# Patient Record
Sex: FEMALE | Race: BLACK OR AFRICAN AMERICAN | NOT HISPANIC OR LATINO | Employment: FULL TIME | URBAN - METROPOLITAN AREA
[De-identification: names, ages, dates, MRNs, and addresses within clinical notes are randomized per-mention and may not be internally consistent; named-entity substitution may affect disease eponyms.]

---

## 2017-01-04 ENCOUNTER — PATIENT MESSAGE (OUTPATIENT)
Dept: ADMINISTRATIVE | Facility: OTHER | Age: 62
End: 2017-01-04

## 2017-01-11 ENCOUNTER — PATIENT OUTREACH (OUTPATIENT)
Dept: OTHER | Facility: OTHER | Age: 62
End: 2017-01-11
Payer: COMMERCIAL

## 2017-01-11 NOTE — PROGRESS NOTES
"Last 5 Patient Entered Readings                                                               Current 30 Day Average: 163/89     Recent Readings 1/10/2017 1/10/2017 2017    Systolic BP (mmHg) 140 202 156    Diastolic BP (mmHg) 81 94 91        Initial introduction completed with the pt and the role of the health  was explained.   We discussed the following information:  Exercise - Ms. Tavares does some walking at work and stays busy at home working around the house. She does have a stationary bike that she plans on using once she returns home from [place]. She is there for a  for the passing of a relative.  Diet - Pt reports that her diet is "crazy". Today she had a roast beef and cheese sandwich with some water. However today, she is traveling. She is wanting to make changes to her diet because she also has diabetes. She did mention that she has been reading food labels. She especially noticed the amount of sodium in frozen dinners. She will review all resources once she returns home from [place]. She is having labs done in about a month and is hoping to get her A1C down by then.    Resources on diet and exercise were sent.     Pt aware that I am not available for emergencies and to call 911 or [facility] on call if one arises.  Pt aware of the importance of medication adherence.  Pt aware of the importance of diet and exercise.  Pt aware that his sodium intake should be no more than 2000mg per day.  Pt aware that the recommended physical activity each week should be about 30 minutes per day at least 5 times per week.   Pt aware of the importance of taking BP readings at least weekly if not more and during different times each day.  Pt aware that the health  can be used as a resource for lifestyle modifications to help reduce or maintain a healthy BP    "

## 2017-01-30 ENCOUNTER — OFFICE VISIT (OUTPATIENT)
Dept: ORTHOPEDICS | Facility: CLINIC | Age: 62
End: 2017-01-30
Payer: COMMERCIAL

## 2017-01-30 ENCOUNTER — HOSPITAL ENCOUNTER (OUTPATIENT)
Dept: RADIOLOGY | Facility: HOSPITAL | Age: 62
Discharge: HOME | End: 2017-01-30
Attending: ORTHOPAEDIC SURGERY
Payer: COMMERCIAL

## 2017-01-30 VITALS
DIASTOLIC BLOOD PRESSURE: 96 MMHG | BODY MASS INDEX: 43.34 KG/M2 | HEART RATE: 85 BPM | SYSTOLIC BLOOD PRESSURE: 144 MMHG | WEIGHT: 215 LBS | RESPIRATION RATE: 16 BRPM | HEIGHT: 59 IN

## 2017-01-30 DIAGNOSIS — M25.562 LEFT KNEE PAIN, UNSPECIFIED CHRONICITY: ICD-10-CM

## 2017-01-30 DIAGNOSIS — M17.0 PRIMARY OSTEOARTHRITIS OF BOTH KNEES: Primary | ICD-10-CM

## 2017-01-30 PROCEDURE — 73560 X-RAY EXAM OF KNEE 1 OR 2: CPT | Mod: 26,59,RT, | Performed by: RADIOLOGY

## 2017-01-30 PROCEDURE — 99203 OFFICE O/P NEW LOW 30 MIN: CPT | Mod: 25,S$GLB,, | Performed by: PHYSICIAN ASSISTANT

## 2017-01-30 PROCEDURE — 73562 X-RAY EXAM OF KNEE 3: CPT | Mod: 26,LT,, | Performed by: RADIOLOGY

## 2017-01-30 PROCEDURE — 73560 X-RAY EXAM OF KNEE 1 OR 2: CPT | Mod: TC,59,RT

## 2017-01-30 PROCEDURE — 20610 DRAIN/INJ JOINT/BURSA W/O US: CPT | Mod: LT,S$GLB,, | Performed by: PHYSICIAN ASSISTANT

## 2017-01-30 PROCEDURE — 99999 PR PBB SHADOW E&M-EST. PATIENT-LVL IV: CPT | Mod: PBBFAC,,, | Performed by: PHYSICIAN ASSISTANT

## 2017-01-30 RX ORDER — TRIAMCINOLONE ACETONIDE 40 MG/ML
40 INJECTION, SUSPENSION INTRA-ARTICULAR; INTRAMUSCULAR
Status: COMPLETED | OUTPATIENT
Start: 2017-01-30 | End: 2017-01-30

## 2017-01-30 RX ADMIN — TRIAMCINOLONE ACETONIDE 40 MG: 40 INJECTION, SUSPENSION INTRA-ARTICULAR; INTRAMUSCULAR at 12:01

## 2017-01-30 NOTE — LETTER
January 30, 2017      [address] [address]           [facility]  [address]  [place] [address]  Phone: [phone]          Patient: Maria Elena Tavares   MR Number: [id number]   YOB: 1955   Date of Visit: 1/30/2017       Dear [facility]:    Thank you for referring Maria Elena Tavares to me for evaluation. Attached you will find relevant portions of my assessment and plan of care.    If you have questions, please do not hesitate to call me. I look forward to following Maria Elena Tavares along with you.    Sincerely,    Lorraine Reese PA-C    Enclosure  CC:  No Recipients    If you would like to receive this communication electronically, please contact [email or web address] or [phone] to request more information on [employer] access.    For providers and/or their staff who would like to refer a patient to [facility], please contact us through our one-stop-shop provider referral line, Tia Forrester, at [phone].    If you feel you have received this communication in error or would no longer like to receive these types of communications, please e-mail [email or web address]

## 2017-01-30 NOTE — PROGRESS NOTES
"  SUBJECTIVE:     Chief Complaint : left knee pain    History of Present Illness:  Maria Elena Tavares is a 61 y.o. female [facility] employee seen in clinic today with a chief complaint of atraumatic left knee pain x 3 weeks. Pain is medial. There is associated swelling. She has stiffness after long periods of sitting. No previous injury to knee. She denies mechanical symptoms or instability. She has tried OTC medications. Denies previous injury. Known medial OA of right knee.     Past Medical History   Diagnosis Date    BMI 37.0-37.9, adult     Diabetes mellitus type II     Diverticulosis      history of diverticulosisseen on colonoscopy at age 48. Repeat recommended at age 58. Done by     Elevated blood protein      History of elevated protein. Apparently has seen  for extensive workup including bone marrow biopsy    History of shingles 2006    Hyperlipidemia     Hypertension     Microalbuminuria      due 2 diabetes    Mild vitamin D deficiency      . A low vitamin D    Thyroid disease      hypothyroidism       Review of Systems:  Constitutional: no fever or chills  ENT: no nasal congestion or sore throat  Respiratory: no cough or shortness of breath  Cardiovascular: no chest pain or palpitations  Gastrointestinal: no nausea or vomiting, tolerating diet  Genitourinary: no hematuria or dysuria  Integument/Breast: no rash or pruritis  Hematologic/Lymphatic: no easy bruising or lymphadenopathy  Musculoskeletal: see HPI  Neurological: no seizures or tremors  Behavioral/Psych: no auditory or visual hallucinations    OBJECTIVE:     PHYSICAL EXAM:  Blood pressure (!) 144/96, pulse 85, resp. rate 16, height 4' 11" (1.499 m), weight 97.5 kg (215 lb).   General Appearance: WDWN, NAD  Gait: Normal  Neuro/Psych: Mood & affect appropriate  Lungs: Respirations equal and unlabored.   CV: 2+ bilateral upper and lower extremity pulses.   Skin: Intact throughout LE  Extremities: No LE edema    Right Knee " Exam  Range of Motion:0-130 active   Effusion:none  Condition of skin:intact  Location of tenderness:Medial joint line   Strength:5 of 5 quadriceps strength and 5 of 5 hamstring strength  Stability:stable to testing  Susana: negative    Left Knee Exam  Range of Motion:0-130 active   Effusion:yes, small  Condition of skin:intact  Location of tenderness:Medial joint line   Strength:5 of 5 quadriceps strength and 5 of 5 hamstring strength  Stability:stable to testing  Susana: pain medially with testing    Alignment: Mild varus    Right Hip Examination: no pain with PROM     Left Hip Examination: no pain with PROM    RADIOGRAPHS: AP, lateral and merchant knee x-rays ordered and images reviewed today by me reveal moderate degenerative changes medial compartment right knee; mild changes left knee    ASSESSMENT/PLAN:   Osteoarthritis knee  Left knee pain  - Continue stationary bicycle. Wt loss.   - Continue OTC meds prn.   - Inject L knee today. She will monitor glucose levels.  - Euflexxa explained/brochure given.  - RTC PRN.    Knee Injection Procedure Note    Pre-operative Diagnosis: left knee degenerative arthritis    Post-operative Diagnosis: same    Indications: left knee pain    Anesthesia: none    Procedure Details     Verbal consent was obtained for the procedure. The injection site was identified and the skin was prepared with alcohol. The left knee was injected from an anterolateral approach with 1 ml of Kenalog and 3 ml Lidocaine under sterile technique using a 22 gauge needle. The needle was removed and the area cleansed and dressed.    Complications:  None; patient tolerated the procedure well.    she was advised to rest the knee today, using ice and elevation as needed for comfort and swelling. she did receive immediate relief of the knee pain. she was told this would be short lived and is secondary to the lidocaine. she may have an increase in discomfort tonight followed by steady improvement over the  next several days. It may take 1-3 weeks following the injection to get the full benefit of the medication.

## 2017-02-01 ENCOUNTER — PATIENT MESSAGE (OUTPATIENT)
Dept: INTERNAL MEDICINE | Facility: CLINIC | Age: 62
End: 2017-02-01

## 2017-02-03 ENCOUNTER — PATIENT OUTREACH (OUTPATIENT)
Dept: OTHER | Facility: OTHER | Age: 62
End: 2017-02-03
Payer: COMMERCIAL

## 2017-02-03 NOTE — PROGRESS NOTES
Last 5 Patient Entered Readings                                                               Current 30 Day Average: 146/85     Recent Readings 1/28/2017 1/28/2017 1/19/2017 1/15/2017 1/13/2017    Systolic BP (mmHg) 133 150 143 131 138    Diastolic BP (mmHg) 73 82 87 89 79    Pulse 93 100 88 87 95        Outpatient Hypertension Medications as of 2/3/2017             losartan-hydrochlorothiazide 100-25 mg (HYZAAR) 100-25 mg per tablet Take 1 tablet by mouth once daily.- qam        Called patient to introduce her into the HDM (hypertension digital medicine) clinic.     Verified the following information with the patient:  Drug allergies  Medication adherence- Patient states she is adherent.     Screening questionnaires:  Depression- not indicated    Sleep apnea- undiagnosed, indicated- Patient states she does snore.  Patient denies waking up gasping for air.  Patient states she sometimes does not feeling well rested because she has trouble sleeping.  Reviewed s/sx of JOSEFINA.    Explained that we expect her to obtain several blood pressures/week at random times of day.     Explained that our goal is to get her BP to consistently below 140/90mmHg.     Patient and I agreed that the patient will take her BP daily to every other day at varying times of the day.     I will plan to follow-up with the patient in 2 weeks.    Emailed patient link to [facility] HT[facility] webpage as well as my direct phone number in case she has in questions.

## 2017-02-03 NOTE — LETTER
"   Lexi Vaca, PharmD  [address] [place] [address]     Dear Maria Elena Tavares,    Welcome to the [facility]pert[facility] Digital [facility] Program!         My name is Lexi Vaca and I am your dedicated clinical pharmacist.  As an expert in medication management, I will help ensure that the medications you are taking continue to provide you with the intended benefits.      This is Jazmyn Franco and she will be your health  for the duration of the program.  Her job is to help you identify lifestyle changes to improve your blood pressure control.  You will talk about nutrition, exercise, and other ways that you may be able to adjust your current habits to better your health. Together, we will work to improve your overall health and encourage you to meet your goals for a healthier lifestyle.    What we expect from YOU:    You will need to take blood pressure readings multiple times a week and no less than one reading per week.   It is important that you take your measurements at different times during the day, when possible.     What you should expect from your Digital Medicine Care Team:   We will provide you with education about high blood pressure, including lifestyle changes that could help you to control your blood pressure.   We will review your weekly readings and provide you with monthly blood pressure progress reports after you have been in the program for more than 30 days.   We will send monthly progress reports on your blood pressure control to your physician so they can follow along with your progress as well.    You will be able to reach me by phone at [phone] or through your [facility] account by clicking "Send a message to your doctor's office" on the home screen then selecting my name in the "To the office of:" field.     I look forward to working with you to achieve your blood pressure goals!    Sincerely,    Lexi Vaca, January  Your personal Clinical " Pharmacist    Please visit [email or web address] to learn more about high blood pressure and what you can do lower your blood pressure.                                                                                         Maria Elena Tavares  [address] [facility] [address]

## 2017-02-13 ENCOUNTER — PATIENT OUTREACH (OUTPATIENT)
Dept: OTHER | Facility: OTHER | Age: 62
End: 2017-02-13
Payer: COMMERCIAL

## 2017-02-17 ENCOUNTER — PATIENT OUTREACH (OUTPATIENT)
Dept: OTHER | Facility: OTHER | Age: 62
End: 2017-02-17
Payer: COMMERCIAL

## 2017-02-17 NOTE — PROGRESS NOTES
Last 5 Patient Entered Readings                                                               Current 30 Day Average: 136/82     Recent Readings 2/15/2017 2/5/2017 1/28/2017 1/28/2017 1/19/2017    Systolic BP (mmHg) 122 139 133 150 143    Diastolic BP (mmHg) 83 81 73 82 87    Pulse 96 99 93 100 88        Hypertension Medications             losartan-hydrochlorothiazide 100-25 mg (HYZAAR) 100-25 mg per tablet Take 1 tablet by mouth once daily.        Plan:   Called patient to follow up. Per current 30 day average, BP is well controlled. Patient denies having questions or concerns. Will continue to monitor. WCB in 1 month.

## 2017-02-24 DIAGNOSIS — N64.4 BREAST PAIN IN FEMALE: Primary | ICD-10-CM

## 2017-03-02 ENCOUNTER — LAB VISIT (OUTPATIENT)
Dept: LAB | Facility: OTHER | Age: 62
End: 2017-03-02
Attending: FAMILY MEDICINE
Payer: COMMERCIAL

## 2017-03-02 DIAGNOSIS — E11.9 DIABETES MELLITUS DUE TO INSULIN RECEPTOR ANTIBODIES: ICD-10-CM

## 2017-03-02 LAB
ESTIMATED AVG GLUCOSE: 209 MG/DL
HBA1C MFR BLD HPLC: 8.9 %

## 2017-03-02 PROCEDURE — 36415 COLL VENOUS BLD VENIPUNCTURE: CPT

## 2017-03-02 PROCEDURE — 83036 HEMOGLOBIN GLYCOSYLATED A1C: CPT

## 2017-03-06 ENCOUNTER — PATIENT OUTREACH (OUTPATIENT)
Dept: OTHER | Facility: OTHER | Age: 62
End: 2017-03-06
Payer: COMMERCIAL

## 2017-03-06 RX ORDER — LOSARTAN POTASSIUM AND HYDROCHLOROTHIAZIDE 25; 100 MG/1; MG/1
TABLET ORAL
Qty: 90 TABLET | Refills: 0 | Status: SHIPPED | OUTPATIENT
Start: 2017-03-06 | End: 2017-05-18 | Stop reason: SDUPTHER

## 2017-03-06 NOTE — PROGRESS NOTES
Last 5 Patient Entered Readings                                                               Current 30 Day Average: 129/76     Recent Readings 3/3/2017 2/23/2017 2/23/2017 2/15/2017 2/5/2017    Systolic BP (mmHg) 136 127 116 122 139    Diastolic BP (mmHg) 77 63 65 83 81    Pulse 98 95 96 96 99      138/83  Follow up with Ms. Maria Elena Tavares completed. Ms. Tavares is doing well. She reports that she needs to improve her exercise routine and cut back on carbohydrates. She had blood work done and wants to lower her A1C. I will send Ms. Tavares the meal planning document via email to assist her with creating healthier meals and cutting carbohydrates. Patient did not have any further questions or concerns. I will follow up in a few weeks to see how she is doing and progressing.

## 2017-03-15 ENCOUNTER — OFFICE VISIT (OUTPATIENT)
Dept: INTERNAL MEDICINE | Facility: CLINIC | Age: 62
End: 2017-03-15
Attending: FAMILY MEDICINE
Payer: COMMERCIAL

## 2017-03-15 VITALS
SYSTOLIC BLOOD PRESSURE: 138 MMHG | WEIGHT: 213.88 LBS | HEART RATE: 76 BPM | HEIGHT: 59 IN | BODY MASS INDEX: 43.12 KG/M2 | DIASTOLIC BLOOD PRESSURE: 90 MMHG

## 2017-03-15 DIAGNOSIS — E11.9 DIABETES MELLITUS DUE TO INSULIN RECEPTOR ANTIBODIES: Primary | ICD-10-CM

## 2017-03-15 DIAGNOSIS — I10 ESSENTIAL HYPERTENSION: ICD-10-CM

## 2017-03-15 PROCEDURE — 99999 PR PBB SHADOW E&M-EST. PATIENT-LVL III: CPT | Mod: PBBFAC,,, | Performed by: FAMILY MEDICINE

## 2017-03-15 PROCEDURE — 99213 OFFICE O/P EST LOW 20 MIN: CPT | Mod: S$GLB,,, | Performed by: FAMILY MEDICINE

## 2017-03-15 NOTE — PATIENT INSTRUCTIONS
unfortunately you would have to be seen for an evaluation . [facility] policy defer physician form being treated blindly to make sure the patient is treated for exactly what your diagnosis are confirmed as.

## 2017-03-15 NOTE — PROGRESS NOTES
Subjective:       Patient ID: Maria Elena Tavares is a 61 y.o. female.    Chief Complaint: Diabetes    HPI Comments: Pt presents today for DM f/u after recent a1c 8.9. Pt aware that she needs med adjustment. States that she does not want lantus at this time    Diabetes   Pertinent negatives for hypoglycemia include no dizziness or headaches. Pertinent negatives for diabetes include no chest pain and no weakness.     Review of Systems   Constitutional: Negative.    Eyes: Negative.    Respiratory: Negative for cough, chest tightness and shortness of breath.    Cardiovascular: Negative for chest pain, palpitations and leg swelling.   Gastrointestinal: Negative.    Musculoskeletal: Negative.  Negative for joint swelling.   Skin: Negative.    Neurological: Negative for dizziness, weakness, light-headedness and headaches.       Objective:      Physical Exam   Constitutional: She is oriented to person, place, and time. She appears well-developed and well-nourished.   HENT:   Head: Normocephalic and atraumatic.   Eyes: Conjunctivae and EOM are normal. Pupils are equal, round, and reactive to light.   Neck: Normal range of motion. Neck supple. No thyromegaly present.   Cardiovascular: Normal rate, regular rhythm, normal heart sounds and intact distal pulses.    No murmur heard.  Pulmonary/Chest: Effort normal and breath sounds normal. No respiratory distress. She has no wheezes. She has no rales. She exhibits no tenderness.   Musculoskeletal: Normal range of motion. She exhibits no edema.   Lymphadenopathy:     She has no cervical adenopathy.   Neurological: She is alert and oriented to person, place, and time.   Skin: Skin is warm. No erythema.   Psychiatric: She has a normal mood and affect. Her behavior is normal. Judgment and thought content normal.       Assessment:       1. 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 Pt has plenty of meds and will double for now  until RF due  Lifestyle mods encouraged with patient  RTC prn symptoms  a1c in 3 mos, appt to follow at 4 mos

## 2017-03-17 ENCOUNTER — PATIENT OUTREACH (OUTPATIENT)
Dept: OTHER | Facility: OTHER | Age: 62
End: 2017-03-17
Payer: COMMERCIAL

## 2017-03-17 NOTE — PROGRESS NOTES
Last 5 Patient Entered Readings                                                               Current 30 Day Average: 131/80     Recent Readings 3/13/2017 3/13/2017 3/7/2017 3/3/2017 2/23/2017    Systolic BP (mmHg) 159 147 124 136 127    Diastolic BP (mmHg) 97 93 83 77 63    Pulse 83 86 95 98 95        Hypertension Medications             losartan-hydrochlorothiazide 100-25 mg (HYZAAR) 100-25 mg per tablet TAKE ONE TABLET BY MOUTH ONCE DAILY        Plan:   Called patient to follow up. Per current 30 day average, BP is well controlled. Patient denies having questions or concerns. Will continue to monitor. WCB in 1 month.

## 2017-03-28 ENCOUNTER — PATIENT OUTREACH (OUTPATIENT)
Dept: OTHER | Facility: OTHER | Age: 62
End: 2017-03-28
Payer: COMMERCIAL

## 2017-03-28 NOTE — PROGRESS NOTES
Last 5 Patient Entered Readings                                                               Current 30 Day Average: 136/83     Recent Readings 3/22/2017 3/13/2017 3/13/2017 3/7/2017 3/3/2017    Systolic BP (mmHg) 133 159 147 124 136    Diastolic BP (mmHg) 80 97 93 83 77    Pulse 85 83 86 95 98        Follow up with Ms. Maria Elena Tavares completed. Ms. Tavares is doing okay. She reports that she felt a little lightheaded this morning. She will take a reading as soon as she can. Patient reports that she has made small changes to her diet, and has started walking two days a week. Patient did not have any further questions or concerns. I will follow up in a few weeks to see how she is doing and progressing.

## 2017-04-07 ENCOUNTER — OFFICE VISIT (OUTPATIENT)
Dept: OBSTETRICS AND GYNECOLOGY | Facility: CLINIC | Age: 62
End: 2017-04-07
Attending: OBSTETRICS & GYNECOLOGY
Payer: COMMERCIAL

## 2017-04-07 ENCOUNTER — PATIENT OUTREACH (OUTPATIENT)
Dept: OTHER | Facility: OTHER | Age: 62
End: 2017-04-07
Payer: COMMERCIAL

## 2017-04-07 VITALS
HEIGHT: 59 IN | WEIGHT: 209 LBS | SYSTOLIC BLOOD PRESSURE: 122 MMHG | DIASTOLIC BLOOD PRESSURE: 76 MMHG | BODY MASS INDEX: 42.13 KG/M2

## 2017-04-07 DIAGNOSIS — N64.4 BREAST TENDERNESS IN FEMALE: Primary | ICD-10-CM

## 2017-04-07 PROCEDURE — 99999 PR PBB SHADOW E&M-EST. PATIENT-LVL II: CPT | Mod: PBBFAC,,, | Performed by: OBSTETRICS & GYNECOLOGY

## 2017-04-07 PROCEDURE — 99213 OFFICE O/P EST LOW 20 MIN: CPT | Mod: S$GLB,,, | Performed by: OBSTETRICS & GYNECOLOGY

## 2017-04-07 NOTE — PROGRESS NOTES
Subjective:       Patient ID: Maria Elena Tavares is a 61 y.o. female.    Chief Complaint:  Breast Problem (right breast)      History of Present Illness  HPI  This 61 yr old female with history of hyst and up to date on pap is here for breast pain.  She had normal mammogram in May 2016.  She has had shooting pain for a few months and was on and off but now for last 3 weeks has been almost constant.  This is bilateral but more on left than right.  She has a history of benign cysts in breast.  She is not on HRT and has not .  (she has not been bothered with hot flashes etc) she denies nipple discharge.  Hurts to lay on them.  She does not wear underwire in bras.  No change in bras.  Her bras do have     GYN & OB History  No LMP recorded. Patient has had a hysterectomy.   Date of Last Pap: 5/16/2016    OB History   No data available       Review of Systems  Review of Systems        Objective:    Physical Exam     No masses or discharg from nipple on left side.  Soreness/tenderness on outer breast  Right breast has small pea shaped and sized tender cyst on mid outer breast.  No nipple discharge on right.  No adenopathy bilaterally  Assessment:        1. Breast tenderness in female               Plan:    follow up after ultrasound,  Breast center may go ahead and do mammogram.

## 2017-04-07 NOTE — PROGRESS NOTES
Last 5 Patient Entered Readings                                                               Current 30 Day Average: 130/81     Recent Readings 4/5/2017 3/28/2017 3/22/2017 3/13/2017 3/13/2017    Systolic BP (mmHg) 109 128 133 159 147    Diastolic BP (mmHg) 69 80 80 97 93    Pulse 111 87 85 83 86        Hypertension Medications             losartan-hydrochlorothiazide 100-25 mg (HYZAAR) 100-25 mg per tablet TAKE ONE TABLET BY MOUTH ONCE DAILY        Plan:   Called patient to follow up. Per current 30 day average, BP is well controlled. Patient denies having questions or concerns. Will continue to monitor. WCB in 3 months, sooner if BP begins to trend up or down.

## 2017-04-10 ENCOUNTER — HOSPITAL ENCOUNTER (OUTPATIENT)
Dept: RADIOLOGY | Facility: HOSPITAL | Age: 62
Discharge: HOME | End: 2017-04-10
Attending: OBSTETRICS & GYNECOLOGY
Payer: COMMERCIAL

## 2017-04-10 DIAGNOSIS — N64.4 BREAST PAIN IN FEMALE: ICD-10-CM

## 2017-04-10 DIAGNOSIS — N64.4 BREAST TENDERNESS IN FEMALE: ICD-10-CM

## 2017-04-10 PROCEDURE — 77066 DX MAMMO INCL CAD BI: CPT | Mod: TC

## 2017-04-10 PROCEDURE — 77062 BREAST TOMOSYNTHESIS BI: CPT | Mod: 26,,, | Performed by: RADIOLOGY

## 2017-04-10 PROCEDURE — 77066 DX MAMMO INCL CAD BI: CPT | Mod: 26,,, | Performed by: RADIOLOGY

## 2017-04-18 ENCOUNTER — PATIENT OUTREACH (OUTPATIENT)
Dept: OTHER | Facility: OTHER | Age: 62
End: 2017-04-18
Payer: COMMERCIAL

## 2017-04-18 ENCOUNTER — PATIENT MESSAGE (OUTPATIENT)
Dept: OBSTETRICS AND GYNECOLOGY | Facility: CLINIC | Age: 62
End: 2017-04-18

## 2017-04-18 NOTE — PROGRESS NOTES
Last 5 Patient Entered Readings                                                               Current 30 Day Average: 138/82     Recent Readings 4/30/2017 4/30/2017 4/28/2017 4/23/2017 4/20/2017    Systolic BP (mmHg) 137 140 134 149 157    Diastolic BP (mmHg) 93 84 93 88 91    Pulse 80 73 121 95 97        Follow up with Ms. Maria Elena Tavares completed. Ms. Tavares attributes elevated readings to not resting long enough before taking a BP reading. She will wait 5-10 minutes before checking BP. Patient reports that she is exercising a few days a week and following a meal plan from Dr. Ahsby. She has lost about 2 pounds so far. Patient did not have any further questions or concerns. I will follow up in a few weeks to see how she is doing and progressing.

## 2017-04-26 ENCOUNTER — INITIAL CONSULT (OUTPATIENT)
Dept: BARIATRICS | Facility: CLINIC | Age: 62
End: 2017-04-26
Payer: COMMERCIAL

## 2017-04-26 VITALS
HEART RATE: 80 BPM | HEIGHT: 60 IN | BODY MASS INDEX: 41.68 KG/M2 | DIASTOLIC BLOOD PRESSURE: 80 MMHG | WEIGHT: 212.31 LBS | SYSTOLIC BLOOD PRESSURE: 126 MMHG

## 2017-04-26 DIAGNOSIS — K21.9 GASTROESOPHAGEAL REFLUX DISEASE, ESOPHAGITIS PRESENCE NOT SPECIFIED: ICD-10-CM

## 2017-04-26 DIAGNOSIS — I10 ESSENTIAL HYPERTENSION: ICD-10-CM

## 2017-04-26 DIAGNOSIS — E66.01 MORBID OBESITY WITH BMI OF 40.0-44.9, ADULT: ICD-10-CM

## 2017-04-26 PROCEDURE — 99999 PR PBB SHADOW E&M-EST. PATIENT-LVL III: CPT | Mod: PBBFAC,,, | Performed by: INTERNAL MEDICINE

## 2017-04-26 PROCEDURE — 99215 OFFICE O/P EST HI 40 MIN: CPT | Mod: S$GLB,,, | Performed by: INTERNAL MEDICINE

## 2017-04-26 RX ORDER — PANTOPRAZOLE SODIUM 40 MG/1
40 TABLET, DELAYED RELEASE ORAL DAILY
Qty: 90 TABLET | Refills: 0 | Status: SHIPPED | OUTPATIENT
Start: 2017-04-26 | End: 2018-12-28

## 2017-04-26 NOTE — PROGRESS NOTES
Subjective:       Patient ID: Maria Elena Tavares is a 61 y.o. female.    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 Had been on Tradjenta.      Hypertension     History of shingles     Mild vitamin D deficiency     Diverticulosis     Hypothyroid     Diverticulitis of colon (without mention of hemorrhage)     Right groin pain    GERD  Lab Results       Component                Value               Date                       HGBA1C                   8.9 (H)             03/02/2017                 HGBA1C                   8.6 (H)             11/02/2016                 HGBA1C                   7.4 (H)             04/28/2016            Lab Results       Component                Value               Date                       LDLCALC                  120.2               11/02/2016                 CREATININE               1.1                 11/02/2016              Has been on 1500 jimmy. Not losing weight. Does have exercise bike.       Previous diet attempts: WW. Tired of counting     Heaviest weight: 212#    Lightest weight: 115#    Goal weight: 160#    History of medication for loss: Something in in her 20s. Not sure what. Has not considered weight loss surgery.     Last eye exam:   3/2/17    Typical eating patterns:   Breakfast: Boiled egg and slice of toast and fruit.     Lunch: Tuna. Chicken Cacittore and gluten free pasta.     Dinner: Similar to lunch, but with rice.    Snacks: Atkins treat. Pear, peach or plum. [place] bar.     Beverages: Water. Occasional diet soda. Crystal light.     Willingness to change: 10/10.     EKG:Normal sinus rhythm  Possible Left atrial enlargement  Borderline Abnormal ECG      BMR: 1448      Review of Systems   Constitutional: Negative for chills and fever.   Respiratory: Negative for apnea and shortness of breath.          Snores.    Cardiovascular: Positive for leg swelling. Negative for chest pain.   Gastrointestinal: Positive for abdominal distention and constipation. Negative for diarrhea.   Genitourinary: Negative for difficulty urinating and dysuria.   Musculoskeletal: Positive for arthralgias. Negative for back pain.        R knee   Neurological: Negative for dizziness and light-headedness.   Psychiatric/Behavioral: Negative for dysphoric mood. The patient is not nervous/anxious.        Objective:     /80  Pulse 80  Ht 5' (1.524 m)  Wt 96.3 kg (212 lb 4.9 oz)  BMI 41.46 kg/m2    Physical Exam   Constitutional: She is oriented to person, place, and time. She appears well-developed and well-nourished. No distress.   HENT:   Head: Normocephalic and atraumatic.   Mouth/Throat: No oropharyngeal exudate.   Eyes: EOM are normal. Pupils are equal, round, and reactive to light. No scleral icterus.   Neck: Normal range of motion. Neck supple. No thyromegaly present.   Cardiovascular: Normal rate and normal heart sounds.  Exam reveals no gallop and no friction rub.    No murmur heard.  Pulmonary/Chest: Effort normal and breath sounds normal. No respiratory distress. She has no wheezes.   Abdominal: Soft. Bowel sounds are normal. She exhibits no distension. There is no tenderness.   Musculoskeletal: Normal range of motion. She exhibits no edema.   Lymphadenopathy:     She has no cervical adenopathy.   Neurological: She is alert and oriented to person, place, and time. No cranial nerve deficit.   Skin: Skin is warm and dry. No erythema.   Psychiatric: She has a normal mood and affect. Her behavior is normal. Judgment normal.   Vitals reviewed.      Assessment:       1. Gastroesophageal reflux disease, esophagitis presence not specified    2. Morbid obesity with BMI of 40.0-44.9, adult    3. Essential hypertension    4. Uncontrolled type 2 diabetes mellitus without complication, without long-term current use of insulin         Plan:         1. Gastroesophageal reflux disease, esophagitis presence not specified  Expect improvement with weight loss  Attempt to wean PPI once 10% TBW lost.     - pantoprazole (PROTONIX) 40 MG tablet; Take 1 tablet (40 mg total) by mouth once daily.  Dispense: 90 tablet; Refill: 0    2. Morbid obesity with BMI of 40.0-44.9, adult      3. Essential hypertension  The current medical regimen is effective;  continue present plan and medications. Expect improvement with weight loss     4. Uncontrolled type 2 diabetes mellitus without complication, without long-term current use of insulin    - dulaglutide (TRULICITY) 1.5 mg/0.5 mL PnIj; Inject 1.5 mg into the skin every 7 days.  Dispense: 2 mL; Refill: 2        Stop glipizide. Start Trulicity once a week.     Decrease portions once you start trulicity    [employer] for coupon    3 meals a day made up of the following:  Unlimited green vegetables, tomatoes, mushrooms, spaghetti squash, cauliflower, meat, poultry, seafood, eggs and hard cheeses.   Milk and plain yogurt  Dressings, seasonings, condiments, etc should have less than 2 g sugars.   beans or nuts can have 1 x a day.   1-2 servings of citrus fruits, berries, pineapple or melon a day (1/2 cup)  Avoid fried foods    No grains, rice, pasta, potatoes or bread    No soda, sweet tea, juices or lemonade    Www.dietdoctor[employer] for recipes.    Ronald Barnes.     Exercise 30 min 4 days a week.     Return in about 5 weeks (around 5/31/2017).

## 2017-04-26 NOTE — MR AVS SNAPSHOT
[facility]  [address]  [place] [address]  Phone: [phone]  Fax: [phone]                  Maria Elena Tavares   2017 9:15 AM   Initial consult    Description:  Female : 1955   Provider:  Nancy Ashby MD   Department:  [facility]           Reason for Visit     Consult           Diagnoses this Visit        Comments    Gastroesophageal reflux disease, esophagitis presence not specified         Morbid obesity with BMI of 40.0-44.9, adult         Essential hypertension         Uncontrolled type 2 diabetes mellitus without complication, without long-term current use of insulin                To Do List           Future Appointments        Provider Department Dept Phone    2017 3:00 PM Nancy Ahsby MD [facility] Surgery [phone]    6/15/2017 7:00 AM LAB, SAME DAY [facility] [phone]    2017 8:00 AM Estephania San MD [facility] Internal Medicine [phone]      Goals (5 Years of Data)              Today    17    Blood Pressure <= 140/90   126/80  126/80  126/80    Notes - Note created  2017  1:48 PM by Jazmyn Franco    It is important to consistently maintain a controlled blood pressure.      Exercise at least 150 minutes per week.           Maintain a low sodium diet           Take at least one BP reading per week at various times of the day           Weight (lb) < 93 kg (205 lb)   96.3 kg (212 lb 4.9 oz)        Notes - Note created  2017  2:55 PM by Jazmyn Franco    Decrease weight by 5% to reduce cardiovascular risk factors        Follow-Up and Disposition     Return in about 5 weeks (around 2017).       These Medications        Disp Refills Start End    pantoprazole (PROTONIX) 40 MG tablet 90 tablet 0 2017    Take 1 tablet (40 mg total) by mouth once daily. - Oral    Pharmacy: [facility] [place]  - [address] Ph #: [phone]       dulaglutide (TRULICITY) 1.5 mg/0.5 mL PnIj 2 mL 2 4/26/2017     Inject 1.5 mg into the skin every 7 days. - Subcutaneous    [facility] [place] [facility] [address] Ph #: [phone]         [facility]     [facility] Nurse Care Line - 24/7 Assistance  Unless otherwise directed by your provider, please contact [facility], our nurse care line that is available for 24/7 assistance.     Registered nurses in the [facility] provide: appointment scheduling, clinical advisement, health education, and other advisory services.  Call: [phone] (toll free)               Medications           Message regarding Medications     Verify the changes and/or additions to your medication regime listed below are the same as discussed with your clinician today.  If any of these changes or additions are incorrect, please notify your healthcare provider.        START taking these NEW medications        Refills    dulaglutide (TRULICITY) 1.5 mg/0.5 mL PnIj 2    Sig: Inject 1.5 mg into the skin every 7 days.    Class: Normal    Route: Subcutaneous      CHANGE how you are taking these medications     Start Taking Instead of    pantoprazole (PROTONIX) 40 MG tablet pantoprazole (PROTONIX) 40 MG tablet    Dosage:  Take 1 tablet (40 mg total) by mouth once daily. Dosage:  Take 1 tablet (40 mg total) by mouth 2 (two) times daily.    Reason for Change:  Reorder       STOP taking these medications     glipiZIDE (GLUCOTROL) 10 MG tablet Take 1 tablet (10 mg total) by mouth 2 (two) times daily before meals.           Verify that the below list of medications is an accurate representation of the medications you are currently taking.  If none reported, the list may be blank. If incorrect, please contact your healthcare provider. Carry this list with you in case of emergency.           Current Medications     aspirin  (ECOTRIN) 81 MG EC tablet Take 81 mg by mouth once daily.      blood sugar diagnostic (TRUETEST TEST STRIPS) Strp Test blood sugars twice daily    blood-glucose meter kit TRUE TEST BLOOD GLUCOSE METER    LANCETS = TEST BLOOD SUGARS TWICE DAILY , DISPENSE 100 EACH REFILLS 8    levothyroxine (SYNTHROID) 200 MCG tablet Take 1 tablet (200 mcg total) by mouth once daily.    losartan-hydrochlorothiazide 100-25 mg (HYZAAR) 100-25 mg per tablet TAKE ONE TABLET BY MOUTH ONCE DAILY    meloxicam (MOBIC) 15 MG tablet Take 1 tablet (15 mg total) by mouth once daily.    metformin (GLUCOPHAGE) 1000 MG tablet TAKE 1 TABLET (1,000 MG TOTAL) BY MOUTH 2 (TWO) TIMES DAILY WITH MEALS.    pantoprazole (PROTONIX) 40 MG tablet Take 1 tablet (40 mg total) by mouth once daily.    rosuvastatin (CRESTOR) 10 MG tablet Take 1 tablet (10 mg total) by mouth once daily.    dulaglutide (TRULICITY) 1.5 mg/0.5 mL PnIj Inject 1.5 mg into the skin every 7 days.           Clinical Reference Information           Your Vitals Were     BP Pulse Height Weight BMI    126/80 80 5' (1.524 m) 96.3 kg (212 lb 4.9 oz) 41.46 kg/m2      Blood Pressure          Most Recent Value    BP  126/80      Allergies as of 4/26/2017     No Known Allergies      Immunizations Administered on Date of Encounter - 4/26/2017     None      Instructions    Stop glipizide. Start Trulicity once a week.     Decrease portions once you start trulicity    Www[employer] for coupon    3 meals a day made up of the following:  Unlimited green vegetables, tomatoes, mushrooms, spaghetti squash, cauliflower, meat, poultry, seafood, eggs and hard cheeses.   Milk and plain yogurt  Dressings, seasonings, condiments, etc should have less than 2 g sugars.   beans or nuts can have 1 x a day.   1-2 servings of citrus fruits, berries, pineapple or melon a day (1/2 cup)  Avoid fried foods    No grains, rice, pasta, potatoes or bread    No soda, sweet tea, juices or lemonade    Www.dietdoctor.com for  recipes.    Ronald Barnes.     Exercise 30 min 4 days a week.     Return in about 5 weeks (around 5/31/2017).         Language Assistance Services     ATTENTION: Language assistance services are available, free of charge. Please call [phone].      ATENCIÓN: Si habla savita, tiene a siegel disposición servicios gratuitos de asistencia lingüística. Llame al [phone].     CHÚ Ý: N?u b?n nói Ti?ng Vi?t, có các d?ch v? h? tr? ngôn ng? mi?n phí dành cho b?n. G?i s? [phone].         [facility] complies with applicable Federal civil rights laws and does not discriminate on the basis of race, color, national origin, age, disability, or sex.

## 2017-05-18 RX ORDER — LOSARTAN POTASSIUM AND HYDROCHLOROTHIAZIDE 25; 100 MG/1; MG/1
TABLET ORAL
Qty: 90 TABLET | Refills: 0 | Status: SHIPPED | OUTPATIENT
Start: 2017-05-18 | End: 2017-09-01 | Stop reason: ALTCHOICE

## 2017-05-23 ENCOUNTER — PATIENT OUTREACH (OUTPATIENT)
Dept: OTHER | Facility: OTHER | Age: 62
End: 2017-05-23

## 2017-05-23 NOTE — PROGRESS NOTES
Last 5 Patient Entered Redings Current 30 Day Average: 127/88     Recent Readings 7/9/2017 7/9/2017 7/9/2017 6/25/2017 6/25/2017    Systolic BP (mmHg) 140 153 149 146 149    Diastolic BP (mmHg) 94 99 77 96 87    Pulse 87 95 77 77 79        Follow up with Ms. Maria Elena Tavares completed. Patient attributes elevated BP to dietary indiscretions. She is back on her diet and will continue to monitor take BP readings. Patient did not have any further questions or concerns. I will follow up in a few weeks to see how she is doing and progressing.

## 2017-05-25 ENCOUNTER — OFFICE VISIT (OUTPATIENT)
Dept: BARIATRICS | Facility: CLINIC | Age: 62
End: 2017-05-25
Payer: COMMERCIAL

## 2017-05-25 VITALS
BODY MASS INDEX: 40.69 KG/M2 | WEIGHT: 207.25 LBS | SYSTOLIC BLOOD PRESSURE: 110 MMHG | DIASTOLIC BLOOD PRESSURE: 76 MMHG | HEART RATE: 82 BPM | HEIGHT: 60 IN

## 2017-05-25 DIAGNOSIS — E66.01 MORBID OBESITY WITH BMI OF 40.0-44.9, ADULT: Primary | ICD-10-CM

## 2017-05-25 PROCEDURE — 99213 OFFICE O/P EST LOW 20 MIN: CPT | Mod: S$GLB,,, | Performed by: INTERNAL MEDICINE

## 2017-05-25 PROCEDURE — 99999 PR PBB SHADOW E&M-EST. PATIENT-LVL III: CPT | Mod: PBBFAC,,, | Performed by: INTERNAL MEDICINE

## 2017-05-25 NOTE — PROGRESS NOTES
Subjective:       Patient ID: Maria Elena Tavares is a 61 y.o. female.    Chief Complaint: Follow-up    Pt here today for follow-up. Has lost 5 lbs. Started her on trulicity with LCHF diet and stopped glipizide. BS has been . Max 140s.She does find her appetite is down and gets full quickly. She denies abd pain and nausea. Has been exercising.       Review of Systems   Constitutional: Negative for chills and fever.   Respiratory: Negative for apnea and shortness of breath.         Snores.    Cardiovascular: Positive for leg swelling. Negative for chest pain.   Gastrointestinal: Positive for abdominal distention and constipation. Negative for diarrhea.   Genitourinary: Negative for difficulty urinating and dysuria.   Musculoskeletal: Positive for arthralgias. Negative for back pain.        R knee   Neurological: Negative for dizziness and light-headedness.   Psychiatric/Behavioral: Negative for dysphoric mood. The patient is not nervous/anxious.        Objective:     /76   Pulse 82   Ht 5' (1.524 m)   Wt 94 kg (207 lb 3.7 oz)   BMI 40.47 kg/m²     Physical Exam   Constitutional: She is oriented to person, place, and time. She appears well-developed and well-nourished. No distress.   HENT:   Head: Normocephalic and atraumatic.   Eyes: EOM are normal. Pupils are equal, round, and reactive to light. No scleral icterus.   Neck: Normal range of motion. Neck supple. No thyromegaly present.   Cardiovascular: Normal rate and normal heart sounds.  Exam reveals no gallop and no friction rub.    No murmur heard.  Pulmonary/Chest: Effort normal and breath sounds normal. No respiratory distress. She has no wheezes.   Musculoskeletal: Normal range of motion. She exhibits no edema.   Neurological: She is alert and oriented to person, place, and time. No cranial nerve deficit.   Skin: Skin is warm and dry. No erythema.   Psychiatric: She has a normal mood and affect. Her behavior is normal. Judgment normal.   Vitals  reviewed.      Assessment:       1. Morbid obesity with BMI of 40.0-44.9, adult    2. Uncontrolled type 2 diabetes mellitus without complication, without long-term current use of insulin        Plan:         1. Uncontrolled type 2 diabetes mellitus without complication, without long-term current use of insulin  Doing well. The current medical regimen is effective;  continue present plan and medications.   - dulaglutide (TRULICITY) 1.5 mg/0.5 mL PnIj; Inject 1.5 mg into the skin every 7 days.  Dispense: 2 mL; Refill: 2    2. Morbid obesity with BMI of 40.0-44.9, adult  Continue to work on decreasing portions and carbs.     3 meals a day made up of the following:  Unlimited green vegetables, tomatoes, mushrooms, spaghetti squash, cauliflower, meat, poultry, seafood, eggs and hard cheeses.   Milk and plain yogurt  Dressings, seasonings, condiments, etc should have less than 2 g sugars.   beans or nuts can have 1 x a day.   1-2 servings of citrus fruits, berries, pineapple or melon a day (1/2 cup)  Avoid fried foods    No grains, rice, pasta, potatoes or bread    No soda, sweet tea, juices or lemonade    Www.dietdoctor.com for recipes.    Ronald Barnes.     Exercise 30 min 4 days a week.     Return in about 8 weeks

## 2017-05-25 NOTE — PATIENT INSTRUCTIONS
Www[email or web address] for coupon    3 meals a day made up of the following:  Unlimited green vegetables, tomatoes, mushrooms, spaghetti squash, cauliflower, meat, poultry, seafood, eggs and hard cheeses.   Milk and plain yogurt  Dressings, seasonings, condiments, etc should have less than 2 g sugars.   beans or nuts can have 1 x a day.   1-2 servings of citrus fruits, berries, pineapple or melon a day (1/2 cup)  Avoid fried foods    No grains, rice, pasta, potatoes or bread    No soda, sweet tea, juices or lemonade    [email or web address] for recipes.    Ronald Barnes.     Exercise 30 min 4 days a week.     Return in about 8 weeks     Bariatric Diet Grocery List      High in Protein:   ? Canned tuna or chicken (packed in water)  ? Lean ground turkey breast or ground round  ? Turkey or chicken (no skin)  ? Lean pork or beef   ? Scrambled, poached, or boiled eggs  ? Baked or broiled fish and seafood (not fried!)  ? Beans and lentils  ? Low fat deli meats: turkey, chicken, ham, roast beef  ? 1% or Skim Milk, Lactaid, or Soymilk  ? Low-fat cheese, cottage cheese, mozzarella string cheese, ricotta  ? Light yogurt, FF/SF frozen yogurt, custard, SF pudding  ? Protein drinks and protein bars with 0-4 grams sugar     Fruits, Vegetables and Snacks   - Green beans, broccoli, cauliflower, spinach, asparagus, carrots, lima beans, yellow squash, zucchini, etc.  - Apple, pineapple, peach, grapes, banana, watermelon, oranges, etc.  - Fruit canned in its own juice or in water (not in syrup)  - Raw veggies  - Lettuce: dark greens like spinach and Duong  - Unsalted Nuts  - Light or Air-popped Popcorn  - Murtaza Links beef jerky     Fluids:   Skim/1% milk, Lactaid, Soymilk  Decaf coffee & decaf tea   Water         Meal Ideas   *Recipes and products available at [email or web address]      Breakfast: (15-20g protein)    - Egg white omelet: 2 egg whites or ½ cup Egg Beaters. (Optional proteins: cheese, shrimp, black beans, chicken, sliced  turkey) (Optional veggies: tomatoes, salsa, spinach, mushrooms, onions, green peppers, or small slice avocado)     - Egg and sausage: 1 egg or ¼ cup Egg Beaters (any variety), with 1 mri or 2 links of Turkey sausage or Veggie breakfast sausage ([employer] or Phone Warrior)    - Crust-less breakfast quiche: To make a glass pie dish, mix 4oz part skim Ricotta, 1 cup skim milk, and 2 eggs as your base. Add protein: shredded cheese, sliced lean ham or turkey, turkey blount/sausage. Add veggies: tomato, onion, green onion, mushroom, green pepper, spinach, etc.    - Yogurt parfait: Mix 1 - 6oz container Dannon Light N Fit vanilla yogurt, with ¼ cup Kashi Go Lean cereal    - Cottage cheese and fruit: ½ cup part-skim cottage cheese or ricotta cheese topped with fresh fruit or sugar free preserves     - Arianna Fritz's Vanilla Egg custard* (add 2 Tbsp instant coffee granules to make Cappuccino Custard*)    - Hi-Protein café latte (skim milk, decaf coffee, 1 scoop protein powder). Optional to add Sugar free syrup or extract flavoring.    Lunch: (20-30g protein)    - ½ cup Black bean soup (Homemade or Progresso), with ¼ cup shredded low-fat cheese. Top with chopped tomato or fresh salsa.     - Lean deli turkey breast and low-fat sliced cheese, mustard or light hayes to moisten, rolled up together, or wrapped in a Duong lettuce leaf    - Chicken salad made from dinner leftovers, moisten with low-fat salad dressing or light hayes. Also try leftover salmon, shrimp, tuna or boiled eggs. Serve ½ cup over dark green salad    - Fat-free canned refried beans, topped with ¼ cup shredded low-fat cheese. Top with chopped tomato or fresh salsa.     - Greek salad: Top mixed greens with 1-2oz grilled chicken, tomatoes, red onions, 2-3 kalamata olives, and sprinkle lightly with feta cheese. Spritz with Balsamic vinegar to taste.     - Crust-less lunch quiche: To make a glass pie dish, mix 4oz part skim Ricotta, 1 cup skim milk, and 2 eggs as  your base. Add protein: shredded cheese, sliced lean ham or turkey, shrimp, chicken. Add veggies: tomato, onion, green onion, mushroom, green pepper, spinach, artichoke, broccoli, etc.    - Pizza bake: tomato sauce, low-fat shredded mozzarella and turkey pepperoni or [place] Leonean blount. Add any veggies.    - Cucumber crab bites: Spread ¼ cup crab dip (lump crabmeat + light cream cheese and green onions) over sliced cucumber.     - Chicken with light spinach and artichoke dip*: Puree in : 6oz cooked and drained spinach, 2 cloves garlic, 1 can cannelloni beans, ½ cup chopped green onions, 1 can drained artichoke hearts (not marinated in oil), lemon juice and basil. Mix in 2oz chopped up chicken.    Supper: (20-30g protein)    - Serve grilled fish over dark green salad tossed with low-fat dressing, served with grilled asparagus ortiz     - Rotisserie chicken salad: served with sliced strawberries, walnuts, fat-free feta cheese crumbles and 1 tbsp Collinss Own Light Raspberry Unadilla Vinaigrette    - Shrimp cocktail: Dip cold boiled shrimp in homemade low-sugar cocktail sauce (1/2 cup Conchita One Carb ketchup, 2 tbsp horseradish, 1/4 tsp hot sauce, 1 tsp Worcestershire sauce, 1 tbsp freshly-squeezed lemon juice). Serve with dark green salad, walnuts, and crumbled blue cheese drizzled with olive oil and Balsamic vinegar    - Tuna Melt: Spread tuna salad onto 2 thick slices of tomato. Top with low-fat cheese and broil until cheese is melted. May also be made with chicken salad of shrimp salad. Slick with different types of cheeses.    - Homemade low-fat Chili using extra lean ground beef or ground turkey. Top with shredded cheese and salsa as desired. May add dollop fat-free sour cream if desired    - Dinner Omelet with shrimp or chicken and onion, green peppers and chives.    - No noodle lasagna: Use sliced zucchini or eggplant in place of noodles.  Layer with part skim ricotta cheese and low sugar meat  sauce (use very lean ground beef or ground turkey).    - Mexican chicken bake: Bake chunks of chicken breast or thigh with taco seasoning, Pace brand enchilada sauce, green onions and low-fat cheese. Serve with ¼ cup black beans or fat free refried beans topped with chopped tomatoes or salsa.    - Arthur frozen meatballs, simmered in Classico Marinara sauce. Different flavors of salsa or spaghetti sauce create different dishes! Sprinkle with parmesan cheese. Serve with grilled or steamed veggies, or a dark green salad.    - Simmer boneless skinless chicken thigh chunks in Classico Marinara sauce or roasted salsa until tender with chopped onion, bell pepper, garlic, mushrooms, spinach, etc.     - Hamburger, without the bun, dressed the way you like. Served with grilled or steamed veggies.    - Eggplant parmesan: Bake slices of eggplant at 350 degrees for 15 minutes. Layer tomato sauce, sliced eggplant and low-fat mozzarella cheese in a baking dish and cover with foil. Bake 30-40 more minutes or until bubbly. Uncover and bake at 400 degrees for about 15 more minutes, or until top is slightly crisp.    - Fish tacos: grilled/baked white fish, wrapped in Duong lettuce leaf, topped with salsa, shredded low-fat cheese, and light coleslaw.    Snacks: (100-200 calories; >5g protein)    - 1 low-fat cheese stick with 8 cherry tomatoes or 1 serving fresh fruit  - 4 thin slices fat-free turkey breast and 1 slice low-fat cheese  - 4 thin slices fat-free honey ham with wedge of melon  - 1/4 cup unsalted nuts with ½ cup fruit  - 6-oz container Dannon Light n Fit vanilla yogurt, topped with 1oz unsalted nuts         - apple, celery or baby carrots spread with 2 Tbsp natural peanut butter or almond butter   - apple slices with 1 oz slice low-fat cheese  - celery, cucumber, bell pepper or baby carrots dipped in ¼ cup hummus bean spread or light spinach and artichoke dip (*recipe in lunch section)  - 100 calorie bag microwave  light popcorn with 3 tbsp grated parmesan cheese  - Murtaza Links Beef Steak - 14g protein! (similar to beef jerky)  - 2 wedges Laughing Cow - Light Herb & Garlic Cheese with sliced cucumber or green bell pepper  - 1/2 cup low-fat cottage cheese with ¼ cup fruit or ¼ cup salsa  - RTD Protein drinks: Atkins, Low Carb Slim Fast, EAS light, Muscle Milk Light, etc.  - Homemade Protein drinks: [facility] Soy95, Isopure, Nectar, UNJURY, Whey Gourmet, etc. Mix 1 scoop powder with 8oz skim/1% milk or light soymilk.  - Protein bars: Atkins, EAS, Pure Protein, Think Thin, Detour, etc. Must have 0-4 grams sugar - Read the label.    Takeout Options: No more than twice/week  Deli - Salads (no pasta or rice), meats, cheeses. Roasted chicken. Lox (salmon)    Mexican - Platters which don't include tortillas, chips, or rice. Go easy on the beans. Example: Fajitas without the tortillas. Ask the  not to bring chips to the table if they are too tempting.    Greek - Meat or fish and vegetable, but no bread or rice. Including hummus, baba ganoush, etc, is OK. Most sit-down Greek restaurants can provide you with cucumber slices for dipping instead of reshma bread.    Fast Food (Avoid as much as possible) - Salads (no croutons and limit salad dressing to 2 tbsp), grilled chicken sandwich without the bun and ask for no hayes. Kristis low fat chili or Taco Bell pintos and cheese.    BBQ - The meats are fine if you ask for sauces on the side, but most of the traditional side dishes are loaded with carbs. Aram slaw, baked beans and BBQ sauce are typically made with sugar.    Chinese - Nothing deep-fried, no rice or noodles. Many Chinese sauces have starch and sugar in them, so you'll have to use your judgement. If you find that these sauces trigger cravings, or cause Dumping, you can ask for the sauce to be made without sugar or just use soy sauce.

## 2017-06-14 ENCOUNTER — OFFICE VISIT (OUTPATIENT)
Dept: OPTOMETRY | Facility: CLINIC | Age: 62
End: 2017-06-14
Payer: COMMERCIAL

## 2017-06-14 DIAGNOSIS — Z01.00 ROUTINE EYE EXAM: Primary | ICD-10-CM

## 2017-06-14 DIAGNOSIS — E11.9 TYPE 2 DIABETES MELLITUS WITHOUT OPHTHALMIC MANIFESTATIONS: ICD-10-CM

## 2017-06-14 DIAGNOSIS — H52.4 PRESBYOPIA: ICD-10-CM

## 2017-06-14 PROCEDURE — 99999 PR PBB SHADOW E&M-EST. PATIENT-LVL I: CPT | Mod: PBBFAC,,, | Performed by: OPTOMETRIST

## 2017-06-14 PROCEDURE — 92014 COMPRE OPH EXAM EST PT 1/>: CPT | Mod: S$GLB,,, | Performed by: OPTOMETRIST

## 2017-06-14 PROCEDURE — 92015 DETERMINE REFRACTIVE STATE: CPT | Mod: S$GLB,,, | Performed by: OPTOMETRIST

## 2017-06-14 NOTE — PROGRESS NOTES
HPI     Last eye exam was 6/13/16 with Dr. Ruiz.  Patient states didn't fill glasses rx from last visit. Unhappy with   current glasses-wants SV readers only. No distance vision complaints.   Patient denies diplopia, headaches, flashes/floaters, and pain.  Hemoglobin A1C       Date                     Value               Ref Range             Status                03/02/2017               8.9 (H)             4.5 - 6.2 %           Final              Comment:    According to ADA guidelines, hemoglobin A1C <7.0% represents  optimal   control in non-pregnant diabetic patients.  Different  metrics may apply   to specific populations.   Standards of Medical Care in Diabetes -   2016.  For the purpose of screening for the presence of diabetes:  <5.7%       Consistent with the absence of diabetes  5.7-6.4%  Consistent with   increasing risk for diabetes   (prediabetes)  >or=6.5%  Consistent with   diabetes  Currently no consensus exists for use of hemoglobin A1C  for   diagnosis of diabetes for children.    ----------        Last edited by More Andrew, OD on 6/14/2017 10:53 AM. (History)        ROS     Negative for: Constitutional, Gastrointestinal, Neurological, Skin,   Genitourinary, Musculoskeletal, HENT, Endocrine, Cardiovascular, Eyes,   Respiratory, Psychiatric, Allergic/Imm, Heme/Lymph    Last edited by More Andrew, OD on 6/14/2017 11:07 AM. (History)        Assessment /Plan     For exam results, see Encounter Report.    Routine eye exam    Presbyopia    Type 2 diabetes mellitus without ophthalmic manifestations            1-2.  Distance rx given.    3.  No retinopathy--monitor yearly.  BS control.        RTC 1 year for dm exam.

## 2017-06-15 ENCOUNTER — LAB VISIT (OUTPATIENT)
Dept: LAB | Facility: OTHER | Age: 62
End: 2017-06-15
Attending: FAMILY MEDICINE
Payer: COMMERCIAL

## 2017-06-15 DIAGNOSIS — E11.9 DIABETES MELLITUS DUE TO INSULIN RECEPTOR ANTIBODIES: ICD-10-CM

## 2017-06-15 LAB
ESTIMATED AVG GLUCOSE: 154 MG/DL
HBA1C MFR BLD HPLC: 7 %

## 2017-06-15 PROCEDURE — 83036 HEMOGLOBIN GLYCOSYLATED A1C: CPT

## 2017-06-15 PROCEDURE — 36415 COLL VENOUS BLD VENIPUNCTURE: CPT

## 2017-06-19 ENCOUNTER — PATIENT MESSAGE (OUTPATIENT)
Dept: INTERNAL MEDICINE | Facility: CLINIC | Age: 62
End: 2017-06-19

## 2017-06-28 ENCOUNTER — TELEPHONE (OUTPATIENT)
Dept: ORTHOPEDICS | Facility: CLINIC | Age: 62
End: 2017-06-28

## 2017-06-28 ENCOUNTER — PATIENT MESSAGE (OUTPATIENT)
Dept: ORTHOPEDICS | Facility: CLINIC | Age: 62
End: 2017-06-28

## 2017-06-28 NOTE — TELEPHONE ENCOUNTER
Radha Peters,     I came to you earlier this year for my left knee.  Monday of this week I missed a step. didn't feel anything until yesterday.   my knee is tight and a little warm.  I think inflammation set in.  What can I take for it or can you prescribe something for me.   Thank you in advance.     Maria Elena Tavares   Patient Financial Services/ [facility] Location   ext [id number]     Patient has not taken anything to help inflammation. Instructed patient to take an anti-inflammatory for a couple days to see if that helps. If not patient will contact use for further treatment plan.

## 2017-07-07 ENCOUNTER — PATIENT OUTREACH (OUTPATIENT)
Dept: OTHER | Facility: OTHER | Age: 62
End: 2017-07-07

## 2017-07-07 NOTE — PROGRESS NOTES
Last 5 Patient Entered Readings                                                               Current 30 Day Average: 127/86     Recent Readings 6/25/2017 6/25/2017 6/15/2017 6/8/2017 6/8/2017    Systolic BP (mmHg) 146 149 94 142 152    Diastolic BP (mmHg) 96 87 73 88 99    Pulse 77 79 104 85 86        Hypertension Medications             losartan-hydrochlorothiazide 100-25 mg (HYZAAR) 100-25 mg per tablet TAKE ONE TABLET BY MOUTH ONCE DAILY        Plan:   Called patient to follow up. Per current 30 day average, BP is well controlled. LVM.  Will continue to monitor. WCB in 4 months, sooner if BP begins to trend up or down.

## 2017-07-10 ENCOUNTER — PATIENT MESSAGE (OUTPATIENT)
Dept: ORTHOPEDICS | Facility: CLINIC | Age: 62
End: 2017-07-10

## 2017-07-12 ENCOUNTER — TELEPHONE (OUTPATIENT)
Dept: URGENT CARE | Facility: CLINIC | Age: 62
End: 2017-07-12

## 2017-07-12 NOTE — TELEPHONE ENCOUNTER
Patient given x-ray results. Pt states she is wearing the knee brace she was given. Pt will follow up with ortho.

## 2017-08-07 ENCOUNTER — PATIENT OUTREACH (OUTPATIENT)
Dept: OTHER | Facility: OTHER | Age: 62
End: 2017-08-07

## 2017-08-07 NOTE — PROGRESS NOTES
"Last 5 Patient Entered Redings Current 30 Day Average: 143/86     Recent Readings 9/4/2017 9/4/2017 9/4/2017 9/3/2017 8/27/2017    Systolic BP (mmHg) 144 148 152 138 145    Diastolic BP (mmHg) 96 98 91 85 77    Pulse 88 90 89 96 81        Hypertension Digital Medicine Program (HDMP): Health  Follow Up    Lifestyle Modifications:    1. Low sodium diet: yes Patient reports that she has "started back on the low sodium diet".    2.Physical activity: no     3.Hypotension/Hypertension symptoms: no   Frequency/Alleviating factors/Precipitating factors, etc.     4. Patient has been compliant with the medicaiton regimen    Follow up with Ms. Maria Elena Tavares completed via [facility]. Ms. Tavares will continue to monitor her BP since medication has changed and she has starting monitoring her sodium intake again. No further questions or concerns. I will follow up in a few weeks to assess progress.         "

## 2017-08-09 ENCOUNTER — OFFICE VISIT (OUTPATIENT)
Dept: INTERNAL MEDICINE | Facility: CLINIC | Age: 62
End: 2017-08-09
Attending: FAMILY MEDICINE
Payer: COMMERCIAL

## 2017-08-09 VITALS
BODY MASS INDEX: 42.22 KG/M2 | WEIGHT: 209.44 LBS | TEMPERATURE: 98 F | HEART RATE: 99 BPM | SYSTOLIC BLOOD PRESSURE: 130 MMHG | DIASTOLIC BLOOD PRESSURE: 82 MMHG | HEIGHT: 59 IN

## 2017-08-09 DIAGNOSIS — I10 ESSENTIAL HYPERTENSION: Primary | ICD-10-CM

## 2017-08-09 PROCEDURE — 3075F SYST BP GE 130 - 139MM HG: CPT | Mod: S$GLB,,, | Performed by: FAMILY MEDICINE

## 2017-08-09 PROCEDURE — 99999 PR PBB SHADOW E&M-EST. PATIENT-LVL III: CPT | Mod: PBBFAC,,, | Performed by: FAMILY MEDICINE

## 2017-08-09 PROCEDURE — 3008F BODY MASS INDEX DOCD: CPT | Mod: S$GLB,,, | Performed by: FAMILY MEDICINE

## 2017-08-09 PROCEDURE — 3079F DIAST BP 80-89 MM HG: CPT | Mod: S$GLB,,, | Performed by: FAMILY MEDICINE

## 2017-08-09 PROCEDURE — 99213 OFFICE O/P EST LOW 20 MIN: CPT | Mod: S$GLB,,, | Performed by: FAMILY MEDICINE

## 2017-08-09 RX ORDER — AZITHROMYCIN 250 MG/1
TABLET, FILM COATED ORAL
Qty: 6 TABLET | Refills: 0 | Status: SHIPPED | OUTPATIENT
Start: 2017-08-09 | End: 2017-08-14

## 2017-08-09 RX ORDER — NAPROXEN SODIUM 550 MG/1
TABLET ORAL
Refills: 2 | COMMUNITY
Start: 2017-07-26 | End: 2018-06-18 | Stop reason: SDUPTHER

## 2017-08-09 NOTE — PROGRESS NOTES
Subjective:       Patient ID: Maria Elena Tavares is a 61 y.o. female.    Chief Complaint: Nasal Congestion (green mucos) and Cough    Pt presents with cough, cold, congestion x 4 days. Pt states that she feels tired and worn out from coughing green chunky sputum.  Pt took OTC decongestants which did not help      Cough   Associated symptoms include a fever, postnasal drip and rhinorrhea. Pertinent negatives include no chest pain, headaches or shortness of breath.     Review of Systems   Constitutional: Positive for activity change, appetite change, fatigue and fever.   HENT: Positive for congestion, postnasal drip, rhinorrhea and sinus pressure.    Eyes: Negative.    Respiratory: Positive for cough. Negative for chest tightness and shortness of breath.    Cardiovascular: Negative for chest pain, palpitations and leg swelling.   Gastrointestinal: Negative.    Musculoskeletal: Negative.  Negative for joint swelling.   Skin: Negative.    Neurological: Negative for dizziness, weakness, light-headedness and headaches.       Objective:      Physical Exam   Constitutional: She is oriented to person, place, and time. She appears well-developed and well-nourished.   HENT:   Head: Normocephalic and atraumatic.   Right Ear: There is swelling. Tympanic membrane is injected and bulging.   Left Ear: There is swelling. Tympanic membrane is bulging. A middle ear effusion is present.   Nose: Mucosal edema and rhinorrhea present. Right sinus exhibits frontal sinus tenderness. Left sinus exhibits frontal sinus tenderness.   Mouth/Throat: Uvula is midline and mucous membranes are normal. Posterior oropharyngeal edema and posterior oropharyngeal erythema present. No oropharyngeal exudate or tonsillar abscesses.   Eyes: Conjunctivae and EOM are normal. Pupils are equal, round, and reactive to light. Right eye exhibits no discharge. Left eye exhibits no discharge. No scleral icterus.   Neck: Normal range of motion. Neck supple. No thyromegaly  present.   Cardiovascular: Normal rate, regular rhythm, normal heart sounds and intact distal pulses.    Pulmonary/Chest: Effort normal and breath sounds normal. No respiratory distress.   Lymphadenopathy:     She has no cervical adenopathy.   Neurological: She is alert and oriented to person, place, and time.   Skin: Skin is warm and dry. No rash noted.   Psychiatric: She has a normal mood and affect. Her behavior is normal. Judgment and thought content normal.       Assessment:       Acute sinusitis  Plan:       Treat with zpak. SE's of med d.w pt. Rest, NSAIDS, tylenol prn  rtc prn symptoms worsening  Pt advised to avoid OTC decongestants since they are increasing her BP's at today's visit  HTN BP's elevated due to decongestants

## 2017-08-16 ENCOUNTER — OFFICE VISIT (OUTPATIENT)
Dept: OPTOMETRY | Facility: CLINIC | Age: 62
End: 2017-08-16
Payer: COMMERCIAL

## 2017-08-16 DIAGNOSIS — H52.4 PRESBYOPIA: Primary | ICD-10-CM

## 2017-08-16 PROCEDURE — 99999 PR PBB SHADOW E&M-EST. PATIENT-LVL I: CPT | Mod: PBBFAC,,, | Performed by: OPTOMETRIST

## 2017-08-16 PROCEDURE — 99499 UNLISTED E&M SERVICE: CPT | Mod: S$GLB,,, | Performed by: OPTOMETRIST

## 2017-08-16 NOTE — PROGRESS NOTES
HPI     Concerns About Ocular Health    Additional comments: glasses complaint            Comments   Patient's last dilated exam was: 6/14/2017  Pt states: trouble reading small print with her new glasses. At last   visit, she requested rx reading glasses, was given rx for distance vision   instead.        Last edited by PHYLLIS Cha on 8/16/2017  9:42 AM.   (History)            Assessment /Plan     For exam results, see Encounter Report.    Presbyopia          1.  Reading rx given--doctor's remake.  Continue with scheduled follow-up.

## 2017-09-01 ENCOUNTER — PATIENT OUTREACH (OUTPATIENT)
Dept: OTHER | Facility: OTHER | Age: 62
End: 2017-09-01

## 2017-09-01 DIAGNOSIS — I10 ESSENTIAL HYPERTENSION: Primary | ICD-10-CM

## 2017-09-01 RX ORDER — VALSARTAN 320 MG/1
320 TABLET ORAL EVERY MORNING
Qty: 30 TABLET | Refills: 11 | Status: SHIPPED | OUTPATIENT
Start: 2017-09-01 | End: 2018-07-23 | Stop reason: ALTCHOICE

## 2017-09-01 RX ORDER — CHLORTHALIDONE 25 MG/1
25 TABLET ORAL EVERY MORNING
Qty: 30 TABLET | Refills: 11 | Status: SHIPPED | OUTPATIENT
Start: 2017-09-01 | End: 2018-08-27 | Stop reason: SDUPTHER

## 2017-09-01 NOTE — PROGRESS NOTES
"Last 5 Patient Entered Redings Current 30 Day Average: 143/85     Recent Readings 8/27/2017 8/13/2017 8/13/2017 8/2/2017 7/23/2017    Systolic BP (mmHg) 145 144 141 139 145    Diastolic BP (mmHg) 77 85 92 94 88    Pulse 81 93 103 92 90        Hypertension Medications             losartan-hydrochlorothiazide 100-25 mg (HYZAAR) 100-25 mg per tablet TAKE ONE TABLET BY MOUTH ONCE DAILY        Plan:   Called patient to follow up. Per current 30 day average, BP is slightly uncontrolled. Per note on 8/9, "HTN BP's elevated due to decongestants."  LVM, requested patient call back.  Will continue to monitor. WCB in 2 weeks.     Patient called back. Patient attributes uncontrolled readings to dietary indiscretions. Suggested changing losartan hctz 100/25 to valsartan 320 + chlorthalidone 25, patient is agreeable.  Will also schedule CMP. Will continue to monitor, WCB in 2 weeks. Patient denies having questions or concerns.     "

## 2017-09-15 ENCOUNTER — PATIENT OUTREACH (OUTPATIENT)
Dept: OTHER | Facility: OTHER | Age: 62
End: 2017-09-15

## 2017-09-15 NOTE — PROGRESS NOTES
Last 5 Patient Entered Redings Current 30 Day Average: 142/88     Recent Readings 9/6/2017 9/4/2017 9/4/2017 9/4/2017 9/3/2017    Systolic BP (mmHg) 142 144 148 152 138    Diastolic BP (mmHg) 95 96 98 91 85    Pulse 91 88 90 89 96        Hypertension Medications             chlorthalidone (HYGROTEN) 25 MG Tab Take 1 tablet (25 mg total) by mouth every morning. Stop losartan hctz 100/25.    valsartan (DIOVAN) 320 MG tablet Take 1 tablet (320 mg total) by mouth every morning. Stop losartan hctz 100/25.        Plan:   Called patient to follow up. Per current 30 day average, BP is uncontrolled.  Requested patient take more frequent readings, at least 3-4x/week, patient confirms understanding.   Will r/s labs.   Patient denies having questions or concerns. Will continue to monitor. WCB in 2 weeks.   If BP remains elevated, will discuss adding amlodipine 5mg qhs.

## 2017-09-19 ENCOUNTER — LAB VISIT (OUTPATIENT)
Dept: LAB | Facility: OTHER | Age: 62
End: 2017-09-19
Attending: FAMILY MEDICINE
Payer: COMMERCIAL

## 2017-09-19 DIAGNOSIS — I10 ESSENTIAL HYPERTENSION: ICD-10-CM

## 2017-09-19 LAB
ALBUMIN SERPL BCP-MCNC: 3.6 G/DL
ALP SERPL-CCNC: 92 U/L
ALT SERPL W/O P-5'-P-CCNC: 13 U/L
ANION GAP SERPL CALC-SCNC: 11 MMOL/L
AST SERPL-CCNC: 19 U/L
BILIRUB SERPL-MCNC: 0.3 MG/DL
BUN SERPL-MCNC: 14 MG/DL
CALCIUM SERPL-MCNC: 10 MG/DL
CHLORIDE SERPL-SCNC: 101 MMOL/L
CO2 SERPL-SCNC: 25 MMOL/L
CREAT SERPL-MCNC: 1.1 MG/DL
EST. GFR  (AFRICAN AMERICAN): >60 ML/MIN/1.73 M^2
EST. GFR  (NON AFRICAN AMERICAN): 54 ML/MIN/1.73 M^2
GLUCOSE SERPL-MCNC: 158 MG/DL
POTASSIUM SERPL-SCNC: 3.8 MMOL/L
PROT SERPL-MCNC: 8.4 G/DL
SODIUM SERPL-SCNC: 137 MMOL/L

## 2017-09-19 PROCEDURE — 36415 COLL VENOUS BLD VENIPUNCTURE: CPT

## 2017-09-19 PROCEDURE — 80053 COMPREHEN METABOLIC PANEL: CPT

## 2017-09-26 ENCOUNTER — PATIENT MESSAGE (OUTPATIENT)
Dept: INTERNAL MEDICINE | Facility: CLINIC | Age: 62
End: 2017-09-26

## 2017-09-26 RX ORDER — METFORMIN HYDROCHLORIDE 1000 MG/1
TABLET ORAL
Qty: 180 TABLET | Refills: 1 | Status: SHIPPED | OUTPATIENT
Start: 2017-09-26 | End: 2018-01-16 | Stop reason: SDUPTHER

## 2017-09-26 RX ORDER — LEVOTHYROXINE SODIUM 200 UG/1
200 TABLET ORAL DAILY
Qty: 90 TABLET | Refills: 0 | Status: SHIPPED | OUTPATIENT
Start: 2017-09-26 | End: 2018-08-27 | Stop reason: SDUPTHER

## 2017-09-29 ENCOUNTER — PATIENT OUTREACH (OUTPATIENT)
Dept: OTHER | Facility: OTHER | Age: 62
End: 2017-09-29

## 2017-09-29 NOTE — PROGRESS NOTES
Last 5 Patient Entered Redings Current 30 Day Average: 132/85     Recent Readings 9/28/2017 9/22/2017 9/21/2017 9/21/2017 9/17/2017    Systolic BP (mmHg) 123 130 144 152 107    Diastolic BP (mmHg) 80 85 93 95 68    Pulse 97 83 84 81 113        Hypertension Medications             chlorthalidone (HYGROTEN) 25 MG Tab Take 1 tablet (25 mg total) by mouth every morning. Stop losartan hctz 100/25.    valsartan (DIOVAN) 320 MG tablet Take 1 tablet (320 mg total) by mouth every morning. Stop losartan hctz 100/25.        Plan:   Called patient to follow up. Per current 30 day average, BP is well controlled.   LVM, requested patient call back if hshe has any questions or concerns.   Will continue to monitor. WCB in 2 weeks.     Plan:   Called patient to follow up. Per current 30 day average, BP is well controlled.  Patient denies having questions or concerns. Will continue to monitor. WCB in 6 weeks, sooner if BP begins to trend up or down.

## 2017-10-10 ENCOUNTER — PATIENT OUTREACH (OUTPATIENT)
Dept: OTHER | Facility: OTHER | Age: 62
End: 2017-10-10

## 2017-10-10 NOTE — PROGRESS NOTES
"Last 5 Patient Entered Redings Current 30 Day Average: 121/77     Recent Readings 10/8/2017 10/4/2017 9/28/2017 9/22/2017 9/21/2017    Systolic BP (mmHg) 106 114 123 130 144    Diastolic BP (mmHg) 66 66 80 85 93    Pulse 119 133 97 83 84        Hypertension Digital Medicine Program (HDMP): Health  Follow Up    Lifestyle Modifications:    1.Low sodium diet: yes  Patient reports that she is "trying" to maintain her low sodium diet.     2.Physical activity: yes Ms. Tavares is "trying" to stay active.     3.Hypotension/Hypertension symptoms: no   Frequency/Alleviating factors/Precipitating factors, etc.     4.Patient has been compliant with the medication regimen.     Follow up with Ms. Maria Elena Tavares completed. Ms. Tavares is doing well. Patient attributes elevated pulse "rushing around" before taking a BP reading. Asked that Ms. Tavares be calm and relaxed when taking a BP reading. Patient confirms understanding. No further questions or concerns. I will follow up in a few weeks to assess progress.         "

## 2017-11-08 ENCOUNTER — PATIENT OUTREACH (OUTPATIENT)
Dept: OTHER | Facility: OTHER | Age: 62
End: 2017-11-08

## 2017-11-08 NOTE — PROGRESS NOTES
Last 5 Patient Entered Readings Current 30 Day Average: 113/78     Recent Readings 11/20/2017 10/30/2017 10/30/2017 10/18/2017 10/17/2017    Systolic BP (mmHg) 120 106 134 130 129    Diastolic BP (mmHg) 78 78 73 81 82    Pulse 101 101 104 91 109        Hypertension Digital Medicine Program (HDMP): Health  Follow Up    Lifestyle Modifications:    1.Low sodium diet: yes Ms. Tavares is maintaining her diet. She has even lost some weight. She reports her weight today at 198 pounds.    2.Physical activity: yes Patient reports that she is staying active.    3.Hypotension/Hypertension symptoms: no   Frequency/Alleviating factors/Precipitating factors, etc.     4.Patient has been compliant with the medication regimen.     Follow up with Ms. Maria Elena Tavares completed. Ms. Tavarse is doing well. She will submit another BP reading today. No further questions or concerns. I will follow up in a few weeks to assess progress.

## 2017-11-10 ENCOUNTER — PATIENT OUTREACH (OUTPATIENT)
Dept: OTHER | Facility: OTHER | Age: 62
End: 2017-11-10

## 2017-11-10 NOTE — PROGRESS NOTES
Last 5 Patient Entered Readings Current 30 Day Average: 122/80     Recent Readings 10/30/2017 10/30/2017 10/18/2017 10/17/2017 10/17/2017    Systolic BP (mmHg) 106 134 130 129 121    Diastolic BP (mmHg) 78 73 81 82 82    Pulse 101 104 91 109 111        Hypertension Medications             chlorthalidone (HYGROTEN) 25 MG Tab Take 1 tablet (25 mg total) by mouth every morning. Stop losartan hctz 100/25.    valsartan (DIOVAN) 320 MG tablet Take 1 tablet (320 mg total) by mouth every morning. Stop losartan hctz 100/25.        Assessment/ Plan:   Called patient to follow up. Per current 30 day average, BP is well controlled.  Patient denies having questions or concerns. Instructed patient to call if she has any questions or concerns, patient confirms understanding.   Will continue to monitor. WCB in 4 months, sooner if BP begins to trend up or down.

## 2017-11-17 ENCOUNTER — PATIENT MESSAGE (OUTPATIENT)
Dept: ORTHOPEDICS | Facility: CLINIC | Age: 62
End: 2017-11-17

## 2017-12-01 ENCOUNTER — OFFICE VISIT (OUTPATIENT)
Dept: ORTHOPEDICS | Facility: CLINIC | Age: 62
End: 2017-12-01
Payer: COMMERCIAL

## 2017-12-01 ENCOUNTER — HOSPITAL ENCOUNTER (OUTPATIENT)
Dept: RADIOLOGY | Facility: HOSPITAL | Age: 62
Discharge: HOME | End: 2017-12-01
Attending: PHYSICIAN ASSISTANT
Payer: COMMERCIAL

## 2017-12-01 VITALS — HEIGHT: 59 IN | WEIGHT: 200.63 LBS | BODY MASS INDEX: 40.44 KG/M2

## 2017-12-01 DIAGNOSIS — M25.562 LEFT KNEE PAIN, UNSPECIFIED CHRONICITY: ICD-10-CM

## 2017-12-01 DIAGNOSIS — M17.0 PRIMARY OSTEOARTHRITIS OF BOTH KNEES: ICD-10-CM

## 2017-12-01 DIAGNOSIS — M17.0 PRIMARY OSTEOARTHRITIS OF BOTH KNEES: Primary | ICD-10-CM

## 2017-12-01 PROCEDURE — 73564 X-RAY EXAM KNEE 4 OR MORE: CPT | Mod: TC,LT

## 2017-12-01 PROCEDURE — 99213 OFFICE O/P EST LOW 20 MIN: CPT | Mod: 25,S$GLB,, | Performed by: PHYSICIAN ASSISTANT

## 2017-12-01 PROCEDURE — 99999 PR PBB SHADOW E&M-EST. PATIENT-LVL III: CPT | Mod: PBBFAC,,, | Performed by: PHYSICIAN ASSISTANT

## 2017-12-01 PROCEDURE — 73562 X-RAY EXAM OF KNEE 3: CPT | Mod: 26,59,RT, | Performed by: RADIOLOGY

## 2017-12-01 PROCEDURE — 20610 DRAIN/INJ JOINT/BURSA W/O US: CPT | Mod: LT,S$GLB,, | Performed by: PHYSICIAN ASSISTANT

## 2017-12-01 PROCEDURE — 73564 X-RAY EXAM KNEE 4 OR MORE: CPT | Mod: 26,LT,, | Performed by: RADIOLOGY

## 2017-12-01 RX ORDER — TRIAMCINOLONE ACETONIDE 40 MG/ML
60 INJECTION, SUSPENSION INTRA-ARTICULAR; INTRAMUSCULAR
Status: COMPLETED | OUTPATIENT
Start: 2017-12-01 | End: 2017-12-01

## 2017-12-01 RX ADMIN — TRIAMCINOLONE ACETONIDE 60 MG: 40 INJECTION, SUSPENSION INTRA-ARTICULAR; INTRAMUSCULAR at 01:12

## 2017-12-01 NOTE — PROGRESS NOTES
"  SUBJECTIVE:     Chief Complaint : left knee pain    History of Present Illness:  Maria Elena Tavares is a 62 y.o. 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 I saw pt in January and gave CSI which helped tremendously. Pain has gradually returned. 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 Pain is medial. There is associated swelling. She has stiffness after long periods of sitting. No previous injury to knee. She denies mechanical symptoms or instability. She has tried OTC medications.      Past Medical History:   Diagnosis Date    BMI 37.0-37.9, adult     Diabetes mellitus type II     Diverticulosis     history of diverticulosisseen on colonoscopy at age 48. Repeat recommended at age 58. Done by     Elevated blood protein     History of elevated protein. Apparently has seen  for extensive workup including bone marrow biopsy    History of shingles 2006    Hyperlipidemia     Hypertension     Microalbuminuria     due 2 diabetes    Mild vitamin D deficiency     . A low vitamin D    Thyroid disease     hypothyroidism       Review of Systems:  Constitutional: no fever or chills  ENT: no nasal congestion or sore throat  Respiratory: no cough or shortness of breath  Cardiovascular: no chest pain or palpitations  Gastrointestinal: no nausea or vomiting, tolerating diet  Genitourinary: no hematuria or dysuria  Integument/Breast: no rash or pruritis  Hematologic/Lymphatic: no easy bruising or lymphadenopathy  Musculoskeletal: see HPI  Neurological: no seizures or tremors  Behavioral/Psych: no auditory or visual hallucinations    OBJECTIVE:     PHYSICAL EXAM:  Height 4' 11" (1.499 m), weight 91 kg (200 lb 9.9 oz).   General Appearance: WDWN, NAD  Gait: Normal  Neuro/Psych: Mood & affect appropriate  Lungs: Respirations equal and unlabored.   CV: 2+ bilateral upper and lower extremity pulses.   Skin: Intact throughout LE  Extremities: " No LE edema    Right Knee Exam  Range of Motion:0-130 active   Effusion:none  Condition of skin:intact  Location of tenderness:Medial joint line   Strength:5 of 5 quadriceps strength and 5 of 5 hamstring strength  Stability:stable to testing  Susana: negative    Left Knee Exam  Range of Motion:0-130 active   Effusion:yes, small  Condition of skin:intact  Location of tenderness:Medial joint line   Strength:5 of 5 quadriceps strength and 5 of 5 hamstring strength  Stability:stable to testing  Susana: pain medially with testing    Alignment: Mild varus    Right Hip Examination: no pain with PROM     Left Hip Examination: no pain with PROM    RADIOGRAPHS: AP, lateral and merchant knee x-rays ordered and images reviewed today by me reveal moderate degenerative changes medial compartment both knees. Minimal progression since previous.     ASSESSMENT/PLAN:   Osteoarthritis knee  Left knee pain  - Continue stationary bicycle. Wt loss.   - Continue OTC meds prn.   - Inject L knee today. She will monitor glucose levels.  - Euflexxa explained/brochure given.  She will call when pain returns.   - RTC PRN.    Knee Injection Procedure Note    Pre-operative Diagnosis: left knee degenerative arthritis    Post-operative Diagnosis: same    Indications: left knee pain    Anesthesia: none    Procedure Details     Verbal consent was obtained for the procedure. The injection site was identified and the skin was prepared with alcohol. The left knee was injected from an anterolateral approach with 1 ml of Kenalog and 3 ml Lidocaine under sterile technique using a 22 gauge needle. The needle was removed and the area cleansed and dressed.    Complications:  None; patient tolerated the procedure well.    she was advised to rest the knee today, using ice and elevation as needed for comfort and swelling. she did receive immediate relief of the knee pain. she was told this would be short lived and is secondary to the lidocaine. she may have  an increase in discomfort tonight followed by steady improvement over the next several days. It may take 1-3 weeks following the injection to get the full benefit of the medication.

## 2017-12-12 DIAGNOSIS — N64.4 BREAST PAIN, RIGHT: Primary | ICD-10-CM

## 2017-12-14 ENCOUNTER — HOSPITAL ENCOUNTER (OUTPATIENT)
Dept: RADIOLOGY | Facility: OTHER | Age: 62
Discharge: HOME | End: 2017-12-14
Attending: OBSTETRICS & GYNECOLOGY
Payer: COMMERCIAL

## 2017-12-14 DIAGNOSIS — N64.4 BREAST PAIN IN FEMALE: ICD-10-CM

## 2017-12-14 PROCEDURE — 76642 ULTRASOUND BREAST LIMITED: CPT | Mod: 26,RT,, | Performed by: RADIOLOGY

## 2017-12-14 PROCEDURE — 76642 ULTRASOUND BREAST LIMITED: CPT | Mod: TC,RT

## 2017-12-21 ENCOUNTER — OFFICE VISIT (OUTPATIENT)
Dept: INTERNAL MEDICINE | Facility: CLINIC | Age: 62
End: 2017-12-21
Attending: FAMILY MEDICINE
Payer: COMMERCIAL

## 2017-12-21 ENCOUNTER — PATIENT MESSAGE (OUTPATIENT)
Dept: OPTOMETRY | Facility: CLINIC | Age: 62
End: 2017-12-21

## 2017-12-21 ENCOUNTER — PATIENT OUTREACH (OUTPATIENT)
Dept: OTHER | Facility: OTHER | Age: 62
End: 2017-12-21

## 2017-12-21 VITALS
SYSTOLIC BLOOD PRESSURE: 128 MMHG | DIASTOLIC BLOOD PRESSURE: 80 MMHG | WEIGHT: 199.94 LBS | HEIGHT: 59 IN | BODY MASS INDEX: 40.31 KG/M2 | TEMPERATURE: 98 F | HEART RATE: 76 BPM

## 2017-12-21 DIAGNOSIS — E11.8 TYPE 2 DIABETES MELLITUS WITH COMPLICATION, UNSPECIFIED LONG TERM INSULIN USE STATUS: ICD-10-CM

## 2017-12-21 DIAGNOSIS — H57.12 ACUTE LEFT EYE PAIN: Primary | ICD-10-CM

## 2017-12-21 DIAGNOSIS — I10 ESSENTIAL HYPERTENSION: ICD-10-CM

## 2017-12-21 PROCEDURE — 99999 PR PBB SHADOW E&M-EST. PATIENT-LVL III: CPT | Mod: PBBFAC,,, | Performed by: FAMILY MEDICINE

## 2017-12-21 PROCEDURE — 99214 OFFICE O/P EST MOD 30 MIN: CPT | Mod: S$GLB,,, | Performed by: FAMILY MEDICINE

## 2017-12-21 RX ORDER — SULFACETAMIDE SODIUM 100 MG/ML
2 SOLUTION/ DROPS OPHTHALMIC EVERY 4 HOURS
Qty: 15 ML | Refills: 0 | Status: SHIPPED | OUTPATIENT
Start: 2017-12-21 | End: 2018-08-27

## 2017-12-21 NOTE — PROGRESS NOTES
"Last 5 Patient Entered Readings Current 30 Day Average: 134/85     Recent Readings 12/17/2017 12/17/2017 12/12/2017 12/12/2017 11/23/2017    SBP (mmHg) 139 148 148 163 114    DBP (mmHg) 85 88 87 107 82    Pulse 94 91 63 81 87        Hypertension Digital Medicine Program (HDMP): Health  Follow Up    Lifestyle Modifications:    1.Low sodium diet: yesPatient reports that her weight as of today is 199 lbs. She has lost about 10 pounds since eliminating some carbohydrates from her diet.     2.Physical activity: yes Ms. Tavares reports that she is "being more active" around her house, and is able to ride her bike for about 30 minutes a few days each week.     3.Hypotension/Hypertension symptoms: no   Frequency/Alleviating factors/Precipitating factors, etc.     4.Patient has been compliant with the medication regimen.     Follow up with Ms. Maria Elena Tavares completed. Ms. Tavares is doing okay. She is having some trouble related to allergy/sinuses. Her BP in the clinic today was 128/80 mmHg. She will continue to monitor her BP using her digital cuff. No further questions or concerns. I will follow up in a few weeks to assess progress.         "

## 2017-12-21 NOTE — PROGRESS NOTES
"CHIEF COMPLAINT: The patient presents with 1 day history of irritation and injection of the eye.    HISTORY OF PRESENT ILLNESS: The patient presents with a one-day history of left sided eye inflammation, irritation, and injection.  The patient has no sick contact.  Patient denies fevers or chills.  The patient denies photophobia.  The patient does have some mild eye pain.  No acute change in vision.  No pus.    REVIEW OF SYSTEMS:  GENERAL: No fever, chills, fatigability or weight loss.  SKIN: No rashes, itching or changes in color or texture of skin.  HEAD: No headaches or recent head trauma.  EYES: Visual acuity fine. No photophobia or diplopia.  EARS: Denies ear pain, discharge or vertigo.  NOSE: No loss of smell, no epistaxis or postnasal drip.  MOUTH & THROAT: No hoarseness or change in voice. No excessive gum bleeding.  NODES: Denies swollen glands.  CHEST: Denies ABREU, cyanosis, wheezing, cough and sputum production.  CARDIOVASCULAR: Denies chest pain, PND, orthopnea or reduced exercise tolerance.  ABDOMEN: Appetite fine. No weight loss. Denies diarrhea, abdominal pain, hematemesis or blood in stool.  URINARY: No flank pain, dysuria or hematuria.  PERIPHERAL VASCULAR: No claudication or cyanosis.  MUSCULOSKELETAL: No joint stiffness or swelling. Denies back pain.  NEUROLOGIC: No history of seizures, paralysis, alteration of gait or coordination.    SOCIAL HISTORY: She does not smoke.  She works here at [employer].    PHYSICAL EXAMINATION:   Blood pressure 128/80, pulse 76, temperature 98 °F (36.7 °C), height 4' 11" (1.499 m), weight 90.7 kg (199 lb 15.3 oz).      APPEARANCE: Well nourished, well developed, in no acute distress.    HEAD: Normocephalic, atraumatic.  EYES: PERRL. EOMI.  Conjunctiva is injected and irritated with edema.  Bilaterally the conjunctiva are anicteric  EARS: TM's intact. Light reflex normal. No retraction or perforation.    NOSE: Mucosa pink. Airway clear.  MOUTH & THROAT: No tonsillar " enlargement. No pharyngeal erythema or exudate. No stridor.  NECK: Supple.   NODES: No cervical, axillary or inguinal lymph node enlargement.  MUSCULOSKELETAL:  There is no clubbing, cyanosis, or edema of the extremities x4.  There is full range of motion of the lumbar spine.  There is full range of motion of the extremities x4.  There is no deformity noted.    NEUROLOGIC:       Normal speech development.      Hearing normal.      Normal gait.      Motor and sensory exams grossly normal.      DTR's normal.  PSYCHIATRIC: Patient is alert and oriented x3.  Thought processes are all normal.  There is no homicidality.  There is no suicidality.  There is no evidence of psychosis.    LABORATORY/RADIOLOGY: Chart reviewed.    ASSESSMENT:   Likely corneal abrasion  Hypertension  Diabetes type 2    PLAN:  Antibiotic eye drops as per med card  Follow-up with ophthalmology tomorrow if symptoms do not improve overnight

## 2017-12-22 ENCOUNTER — TELEPHONE (OUTPATIENT)
Dept: INTERNAL MEDICINE | Facility: CLINIC | Age: 62
End: 2017-12-22

## 2018-01-05 RX ORDER — DULAGLUTIDE 1.5 MG/.5ML
INJECTION, SOLUTION SUBCUTANEOUS
Qty: 2 ML | Refills: 0 | Status: SHIPPED | OUTPATIENT
Start: 2018-01-05 | End: 2018-01-16 | Stop reason: SDUPTHER

## 2018-01-16 ENCOUNTER — LAB VISIT (OUTPATIENT)
Dept: LAB | Facility: OTHER | Age: 63
End: 2018-01-16
Attending: FAMILY MEDICINE
Payer: COMMERCIAL

## 2018-01-16 ENCOUNTER — OFFICE VISIT (OUTPATIENT)
Dept: INTERNAL MEDICINE | Facility: CLINIC | Age: 63
End: 2018-01-16
Attending: FAMILY MEDICINE
Payer: COMMERCIAL

## 2018-01-16 VITALS
HEART RATE: 82 BPM | SYSTOLIC BLOOD PRESSURE: 110 MMHG | DIASTOLIC BLOOD PRESSURE: 80 MMHG | WEIGHT: 202.19 LBS | HEIGHT: 59 IN | BODY MASS INDEX: 40.76 KG/M2

## 2018-01-16 DIAGNOSIS — Z86.19 HISTORY OF SHINGLES: ICD-10-CM

## 2018-01-16 DIAGNOSIS — E11.8 TYPE 2 DIABETES MELLITUS WITH COMPLICATION, UNSPECIFIED LONG TERM INSULIN USE STATUS: ICD-10-CM

## 2018-01-16 DIAGNOSIS — E03.8 OTHER SPECIFIED HYPOTHYROIDISM: ICD-10-CM

## 2018-01-16 DIAGNOSIS — E78.5 HYPERLIPIDEMIA, UNSPECIFIED HYPERLIPIDEMIA TYPE: ICD-10-CM

## 2018-01-16 DIAGNOSIS — I10 ESSENTIAL HYPERTENSION: ICD-10-CM

## 2018-01-16 LAB
ALBUMIN SERPL BCP-MCNC: 3.8 G/DL
ALP SERPL-CCNC: 77 U/L
ALT SERPL W/O P-5'-P-CCNC: 14 U/L
ANION GAP SERPL CALC-SCNC: 10 MMOL/L
AST SERPL-CCNC: 18 U/L
BILIRUB SERPL-MCNC: 0.4 MG/DL
BUN SERPL-MCNC: 16 MG/DL
CALCIUM SERPL-MCNC: 9.7 MG/DL
CHLORIDE SERPL-SCNC: 102 MMOL/L
CHOLEST SERPL-MCNC: 232 MG/DL
CHOLEST/HDLC SERPL: 5.8 {RATIO}
CO2 SERPL-SCNC: 27 MMOL/L
CREAT SERPL-MCNC: 1 MG/DL
EST. GFR  (AFRICAN AMERICAN): >60 ML/MIN/1.73 M^2
EST. GFR  (NON AFRICAN AMERICAN): >60 ML/MIN/1.73 M^2
ESTIMATED AVG GLUCOSE: 157 MG/DL
GLUCOSE SERPL-MCNC: 115 MG/DL
HBA1C MFR BLD HPLC: 7.1 %
HDLC SERPL-MCNC: 40 MG/DL
HDLC SERPL: 17.2 %
LDLC SERPL CALC-MCNC: 167.2 MG/DL
NONHDLC SERPL-MCNC: 192 MG/DL
POTASSIUM SERPL-SCNC: 3.5 MMOL/L
PROT SERPL-MCNC: 7.9 G/DL
SODIUM SERPL-SCNC: 139 MMOL/L
TRIGL SERPL-MCNC: 124 MG/DL

## 2018-01-16 PROCEDURE — 99999 PR PBB SHADOW E&M-EST. PATIENT-LVL III: CPT | Mod: PBBFAC,,, | Performed by: FAMILY MEDICINE

## 2018-01-16 PROCEDURE — 80053 COMPREHEN METABOLIC PANEL: CPT

## 2018-01-16 PROCEDURE — 80061 LIPID PANEL: CPT

## 2018-01-16 PROCEDURE — 83036 HEMOGLOBIN GLYCOSYLATED A1C: CPT

## 2018-01-16 PROCEDURE — 36415 COLL VENOUS BLD VENIPUNCTURE: CPT

## 2018-01-16 PROCEDURE — 99214 OFFICE O/P EST MOD 30 MIN: CPT | Mod: S$GLB,,, | Performed by: FAMILY MEDICINE

## 2018-01-16 RX ORDER — METFORMIN HYDROCHLORIDE 1000 MG/1
TABLET ORAL
Qty: 180 TABLET | Refills: 1 | Status: SHIPPED | OUTPATIENT
Start: 2018-01-16 | End: 2019-02-06 | Stop reason: SDUPTHER

## 2018-01-16 NOTE — PROGRESS NOTES
Subjective:       Patient ID: Maria Elena Tavares is a 62 y.o. female.    Chief Complaint: Diabetes; Hypertension; and Medication Refill    Pt presents today for 6 mos f/u DM and HTN. Pt states that she has not been eating well and knows what she has not been doing. Nervous about getting her a1c done today  Pt also needs to do cardio as well as watch her diet.    Needs shingles vaccine      Hypertension   Pertinent negatives include no blurred vision, chest pain, headaches, palpitations, shortness of breath or sweats. There is no history of CVA, PVD or retinopathy.   Diabetes   She has type 2 diabetes mellitus. No MedicAlert identification noted. The initial diagnosis of diabetes was made 5 years ago. Hypoglycemia symptoms include sleepiness. Pertinent negatives for hypoglycemia include no confusion, dizziness, headaches, hunger, mood changes, nervousness/anxiousness, pallor, seizures, speech difficulty, sweats or tremors. Pertinent negatives for diabetes include no blurred vision, no chest pain, no foot paresthesias, no foot ulcerations, no polydipsia, no polyphagia, no polyuria, no visual change and no weakness. Pertinent negatives for hypoglycemia complications include no blackouts, no hospitalization, no nocturnal hypoglycemia, no required assistance and no required glucagon injection. Symptoms are stable. Pertinent negatives for diabetic complications include no autonomic neuropathy, CVA, heart disease, impotence, nephropathy, peripheral neuropathy, PVD or retinopathy. Risk factors for coronary artery disease include hypertension and obesity. She is compliant with treatment most of the time. Her weight is decreasing steadily. Meal planning includes carbohydrate counting. She has not had a previous visit with a dietitian. She participates in exercise three times a week. She monitors blood glucose at home 1-2 x per day. Blood glucose monitoring compliance is fair. Her home blood glucose trend is decreasing steadily.  She sees a podiatrist.Eye exam is current.   Medication Refill   Pertinent negatives include no chest pain, coughing, headaches, joint swelling, visual change or weakness.     Review of Systems   Constitutional: Negative.    Eyes: Negative.  Negative for blurred vision.   Respiratory: Negative for cough, chest tightness and shortness of breath.    Cardiovascular: Negative for chest pain, palpitations and leg swelling.   Gastrointestinal: Negative.    Endocrine: Negative for polydipsia, polyphagia and polyuria.   Genitourinary: Negative for impotence.   Musculoskeletal: Negative.  Negative for joint swelling.   Skin: Negative.  Negative for pallor.   Neurological: Negative for dizziness, tremors, seizures, speech difficulty, weakness, light-headedness and headaches.   Psychiatric/Behavioral: Negative for confusion. The patient is not nervous/anxious.        Objective:      Physical Exam   Constitutional: She is oriented to person, place, and time. She appears well-developed and well-nourished.   HENT:   Head: Normocephalic and atraumatic.   Eyes: Conjunctivae and EOM are normal. Pupils are equal, round, and reactive to light.   Neck: Normal range of motion. Neck supple. No thyromegaly present.   Cardiovascular: Normal rate, regular rhythm, normal heart sounds and intact distal pulses.    No murmur heard.  Pulmonary/Chest: Effort normal and breath sounds normal. No respiratory distress. She has no wheezes. She has no rales. She exhibits no tenderness.   Musculoskeletal: Normal range of motion. She exhibits no edema.   Lymphadenopathy:     She has no cervical adenopathy.   Neurological: She is alert and oriented to person, place, and time.   Skin: Skin is warm. No erythema.   Psychiatric: She has a normal mood and affect. Her behavior is normal. Judgment and thought content normal.       Assessment:       1. Hyperlipidemia, unspecified hyperlipidemia type    2. Type 2 diabetes mellitus with complication, unspecified  long term insulin use status    3. Uncontrolled type 2 diabetes mellitus without complication, without long-term current use of insulin        Plan:       HTN controlled on meds  DMT2: continue present treatment pending a1c today  RTC prn symptoms or q 3 mos  Hyperlipidemia, unspecified hyperlipidemia type  -     Lipid panel; Future; Expected date: 01/17/2018  -     Comprehensive metabolic panel; Future; Expected date: 01/16/2018    Type 2 diabetes mellitus with complication, unspecified long term insulin use status  -     Hemoglobin A1c; Future; Expected date: 01/17/2018    Uncontrolled type 2 diabetes mellitus without complication, without long-term current use of insulin  -     dulaglutide (TRULICITY) 1.5 mg/0.5 mL PnIj; Inject 1.5 mg into the skin every 7 days.  Dispense: 4 mL; Refill: 5    Other orders  -     Cancel: Zoster Vaccine - Live  -     metFORMIN (GLUCOPHAGE) 1000 MG tablet; TAKE 1 TABLET (1,000 MG TOTAL) BY MOUTH 2 (TWO) TIMES DAILY WITH MEALS.  Dispense: 180 tablet; Refill: 1    pt to RTC for zoster

## 2018-01-19 ENCOUNTER — PATIENT OUTREACH (OUTPATIENT)
Dept: OTHER | Facility: OTHER | Age: 63
End: 2018-01-19

## 2018-01-19 NOTE — PROGRESS NOTES
Last 5 Patient Entered Readings                                      Current 30 Day Average: 125/83     Recent Readings 1/7/2018 12/27/2017 12/17/2017 12/17/2017 12/12/2017    SBP (mmHg) 123 127 139 148 148    DBP (mmHg) 82 83 85 88 87    Pulse 108 82 94 91 63        Hypertension Digital Medicine Program (HDMP): Health  Follow Up    Lifestyle Modifications:    1.Low sodium diet:Deferred    2.Physical activity: Deferred     3.Hypotension/Hypertension symptoms: no   Frequency/Alleviating factors/Precipitating factors, etc.     4.Patient has been compliant with the medication regimen.     Follow up with Ms. Maria Elena Tavares completed. Ms. Tavares is doing well. Her BP in the clinic on 1/16/18 was 110/80 mmHg. Patient will submit a BP reading this weekend. No further questions or concerns. I will follow up in a few weeks to assess progress.

## 2018-02-15 ENCOUNTER — PATIENT OUTREACH (OUTPATIENT)
Dept: OTHER | Facility: OTHER | Age: 63
End: 2018-02-15

## 2018-02-15 NOTE — PROGRESS NOTES
Last 5 Patient Entered Readings                                      Current 30 Day Average: 122/81     Recent Readings 3/4/2018 2/24/2018 2/18/2018 2/4/2018 1/22/2018    SBP (mmHg) 117 124 126 115 117    DBP (mmHg) 83 81 79 77 77    Pulse 101 94 89 98 93        LVM to follow up with Ms. Maria Elena Tavares. 3rd attempt.    Per 30 day average, blood pressure is well controlled 122/81 mmHg. Encouraged adherence to low sodium diet and physical activity guidelines. Advised patient to call or message with questions or concerns. WCB in 3-4 weeks.

## 2018-02-19 ENCOUNTER — PATIENT MESSAGE (OUTPATIENT)
Dept: ORTHOPEDICS | Facility: CLINIC | Age: 63
End: 2018-02-19

## 2018-03-05 DIAGNOSIS — M19.90 ARTHRITIS PAIN: ICD-10-CM

## 2018-03-05 RX ORDER — INSULIN PUMP SYRINGE, 3 ML
EACH MISCELLANEOUS
Qty: 1 EACH | Refills: 0 | Status: SHIPPED | OUTPATIENT
Start: 2018-03-05 | End: 2023-08-22

## 2018-03-05 RX ORDER — IBUPROFEN 400 MG/1
400 TABLET ORAL EVERY 4 HOURS
COMMUNITY
End: 2018-03-05 | Stop reason: SDUPTHER

## 2018-03-05 RX ORDER — IBUPROFEN 400 MG/1
400 TABLET ORAL 2 TIMES DAILY PRN
Qty: 60 TABLET | Refills: 2 | Status: SHIPPED | OUTPATIENT
Start: 2018-03-05 | End: 2019-11-21

## 2018-03-05 NOTE — TELEPHONE ENCOUNTER
Pt called requesting a new glucometer kit and test strips. Also asking for a prescription for ibuprofen 400 mg for arthritis pain  mg isn't working. Please advise

## 2018-03-12 ENCOUNTER — PATIENT OUTREACH (OUTPATIENT)
Dept: OTHER | Facility: OTHER | Age: 63
End: 2018-03-12

## 2018-03-12 NOTE — PROGRESS NOTES
Last 5 Patient Entered Readings                                      Current 30 Day Average: 122/81     Recent Readings 3/4/2018 2/24/2018 2/18/2018 2/4/2018 1/22/2018    SBP (mmHg) 117 124 126 115 117    DBP (mmHg) 83 81 79 77 77    Pulse 101 94 89 98 93        Hypertension Medications             chlorthalidone (HYGROTEN) 25 MG Tab Take 1 tablet (25 mg total) by mouth every morning. Stop losartan hctz 100/25.    valsartan (DIOVAN) 320 MG tablet Take 1 tablet (320 mg total) by mouth every morning. Stop losartan hctz 100/25.        Plan:   Called patient to follow up. Per newly released 2017 ACC/ AHA HTN guidelines  (goal of BP < 130/80), current 30-day average is slightly uncontrolled (DBP).  LVM, requested patient call back at her convenience if she has any questions or concerns, and to continue to take at least weekly BP readings.   Will continue to monitor. WCB in 3 months, sooner if BP begins to trend up or down.

## 2018-04-04 ENCOUNTER — PATIENT OUTREACH (OUTPATIENT)
Dept: OTHER | Facility: OTHER | Age: 63
End: 2018-04-04

## 2018-04-04 NOTE — PROGRESS NOTES
Last 5 Patient Entered Readings                                      Current 30 Day Average: 118/77     Recent Readings 5/13/2018 5/3/2018 4/9/2018 4/2/2018 3/18/2018    SBP (mmHg) 125 111 124 124 112    DBP (mmHg) 85 69 84 84 66    Pulse 83 92 90 91 101          Digital Medicine: Health  Follow Up    Left voicemail to follow up with Ms. Maria Elena Tavares.  Current BP average 118/77 mmHg is at goal, <130/80 mmHg.  Will follow up in a few weeks.

## 2018-06-04 ENCOUNTER — PATIENT OUTREACH (OUTPATIENT)
Dept: OTHER | Facility: OTHER | Age: 63
End: 2018-06-04

## 2018-06-04 NOTE — PROGRESS NOTES
Last 5 Patient Entered Readings                                      Current 30 Day Average: 119/80     Recent Readings 5/23/2018 5/13/2018 5/3/2018 4/9/2018 4/2/2018    SBP (mmHg) 112 125 111 124 124    DBP (mmHg) 75 85 69 84 84    Pulse 85 83 92 90 91        Hypertension Medications             chlorthalidone (HYGROTEN) 25 MG Tab Take 1 tablet (25 mg total) by mouth every morning. Stop losartan hctz 100/25.    valsartan (DIOVAN) 320 MG tablet Take 1 tablet (320 mg total) by mouth every morning. Stop losartan hctz 100/25.        Plan:   Called patient to follow up. Per newly released 2017 ACC/ AHA HTN guidelines  (goal of BP < 130/80), current 30-day average is controlled.  LVM, requested patient call back at her convenience if she has any questions or concerns, and to continue to take at least weekly BP readings.   Will continue to monitor. WCB in 3 months, sooner if BP begins to trend up or down.

## 2018-06-07 ENCOUNTER — PATIENT MESSAGE (OUTPATIENT)
Dept: ADMINISTRATIVE | Facility: OTHER | Age: 63
End: 2018-06-07

## 2018-06-14 DIAGNOSIS — M25.562 LEFT KNEE PAIN, UNSPECIFIED CHRONICITY: Primary | ICD-10-CM

## 2018-06-18 ENCOUNTER — HOSPITAL ENCOUNTER (OUTPATIENT)
Dept: RADIOLOGY | Facility: HOSPITAL | Age: 63
Discharge: HOME | End: 2018-06-18
Attending: PHYSICIAN ASSISTANT
Payer: COMMERCIAL

## 2018-06-18 ENCOUNTER — OFFICE VISIT (OUTPATIENT)
Dept: ORTHOPEDICS | Facility: CLINIC | Age: 63
End: 2018-06-18
Payer: COMMERCIAL

## 2018-06-18 VITALS — HEIGHT: 59 IN | BODY MASS INDEX: 40.47 KG/M2 | WEIGHT: 200.75 LBS

## 2018-06-18 DIAGNOSIS — M17.0 PRIMARY OSTEOARTHRITIS OF BOTH KNEES: Primary | ICD-10-CM

## 2018-06-18 DIAGNOSIS — M25.562 LEFT KNEE PAIN, UNSPECIFIED CHRONICITY: ICD-10-CM

## 2018-06-18 PROCEDURE — 73562 X-RAY EXAM OF KNEE 3: CPT | Mod: 26,RT,, | Performed by: RADIOLOGY

## 2018-06-18 PROCEDURE — 20610 DRAIN/INJ JOINT/BURSA W/O US: CPT | Mod: LT,S$GLB,, | Performed by: PHYSICIAN ASSISTANT

## 2018-06-18 PROCEDURE — 73564 X-RAY EXAM KNEE 4 OR MORE: CPT | Mod: 26,LT,, | Performed by: RADIOLOGY

## 2018-06-18 PROCEDURE — 99213 OFFICE O/P EST LOW 20 MIN: CPT | Mod: 25,S$GLB,, | Performed by: PHYSICIAN ASSISTANT

## 2018-06-18 PROCEDURE — 3008F BODY MASS INDEX DOCD: CPT | Mod: CPTII,S$GLB,, | Performed by: PHYSICIAN ASSISTANT

## 2018-06-18 PROCEDURE — 73562 X-RAY EXAM OF KNEE 3: CPT | Mod: TC,RT

## 2018-06-18 PROCEDURE — 99999 PR PBB SHADOW E&M-EST. PATIENT-LVL III: CPT | Mod: PBBFAC,,, | Performed by: PHYSICIAN ASSISTANT

## 2018-06-18 RX ORDER — NAPROXEN 500 MG/1
250 TABLET ORAL 2 TIMES DAILY WITH MEALS
Qty: 60 TABLET | Refills: 1 | Status: SHIPPED | OUTPATIENT
Start: 2018-06-18 | End: 2018-12-28

## 2018-06-18 RX ORDER — TRIAMCINOLONE ACETONIDE 40 MG/ML
40 INJECTION, SUSPENSION INTRA-ARTICULAR; INTRAMUSCULAR
Status: COMPLETED | OUTPATIENT
Start: 2018-06-18 | End: 2018-06-18

## 2018-06-18 RX ADMIN — TRIAMCINOLONE ACETONIDE 40 MG: 40 INJECTION, SUSPENSION INTRA-ARTICULAR; INTRAMUSCULAR at 03:06

## 2018-06-18 NOTE — PROGRESS NOTES
"  SUBJECTIVE:     Chief Complaint : left knee pain    History of Present Illness:  Maria Elena Tavares is a 62 y.o. 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 I saw pt 6 months ago and gave CSI which helped only for a few weeks. Injection prior to that helped for almost a year. She denies trauma. She had some swelling last week but this has resolved. Pain is medial. Pain is moderate.  She has stiffness after long periods of sitting. No previous injury to knee. She denies mechanical symptoms or instability. She takes naproxen prn.       Past Medical History:   Diagnosis Date    BMI 37.0-37.9, adult     Diabetes mellitus type II     Diverticulosis     history of diverticulosisseen on colonoscopy at age 48. Repeat recommended at age 58. Done by     Elevated blood protein     History of elevated protein. Apparently has seen  for extensive workup including bone marrow biopsy    History of shingles 2006    Hyperlipidemia     Hypertension     Microalbuminuria     due 2 diabetes    Mild vitamin D deficiency     . A low vitamin D    Thyroid disease     hypothyroidism       Review of Systems:  Constitutional: no fever or chills  ENT: no nasal congestion or sore throat  Respiratory: no cough or shortness of breath  Cardiovascular: no chest pain or palpitations  Gastrointestinal: no nausea or vomiting, tolerating diet  Genitourinary: no hematuria or dysuria  Integument/Breast: no rash or pruritis  Hematologic/Lymphatic: no easy bruising or lymphadenopathy  Musculoskeletal: see HPI  Neurological: no seizures or tremors  Behavioral/Psych: no auditory or visual hallucinations    OBJECTIVE:     PHYSICAL EXAM:  Height 4' 11" (1.499 m), weight 91 kg (200 lb 11.7 oz).   General Appearance: WDWN, NAD  Gait: Normal  Neuro/Psych: Mood & affect appropriate  Lungs: Respirations equal and unlabored.   CV: 2+ bilateral upper and lower extremity pulses. "   Skin: Intact throughout LE  Extremities: No LE edema    Right Knee Exam  Range of Motion:0-130 active   Effusion:none  Condition of skin:intact  Location of tenderness:Medial joint line   Strength:5 of 5 quadriceps strength and 5 of 5 hamstring strength  Stability:stable to testing  Susana: negative    Left Knee Exam  Range of Motion:0-130 active   Effusion:yes, small  Condition of skin:intact  Location of tenderness:Medial joint line   Strength:5 of 5 quadriceps strength and 5 of 5 hamstring strength  Stability:stable to testing  Susana: pain medially with testing    Alignment: Mild varus    Right Hip Examination: no pain with PROM     Left Hip Examination: no pain with PROM    RADIOGRAPHS: AP, lateral and merchant knee x-rays ordered and images reviewed today by me reveal moderate degenerative changes medial compartment both knees.     ASSESSMENT/PLAN:   Osteoarthritis knee  Left knee pain  - Continue stationary bicycle. Wt loss.   - Continue OTC meds prn.   - Inject L knee today. She will monitor glucose levels.  - Euflexxa explained/brochure given.  She will call when pain returns.   - RTC PRN.    Knee Injection Procedure Note    Pre-operative Diagnosis: left knee degenerative arthritis    Post-operative Diagnosis: same    Indications: left knee pain    Anesthesia: none    Procedure Details     Verbal consent was obtained for the procedure. The injection site was identified and the skin was prepared with alcohol. The left knee was injected from an anterolateral approach with 1 ml of Kenalog and 3 ml Lidocaine under sterile technique using a 22 gauge needle. The needle was removed and the area cleansed and dressed.    Complications:  None; patient tolerated the procedure well.    she was advised to rest the knee today, using ice and elevation as needed for comfort and swelling. she did receive immediate relief of the knee pain. she was told this would be short lived and is secondary to the lidocaine. she  may have an increase in discomfort tonight followed by steady improvement over the next several days. It may take 1-3 weeks following the injection to get the full benefit of the medication.

## 2018-06-21 ENCOUNTER — OFFICE VISIT (OUTPATIENT)
Dept: OPTOMETRY | Facility: CLINIC | Age: 63
End: 2018-06-21
Payer: COMMERCIAL

## 2018-06-21 DIAGNOSIS — H04.123 DRY EYE SYNDROME, BILATERAL: ICD-10-CM

## 2018-06-21 DIAGNOSIS — H25.13 NS (NUCLEAR SCLEROSIS), BILATERAL: ICD-10-CM

## 2018-06-21 DIAGNOSIS — E11.9 TYPE 2 DIABETES MELLITUS WITHOUT OPHTHALMIC MANIFESTATIONS: ICD-10-CM

## 2018-06-21 DIAGNOSIS — H52.4 PRESBYOPIA: Primary | ICD-10-CM

## 2018-06-21 DIAGNOSIS — I10 ESSENTIAL HYPERTENSION: ICD-10-CM

## 2018-06-21 PROCEDURE — 92015 DETERMINE REFRACTIVE STATE: CPT | Mod: S$GLB,,, | Performed by: OPTOMETRIST

## 2018-06-21 PROCEDURE — 92014 COMPRE OPH EXAM EST PT 1/>: CPT | Mod: S$GLB,,, | Performed by: OPTOMETRIST

## 2018-06-21 PROCEDURE — 99999 PR PBB SHADOW E&M-EST. PATIENT-LVL III: CPT | Mod: PBBFAC,,, | Performed by: OPTOMETRIST

## 2018-06-21 NOTE — PROGRESS NOTES
HPI     62yr old female here for Annual Diabetic Exam. Patient states BSL range   from 115-140. Pt eyes red in the AM due to dryness. Vision stable OU.     Last edited by Pepe Cisneros on 6/21/2018  7:58 AM. (History)            Assessment /Plan     For exam results, see Encounter Report.    Presbyopia   Rx specs    Type 2 diabetes mellitus without ophthalmic manifestations  Essential hypertension   No retinopathy, monitor yearly    NS (nuclear sclerosis), bilateral    Dry eye syndrome, bilateral  Start     lifitegrast (XIIDRA) 5 % Dpet; Apply 1 drop to  both eyes  2 (two) times daily.  Dispense: 60 each; Refill: 12   Continue with art tears PRN

## 2018-06-28 ENCOUNTER — PATIENT OUTREACH (OUTPATIENT)
Dept: OTHER | Facility: OTHER | Age: 63
End: 2018-06-28

## 2018-06-28 NOTE — PROGRESS NOTES
Last 5 Patient Entered Readings                                      Current 30 Day Average: 125/85     Recent Readings 6/24/2018 6/23/2018 6/17/2018 6/17/2018 6/4/2018    SBP (mmHg) 125 129 122 115 122    DBP (mmHg) 86 84 89 79 79    Pulse 85 85 104 101 88      Digital Medicine: Health  Follow Up    Lifestyle Modifications:    1.Dietary Modifications (Sodium intake <2,000mg/day, food labels, dining out): Patient reports that she is really trying to watch her salt intake. Encouragement provided.     2.Physical Activity: Deferred    3.Medication Therapy: Patient has been compliant with the medication regimen.    4.Patient has the following medication side effects/concerns: None  (Frequency/Alleviating factors/Precipitating factors, etc.)     Follow up with Ms. Maria Elena Tavares completed. Ms. Tavares is doing okay. She states that she has been very busy with work. Ms. Tavares received an invitation for DDMP, but is afraid that she is will have trouble affording the strips if she has to check her BG several times a day. Encouraged patient to contact DME about supplies, and complete questionnaires to finalize enrollment. No further questions or concerns. Will continue follow up to achieve health goals.

## 2018-07-11 ENCOUNTER — PATIENT MESSAGE (OUTPATIENT)
Dept: ORTHOPEDICS | Facility: CLINIC | Age: 63
End: 2018-07-11

## 2018-07-11 ENCOUNTER — PATIENT MESSAGE (OUTPATIENT)
Dept: ADMINISTRATIVE | Facility: HOSPITAL | Age: 63
End: 2018-07-11

## 2018-07-23 ENCOUNTER — PATIENT OUTREACH (OUTPATIENT)
Dept: OTHER | Facility: OTHER | Age: 63
End: 2018-07-23

## 2018-07-23 ENCOUNTER — PATIENT MESSAGE (OUTPATIENT)
Dept: PRIMARY CARE CLINIC | Facility: CLINIC | Age: 63
End: 2018-07-23

## 2018-07-23 DIAGNOSIS — I10 HYPERTENSION, UNSPECIFIED TYPE: Primary | ICD-10-CM

## 2018-07-23 RX ORDER — IRBESARTAN 300 MG/1
300 TABLET ORAL DAILY
Qty: 90 TABLET | Refills: 3 | Status: SHIPPED | OUTPATIENT
Start: 2018-07-23 | End: 2018-08-27 | Stop reason: SDUPTHER

## 2018-07-23 NOTE — PROGRESS NOTES
Last 5 Patient Entered Readings                                      Current 30 Day Average: 128/84     Recent Readings 7/18/2018 7/18/2018 7/8/2018 7/7/2018 6/24/2018    SBP (mmHg) 140 135 133 111 125    DBP (mmHg) 91 87 84 77 86    Pulse 83 79 70 84 85        Hypertension Medications             chlorthalidone (HYGROTEN) 25 MG Tab Take 1 tablet (25 mg total) by mouth every morning. Stop losartan hctz 100/25.    valsartan (DIOVAN) 320 MG tablet Take 1 tablet (320 mg total) by mouth every morning. Stop losartan hctz 100/25.        Assessment/ Plan:   Called patient to follow up regarding valsartan recall. Patient has not called her dispensing pharmacy to see if she is affected by the recall, but expressed that she would feel more comfortable if she was changed to a different agent. Discussed with and instructed patient to stop valsartan 320mg and start irbesartan 300mg, patient confirms understanding.     Per newly released 2017 ACC/ AHA HTN guidelines (goal of BP < 130/80), current 30-day average is slightly uncontrolled, remains stable.    Requested patient take more frequent readings so I can better determine whether or not we need to adjust her HTN medication regimen, patient confirms understanding.    Patient denies having questions or concerns. Instructed patient to call if she has any questions or concerns, patient confirms understanding.   Will continue to monitor. WCB in 3 months, sooner if BP begins to trend up or down.

## 2018-08-06 ENCOUNTER — PATIENT OUTREACH (OUTPATIENT)
Dept: OTHER | Facility: OTHER | Age: 63
End: 2018-08-06

## 2018-08-06 NOTE — PROGRESS NOTES
Last 5 Patient Entered Readings                                      Current 30 Day Average: 125/79     Recent Readings 7/26/2018 7/18/2018 7/18/2018 7/8/2018 7/7/2018    SBP (mmHg) 116 140 135 133 111    DBP (mmHg) 63 91 87 84 77    Pulse 85 83 79 70 84        Hypertension Medications             chlorthalidone (HYGROTEN) 25 MG Tab Take 1 tablet (25 mg total) by mouth every morning. Stop losartan hctz 100/25.    irbesartan (AVAPRO) 300 MG tablet Take 1 tablet (300 mg total) by mouth once daily. Stop valsartan 320.        Plan:   Called patient to follow up since stopping valsartan 320 and starting irbesartan 300 2/2 valsartan recall.   Per 2017 ACC/ AHA HTN guidelines  (goal of BP < 130/80), current 30-day average is controlled.  LVM, requested patient call back at her convenience.  Will continue to monitor. WCB in 2 weeks.

## 2018-08-13 ENCOUNTER — PATIENT MESSAGE (OUTPATIENT)
Dept: ADMINISTRATIVE | Facility: HOSPITAL | Age: 63
End: 2018-08-13

## 2018-08-15 ENCOUNTER — PATIENT OUTREACH (OUTPATIENT)
Dept: OTHER | Facility: OTHER | Age: 63
End: 2018-08-15

## 2018-08-15 NOTE — PROGRESS NOTES
"Last 5 Patient Entered Readings                                      Current 30 Day Average: 128/77     Recent Readings 7/26/2018 7/18/2018 7/18/2018 7/8/2018 7/7/2018    SBP (mmHg) 116 140 135 133 111    DBP (mmHg) 63 91 87 84 77    Pulse 85 83 79 70 84        Digital Medicine: Health  Follow Up    Lifestyle Modifications:    1.Dietary Modifications (Sodium intake <2,000mg/day, food labels, dining out): Deferred    2.Physical Activity: Deferred    3.Medication Therapy: Patient has been compliant with the medication regimen.    4.Patient has the following medication side effects/concerns: Patient reports that she has been feeling "a little dizzy" since starting irbesartan. However, she has been unable to check her BP to see how the medication if working to control her BP. She also admits to not drinking enough water throughout the day. Encouraged patient to increase water intake, and check her BP more frequently. Also advised patient to call back if dizziness worsens. Patient verbalized understanding.  (Frequency/Alleviating factors/Precipitating factors, etc.)     Follow up with Ms. Maria Elena Tavares completed. Ms. Tavares is doing okay. She states that she has been "juggling" a lot of different things between work and family. She attributes this to lack of frequent monitoring. No further questions or concerns. Will continue follow up to achieve health goals.          "

## 2018-08-16 NOTE — PROGRESS NOTES
Last 5 Patient Entered Readings                                      Current 30 Day Average: 127/80     Recent Readings 8/15/2018 8/15/2018 7/26/2018 7/18/2018 7/18/2018    SBP (mmHg) 124 124 116 140 135    DBP (mmHg) 87 87 63 91 87    Pulse 88 88 85 83 79        Hypertension Medications             chlorthalidone (HYGROTEN) 25 MG Tab Take 1 tablet (25 mg total) by mouth every morning. Stop losartan hctz 100/25.    irbesartan (AVAPRO) 300 MG tablet Take 1 tablet (300 mg total) by mouth once daily. Stop valsartan 320.        Plan:   Called patient to follow up since stopping valsartan 320 and starting irbesartan 300 2/2 valsartan recall.  Patient admits to dizziness when she first started irbesartan, but states this has improved recently.  Per 2017 ACC/ AHA HTN guidelines (goal of BP < 130/80), current 30-day average is controlled.   Requested patient take more frequent readings so I can better determine whether or not we need to adjust her HTN medication regimen, patient confirms understanding.   Patient denies having questions or concerns. Instructed patient to call if she has any questions or concerns, patient confirms understanding.   Will continue to monitor. WCB in 1 month.

## 2018-08-20 DIAGNOSIS — E11.9 TYPE 2 DIABETES MELLITUS: ICD-10-CM

## 2018-08-23 ENCOUNTER — HOSPITAL ENCOUNTER (EMERGENCY)
Facility: HOSPITAL | Age: 63
Discharge: HOME | End: 2018-08-23
Attending: EMERGENCY MEDICINE
Payer: COMMERCIAL

## 2018-08-23 VITALS
SYSTOLIC BLOOD PRESSURE: 126 MMHG | DIASTOLIC BLOOD PRESSURE: 79 MMHG | OXYGEN SATURATION: 97 % | BODY MASS INDEX: 35.87 KG/M2 | HEIGHT: 61 IN | HEART RATE: 87 BPM | WEIGHT: 190 LBS | RESPIRATION RATE: 18 BRPM | TEMPERATURE: 99 F

## 2018-08-23 DIAGNOSIS — S89.91XA RIGHT KNEE INJURY: ICD-10-CM

## 2018-08-23 DIAGNOSIS — S80.211A ABRASION, KNEE, RIGHT, INITIAL ENCOUNTER: ICD-10-CM

## 2018-08-23 DIAGNOSIS — S80.01XA CONTUSION OF RIGHT KNEE, INITIAL ENCOUNTER: Primary | ICD-10-CM

## 2018-08-23 PROCEDURE — 99284 EMERGENCY DEPT VISIT MOD MDM: CPT | Mod: ,,, | Performed by: NURSE PRACTITIONER

## 2018-08-23 PROCEDURE — 25000003 PHARM REV CODE 250: Performed by: NURSE PRACTITIONER

## 2018-08-23 PROCEDURE — 99283 EMERGENCY DEPT VISIT LOW MDM: CPT

## 2018-08-23 RX ORDER — IBUPROFEN 600 MG/1
600 TABLET ORAL
Status: COMPLETED | OUTPATIENT
Start: 2018-08-23 | End: 2018-08-23

## 2018-08-23 RX ADMIN — IBUPROFEN 600 MG: 600 TABLET ORAL at 09:08

## 2018-08-23 NOTE — ED PROVIDER NOTES
Encounter Date: 8/23/2018       History     Chief Complaint   Patient presents with    Knee Pain     missed top step when getting off bus. hit right knee on concrete     HPI   This is a 62-year-old female with a past medical history significant for hypertension, hyperlipidemia, diabetes mellitus type 2, thyroid disease and diverticulosis who presents to emergency department today for evaluation of right knee pain.  Patient states that she accidentally missed a step while getting off of the employee bus just prior to arrival to the emergency department today.  She states she landed on the right knee.  She denies any limited range of motion, numbness or weakness.  The pain is worse with weight-bearing.  She denies head trauma or loss of consciousness.  Denies neck or back pain.  She denies any other injuries or concerns.  She does report a small scrape on the right knee.  Last tetanus shot was in 2015.  No other problems or concerns voiced at this time.     Review of patient's allergies indicates:  No Known Allergies  Past Medical History:   Diagnosis Date    BMI 37.0-37.9, adult     Diabetes mellitus type II     Diverticulosis     history of diverticulosisseen on colonoscopy at age 48. Repeat recommended at age 58. Done by     Elevated blood protein     History of elevated protein. Apparently has seen  for extensive workup including bone marrow biopsy    History of shingles 2006    Hyperlipidemia     Hypertension     Microalbuminuria     due 2 diabetes    Mild vitamin D deficiency     . A low vitamin D    Thyroid disease     hypothyroidism     Past Surgical History:   Procedure Laterality Date    HYSTERECTOMY      OOPHORECTOMY       Family History   Problem Relation Age of Onset    Cancer Mother         lung ca heavy smoker    Cancer Father     Hypertension Sister     No Known Problems Sister     No Known Problems Daughter     No Known Problems Sister     Diabetes Maternal Aunt      Hypertension Maternal Uncle     Diabetes Maternal Uncle     Diabetes Maternal Grandmother     No Known Problems Brother     No Known Problems Paternal Aunt     No Known Problems Paternal Uncle     No Known Problems Maternal Grandfather     No Known Problems Paternal Grandmother     No Known Problems Paternal Grandfather     Amblyopia Neg Hx     Blindness Neg Hx     Cataracts Neg Hx     Glaucoma Neg Hx     Macular degeneration Neg Hx     Retinal detachment Neg Hx     Strabismus Neg Hx     Stroke Neg Hx     Thyroid disease Neg Hx      Social History     Tobacco Use    Smoking status: Never Smoker    Smokeless tobacco: Never Used    Tobacco comment: 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  She walks occasionally.   Substance Use Topics    Alcohol use: Yes     Comment: none    Drug use: No     Review of Systems   Constitutional: Negative for chills and fever.   HENT: Negative for congestion and sinus pressure.    Eyes: Negative for visual disturbance.   Respiratory: Negative for cough, chest tightness and shortness of breath.    Cardiovascular: Negative for chest pain.   Gastrointestinal: Negative for abdominal pain, diarrhea, nausea and vomiting.   Genitourinary: Negative for flank pain. Negative for dysuria.  Musculoskeletal:  Positive for arthralgia.  Skin:  Positive for abrasion.    Neurological: Negative for dizziness. Negative for weakness and numbness.   Psychiatric/Behavioral: Negative for confusion.         Physical Exam     Initial Vitals [08/23/18 0829]   BP Pulse Resp Temp SpO2   126/79 87 18 99 °F (37.2 °C) 97 %      MAP       --         Physical Exam   Nursing note and vitals reviewed.  Constitutional: Patient appears well-developed and well-nourished. Not diaphoretic. No distress.   HENT:   Head: Normocephalic and atraumatic.   Eyes: Conjunctivae are normal. No scleral icterus.   Neck: Normal range of motion. Neck supple.   Cardiovascular: Normal  rate, regular rhythm and normal heart sounds.   Pulmonary/Chest: Breath sounds normal. No respiratory distress.  Abdominal: Soft. There is no tenderness.   Musculoskeletal:  Normal range of motion of the right knee.  There is no deformity, swelling, erythema or warmth noted. Subjective pain with ambulation.  Antalgic gait noted.  Normal examination of the right foot, ankle and hip.  There is no bony spinal tenderness, crepitus, deformity or step-off noted. No other acute abnormalities noted on musculoskeletal exam.    Neurological: Alert and oriented to person, place, and time. Normal strength. No sensory deficit.   Skin:  Small abrasion noted to the anterior aspect of the right knee.  There is no foreign body, active bleeding, drainage or odor noted.    Psychiatric: Normal mood and affect. Thought content normal.       ED Course   Procedures  Labs Reviewed - No data to display       Imaging Results          X-Ray Knee 3 View Right (Final result)  Result time 08/23/18 09:35:18    Final result by Anthony Moore Jr., MD (08/23/18 09:35:18)                 Impression:      No acute abnormality.      Electronically signed by: Anthony Moore MD  Date:    08/23/2018  Time:    09:35             Narrative:    EXAMINATION:  XR KNEE 3 VIEW RIGHT    CLINICAL HISTORY:  Unspecified injury of right lower leg, initial encounter    TECHNIQUE:  AP, lateral, and Merchant views of the right knee were performed.    COMPARISON:  None.    FINDINGS:  Bones are well mineralized.  Femoral tibial and patellofemoral joints are well maintained. No significant effusion                                 Medical Decision Making:   ED Management:  This is a 62-year-old female presenting to the emergency department today for evaluation of right knee pain that she sustained after missing a step while getting out of employee bus this afternoon, landing on the right knee she did not sustain head trauma, neck or back injury or any other injuries during  the fall.  Small abrasion to the right knee and pain with weight-bearing.  Tetanus shot is up-to-date, 2015.  X-ray of the right knee is negative for acute findings.  An Ace wrap was applied and she was given crutches.  She is given a dose of ibuprofen for pain relief.  Advised she follow up with her PCP in 1 week.  OTC analgesics per package instructions.  Strict return precautions discussed.                      Clinical Impression:   The primary encounter diagnosis was Contusion of right knee, initial encounter. Diagnoses of Right knee injury and Abrasion, knee, right, initial encounter were also pertinent to this visit.                             Suzan Kaufman NP  08/24/18 1300

## 2018-08-23 NOTE — LETTER
"  [facility]  [address]  [facility] [address]  Phone: [phone]  Fax: [phone]   August 23, 2018    Patient: Maria Elena Tavares (Eileen)   YOB: 1955   Date of Visit: 8/23/2018   Patient ID [id number]       To Whom It May Concern:    Maria Elena Tavares (Eileen) was seen and treated in our emergency department on 8/23/2018. She may return to work on Monday 8/27/18.      Sincerely,          SEN BakerC       "

## 2018-08-23 NOTE — ED NOTES
LOC: The patient is awake, alert, and oriented to place, time, situation. Affect is appropriate.  Speech is appropriate and clear.     APPEARANCE: Patient resting on gurney,in no acute distress.  Patient is clean and well groomed.    SKIN: The skin is warm and dry; color consistent with ethnicity.  Patient has normal skin turgor and moist mucus membranes.  Small abrasion to right knee cap.    MUSCULOSKELETAL: Patient moving upper  extremities without difficulty.  Pain to right knee with limited mobility.  Ice pack in place. Denies weakness.     RESPIRATORY: Airway is open and patent. Respirations spontaneous, even, easy, and non-labored.  Patient has a normal effort and rate.  No accessory muscle use noted. Denies cough.     CARDIAC:   No peripheral edema noted. No complaints of chest pain.      ABDOMEN: Soft and non tender to palpation.  No distention noted.     NEUROLOGIC: Eyes open spontaneously.  Behavior appropriate to situation.  Follows commands; facial expression symmetrical.  Purposeful motor response noted; normal sensation in all extremities.

## 2018-08-23 NOTE — ED TRIAGE NOTES
Comes to the ED after falling from the shuttle bus onto the  [place] curb, falling onto the right knee. Right knee now giving out when trying to bear weight.

## 2018-08-27 ENCOUNTER — LAB VISIT (OUTPATIENT)
Dept: LAB | Facility: HOSPITAL | Age: 63
End: 2018-08-27
Attending: FAMILY MEDICINE
Payer: COMMERCIAL

## 2018-08-27 ENCOUNTER — OFFICE VISIT (OUTPATIENT)
Dept: PRIMARY CARE CLINIC | Facility: CLINIC | Age: 63
End: 2018-08-27
Attending: FAMILY MEDICINE
Payer: COMMERCIAL

## 2018-08-27 VITALS
HEIGHT: 60 IN | OXYGEN SATURATION: 99 % | TEMPERATURE: 98 F | SYSTOLIC BLOOD PRESSURE: 137 MMHG | WEIGHT: 201.63 LBS | DIASTOLIC BLOOD PRESSURE: 87 MMHG | BODY MASS INDEX: 39.59 KG/M2 | HEART RATE: 71 BPM

## 2018-08-27 DIAGNOSIS — Z12.39 SCREENING FOR BREAST CANCER: ICD-10-CM

## 2018-08-27 DIAGNOSIS — Z00.00 ANNUAL PHYSICAL EXAM: Primary | ICD-10-CM

## 2018-08-27 DIAGNOSIS — I10 ESSENTIAL HYPERTENSION: ICD-10-CM

## 2018-08-27 DIAGNOSIS — I10 HYPERTENSION, UNSPECIFIED TYPE: ICD-10-CM

## 2018-08-27 LAB
ALBUMIN SERPL BCP-MCNC: 3.7 G/DL
ALBUMIN/CREAT UR: 5.9 UG/MG
ALP SERPL-CCNC: 86 U/L
ALT SERPL W/O P-5'-P-CCNC: 10 U/L
ANION GAP SERPL CALC-SCNC: 12 MMOL/L
AST SERPL-CCNC: 16 U/L
BILIRUB SERPL-MCNC: 0.3 MG/DL
BUN SERPL-MCNC: 16 MG/DL
CALCIUM SERPL-MCNC: 9.9 MG/DL
CHLORIDE SERPL-SCNC: 101 MMOL/L
CHOLEST SERPL-MCNC: 210 MG/DL
CHOLEST/HDLC SERPL: 4.7 {RATIO}
CO2 SERPL-SCNC: 26 MMOL/L
CREAT SERPL-MCNC: 0.9 MG/DL
CREAT UR-MCNC: 153 MG/DL
EST. GFR  (AFRICAN AMERICAN): >60 ML/MIN/1.73 M^2
EST. GFR  (NON AFRICAN AMERICAN): >60 ML/MIN/1.73 M^2
ESTIMATED AVG GLUCOSE: 143 MG/DL
GLUCOSE SERPL-MCNC: 79 MG/DL
HBA1C MFR BLD HPLC: 6.6 %
HDLC SERPL-MCNC: 45 MG/DL
HDLC SERPL: 21.4 %
LDLC SERPL CALC-MCNC: 135.6 MG/DL
MICROALBUMIN UR DL<=1MG/L-MCNC: 9 UG/ML
NONHDLC SERPL-MCNC: 165 MG/DL
POTASSIUM SERPL-SCNC: 3.5 MMOL/L
PROT SERPL-MCNC: 8 G/DL
SODIUM SERPL-SCNC: 139 MMOL/L
T4 FREE SERPL-MCNC: 0.71 NG/DL
TRIGL SERPL-MCNC: 147 MG/DL
TSH SERPL DL<=0.005 MIU/L-ACNC: 7.05 UIU/ML

## 2018-08-27 PROCEDURE — 80053 COMPREHEN METABOLIC PANEL: CPT

## 2018-08-27 PROCEDURE — 84443 ASSAY THYROID STIM HORMONE: CPT

## 2018-08-27 PROCEDURE — 83036 HEMOGLOBIN GLYCOSYLATED A1C: CPT

## 2018-08-27 PROCEDURE — 3079F DIAST BP 80-89 MM HG: CPT | Mod: CPTII,S$GLB,, | Performed by: FAMILY MEDICINE

## 2018-08-27 PROCEDURE — 36415 COLL VENOUS BLD VENIPUNCTURE: CPT | Mod: PN

## 2018-08-27 PROCEDURE — 82043 UR ALBUMIN QUANTITATIVE: CPT

## 2018-08-27 PROCEDURE — 84439 ASSAY OF FREE THYROXINE: CPT

## 2018-08-27 PROCEDURE — 80061 LIPID PANEL: CPT

## 2018-08-27 PROCEDURE — 99999 PR PBB SHADOW E&M-EST. PATIENT-LVL IV: CPT | Mod: PBBFAC,,, | Performed by: FAMILY MEDICINE

## 2018-08-27 PROCEDURE — 3075F SYST BP GE 130 - 139MM HG: CPT | Mod: CPTII,S$GLB,, | Performed by: FAMILY MEDICINE

## 2018-08-27 PROCEDURE — 99396 PREV VISIT EST AGE 40-64: CPT | Mod: S$GLB,,, | Performed by: FAMILY MEDICINE

## 2018-08-27 PROCEDURE — 3045F PR MOST RECENT HEMOGLOBIN A1C LEVEL 7.0-9.0%: CPT | Mod: CPTII,S$GLB,, | Performed by: FAMILY MEDICINE

## 2018-08-27 RX ORDER — ATORVASTATIN CALCIUM 10 MG/1
10 TABLET, FILM COATED ORAL DAILY
Qty: 90 TABLET | Refills: 3 | Status: SHIPPED | OUTPATIENT
Start: 2018-08-27 | End: 2019-09-16

## 2018-08-27 RX ORDER — IRBESARTAN 300 MG/1
300 TABLET ORAL DAILY
Qty: 90 TABLET | Refills: 3 | Status: SHIPPED | OUTPATIENT
Start: 2018-08-27 | End: 2019-07-01 | Stop reason: SDUPTHER

## 2018-08-27 RX ORDER — CHLORTHALIDONE 25 MG/1
25 TABLET ORAL EVERY MORNING
Qty: 90 TABLET | Refills: 1 | Status: SHIPPED | OUTPATIENT
Start: 2018-08-27 | End: 2019-03-20 | Stop reason: SDUPTHER

## 2018-08-27 RX ORDER — LEVOTHYROXINE SODIUM 200 UG/1
200 TABLET ORAL DAILY
Qty: 90 TABLET | Refills: 0 | Status: SHIPPED | OUTPATIENT
Start: 2018-08-27 | End: 2019-03-13 | Stop reason: SDUPTHER

## 2018-08-27 NOTE — PROGRESS NOTES
Subjective:       Patient ID: Maria Elena Tavares is a 62 y.o. female.    Chief Complaint: Annual Exam (pt wants tests - family history of cancer) and Cough (dry cough)    Pt presents today for f/u DM and HTN. Feel well, started to exercise. Also needs meds refilled.      Cough   Associated symptoms include weight loss. Pertinent negatives include no chest pain, chills, ear pain, fever, headaches, sore throat, shortness of breath, sweats or wheezing.   Diabetes   She has type 2 diabetes mellitus. No MedicAlert identification noted. The initial diagnosis of diabetes was made 10 years ago. Hypoglycemia symptoms include sleepiness. Pertinent negatives for hypoglycemia include no confusion, dizziness, headaches, hunger, mood changes, nervousness/anxiousness, seizures, speech difficulty, sweats or tremors. Associated symptoms include weight loss. Pertinent negatives for diabetes include no blurred vision, no chest pain, no fatigue, no foot paresthesias, no foot ulcerations, no polyphagia, no polyuria, no visual change and no weakness. Pertinent negatives for hypoglycemia complications include no blackouts, no hospitalization, no nocturnal hypoglycemia and no required assistance. Symptoms are stable. Pertinent negatives for diabetic complications include no autonomic neuropathy, CVA, heart disease, nephropathy, peripheral neuropathy, PVD or retinopathy. Risk factors for coronary artery disease include hypertension and obesity. Current diabetic treatment includes oral agent (dual therapy). She is compliant with treatment most of the time. Her weight is fluctuating minimally. Meal planning includes carbohydrate counting. She participates in exercise intermittently. She monitors blood glucose at home 1-2 x per day. Blood glucose monitoring compliance is fair. Eye exam is current.     Review of Systems   Constitutional: Positive for weight loss. Negative for activity change, appetite change, chills, fatigue and fever.   HENT:  Negative for congestion, ear pain, sinus pressure, sore throat and trouble swallowing.    Eyes: Negative for blurred vision, photophobia, pain and visual disturbance.   Respiratory: Negative for apnea, cough, chest tightness, shortness of breath and wheezing.    Cardiovascular: Negative for chest pain, palpitations and leg swelling.   Gastrointestinal: Negative for abdominal distention, abdominal pain, constipation, diarrhea, nausea and vomiting.   Endocrine: Negative for polyphagia and polyuria.   Genitourinary: Negative.    Musculoskeletal: Negative for back pain, joint swelling and neck pain.   Skin: Negative.    Neurological: Negative for dizziness, tremors, seizures, speech difficulty, weakness, numbness and headaches.   Psychiatric/Behavioral: Negative for behavioral problems, confusion, decreased concentration and sleep disturbance. The patient is not nervous/anxious.    All other systems reviewed and are negative.      Objective:      Physical Exam   Constitutional: She is oriented to person, place, and time. She appears well-developed and well-nourished.   HENT:   Head: Normocephalic and atraumatic.   Eyes: Conjunctivae and EOM are normal. Pupils are equal, round, and reactive to light.   Neck: Normal range of motion. Neck supple. No thyromegaly present.   Cardiovascular: Normal rate, regular rhythm, normal heart sounds and intact distal pulses.   No murmur heard.  Pulmonary/Chest: Effort normal and breath sounds normal. No respiratory distress. She has no wheezes. She has no rales. She exhibits no tenderness.   Musculoskeletal: Normal range of motion. She exhibits no edema or tenderness.   Lymphadenopathy:     She has no cervical adenopathy.   Neurological: She is alert and oriented to person, place, and time. She has normal reflexes. No cranial nerve deficit. She exhibits normal muscle tone. Coordination normal.   Skin: Skin is warm.   Psychiatric: She has a normal mood and affect. Her behavior is normal.  Judgment and thought content normal.       Assessment:       1. Screening for breast cancer    2. Uncontrolled type 2 diabetes mellitus without complication, without long-term current use of insulin    3. Hypertension, unspecified type    4. Essential hypertension        Plan:       Screening for breast cancer  -     Mammo Digital Screening Bilat with Tomosynthesis CAD; Future; Expected date: 08/27/2018    Uncontrolled type 2 diabetes mellitus without complication, without long-term current use of insulin  -     Lipid panel; Future; Expected date: 08/27/2018  -     Hemoglobin A1c; Future; Expected date: 08/27/2018  -     Comprehensive metabolic panel; Future; Expected date: 08/27/2018  -     TSH; Future; Expected date: 08/27/2018  -     MICROALBUMIN / CREATININE RATIO URINE    Hypertension, unspecified type  -     irbesartan (AVAPRO) 300 MG tablet; Take 1 tablet (300 mg total) by mouth once daily. Stop valsartan 320.  Dispense: 90 tablet; Refill: 3    Essential hypertension  -     chlorthalidone (HYGROTEN) 25 MG Tab; Take 1 tablet (25 mg total) by mouth every morning. Stop losartan hctz 100/25.  Dispense: 90 tablet; Refill: 1    Other orders  -     Cancel: Hemoglobin A1c; Future; Expected date: 08/27/2018  -     levothyroxine (SYNTHROID) 200 MCG tablet; Take 1 tablet (200 mcg total) by mouth once daily.  Dispense: 90 tablet; Refill: 0  -     atorvastatin (LIPITOR) 10 MG tablet; Take 1 tablet (10 mg total) by mouth once daily.  Dispense: 90 tablet; Refill: 3    exercise and diet mods encouraged.  Labs to assess DM to be done  meds refilled  rtc prn or 3 mos for annual exam pending lab results  Pt aware and amenable with plan

## 2018-08-28 ENCOUNTER — TELEPHONE (OUTPATIENT)
Dept: PRIMARY CARE CLINIC | Facility: CLINIC | Age: 63
End: 2018-08-28

## 2018-08-28 DIAGNOSIS — E03.9 HYPOTHYROIDISM, UNSPECIFIED TYPE: Primary | ICD-10-CM

## 2018-09-06 ENCOUNTER — PATIENT MESSAGE (OUTPATIENT)
Dept: PRIMARY CARE CLINIC | Facility: CLINIC | Age: 63
End: 2018-09-06

## 2018-09-06 ENCOUNTER — TELEPHONE (OUTPATIENT)
Dept: PRIMARY CARE CLINIC | Facility: CLINIC | Age: 63
End: 2018-09-06

## 2018-09-06 NOTE — TELEPHONE ENCOUNTER
Spoke with patient and gave her our fax number for FMLA paperwork. I let her know that Dr. San/Mrs. Saravia will be out until Monday but that I would give them the paperwork then. Patient had a verbal understanding.    ----- Message from Billie Laboy sent at 9/6/2018 11:37 AM CDT -----  Contact: pt            Name of Who is Calling: pt      What is the request in detail: pt needs to speak to nurse regarding fmla paperwork. Call pt      Can the clinic reply by [facility]: no      What Number to Call Back if not in [facility]: [phone]

## 2018-09-07 ENCOUNTER — PATIENT MESSAGE (OUTPATIENT)
Dept: ORTHOPEDICS | Facility: CLINIC | Age: 63
End: 2018-09-07

## 2018-09-07 DIAGNOSIS — E11.9 TYPE 2 DIABETES MELLITUS: ICD-10-CM

## 2018-09-10 ENCOUNTER — TELEPHONE (OUTPATIENT)
Dept: ORTHOPEDICS | Facility: CLINIC | Age: 63
End: 2018-09-10

## 2018-09-10 NOTE — TELEPHONE ENCOUNTER
I left patient brief message to call us back to reschedule appointment that she cancelled on 09/10/2018 @ 6:30 pm in [place] after hours.

## 2018-09-11 ENCOUNTER — HOSPITAL ENCOUNTER (OUTPATIENT)
Dept: RADIOLOGY | Facility: OTHER | Age: 63
Discharge: HOME | End: 2018-09-11
Attending: FAMILY MEDICINE
Payer: COMMERCIAL

## 2018-09-11 DIAGNOSIS — Z12.39 SCREENING FOR BREAST CANCER: ICD-10-CM

## 2018-09-11 PROCEDURE — 77063 BREAST TOMOSYNTHESIS BI: CPT | Mod: TC

## 2018-09-11 PROCEDURE — 77067 SCR MAMMO BI INCL CAD: CPT | Mod: 26,,, | Performed by: RADIOLOGY

## 2018-09-11 PROCEDURE — 77063 BREAST TOMOSYNTHESIS BI: CPT | Mod: 26,,, | Performed by: RADIOLOGY

## 2018-09-13 ENCOUNTER — PATIENT OUTREACH (OUTPATIENT)
Dept: OTHER | Facility: OTHER | Age: 63
End: 2018-09-13

## 2018-09-13 NOTE — PROGRESS NOTES
Last 5 Patient Entered Readings                                      Current 30 Day Average: 124/84     Recent Readings 9/12/2018 9/12/2018 8/21/2018 8/21/2018 8/20/2018    SBP (mmHg) 95 100 117 117 151    DBP (mmHg) 63 73 81 81 102    Pulse 89 92 82 82 88        Hypertension Medications             chlorthalidone (HYGROTEN) 25 MG Tab Take 1 tablet (25 mg total) by mouth every morning. Stop losartan hctz 100/25.    irbesartan (AVAPRO) 300 MG tablet Take 1 tablet (300 mg total) by mouth once daily. Stop valsartan 320.        Assessment/ Plan:   Called patient to follow up, reviewed recent readings.   Per 2017 ACC/ AHA HTN guidelines (goal of BP < 130/80), current 30-day average is slightly uncontrolled.   Requested patient take more frequent readings so I can better determine whether or not we need to adjust her HTN medication regimen, patient confirms understanding.   Patient denies hypotensive s/sx (lightheadedness, dizziness, nausea, fatigue) associated with low readings.  Instructed patient to inform me if she does develop hypotensive s/sx associated with low readings, patient confirms understanding.    Patient denies having questions or concerns. Instructed patient to call if she has any questions or concerns, patient confirms understanding.   Will continue to monitor. WCB in 6 weeks.

## 2018-09-26 ENCOUNTER — TELEPHONE (OUTPATIENT)
Dept: PRIMARY CARE CLINIC | Facility: CLINIC | Age: 63
End: 2018-09-26

## 2018-09-26 ENCOUNTER — PATIENT OUTREACH (OUTPATIENT)
Dept: OTHER | Facility: OTHER | Age: 63
End: 2018-09-26

## 2018-09-26 NOTE — PROGRESS NOTES
"Last 5 Patient Entered Readings                                      Current 30 Day Average: 108/71     Recent Readings 2018    SBP (mmHg) 129 99 95 100 117    DBP (mmHg) 84 67 63 73 81    Pulse 100 107 89 92 82        Digital Medicine: Health  Follow Up    Lifestyle Modifications:    1.Dietary Modifications (Sodium intake <2,000mg/day, food labels, dining out): Deferred    2.Physical Activity: Deferred    3.Medication Therapy: Patient has been compliant with the medication regimen.    4.Patient has the following medication side effects/concerns: Patient reports having a "slight headache" with low BP on , . She denies any lightheaded/dizziness. Encouraged hydration.   (Frequency/Alleviating factors/Precipitating factors, etc.)     Follow up with Ms. Maria Elena Tavares completed. Ms. Dumont is doing okay. Patient is eligible for DDMP, and is interested. She states that she will finish her onboarding questionnaires. Ms. Tavares recently purchased a new glucometer, but is concerned because she reports that the strips are expensive and " too fast". She will call back to let me know which device she has. No further questions or concerns. Will continue to follow up to achieve health goals.      "

## 2018-09-26 NOTE — TELEPHONE ENCOUNTER
----- Message from Rani Barrios MA sent at 8/28/2018  4:59 PM CDT -----  Ref placed for Dr Calles endocrine due to elev TSH levels.  PV      Documentation

## 2018-09-26 NOTE — TELEPHONE ENCOUNTER
Spoke with Ms. Tavares in regards to scheduling her endocrine appt with Dr. Calles per Dr. San. I was able to schedule the next avail appt 11/26/18 2:30 . Will mail reminder. Conversation was understood and it ended.

## 2018-10-24 ENCOUNTER — PATIENT OUTREACH (OUTPATIENT)
Dept: OTHER | Facility: OTHER | Age: 63
End: 2018-10-24

## 2018-10-24 NOTE — PROGRESS NOTES
Last 5 Patient Entered Readings                                      Current 30 Day Average: 117/68     Recent Readings 10/21/2018 10/11/2018 9/30/2018 9/23/2018 9/14/2018    SBP (mmHg) 117 114 120 129 99    DBP (mmHg) 69 61 73 84 67    Pulse 83 103 92 100 107        Hypertension Medications             chlorthalidone (HYGROTEN) 25 MG Tab Take 1 tablet (25 mg total) by mouth every morning. Stop losartan hctz 100/25.    irbesartan (AVAPRO) 300 MG tablet Take 1 tablet (300 mg total) by mouth once daily. Stop valsartan 320.        Plan:   Called patient to follow up, reviewed BP readings. Per 2017 ACC/ AHA HTN guidelines  (goal of BP < 130/80), current 30-day average is well controlled.  LVM, requested patient call back at her convenience.  Will continue to monitor. WCB in 6 months, sooner if BP begins to trend up or down.

## 2018-10-31 ENCOUNTER — PATIENT OUTREACH (OUTPATIENT)
Dept: OTHER | Facility: OTHER | Age: 63
End: 2018-10-31

## 2018-10-31 NOTE — LETTER
May 21, 2019     Maria Elena Tavares  [address] [facility] [address]       Dear Maria Elena,    We have made several attempts to encourage your participation in [facility]s [facility]. Unfortunately, we have been unsuccessful.     This is an official notice that you are no longer enrolled in the digital medicine program, and thus, we will no longer be managing your disease states. Please note this has no impact on your relationship with [facility] or your providers. Going forward, please reach out to your primary care provider with any questions or concerns regarding your health.    Please contact [phone] if you have any additional questions.    Sincerely,  The [facility] Team

## 2018-10-31 NOTE — LETTER
May 21, 2019     Maria Elena Tavares  [address] [facility] [address]       Dear Maria Elena,    We have made several attempts to encourage your participation in [facility]s [facility]. Unfortunately, we have been unsuccessful.     This is an official notice that you are no longer enrolled in the digital medicine program, and thus, we will no longer be managing your disease states. Please note this has no impact on your relationship with [facility] or your providers. Going forward, please reach out to your primary care provider with any questions or concerns regarding your health.    Please contact [phone] if you have any additional questions.    Sincerely,  [facility]

## 2018-10-31 NOTE — PROGRESS NOTES
Last 5 Patient Entered Readings                                      Current 30 Day Average: 116/65     Recent Readings 10/21/2018 10/11/2018 9/30/2018 9/23/2018 9/14/2018    SBP (mmHg) 117 114 120 129 99    DBP (mmHg) 69 61 73 84 67    Pulse 83 103 92 100 107        Digital Medicine: Health  Follow Up    Left voicemail to follow up with Ms. Maria Elena Tavares.  Current BP average 116/65 mmHg is at goal, <130/80 mmHg.  Will follow up in 2 weeks.

## 2018-11-09 NOTE — PROGRESS NOTES
Last 5 Patient Entered Readings                                      Current 30 Day Average: 119/77     Recent Readings 11/8/2018 11/6/2018 11/6/2018 11/6/2018 10/21/2018    SBP (mmHg) 115 131 135 139 117    DBP (mmHg) 79 86 89 78 69    Pulse 83 89 92 104 83        Hypertension Medications             chlorthalidone (HYGROTEN) 25 MG Tab Take 1 tablet (25 mg total) by mouth every morning. Stop losartan hctz 100/25.    irbesartan (AVAPRO) 300 MG tablet Take 1 tablet (300 mg total) by mouth once daily. Stop valsartan 320. QHS         Assessment/ Plan:   Returned patient's call. Patient c/o hypotensive s/sx yesterday, but states she also had a nosebleed. BP was 115/79. Instructed patient to separate timing of chlorthalidone and irbesartan to see if this helps with hypotensive s/sx, patient confirms understanding. Patient will begin taking chlorthalidone qam and irbesartan qhs.     Reviewed recent readings. Per 2017 ACC/ AHA HTN guidelines (goal of BP < 130/80), current 30-day average is well controlled.   Patient denies having questions or concerns. Instructed patient to call if she has any questions or concerns, patient confirms understanding.   Will continue to monitor. WCB in 2 weeks.

## 2018-11-13 ENCOUNTER — PATIENT MESSAGE (OUTPATIENT)
Dept: ENDOCRINOLOGY | Facility: CLINIC | Age: 63
End: 2018-11-13

## 2018-11-14 NOTE — PROGRESS NOTES
Last 5 Patient Entered Readings                                      Current 30 Day Average: 121/80     Recent Readings 11/8/2018 11/6/2018 11/6/2018 11/6/2018 10/21/2018    SBP (mmHg) 115 131 135 139 117    DBP (mmHg) 79 86 89 78 69    Pulse 83 89 92 104 83        Digital Medicine: Health  Follow Up    Left voicemail to follow up with Ms. Maria Elena Tavares.  Current BP average 121/80 mmHg is at goal, <130/80 mmHg.  Patient expressed interest in DDMP at our last encounter, but has concerns about the cost of the device and supplies.  Will continue to follow up.

## 2018-11-27 ENCOUNTER — PATIENT OUTREACH (OUTPATIENT)
Dept: OTHER | Facility: OTHER | Age: 63
End: 2018-11-27

## 2018-11-27 NOTE — PROGRESS NOTES
HPI:  Called patient to follow up. Patient confirms she is feeling better now that she is taking chlorthalidone qam and irbesartan qhs. Patient endorses adherence to medication regimen daily and denies missed doses. Patient denies hypotensive s/sx (lightheadedness, dizziness, nausea, fatigue); patient denies hypertensive s/sx (SOB, CP, severe headaches, changes in vision, dizziness, fatigue, confusion, anxiety, nosebleeds).     Last 5 Patient Entered Readings                                      Current 30 Day Average: 123/83     Recent Readings 11/8/2018 11/6/2018 11/6/2018 11/6/2018 10/21/2018    SBP (mmHg) 115 131 135 139 117    DBP (mmHg) 79 86 89 78 69    Pulse 83 89 92 104 83        Assessment:  Reviewed recent readings. Per 2017 ACC/ AHA HTN guidelines (goal of BP < 130/80), current 30-day average is well controlled.     Plan:  Continue current medication regimen. Patients health , Jazmyn Franco, will be following up every 3-4 weeks. I will continue to monitor regularly and will follow-up in 6 weeks, sooner if blood pressure begins to trend upward or downward.     Current medication regimen:  Hypertension Medications             chlorthalidone (HYGROTEN) 25 MG Tab Take 1 tablet (25 mg total) by mouth every morning. Stop losartan hctz 100/25.    irbesartan (AVAPRO) 300 MG tablet Take 1 tablet (300 mg total) by mouth once daily. Stop valsartan 320.        Patient denies having questions or concerns. Patient has my contact information and knows to call with any concerns or clinical changes.

## 2018-12-17 NOTE — PROGRESS NOTES
Last 5 Patient Entered Readings                                      Current 30 Day Average: 123/81     Recent Readings 12/7/2018 12/1/2018 11/29/2018 11/29/2018 11/8/2018    SBP (mmHg) 120 113 136 135 115    DBP (mmHg) 82 79 76 88 79    Pulse 92 85 104 91 83        Digital Medicine: Health  Follow Up    Left voicemail to follow up with Ms. Maria Elena Tavares.  Current BP average 123/81 mmHg is at goal, <130/80 mmHg.  Patient expressed interest in DDMP at our last encounter, but has concerns about the cost of the device and supplies.  3rd attempt. Will continue to follow up.

## 2018-12-28 ENCOUNTER — OFFICE VISIT (OUTPATIENT)
Dept: INTERNAL MEDICINE | Facility: CLINIC | Age: 63
End: 2018-12-28
Attending: INTERNAL MEDICINE
Payer: COMMERCIAL

## 2018-12-28 VITALS
WEIGHT: 205 LBS | OXYGEN SATURATION: 98 % | DIASTOLIC BLOOD PRESSURE: 76 MMHG | HEART RATE: 87 BPM | SYSTOLIC BLOOD PRESSURE: 122 MMHG | BODY MASS INDEX: 40.25 KG/M2 | HEIGHT: 60 IN

## 2018-12-28 DIAGNOSIS — J06.9 UPPER RESPIRATORY TRACT INFECTION, UNSPECIFIED TYPE: Primary | ICD-10-CM

## 2018-12-28 PROCEDURE — 3008F BODY MASS INDEX DOCD: CPT | Mod: CPTII,S$GLB,, | Performed by: INTERNAL MEDICINE

## 2018-12-28 PROCEDURE — 3078F DIAST BP <80 MM HG: CPT | Mod: CPTII,S$GLB,, | Performed by: INTERNAL MEDICINE

## 2018-12-28 PROCEDURE — 99214 OFFICE O/P EST MOD 30 MIN: CPT | Mod: S$GLB,,, | Performed by: INTERNAL MEDICINE

## 2018-12-28 PROCEDURE — 99999 PR PBB SHADOW E&M-EST. PATIENT-LVL III: CPT | Mod: PBBFAC,,, | Performed by: INTERNAL MEDICINE

## 2018-12-28 PROCEDURE — 3074F SYST BP LT 130 MM HG: CPT | Mod: CPTII,S$GLB,, | Performed by: INTERNAL MEDICINE

## 2018-12-28 RX ORDER — CODEINE PHOSPHATE AND GUAIFENESIN 10; 100 MG/5ML; MG/5ML
5-10 SOLUTION ORAL EVERY 4 HOURS PRN
Qty: 118 ML | Refills: 0 | Status: SHIPPED | OUTPATIENT
Start: 2018-12-28 | End: 2019-01-07

## 2018-12-28 NOTE — PROGRESS NOTES
Subjective:       Patient ID: Maria Elena Tavares is a 63 y.o. female.    Chief Complaint: URI    Here for urgent visit  Pt normally cared for by my colleague Dr. San and patient is new to me. I have reviewed patient's past medical, surgical, and social history in addition to MAR and allergies.     64 yo f with PMHx of DM, HTN, hypothyroidism, GERD, vit d def, diverticulosis presents with cough.    1 week ago developed sore throat and runny nose and then developed chest congestion and cough, temp of 101.5 3 days prior. Has not had fever since. Taking tylenol cold and flu liquid and theraflu. She reports sore throat has resolved, nasal congestion has improved but still present, and cough is dry and hurts her bilateral ribs to cough. Patient denies  SOB, chest tightness, eye pain, severe headache, inability to maintain adequate PO intake, abdominal pain, nausea/vomiting, or diarrhea. She has not been checking her BG in last 3-4 days on account of not feeling well.              Review of Systems    Objective:      Vitals:    12/28/18 0829   BP: 122/76   Pulse: 87   SpO2: 98%   Weight: 93 kg (205 lb 0.4 oz)   Height: 5' (1.524 m)      Physical Exam   Constitutional: She is oriented to person, place, and time. She appears well-developed and well-nourished.   HENT:   Head: Normocephalic and atraumatic.   Right Ear: Tympanic membrane, external ear and ear canal normal.   Left Ear: Tympanic membrane, external ear and ear canal normal.   Nose: No mucosal edema or rhinorrhea.   Mouth/Throat: No oropharyngeal exudate, posterior oropharyngeal edema or posterior oropharyngeal erythema.   Eyes: Conjunctivae and EOM are normal.   Pulmonary/Chest: Effort normal and breath sounds normal. No respiratory distress. She has no wheezes.   Abdominal: She exhibits no distension.   Musculoskeletal: She exhibits no edema.   Lymphadenopathy:     She has no cervical adenopathy.   Neurological: She is alert and oriented to person, place, and time.    Skin: Skin is warm and dry. No rash noted.       Assessment:       1. Upper respiratory tract infection, unspecified type        Plan:       Maria Elena was seen today for uri.    Diagnoses and all orders for this visit:    Upper respiratory tract infection, unspecified type   Pt non toxic appearing. No respiratory distress. Lung clear. Presumed viral. OTC meds discussed.  Prompts to call office discussed.    -     guaifenesin-codeine 100-10 mg/5 ml (TUSSI-ORGANIDIN NR)  mg/5 mL syrup; Take 5-10 mLs by mouth every 4 (four) hours as needed for Cough. Max 60mL/24 hours    DM  -rec she keep close eye on sugar and that despite decreased PO intake in setting of infection her BG can become elevated. She voiced understanding. Office and Emergency Department prompts discussed.                 Side effects of medication(s) were discussed in detail and patient voiced understanding.  Patient will call back for any issues or complications.

## 2018-12-31 ENCOUNTER — OFFICE VISIT (OUTPATIENT)
Dept: ORTHOPEDICS | Facility: CLINIC | Age: 63
End: 2018-12-31
Payer: COMMERCIAL

## 2018-12-31 VITALS
HEART RATE: 93 BPM | BODY MASS INDEX: 40.32 KG/M2 | HEIGHT: 60 IN | DIASTOLIC BLOOD PRESSURE: 66 MMHG | SYSTOLIC BLOOD PRESSURE: 108 MMHG | WEIGHT: 205.38 LBS

## 2018-12-31 DIAGNOSIS — M17.12 PRIMARY OSTEOARTHRITIS OF LEFT KNEE: Primary | ICD-10-CM

## 2018-12-31 PROCEDURE — 99999 PR PBB SHADOW E&M-EST. PATIENT-LVL III: CPT | Mod: PBBFAC,,, | Performed by: PHYSICIAN ASSISTANT

## 2018-12-31 PROCEDURE — 99213 OFFICE O/P EST LOW 20 MIN: CPT | Mod: 25,S$GLB,, | Performed by: PHYSICIAN ASSISTANT

## 2018-12-31 PROCEDURE — 3008F BODY MASS INDEX DOCD: CPT | Mod: CPTII,S$GLB,, | Performed by: PHYSICIAN ASSISTANT

## 2018-12-31 PROCEDURE — 20610 DRAIN/INJ JOINT/BURSA W/O US: CPT | Mod: LT,S$GLB,, | Performed by: PHYSICIAN ASSISTANT

## 2018-12-31 PROCEDURE — 3078F DIAST BP <80 MM HG: CPT | Mod: CPTII,S$GLB,, | Performed by: PHYSICIAN ASSISTANT

## 2018-12-31 PROCEDURE — 3074F SYST BP LT 130 MM HG: CPT | Mod: CPTII,S$GLB,, | Performed by: PHYSICIAN ASSISTANT

## 2018-12-31 RX ORDER — TRIAMCINOLONE ACETONIDE 40 MG/ML
60 INJECTION, SUSPENSION INTRA-ARTICULAR; INTRAMUSCULAR
Status: COMPLETED | OUTPATIENT
Start: 2018-12-31 | End: 2018-12-31

## 2018-12-31 RX ADMIN — TRIAMCINOLONE ACETONIDE 60 MG: 40 INJECTION, SUSPENSION INTRA-ARTICULAR; INTRAMUSCULAR at 03:12

## 2018-12-31 NOTE — PROGRESS NOTES
SUBJECTIVE:     Chief Complaint : left knee pain    History of Present Illness:  Maria Elena Tavares is a 63 y.o. 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  She has been treated with injections. Last injection was 6/18/2018 and lasted until now. No trauma to left knee.  She had some swelling but used Aleve and ice and swelling has resolved. Pain is medial. Pain is moderate.  She has stiffness after long periods of sitting. No previous injury to knee. She denies mechanical symptoms or instability.       Past Medical History:   Diagnosis Date    BMI 37.0-37.9, adult     Diabetes mellitus type II     Diverticulosis     history of diverticulosisseen on colonoscopy at age 48. Repeat recommended at age 58. Done by     Elevated blood protein     History of elevated protein. Apparently has seen  for extensive workup including bone marrow biopsy    History of shingles 2006    Hyperlipidemia     Hypertension     Microalbuminuria     due 2 diabetes    Mild vitamin D deficiency     . A low vitamin D    Thyroid disease     hypothyroidism       Review of Systems:  Constitutional: no fever or chills  ENT: no nasal congestion or sore throat  Respiratory: no cough or shortness of breath  Cardiovascular: no chest pain or palpitations  Gastrointestinal: no nausea or vomiting, tolerating diet  Genitourinary: no hematuria or dysuria  Integument/Breast: no rash or pruritis  Hematologic/Lymphatic: no easy bruising or lymphadenopathy  Musculoskeletal: see HPI  Neurological: no seizures or tremors  Behavioral/Psych: no auditory or visual hallucinations    OBJECTIVE:     PHYSICAL EXAM:  Blood pressure 108/66, pulse 93, height 5' (1.524 m), weight 93.2 kg (205 lb 5.7 oz).   General Appearance: WDWN, NAD  Gait: Normal  Neuro/Psych: Mood & affect appropriate  Lungs: Respirations equal and unlabored.   CV: 2+ bilateral upper and lower extremity pulses.    Skin: Intact throughout LE  Extremities: No LE edema    Right Knee Exam  Range of Motion:0-130 active   Effusion:none  Condition of skin:intact  Location of tenderness:Medial joint line   Strength:5 of 5 quadriceps strength and 5 of 5 hamstring strength  Stability:stable to testing  Susana: negative    Left Knee Exam  Range of Motion:0-130 active   Effusion:yes, small  Condition of skin:intact  Location of tenderness:Medial joint line   Strength:5 of 5 quadriceps strength and 5 of 5 hamstring strength  Stability:stable to testing  Susana: pain medially with testing    Alignment: Mild varus    Right Hip Examination: no pain with PROM     Left Hip Examination: no pain with PROM    RADIOGRAPHS: AP, lateral and merchant knee x-rays reviewed today by me reveal moderate degenerative changes medial compartment both knees.     ASSESSMENT/PLAN:   Primary osteoarthritis left knee  - CSI today. Will monitor glucose.  - Continue weight loss efforts.   - F/u prn.    Knee Injection Procedure Note  Diagnosis: left knee degenerative arthritis  Indications: left knee pain  Procedure Details: Verbal consent was obtained for the procedure. The injection site was identified and the skin was prepared with alcohol. The left knee was injected from an anterolateral approach with 1.5 ml of Kenalog and 2 ml Lidocaine under sterile technique using a 22 gauge needle. The needle was removed and the area cleansed and dressed.  Complications:  Patient tolerated the procedure well.    she was advised to rest the knee today, using ice and elevation as needed for comfort and swelling.Immediate relief of the knee pain may be short lived and secondary to the lidocaine. she may have an increase in discomfort tonight followed by steady improvement over the next several days. It may take 1-2 weeks following the injection to get the full benefit of the medication.

## 2019-01-08 ENCOUNTER — PATIENT OUTREACH (OUTPATIENT)
Dept: OTHER | Facility: OTHER | Age: 64
End: 2019-01-08

## 2019-01-08 NOTE — PROGRESS NOTES
HPI:  Called patient to follow up. Patient endorses adherence to medication regimen daily and denies missed doses. Patient denies hypotensive s/sx (lightheadedness, dizziness, nausea, fatigue); patient denies hypertensive s/sx (SOB, CP, severe headaches, changes in vision, dizziness, fatigue, confusion, anxiety, nosebleeds).       Last 5 Patient Entered Readings                                      Current 30 Day Average: 131/89     Recent Readings 12/24/2018 12/7/2018 12/1/2018 11/29/2018 11/29/2018    SBP (mmHg) 142 120 113 136 135    DBP (mmHg) 95 82 79 76 88    Pulse 80 92 85 104 91        Assessment:  Reviewed recent readings. Per 2017 ACC/ AHA HTN guidelines (goal of BP < 130/80), current 30-day average is uncontrolled.     Plan:  Continue current medication regimen due to lack of readings. Instructed patient to begin taking readings at least 2-3x per week. Patients health , Jazmyn Franco, will be following up as scheduled. I will continue to monitor regularly and will follow-up in 4 weeks, sooner if blood pressure begins to trend upward or downward. If BP remains elevated, will discuss adding amlodipine.     Current medication regimen:  Hypertension Medications             chlorthalidone (HYGROTEN) 25 MG Tab Take 1 tablet (25 mg total) by mouth every morning. Stop losartan hctz 100/25.    irbesartan (AVAPRO) 300 MG tablet Take 1 tablet (300 mg total) by mouth once daily. Stop valsartan 320.         Patient denies having questions or concerns. Patient has my contact information and knows to call with any concerns or clinical changes.

## 2019-01-22 NOTE — PROGRESS NOTES
Last 5 Patient Entered Readings                                      Current 30 Day Average: 125/84     Recent Readings 1/16/2019 12/24/2018 12/7/2018 12/1/2018 11/29/2018    SBP (mmHg) 108 142 120 113 136    DBP (mmHg) 72 95 82 79 76    Pulse 91 80 92 85 104        Digital Medicine: Health  Follow Up    Left voicemail to follow up with Ms. Maria Elena Tavares.  Current BP average 125/84 mmHg is at goal, <130/80 mmHg.  Patient expressed interest in DDMP at our last encounter, but has concerns about the cost of the device and supplies.  4th attempt. Will continue to follow up.

## 2019-01-23 NOTE — PROGRESS NOTES
Subjective:      Patient ID: Maria Elena Tavares is a 63 y.o. female.    Chief Complaint:  Hypothyroidism      History of Present Illness  Ms. Tavares presents for evaluation and management of hypothyroidism.     Has active history of DMII, HTN, HLP and hypothyroidism.    Has had hypothyroidism since her early teens. Has been thyroid hormone since that time.     Currently taking levothyroxine 200 mcg once daily. Has been on this dose for quite some time.   Typically takes thyroid hormone in the morning on an empty stomach. If she forgets to take her pill she will take it at bedtime.   She hasn't missed any doses recently.     Prior to the thyroid levels which were abnormal in 8/2018 she had been off of the thyroid hormone for a few months.     Has low energy many days. Weight has been stable. Feels cold most of the time. Has some dry skin.   No heart palpitations, tremors or increased anxiousness.     Has had type 2 diabetes since 2007. Has never been on insulin or hospitalized for diabetes.   Compliant with metformin and Trulicity.   UTD with eye exam - no retinopathy.   UTD with urine micro.  Denies numbness or tingling in the feet.     On statin and ARB.     Review of Systems   Constitutional: Negative for unexpected weight change.   HENT: Negative for voice change.    Eyes: Negative for visual disturbance.   Respiratory: Negative for shortness of breath.    Cardiovascular: Negative for chest pain.   Gastrointestinal: Negative for abdominal pain.   Endocrine: Positive for cold intolerance.   Genitourinary: Negative for frequency.   Musculoskeletal: Negative for myalgias.   Skin: Negative for rash.       Objective:   Physical Exam   Constitutional: She is oriented to person, place, and time. She appears well-developed and well-nourished.   HENT:   Head: Normocephalic and atraumatic.   Right Ear: External ear normal.   Left Ear: External ear normal.   Nose: Nose normal.   Neck: No tracheal deviation present. No  thyromegaly present.   Cardiovascular: Normal rate, regular rhythm and normal heart sounds.   No edema   Pulmonary/Chest: Effort normal and breath sounds normal.   Abdominal: Soft. Bowel sounds are normal. There is no tenderness.   Musculoskeletal:   Normal gait, no cyanosis or clubbing   Neurological: She is alert and oriented to person, place, and time. She has normal reflexes.   Vibration sense intact   Skin: Skin is warm and dry. No rash noted.   No nodules, no ulcers   Psychiatric: She has a normal mood and affect. Judgment normal.   Vitals reviewed.      Lab Review:   Results for OSWALD DANIELLE (MRN [id number]) as of 1/23/2019 17:55   Ref. Range 8/27/2018 14:45   Sodium Latest Ref Range: 136 - 145 mmol/L 139   Potassium Latest Ref Range: 3.5 - 5.1 mmol/L 3.5   Chloride Latest Ref Range: 95 - 110 mmol/L 101   CO2 Latest Ref Range: 23 - 29 mmol/L 26   Anion Gap Latest Ref Range: 8 - 16 mmol/L 12   BUN, Bld Latest Ref Range: 8 - 23 mg/dL 16   Creatinine Latest Ref Range: 0.5 - 1.4 mg/dL 0.9   eGFR if non African American Latest Ref Range: >60 mL/min/1.73 m^2 >60.0   eGFR if African American Latest Ref Range: >60 mL/min/1.73 m^2 >60.0   Glucose Latest Ref Range: 70 - 110 mg/dL 79   Calcium Latest Ref Range: 8.7 - 10.5 mg/dL 9.9   Alkaline Phosphatase Latest Ref Range: 55 - 135 U/L 86   Total Protein Latest Ref Range: 6.0 - 8.4 g/dL 8.0   Albumin Latest Ref Range: 3.5 - 5.2 g/dL 3.7   Total Bilirubin Latest Ref Range: 0.1 - 1.0 mg/dL 0.3   AST Latest Ref Range: 10 - 40 U/L 16   ALT Latest Ref Range: 10 - 44 U/L 10   Triglycerides Latest Ref Range: 30 - 150 mg/dL 147   Cholesterol Latest Ref Range: 120 - 199 mg/dL 210 (H)   HDL Latest Ref Range: 40 - 75 mg/dL 45   HDL/Chol Ratio Latest Ref Range: 20.0 - 50.0 % 21.4   LDL Cholesterol Latest Ref Range: 63.0 - 159.0 mg/dL 135.6   Non-HDL Cholesterol Latest Units: mg/dL 165   Total Cholesterol/HDL Ratio Latest Ref Range: 2.0 - 5.0  4.7   Hemoglobin A1C External Latest Ref  Range: 4.0 - 5.6 % 6.6 (H)   Estimated Avg Glucose Latest Ref Range: 68 - 131 mg/dL 143 (H)   TSH Latest Ref Range: 0.400 - 4.000 uIU/mL 7.046 (H)   Free T4 Latest Ref Range: 0.71 - 1.51 ng/dL 0.71       Assessment:     1. Acquired hypothyroidism    2. Uncontrolled type 2 diabetes mellitus without complication, without long-term current use of insulin    3. Essential hypertension      Plan:     --Patient with hypothyroidism  --No recent TFT's on file  --Clinically euthyroid to hypothyroid  --Will repeat TFT's now since she has been taking medication regulalry and adjust dose as necessary  --Reviewed how to properly take thyroid hormone  --Will also take TPO  --Continue metformin and Trulicity as A1c is at goal  --UTD with eye exam and urine micro    Clive Calles M.D. Staff Endocrinology

## 2019-01-24 ENCOUNTER — PATIENT MESSAGE (OUTPATIENT)
Dept: ENDOCRINOLOGY | Facility: CLINIC | Age: 64
End: 2019-01-24

## 2019-01-24 ENCOUNTER — OFFICE VISIT (OUTPATIENT)
Dept: ENDOCRINOLOGY | Facility: CLINIC | Age: 64
End: 2019-01-24
Payer: COMMERCIAL

## 2019-01-24 VITALS
SYSTOLIC BLOOD PRESSURE: 124 MMHG | DIASTOLIC BLOOD PRESSURE: 80 MMHG | WEIGHT: 206.88 LBS | HEART RATE: 84 BPM | HEIGHT: 61 IN | BODY MASS INDEX: 39.06 KG/M2

## 2019-01-24 DIAGNOSIS — E03.9 ACQUIRED HYPOTHYROIDISM: Primary | ICD-10-CM

## 2019-01-24 DIAGNOSIS — I10 ESSENTIAL HYPERTENSION: ICD-10-CM

## 2019-01-24 PROCEDURE — 99204 PR OFFICE/OUTPT VISIT, NEW, LEVL IV, 45-59 MIN: ICD-10-PCS | Mod: S$GLB,,, | Performed by: INTERNAL MEDICINE

## 2019-01-24 PROCEDURE — 3008F BODY MASS INDEX DOCD: CPT | Mod: CPTII,S$GLB,, | Performed by: INTERNAL MEDICINE

## 2019-01-24 PROCEDURE — 3074F SYST BP LT 130 MM HG: CPT | Mod: CPTII,S$GLB,, | Performed by: INTERNAL MEDICINE

## 2019-01-24 PROCEDURE — 99204 OFFICE O/P NEW MOD 45 MIN: CPT | Mod: S$GLB,,, | Performed by: INTERNAL MEDICINE

## 2019-01-24 PROCEDURE — 3044F HG A1C LEVEL LT 7.0%: CPT | Mod: CPTII,S$GLB,, | Performed by: INTERNAL MEDICINE

## 2019-01-24 PROCEDURE — 3079F DIAST BP 80-89 MM HG: CPT | Mod: CPTII,S$GLB,, | Performed by: INTERNAL MEDICINE

## 2019-01-24 PROCEDURE — 99999 PR PBB SHADOW E&M-EST. PATIENT-LVL III: CPT | Mod: PBBFAC,,, | Performed by: INTERNAL MEDICINE

## 2019-01-24 PROCEDURE — 3074F PR MOST RECENT SYSTOLIC BLOOD PRESSURE < 130 MM HG: ICD-10-PCS | Mod: CPTII,S$GLB,, | Performed by: INTERNAL MEDICINE

## 2019-01-24 PROCEDURE — 99999 PR PBB SHADOW E&M-EST. PATIENT-LVL III: ICD-10-PCS | Mod: PBBFAC,,, | Performed by: INTERNAL MEDICINE

## 2019-01-24 PROCEDURE — 3079F PR MOST RECENT DIASTOLIC BLOOD PRESSURE 80-89 MM HG: ICD-10-PCS | Mod: CPTII,S$GLB,, | Performed by: INTERNAL MEDICINE

## 2019-01-24 PROCEDURE — 3044F PR MOST RECENT HEMOGLOBIN A1C LEVEL <7.0%: ICD-10-PCS | Mod: CPTII,S$GLB,, | Performed by: INTERNAL MEDICINE

## 2019-01-24 PROCEDURE — 3008F PR BODY MASS INDEX (BMI) DOCUMENTED: ICD-10-PCS | Mod: CPTII,S$GLB,, | Performed by: INTERNAL MEDICINE

## 2019-01-25 ENCOUNTER — TELEPHONE (OUTPATIENT)
Dept: ENDOCRINOLOGY | Facility: CLINIC | Age: 64
End: 2019-01-25

## 2019-01-25 ENCOUNTER — PATIENT MESSAGE (OUTPATIENT)
Dept: ENDOCRINOLOGY | Facility: CLINIC | Age: 64
End: 2019-01-25

## 2019-01-28 ENCOUNTER — PATIENT MESSAGE (OUTPATIENT)
Dept: ENDOCRINOLOGY | Facility: CLINIC | Age: 64
End: 2019-01-28

## 2019-02-04 ENCOUNTER — PATIENT OUTREACH (OUTPATIENT)
Dept: OTHER | Facility: OTHER | Age: 64
End: 2019-02-04

## 2019-02-04 NOTE — PROGRESS NOTES
HPI:  Called patient to follow up. Patient endorses adherence to medication regimen daily and denies missed doses. Patient denies hypotensive s/sx (lightheadedness, dizziness, nausea, fatigue); patient denies hypertensive s/sx (SOB, CP, severe headaches, changes in vision, dizziness, fatigue, confusion, anxiety, nosebleeds).    Patient states she received a new phone, which is why readings have not been transmitting.     Last 5 Patient Entered Readings                                      Current 30 Day Average: 108/72     Recent Readings 1/16/2019 12/24/2018 12/7/2018 12/1/2018 11/29/2018    SBP (mmHg) 108 142 120 113 136    DBP (mmHg) 72 95 82 79 76    Pulse 91 80 92 85 104        Assessment:  Reviewed recent readings. Per 2017 ACC/ AHA HTN guidelines (goal of BP < 130/80), current 30-day average is well controlled; however, patient has only taken 1 reading within the past month.     Plan:  Continue current medication regimen. Requested patient increase frequency of readings. Will transfer patient's call to IT support.   Patients health , Jazmyn Franco, will be following up as scheduled.   I will continue to monitor regularly and will follow-up in 12 weeks, sooner if blood pressure begins to trend upward or downward.     Current medication regimen:  Hypertension Medications             chlorthalidone (HYGROTEN) 25 MG Tab Take 1 tablet (25 mg total) by mouth every morning. Stop losartan hctz 100/25.    irbesartan (AVAPRO) 300 MG tablet Take 1 tablet (300 mg total) by mouth once daily. Stop valsartan 320.         Patient denies having questions or concerns. Patient has my contact information and knows to call with any concerns or clinical changes. Instructed patient to call if BP remains > 130/80.

## 2019-02-05 ENCOUNTER — PATIENT MESSAGE (OUTPATIENT)
Dept: ADMINISTRATIVE | Facility: OTHER | Age: 64
End: 2019-02-05

## 2019-02-06 RX ORDER — METFORMIN HYDROCHLORIDE 1000 MG/1
TABLET ORAL
Qty: 180 TABLET | Refills: 1 | Status: SHIPPED | OUTPATIENT
Start: 2019-02-06 | End: 2020-06-02 | Stop reason: SDUPTHER

## 2019-02-06 RX ORDER — METFORMIN HYDROCHLORIDE 1000 MG/1
TABLET ORAL
Qty: 180 TABLET | Refills: 1 | OUTPATIENT
Start: 2019-02-06

## 2019-02-11 ENCOUNTER — TELEPHONE (OUTPATIENT)
Dept: PRIMARY CARE CLINIC | Facility: CLINIC | Age: 64
End: 2019-02-11

## 2019-02-11 RX ORDER — TELMISARTAN 40 MG/1
40 TABLET ORAL DAILY
Qty: 90 TABLET | Refills: 3 | Status: SHIPPED | OUTPATIENT
Start: 2019-02-11 | End: 2019-03-20 | Stop reason: ALTCHOICE

## 2019-02-11 NOTE — TELEPHONE ENCOUNTER
----- Message from Kristi Farah sent at 2/8/2019  1:52 PM CST -----  Regarding: PATIENT'S MEDICATION NOT AVAILABLE  HI,  THIS MESSAGE IS FROM [facility] REGARDING THIS PATIENT'S MEDICATION (IRBESARTAN 300 MG) IS ON BACKORDER. UNFORTUNATELY WE ARE NOT ABLE TO ORDER THIS MEDICATION RIGHT NOW,  AND NO SURROUNDING PHARMACYS HAVE IT AVAILABLE.  PLEASE SEND ALTERNATIVE RX TO [facility].    #IRBESARTAN 150 MG IS ALSO UNAVAILABLE#    THANKS

## 2019-02-12 ENCOUNTER — PATIENT MESSAGE (OUTPATIENT)
Dept: INTERNAL MEDICINE | Facility: CLINIC | Age: 64
End: 2019-02-12

## 2019-02-12 DIAGNOSIS — I10 HYPERTENSION, UNSPECIFIED TYPE: ICD-10-CM

## 2019-02-12 RX ORDER — IRBESARTAN 300 MG/1
300 TABLET ORAL DAILY
Qty: 90 TABLET | Refills: 3 | Status: CANCELLED | OUTPATIENT
Start: 2019-02-12 | End: 2020-02-12

## 2019-02-13 RX ORDER — IRBESARTAN 300 MG/1
300 TABLET ORAL DAILY
Qty: 90 TABLET | Refills: 3 | OUTPATIENT
Start: 2019-02-13 | End: 2020-02-13

## 2019-02-17 NOTE — PATIENT INSTRUCTIONS
Stop glipizide. Start Trulicity once a week.     Decrease portions once you start trulicity    [email or web address] for coupon    3 meals a day made up of the following:  Unlimited green vegetables, tomatoes, mushrooms, spaghetti squash, cauliflower, meat, poultry, seafood, eggs and hard cheeses.   Milk and plain yogurt  Dressings, seasonings, condiments, etc should have less than 2 g sugars.   beans or nuts can have 1 x a day.   1-2 servings of citrus fruits, berries, pineapple or melon a day (1/2 cup)  Avoid fried foods    No grains, rice, pasta, potatoes or bread    No soda, sweet tea, juices or lemonade    [email or web address] for recipes.    Ronald Barnes.     Exercise 30 min 4 days a week.     Return in about 5 weeks (around 5/31/2017).     no

## 2019-02-21 NOTE — PROGRESS NOTES
Last 5 Patient Entered Readings                                      Current 30 Day Average: 125/81     Recent Readings 2/17/2019 2/6/2019 2/3/2019 1/30/2019 1/29/2019    SBP (mmHg) 122 123 125 118 129    DBP (mmHg) 71 70 84 88 81    Pulse 92 102 92 101 87        Digital Medicine: Health  Follow Up    Left voicemail to follow up with Ms. Maria Elena Tavares.  Current BP average 125/81 mmHg is at goal, <130/80 mmHg.  Patient expressed interest in DDMP at our last encounter, but has concerns about the cost of the device and supplies.  5th attempt. Will continue to follow up.

## 2019-03-06 ENCOUNTER — PATIENT MESSAGE (OUTPATIENT)
Dept: ADMINISTRATIVE | Facility: OTHER | Age: 64
End: 2019-03-06

## 2019-03-06 DIAGNOSIS — E11.9 TYPE 2 DIABETES MELLITUS: ICD-10-CM

## 2019-03-13 DIAGNOSIS — E03.9 HYPOTHYROIDISM, UNSPECIFIED TYPE: Primary | ICD-10-CM

## 2019-03-13 DIAGNOSIS — I10 ESSENTIAL HYPERTENSION: ICD-10-CM

## 2019-03-13 RX ORDER — LEVOTHYROXINE SODIUM 200 UG/1
200 TABLET ORAL DAILY
Qty: 90 TABLET | Refills: 0 | OUTPATIENT
Start: 2019-03-13 | End: 2020-03-12

## 2019-03-13 RX ORDER — LEVOTHYROXINE SODIUM 200 UG/1
200 TABLET ORAL DAILY
Qty: 90 TABLET | Refills: 0 | Status: SHIPPED | OUTPATIENT
Start: 2019-03-13 | End: 2019-10-09 | Stop reason: SDUPTHER

## 2019-03-13 RX ORDER — CHLORTHALIDONE 25 MG/1
25 TABLET ORAL EVERY MORNING
Qty: 90 TABLET | Refills: 1 | OUTPATIENT
Start: 2019-03-13 | End: 2020-03-12

## 2019-03-20 ENCOUNTER — OFFICE VISIT (OUTPATIENT)
Dept: PRIMARY CARE CLINIC | Facility: CLINIC | Age: 64
End: 2019-03-20
Attending: FAMILY MEDICINE
Payer: COMMERCIAL

## 2019-03-20 ENCOUNTER — LAB VISIT (OUTPATIENT)
Dept: LAB | Facility: HOSPITAL | Age: 64
End: 2019-03-20
Payer: COMMERCIAL

## 2019-03-20 VITALS
DIASTOLIC BLOOD PRESSURE: 80 MMHG | BODY MASS INDEX: 37.74 KG/M2 | HEART RATE: 90 BPM | WEIGHT: 199.75 LBS | SYSTOLIC BLOOD PRESSURE: 116 MMHG

## 2019-03-20 DIAGNOSIS — I10 ESSENTIAL HYPERTENSION: ICD-10-CM

## 2019-03-20 DIAGNOSIS — J01.10 ACUTE NON-RECURRENT FRONTAL SINUSITIS: ICD-10-CM

## 2019-03-20 DIAGNOSIS — E11.9 ENCOUNTER FOR DIABETIC FOOT EXAM: ICD-10-CM

## 2019-03-20 LAB
ESTIMATED AVG GLUCOSE: 154 MG/DL
HBA1C MFR BLD HPLC: 7 %

## 2019-03-20 PROCEDURE — 83036 HEMOGLOBIN GLYCOSYLATED A1C: CPT

## 2019-03-20 PROCEDURE — 3044F HG A1C LEVEL LT 7.0%: CPT | Mod: CPTII,S$GLB,, | Performed by: FAMILY MEDICINE

## 2019-03-20 PROCEDURE — 3008F BODY MASS INDEX DOCD: CPT | Mod: CPTII,S$GLB,, | Performed by: FAMILY MEDICINE

## 2019-03-20 PROCEDURE — 3008F PR BODY MASS INDEX (BMI) DOCUMENTED: ICD-10-PCS | Mod: CPTII,S$GLB,, | Performed by: FAMILY MEDICINE

## 2019-03-20 PROCEDURE — 3044F PR MOST RECENT HEMOGLOBIN A1C LEVEL <7.0%: ICD-10-PCS | Mod: CPTII,S$GLB,, | Performed by: FAMILY MEDICINE

## 2019-03-20 PROCEDURE — 3074F SYST BP LT 130 MM HG: CPT | Mod: CPTII,S$GLB,, | Performed by: FAMILY MEDICINE

## 2019-03-20 PROCEDURE — 3079F DIAST BP 80-89 MM HG: CPT | Mod: CPTII,S$GLB,, | Performed by: FAMILY MEDICINE

## 2019-03-20 PROCEDURE — 99214 OFFICE O/P EST MOD 30 MIN: CPT | Mod: S$GLB,,, | Performed by: FAMILY MEDICINE

## 2019-03-20 PROCEDURE — 99214 PR OFFICE/OUTPT VISIT, EST, LEVL IV, 30-39 MIN: ICD-10-PCS | Mod: S$GLB,,, | Performed by: FAMILY MEDICINE

## 2019-03-20 PROCEDURE — 99999 PR PBB SHADOW E&M-EST. PATIENT-LVL III: ICD-10-PCS | Mod: PBBFAC,,, | Performed by: FAMILY MEDICINE

## 2019-03-20 PROCEDURE — 36415 COLL VENOUS BLD VENIPUNCTURE: CPT | Mod: PN

## 2019-03-20 PROCEDURE — 3074F PR MOST RECENT SYSTOLIC BLOOD PRESSURE < 130 MM HG: ICD-10-PCS | Mod: CPTII,S$GLB,, | Performed by: FAMILY MEDICINE

## 2019-03-20 PROCEDURE — 99999 PR PBB SHADOW E&M-EST. PATIENT-LVL III: CPT | Mod: PBBFAC,,, | Performed by: FAMILY MEDICINE

## 2019-03-20 PROCEDURE — 3079F PR MOST RECENT DIASTOLIC BLOOD PRESSURE 80-89 MM HG: ICD-10-PCS | Mod: CPTII,S$GLB,, | Performed by: FAMILY MEDICINE

## 2019-03-20 RX ORDER — AZITHROMYCIN 250 MG/1
TABLET, FILM COATED ORAL
Qty: 6 TABLET | Refills: 0 | Status: SHIPPED | OUTPATIENT
Start: 2019-03-20 | End: 2019-03-26

## 2019-03-20 RX ORDER — AZITHROMYCIN 250 MG/1
TABLET, FILM COATED ORAL
Qty: 6 TABLET | Refills: 0 | Status: SHIPPED | OUTPATIENT
Start: 2019-03-20 | End: 2019-03-20 | Stop reason: SDUPTHER

## 2019-03-20 RX ORDER — CHLORTHALIDONE 25 MG/1
25 TABLET ORAL EVERY MORNING
Qty: 90 TABLET | Refills: 1 | Status: SHIPPED | OUTPATIENT
Start: 2019-03-20 | End: 2019-11-06 | Stop reason: SDUPTHER

## 2019-03-20 RX ORDER — CODEINE PHOSPHATE AND GUAIFENESIN 10; 100 MG/5ML; MG/5ML
5 SOLUTION ORAL 2 TIMES DAILY PRN
Qty: 118 ML | Refills: 0 | Status: SHIPPED | OUTPATIENT
Start: 2019-03-20 | End: 2019-03-30

## 2019-03-20 NOTE — PROGRESS NOTES
Subjective:       Patient ID: Maria Elena Tavares is a 63 y.o. female.    Chief Complaint: Cough and Medication Refill    Pt presents with cough, cold, congestion x 6 days. Pt states that she feels tired and worn out from coughing green chunky sputum. Has had chill neg documented fevers  Pt took coricidin which did not help.  Pt also states that she needs meds RF'd and an a1c      Cough   Associated symptoms include a fever, headaches, postnasal drip and rhinorrhea. Pertinent negatives include no chest pain or shortness of breath.   Medication Refill   Associated symptoms include congestion, coughing, fatigue, a fever and headaches. Pertinent negatives include no chest pain, joint swelling or weakness.     Review of Systems   Constitutional: Positive for activity change, appetite change, fatigue and fever.   HENT: Positive for congestion, postnasal drip, rhinorrhea and sinus pressure.    Eyes: Negative.    Respiratory: Positive for cough. Negative for chest tightness and shortness of breath.    Cardiovascular: Negative for chest pain, palpitations and leg swelling.   Gastrointestinal: Negative.    Musculoskeletal: Negative.  Negative for joint swelling.   Skin: Negative.    Neurological: Positive for headaches. Negative for dizziness, weakness and light-headedness.   All other systems reviewed and are negative.      Objective:      Physical Exam   Constitutional: She is oriented to person, place, and time. She appears well-developed and well-nourished.   HENT:   Head: Normocephalic and atraumatic.   Right Ear: There is swelling. Tympanic membrane is injected and bulging.   Left Ear: There is swelling. Tympanic membrane is bulging. A middle ear effusion is present.   Nose: Mucosal edema and rhinorrhea present. Right sinus exhibits frontal sinus tenderness. Left sinus exhibits frontal sinus tenderness.   Mouth/Throat: Uvula is midline and mucous membranes are normal. Posterior oropharyngeal edema and posterior oropharyngeal  erythema present. No oropharyngeal exudate or tonsillar abscesses.   Eyes: Conjunctivae and EOM are normal. Pupils are equal, round, and reactive to light. Right eye exhibits no discharge. Left eye exhibits no discharge. No scleral icterus.   Neck: Normal range of motion. Neck supple. No thyromegaly present.   Cardiovascular: Normal rate, regular rhythm, normal heart sounds and intact distal pulses.   Pulses:       Dorsalis pedis pulses are 3+ on the right side, and 3+ on the left side.        Posterior tibial pulses are 3+ on the right side, and 3+ on the left side.   Pulmonary/Chest: Effort normal and breath sounds normal. No stridor. No respiratory distress. She has no wheezes. She has no rales. She exhibits no tenderness.   Abdominal: Soft. Bowel sounds are normal.   Musculoskeletal: She exhibits no edema or tenderness.        Right foot: There is normal range of motion and no deformity.        Left foot: There is normal range of motion and no deformity.   Feet:   Right Foot:   Protective Sensation: 10 sites tested. 10 sites sensed.   Skin Integrity: Negative for ulcer, blister, skin breakdown, erythema or warmth.   Left Foot:   Protective Sensation: 10 sites tested. 10 sites sensed.   Skin Integrity: Negative for ulcer, blister, skin breakdown or erythema.   Lymphadenopathy:     She has no cervical adenopathy.   Neurological: She is alert and oriented to person, place, and time.   Skin: Skin is warm and dry. No rash noted.   Psychiatric: She has a normal mood and affect. Her behavior is normal. Judgment and thought content normal.       Assessment:       Acute sinusitis  Plan:       Treat with zpak. SE's of med d.w pt. Rest, NSAIDS, tylenol prn  rtc prn symptoms worsening  Pt advised to avoid OTC decongestants since they are increasing her BP's at today's visit but can take zyrtec etc...  HTN controlled  DM : stable on meds, needs a1c today  Labs to be done prior to annual, ordered in [facility]    RTC prn symptoms  or in 6 mos

## 2019-03-21 NOTE — PROGRESS NOTES
Last 5 Patient Entered Readings                                      Current 30 Day Average: 133/88     Recent Readings 3/11/2019 3/11/2019 3/7/2019 3/7/2019 3/7/2019    SBP (mmHg) 120 120 141 141 142    DBP (mmHg) 81 81 85 85 93    Pulse 108 108 86 86 90          Digital Medicine: Health  Follow Up    Left voicemail to follow up with Ms. Nobleen Tavares.  Current BP average 133/88 mmHg is not at goal, <130/80 mmHg.  6th attempt. Will continue to follow up.

## 2019-03-27 ENCOUNTER — PATIENT MESSAGE (OUTPATIENT)
Dept: PRIMARY CARE CLINIC | Facility: CLINIC | Age: 64
End: 2019-03-27

## 2019-03-27 DIAGNOSIS — J32.9 SINUSITIS, UNSPECIFIED CHRONICITY, UNSPECIFIED LOCATION: Primary | ICD-10-CM

## 2019-03-27 RX ORDER — HYDROCORTISONE AND ACETIC ACID 20.75; 10.375 MG/ML; MG/ML
5 SOLUTION AURICULAR (OTIC) 2 TIMES DAILY
Qty: 10 ML | Refills: 0 | Status: SHIPPED | OUTPATIENT
Start: 2019-03-27 | End: 2019-04-06

## 2019-03-27 RX ORDER — BENZONATATE 100 MG/1
100 CAPSULE ORAL 3 TIMES DAILY PRN
Qty: 30 CAPSULE | Refills: 0 | Status: SHIPPED | OUTPATIENT
Start: 2019-03-27 | End: 2019-04-06

## 2019-03-27 NOTE — TELEPHONE ENCOUNTER
At this time, I do not recommend another round of antibiotics. I will send some tessalon perles to help surpress cough, and some ear drops to help with the pain. Please inform pt and that I ave sent these meds to the pharmacy  Thanks,  PV

## 2019-04-05 NOTE — PROGRESS NOTES
Last 5 Patient Entered Readings                                      Current 30 Day Average: 131/89     Recent Readings 3/11/2019 3/11/2019 3/7/2019 3/7/2019 3/7/2019    SBP (mmHg) 120 120 141 141 142    DBP (mmHg) 81 81 85 85 93    Pulse 108 108 86 86 90        Digital Medicine: Health  Follow Up    Left voicemail to follow up with Ms. Maria Elena Tavares.  Current BP average 131/89 mmHg is not at goal, <130/80 mmHg.  Patient recently consented to participate in DDMP.  Will follow up in 1 week.

## 2019-04-12 NOTE — PROGRESS NOTES
Last 5 Patient Entered Readings                                      Current 30 Day Average:      Recent Readings 3/11/2019 3/11/2019 3/7/2019 3/7/2019 3/7/2019    SBP (mmHg) 120 120 141 141 142    DBP (mmHg) 81 81 85 85 93    Pulse 108 108 86 86 90        Digital Medicine: Health  Follow Up    Left voicemail to follow up with MsGuido Maria Elena Tavares.  No recent BP readings.   Patient recently consented to participate in DDMP.  Sending [employer] message today. Will follow up in 1 week.  Consider start of noncompliance workflow.

## 2019-04-23 NOTE — PROGRESS NOTES
Last 5 Patient Entered Readings                                      Current 30 Day Average:      Recent Readings 3/11/2019 3/11/2019 3/7/2019 3/7/2019 3/7/2019    SBP (mmHg) 120 120 141 141 142    DBP (mmHg) 81 81 85 85 93    Pulse 108 108 86 86 90        Digital Medicine: Health  Follow Up    Left voicemail to follow up with Ms. Maria Elena Tavares.  No recent BP readings.   Patient recently consented to participate in DDMP.  Sending [employer] message and patient has not read.   Will send noncompliance notice today.  Will follow up in 2 weeks.

## 2019-04-29 NOTE — PROGRESS NOTES
Last 5 Patient Entered Readings                                      Current 30 Day Average:      Recent Readings 3/11/2019 3/11/2019 3/7/2019 3/7/2019 3/7/2019    SBP (mmHg) 120 120 141 141 142    DBP (mmHg) 81 81 85 85 93    Pulse 108 108 86 86 90        Hypertension Medications             chlorthalidone (HYGROTEN) 25 MG Tab Take 1 tablet (25 mg total) by mouth every morning. Stop losartan hctz 100/25.    irbesartan (AVAPRO) 300 MG tablet Take 1 tablet (300 mg total) by mouth once daily. Stop valsartan 320.        No readings since 3/11. HC has started non-compliance protocol. Will continue to monitor, will follow up in 6 weeks.

## 2019-05-07 NOTE — PROGRESS NOTES
Last 5 Patient Entered Readings                                      Current 30 Day Average:      Recent Readings 3/11/2019 3/11/2019 3/7/2019 3/7/2019 3/7/2019    SBP (mmHg) 120 120 141 141 142    DBP (mmHg) 81 81 85 85 93    Pulse 108 108 86 86 90        Digital Medicine: Health  Follow Up    Left voicemail to follow up with Ms. Maria Elena Tavares.  No recent BP readings.   Patient recently consented to participate in DDMP.  Sent [employer] message and patient has not read.   Will message enrolling provider to encourage participation.  10th attempt. Will follow up in 2 weeks.

## 2019-05-14 ENCOUNTER — TELEPHONE (OUTPATIENT)
Dept: PRIMARY CARE CLINIC | Facility: CLINIC | Age: 64
End: 2019-05-14

## 2019-05-14 NOTE — TELEPHONE ENCOUNTER
----- Message from Jazmyn Franco sent at 5/7/2019 10:22 AM CDT -----  Regarding: Encourage Participation  Dr. San,    Your patient Ms. Maria Elena Tavares was enrolled in the [facility] Digital Medicine program on 1/13/17. Unfortunately, we have been unable to maintain contact with her to continue monitoring, despite our best efforts.     If you still believe this patient is a good candidate for digital medicine, please reach out to her to encourage participation. If going forward we are unable to communicate with her, we will unfortunately have to discharge her from the program.    Please let me know if you have any questions.    Sincerely,  Jazmyn Franco MA, [facility]  [phone]

## 2019-05-21 NOTE — PROGRESS NOTES
Last 5 Patient Entered Readings                                      Current 30 Day Average:      Recent Readings 3/11/2019 3/11/2019 3/7/2019 3/7/2019 3/7/2019    SBP (mmHg) 120 120 141 141 142    DBP (mmHg) 81 81 85 85 93    Pulse 108 108 86 86 90        Digital Medicine: Health  Follow Up    Left voicemail to follow up with Ms. Maria Elena Tavares.  Sending certified discharge letter today.  Will notify enrolling provider at time of discharge.

## 2019-05-23 ENCOUNTER — OFFICE VISIT (OUTPATIENT)
Dept: ORTHOPEDICS | Facility: CLINIC | Age: 64
End: 2019-05-23
Payer: COMMERCIAL

## 2019-05-23 ENCOUNTER — HOSPITAL ENCOUNTER (OUTPATIENT)
Dept: RADIOLOGY | Facility: HOSPITAL | Age: 64
Discharge: HOME | End: 2019-05-23
Attending: PHYSICIAN ASSISTANT
Payer: COMMERCIAL

## 2019-05-23 VITALS
WEIGHT: 198.75 LBS | BODY MASS INDEX: 37.52 KG/M2 | SYSTOLIC BLOOD PRESSURE: 132 MMHG | DIASTOLIC BLOOD PRESSURE: 94 MMHG | HEIGHT: 61 IN | HEART RATE: 64 BPM

## 2019-05-23 DIAGNOSIS — R52 PAIN: ICD-10-CM

## 2019-05-23 DIAGNOSIS — M70.61 TROCHANTERIC BURSITIS OF RIGHT HIP: Primary | ICD-10-CM

## 2019-05-23 PROCEDURE — 3075F PR MOST RECENT SYSTOLIC BLOOD PRESS GE 130-139MM HG: ICD-10-PCS | Mod: CPTII,S$GLB,, | Performed by: PHYSICIAN ASSISTANT

## 2019-05-23 PROCEDURE — 20610 DRAIN/INJ JOINT/BURSA W/O US: CPT | Mod: RT,S$GLB,, | Performed by: PHYSICIAN ASSISTANT

## 2019-05-23 PROCEDURE — 73502 X-RAY EXAM HIP UNI 2-3 VIEWS: CPT | Mod: TC,RT

## 2019-05-23 PROCEDURE — 99999 PR PBB SHADOW E&M-EST. PATIENT-LVL III: CPT | Mod: PBBFAC,,, | Performed by: PHYSICIAN ASSISTANT

## 2019-05-23 PROCEDURE — 99213 PR OFFICE/OUTPT VISIT, EST, LEVL III, 20-29 MIN: ICD-10-PCS | Mod: 25,S$GLB,, | Performed by: PHYSICIAN ASSISTANT

## 2019-05-23 PROCEDURE — 3008F PR BODY MASS INDEX (BMI) DOCUMENTED: ICD-10-PCS | Mod: CPTII,S$GLB,, | Performed by: PHYSICIAN ASSISTANT

## 2019-05-23 PROCEDURE — 99999 PR PBB SHADOW E&M-EST. PATIENT-LVL III: ICD-10-PCS | Mod: PBBFAC,,, | Performed by: PHYSICIAN ASSISTANT

## 2019-05-23 PROCEDURE — 20610 PR DRAIN/INJECT LARGE JOINT/BURSA: ICD-10-PCS | Mod: RT,S$GLB,, | Performed by: PHYSICIAN ASSISTANT

## 2019-05-23 PROCEDURE — 3080F PR MOST RECENT DIASTOLIC BLOOD PRESSURE >= 90 MM HG: ICD-10-PCS | Mod: CPTII,S$GLB,, | Performed by: PHYSICIAN ASSISTANT

## 2019-05-23 PROCEDURE — 3080F DIAST BP >= 90 MM HG: CPT | Mod: CPTII,S$GLB,, | Performed by: PHYSICIAN ASSISTANT

## 2019-05-23 PROCEDURE — 73502 XR HIP 2 VIEW RIGHT: ICD-10-PCS | Mod: 26,RT,, | Performed by: RADIOLOGY

## 2019-05-23 PROCEDURE — 3075F SYST BP GE 130 - 139MM HG: CPT | Mod: CPTII,S$GLB,, | Performed by: PHYSICIAN ASSISTANT

## 2019-05-23 PROCEDURE — 73502 X-RAY EXAM HIP UNI 2-3 VIEWS: CPT | Mod: 26,RT,, | Performed by: RADIOLOGY

## 2019-05-23 PROCEDURE — 3008F BODY MASS INDEX DOCD: CPT | Mod: CPTII,S$GLB,, | Performed by: PHYSICIAN ASSISTANT

## 2019-05-23 PROCEDURE — 99213 OFFICE O/P EST LOW 20 MIN: CPT | Mod: 25,S$GLB,, | Performed by: PHYSICIAN ASSISTANT

## 2019-05-23 RX ORDER — METHYLPREDNISOLONE ACETATE 80 MG/ML
80 INJECTION, SUSPENSION INTRA-ARTICULAR; INTRALESIONAL; INTRAMUSCULAR; SOFT TISSUE
Status: COMPLETED | OUTPATIENT
Start: 2019-05-23 | End: 2019-05-23

## 2019-05-23 RX ORDER — MELOXICAM 15 MG/1
15 TABLET ORAL DAILY
Qty: 30 TABLET | Refills: 0 | Status: SHIPPED | OUTPATIENT
Start: 2019-05-23 | End: 2019-06-28

## 2019-05-23 RX ADMIN — METHYLPREDNISOLONE ACETATE 80 MG: 80 INJECTION, SUSPENSION INTRA-ARTICULAR; INTRALESIONAL; INTRAMUSCULAR; SOFT TISSUE at 04:05

## 2019-05-23 NOTE — PROGRESS NOTES
Subjective:      Patient ID: Maria Elena Tavares is a 63 y.o. female.    Chief Complaint: Pain of the Right Hip    HPI  63 year old female presents with chief complaint of right hip pain x 1 month. She denies trauma. Pain is at the top of her buttock and it radiates down her whole leg. Pain is burning. It is worse when she turns over in bed or goes from sit to stand. She took ibuprofen with mild relief. She denies LBP and groin pain. She does not use assistive devices.   Review of Systems   Constitution: Negative for chills, fever and night sweats.   Cardiovascular: Negative for chest pain.   Respiratory: Negative for cough and shortness of breath.    Hematologic/Lymphatic: Does not bruise/bleed easily.   Skin: Negative for color change.   Gastrointestinal: Negative for heartburn.   Genitourinary: Negative for dysuria.   Neurological: Negative for numbness and paresthesias.   Psychiatric/Behavioral: Negative for altered mental status.   Allergic/Immunologic: Negative for persistent infections.         Objective:            General    Vitals reviewed.  Constitutional: She is oriented to person, place, and time. She appears well-developed and well-nourished.   Cardiovascular: Normal rate.    Neurological: She is alert and oriented to person, place, and time.             Right Hip Exam     Tenderness   The patient tender to palpation of the trochanteric bursa.    Range of Motion   The patient has normal right hip ROM.    Tests   Stinchfield test: negative  Log Roll: negative    Other   Sensation: normal    Comments:  No pain with hip ROM.      Vascular Exam     Right Pulses  Dorsalis Pedis:      2+            X-ray: ordered and reviewed by myself. No acute abnormality.  Minimal DJD of the hips.          Assessment:       Encounter Diagnosis   Name Primary?    Trochanteric bursitis of right hip Yes          Plan:       Discussed treatment options with patient. She would like an injection for the hip bursitis. She will take  mobic x 2 weeks. Some of her pain is likely coming from her lower back so she will consider f/u with back/spine clinic if symptoms do not improve. RTC prn.     PROCEDURE:  I have explained the risks, benefits, and alternatives of the procedure in detail.  The patient voices understanding and all questions have been answered.  The patient agrees to proceed as planned. So after I performed a sterile pre of the skin in the normal fashion the right greater trochanteric area is injected from the lateral approach using an 2 inch 22 gauge needle with a combination of 4cc 1% lidocaine and 80 mg of depo medrol.  The patient is cautioned and immediate relief of pain is secondary to the local anesthetic and will be temporary.  After the anesthetic wears off there may be a increase in pain that may last for a few hours or a few days and they should use ice to help alleviate this flair up of pain.

## 2019-05-27 ENCOUNTER — TELEPHONE (OUTPATIENT)
Dept: OBSTETRICS AND GYNECOLOGY | Facility: CLINIC | Age: 64
End: 2019-05-27

## 2019-05-27 DIAGNOSIS — Z12.31 SCREENING MAMMOGRAM, ENCOUNTER FOR: Primary | ICD-10-CM

## 2019-05-27 DIAGNOSIS — Z12.31 ENCOUNTER FOR SCREENING MAMMOGRAM FOR MALIGNANT NEOPLASM OF BREAST: Primary | ICD-10-CM

## 2019-05-27 NOTE — TELEPHONE ENCOUNTER
----- Message from Kira Delcid sent at 5/24/2019  5:04 PM CDT -----  Contact: PT Portal Request  Appointment Request From: Maria Elena Tavares    With Provider: Dr Santiago    Preferred Date Range: 5/27/2019 - 5/31/2019    Preferred Times: Any time    Reason for visit: mammogram appt    Comments:  mammogram visit yearly visit      Mammogram order submitted please

## 2019-05-27 NOTE — TELEPHONE ENCOUNTER
Called pt no answer left a detailed message along with return #[phone] to call if she has any questions.

## 2019-06-10 ENCOUNTER — PATIENT OUTREACH (OUTPATIENT)
Dept: OTHER | Facility: OTHER | Age: 64
End: 2019-06-10

## 2019-06-10 NOTE — PROGRESS NOTES
HPI:  Called patient to follow up. Patient reports adherence to medication regimen daily and denies missed doses. Patient denies hypotensive s/sx (lightheadedness, dizziness, nausea, fatigue); patient denies hypertensive s/sx (SOB, CP, severe headaches, changes in vision, dizziness, fatigue, confusion, anxiety, nosebleeds).     Patient reports she has never charged her digital cuff.    Last 5 Patient Entered Readings                                      Current 30 Day Average: 121/79     Recent Readings 6/9/2019 6/6/2019 6/3/2019 5/26/2019 5/23/2019    SBP (mmHg) 114 124 118 138 109    DBP (mmHg) 74 72 83 85 74    Pulse 84 95 94 88 101        Assessment:  Reviewed recent readings. Per 2017 ACC/ AHA HTN guidelines (goal of BP < 130/80), current 30-day average is well controlled.     Plan:  Continue current medication regimen.   Encouraged patient to charge digital cuff at least monthly.  Patients health , Jazmyn Franco, will be following up as scheduled.   I will continue to monitor regularly and will follow-up in 4 months, sooner if blood pressure begins to trend upward or downward.     Current medication regimen:  Hypertension Medications             chlorthalidone (HYGROTEN) 25 MG Tab Take 1 tablet (25 mg total) by mouth every morning. Stop losartan hctz 100/25.    irbesartan (AVAPRO) 300 MG tablet Take 1 tablet (300 mg total) by mouth once daily. Stop valsartan 320.         Patient denies having questions or concerns. Patient has my contact information and knows to call with any concerns or clinical changes. Instructed patient to call if BP remains > 130/80.

## 2019-06-27 ENCOUNTER — TELEPHONE (OUTPATIENT)
Dept: PRIMARY CARE CLINIC | Facility: CLINIC | Age: 64
End: 2019-06-27

## 2019-06-27 ENCOUNTER — PATIENT MESSAGE (OUTPATIENT)
Dept: PRIMARY CARE CLINIC | Facility: CLINIC | Age: 64
End: 2019-06-27

## 2019-06-27 RX ORDER — TELMISARTAN 40 MG/1
40 TABLET ORAL DAILY
Qty: 90 TABLET | Refills: 3 | OUTPATIENT
Start: 2019-06-27 | End: 2020-06-26

## 2019-07-01 ENCOUNTER — PATIENT MESSAGE (OUTPATIENT)
Dept: PRIMARY CARE CLINIC | Facility: CLINIC | Age: 64
End: 2019-07-01

## 2019-07-01 DIAGNOSIS — I10 HYPERTENSION, UNSPECIFIED TYPE: Primary | ICD-10-CM

## 2019-07-01 RX ORDER — IRBESARTAN 300 MG/1
300 TABLET ORAL DAILY
Qty: 90 TABLET | Refills: 1 | Status: SHIPPED | OUTPATIENT
Start: 2019-07-01 | End: 2019-11-04 | Stop reason: SDUPTHER

## 2019-07-01 NOTE — TELEPHONE ENCOUNTER
was notified per [facility] that irbesartan is on back order and that [facility] have irbesartan/HCTZ 300 mg . I explained to  that she not on HCTZ and that  is out until July 15.  But will send to covering provider.

## 2019-07-02 NOTE — PROGRESS NOTES
Last 5 Patient Entered Readings                                      Current 30 Day Average: 122/80     Recent Readings 6/23/2019 6/23/2019 6/13/2019 6/9/2019 6/6/2019    SBP (mmHg) 142 140 114 114 124    DBP (mmHg) 82 87 79 74 72    Pulse 91 91 107 84 95        Digital Medicine: Health  Follow Up    Left voicemail to follow up with Ms. Maria Elena Tavares.  Current BP average 122/80 mmHg is at goal, <130/80 mmHg.  Will continue to follow up.

## 2019-07-23 ENCOUNTER — PATIENT MESSAGE (OUTPATIENT)
Dept: PRIMARY CARE CLINIC | Facility: CLINIC | Age: 64
End: 2019-07-23

## 2019-07-24 ENCOUNTER — OFFICE VISIT (OUTPATIENT)
Dept: URGENT CARE | Facility: CLINIC | Age: 64
End: 2019-07-24
Payer: COMMERCIAL

## 2019-07-24 VITALS
OXYGEN SATURATION: 97 % | BODY MASS INDEX: 37.38 KG/M2 | DIASTOLIC BLOOD PRESSURE: 90 MMHG | TEMPERATURE: 97 F | SYSTOLIC BLOOD PRESSURE: 124 MMHG | HEIGHT: 61 IN | HEART RATE: 84 BPM | WEIGHT: 198 LBS | RESPIRATION RATE: 16 BRPM

## 2019-07-24 DIAGNOSIS — J32.9 BACTERIAL SINUSITIS: Primary | ICD-10-CM

## 2019-07-24 DIAGNOSIS — B96.89 BACTERIAL SINUSITIS: Primary | ICD-10-CM

## 2019-07-24 PROCEDURE — 3074F PR MOST RECENT SYSTOLIC BLOOD PRESSURE < 130 MM HG: ICD-10-PCS | Mod: CPTII,S$GLB,, | Performed by: FAMILY MEDICINE

## 2019-07-24 PROCEDURE — 99214 PR OFFICE/OUTPT VISIT, EST, LEVL IV, 30-39 MIN: ICD-10-PCS | Mod: S$GLB,,, | Performed by: FAMILY MEDICINE

## 2019-07-24 PROCEDURE — 3080F PR MOST RECENT DIASTOLIC BLOOD PRESSURE >= 90 MM HG: ICD-10-PCS | Mod: CPTII,S$GLB,, | Performed by: FAMILY MEDICINE

## 2019-07-24 PROCEDURE — 99214 OFFICE O/P EST MOD 30 MIN: CPT | Mod: S$GLB,,, | Performed by: FAMILY MEDICINE

## 2019-07-24 PROCEDURE — 3008F BODY MASS INDEX DOCD: CPT | Mod: CPTII,S$GLB,, | Performed by: FAMILY MEDICINE

## 2019-07-24 PROCEDURE — 3074F SYST BP LT 130 MM HG: CPT | Mod: CPTII,S$GLB,, | Performed by: FAMILY MEDICINE

## 2019-07-24 PROCEDURE — 3008F PR BODY MASS INDEX (BMI) DOCUMENTED: ICD-10-PCS | Mod: CPTII,S$GLB,, | Performed by: FAMILY MEDICINE

## 2019-07-24 PROCEDURE — 3080F DIAST BP >= 90 MM HG: CPT | Mod: CPTII,S$GLB,, | Performed by: FAMILY MEDICINE

## 2019-07-24 RX ORDER — AMOXICILLIN AND CLAVULANATE POTASSIUM 875; 125 MG/1; MG/1
1 TABLET, FILM COATED ORAL EVERY 12 HOURS
Qty: 14 TABLET | Refills: 0 | Status: SHIPPED | OUTPATIENT
Start: 2019-07-24 | End: 2019-08-01

## 2019-07-24 NOTE — PATIENT INSTRUCTIONS

## 2019-07-24 NOTE — LETTER
July 24, 2019      [facility] [address] [place]  [facility] [address]  Phone: [phone]  Fax: [phone]       Patient: Maria Elena Tavares   YOB: 1955  Date of Visit: 07/24/2019    To Whom It May Concern:    Solomon Tavares  was at [facility] on 07/24/2019. She may return to work/school on 7/25/19 with no restrictions. If you have any questions or concerns, or if I can be of further assistance, please do not hesitate to contact me.    Sincerely,      Ford Figueroa, NP

## 2019-07-24 NOTE — PROGRESS NOTES
"Subjective:       Patient ID: Maria Elena Tavares is a 63 y.o. female.    Vitals:    07/24/19 1419   BP: (!) 124/90   Pulse: 84   Resp: 16   Temp: 97.3 °F (36.3 °C)   SpO2: 97%   Weight: 89.8 kg (198 lb)   Height: 5' 1" (1.549 m)       Chief Complaint: Sinus Problem    Pt states sinus congestion and pressure with left ear pain x 1 week. Pt states nose bleed yesterday.  No fever that she knows of.  Feels that symptoms are worsening.  She has taken ibuprofen no other over-the-counter medicines.    Sinus Problem   This is a new problem. The current episode started in the past 7 days. The problem has been gradually worsening since onset. There has been no fever. Associated symptoms include congestion, ear pain, headaches and sinus pressure. Pertinent negatives include no chills, shortness of breath or sore throat. Treatments tried: ibuprofen.     Review of Systems   Constitution: Negative for chills and fever.   HENT: Positive for congestion, ear pain and sinus pressure. Negative for sore throat.    Eyes: Negative for blurred vision.   Cardiovascular: Negative for chest pain.   Respiratory: Negative for shortness of breath.    Skin: Negative for rash.   Musculoskeletal: Negative for back pain and joint pain.   Gastrointestinal: Negative for abdominal pain, diarrhea, nausea and vomiting.   Neurological: Positive for headaches.   Psychiatric/Behavioral: The patient is not nervous/anxious.        Objective:      Physical Exam   Constitutional: She is oriented to person, place, and time. She appears well-developed and well-nourished. She is cooperative.  Non-toxic appearance. She does not appear ill. No distress.   HENT:   Head: Normocephalic and atraumatic.   Right Ear: Hearing, tympanic membrane, external ear and ear canal normal. Tympanic membrane is not erythematous. No middle ear effusion.   Left Ear: Hearing, tympanic membrane, external ear and ear canal normal. Tympanic membrane is not erythematous.  No middle ear " effusion.   Nose: Mucosal edema present. No rhinorrhea or nasal deformity. No epistaxis. Right sinus exhibits no maxillary sinus tenderness and no frontal sinus tenderness. Left sinus exhibits no maxillary sinus tenderness and no frontal sinus tenderness.   Mouth/Throat: Uvula is midline, oropharynx is clear and moist and mucous membranes are normal. No trismus in the jaw. Normal dentition. No uvula swelling. No oropharyngeal exudate or posterior oropharyngeal erythema.   Left-sided maxillary sinus pressure   Eyes: Conjunctivae and lids are normal. No scleral icterus.   Sclera clear bilat   Neck: Trachea normal, full passive range of motion without pain and phonation normal. Neck supple.   Cardiovascular: Normal rate, regular rhythm, normal heart sounds, intact distal pulses and normal pulses.   Pulmonary/Chest: Effort normal and breath sounds normal. No respiratory distress. She has no wheezes.   Abdominal: Soft. Normal appearance and bowel sounds are normal. She exhibits no distension. There is no tenderness.   Musculoskeletal: Normal range of motion. She exhibits no edema or deformity.   Lymphadenopathy:        Head (right side): Submandibular adenopathy present.        Head (left side): Submandibular adenopathy present.   Neurological: She is alert and oriented to person, place, and time. She exhibits normal muscle tone. Coordination normal.   Skin: Skin is warm, dry and intact. She is not diaphoretic. No pallor.   Psychiatric: She has a normal mood and affect. Her speech is normal and behavior is normal. Judgment and thought content normal. Cognition and memory are normal.   Nursing note and vitals reviewed.      Assessment:       1. Bacterial sinusitis        Plan:       Maria Elena was seen today for sinus problem.    Diagnoses and all orders for this visit:    Bacterial sinusitis  -     amoxicillin-clavulanate 875-125mg (AUGMENTIN) 875-125 mg per tablet; Take 1 tablet by mouth every 12 (twelve) hours. for 7  days     Will treat with antibiotics given history of diabetes and symptoms worsening of 7 days.  Advised nasal saline no other nasal sprays    Patient Instructions   PLEASE READ YOUR DISCHARGE INSTRUCTIONS ENTIRELY AS IT CONTAINS IMPORTANT INFORMATION.      Please drink plenty of fluids.    Please get plenty of rest.    Please return here or go to the Emergency Department for any concerns or worsening of condition.    If you do have Hypertension or palpitations, it is safe to take Coricidin HBP for relief of sinus symptoms.    If not allergic, please take over the counter Tylenol (Acetaminophen) and/or Motrin (Ibuprofen) as directed for control of pain and/or fever.  Please follow up with your primary care doctor or specialist as needed.    Sore throat recommendations: Warm fluids, warm salt water gargles, throat lozenges, tea, honey, soup, rest, hydration.    Nasal saline    If you  smoke, please stop smoking.      Please return or see your primary care doctor if you develop new or worsening symptoms.     Please arrange follow up with your primary medical clinic as soon as possible. You must understand that you've received an Urgent Care treatment only and that you may be released before all of your medical problems are known or treated. You, the patient, will arrange for follow up as instructed. If your symptoms worsen or fail to improve you should go to the Emergency Room.    Sinusitis (Antibiotic Treatment)    The sinuses are air-filled spaces within the bones of the face. They connect to the inside of the nose. Sinusitis is an inflammation of the tissue lining the sinus cavity. Sinus inflammation can occur during a cold. It can also be due to allergies to pollens and other particles in the air. Sinusitis can cause symptoms of sinus congestion and fullness. A sinus infection causes fever, headache and facial pain. There is often green or yellow drainage from the nose or into the back of the throat (post-nasal  drip). You have been given antibiotics to treat this condition.  Home care:  · Take the full course of antibiotics as instructed. Do not stop taking them, even if you feel better.  · Drink plenty of water, hot tea, and other liquids. This may help thin mucus. It also may promote sinus drainage.  · Heat may help soothe painful areas of the face. Use a towel soaked in hot water. Or,  the shower and direct the hot spray onto your face. Using a vaporizer along with a menthol rub at night may also help.   · An expectorant containing guaifenesin may help thin the mucus and promote drainage from the sinuses.  · Over-the-counter decongestants may be used unless a similar medicine was prescribed. Nasal sprays work the fastest. Use one that contains phenylephrine or oxymetazoline. First blow the nose gently. Then use the spray. Do not use these medicines more often than directed on the label or symptoms may get worse. You may also use tablets containing pseudoephedrine. Avoid products that combine ingredients, because side effects may be increased. Read labels. You can also ask the pharmacist for help. (NOTE: Persons with high blood pressure should not use decongestants. They can raise blood pressure.)  · Over-the-counter antihistamines may help if allergies contributed to your sinusitis.    · Do not use nasal rinses or irrigation during an acute sinus infection, unless told to by your health care provider. Rinsing may spread the infection to other sinuses.  · Use acetaminophen or ibuprofen to control pain, unless another pain medicine was prescribed. (If you have chronic liver or kidney disease or ever had a stomach ulcer, talk with your doctor before using these medicines. Aspirin should never be used in anyone under 18 years of age who is ill with a fever. It may cause severe liver damage.)  · Don't smoke. This can worsen symptoms.  Follow-up care  Follow up with your healthcare provider or our staff if you are not  improving within the next week.  When to seek medical advice  Call your healthcare provider if any of these occur:  · Facial pain or headache becoming more severe  · Stiff neck  · Unusual drowsiness or confusion  · Swelling of the forehead or eyelids  · Vision problems, including blurred or double vision  · Fever of 100.4ºF (38ºC) or higher, or as directed by your healthcare provider  · Seizure  · Breathing problems  · Symptoms not resolving within 10 days  Date Last Reviewed: 4/13/2015 © 2000-2017 [employer]. [address] [place] [address], [place] [address]. All rights reserved. This information is not intended as a substitute for professional medical care. Always follow your healthcare professional's instructions.

## 2019-07-29 ENCOUNTER — TELEPHONE (OUTPATIENT)
Dept: URGENT CARE | Facility: CLINIC | Age: 64
End: 2019-07-29

## 2019-08-28 ENCOUNTER — PATIENT MESSAGE (OUTPATIENT)
Dept: OBSTETRICS AND GYNECOLOGY | Facility: CLINIC | Age: 64
End: 2019-08-28

## 2019-08-30 ENCOUNTER — HOSPITAL ENCOUNTER (OUTPATIENT)
Dept: RADIOLOGY | Facility: HOSPITAL | Age: 64
Discharge: HOME | End: 2019-08-30
Attending: OBSTETRICS & GYNECOLOGY
Payer: COMMERCIAL

## 2019-08-30 ENCOUNTER — TELEPHONE (OUTPATIENT)
Dept: RADIOLOGY | Facility: HOSPITAL | Age: 64
End: 2019-08-30

## 2019-08-30 VITALS — HEIGHT: 61 IN | WEIGHT: 198 LBS | BODY MASS INDEX: 37.38 KG/M2

## 2019-08-30 DIAGNOSIS — Z12.31 ENCOUNTER FOR SCREENING MAMMOGRAM FOR MALIGNANT NEOPLASM OF BREAST: ICD-10-CM

## 2019-08-30 PROCEDURE — 76642 ULTRASOUND BREAST LIMITED: CPT | Mod: 26,LT,, | Performed by: RADIOLOGY

## 2019-08-30 PROCEDURE — 77066 MAMMO DIGITAL DIAGNOSTIC BILAT WITH TOMOSYNTHESIS_CAD: ICD-10-PCS | Mod: 26,,, | Performed by: RADIOLOGY

## 2019-08-30 PROCEDURE — G0279 TOMOSYNTHESIS, MAMMO: HCPCS | Mod: 26,,, | Performed by: RADIOLOGY

## 2019-08-30 PROCEDURE — 76642 US BREAST LEFT LIMITED: ICD-10-PCS | Mod: 26,LT,, | Performed by: RADIOLOGY

## 2019-08-30 PROCEDURE — G0279 MAMMO DIGITAL DIAGNOSTIC BILAT WITH TOMOSYNTHESIS_CAD: ICD-10-PCS | Mod: 26,,, | Performed by: RADIOLOGY

## 2019-08-30 PROCEDURE — 76642 ULTRASOUND BREAST LIMITED: CPT | Mod: TC,PO,LT

## 2019-08-30 PROCEDURE — 77066 DX MAMMO INCL CAD BI: CPT | Mod: TC,PO

## 2019-08-30 PROCEDURE — 77066 DX MAMMO INCL CAD BI: CPT | Mod: 26,,, | Performed by: RADIOLOGY

## 2019-08-30 NOTE — TELEPHONE ENCOUNTER
Spoke with patient. Reviewed breast biopsy procedure and reviewed instructions for breast biopsy. Patient expressed understanding and all questions were answered. Provided patient with my phone number to call for any further concerns or questions.   Patient scheduled breast biopsy at the [facility] on 9/5/2019.

## 2019-09-03 ENCOUNTER — OFFICE VISIT (OUTPATIENT)
Dept: ORTHOPEDICS | Facility: CLINIC | Age: 64
End: 2019-09-03
Payer: COMMERCIAL

## 2019-09-03 ENCOUNTER — HOSPITAL ENCOUNTER (OUTPATIENT)
Dept: RADIOLOGY | Facility: HOSPITAL | Age: 64
Discharge: HOME | End: 2019-09-03
Attending: PHYSICIAN ASSISTANT
Payer: COMMERCIAL

## 2019-09-03 ENCOUNTER — PATIENT MESSAGE (OUTPATIENT)
Dept: OBSTETRICS AND GYNECOLOGY | Facility: CLINIC | Age: 64
End: 2019-09-03

## 2019-09-03 VITALS — WEIGHT: 201.44 LBS | BODY MASS INDEX: 38.03 KG/M2 | HEIGHT: 61 IN

## 2019-09-03 DIAGNOSIS — M17.12 PRIMARY OSTEOARTHRITIS OF LEFT KNEE: Primary | ICD-10-CM

## 2019-09-03 DIAGNOSIS — M17.12 PRIMARY OSTEOARTHRITIS OF LEFT KNEE: ICD-10-CM

## 2019-09-03 PROCEDURE — 73560 XR KNEE 1 OR 2 VIEW BILATERAL: ICD-10-PCS | Mod: 26,50,, | Performed by: RADIOLOGY

## 2019-09-03 PROCEDURE — 20610 PR DRAIN/INJECT LARGE JOINT/BURSA: ICD-10-PCS | Mod: LT,S$GLB,, | Performed by: PHYSICIAN ASSISTANT

## 2019-09-03 PROCEDURE — 73560 X-RAY EXAM OF KNEE 1 OR 2: CPT | Mod: 26,50,, | Performed by: RADIOLOGY

## 2019-09-03 PROCEDURE — 3008F PR BODY MASS INDEX (BMI) DOCUMENTED: ICD-10-PCS | Mod: CPTII,S$GLB,, | Performed by: PHYSICIAN ASSISTANT

## 2019-09-03 PROCEDURE — 99999 PR PBB SHADOW E&M-EST. PATIENT-LVL III: ICD-10-PCS | Mod: PBBFAC,,, | Performed by: PHYSICIAN ASSISTANT

## 2019-09-03 PROCEDURE — 99213 PR OFFICE/OUTPT VISIT, EST, LEVL III, 20-29 MIN: ICD-10-PCS | Mod: 25,S$GLB,, | Performed by: PHYSICIAN ASSISTANT

## 2019-09-03 PROCEDURE — 99999 PR PBB SHADOW E&M-EST. PATIENT-LVL III: CPT | Mod: PBBFAC,,, | Performed by: PHYSICIAN ASSISTANT

## 2019-09-03 PROCEDURE — 20610 DRAIN/INJ JOINT/BURSA W/O US: CPT | Mod: LT,S$GLB,, | Performed by: PHYSICIAN ASSISTANT

## 2019-09-03 PROCEDURE — 73560 X-RAY EXAM OF KNEE 1 OR 2: CPT | Mod: 50,TC

## 2019-09-03 PROCEDURE — 99213 OFFICE O/P EST LOW 20 MIN: CPT | Mod: 25,S$GLB,, | Performed by: PHYSICIAN ASSISTANT

## 2019-09-03 PROCEDURE — 3008F BODY MASS INDEX DOCD: CPT | Mod: CPTII,S$GLB,, | Performed by: PHYSICIAN ASSISTANT

## 2019-09-03 RX ORDER — TRIAMCINOLONE ACETONIDE 40 MG/ML
60 INJECTION, SUSPENSION INTRA-ARTICULAR; INTRAMUSCULAR
Status: COMPLETED | OUTPATIENT
Start: 2019-09-03 | End: 2019-09-03

## 2019-09-03 RX ADMIN — TRIAMCINOLONE ACETONIDE 60 MG: 40 INJECTION, SUSPENSION INTRA-ARTICULAR; INTRAMUSCULAR at 01:09

## 2019-09-03 NOTE — PROGRESS NOTES
"  SUBJECTIVE:     Chief Complaint : left knee pain    History of Present Illness:  Maria Elena Tavares is a 63 y.o. 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  She has been treated with injections. Last injection was 12/31/2018 and lasted until two weeks ago. No trauma to left knee.  She had some swelling but used Aleve and ice and swelling has resolved. Pain is medial. Pain is moderate.  She has stiffness after long periods of sitting. No previous injury to knee. She denies mechanical symptoms or instability.  She saw Dr. Ashby for weight loss, lost 10 pounds and has gained some back.     Past Medical History:   Diagnosis Date    BMI 37.0-37.9, adult     Diabetes mellitus type II     Diverticulosis     history of diverticulosisseen on colonoscopy at age 48. Repeat recommended at age 58. Done by     Elevated blood protein     History of elevated protein. Apparently has seen  for extensive workup including bone marrow biopsy    History of shingles 2006    Hyperlipidemia     Hypertension     Microalbuminuria     due 2 diabetes    Mild vitamin D deficiency     . A low vitamin D    Thyroid disease     hypothyroidism       Review of Systems:  Constitutional: no fever or chills  ENT: no nasal congestion or sore throat  Respiratory: no cough or shortness of breath  Cardiovascular: no chest pain or palpitations  Gastrointestinal: no nausea or vomiting, tolerating diet  Genitourinary: no hematuria or dysuria  Integument/Breast: no rash or pruritis  Hematologic/Lymphatic: no easy bruising or lymphadenopathy  Musculoskeletal: see HPI  Neurological: no seizures or tremors  Behavioral/Psych: no auditory or visual hallucinations    OBJECTIVE:     PHYSICAL EXAM:  Height 5' 1" (1.549 m), weight 91.4 kg (201 lb 6.6 oz).   General Appearance: WDWN, NAD  Gait: Normal  Neuro/Psych: Mood & affect appropriate  Lungs: Respirations equal and unlabored. "   CV: 2+ bilateral upper and lower extremity pulses.   Skin: Intact throughout LE  Extremities: No LE edema    Right Knee Exam  Range of Motion:0-130 active   Effusion:none  Condition of skin:intact  Location of tenderness:Medial joint line   Strength:5 of 5 quadriceps strength and 5 of 5 hamstring strength  Stability:stable to testing  Susana: negative    Left Knee Exam  Range of Motion:0-130 active   Effusion:yes, small  Condition of skin:intact  Location of tenderness:Medial joint line   Strength:5 of 5 quadriceps strength and 5 of 5 hamstring strength  Stability:stable to testing  Susana: pain medially with testing    Alignment: Mild varus    Right Hip Examination: no pain with PROM     Left Hip Examination: no pain with PROM    RADIOGRAPHS: AP, lateral and merchant knee x-rays obtained and reviewed today by me reveal advanced degenerative changes medial compartment left knee, moderate changes medially in right knee     ASSESSMENT/PLAN:   Primary osteoarthritis left knee  - CSI today. Will monitor glucose.  - Continue weight loss efforts.   - F/u prn.    Knee Injection Procedure Note  Diagnosis: left knee degenerative arthritis  Indications: left knee pain  Procedure Details: Verbal consent was obtained for the procedure. The injection site was identified and the skin was prepared with alcohol. The left knee was injected from an anterolateral approach with 1.5 ml of Kenalog and 2 ml Lidocaine under sterile technique using a 22 gauge needle. The needle was removed and the area cleansed and dressed.  Complications:  Patient tolerated the procedure well.    she was advised to rest the knee today, using ice and elevation as needed for comfort and swelling.Immediate relief of the knee pain may be short lived and secondary to the lidocaine. she may have an increase in discomfort tonight followed by steady improvement over the next several days. It may take 1-2 weeks following the injection to get the full benefit  of the medication.

## 2019-09-05 ENCOUNTER — HOSPITAL ENCOUNTER (OUTPATIENT)
Dept: RADIOLOGY | Facility: HOSPITAL | Age: 64
Discharge: HOME | End: 2019-09-05
Attending: OBSTETRICS & GYNECOLOGY
Payer: COMMERCIAL

## 2019-09-05 DIAGNOSIS — N63.20 LEFT BREAST MASS: ICD-10-CM

## 2019-09-05 PROCEDURE — 19084 US BREAST BIOPSY WITH IMAGING EA ADDITIONAL: ICD-10-PCS | Mod: LT,,, | Performed by: RADIOLOGY

## 2019-09-05 PROCEDURE — 88305 TISSUE EXAM BY PATHOLOGIST: CPT | Performed by: PATHOLOGY

## 2019-09-05 PROCEDURE — 77065 DX MAMMO INCL CAD UNI: CPT | Mod: TC,PO,LT

## 2019-09-05 PROCEDURE — 77065 DX MAMMO INCL CAD UNI: CPT | Mod: 26,LT,, | Performed by: RADIOLOGY

## 2019-09-05 PROCEDURE — 19084 BX BREAST ADD LESION US IMAG: CPT | Mod: LT,,, | Performed by: RADIOLOGY

## 2019-09-05 PROCEDURE — 25000003 PHARM REV CODE 250: Mod: PO | Performed by: OBSTETRICS & GYNECOLOGY

## 2019-09-05 PROCEDURE — 19083 US BREAST BIOPSY WITH IMAGING 1ST SITE LEFT: ICD-10-PCS | Mod: LT,,, | Performed by: RADIOLOGY

## 2019-09-05 PROCEDURE — 27201068 US BREAST BIOPSY WITH IMAGING 1ST SITE LEFT: Mod: PO

## 2019-09-05 PROCEDURE — 27201068 US BREAST BIOPSY WITH IMAGING EA ADDITIONAL: Mod: PO

## 2019-09-05 PROCEDURE — 88360 TISSUE SPECIMEN TO PATHOLOGY, RADIOLOGY: ICD-10-PCS | Mod: 26,,, | Performed by: PATHOLOGY

## 2019-09-05 PROCEDURE — 19083 BX BREAST 1ST LESION US IMAG: CPT | Mod: LT,,, | Performed by: RADIOLOGY

## 2019-09-05 PROCEDURE — 88305 TISSUE SPECIMEN TO PATHOLOGY, RADIOLOGY: ICD-10-PCS | Mod: 26,,, | Performed by: PATHOLOGY

## 2019-09-05 PROCEDURE — 88360 TUMOR IMMUNOHISTOCHEM/MANUAL: CPT | Performed by: PATHOLOGY

## 2019-09-05 PROCEDURE — 77065 MAMMO DIGITAL DIAGNOSTIC LEFT WITH CAD: ICD-10-PCS | Mod: 26,LT,, | Performed by: RADIOLOGY

## 2019-09-05 PROCEDURE — 88360 TUMOR IMMUNOHISTOCHEM/MANUAL: CPT | Mod: 26,,, | Performed by: PATHOLOGY

## 2019-09-05 PROCEDURE — 88305 TISSUE EXAM BY PATHOLOGIST: CPT | Mod: 26,,, | Performed by: PATHOLOGY

## 2019-09-05 RX ORDER — LIDOCAINE HCL/EPINEPHRINE/PF 2%-1:200K
18 VIAL (ML) INJECTION ONCE
Status: COMPLETED | OUTPATIENT
Start: 2019-09-05 | End: 2019-09-05

## 2019-09-05 RX ORDER — LIDOCAINE HYDROCHLORIDE 10 MG/ML
2 INJECTION, SOLUTION EPIDURAL; INFILTRATION; INTRACAUDAL; PERINEURAL ONCE
Status: COMPLETED | OUTPATIENT
Start: 2019-09-05 | End: 2019-09-05

## 2019-09-05 RX ORDER — LIDOCAINE HYDROCHLORIDE AND EPINEPHRINE 20; 10 MG/ML; UG/ML
8 INJECTION, SOLUTION INFILTRATION; PERINEURAL ONCE
Status: COMPLETED | OUTPATIENT
Start: 2019-09-05 | End: 2019-09-05

## 2019-09-05 RX ORDER — LIDOCAINE HYDROCHLORIDE AND EPINEPHRINE 20; 10 MG/ML; UG/ML
18 INJECTION, SOLUTION INFILTRATION; PERINEURAL ONCE
Status: COMPLETED | OUTPATIENT
Start: 2019-09-05 | End: 2019-09-05

## 2019-09-05 RX ADMIN — LIDOCAINE HYDROCHLORIDE,EPINEPHRINE BITARTRATE 18 ML: 20; .005 INJECTION, SOLUTION EPIDURAL; INFILTRATION; INTRACAUDAL; PERINEURAL at 09:09

## 2019-09-05 RX ADMIN — LIDOCAINE HYDROCHLORIDE 2 ML: 10 INJECTION, SOLUTION EPIDURAL; INFILTRATION; INTRACAUDAL; PERINEURAL at 09:09

## 2019-09-05 RX ADMIN — LIDOCAINE HYDROCHLORIDE AND EPINEPHRINE 8 ML: 20; 10 INJECTION, SOLUTION INFILTRATION; PERINEURAL at 09:09

## 2019-09-05 RX ADMIN — LIDOCAINE HYDROCHLORIDE AND EPINEPHRINE 18 ML: 20; 10 INJECTION, SOLUTION INFILTRATION; PERINEURAL at 09:09

## 2019-09-09 ENCOUNTER — PATIENT MESSAGE (OUTPATIENT)
Dept: ORTHOPEDICS | Facility: CLINIC | Age: 64
End: 2019-09-09

## 2019-09-10 ENCOUNTER — PATIENT MESSAGE (OUTPATIENT)
Dept: OBSTETRICS AND GYNECOLOGY | Facility: CLINIC | Age: 64
End: 2019-09-10

## 2019-09-10 ENCOUNTER — DOCUMENTATION ONLY (OUTPATIENT)
Dept: SURGERY | Facility: CLINIC | Age: 64
End: 2019-09-10

## 2019-09-10 NOTE — PROGRESS NOTES
Called Patient with results of breast biopsy from 9/5/2019.  Explained that the biopsy showed invasive ductal carcinoma . Discussed what this means and that the next step is to meet with a breast surgeon. An appt was made for 9/12/2019 with Dr. Lopez.  Reviewed location of breast center. Patient verbalized understanding.  E mailed pt appt and educational information.

## 2019-09-12 ENCOUNTER — OFFICE VISIT (OUTPATIENT)
Dept: SURGERY | Facility: CLINIC | Age: 64
End: 2019-09-12
Payer: COMMERCIAL

## 2019-09-12 ENCOUNTER — TELEPHONE (OUTPATIENT)
Dept: HEMATOLOGY/ONCOLOGY | Facility: CLINIC | Age: 64
End: 2019-09-12

## 2019-09-12 VITALS
WEIGHT: 205.94 LBS | DIASTOLIC BLOOD PRESSURE: 95 MMHG | BODY MASS INDEX: 40.43 KG/M2 | SYSTOLIC BLOOD PRESSURE: 144 MMHG | TEMPERATURE: 99 F | HEIGHT: 60 IN | HEART RATE: 109 BPM

## 2019-09-12 DIAGNOSIS — C50.912 MALIGNANT NEOPLASM OF LEFT FEMALE BREAST, UNSPECIFIED ESTROGEN RECEPTOR STATUS, UNSPECIFIED SITE OF BREAST: Primary | ICD-10-CM

## 2019-09-12 PROCEDURE — 3080F PR MOST RECENT DIASTOLIC BLOOD PRESSURE >= 90 MM HG: ICD-10-PCS | Mod: CPTII,S$GLB,, | Performed by: SURGERY

## 2019-09-12 PROCEDURE — 3080F DIAST BP >= 90 MM HG: CPT | Mod: CPTII,S$GLB,, | Performed by: SURGERY

## 2019-09-12 PROCEDURE — 3008F BODY MASS INDEX DOCD: CPT | Mod: CPTII,S$GLB,, | Performed by: SURGERY

## 2019-09-12 PROCEDURE — 3008F PR BODY MASS INDEX (BMI) DOCUMENTED: ICD-10-PCS | Mod: CPTII,S$GLB,, | Performed by: SURGERY

## 2019-09-12 PROCEDURE — 3077F SYST BP >= 140 MM HG: CPT | Mod: CPTII,S$GLB,, | Performed by: SURGERY

## 2019-09-12 PROCEDURE — 99999 PR PBB SHADOW E&M-EST. PATIENT-LVL IV: ICD-10-PCS | Mod: PBBFAC,,, | Performed by: SURGERY

## 2019-09-12 PROCEDURE — 99205 OFFICE O/P NEW HI 60 MIN: CPT | Mod: S$GLB,,, | Performed by: SURGERY

## 2019-09-12 PROCEDURE — 99205 PR OFFICE/OUTPT VISIT, NEW, LEVL V, 60-74 MIN: ICD-10-PCS | Mod: S$GLB,,, | Performed by: SURGERY

## 2019-09-12 PROCEDURE — 99999 PR PBB SHADOW E&M-EST. PATIENT-LVL IV: CPT | Mod: PBBFAC,,, | Performed by: SURGERY

## 2019-09-12 PROCEDURE — 3077F PR MOST RECENT SYSTOLIC BLOOD PRESSURE >= 140 MM HG: ICD-10-PCS | Mod: CPTII,S$GLB,, | Performed by: SURGERY

## 2019-09-12 NOTE — TELEPHONE ENCOUNTER
Call to pt.   Pt unavailable.   Left message for pt informing that pt scheduled for 1pm consult visit with Dr. Villagran on Monday (time per Dr. Villagran).   Callback number provided if any questions/concerns.     ----- Message from Ivory Gastelum RN sent at 9/12/2019  3:35 PM CDT -----  Regarding: breast cancer patient referral   Good afternoon,  Dr. Lopez is referring this patient to Dr. Villagran.  Patient is requesting Dr. Villagran.  Breast cancer dx.  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  Please call patient to schedule, she needs to be seen as soon as possible.  Thank you.    SERJIO Dodson

## 2019-09-12 NOTE — PROGRESS NOTES
New Breast Cancer  History and Physical  [facility]  Department of Surgery    REFERRING PROVIDER: Criss Santiago MD  [address] [address]    CHIEF COMPLAINT: left breast cancer    Subjective:      Maria Elena Tavares is a 63 y.o. postmenopausal female referred for evaluation of recently diagnosed carcinoma of the left breast. The patient was initially referred for surgical evaluation of a breast lump felt by the patient and an abnormal mammogram first noted 2019. Follow-up imaging showed mass at 3 o'clock in the left breast. A ultrasound guided biopsy was performed on 2019 with pathology revealing infiltrating ductal carcinoma of the breast.     Patient does routinely do self breast exams.  Patient has noted a change on breast exam.  Patient denies nipple discharge. Patient denies to previous breast biopsy. Patient denies a personal history of breast cancer.    Findings at that time were the following:   Tumor size: 2 cm   Tumor thgthrthathdtheth:th th4th Estrogen Receptor: +, 90%   Progesterone Receptor:+ 30%  Her-2 alvarado: -   Ki67:  60%  Lymph node status: positive       GYN History:  Age of menarche was 11. Age of menopause was 50.  Patient denies hormonal therapy. Patient is . Age of first live birth was 28. Patient did not breast feed.    FAMILY History:  Maternal aunt stomach cancer  Mat aunt lung cancer  Mat aunt colon cancer 60s  Mother lung ca 65  Father lung ca 42  Pat aunt breast ca 60s  Paternal aunt breast ca 60s    Past Medical History:   Diagnosis Date    BMI 37.0-37.9, adult     Diabetes mellitus type II     Diverticulosis     history of diverticulosisseen on colonoscopy at age 48. Repeat recommended at age 58. Done by     Elevated blood protein     History of elevated protein. Apparently has seen  for extensive workup including bone marrow biopsy    History of shingles 2006    Hyperlipidemia     Hypertension     Microalbuminuria     due 2  diabetes    Mild vitamin D deficiency     . A low vitamin D    Thyroid disease     hypothyroidism     Past Surgical History:   Procedure Laterality Date    COLONOSCOPY N/A 1/20/2015    Performed by Marietta Perez [facility] at [facility]    HYSTERECTOMY      OOPHORECTOMY       Current Outpatient Medications on File Prior to Visit   Medication Sig Dispense Refill    aspirin (ECOTRIN) 81 MG EC tablet Take 81 mg by mouth once daily.        chlorthalidone (HYGROTEN) 25 MG Tab Take 1 tablet (25 mg total) by mouth every morning. Stop losartan hctz 100/25. 90 tablet 1    dulaglutide (TRULICITY) 1.5 mg/0.5 mL PnIj Inject 1.5 mg into the skin every 7 days. 2 mL 5    ibuprofen (ADVIL,MOTRIN) 400 MG tablet Take 1 tablet (400 mg total) by mouth 2 (two) times daily as needed as directed 60 tablet 2    irbesartan (AVAPRO) 300 MG tablet Take 1 tablet (300 mg total) by mouth once daily. Stop valsartan 320. 90 tablet 1    levothyroxine (SYNTHROID) 200 MCG tablet Take 1 tablet (200 mcg total) by mouth once daily. 90 tablet 0    lifitegrast (XIIDRA) 5 % Dpet Apply 1 drop to  both eyes  2 (two) times daily. 60 each 12    metFORMIN (GLUCOPHAGE) 1000 MG tablet TAKE 1 TABLET (1,000 MG TOTAL) BY MOUTH 2 (TWO) TIMES DAILY WITH MEALS. 180 tablet 1    atorvastatin (LIPITOR) 10 MG tablet Take 1 tablet (10 mg total) by mouth once daily. 90 tablet 3    blood-glucose meter kit DISPENSE : BLOOD TEST STRIPS AND LANCETS PATIENT TEST BLOOD SUGARS TWICE DAILY  TEST STRIPS: 50 EACH, REFILL 5  LANCETS:  50 EACH , REFILL 5 1 each 0     No current facility-administered medications on file prior to visit.      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  She walks occasionally.   Substance and Sexual Activity    Alcohol use: Yes     Comment: occasionally    Drug use: No    Sexual activity: Yes     Partners: Male     Comment: 1 dtr   Lifestyle    Physical activity:     Days per week: Not on file     Minutes per session: Not on file    Stress: Not on file   Relationships    Social connections:     Talks on phone: Not on file     Gets together: Not on file     Attends [employer] service: Not on file     Active member of club or organization: Not on file     Attends meetings of clubs or organizations: Not on file     Relationship status: Not on file   Other Topics Concern    Not on file   Social History Narrative    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 daughter1 granddaughter     Family History   Problem Relation Age of Onset    Cancer Mother         lung ca heavy smoker    Cancer Father     Hypertension Sister     No Known Problems Sister     No Known Problems Daughter     Hypothyroidism Sister     Diabetes Maternal Aunt     Cancer Maternal Aunt     Hypertension Maternal Uncle     Diabetes Maternal Uncle     Diabetes Maternal Grandmother     No Known Problems Brother     No Known Problems Paternal Aunt     No Known Problems Paternal Uncle     No Known Problems Maternal Grandfather     No Known Problems Paternal Grandmother     No Known Problems Paternal Grandfather     Amblyopia Neg Hx     Blindness Neg Hx     Cataracts Neg Hx     Glaucoma Neg Hx     Macular degeneration Neg Hx     Retinal detachment Neg Hx     Strabismus Neg Hx     Stroke Neg Hx     Thyroid disease Neg Hx         Review of Systems  Review of  Systems   Constitutional: Negative for fatigue and fever.   HENT: Negative for sore throat and trouble swallowing.    Eyes: Negative for visual disturbance.   Respiratory: Negative for cough and shortness of breath.    Cardiovascular: Negative for chest pain and palpitations.   Gastrointestinal: Negative for abdominal pain, constipation, diarrhea and nausea.   Genitourinary: Negative for difficulty urinating and dysuria.   Musculoskeletal: Negative for arthralgias and back pain.   Neurological: Negative for dizziness, weakness and headaches.   Hematological: Negative for adenopathy.        Objective:   PHYSICAL EXAM:  BP (!) 144/95 (BP Location: Right arm, Patient Position: Sitting)   Pulse 109   Temp 98.5 °F (36.9 °C)   Ht 5' (1.524 m)   Wt 93.4 kg (205 lb 14.6 oz)   BMI 40.21 kg/m²     Physical Exam   Pulmonary/Chest: She exhibits no tenderness and no deformity. Right breast exhibits no inverted nipple, no mass, no nipple discharge, no skin change and no tenderness. Left breast exhibits mass. Left breast exhibits no inverted nipple, no nipple discharge, no skin change and no tenderness.       Lymphadenopathy:     She has no cervical adenopathy.     She has no axillary adenopathy.        Right: No supraclavicular adenopathy present.        Left: No supraclavicular adenopathy present.         Radiology review: Images personally reviewed by me in the clinic.         Assessment:      Maria Elena Tavares is a 63 y.o. postmenopausal female with recently diagnosed carcinoma of the left breast.      Plan:    Options for management were discussed with the patient and her family. We reviewed the existing data noting the equivalency of breast conserving surgery with radiation therapy and mastectomy. We also reviewed the guidelines of the National Comprehensive Cancer Network for Stage 1-2 breast carcinoma. We discussed the need for lumpectomy margins to be negative for carcinoma, the necessity for postoperative radiation  therapy after breast conservation in most cases, the possibility of a failed or false negative sentinel lymph node biopsy and the potential need for complete lymphadenectomy for a failed or positive sentinel lymph node biopsy were fully discussed. In the setting of mastectomy, delayed or immediate reconstruction options are available and were discussed.     In the setting of lumpectomy, radiation therapy would be recommended majority of the time.  The duration and treatment side effects were discussed with the patient.  This will coordinated with the radiation oncologist pending final pathology.    We also discussed the role of systemic therapy in the treatment of early stage breast cancer.  We discussed that this is based on tumor biology and kin status and will be determined based on final pathology.  We discussed that if the cancer is hormone positive, endocrine therapy would be recommended in most cases and its use can reduce the risk of recurrence as well as improve survival. Side effects of treatment were briefly discussed. We also discussed the potential role for chemotherapy based on a number of factors such as tumor phenotype (ER+ vs. triple negative vs. Saw0sun+) and this would be determined in coordination with the medical oncologist.    Discussed given her tumor biology and lymph node status, lean towards neoadjuvant chemo.  Discussed pros and cons.  Possible chance of less axillary surgery.  Will discuss with medical oncology.  Also discussed possiblibyt of mammaprint now, however I do believe chemo would likely be high.      Patient was educated on breast cancer, receptors, wire localization lumpectomy, mastectomy, sentinel lymph node mapping and biopsy, axillary lymph node dissection, reconstruction, breast prosthesis with post-mastectomy bra and radiation therapy. Patient was given patient information binder including [facility] breast cancer treatment brochure.  All her questions were  answered.    Total time spent with the patient: 60 minutes.  45 minutes of face to face consultation and 15 minutes of chart review and coordination of care.

## 2019-09-12 NOTE — H&P (VIEW-ONLY)
New Breast Cancer  History and Physical  [facility]  Department of Surgery    REFERRING PROVIDER: Criss Santiago MD  [address] [address]    CHIEF COMPLAINT: left breast cancer    Subjective:      Maria Elena Tavares is a 63 y.o. postmenopausal female referred for evaluation of recently diagnosed carcinoma of the left breast. The patient was initially referred for surgical evaluation of a breast lump felt by the patient and an abnormal mammogram first noted 2019. Follow-up imaging showed mass at 3 o'clock in the left breast. A ultrasound guided biopsy was performed on 2019 with pathology revealing infiltrating ductal carcinoma of the breast.     Patient does routinely do self breast exams.  Patient has noted a change on breast exam.  Patient denies nipple discharge. Patient denies to previous breast biopsy. Patient denies a personal history of breast cancer.    Findings at that time were the following:   Tumor size: 2 cm   Tumor thgthrthathdtheth:th th4th Estrogen Receptor: +, 90%   Progesterone Receptor:+ 30%  Her-2 alvarado: -   Ki67:  60%  Lymph node status: positive       GYN History:  Age of menarche was 11. Age of menopause was 50.  Patient denies hormonal therapy. Patient is . Age of first live birth was 28. Patient did not breast feed.    FAMILY History:  Maternal aunt stomach cancer  Mat aunt lung cancer  Mat aunt colon cancer 60s  Mother lung ca 65  Father lung ca 42  Pat aunt breast ca 60s  Paternal aunt breast ca 60s    Past Medical History:   Diagnosis Date    BMI 37.0-37.9, adult     Diabetes mellitus type II     Diverticulosis     history of diverticulosisseen on colonoscopy at age 48. Repeat recommended at age 58. Done by     Elevated blood protein     History of elevated protein. Apparently has seen  for extensive workup including bone marrow biopsy    History of shingles 2006    Hyperlipidemia     Hypertension     Microalbuminuria     due 2  diabetes    Mild vitamin D deficiency     . A low vitamin D    Thyroid disease     hypothyroidism     Past Surgical History:   Procedure Laterality Date    COLONOSCOPY N/A 1/20/2015    Performed by Marietta Perez [facility]    HYSTERECTOMY      OOPHORECTOMY       Current Outpatient Medications on File Prior to Visit   Medication Sig Dispense Refill    aspirin (ECOTRIN) 81 MG EC tablet Take 81 mg by mouth once daily.        chlorthalidone (HYGROTEN) 25 MG Tab Take 1 tablet (25 mg total) by mouth every morning. Stop losartan hctz 100/25. 90 tablet 1    dulaglutide (TRULICITY) 1.5 mg/0.5 mL PnIj Inject 1.5 mg into the skin every 7 days. 2 mL 5    ibuprofen (ADVIL,MOTRIN) 400 MG tablet Take 1 tablet (400 mg total) by mouth 2 (two) times daily as needed as directed 60 tablet 2    irbesartan (AVAPRO) 300 MG tablet Take 1 tablet (300 mg total) by mouth once daily. Stop valsartan 320. 90 tablet 1    levothyroxine (SYNTHROID) 200 MCG tablet Take 1 tablet (200 mcg total) by mouth once daily. 90 tablet 0    lifitegrast (XIIDRA) 5 % Dpet Apply 1 drop to  both eyes  2 (two) times daily. 60 each 12    metFORMIN (GLUCOPHAGE) 1000 MG tablet TAKE 1 TABLET (1,000 MG TOTAL) BY MOUTH 2 (TWO) TIMES DAILY WITH MEALS. 180 tablet 1    atorvastatin (LIPITOR) 10 MG tablet Take 1 tablet (10 mg total) by mouth once daily. 90 tablet 3    blood-glucose meter kit DISPENSE : BLOOD TEST STRIPS AND LANCETS PATIENT TEST BLOOD SUGARS TWICE DAILY  TEST STRIPS: 50 EACH, REFILL 5  LANCETS:  50 EACH , REFILL 5 1 each 0     No current facility-administered medications on file prior to visit.      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  She walks occasionally.   Substance and Sexual Activity    Alcohol use: Yes     Comment: occasionally    Drug use: No    Sexual activity: Yes     Partners: Male     Comment: 1 dtr   Lifestyle    Physical activity:     Days per week: Not on file     Minutes per session: Not on file    Stress: Not on file   Relationships    Social connections:     Talks on phone: Not on file     Gets together: Not on file     Attends Scientologist service: Not on file     Active member of club or organization: Not on file     Attends meetings of clubs or organizations: Not on file     Relationship status: Not on file   Other Topics Concern    Not on file   Social History Narrative    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 daughter1 granddaughter     Family History   Problem Relation Age of Onset    Cancer Mother         lung ca heavy smoker    Cancer Father     Hypertension Sister     No Known Problems Sister     No Known Problems Daughter     Hypothyroidism Sister     Diabetes Maternal Aunt     Cancer Maternal Aunt     Hypertension Maternal Uncle     Diabetes Maternal Uncle     Diabetes Maternal Grandmother     No Known Problems Brother     No Known Problems Paternal Aunt     No Known Problems Paternal Uncle     No Known Problems Maternal Grandfather     No Known Problems Paternal Grandmother     No Known Problems Paternal Grandfather     Amblyopia Neg Hx     Blindness Neg Hx     Cataracts Neg Hx     Glaucoma Neg Hx     Macular degeneration Neg Hx     Retinal detachment Neg Hx     Strabismus Neg Hx     Stroke Neg Hx     Thyroid disease Neg Hx         Review of Systems  Review of  Systems   Constitutional: Negative for fatigue and fever.   HENT: Negative for sore throat and trouble swallowing.    Eyes: Negative for visual disturbance.   Respiratory: Negative for cough and shortness of breath.    Cardiovascular: Negative for chest pain and palpitations.   Gastrointestinal: Negative for abdominal pain, constipation, diarrhea and nausea.   Genitourinary: Negative for difficulty urinating and dysuria.   Musculoskeletal: Negative for arthralgias and back pain.   Neurological: Negative for dizziness, weakness and headaches.   Hematological: Negative for adenopathy.        Objective:   PHYSICAL EXAM:  BP (!) 144/95 (BP Location: Right arm, Patient Position: Sitting)   Pulse 109   Temp 98.5 °F (36.9 °C)   Ht 5' (1.524 m)   Wt 93.4 kg (205 lb 14.6 oz)   BMI 40.21 kg/m²     Physical Exam   Pulmonary/Chest: She exhibits no tenderness and no deformity. Right breast exhibits no inverted nipple, no mass, no nipple discharge, no skin change and no tenderness. Left breast exhibits mass. Left breast exhibits no inverted nipple, no nipple discharge, no skin change and no tenderness.       Lymphadenopathy:     She has no cervical adenopathy.     She has no axillary adenopathy.        Right: No supraclavicular adenopathy present.        Left: No supraclavicular adenopathy present.         Radiology review: Images personally reviewed by me in the clinic.         Assessment:      Maria Elena Tavares is a 63 y.o. postmenopausal female with recently diagnosed carcinoma of the left breast.      Plan:    Options for management were discussed with the patient and her family. We reviewed the existing data noting the equivalency of breast conserving surgery with radiation therapy and mastectomy. We also reviewed the guidelines of the National Comprehensive Cancer Network for Stage 1-2 breast carcinoma. We discussed the need for lumpectomy margins to be negative for carcinoma, the necessity for postoperative radiation  therapy after breast conservation in most cases, the possibility of a failed or false negative sentinel lymph node biopsy and the potential need for complete lymphadenectomy for a failed or positive sentinel lymph node biopsy were fully discussed. In the setting of mastectomy, delayed or immediate reconstruction options are available and were discussed.     In the setting of lumpectomy, radiation therapy would be recommended majority of the time.  The duration and treatment side effects were discussed with the patient.  This will coordinated with the radiation oncologist pending final pathology.    We also discussed the role of systemic therapy in the treatment of early stage breast cancer.  We discussed that this is based on tumor biology and kin status and will be determined based on final pathology.  We discussed that if the cancer is hormone positive, endocrine therapy would be recommended in most cases and its use can reduce the risk of recurrence as well as improve survival. Side effects of treatment were briefly discussed. We also discussed the potential role for chemotherapy based on a number of factors such as tumor phenotype (ER+ vs. triple negative vs. Fpb2zan+) and this would be determined in coordination with the medical oncologist.    Discussed given her tumor biology and lymph node status, lean towards neoadjuvant chemo.  Discussed pros and cons.  Possible chance of less axillary surgery.  Will discuss with medical oncology.  Also discussed possiblibyt of mammaprint now, however I do believe chemo would likely be high.      Patient was educated on breast cancer, receptors, wire localization lumpectomy, mastectomy, sentinel lymph node mapping and biopsy, axillary lymph node dissection, reconstruction, breast prosthesis with post-mastectomy bra and radiation therapy. Patient was given patient information binder including [facility] breast cancer treatment brochure.  All her questions were  answered.    Total time spent with the patient: 60 minutes.  45 minutes of face to face consultation and 15 minutes of chart review and coordination of care.

## 2019-09-12 NOTE — LETTER
October 3, 2019      Criss Santiago MD  [address]  [address]  [place] [address]           [facility]  [address]  [facility] [address]  Phone: [phone]  Fax: [phone]          Patient: Maria Elena Tavares   MR Number: [id number]   YOB: 1955   Date of Visit: 9/12/2019       Dear Dr. Criss Santiago:    Thank you for referring Maria Elena Tavares to me for evaluation. Attached you will find relevant portions of my assessment and plan of care.    If you have questions, please do not hesitate to call me. I look forward to following Maria Elena Tavares along with you.    Sincerely,    Tae Morris  CC:  No Recipients    If you would like to receive this communication electronically, please contact [email or web address] or [phone] to request more information on [employer] Link access.    For providers and/or their staff who would like to refer a patient to [facility], please contact us through our one-stop-shop provider referral line, [facility] access hospitalierge, at [phone].    If you feel you have received this communication in error or would no longer like to receive these types of communications, please e-mail [email or web address]

## 2019-09-13 ENCOUNTER — PATIENT MESSAGE (OUTPATIENT)
Dept: HEMATOLOGY/ONCOLOGY | Facility: CLINIC | Age: 64
End: 2019-09-13

## 2019-09-16 ENCOUNTER — INITIAL CONSULT (OUTPATIENT)
Dept: HEMATOLOGY/ONCOLOGY | Facility: CLINIC | Age: 64
End: 2019-09-16
Payer: COMMERCIAL

## 2019-09-16 VITALS
TEMPERATURE: 99 F | HEIGHT: 60 IN | BODY MASS INDEX: 40.43 KG/M2 | HEART RATE: 89 BPM | WEIGHT: 205.94 LBS | RESPIRATION RATE: 20 BRPM | SYSTOLIC BLOOD PRESSURE: 136 MMHG | DIASTOLIC BLOOD PRESSURE: 82 MMHG

## 2019-09-16 DIAGNOSIS — C77.3 BREAST CANCER METASTASIZED TO AXILLARY LYMPH NODE, LEFT: ICD-10-CM

## 2019-09-16 DIAGNOSIS — C50.912 BREAST CANCER METASTASIZED TO AXILLARY LYMPH NODE, LEFT: ICD-10-CM

## 2019-09-16 PROCEDURE — 99999 PR PBB SHADOW E&M-EST. PATIENT-LVL III: ICD-10-PCS | Mod: PBBFAC,,, | Performed by: INTERNAL MEDICINE

## 2019-09-16 PROCEDURE — 99245 OFF/OP CONSLTJ NEW/EST HI 55: CPT | Mod: S$GLB,,, | Performed by: INTERNAL MEDICINE

## 2019-09-16 PROCEDURE — 99999 PR PBB SHADOW E&M-EST. PATIENT-LVL III: CPT | Mod: PBBFAC,,, | Performed by: INTERNAL MEDICINE

## 2019-09-16 PROCEDURE — 99245 PR OFFICE CONSULTATION,LEVEL V: ICD-10-PCS | Mod: S$GLB,,, | Performed by: INTERNAL MEDICINE

## 2019-09-16 NOTE — PROGRESS NOTES
Subjective:       Patient ID: Maria Elena Tavares is a 63 y.o. female.    Chief Complaint: No chief complaint on file.    HPI     Presents for medical oncology opinion    Oncology History:  - self detected, noted a shooting pain in breast first and then a week later felt a lump  Some delay in getting appointment as she was unaware she had to call  - 19 Diagnostic mammogram and ultrasound:  Left breast 20 mm x 18 mm x 17 mm mass at the 3 o'clock position. Assessment: 4 - Suspicious finding. Biopsy is recommended.   Lymph Node: Left axilla 16 mm x 14 mm x 13 mm lymph node. Assessment: 4 - Suspicious finding. Biopsy is recommended.   Right- There is no mammographic or sonographic evidence of malignancy.  BI-RADS Category:   Overall: 4 - Suspicious  - 19 Ultrasound guided biopsy  Pathology:  FINAL PATHOLOGIC DIAGNOSIS  1. LEFT BREAST MASS, BIOPSY:  - Invasive ductal carcinoma, grade 3, longest linear length is 10 mm measured on the slide.  - Histologic Grade (Hawa Histologic Score)  Glandular (Acinar/Tubular Differentiation): 3.  Nuclear Pleomorphism: 2.  Mitotic Rate: 3.  Overall Grade: Grade 3 (score 8).  - Microcalcifications: Not identified.  - Lymphovascular invasion: Not identified.  2. LYMPH NODE, LEFT AXILLA, BIOPSY:  - Positive for metastatic carcinoma.  - The metastatic deposit measures 6 mm in longest continuous linear length.  Estrogen receptor: Positive, 90% of the tumor nuclei staining moderate to strongly.  Progesterone receptor: Positive, 30% of the tumor nuclei staining weak to moderately.  HER2: Negative.  Ki-67: 60%.  - MRI pending (Wednesday)    Gyn Hx:  Menarche- 11  Menopause - partial hysterectomy at age 36 yo  Remainder done in   No HRT  Prior ocps  , age 28 at 1st pregnancy  No breastfeeding    Active Ambulatory Problems     Diagnosis Date Noted    Uncontrolled type 2 diabetes mellitus without complication, without long-term current use of insulin     Hypertension     History  of shingles     Mild vitamin D deficiency     Diverticulosis     Hypothyroid 10/02/2012    Diverticulitis of colon (without mention of hemorrhage)(562.11) 2015    Right groin pain 2016     Resolved Ambulatory Problems     Diagnosis Date Noted    No Resolved Ambulatory Problems     Past Medical History:   Diagnosis Date    BMI 37.0-37.9, adult     Diabetes mellitus type II     Diverticulosis     Elevated blood protein     History of shingles     Hyperlipidemia     Hypertension     Microalbuminuria     Mild vitamin D deficiency     Thyroid disease      SH:  Works at [employer]  Single  Good support  No tobacco, grew up with 2nd hand exposure  Rare social EtOH    FH:  Mom-  at age 66, lung cancer (smoker)  Dad-  at age 42, lung cancer (smoker, )  3 sisters - 2 with HTN  2 paternal aunts with breast cancer- older at diagnosis  Maternal aunt- colon cancer at an older age    Review of Systems   Constitutional: Positive for fatigue (janes notes, patient relates to thyroid). Negative for activity change, appetite change, chills, fever and unexpected weight change (slight gain).   HENT: Positive for congestion (a couple of months and tx with antibiotics). Negative for hearing loss, rhinorrhea, sore throat and trouble swallowing.    Eyes: Negative for visual disturbance (rx glasses).   Respiratory: Positive for cough (? post nasal drip). Negative for chest tightness, shortness of breath and wheezing.    Cardiovascular: Negative for chest pain, palpitations and leg swelling.   Gastrointestinal: Negative for abdominal distention, abdominal pain, blood in stool, constipation, diarrhea, nausea and vomiting.   Genitourinary: Negative for decreased urine volume, difficulty urinating, dysuria, frequency, urgency, vaginal bleeding, vaginal discharge and vaginal pain.   Musculoskeletal: Positive for arthralgias (left knee arthritis) and gait problem (slowly improving, inflamed left knee,  steroid inj). Negative for back pain and joint swelling (improved).   Skin: Negative for color change, pallor, rash and wound.   Neurological: Positive for headaches (rare, stress job related). Negative for dizziness, weakness, light-headedness and numbness.   Hematological: Positive for adenopathy (patient unable to feel). Does not bruise/bleed easily.   Psychiatric/Behavioral: Positive for sleep disturbance (awakens at night). Negative for dysphoric mood. The patient is nervous/anxious.        Objective:      Physical Exam   Constitutional: She is oriented to person, place, and time. She appears well-developed and well-nourished. No distress.   Presents with her daughter, sister and friend  ECOG=0   HENT:   Head: Normocephalic and atraumatic.   Right Ear: External ear normal.   Left Ear: External ear normal.   Mouth/Throat: Oropharynx is clear and moist. No oropharyngeal exudate.   Eyes: Pupils are equal, round, and reactive to light. Conjunctivae and EOM are normal. No scleral icterus. Right pupil is round and reactive. Left pupil is round and reactive.   Neck: Normal range of motion. Neck supple. No tracheal deviation present. No thyromegaly present.   Cardiovascular: Normal rate, regular rhythm and normal heart sounds. Exam reveals no gallop and no friction rub.   No murmur heard.  Pulmonary/Chest: Effort normal and breath sounds normal. No respiratory distress. She has no wheezes. She has no rales.   Left breast mass approx 2 cm in largest measurement  No palpable LAD felt   Abdominal: Soft. Bowel sounds are normal. She exhibits no distension and no mass. There is no hepatosplenomegaly. There is no tenderness. There is no rebound and no guarding.   No hepatosplenomegaly   Musculoskeletal: Normal range of motion. She exhibits no edema, tenderness or deformity.   Lymphadenopathy:        Head (right side): No submandibular adenopathy present.        Head (left side): No submandibular adenopathy present.     She  has no cervical adenopathy.        Right cervical: No superficial cervical, no deep cervical and no posterior cervical adenopathy present.       Left cervical: No superficial cervical, no deep cervical and no posterior cervical adenopathy present.        Right: No inguinal and no supraclavicular adenopathy present.        Left: No inguinal and no supraclavicular adenopathy present.   Neurological: She is alert and oriented to person, place, and time. She has normal strength and normal reflexes. No sensory deficit. Coordination normal.   Skin: Skin is warm and dry. No bruising, no lesion, no petechiae and no rash noted. She is not diaphoretic. No erythema. No pallor.   Psychiatric: She has a normal mood and affect. Her behavior is normal. Judgment and thought content normal. Her mood appears not anxious. She does not exhibit a depressed mood.   Nursing note and vitals reviewed.   Labs- reviewed   Assessment:       1. Breast cancer metastasized to axillary lymph node, left        Plan:     1. Discussed her breast cancer characteristics  Reviewed rationale for pre-operative chemotherapy   Reviewed drug and side effects    Needs ECHO and PET scan and labs prior    1 hour total

## 2019-09-16 NOTE — PLAN OF CARE
START ON PATHWAY REGIMEN - Breast    [id number]        Doxorubicin (Adriamycin(R))       Cyclophosphamide (Cytoxan(R))       Pegfilgrastim-xxxx     **Always confirm dose/schedule in your pharmacy ordering system**        Paclitaxel (Taxol(R))     **Always confirm dose/schedule in your pharmacy ordering system**    Patient Characteristics:  Preoperative or Nonsurgical Candidate (Clinical Staging), Neoadjuvant Therapy   followed by Surgery, Invasive Disease, Chemotherapy, HER2   Negative/Unknown/Equivocal, ER Positive  Therapeutic Status: Preoperative or Nonsurgical Candidate (Clinical Staging)  AJCC M Category: cM0  AJCC Grade: G3  Breast Surgical Plan: Neoadjuvant Therapy followed by Surgery  ER Status: Positive (+)  AJCC 8 Stage Grouping: IIA  HER2 Status: Negative (-)  AJCC T Category: cT1  AJCC N Category: cN1  OR Status: Positive (+)  Intent of Therapy:  Curative Intent, Discussed with Patient

## 2019-09-16 NOTE — LETTER
September 16, 2019      Elean Lopez MD  [address] [facility] [address]           [facility]  [address]  [facility] [address]  Phone: [phone]          Patient: Maria Elena Tavares   MR Number: [id number]   YOB: 1955   Date of Visit: 9/16/2019       Dear Dr. Elena Lopez:    Thank you for referring Maria Elena Tavares to me for evaluation. Attached you will find relevant portions of my assessment and plan of care.    If you have questions, please do not hesitate to call me. I look forward to following Maria Elena Tavares along with you.    Sincerely,    Tere Villagran MD    Enclosure  CC:  No Recipients    If you would like to receive this communication electronically, please contact [email or web address] or [phone] to request more information on [employer] Link access.    For providers and/or their staff who would like to refer a patient to [facility], please contact us through our one-stop-shop provider referral line, [facility], at [phone].    If you feel you have received this communication in error or would no longer like to receive these types of communications, please e-mail [email or web address]

## 2019-09-17 ENCOUNTER — TELEPHONE (OUTPATIENT)
Dept: HEMATOLOGY/ONCOLOGY | Facility: CLINIC | Age: 64
End: 2019-09-17

## 2019-09-17 ENCOUNTER — PATIENT MESSAGE (OUTPATIENT)
Dept: PRIMARY CARE CLINIC | Facility: CLINIC | Age: 64
End: 2019-09-17

## 2019-09-17 ENCOUNTER — LAB VISIT (OUTPATIENT)
Dept: LAB | Facility: HOSPITAL | Age: 64
End: 2019-09-17
Attending: INTERNAL MEDICINE
Payer: COMMERCIAL

## 2019-09-17 DIAGNOSIS — C77.3 BREAST CANCER METASTASIZED TO AXILLARY LYMPH NODE, LEFT: Primary | ICD-10-CM

## 2019-09-17 DIAGNOSIS — C77.3 BREAST CANCER METASTASIZED TO AXILLARY LYMPH NODE, LEFT: ICD-10-CM

## 2019-09-17 DIAGNOSIS — C50.912 BREAST CANCER METASTASIZED TO AXILLARY LYMPH NODE, LEFT: ICD-10-CM

## 2019-09-17 DIAGNOSIS — C50.912 BREAST CANCER METASTASIZED TO AXILLARY LYMPH NODE, LEFT: Primary | ICD-10-CM

## 2019-09-17 LAB
25(OH)D3+25(OH)D2 SERPL-MCNC: 10 NG/ML (ref 30–96)
ALBUMIN SERPL BCP-MCNC: 4 G/DL (ref 3.5–5.2)
ALP SERPL-CCNC: 95 U/L (ref 55–135)
ALT SERPL W/O P-5'-P-CCNC: 10 U/L (ref 10–44)
ANION GAP SERPL CALC-SCNC: 12 MMOL/L (ref 8–16)
AST SERPL-CCNC: 16 U/L (ref 10–40)
BILIRUB SERPL-MCNC: 0.7 MG/DL (ref 0.1–1)
BUN SERPL-MCNC: 16 MG/DL (ref 8–23)
CALCIUM SERPL-MCNC: 10.1 MG/DL (ref 8.7–10.5)
CHLORIDE SERPL-SCNC: 98 MMOL/L (ref 95–110)
CO2 SERPL-SCNC: 28 MMOL/L (ref 23–29)
CREAT SERPL-MCNC: 1.1 MG/DL (ref 0.5–1.4)
ERYTHROCYTE [DISTWIDTH] IN BLOOD BY AUTOMATED COUNT: 14.7 % (ref 11.5–14.5)
EST. GFR  (AFRICAN AMERICAN): >60 ML/MIN/1.73 M^2
EST. GFR  (NON AFRICAN AMERICAN): 53.6 ML/MIN/1.73 M^2
GLUCOSE SERPL-MCNC: 124 MG/DL (ref 70–110)
HCT VFR BLD AUTO: 41.2 % (ref 37–48.5)
HGB BLD-MCNC: 12.7 G/DL (ref 12–16)
IMM GRANULOCYTES # BLD AUTO: 0.01 K/UL (ref 0–0.04)
MCH RBC QN AUTO: 29 PG (ref 27–31)
MCHC RBC AUTO-ENTMCNC: 30.8 G/DL (ref 32–36)
MCV RBC AUTO: 94 FL (ref 82–98)
NEUTROPHILS # BLD AUTO: 4.6 K/UL (ref 1.8–7.7)
PLATELET # BLD AUTO: 378 K/UL (ref 150–350)
PMV BLD AUTO: 9.4 FL (ref 9.2–12.9)
POTASSIUM SERPL-SCNC: 3.6 MMOL/L (ref 3.5–5.1)
PROT SERPL-MCNC: 9 G/DL (ref 6–8.4)
RBC # BLD AUTO: 4.38 M/UL (ref 4–5.4)
SODIUM SERPL-SCNC: 138 MMOL/L (ref 136–145)
WBC # BLD AUTO: 6.67 K/UL (ref 3.9–12.7)

## 2019-09-17 PROCEDURE — 85027 COMPLETE CBC AUTOMATED: CPT

## 2019-09-17 PROCEDURE — 80053 COMPREHEN METABOLIC PANEL: CPT

## 2019-09-17 PROCEDURE — 82306 VITAMIN D 25 HYDROXY: CPT

## 2019-09-17 PROCEDURE — 36415 COLL VENOUS BLD VENIPUNCTURE: CPT

## 2019-09-17 NOTE — TELEPHONE ENCOUNTER
Called and spoke with patient and assisted with scheduling her echo and PET scan.   apts made for Friday at [facility] for her echo and Saturday here at [facility] for her PET, fasting instructions given

## 2019-09-17 NOTE — TELEPHONE ENCOUNTER
Call patient no answer left message asking pt to call back to assist with scheduling her PET and ECHO

## 2019-09-17 NOTE — TELEPHONE ENCOUNTER
Noted.      ----- Message from Kristal Pradhan MA sent at 9/17/2019  4:39 PM CDT -----  Contact: self/[phone]  Pt states she called to R/S her CT on Saturday, but Pt called back and stated she will keep the appt

## 2019-09-17 NOTE — TELEPHONE ENCOUNTER
----- Message from Odalys Floyd sent at 9/16/2019  4:30 PM CDT -----  Regarding: CHEMO PENDING  Message   Received: Today   Message Contents   MD Odalys Mitchell         1. PET asap   2. ECHO asap   Start chemo next week, see PJ to finalize to consent

## 2019-09-18 ENCOUNTER — TELEPHONE (OUTPATIENT)
Dept: HEMATOLOGY/ONCOLOGY | Facility: CLINIC | Age: 64
End: 2019-09-18

## 2019-09-18 ENCOUNTER — HOSPITAL ENCOUNTER (OUTPATIENT)
Dept: RADIOLOGY | Facility: HOSPITAL | Age: 64
Discharge: HOME | End: 2019-09-18
Attending: SURGERY
Payer: COMMERCIAL

## 2019-09-18 DIAGNOSIS — C50.912 MALIGNANT NEOPLASM OF LEFT FEMALE BREAST, UNSPECIFIED ESTROGEN RECEPTOR STATUS, UNSPECIFIED SITE OF BREAST: ICD-10-CM

## 2019-09-18 PROCEDURE — 77049 MRI BREAST W/WO CONTRAST, W/CAD, BILATERAL: ICD-10-PCS | Mod: 26,,, | Performed by: RADIOLOGY

## 2019-09-18 PROCEDURE — A9577 INJ MULTIHANCE: HCPCS | Performed by: SURGERY

## 2019-09-18 PROCEDURE — 25500020 PHARM REV CODE 255: Performed by: SURGERY

## 2019-09-18 PROCEDURE — 77049 MRI BREAST C-+ W/CAD BI: CPT | Mod: 26,,, | Performed by: RADIOLOGY

## 2019-09-18 PROCEDURE — 77049 MRI BREAST C-+ W/CAD BI: CPT | Mod: TC

## 2019-09-18 RX ADMIN — GADOBENATE DIMEGLUMINE 20 ML: 529 INJECTION, SOLUTION INTRAVENOUS at 03:09

## 2019-09-18 NOTE — TELEPHONE ENCOUNTER
----- Message from Dolores Dinero sent at 9/17/2019  4:19 PM CDT -----  Contact: Pt   Pt called to reschedule  appt 9/21   Callback#[phone]  Thank You  LAUREN Dinero

## 2019-09-19 ENCOUNTER — PATIENT MESSAGE (OUTPATIENT)
Dept: HEMATOLOGY/ONCOLOGY | Facility: CLINIC | Age: 64
End: 2019-09-19

## 2019-09-19 ENCOUNTER — TELEPHONE (OUTPATIENT)
Dept: HEMATOLOGY/ONCOLOGY | Facility: CLINIC | Age: 64
End: 2019-09-19

## 2019-09-19 ENCOUNTER — TELEPHONE (OUTPATIENT)
Dept: RADIOLOGY | Facility: HOSPITAL | Age: 64
End: 2019-09-19

## 2019-09-19 NOTE — TELEPHONE ENCOUNTER
Communicated with pt via [facility].       ----- Message from Tere Villagran MD sent at 9/19/2019  4:02 PM CDT -----  Waiting for PET  We can wait on Vit D  Thanks    ----- Message -----  From: Radha Paige  Sent: 9/19/2019   9:00 AM  To: Tere Villagran MD    Received below message from pt:  [9/19/2019 8:58 AM]  Maria Elena Tavares:     Good Morning,  Did Dr Villagran receive my blood work and MRI test back.  Do you know if she is waiting for all test to come back?      Looked like CBC/CMP were ok- Vit D was low at 10 - want to send in 50,000 Vit D?    And then 9/18 MRI: Recommend second look US for right axillary lymph node.  If a suspicious ultrasound correlate is identified, US guided biopsy is recommended. Patient is also scheduled for a PET/CT and should be referred to for additional characterization of the right axilla. If it would change clinical management, a second look ultrasound could also be performed for the probable satellite lesion in the upper inner left breast. A same day ultrasound guided biopsy could be performed. If no sonographic correlate is identified, an MRI biopsy could be performed.

## 2019-09-20 ENCOUNTER — HOSPITAL ENCOUNTER (OUTPATIENT)
Dept: CARDIOLOGY | Facility: OTHER | Age: 64
Discharge: HOME | End: 2019-09-20
Attending: INTERNAL MEDICINE
Payer: COMMERCIAL

## 2019-09-20 ENCOUNTER — TELEPHONE (OUTPATIENT)
Dept: RADIOLOGY | Facility: HOSPITAL | Age: 64
End: 2019-09-20

## 2019-09-20 DIAGNOSIS — C50.912 BREAST CANCER METASTASIZED TO AXILLARY LYMPH NODE, LEFT: ICD-10-CM

## 2019-09-20 DIAGNOSIS — C77.3 BREAST CANCER METASTASIZED TO AXILLARY LYMPH NODE, LEFT: ICD-10-CM

## 2019-09-20 PROCEDURE — 93306 TTE W/DOPPLER COMPLETE: CPT | Mod: 26,,, | Performed by: INTERNAL MEDICINE

## 2019-09-20 PROCEDURE — 93306 TTE W/DOPPLER COMPLETE: CPT

## 2019-09-20 PROCEDURE — 93306 ECHO (CUPID ONLY): ICD-10-PCS | Mod: 26,,, | Performed by: INTERNAL MEDICINE

## 2019-09-20 NOTE — TELEPHONE ENCOUNTER
Spoke to patient, explained results, and scheduled appointment according to her preferences on 9/24/19 at 1pm. Patient seemed to understand results. Instructed patient to discontinue baby aspirin regimen until after Tuesday. Patient instructed to arrive 15 early.

## 2019-09-21 ENCOUNTER — HOSPITAL ENCOUNTER (OUTPATIENT)
Dept: RADIOLOGY | Facility: HOSPITAL | Age: 64
Discharge: HOME | End: 2019-09-21
Attending: INTERNAL MEDICINE
Payer: COMMERCIAL

## 2019-09-21 DIAGNOSIS — C50.912 BREAST CANCER METASTASIZED TO AXILLARY LYMPH NODE, LEFT: ICD-10-CM

## 2019-09-21 DIAGNOSIS — C77.3 BREAST CANCER METASTASIZED TO AXILLARY LYMPH NODE, LEFT: ICD-10-CM

## 2019-09-21 LAB
AORTIC ROOT ANNULUS: 2.45 CM
AORTIC VALVE CUSP SEPERATION: 1.7 CM
ASCENDING AORTA: 3 CM
AV INDEX (PROSTH): 0.89
AV MEAN GRADIENT: 3 MMHG
AV PEAK GRADIENT: 5 MMHG
AV VALVE AREA: 2.8 CM2
AV VELOCITY RATIO: 0.94
CV ECHO LV RWT: 0.35 CM
DOP CALC AO PEAK VEL: 1.09 M/S
DOP CALC AO VTI: 30.93 CM
DOP CALC LVOT AREA: 3.1 CM2
DOP CALC LVOT DIAMETER: 2 CM
DOP CALC LVOT PEAK VEL: 1.02 M/S
DOP CALC LVOT STROKE VOLUME: 86.7 CM3
DOP CALCLVOT PEAK VEL VTI: 27.61 CM
E WAVE DECELERATION TIME: 182.42 MSEC
E/A RATIO: 0.85
E/E' RATIO: 7.18 M/S
ECHO LV POSTERIOR WALL: 0.93 CM (ref 0.6–1.1)
FRACTIONAL SHORTENING: 32 % (ref 28–44)
INTERVENTRICULAR SEPTUM: 0.89 CM (ref 0.6–1.1)
LA MAJOR: 4.73 CM
LA WIDTH: 3.23 CM
LEFT ATRIUM SIZE: 3.83 CM
LEFT INTERNAL DIMENSION IN SYSTOLE: 3.62 CM (ref 2.1–4)
LEFT VENTRICLE DIASTOLIC VOLUME: 137.21 ML
LEFT VENTRICLE SYSTOLIC VOLUME: 55.17 ML
LEFT VENTRICULAR INTERNAL DIMENSION IN DIASTOLE: 5.33 CM (ref 3.5–6)
LEFT VENTRICULAR MASS: 178.74 G
LV LATERAL E/E' RATIO: 10.17 M/S
LV SEPTAL E/E' RATIO: 5.55 M/S
MV PEAK A VEL: 0.72 M/S
MV PEAK E VEL: 0.61 M/S
PISA TR MAX VEL: 2.58 M/S
POCT GLUCOSE: 119 MG/DL (ref 70–110)
RA MAJOR: 3.75 CM
RA WIDTH: 3.01 CM
SINUS: 2.79 CM
STJ: 2.66 CM
TDI LATERAL: 0.06 M/S
TDI SEPTAL: 0.11 M/S
TDI: 0.09 M/S
TR MAX PG: 27 MMHG

## 2019-09-21 PROCEDURE — 78815 PET IMAGE W/CT SKULL-THIGH: CPT | Mod: 26,PI,, | Performed by: RADIOLOGY

## 2019-09-21 PROCEDURE — A9552 F18 FDG: HCPCS

## 2019-09-21 PROCEDURE — 78815 NM PET CT ROUTINE: ICD-10-PCS | Mod: 26,PI,, | Performed by: RADIOLOGY

## 2019-09-21 PROCEDURE — 78815 PET IMAGE W/CT SKULL-THIGH: CPT | Mod: TC

## 2019-09-24 ENCOUNTER — TELEPHONE (OUTPATIENT)
Dept: HEMATOLOGY/ONCOLOGY | Facility: CLINIC | Age: 64
End: 2019-09-24

## 2019-09-24 ENCOUNTER — HOSPITAL ENCOUNTER (OUTPATIENT)
Dept: RADIOLOGY | Facility: HOSPITAL | Age: 64
Discharge: HOME | End: 2019-09-24
Attending: SURGERY
Payer: COMMERCIAL

## 2019-09-24 DIAGNOSIS — R92.8 ABNORMAL FINDING ON BREAST IMAGING: ICD-10-CM

## 2019-09-24 PROCEDURE — 38505 NEEDLE BIOPSY LYMPH NODES: CPT | Mod: RT,,, | Performed by: RADIOLOGY

## 2019-09-24 PROCEDURE — 76642 US BREAST RIGHT LIMITED: ICD-10-PCS | Mod: 26,RT,, | Performed by: RADIOLOGY

## 2019-09-24 PROCEDURE — 76642 ULTRASOUND BREAST LIMITED: CPT | Mod: 26,RT,, | Performed by: RADIOLOGY

## 2019-09-24 PROCEDURE — 76642 ULTRASOUND BREAST LIMITED: CPT | Mod: TC,PO,RT

## 2019-09-24 PROCEDURE — 27201068 US BIOPSY LYMPH NODE AXILLA: Mod: PO

## 2019-09-24 PROCEDURE — 88342 IMHCHEM/IMCYTCHM 1ST ANTB: CPT | Mod: 26,,, | Performed by: PATHOLOGY

## 2019-09-24 PROCEDURE — 25000003 PHARM REV CODE 250: Mod: PO | Performed by: SURGERY

## 2019-09-24 PROCEDURE — 77065 DX MAMMO INCL CAD UNI: CPT | Mod: 26,RT,, | Performed by: RADIOLOGY

## 2019-09-24 PROCEDURE — 38505 US BIOPSY LYMPH NODE AXILLA: ICD-10-PCS | Mod: RT,,, | Performed by: RADIOLOGY

## 2019-09-24 PROCEDURE — 88342 IMHCHEM/IMCYTCHM 1ST ANTB: CPT | Performed by: PATHOLOGY

## 2019-09-24 PROCEDURE — 88305 TISSUE EXAM BY PATHOLOGIST: CPT | Performed by: PATHOLOGY

## 2019-09-24 PROCEDURE — 88305 TISSUE SPECIMEN TO PATHOLOGY, RADIOLOGY: ICD-10-PCS | Mod: 26,,, | Performed by: PATHOLOGY

## 2019-09-24 PROCEDURE — 77065 MAMMO DIGITAL DIAGNOSTIC RIGHT WITH CAD: ICD-10-PCS | Mod: 26,RT,, | Performed by: RADIOLOGY

## 2019-09-24 PROCEDURE — 88305 TISSUE EXAM BY PATHOLOGIST: CPT | Mod: 26,,, | Performed by: PATHOLOGY

## 2019-09-24 PROCEDURE — 77065 DX MAMMO INCL CAD UNI: CPT | Mod: TC,PO,RT

## 2019-09-24 PROCEDURE — 88342 TISSUE SPECIMEN TO PATHOLOGY, RADIOLOGY: ICD-10-PCS | Mod: 26,,, | Performed by: PATHOLOGY

## 2019-09-24 RX ORDER — LIDOCAINE HYDROCHLORIDE 10 MG/ML
2 INJECTION, SOLUTION EPIDURAL; INFILTRATION; INTRACAUDAL; PERINEURAL ONCE
Status: COMPLETED | OUTPATIENT
Start: 2019-09-24 | End: 2019-09-24

## 2019-09-24 RX ORDER — LIDOCAINE HYDROCHLORIDE AND EPINEPHRINE 20; 10 MG/ML; UG/ML
8 INJECTION, SOLUTION INFILTRATION; PERINEURAL ONCE
Status: COMPLETED | OUTPATIENT
Start: 2019-09-24 | End: 2019-09-24

## 2019-09-24 RX ADMIN — LIDOCAINE HYDROCHLORIDE,EPINEPHRINE BITARTRATE 8 ML: 20; .01 INJECTION, SOLUTION INFILTRATION; PERINEURAL at 01:09

## 2019-09-24 RX ADMIN — LIDOCAINE HYDROCHLORIDE 2 ML: 10 INJECTION, SOLUTION EPIDURAL; INFILTRATION; INTRACAUDAL; PERINEURAL at 01:09

## 2019-09-24 NOTE — TELEPHONE ENCOUNTER
Chemo pending with insurance at this time.       ----- Message from Tere Villagran MD sent at 9/24/2019 12:26 PM CDT -----  Left vm regarding PET  Needs follow up to start chemo  As she has signed consent she can see PJ next week for any questions and start after

## 2019-09-24 NOTE — TELEPHONE ENCOUNTER
Returned call to pt.  Pt unavailable.   Left message for pt to return call.   Callback number provided.       ----- Message from Dolores Dinero sent at 9/24/2019 12:33 PM CDT -----  Contact: Pt   Pt called to speak with nurse greg states that nurse gave her a call have some questions   Callback#[phone]  Thank You  LAUREN Dinero

## 2019-09-25 ENCOUNTER — PATIENT OUTREACH (OUTPATIENT)
Dept: ADMINISTRATIVE | Facility: OTHER | Age: 64
End: 2019-09-25

## 2019-09-25 ENCOUNTER — PATIENT OUTREACH (OUTPATIENT)
Dept: ADMINISTRATIVE | Facility: HOSPITAL | Age: 64
End: 2019-09-25

## 2019-09-25 DIAGNOSIS — E11.9 TYPE 2 DIABETES MELLITUS WITHOUT COMPLICATION, WITHOUT LONG-TERM CURRENT USE OF INSULIN: Primary | ICD-10-CM

## 2019-09-27 ENCOUNTER — TELEPHONE (OUTPATIENT)
Dept: SURGERY | Facility: CLINIC | Age: 64
End: 2019-09-27

## 2019-09-27 ENCOUNTER — OFFICE VISIT (OUTPATIENT)
Dept: OPTOMETRY | Facility: CLINIC | Age: 64
End: 2019-09-27
Payer: COMMERCIAL

## 2019-09-27 DIAGNOSIS — I10 ESSENTIAL HYPERTENSION: ICD-10-CM

## 2019-09-27 DIAGNOSIS — H25.13 NS (NUCLEAR SCLEROSIS), BILATERAL: ICD-10-CM

## 2019-09-27 DIAGNOSIS — H04.123 DRY EYE SYNDROME, BILATERAL: ICD-10-CM

## 2019-09-27 DIAGNOSIS — H52.4 PRESBYOPIA: Primary | ICD-10-CM

## 2019-09-27 DIAGNOSIS — E11.9 TYPE 2 DIABETES MELLITUS WITHOUT OPHTHALMIC MANIFESTATIONS: ICD-10-CM

## 2019-09-27 PROCEDURE — 92014 PR EYE EXAM, EST PATIENT,COMPREHESV: ICD-10-PCS | Mod: S$GLB,,, | Performed by: OPTOMETRIST

## 2019-09-27 PROCEDURE — 99999 PR PBB SHADOW E&M-EST. PATIENT-LVL III: ICD-10-PCS | Mod: PBBFAC,,, | Performed by: OPTOMETRIST

## 2019-09-27 PROCEDURE — 92015 DETERMINE REFRACTIVE STATE: CPT | Mod: S$GLB,,, | Performed by: OPTOMETRIST

## 2019-09-27 PROCEDURE — 99999 PR PBB SHADOW E&M-EST. PATIENT-LVL III: CPT | Mod: PBBFAC,,, | Performed by: OPTOMETRIST

## 2019-09-27 PROCEDURE — 92014 COMPRE OPH EXAM EST PT 1/>: CPT | Mod: S$GLB,,, | Performed by: OPTOMETRIST

## 2019-09-27 PROCEDURE — 92015 PR REFRACTION: ICD-10-PCS | Mod: S$GLB,,, | Performed by: OPTOMETRIST

## 2019-09-27 NOTE — PROGRESS NOTES
HPI     Last eye exam was 6/21/18 with Dr Ruiz.    Pt here for annual  States she has some tearing sometimes  Using Xiidra prn  Patient denies diplopia, headaches, flashes/floaters, pain, and   itching/burning/tearing.    Pt wears her glasses for reading and computer but forgot to bring them   today      Last edited by Lorraine Chavez on 9/27/2019 10:09 AM. (History)            Assessment /Plan     For exam results, see Encounter Report.        Presbyopia              Rx specs     Type 2 diabetes mellitus without ophthalmic manifestations  Essential hypertension              No retinopathy, monitor yearly     NS (nuclear sclerosis), bilateral     Dry eye syndrome, bilateral  Start     lifitegrast (XIIDRA) 5 % Dpet; Apply 1 drop to  both eyes  2 (two) times daily.  Dispense: 60 each; Refill: 12              Continue with art tears PRN

## 2019-09-27 NOTE — TELEPHONE ENCOUNTER
Called pt with benign lymph node biopsy from 9/24/2019. Pt verbalized understanding. Message sent to Dr. Lopez.    ----- Message from Divya Mera MA sent at 9/27/2019 10:42 AM CDT -----  Contact: Pt.Self       ----- Message -----  From: Mei Fry  Sent: 9/27/2019  10:37 AM CDT  To: John FUENTES Staff    Patient Returning Call from [facility]     Who Left Message for Patient:   Ms.Corinne E Coniglio, RN    Communication Preference:  [phone] or [phone]    Additional Information:       Thank You

## 2019-09-30 ENCOUNTER — TELEPHONE (OUTPATIENT)
Dept: HEMATOLOGY/ONCOLOGY | Facility: CLINIC | Age: 64
End: 2019-09-30

## 2019-09-30 ENCOUNTER — PATIENT OUTREACH (OUTPATIENT)
Dept: OTHER | Facility: OTHER | Age: 64
End: 2019-09-30

## 2019-09-30 ENCOUNTER — PATIENT MESSAGE (OUTPATIENT)
Dept: SURGERY | Facility: CLINIC | Age: 64
End: 2019-09-30

## 2019-09-30 NOTE — TELEPHONE ENCOUNTER
----- Message from Dolores Dinero sent at 9/30/2019  3:42 PM CDT -----  Contact: pt   Type:  Patient Returning Call    Who Called:pt   Who Left Message for Patient:maurice vasques   Does the patient know what this is regarding?appt   Would the patient rather a call back or a response via [facility]? Callback   Best Call Back Number:[phone]  Additional Information:   Thank You  LAUREN Dinero

## 2019-09-30 NOTE — TELEPHONE ENCOUNTER
----- Message from Radha Paige sent at 9/30/2019  3:08 PM CDT -----  No labs- needs to see PJ to finalize consent and if chemo has availability this week let's start!  Thanks    ----- Message -----  From: Odalys Floyd  Sent: 9/30/2019   3:06 PM CDT  To: Sparkle CHACON Staff    Chemo is approved. Patient should see pj to sign consent and start this week ?  Does she need labs ?    Message Contents   MD Odalys Mitchell     1. PET asap   2. ECHO asap   Start chemo next week, see PJ to finalize to consent

## 2019-09-30 NOTE — PROGRESS NOTES
Digital Medicine: Clinician Follow-Up    The history is provided by the patient.     Follow Up  Follow-up reason(s): reading review        HPI:  Called patient to follow up. Patient reports adherence to medication regimen daily and denies missed doses. Patient denies hypotensive s/sx (lightheadedness, dizziness, nausea, fatigue); patient denies hypertensive s/sx (SOB, CP, severe headaches, changes in vision, dizziness, fatigue, confusion, anxiety, nosebleeds).    Patient reports she was recently diagnosed with breast cancer.    Last 5 Patient Entered Readings                                      Current 30 Day Average: 130/79     Recent Readings 9/22/2019 9/22/2019 9/2/2019 8/20/2019 8/20/2019    SBP (mmHg) 128 119 132 152 157    DBP (mmHg) 81 74 81 96 99    Pulse 102 87 92 84 87         Assessment:  Reviewed recent readings. Per 2017 ACC/ AHA HTN guidelines (goal of BP < 130/80), current 30-day average is controlled; however, patient has only taken BP on 2 days within the past month.    Plan:  Continue current medication regimen.   Informed patient that some breast cancer treatments can affect BP.   Instructed patient to increase frequency of monitoring to 1-2x per week.   Encouraged patient to charge digital cuff at least monthly.  Patients health , Jazmyn Franco, will be following up as scheduled.   I will continue to monitor regularly and will follow-up in 6 weeks, sooner if blood pressure begins to trend upward or downward.     Current medication regimen:  Hypertension Medications             chlorthalidone (HYGROTEN) 25 MG Tab Take 1 tablet (25 mg total) by mouth every morning. Stop losartan hctz 100/25.    irbesartan (AVAPRO) 300 MG tablet Take 1 tablet (300 mg total) by mouth once daily. Stop valsartan 320.         Patient denies having questions or concerns. Patient has my contact information and knows to call with any concerns or clinical changes.

## 2019-10-01 DIAGNOSIS — C50.912 MALIGNANT NEOPLASM OF LEFT FEMALE BREAST, UNSPECIFIED ESTROGEN RECEPTOR STATUS, UNSPECIFIED SITE OF BREAST: Primary | ICD-10-CM

## 2019-10-02 ENCOUNTER — OFFICE VISIT (OUTPATIENT)
Dept: HEMATOLOGY/ONCOLOGY | Facility: CLINIC | Age: 64
End: 2019-10-02
Payer: COMMERCIAL

## 2019-10-02 VITALS
RESPIRATION RATE: 18 BRPM | SYSTOLIC BLOOD PRESSURE: 120 MMHG | BODY MASS INDEX: 39.9 KG/M2 | WEIGHT: 203.25 LBS | TEMPERATURE: 98 F | OXYGEN SATURATION: 100 % | DIASTOLIC BLOOD PRESSURE: 84 MMHG | HEIGHT: 60 IN | HEART RATE: 95 BPM

## 2019-10-02 DIAGNOSIS — C50.912 BREAST CANCER METASTASIZED TO AXILLARY LYMPH NODE, LEFT: Primary | ICD-10-CM

## 2019-10-02 DIAGNOSIS — C77.3 BREAST CANCER METASTASIZED TO AXILLARY LYMPH NODE, LEFT: Primary | ICD-10-CM

## 2019-10-02 DIAGNOSIS — I10 ESSENTIAL HYPERTENSION: ICD-10-CM

## 2019-10-02 DIAGNOSIS — T45.1X5A CHEMOTHERAPY-INDUCED NAUSEA: ICD-10-CM

## 2019-10-02 DIAGNOSIS — R11.0 CHEMOTHERAPY-INDUCED NAUSEA: ICD-10-CM

## 2019-10-02 PROCEDURE — 3074F PR MOST RECENT SYSTOLIC BLOOD PRESSURE < 130 MM HG: ICD-10-PCS | Mod: CPTII,S$GLB,, | Performed by: NURSE PRACTITIONER

## 2019-10-02 PROCEDURE — 3079F DIAST BP 80-89 MM HG: CPT | Mod: CPTII,S$GLB,, | Performed by: NURSE PRACTITIONER

## 2019-10-02 PROCEDURE — 3045F PR MOST RECENT HEMOGLOBIN A1C LEVEL 7.0-9.0%: CPT | Mod: CPTII,S$GLB,, | Performed by: NURSE PRACTITIONER

## 2019-10-02 PROCEDURE — 3079F PR MOST RECENT DIASTOLIC BLOOD PRESSURE 80-89 MM HG: ICD-10-PCS | Mod: CPTII,S$GLB,, | Performed by: NURSE PRACTITIONER

## 2019-10-02 PROCEDURE — 3045F PR MOST RECENT HEMOGLOBIN A1C LEVEL 7.0-9.0%: ICD-10-PCS | Mod: CPTII,S$GLB,, | Performed by: NURSE PRACTITIONER

## 2019-10-02 PROCEDURE — 3008F BODY MASS INDEX DOCD: CPT | Mod: CPTII,S$GLB,, | Performed by: NURSE PRACTITIONER

## 2019-10-02 PROCEDURE — 99999 PR PBB SHADOW E&M-EST. PATIENT-LVL III: ICD-10-PCS | Mod: PBBFAC,,, | Performed by: NURSE PRACTITIONER

## 2019-10-02 PROCEDURE — 99215 OFFICE O/P EST HI 40 MIN: CPT | Mod: S$GLB,,, | Performed by: NURSE PRACTITIONER

## 2019-10-02 PROCEDURE — 99999 PR PBB SHADOW E&M-EST. PATIENT-LVL III: CPT | Mod: PBBFAC,,, | Performed by: NURSE PRACTITIONER

## 2019-10-02 PROCEDURE — 3008F PR BODY MASS INDEX (BMI) DOCUMENTED: ICD-10-PCS | Mod: CPTII,S$GLB,, | Performed by: NURSE PRACTITIONER

## 2019-10-02 PROCEDURE — 99215 PR OFFICE/OUTPT VISIT, EST, LEVL V, 40-54 MIN: ICD-10-PCS | Mod: S$GLB,,, | Performed by: NURSE PRACTITIONER

## 2019-10-02 PROCEDURE — 3074F SYST BP LT 130 MM HG: CPT | Mod: CPTII,S$GLB,, | Performed by: NURSE PRACTITIONER

## 2019-10-02 RX ORDER — ONDANSETRON 8 MG/1
8 TABLET, ORALLY DISINTEGRATING ORAL EVERY 8 HOURS PRN
Qty: 30 TABLET | Refills: 1 | Status: SHIPPED | OUTPATIENT
Start: 2019-10-02 | End: 2020-03-11

## 2019-10-02 RX ORDER — DEXAMETHASONE 4 MG/1
TABLET ORAL
Qty: 24 TABLET | Refills: 0 | Status: SHIPPED | OUTPATIENT
Start: 2019-10-02 | End: 2019-11-04 | Stop reason: SDUPTHER

## 2019-10-02 RX ORDER — PROMETHAZINE HYDROCHLORIDE 25 MG/1
25 TABLET ORAL EVERY 6 HOURS PRN
Qty: 30 TABLET | Refills: 1 | Status: SHIPPED | OUTPATIENT
Start: 2019-10-02 | End: 2019-11-01

## 2019-10-02 NOTE — PROGRESS NOTES
Pharmacist Patient Education Note    Patient and her daughter were counseled and given information regarding the chemotherapy regimen: DD AC     Chemotherapy regimen was explained in detail.  Medication specific and supportive care hand outs were provided to the patient.  The chemotherapy medications and how they will be administered, relevant labs, and take home medications were reviewed.  Prescriptions for the take home medications will be sent to the patients preferred pharmacy: ondansetron, dexamethasone, promethazine      The following side effects were discussed:  Doxorubicin  -red urine/tears  -cardiotoxicity  -allergic reactions  -nausea and vomiting  -hair loss  -change in color of nails  -soreness of the mouth and throat  -blurred vision  -bone marrow suppression     Cyclophosphamide  -bone marrow depression  -nausea and vomiting  -diarrhea  -hair loss  -swelling  -changes in kidney and liver function  -electrolyte changes  -secondary cancers    Pegfilgrastim  -bone pain      The patient's home medications were reviewed for drug interactions.  The following drug interactions were found: none       The patient concerns of increased blood sugar, constipation, and nausea/vomiting were addressed and all current questions were answered.  Pharmacy will be available throughout the patient's treatment for further questions.     Lorraine Hayes, PharmD, BCOP  Oncology Clinical Specialist  [facility]  Ext. [phone]

## 2019-10-02 NOTE — Clinical Note
Cancel chemo today. Reschedule C1 AC for Monday 10/7/19 with neulasta day 2. RTC 10/21/19  to see Dr. Villagran with cbc, cmp and for C2 AC

## 2019-10-02 NOTE — PROGRESS NOTES
Subjective:       Patient ID: Maria Elena Tavares is a 63 y.o. female.    Chief Complaint: Breast Cancer    HPI     Presents for follow up and to sign consent.     In there interval,   ECHO 9/20/19: EF 64%    PET 9/21/19:  Impression       Hypermetabolic left breast mass and regional left axillary lymphadenopathy consistent with reported breast cancer and corresponding with recent MRI findings.    Upper limit of normal sized right axillary lymph nodes with normal fatty duane and mildly increased radiotracer uptake.  Findings could represent reactive nodes with metastatic nodes thought less likely.  Lymphscintigraphy with injection in the left breast may considered to assess for drainage to the contralateral axilla.     BX 9/24/19: Right axilla node biopsy is benign      Port scheduled for 10/4/19. She wants to start chemo post.   Feels ok today.   No complaints.     Oncology History:  - self detected, noted a shooting pain in breast first and then a week later felt a lump  Some delay in getting appointment as she was unaware she had to call  - 8/30/19 Diagnostic mammogram and ultrasound:  Left breast 20 mm x 18 mm x 17 mm mass at the 3 o'clock position. Assessment: 4 - Suspicious finding. Biopsy is recommended.   Lymph Node: Left axilla 16 mm x 14 mm x 13 mm lymph node. Assessment: 4 - Suspicious finding. Biopsy is recommended.   Right- There is no mammographic or sonographic evidence of malignancy.  BI-RADS Category:   Overall: 4 - Suspicious  - 9/5/19 Ultrasound guided biopsy  Pathology:  FINAL PATHOLOGIC DIAGNOSIS  1. LEFT BREAST MASS, BIOPSY:  - Invasive ductal carcinoma, grade 3, longest linear length is 10 mm measured on the slide.  - Histologic Grade ([place] Histologic Score)  Glandular (Acinar/Tubular Differentiation): 3.  Nuclear Pleomorphism: 2.  Mitotic Rate: 3.  Overall Grade: Grade 3 (score 8).  - Microcalcifications: Not identified.  - Lymphovascular invasion: Not identified.  2. LYMPH NODE, LEFT AXILLA,  BIOPSY:  - Positive for metastatic carcinoma.  - The metastatic deposit measures 6 mm in longest continuous linear length.  Estrogen receptor: Positive, 90% of the tumor nuclei staining moderate to strongly.  Progesterone receptor: Positive, 30% of the tumor nuclei staining weak to moderately.  HER2: Negative.  Ki-67: 60%.  - MRI pending (Wednesday)    Gyn Hx:  Menarche- 11  Menopause - partial hysterectomy at age 34 yo  Remainder done in   No HRT  Prior ocps  , age 28 at 1st pregnancy  No breastfeeding    Active Ambulatory Problems     Diagnosis Date Noted    Uncontrolled type 2 diabetes mellitus without complication, without long-term current use of insulin     Hypertension     History of shingles     Mild vitamin D deficiency     Diverticulosis     Hypothyroid 10/02/2012    Diverticulitis of colon (without mention of hemorrhage)(562.11) 2015    Right groin pain 2016    Breast cancer metastasized to axillary lymph node, left 2019     Resolved Ambulatory Problems     Diagnosis Date Noted    No Resolved Ambulatory Problems     Past Medical History:   Diagnosis Date    BMI 37.0-37.9, adult     Diabetes mellitus type II     Elevated blood protein     Hyperlipidemia     Microalbuminuria     Thyroid disease      SH:  Works at [employer]  Single  Good support  No tobacco, grew up with 2nd hand exposure  Rare social EtOH    FH:  Mom-  at age 66, lung cancer (smoker)  Dad-  at age 42, lung cancer (smoker, )  3 sisters - 2 with HTN  2 paternal aunts with breast cancer- older at diagnosis  Maternal aunt- colon cancer at an older age    Review of Systems   Constitutional: Positive for fatigue. Negative for activity change, appetite change and fever.   HENT: Negative for mouth sores, nosebleeds and sore throat.    Eyes: Negative for visual disturbance.   Respiratory: Negative for cough and shortness of breath.    Cardiovascular: Negative for chest pain, palpitations and leg  swelling.   Gastrointestinal: Negative for abdominal pain, constipation, diarrhea, nausea and vomiting.   Genitourinary: Negative for difficulty urinating and frequency.   Musculoskeletal: Positive for arthralgias (knee). Negative for back pain.   Skin: Negative for rash.   Neurological: Negative for dizziness, numbness and headaches.   Hematological: Negative for adenopathy. Does not bruise/bleed easily.   Psychiatric/Behavioral: Negative for confusion. The patient is not nervous/anxious.    All other systems reviewed and are negative.      Objective:      Physical Exam   Constitutional: She is oriented to person, place, and time. She appears well-developed and well-nourished. No distress.   Presents with her daughter  ECOG=0   HENT:   Head: Normocephalic and atraumatic.   Right Ear: External ear normal.   Left Ear: External ear normal.   Mouth/Throat: Oropharynx is clear and moist. No oropharyngeal exudate.   Eyes: Pupils are equal, round, and reactive to light. Conjunctivae and EOM are normal. No scleral icterus. Right pupil is round and reactive. Left pupil is round and reactive.   Neck: Normal range of motion. Neck supple. No tracheal deviation present. No thyromegaly present.   Cardiovascular: Normal rate, regular rhythm and normal heart sounds. Exam reveals no gallop and no friction rub.   No murmur heard.  Pulmonary/Chest: Effort normal and breath sounds normal. No respiratory distress. She has no wheezes. She has no rales.   Left breast mass approx 2 cm in largest measurement.   No palpable LAD.    Abdominal: Soft. Bowel sounds are normal. She exhibits no distension and no mass. There is no hepatosplenomegaly. There is no tenderness. There is no rebound and no guarding.   No hepatosplenomegaly   Musculoskeletal: Normal range of motion. She exhibits no edema, tenderness or deformity.   Lymphadenopathy:        Head (right side): No submandibular adenopathy present.        Head (left side): No submandibular  adenopathy present.     She has no cervical adenopathy.        Right cervical: No superficial cervical, no deep cervical and no posterior cervical adenopathy present.       Left cervical: No superficial cervical, no deep cervical and no posterior cervical adenopathy present.        Right: No inguinal and no supraclavicular adenopathy present.        Left: No inguinal and no supraclavicular adenopathy present.   Neurological: She is alert and oriented to person, place, and time. She has normal strength and normal reflexes. No sensory deficit. Coordination normal.   Skin: Skin is warm and dry. No bruising, no lesion, no petechiae and no rash noted. She is not diaphoretic. No erythema. No pallor.   Psychiatric: She has a normal mood and affect. Her behavior is normal. Judgment and thought content normal. Her mood appears not anxious. She does not exhibit a depressed mood.   Nursing note and vitals reviewed.   Most recent Labs- reviewed       Assessment:       1. Breast cancer metastasized to axillary lymph node, left    2. Chemotherapy-induced nausea    3. Uncontrolled type 2 diabetes mellitus without complication, without long-term current use of insulin    4. Essential hypertension        Plan:       Doing well. No change in size of left breast mass since last visit.   Chemo discussed: AC q 2 weeks with neulasta support x4 cycles then Taxol weekly.   Port scheduled for Friday.   Chemo Monday 10/7/19. RTC 2 weeks post C1 to see Dr. Villagran with labs and for C2  LAUREN BACON present to discuss AE's of chemo.     Patient was consented for chemotherapy today 10/3/2019 .   An extensive discussion was had which included a thorough discussion of the risk and benefits of treatment and alternatives.  Risks, including but not limited to, possible hair loss, bone marrow damage (anemia, thrombocytopenia, immune suppression, neutropenia), damage to body organs (brain, heart, liver, kidney, lungs, nervous system, skin, and  others), allergic reactions, sterility, nausea/vomiting, constipation/diarrhea, sores in the mouth, secondary cancers, local damage at possible injection sites, and rarely death were all discussed.  The patient agrees with the plan, and all questions have been answered to their satisfaction.  Consent was signed the patient, provider, and a third party witness.      Patient was instructed:  -Call ASAP regardless of time for Fever > 100.4 lasting longer than 1 hour.  -For nausea/vomiting: Take zofran ODT immediately when nausea starts. If you start vomiting, take phenergan. Phenergan may cause drowsiness.   -For Diarrhea: take Imodium 2 tablets after first loose stool, followed by 1 tablet after each loose stool, up to 8 tablets/day  -For Constipation: OTC Miralax prn.     rx's zofran and phenergan  Dexamethasone RX   She understands to monitor blood sugars and report to Dr. San.   Continue current medical therapy.       Patient is in agreement with the proposed treatment plan. All questions were answered to the patient's satisfaction. Pt knows to call clinic for any new or worsening symptoms and if anything is needed before the next clinic visit.      José Lara, SHARONP-C  Hematology & Oncology  [facility] [place] [address]  ph. [phone]  Fax. [phone]     I spent 40 minutes (face to face) with the patient, more than 50% was in counseling and coordination of care as detailed above.

## 2019-10-03 ENCOUNTER — TELEPHONE (OUTPATIENT)
Dept: SURGERY | Facility: CLINIC | Age: 64
End: 2019-10-03

## 2019-10-03 ENCOUNTER — PATIENT MESSAGE (OUTPATIENT)
Dept: PRIMARY CARE CLINIC | Facility: CLINIC | Age: 64
End: 2019-10-03

## 2019-10-03 ENCOUNTER — PATIENT MESSAGE (OUTPATIENT)
Dept: HEMATOLOGY/ONCOLOGY | Facility: CLINIC | Age: 64
End: 2019-10-03

## 2019-10-03 ENCOUNTER — ANESTHESIA EVENT (OUTPATIENT)
Dept: SURGERY | Facility: HOSPITAL | Age: 64
End: 2019-10-03
Payer: COMMERCIAL

## 2019-10-03 NOTE — PRE-PROCEDURE INSTRUCTIONS
PREOP INSTRUCTIONS:No solid food ,milk or milk products for 8 hours prior to procedure.Clear liquids are allowed up to 2 hours before arrival time.Clear liquids are:water,apple juice,pedialyte,gatorade,& jello.Shower instructions as well as directions to the 2nd [facility] were given.Patient encouraged to wear loose fitting,comfortable clothing..Medication instructions for pm prior to and am of procedure reviewed.Instructed patient to avoid taking vitamins,supplements,aspirin and ibuprofen the morning of surgery.Patient stated an understanding.    Patient denies any side effects or issues with anesthesia or sedation.    Patient does not know arrival time.Explained that this information comes from the surgeon's office and if they haven't heard from them by 2 or 3 pm to call the office.Patient stated an understanding.

## 2019-10-03 NOTE — TELEPHONE ENCOUNTER
Spoke with pt regarding surgery, pt advised to arrive to [facility] at 0530 for 0700 surgery, pt verbalized understanding, pre-op education reinforced, all questions answered at this time, pt given reassurance

## 2019-10-04 ENCOUNTER — TELEPHONE (OUTPATIENT)
Dept: HEMATOLOGY/ONCOLOGY | Facility: CLINIC | Age: 64
End: 2019-10-04

## 2019-10-04 ENCOUNTER — HOSPITAL ENCOUNTER (OUTPATIENT)
Facility: HOSPITAL | Age: 64
Discharge: HOME | End: 2019-10-04
Attending: SURGERY | Admitting: SURGERY
Payer: COMMERCIAL

## 2019-10-04 ENCOUNTER — ANESTHESIA (OUTPATIENT)
Dept: SURGERY | Facility: HOSPITAL | Age: 64
End: 2019-10-04
Payer: COMMERCIAL

## 2019-10-04 VITALS
OXYGEN SATURATION: 99 % | BODY MASS INDEX: 39.66 KG/M2 | SYSTOLIC BLOOD PRESSURE: 132 MMHG | HEART RATE: 81 BPM | DIASTOLIC BLOOD PRESSURE: 74 MMHG | HEIGHT: 60 IN | WEIGHT: 202 LBS | RESPIRATION RATE: 20 BRPM | TEMPERATURE: 97 F

## 2019-10-04 DIAGNOSIS — C77.3 BREAST CANCER METASTASIZED TO AXILLARY LYMPH NODE, LEFT: Primary | ICD-10-CM

## 2019-10-04 DIAGNOSIS — N63.20 LEFT BREAST MASS: ICD-10-CM

## 2019-10-04 DIAGNOSIS — C50.912 MALIGNANT NEOPLASM OF LEFT FEMALE BREAST, UNSPECIFIED ESTROGEN RECEPTOR STATUS, UNSPECIFIED SITE OF BREAST: Primary | ICD-10-CM

## 2019-10-04 DIAGNOSIS — C50.912 BREAST CANCER METASTASIZED TO AXILLARY LYMPH NODE, LEFT: Primary | ICD-10-CM

## 2019-10-04 DIAGNOSIS — C50.919 BREAST CANCER: ICD-10-CM

## 2019-10-04 LAB
POCT GLUCOSE: 106 MG/DL (ref 70–110)
POCT GLUCOSE: 121 MG/DL (ref 70–110)

## 2019-10-04 PROCEDURE — 63600175 PHARM REV CODE 636 W HCPCS: Performed by: NURSE ANESTHETIST, CERTIFIED REGISTERED

## 2019-10-04 PROCEDURE — 71000015 HC POSTOP RECOV 1ST HR: Performed by: SURGERY

## 2019-10-04 PROCEDURE — 36000706: Performed by: SURGERY

## 2019-10-04 PROCEDURE — C1788 PORT, INDWELLING, IMP: HCPCS | Performed by: SURGERY

## 2019-10-04 PROCEDURE — 25000003 PHARM REV CODE 250: Performed by: SURGERY

## 2019-10-04 PROCEDURE — 37000008 HC ANESTHESIA 1ST 15 MINUTES: Performed by: SURGERY

## 2019-10-04 PROCEDURE — D9220A PRA ANESTHESIA: ICD-10-PCS | Mod: CRNA,,, | Performed by: NURSE ANESTHETIST, CERTIFIED REGISTERED

## 2019-10-04 PROCEDURE — 77001 CHG FLUOROGUIDE CNTRL VEN ACCESS,PLACE,REPLACE,REMOVE: ICD-10-PCS | Mod: 26,,, | Performed by: SURGERY

## 2019-10-04 PROCEDURE — 36561 INSERT TUNNELED CV CATH: CPT | Mod: RT,,, | Performed by: SURGERY

## 2019-10-04 PROCEDURE — 36561 PR INSERT TUNNELED CV CATH WITH PORT: ICD-10-PCS | Mod: RT,,, | Performed by: SURGERY

## 2019-10-04 PROCEDURE — D9220A PRA ANESTHESIA: Mod: ANES,,, | Performed by: ANESTHESIOLOGY

## 2019-10-04 PROCEDURE — D9220A PRA ANESTHESIA: Mod: CRNA,,, | Performed by: NURSE ANESTHETIST, CERTIFIED REGISTERED

## 2019-10-04 PROCEDURE — 37000009 HC ANESTHESIA EA ADD 15 MINS: Performed by: SURGERY

## 2019-10-04 PROCEDURE — 63600175 PHARM REV CODE 636 W HCPCS: Performed by: SURGERY

## 2019-10-04 PROCEDURE — 25000003 PHARM REV CODE 250: Performed by: NURSE ANESTHETIST, CERTIFIED REGISTERED

## 2019-10-04 PROCEDURE — 71000016 HC POSTOP RECOV ADDL HR: Performed by: SURGERY

## 2019-10-04 PROCEDURE — 82962 GLUCOSE BLOOD TEST: CPT | Performed by: SURGERY

## 2019-10-04 PROCEDURE — 36000707: Performed by: SURGERY

## 2019-10-04 PROCEDURE — D9220A PRA ANESTHESIA: ICD-10-PCS | Mod: ANES,,, | Performed by: ANESTHESIOLOGY

## 2019-10-04 PROCEDURE — 77001 FLUOROGUIDE FOR VEIN DEVICE: CPT | Mod: 26,,, | Performed by: SURGERY

## 2019-10-04 PROCEDURE — 71000044 HC DOSC ROUTINE RECOVERY FIRST HOUR: Performed by: SURGERY

## 2019-10-04 DEVICE — KIT POWERPORT SINGLE 8FR: Type: IMPLANTABLE DEVICE | Site: CHEST | Status: FUNCTIONAL

## 2019-10-04 RX ORDER — DEXAMETHASONE SODIUM PHOSPHATE 4 MG/ML
INJECTION, SOLUTION INTRA-ARTICULAR; INTRALESIONAL; INTRAMUSCULAR; INTRAVENOUS; SOFT TISSUE
Status: DISCONTINUED | OUTPATIENT
Start: 2019-10-04 | End: 2019-10-04

## 2019-10-04 RX ORDER — SODIUM CHLORIDE 0.9 % (FLUSH) 0.9 %
10 SYRINGE (ML) INJECTION
Status: DISCONTINUED | OUTPATIENT
Start: 2019-10-04 | End: 2019-10-04 | Stop reason: HOSPADM

## 2019-10-04 RX ORDER — FENTANYL CITRATE 50 UG/ML
25 INJECTION, SOLUTION INTRAMUSCULAR; INTRAVENOUS EVERY 5 MIN PRN
Status: DISCONTINUED | OUTPATIENT
Start: 2019-10-04 | End: 2019-10-04 | Stop reason: HOSPADM

## 2019-10-04 RX ORDER — CEFAZOLIN SODIUM 1 G/3ML
2 INJECTION, POWDER, FOR SOLUTION INTRAMUSCULAR; INTRAVENOUS
Status: COMPLETED | OUTPATIENT
Start: 2019-10-04 | End: 2019-10-04

## 2019-10-04 RX ORDER — HYDROCODONE BITARTRATE AND ACETAMINOPHEN 5; 325 MG/1; MG/1
1 TABLET ORAL EVERY 6 HOURS PRN
Qty: 5 TABLET | Refills: 0 | Status: SHIPPED | OUTPATIENT
Start: 2019-10-04 | End: 2019-11-21

## 2019-10-04 RX ORDER — LIDOCAINE HYDROCHLORIDE 10 MG/ML
INJECTION, SOLUTION EPIDURAL; INFILTRATION; INTRACAUDAL; PERINEURAL
Status: DISCONTINUED | OUTPATIENT
Start: 2019-10-04 | End: 2019-10-04 | Stop reason: HOSPADM

## 2019-10-04 RX ORDER — LIDOCAINE HYDROCHLORIDE 10 MG/ML
1 INJECTION, SOLUTION EPIDURAL; INFILTRATION; INTRACAUDAL; PERINEURAL ONCE
Status: DISCONTINUED | OUTPATIENT
Start: 2019-10-04 | End: 2019-10-04 | Stop reason: HOSPADM

## 2019-10-04 RX ORDER — MIDAZOLAM HYDROCHLORIDE 1 MG/ML
INJECTION, SOLUTION INTRAMUSCULAR; INTRAVENOUS
Status: DISCONTINUED | OUTPATIENT
Start: 2019-10-04 | End: 2019-10-04

## 2019-10-04 RX ORDER — PROPOFOL 10 MG/ML
VIAL (ML) INTRAVENOUS
Status: DISCONTINUED | OUTPATIENT
Start: 2019-10-04 | End: 2019-10-04

## 2019-10-04 RX ORDER — HYDROCODONE BITARTRATE AND ACETAMINOPHEN 5; 325 MG/1; MG/1
1 TABLET ORAL EVERY 4 HOURS PRN
Status: DISCONTINUED | OUTPATIENT
Start: 2019-10-04 | End: 2019-10-04 | Stop reason: HOSPADM

## 2019-10-04 RX ORDER — PROPOFOL 10 MG/ML
VIAL (ML) INTRAVENOUS CONTINUOUS PRN
Status: DISCONTINUED | OUTPATIENT
Start: 2019-10-04 | End: 2019-10-04

## 2019-10-04 RX ORDER — SODIUM CHLORIDE 9 MG/ML
INJECTION, SOLUTION INTRAVENOUS CONTINUOUS
Status: DISCONTINUED | OUTPATIENT
Start: 2019-10-04 | End: 2019-10-04 | Stop reason: HOSPADM

## 2019-10-04 RX ORDER — ONDANSETRON 2 MG/ML
INJECTION INTRAMUSCULAR; INTRAVENOUS
Status: DISCONTINUED | OUTPATIENT
Start: 2019-10-04 | End: 2019-10-04

## 2019-10-04 RX ORDER — HEPARIN SODIUM (PORCINE) LOCK FLUSH IV SOLN 100 UNIT/ML 100 UNIT/ML
SOLUTION INTRAVENOUS
Status: DISCONTINUED | OUTPATIENT
Start: 2019-10-04 | End: 2019-10-04 | Stop reason: HOSPADM

## 2019-10-04 RX ORDER — LIDOCAINE HCL/PF 100 MG/5ML
SYRINGE (ML) INTRAVENOUS
Status: DISCONTINUED | OUTPATIENT
Start: 2019-10-04 | End: 2019-10-04

## 2019-10-04 RX ORDER — FENTANYL CITRATE 50 UG/ML
INJECTION, SOLUTION INTRAMUSCULAR; INTRAVENOUS
Status: DISCONTINUED | OUTPATIENT
Start: 2019-10-04 | End: 2019-10-04

## 2019-10-04 RX ORDER — KETAMINE HYDROCHLORIDE 100 MG/ML
INJECTION, SOLUTION INTRAMUSCULAR; INTRAVENOUS
Status: DISCONTINUED | OUTPATIENT
Start: 2019-10-04 | End: 2019-10-04

## 2019-10-04 RX ORDER — GLYCOPYRROLATE 0.2 MG/ML
INJECTION INTRAMUSCULAR; INTRAVENOUS
Status: DISCONTINUED | OUTPATIENT
Start: 2019-10-04 | End: 2019-10-04

## 2019-10-04 RX ORDER — PHENYLEPHRINE HYDROCHLORIDE 10 MG/ML
INJECTION INTRAVENOUS
Status: DISCONTINUED | OUTPATIENT
Start: 2019-10-04 | End: 2019-10-04

## 2019-10-04 RX ORDER — ONDANSETRON 8 MG/1
8 TABLET, ORALLY DISINTEGRATING ORAL EVERY 8 HOURS PRN
Status: DISCONTINUED | OUTPATIENT
Start: 2019-10-04 | End: 2019-10-04 | Stop reason: HOSPADM

## 2019-10-04 RX ADMIN — ONDANSETRON 4 MG: 2 INJECTION INTRAMUSCULAR; INTRAVENOUS at 08:10

## 2019-10-04 RX ADMIN — FENTANYL CITRATE 50 MCG: 50 INJECTION, SOLUTION INTRAMUSCULAR; INTRAVENOUS at 07:10

## 2019-10-04 RX ADMIN — MIDAZOLAM HYDROCHLORIDE 2 MG: 1 INJECTION, SOLUTION INTRAMUSCULAR; INTRAVENOUS at 06:10

## 2019-10-04 RX ADMIN — DEXAMETHASONE SODIUM PHOSPHATE 8 MG: 4 INJECTION, SOLUTION INTRAMUSCULAR; INTRAVENOUS at 07:10

## 2019-10-04 RX ADMIN — PHENYLEPHRINE HYDROCHLORIDE 200 MCG: 10 INJECTION INTRAVENOUS at 07:10

## 2019-10-04 RX ADMIN — SODIUM CHLORIDE: 0.9 INJECTION, SOLUTION INTRAVENOUS at 07:10

## 2019-10-04 RX ADMIN — PHENYLEPHRINE HYDROCHLORIDE 300 MCG: 10 INJECTION INTRAVENOUS at 07:10

## 2019-10-04 RX ADMIN — HYDROCODONE BITARTRATE AND ACETAMINOPHEN 1 TABLET: 5; 325 TABLET ORAL at 09:10

## 2019-10-04 RX ADMIN — PROPOFOL 50 MG: 10 INJECTION, EMULSION INTRAVENOUS at 07:10

## 2019-10-04 RX ADMIN — LIDOCAINE HYDROCHLORIDE 40 MG: 20 INJECTION, SOLUTION INTRAVENOUS at 07:10

## 2019-10-04 RX ADMIN — GLYCOPYRROLATE 0.1 MG: 0.2 INJECTION, SOLUTION INTRAMUSCULAR; INTRAVENOUS at 07:10

## 2019-10-04 RX ADMIN — CEFAZOLIN 2 G: 330 INJECTION, POWDER, FOR SOLUTION INTRAMUSCULAR; INTRAVENOUS at 07:10

## 2019-10-04 RX ADMIN — KETAMINE HYDROCHLORIDE 10 MG: 100 INJECTION, SOLUTION, CONCENTRATE INTRAMUSCULAR; INTRAVENOUS at 07:10

## 2019-10-04 RX ADMIN — PROPOFOL 50 MCG/KG/MIN: 10 INJECTION, EMULSION INTRAVENOUS at 07:10

## 2019-10-04 NOTE — PLAN OF CARE
Discharge instructions discussed with patient. Pt verbalizes understanding. Xray done. Port placement okay per radiologist. IV removed from R hand, catheter intact. Consents in chart, vital signs stable, no complaints.

## 2019-10-04 NOTE — TELEPHONE ENCOUNTER
----- Message from José Lara NP sent at 10/2/2019  4:27 PM CDT -----  Cancel chemo today.   Reschedule C1 AC for Monday 10/7/19 with neulasta day 2.   RTC 10/21/19  to see Dr. Villagran with cbc, cmp and for C2 AC

## 2019-10-04 NOTE — TELEPHONE ENCOUNTER
Returned call to pt.  Pt unavailable.   Left message for pt to return call.   Callback number provided.         ----- Message from Sadaf Arevalo sent at 10/4/2019  2:11 PM CDT -----  Contact: pt  Pt calling to set up an appointment.    Pt# [phone]

## 2019-10-04 NOTE — DISCHARGE INSTRUCTIONS
POSTOPERATIVE INSTRUCTIONS FOLLOWING PORT-A-CATH PLACMENT    The following are post-operative instructions that will help you to recover from your surgery.  Please read over these instructions carefully and contact us if we can answer any of your questions or concerns.    Dressing  You may shower after 2 days.  Do not take a tub bath and do not soak the surgical site. Please do not remove the white strips of tape (steri-strips) that cover your incision.  They will be removed at your clinic visit.    Activity   You should be able to return to your regular activities 2 days after your surgery.  However, do not engage in strenuous activities in which you use your upper body such as:  golf, tennis, aerobics, washing windows, raking the yard, mopping, vacuuming, heavy lifting (e.g children) until you are seen for your follow-up appointment in clinic.    Medication for pain  You may find that over the counter pain medications may be sufficient for your pain.  You will be given a prescription for pain medication for more severe pain.  You should not drive or operate machinery while taking these.  Please take narcotics with food.  Narcotics can cause, or worse, constipation.  You will need to increase your fluid intake, eat high fiber foods (such as fruits and bran) and make sure that you are up and walking. You may need to take an over the counter stool softener for constipation.    Please report the following:   Temperature greater than 101 degrees   Discharge or bad odor from the wound   Excessive bleeding, such as bloody dressing or extreme bruising   Redness at incision and/or drain sites   Swelling or buildup of fluid around incision   Shortness of breath     Additional information  I will see you approximately 1-2 weeks following your surgery.  If this follow-up appointment has not been made, please call the office.    If you have any questions or problems, please call my office or my nurse.    Dr. Parker  John Gastelum RN  [phone]    After hours and on weekends, you may call the main [facility] line at [phone] and ask to have the general surgery resident paged or have me paged.

## 2019-10-04 NOTE — ANESTHESIA POSTPROCEDURE EVALUATION
Anesthesia Post Evaluation    Patient: Maria Elena Tavares    Procedure(s) Performed: Procedure(s) (LRB):  [id number] RIGHT (CONSENT AM OF) 1.0 hr case (Right)    Final Anesthesia Type: general  Patient location during evaluation: PACU  Patient participation: Yes- Able to Participate  Level of consciousness: awake and alert  Post-procedure vital signs: reviewed and stable  Pain management: adequate  Airway patency: patent  PONV status at discharge: No PONV  Anesthetic complications: no      Cardiovascular status: stable  Respiratory status: unassisted and spontaneous ventilation  Hydration status: euvolemic  Follow-up not needed.          Vitals Value Taken Time   /74 10/4/2019 11:02 AM   Temp 36.3 °C (97.3 °F) 10/4/2019 11:00 AM   Pulse 77 10/4/2019 11:01 AM   Resp 28 10/4/2019 11:00 AM   SpO2 98 % 10/4/2019 11:00 AM   Vitals shown include unvalidated device data.      No case tracking events are documented in the log.      Pain/[place] Score: Pain Rating Prior to Med Admin: 1 (10/4/2019  9:38 AM)  [place] Score: 10 (10/4/2019  9:00 AM)

## 2019-10-04 NOTE — TRANSFER OF CARE
Anesthesia Transfer of Care Note    Patient: Maria Elena Tavares    Procedure(s) Performed: Procedure(s) (LRB):  [id number] RIGHT (CONSENT AM OF) 1.0 hr case (Right)    Patient location: PACU    Anesthesia Type: general    Transport from OR: Transported from OR on room air with adequate spontaneous ventilation    Post pain: adequate analgesia    Post assessment: no apparent anesthetic complications    Post vital signs: stable    Level of consciousness: awake    Nausea/Vomiting: no nausea/vomiting    Complications: none    Transfer of care protocol was followed      Last vitals:   Visit Vitals  /81   Pulse 86   Temp 36.3 °C (97.3 °F) (Temporal)   Resp 16   Ht 5' (1.524 m)   Wt 91.6 kg (202 lb)   SpO2 99%   Breastfeeding? No   BMI 39.45 kg/m²

## 2019-10-04 NOTE — TELEPHONE ENCOUNTER
Called patient to confirm or reschedule apt for Monday, no answer left message asking her to call back

## 2019-10-04 NOTE — TELEPHONE ENCOUNTER
Returned call to pt.   Pt calling for appt times for chemo Monday/Tuesday- informed of appts scheduled.   Pt verbalized understanding.     ----- Message from Rebeca Collado MA sent at 10/4/2019  3:00 PM CDT -----  Contact: Pt  Pt is returning Radha call.      Pt# [phone]

## 2019-10-04 NOTE — ANESTHESIA PREPROCEDURE EVALUATION
10/04/2019  Maria Elena Tavares is a 63 y.o., female.  Pre-operative evaluation for Procedure(s) (LRB):  [id number] RIGHT (CONSENT AM OF) 1.0 hr case (Right)    Maria Elena Tavares is a 63 y.o. female     Patient Active Problem List   Diagnosis    Uncontrolled type 2 diabetes mellitus without complication, without long-term current use of insulin    Hypertension    History of shingles    Mild vitamin D deficiency    Diverticulosis    Hypothyroid    Diverticulitis of colon (without mention of hemorrhage)(562.11)    Right groin pain    Breast cancer metastasized to axillary lymph node, left       Review of patient's allergies indicates:  No Known Allergies    No current facility-administered medications on file prior to encounter.      Current Outpatient Medications on File Prior to Encounter   Medication Sig Dispense Refill    aspirin (ECOTRIN) 81 MG EC tablet Take 81 mg by mouth once daily.        chlorthalidone (HYGROTEN) 25 MG Tab Take 1 tablet (25 mg total) by mouth every morning. Stop losartan hctz 100/25. 90 tablet 1    dulaglutide (TRULICITY) 1.5 mg/0.5 mL PnIj Inject 1.5 mg into the skin every 7 days. 2 mL 5    ibuprofen (ADVIL,MOTRIN) 400 MG tablet Take 1 tablet (400 mg total) by mouth 2 (two) times daily as needed as directed 60 tablet 2    irbesartan (AVAPRO) 300 MG tablet Take 1 tablet (300 mg total) by mouth once daily. Stop valsartan 320. 90 tablet 1    levothyroxine (SYNTHROID) 200 MCG tablet Take 1 tablet (200 mcg total) by mouth once daily. 90 tablet 0    lifitegrast (XIIDRA) 5 % Dpet Apply 1 drop to  both eyes  2 (two) times daily. 180 each 4    metFORMIN (GLUCOPHAGE) 1000 MG tablet TAKE 1 TABLET (1,000 MG TOTAL) BY MOUTH 2 (TWO) TIMES DAILY WITH MEALS. 180 tablet 1    blood-glucose meter kit DISPENSE : BLOOD TEST STRIPS AND LANCETS PATIENT TEST BLOOD SUGARS TWICE DAILY  TEST  STRIPS: 50 EACH, REFILL 5  LANCETS:  50 EACH , REFILL 5 1 each 0       Past Surgical History:   Procedure Laterality Date    HYSTERECTOMY      OOPHORECTOMY         Social History     Socioeconomic History    Marital status: Single     Spouse name: Not on file    Number of children: Not on file    Years of education: Not on file    Highest education level: Not on file   Occupational History     Employer: [employer] ([employer])   Social Needs    Financial resource strain: Not on file    Food insecurity:     Worry: Not on file     Inability: Not on file    Transportation needs:     Medical: Not on file     Non-medical: Not on file   Tobacco Use    Smoking status: Never Smoker    Smokeless tobacco: Never Used    Tobacco comment: 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  She walks occasionally.   Substance and Sexual Activity    Alcohol use: Yes     Comment: Occasionally    Drug use: No    Sexual activity: Not Currently     Partners: Male   Lifestyle    Physical activity:     Days per week: Not on file     Minutes per session: Not on file    Stress: Not on file   Relationships    Social connections:     Talks on phone: Not on file     Gets together: Not on file     Attends Spiritism service: Not on file     Active member of club or organization: Not on file     Attends meetings of clubs or organizations: Not on file     Relationship status: Not on file   Other Topics Concern    Not on file   Social History Narrative    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     I have reviewed the Medications.     Review of Systems  Anesthesia Hx:  No problems with previous Anesthesia    Social:  Non-Smoker    Cardiovascular:   Hypertension    Pulmonary:  Pulmonary Normal    Hepatic/GI:  Hepatic/GI Normal    Neurological:  Neurology Normal    Endocrine:   Diabetes         Physical Exam  General:  Obesity    Airway/Jaw/Neck:  Airway Findings: Mouth Opening: Normal Tongue: Normal  General Airway Assessment: Adult  Mallampati: III  TM Distance: Normal, at least 6 cm      Dental:  Dental Findings: In tact        Mental Status:  Mental Status Findings:  Cooperative, Alert and Oriented         Anesthesia Plan  Type of Anesthesia, risks & benefits discussed:  Anesthesia Type:  general  Patient's Preference:   Intra-op Monitoring Plan: standard ASA monitors  Intra-op Monitoring Plan Comments:   Post Op Pain Control Plan: per primary service following discharge from PACU  Post Op Pain Control Plan Comments:   Induction:   IV  Beta Blocker:  Patient is not currently on a Beta-Blocker (No further documentation required).       Informed Consent: Patient understands risks and agrees with Anesthesia plan.  Questions answered. Anesthesia consent signed with patient.  ASA Score: 3     Day of Surgery Review of History & Physical:    H&P update referred to the surgeon.         Ready For Surgery From Anesthesia Perspective.

## 2019-10-04 NOTE — DISCHARGE SUMMARY
[facility]  Discharge Summary  General Surgery      Admit Date: 10/4/2019    Discharge Date and Time:  10/04/2019 8:43 AM    Attending Physician: Elena Lopez MD     Discharge Provider: Elena Lopez    Reason for Admission: surgery    Procedures Performed: Procedure(s) (LRB):  [id number] RIGHT (CONSENT AM OF) 1.0 hr case (Right)    Final Diagnoses:   Principal Problem: left breast cancer     Discharged Condition: stable    Disposition: Home or Self Care    Follow Up/Patient Instructions: 7-14 days    Medications:  Reconciled Home Medications:      Medication List      START taking these medications    HYDROcodone-acetaminophen 5-325 mg per tablet  Commonly known as:  NORCO  Take 1 tablet by mouth every 6 (six) hours as needed for Pain.        CONTINUE taking these medications    aspirin 81 MG EC tablet  Commonly known as:  ECOTRIN  Take 81 mg by mouth once daily.     blood-glucose meter kit  DISPENSE : BLOOD TEST STRIPS AND LANCETS PATIENT TEST BLOOD SUGARS TWICE DAILY  TEST STRIPS: 50 EACH, REFILL 5  LANCETS:  50 EACH , REFILL 5     chlorthalidone 25 MG Tab  Commonly known as:  HYGROTEN  Take 1 tablet (25 mg total) by mouth every morning. Stop losartan hctz 100/25.     dexAMETHasone 4 MG Tab  Commonly known as:  DECADRON  Take 1 tablet by mouth twice daily the day before chemo and for 2 days after.     ibuprofen 400 MG tablet  Commonly known as:  ADVIL,MOTRIN  Take 1 tablet (400 mg total) by mouth 2 (two) times daily as needed as directed     irbesartan 300 MG tablet  Commonly known as:  AVAPRO  Take 1 tablet (300 mg total) by mouth once daily. Stop valsartan 320.     levothyroxine 200 MCG tablet  Commonly known as:  SYNTHROID  Take 1 tablet (200 mcg total) by mouth once daily.     metFORMIN 1000 MG tablet  Commonly known as:  GLUCOPHAGE  TAKE 1 TABLET (1,000 MG TOTAL) BY MOUTH 2 (TWO) TIMES DAILY WITH MEALS.     ondansetron 8 MG Tbdl  Commonly known as:  ZOFRAN-ODT  Dissolve 1  tablet (8 mg total) by mouth every 8 (eight) hours as needed (nausea).     promethazine 25 MG tablet  Commonly known as:  PHENERGAN  Take 1 tablet (25 mg total) by mouth every 6 (six) hours as needed for Nausea.     TRULICITY 1.5 mg/0.5 mL Pnij  Generic drug:  dulaglutide  Inject 1.5 mg into the skin every 7 days.     XIIDRA 5 % Dpet  Generic drug:  lifitegrast  Apply 1 drop to  both eyes  2 (two) times daily.          Discharge Procedure Orders   Diet general     Lifting restrictions     Call MD for:  temperature >100.4     Call MD for:  persistent nausea and vomiting     Call MD for:  severe uncontrolled pain     Call MD for:  difficulty breathing, headache or visual disturbances     Call MD for:  redness, tenderness, or signs of infection (pain, swelling, redness, odor or green/yellow discharge around incision site)     Call MD for:  hives     Call MD for:  persistent dizziness or light-headedness     Call MD for:  extreme fatigue     No dressing needed         Diet: regular    Activity: as tolerated

## 2019-10-07 ENCOUNTER — INFUSION (OUTPATIENT)
Dept: INFUSION THERAPY | Facility: HOSPITAL | Age: 64
End: 2019-10-07
Attending: NURSE PRACTITIONER
Payer: COMMERCIAL

## 2019-10-07 ENCOUNTER — TELEPHONE (OUTPATIENT)
Dept: HEMATOLOGY/ONCOLOGY | Facility: CLINIC | Age: 64
End: 2019-10-07

## 2019-10-07 VITALS
TEMPERATURE: 99 F | SYSTOLIC BLOOD PRESSURE: 146 MMHG | DIASTOLIC BLOOD PRESSURE: 83 MMHG | RESPIRATION RATE: 18 BRPM | HEART RATE: 105 BPM

## 2019-10-07 DIAGNOSIS — C77.3 BREAST CANCER METASTASIZED TO AXILLARY LYMPH NODE, LEFT: Primary | ICD-10-CM

## 2019-10-07 DIAGNOSIS — C50.912 BREAST CANCER METASTASIZED TO AXILLARY LYMPH NODE, LEFT: Primary | ICD-10-CM

## 2019-10-07 PROCEDURE — 96413 CHEMO IV INFUSION 1 HR: CPT

## 2019-10-07 PROCEDURE — 96367 TX/PROPH/DG ADDL SEQ IV INF: CPT

## 2019-10-07 PROCEDURE — 63600175 PHARM REV CODE 636 W HCPCS: Mod: JG | Performed by: INTERNAL MEDICINE

## 2019-10-07 PROCEDURE — 96411 CHEMO IV PUSH ADDL DRUG: CPT

## 2019-10-07 RX ORDER — SODIUM CHLORIDE 0.9 % (FLUSH) 0.9 %
10 SYRINGE (ML) INJECTION
Status: DISCONTINUED | OUTPATIENT
Start: 2019-10-07 | End: 2019-10-07 | Stop reason: HOSPADM

## 2019-10-07 RX ORDER — SODIUM CHLORIDE 0.9 % (FLUSH) 0.9 %
10 SYRINGE (ML) INJECTION
Status: CANCELLED | OUTPATIENT
Start: 2019-10-07

## 2019-10-07 RX ORDER — HEPARIN 100 UNIT/ML
500 SYRINGE INTRAVENOUS
Status: DISCONTINUED | OUTPATIENT
Start: 2019-10-07 | End: 2019-10-07 | Stop reason: HOSPADM

## 2019-10-07 RX ORDER — DOXORUBICIN HYDROCHLORIDE 2 MG/ML
60 INJECTION, SOLUTION INTRAVENOUS
Status: CANCELLED | OUTPATIENT
Start: 2019-10-07

## 2019-10-07 RX ORDER — DOXORUBICIN HYDROCHLORIDE 2 MG/ML
60 INJECTION, SOLUTION INTRAVENOUS
Status: COMPLETED | OUTPATIENT
Start: 2019-10-07 | End: 2019-10-07

## 2019-10-07 RX ORDER — HEPARIN 100 UNIT/ML
500 SYRINGE INTRAVENOUS
Status: CANCELLED | OUTPATIENT
Start: 2019-10-07

## 2019-10-07 RX ADMIN — HEPARIN SODIUM (PORCINE) LOCK FLUSH IV SOLN 100 UNIT/ML 500 UNITS: 100 SOLUTION at 06:10

## 2019-10-07 RX ADMIN — DOXORUBICIN HYDROCHLORIDE 120 MG: 2 INJECTION, SOLUTION INTRAVENOUS at 04:10

## 2019-10-07 RX ADMIN — DEXAMETHASONE SODIUM PHOSPHATE: 4 INJECTION, SOLUTION INTRA-ARTICULAR; INTRALESIONAL; INTRAMUSCULAR; INTRAVENOUS; SOFT TISSUE at 03:10

## 2019-10-07 RX ADMIN — CYCLOPHOSPHAMIDE 1195 MG: 1 INJECTION, POWDER, FOR SOLUTION INTRAVENOUS; ORAL at 04:10

## 2019-10-07 RX ADMIN — SODIUM CHLORIDE: 0.9 INJECTION, SOLUTION INTRAVENOUS at 03:10

## 2019-10-07 RX ADMIN — APREPITANT 130 MG: 130 INJECTION, EMULSION INTRAVENOUS at 04:10

## 2019-10-07 NOTE — TELEPHONE ENCOUNTER
----- Message from Ivory Gastelum RN sent at 10/4/2019 12:19 PM CDT -----  Regarding: urgent referral   Good afternoon,  Dr. Lopez has an urgent patient referral for chemotherapy.  She has seen Dr. Courtney Sanz already.  Dr. Lopez put her port in today.  She is set to start her chemo.  She is looking for a medical oncologist closer to her home.  Would like to get her seen and ready as soon as possible.  Please call patient to schedule  Thank you.    Ivory Gastelum RN  Clinical   Breast & Plastic Surgery

## 2019-10-07 NOTE — PLAN OF CARE
Chemotherapy and side effected education given. Patient tolerated Doxorubicin and Cytoxan with no complications. VSS. NAD. Pt instructed to call MD with any problems. Pt discharged home independently.

## 2019-10-08 ENCOUNTER — HOSPITAL ENCOUNTER (OUTPATIENT)
Dept: RADIOLOGY | Facility: HOSPITAL | Age: 64
Discharge: HOME | End: 2019-10-08
Attending: SURGERY
Payer: COMMERCIAL

## 2019-10-08 ENCOUNTER — INFUSION (OUTPATIENT)
Dept: INFUSION THERAPY | Facility: HOSPITAL | Age: 64
End: 2019-10-08
Attending: INTERNAL MEDICINE
Payer: COMMERCIAL

## 2019-10-08 DIAGNOSIS — N63.20 LEFT BREAST MASS: ICD-10-CM

## 2019-10-08 DIAGNOSIS — C77.3 BREAST CANCER METASTASIZED TO AXILLARY LYMPH NODE, LEFT: Primary | ICD-10-CM

## 2019-10-08 DIAGNOSIS — C50.912 MALIGNANT NEOPLASM OF LEFT FEMALE BREAST, UNSPECIFIED ESTROGEN RECEPTOR STATUS, UNSPECIFIED SITE OF BREAST: ICD-10-CM

## 2019-10-08 DIAGNOSIS — C50.912 BREAST CANCER METASTASIZED TO AXILLARY LYMPH NODE, LEFT: Primary | ICD-10-CM

## 2019-10-08 PROCEDURE — 27201068 US BREAST BIOPSY WITH IMAGING 1ST SITE LEFT: Mod: PO

## 2019-10-08 PROCEDURE — 88342 TISSUE SPECIMEN TO PATHOLOGY, RADIOLOGY: ICD-10-PCS | Mod: 26,,, | Performed by: PATHOLOGY

## 2019-10-08 PROCEDURE — 88305 TISSUE SPECIMEN TO PATHOLOGY, RADIOLOGY: ICD-10-PCS | Mod: 26,,, | Performed by: PATHOLOGY

## 2019-10-08 PROCEDURE — 25000003 PHARM REV CODE 250: Mod: PO | Performed by: SURGERY

## 2019-10-08 PROCEDURE — 76642 ULTRASOUND BREAST LIMITED: CPT | Mod: 26,LT,, | Performed by: RADIOLOGY

## 2019-10-08 PROCEDURE — 96372 THER/PROPH/DIAG INJ SC/IM: CPT

## 2019-10-08 PROCEDURE — 63600175 PHARM REV CODE 636 W HCPCS: Mod: TB | Performed by: INTERNAL MEDICINE

## 2019-10-08 PROCEDURE — 88305 TISSUE EXAM BY PATHOLOGIST: CPT | Performed by: PATHOLOGY

## 2019-10-08 PROCEDURE — 76642 US BREAST LEFT LIMITED: ICD-10-PCS | Mod: 26,LT,, | Performed by: RADIOLOGY

## 2019-10-08 PROCEDURE — 19083 US BREAST BIOPSY WITH IMAGING 1ST SITE LEFT: ICD-10-PCS | Mod: LT,,, | Performed by: RADIOLOGY

## 2019-10-08 PROCEDURE — 88341 TISSUE SPECIMEN TO PATHOLOGY, RADIOLOGY: ICD-10-PCS | Mod: 26,,, | Performed by: PATHOLOGY

## 2019-10-08 PROCEDURE — 88342 IMHCHEM/IMCYTCHM 1ST ANTB: CPT | Mod: 26,,, | Performed by: PATHOLOGY

## 2019-10-08 PROCEDURE — 77065 DX MAMMO INCL CAD UNI: CPT | Mod: TC,PO,LT

## 2019-10-08 PROCEDURE — 77065 MAMMO DIGITAL DIAGNOSTIC LEFT WITH CAD: ICD-10-PCS | Mod: 26,LT,, | Performed by: RADIOLOGY

## 2019-10-08 PROCEDURE — 88341 IMHCHEM/IMCYTCHM EA ADD ANTB: CPT | Mod: 26,,, | Performed by: PATHOLOGY

## 2019-10-08 PROCEDURE — 76642 ULTRASOUND BREAST LIMITED: CPT | Mod: TC,PO,LT

## 2019-10-08 PROCEDURE — 19083 BX BREAST 1ST LESION US IMAG: CPT | Mod: LT,,, | Performed by: RADIOLOGY

## 2019-10-08 PROCEDURE — 77065 DX MAMMO INCL CAD UNI: CPT | Mod: 26,LT,, | Performed by: RADIOLOGY

## 2019-10-08 RX ORDER — LIDOCAINE HYDROCHLORIDE 10 MG/ML
1 INJECTION, SOLUTION EPIDURAL; INFILTRATION; INTRACAUDAL; PERINEURAL ONCE
Status: COMPLETED | OUTPATIENT
Start: 2019-10-08 | End: 2019-10-08

## 2019-10-08 RX ORDER — LIDOCAINE HYDROCHLORIDE AND EPINEPHRINE 10; 10 MG/ML; UG/ML
5 INJECTION, SOLUTION INFILTRATION; PERINEURAL ONCE
Status: COMPLETED | OUTPATIENT
Start: 2019-10-08 | End: 2019-10-08

## 2019-10-08 RX ADMIN — LIDOCAINE HYDROCHLORIDE 1 ML: 10 INJECTION, SOLUTION EPIDURAL; INFILTRATION; INTRACAUDAL; PERINEURAL at 10:10

## 2019-10-08 RX ADMIN — PEGFILGRASTIM-CBQV 6 MG: 6 INJECTION, SOLUTION SUBCUTANEOUS at 03:10

## 2019-10-08 RX ADMIN — LIDOCAINE HYDROCHLORIDE AND EPINEPHRINE 5 ML: 10; 10 INJECTION, SOLUTION INFILTRATION; PERINEURAL at 10:10

## 2019-10-08 NOTE — NURSING
Patient here for udenyca injection-teaching with pamphlet on use and side effects of udenyca-no complaints offered-tolerated well.

## 2019-10-09 ENCOUNTER — LAB VISIT (OUTPATIENT)
Dept: LAB | Facility: HOSPITAL | Age: 64
End: 2019-10-09
Payer: COMMERCIAL

## 2019-10-09 ENCOUNTER — OFFICE VISIT (OUTPATIENT)
Dept: PRIMARY CARE CLINIC | Facility: CLINIC | Age: 64
End: 2019-10-09
Attending: FAMILY MEDICINE
Payer: COMMERCIAL

## 2019-10-09 VITALS
HEART RATE: 89 BPM | WEIGHT: 201.19 LBS | SYSTOLIC BLOOD PRESSURE: 122 MMHG | DIASTOLIC BLOOD PRESSURE: 78 MMHG | HEIGHT: 60 IN | BODY MASS INDEX: 39.5 KG/M2

## 2019-10-09 DIAGNOSIS — E03.9 HYPOTHYROIDISM, UNSPECIFIED TYPE: ICD-10-CM

## 2019-10-09 DIAGNOSIS — I10 ESSENTIAL HYPERTENSION: ICD-10-CM

## 2019-10-09 DIAGNOSIS — E11.9 TYPE 2 DIABETES MELLITUS WITHOUT COMPLICATION, WITHOUT LONG-TERM CURRENT USE OF INSULIN: ICD-10-CM

## 2019-10-09 LAB
CHOLEST SERPL-MCNC: 261 MG/DL (ref 120–199)
CHOLEST/HDLC SERPL: 4.6 {RATIO} (ref 2–5)
ESTIMATED AVG GLUCOSE: 163 MG/DL (ref 68–131)
HBA1C MFR BLD HPLC: 7.3 % (ref 4–5.6)
HDLC SERPL-MCNC: 57 MG/DL (ref 40–75)
HDLC SERPL: 21.8 % (ref 20–50)
LDLC SERPL CALC-MCNC: 185.6 MG/DL (ref 63–159)
NONHDLC SERPL-MCNC: 204 MG/DL
TRIGL SERPL-MCNC: 92 MG/DL (ref 30–150)

## 2019-10-09 PROCEDURE — 3074F PR MOST RECENT SYSTOLIC BLOOD PRESSURE < 130 MM HG: ICD-10-PCS | Mod: CPTII,S$GLB,, | Performed by: FAMILY MEDICINE

## 2019-10-09 PROCEDURE — 3008F PR BODY MASS INDEX (BMI) DOCUMENTED: ICD-10-PCS | Mod: CPTII,S$GLB,, | Performed by: FAMILY MEDICINE

## 2019-10-09 PROCEDURE — 99214 PR OFFICE/OUTPT VISIT, EST, LEVL IV, 30-39 MIN: ICD-10-PCS | Mod: S$GLB,,, | Performed by: FAMILY MEDICINE

## 2019-10-09 PROCEDURE — 99999 PR PBB SHADOW E&M-EST. PATIENT-LVL III: CPT | Mod: PBBFAC,,, | Performed by: FAMILY MEDICINE

## 2019-10-09 PROCEDURE — 3045F PR MOST RECENT HEMOGLOBIN A1C LEVEL 7.0-9.0%: CPT | Mod: CPTII,S$GLB,, | Performed by: FAMILY MEDICINE

## 2019-10-09 PROCEDURE — 3008F BODY MASS INDEX DOCD: CPT | Mod: CPTII,S$GLB,, | Performed by: FAMILY MEDICINE

## 2019-10-09 PROCEDURE — 80061 LIPID PANEL: CPT

## 2019-10-09 PROCEDURE — 3045F PR MOST RECENT HEMOGLOBIN A1C LEVEL 7.0-9.0%: ICD-10-PCS | Mod: CPTII,S$GLB,, | Performed by: FAMILY MEDICINE

## 2019-10-09 PROCEDURE — 3074F SYST BP LT 130 MM HG: CPT | Mod: CPTII,S$GLB,, | Performed by: FAMILY MEDICINE

## 2019-10-09 PROCEDURE — 99999 PR PBB SHADOW E&M-EST. PATIENT-LVL III: ICD-10-PCS | Mod: PBBFAC,,, | Performed by: FAMILY MEDICINE

## 2019-10-09 PROCEDURE — 3078F DIAST BP <80 MM HG: CPT | Mod: CPTII,S$GLB,, | Performed by: FAMILY MEDICINE

## 2019-10-09 PROCEDURE — 3078F PR MOST RECENT DIASTOLIC BLOOD PRESSURE < 80 MM HG: ICD-10-PCS | Mod: CPTII,S$GLB,, | Performed by: FAMILY MEDICINE

## 2019-10-09 PROCEDURE — 36415 COLL VENOUS BLD VENIPUNCTURE: CPT | Mod: PN

## 2019-10-09 PROCEDURE — 99214 OFFICE O/P EST MOD 30 MIN: CPT | Mod: S$GLB,,, | Performed by: FAMILY MEDICINE

## 2019-10-09 PROCEDURE — 83036 HEMOGLOBIN GLYCOSYLATED A1C: CPT

## 2019-10-09 RX ORDER — LEVOTHYROXINE SODIUM 200 UG/1
200 TABLET ORAL DAILY
Qty: 90 TABLET | Refills: 1 | Status: SHIPPED | OUTPATIENT
Start: 2019-10-09 | End: 2020-03-11 | Stop reason: SDUPTHER

## 2019-10-09 RX ORDER — LEVOTHYROXINE SODIUM 200 UG/1
200 TABLET ORAL DAILY
Qty: 90 TABLET | Refills: 1 | Status: SHIPPED | OUTPATIENT
Start: 2019-10-09 | End: 2019-10-09

## 2019-10-09 NOTE — PROGRESS NOTES
Subjective:       Patient ID: Maria Elena Tavares is a 63 y.o. female.    Chief Complaint: Hypertension and Diabetes    Pt presents today for 6 mos f/u DM and HTN. Pt has just started round 1 of chemo for recent dx of breast CA. Is good spirits  Takes steroids prior to meds. Blood sugars have stayed stable but unsure as more treatments progress    Hypertension   Pertinent negatives include no chest pain, headaches, palpitations or shortness of breath.   Diabetes   Pertinent negatives for hypoglycemia include no confusion, dizziness or headaches. Pertinent negatives for diabetes include no chest pain, no polydipsia, no polyuria and no weakness.     Review of Systems   Constitutional: Negative.    HENT: Negative for hearing loss, rhinorrhea and trouble swallowing.    Eyes: Negative.    Respiratory: Negative for cough, chest tightness, shortness of breath and wheezing.    Cardiovascular: Negative for chest pain, palpitations and leg swelling.   Gastrointestinal: Negative.    Endocrine: Negative for polydipsia and polyuria.   Musculoskeletal: Negative.  Negative for joint swelling.   Skin: Negative.    Neurological: Negative for dizziness, weakness, light-headedness and headaches.   Psychiatric/Behavioral: Negative for confusion and dysphoric mood.   All other systems reviewed and are negative.      Objective:      Physical Exam   Constitutional: She is oriented to person, place, and time. She appears well-developed and well-nourished.   HENT:   Head: Normocephalic and atraumatic.   Eyes: Pupils are equal, round, and reactive to light. Conjunctivae and EOM are normal.   Neck: Normal range of motion. Neck supple. No thyromegaly present.   Cardiovascular: Normal rate, regular rhythm, normal heart sounds and intact distal pulses.   No murmur heard.  Pulmonary/Chest: Effort normal and breath sounds normal. No respiratory distress. She has no wheezes. She has no rales. She exhibits no tenderness.   Abdominal: Soft. Bowel sounds  are normal. She exhibits no distension and no mass. There is no tenderness. There is no rebound and no guarding. No hernia.   Musculoskeletal: Normal range of motion. She exhibits no edema.   Lymphadenopathy:     She has no cervical adenopathy.   Neurological: She is alert and oriented to person, place, and time. She displays normal reflexes. No cranial nerve deficit or sensory deficit. She exhibits normal muscle tone. Coordination normal.   Skin: Skin is warm. No erythema.   Psychiatric: She has a normal mood and affect. Her behavior is normal. Judgment and thought content normal.       Assessment:       1. Hypothyroidism, unspecified type        Plan:       HTN controlled when she takes her meds,  DMT2: stable but a1c and lipids are overdue. Might need to switch to insulin based on levels  Breast CA: on chemo with surgery planned in Dec  RTC prn symptoms or q 4-6 mos pending labs  Hypothyroidism, unspecified type  -     Discontinue: levothyroxine (SYNTHROID) 200 MCG tablet; Take 1 tablet (200 mcg total) by mouth once daily.  Dispense: 90 tablet; Refill: 1  -     levothyroxine (SYNTHROID) 200 MCG tablet; Take 1 tablet (200 mcg total) by mouth once daily.  Dispense: 90 tablet; Refill: 1      RTC prn symptoms

## 2019-10-10 ENCOUNTER — TELEPHONE (OUTPATIENT)
Dept: HEMATOLOGY/ONCOLOGY | Facility: CLINIC | Age: 64
End: 2019-10-10

## 2019-10-10 ENCOUNTER — PATIENT MESSAGE (OUTPATIENT)
Dept: HEMATOLOGY/ONCOLOGY | Facility: CLINIC | Age: 64
End: 2019-10-10

## 2019-10-11 ENCOUNTER — TELEPHONE (OUTPATIENT)
Dept: RADIOLOGY | Facility: HOSPITAL | Age: 64
End: 2019-10-11

## 2019-10-11 ENCOUNTER — TELEPHONE (OUTPATIENT)
Dept: SURGERY | Facility: CLINIC | Age: 64
End: 2019-10-11

## 2019-10-11 ENCOUNTER — PATIENT MESSAGE (OUTPATIENT)
Dept: SURGERY | Facility: CLINIC | Age: 64
End: 2019-10-11

## 2019-10-11 NOTE — TELEPHONE ENCOUNTER
Called patient twice on mobile phone number. Left generic message for her to call us back at her earliest convenience.

## 2019-10-11 NOTE — TELEPHONE ENCOUNTER
Left VM with my direct contact information, patient advised to give a return call with any questions or concerns rt breast biopsy scheduled on Monday 10-14-19 and left breast biopsy results

## 2019-10-14 ENCOUNTER — HOSPITAL ENCOUNTER (OUTPATIENT)
Dept: RADIOLOGY | Facility: HOSPITAL | Age: 64
Discharge: HOME | End: 2019-10-14
Attending: SURGERY
Payer: COMMERCIAL

## 2019-10-14 DIAGNOSIS — R92.8 ABNORMAL FINDING ON BREAST IMAGING: ICD-10-CM

## 2019-10-14 DIAGNOSIS — R92.8 ABNORMAL MAMMOGRAM: ICD-10-CM

## 2019-10-14 PROCEDURE — 19085 BX BREAST 1ST LESION MR IMAG: CPT | Mod: TC

## 2019-10-14 PROCEDURE — 77065 DX MAMMO INCL CAD UNI: CPT | Mod: 26,LT,, | Performed by: RADIOLOGY

## 2019-10-14 PROCEDURE — 19085 MRI BREAST BIOPSY WITH IMAGING 1ST SITE: ICD-10-PCS | Mod: ,,, | Performed by: RADIOLOGY

## 2019-10-14 PROCEDURE — 88341 IMHCHEM/IMCYTCHM EA ADD ANTB: CPT | Mod: 26,59,, | Performed by: PATHOLOGY

## 2019-10-14 PROCEDURE — A9577 INJ MULTIHANCE: HCPCS | Performed by: SURGERY

## 2019-10-14 PROCEDURE — 88305 TISSUE EXAM BY PATHOLOGIST: CPT | Mod: 26,,, | Performed by: PATHOLOGY

## 2019-10-14 PROCEDURE — 77065 DX MAMMO INCL CAD UNI: CPT | Mod: TC,LT

## 2019-10-14 PROCEDURE — 77065 MAMMO DIGITAL DIAGNOSTIC LEFT WITH CAD: ICD-10-PCS | Mod: 26,LT,, | Performed by: RADIOLOGY

## 2019-10-14 PROCEDURE — 88360 PR  TUMOR IMMUNOHISTOCHEM/MANUAL: ICD-10-PCS | Mod: 26,,, | Performed by: PATHOLOGY

## 2019-10-14 PROCEDURE — 88342 TISSUE SPECIMEN TO PATHOLOGY, RADIOLOGY: ICD-10-PCS | Mod: 26,59,, | Performed by: PATHOLOGY

## 2019-10-14 PROCEDURE — 25000003 PHARM REV CODE 250: Performed by: SURGERY

## 2019-10-14 PROCEDURE — 88341 TISSUE SPECIMEN TO PATHOLOGY, RADIOLOGY: ICD-10-PCS | Mod: 26,59,, | Performed by: PATHOLOGY

## 2019-10-14 PROCEDURE — 19085 BX BREAST 1ST LESION MR IMAG: CPT | Mod: ,,, | Performed by: RADIOLOGY

## 2019-10-14 PROCEDURE — 88360 TUMOR IMMUNOHISTOCHEM/MANUAL: CPT | Mod: 26,,, | Performed by: PATHOLOGY

## 2019-10-14 PROCEDURE — 25500020 PHARM REV CODE 255: Performed by: SURGERY

## 2019-10-14 PROCEDURE — 88305 TISSUE SPECIMEN TO PATHOLOGY, RADIOLOGY: ICD-10-PCS | Mod: 26,,, | Performed by: PATHOLOGY

## 2019-10-14 PROCEDURE — 88360 TUMOR IMMUNOHISTOCHEM/MANUAL: CPT | Performed by: PATHOLOGY

## 2019-10-14 PROCEDURE — 88342 IMHCHEM/IMCYTCHM 1ST ANTB: CPT | Mod: 26,59,, | Performed by: PATHOLOGY

## 2019-10-14 RX ORDER — LIDOCAINE HYDROCHLORIDE 10 MG/ML
1 INJECTION, SOLUTION EPIDURAL; INFILTRATION; INTRACAUDAL; PERINEURAL ONCE
Status: COMPLETED | OUTPATIENT
Start: 2019-10-14 | End: 2019-10-14

## 2019-10-14 RX ORDER — LIDOCAINE HYDROCHLORIDE AND EPINEPHRINE 20; 10 MG/ML; UG/ML
17 INJECTION, SOLUTION INFILTRATION; PERINEURAL ONCE
Status: COMPLETED | OUTPATIENT
Start: 2019-10-14 | End: 2019-10-14

## 2019-10-14 RX ADMIN — LIDOCAINE HYDROCHLORIDE AND EPINEPHRINE 17 ML: 20; 10 INJECTION, SOLUTION INFILTRATION; PERINEURAL at 08:10

## 2019-10-14 RX ADMIN — GADOBENATE DIMEGLUMINE 20 ML: 529 INJECTION, SOLUTION INTRAVENOUS at 08:10

## 2019-10-14 RX ADMIN — LIDOCAINE HYDROCHLORIDE 1 ML: 10 INJECTION, SOLUTION EPIDURAL; INFILTRATION; INTRACAUDAL; PERINEURAL at 08:10

## 2019-10-17 ENCOUNTER — DOCUMENTATION ONLY (OUTPATIENT)
Dept: SURGERY | Facility: CLINIC | Age: 64
End: 2019-10-17

## 2019-10-21 ENCOUNTER — LAB VISIT (OUTPATIENT)
Dept: LAB | Facility: HOSPITAL | Age: 64
End: 2019-10-21
Attending: INTERNAL MEDICINE
Payer: COMMERCIAL

## 2019-10-21 ENCOUNTER — OFFICE VISIT (OUTPATIENT)
Dept: HEMATOLOGY/ONCOLOGY | Facility: CLINIC | Age: 64
End: 2019-10-21
Payer: COMMERCIAL

## 2019-10-21 ENCOUNTER — INFUSION (OUTPATIENT)
Dept: INFUSION THERAPY | Facility: HOSPITAL | Age: 64
End: 2019-10-21
Attending: NURSE PRACTITIONER
Payer: COMMERCIAL

## 2019-10-21 VITALS
HEIGHT: 60 IN | DIASTOLIC BLOOD PRESSURE: 56 MMHG | OXYGEN SATURATION: 99 % | BODY MASS INDEX: 39.13 KG/M2 | WEIGHT: 199.31 LBS | SYSTOLIC BLOOD PRESSURE: 120 MMHG | HEART RATE: 76 BPM | TEMPERATURE: 98 F | RESPIRATION RATE: 16 BRPM

## 2019-10-21 VITALS
HEART RATE: 72 BPM | DIASTOLIC BLOOD PRESSURE: 69 MMHG | SYSTOLIC BLOOD PRESSURE: 122 MMHG | RESPIRATION RATE: 16 BRPM | TEMPERATURE: 98 F

## 2019-10-21 DIAGNOSIS — C50.912 BREAST CANCER METASTASIZED TO AXILLARY LYMPH NODE, LEFT: ICD-10-CM

## 2019-10-21 DIAGNOSIS — C50.912 BREAST CANCER METASTASIZED TO AXILLARY LYMPH NODE, LEFT: Primary | ICD-10-CM

## 2019-10-21 DIAGNOSIS — B37.0 THRUSH: ICD-10-CM

## 2019-10-21 DIAGNOSIS — C77.3 BREAST CANCER METASTASIZED TO AXILLARY LYMPH NODE, LEFT: Primary | ICD-10-CM

## 2019-10-21 DIAGNOSIS — C77.3 BREAST CANCER METASTASIZED TO AXILLARY LYMPH NODE, LEFT: ICD-10-CM

## 2019-10-21 LAB
ALBUMIN SERPL BCP-MCNC: 3.9 G/DL (ref 3.5–5.2)
ALP SERPL-CCNC: 129 U/L (ref 55–135)
ALT SERPL W/O P-5'-P-CCNC: 17 U/L (ref 10–44)
ANION GAP SERPL CALC-SCNC: 11 MMOL/L (ref 8–16)
AST SERPL-CCNC: 21 U/L (ref 10–40)
BILIRUB SERPL-MCNC: 0.4 MG/DL (ref 0.1–1)
BUN SERPL-MCNC: 15 MG/DL (ref 8–23)
CALCIUM SERPL-MCNC: 9.7 MG/DL (ref 8.7–10.5)
CHLORIDE SERPL-SCNC: 101 MMOL/L (ref 95–110)
CO2 SERPL-SCNC: 26 MMOL/L (ref 23–29)
CREAT SERPL-MCNC: 1 MG/DL (ref 0.5–1.4)
ERYTHROCYTE [DISTWIDTH] IN BLOOD BY AUTOMATED COUNT: 14.6 % (ref 11.5–14.5)
EST. GFR  (AFRICAN AMERICAN): >60 ML/MIN/1.73 M^2
EST. GFR  (NON AFRICAN AMERICAN): >60 ML/MIN/1.73 M^2
GLUCOSE SERPL-MCNC: 131 MG/DL (ref 70–110)
HCT VFR BLD AUTO: 36.2 % (ref 37–48.5)
HGB BLD-MCNC: 11.1 G/DL (ref 12–16)
IMM GRANULOCYTES # BLD AUTO: 4.33 K/UL (ref 0–0.04)
MCH RBC QN AUTO: 28.7 PG (ref 27–31)
MCHC RBC AUTO-ENTMCNC: 30.7 G/DL (ref 32–36)
MCV RBC AUTO: 94 FL (ref 82–98)
NEUTROPHILS # BLD AUTO: 24.4 K/UL (ref 1.8–7.7)
PLATELET # BLD AUTO: 231 K/UL (ref 150–350)
PMV BLD AUTO: 10.4 FL (ref 9.2–12.9)
POTASSIUM SERPL-SCNC: 3.3 MMOL/L (ref 3.5–5.1)
PROT SERPL-MCNC: 8 G/DL (ref 6–8.4)
RBC # BLD AUTO: 3.87 M/UL (ref 4–5.4)
SODIUM SERPL-SCNC: 138 MMOL/L (ref 136–145)
WBC # BLD AUTO: 32.18 K/UL (ref 3.9–12.7)

## 2019-10-21 PROCEDURE — 96411 CHEMO IV PUSH ADDL DRUG: CPT

## 2019-10-21 PROCEDURE — 36415 COLL VENOUS BLD VENIPUNCTURE: CPT

## 2019-10-21 PROCEDURE — 3074F SYST BP LT 130 MM HG: CPT | Mod: CPTII,S$GLB,, | Performed by: INTERNAL MEDICINE

## 2019-10-21 PROCEDURE — 96413 CHEMO IV INFUSION 1 HR: CPT

## 2019-10-21 PROCEDURE — 3008F PR BODY MASS INDEX (BMI) DOCUMENTED: ICD-10-PCS | Mod: CPTII,S$GLB,, | Performed by: INTERNAL MEDICINE

## 2019-10-21 PROCEDURE — 3078F DIAST BP <80 MM HG: CPT | Mod: CPTII,S$GLB,, | Performed by: INTERNAL MEDICINE

## 2019-10-21 PROCEDURE — 3074F PR MOST RECENT SYSTOLIC BLOOD PRESSURE < 130 MM HG: ICD-10-PCS | Mod: CPTII,S$GLB,, | Performed by: INTERNAL MEDICINE

## 2019-10-21 PROCEDURE — 85027 COMPLETE CBC AUTOMATED: CPT

## 2019-10-21 PROCEDURE — 96367 TX/PROPH/DG ADDL SEQ IV INF: CPT

## 2019-10-21 PROCEDURE — 80053 COMPREHEN METABOLIC PANEL: CPT

## 2019-10-21 PROCEDURE — 63600175 PHARM REV CODE 636 W HCPCS: Performed by: INTERNAL MEDICINE

## 2019-10-21 PROCEDURE — 63600175 PHARM REV CODE 636 W HCPCS: Mod: TB | Performed by: NURSE PRACTITIONER

## 2019-10-21 PROCEDURE — 99214 OFFICE O/P EST MOD 30 MIN: CPT | Mod: S$GLB,,, | Performed by: INTERNAL MEDICINE

## 2019-10-21 PROCEDURE — 3008F BODY MASS INDEX DOCD: CPT | Mod: CPTII,S$GLB,, | Performed by: INTERNAL MEDICINE

## 2019-10-21 PROCEDURE — 3078F PR MOST RECENT DIASTOLIC BLOOD PRESSURE < 80 MM HG: ICD-10-PCS | Mod: CPTII,S$GLB,, | Performed by: INTERNAL MEDICINE

## 2019-10-21 PROCEDURE — 96375 TX/PRO/DX INJ NEW DRUG ADDON: CPT

## 2019-10-21 PROCEDURE — 25000003 PHARM REV CODE 250: Performed by: INTERNAL MEDICINE

## 2019-10-21 PROCEDURE — 99999 PR PBB SHADOW E&M-EST. PATIENT-LVL III: CPT | Mod: PBBFAC,,, | Performed by: INTERNAL MEDICINE

## 2019-10-21 PROCEDURE — 99214 PR OFFICE/OUTPT VISIT, EST, LEVL IV, 30-39 MIN: ICD-10-PCS | Mod: S$GLB,,, | Performed by: INTERNAL MEDICINE

## 2019-10-21 PROCEDURE — A4216 STERILE WATER/SALINE, 10 ML: HCPCS | Performed by: INTERNAL MEDICINE

## 2019-10-21 PROCEDURE — 99999 PR PBB SHADOW E&M-EST. PATIENT-LVL III: ICD-10-PCS | Mod: PBBFAC,,, | Performed by: INTERNAL MEDICINE

## 2019-10-21 RX ORDER — DOXORUBICIN HYDROCHLORIDE 2 MG/ML
60 INJECTION, SOLUTION INTRAVENOUS
Status: CANCELLED | OUTPATIENT
Start: 2019-10-21

## 2019-10-21 RX ORDER — SODIUM CHLORIDE 0.9 % (FLUSH) 0.9 %
10 SYRINGE (ML) INJECTION
Status: CANCELLED | OUTPATIENT
Start: 2019-10-21

## 2019-10-21 RX ORDER — DOXORUBICIN HYDROCHLORIDE 2 MG/ML
60 INJECTION, SOLUTION INTRAVENOUS
Status: COMPLETED | OUTPATIENT
Start: 2019-10-21 | End: 2019-10-21

## 2019-10-21 RX ORDER — HEPARIN 100 UNIT/ML
500 SYRINGE INTRAVENOUS
Status: CANCELLED | OUTPATIENT
Start: 2019-10-21

## 2019-10-21 RX ORDER — SODIUM CHLORIDE 0.9 % (FLUSH) 0.9 %
10 SYRINGE (ML) INJECTION
Status: DISCONTINUED | OUTPATIENT
Start: 2019-10-21 | End: 2019-10-21 | Stop reason: HOSPADM

## 2019-10-21 RX ORDER — HEPARIN 100 UNIT/ML
500 SYRINGE INTRAVENOUS
Status: DISCONTINUED | OUTPATIENT
Start: 2019-10-21 | End: 2019-10-21 | Stop reason: HOSPADM

## 2019-10-21 RX ADMIN — HEPARIN SODIUM (PORCINE) LOCK FLUSH IV SOLN 100 UNIT/ML 500 UNITS: 100 SOLUTION at 05:10

## 2019-10-21 RX ADMIN — CYCLOPHOSPHAMIDE 1195 MG: 1 INJECTION, POWDER, FOR SOLUTION INTRAVENOUS; ORAL at 04:10

## 2019-10-21 RX ADMIN — Medication 10 ML: at 05:10

## 2019-10-21 RX ADMIN — DOXORUBICIN HYDROCHLORIDE 120 MG: 2 INJECTION, SOLUTION INTRAVENOUS at 04:10

## 2019-10-21 RX ADMIN — SODIUM CHLORIDE: 9 INJECTION, SOLUTION INTRAVENOUS at 02:10

## 2019-10-21 RX ADMIN — APREPITANT 130 MG: 130 INJECTION, EMULSION INTRAVENOUS at 04:10

## 2019-10-21 RX ADMIN — DEXAMETHASONE SODIUM PHOSPHATE: 4 INJECTION, SOLUTION INTRA-ARTICULAR; INTRALESIONAL; INTRAMUSCULAR; INTRAVENOUS; SOFT TISSUE at 03:10

## 2019-10-21 NOTE — PROGRESS NOTES
Subjective:       Patient ID: Maria Elena Tavares is a 63 y.o. female.    Chief Complaint: Breast cancer metastasized to axillary lymph node, left    HPI     Presents for follow up and cycle # 2 of chemotherapy  No fevers, chills, or infectious complaints.  Mild decrease in appetite, taste change.  No nausea or vomiting.  Normal stools.  Fatigue as anticipated and better this week.    Oncology History:  - self detected, noted a shooting pain in breast first and then a week later felt a lump  Some delay in getting appointment as she was unaware she had to call  - 8/30/19 Diagnostic mammogram and ultrasound:  Left breast 20 mm x 18 mm x 17 mm mass at the 3 o'clock position. Assessment: 4 - Suspicious finding. Biopsy is recommended.   Lymph Node: Left axilla 16 mm x 14 mm x 13 mm lymph node. Assessment: 4 - Suspicious finding. Biopsy is recommended.   Right- There is no mammographic or sonographic evidence of malignancy.  BI-RADS Category:   Overall: 4 - Suspicious  - 9/5/19 Ultrasound guided biopsy  Pathology:  FINAL PATHOLOGIC DIAGNOSIS  1. LEFT BREAST MASS, BIOPSY:  - Invasive ductal carcinoma, grade 3, longest linear length is 10 mm measured on the slide.  - Histologic Grade (Hawa Histologic Score)  Glandular (Acinar/Tubular Differentiation): 3.  Nuclear Pleomorphism: 2.  Mitotic Rate: 3.  Overall Grade: Grade 3 (score 8).  - Microcalcifications: Not identified.  - Lymphovascular invasion: Not identified.  2. LYMPH NODE, LEFT AXILLA, BIOPSY:  - Positive for metastatic carcinoma.  - The metastatic deposit measures 6 mm in longest continuous linear length.  Estrogen receptor: Positive, 90% of the tumor nuclei staining moderate to strongly.  Progesterone receptor: Positive, 30% of the tumor nuclei staining weak to moderately.  HER2: Negative.  Ki-67: 60%.  - ECHO 9/20/19: EF 64%  - PET 9/21/19:  Impression         Hypermetabolic left breast mass and regional left axillary lymphadenopathy consistent with reported  breast cancer and corresponding with recent MRI findings.    Upper limit of normal sized right axillary lymph nodes with normal fatty duane and mildly increased radiotracer uptake.  Findings could represent reactive nodes with metastatic nodes thought less likely.  Lymphscintigraphy with injection in the left breast may considered to assess for drainage to the contralateral axilla.      BX 19: Right axilla node biopsy is benign     Gyn Hx:  Menarche- 11  Menopause - partial hysterectomy at age 36 yo  Remainder done in   No HRT  Prior ocps  , age 28 at 1st pregnancy  No breastfeeding      Review of Systems   Constitutional: Positive for appetite change. Negative for activity change, fatigue and unexpected weight change.   HENT: Negative for congestion, dental problem and trouble swallowing.         Taste change noted   Eyes: Negative for visual disturbance.   Respiratory: Negative for cough, shortness of breath and wheezing.    Cardiovascular: Negative for chest pain, palpitations and leg swelling.   Gastrointestinal: Negative for abdominal pain, diarrhea, nausea and vomiting.   Genitourinary: Negative for decreased urine volume, difficulty urinating and frequency.   Musculoskeletal: Negative for arthralgias, back pain and myalgias.   Skin: Negative for rash.   Neurological: Negative for dizziness, weakness, light-headedness, numbness and headaches.   Hematological: Negative for adenopathy. Does not bruise/bleed easily.   Psychiatric/Behavioral: Negative for dysphoric mood. The patient is not nervous/anxious.        Objective:      Physical Exam   Constitutional: She is oriented to person, place, and time. She appears well-developed and well-nourished. No distress.   ECOG=0   HENT:   Head: Normocephalic and atraumatic.   Mouth/Throat: Oropharynx is clear and moist. No oropharyngeal exudate.   Mild thrush tongue   Eyes: Pupils are equal, round, and reactive to light. Conjunctivae and EOM are normal. No  scleral icterus. Right pupil is round and reactive. Left pupil is round and reactive.   Neck: Normal range of motion. Neck supple. No tracheal deviation present. No thyromegaly present.   Cardiovascular: Normal rate, regular rhythm and normal heart sounds. Exam reveals no gallop and no friction rub.   No murmur heard.  Pulmonary/Chest: Effort normal and breath sounds normal. No respiratory distress. She has no wheezes. She has no rales.   Left breast mass approx 2 cm in largest measurement.   No palpable LAD.    Abdominal: Soft. Bowel sounds are normal. She exhibits no distension and no mass. There is no hepatosplenomegaly. There is no tenderness. There is no rebound and no guarding.   No hepatosplenomegaly   Musculoskeletal: Normal range of motion. She exhibits no edema, tenderness or deformity.   Lymphadenopathy:        Head (right side): No submandibular adenopathy present.        Head (left side): No submandibular adenopathy present.     She has no cervical adenopathy.        Right cervical: No superficial cervical, no deep cervical and no posterior cervical adenopathy present.       Left cervical: No superficial cervical, no deep cervical and no posterior cervical adenopathy present.        Right: No inguinal and no supraclavicular adenopathy present.        Left: No inguinal and no supraclavicular adenopathy present.   Neurological: She is alert and oriented to person, place, and time. She has normal strength and normal reflexes. No sensory deficit. Coordination normal.   Skin: Skin is warm and dry. No bruising, no lesion, no petechiae and no rash noted. She is not diaphoretic. No erythema. No pallor.   Psychiatric: She has a normal mood and affect. Her behavior is normal. Judgment and thought content normal. Her mood appears not anxious. She does not exhibit a depressed mood.   Nursing note and vitals reviewed.   Labs- reviewed   Assessment:       1. Breast cancer metastasized to axillary lymph node, left     2. Uncontrolled type 2 diabetes mellitus without complication, without long-term current use of insulin    3. Thrush        Plan:     1. Proceed with cycle # 2 of AC  Counts adequate  eulasta support  Knows to call for any issues    25 minutes total  2. Working on control  3. Rx sent

## 2019-10-22 ENCOUNTER — INFUSION (OUTPATIENT)
Dept: INFUSION THERAPY | Facility: HOSPITAL | Age: 64
End: 2019-10-22
Attending: NURSE PRACTITIONER
Payer: COMMERCIAL

## 2019-10-22 ENCOUNTER — PATIENT MESSAGE (OUTPATIENT)
Dept: SURGERY | Facility: CLINIC | Age: 64
End: 2019-10-22

## 2019-10-22 DIAGNOSIS — C77.3 BREAST CANCER METASTASIZED TO AXILLARY LYMPH NODE, LEFT: Primary | ICD-10-CM

## 2019-10-22 DIAGNOSIS — C50.912 BREAST CANCER METASTASIZED TO AXILLARY LYMPH NODE, LEFT: Primary | ICD-10-CM

## 2019-10-22 DIAGNOSIS — K12.30 MUCOSITIS: Primary | ICD-10-CM

## 2019-10-22 PROBLEM — B37.0 THRUSH: Status: ACTIVE | Noted: 2019-10-22

## 2019-10-22 PROCEDURE — 96372 THER/PROPH/DIAG INJ SC/IM: CPT

## 2019-10-22 PROCEDURE — 63600175 PHARM REV CODE 636 W HCPCS: Mod: TB | Performed by: INTERNAL MEDICINE

## 2019-10-22 RX ADMIN — PEGFILGRASTIM-CBQV 6 MG: 6 INJECTION, SOLUTION SUBCUTANEOUS at 02:10

## 2019-10-22 NOTE — NURSING
Patient received Udencya injection in HERMINIO.  Tolerated well.  Ambulated off unit with steady gait.

## 2019-10-23 ENCOUNTER — PATIENT MESSAGE (OUTPATIENT)
Dept: HEMATOLOGY/ONCOLOGY | Facility: CLINIC | Age: 64
End: 2019-10-23

## 2019-10-23 DIAGNOSIS — C50.912 MALIGNANT NEOPLASM OF LEFT FEMALE BREAST, UNSPECIFIED ESTROGEN RECEPTOR STATUS, UNSPECIFIED SITE OF BREAST: Primary | ICD-10-CM

## 2019-10-23 PROBLEM — C50.919 BREAST CANCER: Status: RESOLVED | Noted: 2019-10-04 | Resolved: 2019-10-23

## 2019-10-23 NOTE — PROGRESS NOTES
Subjective:       Patient ID: Maria Elena Tavares is a 63 y.o. female.    Chief Complaint: Breast Cancer    HPI Patient presents for follow up and cycle 3 of AC for breast cancer. Patient presents with sister. She states she does get nauseated, but this is relived by zofran and phenergan.  She also notes some acid reflex for which takes Mylanta and Prilosec. She also takes  for a darkening of the tongue. No other comlpaints.        Oncology History:  - self detected, noted a shooting pain in breast first and then a week later felt a lump  Some delay in getting appointment as she was unaware she had to call  - 8/30/19 Diagnostic mammogram and ultrasound:  Left breast 20 mm x 18 mm x 17 mm mass at the 3 o'clock position. Assessment: 4 - Suspicious finding. Biopsy is recommended.   Lymph Node: Left axilla 16 mm x 14 mm x 13 mm lymph node. Assessment: 4 - Suspicious finding. Biopsy is recommended.   Right- There is no mammographic or sonographic evidence of malignancy.  BI-RADS Category:   Overall: 4 - Suspicious  - 9/5/19 Ultrasound guided biopsy  Pathology:  FINAL PATHOLOGIC DIAGNOSIS  1. LEFT BREAST MASS, BIOPSY:  - Invasive ductal carcinoma, grade 3, longest linear length is 10 mm measured on the slide.  - Histologic Grade ([place] Histologic Score)  Glandular (Acinar/Tubular Differentiation): 3.  Nuclear Pleomorphism: 2.  Mitotic Rate: 3.  Overall Grade: Grade 3 (score 8).  - Microcalcifications: Not identified.  - Lymphovascular invasion: Not identified.  2. LYMPH NODE, LEFT AXILLA, BIOPSY:  - Positive for metastatic carcinoma.  - The metastatic deposit measures 6 mm in longest continuous linear length.  Estrogen receptor: Positive, 90% of the tumor nuclei staining moderate to strongly.  Progesterone receptor: Positive, 30% of the tumor nuclei staining weak to moderately.  HER2: Negative.  Ki-67: 60%.  - ECHO 9/20/19: EF 64%  - PET 9/21/19:  Impression         Hypermetabolic left breast mass and regional left  axillary lymphadenopathy consistent with reported breast cancer and corresponding with recent MRI findings.    Upper limit of normal sized right axillary lymph nodes with normal fatty duane and mildly increased radiotracer uptake.  Findings could represent reactive nodes with metastatic nodes thought less likely.  Lymphscintigraphy with injection in the left breast may considered to assess for drainage to the contralateral axilla.      BX 19: Right axilla node biopsy is benign     Gyn Hx:  Menarche- 11  Menopause - partial hysterectomy at age 34 yo  Remainder done in   No HRT  Prior ocps  , age 28 at 1st pregnancy  No breastfeeding      Review of Systems   Constitutional: Negative for activity change, appetite change, fatigue, fever and unexpected weight change.   HENT: Negative for congestion, dental problem and trouble swallowing.         Taste change noted  Darkening of tongue noted  Acid reflux   Eyes: Negative for visual disturbance.   Respiratory: Negative for cough, shortness of breath and wheezing.    Cardiovascular: Negative for chest pain, palpitations and leg swelling.   Gastrointestinal: Positive for nausea. Negative for abdominal pain, diarrhea and vomiting.   Genitourinary: Negative for decreased urine volume, difficulty urinating and frequency.   Musculoskeletal: Negative for arthralgias, back pain and myalgias.   Skin: Negative for rash.   Neurological: Negative for dizziness, weakness, light-headedness, numbness and headaches.   Hematological: Negative for adenopathy. Does not bruise/bleed easily.   Psychiatric/Behavioral: Negative for dysphoric mood. The patient is not nervous/anxious.        Objective:      Physical Exam   Constitutional: She is oriented to person, place, and time. She appears well-developed and well-nourished. No distress.   ECOG=0  Presents with sister   HENT:   Head: Normocephalic and atraumatic.   Mouth/Throat: Oropharynx is clear and moist. No oropharyngeal  exudate.   Darkening of tongue noted     Eyes: Pupils are equal, round, and reactive to light. Conjunctivae and EOM are normal. No scleral icterus. Right pupil is round and reactive. Left pupil is round and reactive.   Neck: Normal range of motion. Neck supple. No tracheal deviation present. No thyromegaly present.   Cardiovascular: Normal rate, regular rhythm and normal heart sounds. Exam reveals no gallop and no friction rub.   No murmur heard.  Pulmonary/Chest: Effort normal and breath sounds normal. No respiratory distress. She has no wheezes. She has no rales.   Left breast mass approx 2 cm in largest measurement.   No palpable LAD.    Abdominal: Soft. Bowel sounds are normal. She exhibits no distension and no mass. There is no hepatosplenomegaly. There is no tenderness. There is no rebound and no guarding.   No hepatosplenomegaly   Musculoskeletal: Normal range of motion. She exhibits no edema, tenderness or deformity.   Lymphadenopathy:        Head (right side): No submandibular adenopathy present.        Head (left side): No submandibular adenopathy present.     She has no cervical adenopathy.        Right cervical: No superficial cervical, no deep cervical and no posterior cervical adenopathy present.       Left cervical: No superficial cervical, no deep cervical and no posterior cervical adenopathy present.        Right: No supraclavicular adenopathy present.        Left: No supraclavicular adenopathy present.   Neurological: She is alert and oriented to person, place, and time. She has normal strength and normal reflexes. No sensory deficit. Coordination normal.   Skin: Skin is warm and dry. No bruising, no lesion, no petechiae and no rash noted. She is not diaphoretic. No erythema. No pallor.   Psychiatric: She has a normal mood and affect. Her behavior is normal. Judgment and thought content normal. Her mood appears not anxious. She does not exhibit a depressed mood.   Nursing note and vitals  reviewed.   Labs- reviewed; Glucose elevated will monitor    Assessment:       1. Breast cancer metastasized to axillary lymph node, left    2. Acquired hypothyroidism    3. Uncontrolled type 2 diabetes mellitus without complication, without long-term current use of insulin    4. Essential hypertension        Plan:     1. Proceed with cycle # 3 of AC  Counts adequate  Neulasta support  Knows to call for any issues    2. Continue current dose of synthroid and follow up with PCP  3. Continue metformin and trulicity and follow up with endocrinology - will watch as glucose was 214 today (taking dexamethasone with chemothearpy)   4. Continue irbesartan and follow up with PCP    Patient is in agreement with the proposed treatment plan. All questions were answered to the patient's satisfaction. Patient knows to call clinic for any new or worsening symptoms and if anything is needed before the next clinic visit.          Elsa Reilly, BRITT-C  Hematology & Medical Oncology   [facility] [place] [address]  ph. [phone]  Fax. [phone]    Patient dicussed with collaborating physician, Dr. Villagran.

## 2019-10-24 ENCOUNTER — PATIENT MESSAGE (OUTPATIENT)
Dept: HEMATOLOGY/ONCOLOGY | Facility: CLINIC | Age: 64
End: 2019-10-24

## 2019-10-24 DIAGNOSIS — K12.30 MUCOSITIS: ICD-10-CM

## 2019-10-25 ENCOUNTER — PATIENT MESSAGE (OUTPATIENT)
Dept: HEMATOLOGY/ONCOLOGY | Facility: CLINIC | Age: 64
End: 2019-10-25

## 2019-10-25 ENCOUNTER — HOSPITAL ENCOUNTER (EMERGENCY)
Facility: OTHER | Age: 64
Discharge: HOME | End: 2019-10-25
Attending: EMERGENCY MEDICINE
Payer: COMMERCIAL

## 2019-10-25 VITALS
HEIGHT: 60 IN | SYSTOLIC BLOOD PRESSURE: 118 MMHG | WEIGHT: 199.31 LBS | RESPIRATION RATE: 18 BRPM | BODY MASS INDEX: 39.13 KG/M2 | OXYGEN SATURATION: 97 % | TEMPERATURE: 98 F | DIASTOLIC BLOOD PRESSURE: 60 MMHG | HEART RATE: 88 BPM

## 2019-10-25 DIAGNOSIS — R11.2 NON-INTRACTABLE VOMITING WITH NAUSEA, UNSPECIFIED VOMITING TYPE: ICD-10-CM

## 2019-10-25 DIAGNOSIS — R10.13 ABDOMINAL PAIN, ACUTE, EPIGASTRIC: Primary | ICD-10-CM

## 2019-10-25 DIAGNOSIS — R10.13 EPIGASTRIC PAIN: ICD-10-CM

## 2019-10-25 LAB
ALBUMIN SERPL BCP-MCNC: 3.8 G/DL (ref 3.5–5.2)
ALP SERPL-CCNC: 215 U/L (ref 55–135)
ALT SERPL W/O P-5'-P-CCNC: 15 U/L (ref 10–44)
ANION GAP SERPL CALC-SCNC: 13 MMOL/L (ref 8–16)
ANISOCYTOSIS BLD QL SMEAR: SLIGHT
AST SERPL-CCNC: 20 U/L (ref 10–40)
BACTERIA #/AREA URNS HPF: ABNORMAL /HPF
BASOPHILS # BLD AUTO: ABNORMAL K/UL (ref 0–0.2)
BASOPHILS NFR BLD: 0 % (ref 0–1.9)
BILIRUB SERPL-MCNC: 0.7 MG/DL (ref 0.1–1)
BILIRUB UR QL STRIP: NEGATIVE
BUN SERPL-MCNC: 24 MG/DL (ref 8–23)
CALCIUM SERPL-MCNC: 9.7 MG/DL (ref 8.7–10.5)
CHLORIDE SERPL-SCNC: 101 MMOL/L (ref 95–110)
CLARITY UR: CLEAR
CO2 SERPL-SCNC: 23 MMOL/L (ref 23–29)
COLOR UR: YELLOW
CREAT SERPL-MCNC: 1.1 MG/DL (ref 0.5–1.4)
DIFFERENTIAL METHOD: ABNORMAL
EOSINOPHIL # BLD AUTO: ABNORMAL K/UL (ref 0–0.5)
EOSINOPHIL NFR BLD: 0 % (ref 0–8)
ERYTHROCYTE [DISTWIDTH] IN BLOOD BY AUTOMATED COUNT: 14.9 % (ref 11.5–14.5)
EST. GFR  (AFRICAN AMERICAN): >60 ML/MIN/1.73 M^2
EST. GFR  (NON AFRICAN AMERICAN): 54 ML/MIN/1.73 M^2
GLUCOSE SERPL-MCNC: 169 MG/DL (ref 70–110)
GLUCOSE UR QL STRIP: NEGATIVE
HCT VFR BLD AUTO: 35.8 % (ref 37–48.5)
HGB BLD-MCNC: 11.8 G/DL (ref 12–16)
HGB UR QL STRIP: NEGATIVE
HYALINE CASTS #/AREA URNS LPF: 0 /LPF
IMM GRANULOCYTES # BLD AUTO: ABNORMAL K/UL (ref 0–0.04)
IMM GRANULOCYTES NFR BLD AUTO: ABNORMAL % (ref 0–0.5)
KETONES UR QL STRIP: NEGATIVE
LEUKOCYTE ESTERASE UR QL STRIP: ABNORMAL
LIPASE SERPL-CCNC: 4 U/L (ref 4–60)
LYMPHOCYTES # BLD AUTO: ABNORMAL K/UL (ref 1–4.8)
LYMPHOCYTES NFR BLD: 2 % (ref 18–48)
MCH RBC QN AUTO: 28.9 PG (ref 27–31)
MCHC RBC AUTO-ENTMCNC: 33 G/DL (ref 32–36)
MCV RBC AUTO: 88 FL (ref 82–98)
MICROSCOPIC COMMENT: ABNORMAL
MONOCYTES # BLD AUTO: ABNORMAL K/UL (ref 0.3–1)
MONOCYTES NFR BLD: 1 % (ref 4–15)
NEUTROPHILS NFR BLD: 92 % (ref 38–73)
NEUTS BAND NFR BLD MANUAL: 5 %
NITRITE UR QL STRIP: NEGATIVE
NON-SQ EPI CELLS #/AREA URNS HPF: 0 /HPF
NRBC BLD-RTO: 0 /100 WBC
PH UR STRIP: 6 [PH] (ref 5–8)
PLATELET # BLD AUTO: 264 K/UL (ref 150–350)
PLATELET BLD QL SMEAR: ABNORMAL
PMV BLD AUTO: 10.4 FL (ref 9.2–12.9)
POTASSIUM SERPL-SCNC: 3.3 MMOL/L (ref 3.5–5.1)
PROT SERPL-MCNC: 7.6 G/DL (ref 6–8.4)
PROT UR QL STRIP: NEGATIVE
RBC # BLD AUTO: 4.09 M/UL (ref 4–5.4)
RBC #/AREA URNS HPF: 0 /HPF (ref 0–4)
SODIUM SERPL-SCNC: 137 MMOL/L (ref 136–145)
SP GR UR STRIP: 1.02 (ref 1–1.03)
SQUAMOUS #/AREA URNS HPF: 3 /HPF
URN SPEC COLLECT METH UR: ABNORMAL
UROBILINOGEN UR STRIP-ACNC: NEGATIVE EU/DL
WBC # BLD AUTO: 48.66 K/UL (ref 3.9–12.7)
WBC #/AREA URNS HPF: 8 /HPF (ref 0–5)
WBC CLUMPS URNS QL MICRO: ABNORMAL
YEAST URNS QL MICRO: ABNORMAL

## 2019-10-25 PROCEDURE — 85027 COMPLETE CBC AUTOMATED: CPT

## 2019-10-25 PROCEDURE — 85007 BL SMEAR W/DIFF WBC COUNT: CPT

## 2019-10-25 PROCEDURE — 25000003 PHARM REV CODE 250: Performed by: PHYSICIAN ASSISTANT

## 2019-10-25 PROCEDURE — 63600175 PHARM REV CODE 636 W HCPCS: Performed by: PHYSICIAN ASSISTANT

## 2019-10-25 PROCEDURE — 80053 COMPREHEN METABOLIC PANEL: CPT

## 2019-10-25 PROCEDURE — 93010 EKG 12-LEAD: ICD-10-PCS | Mod: ,,, | Performed by: INTERNAL MEDICINE

## 2019-10-25 PROCEDURE — 96374 THER/PROPH/DIAG INJ IV PUSH: CPT

## 2019-10-25 PROCEDURE — 99284 EMERGENCY DEPT VISIT MOD MDM: CPT | Mod: 25

## 2019-10-25 PROCEDURE — 83690 ASSAY OF LIPASE: CPT

## 2019-10-25 PROCEDURE — 93010 ELECTROCARDIOGRAM REPORT: CPT | Mod: ,,, | Performed by: INTERNAL MEDICINE

## 2019-10-25 PROCEDURE — 93005 ELECTROCARDIOGRAM TRACING: CPT

## 2019-10-25 PROCEDURE — 81000 URINALYSIS NONAUTO W/SCOPE: CPT

## 2019-10-25 RX ORDER — OMEPRAZOLE 20 MG/1
20 CAPSULE, DELAYED RELEASE ORAL DAILY
Qty: 30 CAPSULE | Refills: 0 | Status: SHIPPED | OUTPATIENT
Start: 2019-10-25 | End: 2020-03-11

## 2019-10-25 RX ORDER — ONDANSETRON 4 MG/1
4 TABLET, ORALLY DISINTEGRATING ORAL EVERY 6 HOURS PRN
Qty: 15 TABLET | Refills: 0 | Status: SHIPPED | OUTPATIENT
Start: 2019-10-25 | End: 2019-11-04 | Stop reason: SDUPTHER

## 2019-10-25 RX ORDER — ONDANSETRON 2 MG/ML
4 INJECTION INTRAMUSCULAR; INTRAVENOUS
Status: COMPLETED | OUTPATIENT
Start: 2019-10-25 | End: 2019-10-25

## 2019-10-25 RX ADMIN — ALUMINUM HYDROXIDE, MAGNESIUM HYDROXIDE, DIMETHICONE 50 ML: 200; 200; 20 LIQUID ORAL at 11:10

## 2019-10-25 RX ADMIN — ONDANSETRON 4 MG: 2 INJECTION INTRAMUSCULAR; INTRAVENOUS at 11:10

## 2019-10-25 NOTE — ED TRIAGE NOTES
Pt reports epigastric burning with several episodes of vomiting since this am. Pt reports the pain has now subsided. She denies diarrhea

## 2019-10-25 NOTE — TELEPHONE ENCOUNTER
"Reached out to patient per her request. Previous message stating that she is experiencing what she believes to be "severe heartburn/acid reflux".  Discussed with patients the symptoms she is exhibiting. Per her tone, patient appears to be in active distress. She states the pain she is experiencing is epigastric in nature per her report.  She states it was acute sudden onset starting around 5 a.m. This morning after she went to the [facility]. SHe states she did have a BM, but since the pain has been present   She does not take any long acting heart burn relief such as PPI priolosec of H2-Receptor blocker with pepcid. She did try both pepto bismol and tums this morning with water, however, the water and tums she took came right back.  She describes the pain as intermittent and sharp in nature when active  Given the extent of her pain, advised patient would be ideal for  Her to go to the ED for evaluation and cardiac work up if warranted to assure nothing acute is going on.  She voiced understanding and will be heading there now. Advised patient to inform the ED that she is on active chemo and was last treated on 10/21. She plans to report to [facility] ED.  Will trend notes.    "

## 2019-10-25 NOTE — ED NOTES
Pt rounding Complete, Pt states that she would like some water, pt also states that at this time she is comfortable and that she does not need anything else as of now, pt states that the phlebotomist who yuan her blood at the end was great. Bed rails are up and call light is within patient reach.

## 2019-10-25 NOTE — ED PROVIDER NOTES
"Encounter Date: 10/25/2019       History     Chief Complaint   Patient presents with    Abdominal Pain     Pt c/o epigastric pain after "a couple of bowel movements" at 5 am. Pt c/o vomiting X 2 . Pt had recent chemo on 10/22 for left breast ca.     Patient is a 63-year-old female with history of diabetes, hypertension, hyperlipidemia, thyroid disease, and breast cancer who is currently on chemo who presents the emergency department with abdominal pain, nausea, and vomiting. Patient took her 2nd treatment on Monday.  She states she was feeling nauseated for a couple of days.  She states yesterday she was not able to eat much.  She states she woke up this morning with extreme nausea and vomiting.  She reports right upper quadrant abdominal pain and epigastric pain.  She states the pain becomes so severe in waves that it causes her to scream.  She denies fevers.  She denies chills. She denies blood per rectum.    The history is provided by the patient.     Review of patient's allergies indicates:  No Known Allergies  Past Medical History:   Diagnosis Date    BMI 37.0-37.9, adult     Diabetes mellitus type II     Diverticulosis     history of diverticulosisseen on colonoscopy at age 48. Repeat recommended at age 58. Done by     Elevated blood protein     History of elevated protein. Apparently has seen  for extensive workup including bone marrow biopsy    History of shingles 2006    Hyperlipidemia     Hypertension     Microalbuminuria     due 2 diabetes    Mild vitamin D deficiency     . A low vitamin D    Thyroid disease     hypothyroidism     Past Surgical History:   Procedure Laterality Date    HYSTERECTOMY      INSERTION OF TUNNELED CENTRAL VENOUS CATHETER (CVC) WITH SUBCUTANEOUS PORT Right 10/4/2019    Procedure: [id number] RIGHT (CONSENT AM OF) 1.0 hr case;  Surgeon: Elena Lopez MD;  Location: [facility] OR [address] [place] [address];  Service: General;  Laterality: Right;    OOPHORECTOMY   "     Family History   Problem Relation Age of Onset    Cancer Mother         lung ca heavy smoker    Lung cancer Mother     Cancer Father     Lung cancer Father     Hypertension Sister     No Known Problems Sister     No Known Problems Daughter     Hypothyroidism Sister     Diabetes Maternal Aunt     Cancer Maternal Aunt     No Known Problems Maternal Grandmother     Breast cancer Paternal Aunt     No Known Problems Paternal Uncle     Lung cancer Maternal Grandfather     No Known Problems Paternal Grandmother     No Known Problems Paternal Grandfather     Amblyopia Neg Hx     Blindness Neg Hx     Cataracts Neg Hx     Glaucoma Neg Hx     Macular degeneration Neg Hx     Retinal detachment Neg Hx     Strabismus Neg Hx     Stroke Neg Hx     Thyroid disease Neg Hx      Social History     Tobacco Use    Smoking status: Never Smoker    Smokeless tobacco: Never Used    Tobacco comment: 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  She walks occasionally.   Substance Use Topics    Alcohol use: Yes     Comment: Occasionally    Drug use: No     Review of Systems   Constitutional: Positive for activity change and appetite change. Negative for chills, fatigue and fever.   HENT: Negative for congestion, ear discharge, ear pain, postnasal drip, rhinorrhea, sore throat and trouble swallowing.    Respiratory: Negative for cough and shortness of breath.    Cardiovascular: Negative for chest pain.   Gastrointestinal: Positive for abdominal pain, nausea and vomiting. Negative for blood in stool, constipation and diarrhea.   Genitourinary: Negative for dysuria, flank pain and hematuria.   Musculoskeletal: Negative for back pain, neck pain and neck stiffness.   Skin: Negative for rash and wound.   Neurological: Negative for dizziness, weakness, light-headedness and headaches.       Physical Exam     Initial Vitals [10/25/19 0959]   BP Pulse Resp Temp SpO2   110/67 92 18 98 °F (36.7  °C) 100 %      MAP       --         Physical Exam    Nursing note and vitals reviewed.  Constitutional: She appears well-developed and well-nourished. She is not diaphoretic.  Non-toxic appearance. No distress.   HENT:   Head: Normocephalic.   Right Ear: External ear normal.   Left Ear: External ear normal.   Nose: Nose normal.   Mouth/Throat: Oropharynx is clear and moist. No oropharyngeal exudate.   Eyes: Conjunctivae are normal. Pupils are equal, round, and reactive to light.   Neck: Normal range of motion.   Cardiovascular: Normal rate and regular rhythm.   Pulmonary/Chest: Breath sounds normal.   Abdominal: Soft. Bowel sounds are normal. She exhibits no distension and no mass. There is tenderness in the right upper quadrant and epigastric area. There is guarding. There is no rebound.   Lymphadenopathy:     She has no cervical adenopathy.   Neurological: She is alert and oriented to person, place, and time.   Skin: Skin is warm and dry. Capillary refill takes less than 2 seconds.   Psychiatric: She has a normal mood and affect.         ED Course   Procedures  Labs Reviewed   CBC W/ AUTO DIFFERENTIAL   COMPREHENSIVE METABOLIC PANEL   LIPASE   URINALYSIS, REFLEX TO URINE CULTURE   URINALYSIS MICROSCOPIC          Imaging Results    None          Medical Decision Making:   Initial Assessment:   Urgent evaluation of 63-year-old female with history of hypertension, hyperlipidemia, diabetes, and breast cancer currently on chemo who presents to the emergency department with abdominal pain and nausea and vomiting. Patient is afebrile, nontoxic appearing, and hemodynamically stable.  Dry mucous membranes on exam.  There is tenderness in the epigastric and right upper quadrant with guarding.  With patient's immunocompromised state, I do feel that further workup is warranted.  Labs will be obtained.  Ultrasound will be obtained to evaluate for acute cholecystitis.  Case will be discussed with Dr. Chase and handed over at  change of shift.  Clinical Tests:   Lab Tests: Ordered and Reviewed  Radiological Study: Ordered and Reviewed  Other:   I have discussed this case with another health care provider.       <> Summary of the Discussion: Dr. Chase                      Clinical Impression:       ICD-10-CM ICD-9-CM   1. Epigastric pain R10.13 789.06                                Janeth Kennedy PA-C  10/25/19 1158

## 2019-11-04 ENCOUNTER — PATIENT MESSAGE (OUTPATIENT)
Dept: PRIMARY CARE CLINIC | Facility: CLINIC | Age: 64
End: 2019-11-04

## 2019-11-04 ENCOUNTER — OFFICE VISIT (OUTPATIENT)
Dept: HEMATOLOGY/ONCOLOGY | Facility: CLINIC | Age: 64
End: 2019-11-04
Payer: COMMERCIAL

## 2019-11-04 ENCOUNTER — TELEPHONE (OUTPATIENT)
Dept: HEMATOLOGY/ONCOLOGY | Facility: CLINIC | Age: 64
End: 2019-11-04

## 2019-11-04 ENCOUNTER — INFUSION (OUTPATIENT)
Dept: INFUSION THERAPY | Facility: HOSPITAL | Age: 64
End: 2019-11-04
Attending: NURSE PRACTITIONER
Payer: COMMERCIAL

## 2019-11-04 ENCOUNTER — LAB VISIT (OUTPATIENT)
Dept: LAB | Facility: HOSPITAL | Age: 64
End: 2019-11-04
Payer: COMMERCIAL

## 2019-11-04 VITALS
HEIGHT: 60 IN | TEMPERATURE: 98 F | WEIGHT: 198 LBS | OXYGEN SATURATION: 100 % | DIASTOLIC BLOOD PRESSURE: 74 MMHG | SYSTOLIC BLOOD PRESSURE: 125 MMHG | RESPIRATION RATE: 16 BRPM | HEART RATE: 92 BPM | BODY MASS INDEX: 38.87 KG/M2

## 2019-11-04 VITALS
RESPIRATION RATE: 16 BRPM | SYSTOLIC BLOOD PRESSURE: 123 MMHG | TEMPERATURE: 98 F | HEART RATE: 101 BPM | DIASTOLIC BLOOD PRESSURE: 66 MMHG

## 2019-11-04 DIAGNOSIS — R11.0 CHEMOTHERAPY-INDUCED NAUSEA: ICD-10-CM

## 2019-11-04 DIAGNOSIS — T45.1X5A CHEMOTHERAPY-INDUCED NAUSEA: ICD-10-CM

## 2019-11-04 DIAGNOSIS — E03.9 ACQUIRED HYPOTHYROIDISM: ICD-10-CM

## 2019-11-04 DIAGNOSIS — C50.912 BREAST CANCER METASTASIZED TO AXILLARY LYMPH NODE, LEFT: ICD-10-CM

## 2019-11-04 DIAGNOSIS — C77.3 BREAST CANCER METASTASIZED TO AXILLARY LYMPH NODE, LEFT: ICD-10-CM

## 2019-11-04 DIAGNOSIS — C77.3 BREAST CANCER METASTASIZED TO AXILLARY LYMPH NODE, LEFT: Primary | ICD-10-CM

## 2019-11-04 DIAGNOSIS — I10 ESSENTIAL HYPERTENSION: ICD-10-CM

## 2019-11-04 DIAGNOSIS — I10 HYPERTENSION, UNSPECIFIED TYPE: Primary | ICD-10-CM

## 2019-11-04 DIAGNOSIS — C50.912 BREAST CANCER METASTASIZED TO AXILLARY LYMPH NODE, LEFT: Primary | ICD-10-CM

## 2019-11-04 LAB
ALBUMIN SERPL BCP-MCNC: 3.8 G/DL (ref 3.5–5.2)
ALP SERPL-CCNC: 153 U/L (ref 55–135)
ALT SERPL W/O P-5'-P-CCNC: 14 U/L (ref 10–44)
ANION GAP SERPL CALC-SCNC: 10 MMOL/L (ref 8–16)
ANISOCYTOSIS BLD QL SMEAR: SLIGHT
AST SERPL-CCNC: 11 U/L (ref 10–40)
BASOPHILS # BLD AUTO: ABNORMAL K/UL (ref 0–0.2)
BASOPHILS NFR BLD: 0 % (ref 0–1.9)
BILIRUB SERPL-MCNC: 0.3 MG/DL (ref 0.1–1)
BUN SERPL-MCNC: 18 MG/DL (ref 8–23)
CALCIUM SERPL-MCNC: 9.7 MG/DL (ref 8.7–10.5)
CHLORIDE SERPL-SCNC: 100 MMOL/L (ref 95–110)
CO2 SERPL-SCNC: 27 MMOL/L (ref 23–29)
CREAT SERPL-MCNC: 1 MG/DL (ref 0.5–1.4)
DIFFERENTIAL METHOD: ABNORMAL
EOSINOPHIL # BLD AUTO: ABNORMAL K/UL (ref 0–0.5)
EOSINOPHIL NFR BLD: 0 % (ref 0–8)
ERYTHROCYTE [DISTWIDTH] IN BLOOD BY AUTOMATED COUNT: 15.2 % (ref 11.5–14.5)
EST. GFR  (AFRICAN AMERICAN): >60 ML/MIN/1.73 M^2
EST. GFR  (NON AFRICAN AMERICAN): >60 ML/MIN/1.73 M^2
GLUCOSE SERPL-MCNC: 214 MG/DL (ref 70–110)
HCT VFR BLD AUTO: 33.8 % (ref 37–48.5)
HGB BLD-MCNC: 10.5 G/DL (ref 12–16)
IMM GRANULOCYTES # BLD AUTO: ABNORMAL K/UL (ref 0–0.04)
IMM GRANULOCYTES NFR BLD AUTO: ABNORMAL % (ref 0–0.5)
LYMPHOCYTES # BLD AUTO: ABNORMAL K/UL (ref 1–4.8)
LYMPHOCYTES NFR BLD: 4 % (ref 18–48)
MCH RBC QN AUTO: 29.3 PG (ref 27–31)
MCHC RBC AUTO-ENTMCNC: 31.1 G/DL (ref 32–36)
MCV RBC AUTO: 94 FL (ref 82–98)
METAMYELOCYTES NFR BLD MANUAL: 1 %
MONOCYTES # BLD AUTO: ABNORMAL K/UL (ref 0.3–1)
MONOCYTES NFR BLD: 6.5 % (ref 4–15)
MYELOCYTES NFR BLD MANUAL: 1 %
NEUTROPHILS NFR BLD: 84.5 % (ref 38–73)
NEUTS BAND NFR BLD MANUAL: 3 %
NRBC BLD-RTO: 0 /100 WBC
PAPPENHEIMER BOD BLD QL SMEAR: PRESENT
PLATELET # BLD AUTO: 230 K/UL (ref 150–350)
PLATELET BLD QL SMEAR: ABNORMAL
PMV BLD AUTO: 10.2 FL (ref 9.2–12.9)
POLYCHROMASIA BLD QL SMEAR: ABNORMAL
POTASSIUM SERPL-SCNC: 3.2 MMOL/L (ref 3.5–5.1)
PROT SERPL-MCNC: 7.7 G/DL (ref 6–8.4)
RBC # BLD AUTO: 3.58 M/UL (ref 4–5.4)
SODIUM SERPL-SCNC: 137 MMOL/L (ref 136–145)
WBC # BLD AUTO: 39.65 K/UL (ref 3.9–12.7)

## 2019-11-04 PROCEDURE — 96413 CHEMO IV INFUSION 1 HR: CPT

## 2019-11-04 PROCEDURE — 63600175 PHARM REV CODE 636 W HCPCS: Performed by: INTERNAL MEDICINE

## 2019-11-04 PROCEDURE — 99214 PR OFFICE/OUTPT VISIT, EST, LEVL IV, 30-39 MIN: ICD-10-PCS | Mod: S$GLB,,, | Performed by: NURSE PRACTITIONER

## 2019-11-04 PROCEDURE — 96367 TX/PROPH/DG ADDL SEQ IV INF: CPT

## 2019-11-04 PROCEDURE — 85027 COMPLETE CBC AUTOMATED: CPT

## 2019-11-04 PROCEDURE — 99214 OFFICE O/P EST MOD 30 MIN: CPT | Mod: S$GLB,,, | Performed by: NURSE PRACTITIONER

## 2019-11-04 PROCEDURE — 36415 COLL VENOUS BLD VENIPUNCTURE: CPT

## 2019-11-04 PROCEDURE — 99999 PR PBB SHADOW E&M-EST. PATIENT-LVL V: ICD-10-PCS | Mod: PBBFAC,,, | Performed by: NURSE PRACTITIONER

## 2019-11-04 PROCEDURE — A4216 STERILE WATER/SALINE, 10 ML: HCPCS | Performed by: INTERNAL MEDICINE

## 2019-11-04 PROCEDURE — 96375 TX/PRO/DX INJ NEW DRUG ADDON: CPT

## 2019-11-04 PROCEDURE — 85007 BL SMEAR W/DIFF WBC COUNT: CPT

## 2019-11-04 PROCEDURE — 25000003 PHARM REV CODE 250: Performed by: INTERNAL MEDICINE

## 2019-11-04 PROCEDURE — 80053 COMPREHEN METABOLIC PANEL: CPT

## 2019-11-04 PROCEDURE — 63600175 PHARM REV CODE 636 W HCPCS: Mod: TB | Performed by: NURSE PRACTITIONER

## 2019-11-04 PROCEDURE — 96411 CHEMO IV PUSH ADDL DRUG: CPT

## 2019-11-04 PROCEDURE — 99999 PR PBB SHADOW E&M-EST. PATIENT-LVL V: CPT | Mod: PBBFAC,,, | Performed by: NURSE PRACTITIONER

## 2019-11-04 RX ORDER — HEPARIN 100 UNIT/ML
500 SYRINGE INTRAVENOUS
Status: CANCELLED | OUTPATIENT
Start: 2019-11-04

## 2019-11-04 RX ORDER — CHLORTHALIDONE 25 MG/1
25 TABLET ORAL EVERY MORNING
Qty: 90 TABLET | Refills: 1 | Status: CANCELLED | OUTPATIENT
Start: 2019-11-04 | End: 2020-11-03

## 2019-11-04 RX ORDER — ONDANSETRON 4 MG/1
4 TABLET, ORALLY DISINTEGRATING ORAL EVERY 6 HOURS PRN
Qty: 60 TABLET | Refills: 2 | Status: SHIPPED | OUTPATIENT
Start: 2019-11-04 | End: 2019-12-02 | Stop reason: SDUPTHER

## 2019-11-04 RX ORDER — SODIUM CHLORIDE 0.9 % (FLUSH) 0.9 %
10 SYRINGE (ML) INJECTION
Status: DISCONTINUED | OUTPATIENT
Start: 2019-11-04 | End: 2019-11-04 | Stop reason: HOSPADM

## 2019-11-04 RX ORDER — DEXAMETHASONE 4 MG/1
TABLET ORAL
Qty: 24 TABLET | Refills: 0 | Status: SHIPPED | OUTPATIENT
Start: 2019-11-04 | End: 2020-03-11

## 2019-11-04 RX ORDER — DOXORUBICIN HYDROCHLORIDE 2 MG/ML
60 INJECTION, SOLUTION INTRAVENOUS
Status: COMPLETED | OUTPATIENT
Start: 2019-11-04 | End: 2019-11-04

## 2019-11-04 RX ORDER — DOXORUBICIN HYDROCHLORIDE 2 MG/ML
60 INJECTION, SOLUTION INTRAVENOUS
Status: CANCELLED | OUTPATIENT
Start: 2019-11-04

## 2019-11-04 RX ORDER — HEPARIN 100 UNIT/ML
500 SYRINGE INTRAVENOUS
Status: DISCONTINUED | OUTPATIENT
Start: 2019-11-04 | End: 2019-11-04 | Stop reason: HOSPADM

## 2019-11-04 RX ORDER — SODIUM CHLORIDE 0.9 % (FLUSH) 0.9 %
10 SYRINGE (ML) INJECTION
Status: CANCELLED | OUTPATIENT
Start: 2019-11-04

## 2019-11-04 RX ORDER — ONDANSETRON 4 MG/1
4 TABLET, ORALLY DISINTEGRATING ORAL EVERY 6 HOURS PRN
Qty: 15 TABLET | Refills: 0 | Status: SHIPPED | OUTPATIENT
Start: 2019-11-04 | End: 2019-11-04 | Stop reason: SDUPTHER

## 2019-11-04 RX ORDER — IRBESARTAN 300 MG/1
300 TABLET ORAL DAILY
Qty: 90 TABLET | Refills: 1 | Status: SHIPPED | OUTPATIENT
Start: 2019-11-04 | End: 2020-03-11

## 2019-11-04 RX ADMIN — Medication 10 ML: at 04:11

## 2019-11-04 RX ADMIN — SODIUM CHLORIDE: 9 INJECTION, SOLUTION INTRAVENOUS at 01:11

## 2019-11-04 RX ADMIN — CYCLOPHOSPHAMIDE 1195 MG: 1 INJECTION, POWDER, FOR SOLUTION INTRAVENOUS; ORAL at 03:11

## 2019-11-04 RX ADMIN — HEPARIN 500 UNITS: 100 SYRINGE at 04:11

## 2019-11-04 RX ADMIN — DOXORUBICIN HYDROCHLORIDE 120 MG: 2 INJECTION, SOLUTION INTRAVENOUS at 03:11

## 2019-11-04 RX ADMIN — DEXAMETHASONE SODIUM PHOSPHATE: 4 INJECTION, SOLUTION INTRA-ARTICULAR; INTRALESIONAL; INTRAMUSCULAR; INTRAVENOUS; SOFT TISSUE at 02:11

## 2019-11-04 RX ADMIN — APREPITANT 130 MG: 130 INJECTION, EMULSION INTRAVENOUS at 02:11

## 2019-11-04 NOTE — TELEPHONE ENCOUNTER
Returned call to Leonila with lab.   Leonila calling to inform that pt had [facility] labs drawn and kit in lab.   Thanked Leonila for update.        ----- Message from Dolores Dinero sent at 11/4/2019  2:56 PM CST -----  Contact: leonila from Lab  Leonila from Lab  called to speak with nurse greg   Callback#[id number]  Thank You  LAUREN Dinero

## 2019-11-05 ENCOUNTER — INFUSION (OUTPATIENT)
Dept: INFUSION THERAPY | Facility: HOSPITAL | Age: 64
End: 2019-11-05
Attending: NURSE PRACTITIONER
Payer: COMMERCIAL

## 2019-11-05 ENCOUNTER — TELEPHONE (OUTPATIENT)
Dept: HEMATOLOGY/ONCOLOGY | Facility: CLINIC | Age: 64
End: 2019-11-05

## 2019-11-05 DIAGNOSIS — C77.3 BREAST CANCER METASTASIZED TO AXILLARY LYMPH NODE, LEFT: Primary | ICD-10-CM

## 2019-11-05 DIAGNOSIS — C50.912 BREAST CANCER METASTASIZED TO AXILLARY LYMPH NODE, LEFT: Primary | ICD-10-CM

## 2019-11-05 DIAGNOSIS — I10 ESSENTIAL HYPERTENSION: ICD-10-CM

## 2019-11-05 PROCEDURE — 96372 THER/PROPH/DIAG INJ SC/IM: CPT

## 2019-11-05 PROCEDURE — 63600175 PHARM REV CODE 636 W HCPCS: Mod: TB | Performed by: INTERNAL MEDICINE

## 2019-11-05 RX ORDER — CHLORTHALIDONE 25 MG/1
25 TABLET ORAL EVERY MORNING
Qty: 90 TABLET | Refills: 1 | Status: CANCELLED | OUTPATIENT
Start: 2019-11-04 | End: 2020-11-03

## 2019-11-05 RX ADMIN — PEGFILGRASTIM-CBQV 6 MG: 6 INJECTION, SOLUTION SUBCUTANEOUS at 02:11

## 2019-11-05 NOTE — TELEPHONE ENCOUNTER
Returned call, spoke with patient and confirm her apts for 11/19 and 11/20 and what they were for.

## 2019-11-05 NOTE — NURSING
Pt tolerated Udencya Injection in the right arm. NAD. Pt discharge home. Ambulated independently.

## 2019-11-05 NOTE — TELEPHONE ENCOUNTER
----- Message from Theodore Goncalves sent at 11/5/2019  9:34 AM CST -----  Contact: Patient  Staff Message     Caller name: Pt    Reason for call: Pt wants 11/19 and 11/20 infusion and f/u appts on the same day if avail. Wants a call to confirm        Communication Preference: Ext [phone]    Additional Information:

## 2019-11-05 NOTE — PROGRESS NOTES
Subjective:       Patient ID: Maria Elena Tavares is a 63 y.o. female.    Chief Complaint: Breast Cancer    HPI Patient presents for follow up and cycle 4 of AC for breast cancer. Patient presents with sister. She states she does get nauseated, but this is relived by zofran and phenergan.  She also notes some acid reflex for which takes Mylanta and Prilosec (takes these ad needed). She also takes  for a darkening of the tongue. No other comlpaints.      She did go to urgent care on Thursday and was diagnosed with bronchitis. She was started on amoxicillin and cough medication which as helped. She was afebrile during this episode.        Oncology History:  - self detected, noted a shooting pain in breast first and then a week later felt a lump  Some delay in getting appointment as she was unaware she had to call  - 8/30/19 Diagnostic mammogram and ultrasound:  Left breast 20 mm x 18 mm x 17 mm mass at the 3 o'clock position. Assessment: 4 - Suspicious finding. Biopsy is recommended.   Lymph Node: Left axilla 16 mm x 14 mm x 13 mm lymph node. Assessment: 4 - Suspicious finding. Biopsy is recommended.   Right- There is no mammographic or sonographic evidence of malignancy.  BI-RADS Category:   Overall: 4 - Suspicious  - 9/5/19 Ultrasound guided biopsy  Pathology:  FINAL PATHOLOGIC DIAGNOSIS  1. LEFT BREAST MASS, BIOPSY:  - Invasive ductal carcinoma, grade 3, longest linear length is 10 mm measured on the slide.  - Histologic Grade ([place] Histologic Score)  Glandular (Acinar/Tubular Differentiation): 3.  Nuclear Pleomorphism: 2.  Mitotic Rate: 3.  Overall Grade: Grade 3 (score 8).  - Microcalcifications: Not identified.  - Lymphovascular invasion: Not identified.  2. LYMPH NODE, LEFT AXILLA, BIOPSY:  - Positive for metastatic carcinoma.  - The metastatic deposit measures 6 mm in longest continuous linear length.  Estrogen receptor: Positive, 90% of the tumor nuclei staining moderate to strongly.  Progesterone receptor:  Positive, 30% of the tumor nuclei staining weak to moderately.  HER2: Negative.  Ki-67: 60%.  - ECHO 19: EF 64%  - PET 19:  Impression         Hypermetabolic left breast mass and regional left axillary lymphadenopathy consistent with reported breast cancer and corresponding with recent MRI findings.    Upper limit of normal sized right axillary lymph nodes with normal fatty duane and mildly increased radiotracer uptake.  Findings could represent reactive nodes with metastatic nodes thought less likely.  Lymphscintigraphy with injection in the left breast may considered to assess for drainage to the contralateral axilla.      BX 19: Right axilla node biopsy is benign     Gyn Hx:  Menarche- 11  Menopause - partial hysterectomy at age 34 yo  Remainder done in   No HRT  Prior ocps  , age 28 at 1st pregnancy  No breastfeeding      Review of Systems   Constitutional: Negative for activity change, appetite change, fatigue, fever and unexpected weight change.   HENT: Negative for congestion, dental problem and trouble swallowing.         Taste change noted  Darkening of tongue noted  Acid reflux   Eyes: Negative for visual disturbance.   Respiratory: Positive for cough and shortness of breath. Negative for wheezing.    Cardiovascular: Negative for chest pain, palpitations and leg swelling.   Gastrointestinal: Positive for diarrhea and nausea. Negative for abdominal pain and vomiting.   Genitourinary: Negative for decreased urine volume, difficulty urinating and frequency.   Musculoskeletal: Negative for arthralgias, back pain and myalgias.   Skin: Negative for rash.   Neurological: Negative for dizziness, weakness, light-headedness, numbness and headaches.   Hematological: Negative for adenopathy. Does not bruise/bleed easily.   Psychiatric/Behavioral: Negative for dysphoric mood. The patient is not nervous/anxious.        Objective:      Physical Exam   Constitutional: She is oriented to person,  place, and time. She appears well-developed and well-nourished. No distress.   ECOG=0  Presents with sister   CHRIS:   Head: Normocephalic and atraumatic.   Mouth/Throat: Oropharynx is clear and moist. No oropharyngeal exudate.   Darkening of tongue noted     Eyes: Pupils are equal, round, and reactive to light. Conjunctivae and EOM are normal. No scleral icterus. Right pupil is round and reactive. Left pupil is round and reactive.   Neck: Normal range of motion. Neck supple. No tracheal deviation present. No thyromegaly present.   Cardiovascular: Normal rate, regular rhythm and normal heart sounds. Exam reveals no gallop and no friction rub.   No murmur heard.  Pulmonary/Chest: Effort normal and breath sounds normal. No respiratory distress. She has no wheezes. She has no rales.   No palpable LAD.    Abdominal: Soft. Bowel sounds are normal. She exhibits no distension and no mass. There is no hepatosplenomegaly. There is no tenderness. There is no rebound and no guarding.   No hepatosplenomegaly   Musculoskeletal: Normal range of motion. She exhibits no edema, tenderness or deformity.   Lymphadenopathy:        Head (right side): No submandibular adenopathy present.        Head (left side): No submandibular adenopathy present.     She has no cervical adenopathy.        Right cervical: No superficial cervical, no deep cervical and no posterior cervical adenopathy present.       Left cervical: No superficial cervical, no deep cervical and no posterior cervical adenopathy present.        Right: No supraclavicular adenopathy present.        Left: No supraclavicular adenopathy present.   Neurological: She is alert and oriented to person, place, and time. She has normal strength and normal reflexes. No sensory deficit. Coordination normal.   Skin: Skin is warm and dry. No bruising, no lesion, no petechiae and no rash noted. She is not diaphoretic. No erythema. No pallor.   Psychiatric: She has a normal mood and affect. Her  behavior is normal. Judgment and thought content normal. Her mood appears not anxious. She does not exhibit a depressed mood.   Nursing note and vitals reviewed.   Labs- reviewed  Assessment:       1. Breast cancer metastasized to axillary lymph node, left    2. Acquired hypothyroidism    3. Essential hypertension    4. Uncontrolled type 2 diabetes mellitus without complication, without long-term current use of insulin        Plan:     1. Proceed with cycle # 4 of AC  Counts adequate  Neulasta support  Knows to call for any issues  RTC in 2 weeks to start weekly taxol    2. Continue current dose of synthroid and follow up with PCP  3. Continue metformin and trulicity and follow up with endocrinology - will watch as glucose was 126 today  4. Continue irbesartan and follow up with PCP    Patient is in agreement with the proposed treatment plan. All questions were answered to the patient's satisfaction. Patient knows to call clinic for any new or worsening symptoms and if anything is needed before the next clinic visit.          Elsa Reilly, FNP-C  Hematology & Medical Oncology   [address] [place] [address]  ph. [phone]  Fax. [phone]    Patient dicussed with collaborating physician, Dr. Villagran.

## 2019-11-06 ENCOUNTER — PATIENT MESSAGE (OUTPATIENT)
Dept: PRIMARY CARE CLINIC | Facility: CLINIC | Age: 64
End: 2019-11-06

## 2019-11-06 DIAGNOSIS — I10 ESSENTIAL HYPERTENSION: Primary | ICD-10-CM

## 2019-11-06 RX ORDER — CHLORTHALIDONE 25 MG/1
25 TABLET ORAL EVERY MORNING
Qty: 90 TABLET | Refills: 1 | Status: SHIPPED | OUTPATIENT
Start: 2019-11-06 | End: 2020-03-11

## 2019-11-06 NOTE — PROGRESS NOTES
"Last 5 Patient Entered Readings                                      Current 30 Day Average:      Recent Readings 10/2/2019 10/2/2019 9/22/2019 9/22/2019 9/2/2019    SBP (mmHg) 115 110 128 119 132    DBP (mmHg) 77 70 81 74 81    Pulse 93 109 102 87 92        Hypertension Medications             chlorthalidone (HYGROTEN) 25 MG Tab Take 1 tablet (25 mg total) by mouth every morning. Stop losartan hctz 100/25.    irbesartan (AVAPRO) 300 MG tablet Take 1 tablet (300 mg total) by mouth once daily. Stop valsartan 320.    irbesartan (AVAPRO) 300 MG tablet Take 1 tablet by mouth once daily        11/6- No readings since 10/2. Will request HC to discuss lack of readings during next encounter. Will continue to monitor. Will follow up in 6 weeks.     12/16- No readings since 10/2. Per HC's note, "Patient is currently in non-compliance with previous Health , Jazmyn (10th attempt). Calling patient to encourage readings and introduce self." Will continue to monitor. Will follow up in 6 weeks.   "

## 2019-11-07 ENCOUNTER — PATIENT MESSAGE (OUTPATIENT)
Dept: PRIMARY CARE CLINIC | Facility: CLINIC | Age: 64
End: 2019-11-07

## 2019-11-07 ENCOUNTER — TELEPHONE (OUTPATIENT)
Dept: PRIMARY CARE CLINIC | Facility: CLINIC | Age: 64
End: 2019-11-07

## 2019-11-11 ENCOUNTER — PATIENT MESSAGE (OUTPATIENT)
Dept: SURGERY | Facility: CLINIC | Age: 64
End: 2019-11-11

## 2019-11-11 ENCOUNTER — TELEPHONE (OUTPATIENT)
Dept: PLASTIC SURGERY | Facility: CLINIC | Age: 64
End: 2019-11-11

## 2019-11-14 ENCOUNTER — OFFICE VISIT (OUTPATIENT)
Dept: URGENT CARE | Facility: CLINIC | Age: 64
End: 2019-11-14
Payer: COMMERCIAL

## 2019-11-14 VITALS
DIASTOLIC BLOOD PRESSURE: 91 MMHG | BODY MASS INDEX: 38.68 KG/M2 | OXYGEN SATURATION: 100 % | TEMPERATURE: 99 F | WEIGHT: 197 LBS | HEART RATE: 101 BPM | RESPIRATION RATE: 18 BRPM | SYSTOLIC BLOOD PRESSURE: 140 MMHG | HEIGHT: 60 IN

## 2019-11-14 DIAGNOSIS — J01.90 ACUTE BACTERIAL SINUSITIS: ICD-10-CM

## 2019-11-14 DIAGNOSIS — E11.9 TYPE 2 DIABETES MELLITUS WITHOUT COMPLICATION, WITHOUT LONG-TERM CURRENT USE OF INSULIN: ICD-10-CM

## 2019-11-14 DIAGNOSIS — R52 BODY ACHES: Primary | ICD-10-CM

## 2019-11-14 DIAGNOSIS — C50.919 MALIGNANT NEOPLASM OF FEMALE BREAST, UNSPECIFIED ESTROGEN RECEPTOR STATUS, UNSPECIFIED LATERALITY, UNSPECIFIED SITE OF BREAST: ICD-10-CM

## 2019-11-14 DIAGNOSIS — B96.89 ACUTE BACTERIAL SINUSITIS: ICD-10-CM

## 2019-11-14 LAB
CTP QC/QA: YES
FLUAV AG NPH QL: NEGATIVE
FLUBV AG NPH QL: NEGATIVE
GLUCOSE SERPL-MCNC: 138 MG/DL (ref 70–110)

## 2019-11-14 PROCEDURE — 82962 GLUCOSE BLOOD TEST: CPT | Mod: S$GLB,,, | Performed by: EMERGENCY MEDICINE

## 2019-11-14 PROCEDURE — 87804 INFLUENZA ASSAY W/OPTIC: CPT | Mod: QW,S$GLB,, | Performed by: EMERGENCY MEDICINE

## 2019-11-14 PROCEDURE — 3008F PR BODY MASS INDEX (BMI) DOCUMENTED: ICD-10-PCS | Mod: CPTII,S$GLB,, | Performed by: EMERGENCY MEDICINE

## 2019-11-14 PROCEDURE — 82962 POCT GLUCOSE, HAND-HELD DEVICE: ICD-10-PCS | Mod: S$GLB,,, | Performed by: EMERGENCY MEDICINE

## 2019-11-14 PROCEDURE — 3008F BODY MASS INDEX DOCD: CPT | Mod: CPTII,S$GLB,, | Performed by: EMERGENCY MEDICINE

## 2019-11-14 PROCEDURE — 3077F SYST BP >= 140 MM HG: CPT | Mod: CPTII,S$GLB,, | Performed by: EMERGENCY MEDICINE

## 2019-11-14 PROCEDURE — 99214 OFFICE O/P EST MOD 30 MIN: CPT | Mod: S$GLB,,, | Performed by: EMERGENCY MEDICINE

## 2019-11-14 PROCEDURE — 87804 POCT INFLUENZA A/B: ICD-10-PCS | Mod: QW,S$GLB,, | Performed by: EMERGENCY MEDICINE

## 2019-11-14 PROCEDURE — 99214 PR OFFICE/OUTPT VISIT, EST, LEVL IV, 30-39 MIN: ICD-10-PCS | Mod: S$GLB,,, | Performed by: EMERGENCY MEDICINE

## 2019-11-14 PROCEDURE — 3080F DIAST BP >= 90 MM HG: CPT | Mod: CPTII,S$GLB,, | Performed by: EMERGENCY MEDICINE

## 2019-11-14 PROCEDURE — 3080F PR MOST RECENT DIASTOLIC BLOOD PRESSURE >= 90 MM HG: ICD-10-PCS | Mod: CPTII,S$GLB,, | Performed by: EMERGENCY MEDICINE

## 2019-11-14 PROCEDURE — 3077F PR MOST RECENT SYSTOLIC BLOOD PRESSURE >= 140 MM HG: ICD-10-PCS | Mod: CPTII,S$GLB,, | Performed by: EMERGENCY MEDICINE

## 2019-11-14 RX ORDER — PROMETHAZINE HYDROCHLORIDE AND DEXTROMETHORPHAN HYDROBROMIDE 6.25; 15 MG/5ML; MG/5ML
5 SYRUP ORAL EVERY 6 HOURS PRN
Qty: 180 ML | Refills: 0 | Status: SHIPPED | OUTPATIENT
Start: 2019-11-14 | End: 2019-11-24

## 2019-11-14 RX ORDER — AMOXICILLIN 875 MG/1
875 TABLET, FILM COATED ORAL 2 TIMES DAILY
Qty: 14 TABLET | Refills: 0 | Status: SHIPPED | OUTPATIENT
Start: 2019-11-14 | End: 2019-11-21

## 2019-11-14 NOTE — PATIENT INSTRUCTIONS
Please return here or go to the Emergency Department for any concerns or worsening of condition.      Tylenol 500mg 2 tabs by mouth every 8 hours  Motrin 400mg 2 tabs by mouth every 8 hours  Alternate Tylenol and Motrin every 4hours      Zyrtec, Claritin, or Allegra OTC as directed for the next 7 days    Flonase OTC as directed for the next 7 days    Salt Water Nasal Spray OTC 3x/day for the next 7 days    Mucinex or Robitussin OTC as directed for cough during the day    Coricidin OTC as directed for runny nose and congestion      Follow up with Primary Care or ENT if not improved in 7-10 Days:  [phone]          Acute Bacterial Rhinosinusitis (ABRS)  Acute bacterial rhinosinusitis (ABRS) is an infection of your nasal cavity and sinuses. Its caused by bacteria. Acute means that youve had symptoms for less than 12 weeks.  Understanding your sinuses  The nasal cavity is the large air-filled space behind your nose. The sinuses are a group of spaces formed by the bones of your face. They connect with your nasal cavity. ABRS causes the tissue lining these spaces to become inflamed. Mucus may not drain normally. This leads to facial pain and other symptoms.  What causes ABRS?  ABRS most often follows an upper respiratory infection caused by a virus. Bacteria then infect the lining of your nasal cavity and sinuses. But you can also get ABRS if you have:  · Nasal allergies  · Long-term nasal swelling and congestion not caused by allergies  · Blockage in the nose  Symptoms of ABRS  The symptoms of ABRS may be different for each person, and can include:  · Nasal congestion  · Runny nose  · Fluid draining from the nose down the throat (postnasal drip)  · Headache  · Cough  · Pain in the sinuses  · Thick, colored fluid from the nose (mucus)  · Fever  Diagnosing ABRS  ABRS may be diagnosed if youve had an upper respiratory infection like a cold and cough for longer than 10 to 14 days. Your health care provider will ask about  your symptoms and your medical history. The provider will check your vital signs, including your temperature. Youll have a physical exam. The health care provider will check your ears, nose, and throat. You likely wont need any tests. If ABRS comes back, you may have a culture or other tests.  Treatment for ABRS  Treatment may include:  · Antibiotic medicine. This is for symptoms that last for at least 10 to 14 days.  · Nasal corticosteroid medicine. Drops or spray used in the nose can lessen swelling and congestion.  · Over-the-counter pain medicine. This is to lessen sinus pain and pressure.  · Nasal decongestant medicine. Spray or drops may help to lessen congestion. Do not use them for more than a few days.  · Salt wash (saline irrigation). This can help to loosen mucus.  Possible complications of ABRS  ABRS may come back or become long-term (chronic).  In rare cases, ABRS may cause complications such as:   · Inflamed tissue around the brain and spinal cord (meningitis)  · Inflamed tissue around the eyes (orbital cellulitis)  · Inflamed bones around the sinuses (osteitis)  These problems may need to be treated in a hospital with intravenous (IV) antibiotic medicine or surgery.  When to call the health care provider  Call your health care provider if you have any of the following:  · Symptoms that dont get better, or get worse  · Symptoms that dont get better after 3 to 5 days on antibiotics  · Trouble seeing  · Swelling around your eyes  · Confusion or trouble staying awake   Date Last Reviewed: 3/3/2015  © 4474-0821 [employer]. [address] [place] [address], [place] [address]. All rights reserved. This information is not intended as a substitute for professional medical care. Always follow your healthcare professional's instructions.

## 2019-11-14 NOTE — LETTER
November 14, 2019      [facility] [address] ANGELICA LAUREN MUNOZ  [facility] [address]  Phone: [phone]  Fax: [phone]       Patient: Maria Elena Tavares   YOB: 1955  Date of Visit: 11/14/2019    To Whom It May Concern:    Solomon Tavares  was at [facility] on 11/14/2019. She may return to work/school on 11/16/19 with no restrictions.     Please excuse from November 13th through November 16th    If you have any questions or concerns, or if I can be of further assistance, please do not hesitate to contact me.    Sincerely,    Oli Rowe III, MD

## 2019-11-14 NOTE — PROGRESS NOTES
Subjective:       Patient ID: Maria Elena Tavares is a 63 y.o. female.    Vitals:  height is 5' (1.524 m) and weight is 89.4 kg (197 lb). Her temperature is 98.9 °F (37.2 °C). Her blood pressure is 140/91 (abnormal) and her pulse is 101. Her respiration is 18 and oxygen saturation is 100%.     Chief Complaint: Cough (started on tuesday ); Nasal Congestion; and Generalized Body Aches    Cough   This is a new problem. The current episode started in the past 7 days. The problem has been gradually worsening. The problem occurs constantly. The cough is productive of sputum. Associated symptoms include chills, a fever, nasal congestion and postnasal drip. Pertinent negatives include no ear pain, eye redness, hemoptysis, myalgias, rash, sore throat, shortness of breath or wheezing. Nothing aggravates the symptoms. She has tried nothing for the symptoms. There is no history of asthma, bronchitis or pneumonia.       Constitution: Positive for chills and fever. Negative for sweating and fatigue.   HENT: Positive for congestion and postnasal drip. Negative for ear pain, sinus pain, sinus pressure, sore throat and voice change.    Neck: Negative for painful lymph nodes.   Eyes: Negative for eye redness.   Respiratory: Positive for cough and sputum production. Negative for chest tightness, bloody sputum, COPD, shortness of breath, stridor, wheezing and asthma.    Gastrointestinal: Negative for nausea and vomiting.   Musculoskeletal: Negative for muscle ache.   Skin: Negative for rash.   Allergic/Immunologic: Negative for seasonal allergies and asthma.   Hematologic/Lymphatic: Negative for swollen lymph nodes.       Objective:      Physical Exam   Constitutional: She is oriented to person, place, and time. She appears well-developed and well-nourished. She is cooperative.  Non-toxic appearance. She does not have a sickly appearance. She does not appear ill. No distress.   HENT:   Head: Normocephalic and atraumatic.   Right Ear:  Hearing, tympanic membrane, external ear and ear canal normal.   Left Ear: Hearing, tympanic membrane, external ear and ear canal normal.   Nose: Mucosal edema and rhinorrhea present. No nasal deformity. No epistaxis. Right sinus exhibits frontal sinus tenderness. Right sinus exhibits no maxillary sinus tenderness. Left sinus exhibits frontal sinus tenderness. Left sinus exhibits no maxillary sinus tenderness.   Mouth/Throat: Uvula is midline, oropharynx is clear and moist and mucous membranes are normal. No trismus in the jaw. Normal dentition. No uvula swelling. No oropharyngeal exudate, posterior oropharyngeal edema or posterior oropharyngeal erythema.   Eyes: Conjunctivae and lids are normal. No scleral icterus.   Neck: Trachea normal, full passive range of motion without pain and phonation normal. Neck supple. No neck rigidity. No edema and no erythema present.   Cardiovascular: Normal rate, regular rhythm, normal heart sounds, intact distal pulses and normal pulses.   Pulmonary/Chest: Effort normal and breath sounds normal. No respiratory distress. She has no decreased breath sounds. She has no rhonchi.   Frequent cough on exam   Abdominal: Normal appearance.   Musculoskeletal: Normal range of motion. She exhibits no edema or deformity.   Neurological: She is alert and oriented to person, place, and time. She exhibits normal muscle tone. Coordination normal.   Skin: Skin is warm, dry, intact, not diaphoretic and not pale.   Psychiatric: She has a normal mood and affect. Her speech is normal and behavior is normal. Judgment and thought content normal. Cognition and memory are normal.   Nursing note and vitals reviewed.        Assessment:       1. Body aches    2. Acute bacterial sinusitis    3. Type 2 diabetes mellitus without complication, without long-term current use of insulin    4. Malignant neoplasm of female breast, unspecified estrogen receptor status, unspecified laterality, unspecified site of breast         Plan:         Body aches  -     POCT Influenza A/B    Acute bacterial sinusitis    Type 2 diabetes mellitus without complication, without long-term current use of insulin  -     POCT Glucose, Hand-Held Device    Malignant neoplasm of female breast, unspecified estrogen receptor status, unspecified laterality, unspecified site of breast    Other orders  -     promethazine-dextromethorphan (PROMETHAZINE-DM) 6.25-15 mg/5 mL Syrp; Take 5 mLs by mouth every 6 (six) hours as needed.  Dispense: 180 mL; Refill: 0  -     amoxicillin (AMOXIL) 875 MG tablet; Take 1 tablet (875 mg total) by mouth 2 (two) times daily. for 7 days  Dispense: 14 tablet; Refill: 0     patient treated with antibiotics due to her immune-compromised status and symptoms of sinus infection      Patient Instructions     Please return here or go to the Emergency Department for any concerns or worsening of condition.      Tylenol 500mg 2 tabs by mouth every 8 hours  Motrin 400mg 2 tabs by mouth every 8 hours  Alternate Tylenol and Motrin every 4hours      Zyrtec, Claritin, or Allegra OTC as directed for the next 7 days    Flonase OTC as directed for the next 7 days    Salt Water Nasal Spray OTC 3x/day for the next 7 days    Mucinex or Robitussin OTC as directed for cough during the day    Coricidin OTC as directed for runny nose and congestion      Follow up with Primary Care or ENT if not improved in 7-10 Days:  [id number]          Acute Bacterial Rhinosinusitis (ABRS)  Acute bacterial rhinosinusitis (ABRS) is an infection of your nasal cavity and sinuses. Its caused by bacteria. Acute means that youve had symptoms for less than 12 weeks.  Understanding your sinuses  The nasal cavity is the large air-filled space behind your nose. The sinuses are a group of spaces formed by the bones of your face. They connect with your nasal cavity. ABRS causes the tissue lining these spaces to become inflamed. Mucus may not drain normally. This leads to facial pain  and other symptoms.  What causes ABRS?  ABRS most often follows an upper respiratory infection caused by a virus. Bacteria then infect the lining of your nasal cavity and sinuses. But you can also get ABRS if you have:  · Nasal allergies  · Long-term nasal swelling and congestion not caused by allergies  · Blockage in the nose  Symptoms of ABRS  The symptoms of ABRS may be different for each person, and can include:  · Nasal congestion  · Runny nose  · Fluid draining from the nose down the throat (postnasal drip)  · Headache  · Cough  · Pain in the sinuses  · Thick, colored fluid from the nose (mucus)  · Fever  Diagnosing ABRS  ABRS may be diagnosed if youve had an upper respiratory infection like a cold and cough for longer than 10 to 14 days. Your health care provider will ask about your symptoms and your medical history. The provider will check your vital signs, including your temperature. Youll have a physical exam. The health care provider will check your ears, nose, and throat. You likely wont need any tests. If ABRS comes back, you may have a culture or other tests.  Treatment for ABRS  Treatment may include:  · Antibiotic medicine. This is for symptoms that last for at least 10 to 14 days.  · Nasal corticosteroid medicine. Drops or spray used in the nose can lessen swelling and congestion.  · Over-the-counter pain medicine. This is to lessen sinus pain and pressure.  · Nasal decongestant medicine. Spray or drops may help to lessen congestion. Do not use them for more than a few days.  · Salt wash (saline irrigation). This can help to loosen mucus.  Possible complications of ABRS  ABRS may come back or become long-term (chronic).  In rare cases, ABRS may cause complications such as:   · Inflamed tissue around the brain and spinal cord (meningitis)  · Inflamed tissue around the eyes (orbital cellulitis)  · Inflamed bones around the sinuses (osteitis)  These problems may need to be treated in a hospital with  intravenous (IV) antibiotic medicine or surgery.  When to call the health care provider  Call your health care provider if you have any of the following:  · Symptoms that dont get better, or get worse  · Symptoms that dont get better after 3 to 5 days on antibiotics  · Trouble seeing  · Swelling around your eyes  · Confusion or trouble staying awake   Date Last Reviewed: 3/3/2015  © 4487-2336 [employer]. [address] [place] [address], [place] [address]. All rights reserved. This information is not intended as a substitute for professional medical care. Always follow your healthcare professional's instructions.

## 2019-11-18 ENCOUNTER — TELEPHONE (OUTPATIENT)
Dept: HEMATOLOGY/ONCOLOGY | Facility: CLINIC | Age: 64
End: 2019-11-18

## 2019-11-18 ENCOUNTER — PATIENT OUTREACH (OUTPATIENT)
Dept: OTHER | Facility: OTHER | Age: 64
End: 2019-11-18

## 2019-11-19 ENCOUNTER — INFUSION (OUTPATIENT)
Dept: INFUSION THERAPY | Facility: HOSPITAL | Age: 64
End: 2019-11-19
Attending: NURSE PRACTITIONER
Payer: COMMERCIAL

## 2019-11-19 ENCOUNTER — LAB VISIT (OUTPATIENT)
Dept: LAB | Facility: HOSPITAL | Age: 64
End: 2019-11-19
Attending: INTERNAL MEDICINE
Payer: COMMERCIAL

## 2019-11-19 ENCOUNTER — OFFICE VISIT (OUTPATIENT)
Dept: HEMATOLOGY/ONCOLOGY | Facility: CLINIC | Age: 64
End: 2019-11-19
Payer: COMMERCIAL

## 2019-11-19 VITALS
WEIGHT: 200.19 LBS | TEMPERATURE: 98 F | HEART RATE: 93 BPM | SYSTOLIC BLOOD PRESSURE: 136 MMHG | HEIGHT: 60 IN | DIASTOLIC BLOOD PRESSURE: 77 MMHG | BODY MASS INDEX: 39.3 KG/M2

## 2019-11-19 VITALS
HEART RATE: 86 BPM | TEMPERATURE: 98 F | DIASTOLIC BLOOD PRESSURE: 82 MMHG | RESPIRATION RATE: 18 BRPM | SYSTOLIC BLOOD PRESSURE: 142 MMHG

## 2019-11-19 DIAGNOSIS — I10 ESSENTIAL HYPERTENSION: ICD-10-CM

## 2019-11-19 DIAGNOSIS — C77.3 BREAST CANCER METASTASIZED TO AXILLARY LYMPH NODE, LEFT: Primary | ICD-10-CM

## 2019-11-19 DIAGNOSIS — E03.9 ACQUIRED HYPOTHYROIDISM: ICD-10-CM

## 2019-11-19 DIAGNOSIS — C50.912 BREAST CANCER METASTASIZED TO AXILLARY LYMPH NODE, LEFT: Primary | ICD-10-CM

## 2019-11-19 DIAGNOSIS — C77.3 BREAST CANCER METASTASIZED TO AXILLARY LYMPH NODE, LEFT: ICD-10-CM

## 2019-11-19 DIAGNOSIS — C50.912 BREAST CANCER METASTASIZED TO AXILLARY LYMPH NODE, LEFT: ICD-10-CM

## 2019-11-19 LAB
ALBUMIN SERPL BCP-MCNC: 3.7 G/DL (ref 3.5–5.2)
ALP SERPL-CCNC: 117 U/L (ref 55–135)
ALT SERPL W/O P-5'-P-CCNC: 11 U/L (ref 10–44)
ANION GAP SERPL CALC-SCNC: 9 MMOL/L (ref 8–16)
AST SERPL-CCNC: 15 U/L (ref 10–40)
BILIRUB SERPL-MCNC: 0.3 MG/DL (ref 0.1–1)
BUN SERPL-MCNC: 10 MG/DL (ref 8–23)
CALCIUM SERPL-MCNC: 9.5 MG/DL (ref 8.7–10.5)
CHLORIDE SERPL-SCNC: 110 MMOL/L (ref 95–110)
CO2 SERPL-SCNC: 23 MMOL/L (ref 23–29)
CREAT SERPL-MCNC: 1 MG/DL (ref 0.5–1.4)
ERYTHROCYTE [DISTWIDTH] IN BLOOD BY AUTOMATED COUNT: 17.8 % (ref 11.5–14.5)
EST. GFR  (AFRICAN AMERICAN): >60 ML/MIN/1.73 M^2
EST. GFR  (NON AFRICAN AMERICAN): >60 ML/MIN/1.73 M^2
GLUCOSE SERPL-MCNC: 128 MG/DL (ref 70–110)
HCT VFR BLD AUTO: 29 % (ref 37–48.5)
HGB BLD-MCNC: 9.1 G/DL (ref 12–16)
IMM GRANULOCYTES # BLD AUTO: 1.54 K/UL (ref 0–0.04)
MCH RBC QN AUTO: 30.7 PG (ref 27–31)
MCHC RBC AUTO-ENTMCNC: 31.4 G/DL (ref 32–36)
MCV RBC AUTO: 98 FL (ref 82–98)
NEUTROPHILS # BLD AUTO: 19.7 K/UL (ref 1.8–7.7)
PLATELET # BLD AUTO: 234 K/UL (ref 150–350)
PMV BLD AUTO: 10 FL (ref 9.2–12.9)
POTASSIUM SERPL-SCNC: 3.9 MMOL/L (ref 3.5–5.1)
PROT SERPL-MCNC: 7.3 G/DL (ref 6–8.4)
RBC # BLD AUTO: 2.96 M/UL (ref 4–5.4)
SODIUM SERPL-SCNC: 142 MMOL/L (ref 136–145)
WBC # BLD AUTO: 24.05 K/UL (ref 3.9–12.7)

## 2019-11-19 PROCEDURE — A4216 STERILE WATER/SALINE, 10 ML: HCPCS | Performed by: INTERNAL MEDICINE

## 2019-11-19 PROCEDURE — 96411 CHEMO IV PUSH ADDL DRUG: CPT

## 2019-11-19 PROCEDURE — 99999 PR PBB SHADOW E&M-EST. PATIENT-LVL IV: CPT | Mod: PBBFAC,,, | Performed by: NURSE PRACTITIONER

## 2019-11-19 PROCEDURE — 80053 COMPREHEN METABOLIC PANEL: CPT

## 2019-11-19 PROCEDURE — 63600175 PHARM REV CODE 636 W HCPCS: Performed by: INTERNAL MEDICINE

## 2019-11-19 PROCEDURE — 99999 PR PBB SHADOW E&M-EST. PATIENT-LVL IV: ICD-10-PCS | Mod: PBBFAC,,, | Performed by: NURSE PRACTITIONER

## 2019-11-19 PROCEDURE — 99214 OFFICE O/P EST MOD 30 MIN: CPT | Mod: S$GLB,,, | Performed by: NURSE PRACTITIONER

## 2019-11-19 PROCEDURE — 36415 COLL VENOUS BLD VENIPUNCTURE: CPT

## 2019-11-19 PROCEDURE — 63600175 PHARM REV CODE 636 W HCPCS: Mod: TB | Performed by: NURSE PRACTITIONER

## 2019-11-19 PROCEDURE — 96367 TX/PROPH/DG ADDL SEQ IV INF: CPT

## 2019-11-19 PROCEDURE — 96375 TX/PRO/DX INJ NEW DRUG ADDON: CPT

## 2019-11-19 PROCEDURE — 99214 PR OFFICE/OUTPT VISIT, EST, LEVL IV, 30-39 MIN: ICD-10-PCS | Mod: S$GLB,,, | Performed by: NURSE PRACTITIONER

## 2019-11-19 PROCEDURE — 25000003 PHARM REV CODE 250: Performed by: INTERNAL MEDICINE

## 2019-11-19 PROCEDURE — 85027 COMPLETE CBC AUTOMATED: CPT

## 2019-11-19 PROCEDURE — 96413 CHEMO IV INFUSION 1 HR: CPT

## 2019-11-19 RX ORDER — HEPARIN 100 UNIT/ML
500 SYRINGE INTRAVENOUS
Status: DISCONTINUED | OUTPATIENT
Start: 2019-11-19 | End: 2019-11-19 | Stop reason: HOSPADM

## 2019-11-19 RX ORDER — HEPARIN 100 UNIT/ML
500 SYRINGE INTRAVENOUS
Status: CANCELLED | OUTPATIENT
Start: 2019-11-19

## 2019-11-19 RX ORDER — DOXORUBICIN HYDROCHLORIDE 2 MG/ML
60 INJECTION, SOLUTION INTRAVENOUS
Status: COMPLETED | OUTPATIENT
Start: 2019-11-19 | End: 2019-11-19

## 2019-11-19 RX ORDER — SODIUM CHLORIDE 0.9 % (FLUSH) 0.9 %
10 SYRINGE (ML) INJECTION
Status: CANCELLED | OUTPATIENT
Start: 2019-11-19

## 2019-11-19 RX ORDER — SODIUM CHLORIDE 0.9 % (FLUSH) 0.9 %
10 SYRINGE (ML) INJECTION
Status: DISCONTINUED | OUTPATIENT
Start: 2019-11-19 | End: 2019-11-19 | Stop reason: HOSPADM

## 2019-11-19 RX ORDER — DOXORUBICIN HYDROCHLORIDE 2 MG/ML
60 INJECTION, SOLUTION INTRAVENOUS
Status: CANCELLED | OUTPATIENT
Start: 2019-11-19

## 2019-11-19 RX ADMIN — APREPITANT 130 MG: 130 INJECTION, EMULSION INTRAVENOUS at 02:11

## 2019-11-19 RX ADMIN — DEXAMETHASONE SODIUM PHOSPHATE: 4 INJECTION, SOLUTION INTRA-ARTICULAR; INTRALESIONAL; INTRAMUSCULAR; INTRAVENOUS; SOFT TISSUE at 02:11

## 2019-11-19 RX ADMIN — SODIUM CHLORIDE: 9 INJECTION, SOLUTION INTRAVENOUS at 02:11

## 2019-11-19 RX ADMIN — DOXORUBICIN HYDROCHLORIDE 120 MG: 2 INJECTION, SOLUTION INTRAVENOUS at 03:11

## 2019-11-19 RX ADMIN — HEPARIN 500 UNITS: 100 SYRINGE at 04:11

## 2019-11-19 RX ADMIN — Medication 10 ML: at 04:11

## 2019-11-19 RX ADMIN — CYCLOPHOSPHAMIDE 1195 MG: 1 INJECTION, POWDER, FOR SOLUTION INTRAVENOUS; ORAL at 03:11

## 2019-11-19 NOTE — Clinical Note
Follow up on Monday 12/2 with Dr. Villagran, labs prior CBC and CMP and then weekly taxol cycle 1.CBC, CMP with weekly taxol cycle 2 on 12/9CBC, CMP with weekly taxol cycle 3 on 12/16CBC, CMP MD./NP visit with weekly taxol cycle 4 12/23

## 2019-11-20 ENCOUNTER — INFUSION (OUTPATIENT)
Dept: INFUSION THERAPY | Facility: HOSPITAL | Age: 64
End: 2019-11-20
Attending: NURSE PRACTITIONER
Payer: COMMERCIAL

## 2019-11-20 DIAGNOSIS — C77.3 BREAST CANCER METASTASIZED TO AXILLARY LYMPH NODE, LEFT: Primary | ICD-10-CM

## 2019-11-20 DIAGNOSIS — C50.912 BREAST CANCER METASTASIZED TO AXILLARY LYMPH NODE, LEFT: Primary | ICD-10-CM

## 2019-11-20 PROCEDURE — 63600175 PHARM REV CODE 636 W HCPCS: Performed by: INTERNAL MEDICINE

## 2019-11-20 PROCEDURE — 96372 THER/PROPH/DIAG INJ SC/IM: CPT

## 2019-11-20 RX ADMIN — PEGFILGRASTIM-CBQV 6 MG: 6 INJECTION, SOLUTION SUBCUTANEOUS at 02:11

## 2019-11-20 NOTE — NURSING
Patient here for udenyca injection-states having small amount of nausea-taking medication with good results-reviewed side effects od udenyca-tolerated well

## 2019-11-22 ENCOUNTER — PATIENT MESSAGE (OUTPATIENT)
Dept: OBSTETRICS AND GYNECOLOGY | Facility: CLINIC | Age: 64
End: 2019-11-22

## 2019-11-25 ENCOUNTER — PATIENT MESSAGE (OUTPATIENT)
Dept: PRIMARY CARE CLINIC | Facility: CLINIC | Age: 64
End: 2019-11-25

## 2019-11-25 ENCOUNTER — PATIENT MESSAGE (OUTPATIENT)
Dept: HEMATOLOGY/ONCOLOGY | Facility: CLINIC | Age: 64
End: 2019-11-25

## 2019-11-25 DIAGNOSIS — C77.3 BREAST CANCER METASTASIZED TO AXILLARY LYMPH NODE, LEFT: Primary | ICD-10-CM

## 2019-11-25 DIAGNOSIS — C50.912 BREAST CANCER METASTASIZED TO AXILLARY LYMPH NODE, LEFT: Primary | ICD-10-CM

## 2019-11-26 ENCOUNTER — DOCUMENTATION ONLY (OUTPATIENT)
Dept: HEMATOLOGY/ONCOLOGY | Facility: CLINIC | Age: 64
End: 2019-11-26

## 2019-11-26 ENCOUNTER — PATIENT MESSAGE (OUTPATIENT)
Dept: PRIMARY CARE CLINIC | Facility: CLINIC | Age: 64
End: 2019-11-26

## 2019-11-26 ENCOUNTER — LAB VISIT (OUTPATIENT)
Dept: LAB | Facility: HOSPITAL | Age: 64
End: 2019-11-26
Payer: COMMERCIAL

## 2019-11-26 DIAGNOSIS — C77.3 BREAST CANCER METASTASIZED TO AXILLARY LYMPH NODE, LEFT: ICD-10-CM

## 2019-11-26 DIAGNOSIS — C50.912 BREAST CANCER METASTASIZED TO AXILLARY LYMPH NODE, LEFT: ICD-10-CM

## 2019-11-26 LAB
ANISOCYTOSIS BLD QL SMEAR: SLIGHT
BASOPHILS # BLD AUTO: ABNORMAL K/UL (ref 0–0.2)
BASOPHILS NFR BLD: 2.6 % (ref 0–1.9)
DIFFERENTIAL METHOD: ABNORMAL
EOSINOPHIL # BLD AUTO: ABNORMAL K/UL (ref 0–0.5)
EOSINOPHIL NFR BLD: 0 % (ref 0–8)
ERYTHROCYTE [DISTWIDTH] IN BLOOD BY AUTOMATED COUNT: 17.2 % (ref 11.5–14.5)
FERRITIN SERPL-MCNC: 1602 NG/ML (ref 20–300)
HCT VFR BLD AUTO: 25.3 % (ref 37–48.5)
HGB BLD-MCNC: 8.2 G/DL (ref 12–16)
HYPOCHROMIA BLD QL SMEAR: ABNORMAL
IMM GRANULOCYTES # BLD AUTO: ABNORMAL K/UL (ref 0–0.04)
IMM GRANULOCYTES NFR BLD AUTO: ABNORMAL % (ref 0–0.5)
IRON SERPL-MCNC: 139 UG/DL (ref 30–160)
LYMPHOCYTES # BLD AUTO: ABNORMAL K/UL (ref 1–4.8)
LYMPHOCYTES NFR BLD: 63.2 % (ref 18–48)
MCH RBC QN AUTO: 30.3 PG (ref 27–31)
MCHC RBC AUTO-ENTMCNC: 32.4 G/DL (ref 32–36)
MCV RBC AUTO: 93 FL (ref 82–98)
MONOCYTES # BLD AUTO: ABNORMAL K/UL (ref 0.3–1)
MONOCYTES NFR BLD: 2.6 % (ref 4–15)
NEUTROPHILS NFR BLD: 31.6 % (ref 38–73)
NRBC BLD-RTO: 0 /100 WBC
OVALOCYTES BLD QL SMEAR: ABNORMAL
PLATELET # BLD AUTO: 158 K/UL (ref 150–350)
PMV BLD AUTO: 11.1 FL (ref 9.2–12.9)
POIKILOCYTOSIS BLD QL SMEAR: SLIGHT
POLYCHROMASIA BLD QL SMEAR: ABNORMAL
RBC # BLD AUTO: 2.71 M/UL (ref 4–5.4)
SATURATED IRON: 44 % (ref 20–50)
TOTAL IRON BINDING CAPACITY: 317 UG/DL (ref 250–450)
TRANSFERRIN SERPL-MCNC: 214 MG/DL (ref 200–375)
WBC # BLD AUTO: 0.65 K/UL (ref 3.9–12.7)

## 2019-11-26 PROCEDURE — 82728 ASSAY OF FERRITIN: CPT

## 2019-11-26 PROCEDURE — 85027 COMPLETE CBC AUTOMATED: CPT

## 2019-11-26 PROCEDURE — 36415 COLL VENOUS BLD VENIPUNCTURE: CPT

## 2019-11-26 PROCEDURE — 83540 ASSAY OF IRON: CPT

## 2019-11-26 PROCEDURE — 85007 BL SMEAR W/DIFF WBC COUNT: CPT

## 2019-11-26 NOTE — PLAN OF CARE
DISCONTINUE ON PATHWAY REGIMEN - Breast    [id number]        Doxorubicin (Adriamycin(R))       Cyclophosphamide (Cytoxan(R))       Pegfilgrastim-xxxx     **Always confirm dose/schedule in your pharmacy ordering system**        Paclitaxel (Taxol(R))     **Always confirm dose/schedule in your pharmacy ordering system**    REASON: Continuation Of Treatment  PRIOR TREATMENT: [id number]: Dose-Dense AC-T (Paclitaxel Weekly) - [Doxorubicin +   Cyclophosphamide q14 Days x 4 Cycles, Followed by Paclitaxel 80 mg/m2 Weekly x   12 Weeks]  TREATMENT RESPONSE: Partial Response (ID)    START ON PATHWAY REGIMEN - Breast    [id number]        Paclitaxel (Taxol(R))     **Always confirm dose/schedule in your pharmacy ordering system**        Doxorubicin (Adriamycin(R))       Cyclophosphamide (Cytoxan(R))       Pegfilgrastim-xxxx     **Always confirm dose/schedule in your pharmacy ordering system**    Patient Characteristics:  Preoperative or Nonsurgical Candidate (Clinical Staging), Neoadjuvant Therapy   followed by Surgery, Invasive Disease, Chemotherapy, HER2   Negative/Unknown/Equivocal, ER Positive  Therapeutic Status: Preoperative or Nonsurgical Candidate (Clinical Staging)  AJCC M Category: cM0  AJCC Grade: G3  Breast Surgical Plan: Neoadjuvant Therapy followed by Surgery  ER Status: Positive (+)  AJCC 8 Stage Grouping: IIA  HER2 Status: Negative (-)  AJCC T Category: cT1  AJCC N Category: cN1  ID Status: Positive (+)  Intent of Therapy:  Curative Intent, Discussed with Patient

## 2019-11-29 ENCOUNTER — PATIENT MESSAGE (OUTPATIENT)
Dept: SURGERY | Facility: CLINIC | Age: 64
End: 2019-11-29

## 2019-12-02 ENCOUNTER — LAB VISIT (OUTPATIENT)
Dept: LAB | Facility: HOSPITAL | Age: 64
End: 2019-12-02
Attending: INTERNAL MEDICINE
Payer: COMMERCIAL

## 2019-12-02 ENCOUNTER — TELEPHONE (OUTPATIENT)
Dept: SURGERY | Facility: CLINIC | Age: 64
End: 2019-12-02

## 2019-12-02 ENCOUNTER — INFUSION (OUTPATIENT)
Dept: INFUSION THERAPY | Facility: HOSPITAL | Age: 64
End: 2019-12-02
Attending: NURSE PRACTITIONER
Payer: COMMERCIAL

## 2019-12-02 ENCOUNTER — OFFICE VISIT (OUTPATIENT)
Dept: HEMATOLOGY/ONCOLOGY | Facility: CLINIC | Age: 64
End: 2019-12-02
Payer: COMMERCIAL

## 2019-12-02 VITALS
HEART RATE: 91 BPM | RESPIRATION RATE: 18 BRPM | DIASTOLIC BLOOD PRESSURE: 63 MMHG | SYSTOLIC BLOOD PRESSURE: 110 MMHG | TEMPERATURE: 98 F

## 2019-12-02 VITALS
RESPIRATION RATE: 18 BRPM | BODY MASS INDEX: 38.56 KG/M2 | DIASTOLIC BLOOD PRESSURE: 68 MMHG | OXYGEN SATURATION: 99 % | HEIGHT: 60 IN | SYSTOLIC BLOOD PRESSURE: 136 MMHG | TEMPERATURE: 98 F | HEART RATE: 93 BPM | WEIGHT: 196.44 LBS

## 2019-12-02 DIAGNOSIS — C50.912 BREAST CANCER METASTASIZED TO AXILLARY LYMPH NODE, LEFT: ICD-10-CM

## 2019-12-02 DIAGNOSIS — C50.912 BREAST CANCER METASTASIZED TO AXILLARY LYMPH NODE, LEFT: Primary | ICD-10-CM

## 2019-12-02 DIAGNOSIS — C77.3 BREAST CANCER METASTASIZED TO AXILLARY LYMPH NODE, LEFT: ICD-10-CM

## 2019-12-02 DIAGNOSIS — K13.79 MOUTH SORES: ICD-10-CM

## 2019-12-02 DIAGNOSIS — T45.1X5A CHEMOTHERAPY-INDUCED NAUSEA: Primary | ICD-10-CM

## 2019-12-02 DIAGNOSIS — D63.0 ANEMIA IN NEOPLASTIC DISEASE: ICD-10-CM

## 2019-12-02 DIAGNOSIS — R11.0 CHEMOTHERAPY-INDUCED NAUSEA: Primary | ICD-10-CM

## 2019-12-02 DIAGNOSIS — C77.3 BREAST CANCER METASTASIZED TO AXILLARY LYMPH NODE, LEFT: Primary | ICD-10-CM

## 2019-12-02 DIAGNOSIS — K12.30 MUCOSITIS: ICD-10-CM

## 2019-12-02 LAB
ALBUMIN SERPL BCP-MCNC: 3.5 G/DL (ref 3.5–5.2)
ALP SERPL-CCNC: 133 U/L (ref 55–135)
ALT SERPL W/O P-5'-P-CCNC: 9 U/L (ref 10–44)
ANION GAP SERPL CALC-SCNC: 12 MMOL/L (ref 8–16)
AST SERPL-CCNC: 14 U/L (ref 10–40)
BILIRUB SERPL-MCNC: 0.3 MG/DL (ref 0.1–1)
BUN SERPL-MCNC: 10 MG/DL (ref 8–23)
CALCIUM SERPL-MCNC: 8.9 MG/DL (ref 8.7–10.5)
CHLORIDE SERPL-SCNC: 104 MMOL/L (ref 95–110)
CO2 SERPL-SCNC: 24 MMOL/L (ref 23–29)
CREAT SERPL-MCNC: 1 MG/DL (ref 0.5–1.4)
ERYTHROCYTE [DISTWIDTH] IN BLOOD BY AUTOMATED COUNT: 19.1 % (ref 11.5–14.5)
EST. GFR  (AFRICAN AMERICAN): >60 ML/MIN/1.73 M^2
EST. GFR  (NON AFRICAN AMERICAN): 59.7 ML/MIN/1.73 M^2
GLUCOSE SERPL-MCNC: 188 MG/DL (ref 70–110)
HCT VFR BLD AUTO: 27.6 % (ref 37–48.5)
HGB BLD-MCNC: 7.8 G/DL (ref 12–16)
IMM GRANULOCYTES # BLD AUTO: 2.04 K/UL (ref 0–0.04)
MCH RBC QN AUTO: 30.2 PG (ref 27–31)
MCHC RBC AUTO-ENTMCNC: 28.3 G/DL (ref 32–36)
MCV RBC AUTO: 107 FL (ref 82–98)
NEUTROPHILS # BLD AUTO: 15.4 K/UL (ref 1.8–7.7)
PLATELET # BLD AUTO: 147 K/UL (ref 150–350)
PMV BLD AUTO: 10.6 FL (ref 9.2–12.9)
POTASSIUM SERPL-SCNC: 3.3 MMOL/L (ref 3.5–5.1)
PROT SERPL-MCNC: 6.9 G/DL (ref 6–8.4)
RBC # BLD AUTO: 2.58 M/UL (ref 4–5.4)
SODIUM SERPL-SCNC: 140 MMOL/L (ref 136–145)
WBC # BLD AUTO: 20.2 K/UL (ref 3.9–12.7)

## 2019-12-02 PROCEDURE — 3078F PR MOST RECENT DIASTOLIC BLOOD PRESSURE < 80 MM HG: ICD-10-PCS | Mod: CPTII,S$GLB,, | Performed by: INTERNAL MEDICINE

## 2019-12-02 PROCEDURE — S0028 INJECTION, FAMOTIDINE, 20 MG: HCPCS | Performed by: INTERNAL MEDICINE

## 2019-12-02 PROCEDURE — 25000003 PHARM REV CODE 250: Performed by: INTERNAL MEDICINE

## 2019-12-02 PROCEDURE — A4216 STERILE WATER/SALINE, 10 ML: HCPCS | Performed by: INTERNAL MEDICINE

## 2019-12-02 PROCEDURE — 96367 TX/PROPH/DG ADDL SEQ IV INF: CPT

## 2019-12-02 PROCEDURE — 99214 OFFICE O/P EST MOD 30 MIN: CPT | Mod: S$GLB,,, | Performed by: INTERNAL MEDICINE

## 2019-12-02 PROCEDURE — 3078F DIAST BP <80 MM HG: CPT | Mod: CPTII,S$GLB,, | Performed by: INTERNAL MEDICINE

## 2019-12-02 PROCEDURE — 96413 CHEMO IV INFUSION 1 HR: CPT

## 2019-12-02 PROCEDURE — 99214 PR OFFICE/OUTPT VISIT, EST, LEVL IV, 30-39 MIN: ICD-10-PCS | Mod: S$GLB,,, | Performed by: INTERNAL MEDICINE

## 2019-12-02 PROCEDURE — 99999 PR PBB SHADOW E&M-EST. PATIENT-LVL III: CPT | Mod: PBBFAC,,, | Performed by: INTERNAL MEDICINE

## 2019-12-02 PROCEDURE — 85027 COMPLETE CBC AUTOMATED: CPT

## 2019-12-02 PROCEDURE — 96375 TX/PRO/DX INJ NEW DRUG ADDON: CPT

## 2019-12-02 PROCEDURE — 3075F PR MOST RECENT SYSTOLIC BLOOD PRESS GE 130-139MM HG: ICD-10-PCS | Mod: CPTII,S$GLB,, | Performed by: INTERNAL MEDICINE

## 2019-12-02 PROCEDURE — 3008F PR BODY MASS INDEX (BMI) DOCUMENTED: ICD-10-PCS | Mod: CPTII,S$GLB,, | Performed by: INTERNAL MEDICINE

## 2019-12-02 PROCEDURE — 80053 COMPREHEN METABOLIC PANEL: CPT

## 2019-12-02 PROCEDURE — 3008F BODY MASS INDEX DOCD: CPT | Mod: CPTII,S$GLB,, | Performed by: INTERNAL MEDICINE

## 2019-12-02 PROCEDURE — 63600175 PHARM REV CODE 636 W HCPCS: Performed by: INTERNAL MEDICINE

## 2019-12-02 PROCEDURE — 36415 COLL VENOUS BLD VENIPUNCTURE: CPT

## 2019-12-02 PROCEDURE — 3075F SYST BP GE 130 - 139MM HG: CPT | Mod: CPTII,S$GLB,, | Performed by: INTERNAL MEDICINE

## 2019-12-02 PROCEDURE — 99999 PR PBB SHADOW E&M-EST. PATIENT-LVL III: ICD-10-PCS | Mod: PBBFAC,,, | Performed by: INTERNAL MEDICINE

## 2019-12-02 RX ORDER — FAMOTIDINE 10 MG/ML
20 INJECTION INTRAVENOUS
Status: CANCELLED | OUTPATIENT
Start: 2019-12-02

## 2019-12-02 RX ORDER — HEPARIN 100 UNIT/ML
500 SYRINGE INTRAVENOUS
Status: CANCELLED | OUTPATIENT
Start: 2019-12-02

## 2019-12-02 RX ORDER — HEPARIN 100 UNIT/ML
500 SYRINGE INTRAVENOUS
Status: DISCONTINUED | OUTPATIENT
Start: 2019-12-02 | End: 2019-12-02 | Stop reason: HOSPADM

## 2019-12-02 RX ORDER — SODIUM CHLORIDE 0.9 % (FLUSH) 0.9 %
10 SYRINGE (ML) INJECTION
Status: CANCELLED | OUTPATIENT
Start: 2019-12-02

## 2019-12-02 RX ORDER — FAMOTIDINE 10 MG/ML
20 INJECTION INTRAVENOUS
Status: COMPLETED | OUTPATIENT
Start: 2019-12-02 | End: 2019-12-02

## 2019-12-02 RX ORDER — EPINEPHRINE 0.3 MG/.3ML
0.3 INJECTION SUBCUTANEOUS ONCE AS NEEDED
Status: CANCELLED | OUTPATIENT
Start: 2019-12-02

## 2019-12-02 RX ORDER — PROMETHAZINE HYDROCHLORIDE 25 MG/1
25 TABLET ORAL EVERY 4 HOURS
Qty: 30 TABLET | Refills: 1 | Status: SHIPPED | OUTPATIENT
Start: 2019-12-02 | End: 2020-03-11

## 2019-12-02 RX ORDER — DIPHENHYDRAMINE HYDROCHLORIDE 50 MG/ML
50 INJECTION INTRAMUSCULAR; INTRAVENOUS ONCE AS NEEDED
Status: DISCONTINUED | OUTPATIENT
Start: 2019-12-02 | End: 2019-12-02 | Stop reason: HOSPADM

## 2019-12-02 RX ORDER — EPINEPHRINE 0.3 MG/.3ML
0.3 INJECTION SUBCUTANEOUS ONCE AS NEEDED
Status: DISCONTINUED | OUTPATIENT
Start: 2019-12-02 | End: 2019-12-02 | Stop reason: HOSPADM

## 2019-12-02 RX ORDER — ONDANSETRON 4 MG/1
4 TABLET, ORALLY DISINTEGRATING ORAL EVERY 6 HOURS PRN
Qty: 60 TABLET | Refills: 2 | Status: SHIPPED | OUTPATIENT
Start: 2019-12-02 | End: 2021-03-15

## 2019-12-02 RX ORDER — SODIUM CHLORIDE 0.9 % (FLUSH) 0.9 %
10 SYRINGE (ML) INJECTION
Status: DISCONTINUED | OUTPATIENT
Start: 2019-12-02 | End: 2019-12-02 | Stop reason: HOSPADM

## 2019-12-02 RX ORDER — DIPHENHYDRAMINE HYDROCHLORIDE 50 MG/ML
50 INJECTION INTRAMUSCULAR; INTRAVENOUS ONCE AS NEEDED
Status: CANCELLED | OUTPATIENT
Start: 2019-12-02

## 2019-12-02 RX ADMIN — DIPHENHYDRAMINE HYDROCHLORIDE 25 MG: 50 INJECTION INTRAMUSCULAR; INTRAVENOUS at 01:12

## 2019-12-02 RX ADMIN — HYDROCORTISONE SODIUM SUCCINATE 100 MG: 100 INJECTION, POWDER, FOR SOLUTION INTRAMUSCULAR; INTRAVENOUS at 03:12

## 2019-12-02 RX ADMIN — PACLITAXEL 156 MG: 6 INJECTION, SOLUTION INTRAVENOUS at 03:12

## 2019-12-02 RX ADMIN — HEPARIN 500 UNITS: 100 SYRINGE at 05:12

## 2019-12-02 RX ADMIN — Medication 10 MG: at 02:12

## 2019-12-02 RX ADMIN — Medication 10 ML: at 05:12

## 2019-12-02 RX ADMIN — FAMOTIDINE 20 MG: 10 INJECTION, SOLUTION INTRAVENOUS at 01:12

## 2019-12-02 NOTE — NURSING
1551 patient c/o feeling flushed and hot all over. Taxol paused and solumedrol administered. Dr. Villagran made aware and advised to restart infusion in 30 min.

## 2019-12-03 ENCOUNTER — PATIENT OUTREACH (OUTPATIENT)
Dept: ADMINISTRATIVE | Facility: OTHER | Age: 64
End: 2019-12-03

## 2019-12-03 ENCOUNTER — TELEPHONE (OUTPATIENT)
Dept: HEMATOLOGY/ONCOLOGY | Facility: CLINIC | Age: 64
End: 2019-12-03

## 2019-12-03 DIAGNOSIS — C77.3 BREAST CANCER METASTASIZED TO AXILLARY LYMPH NODE, LEFT: Primary | ICD-10-CM

## 2019-12-03 DIAGNOSIS — C50.912 BREAST CANCER METASTASIZED TO AXILLARY LYMPH NODE, LEFT: Primary | ICD-10-CM

## 2019-12-03 NOTE — TELEPHONE ENCOUNTER
Called patient gave her the appointment on Thursday with PJJ and lab. Also told her about the adjustment made to add in appointment to see PJ on Monday.        ----- Message from Radha Paige sent at 12/3/2019  8:37 AM CST -----  Regarding: FW: time  Can put with an JAZZY for the 1 week    ----- Message -----  From: Tere Villagran MD  Sent: 12/3/2019   8:36 AM CST  To: Radha Paige  Subject: RE: time                                         yes  ----- Message -----  From: Radha Paige  Sent: 12/3/2019   8:11 AM CST  To: Tere Villagran MD  Subject: FW: time                                         Ok to see PJ in 1 week?    ----- Message -----  From: Lillie Saini  Sent: 12/3/2019   8:09 AM CST  To: Sparkle Hawkins  Subject: time                                             Can I get a time in 1 week for follow up.    Thanks    ----- Message -----  From: Tere Villagran MD  Sent: 12/2/2019   5:28 PM CST  To: Odalys Floyd    Cbc and type and screen on Thursday with PJ  RTC 1 week with cbc, cmp and chemo and EP

## 2019-12-04 ENCOUNTER — OFFICE VISIT (OUTPATIENT)
Dept: PLASTIC SURGERY | Facility: CLINIC | Age: 64
End: 2019-12-04
Payer: COMMERCIAL

## 2019-12-04 VITALS
DIASTOLIC BLOOD PRESSURE: 86 MMHG | HEART RATE: 96 BPM | BODY MASS INDEX: 38.09 KG/M2 | WEIGHT: 194 LBS | HEIGHT: 60 IN | SYSTOLIC BLOOD PRESSURE: 116 MMHG

## 2019-12-04 DIAGNOSIS — C50.912 MALIGNANT NEOPLASM OF LEFT FEMALE BREAST, UNSPECIFIED ESTROGEN RECEPTOR STATUS, UNSPECIFIED SITE OF BREAST: Primary | ICD-10-CM

## 2019-12-04 PROCEDURE — 3074F PR MOST RECENT SYSTOLIC BLOOD PRESSURE < 130 MM HG: ICD-10-PCS | Mod: CPTII,S$GLB,, | Performed by: SURGERY

## 2019-12-04 PROCEDURE — 3008F BODY MASS INDEX DOCD: CPT | Mod: CPTII,S$GLB,, | Performed by: SURGERY

## 2019-12-04 PROCEDURE — 3074F SYST BP LT 130 MM HG: CPT | Mod: CPTII,S$GLB,, | Performed by: SURGERY

## 2019-12-04 PROCEDURE — 99203 OFFICE O/P NEW LOW 30 MIN: CPT | Mod: S$GLB,,, | Performed by: SURGERY

## 2019-12-04 PROCEDURE — 99999 PR PBB SHADOW E&M-EST. PATIENT-LVL III: CPT | Mod: PBBFAC,,, | Performed by: SURGERY

## 2019-12-04 PROCEDURE — 3079F DIAST BP 80-89 MM HG: CPT | Mod: CPTII,S$GLB,, | Performed by: SURGERY

## 2019-12-04 PROCEDURE — 99203 PR OFFICE/OUTPT VISIT, NEW, LEVL III, 30-44 MIN: ICD-10-PCS | Mod: S$GLB,,, | Performed by: SURGERY

## 2019-12-04 PROCEDURE — 3079F PR MOST RECENT DIASTOLIC BLOOD PRESSURE 80-89 MM HG: ICD-10-PCS | Mod: CPTII,S$GLB,, | Performed by: SURGERY

## 2019-12-04 PROCEDURE — 3008F PR BODY MASS INDEX (BMI) DOCUMENTED: ICD-10-PCS | Mod: CPTII,S$GLB,, | Performed by: SURGERY

## 2019-12-04 PROCEDURE — 99999 PR PBB SHADOW E&M-EST. PATIENT-LVL III: ICD-10-PCS | Mod: PBBFAC,,, | Performed by: SURGERY

## 2019-12-04 NOTE — PROGRESS NOTES
[facility] - Plastic Surg [facility]  General Surgery  History and Physical      Subjective:     Maria Elena Tavares is a 64 y.o. female with left breast IDC who presents to discuss breast reconstruction options. She has finished 4 rounds of AC and is currently undergoing weekly treatments with taxol. She has completed 1/12 of these and is schedule to finish end of February. Following chemotherapy, she would like to undergo bilateral mastectomy (her breast surgeon is Dr. Lopez), but she is unsure if she will need further radiation. She is interested in reconstruction, but would like to know more about her options.    ROS:  Gen: no fevers, no chills, +fatigue  CV: no palpitations, no peripheral edema  Respit: no cough  Abd: no abd pain, no nausea, no vomiting, no diarrhea, no constipation  Skin: no rash, no wound  MSK: no back pain, no arthralgias, no myalgias  Neuro: no headache, no dizziness      Objective:     Vitals:   Vitals:    12/04/19 1200   BP: 116/86   Pulse: 96   Weight: 88 kg (194 lb 0.1 oz)   Height: 5' (1.524 m)       Physical Exam:  Gen: well appearing, no acute distress  CV: RRR  Respit: Breathing non-labored  Abd: obese abdomen  Extr: Warm and well perfused  MSK: moves all extremities appropriately  Neuro: alert, CN II - XII grossly intact    Significant Diagnostics:  Reviewed    Assessment/Plan:     Maria Elena Tavares is a 64 y.o. female with left breast IDC currently undergoing chemotherapy with plans for bilateral mastectomy and breast reconstruction in March.    We reviewed the options for breast reconstruction today. This included a conversation on implant and tissue based reconstruction.    We discussed that reconstruction typically occurs at the time of the mastectomy. However, definitive plans would depend on whether she were to require radiation or not.   Implant based reconstruction would typically include placement of the implant at the time of the surgery with possible fat grafting in the future. The  location of the implant (above or below the pectoralis) depends on the thickness of the skin flap. If the flap is too thin, then the implant would need to be placed below the muscle. However, if the skin is significantly thin, she may not be a candidate for implant reconstruction as this would lead to flap necrosis. Furthermore, we discussed that if she were to require radiation, we would not recommend placement of implants as these tend to have more complications than a tissue based reconstruction.   The tissue based reconstruction would be a FAHAD flap. We discussed that for this surgery, an incision would be made on her abdomen and tissue would be harvested with its associated vasculature. This would then be placed onto the chest with vasculature anastamosis to the internal mammary vessels.     The risks and benefits of each procedure were discussed and questions and concerns were addressed to the patients satisfaction. She has nearly 4 months to consider her options.     She will return to the clinic when she is closer to finishing chemotherapy to discuss definitive plans. She was advised to schedule an appointment for further counseling should she have any questions/concerns.      Kira Sancehz MD  General Surgery, PGY-1  [phone]

## 2019-12-04 NOTE — PROGRESS NOTES
PATIENT: Maria Elena Tavares  MRN: [id number]  DATE: 12/5/2019      Chief complaint:  Chief Complaint   Patient presents with    Breast cancer metastasized to axillary lymph node, left         History of Present Illness: Ms. Tavares is a 64 y.o. who returns in follow up for Breast Cancer.    Received C1 Taxol on 12/2/19 - treatment was interrupted due to flushing but re challenged without difficulties.   She is here for CBC and possible blood transfusion.     Today, She continues to feel fatigued.   Tolerated C1 well this week without AE post. See above as had mild reaction.  She continues to have SOB on exertion and has occasional dizziness if stands too quickly.   No CP/swelling.   No Mouth sores  No nausea or vomiting  No fevers or chills        She is accompanied by her self.    Oncologic History:  - self detected, noted a shooting pain in breast first and then a week later felt a lump  Some delay in getting appointment as she was unaware she had to call  - 8/30/19 Diagnostic mammogram and ultrasound:  Left breast 20 mm x 18 mm x 17 mm mass at the 3 o'clock position. Assessment: 4 - Suspicious finding. Biopsy is recommended.   Lymph Node: Left axilla 16 mm x 14 mm x 13 mm lymph node. Assessment: 4 - Suspicious finding. Biopsy is recommended.   Right- There is no mammographic or sonographic evidence of malignancy.  BI-RADS Category:   Overall: 4 - Suspicious  - 9/5/19 Ultrasound guided biopsy  Pathology:  FINAL PATHOLOGIC DIAGNOSIS  1. LEFT BREAST MASS, BIOPSY:  - Invasive ductal carcinoma, grade 3, longest linear length is 10 mm measured on the slide.  - Histologic Grade ([place] Histologic Score)  Glandular (Acinar/Tubular Differentiation): 3.  Nuclear Pleomorphism: 2.  Mitotic Rate: 3.  Overall Grade: Grade 3 (score 8).  - Microcalcifications: Not identified.  - Lymphovascular invasion: Not identified.  2. LYMPH NODE, LEFT AXILLA, BIOPSY:  - Positive for metastatic carcinoma.  - The metastatic deposit measures 6 mm  in longest continuous linear length.  Estrogen receptor: Positive, 90% of the tumor nuclei staining moderate to strongly.  Progesterone receptor: Positive, 30% of the tumor nuclei staining weak to moderately.  HER2: Negative.  Ki-67: 60%.  - ECHO 19: EF 64%  - PET 19:  Impression         Hypermetabolic left breast mass and regional left axillary lymphadenopathy consistent with reported breast cancer and corresponding with recent MRI findings.    Upper limit of normal sized right axillary lymph nodes with normal fatty duane and mildly increased radiotracer uptake.  Findings could represent reactive nodes with metastatic nodes thought less likely.  Lymphscintigraphy with injection in the left breast may considered to assess for drainage to the contralateral axilla.      BX 19: Right axilla node biopsy is benign     Gyn Hx:  Menarche- 11  Menopause - partial hysterectomy at age 34 yo  Remainder done in   No HRT  Prior ocps  , age 28 at 1st pregnancy  No breastfeeding         PMFSH: all information reviewed and updated as relevant to today's visit    Review of Systems:   Review of Systems   Constitutional: Positive for fatigue. Negative for appetite change and unexpected weight change.   Eyes: Negative for visual disturbance.   Respiratory: Positive for shortness of breath. Negative for cough.    Cardiovascular: Negative for chest pain.   Gastrointestinal: Negative for abdominal pain and diarrhea.   Genitourinary: Negative for frequency.   Musculoskeletal: Negative for back pain.   Skin: Negative for rash.   Neurological: Negative for headaches.   Hematological: Negative for adenopathy.   Psychiatric/Behavioral: The patient is nervous/anxious.          Objective:      Vitals:   Vitals:    19 0931   BP: 137/78   BP Location: Left arm   Patient Position: Sitting   BP Method: Large (Automatic)   Pulse: 100   Resp: 18   Temp: 98.1 °F (36.7 °C)   TempSrc: Oral   SpO2: 98%   Weight: 88.2 kg (194 lb  7.1 oz)   Height: 5' (1.524 m)     BMI: Body mass index is 37.98 kg/m².    Physical Exam:   Physical Exam   Constitutional: She is oriented to person, place, and time. She appears well-developed and well-nourished. No distress.   HENT:   Head: Normocephalic and atraumatic.   Mouth/Throat: Oropharynx is clear and moist.   No sinus tenderness.  Alopecia.  Darkening of tongue noted   Eyes: Pupils are equal, round, and reactive to light. Conjunctivae and lids are normal. No scleral icterus.   Neck: Trachea normal and normal range of motion. Neck supple. No JVD present. No thyromegaly present.   Cardiovascular: Normal rate, regular rhythm, normal heart sounds and intact distal pulses.   Pulmonary/Chest: Effort normal and breath sounds normal. She has no wheezes.   Abdominal: Soft. Normal appearance and bowel sounds are normal. She exhibits no distension and no mass. There is no tenderness.   No organomegaly.    Musculoskeletal: Normal range of motion. She exhibits no edema.   No spinal or paraspinal tenderness.    Lymphadenopathy:        Head (right side): No submental and no submandibular adenopathy present.        Head (left side): No submental and no submandibular adenopathy present.     She has no cervical adenopathy.     She has no axillary adenopathy.        Right: No supraclavicular adenopathy present.        Left: No supraclavicular adenopathy present.   Neurological: She is alert and oriented to person, place, and time. She has normal strength and normal reflexes. No cranial nerve deficit. Gait normal.   Skin: Skin is warm, dry and intact. Capillary refill takes less than 2 seconds. No bruising and no rash noted. No cyanosis. Nails show no clubbing.   Psychiatric: She has a normal mood and affect. Her speech is normal and behavior is normal.   Nursing note and vitals reviewed.    Breast Exam: deferred    ECOG Performance Status:   ECOG SCORE    1 - Restricted in strenuous activity-ambulatory and able to carry  out work of a light nature         Laboratory Data:  Results for orders placed or performed in visit on 12/02/19   CBC Oncology   Result Value Ref Range    WBC 20.20 (H) 3.90 - 12.70 K/uL    RBC 2.58 (L) 4.00 - 5.40 M/uL    Hemoglobin 7.8 (L) 12.0 - 16.0 g/dL    Hematocrit 27.6 (L) 37.0 - 48.5 %    Mean Corpuscular Volume 107 (H) 82 - 98 fL    Mean Corpuscular Hemoglobin 30.2 27.0 - 31.0 pg    Mean Corpuscular Hemoglobin Conc 28.3 (L) 32.0 - 36.0 g/dL    RDW 19.1 (H) 11.5 - 14.5 %    Platelets 147 (L) 150 - 350 K/uL    MPV 10.6 9.2 - 12.9 fL    Gran # (ANC) 15.4 (H) 1.8 - 7.7 K/uL    Immature Grans (Abs) 2.04 (H) 0.00 - 0.04 K/uL   Comprehensive metabolic panel   Result Value Ref Range    Sodium 140 136 - 145 mmol/L    Potassium 3.3 (L) 3.5 - 5.1 mmol/L    Chloride 104 95 - 110 mmol/L    CO2 24 23 - 29 mmol/L    Glucose 188 (H) 70 - 110 mg/dL    BUN, Bld 10 8 - 23 mg/dL    Creatinine 1.0 0.5 - 1.4 mg/dL    Calcium 8.9 8.7 - 10.5 mg/dL    Total Protein 6.9 6.0 - 8.4 g/dL    Albumin 3.5 3.5 - 5.2 g/dL    Total Bilirubin 0.3 0.1 - 1.0 mg/dL    Alkaline Phosphatase 133 55 - 135 U/L    AST 14 10 - 40 U/L    ALT 9 (L) 10 - 44 U/L    Anion Gap 12 8 - 16 mmol/L    eGFR if African American >60.0 >60 mL/min/1.73 m^2    eGFR if non  59.7 (A) >60 mL/min/1.73 m^2        Imaging:  US Abdomen Limited  Narrative: EXAMINATION:  US ABDOMEN LIMITED    CLINICAL HISTORY:  RUQ and epigastric pain;    TECHNIQUE:  Limited ultrasound of the right upper quadrant of the abdomen (including pancreas, liver, gallbladder, common bile duct, and spleen) was performed.    COMPARISON:  Abdominal ultrasound 10/08/2014 and CT abdomen and pelvis 10/23/2014    FINDINGS:  Liver: Normal in size, measuring 14.1 cm. Slightly heterogeneous increased parenchymal attenuation with some scattered geographic areas of sparing near the gallbladder and right portal vein suggesting fatty infiltration.  No focal hepatic lesions.    Gallbladder: No  calculi, wall thickening, or pericholecystic fluid.  No sonographic Baldwin's sign.    Biliary system: The common duct is not dilated, measuring 2 mm.  No intrahepatic ductal dilatation.    Spleen: Normal in size with scattered small echogenic parenchymal foci suggesting calcifications from prior granulomatous disease, measuring 7.8 cm.    Miscellaneous: No upper abdominal ascites.  Imaged portions of the pancreas and IVC are within normal limits.  There is a 3.1 cm simple appearing cyst at the right kidney without hydronephrosis seen.  Impression: No acute sonographic abnormality.    Hepatic steatosis.    Electronically signed by: Tobi Resendiz MD  Date:    10/25/2019  Time:    13:14         Assessment/Plan:     1. Symptomatic anemia    2. Breast cancer metastasized to axillary lymph node, left    3. Chemotherapy induced neutropenia        Plan:  Labs reviewed. Anemia parameters noted and patient is symptomatic. Plan for 1 u PRBCs today.   No infectious complaints.   RTC next week as scheduled for C2 Taxol.       Med and Orders:  Orders Placed This Encounter    Type & Screen    Prepare RBC 1 Unit       Follow Up: as previous  No follow-ups on file.    Patient instructions: as above  There are no Patient Instructions on file for this visit.    Patient is in agreement with the proposed treatment plan. All questions were answered to the patient's satisfaction. Pt knows to call clinic for any new or worsening symptoms and if anything is needed before the next clinic visit.      SHARON MorejonP-C  Hematology & Oncology  [address] [place] [id number]  ph. [phone]  Fax. [phone]     I spent 30 minutes (face to face) with the patient, more than 50% was in counseling and coordination of care as detailed above.

## 2019-12-05 ENCOUNTER — OFFICE VISIT (OUTPATIENT)
Dept: HEMATOLOGY/ONCOLOGY | Facility: CLINIC | Age: 64
End: 2019-12-05
Payer: COMMERCIAL

## 2019-12-05 ENCOUNTER — INFUSION (OUTPATIENT)
Dept: INFUSION THERAPY | Facility: HOSPITAL | Age: 64
End: 2019-12-05
Attending: NURSE PRACTITIONER
Payer: COMMERCIAL

## 2019-12-05 VITALS
OXYGEN SATURATION: 99 % | SYSTOLIC BLOOD PRESSURE: 123 MMHG | HEART RATE: 85 BPM | TEMPERATURE: 99 F | RESPIRATION RATE: 19 BRPM | DIASTOLIC BLOOD PRESSURE: 80 MMHG

## 2019-12-05 VITALS
HEIGHT: 60 IN | BODY MASS INDEX: 38.18 KG/M2 | OXYGEN SATURATION: 98 % | DIASTOLIC BLOOD PRESSURE: 78 MMHG | TEMPERATURE: 98 F | WEIGHT: 194.44 LBS | HEART RATE: 100 BPM | RESPIRATION RATE: 18 BRPM | SYSTOLIC BLOOD PRESSURE: 137 MMHG

## 2019-12-05 DIAGNOSIS — D64.9 SYMPTOMATIC ANEMIA: ICD-10-CM

## 2019-12-05 DIAGNOSIS — C77.3 BREAST CANCER METASTASIZED TO AXILLARY LYMPH NODE, LEFT: ICD-10-CM

## 2019-12-05 DIAGNOSIS — D63.0 ANEMIA IN NEOPLASTIC DISEASE: Primary | ICD-10-CM

## 2019-12-05 DIAGNOSIS — C50.912 BREAST CANCER METASTASIZED TO AXILLARY LYMPH NODE, LEFT: ICD-10-CM

## 2019-12-05 DIAGNOSIS — D70.1 CHEMOTHERAPY INDUCED NEUTROPENIA: ICD-10-CM

## 2019-12-05 DIAGNOSIS — T45.1X5A CHEMOTHERAPY INDUCED NEUTROPENIA: ICD-10-CM

## 2019-12-05 DIAGNOSIS — D64.9 SYMPTOMATIC ANEMIA: Primary | ICD-10-CM

## 2019-12-05 LAB
ABO + RH BLD: NORMAL
BLD GP AB SCN CELLS X3 SERPL QL: NORMAL
BLD PROD TYP BPU: NORMAL
BLOOD UNIT EXPIRATION DATE: NORMAL
BLOOD UNIT TYPE CODE: 5100
BLOOD UNIT TYPE: NORMAL
CODING SYSTEM: NORMAL
DISPENSE STATUS: NORMAL
ERYTHROCYTE [DISTWIDTH] IN BLOOD BY AUTOMATED COUNT: 18.4 % (ref 11.5–14.5)
HCT VFR BLD AUTO: 25.4 % (ref 37–48.5)
HGB BLD-MCNC: 7.8 G/DL (ref 12–16)
IMM GRANULOCYTES # BLD AUTO: 0.03 K/UL (ref 0–0.04)
MCH RBC QN AUTO: 29.9 PG (ref 27–31)
MCHC RBC AUTO-ENTMCNC: 30.7 G/DL (ref 32–36)
MCV RBC AUTO: 97 FL (ref 82–98)
NEUTROPHILS # BLD AUTO: 3.1 K/UL (ref 1.8–7.7)
NUM UNITS TRANS PACKED RBC: NORMAL
PLATELET # BLD AUTO: 173 K/UL (ref 150–350)
PMV BLD AUTO: 11.1 FL (ref 9.2–12.9)
RBC # BLD AUTO: 2.61 M/UL (ref 4–5.4)
WBC # BLD AUTO: 3.78 K/UL (ref 3.9–12.7)

## 2019-12-05 PROCEDURE — 99999 PR PBB SHADOW E&M-EST. PATIENT-LVL V: CPT | Mod: PBBFAC,,, | Performed by: NURSE PRACTITIONER

## 2019-12-05 PROCEDURE — 3008F PR BODY MASS INDEX (BMI) DOCUMENTED: ICD-10-PCS | Mod: CPTII,S$GLB,, | Performed by: NURSE PRACTITIONER

## 2019-12-05 PROCEDURE — A4216 STERILE WATER/SALINE, 10 ML: HCPCS | Performed by: INTERNAL MEDICINE

## 2019-12-05 PROCEDURE — 3008F BODY MASS INDEX DOCD: CPT | Mod: CPTII,S$GLB,, | Performed by: NURSE PRACTITIONER

## 2019-12-05 PROCEDURE — 36430 TRANSFUSION BLD/BLD COMPNT: CPT

## 2019-12-05 PROCEDURE — 3075F SYST BP GE 130 - 139MM HG: CPT | Mod: CPTII,S$GLB,, | Performed by: NURSE PRACTITIONER

## 2019-12-05 PROCEDURE — 99214 PR OFFICE/OUTPT VISIT, EST, LEVL IV, 30-39 MIN: ICD-10-PCS | Mod: S$GLB,,, | Performed by: NURSE PRACTITIONER

## 2019-12-05 PROCEDURE — P9016 RBC LEUKOCYTES REDUCED: HCPCS

## 2019-12-05 PROCEDURE — 63600175 PHARM REV CODE 636 W HCPCS: Performed by: NURSE PRACTITIONER

## 2019-12-05 PROCEDURE — 3075F PR MOST RECENT SYSTOLIC BLOOD PRESS GE 130-139MM HG: ICD-10-PCS | Mod: CPTII,S$GLB,, | Performed by: NURSE PRACTITIONER

## 2019-12-05 PROCEDURE — 36415 COLL VENOUS BLD VENIPUNCTURE: CPT

## 2019-12-05 PROCEDURE — 25000003 PHARM REV CODE 250: Performed by: NURSE PRACTITIONER

## 2019-12-05 PROCEDURE — 85027 COMPLETE CBC AUTOMATED: CPT

## 2019-12-05 PROCEDURE — 99999 PR PBB SHADOW E&M-EST. PATIENT-LVL V: ICD-10-PCS | Mod: PBBFAC,,, | Performed by: NURSE PRACTITIONER

## 2019-12-05 PROCEDURE — 3078F PR MOST RECENT DIASTOLIC BLOOD PRESSURE < 80 MM HG: ICD-10-PCS | Mod: CPTII,S$GLB,, | Performed by: NURSE PRACTITIONER

## 2019-12-05 PROCEDURE — 86920 COMPATIBILITY TEST SPIN: CPT

## 2019-12-05 PROCEDURE — 25000003 PHARM REV CODE 250: Performed by: INTERNAL MEDICINE

## 2019-12-05 PROCEDURE — 86850 RBC ANTIBODY SCREEN: CPT

## 2019-12-05 PROCEDURE — 3078F DIAST BP <80 MM HG: CPT | Mod: CPTII,S$GLB,, | Performed by: NURSE PRACTITIONER

## 2019-12-05 PROCEDURE — 36591 DRAW BLOOD OFF VENOUS DEVICE: CPT

## 2019-12-05 PROCEDURE — 99214 OFFICE O/P EST MOD 30 MIN: CPT | Mod: S$GLB,,, | Performed by: NURSE PRACTITIONER

## 2019-12-05 PROCEDURE — 63600175 PHARM REV CODE 636 W HCPCS: Performed by: INTERNAL MEDICINE

## 2019-12-05 RX ORDER — DIPHENHYDRAMINE HCL 25 MG
25 CAPSULE ORAL
Status: COMPLETED | OUTPATIENT
Start: 2019-12-05 | End: 2019-12-05

## 2019-12-05 RX ORDER — SODIUM CHLORIDE 0.9 % (FLUSH) 0.9 %
10 SYRINGE (ML) INJECTION
Status: CANCELLED | OUTPATIENT
Start: 2019-12-05

## 2019-12-05 RX ORDER — DIPHENHYDRAMINE HCL 25 MG
25 CAPSULE ORAL
Status: CANCELLED | OUTPATIENT
Start: 2019-12-05

## 2019-12-05 RX ORDER — SODIUM CHLORIDE 0.9 % (FLUSH) 0.9 %
10 SYRINGE (ML) INJECTION
Status: COMPLETED | OUTPATIENT
Start: 2019-12-05 | End: 2019-12-05

## 2019-12-05 RX ORDER — HYDROCODONE BITARTRATE AND ACETAMINOPHEN 500; 5 MG/1; MG/1
TABLET ORAL ONCE
Status: CANCELLED | OUTPATIENT
Start: 2019-12-05 | End: 2019-12-05

## 2019-12-05 RX ORDER — HEPARIN 100 UNIT/ML
500 SYRINGE INTRAVENOUS
Status: CANCELLED | OUTPATIENT
Start: 2019-12-05

## 2019-12-05 RX ORDER — HEPARIN 100 UNIT/ML
500 SYRINGE INTRAVENOUS
Status: COMPLETED | OUTPATIENT
Start: 2019-12-05 | End: 2019-12-05

## 2019-12-05 RX ORDER — ACETAMINOPHEN 325 MG/1
650 TABLET ORAL
Status: COMPLETED | OUTPATIENT
Start: 2019-12-05 | End: 2019-12-05

## 2019-12-05 RX ORDER — ACETAMINOPHEN 325 MG/1
650 TABLET ORAL
Status: CANCELLED | OUTPATIENT
Start: 2019-12-05

## 2019-12-05 RX ORDER — HYDROCODONE BITARTRATE AND ACETAMINOPHEN 500; 5 MG/1; MG/1
TABLET ORAL ONCE
Status: COMPLETED | OUTPATIENT
Start: 2019-12-05 | End: 2019-12-05

## 2019-12-05 RX ADMIN — HEPARIN 500 UNITS: 100 SYRINGE at 09:12

## 2019-12-05 RX ADMIN — ACETAMINOPHEN 650 MG: 325 TABLET ORAL at 11:12

## 2019-12-05 RX ADMIN — DIPHENHYDRAMINE HYDROCHLORIDE 25 MG: 25 CAPSULE ORAL at 11:12

## 2019-12-05 RX ADMIN — Medication 10 ML: at 09:12

## 2019-12-05 RX ADMIN — SODIUM CHLORIDE: 9 INJECTION, SOLUTION INTRAVENOUS at 11:12

## 2019-12-05 NOTE — PLAN OF CARE
Pt got 1 unit prbc's of blood tolerated well, with no s/s of adverse reaction. VS stable throughout transfusion. Port to R chest wall, flushed with normal saline and heparin and secured prior to discharge. RTC Monday,12/9/19, pt verbalized understanding. Pt discharged with NAD. Ambulatory from clinic.

## 2019-12-05 NOTE — NURSING
Patient's PAC accessed.  Labs drawn.  Line heparinized and left in place for possible infusion.  Patient to report to clinic for removal if no infusion ordered.  Patient verbalized understanding.

## 2019-12-09 ENCOUNTER — HOSPITAL ENCOUNTER (OUTPATIENT)
Dept: CARDIOLOGY | Facility: CLINIC | Age: 64
Discharge: HOME | End: 2019-12-09
Payer: COMMERCIAL

## 2019-12-09 ENCOUNTER — INFUSION (OUTPATIENT)
Dept: INFUSION THERAPY | Facility: HOSPITAL | Age: 64
End: 2019-12-09
Attending: NURSE PRACTITIONER
Payer: COMMERCIAL

## 2019-12-09 ENCOUNTER — INFUSION (OUTPATIENT)
Dept: INFUSION THERAPY | Facility: HOSPITAL | Age: 64
End: 2019-12-09
Attending: INTERNAL MEDICINE
Payer: COMMERCIAL

## 2019-12-09 ENCOUNTER — OFFICE VISIT (OUTPATIENT)
Dept: HEMATOLOGY/ONCOLOGY | Facility: CLINIC | Age: 64
End: 2019-12-09
Payer: COMMERCIAL

## 2019-12-09 VITALS
SYSTOLIC BLOOD PRESSURE: 110 MMHG | DIASTOLIC BLOOD PRESSURE: 70 MMHG | RESPIRATION RATE: 18 BRPM | BODY MASS INDEX: 38.05 KG/M2 | OXYGEN SATURATION: 98 % | TEMPERATURE: 98 F | HEIGHT: 60 IN | HEART RATE: 126 BPM | WEIGHT: 193.81 LBS

## 2019-12-09 VITALS
TEMPERATURE: 98 F | RESPIRATION RATE: 18 BRPM | OXYGEN SATURATION: 96 % | SYSTOLIC BLOOD PRESSURE: 123 MMHG | DIASTOLIC BLOOD PRESSURE: 75 MMHG | HEART RATE: 104 BPM

## 2019-12-09 DIAGNOSIS — D63.0 ANEMIA IN NEOPLASTIC DISEASE: ICD-10-CM

## 2019-12-09 DIAGNOSIS — R06.02 SHORTNESS OF BREATH: ICD-10-CM

## 2019-12-09 DIAGNOSIS — D75.839 THROMBOCYTHEMIA: ICD-10-CM

## 2019-12-09 DIAGNOSIS — M79.601 RIGHT ARM PAIN: ICD-10-CM

## 2019-12-09 DIAGNOSIS — R06.02 SHORTNESS OF BREATH: Primary | ICD-10-CM

## 2019-12-09 DIAGNOSIS — C77.3 BREAST CANCER METASTASIZED TO AXILLARY LYMPH NODE, LEFT: ICD-10-CM

## 2019-12-09 DIAGNOSIS — C50.912 BREAST CANCER METASTASIZED TO AXILLARY LYMPH NODE, LEFT: Primary | ICD-10-CM

## 2019-12-09 DIAGNOSIS — R00.0 TACHYCARDIA: ICD-10-CM

## 2019-12-09 DIAGNOSIS — E87.6 HYPOKALEMIA: ICD-10-CM

## 2019-12-09 DIAGNOSIS — C77.3 BREAST CANCER METASTASIZED TO AXILLARY LYMPH NODE, LEFT: Primary | ICD-10-CM

## 2019-12-09 DIAGNOSIS — R79.89 POSITIVE D DIMER: ICD-10-CM

## 2019-12-09 DIAGNOSIS — C50.912 BREAST CANCER METASTASIZED TO AXILLARY LYMPH NODE, LEFT: ICD-10-CM

## 2019-12-09 LAB
ALBUMIN SERPL BCP-MCNC: 3.7 G/DL (ref 3.5–5.2)
ALP SERPL-CCNC: 98 U/L (ref 55–135)
ALT SERPL W/O P-5'-P-CCNC: 11 U/L (ref 10–44)
ANION GAP SERPL CALC-SCNC: 13 MMOL/L (ref 8–16)
AST SERPL-CCNC: 15 U/L (ref 10–40)
BILIRUB SERPL-MCNC: 0.5 MG/DL (ref 0.1–1)
BUN SERPL-MCNC: 12 MG/DL (ref 8–23)
CALCIUM SERPL-MCNC: 9.7 MG/DL (ref 8.7–10.5)
CHLORIDE SERPL-SCNC: 101 MMOL/L (ref 95–110)
CO2 SERPL-SCNC: 28 MMOL/L (ref 23–29)
CREAT SERPL-MCNC: 0.9 MG/DL (ref 0.5–1.4)
D DIMER PPP IA.FEU-MCNC: 1.48 MG/L FEU
ERYTHROCYTE [DISTWIDTH] IN BLOOD BY AUTOMATED COUNT: 17.3 % (ref 11.5–14.5)
EST. GFR  (AFRICAN AMERICAN): >60 ML/MIN/1.73 M^2
EST. GFR  (NON AFRICAN AMERICAN): >60 ML/MIN/1.73 M^2
GLUCOSE SERPL-MCNC: 156 MG/DL (ref 70–110)
HCT VFR BLD AUTO: 31.2 % (ref 37–48.5)
HGB BLD-MCNC: 9.8 G/DL (ref 12–16)
IMM GRANULOCYTES # BLD AUTO: 0.1 K/UL (ref 0–0.04)
MCH RBC QN AUTO: 30.8 PG (ref 27–31)
MCHC RBC AUTO-ENTMCNC: 31.4 G/DL (ref 32–36)
MCV RBC AUTO: 98 FL (ref 82–98)
NEUTROPHILS # BLD AUTO: 5.7 K/UL (ref 1.8–7.7)
PLATELET # BLD AUTO: 358 K/UL (ref 150–350)
PMV BLD AUTO: 10.1 FL (ref 9.2–12.9)
POTASSIUM SERPL-SCNC: 2.9 MMOL/L (ref 3.5–5.1)
PROT SERPL-MCNC: 7.3 G/DL (ref 6–8.4)
RBC # BLD AUTO: 3.18 M/UL (ref 4–5.4)
SODIUM SERPL-SCNC: 142 MMOL/L (ref 136–145)
WBC # BLD AUTO: 7.41 K/UL (ref 3.9–12.7)

## 2019-12-09 PROCEDURE — 99215 PR OFFICE/OUTPT VISIT, EST, LEVL V, 40-54 MIN: ICD-10-PCS | Mod: S$GLB,,, | Performed by: NURSE PRACTITIONER

## 2019-12-09 PROCEDURE — 85379 FIBRIN DEGRADATION QUANT: CPT

## 2019-12-09 PROCEDURE — 3008F PR BODY MASS INDEX (BMI) DOCUMENTED: ICD-10-PCS | Mod: CPTII,S$GLB,, | Performed by: NURSE PRACTITIONER

## 2019-12-09 PROCEDURE — 96367 TX/PROPH/DG ADDL SEQ IV INF: CPT

## 2019-12-09 PROCEDURE — 3078F PR MOST RECENT DIASTOLIC BLOOD PRESSURE < 80 MM HG: ICD-10-PCS | Mod: CPTII,S$GLB,, | Performed by: NURSE PRACTITIONER

## 2019-12-09 PROCEDURE — 63600175 PHARM REV CODE 636 W HCPCS: Performed by: INTERNAL MEDICINE

## 2019-12-09 PROCEDURE — 99999 PR PBB SHADOW E&M-EST. PATIENT-LVL V: ICD-10-PCS | Mod: PBBFAC,,, | Performed by: NURSE PRACTITIONER

## 2019-12-09 PROCEDURE — 93005 EKG 12-LEAD: ICD-10-PCS | Mod: S$GLB,,, | Performed by: NURSE PRACTITIONER

## 2019-12-09 PROCEDURE — 3008F BODY MASS INDEX DOCD: CPT | Mod: CPTII,S$GLB,, | Performed by: NURSE PRACTITIONER

## 2019-12-09 PROCEDURE — 99999 PR PBB SHADOW E&M-EST. PATIENT-LVL V: CPT | Mod: PBBFAC,,, | Performed by: NURSE PRACTITIONER

## 2019-12-09 PROCEDURE — 25000003 PHARM REV CODE 250: Performed by: INTERNAL MEDICINE

## 2019-12-09 PROCEDURE — 3078F DIAST BP <80 MM HG: CPT | Mod: CPTII,S$GLB,, | Performed by: NURSE PRACTITIONER

## 2019-12-09 PROCEDURE — A4216 STERILE WATER/SALINE, 10 ML: HCPCS | Performed by: INTERNAL MEDICINE

## 2019-12-09 PROCEDURE — S0028 INJECTION, FAMOTIDINE, 20 MG: HCPCS | Performed by: INTERNAL MEDICINE

## 2019-12-09 PROCEDURE — 96413 CHEMO IV INFUSION 1 HR: CPT

## 2019-12-09 PROCEDURE — 80053 COMPREHEN METABOLIC PANEL: CPT

## 2019-12-09 PROCEDURE — 3074F SYST BP LT 130 MM HG: CPT | Mod: CPTII,S$GLB,, | Performed by: NURSE PRACTITIONER

## 2019-12-09 PROCEDURE — 3074F PR MOST RECENT SYSTOLIC BLOOD PRESSURE < 130 MM HG: ICD-10-PCS | Mod: CPTII,S$GLB,, | Performed by: NURSE PRACTITIONER

## 2019-12-09 PROCEDURE — 96375 TX/PRO/DX INJ NEW DRUG ADDON: CPT

## 2019-12-09 PROCEDURE — 85027 COMPLETE CBC AUTOMATED: CPT

## 2019-12-09 PROCEDURE — 93005 ELECTROCARDIOGRAM TRACING: CPT | Mod: S$GLB,,, | Performed by: NURSE PRACTITIONER

## 2019-12-09 PROCEDURE — 93010 EKG 12-LEAD: ICD-10-PCS | Mod: S$GLB,,, | Performed by: INTERNAL MEDICINE

## 2019-12-09 PROCEDURE — 93010 ELECTROCARDIOGRAM REPORT: CPT | Mod: S$GLB,,, | Performed by: INTERNAL MEDICINE

## 2019-12-09 PROCEDURE — 99215 OFFICE O/P EST HI 40 MIN: CPT | Mod: S$GLB,,, | Performed by: NURSE PRACTITIONER

## 2019-12-09 RX ORDER — SODIUM CHLORIDE 0.9 % (FLUSH) 0.9 %
10 SYRINGE (ML) INJECTION
Status: COMPLETED | OUTPATIENT
Start: 2019-12-09 | End: 2019-12-09

## 2019-12-09 RX ORDER — DIPHENHYDRAMINE HYDROCHLORIDE 50 MG/ML
50 INJECTION INTRAMUSCULAR; INTRAVENOUS ONCE AS NEEDED
Status: CANCELLED | OUTPATIENT
Start: 2019-12-09

## 2019-12-09 RX ORDER — SODIUM CHLORIDE 0.9 % (FLUSH) 0.9 %
10 SYRINGE (ML) INJECTION
Status: CANCELLED | OUTPATIENT
Start: 2019-12-09

## 2019-12-09 RX ORDER — POTASSIUM CHLORIDE 20 MEQ/1
40 TABLET, EXTENDED RELEASE ORAL ONCE
Status: CANCELLED
Start: 2019-12-09

## 2019-12-09 RX ORDER — SODIUM CHLORIDE 0.9 % (FLUSH) 0.9 %
10 SYRINGE (ML) INJECTION
Status: DISCONTINUED | OUTPATIENT
Start: 2019-12-09 | End: 2019-12-09 | Stop reason: HOSPADM

## 2019-12-09 RX ORDER — EPINEPHRINE 0.3 MG/.3ML
0.3 INJECTION SUBCUTANEOUS ONCE AS NEEDED
Status: CANCELLED | OUTPATIENT
Start: 2019-12-09

## 2019-12-09 RX ORDER — POTASSIUM CHLORIDE 20 MEQ/1
40 TABLET, EXTENDED RELEASE ORAL ONCE
Status: COMPLETED | OUTPATIENT
Start: 2019-12-09 | End: 2019-12-09

## 2019-12-09 RX ORDER — EPINEPHRINE 0.3 MG/.3ML
0.3 INJECTION SUBCUTANEOUS ONCE AS NEEDED
Status: DISCONTINUED | OUTPATIENT
Start: 2019-12-09 | End: 2019-12-09 | Stop reason: HOSPADM

## 2019-12-09 RX ORDER — HEPARIN 100 UNIT/ML
500 SYRINGE INTRAVENOUS
Status: DISCONTINUED | OUTPATIENT
Start: 2019-12-09 | End: 2019-12-09 | Stop reason: HOSPADM

## 2019-12-09 RX ORDER — FAMOTIDINE 10 MG/ML
20 INJECTION INTRAVENOUS
Status: CANCELLED | OUTPATIENT
Start: 2019-12-09

## 2019-12-09 RX ORDER — DIPHENHYDRAMINE HYDROCHLORIDE 50 MG/ML
50 INJECTION INTRAMUSCULAR; INTRAVENOUS ONCE AS NEEDED
Status: DISCONTINUED | OUTPATIENT
Start: 2019-12-09 | End: 2019-12-09 | Stop reason: HOSPADM

## 2019-12-09 RX ORDER — HEPARIN 100 UNIT/ML
500 SYRINGE INTRAVENOUS
Status: COMPLETED | OUTPATIENT
Start: 2019-12-09 | End: 2019-12-09

## 2019-12-09 RX ORDER — HEPARIN 100 UNIT/ML
500 SYRINGE INTRAVENOUS
Status: CANCELLED | OUTPATIENT
Start: 2019-12-09

## 2019-12-09 RX ORDER — FAMOTIDINE 10 MG/ML
20 INJECTION INTRAVENOUS
Status: COMPLETED | OUTPATIENT
Start: 2019-12-09 | End: 2019-12-09

## 2019-12-09 RX ORDER — POTASSIUM CHLORIDE 20 MEQ/1
20 TABLET, EXTENDED RELEASE ORAL DAILY
Qty: 7 TABLET | Refills: 0 | Status: SHIPPED | OUTPATIENT
Start: 2019-12-09 | End: 2019-12-23 | Stop reason: SDUPTHER

## 2019-12-09 RX ADMIN — HEPARIN 500 UNITS: 100 SYRINGE at 06:12

## 2019-12-09 RX ADMIN — SODIUM CHLORIDE: 0.9 INJECTION, SOLUTION INTRAVENOUS at 04:12

## 2019-12-09 RX ADMIN — POTASSIUM CHLORIDE 40 MEQ: 1500 TABLET, EXTENDED RELEASE ORAL at 04:12

## 2019-12-09 RX ADMIN — Medication 10 ML: at 02:12

## 2019-12-09 RX ADMIN — DIPHENHYDRAMINE HYDROCHLORIDE 25 MG: 50 INJECTION INTRAMUSCULAR; INTRAVENOUS at 04:12

## 2019-12-09 RX ADMIN — HEPARIN 500 UNITS: 100 SYRINGE at 02:12

## 2019-12-09 RX ADMIN — Medication 10 ML: at 06:12

## 2019-12-09 RX ADMIN — PACLITAXEL 156 MG: 6 INJECTION, SOLUTION INTRAVENOUS at 05:12

## 2019-12-09 RX ADMIN — DEXAMETHASONE SODIUM PHOSPHATE 10 MG: 4 INJECTION, SOLUTION INTRA-ARTICULAR; INTRALESIONAL; INTRAMUSCULAR; INTRAVENOUS; SOFT TISSUE at 05:12

## 2019-12-09 RX ADMIN — FAMOTIDINE 20 MG: 10 INJECTION, SOLUTION INTRAVENOUS at 04:12

## 2019-12-09 NOTE — PROGRESS NOTES
"PATIENT: Maria Elena Tavares  MRN: [id number]  DATE: 12/10/2019      Chief complaint:  Chief Complaint   Patient presents with    Symptomatic anemia         History of Present Illness: Ms. Tavares is a 64 y.o. who returns in follow up for Breast Cancer.    Received C1 Taxol on 12/2/19 - treatment was interrupted due to flushing but re challenged without difficulties.     In the interval, 1u PRBC transfusion.   Fatigue slightly better post transfusion but continues to be SOB on exertion.   2 episodes of right arm pain lasting about 15 minutes- first episode 12/7 and then again yesterday.    Right arm "ache" from shoulder to wrist and hand felt cold.   States above ache relieved with Tylenol.   No exertional pain.  No CP/palpitations/swelling.   No Mouth sores  No nausea or vomiting  No fevers or chills  No headache or visual changes.       She is accompanied by her friend.    Oncologic History:  - self detected, noted a shooting pain in breast first and then a week later felt a lump  Some delay in getting appointment as she was unaware she had to call  - 8/30/19 Diagnostic mammogram and ultrasound:  Left breast 20 mm x 18 mm x 17 mm mass at the 3 o'clock position. Assessment: 4 - Suspicious finding. Biopsy is recommended.   Lymph Node: Left axilla 16 mm x 14 mm x 13 mm lymph node. Assessment: 4 - Suspicious finding. Biopsy is recommended.   Right- There is no mammographic or sonographic evidence of malignancy.  BI-RADS Category:   Overall: 4 - Suspicious  - 9/5/19 Ultrasound guided biopsy  Pathology:  FINAL PATHOLOGIC DIAGNOSIS  1. LEFT BREAST MASS, BIOPSY:  - Invasive ductal carcinoma, grade 3, longest linear length is 10 mm measured on the slide.  - Histologic Grade ([place] Histologic Score)  Glandular (Acinar/Tubular Differentiation): 3.  Nuclear Pleomorphism: 2.  Mitotic Rate: 3.  Overall Grade: Grade 3 (score 8).  - Microcalcifications: Not identified.  - Lymphovascular invasion: Not identified.  2. LYMPH NODE, LEFT " "AXILLA, BIOPSY:  - Positive for metastatic carcinoma.  - The metastatic deposit measures 6 mm in longest continuous linear length.  Estrogen receptor: Positive, 90% of the tumor nuclei staining moderate to strongly.  Progesterone receptor: Positive, 30% of the tumor nuclei staining weak to moderately.  HER2: Negative.  Ki-67: 60%.  - ECHO 19: EF 64%  - PET 19:  Impression         Hypermetabolic left breast mass and regional left axillary lymphadenopathy consistent with reported breast cancer and corresponding with recent MRI findings.    Upper limit of normal sized right axillary lymph nodes with normal fatty duane and mildly increased radiotracer uptake.  Findings could represent reactive nodes with metastatic nodes thought less likely.  Lymphscintigraphy with injection in the left breast may considered to assess for drainage to the contralateral axilla.      BX 19: Right axilla node biopsy is benign     Gyn Hx:  Menarche- 11  Menopause - partial hysterectomy at age 36 yo  Remainder done in   No HRT  Prior ocps  , age 28 at 1st pregnancy  No breastfeeding         PMFSH: all information reviewed and updated as relevant to today's visit    Review of Systems:   Review of Systems   Constitutional: Positive for fatigue. Negative for activity change, appetite change, fever and unexpected weight change.   HENT: Negative for mouth sores, nosebleeds and sore throat.    Eyes: Negative for visual disturbance.   Respiratory: Positive for shortness of breath. Negative for cough.    Cardiovascular: Negative for chest pain, palpitations and leg swelling.   Gastrointestinal: Negative for abdominal pain, constipation, diarrhea, nausea and vomiting.   Genitourinary: Negative for difficulty urinating and frequency.   Musculoskeletal: Negative for arthralgias and back pain.        Arm pain x 2 occasions. "aching"   Skin: Negative for rash.   Neurological: Negative for dizziness, numbness and headaches. "   Hematological: Negative for adenopathy. Does not bruise/bleed easily.   Psychiatric/Behavioral: Negative for confusion and sleep disturbance. The patient is nervous/anxious.    All other systems reviewed and are negative.        Objective:      Vitals:   Vitals:    12/09/19 1442   BP: 110/70   BP Location: Left arm   Patient Position: Sitting   BP Method: Large (Automatic)   Pulse: (!) 126   Resp: 18   Temp: 97.9 °F (36.6 °C)   TempSrc: Oral   SpO2: 98%   Weight: 87.9 kg (193 lb 12.6 oz)   Height: 5' (1.524 m)     BMI: Body mass index is 37.85 kg/m².    Physical Exam:   Physical Exam   Constitutional: She is oriented to person, place, and time. She appears well-developed and well-nourished. No distress.   HENT:   Head: Normocephalic and atraumatic.   Mouth/Throat: Oropharynx is clear and moist.   No sinus tenderness.  Alopecia.  Darkening of tongue noted   Eyes: Pupils are equal, round, and reactive to light. Conjunctivae and lids are normal. No scleral icterus.   Neck: Trachea normal and normal range of motion. Neck supple. No JVD present. No thyromegaly present.   No carotid bruits.    Cardiovascular: Normal heart sounds and intact distal pulses.   No murmur heard.  tachycardic   Pulmonary/Chest: Effort normal and breath sounds normal. She has no wheezes.   Abdominal: Soft. Normal appearance and bowel sounds are normal. She exhibits no distension and no mass. There is no tenderness.   No organomegaly.    Musculoskeletal: Normal range of motion. She exhibits no edema.   No spinal or paraspinal tenderness.    Lymphadenopathy:        Head (right side): No submental and no submandibular adenopathy present.        Head (left side): No submental and no submandibular adenopathy present.     She has no cervical adenopathy.     She has no axillary adenopathy.        Right: No supraclavicular adenopathy present.        Left: No supraclavicular adenopathy present.   Neurological: She is alert and oriented to person, place,  and time. She has normal strength and normal reflexes. No cranial nerve deficit. Gait normal.   Skin: Skin is warm, dry and intact. Capillary refill takes less than 2 seconds. No bruising and no rash noted. No cyanosis. Nails show no clubbing.   Psychiatric: She has a normal mood and affect. Her speech is normal and behavior is normal.   Nursing note and vitals reviewed.    Breast Exam: deferred    ECOG Performance Status:   ECOG SCORE    1 - Restricted in strenuous activity-ambulatory and able to carry out work of a light nature         Laboratory Data:  Results for orders placed or performed in visit on 12/09/19   CBC Oncology   Result Value Ref Range    WBC 7.41 3.90 - 12.70 K/uL    RBC 3.18 (L) 4.00 - 5.40 M/uL    Hemoglobin 9.8 (L) 12.0 - 16.0 g/dL    Hematocrit 31.2 (L) 37.0 - 48.5 %    Mean Corpuscular Volume 98 82 - 98 fL    Mean Corpuscular Hemoglobin 30.8 27.0 - 31.0 pg    Mean Corpuscular Hemoglobin Conc 31.4 (L) 32.0 - 36.0 g/dL    RDW 17.3 (H) 11.5 - 14.5 %    Platelets 358 (H) 150 - 350 K/uL    MPV 10.1 9.2 - 12.9 fL    Gran # (ANC) 5.7 1.8 - 7.7 K/uL    Immature Grans (Abs) 0.10 (H) 0.00 - 0.04 K/uL   Comprehensive metabolic panel   Result Value Ref Range    Sodium 142 136 - 145 mmol/L    Potassium 2.9 (L) 3.5 - 5.1 mmol/L    Chloride 101 95 - 110 mmol/L    CO2 28 23 - 29 mmol/L    Glucose 156 (H) 70 - 110 mg/dL    BUN, Bld 12 8 - 23 mg/dL    Creatinine 0.9 0.5 - 1.4 mg/dL    Calcium 9.7 8.7 - 10.5 mg/dL    Total Protein 7.3 6.0 - 8.4 g/dL    Albumin 3.7 3.5 - 5.2 g/dL    Total Bilirubin 0.5 0.1 - 1.0 mg/dL    Alkaline Phosphatase 98 55 - 135 U/L    AST 15 10 - 40 U/L    ALT 11 10 - 44 U/L    Anion Gap 13 8 - 16 mmol/L    eGFR if African American >60.0 >60 mL/min/1.73 m^2    eGFR if non African American >60.0 >60 mL/min/1.73 m^2        Imaging:  US Abdomen Limited  Narrative: EXAMINATION:  US ABDOMEN LIMITED    CLINICAL HISTORY:  RUQ and epigastric pain;    TECHNIQUE:  Limited ultrasound of the right  "upper quadrant of the abdomen (including pancreas, liver, gallbladder, common bile duct, and spleen) was performed.    COMPARISON:  Abdominal ultrasound 10/08/2014 and CT abdomen and pelvis 10/23/2014    FINDINGS:  Liver: Normal in size, measuring 14.1 cm. Slightly heterogeneous increased parenchymal attenuation with some scattered geographic areas of sparing near the gallbladder and right portal vein suggesting fatty infiltration.  No focal hepatic lesions.    Gallbladder: No calculi, wall thickening, or pericholecystic fluid.  No sonographic Baldwin's sign.    Biliary system: The common duct is not dilated, measuring 2 mm.  No intrahepatic ductal dilatation.    Spleen: Normal in size with scattered small echogenic parenchymal foci suggesting calcifications from prior granulomatous disease, measuring 7.8 cm.    Miscellaneous: No upper abdominal ascites.  Imaged portions of the pancreas and IVC are within normal limits.  There is a 3.1 cm simple appearing cyst at the right kidney without hydronephrosis seen.  Impression: No acute sonographic abnormality.    Hepatic steatosis.    Electronically signed by: Tobi Resendiz MD  Date:    10/25/2019  Time:    13:14         Assessment/Plan:     1. Breast cancer metastasized to axillary lymph node, left    2. Anemia in neoplastic disease    3. Hypokalemia    4. Tachycardia    5. Shortness of breath    6. Right arm pain    7. Thrombocythemia    8. Positive D dimer        Plan:    Presents for C2 Taxol. She had 2 episodes of non exertional, short lived right arm "ache" relieved with tylenol. No other symptoms. She is tachycardic today, therefor will obtain EKG to r/o afib and/or other EKG changes.  Her Wells Score is 2.6 placing her in intermediate probability for PE. Will draw d-dimer today.   Provided that it is less than 500 ng/ml, will proceed with carotid ultrasound and stress ECHO.     -Labs reviewed. Anemia parameters improved although continues to be SOB on exertion (no " improvement post transfusion). Will monitor.  Plt up- reactive.   Potassium low- add oral replacement today in chemo and will send in RX for 7 days. Repeat next week.     If EKG ok, will proceed with C2 Taxol as scheduled.    Plan is for surgery then xrt then endocrine therapy. She is contemplating reconstruction after XRT.   ECHO due- will get stress as above  RTC in 1 weeks to see Dr. Villagran with CBC, CMP, carotid ultrasound, stress ECHO and for C3 Taxol.       Addendum: d-dimer elevated. Will proceed with CTA of chest to rule out PE. If negative, proceed with carotid ultrasound and stress echo. Discussed with patient.     Med and Orders:  Orders Placed This Encounter    US Carotid Bilateral    CTA CHEST    D-DIMER, QUANTITATIVE    CBC Oncology    Comprehensive metabolic panel    EKG 12-lead    Stress Echo Which stress agent will be used? Treadmill Exercise; Color Flow Doppler? No    potassium chloride SA (K-DUR,KLOR-CON) 20 MEQ tablet       Follow Up:   Follow up in about 1 week (around 12/16/2019).    Patient instructions: as above  There are no Patient Instructions on file for this visit.    Patient is in agreement with the proposed treatment plan. All questions were answered to the patient's satisfaction. Pt knows to call clinic for any new or worsening symptoms and if anything is needed before the next clinic visit. Discussed case with Dr. Villagran who agrees with above.       BRITT Morejon-PAUL  Hematology & Oncology  [address] [place] [id number]  ph. [phone]  Fax. [phone]     I spent 40 minutes (face to face) with the patient, more than 50% was in counseling and coordination of care as detailed above.

## 2019-12-09 NOTE — PLAN OF CARE
1640-Labs , hx, and medications reviewed, patient was seen by PA prior to arrival, had EKG performed for tachycardia, per VERA okay to treat, orders signed and new lab order for collection today. Assessment completed. Discussed plan of care with patient. Patient in agreement. Chair reclined and warm blanket and snack offered.

## 2019-12-09 NOTE — Clinical Note
Hypertension is unchanged.  Continue current treatment regimen.  Blood pressure will be reassessed in 4 weeks.   fyi- SOB and 2 episodes of right arm pain as discussed yesterday. D dimer was elevated, therefor ordering CTA. If negative, will proceed with stress echo and carotid ultrasound.

## 2019-12-09 NOTE — Clinical Note
CTA stat to rule out pulmonary embolus. Also schedule for carotid ultrasound and stress ECHO sometime this week.RTC in 1 week (12/16) to see Dr. Villagran with CBC, CMP and for C3 Taxol.

## 2019-12-10 NOTE — PLAN OF CARE
1845-Patient tolerated treatment well. Port flushed and heparin locked before de-access. Discharged without complaints or S/S of adverse event.  Instructed to call provider for any questions or concerns.

## 2019-12-11 ENCOUNTER — HOSPITAL ENCOUNTER (OUTPATIENT)
Dept: CARDIOLOGY | Facility: CLINIC | Age: 64
Discharge: HOME | End: 2019-12-11
Attending: NURSE PRACTITIONER
Payer: COMMERCIAL

## 2019-12-11 ENCOUNTER — HOSPITAL ENCOUNTER (OUTPATIENT)
Dept: RADIOLOGY | Facility: HOSPITAL | Age: 64
Discharge: HOME | End: 2019-12-11
Attending: NURSE PRACTITIONER
Payer: COMMERCIAL

## 2019-12-11 VITALS — BODY MASS INDEX: 36.12 KG/M2 | HEIGHT: 60 IN | WEIGHT: 184 LBS

## 2019-12-11 DIAGNOSIS — D63.0 ANEMIA IN NEOPLASTIC DISEASE: ICD-10-CM

## 2019-12-11 DIAGNOSIS — R06.02 SHORTNESS OF BREATH: ICD-10-CM

## 2019-12-11 DIAGNOSIS — R00.0 TACHYCARDIA: ICD-10-CM

## 2019-12-11 DIAGNOSIS — C50.912 BREAST CANCER METASTASIZED TO AXILLARY LYMPH NODE, LEFT: ICD-10-CM

## 2019-12-11 DIAGNOSIS — R79.89 POSITIVE D DIMER: ICD-10-CM

## 2019-12-11 DIAGNOSIS — M79.601 RIGHT ARM PAIN: ICD-10-CM

## 2019-12-11 DIAGNOSIS — C77.3 BREAST CANCER METASTASIZED TO AXILLARY LYMPH NODE, LEFT: ICD-10-CM

## 2019-12-11 LAB
ASCENDING AORTA: 2.99 CM
BSA FOR ECHO PROCEDURE: 1.88 M2
CV ECHO LV RWT: 0.48 CM
CV STRESS BASE HR: 95 BPM
DIASTOLIC BLOOD PRESSURE: 62 MMHG
DOP CALC LVOT AREA: 4.2 CM2
DOP CALC LVOT DIAMETER: 2.3 CM
DOP CALC LVOT PEAK VEL: 0.85 M/S
DOP CALC LVOT STROKE VOLUME: 53.57 CM3
DOP CALCLVOT PEAK VEL VTI: 12.9 CM
E WAVE DECELERATION TIME: 259.27 MSEC
E/A RATIO: 0.55
E/E' RATIO: 5.43 M/S
ECHO LV POSTERIOR WALL: 0.94 CM (ref 0.6–1.1)
FRACTIONAL SHORTENING: 32 % (ref 28–44)
INTERVENTRICULAR SEPTUM: 0.92 CM (ref 0.6–1.1)
IVRT: 0.12 MSEC
LA MAJOR: 4.64 CM
LA MINOR: 4.72 CM
LA WIDTH: 2.53 CM
LEFT ATRIUM SIZE: 3.57 CM
LEFT ATRIUM VOLUME INDEX: 19.9 ML/M2
LEFT ATRIUM VOLUME: 35.93 CM3
LEFT INTERNAL DIMENSION IN SYSTOLE: 2.65 CM (ref 2.1–4)
LEFT VENTRICLE DIASTOLIC VOLUME INDEX: 36.37 ML/M2
LEFT VENTRICLE DIASTOLIC VOLUME: 65.52 ML
LEFT VENTRICLE MASS INDEX: 61 G/M2
LEFT VENTRICLE SYSTOLIC VOLUME INDEX: 14.3 ML/M2
LEFT VENTRICLE SYSTOLIC VOLUME: 25.8 ML
LEFT VENTRICULAR INTERNAL DIMENSION IN DIASTOLE: 3.89 CM (ref 3.5–6)
LEFT VENTRICULAR MASS: 109.8 G
LV LATERAL E/E' RATIO: 5.43 M/S
LV SEPTAL E/E' RATIO: 5.43 M/S
MV PEAK A VEL: 0.69 M/S
MV PEAK E VEL: 0.38 M/S
OHS CV CPX 1 MINUTE RECOVERY HEART RATE: 136 BPM
OHS CV CPX 85 PERCENT MAX PREDICTED HEART RATE MALE: 127
OHS CV CPX ESTIMATED METS: 7
OHS CV CPX MAX PREDICTED HEART RATE: 150
OHS CV CPX PATIENT IS FEMALE: 1
OHS CV CPX PATIENT IS MALE: 0
OHS CV CPX PEAK DIASTOLIC BLOOD PRESSURE: 52 MMHG
OHS CV CPX PEAK HEAR RATE: 151 BPM
OHS CV CPX PEAK RATE PRESSURE PRODUCT: NORMAL
OHS CV CPX PEAK SYSTOLIC BLOOD PRESSURE: 133 MMHG
OHS CV CPX PERCENT MAX PREDICTED HEART RATE ACHIEVED: 101
OHS CV CPX RATE PRESSURE PRODUCT PRESENTING: 8835
PISA TR MAX VEL: 2.24 M/S
PULM VEIN S/D RATIO: 1.5
PV PEAK D VEL: 0.28 M/S
PV PEAK S VEL: 0.42 M/S
RA MAJOR: 4.05 CM
RA PRESSURE: 3 MMHG
RA WIDTH: 2.45 CM
RIGHT VENTRICULAR END-DIASTOLIC DIMENSION: 2.8 CM
RV TISSUE DOPPLER FREE WALL SYSTOLIC VELOCITY 1 (APICAL 4 CHAMBER VIEW): 10.45 CM/S
SINUS: 3.42 CM
STJ: 2.71 CM
STRESS ECHO POST EXERCISE DUR MIN: 4 MINUTES
STRESS ECHO POST EXERCISE DUR SEC: 30 SECONDS
SYSTOLIC BLOOD PRESSURE: 93 MMHG
TDI LATERAL: 0.07 M/S
TDI SEPTAL: 0.07 M/S
TDI: 0.07 M/S
TR MAX PG: 20 MMHG
TRICUSPID ANNULAR PLANE SYSTOLIC EXCURSION: 1.87 CM
TV REST PULMONARY ARTERY PRESSURE: 23 MMHG

## 2019-12-11 PROCEDURE — 93351 STRESS TTE COMPLETE: CPT

## 2019-12-11 PROCEDURE — 93351 STRESS ECHO (CUPID ONLY): ICD-10-PCS | Mod: 26,,, | Performed by: INTERNAL MEDICINE

## 2019-12-11 PROCEDURE — 71275 CTA CHEST NON CORONARY: ICD-10-PCS | Mod: 26,,, | Performed by: RADIOLOGY

## 2019-12-11 PROCEDURE — 25500020 PHARM REV CODE 255: Performed by: NURSE PRACTITIONER

## 2019-12-11 PROCEDURE — 93351 STRESS TTE COMPLETE: CPT | Mod: 26,,, | Performed by: INTERNAL MEDICINE

## 2019-12-11 PROCEDURE — 71275 CT ANGIOGRAPHY CHEST: CPT | Mod: TC

## 2019-12-11 PROCEDURE — 71275 CT ANGIOGRAPHY CHEST: CPT | Mod: 26,,, | Performed by: RADIOLOGY

## 2019-12-11 RX ADMIN — IOHEXOL 100 ML: 350 INJECTION, SOLUTION INTRAVENOUS at 07:12

## 2019-12-12 ENCOUNTER — TELEPHONE (OUTPATIENT)
Dept: HEMATOLOGY/ONCOLOGY | Facility: CLINIC | Age: 64
End: 2019-12-12

## 2019-12-12 ENCOUNTER — HOSPITAL ENCOUNTER (OUTPATIENT)
Dept: RADIOLOGY | Facility: HOSPITAL | Age: 64
Discharge: HOME | End: 2019-12-12
Attending: NURSE PRACTITIONER
Payer: COMMERCIAL

## 2019-12-12 DIAGNOSIS — C50.912 BREAST CANCER METASTASIZED TO AXILLARY LYMPH NODE, LEFT: ICD-10-CM

## 2019-12-12 DIAGNOSIS — R06.02 SHORTNESS OF BREATH: ICD-10-CM

## 2019-12-12 DIAGNOSIS — R00.0 TACHYCARDIA: ICD-10-CM

## 2019-12-12 DIAGNOSIS — M79.601 RIGHT ARM PAIN: ICD-10-CM

## 2019-12-12 DIAGNOSIS — C77.3 BREAST CANCER METASTASIZED TO AXILLARY LYMPH NODE, LEFT: ICD-10-CM

## 2019-12-12 DIAGNOSIS — D63.0 ANEMIA IN NEOPLASTIC DISEASE: ICD-10-CM

## 2019-12-12 PROCEDURE — 93880 EXTRACRANIAL BILAT STUDY: CPT | Mod: 26,,, | Performed by: RADIOLOGY

## 2019-12-12 PROCEDURE — 93880 US CAROTID BILATERAL: ICD-10-PCS | Mod: 26,,, | Performed by: RADIOLOGY

## 2019-12-12 PROCEDURE — 93880 EXTRACRANIAL BILAT STUDY: CPT | Mod: TC

## 2019-12-12 NOTE — TELEPHONE ENCOUNTER
Patient informed of results of CTA, Carotid US, and stress ECHO. Discussed measures to prevent heart diserase such as diet, exercise, and keeping cholesterol levels low. Offered cardiology referral to get established. Will opt out at this time and discuss further at next appt.   PJ

## 2019-12-13 ENCOUNTER — TELEPHONE (OUTPATIENT)
Dept: HEMATOLOGY/ONCOLOGY | Facility: CLINIC | Age: 64
End: 2019-12-13

## 2019-12-13 NOTE — TELEPHONE ENCOUNTER
Called patient left voice mail that appointment with Dr Villagran has been rescheduled to a earlier time Dr Villagran has a meeting. Scheduled labs at 10 am Dr Villagran at 11 and trying to get a earlier infusion appt. Number was provided.

## 2019-12-16 ENCOUNTER — OFFICE VISIT (OUTPATIENT)
Dept: HEMATOLOGY/ONCOLOGY | Facility: CLINIC | Age: 64
End: 2019-12-16
Payer: COMMERCIAL

## 2019-12-16 ENCOUNTER — INFUSION (OUTPATIENT)
Dept: INFUSION THERAPY | Facility: HOSPITAL | Age: 64
End: 2019-12-16
Attending: INTERNAL MEDICINE
Payer: COMMERCIAL

## 2019-12-16 ENCOUNTER — INFUSION (OUTPATIENT)
Dept: INFUSION THERAPY | Facility: HOSPITAL | Age: 64
End: 2019-12-16
Attending: NURSE PRACTITIONER
Payer: COMMERCIAL

## 2019-12-16 VITALS
TEMPERATURE: 98 F | HEART RATE: 116 BPM | RESPIRATION RATE: 18 BRPM | DIASTOLIC BLOOD PRESSURE: 57 MMHG | SYSTOLIC BLOOD PRESSURE: 99 MMHG

## 2019-12-16 VITALS
SYSTOLIC BLOOD PRESSURE: 121 MMHG | DIASTOLIC BLOOD PRESSURE: 69 MMHG | TEMPERATURE: 98 F | BODY MASS INDEX: 37.63 KG/M2 | WEIGHT: 192.69 LBS | RESPIRATION RATE: 20 BRPM | HEART RATE: 117 BPM

## 2019-12-16 DIAGNOSIS — C50.912 BREAST CANCER METASTASIZED TO AXILLARY LYMPH NODE, LEFT: Primary | ICD-10-CM

## 2019-12-16 DIAGNOSIS — Z86.19 HISTORY OF SHINGLES: ICD-10-CM

## 2019-12-16 DIAGNOSIS — C77.3 BREAST CANCER METASTASIZED TO AXILLARY LYMPH NODE, LEFT: Primary | ICD-10-CM

## 2019-12-16 DIAGNOSIS — D63.0 ANEMIA IN NEOPLASTIC DISEASE: ICD-10-CM

## 2019-12-16 DIAGNOSIS — T45.1X5A NEUROPATHY DUE TO CHEMOTHERAPEUTIC DRUG: ICD-10-CM

## 2019-12-16 DIAGNOSIS — G62.0 NEUROPATHY DUE TO CHEMOTHERAPEUTIC DRUG: ICD-10-CM

## 2019-12-16 LAB
ALBUMIN SERPL BCP-MCNC: 3.5 G/DL (ref 3.5–5.2)
ALP SERPL-CCNC: 78 U/L (ref 55–135)
ALT SERPL W/O P-5'-P-CCNC: 10 U/L (ref 10–44)
ANION GAP SERPL CALC-SCNC: 9 MMOL/L (ref 8–16)
AST SERPL-CCNC: 16 U/L (ref 10–40)
BILIRUB SERPL-MCNC: 0.4 MG/DL (ref 0.1–1)
BUN SERPL-MCNC: 18 MG/DL (ref 8–23)
CALCIUM SERPL-MCNC: 9.3 MG/DL (ref 8.7–10.5)
CHLORIDE SERPL-SCNC: 102 MMOL/L (ref 95–110)
CO2 SERPL-SCNC: 27 MMOL/L (ref 23–29)
CREAT SERPL-MCNC: 1.1 MG/DL (ref 0.5–1.4)
ERYTHROCYTE [DISTWIDTH] IN BLOOD BY AUTOMATED COUNT: 18.1 % (ref 11.5–14.5)
EST. GFR  (AFRICAN AMERICAN): >60 ML/MIN/1.73 M^2
EST. GFR  (NON AFRICAN AMERICAN): 53.2 ML/MIN/1.73 M^2
GLUCOSE SERPL-MCNC: 113 MG/DL (ref 70–110)
HCT VFR BLD AUTO: 27.6 % (ref 37–48.5)
HGB BLD-MCNC: 8.6 G/DL (ref 12–16)
IMM GRANULOCYTES # BLD AUTO: 0.05 K/UL (ref 0–0.04)
MCH RBC QN AUTO: 30.6 PG (ref 27–31)
MCHC RBC AUTO-ENTMCNC: 31.2 G/DL (ref 32–36)
MCV RBC AUTO: 98 FL (ref 82–98)
NEUTROPHILS # BLD AUTO: 3 K/UL (ref 1.8–7.7)
PLATELET # BLD AUTO: 304 K/UL (ref 150–350)
PMV BLD AUTO: 10.2 FL (ref 9.2–12.9)
POTASSIUM SERPL-SCNC: 3.1 MMOL/L (ref 3.5–5.1)
PROT SERPL-MCNC: 6.7 G/DL (ref 6–8.4)
RBC # BLD AUTO: 2.81 M/UL (ref 4–5.4)
SODIUM SERPL-SCNC: 138 MMOL/L (ref 136–145)
WBC # BLD AUTO: 4.21 K/UL (ref 3.9–12.7)

## 2019-12-16 PROCEDURE — 80053 COMPREHEN METABOLIC PANEL: CPT

## 2019-12-16 PROCEDURE — 3074F PR MOST RECENT SYSTOLIC BLOOD PRESSURE < 130 MM HG: ICD-10-PCS | Mod: CPTII,S$GLB,, | Performed by: INTERNAL MEDICINE

## 2019-12-16 PROCEDURE — 3008F BODY MASS INDEX DOCD: CPT | Mod: CPTII,S$GLB,, | Performed by: INTERNAL MEDICINE

## 2019-12-16 PROCEDURE — 25000003 PHARM REV CODE 250: Performed by: INTERNAL MEDICINE

## 2019-12-16 PROCEDURE — 99999 PR PBB SHADOW E&M-EST. PATIENT-LVL IV: CPT | Mod: PBBFAC,,, | Performed by: INTERNAL MEDICINE

## 2019-12-16 PROCEDURE — 96375 TX/PRO/DX INJ NEW DRUG ADDON: CPT

## 2019-12-16 PROCEDURE — 96367 TX/PROPH/DG ADDL SEQ IV INF: CPT

## 2019-12-16 PROCEDURE — 3078F PR MOST RECENT DIASTOLIC BLOOD PRESSURE < 80 MM HG: ICD-10-PCS | Mod: CPTII,S$GLB,, | Performed by: INTERNAL MEDICINE

## 2019-12-16 PROCEDURE — 3078F DIAST BP <80 MM HG: CPT | Mod: CPTII,S$GLB,, | Performed by: INTERNAL MEDICINE

## 2019-12-16 PROCEDURE — 99214 PR OFFICE/OUTPT VISIT, EST, LEVL IV, 30-39 MIN: ICD-10-PCS | Mod: S$GLB,,, | Performed by: INTERNAL MEDICINE

## 2019-12-16 PROCEDURE — A4216 STERILE WATER/SALINE, 10 ML: HCPCS | Performed by: INTERNAL MEDICINE

## 2019-12-16 PROCEDURE — 99214 OFFICE O/P EST MOD 30 MIN: CPT | Mod: S$GLB,,, | Performed by: INTERNAL MEDICINE

## 2019-12-16 PROCEDURE — 3008F PR BODY MASS INDEX (BMI) DOCUMENTED: ICD-10-PCS | Mod: CPTII,S$GLB,, | Performed by: INTERNAL MEDICINE

## 2019-12-16 PROCEDURE — 3074F SYST BP LT 130 MM HG: CPT | Mod: CPTII,S$GLB,, | Performed by: INTERNAL MEDICINE

## 2019-12-16 PROCEDURE — 96413 CHEMO IV INFUSION 1 HR: CPT

## 2019-12-16 PROCEDURE — S0028 INJECTION, FAMOTIDINE, 20 MG: HCPCS | Performed by: INTERNAL MEDICINE

## 2019-12-16 PROCEDURE — 63600175 PHARM REV CODE 636 W HCPCS: Performed by: INTERNAL MEDICINE

## 2019-12-16 PROCEDURE — 99999 PR PBB SHADOW E&M-EST. PATIENT-LVL IV: ICD-10-PCS | Mod: PBBFAC,,, | Performed by: INTERNAL MEDICINE

## 2019-12-16 PROCEDURE — 85027 COMPLETE CBC AUTOMATED: CPT

## 2019-12-16 RX ORDER — HEPARIN 100 UNIT/ML
500 SYRINGE INTRAVENOUS
Status: DISCONTINUED | OUTPATIENT
Start: 2019-12-16 | End: 2019-12-16 | Stop reason: HOSPADM

## 2019-12-16 RX ORDER — SODIUM CHLORIDE 0.9 % (FLUSH) 0.9 %
10 SYRINGE (ML) INJECTION
Status: CANCELLED | OUTPATIENT
Start: 2019-12-16

## 2019-12-16 RX ORDER — FAMOTIDINE 10 MG/ML
20 INJECTION INTRAVENOUS
Status: COMPLETED | OUTPATIENT
Start: 2019-12-16 | End: 2019-12-16

## 2019-12-16 RX ORDER — FAMOTIDINE 10 MG/ML
20 INJECTION INTRAVENOUS
Status: CANCELLED | OUTPATIENT
Start: 2019-12-16

## 2019-12-16 RX ORDER — DIPHENHYDRAMINE HYDROCHLORIDE 50 MG/ML
50 INJECTION INTRAMUSCULAR; INTRAVENOUS ONCE AS NEEDED
Status: DISCONTINUED | OUTPATIENT
Start: 2019-12-16 | End: 2019-12-16 | Stop reason: HOSPADM

## 2019-12-16 RX ORDER — SODIUM CHLORIDE 0.9 % (FLUSH) 0.9 %
10 SYRINGE (ML) INJECTION
Status: COMPLETED | OUTPATIENT
Start: 2019-12-16 | End: 2019-12-16

## 2019-12-16 RX ORDER — HEPARIN 100 UNIT/ML
500 SYRINGE INTRAVENOUS
Status: COMPLETED | OUTPATIENT
Start: 2019-12-16 | End: 2019-12-16

## 2019-12-16 RX ORDER — EPINEPHRINE 0.3 MG/.3ML
0.3 INJECTION SUBCUTANEOUS ONCE AS NEEDED
Status: CANCELLED | OUTPATIENT
Start: 2019-12-16

## 2019-12-16 RX ORDER — EPINEPHRINE 0.3 MG/.3ML
0.3 INJECTION SUBCUTANEOUS ONCE AS NEEDED
Status: DISCONTINUED | OUTPATIENT
Start: 2019-12-16 | End: 2019-12-16 | Stop reason: HOSPADM

## 2019-12-16 RX ORDER — DIPHENHYDRAMINE HYDROCHLORIDE 50 MG/ML
50 INJECTION INTRAMUSCULAR; INTRAVENOUS ONCE AS NEEDED
Status: CANCELLED | OUTPATIENT
Start: 2019-12-16

## 2019-12-16 RX ORDER — HEPARIN 100 UNIT/ML
500 SYRINGE INTRAVENOUS
Status: CANCELLED | OUTPATIENT
Start: 2019-12-16

## 2019-12-16 RX ORDER — SODIUM CHLORIDE 0.9 % (FLUSH) 0.9 %
10 SYRINGE (ML) INJECTION
Status: DISCONTINUED | OUTPATIENT
Start: 2019-12-16 | End: 2019-12-16 | Stop reason: HOSPADM

## 2019-12-16 RX ADMIN — HEPARIN 500 UNITS: 100 SYRINGE at 11:12

## 2019-12-16 RX ADMIN — FAMOTIDINE 20 MG: 10 INJECTION, SOLUTION INTRAVENOUS at 01:12

## 2019-12-16 RX ADMIN — SODIUM CHLORIDE, PRESERVATIVE FREE 10 ML: 5 INJECTION INTRAVENOUS at 11:12

## 2019-12-16 RX ADMIN — DEXAMETHASONE SODIUM PHOSPHATE 10 MG: 4 INJECTION, SOLUTION INTRA-ARTICULAR; INTRALESIONAL; INTRAMUSCULAR; INTRAVENOUS; SOFT TISSUE at 01:12

## 2019-12-16 RX ADMIN — PACLITAXEL 156 MG: 6 INJECTION, SOLUTION INTRAVENOUS at 02:12

## 2019-12-16 RX ADMIN — DIPHENHYDRAMINE HYDROCHLORIDE 25 MG: 50 INJECTION, SOLUTION INTRAMUSCULAR; INTRAVENOUS at 01:12

## 2019-12-16 NOTE — PROGRESS NOTES
"Subjective:       Patient ID: Maria Elena Tavares is a 64 y.o. female.    Chief Complaint: Follow-up    HPI     History of Present Illness: Ms. Tavares is a 64 y.o. who returns in follow up for Breast Cancer.     Received C1 Taxol on 12/2/19 - treatment was interrupted due to flushing but re challenged without difficulties.      In the interval, 1u PRBC transfusion.   Fatigue slightly better post transfusion but continues to be SOB on exertion.   2 episodes of right arm pain lasting about 15 minutes- first episode 12/7 and then again yesterday.    Right arm "ache" from shoulder to wrist and hand felt cold.   States above ache relieved with Tylenol.   No exertional pain.  No CP/palpitations/swelling.   No Mouth sores  No nausea or vomiting  No fevers or chills  No headache or visual changes.         She is accompanied by her friend.     Oncologic History:  - self detected, noted a shooting pain in breast first and then a week later felt a lump  Some delay in getting appointment as she was unaware she had to call  - 8/30/19 Diagnostic mammogram and ultrasound:  Left breast 20 mm x 18 mm x 17 mm mass at the 3 o'clock position. Assessment: 4 - Suspicious finding. Biopsy is recommended.   Lymph Node: Left axilla 16 mm x 14 mm x 13 mm lymph node. Assessment: 4 - Suspicious finding. Biopsy is recommended.   Right- There is no mammographic or sonographic evidence of malignancy.  BI-RADS Category:   Overall: 4 - Suspicious  - 9/5/19 Ultrasound guided biopsy  Pathology:  FINAL PATHOLOGIC DIAGNOSIS  1. LEFT BREAST MASS, BIOPSY:  - Invasive ductal carcinoma, grade 3, longest linear length is 10 mm measured on the slide.  - Histologic Grade ([place] Histologic Score)  Glandular (Acinar/Tubular Differentiation): 3.  Nuclear Pleomorphism: 2.  Mitotic Rate: 3.  Overall Grade: Grade 3 (score 8).  - Microcalcifications: Not identified.  - Lymphovascular invasion: Not identified.  2. LYMPH NODE, LEFT AXILLA, BIOPSY:  - Positive for " metastatic carcinoma.  - The metastatic deposit measures 6 mm in longest continuous linear length.  Estrogen receptor: Positive, 90% of the tumor nuclei staining moderate to strongly.  Progesterone receptor: Positive, 30% of the tumor nuclei staining weak to moderately.  HER2: Negative.  Ki-67: 60%.  - ECHO 19: EF 64%  - PET 19:  Impression         Hypermetabolic left breast mass and regional left axillary lymphadenopathy consistent with reported breast cancer and corresponding with recent MRI findings.    Upper limit of normal sized right axillary lymph nodes with normal fatty duane and mildly increased radiotracer uptake.  Findings could represent reactive nodes with metastatic nodes thought less likely.  Lymphscintigraphy with injection in the left breast may considered to assess for drainage to the contralateral axilla.      BX 19: Right axilla node biopsy is benign     Gyn Hx:  Menarche- 11  Menopause - partial hysterectomy at age 34 yo  Remainder done in   No HRT  Prior ocps  , age 28 at 1st pregnancy  No breastfeeding     PMFSH: all information reviewed and updated as relevant to today's visit    Review of Systems   Constitutional: Positive for fatigue (better). Negative for activity change, appetite change and unexpected weight change.   HENT: Negative for congestion.    Eyes: Negative for visual disturbance.   Respiratory: Positive for shortness of breath (better, with exertion). Negative for cough and wheezing.    Cardiovascular: Negative for chest pain, palpitations and leg swelling.   Gastrointestinal: Negative for abdominal distention, abdominal pain, blood in stool, constipation, diarrhea, nausea and vomiting.   Genitourinary: Negative for decreased urine volume, difficulty urinating and frequency.   Musculoskeletal: Negative for arthralgias, back pain and myalgias.   Skin: Negative for color change, pallor, rash and wound.   Neurological: Positive for numbness (neuropathy noted  hands, worse in one previously affected by shingles). Negative for dizziness, weakness and headaches.   Hematological: Negative for adenopathy. Does not bruise/bleed easily.   Psychiatric/Behavioral: Negative for dysphoric mood. The patient is not nervous/anxious.        Objective:      Physical Exam   Constitutional: She is oriented to person, place, and time. She appears well-developed and well-nourished. No distress.   HENT:   Head: Normocephalic and atraumatic.   Mouth/Throat: Oropharynx is clear and moist. No oropharyngeal exudate.   No sinus tenderness.  Alopecia.  Darkening of tongue noted   Eyes: Pupils are equal, round, and reactive to light. Conjunctivae, EOM and lids are normal. No scleral icterus.   Neck: Trachea normal and normal range of motion. Neck supple. No JVD present. No thyromegaly present.   No carotid bruits.    Cardiovascular: Normal rate, regular rhythm and normal heart sounds. Exam reveals no gallop and no friction rub.   No murmur heard.  Pulmonary/Chest: Effort normal and breath sounds normal. No respiratory distress. She has no wheezes. She exhibits no tenderness.   Breast mass smaller   Abdominal: Soft. Normal appearance and bowel sounds are normal. She exhibits no distension and no mass. There is no tenderness.   No organomegaly.    Musculoskeletal: Normal range of motion. She exhibits no edema, tenderness or deformity.   No spinal or paraspinal tenderness.    Lymphadenopathy:        Head (right side): No submental and no submandibular adenopathy present.        Head (left side): No submental and no submandibular adenopathy present.     She has no cervical adenopathy.     She has no axillary adenopathy.        Right: No supraclavicular adenopathy present.        Left: No supraclavicular adenopathy present.   Neurological: She is alert and oriented to person, place, and time. She has normal strength and normal reflexes. No cranial nerve deficit or sensory deficit. Coordination and gait  normal.   Skin: Skin is warm, dry and intact. Capillary refill takes less than 2 seconds. No bruising and no rash noted. She is not diaphoretic. No cyanosis or erythema. No pallor. Nails show no clubbing.   Psychiatric: She has a normal mood and affect. Her speech is normal and behavior is normal.   Nursing note and vitals reviewed.   Labs- reviewed   Assessment:       1. Breast cancer metastasized to axillary lymph node, left    2. Anemia in neoplastic disease    3. History of shingles    4. Neuropathy due to chemotherapeutic drug        Plan:     1. Continue weekly Taxol  Clinically responding will image to further assess  2. Parameters slightly improved, monitor closely  3-4. Start Neurontin     25 minutes total

## 2019-12-17 ENCOUNTER — TELEPHONE (OUTPATIENT)
Dept: HEMATOLOGY/ONCOLOGY | Facility: CLINIC | Age: 64
End: 2019-12-17

## 2019-12-17 ENCOUNTER — PATIENT MESSAGE (OUTPATIENT)
Dept: HEMATOLOGY/ONCOLOGY | Facility: CLINIC | Age: 64
End: 2019-12-17

## 2019-12-17 DIAGNOSIS — G62.9 NEUROPATHY: Primary | ICD-10-CM

## 2019-12-17 PROBLEM — T45.1X5A NEUROPATHY DUE TO CHEMOTHERAPEUTIC DRUG: Status: ACTIVE | Noted: 2019-12-17

## 2019-12-17 PROBLEM — G62.0 NEUROPATHY DUE TO CHEMOTHERAPEUTIC DRUG: Status: ACTIVE | Noted: 2019-12-17

## 2019-12-17 RX ORDER — GABAPENTIN 100 MG/1
100 CAPSULE ORAL 3 TIMES DAILY
Qty: 90 CAPSULE | Refills: 2 | Status: SHIPPED | OUTPATIENT
Start: 2019-12-17 | End: 2020-02-11

## 2019-12-17 NOTE — TELEPHONE ENCOUNTER
----- Message from Tere Villagran MD sent at 12/17/2019 10:03 AM CST -----  - imaging mammo next week  - RTC 1 week with lilia, cmp and Taxol, EP with JAZZY

## 2019-12-19 NOTE — PROGRESS NOTES
"Digital Medicine: Health  Follow-Up    Introduced self to patient as new Health . Patient apologized for the lack of readings. Patient reports she was diagnosed with cancer recently and has been getting chemo treatments. Patient states, "my blood pressure has been good".  Patient reports she will starting monitoring again taking readings 1-2 times a week. Informed patient about hiatus. Offered support to patient during this hard time. Patient was appreciative.     The history is provided by the patient. No  was used.     Follow Up  Follow-up reason(s): reading review      Readings are missing.   patient reminder needed.    INTERVENTION(S)  encouragement/support, denied resources and denied questions    PLAN  patient verbalizes understanding, await MD intervention and continue monitoring    There are no preventive care reminders to display for this patient.    Last 5 Patient Entered Readings                                      Current 30 Day Average:      Recent Readings 10/2/2019 10/2/2019 9/22/2019 9/22/2019 9/2/2019    SBP (mmHg) 115 110 128 119 132    DBP (mmHg) 77 70 81 74 81    Pulse 93 109 102 87 92        Screenings    Patient did not want to follow-up on lifestyle at this time. I will continue to monitor and follow-up with patient.   "

## 2019-12-20 NOTE — PROGRESS NOTES
PATIENT: Maria Elena Tavares  MRN: [id number]  DATE: 12/23/2019      Chief complaint:  Chief Complaint   Patient presents with    Breast cancer metastasized to axillary lymph node, left         History of Present Illness: Ms. Tavares is a 64 y.o. who returns in follow up for Breast Cancer.    Started C1 Taxol on 12/2/19 - treatment was interrupted due to flushing but re challenged without difficulties.     In review,   s/p 1u PRBC transfusion.   Fatigue and SOB improved post transfusion.  Also, right arm pain noted previously.   Further workup essentially negative- EKG, CTA, ECHO, Carotid US.   Felt to be reactivated nerve pain related to previous shingles post Veronica. Gabapentin Rx'd.    Today:  MMG earlier today reviewed.    Here for Cycle 4 Taxol.   Neuropathy pain in right arm/hand. No interference with ADL's. Gabapentin helping.   SOB not as bad.   Staying active.   Ready to proceed with chemo today.    She is accompanied by her self.    Oncologic History:  - self detected, noted a shooting pain in breast first and then a week later felt a lump  Some delay in getting appointment as she was unaware she had to call  - 8/30/19 Diagnostic mammogram and ultrasound:  Left breast 20 mm x 18 mm x 17 mm mass at the 3 o'clock position. Assessment: 4 - Suspicious finding. Biopsy is recommended.   Lymph Node: Left axilla 16 mm x 14 mm x 13 mm lymph node. Assessment: 4 - Suspicious finding. Biopsy is recommended.   Right- There is no mammographic or sonographic evidence of malignancy.  BI-RADS Category:   Overall: 4 - Suspicious  - 9/5/19 Ultrasound guided biopsy  Pathology:  FINAL PATHOLOGIC DIAGNOSIS  1. LEFT BREAST MASS, BIOPSY:  - Invasive ductal carcinoma, grade 3, longest linear length is 10 mm measured on the slide.  - Histologic Grade ([place] Histologic Score)  Glandular (Acinar/Tubular Differentiation): 3.  Nuclear Pleomorphism: 2.  Mitotic Rate: 3.  Overall Grade: Grade 3 (score 8).  - Microcalcifications: Not  identified.  - Lymphovascular invasion: Not identified.  2. LYMPH NODE, LEFT AXILLA, BIOPSY:  - Positive for metastatic carcinoma.  - The metastatic deposit measures 6 mm in longest continuous linear length.  Estrogen receptor: Positive, 90% of the tumor nuclei staining moderate to strongly.  Progesterone receptor: Positive, 30% of the tumor nuclei staining weak to moderately.  HER2: Negative.  Ki-67: 60%.  - ECHO 19: EF 64%  - PET 19:  Impression         Hypermetabolic left breast mass and regional left axillary lymphadenopathy consistent with reported breast cancer and corresponding with recent MRI findings.    Upper limit of normal sized right axillary lymph nodes with normal fatty duane and mildly increased radiotracer uptake.  Findings could represent reactive nodes with metastatic nodes thought less likely.  Lymphscintigraphy with injection in the left breast may considered to assess for drainage to the contralateral axilla.      BX 19: Right axilla node biopsy is benign     Gyn Hx:  Menarche- 11  Menopause - partial hysterectomy at age 34 yo  Remainder done in   No HRT  Prior ocps  , age 28 at 1st pregnancy  No breastfeeding         PMFSH: all information reviewed and updated as relevant to today's visit    Review of Systems:   Review of Systems   Constitutional: Positive for fatigue. Negative for activity change, appetite change, fever and unexpected weight change.   HENT: Negative for mouth sores, nosebleeds and sore throat.    Eyes: Negative for visual disturbance.   Respiratory: Positive for shortness of breath (improving). Negative for cough.    Cardiovascular: Negative for chest pain, palpitations and leg swelling.   Gastrointestinal: Negative for abdominal pain, constipation, diarrhea, nausea and vomiting.   Genitourinary: Negative for difficulty urinating and frequency.   Musculoskeletal: Negative for arthralgias and back pain.   Skin: Negative for rash.   Neurological: Positive  for numbness (in right arm/hand- nerve type pain). Negative for dizziness and headaches.   Hematological: Negative for adenopathy. Does not bruise/bleed easily.   Psychiatric/Behavioral: Negative for confusion and sleep disturbance. The patient is not nervous/anxious.    All other systems reviewed and are negative.        Objective:      Vitals:   Vitals:    12/23/19 1300   BP: 115/61   BP Location: Left arm   Patient Position: Sitting   BP Method: Large (Automatic)   Pulse: (!) 112   Resp: 18   Temp: 98.3 °F (36.8 °C)   TempSrc: Oral   SpO2: 99%   Weight: 88.3 kg (194 lb 10.7 oz)   Height: 5' (1.524 m)     BMI: Body mass index is 38.02 kg/m².    Physical Exam:   Physical Exam   Constitutional: She is oriented to person, place, and time. She appears well-developed and well-nourished. No distress.   HENT:   Head: Normocephalic and atraumatic.   Mouth/Throat: Oropharynx is clear and moist.   No sinus tenderness.  Alopecia.  Darkening of tongue noted   Eyes: Pupils are equal, round, and reactive to light. Conjunctivae and lids are normal. No scleral icterus.   Neck: Trachea normal and normal range of motion. Neck supple. No JVD present. No thyromegaly present.   No carotid bruits.    Cardiovascular: Normal heart sounds and intact distal pulses.   No murmur heard.  tachycardic   Pulmonary/Chest: Effort normal and breath sounds normal. She has no wheezes.   Abdominal: Soft. Normal appearance and bowel sounds are normal. She exhibits no distension and no mass. There is no tenderness.   No organomegaly.    Musculoskeletal: Normal range of motion. She exhibits no edema.   No spinal or paraspinal tenderness.    Lymphadenopathy:        Head (right side): No submental and no submandibular adenopathy present.        Head (left side): No submental and no submandibular adenopathy present.     She has no cervical adenopathy.     She has no axillary adenopathy.        Right: No supraclavicular adenopathy present.        Left: No  supraclavicular adenopathy present.   Neurological: She is alert and oriented to person, place, and time. She has normal strength and normal reflexes. No cranial nerve deficit. Gait normal.   Skin: Skin is warm, dry and intact. Capillary refill takes less than 2 seconds. No bruising and no rash noted. No cyanosis. Nails show no clubbing.   Psychiatric: She has a normal mood and affect. Her speech is normal and behavior is normal.   Nursing note and vitals reviewed.    Breast Exam: deferred    ECOG Performance Status:   ECOG SCORE    0 - Fully active-able to carry on all pre-disease performance without restriction         Laboratory Data:  Results for orders placed or performed in visit on 12/23/19   CBC Oncology   Result Value Ref Range    WBC 3.98 3.90 - 12.70 K/uL    RBC 2.58 (L) 4.00 - 5.40 M/uL    Hemoglobin 8.3 (L) 12.0 - 16.0 g/dL    Hematocrit 26.1 (L) 37.0 - 48.5 %    Mean Corpuscular Volume 101 (H) 82 - 98 fL    Mean Corpuscular Hemoglobin 32.2 (H) 27.0 - 31.0 pg    Mean Corpuscular Hemoglobin Conc 31.8 (L) 32.0 - 36.0 g/dL    RDW 18.4 (H) 11.5 - 14.5 %    Platelets 332 150 - 350 K/uL    MPV 10.3 9.2 - 12.9 fL    Gran # (ANC) 2.9 1.8 - 7.7 K/uL    Immature Grans (Abs) 0.08 (H) 0.00 - 0.04 K/uL   Comprehensive metabolic panel   Result Value Ref Range    Sodium 140 136 - 145 mmol/L    Potassium 2.8 (L) 3.5 - 5.1 mmol/L    Chloride 105 95 - 110 mmol/L    CO2 25 23 - 29 mmol/L    Glucose 142 (H) 70 - 110 mg/dL    BUN, Bld 11 8 - 23 mg/dL    Creatinine 0.8 0.5 - 1.4 mg/dL    Calcium 9.1 8.7 - 10.5 mg/dL    Total Protein 6.8 6.0 - 8.4 g/dL    Albumin 3.6 3.5 - 5.2 g/dL    Total Bilirubin 0.6 0.1 - 1.0 mg/dL    Alkaline Phosphatase 74 55 - 135 U/L    AST 16 10 - 40 U/L    ALT 9 (L) 10 - 44 U/L    Anion Gap 10 8 - 16 mmol/L    eGFR if African American >60.0 >60 mL/min/1.73 m^2    eGFR if non African American >60.0 >60 mL/min/1.73 m^2        Imaging:  Mammo Digital Diagnostic Bilat w/ Moe  Narrative: Result:    Mammo Digital Diagnostic Bilat w/ Moe     History:  Patient is 64 y.o. and is seen for a diagnostic mammogram.    Films Compared:  Prior images (if available) were compared.     Findings:  This procedure was performed using tomosynthesis.  Computer-aided   detection was utilized in the interpretation of this examination.  The breasts have scattered areas of fibroglandular density.     Left  There are multiple biopsy markers:    Twirl marker (US biopsy 9/5/19):11 mm x 25 mm x 16 mm oval mass with   irregular margins seen in the central region of the left breast in the   middle depth, 9 cm from the nipple. Pathology = invasive ductal carcinoma.    Mass has decreased in size, consistent with partial response to   treatment.     Hydromark marker (US biopsy 9/5/19): Previously biopsied left axillary   node. Pathology + for metastatic carcinoma) has decreased in size.  Not   seen on mammogram.     Heart marker (US biopsy 10/8/19) :  Lower inner quadrant left breast   approximately 6-7 cm anterior and medial to twirl marker. Pathology = ADH   .     Dumb bell/hour glass marker (MRI biopsy 10/14/19): Lower inner quadrant   left breast approximately 2 cm anterior and medial to twirl marker.    Pathology = invasive ductal carcinoma.     Right  There is no evidence of suspicious masses, calcifications, or other   abnormal findings in the right breast. Right axillary node biopsied   9/24/19 = benign.  Impression: Left  Mass: Left breast 11 mm x 25 mm x 16 mm mass at the central middle   position. Assessment: 6 - Known biopsy, proven malignancy. Size decreased   consistent with partial response to treatment. Left axillary node also   decreased in size. Lesions at other biopsy sites were not identified with   mammogram.     Right  There is no mammographic evidence of malignancy.    BI-RADS Category:   Overall: 6 - Known Biopsy-Proven Malignancy            Assessment/Plan:     1. Breast cancer metastasized to axillary lymph  node, left    2. Anemia in neoplastic disease    3. Hypokalemia    4. Neuropathy due to chemotherapeutic drug    5. History of shingles        Plan:  -Doing well, clinically responding as confirmed by imaging.   -Labs reviewed. Anemia parameters down but stable. Of note, Symptoms improved post transfusion.   -Potassium 40meq po today in chemo. Proceed with C4 Taxol.   -Potassium Rx sent - she will take 1 tonight and 2 tomorrow then 1 a day thereafter as long as she is on diuretic. Continue to monitor.   -RTC next week to see Md/JAZZY with CBC, CMP, C5 Taxol.   -Continue Gabapentin- ok to take TID.  -we discussed neuropathy associated with chemotherapy. At this time, she is a grade 1-2 and understands risk of permanent nerve damage. She wishes to continue with chemotherapy treatments.       Med and Orders:  Orders Placed This Encounter    CBC Oncology    Comprehensive metabolic panel    potassium chloride SA (K-DUR,[id number]) 20 MEQ tablet       Follow Up:   Follow up in about 1 week (around 12/30/2019).      Patient is in agreement with the proposed treatment plan. All questions were answered to the patient's satisfaction. Pt knows to call clinic for any new or worsening symptoms and if anything is needed before the next clinic visit.      José Lara, SHARONP-C  Hematology & Oncology  [facility] [place] [address]  ph. [phone]  Fax. [phone]     I spent 30 minutes (face to face) with the patient, more than 50% was in counseling and coordination of care as detailed above.

## 2019-12-23 ENCOUNTER — HOSPITAL ENCOUNTER (OUTPATIENT)
Dept: RADIOLOGY | Facility: HOSPITAL | Age: 64
Discharge: HOME | End: 2019-12-23
Attending: INTERNAL MEDICINE
Payer: COMMERCIAL

## 2019-12-23 ENCOUNTER — INFUSION (OUTPATIENT)
Dept: INFUSION THERAPY | Facility: HOSPITAL | Age: 64
End: 2019-12-23
Attending: NURSE PRACTITIONER
Payer: COMMERCIAL

## 2019-12-23 ENCOUNTER — OFFICE VISIT (OUTPATIENT)
Dept: HEMATOLOGY/ONCOLOGY | Facility: CLINIC | Age: 64
End: 2019-12-23
Payer: COMMERCIAL

## 2019-12-23 ENCOUNTER — INFUSION (OUTPATIENT)
Dept: INFUSION THERAPY | Facility: HOSPITAL | Age: 64
End: 2019-12-23
Attending: INTERNAL MEDICINE
Payer: COMMERCIAL

## 2019-12-23 VITALS
WEIGHT: 194.69 LBS | TEMPERATURE: 98 F | RESPIRATION RATE: 18 BRPM | WEIGHT: 194.69 LBS | BODY MASS INDEX: 38.22 KG/M2 | OXYGEN SATURATION: 99 % | DIASTOLIC BLOOD PRESSURE: 61 MMHG | HEIGHT: 60 IN | BODY MASS INDEX: 38.22 KG/M2 | SYSTOLIC BLOOD PRESSURE: 115 MMHG | HEIGHT: 60 IN | HEART RATE: 112 BPM

## 2019-12-23 DIAGNOSIS — C77.3 BREAST CANCER METASTASIZED TO AXILLARY LYMPH NODE, LEFT: Primary | ICD-10-CM

## 2019-12-23 DIAGNOSIS — E87.6 HYPOKALEMIA: ICD-10-CM

## 2019-12-23 DIAGNOSIS — D63.0 ANEMIA IN NEOPLASTIC DISEASE: ICD-10-CM

## 2019-12-23 DIAGNOSIS — C50.912 BREAST CANCER METASTASIZED TO AXILLARY LYMPH NODE, LEFT: Primary | ICD-10-CM

## 2019-12-23 DIAGNOSIS — Z86.19 HISTORY OF SHINGLES: ICD-10-CM

## 2019-12-23 DIAGNOSIS — C50.912 BREAST CANCER METASTASIZED TO AXILLARY LYMPH NODE, LEFT: ICD-10-CM

## 2019-12-23 DIAGNOSIS — C77.3 BREAST CANCER METASTASIZED TO AXILLARY LYMPH NODE, LEFT: ICD-10-CM

## 2019-12-23 DIAGNOSIS — T45.1X5A NEUROPATHY DUE TO CHEMOTHERAPEUTIC DRUG: ICD-10-CM

## 2019-12-23 DIAGNOSIS — G62.0 NEUROPATHY DUE TO CHEMOTHERAPEUTIC DRUG: ICD-10-CM

## 2019-12-23 LAB
ALBUMIN SERPL BCP-MCNC: 3.6 G/DL (ref 3.5–5.2)
ALP SERPL-CCNC: 74 U/L (ref 55–135)
ALT SERPL W/O P-5'-P-CCNC: 9 U/L (ref 10–44)
ANION GAP SERPL CALC-SCNC: 10 MMOL/L (ref 8–16)
AST SERPL-CCNC: 16 U/L (ref 10–40)
BILIRUB SERPL-MCNC: 0.6 MG/DL (ref 0.1–1)
BUN SERPL-MCNC: 11 MG/DL (ref 8–23)
CALCIUM SERPL-MCNC: 9.1 MG/DL (ref 8.7–10.5)
CHLORIDE SERPL-SCNC: 105 MMOL/L (ref 95–110)
CO2 SERPL-SCNC: 25 MMOL/L (ref 23–29)
CREAT SERPL-MCNC: 0.8 MG/DL (ref 0.5–1.4)
ERYTHROCYTE [DISTWIDTH] IN BLOOD BY AUTOMATED COUNT: 18.4 % (ref 11.5–14.5)
EST. GFR  (AFRICAN AMERICAN): >60 ML/MIN/1.73 M^2
EST. GFR  (NON AFRICAN AMERICAN): >60 ML/MIN/1.73 M^2
GLUCOSE SERPL-MCNC: 142 MG/DL (ref 70–110)
HCT VFR BLD AUTO: 26.1 % (ref 37–48.5)
HGB BLD-MCNC: 8.3 G/DL (ref 12–16)
IMM GRANULOCYTES # BLD AUTO: 0.08 K/UL (ref 0–0.04)
MCH RBC QN AUTO: 32.2 PG (ref 27–31)
MCHC RBC AUTO-ENTMCNC: 31.8 G/DL (ref 32–36)
MCV RBC AUTO: 101 FL (ref 82–98)
NEUTROPHILS # BLD AUTO: 2.9 K/UL (ref 1.8–7.7)
PLATELET # BLD AUTO: 332 K/UL (ref 150–350)
PMV BLD AUTO: 10.3 FL (ref 9.2–12.9)
POTASSIUM SERPL-SCNC: 2.8 MMOL/L (ref 3.5–5.1)
PROT SERPL-MCNC: 6.8 G/DL (ref 6–8.4)
RBC # BLD AUTO: 2.58 M/UL (ref 4–5.4)
SODIUM SERPL-SCNC: 140 MMOL/L (ref 136–145)
WBC # BLD AUTO: 3.98 K/UL (ref 3.9–12.7)

## 2019-12-23 PROCEDURE — 3078F PR MOST RECENT DIASTOLIC BLOOD PRESSURE < 80 MM HG: ICD-10-PCS | Mod: CPTII,S$GLB,, | Performed by: NURSE PRACTITIONER

## 2019-12-23 PROCEDURE — A4216 STERILE WATER/SALINE, 10 ML: HCPCS | Performed by: INTERNAL MEDICINE

## 2019-12-23 PROCEDURE — 77062 BREAST TOMOSYNTHESIS BI: CPT | Mod: TC,PO

## 2019-12-23 PROCEDURE — S0028 INJECTION, FAMOTIDINE, 20 MG: HCPCS | Performed by: INTERNAL MEDICINE

## 2019-12-23 PROCEDURE — 80053 COMPREHEN METABOLIC PANEL: CPT

## 2019-12-23 PROCEDURE — 85027 COMPLETE CBC AUTOMATED: CPT

## 2019-12-23 PROCEDURE — 77066 MAMMO DIGITAL DIAGNOSTIC BILAT WITH TOMOSYNTHESIS_CAD: ICD-10-PCS | Mod: 26,,, | Performed by: RADIOLOGY

## 2019-12-23 PROCEDURE — 3008F PR BODY MASS INDEX (BMI) DOCUMENTED: ICD-10-PCS | Mod: CPTII,S$GLB,, | Performed by: NURSE PRACTITIONER

## 2019-12-23 PROCEDURE — 99214 PR OFFICE/OUTPT VISIT, EST, LEVL IV, 30-39 MIN: ICD-10-PCS | Mod: S$GLB,,, | Performed by: NURSE PRACTITIONER

## 2019-12-23 PROCEDURE — 3074F PR MOST RECENT SYSTOLIC BLOOD PRESSURE < 130 MM HG: ICD-10-PCS | Mod: CPTII,S$GLB,, | Performed by: NURSE PRACTITIONER

## 2019-12-23 PROCEDURE — 96375 TX/PRO/DX INJ NEW DRUG ADDON: CPT

## 2019-12-23 PROCEDURE — 99999 PR PBB SHADOW E&M-EST. PATIENT-LVL III: ICD-10-PCS | Mod: PBBFAC,,, | Performed by: NURSE PRACTITIONER

## 2019-12-23 PROCEDURE — 3008F BODY MASS INDEX DOCD: CPT | Mod: CPTII,S$GLB,, | Performed by: NURSE PRACTITIONER

## 2019-12-23 PROCEDURE — 99214 OFFICE O/P EST MOD 30 MIN: CPT | Mod: S$GLB,,, | Performed by: NURSE PRACTITIONER

## 2019-12-23 PROCEDURE — G0279 TOMOSYNTHESIS, MAMMO: HCPCS | Mod: 26,,, | Performed by: RADIOLOGY

## 2019-12-23 PROCEDURE — 25000003 PHARM REV CODE 250: Performed by: INTERNAL MEDICINE

## 2019-12-23 PROCEDURE — 96367 TX/PROPH/DG ADDL SEQ IV INF: CPT

## 2019-12-23 PROCEDURE — 96413 CHEMO IV INFUSION 1 HR: CPT

## 2019-12-23 PROCEDURE — 77066 DX MAMMO INCL CAD BI: CPT | Mod: 26,,, | Performed by: RADIOLOGY

## 2019-12-23 PROCEDURE — 63600175 PHARM REV CODE 636 W HCPCS: Performed by: INTERNAL MEDICINE

## 2019-12-23 PROCEDURE — G0279 MAMMO DIGITAL DIAGNOSTIC BILAT WITH TOMOSYNTHESIS_CAD: ICD-10-PCS | Mod: 26,,, | Performed by: RADIOLOGY

## 2019-12-23 PROCEDURE — 3078F DIAST BP <80 MM HG: CPT | Mod: CPTII,S$GLB,, | Performed by: NURSE PRACTITIONER

## 2019-12-23 PROCEDURE — 99999 PR PBB SHADOW E&M-EST. PATIENT-LVL III: CPT | Mod: PBBFAC,,, | Performed by: NURSE PRACTITIONER

## 2019-12-23 PROCEDURE — 3074F SYST BP LT 130 MM HG: CPT | Mod: CPTII,S$GLB,, | Performed by: NURSE PRACTITIONER

## 2019-12-23 RX ORDER — FAMOTIDINE 10 MG/ML
20 INJECTION INTRAVENOUS
Status: CANCELLED | OUTPATIENT
Start: 2019-12-23

## 2019-12-23 RX ORDER — HEPARIN 100 UNIT/ML
500 SYRINGE INTRAVENOUS
Status: CANCELLED | OUTPATIENT
Start: 2019-12-23

## 2019-12-23 RX ORDER — HEPARIN 100 UNIT/ML
500 SYRINGE INTRAVENOUS
Status: DISCONTINUED | OUTPATIENT
Start: 2019-12-23 | End: 2019-12-23 | Stop reason: HOSPADM

## 2019-12-23 RX ORDER — POTASSIUM CHLORIDE 20 MEQ/1
40 TABLET, EXTENDED RELEASE ORAL ONCE
Status: CANCELLED
Start: 2019-12-23

## 2019-12-23 RX ORDER — SODIUM CHLORIDE 0.9 % (FLUSH) 0.9 %
10 SYRINGE (ML) INJECTION
Status: CANCELLED | OUTPATIENT
Start: 2019-12-23

## 2019-12-23 RX ORDER — FAMOTIDINE 10 MG/ML
20 INJECTION INTRAVENOUS
Status: COMPLETED | OUTPATIENT
Start: 2019-12-23 | End: 2019-12-23

## 2019-12-23 RX ORDER — EPINEPHRINE 0.3 MG/.3ML
0.3 INJECTION SUBCUTANEOUS ONCE AS NEEDED
Status: DISCONTINUED | OUTPATIENT
Start: 2019-12-23 | End: 2019-12-23 | Stop reason: HOSPADM

## 2019-12-23 RX ORDER — HEPARIN 100 UNIT/ML
500 SYRINGE INTRAVENOUS
Status: COMPLETED | OUTPATIENT
Start: 2019-12-23 | End: 2019-12-23

## 2019-12-23 RX ORDER — DIPHENHYDRAMINE HYDROCHLORIDE 50 MG/ML
50 INJECTION INTRAMUSCULAR; INTRAVENOUS ONCE AS NEEDED
Status: CANCELLED | OUTPATIENT
Start: 2019-12-23

## 2019-12-23 RX ORDER — SODIUM CHLORIDE 0.9 % (FLUSH) 0.9 %
10 SYRINGE (ML) INJECTION
Status: COMPLETED | OUTPATIENT
Start: 2019-12-23 | End: 2019-12-23

## 2019-12-23 RX ORDER — POTASSIUM CHLORIDE 20 MEQ/1
20 TABLET, EXTENDED RELEASE ORAL DAILY
Qty: 30 TABLET | Refills: 1 | Status: SHIPPED | OUTPATIENT
Start: 2019-12-23 | End: 2020-02-11 | Stop reason: SDUPTHER

## 2019-12-23 RX ORDER — EPINEPHRINE 0.3 MG/.3ML
0.3 INJECTION SUBCUTANEOUS ONCE AS NEEDED
Status: CANCELLED | OUTPATIENT
Start: 2019-12-23

## 2019-12-23 RX ORDER — POTASSIUM CHLORIDE 20 MEQ/1
40 TABLET, EXTENDED RELEASE ORAL ONCE
Status: COMPLETED | OUTPATIENT
Start: 2019-12-23 | End: 2019-12-23

## 2019-12-23 RX ORDER — SODIUM CHLORIDE 0.9 % (FLUSH) 0.9 %
10 SYRINGE (ML) INJECTION
Status: DISCONTINUED | OUTPATIENT
Start: 2019-12-23 | End: 2019-12-23 | Stop reason: HOSPADM

## 2019-12-23 RX ORDER — DIPHENHYDRAMINE HYDROCHLORIDE 50 MG/ML
50 INJECTION INTRAMUSCULAR; INTRAVENOUS ONCE AS NEEDED
Status: DISCONTINUED | OUTPATIENT
Start: 2019-12-23 | End: 2019-12-23 | Stop reason: HOSPADM

## 2019-12-23 RX ADMIN — DIPHENHYDRAMINE HYDROCHLORIDE 25 MG: 50 INJECTION, SOLUTION INTRAMUSCULAR; INTRAVENOUS at 02:12

## 2019-12-23 RX ADMIN — FAMOTIDINE 20 MG: 10 INJECTION, SOLUTION INTRAVENOUS at 02:12

## 2019-12-23 RX ADMIN — DEXAMETHASONE SODIUM PHOSPHATE 10 MG: 4 INJECTION, SOLUTION INTRA-ARTICULAR; INTRALESIONAL; INTRAMUSCULAR; INTRAVENOUS; SOFT TISSUE at 02:12

## 2019-12-23 RX ADMIN — Medication 10 ML: at 11:12

## 2019-12-23 RX ADMIN — Medication 10 ML: at 04:12

## 2019-12-23 RX ADMIN — PACLITAXEL 156 MG: 6 INJECTION, SOLUTION INTRAVENOUS at 02:12

## 2019-12-23 RX ADMIN — HEPARIN 500 UNITS: 100 SYRINGE at 04:12

## 2019-12-23 RX ADMIN — HEPARIN 500 UNITS: 100 SYRINGE at 11:12

## 2019-12-23 RX ADMIN — SODIUM CHLORIDE: 0.9 INJECTION, SOLUTION INTRAVENOUS at 02:12

## 2019-12-23 RX ADMIN — POTASSIUM CHLORIDE 40 MEQ: 1500 TABLET, EXTENDED RELEASE ORAL at 02:12

## 2019-12-23 NOTE — PROGRESS NOTES
PATIENT: Maria Elena Tavares  MRN: [id number]  DATE: 12/23/2019      History of Present Illness: Ms. Tavares is a 64 y.o. who returns in follow up for Breast Cancer.    Started C1 Taxol on 12/2/19 - treatment was interrupted due to flushing but re challenged without difficulties.     Today: Here for Cycle 5 Taxol. Patient does note numbness and tingling in the right arm no interference with ADL's, she was on Gabapentin, but this is not helping, will transition to lyrica.   Always cold.   SOB not as bad.   Staying active.   Ready to proceed with chemo today.    She is accompanied by friend.    Oncologic History:  - self detected, noted a shooting pain in breast first and then a week later felt a lump  Some delay in getting appointment as she was unaware she had to call  - 8/30/19 Diagnostic mammogram and ultrasound:  Left breast 20 mm x 18 mm x 17 mm mass at the 3 o'clock position. Assessment: 4 - Suspicious finding. Biopsy is recommended.   Lymph Node: Left axilla 16 mm x 14 mm x 13 mm lymph node. Assessment: 4 - Suspicious finding. Biopsy is recommended.   Right- There is no mammographic or sonographic evidence of malignancy.  BI-RADS Category:   Overall: 4 - Suspicious  - 9/5/19 Ultrasound guided biopsy  Pathology:  FINAL PATHOLOGIC DIAGNOSIS  1. LEFT BREAST MASS, BIOPSY:  - Invasive ductal carcinoma, grade 3, longest linear length is 10 mm measured on the slide.  - Histologic Grade ([place] Histologic Score)  Glandular (Acinar/Tubular Differentiation): 3.  Nuclear Pleomorphism: 2.  Mitotic Rate: 3.  Overall Grade: Grade 3 (score 8).  - Microcalcifications: Not identified.  - Lymphovascular invasion: Not identified.  2. LYMPH NODE, LEFT AXILLA, BIOPSY:  - Positive for metastatic carcinoma.  - The metastatic deposit measures 6 mm in longest continuous linear length.  Estrogen receptor: Positive, 90% of the tumor nuclei staining moderate to strongly.  Progesterone receptor: Positive, 30% of the tumor nuclei staining weak  to moderately.  HER2: Negative.  Ki-67: 60%.  - ECHO 19: EF 64%  - PET 19:  Impression         Hypermetabolic left breast mass and regional left axillary lymphadenopathy consistent with reported breast cancer and corresponding with recent MRI findings.    Upper limit of normal sized right axillary lymph nodes with normal fatty duane and mildly increased radiotracer uptake.  Findings could represent reactive nodes with metastatic nodes thought less likely.  Lymphscintigraphy with injection in the left breast may considered to assess for drainage to the contralateral axilla.      BX 19: Right axilla node biopsy is benign     Gyn Hx:  Menarche- 11  Menopause - partial hysterectomy at age 34 yo  Remainder done in   No HRT  Prior ocps  , age 28 at 1st pregnancy  No breastfeeding         PMFSH: all information reviewed and updated as relevant to today's visit    Review of Systems:   Review of Systems   Constitutional: Positive for fatigue. Negative for activity change, appetite change, fever and unexpected weight change.   HENT: Positive for nosebleeds (from sinus; better with nasal saline). Negative for mouth sores and sore throat.    Eyes: Negative for visual disturbance.   Respiratory: Positive for shortness of breath (improving). Negative for cough.    Cardiovascular: Negative for chest pain, palpitations and leg swelling.   Gastrointestinal: Negative for abdominal pain, constipation, diarrhea, nausea and vomiting.   Genitourinary: Negative for difficulty urinating and frequency.   Musculoskeletal: Negative for arthralgias and back pain.   Skin: Negative for rash.   Neurological: Positive for numbness (in right arm/hand- nerve type pain). Negative for dizziness, light-headedness and headaches.   Hematological: Negative for adenopathy. Does not bruise/bleed easily.   Psychiatric/Behavioral: Negative for confusion and sleep disturbance. The patient is not nervous/anxious.          Objective:       Vitals:   Vitals:    12/30/19 1509   BP: 113/68   BP Location: Left arm   Patient Position: Sitting   BP Method: Large (Automatic)   Pulse: (!) 125   Resp: 18   Temp: 98.2 °F (36.8 °C)   TempSrc: Oral   SpO2: 98%   Weight: 88.5 kg (195 lb 1.7 oz)   Height: 5' (1.524 m)       Physical Exam:   Physical Exam   Constitutional: She is oriented to person, place, and time. She appears well-developed and well-nourished. No distress.   HENT:   Head: Normocephalic and atraumatic.   Mouth/Throat: Oropharynx is clear and moist.   No sinus tenderness.  Alopecia.  Darkening of tongue noted   Eyes: Pupils are equal, round, and reactive to light. Conjunctivae and lids are normal. No scleral icterus.   Neck: Trachea normal and normal range of motion. Neck supple. No JVD present. No thyromegaly present.   Cardiovascular: Normal heart sounds and intact distal pulses. Tachycardia present.   No murmur heard.  Pulmonary/Chest: Effort normal and breath sounds normal. She has no wheezes.   Abdominal: Soft. Normal appearance and bowel sounds are normal. She exhibits no distension and no mass. There is no tenderness.   No organomegaly.    Musculoskeletal: Normal range of motion. She exhibits no edema.   No spinal or paraspinal tenderness.    Lymphadenopathy:        Head (right side): No submental and no submandibular adenopathy present.        Head (left side): No submental and no submandibular adenopathy present.     She has no cervical adenopathy.     She has no axillary adenopathy.        Right: No supraclavicular adenopathy present.        Left: No supraclavicular adenopathy present.   Neurological: She is alert and oriented to person, place, and time. She has normal strength and normal reflexes. No cranial nerve deficit. Gait normal.   Skin: Skin is warm, dry and intact. Capillary refill takes less than 2 seconds. No bruising and no rash noted. No cyanosis. Nails show no clubbing.   Psychiatric: She has a normal mood and affect. Her  speech is normal and behavior is normal.   Nursing note and vitals reviewed.    Breast Exam: deferred      Laboratory Data:  Results for orders placed or performed in visit on 12/30/19   CBC Oncology   Result Value Ref Range    WBC 3.98 3.90 - 12.70 K/uL    RBC 2.76 (L) 4.00 - 5.40 M/uL    Hemoglobin 9.0 (L) 12.0 - 16.0 g/dL    Hematocrit 28.1 (L) 37.0 - 48.5 %    Mean Corpuscular Volume 102 (H) 82 - 98 fL    Mean Corpuscular Hemoglobin 32.6 (H) 27.0 - 31.0 pg    Mean Corpuscular Hemoglobin Conc 32.0 32.0 - 36.0 g/dL    RDW 18.6 (H) 11.5 - 14.5 %    Platelets 289 150 - 350 K/uL    MPV 9.9 9.2 - 12.9 fL    Gran # (ANC) 2.7 1.8 - 7.7 K/uL    Immature Grans (Abs) 0.06 (H) 0.00 - 0.04 K/uL        Imaging:  Mammo Digital Diagnostic Bilat w/ Moe  Narrative: Result:   Mammo Digital Diagnostic Bilat w/ Moe     History:  Patient is 64 y.o. and is seen for a diagnostic mammogram.    Films Compared:  Prior images (if available) were compared.     Findings:  This procedure was performed using tomosynthesis.  Computer-aided   detection was utilized in the interpretation of this examination.  The breasts have scattered areas of fibroglandular density.     Left  There are multiple biopsy markers:    Twirl marker (US biopsy 9/5/19):11 mm x 25 mm x 16 mm oval mass with   irregular margins seen in the central region of the left breast in the   middle depth, 9 cm from the nipple. Pathology = invasive ductal carcinoma.    Mass has decreased in size, consistent with partial response to   treatment.     Hydromark marker (US biopsy 9/5/19): Previously biopsied left axillary   node. Pathology + for metastatic carcinoma) has decreased in size.  Not   seen on mammogram.     Heart marker (US biopsy 10/8/19) :  Lower inner quadrant left breast   approximately 6-7 cm anterior and medial to twirl marker. Pathology = ADH   .     Dumb bell/hour glass marker (MRI biopsy 10/14/19): Lower inner quadrant   left breast approximately 2 cm anterior and  medial to twirl marker.    Pathology = invasive ductal carcinoma.     Right  There is no evidence of suspicious masses, calcifications, or other   abnormal findings in the right breast. Right axillary node biopsied   9/24/19 = benign.  Impression: Left  Mass: Left breast 11 mm x 25 mm x 16 mm mass at the central middle   position. Assessment: 6 - Known biopsy, proven malignancy. Size decreased   consistent with partial response to treatment. Left axillary node also   decreased in size. Lesions at other biopsy sites were not identified with   mammogram.     Right  There is no mammographic evidence of malignancy.    BI-RADS Category:   Overall: 6 - Known Biopsy-Proven Malignancy            Assessment/Plan:     1. Breast cancer metastasized to axillary lymph node, left    2. Acquired hypothyroidism    3. Uncontrolled type 2 diabetes mellitus without complication, without long-term current use of insulin    4. Essential hypertension    5. Neuropathy due to chemotherapeutic drug        Plan:  -Doing well, clinically responding as confirmed by imaging.   -Labs reviewed. Anemia parameters stable. Of note, Symptoms improved post transfusion.   -Potassium 40meq po today in chemo. Proceed with C5 Taxol.   -Potassium Rx sent - She will take 40mEq x 3 days Continue to monitor.   -RTC next week to see Md/JAZZY with CBC, CMP, C6 Taxol.   -Transitioned from gabapentin to lyrica  -we discussed neuropathy associated with chemotherapy. At this time, she is a grade 1-2 and understands risk of permanent nerve damage. She wishes to continue with chemotherapy treatments.     Patient is in agreement with the proposed treatment plan. All questions were answered to the patient's satisfaction. Patient knows to call clinic for any new or worsening symptoms and if anything is needed before the next clinic visit.          Elsa Reilly, FNP-C  Hematology & Medical Oncology   [facility] [place] [address]  ph.  [phone]  Fax. [phone]    Patient dicussed with collaborating physician, Dr. Villagran.

## 2019-12-23 NOTE — NURSING
1145- Patient arrived ambulatory to the unit to have port accessed and labs drawn.  Patients port flushes easily with good blood return, was heparin locked and capped and left in place for treatment later.  Patient discharged to next appointment, will return to clinic for treatment this afternoon.

## 2019-12-24 ENCOUNTER — LAB VISIT (OUTPATIENT)
Dept: LAB | Facility: HOSPITAL | Age: 64
End: 2019-12-24
Attending: SURGERY
Payer: COMMERCIAL

## 2019-12-24 DIAGNOSIS — C50.912 MALIGNANT NEOPLASM OF LEFT FEMALE BREAST, UNSPECIFIED ESTROGEN RECEPTOR STATUS, UNSPECIFIED SITE OF BREAST: ICD-10-CM

## 2019-12-24 LAB — INTEGRATED BRAC ANALYSIS: NORMAL

## 2019-12-24 PROCEDURE — 36415 COLL VENOUS BLD VENIPUNCTURE: CPT

## 2019-12-30 ENCOUNTER — OFFICE VISIT (OUTPATIENT)
Dept: HEMATOLOGY/ONCOLOGY | Facility: CLINIC | Age: 64
End: 2019-12-30
Payer: COMMERCIAL

## 2019-12-30 ENCOUNTER — INFUSION (OUTPATIENT)
Dept: INFUSION THERAPY | Facility: HOSPITAL | Age: 64
End: 2019-12-30
Attending: INTERNAL MEDICINE
Payer: COMMERCIAL

## 2019-12-30 ENCOUNTER — INFUSION (OUTPATIENT)
Dept: INFUSION THERAPY | Facility: HOSPITAL | Age: 64
End: 2019-12-30
Attending: NURSE PRACTITIONER
Payer: COMMERCIAL

## 2019-12-30 VITALS
TEMPERATURE: 98 F | SYSTOLIC BLOOD PRESSURE: 113 MMHG | BODY MASS INDEX: 38.31 KG/M2 | OXYGEN SATURATION: 98 % | HEART RATE: 125 BPM | DIASTOLIC BLOOD PRESSURE: 68 MMHG | WEIGHT: 195.13 LBS | RESPIRATION RATE: 18 BRPM | HEIGHT: 60 IN

## 2019-12-30 VITALS
DIASTOLIC BLOOD PRESSURE: 62 MMHG | WEIGHT: 195.13 LBS | BODY MASS INDEX: 38.31 KG/M2 | HEIGHT: 60 IN | SYSTOLIC BLOOD PRESSURE: 110 MMHG | HEART RATE: 95 BPM | TEMPERATURE: 98 F | RESPIRATION RATE: 18 BRPM

## 2019-12-30 DIAGNOSIS — C50.912 BREAST CANCER METASTASIZED TO AXILLARY LYMPH NODE, LEFT: Primary | ICD-10-CM

## 2019-12-30 DIAGNOSIS — T45.1X5A NEUROPATHY DUE TO CHEMOTHERAPEUTIC DRUG: ICD-10-CM

## 2019-12-30 DIAGNOSIS — D63.0 ANEMIA IN NEOPLASTIC DISEASE: ICD-10-CM

## 2019-12-30 DIAGNOSIS — C77.3 BREAST CANCER METASTASIZED TO AXILLARY LYMPH NODE, LEFT: Primary | ICD-10-CM

## 2019-12-30 DIAGNOSIS — G62.0 NEUROPATHY DUE TO CHEMOTHERAPEUTIC DRUG: ICD-10-CM

## 2019-12-30 DIAGNOSIS — E03.9 ACQUIRED HYPOTHYROIDISM: ICD-10-CM

## 2019-12-30 DIAGNOSIS — I10 ESSENTIAL HYPERTENSION: ICD-10-CM

## 2019-12-30 LAB
ALBUMIN SERPL BCP-MCNC: 3.8 G/DL (ref 3.5–5.2)
ALP SERPL-CCNC: 82 U/L (ref 55–135)
ALT SERPL W/O P-5'-P-CCNC: 8 U/L (ref 10–44)
ANION GAP SERPL CALC-SCNC: 12 MMOL/L (ref 8–16)
AST SERPL-CCNC: 15 U/L (ref 10–40)
BILIRUB SERPL-MCNC: 0.4 MG/DL (ref 0.1–1)
BUN SERPL-MCNC: 13 MG/DL (ref 8–23)
CALCIUM SERPL-MCNC: 9.2 MG/DL (ref 8.7–10.5)
CHLORIDE SERPL-SCNC: 104 MMOL/L (ref 95–110)
CO2 SERPL-SCNC: 24 MMOL/L (ref 23–29)
CREAT SERPL-MCNC: 1 MG/DL (ref 0.5–1.4)
ERYTHROCYTE [DISTWIDTH] IN BLOOD BY AUTOMATED COUNT: 18.6 % (ref 11.5–14.5)
EST. GFR  (AFRICAN AMERICAN): >60 ML/MIN/1.73 M^2
EST. GFR  (NON AFRICAN AMERICAN): 59.7 ML/MIN/1.73 M^2
GLUCOSE SERPL-MCNC: 112 MG/DL (ref 70–110)
HCT VFR BLD AUTO: 28.1 % (ref 37–48.5)
HGB BLD-MCNC: 9 G/DL (ref 12–16)
IMM GRANULOCYTES # BLD AUTO: 0.06 K/UL (ref 0–0.04)
MCH RBC QN AUTO: 32.6 PG (ref 27–31)
MCHC RBC AUTO-ENTMCNC: 32 G/DL (ref 32–36)
MCV RBC AUTO: 102 FL (ref 82–98)
NEUTROPHILS # BLD AUTO: 2.7 K/UL (ref 1.8–7.7)
PLATELET # BLD AUTO: 289 K/UL (ref 150–350)
PMV BLD AUTO: 9.9 FL (ref 9.2–12.9)
POTASSIUM SERPL-SCNC: 3.1 MMOL/L (ref 3.5–5.1)
PROT SERPL-MCNC: 7.5 G/DL (ref 6–8.4)
RBC # BLD AUTO: 2.76 M/UL (ref 4–5.4)
SODIUM SERPL-SCNC: 140 MMOL/L (ref 136–145)
WBC # BLD AUTO: 3.98 K/UL (ref 3.9–12.7)

## 2019-12-30 PROCEDURE — 99214 OFFICE O/P EST MOD 30 MIN: CPT | Mod: S$GLB,,, | Performed by: NURSE PRACTITIONER

## 2019-12-30 PROCEDURE — S0028 INJECTION, FAMOTIDINE, 20 MG: HCPCS | Performed by: INTERNAL MEDICINE

## 2019-12-30 PROCEDURE — 63600175 PHARM REV CODE 636 W HCPCS: Performed by: INTERNAL MEDICINE

## 2019-12-30 PROCEDURE — 85027 COMPLETE CBC AUTOMATED: CPT

## 2019-12-30 PROCEDURE — 99214 PR OFFICE/OUTPT VISIT, EST, LEVL IV, 30-39 MIN: ICD-10-PCS | Mod: S$GLB,,, | Performed by: NURSE PRACTITIONER

## 2019-12-30 PROCEDURE — 99999 PR PBB SHADOW E&M-EST. PATIENT-LVL V: CPT | Mod: PBBFAC,,, | Performed by: NURSE PRACTITIONER

## 2019-12-30 PROCEDURE — 99999 PR PBB SHADOW E&M-EST. PATIENT-LVL V: ICD-10-PCS | Mod: PBBFAC,,, | Performed by: NURSE PRACTITIONER

## 2019-12-30 PROCEDURE — 80053 COMPREHEN METABOLIC PANEL: CPT

## 2019-12-30 PROCEDURE — 96367 TX/PROPH/DG ADDL SEQ IV INF: CPT

## 2019-12-30 PROCEDURE — 96413 CHEMO IV INFUSION 1 HR: CPT

## 2019-12-30 PROCEDURE — 25000003 PHARM REV CODE 250: Performed by: INTERNAL MEDICINE

## 2019-12-30 PROCEDURE — A4216 STERILE WATER/SALINE, 10 ML: HCPCS | Performed by: INTERNAL MEDICINE

## 2019-12-30 PROCEDURE — 96375 TX/PRO/DX INJ NEW DRUG ADDON: CPT

## 2019-12-30 RX ORDER — EPINEPHRINE 0.3 MG/.3ML
0.3 INJECTION SUBCUTANEOUS ONCE AS NEEDED
Status: DISCONTINUED | OUTPATIENT
Start: 2019-12-30 | End: 2019-12-30 | Stop reason: HOSPADM

## 2019-12-30 RX ORDER — FAMOTIDINE 10 MG/ML
20 INJECTION INTRAVENOUS
Status: CANCELLED | OUTPATIENT
Start: 2019-12-30

## 2019-12-30 RX ORDER — HEPARIN 100 UNIT/ML
500 SYRINGE INTRAVENOUS
Status: CANCELLED | OUTPATIENT
Start: 2019-12-30

## 2019-12-30 RX ORDER — FAMOTIDINE 10 MG/ML
20 INJECTION INTRAVENOUS
Status: COMPLETED | OUTPATIENT
Start: 2019-12-30 | End: 2019-12-30

## 2019-12-30 RX ORDER — EPINEPHRINE 0.3 MG/.3ML
0.3 INJECTION SUBCUTANEOUS ONCE AS NEEDED
Status: CANCELLED | OUTPATIENT
Start: 2019-12-30

## 2019-12-30 RX ORDER — DIPHENHYDRAMINE HYDROCHLORIDE 50 MG/ML
50 INJECTION INTRAMUSCULAR; INTRAVENOUS ONCE AS NEEDED
Status: DISCONTINUED | OUTPATIENT
Start: 2019-12-30 | End: 2019-12-30 | Stop reason: HOSPADM

## 2019-12-30 RX ORDER — PREGABALIN 150 MG/1
150 CAPSULE ORAL ONCE
Qty: 60 CAPSULE | Refills: 2 | Status: SHIPPED | OUTPATIENT
Start: 2019-12-30 | End: 2020-03-11

## 2019-12-30 RX ORDER — SODIUM CHLORIDE 0.9 % (FLUSH) 0.9 %
10 SYRINGE (ML) INJECTION
Status: COMPLETED | OUTPATIENT
Start: 2019-12-30 | End: 2019-12-30

## 2019-12-30 RX ORDER — POTASSIUM CHLORIDE 20 MEQ/1
40 TABLET, EXTENDED RELEASE ORAL ONCE
Status: CANCELLED
Start: 2019-12-30

## 2019-12-30 RX ORDER — SODIUM CHLORIDE 0.9 % (FLUSH) 0.9 %
10 SYRINGE (ML) INJECTION
Status: DISCONTINUED | OUTPATIENT
Start: 2019-12-30 | End: 2019-12-30 | Stop reason: HOSPADM

## 2019-12-30 RX ORDER — HEPARIN 100 UNIT/ML
500 SYRINGE INTRAVENOUS
Status: COMPLETED | OUTPATIENT
Start: 2019-12-30 | End: 2019-12-30

## 2019-12-30 RX ORDER — DIPHENHYDRAMINE HYDROCHLORIDE 50 MG/ML
50 INJECTION INTRAMUSCULAR; INTRAVENOUS ONCE AS NEEDED
Status: CANCELLED | OUTPATIENT
Start: 2019-12-30

## 2019-12-30 RX ORDER — SODIUM CHLORIDE 0.9 % (FLUSH) 0.9 %
10 SYRINGE (ML) INJECTION
Status: CANCELLED | OUTPATIENT
Start: 2019-12-30

## 2019-12-30 RX ORDER — POTASSIUM CHLORIDE 20 MEQ/1
40 TABLET, EXTENDED RELEASE ORAL ONCE
Status: COMPLETED | OUTPATIENT
Start: 2019-12-30 | End: 2019-12-30

## 2019-12-30 RX ORDER — HEPARIN 100 UNIT/ML
500 SYRINGE INTRAVENOUS
Status: DISCONTINUED | OUTPATIENT
Start: 2019-12-30 | End: 2019-12-30 | Stop reason: HOSPADM

## 2019-12-30 RX ADMIN — DEXAMETHASONE SODIUM PHOSPHATE 10 MG: 4 INJECTION, SOLUTION INTRA-ARTICULAR; INTRALESIONAL; INTRAMUSCULAR; INTRAVENOUS; SOFT TISSUE at 04:12

## 2019-12-30 RX ADMIN — HEPARIN 500 UNITS: 100 SYRINGE at 06:12

## 2019-12-30 RX ADMIN — DIPHENHYDRAMINE HYDROCHLORIDE 25 MG: 50 INJECTION, SOLUTION INTRAMUSCULAR; INTRAVENOUS at 04:12

## 2019-12-30 RX ADMIN — Medication 10 ML: at 02:12

## 2019-12-30 RX ADMIN — SODIUM CHLORIDE: 9 INJECTION, SOLUTION INTRAVENOUS at 04:12

## 2019-12-30 RX ADMIN — PACLITAXEL 156 MG: 6 INJECTION, SOLUTION INTRAVENOUS at 04:12

## 2019-12-30 RX ADMIN — FAMOTIDINE 20 MG: 10 INJECTION, SOLUTION INTRAVENOUS at 04:12

## 2019-12-30 RX ADMIN — POTASSIUM CHLORIDE 40 MEQ: 1500 TABLET, EXTENDED RELEASE ORAL at 04:12

## 2019-12-30 RX ADMIN — HEPARIN 500 UNITS: 100 SYRINGE at 02:12

## 2019-12-30 NOTE — PLAN OF CARE
Pt here today for taxol. Will Replace potassium orally today. Pt instructed by Elsa Reilly NP to take home script twice a day for three days and then to stop. Pt verbalizes understanding. Port flushed, blood return present, infusing without difficulty.

## 2019-12-30 NOTE — PLAN OF CARE
Pt tolerated infusion. VSS. Port flushed, hep-locked and de-accessed. AVS Refused. No questions at this time.

## 2019-12-30 NOTE — Clinical Note
RTC next week to see Md/JAZZY with CBC, CMP, C5 Taxol. Patient requests to have labs drawn on 5 th floor with Kristi (can we send that to 5 and ask)

## 2020-01-02 ENCOUNTER — TELEPHONE (OUTPATIENT)
Dept: HEMATOLOGY/ONCOLOGY | Facility: CLINIC | Age: 65
End: 2020-01-02

## 2020-01-06 ENCOUNTER — INFUSION (OUTPATIENT)
Dept: INFUSION THERAPY | Facility: HOSPITAL | Age: 65
End: 2020-01-06
Attending: INTERNAL MEDICINE
Payer: COMMERCIAL

## 2020-01-06 ENCOUNTER — OFFICE VISIT (OUTPATIENT)
Dept: HEMATOLOGY/ONCOLOGY | Facility: CLINIC | Age: 65
End: 2020-01-06
Payer: COMMERCIAL

## 2020-01-06 VITALS
TEMPERATURE: 98 F | WEIGHT: 196 LBS | DIASTOLIC BLOOD PRESSURE: 60 MMHG | HEIGHT: 60 IN | BODY MASS INDEX: 38.48 KG/M2 | HEART RATE: 116 BPM | SYSTOLIC BLOOD PRESSURE: 111 MMHG | RESPIRATION RATE: 18 BRPM | OXYGEN SATURATION: 100 %

## 2020-01-06 VITALS
RESPIRATION RATE: 18 BRPM | BODY MASS INDEX: 38.48 KG/M2 | HEART RATE: 96 BPM | WEIGHT: 196 LBS | TEMPERATURE: 98 F | SYSTOLIC BLOOD PRESSURE: 125 MMHG | HEIGHT: 60 IN | DIASTOLIC BLOOD PRESSURE: 78 MMHG

## 2020-01-06 DIAGNOSIS — C77.3 BREAST CANCER METASTASIZED TO AXILLARY LYMPH NODE, LEFT: Primary | ICD-10-CM

## 2020-01-06 DIAGNOSIS — C50.912 BREAST CANCER METASTASIZED TO AXILLARY LYMPH NODE, LEFT: Primary | ICD-10-CM

## 2020-01-06 DIAGNOSIS — D63.0 ANEMIA IN NEOPLASTIC DISEASE: ICD-10-CM

## 2020-01-06 DIAGNOSIS — T45.1X5A NEUROPATHY DUE TO CHEMOTHERAPEUTIC DRUG: ICD-10-CM

## 2020-01-06 DIAGNOSIS — G62.0 NEUROPATHY DUE TO CHEMOTHERAPEUTIC DRUG: ICD-10-CM

## 2020-01-06 LAB
ALBUMIN SERPL BCP-MCNC: 3.7 G/DL (ref 3.5–5.2)
ALP SERPL-CCNC: 75 U/L (ref 55–135)
ALT SERPL W/O P-5'-P-CCNC: 9 U/L (ref 10–44)
ANION GAP SERPL CALC-SCNC: 10 MMOL/L (ref 8–16)
AST SERPL-CCNC: 16 U/L (ref 10–40)
BILIRUB SERPL-MCNC: 0.5 MG/DL (ref 0.1–1)
BUN SERPL-MCNC: 13 MG/DL (ref 8–23)
CALCIUM SERPL-MCNC: 9.8 MG/DL (ref 8.7–10.5)
CHLORIDE SERPL-SCNC: 102 MMOL/L (ref 95–110)
CO2 SERPL-SCNC: 25 MMOL/L (ref 23–29)
CREAT SERPL-MCNC: 0.9 MG/DL (ref 0.5–1.4)
ERYTHROCYTE [DISTWIDTH] IN BLOOD BY AUTOMATED COUNT: 18.5 % (ref 11.5–14.5)
EST. GFR  (AFRICAN AMERICAN): >60 ML/MIN/1.73 M^2
EST. GFR  (NON AFRICAN AMERICAN): >60 ML/MIN/1.73 M^2
GLUCOSE SERPL-MCNC: 132 MG/DL (ref 70–110)
HCT VFR BLD AUTO: 28.5 % (ref 37–48.5)
HGB BLD-MCNC: 9.1 G/DL (ref 12–16)
IMM GRANULOCYTES # BLD AUTO: 0.11 K/UL (ref 0–0.04)
MCH RBC QN AUTO: 32.9 PG (ref 27–31)
MCHC RBC AUTO-ENTMCNC: 31.9 G/DL (ref 32–36)
MCV RBC AUTO: 103 FL (ref 82–98)
NEUTROPHILS # BLD AUTO: 3.5 K/UL (ref 1.8–7.7)
PLATELET # BLD AUTO: 281 K/UL (ref 150–350)
PMV BLD AUTO: 10.1 FL (ref 9.2–12.9)
POTASSIUM SERPL-SCNC: 3.6 MMOL/L (ref 3.5–5.1)
PROT SERPL-MCNC: 7.4 G/DL (ref 6–8.4)
RBC # BLD AUTO: 2.77 M/UL (ref 4–5.4)
SODIUM SERPL-SCNC: 137 MMOL/L (ref 136–145)
WBC # BLD AUTO: 4.65 K/UL (ref 3.9–12.7)

## 2020-01-06 PROCEDURE — 99213 PR OFFICE/OUTPT VISIT, EST, LEVL III, 20-29 MIN: ICD-10-PCS | Mod: S$GLB,,, | Performed by: INTERNAL MEDICINE

## 2020-01-06 PROCEDURE — 96367 TX/PROPH/DG ADDL SEQ IV INF: CPT

## 2020-01-06 PROCEDURE — 99999 PR PBB SHADOW E&M-EST. PATIENT-LVL III: CPT | Mod: PBBFAC,,, | Performed by: INTERNAL MEDICINE

## 2020-01-06 PROCEDURE — 3078F PR MOST RECENT DIASTOLIC BLOOD PRESSURE < 80 MM HG: ICD-10-PCS | Mod: CPTII,S$GLB,, | Performed by: INTERNAL MEDICINE

## 2020-01-06 PROCEDURE — 63600175 PHARM REV CODE 636 W HCPCS: Performed by: INTERNAL MEDICINE

## 2020-01-06 PROCEDURE — 99213 OFFICE O/P EST LOW 20 MIN: CPT | Mod: S$GLB,,, | Performed by: INTERNAL MEDICINE

## 2020-01-06 PROCEDURE — A4216 STERILE WATER/SALINE, 10 ML: HCPCS | Performed by: INTERNAL MEDICINE

## 2020-01-06 PROCEDURE — 96375 TX/PRO/DX INJ NEW DRUG ADDON: CPT

## 2020-01-06 PROCEDURE — 96413 CHEMO IV INFUSION 1 HR: CPT

## 2020-01-06 PROCEDURE — 3008F PR BODY MASS INDEX (BMI) DOCUMENTED: ICD-10-PCS | Mod: CPTII,S$GLB,, | Performed by: INTERNAL MEDICINE

## 2020-01-06 PROCEDURE — 25000003 PHARM REV CODE 250: Performed by: INTERNAL MEDICINE

## 2020-01-06 PROCEDURE — 3008F BODY MASS INDEX DOCD: CPT | Mod: CPTII,S$GLB,, | Performed by: INTERNAL MEDICINE

## 2020-01-06 PROCEDURE — 3074F PR MOST RECENT SYSTOLIC BLOOD PRESSURE < 130 MM HG: ICD-10-PCS | Mod: CPTII,S$GLB,, | Performed by: INTERNAL MEDICINE

## 2020-01-06 PROCEDURE — 85027 COMPLETE CBC AUTOMATED: CPT

## 2020-01-06 PROCEDURE — S0028 INJECTION, FAMOTIDINE, 20 MG: HCPCS | Performed by: INTERNAL MEDICINE

## 2020-01-06 PROCEDURE — 99999 PR PBB SHADOW E&M-EST. PATIENT-LVL III: ICD-10-PCS | Mod: PBBFAC,,, | Performed by: INTERNAL MEDICINE

## 2020-01-06 PROCEDURE — 3078F DIAST BP <80 MM HG: CPT | Mod: CPTII,S$GLB,, | Performed by: INTERNAL MEDICINE

## 2020-01-06 PROCEDURE — 3074F SYST BP LT 130 MM HG: CPT | Mod: CPTII,S$GLB,, | Performed by: INTERNAL MEDICINE

## 2020-01-06 PROCEDURE — 80053 COMPREHEN METABOLIC PANEL: CPT

## 2020-01-06 RX ORDER — SODIUM CHLORIDE 0.9 % (FLUSH) 0.9 %
10 SYRINGE (ML) INJECTION
Status: COMPLETED | OUTPATIENT
Start: 2020-01-06 | End: 2020-01-06

## 2020-01-06 RX ORDER — HEPARIN 100 UNIT/ML
500 SYRINGE INTRAVENOUS
Status: DISCONTINUED | OUTPATIENT
Start: 2020-01-06 | End: 2020-01-06 | Stop reason: HOSPADM

## 2020-01-06 RX ORDER — HEPARIN 100 UNIT/ML
500 SYRINGE INTRAVENOUS
Status: CANCELLED | OUTPATIENT
Start: 2020-01-06

## 2020-01-06 RX ORDER — FAMOTIDINE 10 MG/ML
20 INJECTION INTRAVENOUS
Status: COMPLETED | OUTPATIENT
Start: 2020-01-06 | End: 2020-01-06

## 2020-01-06 RX ORDER — DIPHENHYDRAMINE HYDROCHLORIDE 50 MG/ML
50 INJECTION INTRAMUSCULAR; INTRAVENOUS ONCE AS NEEDED
Status: CANCELLED | OUTPATIENT
Start: 2020-01-06

## 2020-01-06 RX ORDER — EPINEPHRINE 0.3 MG/.3ML
0.3 INJECTION SUBCUTANEOUS ONCE AS NEEDED
Status: CANCELLED | OUTPATIENT
Start: 2020-01-06

## 2020-01-06 RX ORDER — SODIUM CHLORIDE 0.9 % (FLUSH) 0.9 %
10 SYRINGE (ML) INJECTION
Status: DISCONTINUED | OUTPATIENT
Start: 2020-01-06 | End: 2020-01-06 | Stop reason: HOSPADM

## 2020-01-06 RX ORDER — DIPHENHYDRAMINE HYDROCHLORIDE 50 MG/ML
50 INJECTION INTRAMUSCULAR; INTRAVENOUS ONCE AS NEEDED
Status: DISCONTINUED | OUTPATIENT
Start: 2020-01-06 | End: 2020-01-06 | Stop reason: HOSPADM

## 2020-01-06 RX ORDER — SODIUM CHLORIDE 0.9 % (FLUSH) 0.9 %
10 SYRINGE (ML) INJECTION
Status: CANCELLED | OUTPATIENT
Start: 2020-01-06

## 2020-01-06 RX ORDER — HEPARIN 100 UNIT/ML
500 SYRINGE INTRAVENOUS
Status: COMPLETED | OUTPATIENT
Start: 2020-01-06 | End: 2020-01-06

## 2020-01-06 RX ORDER — EPINEPHRINE 0.3 MG/.3ML
0.3 INJECTION SUBCUTANEOUS ONCE AS NEEDED
Status: DISCONTINUED | OUTPATIENT
Start: 2020-01-06 | End: 2020-01-06 | Stop reason: HOSPADM

## 2020-01-06 RX ORDER — FAMOTIDINE 10 MG/ML
20 INJECTION INTRAVENOUS
Status: CANCELLED | OUTPATIENT
Start: 2020-01-06

## 2020-01-06 RX ADMIN — HEPARIN 500 UNITS: 100 SYRINGE at 09:01

## 2020-01-06 RX ADMIN — Medication 10 ML: at 09:01

## 2020-01-06 RX ADMIN — FAMOTIDINE 20 MG: 10 INJECTION, SOLUTION INTRAVENOUS at 02:01

## 2020-01-06 RX ADMIN — DEXAMETHASONE SODIUM PHOSPHATE 10 MG: 4 INJECTION, SOLUTION INTRA-ARTICULAR; INTRALESIONAL; INTRAMUSCULAR; INTRAVENOUS; SOFT TISSUE at 02:01

## 2020-01-06 RX ADMIN — Medication 10 ML: at 03:01

## 2020-01-06 RX ADMIN — HEPARIN 500 UNITS: 100 SYRINGE at 03:01

## 2020-01-06 RX ADMIN — DIPHENHYDRAMINE HYDROCHLORIDE 25 MG: 50 INJECTION INTRAMUSCULAR; INTRAVENOUS at 02:01

## 2020-01-06 RX ADMIN — SODIUM CHLORIDE: 9 INJECTION, SOLUTION INTRAVENOUS at 02:01

## 2020-01-06 RX ADMIN — PACLITAXEL 156 MG: 6 INJECTION, SOLUTION INTRAVENOUS at 02:01

## 2020-01-06 NOTE — PROGRESS NOTES
Subjective:       Patient ID: Maria Elena Tavares is a 64 y.o. female.    Chief Complaint: Breast cancer metastasized to axillary lymph node, left    HPI     History of Present Illness: Ms. Tavares is a 64 y.o. who returns in follow up for Breast Cancer.     Mild nausea over the weekend  She was able to eat and drink well  Continued off and on pain in right arm  Notes numbness as well  New medication (lyrica) helping-- did make her a little fatigued      Today: Here for Cycle 6 Taxol.  Imaging in the interval reveals partial response.     Oncologic History:  - self detected, noted a shooting pain in breast first and then a week later felt a lump  Some delay in getting appointment as she was unaware she had to call  - 8/30/19 Diagnostic mammogram and ultrasound:  Left breast 20 mm x 18 mm x 17 mm mass at the 3 o'clock position. Assessment: 4 - Suspicious finding. Biopsy is recommended.   Lymph Node: Left axilla 16 mm x 14 mm x 13 mm lymph node. Assessment: 4 - Suspicious finding. Biopsy is recommended.   Right- There is no mammographic or sonographic evidence of malignancy.  BI-RADS Category:   Overall: 4 - Suspicious  - 9/5/19 Ultrasound guided biopsy  Pathology:  FINAL PATHOLOGIC DIAGNOSIS  1. LEFT BREAST MASS, BIOPSY:  - Invasive ductal carcinoma, grade 3, longest linear length is 10 mm measured on the slide.  - Histologic Grade ([place] Histologic Score)  Glandular (Acinar/Tubular Differentiation): 3.  Nuclear Pleomorphism: 2.  Mitotic Rate: 3.  Overall Grade: Grade 3 (score 8).  - Microcalcifications: Not identified.  - Lymphovascular invasion: Not identified.  2. LYMPH NODE, LEFT AXILLA, BIOPSY:  - Positive for metastatic carcinoma.  - The metastatic deposit measures 6 mm in longest continuous linear length.  Estrogen receptor: Positive, 90% of the tumor nuclei staining moderate to strongly.  Progesterone receptor: Positive, 30% of the tumor nuclei staining weak to moderately.  HER2: Negative.  Ki-67: 60%.  - ECHO  19: EF 64%  - PET 19:  Impression         Hypermetabolic left breast mass and regional left axillary lymphadenopathy consistent with reported breast cancer and corresponding with recent MRI findings.    Upper limit of normal sized right axillary lymph nodes with normal fatty duane and mildly increased radiotracer uptake.  Findings could represent reactive nodes with metastatic nodes thought less likely.  Lymphscintigraphy with injection in the left breast may considered to assess for drainage to the contralateral axilla.      BX 19: Right axilla node biopsy is benign     Gyn Hx:  Menarche- 11  Menopause - partial hysterectomy at age 36 yo  Remainder done in   No HRT  Prior ocps  , age 28 at 1st pregnancy  No breastfeeding           PMFSH: all information reviewed and updated as relevant to today's visit      Review of Systems   Constitutional: Negative for activity change, appetite change and unexpected weight change.   Eyes: Positive for visual disturbance.   Respiratory: Negative for cough and shortness of breath.    Cardiovascular: Negative for chest pain.   Gastrointestinal: Negative for abdominal pain and diarrhea.   Genitourinary: Negative for frequency.   Musculoskeletal: Negative for back pain.   Skin: Negative for rash.   Neurological: Negative for headaches.   Hematological: Negative for adenopathy.   Psychiatric/Behavioral: The patient is not nervous/anxious.        Objective:      Physical Exam   Constitutional: She is oriented to person, place, and time. She appears well-developed and well-nourished. No distress.   HENT:   Head: Normocephalic and atraumatic.   Mouth/Throat: Oropharynx is clear and moist.   No sinus tenderness.  Alopecia.  Darkening of tongue noted   Eyes: Pupils are equal, round, and reactive to light. Conjunctivae and lids are normal. No scleral icterus.   Neck: Trachea normal and normal range of motion. Neck supple. No JVD present. No thyromegaly present.    Cardiovascular: Normal rate, regular rhythm, normal heart sounds and intact distal pulses.   No murmur heard.  Pulmonary/Chest: Effort normal and breath sounds normal. She has no wheezes.   Abdominal: Soft. Normal appearance and bowel sounds are normal. She exhibits no distension and no mass. There is no tenderness.   No organomegaly.    Musculoskeletal: Normal range of motion. She exhibits no edema.   No spinal or paraspinal tenderness.    Lymphadenopathy:        Head (right side): No submental and no submandibular adenopathy present.        Head (left side): No submental and no submandibular adenopathy present.     She has no cervical adenopathy.     She has no axillary adenopathy.        Right: No supraclavicular adenopathy present.        Left: No supraclavicular adenopathy present.   Neurological: She is alert and oriented to person, place, and time. She has normal strength and normal reflexes. No cranial nerve deficit. Gait normal.   Skin: Skin is warm, dry and intact. Capillary refill takes less than 2 seconds. No bruising and no rash noted. No cyanosis. Nails show no clubbing.   Psychiatric: She has a normal mood and affect. Her speech is normal and behavior is normal.   Nursing note and vitals reviewed.    Labs- reviewed  Assessment:       1. Breast cancer metastasized to axillary lymph node, left    2. Anemia in neoplastic disease    3. Neuropathy due to chemotherapeutic drug        Plan:     1. Continue Taxol, labs appropriate  2. Parameters stable  Has required prior transfusion  Monitor  3. Worse on rt hand - will likely refer for carpal tunnel assessment  On lyrica    Knows to call for any issues  RTC 1 week

## 2020-01-06 NOTE — PLAN OF CARE
Tolerated taxol well.  No reactions noted.  No questions or concerns at this time.  Ambulated off unit in NAD.

## 2020-01-07 NOTE — PROGRESS NOTES
PATIENT: Maria Elena Tavares  MRN: [id number]  DATE: 1/7/2020      History of Present Illness: Ms. Tavares is a 64 y.o. who returns in follow up for Breast Cancer.    Started C1 Taxol on 12/2/19 - treatment was interrupted due to flushing but re challenged without difficulties.     Today: Here for Cycle 7 Taxol. Patient does note numbness and tingling in the right arm no interference with ADL's, she was on Gabapentin, but this is not helping, will transition to lyrica.   She notes she is having right arm pain due to a history of shingles.   UTI - fever over the weekend- started on antibiotics; Ciprofloxacin this morning; awaiting culture.   Always cold.   SOB resolved.  Staying active.   Ready to proceed with chemo today.    She is accompanied by friend.    Oncologic History:  - self detected, noted a shooting pain in breast first and then a week later felt a lump  Some delay in getting appointment as she was unaware she had to call  - 8/30/19 Diagnostic mammogram and ultrasound:  Left breast 20 mm x 18 mm x 17 mm mass at the 3 o'clock position. Assessment: 4 - Suspicious finding. Biopsy is recommended.   Lymph Node: Left axilla 16 mm x 14 mm x 13 mm lymph node. Assessment: 4 - Suspicious finding. Biopsy is recommended.   Right- There is no mammographic or sonographic evidence of malignancy.  BI-RADS Category:   Overall: 4 - Suspicious  - 9/5/19 Ultrasound guided biopsy  Pathology:  FINAL PATHOLOGIC DIAGNOSIS  1. LEFT BREAST MASS, BIOPSY:  - Invasive ductal carcinoma, grade 3, longest linear length is 10 mm measured on the slide.  - Histologic Grade (Sidney Histologic Score)  Glandular (Acinar/Tubular Differentiation): 3.  Nuclear Pleomorphism: 2.  Mitotic Rate: 3.  Overall Grade: Grade 3 (score 8).  - Microcalcifications: Not identified.  - Lymphovascular invasion: Not identified.  2. LYMPH NODE, LEFT AXILLA, BIOPSY:  - Positive for metastatic carcinoma.  - The metastatic deposit measures 6 mm in longest continuous  linear length.  Estrogen receptor: Positive, 90% of the tumor nuclei staining moderate to strongly.  Progesterone receptor: Positive, 30% of the tumor nuclei staining weak to moderately.  HER2: Negative.  Ki-67: 60%.  - ECHO 9/20/19: EF 64%  - PET 9/21/19:  Impression         Hypermetabolic left breast mass and regional left axillary lymphadenopathy consistent with reported breast cancer and corresponding with recent MRI findings.    Upper limit of normal sized right axillary lymph nodes with normal fatty duane and mildly increased radiotracer uptake.  Findings could represent reactive nodes with metastatic nodes thought less likely.  Lymphscintigraphy with injection in the left breast may considered to assess for drainage to the contralateral axilla.      BX 9/24/19: Right axilla node biopsy is benign    Review of Systems:   Review of Systems   Constitutional: Positive for fatigue. Negative for activity change, appetite change, fever and unexpected weight change.   HENT: Positive for nosebleeds (from sinus; better with nasal saline). Negative for mouth sores and sore throat.    Eyes: Negative for visual disturbance.   Respiratory: Negative for cough and shortness of breath.    Cardiovascular: Negative for chest pain, palpitations and leg swelling.   Gastrointestinal: Negative for abdominal pain, constipation, diarrhea, nausea and vomiting.   Genitourinary: Positive for urgency (with UTI). Negative for difficulty urinating and frequency.   Musculoskeletal: Negative for arthralgias and back pain.   Skin: Negative for rash.   Neurological: Positive for numbness (in right arm/hand- nerve type pain). Negative for dizziness, light-headedness and headaches.   Hematological: Negative for adenopathy. Does not bruise/bleed easily.   Psychiatric/Behavioral: Negative for confusion and sleep disturbance. The patient is not nervous/anxious.          Objective:      Vitals:   Vitals:    01/14/20 1314   BP: (!) 99/58   BP Location:  Left arm   Patient Position: Sitting   BP Method: Large (Automatic)   Pulse: (!) 118   Resp: 18   Temp: 98.1 °F (36.7 °C)   TempSrc: Oral   SpO2: 98%   Weight: 89.4 kg (197 lb 1.5 oz)   Height: 5' (1.524 m)       Physical Exam:   Physical Exam   Constitutional: She is oriented to person, place, and time. She appears well-developed and well-nourished. No distress.   HENT:   Head: Normocephalic and atraumatic.   Mouth/Throat: Oropharynx is clear and moist.   Alopecia.  Darkening of tongue noted   Eyes: Pupils are equal, round, and reactive to light. Conjunctivae and lids are normal. No scleral icterus.   Neck: Trachea normal and normal range of motion. Neck supple.   Cardiovascular: Normal heart sounds and intact distal pulses. Tachycardia present.   No murmur heard.  Pulmonary/Chest: Effort normal and breath sounds normal. She has no wheezes.   Abdominal: Soft. Normal appearance and bowel sounds are normal. She exhibits no distension. There is no tenderness.   No organomegaly.    Musculoskeletal: Normal range of motion. She exhibits no edema.   No spinal or paraspinal tenderness.    Lymphadenopathy:     She has no cervical adenopathy.     She has no axillary adenopathy.        Right: No supraclavicular adenopathy present.        Left: No supraclavicular adenopathy present.   Neurological: She is alert and oriented to person, place, and time. She has normal strength and normal reflexes. Gait normal.   Skin: Skin is warm, dry and intact. Capillary refill takes less than 2 seconds. No bruising and no rash noted. No cyanosis. Nails show no clubbing.   Psychiatric: She has a normal mood and affect. Her speech is normal and behavior is normal.   Nursing note and vitals reviewed.    Breast Exam: deferred      Laboratory Data:  Results for orders placed or performed in visit on 01/14/20   CBC Oncology   Result Value Ref Range    WBC 4.56 3.90 - 12.70 K/uL    RBC 2.61 (L) 4.00 - 5.40 M/uL    Hemoglobin 8.5 (L) 12.0 - 16.0  g/dL    Hematocrit 27.5 (L) 37.0 - 48.5 %    Mean Corpuscular Volume 105 (H) 82 - 98 fL    Mean Corpuscular Hemoglobin 32.6 (H) 27.0 - 31.0 pg    Mean Corpuscular Hemoglobin Conc 30.9 (L) 32.0 - 36.0 g/dL    RDW 18.4 (H) 11.5 - 14.5 %    Platelets 292 150 - 350 K/uL    MPV 10.4 9.2 - 12.9 fL    Gran # (ANC) 3.2 1.8 - 7.7 K/uL    Immature Grans (Abs) 0.04 0.00 - 0.04 K/uL   Comprehensive metabolic panel   Result Value Ref Range    Sodium 141 136 - 145 mmol/L    Potassium 3.5 3.5 - 5.1 mmol/L    Chloride 106 95 - 110 mmol/L    CO2 24 23 - 29 mmol/L    Glucose 116 (H) 70 - 110 mg/dL    BUN, Bld 20 8 - 23 mg/dL    Creatinine 1.0 0.5 - 1.4 mg/dL    Calcium 9.2 8.7 - 10.5 mg/dL    Total Protein 7.1 6.0 - 8.4 g/dL    Albumin 3.7 3.5 - 5.2 g/dL    Total Bilirubin 0.4 0.1 - 1.0 mg/dL    Alkaline Phosphatase 85 55 - 135 U/L    AST 16 10 - 40 U/L    ALT 10 10 - 44 U/L    Anion Gap 11 8 - 16 mmol/L    eGFR if African American >60.0 >60 mL/min/1.73 m^2    eGFR if non  59.7 (A) >60 mL/min/1.73 m^2           Assessment/Plan:     1. Breast cancer metastasized to axillary lymph node, left    2. Essential hypertension    3. Acquired hypothyroidism    4. Uncontrolled type 2 diabetes mellitus without complication, without long-term current use of insulin        Plan:  -Doing well, clinically responding as confirmed by imaging.   -Labs reviewed. Anemia parameters stable. Of note, Symptoms improved post transfusion.   -Continue lyrica  -we discussed neuropathy associated with chemotherapy. At this time, she is a grade 1-2 and understands risk of permanent nerve damage. She wishes to continue with chemotherapy treatments.     2. Monitored today; continue medication and follow up with PCP  3. Continue current dose of synthroid and follow up with endocrinology   4. Continue current medications and follow up with endocrinology     Will do cycle 8 and 10 with just labs no provider visit  Will do cycle 9 and 11 with labs and a  provider visit  Will need to be referred back to Dr. Lopez around cycle 10 or 11     Patient is in agreement with the proposed treatment plan. All questions were answered to the patient's satisfaction. Patient knows to call clinic for any new or worsening symptoms and if anything is needed before the next clinic visit.          Elsa Reilly, SHARONP-C  Hematology & Medical Oncology   [address] [place] [address]  ph. [phone]  Fax. [phone]    Patient dicussed with collaborating physician, Dr. Villagran.

## 2020-01-13 ENCOUNTER — LAB VISIT (OUTPATIENT)
Dept: LAB | Facility: HOSPITAL | Age: 65
End: 2020-01-13
Attending: INTERNAL MEDICINE
Payer: COMMERCIAL

## 2020-01-13 ENCOUNTER — TELEPHONE (OUTPATIENT)
Dept: HEMATOLOGY/ONCOLOGY | Facility: CLINIC | Age: 65
End: 2020-01-13

## 2020-01-13 DIAGNOSIS — R39.15 URINARY URGENCY: Primary | ICD-10-CM

## 2020-01-13 DIAGNOSIS — N39.0 URINARY TRACT INFECTION WITHOUT HEMATURIA, SITE UNSPECIFIED: Primary | ICD-10-CM

## 2020-01-13 DIAGNOSIS — R39.15 URINARY URGENCY: ICD-10-CM

## 2020-01-13 LAB
BACTERIA #/AREA URNS AUTO: ABNORMAL /HPF
BILIRUB UR QL STRIP: NEGATIVE
CLARITY UR REFRACT.AUTO: ABNORMAL
COLOR UR AUTO: YELLOW
GLUCOSE UR QL STRIP: NEGATIVE
HGB UR QL STRIP: ABNORMAL
KETONES UR QL STRIP: NEGATIVE
LEUKOCYTE ESTERASE UR QL STRIP: ABNORMAL
MICROSCOPIC COMMENT: ABNORMAL
NITRITE UR QL STRIP: POSITIVE
PH UR STRIP: 6 [PH] (ref 5–8)
PROT UR QL STRIP: NEGATIVE
RBC #/AREA URNS AUTO: 2 /HPF (ref 0–4)
SP GR UR STRIP: 1.01 (ref 1–1.03)
URN SPEC COLLECT METH UR: ABNORMAL
WBC #/AREA URNS AUTO: >100 /HPF (ref 0–5)
WBC CLUMPS UR QL AUTO: ABNORMAL

## 2020-01-13 PROCEDURE — 87088 URINE BACTERIA CULTURE: CPT

## 2020-01-13 PROCEDURE — 87186 SC STD MICRODIL/AGAR DIL: CPT

## 2020-01-13 PROCEDURE — 81001 URINALYSIS AUTO W/SCOPE: CPT

## 2020-01-13 PROCEDURE — 87086 URINE CULTURE/COLONY COUNT: CPT

## 2020-01-13 PROCEDURE — 87077 CULTURE AEROBIC IDENTIFY: CPT

## 2020-01-13 RX ORDER — CIPROFLOXACIN 500 MG/1
500 TABLET ORAL EVERY 12 HOURS
Qty: 10 TABLET | Refills: 0 | Status: SHIPPED | OUTPATIENT
Start: 2020-01-13 | End: 2020-02-26

## 2020-01-13 NOTE — TELEPHONE ENCOUNTER
Returned call to pt.   Pt stated that she believes she may have a UTI.   Pt stated symptoms started last week- reports has urgency and a little incontinence and then urine smells strongly.   Pt scheduled for labs, EP, and chemo tomorrow- asked pt if she can come today to do urine sample, pt agreeable and verbalized understanding of instructions.           ----- Message from Theodore Goncalves sent at 1/13/2020  8:50 AM CST -----  Contact: Patient  Staff Message     Caller name: Pt     Reason for call: Pt thinks she may have a UTI, wouldn't give any symptom details ans wants to speak with the nurse.        Communication Preference: [phone]    Additional Information:

## 2020-01-14 ENCOUNTER — TELEPHONE (OUTPATIENT)
Dept: HEMATOLOGY/ONCOLOGY | Facility: CLINIC | Age: 65
End: 2020-01-14

## 2020-01-14 ENCOUNTER — OFFICE VISIT (OUTPATIENT)
Dept: HEMATOLOGY/ONCOLOGY | Facility: CLINIC | Age: 65
End: 2020-01-14
Payer: COMMERCIAL

## 2020-01-14 ENCOUNTER — INFUSION (OUTPATIENT)
Dept: INFUSION THERAPY | Facility: HOSPITAL | Age: 65
End: 2020-01-14
Attending: INTERNAL MEDICINE
Payer: COMMERCIAL

## 2020-01-14 VITALS
DIASTOLIC BLOOD PRESSURE: 58 MMHG | TEMPERATURE: 98 F | SYSTOLIC BLOOD PRESSURE: 99 MMHG | HEIGHT: 60 IN | BODY MASS INDEX: 38.69 KG/M2 | OXYGEN SATURATION: 98 % | RESPIRATION RATE: 18 BRPM | WEIGHT: 197.06 LBS | HEART RATE: 118 BPM

## 2020-01-14 VITALS
RESPIRATION RATE: 18 BRPM | SYSTOLIC BLOOD PRESSURE: 110 MMHG | TEMPERATURE: 98 F | DIASTOLIC BLOOD PRESSURE: 71 MMHG | HEART RATE: 92 BPM

## 2020-01-14 DIAGNOSIS — D63.0 ANEMIA IN NEOPLASTIC DISEASE: ICD-10-CM

## 2020-01-14 DIAGNOSIS — C77.3 BREAST CANCER METASTASIZED TO AXILLARY LYMPH NODE, LEFT: Primary | ICD-10-CM

## 2020-01-14 DIAGNOSIS — C50.912 BREAST CANCER METASTASIZED TO AXILLARY LYMPH NODE, LEFT: Primary | ICD-10-CM

## 2020-01-14 DIAGNOSIS — E03.9 ACQUIRED HYPOTHYROIDISM: ICD-10-CM

## 2020-01-14 DIAGNOSIS — I10 ESSENTIAL HYPERTENSION: ICD-10-CM

## 2020-01-14 PROBLEM — K13.79 MOUTH SORES: Status: RESOLVED | Noted: 2019-12-02 | Resolved: 2020-01-14

## 2020-01-14 LAB
ALBUMIN SERPL BCP-MCNC: 3.7 G/DL (ref 3.5–5.2)
ALP SERPL-CCNC: 85 U/L (ref 55–135)
ALT SERPL W/O P-5'-P-CCNC: 10 U/L (ref 10–44)
ANION GAP SERPL CALC-SCNC: 11 MMOL/L (ref 8–16)
AST SERPL-CCNC: 16 U/L (ref 10–40)
BILIRUB SERPL-MCNC: 0.4 MG/DL (ref 0.1–1)
BUN SERPL-MCNC: 20 MG/DL (ref 8–23)
CALCIUM SERPL-MCNC: 9.2 MG/DL (ref 8.7–10.5)
CHLORIDE SERPL-SCNC: 106 MMOL/L (ref 95–110)
CO2 SERPL-SCNC: 24 MMOL/L (ref 23–29)
CREAT SERPL-MCNC: 1 MG/DL (ref 0.5–1.4)
ERYTHROCYTE [DISTWIDTH] IN BLOOD BY AUTOMATED COUNT: 18.4 % (ref 11.5–14.5)
EST. GFR  (AFRICAN AMERICAN): >60 ML/MIN/1.73 M^2
EST. GFR  (NON AFRICAN AMERICAN): 59.7 ML/MIN/1.73 M^2
GLUCOSE SERPL-MCNC: 116 MG/DL (ref 70–110)
HCT VFR BLD AUTO: 27.5 % (ref 37–48.5)
HGB BLD-MCNC: 8.5 G/DL (ref 12–16)
IMM GRANULOCYTES # BLD AUTO: 0.04 K/UL (ref 0–0.04)
MCH RBC QN AUTO: 32.6 PG (ref 27–31)
MCHC RBC AUTO-ENTMCNC: 30.9 G/DL (ref 32–36)
MCV RBC AUTO: 105 FL (ref 82–98)
NEUTROPHILS # BLD AUTO: 3.2 K/UL (ref 1.8–7.7)
PLATELET # BLD AUTO: 292 K/UL (ref 150–350)
PMV BLD AUTO: 10.4 FL (ref 9.2–12.9)
POTASSIUM SERPL-SCNC: 3.5 MMOL/L (ref 3.5–5.1)
PROT SERPL-MCNC: 7.1 G/DL (ref 6–8.4)
RBC # BLD AUTO: 2.61 M/UL (ref 4–5.4)
SODIUM SERPL-SCNC: 141 MMOL/L (ref 136–145)
WBC # BLD AUTO: 4.56 K/UL (ref 3.9–12.7)

## 2020-01-14 PROCEDURE — 25000003 PHARM REV CODE 250: Performed by: INTERNAL MEDICINE

## 2020-01-14 PROCEDURE — A4216 STERILE WATER/SALINE, 10 ML: HCPCS | Performed by: INTERNAL MEDICINE

## 2020-01-14 PROCEDURE — 99999 PR PBB SHADOW E&M-EST. PATIENT-LVL V: CPT | Mod: PBBFAC,,, | Performed by: NURSE PRACTITIONER

## 2020-01-14 PROCEDURE — 96367 TX/PROPH/DG ADDL SEQ IV INF: CPT

## 2020-01-14 PROCEDURE — 96413 CHEMO IV INFUSION 1 HR: CPT

## 2020-01-14 PROCEDURE — 63600175 PHARM REV CODE 636 W HCPCS: Performed by: INTERNAL MEDICINE

## 2020-01-14 PROCEDURE — 96375 TX/PRO/DX INJ NEW DRUG ADDON: CPT

## 2020-01-14 PROCEDURE — 99214 PR OFFICE/OUTPT VISIT, EST, LEVL IV, 30-39 MIN: ICD-10-PCS | Mod: S$GLB,,, | Performed by: NURSE PRACTITIONER

## 2020-01-14 PROCEDURE — 99999 PR PBB SHADOW E&M-EST. PATIENT-LVL V: ICD-10-PCS | Mod: PBBFAC,,, | Performed by: NURSE PRACTITIONER

## 2020-01-14 PROCEDURE — 80053 COMPREHEN METABOLIC PANEL: CPT

## 2020-01-14 PROCEDURE — S0028 INJECTION, FAMOTIDINE, 20 MG: HCPCS | Performed by: INTERNAL MEDICINE

## 2020-01-14 PROCEDURE — 99214 OFFICE O/P EST MOD 30 MIN: CPT | Mod: S$GLB,,, | Performed by: NURSE PRACTITIONER

## 2020-01-14 PROCEDURE — 85027 COMPLETE CBC AUTOMATED: CPT

## 2020-01-14 RX ORDER — HEPARIN 100 UNIT/ML
500 SYRINGE INTRAVENOUS
Status: CANCELLED | OUTPATIENT
Start: 2020-01-14

## 2020-01-14 RX ORDER — HEPARIN 100 UNIT/ML
500 SYRINGE INTRAVENOUS
Status: DISCONTINUED | OUTPATIENT
Start: 2020-01-14 | End: 2020-01-14 | Stop reason: HOSPADM

## 2020-01-14 RX ORDER — DIPHENHYDRAMINE HYDROCHLORIDE 50 MG/ML
50 INJECTION INTRAMUSCULAR; INTRAVENOUS ONCE AS NEEDED
Status: DISCONTINUED | OUTPATIENT
Start: 2020-01-14 | End: 2020-01-14 | Stop reason: HOSPADM

## 2020-01-14 RX ORDER — EPINEPHRINE 0.3 MG/.3ML
0.3 INJECTION SUBCUTANEOUS ONCE AS NEEDED
Status: CANCELLED | OUTPATIENT
Start: 2020-01-14

## 2020-01-14 RX ORDER — SODIUM CHLORIDE 0.9 % (FLUSH) 0.9 %
10 SYRINGE (ML) INJECTION
Status: CANCELLED | OUTPATIENT
Start: 2020-01-14

## 2020-01-14 RX ORDER — DIPHENHYDRAMINE HYDROCHLORIDE 50 MG/ML
50 INJECTION INTRAMUSCULAR; INTRAVENOUS ONCE AS NEEDED
Status: CANCELLED | OUTPATIENT
Start: 2020-01-14

## 2020-01-14 RX ORDER — HEPARIN 100 UNIT/ML
500 SYRINGE INTRAVENOUS
Status: COMPLETED | OUTPATIENT
Start: 2020-01-14 | End: 2020-01-14

## 2020-01-14 RX ORDER — SODIUM CHLORIDE 0.9 % (FLUSH) 0.9 %
10 SYRINGE (ML) INJECTION
Status: DISCONTINUED | OUTPATIENT
Start: 2020-01-14 | End: 2020-01-14 | Stop reason: HOSPADM

## 2020-01-14 RX ORDER — SODIUM CHLORIDE 0.9 % (FLUSH) 0.9 %
10 SYRINGE (ML) INJECTION
Status: COMPLETED | OUTPATIENT
Start: 2020-01-14 | End: 2020-01-14

## 2020-01-14 RX ORDER — EPINEPHRINE 0.3 MG/.3ML
0.3 INJECTION SUBCUTANEOUS ONCE AS NEEDED
Status: DISCONTINUED | OUTPATIENT
Start: 2020-01-14 | End: 2020-01-14 | Stop reason: HOSPADM

## 2020-01-14 RX ORDER — FAMOTIDINE 10 MG/ML
20 INJECTION INTRAVENOUS
Status: COMPLETED | OUTPATIENT
Start: 2020-01-14 | End: 2020-01-14

## 2020-01-14 RX ORDER — FAMOTIDINE 10 MG/ML
20 INJECTION INTRAVENOUS
Status: CANCELLED | OUTPATIENT
Start: 2020-01-14

## 2020-01-14 RX ADMIN — DIPHENHYDRAMINE HYDROCHLORIDE 25 MG: 50 INJECTION, SOLUTION INTRAMUSCULAR; INTRAVENOUS at 02:01

## 2020-01-14 RX ADMIN — Medication 10 ML: at 11:01

## 2020-01-14 RX ADMIN — DEXAMETHASONE SODIUM PHOSPHATE 10 MG: 4 INJECTION, SOLUTION INTRA-ARTICULAR; INTRALESIONAL; INTRAMUSCULAR; INTRAVENOUS; SOFT TISSUE at 02:01

## 2020-01-14 RX ADMIN — SODIUM CHLORIDE: 0.9 INJECTION, SOLUTION INTRAVENOUS at 02:01

## 2020-01-14 RX ADMIN — HEPARIN 500 UNITS: 100 SYRINGE at 04:01

## 2020-01-14 RX ADMIN — HEPARIN 500 UNITS: 100 SYRINGE at 11:01

## 2020-01-14 RX ADMIN — FAMOTIDINE 20 MG: 10 INJECTION, SOLUTION INTRAVENOUS at 02:01

## 2020-01-14 RX ADMIN — Medication 10 ML: at 04:01

## 2020-01-14 RX ADMIN — PACLITAXEL 156 MG: 6 INJECTION, SOLUTION INTRAVENOUS at 03:01

## 2020-01-14 NOTE — PROGRESS NOTES
PATIENT: Maria Elena Tavares  MRN: [id number]  DATE: 1/14/2020      History of Present Illness: Ms. Tavares is a 64 y.o. who returns in follow up for Breast Cancer.    Started C1 Taxol on 12/2/19    Today: Here for Cycle 9 Taxol. Patient does note numbness and tingling in the right arm with interference of ADL's. She does take Lyrica, but can only take it once a day due to it making her sleepy. Coldness and tingling in the right hand upon waking. She would like to proceed with her last 4 treatments.  She states her appetite is decreased, but this could be related to the pain. Her weight is stable.   She notes she is having right arm pain due to a history of shingles, but this could be neuropathy from taxol.   Always cold.   Staying active.   Ready to proceed with chemo today.    She presents alone.     Oncologic History:  - self detected, noted a shooting pain in breast first and then a week later felt a lump  Some delay in getting appointment as she was unaware she had to call  - 8/30/19 Diagnostic mammogram and ultrasound:  Left breast 20 mm x 18 mm x 17 mm mass at the 3 o'clock position. Assessment: 4 - Suspicious finding. Biopsy is recommended.   Lymph Node: Left axilla 16 mm x 14 mm x 13 mm lymph node. Assessment: 4 - Suspicious finding. Biopsy is recommended.   Right- There is no mammographic or sonographic evidence of malignancy.  BI-RADS Category:   Overall: 4 - Suspicious  - 9/5/19 Ultrasound guided biopsy  Pathology:  FINAL PATHOLOGIC DIAGNOSIS  1. LEFT BREAST MASS, BIOPSY:  - Invasive ductal carcinoma, grade 3, longest linear length is 10 mm measured on the slide.  - Histologic Grade (Hawa Histologic Score)  Glandular (Acinar/Tubular Differentiation): 3.  Nuclear Pleomorphism: 2.  Mitotic Rate: 3.  Overall Grade: Grade 3 (score 8).  - Microcalcifications: Not identified.  - Lymphovascular invasion: Not identified.  2. LYMPH NODE, LEFT AXILLA, BIOPSY:  - Positive for metastatic carcinoma.  - The metastatic  deposit measures 6 mm in longest continuous linear length.  Estrogen receptor: Positive, 90% of the tumor nuclei staining moderate to strongly.  Progesterone receptor: Positive, 30% of the tumor nuclei staining weak to moderately.  HER2: Negative.  Ki-67: 60%.  - ECHO 9/20/19: EF 64%  - PET 9/21/19:  Impression         Hypermetabolic left breast mass and regional left axillary lymphadenopathy consistent with reported breast cancer and corresponding with recent MRI findings.    Upper limit of normal sized right axillary lymph nodes with normal fatty duane and mildly increased radiotracer uptake.  Findings could represent reactive nodes with metastatic nodes thought less likely.  Lymphscintigraphy with injection in the left breast may considered to assess for drainage to the contralateral axilla.      BX 9/24/19: Right axilla node biopsy is benign    Review of Systems:   Review of Systems   Constitutional: Positive for fatigue. Negative for activity change, appetite change, fever and unexpected weight change.   HENT: Positive for nosebleeds (from sinus; better with nasal saline). Negative for mouth sores and sore throat.    Eyes: Negative for visual disturbance.   Respiratory: Negative for cough and shortness of breath.    Cardiovascular: Negative for chest pain, palpitations and leg swelling.   Gastrointestinal: Negative for abdominal pain, constipation, diarrhea, nausea and vomiting.   Genitourinary: Negative for difficulty urinating, frequency and urgency.   Musculoskeletal: Negative for arthralgias and back pain.   Skin: Negative for rash.   Neurological: Positive for numbness (in right arm/hand- nerve type pain). Negative for dizziness, light-headedness and headaches.   Hematological: Negative for adenopathy. Does not bruise/bleed easily.   Psychiatric/Behavioral: Negative for confusion and sleep disturbance. The patient is not nervous/anxious.          Objective:      Vitals:   Vitals:    01/28/20 1313   BP: (!)  88/51   BP Location: Right arm   Patient Position: Sitting   BP Method: Large (Automatic)   Pulse: 103   Resp: 18   Temp: 98.2 °F (36.8 °C)   TempSrc: Oral   SpO2: 97%   Weight: 87.7 kg (193 lb 5.5 oz)   Height: 5' (1.524 m)       Physical Exam:   Physical Exam   Constitutional: She is oriented to person, place, and time. She appears well-developed and well-nourished. No distress.   HENT:   Head: Normocephalic and atraumatic.   Mouth/Throat: Oropharynx is clear and moist.   Alopecia.  Darkening of tongue noted   Eyes: Pupils are equal, round, and reactive to light. Conjunctivae and lids are normal. No scleral icterus.   Neck: Trachea normal and normal range of motion. Neck supple.   Cardiovascular: Normal rate, regular rhythm, normal heart sounds and intact distal pulses.   No murmur heard.  Pulmonary/Chest: Effort normal and breath sounds normal. She has no wheezes.   Abdominal: Soft. Normal appearance and bowel sounds are normal. She exhibits no distension. There is no tenderness.   No organomegaly.    Musculoskeletal: Normal range of motion. She exhibits no edema.   No spinal or paraspinal tenderness.    Lymphadenopathy:     She has no cervical adenopathy.     She has no axillary adenopathy.        Right: No supraclavicular adenopathy present.        Left: No supraclavicular adenopathy present.   Neurological: She is alert and oriented to person, place, and time. She has normal strength and normal reflexes. Gait normal.   Skin: Skin is warm, dry and intact. Capillary refill takes less than 2 seconds. No bruising and no rash noted. No cyanosis. Nails show no clubbing.   Psychiatric: She has a normal mood and affect. Her speech is normal and behavior is normal.   Nursing note and vitals reviewed.    Breast Exam: deferred      Laboratory Data:  Results for orders placed or performed in visit on 01/28/20   Comprehensive metabolic panel   Result Value Ref Range    Sodium 140 136 - 145 mmol/L    Potassium 3.6 3.5 - 5.1  mmol/L    Chloride 103 95 - 110 mmol/L    CO2 27 23 - 29 mmol/L    Glucose 104 70 - 110 mg/dL    BUN, Bld 24 (H) 8 - 23 mg/dL    Creatinine 1.3 0.5 - 1.4 mg/dL    Calcium 9.9 8.7 - 10.5 mg/dL    Total Protein 7.5 6.0 - 8.4 g/dL    Albumin 3.8 3.5 - 5.2 g/dL    Total Bilirubin 0.5 0.1 - 1.0 mg/dL    Alkaline Phosphatase 78 55 - 135 U/L    AST 21 10 - 40 U/L    ALT 12 10 - 44 U/L    Anion Gap 10 8 - 16 mmol/L    eGFR if African American 50.1 (A) >60 mL/min/1.73 m^2    eGFR if non  43.5 (A) >60 mL/min/1.73 m^2   CBC Oncology   Result Value Ref Range    WBC 5.39 3.90 - 12.70 K/uL    RBC 2.68 (L) 4.00 - 5.40 M/uL    Hemoglobin 9.0 (L) 12.0 - 16.0 g/dL    Hematocrit 28.6 (L) 37.0 - 48.5 %    Mean Corpuscular Volume 107 (H) 82 - 98 fL    Mean Corpuscular Hemoglobin 33.6 (H) 27.0 - 31.0 pg    Mean Corpuscular Hemoglobin Conc 31.5 (L) 32.0 - 36.0 g/dL    RDW 16.7 (H) 11.5 - 14.5 %    Platelets 338 150 - 350 K/uL    MPV 9.9 9.2 - 12.9 fL    Gran # (ANC) 3.9 1.8 - 7.7 K/uL    Immature Grans (Abs) 0.07 (H) 0.00 - 0.04 K/uL           Assessment/Plan:     1. Breast cancer metastasized to axillary lymph node, left    2. Anemia in neoplastic disease    3. History of shingles    4. Essential hypertension    5. Neuropathy due to chemotherapeutic drug    6. Mild vitamin D deficiency    7. Acquired hypothyroidism    8. Uncontrolled type 2 diabetes mellitus without complication, without long-term current use of insulin        Plan:  1. Doing well, clinically responding as confirmed by imaging. Proceed with cycle 9 of Taxol   2. Labs reviewed. Anemia parameters stable. Of note, Symptoms improved post transfusion.   3. Continue to monitor nerve pain in right arm; takes lyrica, -we discussed neuropathy associated with chemotherapy. At this time, she is a grade 1-2 and understands risk of permanent nerve damage. She wishes to continue with chemotherapy treatments.   4. Monitored today; continue medication and follow up with  PCP  5. Continue to monitor and continue lyrica  6. Continue vitamin D replacement  7. Continue current dose of synthroid and follow up with endocrinology   8. Continue current medications and follow up with endocrinology     Will do cycle  10 with just labs no provider visit  Will do cycle 11 with labs and a provider visit  Needs cycle 12 scheduled with Dr. Villagran     Patient is in agreement with the proposed treatment plan. All questions were answered to the patient's satisfaction. Patient knows to call clinic for any new or worsening symptoms and if anything is needed before the next clinic visit.          Elsa Reilly, SHARONP-C  Hematology & Medical Oncology   [facility] [place] [address]  ph. [phone]  Fax. [phone]    Patient dicussed with collaborating physician, Dr. Villagran.

## 2020-01-14 NOTE — Clinical Note
Labs CBC and CMP on 5 followed by cycle 8 of taxol in 1 weekLabs CBC and CMP on 5 followed by MD and NP cycle 9 of taxol in 2 weeksLabs CBC and CMP on 5 followed by cycle 10 of taxol in 3 weeksLabs CBC and CMP on 5 followed by MD and NP cycle 11 of taxol in 2 weeks

## 2020-01-14 NOTE — PLAN OF CARE
Pt tolerated taxol infusion, VSS. No new complaints or concerns voiced at this time. Patient to RTC 1/21/20, verbalized understanding. NAD noted. Pt d/c home

## 2020-01-14 NOTE — TELEPHONE ENCOUNTER
----- Message from Tere Villagran MD sent at 1/13/2020  4:46 PM CST -----  Advised of UTI, culture pending  She will  Cipro

## 2020-01-14 NOTE — NURSING
Patient here for lab draw from right chest port-accesses easily with good blood return-blood to lab-line flushed and left accessed for chemo today.

## 2020-01-15 LAB — BACTERIA UR CULT: ABNORMAL

## 2020-01-17 ENCOUNTER — PATIENT MESSAGE (OUTPATIENT)
Dept: HEMATOLOGY/ONCOLOGY | Facility: CLINIC | Age: 65
End: 2020-01-17

## 2020-01-20 RX ORDER — HEPARIN 100 UNIT/ML
500 SYRINGE INTRAVENOUS
Status: CANCELLED | OUTPATIENT
Start: 2020-01-20

## 2020-01-20 RX ORDER — SODIUM CHLORIDE 0.9 % (FLUSH) 0.9 %
10 SYRINGE (ML) INJECTION
Status: CANCELLED | OUTPATIENT
Start: 2020-01-20

## 2020-01-20 RX ORDER — FAMOTIDINE 10 MG/ML
20 INJECTION INTRAVENOUS
Status: CANCELLED | OUTPATIENT
Start: 2020-01-20

## 2020-01-20 RX ORDER — DIPHENHYDRAMINE HYDROCHLORIDE 50 MG/ML
50 INJECTION INTRAMUSCULAR; INTRAVENOUS ONCE AS NEEDED
Status: CANCELLED | OUTPATIENT
Start: 2020-01-20

## 2020-01-20 RX ORDER — EPINEPHRINE 0.3 MG/.3ML
0.3 INJECTION SUBCUTANEOUS ONCE AS NEEDED
Status: CANCELLED | OUTPATIENT
Start: 2020-01-20

## 2020-01-21 ENCOUNTER — INFUSION (OUTPATIENT)
Dept: INFUSION THERAPY | Facility: HOSPITAL | Age: 65
End: 2020-01-21
Attending: INTERNAL MEDICINE
Payer: COMMERCIAL

## 2020-01-21 ENCOUNTER — PATIENT MESSAGE (OUTPATIENT)
Dept: HEMATOLOGY/ONCOLOGY | Facility: CLINIC | Age: 65
End: 2020-01-21

## 2020-01-21 VITALS
WEIGHT: 197.06 LBS | HEART RATE: 104 BPM | SYSTOLIC BLOOD PRESSURE: 120 MMHG | HEIGHT: 60 IN | DIASTOLIC BLOOD PRESSURE: 71 MMHG | TEMPERATURE: 99 F | RESPIRATION RATE: 18 BRPM | BODY MASS INDEX: 38.69 KG/M2

## 2020-01-21 DIAGNOSIS — C50.912 BREAST CANCER METASTASIZED TO AXILLARY LYMPH NODE, LEFT: Primary | ICD-10-CM

## 2020-01-21 DIAGNOSIS — M79.601 RIGHT ARM PAIN: Primary | ICD-10-CM

## 2020-01-21 DIAGNOSIS — D63.0 ANEMIA IN NEOPLASTIC DISEASE: ICD-10-CM

## 2020-01-21 DIAGNOSIS — C77.3 BREAST CANCER METASTASIZED TO AXILLARY LYMPH NODE, LEFT: Primary | ICD-10-CM

## 2020-01-21 LAB
ALBUMIN SERPL BCP-MCNC: 3.7 G/DL (ref 3.5–5.2)
ALP SERPL-CCNC: 85 U/L (ref 55–135)
ALT SERPL W/O P-5'-P-CCNC: 9 U/L (ref 10–44)
ANION GAP SERPL CALC-SCNC: 12 MMOL/L (ref 8–16)
AST SERPL-CCNC: 15 U/L (ref 10–40)
BASOPHILS # BLD AUTO: 0.02 K/UL (ref 0–0.2)
BASOPHILS NFR BLD: 0.5 % (ref 0–1.9)
BILIRUB SERPL-MCNC: 0.4 MG/DL (ref 0.1–1)
BUN SERPL-MCNC: 17 MG/DL (ref 8–23)
CALCIUM SERPL-MCNC: 9.5 MG/DL (ref 8.7–10.5)
CHLORIDE SERPL-SCNC: 103 MMOL/L (ref 95–110)
CO2 SERPL-SCNC: 24 MMOL/L (ref 23–29)
CREAT SERPL-MCNC: 1 MG/DL (ref 0.5–1.4)
DIFFERENTIAL METHOD: ABNORMAL
EOSINOPHIL # BLD AUTO: 0 K/UL (ref 0–0.5)
EOSINOPHIL NFR BLD: 1 % (ref 0–8)
ERYTHROCYTE [DISTWIDTH] IN BLOOD BY AUTOMATED COUNT: 17.2 % (ref 11.5–14.5)
EST. GFR  (AFRICAN AMERICAN): >60 ML/MIN/1.73 M^2
EST. GFR  (NON AFRICAN AMERICAN): 59.7 ML/MIN/1.73 M^2
GLUCOSE SERPL-MCNC: 191 MG/DL (ref 70–110)
HCT VFR BLD AUTO: 27.7 % (ref 37–48.5)
HGB BLD-MCNC: 8.7 G/DL (ref 12–16)
IMM GRANULOCYTES # BLD AUTO: 0.04 K/UL (ref 0–0.04)
IMM GRANULOCYTES NFR BLD AUTO: 1 % (ref 0–0.5)
LYMPHOCYTES # BLD AUTO: 0.3 K/UL (ref 1–4.8)
LYMPHOCYTES NFR BLD: 8.7 % (ref 18–48)
MCH RBC QN AUTO: 33.2 PG (ref 27–31)
MCHC RBC AUTO-ENTMCNC: 31.4 G/DL (ref 32–36)
MCV RBC AUTO: 106 FL (ref 82–98)
MONOCYTES # BLD AUTO: 0.4 K/UL (ref 0.3–1)
MONOCYTES NFR BLD: 11.1 % (ref 4–15)
NEUTROPHILS # BLD AUTO: 3 K/UL (ref 1.8–7.7)
NEUTROPHILS NFR BLD: 77.7 % (ref 38–73)
NRBC BLD-RTO: 0 /100 WBC
PLATELET # BLD AUTO: 295 K/UL (ref 150–350)
PMV BLD AUTO: 10.2 FL (ref 9.2–12.9)
POTASSIUM SERPL-SCNC: 3.2 MMOL/L (ref 3.5–5.1)
PROT SERPL-MCNC: 7.3 G/DL (ref 6–8.4)
RBC # BLD AUTO: 2.62 M/UL (ref 4–5.4)
SODIUM SERPL-SCNC: 139 MMOL/L (ref 136–145)
WBC # BLD AUTO: 3.89 K/UL (ref 3.9–12.7)

## 2020-01-21 PROCEDURE — A4216 STERILE WATER/SALINE, 10 ML: HCPCS | Performed by: INTERNAL MEDICINE

## 2020-01-21 PROCEDURE — 96375 TX/PRO/DX INJ NEW DRUG ADDON: CPT

## 2020-01-21 PROCEDURE — 96361 HYDRATE IV INFUSION ADD-ON: CPT

## 2020-01-21 PROCEDURE — 63600175 PHARM REV CODE 636 W HCPCS: Performed by: INTERNAL MEDICINE

## 2020-01-21 PROCEDURE — 25000003 PHARM REV CODE 250: Performed by: INTERNAL MEDICINE

## 2020-01-21 PROCEDURE — 96413 CHEMO IV INFUSION 1 HR: CPT

## 2020-01-21 PROCEDURE — 85025 COMPLETE CBC W/AUTO DIFF WBC: CPT

## 2020-01-21 PROCEDURE — 80053 COMPREHEN METABOLIC PANEL: CPT

## 2020-01-21 PROCEDURE — S0028 INJECTION, FAMOTIDINE, 20 MG: HCPCS | Performed by: INTERNAL MEDICINE

## 2020-01-21 PROCEDURE — 96367 TX/PROPH/DG ADDL SEQ IV INF: CPT

## 2020-01-21 RX ORDER — DIPHENHYDRAMINE HYDROCHLORIDE 50 MG/ML
50 INJECTION INTRAMUSCULAR; INTRAVENOUS ONCE AS NEEDED
Status: DISCONTINUED | OUTPATIENT
Start: 2020-01-21 | End: 2020-01-21 | Stop reason: HOSPADM

## 2020-01-21 RX ORDER — SODIUM CHLORIDE 0.9 % (FLUSH) 0.9 %
10 SYRINGE (ML) INJECTION
Status: DISCONTINUED | OUTPATIENT
Start: 2020-01-21 | End: 2020-01-21 | Stop reason: HOSPADM

## 2020-01-21 RX ORDER — EPINEPHRINE 0.3 MG/.3ML
0.3 INJECTION SUBCUTANEOUS ONCE AS NEEDED
Status: DISCONTINUED | OUTPATIENT
Start: 2020-01-21 | End: 2020-01-21 | Stop reason: HOSPADM

## 2020-01-21 RX ORDER — HEPARIN 100 UNIT/ML
500 SYRINGE INTRAVENOUS
Status: CANCELLED | OUTPATIENT
Start: 2020-01-21

## 2020-01-21 RX ORDER — SODIUM CHLORIDE 0.9 % (FLUSH) 0.9 %
10 SYRINGE (ML) INJECTION
Status: CANCELLED | OUTPATIENT
Start: 2020-01-21

## 2020-01-21 RX ORDER — SODIUM CHLORIDE 0.9 % (FLUSH) 0.9 %
10 SYRINGE (ML) INJECTION
Status: COMPLETED | OUTPATIENT
Start: 2020-01-21 | End: 2020-01-21

## 2020-01-21 RX ORDER — FAMOTIDINE 10 MG/ML
20 INJECTION INTRAVENOUS
Status: COMPLETED | OUTPATIENT
Start: 2020-01-21 | End: 2020-01-21

## 2020-01-21 RX ORDER — HEPARIN 100 UNIT/ML
500 SYRINGE INTRAVENOUS
Status: DISCONTINUED | OUTPATIENT
Start: 2020-01-21 | End: 2020-01-21 | Stop reason: HOSPADM

## 2020-01-21 RX ORDER — HEPARIN 100 UNIT/ML
500 SYRINGE INTRAVENOUS
Status: COMPLETED | OUTPATIENT
Start: 2020-01-21 | End: 2020-01-21

## 2020-01-21 RX ADMIN — PACLITAXEL 156 MG: 6 INJECTION, SOLUTION INTRAVENOUS at 03:01

## 2020-01-21 RX ADMIN — DIPHENHYDRAMINE HYDROCHLORIDE 25 MG: 50 INJECTION, SOLUTION INTRAMUSCULAR; INTRAVENOUS at 02:01

## 2020-01-21 RX ADMIN — HEPARIN 500 UNITS: 100 SYRINGE at 11:01

## 2020-01-21 RX ADMIN — HEPARIN 500 UNITS: 100 SYRINGE at 04:01

## 2020-01-21 RX ADMIN — FAMOTIDINE 20 MG: 10 INJECTION, SOLUTION INTRAVENOUS at 02:01

## 2020-01-21 RX ADMIN — Medication 10 ML: at 04:01

## 2020-01-21 RX ADMIN — DEXAMETHASONE SODIUM PHOSPHATE 10 MG: 4 INJECTION, SOLUTION INTRA-ARTICULAR; INTRALESIONAL; INTRAMUSCULAR; INTRAVENOUS; SOFT TISSUE at 02:01

## 2020-01-21 RX ADMIN — Medication 10 ML: at 11:01

## 2020-01-21 RX ADMIN — SODIUM CHLORIDE: 0.9 INJECTION, SOLUTION INTRAVENOUS at 02:01

## 2020-01-21 RX ADMIN — SODIUM CHLORIDE 1000 ML: 0.9 INJECTION, SOLUTION INTRAVENOUS at 01:01

## 2020-01-21 NOTE — PLAN OF CARE
1305-Labs , hx, and medications reviewed, no md appointment today. Assessment completed. Patients bp below baseline, patient denies dizziness- reports fatigue and sleepiness after taking pain pill this morning.  Per MD office will bolus 1L NS and re-assess. Discussed plan of care with patient. Patient in agreement. Chair reclined and warm blanket and snack offered.

## 2020-01-21 NOTE — NURSING
Patient's PAC accessed for lab draw.  Specimens sent to lab.  Needle heparinized and left in place for scheduled treatment.

## 2020-01-21 NOTE — PLAN OF CARE
1625- Vitals rechecked after bolus, much improved, per MD office okay to proceed with treatment. Patient tolerated treatment well. Discharged without complaints or S/S of adverse event. AVS given.  Instructed to call provider for any questions or concerns.

## 2020-01-22 ENCOUNTER — PATIENT OUTREACH (OUTPATIENT)
Dept: ADMINISTRATIVE | Facility: OTHER | Age: 65
End: 2020-01-22

## 2020-01-22 ENCOUNTER — HOSPITAL ENCOUNTER (OUTPATIENT)
Dept: VASCULAR SURGERY | Facility: CLINIC | Age: 65
Discharge: HOME | End: 2020-01-22
Attending: NURSE PRACTITIONER
Payer: COMMERCIAL

## 2020-01-22 ENCOUNTER — PATIENT OUTREACH (OUTPATIENT)
Dept: OTHER | Facility: OTHER | Age: 65
End: 2020-01-22

## 2020-01-22 DIAGNOSIS — M79.601 RIGHT ARM PAIN: ICD-10-CM

## 2020-01-22 PROCEDURE — 93971 PR US DUPLEX, UPPER OR LOWER EXT VENOUS,UNILAT OR LTD: ICD-10-PCS | Mod: S$GLB,,, | Performed by: SURGERY

## 2020-01-22 PROCEDURE — 93971 EXTREMITY STUDY: CPT | Mod: S$GLB,,, | Performed by: SURGERY

## 2020-01-23 ENCOUNTER — PATIENT OUTREACH (OUTPATIENT)
Dept: OTHER | Facility: OTHER | Age: 65
End: 2020-01-23

## 2020-01-23 NOTE — PROGRESS NOTES
Last 5 Patient Entered Readings                                      Current 30 Day Average:      Recent Readings 10/2/2019 10/2/2019 9/22/2019 9/22/2019 9/2/2019    SBP (mmHg) 115 110 128 119 132    DBP (mmHg) 77 70 81 74 81    Pulse 93 109 102 87 92        Hypertension Medications             chlorthalidone (HYGROTEN) 25 MG Tab Take 1 tablet (25 mg total) by mouth every morning. Stop losartan hctz 100/25.    chlorthalidone (HYGROTEN) 25 MG Tab Take 1 tablet by mouth every morning    irbesartan (AVAPRO) 300 MG tablet Take 1 tablet (300 mg total) by mouth once daily. Stop valsartan 320.    irbesartan (AVAPRO) 300 MG tablet Take 1 tablet by mouth once daily        No readings since 10/2/19. HC reached out on 1/22/20 to discuss lack of readings, LVM. Will continue to monitor. Will follow up in 6 weeks.

## 2020-01-24 ENCOUNTER — PATIENT MESSAGE (OUTPATIENT)
Dept: HEMATOLOGY/ONCOLOGY | Facility: CLINIC | Age: 65
End: 2020-01-24

## 2020-01-27 ENCOUNTER — PATIENT MESSAGE (OUTPATIENT)
Dept: SURGERY | Facility: CLINIC | Age: 65
End: 2020-01-27

## 2020-01-28 ENCOUNTER — INFUSION (OUTPATIENT)
Dept: INFUSION THERAPY | Facility: HOSPITAL | Age: 65
End: 2020-01-28
Attending: INTERNAL MEDICINE
Payer: COMMERCIAL

## 2020-01-28 ENCOUNTER — TELEPHONE (OUTPATIENT)
Dept: HEMATOLOGY/ONCOLOGY | Facility: CLINIC | Age: 65
End: 2020-01-28

## 2020-01-28 ENCOUNTER — OFFICE VISIT (OUTPATIENT)
Dept: HEMATOLOGY/ONCOLOGY | Facility: CLINIC | Age: 65
End: 2020-01-28
Payer: COMMERCIAL

## 2020-01-28 VITALS
BODY MASS INDEX: 37.95 KG/M2 | HEART RATE: 103 BPM | TEMPERATURE: 98 F | RESPIRATION RATE: 18 BRPM | HEIGHT: 60 IN | OXYGEN SATURATION: 97 % | DIASTOLIC BLOOD PRESSURE: 51 MMHG | SYSTOLIC BLOOD PRESSURE: 88 MMHG | WEIGHT: 193.31 LBS

## 2020-01-28 VITALS — SYSTOLIC BLOOD PRESSURE: 111 MMHG | HEART RATE: 91 BPM | DIASTOLIC BLOOD PRESSURE: 61 MMHG

## 2020-01-28 DIAGNOSIS — C77.3 BREAST CANCER METASTASIZED TO AXILLARY LYMPH NODE, LEFT: Primary | ICD-10-CM

## 2020-01-28 DIAGNOSIS — D63.0 ANEMIA IN NEOPLASTIC DISEASE: Primary | ICD-10-CM

## 2020-01-28 DIAGNOSIS — E55.9 MILD VITAMIN D DEFICIENCY: ICD-10-CM

## 2020-01-28 DIAGNOSIS — C50.912 BREAST CANCER METASTASIZED TO AXILLARY LYMPH NODE, LEFT: Primary | ICD-10-CM

## 2020-01-28 DIAGNOSIS — C50.912 BREAST CANCER METASTASIZED TO AXILLARY LYMPH NODE, LEFT: ICD-10-CM

## 2020-01-28 DIAGNOSIS — C77.3 BREAST CANCER METASTASIZED TO AXILLARY LYMPH NODE, LEFT: ICD-10-CM

## 2020-01-28 DIAGNOSIS — T45.1X5A NEUROPATHY DUE TO CHEMOTHERAPEUTIC DRUG: ICD-10-CM

## 2020-01-28 DIAGNOSIS — Z86.19 HISTORY OF SHINGLES: ICD-10-CM

## 2020-01-28 DIAGNOSIS — G62.0 NEUROPATHY DUE TO CHEMOTHERAPEUTIC DRUG: ICD-10-CM

## 2020-01-28 DIAGNOSIS — D63.0 ANEMIA IN NEOPLASTIC DISEASE: ICD-10-CM

## 2020-01-28 DIAGNOSIS — I10 ESSENTIAL HYPERTENSION: ICD-10-CM

## 2020-01-28 DIAGNOSIS — E03.9 ACQUIRED HYPOTHYROIDISM: ICD-10-CM

## 2020-01-28 LAB
ALBUMIN SERPL BCP-MCNC: 3.8 G/DL (ref 3.5–5.2)
ALP SERPL-CCNC: 78 U/L (ref 55–135)
ALT SERPL W/O P-5'-P-CCNC: 12 U/L (ref 10–44)
ANION GAP SERPL CALC-SCNC: 10 MMOL/L (ref 8–16)
AST SERPL-CCNC: 21 U/L (ref 10–40)
BILIRUB SERPL-MCNC: 0.5 MG/DL (ref 0.1–1)
BUN SERPL-MCNC: 24 MG/DL (ref 8–23)
CALCIUM SERPL-MCNC: 9.9 MG/DL (ref 8.7–10.5)
CHLORIDE SERPL-SCNC: 103 MMOL/L (ref 95–110)
CO2 SERPL-SCNC: 27 MMOL/L (ref 23–29)
CREAT SERPL-MCNC: 1.3 MG/DL (ref 0.5–1.4)
ERYTHROCYTE [DISTWIDTH] IN BLOOD BY AUTOMATED COUNT: 16.7 % (ref 11.5–14.5)
EST. GFR  (AFRICAN AMERICAN): 50.1 ML/MIN/1.73 M^2
EST. GFR  (NON AFRICAN AMERICAN): 43.5 ML/MIN/1.73 M^2
GLUCOSE SERPL-MCNC: 104 MG/DL (ref 70–110)
HCT VFR BLD AUTO: 28.6 % (ref 37–48.5)
HGB BLD-MCNC: 9 G/DL (ref 12–16)
IMM GRANULOCYTES # BLD AUTO: 0.07 K/UL (ref 0–0.04)
MCH RBC QN AUTO: 33.6 PG (ref 27–31)
MCHC RBC AUTO-ENTMCNC: 31.5 G/DL (ref 32–36)
MCV RBC AUTO: 107 FL (ref 82–98)
NEUTROPHILS # BLD AUTO: 3.9 K/UL (ref 1.8–7.7)
PLATELET # BLD AUTO: 338 K/UL (ref 150–350)
PMV BLD AUTO: 9.9 FL (ref 9.2–12.9)
POTASSIUM SERPL-SCNC: 3.6 MMOL/L (ref 3.5–5.1)
PROT SERPL-MCNC: 7.5 G/DL (ref 6–8.4)
RBC # BLD AUTO: 2.68 M/UL (ref 4–5.4)
SODIUM SERPL-SCNC: 140 MMOL/L (ref 136–145)
WBC # BLD AUTO: 5.39 K/UL (ref 3.9–12.7)

## 2020-01-28 PROCEDURE — A4216 STERILE WATER/SALINE, 10 ML: HCPCS | Performed by: INTERNAL MEDICINE

## 2020-01-28 PROCEDURE — 99214 PR OFFICE/OUTPT VISIT, EST, LEVL IV, 30-39 MIN: ICD-10-PCS | Mod: S$GLB,,, | Performed by: NURSE PRACTITIONER

## 2020-01-28 PROCEDURE — 85027 COMPLETE CBC AUTOMATED: CPT

## 2020-01-28 PROCEDURE — 99999 PR PBB SHADOW E&M-EST. PATIENT-LVL V: ICD-10-PCS | Mod: PBBFAC,,, | Performed by: NURSE PRACTITIONER

## 2020-01-28 PROCEDURE — 96367 TX/PROPH/DG ADDL SEQ IV INF: CPT

## 2020-01-28 PROCEDURE — 96361 HYDRATE IV INFUSION ADD-ON: CPT

## 2020-01-28 PROCEDURE — 99214 OFFICE O/P EST MOD 30 MIN: CPT | Mod: S$GLB,,, | Performed by: NURSE PRACTITIONER

## 2020-01-28 PROCEDURE — 96375 TX/PRO/DX INJ NEW DRUG ADDON: CPT

## 2020-01-28 PROCEDURE — 63600175 PHARM REV CODE 636 W HCPCS: Performed by: INTERNAL MEDICINE

## 2020-01-28 PROCEDURE — 96413 CHEMO IV INFUSION 1 HR: CPT

## 2020-01-28 PROCEDURE — 25000003 PHARM REV CODE 250: Performed by: INTERNAL MEDICINE

## 2020-01-28 PROCEDURE — 80053 COMPREHEN METABOLIC PANEL: CPT

## 2020-01-28 PROCEDURE — 99999 PR PBB SHADOW E&M-EST. PATIENT-LVL V: CPT | Mod: PBBFAC,,, | Performed by: NURSE PRACTITIONER

## 2020-01-28 PROCEDURE — 63600175 PHARM REV CODE 636 W HCPCS: Performed by: NURSE PRACTITIONER

## 2020-01-28 PROCEDURE — S0028 INJECTION, FAMOTIDINE, 20 MG: HCPCS | Performed by: INTERNAL MEDICINE

## 2020-01-28 RX ORDER — DIPHENHYDRAMINE HYDROCHLORIDE 50 MG/ML
50 INJECTION INTRAMUSCULAR; INTRAVENOUS ONCE AS NEEDED
Status: DISCONTINUED | OUTPATIENT
Start: 2020-01-28 | End: 2020-01-28 | Stop reason: HOSPADM

## 2020-01-28 RX ORDER — EPINEPHRINE 0.3 MG/.3ML
0.3 INJECTION SUBCUTANEOUS ONCE AS NEEDED
Status: DISCONTINUED | OUTPATIENT
Start: 2020-01-28 | End: 2020-01-28 | Stop reason: HOSPADM

## 2020-01-28 RX ORDER — HEPARIN 100 UNIT/ML
500 SYRINGE INTRAVENOUS
Status: CANCELLED | OUTPATIENT
Start: 2020-01-28

## 2020-01-28 RX ORDER — DIPHENHYDRAMINE HYDROCHLORIDE 50 MG/ML
50 INJECTION INTRAMUSCULAR; INTRAVENOUS ONCE AS NEEDED
Status: CANCELLED | OUTPATIENT
Start: 2020-01-28

## 2020-01-28 RX ORDER — FAMOTIDINE 10 MG/ML
20 INJECTION INTRAVENOUS
Status: CANCELLED | OUTPATIENT
Start: 2020-01-28

## 2020-01-28 RX ORDER — HEPARIN 100 UNIT/ML
500 SYRINGE INTRAVENOUS
Status: DISCONTINUED | OUTPATIENT
Start: 2020-01-28 | End: 2020-01-28 | Stop reason: HOSPADM

## 2020-01-28 RX ORDER — SODIUM CHLORIDE 0.9 % (FLUSH) 0.9 %
10 SYRINGE (ML) INJECTION
Status: CANCELLED | OUTPATIENT
Start: 2020-01-28

## 2020-01-28 RX ORDER — EPINEPHRINE 0.3 MG/.3ML
0.3 INJECTION SUBCUTANEOUS ONCE AS NEEDED
Status: CANCELLED | OUTPATIENT
Start: 2020-01-28

## 2020-01-28 RX ORDER — SODIUM CHLORIDE 0.9 % (FLUSH) 0.9 %
10 SYRINGE (ML) INJECTION
Status: DISCONTINUED | OUTPATIENT
Start: 2020-01-28 | End: 2020-01-28 | Stop reason: HOSPADM

## 2020-01-28 RX ORDER — FAMOTIDINE 10 MG/ML
20 INJECTION INTRAVENOUS
Status: COMPLETED | OUTPATIENT
Start: 2020-01-28 | End: 2020-01-28

## 2020-01-28 RX ORDER — SODIUM CHLORIDE 0.9 % (FLUSH) 0.9 %
10 SYRINGE (ML) INJECTION
Status: COMPLETED | OUTPATIENT
Start: 2020-01-28 | End: 2020-01-28

## 2020-01-28 RX ORDER — HEPARIN 100 UNIT/ML
500 SYRINGE INTRAVENOUS
Status: COMPLETED | OUTPATIENT
Start: 2020-01-28 | End: 2020-01-28

## 2020-01-28 RX ADMIN — DEXAMETHASONE SODIUM PHOSPHATE 10 MG: 4 INJECTION, SOLUTION INTRA-ARTICULAR; INTRALESIONAL; INTRAMUSCULAR; INTRAVENOUS; SOFT TISSUE at 02:01

## 2020-01-28 RX ADMIN — SODIUM CHLORIDE 500 ML: 9 INJECTION, SOLUTION INTRAVENOUS at 02:01

## 2020-01-28 RX ADMIN — Medication 10 ML: at 11:01

## 2020-01-28 RX ADMIN — DIPHENHYDRAMINE HYDROCHLORIDE 25 MG: 50 INJECTION, SOLUTION INTRAMUSCULAR; INTRAVENOUS at 02:01

## 2020-01-28 RX ADMIN — PACLITAXEL 156 MG: 6 INJECTION, SOLUTION INTRAVENOUS at 03:01

## 2020-01-28 RX ADMIN — HEPARIN 500 UNITS: 100 SYRINGE at 04:01

## 2020-01-28 RX ADMIN — HEPARIN 500 UNITS: 100 SYRINGE at 11:01

## 2020-01-28 RX ADMIN — SODIUM CHLORIDE: 9 INJECTION, SOLUTION INTRAVENOUS at 02:01

## 2020-01-28 RX ADMIN — Medication 10 ML: at 04:01

## 2020-01-28 RX ADMIN — FAMOTIDINE 20 MG: 10 INJECTION, SOLUTION INTRAVENOUS at 02:01

## 2020-01-28 NOTE — PLAN OF CARE
Problem: Adult Inpatient Plan of Care  Goal: Optimal Comfort and Wellbeing  Intervention: Provide Person-Centered Care  Flowsheets (Taken 1/28/2020 8725)  Trust Relationship/Rapport: thoughts/feelings acknowledged; care explained; choices provided; emotional support provided; empathic listening provided; questions answered; reassurance provided; questions encouraged

## 2020-01-28 NOTE — PLAN OF CARE
Pt tolerated Taxol and 500ml IVFS per NP [place] per pt BP in office visit was low ( 88/51)today. NAD. Port flushed + blood return present, flushed. Hep lock and deaccesed. declined AVS. Uses my [facility]. Discharged home. Ambulated independently with family.

## 2020-01-28 NOTE — TELEPHONE ENCOUNTER
----- Message from Elsa Reilly NP sent at 1/28/2020  2:12 PM CST -----  Please schedule patient for labs CBC and CMP on 5 th floor, MD visit with Dr. Villagran on 2/18 and taxol #12 post Dr. Villagran visit. If Dr. Villagran is not available please let me know.

## 2020-01-28 NOTE — PROGRESS NOTES
PATIENT: Maria Elena Tavares  MRN: [id number]  DATE: 1/28/2020      History of Present Illness: Ms. Tavares is a 64 y.o. who returns in follow up for Breast Cancer.    Started C1 Taxol on 12/2/19 - treatment was interrupted due to flushing but re challenged without difficulties.     Today: Here for Cycle 11 Taxol. Patient does note numbness and tingling in the right arm no interference with ADL's, she was on Gabapentin, but this is not helping, will transition to lyrica, still without resolution.  She notes she has a 10/10 pain in the right arm, we will give pain medication on the 5th floor prior to infusion.    She states when she wakes up she feels her balance is off, also notes lightheadedness. She does have a history of lightheadedness.   Always cold.   SOB resolved.  Staying active.   Ready to proceed with chemo today.    She is accompanied by friend.    Oncologic History:  - self detected, noted a shooting pain in breast first and then a week later felt a lump  Some delay in getting appointment as she was unaware she had to call  - 8/30/19 Diagnostic mammogram and ultrasound:  Left breast 20 mm x 18 mm x 17 mm mass at the 3 o'clock position. Assessment: 4 - Suspicious finding. Biopsy is recommended.   Lymph Node: Left axilla 16 mm x 14 mm x 13 mm lymph node. Assessment: 4 - Suspicious finding. Biopsy is recommended.   Right- There is no mammographic or sonographic evidence of malignancy.  BI-RADS Category:   Overall: 4 - Suspicious  - 9/5/19 Ultrasound guided biopsy  Pathology:  FINAL PATHOLOGIC DIAGNOSIS  1. LEFT BREAST MASS, BIOPSY:  - Invasive ductal carcinoma, grade 3, longest linear length is 10 mm measured on the slide.  - Histologic Grade (Winslow Histologic Score)  Glandular (Acinar/Tubular Differentiation): 3.  Nuclear Pleomorphism: 2.  Mitotic Rate: 3.  Overall Grade: Grade 3 (score 8).  - Microcalcifications: Not identified.  - Lymphovascular invasion: Not identified.  2. LYMPH NODE, LEFT AXILLA,  BIOPSY:  - Positive for metastatic carcinoma.  - The metastatic deposit measures 6 mm in longest continuous linear length.  Estrogen receptor: Positive, 90% of the tumor nuclei staining moderate to strongly.  Progesterone receptor: Positive, 30% of the tumor nuclei staining weak to moderately.  HER2: Negative.  Ki-67: 60%.  - ECHO 9/20/19: EF 64%  - PET 9/21/19:  Impression         Hypermetabolic left breast mass and regional left axillary lymphadenopathy consistent with reported breast cancer and corresponding with recent MRI findings.    Upper limit of normal sized right axillary lymph nodes with normal fatty duane and mildly increased radiotracer uptake.  Findings could represent reactive nodes with metastatic nodes thought less likely.  Lymphscintigraphy with injection in the left breast may considered to assess for drainage to the contralateral axilla.      BX 9/24/19: Right axilla node biopsy is benign    Review of Systems:   Review of Systems   Constitutional: Positive for fatigue (states it is okay). Negative for activity change, appetite change, fever and unexpected weight change.   HENT: Positive for nosebleeds (from sinus; better with nasal saline). Negative for mouth sores and sore throat.    Eyes: Negative for visual disturbance.   Respiratory: Positive for shortness of breath (with exertion). Negative for cough.    Cardiovascular: Negative for chest pain, palpitations and leg swelling.   Gastrointestinal: Negative for abdominal pain, constipation, diarrhea, nausea and vomiting.   Genitourinary: Negative for difficulty urinating, frequency and urgency.   Musculoskeletal: Negative for arthralgias and back pain.   Skin: Negative for rash.   Neurological: Positive for light-headedness and numbness (in right arm/hand- nerve type pain). Negative for dizziness and headaches.   Hematological: Negative for adenopathy. Does not bruise/bleed easily.   Psychiatric/Behavioral: Negative for confusion and sleep  disturbance. The patient is not nervous/anxious.          Objective:      Vitals:   Vitals:    02/11/20 1350   BP: 114/66   BP Location: Right arm   Patient Position: Sitting   BP Method: Large (Automatic)   Pulse: 94   Resp: 18   Temp: 98.2 °F (36.8 °C)   TempSrc: Oral   SpO2: 98%   Weight: 87.8 kg (193 lb 9 oz)   Height: 5' (1.524 m)       Physical Exam:   Physical Exam   Constitutional: She is oriented to person, place, and time. She appears well-developed and well-nourished. No distress.   Presents with friend  ECOG-0   HENT:   Head: Normocephalic and atraumatic.   Mouth/Throat: Oropharynx is clear and moist.   Alopecia.  Darkening of tongue noted   Eyes: Pupils are equal, round, and reactive to light. Conjunctivae and lids are normal. No scleral icterus.   Neck: Trachea normal and normal range of motion. Neck supple.   Cardiovascular: Normal heart sounds and intact distal pulses. Tachycardia present.   No murmur heard.  Pulmonary/Chest: Effort normal and breath sounds normal. She has no wheezes.   Abdominal: Soft. Normal appearance and bowel sounds are normal. She exhibits no distension. There is no tenderness.   No organomegaly.    Musculoskeletal: Normal range of motion. She exhibits no edema.   No spinal or paraspinal tenderness.    Lymphadenopathy:     She has no cervical adenopathy.     She has no axillary adenopathy.        Right: No supraclavicular adenopathy present.        Left: No supraclavicular adenopathy present.   Neurological: She is alert and oriented to person, place, and time. She has normal strength and normal reflexes. Gait normal.   Skin: Skin is warm, dry and intact. Capillary refill takes less than 2 seconds. No bruising and no rash noted. No cyanosis. Nails show no clubbing.   Psychiatric: She has a normal mood and affect. Her speech is normal and behavior is normal.   Nursing note and vitals reviewed.    Breast Exam: deferred      Laboratory Data:  Results for orders placed or performed  in visit on 02/11/20   CBC Oncology   Result Value Ref Range    WBC 4.23 3.90 - 12.70 K/uL    RBC 2.59 (L) 4.00 - 5.40 M/uL    Hemoglobin 8.7 (L) 12.0 - 16.0 g/dL    Hematocrit 27.5 (L) 37.0 - 48.5 %    Mean Corpuscular Volume 106 (H) 82 - 98 fL    Mean Corpuscular Hemoglobin 33.6 (H) 27.0 - 31.0 pg    Mean Corpuscular Hemoglobin Conc 31.6 (L) 32.0 - 36.0 g/dL    RDW 15.6 (H) 11.5 - 14.5 %    Platelets 330 150 - 350 K/uL    MPV 10.1 9.2 - 12.9 fL    Gran # (ANC) 3.2 1.8 - 7.7 K/uL    Immature Grans (Abs) 0.05 (H) 0.00 - 0.04 K/uL   Comprehensive metabolic panel   Result Value Ref Range    Sodium 141 136 - 145 mmol/L    Potassium 3.1 (L) 3.5 - 5.1 mmol/L    Chloride 106 95 - 110 mmol/L    CO2 26 23 - 29 mmol/L    Glucose 136 (H) 70 - 110 mg/dL    BUN, Bld 16 8 - 23 mg/dL    Creatinine 1.0 0.5 - 1.4 mg/dL    Calcium 9.0 8.7 - 10.5 mg/dL    Total Protein 7.3 6.0 - 8.4 g/dL    Albumin 3.6 3.5 - 5.2 g/dL    Total Bilirubin 0.3 0.1 - 1.0 mg/dL    Alkaline Phosphatase 76 55 - 135 U/L    AST 16 10 - 40 U/L    ALT 8 (L) 10 - 44 U/L    Anion Gap 9 8 - 16 mmol/L    eGFR if African American >60.0 >60 mL/min/1.73 m^2    eGFR if non  59.7 (A) >60 mL/min/1.73 m^2           Assessment/Plan:     1. Breast cancer metastasized to axillary lymph node, left    2. Anemia in neoplastic disease    3. Chemotherapy induced neutropenia    4. Neuropathy due to chemotherapeutic drug    5. Essential hypertension    6. Uncontrolled type 2 diabetes mellitus without complication, without long-term current use of insulin    7. Acquired hypothyroidism    8. Mild vitamin D deficiency    9. Hypokalemia    10. Neuropathy        Plan:  1. Doing well, clinically responding as confirmed by imaging.   2 and 3. Labs reviewed. Anemia parameters stable. Of note, Symptoms improved post transfusion.   4 and 10. Continue to monitor and changed gabapentin to 200 mg TID and recommended stopping lyrica, also prescribed percocet 5 mg PRN; Continue to  monitor nerve pain in right arm; -we discussed neuropathy associated with chemotherapy. At this time, she is a grade 1-2 and understands risk of permanent nerve damage. She wishes to continue with chemotherapy treatments.   5. Monitored today; continue medication and follow up with PCP  6. Continue current medications and follow up with endocrinology  7. Continue current dose of synthroid and follow up with endocrinology    8. Continue vitamin D replacement  9. Recommended she restart potassium pills; 20 mEq of potassium daily       Patient is in agreement with the proposed treatment plan. All questions were answered to the patient's satisfaction. Patient knows to call clinic for any new or worsening symptoms and if anything is needed before the next clinic visit.          Elsa Reilly, FNP-C  Hematology & Medical Oncology   [facility] [place] [address]  ph. [phone]  Fax. [phone]    Patient dicussed with collaborating physician, Dr. Villagran.

## 2020-01-28 NOTE — Clinical Note
Please schedule patient for labs CBC and CMP on 5 th floor, MD visit with Dr. Villagran on 2/18 and taxol #12 post Dr. Villagran visit. If Dr. Villagran is not available please let me know.

## 2020-01-28 NOTE — NURSING
PAC accessed for lab draw.  Specimens collected and sent to lab.  Line heparinized and needle left in place for scheduled treatment.  Patient tolerated well.

## 2020-01-29 ENCOUNTER — TELEPHONE (OUTPATIENT)
Dept: HEMATOLOGY/ONCOLOGY | Facility: CLINIC | Age: 65
End: 2020-01-29

## 2020-02-03 RX ORDER — SODIUM CHLORIDE 0.9 % (FLUSH) 0.9 %
10 SYRINGE (ML) INJECTION
Status: CANCELLED | OUTPATIENT
Start: 2020-02-04

## 2020-02-03 RX ORDER — DIPHENHYDRAMINE HYDROCHLORIDE 50 MG/ML
50 INJECTION INTRAMUSCULAR; INTRAVENOUS ONCE AS NEEDED
Status: CANCELLED | OUTPATIENT
Start: 2020-02-04

## 2020-02-03 RX ORDER — HEPARIN 100 UNIT/ML
500 SYRINGE INTRAVENOUS
Status: CANCELLED | OUTPATIENT
Start: 2020-02-04

## 2020-02-03 RX ORDER — FAMOTIDINE 10 MG/ML
20 INJECTION INTRAVENOUS
Status: CANCELLED | OUTPATIENT
Start: 2020-02-04

## 2020-02-03 RX ORDER — EPINEPHRINE 0.3 MG/.3ML
0.3 INJECTION SUBCUTANEOUS ONCE AS NEEDED
Status: CANCELLED | OUTPATIENT
Start: 2020-02-04

## 2020-02-04 ENCOUNTER — INFUSION (OUTPATIENT)
Dept: INFUSION THERAPY | Facility: HOSPITAL | Age: 65
End: 2020-02-04
Attending: INTERNAL MEDICINE
Payer: COMMERCIAL

## 2020-02-04 VITALS
DIASTOLIC BLOOD PRESSURE: 68 MMHG | RESPIRATION RATE: 18 BRPM | SYSTOLIC BLOOD PRESSURE: 118 MMHG | HEART RATE: 107 BPM | TEMPERATURE: 98 F | HEIGHT: 60 IN | WEIGHT: 193.31 LBS | BODY MASS INDEX: 37.95 KG/M2

## 2020-02-04 DIAGNOSIS — C50.912 BREAST CANCER METASTASIZED TO AXILLARY LYMPH NODE, LEFT: ICD-10-CM

## 2020-02-04 DIAGNOSIS — D63.0 ANEMIA IN NEOPLASTIC DISEASE: Primary | ICD-10-CM

## 2020-02-04 DIAGNOSIS — C77.3 BREAST CANCER METASTASIZED TO AXILLARY LYMPH NODE, LEFT: Primary | ICD-10-CM

## 2020-02-04 DIAGNOSIS — C50.912 BREAST CANCER METASTASIZED TO AXILLARY LYMPH NODE, LEFT: Primary | ICD-10-CM

## 2020-02-04 DIAGNOSIS — C77.3 BREAST CANCER METASTASIZED TO AXILLARY LYMPH NODE, LEFT: ICD-10-CM

## 2020-02-04 LAB
ALBUMIN SERPL BCP-MCNC: 3.7 G/DL (ref 3.5–5.2)
ALP SERPL-CCNC: 76 U/L (ref 55–135)
ALT SERPL W/O P-5'-P-CCNC: 8 U/L (ref 10–44)
ANION GAP SERPL CALC-SCNC: 9 MMOL/L (ref 8–16)
AST SERPL-CCNC: 16 U/L (ref 10–40)
BILIRUB SERPL-MCNC: 0.4 MG/DL (ref 0.1–1)
BUN SERPL-MCNC: 18 MG/DL (ref 8–23)
CALCIUM SERPL-MCNC: 10.2 MG/DL (ref 8.7–10.5)
CHLORIDE SERPL-SCNC: 104 MMOL/L (ref 95–110)
CO2 SERPL-SCNC: 27 MMOL/L (ref 23–29)
CREAT SERPL-MCNC: 1.1 MG/DL (ref 0.5–1.4)
ERYTHROCYTE [DISTWIDTH] IN BLOOD BY AUTOMATED COUNT: 16.1 % (ref 11.5–14.5)
EST. GFR  (AFRICAN AMERICAN): >60 ML/MIN/1.73 M^2
EST. GFR  (NON AFRICAN AMERICAN): 53.2 ML/MIN/1.73 M^2
GLUCOSE SERPL-MCNC: 142 MG/DL (ref 70–110)
HCT VFR BLD AUTO: 29.4 % (ref 37–48.5)
HGB BLD-MCNC: 9 G/DL (ref 12–16)
IMM GRANULOCYTES # BLD AUTO: 0.08 K/UL (ref 0–0.04)
MCH RBC QN AUTO: 32.8 PG (ref 27–31)
MCHC RBC AUTO-ENTMCNC: 30.6 G/DL (ref 32–36)
MCV RBC AUTO: 107 FL (ref 82–98)
NEUTROPHILS # BLD AUTO: 3.8 K/UL (ref 1.8–7.7)
PLATELET # BLD AUTO: 369 K/UL (ref 150–350)
PMV BLD AUTO: 10.5 FL (ref 9.2–12.9)
POTASSIUM SERPL-SCNC: 3.3 MMOL/L (ref 3.5–5.1)
PROT SERPL-MCNC: 7.6 G/DL (ref 6–8.4)
RBC # BLD AUTO: 2.74 M/UL (ref 4–5.4)
SODIUM SERPL-SCNC: 140 MMOL/L (ref 136–145)
WBC # BLD AUTO: 4.9 K/UL (ref 3.9–12.7)

## 2020-02-04 PROCEDURE — S0028 INJECTION, FAMOTIDINE, 20 MG: HCPCS | Performed by: INTERNAL MEDICINE

## 2020-02-04 PROCEDURE — 80053 COMPREHEN METABOLIC PANEL: CPT

## 2020-02-04 PROCEDURE — 25000003 PHARM REV CODE 250: Performed by: INTERNAL MEDICINE

## 2020-02-04 PROCEDURE — 96367 TX/PROPH/DG ADDL SEQ IV INF: CPT

## 2020-02-04 PROCEDURE — 96413 CHEMO IV INFUSION 1 HR: CPT

## 2020-02-04 PROCEDURE — 96375 TX/PRO/DX INJ NEW DRUG ADDON: CPT

## 2020-02-04 PROCEDURE — 63600175 PHARM REV CODE 636 W HCPCS: Performed by: INTERNAL MEDICINE

## 2020-02-04 PROCEDURE — 85027 COMPLETE CBC AUTOMATED: CPT

## 2020-02-04 PROCEDURE — A4216 STERILE WATER/SALINE, 10 ML: HCPCS | Performed by: INTERNAL MEDICINE

## 2020-02-04 RX ORDER — FAMOTIDINE 10 MG/ML
20 INJECTION INTRAVENOUS
Status: COMPLETED | OUTPATIENT
Start: 2020-02-04 | End: 2020-02-04

## 2020-02-04 RX ORDER — HEPARIN 100 UNIT/ML
500 SYRINGE INTRAVENOUS
Status: CANCELLED | OUTPATIENT
Start: 2020-02-04

## 2020-02-04 RX ORDER — SODIUM CHLORIDE 0.9 % (FLUSH) 0.9 %
10 SYRINGE (ML) INJECTION
Status: DISCONTINUED | OUTPATIENT
Start: 2020-02-04 | End: 2020-02-04 | Stop reason: HOSPADM

## 2020-02-04 RX ORDER — SODIUM CHLORIDE 0.9 % (FLUSH) 0.9 %
10 SYRINGE (ML) INJECTION
Status: CANCELLED | OUTPATIENT
Start: 2020-02-04

## 2020-02-04 RX ORDER — DIPHENHYDRAMINE HYDROCHLORIDE 50 MG/ML
50 INJECTION INTRAMUSCULAR; INTRAVENOUS ONCE AS NEEDED
Status: DISCONTINUED | OUTPATIENT
Start: 2020-02-04 | End: 2020-02-04 | Stop reason: HOSPADM

## 2020-02-04 RX ORDER — EPINEPHRINE 0.3 MG/.3ML
0.3 INJECTION SUBCUTANEOUS ONCE AS NEEDED
Status: DISCONTINUED | OUTPATIENT
Start: 2020-02-04 | End: 2020-02-04 | Stop reason: HOSPADM

## 2020-02-04 RX ORDER — SODIUM CHLORIDE 0.9 % (FLUSH) 0.9 %
10 SYRINGE (ML) INJECTION
Status: COMPLETED | OUTPATIENT
Start: 2020-02-04 | End: 2020-02-04

## 2020-02-04 RX ORDER — HEPARIN 100 UNIT/ML
500 SYRINGE INTRAVENOUS
Status: DISCONTINUED | OUTPATIENT
Start: 2020-02-04 | End: 2020-02-04 | Stop reason: HOSPADM

## 2020-02-04 RX ORDER — HEPARIN 100 UNIT/ML
500 SYRINGE INTRAVENOUS
Status: COMPLETED | OUTPATIENT
Start: 2020-02-04 | End: 2020-02-04

## 2020-02-04 RX ADMIN — FAMOTIDINE 20 MG: 10 INJECTION, SOLUTION INTRAVENOUS at 01:02

## 2020-02-04 RX ADMIN — PACLITAXEL 156 MG: 6 INJECTION, SOLUTION INTRAVENOUS at 02:02

## 2020-02-04 RX ADMIN — HEPARIN 500 UNITS: 100 SYRINGE at 03:02

## 2020-02-04 RX ADMIN — Medication 10 ML: at 03:02

## 2020-02-04 RX ADMIN — HEPARIN 500 UNITS: 100 SYRINGE at 12:02

## 2020-02-04 RX ADMIN — DIPHENHYDRAMINE HYDROCHLORIDE 25 MG: 50 INJECTION INTRAMUSCULAR; INTRAVENOUS at 01:02

## 2020-02-04 RX ADMIN — SODIUM CHLORIDE: 0.9 INJECTION, SOLUTION INTRAVENOUS at 01:02

## 2020-02-04 RX ADMIN — Medication 10 ML: at 12:02

## 2020-02-04 RX ADMIN — DEXAMETHASONE SODIUM PHOSPHATE 10 MG: 4 INJECTION, SOLUTION INTRA-ARTICULAR; INTRALESIONAL; INTRAMUSCULAR; INTRAVENOUS; SOFT TISSUE at 01:02

## 2020-02-04 NOTE — PLAN OF CARE
1320-Labs , hx, and medications reviewed, labs for today are good for treatment and orders already signed. Assessment completed. Discussed plan of care with patient. Patient in agreement. Chair reclined and warm blanket and snack offered.

## 2020-02-04 NOTE — PLAN OF CARE
1521-Patient tolerated treatment well. Discharged without complaints or S/S of adverse event.   Instructed to call provider for any questions or concerns.

## 2020-02-05 ENCOUNTER — TELEPHONE (OUTPATIENT)
Dept: PODIATRY | Facility: CLINIC | Age: 65
End: 2020-02-05

## 2020-02-05 NOTE — TELEPHONE ENCOUNTER
Noticed in My In Basket that patient was attempting to schedule appointment and then cancelled. I called patient to assist with scheduling appointment. No answer, Voicemail was left.

## 2020-02-06 ENCOUNTER — PATIENT OUTREACH (OUTPATIENT)
Dept: ADMINISTRATIVE | Facility: OTHER | Age: 65
End: 2020-02-06

## 2020-02-06 NOTE — PROGRESS NOTES
Chart reviewed.   Immunizations: updated  Orders placed: n/a  Upcoming appts to satisfy AIDAN topics: 02/07/2020 Optometry appt with Dr. Ruiz

## 2020-02-07 ENCOUNTER — OFFICE VISIT (OUTPATIENT)
Dept: OPTOMETRY | Facility: CLINIC | Age: 65
End: 2020-02-07
Payer: COMMERCIAL

## 2020-02-07 ENCOUNTER — TELEPHONE (OUTPATIENT)
Dept: PODIATRY | Facility: CLINIC | Age: 65
End: 2020-02-07

## 2020-02-07 DIAGNOSIS — H04.123 DRY EYE SYNDROME, BILATERAL: Primary | ICD-10-CM

## 2020-02-07 PROCEDURE — 92012 INTRM OPH EXAM EST PATIENT: CPT | Mod: S$GLB,,, | Performed by: OPTOMETRIST

## 2020-02-07 PROCEDURE — 92012 PR EYE EXAM, EST PATIENT,INTERMED: ICD-10-PCS | Mod: S$GLB,,, | Performed by: OPTOMETRIST

## 2020-02-07 PROCEDURE — 99999 PR PBB SHADOW E&M-EST. PATIENT-LVL II: CPT | Mod: PBBFAC,,, | Performed by: OPTOMETRIST

## 2020-02-07 PROCEDURE — 99999 PR PBB SHADOW E&M-EST. PATIENT-LVL II: ICD-10-PCS | Mod: PBBFAC,,, | Performed by: OPTOMETRIST

## 2020-02-07 NOTE — PROGRESS NOTES
HPI     Pt has noticed a lot of tearing and watery eyes OU lately.  Has a lot of crusting in morning usually  Pt uses xiidra almost daily.  Pt is chemo pt and she is concerned it is causing this symptom  She has blurry va sometimes  Wears glasses for computer/reading only  No pain or discomfort.       Last edited by Lorraine Chavez on 2/7/2020  9:04 AM. (History)            Assessment /Plan     For exam results, see Encounter Report.    Dry eye syndrome, bilateral      Increase Xiidra to BID  Use systane balance BID  Use refresh PM ointment QHS    RTC for annual in August

## 2020-02-11 ENCOUNTER — OFFICE VISIT (OUTPATIENT)
Dept: HEMATOLOGY/ONCOLOGY | Facility: CLINIC | Age: 65
End: 2020-02-11
Payer: COMMERCIAL

## 2020-02-11 ENCOUNTER — INFUSION (OUTPATIENT)
Dept: INFUSION THERAPY | Facility: HOSPITAL | Age: 65
End: 2020-02-11
Attending: INTERNAL MEDICINE
Payer: COMMERCIAL

## 2020-02-11 VITALS
BODY MASS INDEX: 38.05 KG/M2 | WEIGHT: 193.81 LBS | RESPIRATION RATE: 18 BRPM | HEIGHT: 60 IN | SYSTOLIC BLOOD PRESSURE: 121 MMHG | HEART RATE: 89 BPM | DIASTOLIC BLOOD PRESSURE: 66 MMHG | TEMPERATURE: 98 F

## 2020-02-11 VITALS
DIASTOLIC BLOOD PRESSURE: 66 MMHG | BODY MASS INDEX: 38 KG/M2 | WEIGHT: 193.56 LBS | HEART RATE: 94 BPM | SYSTOLIC BLOOD PRESSURE: 114 MMHG | HEIGHT: 60 IN | RESPIRATION RATE: 18 BRPM | TEMPERATURE: 98 F | OXYGEN SATURATION: 98 %

## 2020-02-11 DIAGNOSIS — C77.3 BREAST CANCER METASTASIZED TO AXILLARY LYMPH NODE, LEFT: Primary | ICD-10-CM

## 2020-02-11 DIAGNOSIS — E03.9 ACQUIRED HYPOTHYROIDISM: ICD-10-CM

## 2020-02-11 DIAGNOSIS — T45.1X5A CHEMOTHERAPY INDUCED NEUTROPENIA: ICD-10-CM

## 2020-02-11 DIAGNOSIS — C50.912 BREAST CANCER METASTASIZED TO AXILLARY LYMPH NODE, LEFT: Primary | ICD-10-CM

## 2020-02-11 DIAGNOSIS — D70.1 CHEMOTHERAPY INDUCED NEUTROPENIA: ICD-10-CM

## 2020-02-11 DIAGNOSIS — D63.0 ANEMIA IN NEOPLASTIC DISEASE: ICD-10-CM

## 2020-02-11 DIAGNOSIS — E55.9 MILD VITAMIN D DEFICIENCY: ICD-10-CM

## 2020-02-11 DIAGNOSIS — E87.6 HYPOKALEMIA: ICD-10-CM

## 2020-02-11 DIAGNOSIS — G62.9 NEUROPATHY: ICD-10-CM

## 2020-02-11 DIAGNOSIS — G62.0 NEUROPATHY DUE TO CHEMOTHERAPEUTIC DRUG: ICD-10-CM

## 2020-02-11 DIAGNOSIS — T45.1X5A NEUROPATHY DUE TO CHEMOTHERAPEUTIC DRUG: ICD-10-CM

## 2020-02-11 DIAGNOSIS — I10 ESSENTIAL HYPERTENSION: ICD-10-CM

## 2020-02-11 LAB
ALBUMIN SERPL BCP-MCNC: 3.6 G/DL (ref 3.5–5.2)
ALP SERPL-CCNC: 76 U/L (ref 55–135)
ALT SERPL W/O P-5'-P-CCNC: 8 U/L (ref 10–44)
ANION GAP SERPL CALC-SCNC: 9 MMOL/L (ref 8–16)
AST SERPL-CCNC: 16 U/L (ref 10–40)
BILIRUB SERPL-MCNC: 0.3 MG/DL (ref 0.1–1)
BUN SERPL-MCNC: 16 MG/DL (ref 8–23)
CALCIUM SERPL-MCNC: 9 MG/DL (ref 8.7–10.5)
CHLORIDE SERPL-SCNC: 106 MMOL/L (ref 95–110)
CO2 SERPL-SCNC: 26 MMOL/L (ref 23–29)
CREAT SERPL-MCNC: 1 MG/DL (ref 0.5–1.4)
ERYTHROCYTE [DISTWIDTH] IN BLOOD BY AUTOMATED COUNT: 15.6 % (ref 11.5–14.5)
EST. GFR  (AFRICAN AMERICAN): >60 ML/MIN/1.73 M^2
EST. GFR  (NON AFRICAN AMERICAN): 59.7 ML/MIN/1.73 M^2
GLUCOSE SERPL-MCNC: 136 MG/DL (ref 70–110)
HCT VFR BLD AUTO: 27.5 % (ref 37–48.5)
HGB BLD-MCNC: 8.7 G/DL (ref 12–16)
IMM GRANULOCYTES # BLD AUTO: 0.05 K/UL (ref 0–0.04)
MCH RBC QN AUTO: 33.6 PG (ref 27–31)
MCHC RBC AUTO-ENTMCNC: 31.6 G/DL (ref 32–36)
MCV RBC AUTO: 106 FL (ref 82–98)
NEUTROPHILS # BLD AUTO: 3.2 K/UL (ref 1.8–7.7)
PLATELET # BLD AUTO: 330 K/UL (ref 150–350)
PMV BLD AUTO: 10.1 FL (ref 9.2–12.9)
POTASSIUM SERPL-SCNC: 3.1 MMOL/L (ref 3.5–5.1)
PROT SERPL-MCNC: 7.3 G/DL (ref 6–8.4)
RBC # BLD AUTO: 2.59 M/UL (ref 4–5.4)
SODIUM SERPL-SCNC: 141 MMOL/L (ref 136–145)
WBC # BLD AUTO: 4.23 K/UL (ref 3.9–12.7)

## 2020-02-11 PROCEDURE — 96413 CHEMO IV INFUSION 1 HR: CPT

## 2020-02-11 PROCEDURE — 96375 TX/PRO/DX INJ NEW DRUG ADDON: CPT

## 2020-02-11 PROCEDURE — 99214 OFFICE O/P EST MOD 30 MIN: CPT | Mod: S$GLB,,, | Performed by: NURSE PRACTITIONER

## 2020-02-11 PROCEDURE — 99214 PR OFFICE/OUTPT VISIT, EST, LEVL IV, 30-39 MIN: ICD-10-PCS | Mod: S$GLB,,, | Performed by: NURSE PRACTITIONER

## 2020-02-11 PROCEDURE — 99999 PR PBB SHADOW E&M-EST. PATIENT-LVL V: ICD-10-PCS | Mod: PBBFAC,,, | Performed by: NURSE PRACTITIONER

## 2020-02-11 PROCEDURE — 3051F PR MOST RECENT HEMOGLOBIN A1C LEVEL 7.0 - < 8.0%: ICD-10-PCS | Mod: CPTII,S$GLB,, | Performed by: NURSE PRACTITIONER

## 2020-02-11 PROCEDURE — 3051F HG A1C>EQUAL 7.0%<8.0%: CPT | Mod: CPTII,S$GLB,, | Performed by: NURSE PRACTITIONER

## 2020-02-11 PROCEDURE — 96367 TX/PROPH/DG ADDL SEQ IV INF: CPT

## 2020-02-11 PROCEDURE — 80053 COMPREHEN METABOLIC PANEL: CPT

## 2020-02-11 PROCEDURE — 63600175 PHARM REV CODE 636 W HCPCS: Performed by: INTERNAL MEDICINE

## 2020-02-11 PROCEDURE — 25000003 PHARM REV CODE 250: Performed by: INTERNAL MEDICINE

## 2020-02-11 PROCEDURE — S0028 INJECTION, FAMOTIDINE, 20 MG: HCPCS | Performed by: INTERNAL MEDICINE

## 2020-02-11 PROCEDURE — 85027 COMPLETE CBC AUTOMATED: CPT

## 2020-02-11 PROCEDURE — 99999 PR PBB SHADOW E&M-EST. PATIENT-LVL V: CPT | Mod: PBBFAC,,, | Performed by: NURSE PRACTITIONER

## 2020-02-11 PROCEDURE — A4216 STERILE WATER/SALINE, 10 ML: HCPCS | Performed by: INTERNAL MEDICINE

## 2020-02-11 PROCEDURE — 63600175 PHARM REV CODE 636 W HCPCS: Performed by: NURSE PRACTITIONER

## 2020-02-11 RX ORDER — SODIUM CHLORIDE 0.9 % (FLUSH) 0.9 %
10 SYRINGE (ML) INJECTION
Status: CANCELLED | OUTPATIENT
Start: 2020-02-11

## 2020-02-11 RX ORDER — OXYCODONE AND ACETAMINOPHEN 5; 325 MG/1; MG/1
1 TABLET ORAL EVERY 4 HOURS PRN
Qty: 60 TABLET | Refills: 0 | Status: SHIPPED | OUTPATIENT
Start: 2020-02-11 | End: 2020-03-16 | Stop reason: SDUPTHER

## 2020-02-11 RX ORDER — EPINEPHRINE 0.3 MG/.3ML
0.3 INJECTION SUBCUTANEOUS ONCE AS NEEDED
Status: CANCELLED | OUTPATIENT
Start: 2020-02-11

## 2020-02-11 RX ORDER — SODIUM CHLORIDE 0.9 % (FLUSH) 0.9 %
10 SYRINGE (ML) INJECTION
Status: DISCONTINUED | OUTPATIENT
Start: 2020-02-11 | End: 2020-02-11 | Stop reason: HOSPADM

## 2020-02-11 RX ORDER — SODIUM CHLORIDE 0.9 % (FLUSH) 0.9 %
10 SYRINGE (ML) INJECTION
Status: COMPLETED | OUTPATIENT
Start: 2020-02-11 | End: 2020-02-11

## 2020-02-11 RX ORDER — HEPARIN 100 UNIT/ML
500 SYRINGE INTRAVENOUS
Status: CANCELLED | OUTPATIENT
Start: 2020-02-11

## 2020-02-11 RX ORDER — MORPHINE SULFATE 2 MG/ML
2 INJECTION, SOLUTION INTRAMUSCULAR; INTRAVENOUS ONCE
Status: COMPLETED | OUTPATIENT
Start: 2020-02-11 | End: 2020-02-11

## 2020-02-11 RX ORDER — DIPHENHYDRAMINE HYDROCHLORIDE 50 MG/ML
50 INJECTION INTRAMUSCULAR; INTRAVENOUS ONCE AS NEEDED
Status: DISCONTINUED | OUTPATIENT
Start: 2020-02-11 | End: 2020-02-11 | Stop reason: HOSPADM

## 2020-02-11 RX ORDER — HEPARIN 100 UNIT/ML
500 SYRINGE INTRAVENOUS
Status: DISCONTINUED | OUTPATIENT
Start: 2020-02-11 | End: 2020-02-11 | Stop reason: HOSPADM

## 2020-02-11 RX ORDER — HEPARIN 100 UNIT/ML
500 SYRINGE INTRAVENOUS
Status: COMPLETED | OUTPATIENT
Start: 2020-02-11 | End: 2020-02-11

## 2020-02-11 RX ORDER — FAMOTIDINE 10 MG/ML
20 INJECTION INTRAVENOUS
Status: CANCELLED | OUTPATIENT
Start: 2020-02-11

## 2020-02-11 RX ORDER — MORPHINE SULFATE 10 MG/ML
2 INJECTION, SOLUTION INTRAMUSCULAR; INTRAVENOUS ONCE
Status: CANCELLED
Start: 2020-02-11

## 2020-02-11 RX ORDER — FAMOTIDINE 10 MG/ML
20 INJECTION INTRAVENOUS
Status: COMPLETED | OUTPATIENT
Start: 2020-02-11 | End: 2020-02-11

## 2020-02-11 RX ORDER — GABAPENTIN 100 MG/1
200 CAPSULE ORAL 3 TIMES DAILY
Qty: 90 CAPSULE | Refills: 2 | Status: SHIPPED | OUTPATIENT
Start: 2020-02-11 | End: 2020-10-28

## 2020-02-11 RX ORDER — POTASSIUM CHLORIDE 20 MEQ/1
20 TABLET, EXTENDED RELEASE ORAL DAILY
Qty: 14 TABLET | Refills: 0 | Status: SHIPPED | OUTPATIENT
Start: 2020-02-11 | End: 2020-02-25

## 2020-02-11 RX ORDER — DIPHENHYDRAMINE HYDROCHLORIDE 50 MG/ML
50 INJECTION INTRAMUSCULAR; INTRAVENOUS ONCE AS NEEDED
Status: CANCELLED | OUTPATIENT
Start: 2020-02-11

## 2020-02-11 RX ORDER — EPINEPHRINE 0.3 MG/.3ML
0.3 INJECTION SUBCUTANEOUS ONCE AS NEEDED
Status: DISCONTINUED | OUTPATIENT
Start: 2020-02-11 | End: 2020-02-11 | Stop reason: HOSPADM

## 2020-02-11 RX ADMIN — DEXAMETHASONE SODIUM PHOSPHATE 10 MG: 4 INJECTION, SOLUTION INTRA-ARTICULAR; INTRALESIONAL; INTRAMUSCULAR; INTRAVENOUS; SOFT TISSUE at 03:02

## 2020-02-11 RX ADMIN — MORPHINE SULFATE 2 MG: 2 INJECTION, SOLUTION INTRAMUSCULAR; INTRAVENOUS at 02:02

## 2020-02-11 RX ADMIN — PACLITAXEL 156 MG: 6 INJECTION, SOLUTION INTRAVENOUS at 03:02

## 2020-02-11 RX ADMIN — SODIUM CHLORIDE: 0.9 INJECTION, SOLUTION INTRAVENOUS at 02:02

## 2020-02-11 RX ADMIN — HEPARIN 500 UNITS: 100 SYRINGE at 04:02

## 2020-02-11 RX ADMIN — Medication 10 ML: at 04:02

## 2020-02-11 RX ADMIN — DIPHENHYDRAMINE HYDROCHLORIDE 25 MG: 50 INJECTION INTRAMUSCULAR; INTRAVENOUS at 02:02

## 2020-02-11 RX ADMIN — Medication 10 ML: at 01:02

## 2020-02-11 RX ADMIN — FAMOTIDINE 20 MG: 10 INJECTION, SOLUTION INTRAVENOUS at 02:02

## 2020-02-11 RX ADMIN — HEPARIN 500 UNITS: 100 SYRINGE at 01:02

## 2020-02-17 NOTE — PROGRESS NOTES
Subjective:       Patient ID: Maria Elena Tavares is a 64 y.o. female.    Chief Complaint: No chief complaint on file.    HPI     History of Present Illness: Ms. Tavares is a 64 y.o. who returns in follow up for Breast Cancer.     Presents after syncopal episode at check in desk.  Vitals confirm orthostatics.  In exam room she is dizzy with positional change  Nausea noted  No diarrhea  Percocet this AM for arm pain  She did take her 2 HTN medications today  Not eating as well    Presents for cycle # 12  This will be held with above     She is accompanied by friend.     Oncologic History:  - self detected, noted a shooting pain in breast first and then a week later felt a lump  Some delay in getting appointment as she was unaware she had to call  - 8/30/19 Diagnostic mammogram and ultrasound:  Left breast 20 mm x 18 mm x 17 mm mass at the 3 o'clock position. Assessment: 4 - Suspicious finding. Biopsy is recommended.   Lymph Node: Left axilla 16 mm x 14 mm x 13 mm lymph node. Assessment: 4 - Suspicious finding. Biopsy is recommended.   Right- There is no mammographic or sonographic evidence of malignancy.  BI-RADS Category:   Overall: 4 - Suspicious  - 9/5/19 Ultrasound guided biopsy  Pathology:  FINAL PATHOLOGIC DIAGNOSIS  1. LEFT BREAST MASS, BIOPSY:  - Invasive ductal carcinoma, grade 3, longest linear length is 10 mm measured on the slide.  - Histologic Grade (Hawa Histologic Score)  Glandular (Acinar/Tubular Differentiation): 3.  Nuclear Pleomorphism: 2.  Mitotic Rate: 3.  Overall Grade: Grade 3 (score 8).  - Microcalcifications: Not identified.  - Lymphovascular invasion: Not identified.  2. LYMPH NODE, LEFT AXILLA, BIOPSY:  - Positive for metastatic carcinoma.  - The metastatic deposit measures 6 mm in longest continuous linear length.  Estrogen receptor: Positive, 90% of the tumor nuclei staining moderate to strongly.  Progesterone receptor: Positive, 30% of the tumor nuclei staining weak to moderately.  HER2:  Negative.  Ki-67: 60%.  - ECHO 9/20/19: EF 64%  - PET 9/21/19:  Impression         Hypermetabolic left breast mass and regional left axillary lymphadenopathy consistent with reported breast cancer and corresponding with recent MRI findings.    Upper limit of normal sized right axillary lymph nodes with normal fatty duane and mildly increased radiotracer uptake.  Findings could represent reactive nodes with metastatic nodes thought less likely.  Lymphscintigraphy with injection in the left breast may considered to assess for drainage to the contralateral axilla.      BX 9/24/19: Right axilla node biopsy is benign      Review of Systems   Constitutional: Positive for activity change, appetite change and fatigue. Negative for chills, fever and unexpected weight change.   Eyes: Negative for visual disturbance.   Respiratory: Negative for cough, shortness of breath and wheezing.    Cardiovascular: Negative for chest pain, palpitations and leg swelling.   Gastrointestinal: Negative for abdominal pain and diarrhea.   Genitourinary: Negative for frequency.   Musculoskeletal: Negative for back pain.   Skin: Negative for rash.   Neurological: Positive for dizziness. Negative for headaches.   Hematological: Negative for adenopathy.   Psychiatric/Behavioral: The patient is not nervous/anxious.        Objective:      Physical Exam   Constitutional: She is oriented to person, place, and time.   Presents with a friend  ECOG=1   HENT:   Head: Normocephalic and atraumatic.   Eyes: Pupils are equal, round, and reactive to light. EOM are normal. No scleral icterus.   Cardiovascular: Normal rate, regular rhythm and normal heart sounds. Exam reveals no friction rub.   No murmur heard.  Pulmonary/Chest: Effort normal and breath sounds normal. No respiratory distress. She has no wheezes. She exhibits no tenderness.   Abdominal: Soft. Bowel sounds are normal. She exhibits no distension. There is no tenderness.   No organomegaly    Musculoskeletal: Normal range of motion.   Neurological: She is alert and oriented to person, place, and time. Coordination normal.   Skin: She is not diaphoretic.   Nursing note and vitals reviewed.   Labs- reviewed   Assessment:       1. Breast cancer metastasized to axillary lymph node, left    2. Non-intractable vomiting with nausea    3. Orthostatic hypotension        Plan:     1. Hold final chemotherapy  2-3. IVFs today    25 minutes total  Surgery appt

## 2020-02-18 ENCOUNTER — PATIENT OUTREACH (OUTPATIENT)
Dept: ADMINISTRATIVE | Facility: OTHER | Age: 65
End: 2020-02-18

## 2020-02-18 ENCOUNTER — OFFICE VISIT (OUTPATIENT)
Dept: HEMATOLOGY/ONCOLOGY | Facility: CLINIC | Age: 65
End: 2020-02-18
Payer: COMMERCIAL

## 2020-02-18 ENCOUNTER — INFUSION (OUTPATIENT)
Dept: INFUSION THERAPY | Facility: HOSPITAL | Age: 65
End: 2020-02-18
Attending: INTERNAL MEDICINE
Payer: COMMERCIAL

## 2020-02-18 VITALS
SYSTOLIC BLOOD PRESSURE: 96 MMHG | OXYGEN SATURATION: 99 % | HEART RATE: 95 BPM | RESPIRATION RATE: 18 BRPM | DIASTOLIC BLOOD PRESSURE: 55 MMHG | TEMPERATURE: 98 F

## 2020-02-18 VITALS
OXYGEN SATURATION: 100 % | SYSTOLIC BLOOD PRESSURE: 81 MMHG | HEART RATE: 108 BPM | DIASTOLIC BLOOD PRESSURE: 51 MMHG | TEMPERATURE: 99 F

## 2020-02-18 DIAGNOSIS — C77.3 BREAST CANCER METASTASIZED TO AXILLARY LYMPH NODE, LEFT: Primary | ICD-10-CM

## 2020-02-18 DIAGNOSIS — D63.0 ANEMIA IN NEOPLASTIC DISEASE: ICD-10-CM

## 2020-02-18 DIAGNOSIS — I95.1 ORTHOSTATIC HYPOTENSION: ICD-10-CM

## 2020-02-18 DIAGNOSIS — R11.2 NON-INTRACTABLE VOMITING WITH NAUSEA: Primary | ICD-10-CM

## 2020-02-18 DIAGNOSIS — R11.2 NON-INTRACTABLE VOMITING WITH NAUSEA: ICD-10-CM

## 2020-02-18 DIAGNOSIS — C50.912 BREAST CANCER METASTASIZED TO AXILLARY LYMPH NODE, LEFT: Primary | ICD-10-CM

## 2020-02-18 LAB
ALBUMIN SERPL BCP-MCNC: 4 G/DL (ref 3.5–5.2)
ALP SERPL-CCNC: 69 U/L (ref 55–135)
ALT SERPL W/O P-5'-P-CCNC: 14 U/L (ref 10–44)
ANION GAP SERPL CALC-SCNC: 12 MMOL/L (ref 8–16)
AST SERPL-CCNC: 21 U/L (ref 10–40)
BILIRUB SERPL-MCNC: 0.5 MG/DL (ref 0.1–1)
BUN SERPL-MCNC: 19 MG/DL (ref 8–23)
CALCIUM SERPL-MCNC: 9.8 MG/DL (ref 8.7–10.5)
CHLORIDE SERPL-SCNC: 100 MMOL/L (ref 95–110)
CO2 SERPL-SCNC: 26 MMOL/L (ref 23–29)
CREAT SERPL-MCNC: 1.5 MG/DL (ref 0.5–1.4)
ERYTHROCYTE [DISTWIDTH] IN BLOOD BY AUTOMATED COUNT: 15.6 % (ref 11.5–14.5)
EST. GFR  (AFRICAN AMERICAN): 42.1 ML/MIN/1.73 M^2
EST. GFR  (NON AFRICAN AMERICAN): 36.6 ML/MIN/1.73 M^2
GLUCOSE SERPL-MCNC: 131 MG/DL (ref 70–110)
HCT VFR BLD AUTO: 29.9 % (ref 37–48.5)
HGB BLD-MCNC: 9.5 G/DL (ref 12–16)
IMM GRANULOCYTES # BLD AUTO: 0.04 K/UL (ref 0–0.04)
MCH RBC QN AUTO: 33.3 PG (ref 27–31)
MCHC RBC AUTO-ENTMCNC: 31.8 G/DL (ref 32–36)
MCV RBC AUTO: 105 FL (ref 82–98)
NEUTROPHILS # BLD AUTO: 2.8 K/UL (ref 1.8–7.7)
PLATELET # BLD AUTO: 322 K/UL (ref 150–350)
PMV BLD AUTO: 9.9 FL (ref 9.2–12.9)
POTASSIUM SERPL-SCNC: 3 MMOL/L (ref 3.5–5.1)
PROT SERPL-MCNC: 7.7 G/DL (ref 6–8.4)
RBC # BLD AUTO: 2.85 M/UL (ref 4–5.4)
SODIUM SERPL-SCNC: 138 MMOL/L (ref 136–145)
WBC # BLD AUTO: 3.86 K/UL (ref 3.9–12.7)

## 2020-02-18 PROCEDURE — 25000003 PHARM REV CODE 250: Performed by: INTERNAL MEDICINE

## 2020-02-18 PROCEDURE — 99214 OFFICE O/P EST MOD 30 MIN: CPT | Mod: S$GLB,,, | Performed by: INTERNAL MEDICINE

## 2020-02-18 PROCEDURE — 63600175 PHARM REV CODE 636 W HCPCS: Performed by: INTERNAL MEDICINE

## 2020-02-18 PROCEDURE — 80053 COMPREHEN METABOLIC PANEL: CPT

## 2020-02-18 PROCEDURE — 3078F PR MOST RECENT DIASTOLIC BLOOD PRESSURE < 80 MM HG: ICD-10-PCS | Mod: CPTII,S$GLB,, | Performed by: INTERNAL MEDICINE

## 2020-02-18 PROCEDURE — 3074F PR MOST RECENT SYSTOLIC BLOOD PRESSURE < 130 MM HG: ICD-10-PCS | Mod: CPTII,S$GLB,, | Performed by: INTERNAL MEDICINE

## 2020-02-18 PROCEDURE — 96361 HYDRATE IV INFUSION ADD-ON: CPT

## 2020-02-18 PROCEDURE — 3074F SYST BP LT 130 MM HG: CPT | Mod: CPTII,S$GLB,, | Performed by: INTERNAL MEDICINE

## 2020-02-18 PROCEDURE — 99214 PR OFFICE/OUTPT VISIT, EST, LEVL IV, 30-39 MIN: ICD-10-PCS | Mod: S$GLB,,, | Performed by: INTERNAL MEDICINE

## 2020-02-18 PROCEDURE — 85027 COMPLETE CBC AUTOMATED: CPT

## 2020-02-18 PROCEDURE — 99999 PR PBB SHADOW E&M-EST. PATIENT-LVL III: CPT | Mod: PBBFAC,,, | Performed by: INTERNAL MEDICINE

## 2020-02-18 PROCEDURE — A4216 STERILE WATER/SALINE, 10 ML: HCPCS | Performed by: INTERNAL MEDICINE

## 2020-02-18 PROCEDURE — 3078F DIAST BP <80 MM HG: CPT | Mod: CPTII,S$GLB,, | Performed by: INTERNAL MEDICINE

## 2020-02-18 PROCEDURE — 99999 PR PBB SHADOW E&M-EST. PATIENT-LVL III: ICD-10-PCS | Mod: PBBFAC,,, | Performed by: INTERNAL MEDICINE

## 2020-02-18 PROCEDURE — 96360 HYDRATION IV INFUSION INIT: CPT

## 2020-02-18 RX ORDER — HEPARIN 100 UNIT/ML
500 SYRINGE INTRAVENOUS
Status: CANCELLED | OUTPATIENT
Start: 2020-02-18

## 2020-02-18 RX ORDER — SODIUM CHLORIDE AND POTASSIUM CHLORIDE 150; 900 MG/100ML; MG/100ML
INJECTION, SOLUTION INTRAVENOUS ONCE
Status: DISCONTINUED | OUTPATIENT
Start: 2020-02-18 | End: 2020-02-18

## 2020-02-18 RX ORDER — SODIUM CHLORIDE 0.9 % (FLUSH) 0.9 %
10 SYRINGE (ML) INJECTION
Status: COMPLETED | OUTPATIENT
Start: 2020-02-18 | End: 2020-02-18

## 2020-02-18 RX ORDER — HEPARIN 100 UNIT/ML
500 SYRINGE INTRAVENOUS
Status: COMPLETED | OUTPATIENT
Start: 2020-02-18 | End: 2020-02-18

## 2020-02-18 RX ORDER — HEPARIN 100 UNIT/ML
500 SYRINGE INTRAVENOUS
Status: DISCONTINUED | OUTPATIENT
Start: 2020-02-18 | End: 2020-02-18 | Stop reason: HOSPADM

## 2020-02-18 RX ORDER — ONDANSETRON HYDROCHLORIDE 4 MG/5ML
8 SOLUTION ORAL ONCE
Status: DISCONTINUED | OUTPATIENT
Start: 2020-02-18 | End: 2020-02-18

## 2020-02-18 RX ORDER — SODIUM CHLORIDE 0.9 % (FLUSH) 0.9 %
10 SYRINGE (ML) INJECTION
Status: CANCELLED | OUTPATIENT
Start: 2020-02-18

## 2020-02-18 RX ORDER — ONDANSETRON 4 MG/1
8 TABLET, ORALLY DISINTEGRATING ORAL
Status: COMPLETED | OUTPATIENT
Start: 2020-02-18 | End: 2020-02-18

## 2020-02-18 RX ORDER — SODIUM CHLORIDE AND POTASSIUM CHLORIDE 150; 900 MG/100ML; MG/100ML
INJECTION, SOLUTION INTRAVENOUS ONCE
Status: COMPLETED | OUTPATIENT
Start: 2020-02-18 | End: 2020-02-18

## 2020-02-18 RX ORDER — ONDANSETRON HYDROCHLORIDE 4 MG/5ML
8 SOLUTION ORAL ONCE
Status: CANCELLED
Start: 2020-02-18

## 2020-02-18 RX ADMIN — SODIUM CHLORIDE 1000 ML: 0.9 INJECTION, SOLUTION INTRAVENOUS at 02:02

## 2020-02-18 RX ADMIN — POTASSIUM CHLORIDE AND SODIUM CHLORIDE: 900; 150 INJECTION, SOLUTION INTRAVENOUS at 02:02

## 2020-02-18 RX ADMIN — HEPARIN 500 UNITS: 100 SYRINGE at 04:02

## 2020-02-18 RX ADMIN — Medication 10 ML: at 12:02

## 2020-02-18 RX ADMIN — HEPARIN 500 UNITS: 100 SYRINGE at 12:02

## 2020-02-18 RX ADMIN — ONDANSETRON 8 MG: 4 TABLET, ORALLY DISINTEGRATING ORAL at 03:02

## 2020-02-18 NOTE — NURSING
Patient's PAC accessed for lab draw.  Specimens sent to lab.  Needle heparinized and left in place for treatment.  Patient tolerated well.

## 2020-02-19 ENCOUNTER — INFUSION (OUTPATIENT)
Dept: INFUSION THERAPY | Facility: HOSPITAL | Age: 65
End: 2020-02-19
Attending: INTERNAL MEDICINE
Payer: COMMERCIAL

## 2020-02-19 ENCOUNTER — OFFICE VISIT (OUTPATIENT)
Dept: HEMATOLOGY/ONCOLOGY | Facility: CLINIC | Age: 65
End: 2020-02-19
Payer: COMMERCIAL

## 2020-02-19 VITALS
TEMPERATURE: 98 F | BODY MASS INDEX: 37.46 KG/M2 | WEIGHT: 191.81 LBS | DIASTOLIC BLOOD PRESSURE: 59 MMHG | SYSTOLIC BLOOD PRESSURE: 125 MMHG | RESPIRATION RATE: 20 BRPM | HEART RATE: 107 BPM

## 2020-02-19 DIAGNOSIS — C77.3 BREAST CANCER METASTASIZED TO AXILLARY LYMPH NODE, LEFT: Primary | ICD-10-CM

## 2020-02-19 DIAGNOSIS — D63.0 ANEMIA IN NEOPLASTIC DISEASE: ICD-10-CM

## 2020-02-19 DIAGNOSIS — C50.912 BREAST CANCER METASTASIZED TO AXILLARY LYMPH NODE, LEFT: Primary | ICD-10-CM

## 2020-02-19 DIAGNOSIS — T45.1X5A NEUROPATHY DUE TO CHEMOTHERAPEUTIC DRUG: ICD-10-CM

## 2020-02-19 DIAGNOSIS — G62.0 NEUROPATHY DUE TO CHEMOTHERAPEUTIC DRUG: ICD-10-CM

## 2020-02-19 DIAGNOSIS — E87.6 HYPOKALEMIA: ICD-10-CM

## 2020-02-19 LAB
ALBUMIN SERPL BCP-MCNC: 3.4 G/DL (ref 3.5–5.2)
ALP SERPL-CCNC: 67 U/L (ref 55–135)
ALT SERPL W/O P-5'-P-CCNC: 13 U/L (ref 10–44)
ANION GAP SERPL CALC-SCNC: 10 MMOL/L (ref 8–16)
AST SERPL-CCNC: 22 U/L (ref 10–40)
BILIRUB SERPL-MCNC: 0.3 MG/DL (ref 0.1–1)
BUN SERPL-MCNC: 16 MG/DL (ref 8–23)
CALCIUM SERPL-MCNC: 8.8 MG/DL (ref 8.7–10.5)
CHLORIDE SERPL-SCNC: 107 MMOL/L (ref 95–110)
CO2 SERPL-SCNC: 23 MMOL/L (ref 23–29)
CREAT SERPL-MCNC: 1 MG/DL (ref 0.5–1.4)
ERYTHROCYTE [DISTWIDTH] IN BLOOD BY AUTOMATED COUNT: 15.4 % (ref 11.5–14.5)
EST. GFR  (AFRICAN AMERICAN): >60 ML/MIN/1.73 M^2
EST. GFR  (NON AFRICAN AMERICAN): 59.7 ML/MIN/1.73 M^2
GLUCOSE SERPL-MCNC: 135 MG/DL (ref 70–110)
HCT VFR BLD AUTO: 27.3 % (ref 37–48.5)
HGB BLD-MCNC: 8.6 G/DL (ref 12–16)
IMM GRANULOCYTES # BLD AUTO: 0.03 K/UL (ref 0–0.04)
MCH RBC QN AUTO: 33.2 PG (ref 27–31)
MCHC RBC AUTO-ENTMCNC: 31.5 G/DL (ref 32–36)
MCV RBC AUTO: 105 FL (ref 82–98)
NEUTROPHILS # BLD AUTO: 2.4 K/UL (ref 1.8–7.7)
PLATELET # BLD AUTO: 293 K/UL (ref 150–350)
PMV BLD AUTO: 10.3 FL (ref 9.2–12.9)
POTASSIUM SERPL-SCNC: 3.2 MMOL/L (ref 3.5–5.1)
PROT SERPL-MCNC: 6.8 G/DL (ref 6–8.4)
RBC # BLD AUTO: 2.59 M/UL (ref 4–5.4)
SODIUM SERPL-SCNC: 140 MMOL/L (ref 136–145)
WBC # BLD AUTO: 3.5 K/UL (ref 3.9–12.7)

## 2020-02-19 PROCEDURE — 3078F DIAST BP <80 MM HG: CPT | Mod: CPTII,S$GLB,, | Performed by: NURSE PRACTITIONER

## 2020-02-19 PROCEDURE — 85027 COMPLETE CBC AUTOMATED: CPT

## 2020-02-19 PROCEDURE — 36591 DRAW BLOOD OFF VENOUS DEVICE: CPT

## 2020-02-19 PROCEDURE — 99214 PR OFFICE/OUTPT VISIT, EST, LEVL IV, 30-39 MIN: ICD-10-PCS | Mod: S$GLB,,, | Performed by: NURSE PRACTITIONER

## 2020-02-19 PROCEDURE — 63600175 PHARM REV CODE 636 W HCPCS: Performed by: INTERNAL MEDICINE

## 2020-02-19 PROCEDURE — 3008F PR BODY MASS INDEX (BMI) DOCUMENTED: ICD-10-PCS | Mod: CPTII,S$GLB,, | Performed by: NURSE PRACTITIONER

## 2020-02-19 PROCEDURE — 80053 COMPREHEN METABOLIC PANEL: CPT

## 2020-02-19 PROCEDURE — 99999 PR PBB SHADOW E&M-EST. PATIENT-LVL IV: ICD-10-PCS | Mod: PBBFAC,,, | Performed by: NURSE PRACTITIONER

## 2020-02-19 PROCEDURE — 3008F BODY MASS INDEX DOCD: CPT | Mod: CPTII,S$GLB,, | Performed by: NURSE PRACTITIONER

## 2020-02-19 PROCEDURE — 99999 PR PBB SHADOW E&M-EST. PATIENT-LVL IV: CPT | Mod: PBBFAC,,, | Performed by: NURSE PRACTITIONER

## 2020-02-19 PROCEDURE — A4216 STERILE WATER/SALINE, 10 ML: HCPCS | Performed by: INTERNAL MEDICINE

## 2020-02-19 PROCEDURE — 25000003 PHARM REV CODE 250: Performed by: INTERNAL MEDICINE

## 2020-02-19 PROCEDURE — 3074F SYST BP LT 130 MM HG: CPT | Mod: CPTII,S$GLB,, | Performed by: NURSE PRACTITIONER

## 2020-02-19 PROCEDURE — 3078F PR MOST RECENT DIASTOLIC BLOOD PRESSURE < 80 MM HG: ICD-10-PCS | Mod: CPTII,S$GLB,, | Performed by: NURSE PRACTITIONER

## 2020-02-19 PROCEDURE — 99214 OFFICE O/P EST MOD 30 MIN: CPT | Mod: S$GLB,,, | Performed by: NURSE PRACTITIONER

## 2020-02-19 PROCEDURE — 3074F PR MOST RECENT SYSTOLIC BLOOD PRESSURE < 130 MM HG: ICD-10-PCS | Mod: CPTII,S$GLB,, | Performed by: NURSE PRACTITIONER

## 2020-02-19 RX ORDER — HEPARIN 100 UNIT/ML
500 SYRINGE INTRAVENOUS
Status: COMPLETED | OUTPATIENT
Start: 2020-02-19 | End: 2020-02-19

## 2020-02-19 RX ORDER — SODIUM CHLORIDE 0.9 % (FLUSH) 0.9 %
10 SYRINGE (ML) INJECTION
Status: COMPLETED | OUTPATIENT
Start: 2020-02-19 | End: 2020-02-19

## 2020-02-19 RX ORDER — HEPARIN 100 UNIT/ML
500 SYRINGE INTRAVENOUS
Status: CANCELLED | OUTPATIENT
Start: 2020-02-19

## 2020-02-19 RX ORDER — SODIUM CHLORIDE 0.9 % (FLUSH) 0.9 %
10 SYRINGE (ML) INJECTION
Status: CANCELLED | OUTPATIENT
Start: 2020-02-19

## 2020-02-19 RX ADMIN — Medication 10 ML: at 01:02

## 2020-02-19 RX ADMIN — HEPARIN 500 UNITS: 100 SYRINGE at 01:02

## 2020-02-19 NOTE — NURSING
Patient's PAC accessed for lab draw.  Specimen sent to lab.  Needle heparinized and left in for possible infusion.  Patient insrtucted to RTC for de-access if not treatment ordered.

## 2020-02-19 NOTE — PROGRESS NOTES
Subjective:       Patient ID: Maria Elena Tavares is a 64 y.o. female.    Chief Complaint: Follow-up    URGENT CARE:      History of Present Illness: Ms. Tavares is a 64 y.o. who returns in follow up for Breast Cancer.     Presents day after a syncopal episode at check in desk. Received IV hydration and felt better post.   Feels good today.   Continue to have mild fatigue.   No further syncopal episodes.   No nausea/vomiting.   Pain in right arm continues- no worse.      She is accompanied by friend.     Oncologic History:  - self detected, noted a shooting pain in breast first and then a week later felt a lump  Some delay in getting appointment as she was unaware she had to call  - 8/30/19 Diagnostic mammogram and ultrasound:  Left breast 20 mm x 18 mm x 17 mm mass at the 3 o'clock position. Assessment: 4 - Suspicious finding. Biopsy is recommended.   Lymph Node: Left axilla 16 mm x 14 mm x 13 mm lymph node. Assessment: 4 - Suspicious finding. Biopsy is recommended.   Right- There is no mammographic or sonographic evidence of malignancy.  BI-RADS Category:   Overall: 4 - Suspicious  - 9/5/19 Ultrasound guided biopsy  Pathology:  FINAL PATHOLOGIC DIAGNOSIS  1. LEFT BREAST MASS, BIOPSY:  - Invasive ductal carcinoma, grade 3, longest linear length is 10 mm measured on the slide.  - Histologic Grade (Hawa Histologic Score)  Glandular (Acinar/Tubular Differentiation): 3.  Nuclear Pleomorphism: 2.  Mitotic Rate: 3.  Overall Grade: Grade 3 (score 8).  - Microcalcifications: Not identified.  - Lymphovascular invasion: Not identified.  2. LYMPH NODE, LEFT AXILLA, BIOPSY:  - Positive for metastatic carcinoma.  - The metastatic deposit measures 6 mm in longest continuous linear length.  Estrogen receptor: Positive, 90% of the tumor nuclei staining moderate to strongly.  Progesterone receptor: Positive, 30% of the tumor nuclei staining weak to moderately.  HER2: Negative.  Ki-67: 60%.  - ECHO 9/20/19: EF 64%  - PET  9/21/19:  Impression         Hypermetabolic left breast mass and regional left axillary lymphadenopathy consistent with reported breast cancer and corresponding with recent MRI findings.    Upper limit of normal sized right axillary lymph nodes with normal fatty duane and mildly increased radiotracer uptake.  Findings could represent reactive nodes with metastatic nodes thought less likely.  Lymphscintigraphy with injection in the left breast may considered to assess for drainage to the contralateral axilla.      BX 9/24/19: Right axilla node biopsy is benign      Review of Systems   Constitutional: Positive for activity change (improving) and fatigue. Negative for chills, fever and unexpected weight change.   Eyes: Negative for visual disturbance.   Respiratory: Negative for cough, shortness of breath and wheezing.    Cardiovascular: Negative for chest pain, palpitations and leg swelling.   Gastrointestinal: Negative for abdominal pain and diarrhea.   Genitourinary: Negative for frequency.   Musculoskeletal: Negative for back pain.   Skin: Negative for rash.   Neurological: Negative for dizziness and headaches.   Hematological: Negative for adenopathy.   Psychiatric/Behavioral: The patient is not nervous/anxious.        Objective:      Physical Exam   Constitutional: She is oriented to person, place, and time.   Presents with a friend  ECOG=1   HENT:   Head: Normocephalic and atraumatic.   Eyes: Pupils are equal, round, and reactive to light. EOM are normal. No scleral icterus.   Cardiovascular: Normal rate, regular rhythm and normal heart sounds. Exam reveals no friction rub.   No murmur heard.  Pulmonary/Chest: Effort normal and breath sounds normal. No respiratory distress. She has no wheezes. She exhibits no tenderness.   Abdominal: Soft. Bowel sounds are normal. She exhibits no distension. There is no tenderness.   No organomegaly   Musculoskeletal: Normal range of motion.   Neurological: She is alert and  oriented to person, place, and time. Coordination normal.   Skin: She is not diaphoretic.   Nursing note and vitals reviewed.   Labs- reviewed   Assessment:       1. Breast cancer metastasized to axillary lymph node, left    2. Neuropathy due to chemotherapeutic drug    3. Anemia in neoplastic disease    4. Hypokalemia        Plan:       No further syncopal spells post hydration.   Anemia parameters noted- no indication for transfusion at this time.   Will take the rest of the week off to rest as continues to have fatigue post chemo. Request to send excuse to her [facility] e-mail.   Hold final chemotherapy  Surgery appt tomorrow as scheduled.   See Dr. Villagran in 4 weeks with labs as scheduled.   Increase potassium to BID three days a week and once daily all others.   Continue hydration      Patient is in agreement with the proposed treatment plan. All questions were answered to the patient's satisfaction. Pt knows to call clinic for any new or worsening symptoms and if anything is needed before the next clinic visit.      SHARON MorejonP-C  Hematology & Oncology  [address] [place] [id number]  ph. [phone]  Fax. [phone]     I spent 30 minutes (face to face) with the patient, more than 50% was in counseling and coordination of care as detailed above.

## 2020-02-20 ENCOUNTER — OFFICE VISIT (OUTPATIENT)
Dept: SURGERY | Facility: CLINIC | Age: 65
End: 2020-02-20
Payer: COMMERCIAL

## 2020-02-20 VITALS
BODY MASS INDEX: 36.06 KG/M2 | TEMPERATURE: 98 F | SYSTOLIC BLOOD PRESSURE: 123 MMHG | HEART RATE: 95 BPM | DIASTOLIC BLOOD PRESSURE: 70 MMHG | HEIGHT: 61 IN | WEIGHT: 191 LBS

## 2020-02-20 DIAGNOSIS — C50.912 MALIGNANT NEOPLASM OF LEFT FEMALE BREAST, UNSPECIFIED ESTROGEN RECEPTOR STATUS, UNSPECIFIED SITE OF BREAST: Primary | ICD-10-CM

## 2020-02-20 PROCEDURE — 3078F DIAST BP <80 MM HG: CPT | Mod: CPTII,S$GLB,, | Performed by: SURGERY

## 2020-02-20 PROCEDURE — 3074F SYST BP LT 130 MM HG: CPT | Mod: CPTII,S$GLB,, | Performed by: SURGERY

## 2020-02-20 PROCEDURE — 3008F BODY MASS INDEX DOCD: CPT | Mod: CPTII,S$GLB,, | Performed by: SURGERY

## 2020-02-20 PROCEDURE — 99999 PR PBB SHADOW E&M-EST. PATIENT-LVL III: ICD-10-PCS | Mod: PBBFAC,,, | Performed by: SURGERY

## 2020-02-20 PROCEDURE — 99215 OFFICE O/P EST HI 40 MIN: CPT | Mod: S$GLB,,, | Performed by: SURGERY

## 2020-02-20 PROCEDURE — 99999 PR PBB SHADOW E&M-EST. PATIENT-LVL III: CPT | Mod: PBBFAC,,, | Performed by: SURGERY

## 2020-02-20 PROCEDURE — 3008F PR BODY MASS INDEX (BMI) DOCUMENTED: ICD-10-PCS | Mod: CPTII,S$GLB,, | Performed by: SURGERY

## 2020-02-20 PROCEDURE — 3078F PR MOST RECENT DIASTOLIC BLOOD PRESSURE < 80 MM HG: ICD-10-PCS | Mod: CPTII,S$GLB,, | Performed by: SURGERY

## 2020-02-20 PROCEDURE — 3074F PR MOST RECENT SYSTOLIC BLOOD PRESSURE < 130 MM HG: ICD-10-PCS | Mod: CPTII,S$GLB,, | Performed by: SURGERY

## 2020-02-20 PROCEDURE — 99215 PR OFFICE/OUTPT VISIT, EST, LEVL V, 40-54 MIN: ICD-10-PCS | Mod: S$GLB,,, | Performed by: SURGERY

## 2020-02-24 ENCOUNTER — HOSPITAL ENCOUNTER (OUTPATIENT)
Dept: RADIOLOGY | Facility: HOSPITAL | Age: 65
Discharge: HOME | End: 2020-02-24
Attending: SURGERY
Payer: COMMERCIAL

## 2020-02-24 ENCOUNTER — PATIENT MESSAGE (OUTPATIENT)
Dept: HEMATOLOGY/ONCOLOGY | Facility: CLINIC | Age: 65
End: 2020-02-24

## 2020-02-24 DIAGNOSIS — L03.011 INFECTION OF NAIL BED OF FINGER OF RIGHT HAND: Primary | ICD-10-CM

## 2020-02-24 DIAGNOSIS — C50.912 MALIGNANT NEOPLASM OF LEFT FEMALE BREAST, UNSPECIFIED ESTROGEN RECEPTOR STATUS, UNSPECIFIED SITE OF BREAST: ICD-10-CM

## 2020-02-24 PROCEDURE — 77049 MRI BREAST C-+ W/CAD BI: CPT | Mod: TC

## 2020-02-24 PROCEDURE — A9577 INJ MULTIHANCE: HCPCS | Performed by: SURGERY

## 2020-02-24 PROCEDURE — 77049 MRI BREAST C-+ W/CAD BI: CPT | Mod: 26,,, | Performed by: RADIOLOGY

## 2020-02-24 PROCEDURE — 77049 MRI BREAST W/WO CONTRAST, W/CAD, BILATERAL: ICD-10-PCS | Mod: 26,,, | Performed by: RADIOLOGY

## 2020-02-24 PROCEDURE — 25500020 PHARM REV CODE 255: Performed by: SURGERY

## 2020-02-24 RX ADMIN — GADOBENATE DIMEGLUMINE 19 ML: 529 INJECTION, SOLUTION INTRAVENOUS at 05:02

## 2020-02-26 ENCOUNTER — PATIENT MESSAGE (OUTPATIENT)
Dept: HEMATOLOGY/ONCOLOGY | Facility: CLINIC | Age: 65
End: 2020-02-26

## 2020-02-26 DIAGNOSIS — M79.601 RIGHT ARM PAIN: Primary | ICD-10-CM

## 2020-02-26 DIAGNOSIS — I89.0 LYMPHEDEMA OF RIGHT UPPER EXTREMITY: ICD-10-CM

## 2020-02-26 DIAGNOSIS — L03.011 INFECTION OF NAIL BED OF FINGER OF RIGHT HAND: ICD-10-CM

## 2020-02-26 RX ORDER — CIPROFLOXACIN 500 MG/1
500 TABLET ORAL 2 TIMES DAILY
Qty: 20 TABLET | Refills: 0 | Status: SHIPPED | OUTPATIENT
Start: 2020-02-26 | End: 2020-03-07

## 2020-02-26 RX ORDER — CIPROFLOXACIN 500 MG/1
500 TABLET ORAL 2 TIMES DAILY
Qty: 20 TABLET | Refills: 0 | Status: SHIPPED | OUTPATIENT
Start: 2020-02-26 | End: 2020-02-26 | Stop reason: SDUPTHER

## 2020-02-26 NOTE — TELEPHONE ENCOUNTER
"Returned call to pt.   Pt stated that she is still having odorous fluid coming from thumb nail bed- has been soaking in peroxide.   Informed pt sent message on [facility] that rx for Cipro sent to [facility].   Pt asked that rx be re-routed to [facility] at [facility]---informed would re-route.   Pt stated that her right hand is swollen and painful from elbow to hand---pt reports started this morning.   Pt denies any redness to site.   Discussed swelling with NP, Elsa Reilly, and due to recent US of right extremity would hold off and order compression sleeve as may be lymphedema related.   Informed pt of recommendations and informed would be faxing over compression sleeve order [facility].   Pt knows to notify office if sleeve does not help with pain/swelling in hand and verbalized understanding.         ----- Message from Yamil Harrison sent at 2/26/2020  9:13 AM CST -----  Contact: Maria Elena  Consult/Advisory:    Name Of Caller: Maria Elena   Provider Name: Dr. Villagran   Does patient feel the need to be seen today?   Relationship to the Pt?: Self   Contact Preference?: [phone]  What is the nature of the call?:  Fingers on right hand has swollen up and is painful     Additional Notes:  "Thank you for all that you do for our patients'"        "

## 2020-02-28 ENCOUNTER — OFFICE VISIT (OUTPATIENT)
Dept: URGENT CARE | Facility: CLINIC | Age: 65
End: 2020-02-28
Payer: COMMERCIAL

## 2020-02-28 VITALS
TEMPERATURE: 98 F | WEIGHT: 191 LBS | HEIGHT: 61 IN | HEART RATE: 90 BPM | OXYGEN SATURATION: 100 % | DIASTOLIC BLOOD PRESSURE: 78 MMHG | BODY MASS INDEX: 36.06 KG/M2 | RESPIRATION RATE: 16 BRPM | SYSTOLIC BLOOD PRESSURE: 129 MMHG

## 2020-02-28 DIAGNOSIS — M79.601 RIGHT ARM PAIN: Primary | ICD-10-CM

## 2020-02-28 PROCEDURE — 99213 OFFICE O/P EST LOW 20 MIN: CPT | Mod: S$GLB,,, | Performed by: PHYSICIAN ASSISTANT

## 2020-02-28 PROCEDURE — 99213 PR OFFICE/OUTPT VISIT, EST, LEVL III, 20-29 MIN: ICD-10-PCS | Mod: S$GLB,,, | Performed by: PHYSICIAN ASSISTANT

## 2020-02-28 NOTE — PROGRESS NOTES
"Subjective:       Patient ID: Maria Elena Tavares is a 64 y.o. female.    Vitals:  height is 5' 1" (1.549 m) and weight is 86.6 kg (191 lb). Her oral temperature is 98 °F (36.7 °C). Her blood pressure is 129/78 and her pulse is 90. Her respiration is 16 and oxygen saturation is 100%.     Chief Complaint: Arm Pain (right arm )    Pt states 10/4/19 she started chemo and that is when her right arm began to hurt. Pt rx'd Neurontin, oxycodone but not help with pain. Pt states pain goes from right elbow down to fingers. Pt states fingers are tingling.   Pt states she finished with chemo on 2/11/20 but pain has not subsided.     Arm Pain    The incident occurred more than 1 week ago. There was no injury mechanism. The pain is present in the right fingers and right elbow. The quality of the pain is described as shooting. The pain radiates to the right hand. Associated symptoms include numbness and tingling. Pertinent negatives include no chest pain. She has tried heat (oxycodone ) for the symptoms.       Constitution: Negative for chills, fatigue and fever.   HENT: Negative for congestion and sore throat.    Neck: Negative for painful lymph nodes.   Cardiovascular: Negative for chest pain and leg swelling.   Eyes: Negative for double vision and blurred vision.   Respiratory: Negative for cough and shortness of breath.    Gastrointestinal: Negative for nausea, vomiting and diarrhea.   Genitourinary: Negative for dysuria, frequency, urgency and history of kidney stones.   Musculoskeletal: Negative for joint pain, joint swelling, muscle cramps and muscle ache.   Skin: Negative for color change, pale, rash and bruising.   Allergic/Immunologic: Negative for seasonal allergies.   Neurological: Positive for numbness. Negative for dizziness, history of vertigo, light-headedness, passing out and headaches.   Hematologic/Lymphatic: Negative for swollen lymph nodes.   Psychiatric/Behavioral: Negative for nervous/anxious, sleep disturbance " and depression. The patient is not nervous/anxious.        Objective:      Physical Exam   Constitutional: She appears well-developed and well-nourished. No distress.   HENT:   Head: Normocephalic and atraumatic.   Eyes: EOM are normal.   Neck: Normal range of motion. Neck supple.   Cardiovascular: Intact distal pulses.   1+ radial bilaterally. No unilateral upper extremity edema.   Pulmonary/Chest: No respiratory distress.   Musculoskeletal: Normal range of motion.   No tenderness to RUE   Skin: Skin is warm, dry and not diaphoretic.   Proximal nailbed discoloration to all nails. Bilateral thumbs with mild tenderness to nails themselves; L thumb nailbed may be slightly raised compared to contralateral. No paronychia. No firm swelling or erythema to soft tissues of thumb. No erythema/warmth. Normal cap refill to all fingers.   Nursing note and vitals reviewed.        Assessment:       1. Right arm pain        Plan:         R arm pain x months, worsened recently. Not associated with CP, SOB. Denies CP, SOB, or ABREU. Recent UE u/s negative for DVT. She admits to infrequent R hand swelling, which spontaneously resolved. No edema on today's exam. No tenderness to arm. Pain is exacerbated with prolonged use of her arm. She is R handed. Neurovascularly intact distally. I do not suspect acute process at this time. Continue percocet, gabapentin.    Also with possible nailbed infection; started on cipro 2 days ago. Advised continued abx, continue warm soaks/compression. F/U with hem onc or pcp.    Right arm pain

## 2020-02-28 NOTE — PATIENT INSTRUCTIONS
Continue current pain medication regimen. Continue with compression sleeve fitting. Additional Ibuprofen 600mg may be helpful for pain. Rest, elevate. Follow-up with primary or hem/onc physician for reevaluation. Present to the ED if you experience chest pain or shortness of breath related to arm pain, if persistent swelling to arm.

## 2020-03-02 ENCOUNTER — PATIENT OUTREACH (OUTPATIENT)
Dept: ADMINISTRATIVE | Facility: OTHER | Age: 65
End: 2020-03-02

## 2020-03-03 ENCOUNTER — PATIENT MESSAGE (OUTPATIENT)
Dept: HEMATOLOGY/ONCOLOGY | Facility: CLINIC | Age: 65
End: 2020-03-03

## 2020-03-04 ENCOUNTER — OFFICE VISIT (OUTPATIENT)
Dept: PLASTIC SURGERY | Facility: CLINIC | Age: 65
End: 2020-03-04
Payer: COMMERCIAL

## 2020-03-04 VITALS
SYSTOLIC BLOOD PRESSURE: 142 MMHG | BODY MASS INDEX: 36.82 KG/M2 | WEIGHT: 194.88 LBS | HEART RATE: 96 BPM | DIASTOLIC BLOOD PRESSURE: 96 MMHG

## 2020-03-04 DIAGNOSIS — C50.912 MALIGNANT NEOPLASM OF LEFT FEMALE BREAST, UNSPECIFIED ESTROGEN RECEPTOR STATUS, UNSPECIFIED SITE OF BREAST: Primary | ICD-10-CM

## 2020-03-04 PROCEDURE — 3008F BODY MASS INDEX DOCD: CPT | Mod: CPTII,S$GLB,, | Performed by: SURGERY

## 2020-03-04 PROCEDURE — 99213 OFFICE O/P EST LOW 20 MIN: CPT | Mod: S$GLB,,, | Performed by: SURGERY

## 2020-03-04 PROCEDURE — 3077F PR MOST RECENT SYSTOLIC BLOOD PRESSURE >= 140 MM HG: ICD-10-PCS | Mod: CPTII,S$GLB,, | Performed by: SURGERY

## 2020-03-04 PROCEDURE — 99213 PR OFFICE/OUTPT VISIT, EST, LEVL III, 20-29 MIN: ICD-10-PCS | Mod: S$GLB,,, | Performed by: SURGERY

## 2020-03-04 PROCEDURE — 99999 PR PBB SHADOW E&M-EST. PATIENT-LVL III: CPT | Mod: PBBFAC,,, | Performed by: SURGERY

## 2020-03-04 PROCEDURE — 3080F DIAST BP >= 90 MM HG: CPT | Mod: CPTII,S$GLB,, | Performed by: SURGERY

## 2020-03-04 PROCEDURE — 3077F SYST BP >= 140 MM HG: CPT | Mod: CPTII,S$GLB,, | Performed by: SURGERY

## 2020-03-04 PROCEDURE — 3008F PR BODY MASS INDEX (BMI) DOCUMENTED: ICD-10-PCS | Mod: CPTII,S$GLB,, | Performed by: SURGERY

## 2020-03-04 PROCEDURE — 99999 PR PBB SHADOW E&M-EST. PATIENT-LVL III: ICD-10-PCS | Mod: PBBFAC,,, | Performed by: SURGERY

## 2020-03-04 PROCEDURE — 3080F PR MOST RECENT DIASTOLIC BLOOD PRESSURE >= 90 MM HG: ICD-10-PCS | Mod: CPTII,S$GLB,, | Performed by: SURGERY

## 2020-03-04 NOTE — PROGRESS NOTES
History & Physical    SUBJECTIVE:   Chief complaint: breast cancer    History of Present Illness:  64 y.o. female with breast cancer.  Has completed neoadjuvant chemotherapy and recently underwent post treatment MRI.  She presents today to discuss reconstruction options.  She currently is unsure if she will have a bilateral or unilateral mastectomy.  Also unknown if she will need radiation.     States she has been doing well other than a presyncopal episode at the end of February after which her blood pressure medicine was changed.      Past Medical History:   Diagnosis Date    BMI 37.0-37.9, adult     Breast cancer 09/05/2019     Left breast, IDC with lymph node metastisis    Diabetes mellitus type II     Diverticulosis     history of diverticulosisseen on colonoscopy at age 48. Repeat recommended at age 58. Done by     Elevated blood protein     History of elevated protein. Apparently has seen  for extensive workup including bone marrow biopsy    History of shingles 2006    Hyperlipidemia     Hypertension     Microalbuminuria     due 2 diabetes    Mild vitamin D deficiency     . A low vitamin D    Thyroid disease     hypothyroidism       Past Surgical History:   Procedure Laterality Date    BREAST BIOPSY Left 09/05/2019    IDC with mets to node    BREAST BIOPSY Right 09/26/2019    core bx, benign node    BREAST BIOPSY Left 10/14/2019    MRI Core bx, + IDC    BREAST BIOPSY Left 10/08/2019    Core bx, ADH    HYSTERECTOMY      INSERTION OF TUNNELED CENTRAL VENOUS CATHETER (CVC) WITH SUBCUTANEOUS PORT Right 10/4/2019    Procedure: [id number] RIGHT (CONSENT AM OF) 1.0 hr case;  Surgeon: Elena Lopez MD;  Location: [facility] OR [address] [place] [address];  Service: General;  Laterality: Right;    OOPHORECTOMY         Current Outpatient Medications   Medication Sig Dispense Refill    aspirin (ECOTRIN) 81 MG EC tablet Take 81 mg by mouth once daily.        blood-glucose meter kit DISPENSE : BLOOD  TEST STRIPS AND LANCETS PATIENT TEST BLOOD SUGARS TWICE DAILY  TEST STRIPS: 50 EACH, REFILL 5  LANCETS:  50 EACH , REFILL 5 1 each 0    chlorthalidone (HYGROTEN) 25 MG Tab Take 1 tablet (25 mg total) by mouth every morning. Stop losartan hctz 100/25. (Patient not taking: Reported on 2/28/2020) 90 tablet 1    chlorthalidone (HYGROTEN) 25 MG Tab Take 1 tablet by mouth every morning (Patient not taking: Reported on 2/20/2020) 90 tablet 1    ciprofloxacin HCl (CIPRO) 500 MG tablet Take 1 tablet (500 mg total) by mouth 2 (two) times daily. for 10 days 20 tablet 0    dexAMETHasone (DECADRON) 4 MG Tab Take 1 tablet by mouth twice daily the day before chemo and for 2 days after. (Patient not taking: Reported on 2/20/2020) 24 tablet 0    dulaglutide (TRULICITY) 1.5 mg/0.5 mL PnIj Inject 1.5 mg into the skin every 7 days. 2 mL 5    gabapentin (NEURONTIN) 100 MG capsule Take 2 capsules (200 mg total) by mouth 3 (three) times daily. 90 capsule 2    irbesartan (AVAPRO) 300 MG tablet Take 1 tablet (300 mg total) by mouth once daily. Stop valsartan 320. (Patient not taking: Reported on 2/20/2020) 90 tablet 1    irbesartan (AVAPRO) 300 MG tablet Take 1 tablet by mouth once daily (Patient not taking: Reported on 2/20/2020) 90 tablet 1    levothyroxine (SYNTHROID) 200 MCG tablet Take 1 tablet (200 mcg total) by mouth once daily. 90 tablet 1    levothyroxine (SYNTHROID) 200 MCG tablet Take 1 tablet by mouth once daily (Patient not taking: Reported on 2/28/2020) 90 tablet 1    lifitegrast (XIIDRA) 5 % Dpet Apply 1 drop to  both eyes  2 (two) times daily. 180 each 4    magic mouthwash diphen/antac/lidoc/nysta Take 10 mLs by mouth 4 (four) times daily. 120 mL 0    metFORMIN (GLUCOPHAGE) 1000 MG tablet TAKE 1 TABLET (1,000 MG TOTAL) BY MOUTH 2 (TWO) TIMES DAILY WITH MEALS. 180 tablet 1    omeprazole (PRILOSEC) 20 MG capsule Take 1 capsule (20 mg total) by mouth once daily. 30 capsule 0    ondansetron (ZOFRAN-ODT) 4 MG TbDL  Dissolve 1 tablet (4 mg total) by mouth every 6 (six) hours as needed. 60 tablet 2    ondansetron (ZOFRAN-ODT) 8 MG TbDL Dissolve 1 tablet (8 mg total) by mouth every 8 (eight) hours as needed (nausea). 30 tablet 1    oxyCODONE-acetaminophen (PERCOCET) 5-325 mg per tablet Take 1 tablet by mouth every 4 (four) hours as needed for Pain. 60 tablet 0    pregabalin (LYRICA) 150 MG capsule Take 1 capsule (150 mg total) by mouth once. for 1 dose (Patient not taking: Reported on 2/20/2020) 60 capsule 2    promethazine (PHENERGAN) 25 MG tablet Take 1 tablet (25 mg total) by mouth every 4 (four) hours. (Patient not taking: Reported on 2/20/2020) 30 tablet 1     No current facility-administered medications for this visit.        Review of patient's allergies indicates:  No Known Allergies      Review of Systems:  Review of systems negative except as pertinent positive and negatives  listed above      OBJECTIVE:     BP (!) 142/96   Pulse 96   Wt 88.4 kg (194 lb 14.2 oz)   BMI 36.82 kg/m²       Physical Exam:  Gen: NAD, Aox3  Neuro: no focal deficits  HEENT: NCAT, neck supple  CV: RRR  Pulm: Breathing non-labored, chest wall movement equal bilaterally  Breast: bilateral macromastia with grade 3 ptosis.  No palpable masses  Abdomen: soft, nontender, no guarding  Gu: no rashes or wounds  Extremity:normal strength, no cyanosis or edema  Psych: normal mood and affect          ASSESSMENT/PLAN:     Mrs Tavares is 65 yo female with left sided breast cancer, who has now finished chemotherapy.  Post treatment MRI demonstrated decreasing size of the known left breast malignancy, multiple satellite lesions have resolved and level 1 biopsy proven positive node resolved.      At this time decision for bilateral vs unilateral mastectomy and need for adjuvant radiation has not been made.    Discussed with her that regardless of unilateral vs bilateral mastectomy with the possible need for radiation we will proceed with tissue expander  placement as a bridge to implants or autologous tissue reconstruction.      She is tentatively scheduled for surgery on 24 March.  Will discuss with Dr Lopez and plan to proceed with tissue expander placement.    PAYTON Mcnair MD, FACS  Plastic Surgery Fellow

## 2020-03-05 ENCOUNTER — OFFICE VISIT (OUTPATIENT)
Dept: HEMATOLOGY/ONCOLOGY | Facility: CLINIC | Age: 65
End: 2020-03-05
Payer: COMMERCIAL

## 2020-03-05 ENCOUNTER — PATIENT OUTREACH (OUTPATIENT)
Dept: OTHER | Facility: OTHER | Age: 65
End: 2020-03-05

## 2020-03-05 VITALS
SYSTOLIC BLOOD PRESSURE: 178 MMHG | HEART RATE: 83 BPM | TEMPERATURE: 98 F | DIASTOLIC BLOOD PRESSURE: 81 MMHG | WEIGHT: 198 LBS | BODY MASS INDEX: 38.87 KG/M2 | HEIGHT: 60 IN | RESPIRATION RATE: 18 BRPM | OXYGEN SATURATION: 97 %

## 2020-03-05 DIAGNOSIS — G62.9 NEUROPATHY: ICD-10-CM

## 2020-03-05 PROCEDURE — 3008F PR BODY MASS INDEX (BMI) DOCUMENTED: ICD-10-PCS | Mod: CPTII,S$GLB,, | Performed by: INTERNAL MEDICINE

## 2020-03-05 PROCEDURE — 3079F PR MOST RECENT DIASTOLIC BLOOD PRESSURE 80-89 MM HG: ICD-10-PCS | Mod: CPTII,S$GLB,, | Performed by: INTERNAL MEDICINE

## 2020-03-05 PROCEDURE — 99999 PR PBB SHADOW E&M-EST. PATIENT-LVL IV: ICD-10-PCS | Mod: PBBFAC,,, | Performed by: INTERNAL MEDICINE

## 2020-03-05 PROCEDURE — 3077F PR MOST RECENT SYSTOLIC BLOOD PRESSURE >= 140 MM HG: ICD-10-PCS | Mod: CPTII,S$GLB,, | Performed by: INTERNAL MEDICINE

## 2020-03-05 PROCEDURE — 99212 OFFICE O/P EST SF 10 MIN: CPT | Mod: S$GLB,,, | Performed by: INTERNAL MEDICINE

## 2020-03-05 PROCEDURE — 99999 PR PBB SHADOW E&M-EST. PATIENT-LVL IV: CPT | Mod: PBBFAC,,, | Performed by: INTERNAL MEDICINE

## 2020-03-05 PROCEDURE — 3008F BODY MASS INDEX DOCD: CPT | Mod: CPTII,S$GLB,, | Performed by: INTERNAL MEDICINE

## 2020-03-05 PROCEDURE — 3079F DIAST BP 80-89 MM HG: CPT | Mod: CPTII,S$GLB,, | Performed by: INTERNAL MEDICINE

## 2020-03-05 PROCEDURE — 99212 PR OFFICE/OUTPT VISIT, EST, LEVL II, 10-19 MIN: ICD-10-PCS | Mod: S$GLB,,, | Performed by: INTERNAL MEDICINE

## 2020-03-05 PROCEDURE — 3077F SYST BP >= 140 MM HG: CPT | Mod: CPTII,S$GLB,, | Performed by: INTERNAL MEDICINE

## 2020-03-05 NOTE — PROGRESS NOTES
Nail changes with Taxol  Worsening neuropathy   Right hand with increased pain ? Preexisting carpal tunnel  Sometimes pain from elbow down today just the right hand    Nerve conduction and referral to hand surgeon post    10 minutes total

## 2020-03-05 NOTE — PROGRESS NOTES
Last 5 Patient Entered Readings                                      Current 30 Day Average: 124/81     Recent Readings 2/27/2020 2/27/2020 10/2/2019 10/2/2019 9/22/2019    SBP (mmHg) 124 142 115 110 128    DBP (mmHg) 72 90 77 70 81    Pulse 94 92 93 109 102        Hypertension Medications             chlorthalidone (HYGROTEN) 25 MG Tab Take 1 tablet (25 mg total) by mouth every morning. Stop losartan hctz 100/25.    chlorthalidone (HYGROTEN) 25 MG Tab Take 1 tablet by mouth every morning    irbesartan (AVAPRO) 300 MG tablet Take 1 tablet (300 mg total) by mouth once daily. Stop valsartan 320.    irbesartan (AVAPRO) 300 MG tablet Take 1 tablet by mouth once daily        Within the past month, patient has only taken 2 readings on 2/27/20. HC attempted to reach patient on 2/7 and 2/18 to discuss lack of readings, need for hiatus? Will continue to monitor. Will follow up in 6 weeks.

## 2020-03-06 ENCOUNTER — PATIENT MESSAGE (OUTPATIENT)
Dept: HEMATOLOGY/ONCOLOGY | Facility: CLINIC | Age: 65
End: 2020-03-06

## 2020-03-06 ENCOUNTER — TELEPHONE (OUTPATIENT)
Dept: PLASTIC SURGERY | Facility: CLINIC | Age: 65
End: 2020-03-06

## 2020-03-06 DIAGNOSIS — C50.912 MALIGNANT NEOPLASM OF LEFT FEMALE BREAST, UNSPECIFIED ESTROGEN RECEPTOR STATUS, UNSPECIFIED SITE OF BREAST: Primary | ICD-10-CM

## 2020-03-06 DIAGNOSIS — G62.9 NEUROPATHY: Primary | ICD-10-CM

## 2020-03-09 ENCOUNTER — TELEPHONE (OUTPATIENT)
Dept: HEMATOLOGY/ONCOLOGY | Facility: CLINIC | Age: 65
End: 2020-03-09

## 2020-03-09 ENCOUNTER — TELEPHONE (OUTPATIENT)
Dept: ADMINISTRATIVE | Facility: OTHER | Age: 65
End: 2020-03-09

## 2020-03-09 NOTE — TELEPHONE ENCOUNTER
Left voice message for patient to return call to schedule appointment from referral to Neurology department.  Lacey BACON [phone]

## 2020-03-09 NOTE — TELEPHONE ENCOUNTER
Left VM for PT to return call to clinic. Next [facility] appointment will be after her procedure at [place]. Appointment on 3/12 was an override to get PT seen before procedure.

## 2020-03-11 ENCOUNTER — LAB VISIT (OUTPATIENT)
Dept: LAB | Facility: HOSPITAL | Age: 65
End: 2020-03-11
Payer: COMMERCIAL

## 2020-03-11 ENCOUNTER — OFFICE VISIT (OUTPATIENT)
Dept: PRIMARY CARE CLINIC | Facility: CLINIC | Age: 65
End: 2020-03-11
Attending: FAMILY MEDICINE
Payer: COMMERCIAL

## 2020-03-11 ENCOUNTER — PATIENT MESSAGE (OUTPATIENT)
Dept: SURGERY | Facility: HOSPITAL | Age: 65
End: 2020-03-11

## 2020-03-11 VITALS
DIASTOLIC BLOOD PRESSURE: 96 MMHG | SYSTOLIC BLOOD PRESSURE: 134 MMHG | BODY MASS INDEX: 37.33 KG/M2 | OXYGEN SATURATION: 98 % | HEIGHT: 60 IN | WEIGHT: 190.13 LBS | HEART RATE: 101 BPM

## 2020-03-11 DIAGNOSIS — E03.9 HYPOTHYROIDISM, UNSPECIFIED TYPE: ICD-10-CM

## 2020-03-11 DIAGNOSIS — C77.3 BREAST CANCER METASTASIZED TO AXILLARY LYMPH NODE, LEFT: ICD-10-CM

## 2020-03-11 DIAGNOSIS — C50.912 BREAST CANCER METASTASIZED TO AXILLARY LYMPH NODE, LEFT: ICD-10-CM

## 2020-03-11 DIAGNOSIS — I10 HYPERTENSION, UNSPECIFIED TYPE: ICD-10-CM

## 2020-03-11 LAB
ESTIMATED AVG GLUCOSE: 137 MG/DL (ref 68–131)
HBA1C MFR BLD HPLC: 6.4 % (ref 4–5.6)

## 2020-03-11 PROCEDURE — 90732 PPSV23 VACC 2 YRS+ SUBQ/IM: CPT | Mod: S$GLB,,, | Performed by: FAMILY MEDICINE

## 2020-03-11 PROCEDURE — 3080F DIAST BP >= 90 MM HG: CPT | Mod: CPTII,S$GLB,, | Performed by: FAMILY MEDICINE

## 2020-03-11 PROCEDURE — 3051F PR MOST RECENT HEMOGLOBIN A1C LEVEL 7.0 - < 8.0%: ICD-10-PCS | Mod: CPTII,S$GLB,, | Performed by: FAMILY MEDICINE

## 2020-03-11 PROCEDURE — 99214 OFFICE O/P EST MOD 30 MIN: CPT | Mod: 25,S$GLB,, | Performed by: FAMILY MEDICINE

## 2020-03-11 PROCEDURE — 3051F HG A1C>EQUAL 7.0%<8.0%: CPT | Mod: CPTII,S$GLB,, | Performed by: FAMILY MEDICINE

## 2020-03-11 PROCEDURE — 90471 IMMUNIZATION ADMIN: CPT | Mod: S$GLB,,, | Performed by: FAMILY MEDICINE

## 2020-03-11 PROCEDURE — 99999 PR PBB SHADOW E&M-EST. PATIENT-LVL III: ICD-10-PCS | Mod: PBBFAC,,, | Performed by: FAMILY MEDICINE

## 2020-03-11 PROCEDURE — 99999 PR PBB SHADOW E&M-EST. PATIENT-LVL III: CPT | Mod: PBBFAC,,, | Performed by: FAMILY MEDICINE

## 2020-03-11 PROCEDURE — 83036 HEMOGLOBIN GLYCOSYLATED A1C: CPT

## 2020-03-11 PROCEDURE — 36415 COLL VENOUS BLD VENIPUNCTURE: CPT | Mod: PN

## 2020-03-11 PROCEDURE — 3008F BODY MASS INDEX DOCD: CPT | Mod: CPTII,S$GLB,, | Performed by: FAMILY MEDICINE

## 2020-03-11 PROCEDURE — 99214 PR OFFICE/OUTPT VISIT, EST, LEVL IV, 30-39 MIN: ICD-10-PCS | Mod: 25,S$GLB,, | Performed by: FAMILY MEDICINE

## 2020-03-11 PROCEDURE — 3008F PR BODY MASS INDEX (BMI) DOCUMENTED: ICD-10-PCS | Mod: CPTII,S$GLB,, | Performed by: FAMILY MEDICINE

## 2020-03-11 PROCEDURE — 90732 PNEUMOCOCCAL POLYSACCHARIDE VACCINE 23-VALENT =>2YO SQ IM: ICD-10-PCS | Mod: S$GLB,,, | Performed by: FAMILY MEDICINE

## 2020-03-11 PROCEDURE — 3075F SYST BP GE 130 - 139MM HG: CPT | Mod: CPTII,S$GLB,, | Performed by: FAMILY MEDICINE

## 2020-03-11 PROCEDURE — 3075F PR MOST RECENT SYSTOLIC BLOOD PRESS GE 130-139MM HG: ICD-10-PCS | Mod: CPTII,S$GLB,, | Performed by: FAMILY MEDICINE

## 2020-03-11 PROCEDURE — 3080F PR MOST RECENT DIASTOLIC BLOOD PRESSURE >= 90 MM HG: ICD-10-PCS | Mod: CPTII,S$GLB,, | Performed by: FAMILY MEDICINE

## 2020-03-11 PROCEDURE — 90471 PNEUMOCOCCAL POLYSACCHARIDE VACCINE 23-VALENT =>2YO SQ IM: ICD-10-PCS | Mod: S$GLB,,, | Performed by: FAMILY MEDICINE

## 2020-03-11 RX ORDER — CHLORTHALIDONE 25 MG/1
TABLET ORAL
Qty: 90 TABLET | Refills: 1 | Status: SHIPPED | OUTPATIENT
Start: 2020-03-11 | End: 2020-11-27 | Stop reason: SDUPTHER

## 2020-03-11 RX ORDER — IRBESARTAN 300 MG/1
TABLET ORAL
Qty: 90 TABLET | Refills: 1 | Status: SHIPPED | OUTPATIENT
Start: 2020-03-11 | End: 2020-11-30 | Stop reason: SDUPTHER

## 2020-03-11 RX ORDER — LEVOTHYROXINE SODIUM 200 UG/1
200 TABLET ORAL DAILY
Qty: 90 TABLET | Refills: 1 | Status: SHIPPED | OUTPATIENT
Start: 2020-03-11 | End: 2022-06-28 | Stop reason: SDUPTHER

## 2020-03-11 NOTE — PROGRESS NOTES
After obtaining consent, and per orders of Dr. San, injection of pneumonia 23 Lot [id number] Exp 12/3/20 given in the LD by CHRYSTAL CONNOLLY. Patient tolerated well and band aid applied. Patient instructed to remain in clinic for 15 minutes afterwards, and to report any adverse reaction to me immediately.

## 2020-03-11 NOTE — PROGRESS NOTES
Subjective:       Patient ID: Maria Elena Tavares is a 64 y.o. female.    Chief Complaint: Diabetes    Pt presents today for 6 mos f/u DM and HTN. Pt has finished chemo for recent dx of breast CA. Will be having double mastectomy 3/24. Is in good spirits  Takes steroids prior to meds. Blood sugars have been up and down per pt. She is here for an a1c and to touch base with me.     States that during chemo her pressures bottom out to where she passed out. She is scared now to go too low      Hypertension   Pertinent negatives include no chest pain, headaches, palpitations or shortness of breath.   Diabetes   Pertinent negatives for hypoglycemia include no confusion, dizziness or headaches. Pertinent negatives for diabetes include no chest pain, no polydipsia, no polyuria and no weakness.     Review of Systems   Constitutional: Negative.    HENT: Negative for hearing loss, rhinorrhea and trouble swallowing.    Eyes: Negative.    Respiratory: Negative for cough, chest tightness, shortness of breath and wheezing.    Cardiovascular: Negative for chest pain, palpitations and leg swelling.   Gastrointestinal: Negative.    Endocrine: Negative for polydipsia and polyuria.   Musculoskeletal: Negative.  Negative for joint swelling.   Skin: Negative.    Neurological: Negative for dizziness, weakness, light-headedness and headaches.   Psychiatric/Behavioral: Negative for confusion and dysphoric mood.   All other systems reviewed and are negative.      Objective:      Physical Exam   Constitutional: She is oriented to person, place, and time. She appears well-developed and well-nourished.   HENT:   Head: Normocephalic and atraumatic.   Eyes: Pupils are equal, round, and reactive to light. Conjunctivae and EOM are normal.   Neck: Normal range of motion. Neck supple. No thyromegaly present.   Cardiovascular: Normal rate, regular rhythm, normal heart sounds and intact distal pulses.   No murmur heard.  Pulmonary/Chest: Effort normal and  breath sounds normal. No respiratory distress. She has no wheezes. She has no rales. She exhibits no tenderness.   Abdominal: Soft. Bowel sounds are normal. She exhibits no distension and no mass. There is no tenderness. There is no rebound and no guarding. No hernia.   Musculoskeletal: Normal range of motion. She exhibits no edema.   Lymphadenopathy:     She has no cervical adenopathy.   Neurological: She is alert and oriented to person, place, and time. She displays normal reflexes. No cranial nerve deficit or sensory deficit. She exhibits normal muscle tone. Coordination normal.   Skin: Skin is warm. No rash noted. No erythema.   Psychiatric: She has a normal mood and affect. Her behavior is normal. Judgment and thought content normal.       Assessment:       1. Hypertension, unspecified type    2. Hypothyroidism, unspecified type    3. Uncontrolled type 2 diabetes mellitus without complication, without long-term current use of insulin        Plan:       HTN controlled when she takes her meds. Was too low and will not adjust since pt is fearful of falling  DMT2: stable but a1c is overdue.   Breast CA: surgery 3/24  RTC prn symptoms or q 4-6 mos pending labs  Hypertension, unspecified type  -     chlorthalidone (HYGROTEN) 25 MG Tab; Take 1 tablet by mouth every morning  Dispense: 90 tablet; Refill: 1  -     irbesartan (AVAPRO) 300 MG tablet; Take 1 tablet by mouth once daily  Dispense: 90 tablet; Refill: 1    Hypothyroidism, unspecified type  -     levothyroxine (SYNTHROID) 200 MCG tablet; Take 1 tablet (200 mcg total) by mouth once daily.  Dispense: 90 tablet; Refill: 1    Uncontrolled type 2 diabetes mellitus without complication, without long-term current use of insulin  -     dulaglutide (TRULICITY) 1.5 mg/0.5 mL PnIj; Inject 1.5 mg into the skin every 7 days.  Dispense: 2 mL; Refill: 5  -     Hemoglobin A1c; Future; Expected date: 03/11/2020    Other orders  -     (In Office Administered) Pneumococcal  Polysaccharide Vaccine (23 Valent) (SQ/IM)      RTC prn symptoms

## 2020-03-12 ENCOUNTER — CLINICAL SUPPORT (OUTPATIENT)
Dept: PLASTIC SURGERY | Facility: CLINIC | Age: 65
End: 2020-03-12
Payer: COMMERCIAL

## 2020-03-12 ENCOUNTER — PROCEDURE VISIT (OUTPATIENT)
Dept: NEUROLOGY | Facility: CLINIC | Age: 65
End: 2020-03-12
Payer: COMMERCIAL

## 2020-03-12 ENCOUNTER — TELEPHONE (OUTPATIENT)
Dept: HEMATOLOGY/ONCOLOGY | Facility: CLINIC | Age: 65
End: 2020-03-12

## 2020-03-12 ENCOUNTER — HOSPITAL ENCOUNTER (OUTPATIENT)
Dept: CARDIOLOGY | Facility: CLINIC | Age: 65
Discharge: HOME | End: 2020-03-12
Payer: COMMERCIAL

## 2020-03-12 ENCOUNTER — LAB VISIT (OUTPATIENT)
Dept: LAB | Facility: HOSPITAL | Age: 65
End: 2020-03-12
Attending: SURGERY
Payer: COMMERCIAL

## 2020-03-12 DIAGNOSIS — G62.9 NEUROPATHY: ICD-10-CM

## 2020-03-12 DIAGNOSIS — C50.912 MALIGNANT NEOPLASM OF LEFT FEMALE BREAST, UNSPECIFIED ESTROGEN RECEPTOR STATUS, UNSPECIFIED SITE OF BREAST: ICD-10-CM

## 2020-03-12 LAB
ALBUMIN SERPL BCP-MCNC: 4.2 G/DL (ref 3.5–5.2)
ALP SERPL-CCNC: 80 U/L (ref 55–135)
ALT SERPL W/O P-5'-P-CCNC: 10 U/L (ref 10–44)
ANION GAP SERPL CALC-SCNC: 11 MMOL/L (ref 8–16)
AST SERPL-CCNC: 18 U/L (ref 10–40)
BASOPHILS # BLD AUTO: 0.02 K/UL (ref 0–0.2)
BASOPHILS NFR BLD: 0.5 % (ref 0–1.9)
BILIRUB SERPL-MCNC: 0.6 MG/DL (ref 0.1–1)
BUN SERPL-MCNC: 19 MG/DL (ref 8–23)
CALCIUM SERPL-MCNC: 10.5 MG/DL (ref 8.7–10.5)
CHLORIDE SERPL-SCNC: 99 MMOL/L (ref 95–110)
CO2 SERPL-SCNC: 28 MMOL/L (ref 23–29)
CREAT SERPL-MCNC: 0.9 MG/DL (ref 0.5–1.4)
DIFFERENTIAL METHOD: ABNORMAL
EOSINOPHIL # BLD AUTO: 0.1 K/UL (ref 0–0.5)
EOSINOPHIL NFR BLD: 3.1 % (ref 0–8)
ERYTHROCYTE [DISTWIDTH] IN BLOOD BY AUTOMATED COUNT: 15 % (ref 11.5–14.5)
EST. GFR  (AFRICAN AMERICAN): >60 ML/MIN/1.73 M^2
EST. GFR  (NON AFRICAN AMERICAN): >60 ML/MIN/1.73 M^2
GLUCOSE SERPL-MCNC: 93 MG/DL (ref 70–110)
HCT VFR BLD AUTO: 37.3 % (ref 37–48.5)
HGB BLD-MCNC: 11.3 G/DL (ref 12–16)
IMM GRANULOCYTES # BLD AUTO: 0.01 K/UL (ref 0–0.04)
IMM GRANULOCYTES NFR BLD AUTO: 0.3 % (ref 0–0.5)
LYMPHOCYTES # BLD AUTO: 0.7 K/UL (ref 1–4.8)
LYMPHOCYTES NFR BLD: 19 % (ref 18–48)
MCH RBC QN AUTO: 31 PG (ref 27–31)
MCHC RBC AUTO-ENTMCNC: 30.3 G/DL (ref 32–36)
MCV RBC AUTO: 103 FL (ref 82–98)
MONOCYTES # BLD AUTO: 0.7 K/UL (ref 0.3–1)
MONOCYTES NFR BLD: 16.9 % (ref 4–15)
NEUTROPHILS # BLD AUTO: 2.4 K/UL (ref 1.8–7.7)
NEUTROPHILS NFR BLD: 60.2 % (ref 38–73)
NRBC BLD-RTO: 0 /100 WBC
PLATELET # BLD AUTO: 278 K/UL (ref 150–350)
PMV BLD AUTO: 10 FL (ref 9.2–12.9)
POTASSIUM SERPL-SCNC: 3.5 MMOL/L (ref 3.5–5.1)
PROT SERPL-MCNC: 8 G/DL (ref 6–8.4)
RBC # BLD AUTO: 3.64 M/UL (ref 4–5.4)
SODIUM SERPL-SCNC: 138 MMOL/L (ref 136–145)
WBC # BLD AUTO: 3.9 K/UL (ref 3.9–12.7)

## 2020-03-12 PROCEDURE — 93010 ELECTROCARDIOGRAM REPORT: CPT | Mod: S$GLB,,, | Performed by: INTERNAL MEDICINE

## 2020-03-12 PROCEDURE — 85025 COMPLETE CBC W/AUTO DIFF WBC: CPT

## 2020-03-12 PROCEDURE — 95913 NRV CNDJ TEST 13/> STUDIES: CPT | Mod: S$GLB,,, | Performed by: PSYCHIATRY & NEUROLOGY

## 2020-03-12 PROCEDURE — 95913 PR NERVE CONDUCTION STUDY; 13 OR MORE STUDIES: ICD-10-PCS | Mod: S$GLB,,, | Performed by: PSYCHIATRY & NEUROLOGY

## 2020-03-12 PROCEDURE — 93010 EKG 12-LEAD: ICD-10-PCS | Mod: S$GLB,,, | Performed by: INTERNAL MEDICINE

## 2020-03-12 PROCEDURE — 95886 PR EMG COMPLETE, W/ NERVE CONDUCTION STUDIES, 5+ MUSCLES: ICD-10-PCS | Mod: S$GLB,,, | Performed by: PSYCHIATRY & NEUROLOGY

## 2020-03-12 PROCEDURE — 80053 COMPREHEN METABOLIC PANEL: CPT

## 2020-03-12 PROCEDURE — 36415 COLL VENOUS BLD VENIPUNCTURE: CPT

## 2020-03-12 PROCEDURE — 95886 MUSC TEST DONE W/N TEST COMP: CPT | Mod: S$GLB,,, | Performed by: PSYCHIATRY & NEUROLOGY

## 2020-03-12 PROCEDURE — 93005 ELECTROCARDIOGRAM TRACING: CPT | Mod: S$GLB,,, | Performed by: SURGERY

## 2020-03-12 PROCEDURE — 99999 PR PBB SHADOW E&M-EST. PATIENT-LVL II: ICD-10-PCS | Mod: PBBFAC,,,

## 2020-03-12 PROCEDURE — 93005 EKG 12-LEAD: ICD-10-PCS | Mod: S$GLB,,, | Performed by: SURGERY

## 2020-03-12 PROCEDURE — 99999 PR PBB SHADOW E&M-EST. PATIENT-LVL II: CPT | Mod: PBBFAC,,,

## 2020-03-12 NOTE — TELEPHONE ENCOUNTER
Message fwd to MD.     ----- Message from Suly Smith sent at 3/12/2020 11:35 AM CDT -----  Contact: kayla  tel:   [phone]   Wants test results of this a.m.      Returning your call.

## 2020-03-12 NOTE — PROGRESS NOTES
Pre-op instructions reviewed with pt. for surgery scheduled 3/24/2020. Questions answered. Pt verbalized understanding.

## 2020-03-12 NOTE — TELEPHONE ENCOUNTER
Message fwd to MD.         ----- Message from Julia Dixon sent at 3/12/2020  1:27 PM CDT -----  Contact: self  Type:  Patient Returning Call    Who Called:   Patient  Returning missed call to nurse   Who Left Message for Patient:  Does the patient know what this is regarding?  Would the patient rather a call back or a response via [facility]?call  Best Call Back Number:[phone]  Additional Information:

## 2020-03-12 NOTE — TELEPHONE ENCOUNTER
----- Message from Tere Villagran MD sent at 3/12/2020 11:22 AM CDT -----  Left her a message to return call to me regarding results

## 2020-03-13 ENCOUNTER — TELEPHONE (OUTPATIENT)
Dept: HEMATOLOGY/ONCOLOGY | Facility: CLINIC | Age: 65
End: 2020-03-13

## 2020-03-13 ENCOUNTER — PATIENT MESSAGE (OUTPATIENT)
Dept: SURGERY | Facility: HOSPITAL | Age: 65
End: 2020-03-13

## 2020-03-13 NOTE — TELEPHONE ENCOUNTER
----- Message from Tere Villagran MD sent at 3/13/2020  9:11 AM CDT -----  Contact: kayla  tel:   [phone]   Called   Can refer to hand surgery but after breast surgery- will rediscuss at next visit    ----- Message -----  From: Radha Paige  Sent: 3/12/2020  11:38 AM CDT  To: Tere Villagran MD        ----- Message -----  From: Suly Smith  Sent: 3/12/2020  11:35 AM CDT  To: [facility] Staff    Wants test results of this a.m.      Returning your call.

## 2020-03-16 ENCOUNTER — PATIENT MESSAGE (OUTPATIENT)
Dept: HEMATOLOGY/ONCOLOGY | Facility: CLINIC | Age: 65
End: 2020-03-16

## 2020-03-16 DIAGNOSIS — T45.1X5A NEUROPATHY DUE TO CHEMOTHERAPEUTIC DRUG: ICD-10-CM

## 2020-03-16 DIAGNOSIS — C77.3 BREAST CANCER METASTASIZED TO AXILLARY LYMPH NODE, LEFT: ICD-10-CM

## 2020-03-16 DIAGNOSIS — C50.912 BREAST CANCER METASTASIZED TO AXILLARY LYMPH NODE, LEFT: ICD-10-CM

## 2020-03-16 DIAGNOSIS — G62.0 NEUROPATHY DUE TO CHEMOTHERAPEUTIC DRUG: ICD-10-CM

## 2020-03-16 RX ORDER — OXYCODONE AND ACETAMINOPHEN 5; 325 MG/1; MG/1
1 TABLET ORAL EVERY 4 HOURS PRN
Qty: 60 TABLET | Refills: 0 | Status: ON HOLD | OUTPATIENT
Start: 2020-03-16 | End: 2020-03-24 | Stop reason: HOSPADM

## 2020-03-16 NOTE — PROGRESS NOTES
New Breast Cancer  History and Physical  [facility]  Department of Surgery    REFERRING PROVIDER: No referring provider defined for this encounter.    CHIEF COMPLAINT: left breast cancer    Subjective:      Maria Elena Tavares is a 64 y.o. postmenopausal female referred for evaluation of recently diagnosed carcinoma of the left breast. The patient was initially referred for surgical evaluation of a breast lump felt by the patient and an abnormal mammogram first noted 2019. Follow-up imaging showed mass at 3 o'clock in the left breast. A ultrasound guided biopsy was performed on 2019 with pathology revealing infiltrating ductal carcinoma of the breast.     Patient does routinely do self breast exams.  Patient has noted a change on breast exam.  Patient denies nipple discharge. Patient denies to previous breast biopsy. Patient denies a personal history of breast cancer.    Findings at that time were the following:   Tumor size: 2 cm   Tumor thgthrthathdtheth:th th4th Estrogen Receptor: +, 90%   Progesterone Receptor:+ 30%  Her-2 alvarado: -   Ki67:  60%  Lymph node status: positive       INTERVAL HISTORY: 2020  Patient doing well. Completed neoadjuvant chemo.  Last dose of chemo was 2020.  Here to discuss options.      GYN History:  Age of menarche was 11. Age of menopause was 50.  Patient denies hormonal therapy. Patient is . Age of first live birth was 28. Patient did not breast feed.    FAMILY History:  Maternal aunt stomach cancer  Mat aunt lung cancer  Mat aunt colon cancer 60s  Mother lung ca 65  Father lung ca 42  Pat aunt breast ca 60s  Paternal aunt breast ca 60s    Past Medical History:   Diagnosis Date    BMI 37.0-37.9, adult     Breast cancer 2019     Left breast, IDC with lymph node metastisis    Diabetes mellitus type II     Diverticulosis     history of diverticulosisseen on colonoscopy at age 48. Repeat recommended at age 58. Done by     Elevated blood protein     History of  elevated protein. Apparently has seen  for extensive workup including bone marrow biopsy    History of shingles 2006    Hyperlipidemia     Hypertension     Microalbuminuria     due 2 diabetes    Mild vitamin D deficiency     . A low vitamin D    Thyroid disease     hypothyroidism     Past Surgical History:   Procedure Laterality Date    BREAST BIOPSY Left 09/05/2019    IDC with mets to node    BREAST BIOPSY Right 09/26/2019    core bx, benign node    BREAST BIOPSY Left 10/14/2019    MRI Core bx, + IDC    BREAST BIOPSY Left 10/08/2019    Core bx, ADH    HYSTERECTOMY      INSERTION OF TUNNELED CENTRAL VENOUS CATHETER (CVC) WITH SUBCUTANEOUS PORT Right 10/4/2019    Procedure: AHIRZIAIZ-SIIV-A-CATH RIGHT (CONSENT AM OF) 1.0 hr case;  Surgeon: Elena Lopez MD;  Location: [facility] OR [address] [place] [address];  Service: General;  Laterality: Right;    OOPHORECTOMY       Current Outpatient Medications on File Prior to Visit   Medication Sig Dispense Refill    gabapentin (NEURONTIN) 100 MG capsule Take 2 capsules (200 mg total) by mouth 3 (three) times daily. 90 capsule 2    lifitegrast (XIIDRA) 5 % Dpet Apply 1 drop to  both eyes  2 (two) times daily. 180 each 4    metFORMIN (GLUCOPHAGE) 1000 MG tablet TAKE 1 TABLET (1,000 MG TOTAL) BY MOUTH 2 (TWO) TIMES DAILY WITH MEALS. 180 tablet 1    aspirin (ECOTRIN) 81 MG EC tablet Take 81 mg by mouth once daily.        blood-glucose meter kit DISPENSE : BLOOD TEST STRIPS AND LANCETS PATIENT TEST BLOOD SUGARS TWICE DAILY  TEST STRIPS: 50 EACH, REFILL 5  LANCETS:  50 EACH , REFILL 5 1 each 0    ondansetron (ZOFRAN-ODT) 4 MG TbDL Dissolve 1 tablet (4 mg total) by mouth every 6 (six) hours as needed. 60 tablet 2    oxyCODONE-acetaminophen (PERCOCET) 5-325 mg per tablet Take 1 tablet by mouth every 4 (four) hours as needed for Pain. 60 tablet 0     No current facility-administered medications on file prior to visit.      Social History     Socioeconomic History    Marital  status: Single     Spouse name: Not on file    Number of children: Not on file    Years of education: Not on file    Highest education level: Not on file   Occupational History     Employer: [employer] ([employer])   Social Needs    Financial resource strain: Not on file    Food insecurity:     Worry: Not on file     Inability: Not on file    Transportation needs:     Medical: Not on file     Non-medical: Not on file   Tobacco Use    Smoking status: Never Smoker    Smokeless tobacco: Never Used    Tobacco comment: 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  She walks occasionally.   Substance and Sexual Activity    Alcohol use: Yes     Comment: Occasionally    Drug use: No    Sexual activity: Not Currently     Partners: Male   Lifestyle    Physical activity:     Days per week: Not on file     Minutes per session: Not on file    Stress: Not on file   Relationships    Social connections:     Talks on phone: Not on file     Gets together: Not on file     Attends Christianity service: Not on file     Active member of club or organization: Not on file     Attends meetings of clubs or organizations: Not on file     Relationship status: Not on file   Other Topics Concern    Not on file   Social History Narrative    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 daughter1 granddaughter     Family History   Problem Relation Age of Onset    Cancer Mother         lung ca heavy smoker    Lung cancer Mother     Cancer Father     Lung cancer Father     Hypertension Sister     No Known Problems Sister     No Known Problems Daughter     Hypothyroidism Sister     Diabetes Maternal Aunt     Cancer Maternal Aunt     No Known Problems Maternal Grandmother     Breast cancer Paternal Aunt     No Known Problems Paternal Uncle     Lung cancer Maternal Grandfather     No Known Problems Paternal Grandmother     No Known Problems Paternal Grandfather     Amblyopia Neg Hx      "Blindness Neg Hx     Cataracts Neg Hx     Glaucoma Neg Hx     Macular degeneration Neg Hx     Retinal detachment Neg Hx     Strabismus Neg Hx     Stroke Neg Hx     Thyroid disease Neg Hx         Review of Systems  Review of Systems   Constitutional: Negative for fatigue and fever.   HENT: Negative for sore throat and trouble swallowing.    Eyes: Negative for visual disturbance.   Respiratory: Negative for cough and shortness of breath.    Cardiovascular: Negative for chest pain and palpitations.   Gastrointestinal: Negative for abdominal pain, constipation, diarrhea and nausea.   Genitourinary: Negative for difficulty urinating and dysuria.   Musculoskeletal: Negative for arthralgias and back pain.   Neurological: Negative for dizziness, weakness and headaches.   Hematological: Negative for adenopathy.        Objective:   PHYSICAL EXAM:  /70 (BP Location: Left arm, Patient Position: Sitting, BP Method: Large (Automatic))   Pulse 95   Temp 97.6 °F (36.4 °C) (Oral)   Ht 5' 1" (1.549 m)   Wt 86.6 kg (191 lb)   BMI 36.09 kg/m²     Physical Exam   Pulmonary/Chest: She exhibits no tenderness and no deformity. Right breast exhibits no inverted nipple, no mass, no nipple discharge, no skin change and no tenderness. Left breast exhibits mass. Left breast exhibits no inverted nipple, no nipple discharge, no skin change and no tenderness.   Lymphadenopathy:     She has no cervical adenopathy.     She has no axillary adenopathy.        Right: No supraclavicular adenopathy present.        Left: No supraclavicular adenopathy present.     Mass resolved on exam today      Radiology review: Images personally reviewed by me in the clinic.         Assessment:      Maria Elena Tavares is a 64 y.o. postmenopausal female with recently diagnosed carcinoma of the left breast.      Plan:     Discussed options at length. Needs post-chemo MRI.  Will followup.      She desires bilateral mastectomy with LEFT SLNB.  Will send for " frozen.  If positive, needs dissection.

## 2020-03-18 NOTE — ANESTHESIA PAT ROS NOTE
03/18/2020  Maria Elena Tavares is a 64 y.o., female.      Pre-op Assessment         Review of Systems  Anesthesia Hx:  No problems with previous Anesthesia  History of prior surgery of interest to airway management or planning: Previous anesthesia: General port placement october 2019 with general anesthesia.    Social:  No Alcohol Use, Non-Smoker    Hematology/Oncology:         -- Anemia: Hematology Comments: Had to receive blood during chemo  Last H/H   Current/Recent Cancer. Breast chemotherapy and surgery  Oncology Comments: Breast ca     EENT/Dental:EENT/Dental Normal   Cardiovascular:   Exercise tolerance: good Hypertension hyperlipidemia    Pulmonary:  Pulmonary Normal    Renal/:  Renal/ Normal     Hepatic/GI:   Hx of diverticulosis   Musculoskeletal:  Musculoskeletal Normal    Neurological:   Rt arm pain  Chronic Pain Syndrome  Peripheral Neuropathy    Endocrine:   Diabetes, type 2, using insulin Hypothyroidism Last A1c 6.4 On 3/11/20    Mild vit D deficiency   Dermatological:  Skin Normal           Anesthesia Assessment: Preoperative EQUATION    Planned Procedure: Procedure(s) (LRB):  MASTECTOMY, SIMPLE BILATERAL (Bilateral)  BIOPSY, LYMPH NODE, SENTINEL LEFT (Left)  INSERTION, TISSUE EXPANDER, BREAST BILATERAL (Bilateral)  Requested Anesthesia Type:General  Surgeon: Elena Lopez MD  Service: General  Known or anticipated Date of Surgery:3/24/2020    Surgeon notes: reviewed    Electronic QUestionnaire Assessment completed via nurse interview with patient.        Triage considerations:         Previous anesthesia records:GETA 10/4/19    Last  Airway Present Prior to Hospital Arrival?: No Placement Date: 10/04/19 Placement Time: 0724 Inserted by: CRNA Airway Device: LMA Mask Ventilation: Easy - oral Airway Device Size: 4.0 Placement Verified By: Auscultation;Capnometry Complicating Factors: None  Findings Post-Intubation: Positive EtCO2;Bilateral breath sounds;Atraumatic/Condition of teeth unchanged Secured at: Lips Complications: None Breath Sounds: Equal Bilateral Insertion attempts (enter comment if more than 2 attempts): 1 Removal Date: 10/04/19 Removal Time: 0812   Airway Placement Date: 10/04/19 Placement Time: 0724 Inserted by: CRNA Airway Device: LMA Airway Device Size: 4.0 Placement Verified By: Auscultation;Capnometry Breath Sounds: Equal Bilateral Insertion attempts (enter comment if more than 2 attempts): 1 Removal Date: 10/04/19 Removal Time: 0812      PCP note: within 1 month , within [facility]  Subspecialty notes: Hematology/Oncology, plastic surgery/ bariatrics    Other important co-morbidities: DM2, HLD and HTN    Hx breast cancer   Thyroid Disease   Mild Vit. D deficiency  Tests already available:  Available tests,  within 1 month , within [facility] .     EKG 12-lead   Order: [id number]   Status:  Final result   Visible to patient:  Yes (Patient Portal) Next appt:  03/24/2020 at 12:00 PM in Radiology ([place] [id number] PREP ROOM) Dx:  Malignant neoplasm of left female rivka...      Narrative   Performed by: GEMUSE   Test Reason : C50.912,    Vent. Rate : 106 BPM     Atrial Rate : 106 BPM     P-R Int : 128 ms          QRS Dur : 066 ms      QT Int : 358 ms       P-R-T Axes : 043 -12 047 degrees     QTc Int : 475 ms    Sinus tachycardia  Otherwise normal ECG  When compared with ECG of 11-DEC-2019 13:02,  No significant change was found  Confirmed by Yana Maddox MD ([id number]) on 3/12/2020 1:50:04 PM    Referred By: JAY KEITH           Confirmed By:Yana Maddox MD      Specimen Collected: 03/12/20 11:40 Last Resulted: 03/12/20 13:50                       Instructions given. (See in Nurse's note)    Optimization:  Last Anesthesia in October/Denies any issues with anesthesia// PCP  Aware of surgery     Plan:    Testing:  none   Pre-anesthesia  visit       Visit focus: none     Consultation:pcp        Navigation:              Consults scheduled.             Results will be tracked by Preop Clinic.      Addendum :    MD Nadia Ingram, SERJIO             Dear Nadia,   Yes, she is at an acceptable cardiovascular risk for her upcoming bilateral mastectomy.   Best,PV    Previous Messages      ----- Message -----   From: Stefanie Cortez MA   Sent: 3/18/2020   3:11 PM CDT   To: Estephania San MD     please advise   ----- Message -----   From: Nadia Jamison RN   Sent: 3/18/2020   2:32 PM CDT   To: Estephania San MD, Jaswinder Best Staff     Pt. Seen by you recently, scheduled for bilateral simple mastectomy, lymp node biopsy, sentinel node biopsy, alayna breast tissue expander by Dr. Lopez// Dr. Solorzano 3/24 -surgery 5 hours . Do you feel pt. Is optimal to proceed.       ALOK Jamison Rn Bc   Pre-op anesthesia

## 2020-03-23 ENCOUNTER — TELEPHONE (OUTPATIENT)
Dept: SURGERY | Facility: CLINIC | Age: 65
End: 2020-03-23

## 2020-03-23 ENCOUNTER — PATIENT MESSAGE (OUTPATIENT)
Dept: SURGERY | Facility: HOSPITAL | Age: 65
End: 2020-03-23

## 2020-03-23 DIAGNOSIS — R22.31 MASS OF RIGHT AXILLA: ICD-10-CM

## 2020-03-23 DIAGNOSIS — C50.912 MALIGNANT NEOPLASM OF LEFT FEMALE BREAST, UNSPECIFIED ESTROGEN RECEPTOR STATUS, UNSPECIFIED SITE OF BREAST: Primary | ICD-10-CM

## 2020-03-23 NOTE — TELEPHONE ENCOUNTER
Spoke with patient and informed her that her surgery time on Monday is for 7:30 and [facility] needs to [facility].  Patient had no questions.

## 2020-03-24 ENCOUNTER — HOSPITAL ENCOUNTER (OUTPATIENT)
Facility: HOSPITAL | Age: 65
Discharge: HOME | End: 2020-03-24
Attending: SURGERY | Admitting: SURGERY
Payer: COMMERCIAL

## 2020-03-24 ENCOUNTER — HOSPITAL ENCOUNTER (OUTPATIENT)
Dept: RADIOLOGY | Facility: HOSPITAL | Age: 65
Discharge: HOME | End: 2020-03-24
Attending: SURGERY | Admitting: SURGERY
Payer: COMMERCIAL

## 2020-03-24 ENCOUNTER — DOCUMENTATION ONLY (OUTPATIENT)
Dept: HEMATOLOGY/ONCOLOGY | Facility: CLINIC | Age: 65
End: 2020-03-24

## 2020-03-24 ENCOUNTER — ANESTHESIA (OUTPATIENT)
Dept: SURGERY | Facility: HOSPITAL | Age: 65
End: 2020-03-24
Payer: COMMERCIAL

## 2020-03-24 ENCOUNTER — ANESTHESIA EVENT (OUTPATIENT)
Dept: SURGERY | Facility: HOSPITAL | Age: 65
End: 2020-03-24
Payer: COMMERCIAL

## 2020-03-24 VITALS
WEIGHT: 186 LBS | HEART RATE: 97 BPM | OXYGEN SATURATION: 100 % | RESPIRATION RATE: 16 BRPM | SYSTOLIC BLOOD PRESSURE: 131 MMHG | HEIGHT: 60 IN | DIASTOLIC BLOOD PRESSURE: 78 MMHG | BODY MASS INDEX: 36.52 KG/M2 | TEMPERATURE: 98 F

## 2020-03-24 DIAGNOSIS — C50.912 MALIGNANT NEOPLASM OF LEFT FEMALE BREAST, UNSPECIFIED ESTROGEN RECEPTOR STATUS, UNSPECIFIED SITE OF BREAST: ICD-10-CM

## 2020-03-24 DIAGNOSIS — C50.919 BREAST CANCER: ICD-10-CM

## 2020-03-24 DIAGNOSIS — R22.31 MASS OF RIGHT AXILLA: ICD-10-CM

## 2020-03-24 LAB
POCT GLUCOSE: 169 MG/DL (ref 70–110)
POCT GLUCOSE: 90 MG/DL (ref 70–110)

## 2020-03-24 PROCEDURE — 64461 PVB THORACIC SINGLE INJ SITE: CPT | Performed by: STUDENT IN AN ORGANIZED HEALTH CARE EDUCATION/TRAINING PROGRAM

## 2020-03-24 PROCEDURE — D9220A PRA ANESTHESIA: Mod: ANES,,, | Performed by: ANESTHESIOLOGY

## 2020-03-24 PROCEDURE — C1729 CATH, DRAINAGE: HCPCS | Performed by: SURGERY

## 2020-03-24 PROCEDURE — A9520 TC99 TILMANOCEPT DIAG 0.5MCI: HCPCS

## 2020-03-24 PROCEDURE — 88342 IMHCHEM/IMCYTCHM 1ST ANTB: CPT | Mod: 59 | Performed by: PATHOLOGY

## 2020-03-24 PROCEDURE — 38525 BIOPSY/REMOVAL LYMPH NODES: CPT | Mod: 51,RT,, | Performed by: SURGERY

## 2020-03-24 PROCEDURE — 37000008 HC ANESTHESIA 1ST 15 MINUTES: Performed by: SURGERY

## 2020-03-24 PROCEDURE — D9220A PRA ANESTHESIA: ICD-10-PCS | Mod: ANES,,, | Performed by: ANESTHESIOLOGY

## 2020-03-24 PROCEDURE — 19303 MAST SIMPLE COMPLETE: CPT | Mod: RT,,, | Performed by: SURGERY

## 2020-03-24 PROCEDURE — 88342 IMHCHEM/IMCYTCHM 1ST ANTB: CPT | Mod: 26,59,, | Performed by: PATHOLOGY

## 2020-03-24 PROCEDURE — 63600175 PHARM REV CODE 636 W HCPCS: Performed by: ANESTHESIOLOGY

## 2020-03-24 PROCEDURE — 88307 TISSUE EXAM BY PATHOLOGIST: CPT | Performed by: PATHOLOGY

## 2020-03-24 PROCEDURE — D9220A PRA ANESTHESIA: ICD-10-PCS | Mod: CRNA,,, | Performed by: NURSE ANESTHETIST, CERTIFIED REGISTERED

## 2020-03-24 PROCEDURE — 88331 PR  PATH CONSULT IN SURG,W FRZ SEC: ICD-10-PCS | Mod: 26,,, | Performed by: PATHOLOGY

## 2020-03-24 PROCEDURE — 63600175 PHARM REV CODE 636 W HCPCS: Performed by: STUDENT IN AN ORGANIZED HEALTH CARE EDUCATION/TRAINING PROGRAM

## 2020-03-24 PROCEDURE — 82962 GLUCOSE BLOOD TEST: CPT | Performed by: SURGERY

## 2020-03-24 PROCEDURE — 36000707: Performed by: SURGERY

## 2020-03-24 PROCEDURE — 71000045 HC DOSC ROUTINE RECOVERY EA ADD'L HR: Performed by: SURGERY

## 2020-03-24 PROCEDURE — 19357 PR BREAST RECONSTRUC W TISS EXPANDR: ICD-10-PCS | Mod: 50,,, | Performed by: SURGERY

## 2020-03-24 PROCEDURE — 97605 PR NEG PRESS WOUND THERAPY (NPWT) W/NON-DISPOSABLE WOUND VAC DEVICE (DME), <=50 CM: ICD-10-PCS | Mod: ,,, | Performed by: SURGERY

## 2020-03-24 PROCEDURE — 37000009 HC ANESTHESIA EA ADD 15 MINS: Performed by: SURGERY

## 2020-03-24 PROCEDURE — 38525 PR BIOPSY/REM LYMPH NODES, AXILLARY: ICD-10-PCS | Mod: 51,RT,, | Performed by: SURGERY

## 2020-03-24 PROCEDURE — 76942 ECHO GUIDE FOR BIOPSY: CPT | Mod: 26,,, | Performed by: ANESTHESIOLOGY

## 2020-03-24 PROCEDURE — 88305 TISSUE EXAM BY PATHOLOGIST: CPT | Mod: 26,,, | Performed by: PATHOLOGY

## 2020-03-24 PROCEDURE — 88305 TISSUE EXAM BY PATHOLOGIST: CPT | Performed by: PATHOLOGY

## 2020-03-24 PROCEDURE — 25000003 PHARM REV CODE 250: Performed by: SURGERY

## 2020-03-24 PROCEDURE — 19357 TISS XPNDR PLMT BRST RCNSTJ: CPT | Mod: 50,,, | Performed by: SURGERY

## 2020-03-24 PROCEDURE — 25000003 PHARM REV CODE 250: Performed by: NURSE ANESTHETIST, CERTIFIED REGISTERED

## 2020-03-24 PROCEDURE — 64450 NJX AA&/STRD OTHER PN/BRANCH: CPT | Mod: 59,50,, | Performed by: ANESTHESIOLOGY

## 2020-03-24 PROCEDURE — 71000033 HC RECOVERY, INTIAL HOUR: Performed by: SURGERY

## 2020-03-24 PROCEDURE — 71000015 HC POSTOP RECOV 1ST HR: Performed by: SURGERY

## 2020-03-24 PROCEDURE — 88341 IMHCHEM/IMCYTCHM EA ADD ANTB: CPT | Mod: 26,59,, | Performed by: PATHOLOGY

## 2020-03-24 PROCEDURE — 88331 PATH CONSLTJ SURG 1 BLK 1SPC: CPT | Mod: 59 | Performed by: PATHOLOGY

## 2020-03-24 PROCEDURE — 88331 PATH CONSLTJ SURG 1 BLK 1SPC: CPT | Mod: 26,,, | Performed by: PATHOLOGY

## 2020-03-24 PROCEDURE — 76942 PEC SINGLE INJECTION BLOCK(S): ICD-10-PCS | Mod: 26,,, | Performed by: ANESTHESIOLOGY

## 2020-03-24 PROCEDURE — S0028 INJECTION, FAMOTIDINE, 20 MG: HCPCS | Performed by: NURSE ANESTHETIST, CERTIFIED REGISTERED

## 2020-03-24 PROCEDURE — 64461 PVB THORACIC SINGLE INJ SITE: CPT | Mod: 59,50,, | Performed by: ANESTHESIOLOGY

## 2020-03-24 PROCEDURE — 71000016 HC POSTOP RECOV ADDL HR: Performed by: SURGERY

## 2020-03-24 PROCEDURE — 88307 PR  SURG PATH,LEVEL V: ICD-10-PCS | Mod: 26,,, | Performed by: PATHOLOGY

## 2020-03-24 PROCEDURE — C1789 PROSTHESIS, BREAST, IMP: HCPCS | Performed by: SURGERY

## 2020-03-24 PROCEDURE — 63600175 PHARM REV CODE 636 W HCPCS: Performed by: NURSE ANESTHETIST, CERTIFIED REGISTERED

## 2020-03-24 PROCEDURE — 76942 ECHO GUIDE FOR BIOPSY: CPT | Performed by: STUDENT IN AN ORGANIZED HEALTH CARE EDUCATION/TRAINING PROGRAM

## 2020-03-24 PROCEDURE — 38900 PR INTRAOPERATIVE SENTINEL LYMPH NODE ID W DYE INJECTION: ICD-10-PCS | Mod: LT,,, | Performed by: SURGERY

## 2020-03-24 PROCEDURE — 71000044 HC DOSC ROUTINE RECOVERY FIRST HOUR: Performed by: SURGERY

## 2020-03-24 PROCEDURE — 88305 TISSUE EXAM BY PATHOLOGIST: ICD-10-PCS | Mod: 26,,, | Performed by: PATHOLOGY

## 2020-03-24 PROCEDURE — 38792 RA TRACER ID OF SENTINL NODE: CPT | Mod: 59,LT,, | Performed by: SURGERY

## 2020-03-24 PROCEDURE — 88360 PR  TUMOR IMMUNOHISTOCHEM/MANUAL: ICD-10-PCS | Mod: 26,,, | Performed by: PATHOLOGY

## 2020-03-24 PROCEDURE — 36000706: Performed by: SURGERY

## 2020-03-24 PROCEDURE — 63600175 PHARM REV CODE 636 W HCPCS: Performed by: GENERAL PRACTICE

## 2020-03-24 PROCEDURE — 19307 MAST MOD RAD: CPT | Mod: LT,,, | Performed by: SURGERY

## 2020-03-24 PROCEDURE — 97605 NEG PRS WND THER DME<=50SQCM: CPT | Mod: ,,, | Performed by: SURGERY

## 2020-03-24 PROCEDURE — 88307 TISSUE EXAM BY PATHOLOGIST: CPT | Mod: 26,,, | Performed by: PATHOLOGY

## 2020-03-24 PROCEDURE — 88360 TUMOR IMMUNOHISTOCHEM/MANUAL: CPT | Mod: 26,,, | Performed by: PATHOLOGY

## 2020-03-24 PROCEDURE — 19303 PR MASTECTOMY, SIMPLE, COMPLETE: ICD-10-PCS | Mod: RT,,, | Performed by: SURGERY

## 2020-03-24 PROCEDURE — 64461 PARAVERTEBRAL SINGLE INJECTION BLOCK(S): ICD-10-PCS | Mod: 59,50,, | Performed by: ANESTHESIOLOGY

## 2020-03-24 PROCEDURE — 19307 PR MASTECTOMY, MODIFIED RADICAL: ICD-10-PCS | Mod: LT,,, | Performed by: SURGERY

## 2020-03-24 PROCEDURE — 88342 CHG IMMUNOCYTOCHEMISTRY: ICD-10-PCS | Mod: 26,59,, | Performed by: PATHOLOGY

## 2020-03-24 PROCEDURE — D9220A PRA ANESTHESIA: Mod: CRNA,,, | Performed by: NURSE ANESTHETIST, CERTIFIED REGISTERED

## 2020-03-24 PROCEDURE — 38792 PR IDENTIFY SENTINEL 2DE: ICD-10-PCS | Mod: 59,LT,, | Performed by: SURGERY

## 2020-03-24 PROCEDURE — 88341 IMHCHEM/IMCYTCHM EA ADD ANTB: CPT | Performed by: PATHOLOGY

## 2020-03-24 PROCEDURE — 27201423 OPTIME MED/SURG SUP & DEVICES STERILE SUPPLY: Performed by: SURGERY

## 2020-03-24 PROCEDURE — 38900 IO MAP OF SENT LYMPH NODE: CPT | Mod: LT,,, | Performed by: SURGERY

## 2020-03-24 PROCEDURE — 25000003 PHARM REV CODE 250

## 2020-03-24 PROCEDURE — 27100025 HC TUBING, SET FLUID WARMER: Performed by: ANESTHESIOLOGY

## 2020-03-24 PROCEDURE — 88341 PR IHC OR ICC EACH ADD'L SINGLE ANTIBODY  STAINPR: ICD-10-PCS | Mod: 26,59,, | Performed by: PATHOLOGY

## 2020-03-24 PROCEDURE — 88360 TUMOR IMMUNOHISTOCHEM/MANUAL: CPT | Performed by: PATHOLOGY

## 2020-03-24 PROCEDURE — S0077 INJECTION, CLINDAMYCIN PHOSP: HCPCS | Performed by: SURGERY

## 2020-03-24 PROCEDURE — 63600175 PHARM REV CODE 636 W HCPCS: Mod: JW,JG | Performed by: SURGERY

## 2020-03-24 PROCEDURE — 94761 N-INVAS EAR/PLS OXIMETRY MLT: CPT

## 2020-03-24 PROCEDURE — 64450 PEC SINGLE INJECTION BLOCK(S): ICD-10-PCS | Mod: 59,50,, | Performed by: ANESTHESIOLOGY

## 2020-03-24 DEVICE — EXPANDER NATRELLE FH EP 650CC: Type: IMPLANTABLE DEVICE | Site: BREAST | Status: FUNCTIONAL

## 2020-03-24 RX ORDER — CIPROFLOXACIN 2 MG/ML
INJECTION, SOLUTION INTRAVENOUS CONTINUOUS PRN
Status: COMPLETED | OUTPATIENT
Start: 2020-03-24 | End: 2020-03-24

## 2020-03-24 RX ORDER — SODIUM CHLORIDE 9 MG/ML
INJECTION, SOLUTION INTRAVENOUS CONTINUOUS PRN
Status: DISCONTINUED | OUTPATIENT
Start: 2020-03-24 | End: 2020-03-24

## 2020-03-24 RX ORDER — ONDANSETRON 2 MG/ML
4 INJECTION INTRAMUSCULAR; INTRAVENOUS ONCE AS NEEDED
Status: COMPLETED | OUTPATIENT
Start: 2020-03-24 | End: 2020-03-24

## 2020-03-24 RX ORDER — LIDOCAINE HYDROCHLORIDE 20 MG/ML
INJECTION INTRAVENOUS
Status: DISCONTINUED | OUTPATIENT
Start: 2020-03-24 | End: 2020-03-24

## 2020-03-24 RX ORDER — PROPOFOL 10 MG/ML
VIAL (ML) INTRAVENOUS
Status: DISCONTINUED | OUTPATIENT
Start: 2020-03-24 | End: 2020-03-24

## 2020-03-24 RX ORDER — OXYCODONE AND ACETAMINOPHEN 5; 325 MG/1; MG/1
1 TABLET ORAL ONCE
Status: COMPLETED | OUTPATIENT
Start: 2020-03-24 | End: 2020-03-24

## 2020-03-24 RX ORDER — HYDROCODONE BITARTRATE AND ACETAMINOPHEN 5; 325 MG/1; MG/1
1 TABLET ORAL EVERY 6 HOURS PRN
Qty: 21 TABLET | Refills: 0 | Status: SHIPPED | OUTPATIENT
Start: 2020-03-24 | End: 2020-03-31

## 2020-03-24 RX ORDER — CEFAZOLIN SODIUM 1 G/3ML
INJECTION, POWDER, FOR SOLUTION INTRAMUSCULAR; INTRAVENOUS
Status: DISCONTINUED | OUTPATIENT
Start: 2020-03-24 | End: 2020-03-24

## 2020-03-24 RX ORDER — DIPHENHYDRAMINE HYDROCHLORIDE 50 MG/ML
25 INJECTION INTRAMUSCULAR; INTRAVENOUS EVERY 6 HOURS PRN
Status: DISCONTINUED | OUTPATIENT
Start: 2020-03-24 | End: 2020-03-24 | Stop reason: HOSPADM

## 2020-03-24 RX ORDER — FENTANYL CITRATE 50 UG/ML
25 INJECTION, SOLUTION INTRAMUSCULAR; INTRAVENOUS EVERY 5 MIN PRN
Status: DISCONTINUED | OUTPATIENT
Start: 2020-03-24 | End: 2020-03-24 | Stop reason: HOSPADM

## 2020-03-24 RX ORDER — ONDANSETRON 2 MG/ML
INJECTION INTRAMUSCULAR; INTRAVENOUS
Status: DISCONTINUED | OUTPATIENT
Start: 2020-03-24 | End: 2020-03-24

## 2020-03-24 RX ORDER — SUCCINYLCHOLINE CHLORIDE 20 MG/ML
INJECTION INTRAMUSCULAR; INTRAVENOUS
Status: DISCONTINUED | OUTPATIENT
Start: 2020-03-24 | End: 2020-03-24

## 2020-03-24 RX ORDER — FENTANYL CITRATE 50 UG/ML
INJECTION, SOLUTION INTRAMUSCULAR; INTRAVENOUS
Status: DISCONTINUED | OUTPATIENT
Start: 2020-03-24 | End: 2020-03-24

## 2020-03-24 RX ORDER — FAMOTIDINE 10 MG/ML
INJECTION INTRAVENOUS
Status: DISCONTINUED | OUTPATIENT
Start: 2020-03-24 | End: 2020-03-24

## 2020-03-24 RX ORDER — HEPARIN SODIUM 5000 [USP'U]/ML
5000 INJECTION, SOLUTION INTRAVENOUS; SUBCUTANEOUS EVERY 8 HOURS
Status: DISCONTINUED | OUTPATIENT
Start: 2020-03-24 | End: 2020-03-24 | Stop reason: HOSPADM

## 2020-03-24 RX ORDER — OXYCODONE AND ACETAMINOPHEN 5; 325 MG/1; MG/1
TABLET ORAL
Status: COMPLETED
Start: 2020-03-24 | End: 2020-03-24

## 2020-03-24 RX ORDER — BACITRACIN 50000 [IU]/1
INJECTION, POWDER, FOR SOLUTION INTRAMUSCULAR
Status: DISCONTINUED | OUTPATIENT
Start: 2020-03-24 | End: 2020-03-24 | Stop reason: HOSPADM

## 2020-03-24 RX ORDER — HEPARIN SODIUM 5000 [USP'U]/ML
5000 INJECTION, SOLUTION INTRAVENOUS; SUBCUTANEOUS EVERY 8 HOURS
Status: DISCONTINUED | OUTPATIENT
Start: 2020-03-24 | End: 2020-03-24

## 2020-03-24 RX ORDER — HYDROCODONE BITARTRATE AND ACETAMINOPHEN 5; 325 MG/1; MG/1
1 TABLET ORAL ONCE
Status: DISCONTINUED | OUTPATIENT
Start: 2020-03-24 | End: 2020-03-24

## 2020-03-24 RX ORDER — ROCURONIUM BROMIDE 10 MG/ML
INJECTION, SOLUTION INTRAVENOUS
Status: DISCONTINUED | OUTPATIENT
Start: 2020-03-24 | End: 2020-03-24

## 2020-03-24 RX ORDER — PHENYLEPHRINE HYDROCHLORIDE 10 MG/ML
INJECTION INTRAVENOUS
Status: DISCONTINUED | OUTPATIENT
Start: 2020-03-24 | End: 2020-03-24

## 2020-03-24 RX ORDER — AMOXICILLIN AND CLAVULANATE POTASSIUM 875; 125 MG/1; MG/1
1 TABLET, FILM COATED ORAL EVERY 12 HOURS
Qty: 20 TABLET | Refills: 0 | Status: SHIPPED | OUTPATIENT
Start: 2020-03-24 | End: 2020-04-03

## 2020-03-24 RX ORDER — ISOSULFAN BLUE 50 MG/5ML
INJECTION, SOLUTION SUBCUTANEOUS
Status: DISCONTINUED | OUTPATIENT
Start: 2020-03-24 | End: 2020-03-24 | Stop reason: HOSPADM

## 2020-03-24 RX ORDER — MIDAZOLAM HYDROCHLORIDE 1 MG/ML
0.5 INJECTION INTRAMUSCULAR; INTRAVENOUS
Status: DISCONTINUED | OUTPATIENT
Start: 2020-03-24 | End: 2020-03-24 | Stop reason: HOSPADM

## 2020-03-24 RX ORDER — LIDOCAINE HYDROCHLORIDE 10 MG/ML
1 INJECTION, SOLUTION EPIDURAL; INFILTRATION; INTRACAUDAL; PERINEURAL ONCE
Status: COMPLETED | OUTPATIENT
Start: 2020-03-24 | End: 2020-03-24

## 2020-03-24 RX ORDER — SODIUM CHLORIDE 0.9 % (FLUSH) 0.9 %
10 SYRINGE (ML) INJECTION
Status: DISCONTINUED | OUTPATIENT
Start: 2020-03-24 | End: 2020-03-24 | Stop reason: HOSPADM

## 2020-03-24 RX ORDER — KETAMINE HCL IN 0.9 % NACL 50 MG/5 ML
SYRINGE (ML) INTRAVENOUS
Status: DISCONTINUED | OUTPATIENT
Start: 2020-03-24 | End: 2020-03-24

## 2020-03-24 RX ORDER — DEXAMETHASONE SODIUM PHOSPHATE 4 MG/ML
INJECTION, SOLUTION INTRA-ARTICULAR; INTRALESIONAL; INTRAMUSCULAR; INTRAVENOUS; SOFT TISSUE
Status: DISCONTINUED | OUTPATIENT
Start: 2020-03-24 | End: 2020-03-24

## 2020-03-24 RX ORDER — HYDROMORPHONE HYDROCHLORIDE 1 MG/ML
0.2 INJECTION, SOLUTION INTRAMUSCULAR; INTRAVENOUS; SUBCUTANEOUS EVERY 5 MIN PRN
Status: DISCONTINUED | OUTPATIENT
Start: 2020-03-24 | End: 2020-03-24 | Stop reason: HOSPADM

## 2020-03-24 RX ADMIN — PROPOFOL 30 MG: 10 INJECTION, EMULSION INTRAVENOUS at 08:03

## 2020-03-24 RX ADMIN — PROPOFOL 150 MG: 10 INJECTION, EMULSION INTRAVENOUS at 08:03

## 2020-03-24 RX ADMIN — HYDROMORPHONE HYDROCHLORIDE 0.2 MG: 1 INJECTION, SOLUTION INTRAMUSCULAR; INTRAVENOUS; SUBCUTANEOUS at 02:03

## 2020-03-24 RX ADMIN — HYDROMORPHONE HYDROCHLORIDE 0.2 MG: 1 INJECTION, SOLUTION INTRAMUSCULAR; INTRAVENOUS; SUBCUTANEOUS at 01:03

## 2020-03-24 RX ADMIN — MIDAZOLAM HYDROCHLORIDE 1 MG: 1 INJECTION, SOLUTION INTRAMUSCULAR; INTRAVENOUS at 08:03

## 2020-03-24 RX ADMIN — PHENYLEPHRINE HYDROCHLORIDE 100 MCG: 10 INJECTION INTRAVENOUS at 09:03

## 2020-03-24 RX ADMIN — FENTANYL CITRATE 50 MCG: 50 INJECTION INTRAMUSCULAR; INTRAVENOUS at 07:03

## 2020-03-24 RX ADMIN — LIDOCAINE HYDROCHLORIDE 50 MG: 20 INJECTION, SOLUTION INTRAVENOUS at 08:03

## 2020-03-24 RX ADMIN — SUCCINYLCHOLINE CHLORIDE 120 MG: 20 INJECTION, SOLUTION INTRAMUSCULAR; INTRAVENOUS at 08:03

## 2020-03-24 RX ADMIN — PHENYLEPHRINE HYDROCHLORIDE 200 MCG: 10 INJECTION INTRAVENOUS at 08:03

## 2020-03-24 RX ADMIN — OXYCODONE HYDROCHLORIDE AND ACETAMINOPHEN 1 TABLET: 5; 325 TABLET ORAL at 01:03

## 2020-03-24 RX ADMIN — DEXAMETHASONE SODIUM PHOSPHATE 8 MG: 4 INJECTION, SOLUTION INTRAMUSCULAR; INTRAVENOUS at 09:03

## 2020-03-24 RX ADMIN — ONDANSETRON 4 MG: 2 INJECTION INTRAMUSCULAR; INTRAVENOUS at 12:03

## 2020-03-24 RX ADMIN — FAMOTIDINE 20 MG: 10 INJECTION, SOLUTION INTRAVENOUS at 10:03

## 2020-03-24 RX ADMIN — SODIUM CHLORIDE, SODIUM GLUCONATE, SODIUM ACETATE, POTASSIUM CHLORIDE, MAGNESIUM CHLORIDE, SODIUM PHOSPHATE, DIBASIC, AND POTASSIUM PHOSPHATE: .53; .5; .37; .037; .03; .012; .00082 INJECTION, SOLUTION INTRAVENOUS at 09:03

## 2020-03-24 RX ADMIN — HEPARIN SODIUM 5000 UNITS: 5000 INJECTION, SOLUTION INTRAVENOUS; SUBCUTANEOUS at 03:03

## 2020-03-24 RX ADMIN — FENTANYL CITRATE 25 MCG: 50 INJECTION, SOLUTION INTRAMUSCULAR; INTRAVENOUS at 10:03

## 2020-03-24 RX ADMIN — FENTANYL CITRATE 25 MCG: 50 INJECTION, SOLUTION INTRAMUSCULAR; INTRAVENOUS at 12:03

## 2020-03-24 RX ADMIN — LIDOCAINE HYDROCHLORIDE 0.4 MG: 10 INJECTION, SOLUTION EPIDURAL; INFILTRATION; INTRACAUDAL; PERINEURAL at 06:03

## 2020-03-24 RX ADMIN — SODIUM CHLORIDE, SODIUM GLUCONATE, SODIUM ACETATE, POTASSIUM CHLORIDE, MAGNESIUM CHLORIDE, SODIUM PHOSPHATE, DIBASIC, AND POTASSIUM PHOSPHATE: .53; .5; .37; .037; .03; .012; .00082 INJECTION, SOLUTION INTRAVENOUS at 08:03

## 2020-03-24 RX ADMIN — CEFAZOLIN 2 G: 330 INJECTION, POWDER, FOR SOLUTION INTRAMUSCULAR; INTRAVENOUS at 12:03

## 2020-03-24 RX ADMIN — SUCCINYLCHOLINE CHLORIDE 40 MG: 20 INJECTION, SOLUTION INTRAMUSCULAR; INTRAVENOUS at 08:03

## 2020-03-24 RX ADMIN — FENTANYL CITRATE 25 MCG: 50 INJECTION, SOLUTION INTRAMUSCULAR; INTRAVENOUS at 11:03

## 2020-03-24 RX ADMIN — Medication 20 MG: at 08:03

## 2020-03-24 RX ADMIN — SODIUM CHLORIDE: 0.9 INJECTION, SOLUTION INTRAVENOUS at 07:03

## 2020-03-24 RX ADMIN — Medication 10 MG: at 10:03

## 2020-03-24 RX ADMIN — Medication 10 MG: at 12:03

## 2020-03-24 RX ADMIN — ONDANSETRON 4 MG: 2 INJECTION INTRAMUSCULAR; INTRAVENOUS at 02:03

## 2020-03-24 RX ADMIN — MIDAZOLAM HYDROCHLORIDE 2 MG: 1 INJECTION, SOLUTION INTRAMUSCULAR; INTRAVENOUS at 07:03

## 2020-03-24 RX ADMIN — OXYCODONE AND ACETAMINOPHEN 1 TABLET: 5; 325 TABLET ORAL at 01:03

## 2020-03-24 RX ADMIN — PHENYLEPHRINE HYDROCHLORIDE 100 MCG: 10 INJECTION INTRAVENOUS at 10:03

## 2020-03-24 RX ADMIN — LIDOCAINE HYDROCHLORIDE 50 MG: 20 INJECTION, SOLUTION INTRAVENOUS at 12:03

## 2020-03-24 RX ADMIN — Medication 10 MG: at 11:03

## 2020-03-24 RX ADMIN — ROCURONIUM BROMIDE 10 MG: 10 INJECTION, SOLUTION INTRAVENOUS at 08:03

## 2020-03-24 RX ADMIN — CEFAZOLIN 2 G: 330 INJECTION, POWDER, FOR SOLUTION INTRAMUSCULAR; INTRAVENOUS at 08:03

## 2020-03-24 NOTE — H&P
SUBJECTIVE:   Chief complaint: breast cancer     History of Present Illness:  64 y.o. female with breast cancer.  Has completed neoadjuvant chemotherapy and recently underwent post treatment MRI.  She presents today to discuss reconstruction options.  She currently is unsure if she will have a bilateral or unilateral mastectomy.  Also unknown if she will need radiation.      States she has been doing well other than a presyncopal episode at the end of February after which her blood pressure medicine was changed.            Past Medical History:   Diagnosis Date    BMI 37.0-37.9, adult      Breast cancer 09/05/2019      Left breast, IDC with lymph node metastisis    Diabetes mellitus type II      Diverticulosis       history of diverticulosisseen on colonoscopy at age 48. Repeat recommended at age 58. Done by     Elevated blood protein       History of elevated protein. Apparently has seen  for extensive workup including bone marrow biopsy    History of shingles 2006    Hyperlipidemia      Hypertension      Microalbuminuria       due 2 diabetes    Mild vitamin D deficiency       . A low vitamin D    Thyroid disease       hypothyroidism               Past Surgical History:   Procedure Laterality Date    BREAST BIOPSY Left 09/05/2019     IDC with mets to node    BREAST BIOPSY Right 09/26/2019     core bx, benign node    BREAST BIOPSY Left 10/14/2019     MRI Core bx, + IDC    BREAST BIOPSY Left 10/08/2019     Core bx, ADH    HYSTERECTOMY        INSERTION OF TUNNELED CENTRAL VENOUS CATHETER (CVC) WITH SUBCUTANEOUS PORT Right 10/4/2019     Procedure: PHFYBONFW-XMRM-J-CATH RIGHT (CONSENT AM OF) 1.0 hr case;  Surgeon: Elena Lopez MD;  Location: [facility] OR [address] [place] [address];  Service: General;  Laterality: Right;    OOPHORECTOMY             Current Medications   Current Outpatient Medications   Medication Sig Dispense Refill    aspirin (ECOTRIN) 81 MG EC tablet Take 81 mg by mouth once daily.           blood-glucose meter kit DISPENSE : BLOOD TEST STRIPS AND LANCETS PATIENT TEST BLOOD SUGARS TWICE DAILY  TEST STRIPS: 50 EACH, REFILL 5  LANCETS:  50 EACH , REFILL 5 1 each 0    chlorthalidone (HYGROTEN) 25 MG Tab Take 1 tablet (25 mg total) by mouth every morning. Stop losartan hctz 100/25. (Patient not taking: Reported on 2/28/2020) 90 tablet 1    chlorthalidone (HYGROTEN) 25 MG Tab Take 1 tablet by mouth every morning (Patient not taking: Reported on 2/20/2020) 90 tablet 1    ciprofloxacin HCl (CIPRO) 500 MG tablet Take 1 tablet (500 mg total) by mouth 2 (two) times daily. for 10 days 20 tablet 0    dexAMETHasone (DECADRON) 4 MG Tab Take 1 tablet by mouth twice daily the day before chemo and for 2 days after. (Patient not taking: Reported on 2/20/2020) 24 tablet 0    dulaglutide (TRULICITY) 1.5 mg/0.5 mL PnIj Inject 1.5 mg into the skin every 7 days. 2 mL 5    gabapentin (NEURONTIN) 100 MG capsule Take 2 capsules (200 mg total) by mouth 3 (three) times daily. 90 capsule 2    irbesartan (AVAPRO) 300 MG tablet Take 1 tablet (300 mg total) by mouth once daily. Stop valsartan 320. (Patient not taking: Reported on 2/20/2020) 90 tablet 1    irbesartan (AVAPRO) 300 MG tablet Take 1 tablet by mouth once daily (Patient not taking: Reported on 2/20/2020) 90 tablet 1    levothyroxine (SYNTHROID) 200 MCG tablet Take 1 tablet (200 mcg total) by mouth once daily. 90 tablet 1    levothyroxine (SYNTHROID) 200 MCG tablet Take 1 tablet by mouth once daily (Patient not taking: Reported on 2/28/2020) 90 tablet 1    lifitegrast (XIIDRA) 5 % Dpet Apply 1 drop to  both eyes  2 (two) times daily. 180 each 4    magic mouthwash diphen/antac/lidoc/nysta Take 10 mLs by mouth 4 (four) times daily. 120 mL 0    metFORMIN (GLUCOPHAGE) 1000 MG tablet TAKE 1 TABLET (1,000 MG TOTAL) BY MOUTH 2 (TWO) TIMES DAILY WITH MEALS. 180 tablet 1    omeprazole (PRILOSEC) 20 MG capsule Take 1 capsule (20 mg total) by mouth once daily. 30  capsule 0    ondansetron (ZOFRAN-ODT) 4 MG TbDL Dissolve 1 tablet (4 mg total) by mouth every 6 (six) hours as needed. 60 tablet 2    ondansetron (ZOFRAN-ODT) 8 MG TbDL Dissolve 1 tablet (8 mg total) by mouth every 8 (eight) hours as needed (nausea). 30 tablet 1    oxyCODONE-acetaminophen (PERCOCET) 5-325 mg per tablet Take 1 tablet by mouth every 4 (four) hours as needed for Pain. 60 tablet 0    pregabalin (LYRICA) 150 MG capsule Take 1 capsule (150 mg total) by mouth once. for 1 dose (Patient not taking: Reported on 2/20/2020) 60 capsule 2    promethazine (PHENERGAN) 25 MG tablet Take 1 tablet (25 mg total) by mouth every 4 (four) hours. (Patient not taking: Reported on 2/20/2020) 30 tablet 1      No current facility-administered medications for this visit.             Review of patient's allergies indicates:  No Known Allergies        Review of Systems:  Review of systems negative except as pertinent positive and negatives  listed above        OBJECTIVE:      BP (!) 142/96   Pulse 96   Wt 88.4 kg (194 lb 14.2 oz)   BMI 36.82 kg/m²         Physical Exam:  Gen: NAD, Aox3  Neuro: no focal deficits  HEENT: NCAT, neck supple  CV: RRR  Pulm: Breathing non-labored, chest wall movement equal bilaterally  Breast: bilateral macromastia with grade 3 ptosis.  No palpable masses  Abdomen: soft, nontender, no guarding  Gu: no rashes or wounds  Extremity:normal strength, no cyanosis or edema  Psych: normal mood and affect              ASSESSMENT/PLAN:      Mrs Tavares is 65 yo female with left sided breast cancer, who has now finished chemotherapy.  Post treatment MRI demonstrated decreasing size of the known left breast malignancy, multiple satellite lesions have resolved and level 1 biopsy proven positive node resolved.      She will undergo bilateral mastectomy today with tissue expander placement.    Risks benefits and alternatives of the procedure were explained to the patient. Specific risks including but not  limited to bleeding, infection, asymmetry, skin necrosis, skin loss, wound healing problems, seroma, hematoma, need for drain placement, scarring, pain, and PE/DVT. Patient understands these risks and wishes to proceed with surgery.     PAYTON Mcnair MD, FACS  Plastic Surgery Fellow

## 2020-03-24 NOTE — TRANSFER OF CARE
Anesthesia Transfer of Care Note    Patient: Maria Elena Tavares    Procedure(s) Performed: Procedure(s) (LRB):  MASTECTOMY, SIMPLE BILATERAL (Bilateral)  BIOPSY, LYMPH NODE, SENTINEL LEFT (Left)  INSERTION, TISSUE EXPANDER, BREAST BILATERAL (Bilateral)    Patient location: PACU    Anesthesia Type: general    Transport from OR: Transported from OR on 6-10 L/min O2 by face mask with adequate spontaneous ventilation    Post pain: pain needs to be addressed    Post assessment: no apparent anesthetic complications and tolerated procedure well    Post vital signs: stable    Level of consciousness: awake and sedated    Nausea/Vomiting: no nausea/vomiting    Complications: none    Transfer of care protocol was followed      Last vitals:   Visit Vitals  BP (!) 106/58 (BP Location: Right arm, Patient Position: Lying)   Pulse 94   Temp 36.7 °C (98.1 °F) (Oral)   Resp 15   Ht 5' (1.524 m)   Wt 84.4 kg (186 lb)   SpO2 100%   Breastfeeding? No   BMI 36.33 kg/m²

## 2020-03-24 NOTE — ANESTHESIA PROCEDURE NOTES
Pec Single Injection Block(s)    Patient location during procedure: pre-op   Block not for primary anesthetic.  Reason for block: at surgeon's request and post-op pain management   Post-op Pain Location: Bilateral chest pain  Start time: 3/24/2020 7:29 AM  Timeout: 3/24/2020 7:29 AM   End time: 3/24/2020 8:00 AM    Staffing  Authorizing Provider: Glory Lopez MD  Performing Provider: Shiva Harrison MD    Preanesthetic Checklist  Completed: patient identified, site marked, surgical consent, pre-op evaluation, timeout performed, IV checked, risks and benefits discussed and monitors and equipment checked  Peripheral Block  Patient position: supine  Prep: ChloraPrep  Patient monitoring: heart rate, cardiac monitor, continuous pulse ox, continuous capnometry and frequent blood pressure checks  Block type: pectoral  Laterality: bilateral  Injection technique: single shot  Needle  Needle type: Stimuplex   Needle gauge: 21 G  Needle length: 4 in  Needle localization: anatomical landmarks and ultrasound guidance   -ultrasound image captured on disc.  Assessment  Injection assessment: negative aspiration, negative parasthesia and local visualized surrounding nerve  Paresthesia pain: none  Heart rate change: no  Slow fractionated injection: yes  Additional Notes    VSS.  [facility] RN monitoring vitals throughout procedure.  Patient tolerated procedure well.

## 2020-03-24 NOTE — OP NOTE
Operative Note     3/24/2020    PRE-OP DIAGNOSIS: Malignant neoplasm of left female breast,  estrogen receptor status positive, unspecified site of breast [C50.912]      POST-OP DIAGNOSIS: Post-Op Diagnosis Codes:     *same    Procedure(s):  MASTECTOMY, SIMPLE BILATERAL - I performed the right mastectomy and right sentinel node biopsy only.  The remainder of the case will be dictated by Zaynab Lopez and Jeanine.  BIOPSY, LYMPH NODE, SENTINEL LEFT  INSERTION, TISSUE EXPANDER, BREAST BILATERAL     SURGEON: Surgeon(s) and Role:  Panel 1:     * Elena Lopez MD - Primary     * Mariam Alexandre MD - Co-Surgeon  Panel 2:     * Michael Solorzano MD - Primary    ANESTHESIA: General     OPERATIVE FINDINGS: Healthy appearing skin flaps at the completion of the mastectomy.  One clipped node sent for frozen and was negative on the right side.    INDICATION FOR PROCEDURE: This patient presents with a history of left breast cancer of the left breast    PROCEDURE IN DETAIL:  Maria Elena Tavares is a 64 y.o. female brought to the operating room for definitive surgery of invasive carcinoma of the left breast.  The patient has elected to undergo bilateral simple mastectomy with sentinel lymph node biopsy for kin assessment. The patient was informed of the possible risks and complications of the procedure, including but not limited to anesthetic risks, bleeding, infection, and need for additional surgery.  The patient concurred with the proposed plan, and has given informed consent.  The site of surgery was properly noted/marked in the preoperative holding area.    The patient was then brought to the operating room and placed in the supine position with both upper extremities extended.  regional and general anesthesia was administered. Perioperative antibiotics were administered consisting of Ancef and a time out was performed confirming the patient, site, and procedure.   The patient's left breast was injected with technetium to  facilitate sentinel lymph node identification.The bilateral chest and axilla was then prepped and draped in the usual sterile fashion.    We then turned our attention to the right breast where an elliptical incision was fashioned to incorporate the nipple areolar complex.  The incision was made with a 10-blade and extended through the subcutaneous tissues with Bovie electrocautery.  Skin flaps were raised to the clavicle superiorly.  We then  turned our attention to the right axilla.  There was no distinct radioactivity or lue dye that crossed over from the affected side.  Therefore the clipped node was identified with ultrasound guidance, excised, and sent for frozen section.  We then proceeded to raise the remainder of the flaps to the lateral border of the sternum medially, to the inframammary fold inferiorly, and to the anterior border of the latissimus dorsi muscle laterally. The breast tissue was sharply excised off the chest wall taking care to incorporate the pectoralis fascia while leaving the serratus fascia behind.  The resulting mastectomy specimen was marked using a short stitch superiorly and long stitch laterally.  The breast was sent to pathology for permanent evaluation.      Frozen section kin evaluation revealed no evidence of metastatic disease.  Therefore, the operative field was irrigated with normal saline and all bleeding points were secured with Bovie electrocautery.  The case was then turned over to the plastic surgery service. Dr. Lopez completed the left breast mastectomy ,sentinel node biopsy and axillary dissection.    Dermabond was applied. A post surgical bra was placed on the patient. At the end of the operation, all sponge, instrument, and needle counts x 2 were correct.    ESTIMATED BLOOD LOSS: less than 50 mL    COMPLICATIONS: none    DISPOSITION: PACU - hemodynamically stable.    ATTESTATION:   I was present and scrubbed for the entire procedure.

## 2020-03-24 NOTE — ANESTHESIA PROCEDURE NOTES
Paravertebral Single Injection Block(s)    Patient location during procedure: pre-op   Block not for primary anesthetic.  Reason for block: at surgeon's request and post-op pain management   Post-op Pain Location: Bilateral chest/truncal pain   Start time: 3/24/2020 7:29 AM  Timeout: 3/24/2020 7:29 AM   End time: 3/24/2020 8:00 AM    Staffing  Authorizing Provider: Glory Lopez MD  Performing Provider: Shiva Harrison MD    Preanesthetic Checklist  Completed: patient identified, site marked, surgical consent, pre-op evaluation, timeout performed, IV checked, risks and benefits discussed and monitors and equipment checked  Peripheral Block  Patient position: sitting  Prep: ChloraPrep  Patient monitoring: heart rate, cardiac monitor, continuous pulse ox, continuous capnometry and frequent blood pressure checks  Block type: paravertebral - thoracic  Laterality: bilateral  Injection technique: single shot  Location: T3-4  Needle  Needle type: Tuohy   Needle gauge: 17 G  Needle length: 3.5 in  Needle localization: anatomical landmarks     Assessment  Injection assessment: negative aspiration and negative parasthesia  Paresthesia pain: none  Heart rate change: no  Slow fractionated injection: yes  Additional Notes    T4 os at 4 cm  VSS.  [facility] RN monitoring vitals throughout procedure.  Patient tolerated procedure well.

## 2020-03-24 NOTE — PROGRESS NOTES
1:36 pmReceived email from Krista Domingo with [email or web address].Patient reached out to them to request for more assistance with gas card. Provided Krista the dates of all upcoming appointments.

## 2020-03-24 NOTE — BRIEF OP NOTE
[facility]  Brief Operative Note    Surgery Date: 3/24/2020     Surgeon(s) and Role:  Panel 1:     * Elena Lopez MD - Primary     * Mariam Alexandre MD - Co-Surgeon  Panel 2:     * Michael Solorzano MD - Primary    Assisting Surgeon: None    Pre-op Diagnosis:  Malignant neoplasm of left female breast, unspecified estrogen receptor status, unspecified site of breast [C50.912]    Post-op Diagnosis:  Post-Op Diagnosis Codes:     * Malignant neoplasm of left female breast, unspecified estrogen receptor status, unspecified site of breast [C50.912]    Procedure(s) (LRB):  MASTECTOMY, SIMPLE BILATERAL (Bilateral)  BIOPSY, LYMPH NODE, SENTINEL LEFT (Left)  INSERTION, TISSUE EXPANDER, BREAST BILATERAL (Bilateral)    Anesthesia: General    Description of the findings of the procedure(s): bilateral tissue expander placement    Estimated Blood Loss: 250 mL         Specimens:   Specimen (12h ago, onward)    None            Discharge Note    OUTCOME: Patient tolerated treatment/procedure well without complication and is now ready for discharge.    DISPOSITION: Home or Self Care    FINAL DIAGNOSIS:  Breast cancer    FOLLOWUP: In clinic one week    DISCHARGE INSTRUCTIONS:    Discharge Procedure Orders   Sponge bath only until clinic visit     Lifting restrictions     Teach SASHA drain care and provide sheet to record output     Call MD for:  temperature >100.4     Call MD for:  persistent nausea and vomiting     Call MD for:  severe uncontrolled pain     Call MD for:  difficulty breathing, headache or visual disturbances     Activity as tolerated

## 2020-03-24 NOTE — ANESTHESIA PREPROCEDURE EVALUATION
03/24/2020  Maria Elena Tavares is a 64 y.o., female.  Patient Active Problem List   Diagnosis    Uncontrolled type 2 diabetes mellitus without complication, without long-term current use of insulin    Hypertension    History of shingles    Mild vitamin D deficiency    Hypothyroid    Diverticulitis of colon (without mention of hemorrhage)(562.11)    Breast cancer metastasized to axillary lymph node, left    Thrush    Anemia in neoplastic disease    Chemotherapy induced neutropenia    Neuropathy due to chemotherapeutic drug    Right arm pain    Non-intractable vomiting with nausea    Orthostatic hypotension    Neuropathy         Anesthesia Evaluation         Review of Systems      Physical Exam  General:  Well nourished    Airway/Jaw/Neck:   Extension normal.    Eyes/Ears/Nose:  Eyes/Ears/Nose Findings: Wearing mask     Chest/Lungs:   Respiratory rate and mechanics appear normal   Heart/Vascular:  Not examined      Mental Status:  Mental Status Findings:  Cooperative, Alert and Oriented         Anesthesia Plan  Type of Anesthesia, risks & benefits discussed:  Anesthesia Type:  general  Patient's Preference: General  Intra-op Monitoring Plan: standard ASA monitors  Intra-op Monitoring Plan Comments: Standard ASA monitors.   Post Op Pain Control Plan: per primary service following discharge from PACU  Post Op Pain Control Plan Comments: Per primary service.     Induction:   IV  Beta Blocker:  Patient is not currently on a Beta-Blocker (No further documentation required).       Informed Consent: Patient understands risks and agrees with Anesthesia plan.  Questions answered. Anesthesia consent signed with patient.  ASA Score: 3     Day of Surgery Review of History & Physical:    H&P update referred to the surgeon.     Anesthesia Plan Notes: Chart reviewed, patient interviewed and examined.  The plan for  general anesthesia was explained.  Questions were answered and the consent was signed.  Pankaj TAPIA         Ready For Surgery From Anesthesia Perspective.

## 2020-03-24 NOTE — DISCHARGE INSTRUCTIONS
Please leave wound vac in place.  It will be removed when you are seen in clinic in one week.    Empty and record drain output as instructed    Do not get incisions wet until seen in clinic

## 2020-03-25 RX ORDER — MIDAZOLAM HYDROCHLORIDE 1 MG/ML
INJECTION, SOLUTION INTRAMUSCULAR; INTRAVENOUS
Status: DISCONTINUED | OUTPATIENT
Start: 2020-03-24 | End: 2020-03-25

## 2020-03-25 NOTE — ANESTHESIA POSTPROCEDURE EVALUATION
Anesthesia Post Evaluation    Patient: Maria Elena Tavares    Procedure(s) Performed: Procedure(s) (LRB):  MASTECTOMY, SIMPLE BILATERAL (Bilateral)  BIOPSY, LYMPH NODE, SENTINEL LEFT (Left)  INSERTION, TISSUE EXPANDER, BREAST BILATERAL (Bilateral)    Final Anesthesia Type: general    Patient location during evaluation: PACU  Patient participation: Yes- Able to Participate  Level of consciousness: awake and alert  Post-procedure vital signs: reviewed and stable  Pain management: adequate  Airway patency: patent    PONV status at discharge: No PONV  Anesthetic complications: no      Cardiovascular status: hemodynamically stable  Respiratory status: unassisted  Hydration status: euvolemic  Follow-up not needed.          Vitals Value Taken Time   /78 3/24/2020  7:30 PM   Temp 36.5 °C (97.7 °F) 3/24/2020  7:30 PM   Pulse 97 3/24/2020  7:30 PM   Resp 16 3/24/2020  7:30 PM   SpO2 100 % 3/24/2020  7:30 PM         Event Time     Out of Recovery 13:45:00          Pain/[place] Score: Pain Rating Prior to Med Admin: 8 (3/24/2020  2:05 PM)  Pain Rating Post Med Admin: 4 (3/24/2020  7:15 PM)  [place] Score: 10 (3/24/2020  7:15 PM)

## 2020-03-25 NOTE — OP NOTE
Date of surgery:  03/24/2020  Preoperative diagnosis:  Breast cancer  Postoperative diagnosis:  The same  Procedure performed:  Immediate bilateral breast reconstruction using tissue expanders  Placement of bilateral provena wound vacs  Surgeon:  Jeanine  Anesthesia:  General  Blood loss:  Minimal   complications:  None        After completion of the mastectomy I entered the room.  Both mastectomy flaps were noted to be thick with excellent vascularity.  Next, hemostasis was meticulously obtained.  Two drains were placed on each side.  The wounds were irrigated 1st with Betadine, then with triple antibiotic solution.  2650 cc smooth Magna Site tissue expanders were opened on the back table.  All air was removed.  There were soaked in triple antibiotic solution.  They were placed in the prepectoral position and sutured to the pectoralis major muscle using interrupted 2-0 proline sutures.  Next, approximately 300 cc of air was placed in each expander.  The skin was then closed over the drains using interrupted 3-0 Monocryl followed by running 4-0 Monocryl subcuticular suture.  Similar procedure was performed on the opposite side.  Pro vena wound vacs were then applied.

## 2020-03-25 NOTE — OP NOTE
Operative Note     3/24/2020    PRE-OP DIAGNOSIS: Malignant neoplasm of left female breast, unspecified estrogen receptor status, unspecified site of breast [C50.912]      POST-OP DIAGNOSIS: Post-Op Diagnosis Codes:     * Malignant neoplasm of left female breast, unspecified estrogen receptor status, unspecified site of breast [C50.912]    Procedure(s):  MASTECTOMY, SIMPLE BILATERAL  Left axillary dissection  BIOPSY, LYMPH NODE, SENTINEL LEFT   Right axillary lymph node removal (by Dr. Alexandre)  Injection of radioactive technetium and blue dye for LN   INSERTION, TISSUE EXPANDER, BREAST BILATERAL     SURGEON: Surgeon(s) and Role:  Panel 1:     * Elena Lopez MD - Primary     * Mariam Alexandre MD - Co-Surgeon  Panel 2:     * Michael Solorzano MD - Primary    ANESTHESIA: General     OPERATIVE FINDINGS: 3 [address] LN left.  #1 positive with clip so ALND performed. 1 LN right with clip negative.    INDICATION FOR PROCEDURE: This patient presents with a history of cancer of the left breast with prior right axillary lymph node biopsy.  Patient is s/p neoadjuvant chemotherapy and felt to be medically urgent in the time COVID for her breast cancer s/p chemotherapy.    PROCEDURE IN DETAIL:  Maria Elena Tavares is a 64 y.o. female brought to the operating room for definitive surgery of cancer of the left breast.  The patient has elected to undergo bilateral simple mastectomy with sentinel lymph node biopsy for kin assessment. The patient was informed of the possible risks and complications of the procedure, including but not limited to anesthetic risks, bleeding, infection, and need for additional surgery.  The patient concurred with the proposed plan, and has given informed consent.  The site of surgery was properly noted/marked in the preoperative holding area.    Prior to arriving in the operating room, the patient's left breast was injected with technetium to facilitate sentinel lymph node identification. The patient  was then brought to the operating room and placed in the supine position with both upper extremities extended.  general anesthesia was administered. Perioperative antibiotics were administered consisting of Ancef and a time out was performed confirming the patient, site, and procedure.  The bilateral chest and axilla was then prepped and draped in the usual sterile fashion.    Dr. Alexandre will dictate the contralateral breast separately.    We then turned our attention to the left breast where an elliptical incision was fashioned to incorporate the nipple areolar complex.  The incision was made with a 10-blade and extended through the subcutaneous tissues with Bovie electrocautery.  Skin flaps were raised to the clavicle superiorly.  We then  turned our attention to the left axilla.  Blue dye was injected prior to the incision in the usual subareolar fashion. The gamma probe was used to identify an area of increased radioactivity within the lower axilla. The clavipectoral sheath was sharply incised to reveal the level I axillary lymph nodes. The probe was used to identify a single node with increased radioactivity.  This node was brought into the operative field and carefully dissected free of the surrounding lymphovascular structures.  The ex vivo count of the node was elevated and noted to be blue with clip present.  The node was then sent to pathology for frozen section evaluation, labeled as sentinel node #1.  A total of 3 axillary sentinel nodes and 0 axillary non-sentinel nodes were identified, excised and submitted to pathology.  Bed counts were obtained to confirm that the 10% rule had not been violated.   The wound was irrigated with normal saline, and all bleeding points were secured with Bovie electrocautery.     We then proceeded to raise the remainder of the flaps to the lateral border of the sternum medially, to the inframammary fold inferiorly, and to the anterior border of the latissimus dorsi muscle  laterally. The breast tissue was sharply excised off the chest wall taking care to incorporate the pectoralis fascia while leaving the serratus fascia behind.  The resulting mastectomy specimen was marked using a short stitch superiorly and long stitch laterally.  The breast was sent to pathology for permanent evaluation.      Frozen section kin evaluation revealed evidence of metastatic disease, so ALND performed. An axillary dissection was performed with removal of the associated lymph nodes and surrounding adipose tissue. This included levels I and II. This was accomplished by exposing the axillary vein superiorly. Small venous tributaries, lymphatics, and vessels were clipped and ligated or cauterized and divided. The subscapularis muscle was skeletonized. The long thoracic and thoracodorsal neurovascular bundles were identified and preserved. The tissue between the long thoracic and thoracodorsal bundle was removed.  Of note, at the end of the dissection, level 3 nodes were palpated and no abnormal nodes were noted.      The specimen was submitted to pathology. The operative field was irrigated with normal saline and all bleeding points were secured with Bovie electrocautery. The incision was closed by the plastic surgery service.      At the end of the operation, all sponge, instrument, and needle counts x 2 were correct.    ESTIMATED BLOOD LOSS: Minimal    COMPLICATIONS: none    DISPOSITION: intraoperative transfer to plastics    ATTESTATION:   I performed the procedure.

## 2020-03-25 NOTE — PROGRESS NOTES
Patient discharged to home. Discharge instructions complete, patient and daughter state complete understanding. Drain teaching complete, drain documentation given to patient and family. All questions answered. Prescriptions given to patient from pharmacy. No further questions at this time. Patient transported via wheelchair by RN and placed in the car.

## 2020-03-26 ENCOUNTER — PATIENT MESSAGE (OUTPATIENT)
Dept: PRIMARY CARE CLINIC | Facility: CLINIC | Age: 65
End: 2020-03-26

## 2020-03-26 ENCOUNTER — HOSPITAL ENCOUNTER (OUTPATIENT)
Dept: RADIOLOGY | Facility: HOSPITAL | Age: 65
Discharge: HOME | End: 2020-03-26
Attending: SURGERY
Payer: COMMERCIAL

## 2020-03-26 ENCOUNTER — PATIENT MESSAGE (OUTPATIENT)
Dept: HEMATOLOGY/ONCOLOGY | Facility: CLINIC | Age: 65
End: 2020-03-26

## 2020-03-26 DIAGNOSIS — Z85.3 HISTORY OF LEFT BREAST CANCER: ICD-10-CM

## 2020-03-26 PROCEDURE — 76098 X-RAY EXAM SURGICAL SPECIMEN: CPT | Mod: 26,,, | Performed by: RADIOLOGY

## 2020-03-26 PROCEDURE — 76098 X-RAY EXAM SURGICAL SPECIMEN: CPT | Mod: TC,PO

## 2020-03-26 PROCEDURE — 76098 MAMMO BREAST SPECIMEN: ICD-10-PCS | Mod: 26,,, | Performed by: RADIOLOGY

## 2020-03-28 ENCOUNTER — TELEPHONE (OUTPATIENT)
Dept: SURGERY | Facility: HOSPITAL | Age: 65
End: 2020-03-28

## 2020-03-28 NOTE — TELEPHONE ENCOUNTER
Received a call from the patient stating that her provena vacs had started to alert on both sides. Went through a series of troubleshooting at the end of which both vacs had regained suction. She has an appointment with Dr. Solorzano on Wednesday and will follow up at that time

## 2020-03-30 ENCOUNTER — PATIENT MESSAGE (OUTPATIENT)
Dept: PLASTIC SURGERY | Facility: CLINIC | Age: 65
End: 2020-03-30

## 2020-03-30 ENCOUNTER — DOCUMENTATION ONLY (OUTPATIENT)
Dept: HEMATOLOGY/ONCOLOGY | Facility: CLINIC | Age: 65
End: 2020-03-30

## 2020-03-30 NOTE — PROGRESS NOTES
Received request to reach out to the patient. Reached out to her. She is in need of help to get in touch with A[employer] at [employer] to pay her mortgage for the month. Reached out to abraham and asked her to touch base with the patient. I forwarded her copies of the bills that the patient had submitted.

## 2020-03-31 ENCOUNTER — TELEPHONE (OUTPATIENT)
Dept: SURGERY | Facility: CLINIC | Age: 65
End: 2020-03-31

## 2020-03-31 NOTE — TELEPHONE ENCOUNTER
Spoke to patient and rescheduled her appointment with Dr. Lopez from 4/6/20 at 1:45 to a video visit on 4/9/20 at 830.  Patient had no concerns at this time.  Patient was told to call if she had any questions or concerns prior to appointment.

## 2020-04-01 ENCOUNTER — OFFICE VISIT (OUTPATIENT)
Dept: PLASTIC SURGERY | Facility: CLINIC | Age: 65
End: 2020-04-01
Payer: COMMERCIAL

## 2020-04-01 VITALS — DIASTOLIC BLOOD PRESSURE: 78 MMHG | HEART RATE: 102 BPM | SYSTOLIC BLOOD PRESSURE: 117 MMHG

## 2020-04-01 DIAGNOSIS — Z09 SURGERY FOLLOW-UP EXAMINATION: ICD-10-CM

## 2020-04-01 DIAGNOSIS — C50.919 MALIGNANT NEOPLASM OF FEMALE BREAST, UNSPECIFIED ESTROGEN RECEPTOR STATUS, UNSPECIFIED LATERALITY, UNSPECIFIED SITE OF BREAST: Primary | ICD-10-CM

## 2020-04-01 PROCEDURE — 99024 POSTOP FOLLOW-UP VISIT: CPT | Mod: S$GLB,,, | Performed by: SURGERY

## 2020-04-01 PROCEDURE — 99024 PR POST-OP FOLLOW-UP VISIT: ICD-10-PCS | Mod: S$GLB,,, | Performed by: SURGERY

## 2020-04-01 PROCEDURE — 99999 PR PBB SHADOW E&M-EST. PATIENT-LVL II: CPT | Mod: PBBFAC,,, | Performed by: SURGERY

## 2020-04-01 PROCEDURE — 99999 PR PBB SHADOW E&M-EST. PATIENT-LVL II: ICD-10-PCS | Mod: PBBFAC,,, | Performed by: SURGERY

## 2020-04-01 NOTE — PROGRESS NOTES
64F s/p bilat mastecomies and TE placements 3/30. Doing well overal    On exam:  viable skin, no drainage  Drains with serous fluid    Upper drains (2&4) removed  Outputs reviewed.   100cc of air injected into each expander    -f/u 2 weeks  -ok to shower

## 2020-04-03 ENCOUNTER — PATIENT MESSAGE (OUTPATIENT)
Dept: SURGERY | Facility: CLINIC | Age: 65
End: 2020-04-03

## 2020-04-03 LAB
COMMENT: NORMAL
FINAL PATHOLOGIC DIAGNOSIS: NORMAL
FROZEN SECTION DIAGNOSIS: NORMAL
FROZEN SECTION FOOTNOTE: NORMAL
GROSS: NORMAL
Lab: NORMAL

## 2020-04-06 ENCOUNTER — PATIENT MESSAGE (OUTPATIENT)
Dept: PLASTIC SURGERY | Facility: CLINIC | Age: 65
End: 2020-04-06

## 2020-04-08 NOTE — PROGRESS NOTES
Digital Medicine: Health  Follow-Up    Patient reports she has surgery on 3/24/20. Patient reports she is still recovering at her daughters house. Patient left BP machine at her house but was dropped off to her yesterday. Patient plans to monitor again soon. Encouraged patient to reach out if she has any questions or concerns.     The history is provided by the patient. No  was used.     INTERVENTION(S)  encouragement/support    PLAN  continue monitoring      There are no preventive care reminders to display for this patient.    Last 5 Patient Entered Readings                                      Current 30 Day Average: 104/72     Recent Readings 3/20/2020 3/20/2020 3/8/2020 3/8/2020 3/8/2020    SBP (mmHg) 104 96 128 151 113    DBP (mmHg) 72 71 86 82 79    Pulse 113 118 99 101 104          Screenings- Deferred.

## 2020-04-09 ENCOUNTER — PATIENT MESSAGE (OUTPATIENT)
Dept: HEMATOLOGY/ONCOLOGY | Facility: CLINIC | Age: 65
End: 2020-04-09

## 2020-04-09 ENCOUNTER — OFFICE VISIT (OUTPATIENT)
Dept: SURGERY | Facility: CLINIC | Age: 65
End: 2020-04-09
Payer: COMMERCIAL

## 2020-04-09 ENCOUNTER — PATIENT MESSAGE (OUTPATIENT)
Dept: SURGERY | Facility: CLINIC | Age: 65
End: 2020-04-09

## 2020-04-09 DIAGNOSIS — C50.912 MALIGNANT NEOPLASM OF LEFT FEMALE BREAST, UNSPECIFIED ESTROGEN RECEPTOR STATUS, UNSPECIFIED SITE OF BREAST: Primary | ICD-10-CM

## 2020-04-09 PROCEDURE — 99024 PR POST-OP FOLLOW-UP VISIT: ICD-10-PCS | Mod: 95,,, | Performed by: SURGERY

## 2020-04-09 PROCEDURE — 99024 POSTOP FOLLOW-UP VISIT: CPT | Mod: 95,,, | Performed by: SURGERY

## 2020-04-09 NOTE — PROGRESS NOTES
S/p bilateral mastectomy for LEFT ER+ IDC, grade 3.   Final path:  ypT1c (1.2cm) N1 (1/11) M0    TELEVISIT: per patient healing well.  Still with 2 drains.    Will need PMRT.  Wants to see Dr. Bell  F/u with Dr. Villagran  Refer to PT due to ALND  F/u 6 monts  Will need plastics referral for flap after PMRT since Dr. lawler doesn't do autologous tissue.

## 2020-04-14 ENCOUNTER — TELEPHONE (OUTPATIENT)
Dept: HEMATOLOGY/ONCOLOGY | Facility: CLINIC | Age: 65
End: 2020-04-14

## 2020-04-14 NOTE — TELEPHONE ENCOUNTER
----- Message from Corinne E Coniglio, RN sent at 4/13/2020  5:11 PM CDT -----  Patient needs follow up appt with Dr. Villagran per Dr. Lopez. Please contact to schedule appt.  Corinne

## 2020-04-15 ENCOUNTER — OFFICE VISIT (OUTPATIENT)
Dept: PLASTIC SURGERY | Facility: CLINIC | Age: 65
End: 2020-04-15
Payer: COMMERCIAL

## 2020-04-15 ENCOUNTER — TELEPHONE (OUTPATIENT)
Dept: HEMATOLOGY/ONCOLOGY | Facility: CLINIC | Age: 65
End: 2020-04-15

## 2020-04-15 ENCOUNTER — PATIENT OUTREACH (OUTPATIENT)
Dept: OTHER | Facility: OTHER | Age: 65
End: 2020-04-15

## 2020-04-15 ENCOUNTER — CLINICAL SUPPORT (OUTPATIENT)
Dept: REHABILITATION | Facility: HOSPITAL | Age: 65
End: 2020-04-15
Payer: COMMERCIAL

## 2020-04-15 VITALS — HEART RATE: 94 BPM | SYSTOLIC BLOOD PRESSURE: 125 MMHG | DIASTOLIC BLOOD PRESSURE: 73 MMHG

## 2020-04-15 DIAGNOSIS — Z09 SURGERY FOLLOW-UP EXAMINATION: Primary | ICD-10-CM

## 2020-04-15 DIAGNOSIS — Z91.89 AT RISK FOR LYMPHEDEMA: ICD-10-CM

## 2020-04-15 DIAGNOSIS — C50.912 BREAST CANCER METASTASIZED TO AXILLARY LYMPH NODE, LEFT: Primary | ICD-10-CM

## 2020-04-15 DIAGNOSIS — M25.619 DECREASED SHOULDER MOBILITY, UNSPECIFIED LATERALITY: ICD-10-CM

## 2020-04-15 DIAGNOSIS — C77.3 BREAST CANCER METASTASIZED TO AXILLARY LYMPH NODE, LEFT: Primary | ICD-10-CM

## 2020-04-15 DIAGNOSIS — C50.912 MALIGNANT NEOPLASM OF LEFT FEMALE BREAST, UNSPECIFIED ESTROGEN RECEPTOR STATUS, UNSPECIFIED SITE OF BREAST: ICD-10-CM

## 2020-04-15 PROCEDURE — 99999 PR PBB SHADOW E&M-EST. PATIENT-LVL II: ICD-10-PCS | Mod: PBBFAC,,, | Performed by: SURGERY

## 2020-04-15 PROCEDURE — 99024 POSTOP FOLLOW-UP VISIT: CPT | Mod: S$GLB,,, | Performed by: SURGERY

## 2020-04-15 PROCEDURE — 97162 PT EVAL MOD COMPLEX 30 MIN: CPT | Performed by: PHYSICAL MEDICINE & REHABILITATION

## 2020-04-15 PROCEDURE — 99999 PR PBB SHADOW E&M-EST. PATIENT-LVL II: CPT | Mod: PBBFAC,,, | Performed by: SURGERY

## 2020-04-15 PROCEDURE — 97110 THERAPEUTIC EXERCISES: CPT | Performed by: PHYSICAL MEDICINE & REHABILITATION

## 2020-04-15 PROCEDURE — 99024 PR POST-OP FOLLOW-UP VISIT: ICD-10-PCS | Mod: S$GLB,,, | Performed by: SURGERY

## 2020-04-15 NOTE — PROGRESS NOTES
[facility]  Physical Therapy Initial Evaluation    Name: Maria Elena Tavares  Clinic Number: [id number]    Therapy Diagnosis:   Encounter Diagnoses   Name Primary?    Malignant neoplasm of left female breast, unspecified estrogen receptor status, unspecified site of breast     Breast cancer metastasized to axillary lymph node, left Yes    At risk for lymphedema     Decreased shoulder mobility, unspecified laterality      Physician: Elena Lopez MD    Physician Orders: PT Eval and Treat   Medical Diagnosis: Left breast surgery/decreased shoulder mobility  Evaluation Date: 4/15/2020  Authorization Period Expiration: 12/31/20  Plan of Care Certification Period: 5/29/2020 (6 weeks)  Visit # / Visits authorized: 1/ 20  Insurance: [employer]    Time In: 10:20 AM  Time Out: 11:15 AM  Total Billable Time: 55 minutes    Precautions: cancer      History   History of Present Illness: Maria Elena is a 64 y.o. female that presents to  [facility] at the [facility] s/p bilateral breast surgery.   Diagnosis:  Left breast IDC, ER (+), MA (+), HER2 (-), (+) lymph node left axilla  Chief complaint: Patient c/o pulling and tightness in left axilla and decreased shoulder motion both upper extremities. Patient states she saw Dr. Solorzano this morning and he removed her drains.   Surgical History: 3/24/20 bilateral mastectomies with SLNB and ALNB and tissue expander reconstruction  Maria Elena Tavares  has a past surgical history that includes Hysterectomy; Oophorectomy; Insertion of tunneled central venous catheter (CVC) with subcutaneous port (Right, 10/4/2019); Breast biopsy (Left, 09/05/2019); Breast biopsy (Right, 09/26/2019); Breast biopsy (Left, 10/14/2019); Breast biopsy (Left, 10/08/2019); Simple mastectomy (Bilateral, 3/24/2020); [place] lymph node biopsy (Left, 3/24/2020); and Insertion of breast tissue expander (Bilateral, 3/24/2020).    Chemotherapy:  viji-adjuvant chemotherapy from 10/19 to 2/20  Radiation: pending    Past Medical History:   Diagnosis Date    BMI 37.0-37.9, adult     Breast cancer 09/05/2019     Left breast, IDC with lymph node metastisis    Diabetes mellitus type II     Diverticulosis     history of diverticulosisseen on colonoscopy at age 48. Repeat recommended at age 58. Done by     Elevated blood protein     History of elevated protein. Apparently has seen  for extensive workup including bone marrow biopsy    History of shingles 2006    Hyperlipidemia     Hypertension     Microalbuminuria     due 2 diabetes    Mild vitamin D deficiency     . A low vitamin D    Thyroid disease     hypothyroidism          Medications:  Maria Elena has a current medication list which includes the following prescription(s): aspirin, blood-glucose meter, chlorthalidone, dulaglutide, gabapentin, irbesartan, levothyroxine, lifitegrast, metformin, and ondansetron.    Allergies:  Review of patient's allergies indicates:  No Known Allergies     Prior Therapy: none  Social History:  lives alone/with daughter since surgery  Occupation:Patient  financial counselor at [employer]  Prior Level of Function: Independent with all ADL's with BUE  Current Level of Function: difficulty reaching and raising upward with both arms    Other Past Medical History: see above    Patient's Goals: I want to regain my motion in myarms    Hand dominance: right handed    Subjective   Pt states: slight discomfort inanaterior chest with moving her arms  Pain: 0/10 on VAS currently.   Best: 0/10  Worst: 5/10   Pain location: bilateral anterior chests   Objective   Mental status :oriented    Postural examination/scapula alignment: Rounded shoulder and Head forward    Skin Integrity:   Scar Location: anterior chests  Appearance: healing  Signs of infection: No  Drainage:none  Color:NA    Edema: Mild  Location: anterior chest    Axillary Web Syndrome/Cording:   Location: left axilla  "and left medial upper arm  Degree of Cording: moderate (mild, moderate etc...)   Number of cords present: one    Sensation: WNL anterior chest        Range of Motion:      Shoulder Range of Motion:   Active /Passive ROM Right Left   Flexion 150 140   Abduction 115 110   Extension 50 50   IR/90deg 85 85   ER/90deg 75 90          Strength: manual muscle test grades below   Upper Extremity Strength   (R) UE (L) UE   Shoulder flexion: 5/5 5/5   Shoulder Abduction: 5/5 5/5   Shoulder IR 5/5 5/5   Shoulder ER 5/5 5/5   Elbow flexion: 5/5 5/5   Elbow extension: 5/5 5/5   Wrist flexion: 5/5 5/5   Wrist extension: 5/5 5/5    5/5 5/5       Baseline Measurements of BL UE's for early detection of Lymphedema:     LANDMARK RIGHT UE LEFT UE DIFFERENCE   E + 8" 46 cm 47 cm 1 cm   E + 6" 40 cm 41.5 cm 1.5 cm   E + 4" 37 cm 37.5 cm 0.5 cm   E + 2" 35 cm 36.5 cm 1.5 cm   Elbow 29.5 cm 30 cm 0.5 cm   W+ 8" 30 cm 29.5 cm 0.5 cm   W +  6" 28.5 cm 29 cm 0.5 cm   W + 4" 25 cm 25 cm 0 cm   Wrist 17 cm 17 cm 0 cm   DPC 20.5 cm 20 cm 0.5 cm   IP Thumb 6 cm 6.5 cm 0.5 cm     Functional Mobility (Bed mobility, transfers)  Independent    ADL's:  Difficulty reaching upward with BUE    Gait Assessment:   - AD used: none  - Assistance: independent  - Distance: community distances     Patient Education Provided   - role of therapy in multi - disciplinary team, goals for therapy  - Pt was educated in lymphedema etiology and management plans.    - Pt was provided with written risk reductions and precautions for managing lymphedema.     Pt has no cultural, educational or language barriers to learning provided.  Treatment and Instruction of Home Exercise Program    Time In:10:50 AM  Time Out: 11:15 AM    Maria Elena received individual therapeutic exercises to improve postural correction and alignment, stretching and soft tissue mobility, and strengthening for 25 minutes including the following:   Supine Shld Flexion with wand       1 x 10  Butterflies   " "                                     10 x 5"  Supine Shld ER with wand             1 x 10  Side Lying Shld Abd (B)                  1 x 10  Seated Scap Retractions               10 x 5"  Wall Climbs    Forward (B)            1 x 10                         Sideways (B)          1 X 10    Written Home Exercises Provided and Patient Education: Handouts given   See EMR under patient instructions for program given  Pt demonstrated good understanding of the education provided. Patient demo good return demo of skill of exercises.    Functional Limitations Reporting      CMS Impairment/Limitation/Restriction for FOTO Outcome Survey    Therapist reviewed FOTO scores for Maria Elena Tavares on 4/15/2020.   FOTO documents entered into EPIC - see Media section.    Limitations Score: 46%  Category: Carrying           Assessment   This is a 64 y.o. female referred to outpatient physical therapy and presents with a medical diagnosis of left breast cancer and was seen today post-operatively to establish PT plan of care for impairments following surgery including: decreased AROM bilateral shoulders and moderate cording left axilla and medial upper arm.     Pt instructed in HEP this session and was able to perform all exercises given independently. Pt instructed to follow up with therapist if any concerns arise with program established. Pt will continue to benefit from skilled physical therapy to address the impairments stated in chart below, provide patient/family education and to maximize pt's level of independence in home and community environment     Anticipated barriers to physical therapy: COVID19 precautions     Pt's spiritual, cultural and educational needs considered and pt agreeable to plan of care and goals as stated below:     Medical necessity is demonstrated by the following IMPAIRMENTS/PROMBLEM LIST:    History  Co-morbidities and personal factors that may impact the plan of care Co-morbidities:   diabetes, high BMI, history " of cancer, HTN and Hypothyroidism    Personal Factors:   no deficits     moderate   Examination  Body Structures and Functions, activity limitations and participation restrictions that may impact the plan of care Body Regions:   upper extremities    Body Systems:    ROM  scar formation    Participation Restrictions:   Difficulty raising BUE upward    Activity limitations:   Learning and applying knowledge  no deficits    General Tasks and Commands  no deficits    Communication  no deficits    Mobility  lifting and carrying objects    Self care  washing oneself (bathing, drying, washing hands)  dressing    Domestic Life  cooking  doing house work (cleaning house, washing dishes, laundry)    Interactions/Relationships  no deficits    Life Areas  no deficits    Community and Social Life  community life         moderate   Clinical Presentation evolving clinical presentation with changing clinical characteristics moderate   Decision Making/ Complexity Score: moderate       Goals: Pt agrees with goals set    Short Term goals: 3 weeks  1. Patient will demonstrate 100% understanding of lymphedema risk reduction practices to include self monitoring for lymphedema. (progressing, not met)  2. Patient will demonstrate independence with Home Exercise program established. (progressing, not met)  3. Pt will increase AROM/PROM in shoulder abduction ROM to 140 degrees bilaterally to improve functional reach, carry, push, pull pain free. (progressing, not met)  4. Pt will increase AROM/PROM in shoulder flexion to 160 degrees bilaterally to improve functional reach, carry, push, pull pain free.(progressing, not met)    Long Term Goals: 6 weeks   1.  Pt will increase AROM/PROM in shoulder flexion to 170 degrees bilaterally to improve functional reach, carry, push, pull pain free. (progressing, not met)  2. Pt will demonstrate full/maximized tissue mobility to increase ROM and promote healthy tissue to be pain free at discharge.  (progressing, not met)  3. Pt will report decrease in overall worst pain to 2/10 at discharge. (progressing, not met)  4. Pt will increase AROM/PROM in shoulder abduction ROM to 170 degrees bilaterally to improve functional reach, carry, push, pull pain free. (progressing, not met)  5. Patient will report compliance with walking program 5x week for 10 min each day to improve overall cardiovascular function and decrease cancer related fatigue at discharge. (progressing, not met)      Plan   Outpatient physical therapy 2x week for 6 weeks to include the following:   Manual Therapy, Patient Education and Therapeutic Exercise.    Plan of care Certification Period: 4/15/2020 to 5/29/2020      As we are following the updates regarding COVID19 and taking every precaution to ensure the safety of our patients, staff, and community, and in an abundance of caution and to reduce the risk and to limit community spread of this virus, I  reschedule Ms. Jimenez for a follow-up visit in 2 weeks (4/29/20) when she also has a follow up appointment with Dr. Solorzano. I will monitor Ms. Tavares weekly via telephone or telemedicine. Ms. Tavares was given my business card with my phone number and email addressn and she was instructed to call or email me at any time with any questions or concerns. Ms. Tavares was also informed that she can request an in-person visit if she feels she needs to come to the clinic, as she is considered high priority since she recently had surgery.      Therapist: Klarissa Temple, PT  4/15/2020

## 2020-04-15 NOTE — PROGRESS NOTES
Digital Medicine: Clinician Follow-Up    The history is provided by the patient.     Follow Up  Follow-up reason(s): reading review        HPI:  Called patient to follow up. Patient reports adherence to medication regimen daily and denies missed doses. Patient denies hypotensive s/sx (lightheadedness, dizziness, nausea, fatigue); patient denies hypertensive s/sx (SOB, CP, severe headaches, changes in vision, dizziness, fatigue, confusion, anxiety, nosebleeds).     Last 5 Patient Entered Readings                                      Current 30 Day Average: 120/77     Recent Readings 4/12/2020 3/20/2020 3/20/2020 3/8/2020 3/8/2020    SBP (mmHg) 135 104 96 128 151    DBP (mmHg) 87 72 71 86 82    Pulse 75 113 118 99 101        Assessment:  Reviewed recent readings and labs (last CMP drawn on 3/12/20). Per 2017 ACC/ AHA HTN guidelines (goal of BP < 130/80), current 30-day average is well controlled. Within the past month, SBPs have ranged  and DBPs have ranged 71-87. Patient was seen by Plastic Surgery today, BP was 125/73.  Patient was seen by Plastic Surgery on 4/1/20, BP was 117/78.    Plan:  Continue current medication regimen.   Instructed patient to increase frequency of monitoring to 1x per week.   Instructed patient to call if BP remains > 130/80.   Patients health  will be following up as scheduled.   I will continue to monitor regularly and will follow-up in 16 weeks, sooner if blood pressure begins to trend upward or downward.     Current medication regimen:  Hypertension Medications             chlorthalidone (HYGROTEN) 25 MG Tab Take 1 tablet by mouth every morning    irbesartan (AVAPRO) 300 MG tablet Take 1 tablet by mouth once daily         Patient denies having questions or concerns. Patient has my contact information and knows to call with any concerns or clinical changes.

## 2020-04-15 NOTE — PLAN OF CARE
[facility]  Physical Therapy Initial Evaluation    Name: Maria Elena Tavares  Clinic Number: [id number]    Therapy Diagnosis:   Encounter Diagnoses   Name Primary?    Malignant neoplasm of left female breast, unspecified estrogen receptor status, unspecified site of breast     Breast cancer metastasized to axillary lymph node, left Yes    At risk for lymphedema     Decreased shoulder mobility, unspecified laterality      Physician: Elena Lopez MD    Physician Orders: PT Eval and Treat   Medical Diagnosis: Left breast surgery/decreased shoulder mobility  Evaluation Date: 4/15/2020  Authorization Period Expiration: 12/31/20  Plan of Care Certification Period: 5/29/2020 (6 weeks)  Visit # / Visits authorized: 1/ 20  Insurance: [employer]    Time In: 10:20 AM  Time Out: 11:15 AM  Total Billable Time: 55 minutes    Precautions: cancer      History   History of Present Illness: Maria Elena is a 64 y.o. female that presents to  [facility] at the [facility] s/p bilateral breast surgery.   Diagnosis:  Left breast IDC, ER (+), MA (+), HER2 (-), (+) lymph node left axilla  Chief complaint: Patient c/o pulling and tightness in left axilla and decreased shoulder motion both upper extremities. Patient states she saw Dr. Solorzano this morning and he removed her drains.   Surgical History: 3/24/20 bilateral mastectomies with SLNB and ALNB and tissue expander reconstruction  Maria Elena Tavares  has a past surgical history that includes Hysterectomy; Oophorectomy; Insertion of tunneled central venous catheter (CVC) with subcutaneous port (Right, 10/4/2019); Breast biopsy (Left, 09/05/2019); Breast biopsy (Right, 09/26/2019); Breast biopsy (Left, 10/14/2019); Breast biopsy (Left, 10/08/2019); Simple mastectomy (Bilateral, 3/24/2020); [place] lymph node biopsy (Left, 3/24/2020); and Insertion of breast tissue expander (Bilateral, 3/24/2020).    Chemotherapy:  viji-adjuvant chemotherapy from 10/19 to 2/20  Radiation: pending    Past Medical History:   Diagnosis Date    BMI 37.0-37.9, adult     Breast cancer 09/05/2019     Left breast, IDC with lymph node metastisis    Diabetes mellitus type II     Diverticulosis     history of diverticulosisseen on colonoscopy at age 48. Repeat recommended at age 58. Done by     Elevated blood protein     History of elevated protein. Apparently has seen  for extensive workup including bone marrow biopsy    History of shingles 2006    Hyperlipidemia     Hypertension     Microalbuminuria     due 2 diabetes    Mild vitamin D deficiency     . A low vitamin D    Thyroid disease     hypothyroidism          Medications:  Maria Elena has a current medication list which includes the following prescription(s): aspirin, blood-glucose meter, chlorthalidone, dulaglutide, gabapentin, irbesartan, levothyroxine, lifitegrast, metformin, and ondansetron.    Allergies:  Review of patient's allergies indicates:  No Known Allergies     Prior Therapy: none  Social History:  lives alone/with daughter since surgery  Occupation:Patient  financial counselor at [employer]  Prior Level of Function: Independent with all ADL's with BUE  Current Level of Function: difficulty reaching and raising upward with both arms    Other Past Medical History: see above    Patient's Goals: I want to regain my motion in myarms    Hand dominance: right handed    Subjective   Pt states: slight discomfort inanaterior chest with moving her arms  Pain: 0/10 on VAS currently.   Best: 0/10  Worst: 5/10   Pain location: bilateral anterior chests   Objective   Mental status :oriented    Postural examination/scapula alignment: Rounded shoulder and Head forward    Skin Integrity:   Scar Location: anterior chests  Appearance: healing  Signs of infection: No  Drainage:none  Color:NA    Edema: Mild  Location: anterior chest    Axillary Web Syndrome/Cording:   Location: left axilla  "and left medial upper arm  Degree of Cording: moderate (mild, moderate etc...)   Number of cords present: one    Sensation: WNL anterior chest        Range of Motion:      Shoulder Range of Motion:   Active /Passive ROM Right Left   Flexion 150 140   Abduction 115 110   Extension 50 50   IR/90deg 85 85   ER/90deg 75 90          Strength: manual muscle test grades below   Upper Extremity Strength   (R) UE (L) UE   Shoulder flexion: 5/5 5/5   Shoulder Abduction: 5/5 5/5   Shoulder IR 5/5 5/5   Shoulder ER 5/5 5/5   Elbow flexion: 5/5 5/5   Elbow extension: 5/5 5/5   Wrist flexion: 5/5 5/5   Wrist extension: 5/5 5/5    5/5 5/5       Baseline Measurements of BL UE's for early detection of Lymphedema:     LANDMARK RIGHT UE LEFT UE DIFFERENCE   E + 8" 46 cm 47 cm 1 cm   E + 6" 40 cm 41.5 cm 1.5 cm   E + 4" 37 cm 37.5 cm 0.5 cm   E + 2" 35 cm 36.5 cm 1.5 cm   Elbow 29.5 cm 30 cm 0.5 cm   W+ 8" 30 cm 29.5 cm 0.5 cm   W +  6" 28.5 cm 29 cm 0.5 cm   W + 4" 25 cm 25 cm 0 cm   Wrist 17 cm 17 cm 0 cm   DPC 20.5 cm 20 cm 0.5 cm   IP Thumb 6 cm 6.5 cm 0.5 cm     Functional Mobility (Bed mobility, transfers)  Independent    ADL's:  Difficulty reaching upward with BUE    Gait Assessment:   - AD used: none  - Assistance: independent  - Distance: community distances     Patient Education Provided   - role of therapy in multi - disciplinary team, goals for therapy  - Pt was educated in lymphedema etiology and management plans.    - Pt was provided with written risk reductions and precautions for managing lymphedema.     Pt has no cultural, educational or language barriers to learning provided.  Treatment and Instruction of Home Exercise Program    Time In:10:50 AM  Time Out: 11:15 AM    Maria Elena received individual therapeutic exercises to improve postural correction and alignment, stretching and soft tissue mobility, and strengthening for 25 minutes including the following:   Supine Shld Flexion with wand       1 x 10  Butterflies   " "                                     10 x 5"  Supine Shld ER with wand             1 x 10  Side Lying Shld Abd (B)                  1 x 10  Seated Scap Retractions               10 x 5"  Wall Climbs    Forward (B)            1 x 10                         Sideways (B)          1 X 10    Written Home Exercises Provided and Patient Education: Handouts given   See EMR under patient instructions for program given  Pt demonstrated good understanding of the education provided. Patient demo good return demo of skill of exercises.    Functional Limitations Reporting      CMS Impairment/Limitation/Restriction for FOTO Outcome Survey    Therapist reviewed FOTO scores for Maria Elena Tavares on 4/15/2020.   FOTO documents entered into EPIC - see Media section.    Limitations Score: 46%  Category: Carrying           Assessment   This is a 64 y.o. female referred to outpatient physical therapy and presents with a medical diagnosis of left breast cancer and was seen today post-operatively to establish PT plan of care for impairments following surgery including: decreased AROM bilateral shoulders and moderate cording left axilla and medial upper arm.     Pt instructed in HEP this session and was able to perform all exercises given independently. Pt instructed to follow up with therapist if any concerns arise with program established. Pt will continue to benefit from skilled physical therapy to address the impairments stated in chart below, provide patient/family education and to maximize pt's level of independence in home and community environment     Anticipated barriers to physical therapy: COVID19 precautions     Pt's spiritual, cultural and educational needs considered and pt agreeable to plan of care and goals as stated below:     Medical necessity is demonstrated by the following IMPAIRMENTS/PROMBLEM LIST:    History  Co-morbidities and personal factors that may impact the plan of care Co-morbidities:   diabetes, high BMI, history " of cancer, HTN and Hypothyroidism    Personal Factors:   no deficits     moderate   Examination  Body Structures and Functions, activity limitations and participation restrictions that may impact the plan of care Body Regions:   upper extremities    Body Systems:    ROM  scar formation    Participation Restrictions:   Difficulty raising BUE upward    Activity limitations:   Learning and applying knowledge  no deficits    General Tasks and Commands  no deficits    Communication  no deficits    Mobility  lifting and carrying objects    Self care  washing oneself (bathing, drying, washing hands)  dressing    Domestic Life  cooking  doing house work (cleaning house, washing dishes, laundry)    Interactions/Relationships  no deficits    Life Areas  no deficits    Community and Social Life  community life         moderate   Clinical Presentation evolving clinical presentation with changing clinical characteristics moderate   Decision Making/ Complexity Score: moderate       Goals: Pt agrees with goals set    Short Term goals: 3 weeks  1. Patient will demonstrate 100% understanding of lymphedema risk reduction practices to include self monitoring for lymphedema. (progressing, not met)  2. Patient will demonstrate independence with Home Exercise program established. (progressing, not met)  3. Pt will increase AROM/PROM in shoulder abduction ROM to 140 degrees bilaterally to improve functional reach, carry, push, pull pain free. (progressing, not met)  4. Pt will increase AROM/PROM in shoulder flexion to 160 degrees bilaterally to improve functional reach, carry, push, pull pain free.(progressing, not met)    Long Term Goals: 6 weeks   1.  Pt will increase AROM/PROM in shoulder flexion to 170 degrees bilaterally to improve functional reach, carry, push, pull pain free. (progressing, not met)  2. Pt will demonstrate full/maximized tissue mobility to increase ROM and promote healthy tissue to be pain free at discharge.  (progressing, not met)  3. Pt will report decrease in overall worst pain to 2/10 at discharge. (progressing, not met)  4. Pt will increase AROM/PROM in shoulder abduction ROM to 170 degrees bilaterally to improve functional reach, carry, push, pull pain free. (progressing, not met)  5. Patient will report compliance with walking program 5x week for 10 min each day to improve overall cardiovascular function and decrease cancer related fatigue at discharge. (progressing, not met)      Plan   Outpatient physical therapy 2x week for 6 weeks to include the following:   Manual Therapy, Patient Education and Therapeutic Exercise.    Plan of care Certification Period: 4/15/2020 to 5/29/2020      As we are following the updates regarding COVID19 and taking every precaution to ensure the safety of our patients, staff, and community, and in an abundance of caution and to reduce the risk and to limit community spread of this virus, I  reschedule Ms. Jimenez for a follow-up visit in 2 weeks (4/29/20) when she also has a follow up appointment with Dr. Solorzano. I will monitor Ms. Tavares weekly via telephone or telemedicine. Ms. Tavares was given my business card with my phone number and email addressn and she was instructed to call or email me at any time with any questions or concerns. Ms. Tavares was also informed that she can request an in-person visit if she feels she needs to come to the clinic, as she is considered high priority since she recently had surgery.      Therapist: Klarissa Temple, PT  4/15/2020

## 2020-04-15 NOTE — TELEPHONE ENCOUNTER
----- Message from Radha Paige sent at 4/14/2020  1:01 PM CDT -----  Regarding: FW: Scheduling MED ONC appt/Dr. Lopez  I'm sorry- can we push 4/16 appt back 4 weeks?    ----- Message -----  From: Tere Villagran MD  Sent: 4/14/2020   1:00 PM CDT  To: Radha Paige  Subject: RE: Scheduling MED ONC appt/Dr. Lopez           Yes    ----- Message -----  From: Radha Paige  Sent: 4/14/2020  12:54 PM CDT  To: Tere Villagran MD  Subject: RE: Scheduling MED ONC appt/Dr. Lopez           She's scheduled to see you on 4/16 with CBC - delay a month?    ----- Message -----  From: Tere Villagran MD  Sent: 4/14/2020  12:45 PM CDT  To: Radha Paige  Subject: RE: Scheduling MED ONC appt/Dr. Lopez           4-6 weeks post surgery    ----- Message -----  From: Radha Paige  Sent: 4/13/2020   4:07 PM CDT  To: Tere Villagran MD  Subject: FW: Scheduling MED ONC appt/Dr. Lopez           When do you want to see pt again?    ----- Message -----  From: Andra Kohler RN  Sent: 4/13/2020   4:04 PM CDT  To: [facility] Staff  Subject: Scheduling MED ONC appt/Dr. Lopez               Can you please reach out to this patient to schedule an appt with Dr. Villagran.  Thanks,  Andra

## 2020-04-15 NOTE — PROGRESS NOTES
64F w/ bilat mastectomies and TE expanders on 3/30, last seen 2weeks ago. No issues.     Drains with minimal serous fluid  Incisions well healed    -100cc air placed into each expander  -drains pulled  -f/u in 2 weeks for more expansion

## 2020-04-15 NOTE — PATIENT INSTRUCTIONS
PRE/POST OP PATIENT EDUCATION    Post Operative Instructions     Range of Motion/lifting Precautions post surgery  The following activities should be avoided until your drain(s) have been removed  - do not lift affected arm above 90 degrees of shoulder flexion  - do not lift over 5 lbs  - do not pull or push heavy objects  - do not sleep on your stomach or surgery side       After surgery, you may begin self-care tasks including grooming, dressing, feeding and simple hygiene as soon as you feel up to it.    Schedule your post-op therapy follow-up after your drains have been removed     When to call your doctor   - if any part of your affected arm or axilla feels hot, is reddened or has increased swelling   - if you develop a temperature over 101 degrees Fahrenheit      Lymphedema - Identification and Prevention     Lymphedema - is the swelling of a body area or extremity caused by the accumulation of lymphatic fluid.  There is a risk for lymphedema with the removal of lymph nodes, trauma or radiation therapy.  Treatment of breast cancer often involves surgery: mastectomy or lumpectomy. Some of the lymph nodes in the underarm (called axillary lymph nodes) may be removed and checked to see if they contain cancer cells.     During breast surgery when axillary lymph nodes are removed (with sentinel node biopsy or axillary dissection) or are treated with radiation therapy, the lymphatic system may become impaired. This may prevent lymphatic fluid from leaving the area therefore, causing lymphedema.     Lymphatic fluid is a normal part of the circulatory system. Its function is to remove waste products and to produce cells vital to fighting infection. Swelling occurs when the vessels become restricted and the lymphatic fluid is unable to freely flow through them.  If lymphedema is left untreated, the affected limb could progressively become more swollen, which could lead to hardening  of the skin, bulkiness in the limb, infection and impaired wound healing.         There are things you can do to decrease the chance of developing lymphedema.                                          www.lymphnet.org/riskreduction                                                                                                                                                  The information presented is intended for general information and educational purposes. It is not intended to replace the  advice of your health care provider. Contact your health care provider if you believe you have a health problem.                                                      Scapular Retraction (Standing)    With arms at sides, squeeze shoulder blades together. Do not shrug shoulders and do not hold your breath. Hold 5 seconds. Repeat 15 times 3 sessions 1-2 x day.                     ROM: Flexion - Wand (Supine)        Lie on back holding wand. Raise arms over head.   Repeat _5-10___ times per set. Do __1__ sets per session. Do __2__ sessions per day.  Copyright © I. All rights reserved.             Butterflies    Lie on your back with your hands behind your head. Open your chest by  your elbows apart and gently down. Hold for 5 seconds. Then, bring  your elbows back together. Repeat 2x daily  5-10 reps   Copyright © 8620-5024 [employer]    ROM: External Rotation - Wand (supine)    Lie on back holding wand with elbows bent to 90°. Rotate forearms over head as far as possible.   Repeat _5-10___ times per set. Do _1___ sets per session. Do __2__ sessions per day.     https://orth.[employer].us/932     Copyright © I. All rights reserved.              Sidelying Shoulder Abduction            Sidelying Shoulder Abduction    Lie on your right/left side with right/left arm on top. Keep your elbow straight. Lift your arm upward toward your head.  Perform _5-10___ times. Perform _1___sets.   Perform __2__ times per day.  Copyright ©  9650-7823 [employer]ROM:           Wall Climb    Perform this exercise two (2) times a day with 5-10 repetitions.    Stand and FACE THE WALL with your toes 10 to 12 inches away from the wall.    a. Hold a towel roll in both hands on the wall and slowly slide up the wall  as high as possible..  b. Try to go a little higher each time. It may relax you a bit if you rest your head against the wall.            Wall Walk (Shoulder Abduction)  Using your each arm, walk your hand up  a wall straight out to the side, as high as you  are able. Perform 5-10 times. Perform 2 times a day.  Copyright © 1846-2215 [employer]

## 2020-04-20 ENCOUNTER — TELEPHONE (OUTPATIENT)
Dept: REHABILITATION | Facility: HOSPITAL | Age: 65
End: 2020-04-20

## 2020-04-20 NOTE — TELEPHONE ENCOUNTER
I called Ms. Tavares today to see how she was doing. She stated she has been performing her HEP and is feeling some soreness in her arms from the exercises. She also stated she is able to raise her left arm above her head. I discussed with her to continue with HEP and to perform the exercises slowly and gently,and to even keep her reps low so as to not increase her soreness. Patient will return to PT clinic 4/29/20 as she as an MD appointment that day also. Will continue to monitor progress.    Klarissa Temple, PT

## 2020-04-21 ENCOUNTER — TUMOR BOARD CONFERENCE (OUTPATIENT)
Dept: SURGERY | Facility: CLINIC | Age: 65
End: 2020-04-21

## 2020-04-21 DIAGNOSIS — Z01.84 ANTIBODY RESPONSE EXAMINATION: ICD-10-CM

## 2020-04-21 NOTE — PROGRESS NOTES
Breast cancer metastasized to axillary lymph node, left    8/30/2019 Imaging Significant Findings     Impression:  Left  Mass: Left breast 20 mm x 18 mm x 17 mm mass at the 3 o'clock position. Assessment: 4 - Suspicious finding. Biopsy is recommended.   Lymph Node: Left axilla 16 mm x 14 mm x 13 mm lymph node. Assessment: 4 - Suspicious finding. Biopsy is recommended.      Right  There is no mammographic or sonographic evidence of malignancy.          Recommendation:  Biopsy is recommended for palpable left breast mass and enlarged left axillary node.  I discussed these findings and recommendations with the patient in detail at the time of exam.      9/5/2019 Biopsy     LEFT BREAST MASS, BIOPSY:  - Invasive ductal carcinoma, grade 3    2. LYMPH NODE, LEFT AXILLA, BIOPSY:  - Positive for metastatic carcinoma      9/5/2019 Initial Diagnosis     Breast cancer metastasized to axillary lymph node, left      9/5/2019 Breast Tumor Markers     Estrogen Receptor: Positive >90%  Progesterone Receptor: Positive 10-50%  HER2: Negative  Ki67: >30%      9/18/2019 Imaging Significant Findings     Breast MRI  Impression:  Left  Mass: Left breast 24 mm x 22 mm mass at the central middle position. Assessment: 6 - Known biopsy, proven malignancy. Surgical Consult is recommended.      Additional 9 mm mass in the upper inner quadrant of the left breast concerning for a satellite lesion located 12 mm anterior to the known malignancy.      Lymph Node: Left axilla 18 mm x 15 mm lymph node. Assessment: 6 - Known biopsy, proven malignancy.      Right  Lymph Node: Right axilla 16 mm x 13 mm lymph node. Assessment: 4 - Suspicious finding. Biopsy is recommended.     Recommendation:  Recommend second look US for right axillary lymph node.  If a suspicious ultrasound correlate is identified, US guided biopsy is recommended. Patient is also scheduled for a PET/CT and should be referred to for additional characterization of the right axilla.        9/18/2019 Cancer Staged     Staging form: Breast, AJCC 8th Edition  - Clinical: Stage IIA (cT1c, cN1, cM0, G3, ER+, SC+, HER2-)      9/21/2019 Imaging Significant Findings     PET scan  Impression       Hypermetabolic left breast mass and regional left axillary lymphadenopathy consistent with reported breast cancer and corresponding with recent MRI findings.    Upper limit of normal sized right axillary lymph nodes with normal fatty duane and mildly increased radiotracer uptake.  Findings could represent reactive nodes with metastatic nodes thought less likely.  Lymphscintigraphy with injection in the left breast may considered to assess for drainage to the contralateral axilla.           9/24/2019 - 12/2/2019 Chemotherapy     Treatment Summary   Plan Name: OP BREAST DOSE-DENSE AC - DOXORUBICIN CYCLOPHOSPHAMIDE Q2W  Treatment Goal: Curative  Status: Inactive  Start Date: 10/7/2019  End Date: 11/20/2019  Provider: Tere Villagran MD  Chemotherapy: DOXOrubicin chemo injection 120 mg, 60 mg/m2 = 120 mg, Intravenous, Clinic/HOD 1 time, 4 of 4 cycles  Administration: 120 mg (10/7/2019), 120 mg (10/21/2019), 120 mg (11/4/2019), 120 mg (11/19/2019)  cyclophosphamide (CYTOXAN) 600 mg/m2 = 1,195 mg in sodium chloride 0.9% 250 mL chemo infusion, 600 mg/m2 = 1,195 mg, Intravenous, Clinic/HOD 1 time, 4 of 4 cycles  Administration: 1,195 mg (10/7/2019), 1,195 mg (10/21/2019), 1,195 mg (11/4/2019), 1,195 mg (11/19/2019)      9/24/2019 Biopsy     right axilla lymph node:  - Benign lymph node, no malignancy seen      10/8/2019 Biopsy     Left breast, biopsy:  Small sample of breast tissue with minute focus of atypical ductal hyperplasia (ADH).  Microcalcifications are identified in association with ADH.  No DCIS or invasive carcinoma is identified.      10/14/2019 Biopsy     Left breast mass, core biopsy:  Invasive ductal carcinoma, [place] histologic grade 2 (tubular formation: 3, nuclear pleomorphism: 2,  mitotic  activity:).  Microcalcifications are identified in association with invasive carcinoma      11/4/2019 Genetic Testing     Myrisk: negative  VUS MSH3, PMS2      12/2/2019 - 12/2/2019 Chemotherapy     Treatment Summary   Plan Name: OP BREAST DOSE-DENSE AC - DOXORUBICIN CYCLOPHOSPHAMIDE Q2W  Treatment Goal: Curative  Status: Inactive  Start Date: [No treatment day found]  End Date: [No treatment day found]  Provider: Tere Villagran MD  Chemotherapy: DOXOrubicin chemo injection 118 mg, 60 mg/m2, Intravenous, Clinic/HOD 1 time, 0 of 4 cycles  cyclophosphamide (CYTOXAN) 600 mg/m2 = 1,175 mg in sodium chloride 0.9% 250 mL chemo infusion, 600 mg/m2, Intravenous, Clinic/HOD 1 time, 0 of 4 cycles      12/2/2019 - 2/4/2020 Chemotherapy     Treatment Summary   Plan Name: OP PACLITAXEL QW  Treatment Goal: Curative  Status: Active  Start Date: 12/2/2019  End Date: 2/18/2020 (Planned)  Provider: Tere Villagran MD  Chemotherapy: PACLitaxel (TAXOL) 80 mg/m2 = 156 mg in sodium chloride 0.9% 250 mL chemo infusion, 80 mg/m2 = 156 mg, Intravenous, Clinic/HOD 1 time, 11 of 12 cycles  Administration: 156 mg (12/2/2019), 156 mg (12/9/2019), 156 mg (12/16/2019), 156 mg (12/23/2019), 156 mg (12/30/2019), 156 mg (1/6/2020), 156 mg (1/14/2020), 156 mg (1/21/2020), 156 mg (1/28/2020), 156 mg (2/4/2020)        2/24/2020 Imaging Significant Findings     Breast MRI  Impression:  1. Since September 18, 2019, left breast central region middle depth mass representing known malignancy is decreased in size. BI-RADS 6: Known malignancy.      2. Left breast upper inner quadrant middle depth satellite mass representing additional malignancy has resolved. BI-RADS 6: Known malignancy.      3. Level 1 left axillary adenopathy has resolved. One of these nodes is biopsy-proven malignancy. BI-RADS 6: Known malignancy.           Recommendation:  The patient is already under surgical and oncologic care.      3/24/2020 Breast Surgery     Surgery: Dr Parker  John, [phone] performed bilateral mastectomy with sentinel lymph node biopsy with pathology showing grade 3 invasive ductal carcinoma, 12 mm with 1 positive lymph nodes.         3/24/2020 Cancer Staged     yp T1c N1a      3/24/2020 Breast Tumor Markers       Following up with Dr. Villagran 5/12/2020 to discuss AI. Has appt with Dr. Lebrno's 5/11/2020.        4/21/2020  Tumor board conference     Plan for PMRT given residual kin disease.  Also will need endocrine therapy with AI per Dr. Villagran.

## 2020-04-27 NOTE — PROGRESS NOTES
Physical Therapy Daily Treatment Note     Name: Maria Elena Tavares  Clinic Number: [id number]  Diagnosis:   Encounter Diagnoses   Name Primary?    Decreased shoulder mobility, unspecified laterality     At risk for lymphedema     Breast cancer metastasized to axillary lymph node, left      Physician: Elena Lopez MD    Precautions: None  Visit #: 2 of 20  Time In: 11:45 AM  Time Out: 12:25 PM  Total Treatment Time 1:1: 40 minutes    Evaluation Date: 4/15/2020  Visit # authorized: 20  Authorization period: 12/31/2020  Plan of care Expiration: 5/29/2020  MD referral: Dr. Elena Lopez  Insurance: [facility] [employer]      Subjective     Pt reports: has been performing HEP and moving left arm better. States saw Dr. Solorzano prior to therapy. States has slight pain in bilateral axilla with reaching up.  Pain Scale: Maria Elena rates pain on a scale of 6/10 on VAS.   Pain location: axilla bilateral    Fatigue: mild  Functional change: moving her arms better; walking and washing clothes and cooking  Diagnosis:  Left breast IDC, ER (+), VT (+), HER2 (-), (+) lymph node left axilla  Chief complaint: Patient c/o pulling and tightness in left axilla and decreased shoulder motion both upper extremities. Patient states she saw Dr. Solorzano this morning and he removed her drains.   Surgical History: 3/24/20 bilateral mastectomies with SLNB and ALNB and tissue expander reconstruction  Maria Elena Tavares  has a past surgical history that includes Hysterectomy; Oophorectomy; Insertion of tunneled central venous catheter (CVC) with subcutaneous port (Right, 10/4/2019); Breast biopsy (Left, 09/05/2019); Breast biopsy (Right, 09/26/2019); Breast biopsy (Left, 10/14/2019); Breast biopsy (Left, 10/08/2019); Simple mastectomy (Bilateral, 3/24/2020); [place] lymph node biopsy (Left, 3/24/2020); and Insertion of breast tissue expander (Bilateral, 3/24/2020).     Chemotherapy: viji-adjuvant chemotherapy from  "10/19 to 2/20  Radiation: pending    Objective     Maria Elena received individual therapeutic exercises to improve postural correction and alignment, stretching and soft tissue mobility, and strengthening for 25 minutes including the following:   Supine Shld Flexion with wand       1 x 10  Butterflies                                        10 x 5"  Supine Shld ER with wand             1 x 10  Side Lying Shld Abd (B)                  1 x 10  Seated Scap Retractions               10 x 5"  Wall Climbs    Forward (B)            10 x 5"                         Sideways (B)          1 X 10    Maria Elena received the following manual therapy techniques were performed to increased myofascial/soft tissue length, mobility and pliability, increase PROM, AROM and function as well as to decrease pain for 15 minutes  Pectoralis muscle bending with ER (B)  Lateral Freddie Stretch (B)   Passive Stretch (B) shoulders    Shoulder Range of Motion:   Active /Passive ROM Right Left   Flexion 160 165   Abduction 155 155   Extension 50 50   IR/90deg 85 85   ER/90deg 90 90     Functional Limitations Reporting       CMS Impairment/Limitation/Restriction for FOTO Outcome Survey     Therapist reviewed FOTO scores for Maria Elena Tavares on 4/29/2020.   FOTO documents entered into [facility] - see Media section.     Limitations Score: 36%  Category: Carrying         Home Exercise Program and Patient Education   Education provided re:  - role of PT in multi - disciplinary team, goals for PT  - progress towards goals     See EMR under notes/patient instructions for HEP given/taught this session - all sets and reps included. Pt received printed copy.     Pt was able to demonstrate and report understanding and performance  Pt has no cultural, educational or language barriers to learning provided.    Assessment     Patient is responding well to physical therapy. Is at expected function and pain level for 5 weeks post operatively.   Pain after treatment: 1/10    Pt " prognosis is Good.Patient received manual therapy as above to decrease soft tissue and shoulder joint tightness. Patient performed above exercise program. Improvements noted with AROM: Left shoulder: 25 degrees with flexion and 45 degrees with Abd; Right Shoulder: 10 degrees with flexion, 40 degrees with Abd, 15 degrees with ER. Patient is able to assume radiation position with LUE. Patient instructed to continue with HEP.  Pt will continue to benefit from skilled outpatient physical therapy to address the deficits listed in the problem list chart on initial evaluation, provide pt/family education and to maximize pt's level of independence in the home and community environment.     Goals as follows:  Short Term goals: 3 weeks  1. Patient will demonstrate 100% understanding of lymphedema risk reduction practices to include self monitoring for lymphedema. ( met, 4/29/20)  2. Patient will demonstrate independence with Home Exercise program established. (met, 4/29/20)  3. Pt will increase AROM/PROM in shoulder abduction ROM to 140 degrees bilaterally to improve functional reach, carry, push, pull pain free. (met, 4/29/20)  4. Pt will increase AROM/PROM in shoulder flexion to 160 degrees bilaterally to improve functional reach, carry, push, pull pain free.(met, 4/29/20)     Long Term Goals: 6 weeks   1.  Pt will increase AROM/PROM in shoulder flexion to 170 degrees bilaterally to improve functional reach, carry, push, pull pain free. (progressing, not met)  2. Pt will demonstrate full/maximized tissue mobility to increase ROM and promote healthy tissue to be pain free at discharge. (progressing, not met)  3. Pt will report decrease in overall worst pain to 2/10 at discharge. (progressing, not met)  4. Pt will increase AROM/PROM in shoulder abduction ROM to 170 degrees bilaterally to improve functional reach, carry, push, pull pain free. (progressing, not met)  5. Patient will report compliance with walking program 5x  week for 10 min each day to improve overall cardiovascular function and decrease cancer related fatigue at discharge. (progressing, not met)     Plan     Continue with established POC toward physical therapy goals.Patient will RTC 5/13/20 --same day she has an appointment with Dr. Solorzano.    Therapist: Klarissa Temple, PT  4/29/2020

## 2020-04-29 ENCOUNTER — OFFICE VISIT (OUTPATIENT)
Dept: PLASTIC SURGERY | Facility: CLINIC | Age: 65
End: 2020-04-29
Payer: COMMERCIAL

## 2020-04-29 ENCOUNTER — CLINICAL SUPPORT (OUTPATIENT)
Dept: REHABILITATION | Facility: HOSPITAL | Age: 65
End: 2020-04-29
Attending: FAMILY MEDICINE
Payer: COMMERCIAL

## 2020-04-29 VITALS
SYSTOLIC BLOOD PRESSURE: 145 MMHG | BODY MASS INDEX: 36.53 KG/M2 | WEIGHT: 186.06 LBS | HEART RATE: 91 BPM | HEIGHT: 60 IN | DIASTOLIC BLOOD PRESSURE: 92 MMHG

## 2020-04-29 DIAGNOSIS — C50.912 BREAST CANCER METASTASIZED TO AXILLARY LYMPH NODE, LEFT: ICD-10-CM

## 2020-04-29 DIAGNOSIS — C77.3 BREAST CANCER METASTASIZED TO AXILLARY LYMPH NODE, LEFT: ICD-10-CM

## 2020-04-29 DIAGNOSIS — Z09 SURGERY FOLLOW-UP EXAMINATION: Primary | ICD-10-CM

## 2020-04-29 DIAGNOSIS — Z91.89 AT RISK FOR LYMPHEDEMA: ICD-10-CM

## 2020-04-29 DIAGNOSIS — M25.619 DECREASED SHOULDER MOBILITY, UNSPECIFIED LATERALITY: ICD-10-CM

## 2020-04-29 PROCEDURE — 97140 MANUAL THERAPY 1/> REGIONS: CPT | Performed by: PHYSICAL MEDICINE & REHABILITATION

## 2020-04-29 PROCEDURE — 99999 PR PBB SHADOW E&M-EST. PATIENT-LVL III: CPT | Mod: PBBFAC,,, | Performed by: SURGERY

## 2020-04-29 PROCEDURE — 99999 PR PBB SHADOW E&M-EST. PATIENT-LVL III: ICD-10-PCS | Mod: PBBFAC,,, | Performed by: SURGERY

## 2020-04-29 PROCEDURE — 99024 PR POST-OP FOLLOW-UP VISIT: ICD-10-PCS | Mod: S$GLB,,, | Performed by: SURGERY

## 2020-04-29 PROCEDURE — 99024 POSTOP FOLLOW-UP VISIT: CPT | Mod: S$GLB,,, | Performed by: SURGERY

## 2020-04-29 PROCEDURE — 97110 THERAPEUTIC EXERCISES: CPT | Performed by: PHYSICAL MEDICINE & REHABILITATION

## 2020-04-29 NOTE — PROGRESS NOTES
"65yo female s/p bilateral mastectomies and TE reconstruction on 3/30/2020    She was seen 2 weeks ago at which time her drains were pulled and 100cc air was placed into bilateral TE.  She denies fevers/chills/drainage.  She is c/o bilateral "tightness."  She is set to begin radiation next month possibly (assuming that her physical therapy continues to go well).    Vitals:    04/29/20 1051   BP: (!) 145/92   Pulse: 91     B breast incisions c/d/i without evidence of infection, TE's in place with a minimal amount of fluid appreciated in R breast    A/P: s/p above  Air removed  300cc of saline placed bilaterally  F/u in 2 weeks    "

## 2020-05-07 ENCOUNTER — PATIENT OUTREACH (OUTPATIENT)
Dept: OTHER | Facility: OTHER | Age: 65
End: 2020-05-07

## 2020-05-11 ENCOUNTER — TELEPHONE (OUTPATIENT)
Dept: HEMATOLOGY/ONCOLOGY | Facility: CLINIC | Age: 65
End: 2020-05-11

## 2020-05-11 ENCOUNTER — LAB VISIT (OUTPATIENT)
Dept: LAB | Facility: HOSPITAL | Age: 65
End: 2020-05-11
Attending: INTERNAL MEDICINE
Payer: COMMERCIAL

## 2020-05-11 DIAGNOSIS — C50.912 BREAST CANCER METASTASIZED TO AXILLARY LYMPH NODE, LEFT: ICD-10-CM

## 2020-05-11 DIAGNOSIS — C77.3 BREAST CANCER METASTASIZED TO AXILLARY LYMPH NODE, LEFT: ICD-10-CM

## 2020-05-11 LAB
ERYTHROCYTE [DISTWIDTH] IN BLOOD BY AUTOMATED COUNT: 15.4 % (ref 11.5–14.5)
HCT VFR BLD AUTO: 36.7 % (ref 37–48.5)
HGB BLD-MCNC: 10.8 G/DL (ref 12–16)
IMM GRANULOCYTES # BLD AUTO: 0.01 K/UL (ref 0–0.04)
MCH RBC QN AUTO: 28.6 PG (ref 27–31)
MCHC RBC AUTO-ENTMCNC: 29.4 G/DL (ref 32–36)
MCV RBC AUTO: 97 FL (ref 82–98)
NEUTROPHILS # BLD AUTO: 2.2 K/UL (ref 1.8–7.7)
PLATELET # BLD AUTO: 292 K/UL (ref 150–350)
PMV BLD AUTO: 10 FL (ref 9.2–12.9)
RBC # BLD AUTO: 3.77 M/UL (ref 4–5.4)
WBC # BLD AUTO: 3.78 K/UL (ref 3.9–12.7)

## 2020-05-11 PROCEDURE — 36415 COLL VENOUS BLD VENIPUNCTURE: CPT

## 2020-05-11 PROCEDURE — 85027 COMPLETE CBC AUTOMATED: CPT

## 2020-05-11 NOTE — TELEPHONE ENCOUNTER
Called patient to confirm virtual visit with Dr. Steven on 5/12 at 4:30 pm. Pt did not answer, left a detailed message.

## 2020-05-12 ENCOUNTER — OFFICE VISIT (OUTPATIENT)
Dept: HEMATOLOGY/ONCOLOGY | Facility: CLINIC | Age: 65
End: 2020-05-12
Payer: COMMERCIAL

## 2020-05-12 ENCOUNTER — OFFICE VISIT (OUTPATIENT)
Dept: RADIATION ONCOLOGY | Facility: CLINIC | Age: 65
End: 2020-05-12
Payer: COMMERCIAL

## 2020-05-12 ENCOUNTER — TELEPHONE (OUTPATIENT)
Dept: HEMATOLOGY/ONCOLOGY | Facility: CLINIC | Age: 65
End: 2020-05-12

## 2020-05-12 DIAGNOSIS — C50.912 BREAST CANCER METASTASIZED TO AXILLARY LYMPH NODE, LEFT: ICD-10-CM

## 2020-05-12 DIAGNOSIS — C77.3 BREAST CANCER METASTASIZED TO AXILLARY LYMPH NODE, LEFT: ICD-10-CM

## 2020-05-12 DIAGNOSIS — E55.9 VITAMIN D DEFICIENCY: ICD-10-CM

## 2020-05-12 DIAGNOSIS — C50.912 MALIGNANT NEOPLASM OF LEFT FEMALE BREAST, UNSPECIFIED ESTROGEN RECEPTOR STATUS, UNSPECIFIED SITE OF BREAST: ICD-10-CM

## 2020-05-12 DIAGNOSIS — M79.601 RIGHT ARM PAIN: Primary | ICD-10-CM

## 2020-05-12 PROCEDURE — 99213 PR OFFICE/OUTPT VISIT, EST, LEVL III, 20-29 MIN: ICD-10-PCS | Mod: 95,,, | Performed by: INTERNAL MEDICINE

## 2020-05-12 PROCEDURE — 99203 PR OFFICE/OUTPT VISIT, NEW, LEVL III, 30-44 MIN: ICD-10-PCS | Mod: 95,,, | Performed by: RADIOLOGY

## 2020-05-12 PROCEDURE — 99203 OFFICE O/P NEW LOW 30 MIN: CPT | Mod: 95,,, | Performed by: RADIOLOGY

## 2020-05-12 PROCEDURE — 99213 OFFICE O/P EST LOW 20 MIN: CPT | Mod: 95,,, | Performed by: INTERNAL MEDICINE

## 2020-05-12 RX ORDER — LETROZOLE 2.5 MG/1
2.5 TABLET, FILM COATED ORAL DAILY
Qty: 30 TABLET | Refills: 3 | Status: SHIPPED | OUTPATIENT
Start: 2020-05-12 | End: 2020-05-12

## 2020-05-12 RX ORDER — LETROZOLE 2.5 MG/1
2.5 TABLET, FILM COATED ORAL DAILY
Qty: 30 TABLET | Refills: 3 | Status: SHIPPED | OUTPATIENT
Start: 2020-05-12 | End: 2020-10-02 | Stop reason: SDUPTHER

## 2020-05-12 RX ORDER — ERGOCALCIFEROL 1.25 MG/1
50000 CAPSULE ORAL
Qty: 12 CAPSULE | Refills: 2 | Status: SHIPPED | OUTPATIENT
Start: 2020-05-12 | End: 2020-09-15 | Stop reason: SDUPTHER

## 2020-05-12 NOTE — Clinical Note
RTC 4-5 weeks to see me BMD and MRI shoulder needed in next couple of weeksReferral to Sonal Sanchez

## 2020-05-12 NOTE — TELEPHONE ENCOUNTER
Returned call to pt.   Pt stated was calling back for ANICETO Garcia to confirm appt for today.   Nurse noted and verbalized understanding.     ----- Message from Honey Baird sent at 5/12/2020  1:01 PM CDT -----  Contact: pt  Reason: Pt says she is returning a call from Radha    Communication; [phone]

## 2020-05-12 NOTE — PROGRESS NOTES
Subjective:       Patient ID: Maria Elena Tavares is a 64 y.o. female.    Chief Complaint: No chief complaint on file.    HPI     History of Present Illness: Ms. Tavares is a 64 y.o. who returns in follow up for Breast Cancer.     Returns after surgery  Reports right arm with increased pain again  She is doing PT and reports cancer side (left) doing better than the right  Reports BP and BG doing better  No other pain complaints     Oncologic History:  - self detected, noted a shooting pain in breast first and then a week later felt a lump  Some delay in getting appointment as she was unaware she had to call  - 8/30/19 Diagnostic mammogram and ultrasound:  Left breast 20 mm x 18 mm x 17 mm mass at the 3 o'clock position. Assessment: 4 - Suspicious finding. Biopsy is recommended.   Lymph Node: Left axilla 16 mm x 14 mm x 13 mm lymph node. Assessment: 4 - Suspicious finding. Biopsy is recommended.   Right- There is no mammographic or sonographic evidence of malignancy.  BI-RADS Category:   Overall: 4 - Suspicious  - 9/5/19 Ultrasound guided biopsy  Pathology:  FINAL PATHOLOGIC DIAGNOSIS  1. LEFT BREAST MASS, BIOPSY:  - Invasive ductal carcinoma, grade 3, longest linear length is 10 mm measured on the slide.  - Histologic Grade ([place] Histologic Score)  Glandular (Acinar/Tubular Differentiation): 3.  Nuclear Pleomorphism: 2.  Mitotic Rate: 3.  Overall Grade: Grade 3 (score 8).  - Microcalcifications: Not identified.  - Lymphovascular invasion: Not identified.  2. LYMPH NODE, LEFT AXILLA, BIOPSY:  - Positive for metastatic carcinoma.  - The metastatic deposit measures 6 mm in longest continuous linear length.  Estrogen receptor: Positive, 90% of the tumor nuclei staining moderate to strongly.  Progesterone receptor: Positive, 30% of the tumor nuclei staining weak to moderately.  HER2: Negative.  Ki-67: 60%.  - ECHO 9/20/19: EF 64%  - PET 9/21/19:  Impression         Hypermetabolic left breast mass and regional left  axillary lymphadenopathy consistent with reported breast cancer and corresponding with recent MRI findings.    Upper limit of normal sized right axillary lymph nodes with normal fatty duane and mildly increased radiotracer uptake.  Findings could represent reactive nodes with metastatic nodes thought less likely.  Lymphscintigraphy with injection in the left breast may considered to assess for drainage to the contralateral axilla.      BX 9/24/19: Right axilla node biopsy is benign  - she underwent neoadjuvant chemotherapy and completed 4 cycles of AC followed by 11 cycles of weekly Taxol.   Stopped with side effects.  - 3/24/20 - she underwent bilateral mastectomies with Lt. sentinel node biopsy and subsequent Lt. axillary dissection and Rt. sentinel node biopsy with Dr. Lopez.  Tissue expanders were placed.   - Pathology from the Lt. breast revealed 1.2 cm of residual invasive ductal carcinoma histologic grade 3, nuclear grade 3 and mitotic index 5 ). There was high grade DCIS. There was lymphovascular invasion.  The margins of resection were negative at > 1 cm.  One axillary node had a 5 mm focus of metastatic carcinoma.  Another Lt. sentinel node had isolated tumor cells.   A third Lt. sentinel node and 7 Lt. axillary nodes were negative.  The Rt. breast and Rt. sentinel nodes were negative.    - XRT pending    Review of Systems   Constitutional: Negative for activity change, appetite change and unexpected weight change.   HENT: Negative for congestion.    Eyes: Negative for visual disturbance.   Respiratory: Negative for cough, shortness of breath and wheezing.    Cardiovascular: Negative for chest pain.   Gastrointestinal: Negative for abdominal distention, abdominal pain, constipation, diarrhea, nausea and vomiting.   Genitourinary: Negative for difficulty urinating and frequency.   Musculoskeletal: Positive for arthralgias. Negative for back pain.   Skin: Negative for rash.   Neurological: Negative for  dizziness and headaches.   Hematological: Negative for adenopathy. Does not bruise/bleed easily.   Psychiatric/Behavioral: The patient is not nervous/anxious.        Objective:      Physical Exam   Constitutional:   Virtual visit  ECOG=0       Assessment:       1. Right arm pain    2. Breast cancer metastasized to axillary lymph node, left        Plan:       1. MRI of right shoulder area  Currently she is working with PT  2. Femara to start now  BMD ordered  XRT pending  RTC 4 weeks  Dr. Myers at next visit    20 minutes total

## 2020-05-12 NOTE — PROGRESS NOTES
HISTORY OF PRESENT ILLNESS:   The patient location is: home   The chief complaint leading to consultation is: breast cancer   Visit type: audiovisual  Total time spent with patient: 20 minutes   Each patient to whom he or she provides medical services by telemedicine is:  (1) informed of the relationship between the physician and patient and the respective role of any other health care provider with respect to management of the patient; and (2) notified that he or she may decline to receive medical services by telemedicine and may withdraw from such care at any time.    This patient is referred for discussion of postmastectomy irradiation for her Lt. breast cancer.   Ms. Arredondo was initially referred for further evaluation after diagnostic MMG on 8/30/19 revealed a 20 mm hypoechoic mass with indistinct margins in the Lt. breast at the 3 o'clock position.  The was also a 1.5 cm Lt. axillary node.  Biopsy on 9/5/19 revealed infiltrating ductal carcinoma, histologic grade 3, nuclear grade 2 and mitotic index 3.  90% of the tumor was moderate to strongly ER positive, 30% was weak to moderately IA positive, Her - 2 was negative and Ki-67 was 60%.  Biopsy of the lymph node revealed metastatic carcinoma.  Further work up with MRI of the breast on 9/18/20 revealed the 2.4 cm mass in the central region of the Lt. breast with  a 9 mm possible satellite lesion in the UIQ of the Lt. breast.  The Lt. axillary node measured 1.8 cm.  There was also an abnormal 1.6 cm Rt. axillary node and an enlarged Rt. retropectoral (Tommy) lymph node.   There was no Rt. breast masses.  PET scan on 9/21/19 confirmed hypermetabolic activity in the larger Lt. breast mass and the Lt. level 1 node (SUV 4.6).  The two nodes on the Rt. had SUVs of 2.4 and 1.9, respectively.  There was no evidence of distant metastatic disease.  The Rt. axillary node was biopsied on 9/24/19 and was benign.  The smaller Lt. breast mass seen on MRI was biopsied on  10/8/19 and revealed invasive ductal carcinoma, histologic grade 2, nuclear grade 3 and mitotic index 2.  100% of the tumor cells were strongly ER positive, 60% were moderate to strongly ID positive, Her - 2 was 2+ but negative by FISH  Ki-67 was < 5%.  The patient began neoadjuvant chemotherapy and completed 4 cycles of AC followed by 11 cycles of weekly Taxol.  She underwent bilateral mastectomies with Lt. sentinel node biopsy and subsequent Lt. axillary dissection and Rt. sentinel node biopsy.  Tissue expanders were placed.  Pathology from the Lt. breast revealed 1.2 cm of residual invasive ductal carcinoma histologic grade 3, nuclear grade 3 and mitotic index 5 ). There was high grade DCIS. There was lymphovascular invasion.  The margins of resection were negative at > 1 cm.  One axillary node had a 5 mm focus of metastatic carcinoma.  Another Lt. sentinel node had isolated tumor cells.   A third Lt. sentinel node and 7 Lt. axillary nodes were negative.  The Rt. breast and Rt. sentinel nodes were negative.  The patient is recovering well from her surgery. She presents for discussion of postmastectomy irradiation.      REVIEW OF SYSTEMS:   Review of Systems   Constitutional: Positive for malaise/fatigue. Negative for chills, fever and weight loss.   Respiratory: Negative for cough and shortness of breath.    Cardiovascular: Negative for chest pain and palpitations.   Gastrointestinal: Negative for abdominal pain, diarrhea and vomiting.       GYN HISTORY:  Age of menarche was 11. Age of menopause was 50.  Patient denies hormonal therapy. Patient is . Age of first live birth was 28. Patient did not breast feed.     FAMILY History:  Maternal aunt stomach cancer  Mat aunt lung cancer  Mat aunt colon cancer 60s  Mother lung ca 65  Father lung ca 42  Pat aunt breast ca 60s  Paternal aunt breast ca 60s       PAST MEDICAL HISTORY:  Past Medical History:   Diagnosis Date    BMI 37.0-37.9, adult     Breast cancer  09/05/2019     Left breast, IDC with lymph node metastisis    Diabetes mellitus type II     Diverticulosis     history of diverticulosisseen on colonoscopy at age 48. Repeat recommended at age 58. Done by     Elevated blood protein     History of elevated protein. Apparently has seen  for extensive workup including bone marrow biopsy    History of shingles 2006    Hyperlipidemia     Hypertension     Microalbuminuria     due 2 diabetes    Mild vitamin D deficiency     . A low vitamin D    Thyroid disease     hypothyroidism       PAST SURGICAL HISTORY:  Past Surgical History:   Procedure Laterality Date    BREAST BIOPSY Left 09/05/2019    IDC with mets to node    BREAST BIOPSY Right 09/26/2019    core bx, benign node    BREAST BIOPSY Left 10/14/2019    MRI Core bx, + IDC    BREAST BIOPSY Left 10/08/2019    Core bx, ADH    HYSTERECTOMY      INSERTION OF BREAST TISSUE EXPANDER Bilateral 3/24/2020    Procedure: INSERTION, TISSUE EXPANDER, BREAST BILATERAL;  Surgeon: Michael Solorzano MD;  Location: [facility] OR [address] [place] [address];  Service: Plastics;  Laterality: Bilateral;    INSERTION OF TUNNELED CENTRAL VENOUS CATHETER (CVC) WITH SUBCUTANEOUS PORT Right 10/4/2019    Procedure: DOMVATFON-OJBF-W-CATH RIGHT (CONSENT AM OF) 1.0 hr case;  Surgeon: Elena Lopez MD;  Location: [facility] OR [address] [place] [address];  Service: General;  Laterality: Right;    OOPHORECTOMY      SENTINEL LYMPH NODE BIOPSY Left 3/24/2020    Procedure: BIOPSY, LYMPH NODE, SENTINEL LEFT;  Surgeon: Elena Lopez MD;  Location: [facility] OR [address] [place] [address];  Service: General;  Laterality: Left;    SIMPLE MASTECTOMY Bilateral 3/24/2020    Procedure: MASTECTOMY, SIMPLE BILATERAL;  Surgeon: Elena Lopez MD;  Location: [address];  Service: General;  Laterality: Bilateral;       ALLERGIES:   Review of patient's allergies indicates:  No Known Allergies    MEDICATIONS:  Current Outpatient Medications   Medication Sig    aspirin (ECOTRIN) 81 MG EC tablet  Take 81 mg by mouth once daily.      blood-glucose meter kit DISPENSE : BLOOD TEST STRIPS AND LANCETS PATIENT TEST BLOOD SUGARS TWICE DAILY  TEST STRIPS: 50 EACH, REFILL 5  LANCETS:  50 EACH , REFILL 5    chlorthalidone (HYGROTEN) 25 MG Tab Take 1 tablet by mouth every morning    dulaglutide (TRULICITY) 1.5 mg/0.5 mL PnIj Inject 1.5 mg into the skin every 7 days.    gabapentin (NEURONTIN) 100 MG capsule Take 2 capsules (200 mg total) by mouth 3 (three) times daily.    irbesartan (AVAPRO) 300 MG tablet Take 1 tablet by mouth once daily    levothyroxine (SYNTHROID) 200 MCG tablet Take 1 tablet (200 mcg total) by mouth once daily.    lifitegrast (XIIDRA) 5 % Dpet Apply 1 drop to  both eyes  2 (two) times daily.    metFORMIN (GLUCOPHAGE) 1000 MG tablet TAKE 1 TABLET (1,000 MG TOTAL) BY MOUTH 2 (TWO) TIMES DAILY WITH MEALS.    ondansetron (ZOFRAN-ODT) 4 MG TbDL Dissolve 1 tablet (4 mg total) by mouth every 6 (six) hours as needed.     No current facility-administered medications for this visit.        SOCIAL HISTORY:  Social History     Socioeconomic History    Marital status: Single     Spouse name: Not on file    Number of children: Not on file    Years of education: Not on file    Highest education level: Not on file   Occupational History     Employer: [employer] ([employer])   Social Needs    Financial resource strain: Not on file    Food insecurity:     Worry: Not on file     Inability: Not on file    Transportation needs:     Medical: Not on file     Non-medical: Not on file   Tobacco Use    Smoking status: Never Smoker    Smokeless tobacco: Never Used    Tobacco comment: 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  She walks occasionally.   Substance and Sexual Activity    Alcohol use: Not Currently     Comment: Occasionally    Drug use: No    Sexual activity: Not Currently     Partners: Male   Lifestyle    Physical activity:     Days per week: Not on  file     Minutes per session: Not on file    Stress: Not on file   Relationships    Social connections:     Talks on phone: Not on file     Gets together: Not on file     Attends Yazidi service: Not on file     Active member of club or organization: Not on file     Attends meetings of clubs or organizations: Not on file     Relationship status: Not on file   Other Topics Concern    Not on file   Social History Narrative    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 daughter1 granddaughter       FAMILY HISTORY:  Family History   Problem Relation Age of Onset    Cancer Mother         lung ca heavy smoker    Lung cancer Mother     Cancer Father     Lung cancer Father     Hypertension Sister     No Known Problems Sister     No Known Problems Daughter     Hypothyroidism Sister     Diabetes Maternal Aunt     Cancer Maternal Aunt     No Known Problems Maternal Grandmother     Breast cancer Paternal Aunt     No Known Problems Paternal Uncle     Lung cancer Maternal Grandfather     No Known Problems Paternal Grandmother     No Known Problems Paternal Grandfather     Amblyopia Neg Hx     Blindness Neg Hx     Cataracts Neg Hx     Glaucoma Neg Hx     Macular degeneration Neg Hx     Retinal detachment Neg Hx     Strabismus Neg Hx     Stroke Neg Hx     Thyroid disease Neg Hx          PHYSICAL EXAMINATION:  There were no vitals filed for this visit.  Physical Exam   Constitutional: She is oriented to person, place, and time and well-developed, well-nourished, and in no distress.   Neurological: She is alert and oriented to person, place, and time.   Psychiatric: Affect and judgment normal.       ASSESSMENT/PLAN:  Clinical stage IIA (T1c, N1, M0, G3, ER+, IL+, Her-2-) yp T1c, N1 breast cancer.      ECO    I discussed the rational for postmastectomy radiotherapy in this setting.  Explained we would  recommend 50.4 Gy to the Lt. chest wall and regional lymph nodes.  Discussed the procedures,  risks and benefits of radiotherapy.  Discussed the acute and long term side effects including increased risk of lymphedema, lung fibrosis and increased cardiac events.  Will plan simulation next week with treatment to begin thereafter.      I spent approximately 45 minutes reviewing the available records and evaluating the patient, out of which over 50% of the time was spent face to face with the patient in counseling and coordinating this patient's care.

## 2020-05-13 ENCOUNTER — PATIENT MESSAGE (OUTPATIENT)
Dept: HEMATOLOGY/ONCOLOGY | Facility: CLINIC | Age: 65
End: 2020-05-13

## 2020-05-13 ENCOUNTER — OFFICE VISIT (OUTPATIENT)
Dept: PLASTIC SURGERY | Facility: CLINIC | Age: 65
End: 2020-05-13
Payer: COMMERCIAL

## 2020-05-13 VITALS — DIASTOLIC BLOOD PRESSURE: 98 MMHG | SYSTOLIC BLOOD PRESSURE: 133 MMHG | HEART RATE: 118 BPM

## 2020-05-13 DIAGNOSIS — Z09 SURGERY FOLLOW-UP EXAMINATION: Primary | ICD-10-CM

## 2020-05-13 PROCEDURE — 99024 POSTOP FOLLOW-UP VISIT: CPT | Mod: S$GLB,,, | Performed by: SURGERY

## 2020-05-13 PROCEDURE — 99024 PR POST-OP FOLLOW-UP VISIT: ICD-10-PCS | Mod: S$GLB,,, | Performed by: SURGERY

## 2020-05-13 PROCEDURE — 99999 PR PBB SHADOW E&M-EST. PATIENT-LVL II: CPT | Mod: PBBFAC,,, | Performed by: SURGERY

## 2020-05-13 PROCEDURE — 99999 PR PBB SHADOW E&M-EST. PATIENT-LVL II: ICD-10-PCS | Mod: PBBFAC,,, | Performed by: SURGERY

## 2020-05-13 NOTE — PROGRESS NOTES
63yo female s/p bilateral mastectomies and TE reconstruction on 3/30/2020    She is 6 weeks post op. She was seen 2 weeks ago at which time 300 cc saline was placed bilaterally. The patient had some breast discomfort for the first 5 days after placement of saline, which has improved now. She still complains of right axilla pain. The patient has been going to PT consistently and is doing well. She is set to begin radiation next week. She denies fevers/chills/drainage.      Vitals:    05/13/20 1054   BP: (!) 133/98   Pulse: (!) 118     PE: breast incisions well healed without evidence of infection, TE's in place.     A/P: s/p above  150 cc of saline placed in R breast and 135 cc added in L breast  1 left axillary suture removed  F/u in 2 weeks

## 2020-05-21 ENCOUNTER — HOSPITAL ENCOUNTER (OUTPATIENT)
Dept: RADIATION THERAPY | Facility: HOSPITAL | Age: 65
Discharge: HOME | End: 2020-05-21
Attending: RADIOLOGY
Payer: COMMERCIAL

## 2020-05-21 ENCOUNTER — CLINICAL SUPPORT (OUTPATIENT)
Dept: REHABILITATION | Facility: HOSPITAL | Age: 65
End: 2020-05-21
Attending: SURGERY
Payer: COMMERCIAL

## 2020-05-21 DIAGNOSIS — M25.619 DECREASED SHOULDER MOBILITY, UNSPECIFIED LATERALITY: ICD-10-CM

## 2020-05-21 DIAGNOSIS — C50.912 BREAST CANCER METASTASIZED TO AXILLARY LYMPH NODE, LEFT: ICD-10-CM

## 2020-05-21 DIAGNOSIS — Z01.84 ANTIBODY RESPONSE EXAMINATION: ICD-10-CM

## 2020-05-21 DIAGNOSIS — Z91.89 AT RISK FOR LYMPHEDEMA: ICD-10-CM

## 2020-05-21 DIAGNOSIS — C77.3 BREAST CANCER METASTASIZED TO AXILLARY LYMPH NODE, LEFT: ICD-10-CM

## 2020-05-21 PROCEDURE — 97110 THERAPEUTIC EXERCISES: CPT | Performed by: PHYSICAL MEDICINE & REHABILITATION

## 2020-05-21 PROCEDURE — 77334 PR  RADN TREATMENT AID(S) COMPLX: ICD-10-PCS | Mod: 26,,, | Performed by: RADIOLOGY

## 2020-05-21 PROCEDURE — 97140 MANUAL THERAPY 1/> REGIONS: CPT | Performed by: PHYSICAL MEDICINE & REHABILITATION

## 2020-05-21 PROCEDURE — 77263 PR  RADIATION THERAPY PLAN COMPLEX: ICD-10-PCS | Mod: ,,, | Performed by: RADIOLOGY

## 2020-05-21 PROCEDURE — 77263 THER RADIOLOGY TX PLNG CPLX: CPT | Mod: ,,, | Performed by: RADIOLOGY

## 2020-05-21 PROCEDURE — 77290 THER RAD SIMULAJ FIELD CPLX: CPT | Mod: 26,,, | Performed by: RADIOLOGY

## 2020-05-21 PROCEDURE — 77290 PR  SET RADN THERAPY FIELD COMPLEX: ICD-10-PCS | Mod: 26,,, | Performed by: RADIOLOGY

## 2020-05-21 PROCEDURE — 77290 THER RAD SIMULAJ FIELD CPLX: CPT | Mod: TC | Performed by: RADIOLOGY

## 2020-05-21 PROCEDURE — 77334 RADIATION TREATMENT AID(S): CPT | Mod: 26,,, | Performed by: RADIOLOGY

## 2020-05-21 PROCEDURE — 77334 RADIATION TREATMENT AID(S): CPT | Mod: TC | Performed by: RADIOLOGY

## 2020-05-21 PROCEDURE — 77014 HC CT GUIDANCE RADIATION THERAPY FLDS PLACEMENT: CPT | Mod: TC | Performed by: RADIOLOGY

## 2020-05-21 NOTE — PROGRESS NOTES
Physical Therapy Daily Treatment Note & Discharge Note     Name: Maria Elena Tavares  Clinic Number: [id number]  Diagnosis:   Encounter Diagnoses   Name Primary?    Decreased shoulder mobility, unspecified laterality     At risk for lymphedema     Breast cancer metastasized to axillary lymph node, left      Physician: Elena Lopez MD    Precautions: None  Visit #: 3 of 20  Time In: 8:00 AM  Time Out: 8:40 AM  Total Treatment Time 1:1: 40 minutes    Evaluation Date: 4/15/2020  Visit # authorized: 20  Authorization period: 12/31/2020  Plan of care Expiration: 5/29/2020  MD referral: Dr. Elena Lopez  Insurance: [facility] [employer]      Subjective     Pt reports: has been performing HEP and moving both arms well and using BUE with all ADL's. States has appointment today for radiation simulation.  Pain Scale: Maria Elena rates pain on a scale of 0/10 on VAS.   Pain location: axilla bilateral    Fatigue: mild  Functional change: moving her arms well and able to use BUE's with all aDL's   Diagnosis:  Left breast IDC, ER (+), MT (+), HER2 (-), (+) lymph node left axilla  Chief complaint: Patient c/o pulling and tightness in left axilla and decreased shoulder motion both upper extremities. Patient states she saw Dr. Solorzano this morning and he removed her drains.   Surgical History: 3/24/20 bilateral mastectomies with SLNB and ALNB and tissue expander reconstruction  Maria Elena Tavares  has a past surgical history that includes Hysterectomy; Oophorectomy; Insertion of tunneled central venous catheter (CVC) with subcutaneous port (Right, 10/4/2019); Breast biopsy (Left, 09/05/2019); Breast biopsy (Right, 09/26/2019); Breast biopsy (Left, 10/14/2019); Breast biopsy (Left, 10/08/2019); Simple mastectomy (Bilateral, 3/24/2020); [place] lymph node biopsy (Left, 3/24/2020); and Insertion of breast tissue expander (Bilateral, 3/24/2020).     Chemotherapy: viji-adjuvant chemotherapy from 10/19 to 2/20  Radiation:  "pending    Objective     Maria Elena received individual therapeutic exercises to improve postural correction and alignment, stretching and soft tissue mobility, and strengthening for 25 minutes including the following:   Supine Shld Flexion with wand       1 x 10  Butterflies                                        10 x 5"  LTR with ER                                  1 x 10  Supine Shld ER with wand             1 x 10  Side Lying Shld Abd (B)                  1 x 10  Seated Scap Retractions               10 x 5"  Wall Climbs    Forward (B)            10 x 5"                         Sideways (B)          1 X 10    Maria Elena received the following manual therapy techniques were performed to increased myofascial/soft tissue length, mobility and pliability, increase PROM, AROM and function as well as to decrease pain for 15 minutes  Pectoralis muscle bending with ER (B)  Lateral Chest Stretch (B)   Passive Stretch (B) shoulders    Shoulder Range of Motion:   Active /Passive ROM Right Left   Flexion 170 170   Abduction 170 170   Extension 50 50   IR/90deg 85 85   ER/90deg 90 90     Functional Limitations Reporting       CMS Impairment/Limitation/Restriction for FOTO Outcome Survey     Therapist reviewed FOTO scores for Maria Elena Tavares on 5/21/2020.   FOTO documents entered into [employer] - see Media section.     Limitations Score: 29%  Category: Carrying         Home Exercise Program and Patient Education   Education provided re:  - role of PT in multi - disciplinary team, goals for PT  - progress towards goals     See EMR under notes/patient instructions for HEP given/taught this session - all sets and reps included. Pt received printed copy.     Pt was able to demonstrate and report understanding and performance  Pt has no cultural, educational or language barriers to learning provided.    Assessment     Patient is responding well to physical therapy. Is at expected function and pain level for 5 weeks post operatively.   Pain after " treatment: 0/10    Pt prognosis is Good.Reassessment for discharge. Patient received manual therapy as above and no longer exhibits soft tissue and shoulder joint tightness. Patient performed above exercise program and tolerated one new exercise added in today.. Improvements noted with AROM: Left shoulder: 5 degrees with flexion and 15 degrees with Abd; Right Shoulder: 10 degrees with flexion, 15 degrees with Abd,. AROM is now WNL bilateral shoulders.  Patient is able to assume radiation position with LUE. Patient instructed to continue with HEP while receiving radiation therapy to maintain her left shoulder mobility.  Patient has met all STG & LTG.  Goals as follows:  Short Term goals: 3 weeks  1. Patient will demonstrate 100% understanding of lymphedema risk reduction practices to include self monitoring for lymphedema. ( met, 4/29/20)  2. Patient will demonstrate independence with Home Exercise program established. (met, 4/29/20)  3. Pt will increase AROM/PROM in shoulder abduction ROM to 140 degrees bilaterally to improve functional reach, carry, push, pull pain free. (met, 4/29/20)  4. Pt will increase AROM/PROM in shoulder flexion to 160 degrees bilaterally to improve functional reach, carry, push, pull pain free.(met, 4/29/20)     Long Term Goals: 6 weeks   1.  Pt will increase AROM/PROM in shoulder flexion to 170 degrees bilaterally to improve functional reach, carry, push, pull pain free. (met, 5/21/20)  2. Pt will demonstrate full/maximized tissue mobility to increase ROM and promote healthy tissue to be pain free at discharge. (met, 5/21/20)  3. Pt will report decrease in overall worst pain to 2/10 at discharge. (met, 5/21/20)  4. Pt will increase AROM/PROM in shoulder abduction ROM to 170 degrees bilaterally to improve functional reach, carry, push, pull pain free. (met, 5/21/20)  5. Patient will report compliance with walking program 5x week for 10 min each day to improve overall cardiovascular function  and decrease cancer related fatigue at discharge. (met, 5/21/20)     Plan     Patient is discharged from physical therapy.    Therapist: Klarissa Temple, PT  5/21/2020

## 2020-05-22 ENCOUNTER — TELEPHONE (OUTPATIENT)
Dept: OBSTETRICS AND GYNECOLOGY | Facility: CLINIC | Age: 65
End: 2020-05-22

## 2020-05-22 NOTE — TELEPHONE ENCOUNTER
Patient phoned with referral details explained including Dr. Myers's role in patient's survivorship care. Patient's appt confirmed for June 11th at 1415, SERJIO James.

## 2020-05-25 ENCOUNTER — LAB VISIT (OUTPATIENT)
Dept: INTERNAL MEDICINE | Facility: CLINIC | Age: 65
End: 2020-05-25
Payer: COMMERCIAL

## 2020-05-25 DIAGNOSIS — Z01.84 ANTIBODY RESPONSE EXAMINATION: ICD-10-CM

## 2020-05-25 NOTE — PROGRESS NOTES
Pt was sent through covid line   And was swab in error  Contacted pt and she will come tomorrow and do blood work   Swab disgarded

## 2020-05-26 ENCOUNTER — PATIENT MESSAGE (OUTPATIENT)
Dept: HEMATOLOGY/ONCOLOGY | Facility: CLINIC | Age: 65
End: 2020-05-26

## 2020-05-26 ENCOUNTER — LAB VISIT (OUTPATIENT)
Dept: LAB | Facility: HOSPITAL | Age: 65
End: 2020-05-26
Payer: COMMERCIAL

## 2020-05-26 DIAGNOSIS — Z01.84 ANTIBODY RESPONSE EXAMINATION: ICD-10-CM

## 2020-05-26 LAB — SARS-COV-2 IGG SERPLBLD QL IA.RAPID: NEGATIVE

## 2020-05-26 PROCEDURE — 36415 COLL VENOUS BLD VENIPUNCTURE: CPT

## 2020-05-26 PROCEDURE — 86769 SARS-COV-2 COVID-19 ANTIBODY: CPT

## 2020-05-28 PROCEDURE — 77280 THER RAD SIMULAJ FIELD SMPL: CPT | Mod: 26,,, | Performed by: RADIOLOGY

## 2020-05-28 PROCEDURE — 77295 PR 3D RADIOTHERAPY PLAN: ICD-10-PCS | Mod: 26,,, | Performed by: RADIOLOGY

## 2020-05-28 PROCEDURE — 77280 THER RAD SIMULAJ FIELD SMPL: CPT | Mod: TC | Performed by: RADIOLOGY

## 2020-05-28 PROCEDURE — 77295 3-D RADIOTHERAPY PLAN: CPT | Mod: TC | Performed by: RADIOLOGY

## 2020-05-28 PROCEDURE — 77300 RADIATION THERAPY DOSE PLAN: CPT | Mod: 26,,, | Performed by: RADIOLOGY

## 2020-05-28 PROCEDURE — 77334 PR  RADN TREATMENT AID(S) COMPLX: ICD-10-PCS | Mod: 26,,, | Performed by: RADIOLOGY

## 2020-05-28 PROCEDURE — 77334 RADIATION TREATMENT AID(S): CPT | Mod: TC | Performed by: RADIOLOGY

## 2020-05-28 PROCEDURE — 77295 3-D RADIOTHERAPY PLAN: CPT | Mod: 26,,, | Performed by: RADIOLOGY

## 2020-05-28 PROCEDURE — 77293 RESPIRATOR MOTION MGMT SIMUL: CPT | Mod: TC | Performed by: RADIOLOGY

## 2020-05-28 PROCEDURE — 77280 PR  SET RADN THERAPY FIELD SIMPLE: ICD-10-PCS | Mod: 26,,, | Performed by: RADIOLOGY

## 2020-05-28 PROCEDURE — 77300 PR RADIATION THERAPY,DOSIMETRY PLAN: ICD-10-PCS | Mod: 26,,, | Performed by: RADIOLOGY

## 2020-05-28 PROCEDURE — 77293 PR RESPIRATORY MOTION MGMT SIMULATION: ICD-10-PCS | Mod: 26,,, | Performed by: RADIOLOGY

## 2020-05-28 PROCEDURE — 77293 RESPIRATOR MOTION MGMT SIMUL: CPT | Mod: 26,,, | Performed by: RADIOLOGY

## 2020-05-28 PROCEDURE — 77334 RADIATION TREATMENT AID(S): CPT | Mod: 26,,, | Performed by: RADIOLOGY

## 2020-05-28 PROCEDURE — 77300 RADIATION THERAPY DOSE PLAN: CPT | Mod: TC | Performed by: RADIOLOGY

## 2020-05-29 ENCOUNTER — PATIENT MESSAGE (OUTPATIENT)
Dept: PLASTIC SURGERY | Facility: CLINIC | Age: 65
End: 2020-05-29

## 2020-05-29 ENCOUNTER — PATIENT MESSAGE (OUTPATIENT)
Dept: HEMATOLOGY/ONCOLOGY | Facility: CLINIC | Age: 65
End: 2020-05-29

## 2020-05-29 ENCOUNTER — HOSPITAL ENCOUNTER (OUTPATIENT)
Dept: RADIOLOGY | Facility: HOSPITAL | Age: 65
Discharge: HOME | End: 2020-05-29
Attending: INTERNAL MEDICINE
Payer: COMMERCIAL

## 2020-05-29 ENCOUNTER — HOSPITAL ENCOUNTER (OUTPATIENT)
Dept: RADIOLOGY | Facility: CLINIC | Age: 65
Discharge: HOME | End: 2020-05-29
Attending: INTERNAL MEDICINE
Payer: COMMERCIAL

## 2020-05-29 DIAGNOSIS — E55.9 VITAMIN D DEFICIENCY: ICD-10-CM

## 2020-05-29 DIAGNOSIS — M79.601 RIGHT ARM PAIN: ICD-10-CM

## 2020-05-29 PROCEDURE — 73221 MRI JOINT UPR EXTREM W/O DYE: CPT | Mod: TC,RT

## 2020-05-29 PROCEDURE — 73221 MRI JOINT UPR EXTREM W/O DYE: CPT | Mod: 26,RT,, | Performed by: RADIOLOGY

## 2020-05-29 PROCEDURE — 77080 DXA BONE DENSITY AXIAL: CPT | Mod: TC

## 2020-05-29 PROCEDURE — 77080 DEXA BONE DENSITY SPINE HIP: ICD-10-PCS | Mod: 26,,, | Performed by: INTERNAL MEDICINE

## 2020-05-29 PROCEDURE — 73221 MRI SHOULDER WITHOUT CONTRAST RIGHT: ICD-10-PCS | Mod: 26,RT,, | Performed by: RADIOLOGY

## 2020-05-29 PROCEDURE — 77080 DXA BONE DENSITY AXIAL: CPT | Mod: 26,,, | Performed by: INTERNAL MEDICINE

## 2020-06-01 ENCOUNTER — HOSPITAL ENCOUNTER (OUTPATIENT)
Dept: RADIATION THERAPY | Facility: HOSPITAL | Age: 65
Discharge: HOME | End: 2020-06-01
Attending: RADIOLOGY
Payer: COMMERCIAL

## 2020-06-01 PROCEDURE — 77412 RADIATION TX DELIVERY LVL 3: CPT | Performed by: RADIOLOGY

## 2020-06-01 PROCEDURE — 77417 THER RADIOLOGY PORT IMAGE(S): CPT | Performed by: RADIOLOGY

## 2020-06-01 PROCEDURE — 77387 GUIDANCE FOR RADJ TX DLVR: CPT | Mod: TC | Performed by: RADIOLOGY

## 2020-06-01 PROCEDURE — 77387 PR GUIDANCE FOR RADIATION TREATMENT DELIVERY: ICD-10-PCS | Mod: 26,,, | Performed by: RADIOLOGY

## 2020-06-01 PROCEDURE — 77387 GUIDANCE FOR RADJ TX DLVR: CPT | Mod: 26,,, | Performed by: RADIOLOGY

## 2020-06-02 ENCOUNTER — PATIENT MESSAGE (OUTPATIENT)
Dept: PRIMARY CARE CLINIC | Facility: CLINIC | Age: 65
End: 2020-06-02

## 2020-06-02 LAB
CREAT SERPL-MCNC: 0.9 MG/DL (ref 0.5–1.4)
SAMPLE: NORMAL

## 2020-06-02 PROCEDURE — 77387 GUIDANCE FOR RADJ TX DLVR: CPT | Mod: TC | Performed by: RADIOLOGY

## 2020-06-02 PROCEDURE — 77387 GUIDANCE FOR RADJ TX DLVR: CPT | Mod: 26,,, | Performed by: RADIOLOGY

## 2020-06-02 PROCEDURE — 77412 RADIATION TX DELIVERY LVL 3: CPT | Performed by: RADIOLOGY

## 2020-06-02 PROCEDURE — 77387 PR GUIDANCE FOR RADIATION TREATMENT DELIVERY: ICD-10-PCS | Mod: 26,,, | Performed by: RADIOLOGY

## 2020-06-02 RX ORDER — METFORMIN HYDROCHLORIDE 1000 MG/1
TABLET ORAL
Qty: 180 TABLET | Refills: 1 | Status: SHIPPED | OUTPATIENT
Start: 2020-06-02 | End: 2022-06-22

## 2020-06-03 PROCEDURE — 77387 PR GUIDANCE FOR RADIATION TREATMENT DELIVERY: ICD-10-PCS | Mod: 26,,, | Performed by: RADIOLOGY

## 2020-06-03 PROCEDURE — 77412 RADIATION TX DELIVERY LVL 3: CPT | Performed by: RADIOLOGY

## 2020-06-03 PROCEDURE — 77387 GUIDANCE FOR RADJ TX DLVR: CPT | Mod: TC | Performed by: RADIOLOGY

## 2020-06-03 PROCEDURE — 77387 GUIDANCE FOR RADJ TX DLVR: CPT | Mod: 26,,, | Performed by: RADIOLOGY

## 2020-06-04 ENCOUNTER — DOCUMENTATION ONLY (OUTPATIENT)
Dept: RADIATION ONCOLOGY | Facility: CLINIC | Age: 65
End: 2020-06-04

## 2020-06-04 PROCEDURE — 77387 PR GUIDANCE FOR RADIATION TREATMENT DELIVERY: ICD-10-PCS | Mod: 26,,, | Performed by: RADIOLOGY

## 2020-06-04 PROCEDURE — 77387 GUIDANCE FOR RADJ TX DLVR: CPT | Mod: TC | Performed by: RADIOLOGY

## 2020-06-04 PROCEDURE — 77336 RADIATION PHYSICS CONSULT: CPT | Performed by: RADIOLOGY

## 2020-06-04 PROCEDURE — 77412 RADIATION TX DELIVERY LVL 3: CPT | Performed by: RADIOLOGY

## 2020-06-04 PROCEDURE — 77387 GUIDANCE FOR RADJ TX DLVR: CPT | Mod: 26,,, | Performed by: RADIOLOGY

## 2020-06-05 PROCEDURE — 77387 GUIDANCE FOR RADJ TX DLVR: CPT | Mod: 26,,, | Performed by: RADIOLOGY

## 2020-06-05 PROCEDURE — 77387 PR GUIDANCE FOR RADIATION TREATMENT DELIVERY: ICD-10-PCS | Mod: 26,,, | Performed by: RADIOLOGY

## 2020-06-05 PROCEDURE — 77387 GUIDANCE FOR RADJ TX DLVR: CPT | Mod: TC | Performed by: RADIOLOGY

## 2020-06-05 PROCEDURE — 77412 RADIATION TX DELIVERY LVL 3: CPT | Performed by: RADIOLOGY

## 2020-06-09 PROCEDURE — 77387 GUIDANCE FOR RADJ TX DLVR: CPT | Mod: 26,,, | Performed by: RADIOLOGY

## 2020-06-09 PROCEDURE — 77387 PR GUIDANCE FOR RADIATION TREATMENT DELIVERY: ICD-10-PCS | Mod: 26,,, | Performed by: RADIOLOGY

## 2020-06-09 PROCEDURE — 77417 THER RADIOLOGY PORT IMAGE(S): CPT | Performed by: RADIOLOGY

## 2020-06-09 PROCEDURE — 77387 GUIDANCE FOR RADJ TX DLVR: CPT | Mod: TC | Performed by: RADIOLOGY

## 2020-06-09 PROCEDURE — 77412 RADIATION TX DELIVERY LVL 3: CPT | Performed by: RADIOLOGY

## 2020-06-10 ENCOUNTER — PATIENT OUTREACH (OUTPATIENT)
Dept: ADMINISTRATIVE | Facility: OTHER | Age: 65
End: 2020-06-10

## 2020-06-10 NOTE — PROGRESS NOTES
Care Everywhere: updated  Immunization: updated  Health Maintenance: updated  Media Review: reviewed for outside colon cancer report  Legacy Review: n/a  Order placed: n/a  Upcoming appts:n/a

## 2020-06-11 ENCOUNTER — DOCUMENTATION ONLY (OUTPATIENT)
Dept: RADIATION ONCOLOGY | Facility: CLINIC | Age: 65
End: 2020-06-11

## 2020-06-11 ENCOUNTER — OFFICE VISIT (OUTPATIENT)
Dept: OBSTETRICS AND GYNECOLOGY | Facility: CLINIC | Age: 65
End: 2020-06-11
Attending: OBSTETRICS & GYNECOLOGY
Payer: COMMERCIAL

## 2020-06-11 VITALS
DIASTOLIC BLOOD PRESSURE: 88 MMHG | HEIGHT: 60 IN | SYSTOLIC BLOOD PRESSURE: 136 MMHG | WEIGHT: 179.88 LBS | BODY MASS INDEX: 35.31 KG/M2

## 2020-06-11 DIAGNOSIS — Z17.0 MALIGNANT NEOPLASM OF BREAST IN FEMALE, ESTROGEN RECEPTOR POSITIVE, UNSPECIFIED LATERALITY, UNSPECIFIED SITE OF BREAST: Primary | ICD-10-CM

## 2020-06-11 DIAGNOSIS — M79.642 PAIN IN BOTH HANDS: ICD-10-CM

## 2020-06-11 DIAGNOSIS — Z79.811 USE OF AROMATASE INHIBITORS: ICD-10-CM

## 2020-06-11 DIAGNOSIS — G62.9 NEUROPATHY: ICD-10-CM

## 2020-06-11 DIAGNOSIS — M79.641 PAIN IN BOTH HANDS: ICD-10-CM

## 2020-06-11 DIAGNOSIS — C50.919 MALIGNANT NEOPLASM OF BREAST IN FEMALE, ESTROGEN RECEPTOR POSITIVE, UNSPECIFIED LATERALITY, UNSPECIFIED SITE OF BREAST: Primary | ICD-10-CM

## 2020-06-11 PROCEDURE — 3008F PR BODY MASS INDEX (BMI) DOCUMENTED: ICD-10-PCS | Mod: CPTII,S$GLB,, | Performed by: OBSTETRICS & GYNECOLOGY

## 2020-06-11 PROCEDURE — 99204 OFFICE O/P NEW MOD 45 MIN: CPT | Mod: S$GLB,,, | Performed by: OBSTETRICS & GYNECOLOGY

## 2020-06-11 PROCEDURE — 99204 PR OFFICE/OUTPT VISIT, NEW, LEVL IV, 45-59 MIN: ICD-10-PCS | Mod: S$GLB,,, | Performed by: OBSTETRICS & GYNECOLOGY

## 2020-06-11 PROCEDURE — 77387 PR GUIDANCE FOR RADIATION TREATMENT DELIVERY: ICD-10-PCS | Mod: 26,,, | Performed by: RADIOLOGY

## 2020-06-11 PROCEDURE — 3075F PR MOST RECENT SYSTOLIC BLOOD PRESS GE 130-139MM HG: ICD-10-PCS | Mod: CPTII,S$GLB,, | Performed by: OBSTETRICS & GYNECOLOGY

## 2020-06-11 PROCEDURE — 3079F DIAST BP 80-89 MM HG: CPT | Mod: CPTII,S$GLB,, | Performed by: OBSTETRICS & GYNECOLOGY

## 2020-06-11 PROCEDURE — 77412 RADIATION TX DELIVERY LVL 3: CPT | Performed by: RADIOLOGY

## 2020-06-11 PROCEDURE — 77387 GUIDANCE FOR RADJ TX DLVR: CPT | Mod: TC | Performed by: RADIOLOGY

## 2020-06-11 PROCEDURE — 3075F SYST BP GE 130 - 139MM HG: CPT | Mod: CPTII,S$GLB,, | Performed by: OBSTETRICS & GYNECOLOGY

## 2020-06-11 PROCEDURE — 77387 GUIDANCE FOR RADJ TX DLVR: CPT | Mod: 26,,, | Performed by: RADIOLOGY

## 2020-06-11 PROCEDURE — 3008F BODY MASS INDEX DOCD: CPT | Mod: CPTII,S$GLB,, | Performed by: OBSTETRICS & GYNECOLOGY

## 2020-06-11 PROCEDURE — 3079F PR MOST RECENT DIASTOLIC BLOOD PRESSURE 80-89 MM HG: ICD-10-PCS | Mod: CPTII,S$GLB,, | Performed by: OBSTETRICS & GYNECOLOGY

## 2020-06-12 PROCEDURE — 77387 GUIDANCE FOR RADJ TX DLVR: CPT | Mod: TC | Performed by: RADIOLOGY

## 2020-06-12 PROCEDURE — 77387 GUIDANCE FOR RADJ TX DLVR: CPT | Mod: 26,,, | Performed by: RADIOLOGY

## 2020-06-12 PROCEDURE — 77387 PR GUIDANCE FOR RADIATION TREATMENT DELIVERY: ICD-10-PCS | Mod: 26,,, | Performed by: RADIOLOGY

## 2020-06-12 PROCEDURE — 77412 RADIATION TX DELIVERY LVL 3: CPT | Performed by: RADIOLOGY

## 2020-06-12 NOTE — PROGRESS NOTES
SUBJECTIVE:   64 y.o. female   presents today to discuss side effects from her breast cancer treatment. No LMP recorded. Patient has had a hysterectomy..  She had left breast infiltrating ductal carcinoma.  She is status post neoadjuvant DD AC plus Taxol weekly.  She had a bilateral mastectomy, radiation and flap reconstruction  She has a history of a hysterectomy for fibroids followed by a BSO later  She is currently taking Femara  She reports horrible pain in her hands.  She reports the pain goes from her elbow to her fingers.  The pain is worse on the right than the left She also reports neuropathy.  She says her hands feel numb and tingly and is difficult for her to open jars and container.  Is also difficult for her to use a computer which she has to do at work  She reports she constantly squeezes her hands to try to get rid of the pain  She also reports neuropathy in her toes.  Bilaterally she feels like something is underneath her feet.    She denies hot flashes or night sweats  She has a history of low vitamin D-her level in 2019 was 10    Past Medical History:   Diagnosis Date    BMI 37.0-37.9, adult     Breast cancer 2019     Left breast, IDC with lymph node metastisis    Colon polyps 2015    Diabetes mellitus type II     Diverticulosis     history of diverticulosisseen on colonoscopy at age 48. Repeat recommended at age 58. Done by     Elevated blood protein     History of elevated protein. Apparently has seen  for extensive workup including bone marrow biopsy    History of shingles 2006    Hyperlipidemia     Hypertension     Microalbuminuria     due 2 diabetes    Mild vitamin D deficiency     . A low vitamin D    Thyroid disease     hypothyroidism     Past Surgical History:   Procedure Laterality Date    BREAST BIOPSY Left 2019    IDC with mets to node    BREAST BIOPSY Right 2019    core bx, benign node    BREAST BIOPSY Left  10/14/2019    MRI Core bx, + IDC    BREAST BIOPSY Left 10/08/2019    Core bx, ADH     SECTION      HYSTERECTOMY      INSERTION OF BREAST TISSUE EXPANDER Bilateral 3/24/2020    Procedure: INSERTION, TISSUE EXPANDER, BREAST BILATERAL;  Surgeon: Michael Solorzano MD;  Location: [address];  Service: Plastics;  Laterality: Bilateral;    INSERTION OF TUNNELED CENTRAL VENOUS CATHETER (CVC) WITH SUBCUTANEOUS PORT Right 10/4/2019    Procedure: JAJHXKRAO-VOKD-Y-CATH RIGHT (CONSENT AM OF) 1.0 hr case;  Surgeon: Elena Lopez MD;  Location: [facility] OR [address] [place] [address];  Service: General;  Laterality: Right;    OOPHORECTOMY      SENTINEL LYMPH NODE BIOPSY Left 3/24/2020    Procedure: BIOPSY, LYMPH NODE, SENTINEL LEFT;  Surgeon: Elena Lopez MD;  Location: [facility] OR [address] [place] [address];  Service: General;  Laterality: Left;    SIMPLE MASTECTOMY Bilateral 3/24/2020    Procedure: MASTECTOMY, SIMPLE BILATERAL;  Surgeon: Elena Lopez MD;  Location: [facility] OR [address] [place] [address];  Service: General;  Laterality: Bilateral;     Social History     Socioeconomic History    Marital status: Single     Spouse name: Not on file    Number of children: Not on file    Years of education: Not on file    Highest education level: Not on file   Occupational History     Employer: [employer] ([employer])   Social Needs    Financial resource strain: Not on file    Food insecurity:     Worry: Not on file     Inability: Not on file    Transportation needs:     Medical: Not on file     Non-medical: Not on file   Tobacco Use    Smoking status: Never Smoker    Smokeless tobacco: Never Used    Tobacco comment: 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  She walks occasionally.   Substance and Sexual Activity    Alcohol use: Not Currently     Comment: Occasionally    Drug use: No    Sexual activity: Not Currently     Partners: Male   Lifestyle    Physical activity:     Days per week: Not on file     Minutes per  session: Not on file    Stress: Not on file   Relationships    Social connections:     Talks on phone: Not on file     Gets together: Not on file     Attends Shinto service: Not on file     Active member of club or organization: Not on file     Attends meetings of clubs or organizations: Not on file     Relationship status: Not on file   Other Topics Concern    Not on file   Social History Narrative    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 daughter1 granddaughter     Family History   Problem Relation Age of Onset    Cancer Mother         lung ca heavy smoker    Lung cancer Mother     Cancer Father     Lung cancer Father     Hypertension Sister     No Known Problems Sister     No Known Problems Daughter     Hypothyroidism Sister     Diabetes Maternal Aunt     Cancer Maternal Aunt     No Known Problems Maternal Grandmother     Breast cancer Paternal Aunt     No Known Problems Paternal Uncle     Lung cancer Maternal Grandfather     No Known Problems Paternal Grandmother     No Known Problems Paternal Grandfather     Amblyopia Neg Hx     Blindness Neg Hx     Cataracts Neg Hx     Glaucoma Neg Hx     Macular degeneration Neg Hx     Retinal detachment Neg Hx     Strabismus Neg Hx     Stroke Neg Hx     Thyroid disease Neg Hx     Ovarian cancer Neg Hx     Colon cancer Neg Hx      OB History    Para Term  AB Living   1 1 1         SAB TAB Ectopic Multiple Live Births                  # Outcome Date GA Lbr Grey/2nd Weight Sex Delivery Anes PTL Lv   1 Term      [id number]-[id number]              Current Outpatient Medications   Medication Sig Dispense Refill    aspirin (ECOTRIN) 81 MG EC tablet Take 81 mg by mouth once daily.        chlorthalidone (HYGROTEN) 25 MG Tab Take 1 tablet by mouth every morning 90 tablet 1    dulaglutide (TRULICITY) 1.5 mg/0.5 mL PnIj Inject 1.5 mg into the skin every 7 days. 2 mL 5    ergocalciferol (VITAMIN D2) 50,000 unit Cap Take 1 capsule (50,000  Units total) by mouth every 7 days. 12 capsule 2    gabapentin (NEURONTIN) 100 MG capsule Take 2 capsules (200 mg total) by mouth 3 (three) times daily. 90 capsule 2    irbesartan (AVAPRO) 300 MG tablet Take 1 tablet by mouth once daily 90 tablet 1    letrozole (FEMARA) 2.5 mg Tab Take 1 tablet (2.5 mg total) by mouth once daily. 30 tablet 3    levothyroxine (SYNTHROID) 200 MCG tablet Take 1 tablet (200 mcg total) by mouth once daily. 90 tablet 1    lifitegrast (XIIDRA) 5 % Dpet Apply 1 drop to  both eyes  2 (two) times daily. 180 each 4    metFORMIN (GLUCOPHAGE) 1000 MG tablet TAKE 1 TABLET (1,000 MG TOTAL) BY MOUTH 2 (TWO) TIMES DAILY WITH MEALS. 180 tablet 1    ondansetron (ZOFRAN-ODT) 4 MG TbDL Dissolve 1 tablet (4 mg total) by mouth every 6 (six) hours as needed. 60 tablet 2    blood-glucose meter kit DISPENSE : BLOOD TEST STRIPS AND LANCETS PATIENT TEST BLOOD SUGARS TWICE DAILY  TEST STRIPS: 50 EACH, REFILL 5  LANCETS:  50 EACH , REFILL 5 1 each 0     No current facility-administered medications for this visit.      Allergies: Patient has no known allergies.     The 10-year ASCVD risk score (Keota LI Jr., et al., 2013) is: 27.5%    Values used to calculate the score:      Age: 64 years      Sex: Female      Is Non- : Yes      Diabetic: Yes      Tobacco smoker: No      Systolic Blood Pressure: 136 mmHg      Is BP treated: Yes      HDL Cholesterol: 57 mg/dL      Total Cholesterol: 261 mg/dL      ROS:  Constitutional: no weight loss, weight gain, fever, fatigue  Eyes:  No vision changes, glasses/contacts  ENT/Mouth: No ulcers, sinus problems, ears ringing, headache  Cardiovascular: No inability to lie flat, chest pain, exercise intolerance, swelling, heart palpitations  Respiratory: No wheezing, coughing blood, shortness of breath, or cough  Gastrointestinal: No diarrhea, bloody stool, nausea/vomiting, constipation, gas, hemorrhoids  Genitourinary: No blood in urine, painful  urination, urgency of urination, frequency of urination, incomplete emptying, incontinence, abnormal bleeding, painful periods, heavy periods, vaginal discharge, vaginal odor, painful intercourse, sexual problems, bleeding after intercourse.  Musculoskeletal: No muscle weakness  Skin/Breast: + breast cancer  Neurological: No passing out, seizures, numbness, headache, + neuropathy  Endocrine:= diabetes,+ hypothyroid, hyperthyroid, hot flashes, hair loss, abnormal hair growth, acne  Psychiatric: No depression, crying  Hematologic: No bruises, bleeding, swollen lymph nodes, anemia.      Physical Exam  -GENERAL PLEASANT INTERACTIVE DURING VISIT  SHE REQUEST TO DO HER WELL-WOMAN EXAM AT A LATER DATE    ASSESSMENT:   Breast cancer  Use of aromatase inhibitor  Vitamin-D deficiency  Neuropathy  Pain in extremities    PLAN:  Face-to-face time 25 min majority spent in counseling and arranging for follow-up  Counseled her on benefits of acupuncture for her hand and foot pain and her neuropathy  Recommend vitamin D3 5000 international units daily  Follow-up for well-woman exam

## 2020-06-15 PROCEDURE — 77387 GUIDANCE FOR RADJ TX DLVR: CPT | Mod: 26,,, | Performed by: RADIOLOGY

## 2020-06-15 PROCEDURE — 77412 RADIATION TX DELIVERY LVL 3: CPT | Performed by: RADIOLOGY

## 2020-06-15 PROCEDURE — 77336 RADIATION PHYSICS CONSULT: CPT | Performed by: RADIOLOGY

## 2020-06-15 PROCEDURE — 77387 PR GUIDANCE FOR RADIATION TREATMENT DELIVERY: ICD-10-PCS | Mod: 26,,, | Performed by: RADIOLOGY

## 2020-06-15 PROCEDURE — 77387 GUIDANCE FOR RADJ TX DLVR: CPT | Mod: TC | Performed by: RADIOLOGY

## 2020-06-16 PROCEDURE — 77412 RADIATION TX DELIVERY LVL 3: CPT | Performed by: RADIOLOGY

## 2020-06-16 PROCEDURE — 77387 PR GUIDANCE FOR RADIATION TREATMENT DELIVERY: ICD-10-PCS | Mod: 26,,, | Performed by: RADIOLOGY

## 2020-06-16 PROCEDURE — 77387 GUIDANCE FOR RADJ TX DLVR: CPT | Mod: TC | Performed by: RADIOLOGY

## 2020-06-16 PROCEDURE — 77387 GUIDANCE FOR RADJ TX DLVR: CPT | Mod: 26,,, | Performed by: RADIOLOGY

## 2020-06-17 PROCEDURE — 77387 GUIDANCE FOR RADJ TX DLVR: CPT | Mod: 26,,, | Performed by: RADIOLOGY

## 2020-06-17 PROCEDURE — 77387 GUIDANCE FOR RADJ TX DLVR: CPT | Mod: TC | Performed by: RADIOLOGY

## 2020-06-17 PROCEDURE — 77387 PR GUIDANCE FOR RADIATION TREATMENT DELIVERY: ICD-10-PCS | Mod: 26,,, | Performed by: RADIOLOGY

## 2020-06-17 PROCEDURE — 77412 RADIATION TX DELIVERY LVL 3: CPT | Performed by: RADIOLOGY

## 2020-06-17 NOTE — PROGRESS NOTES
Subjective:       Patient ID: Maria Elena Tavares is a 64 y.o. female.    Chief Complaint: No chief complaint on file.    HPI     History of Present Illness: Ms. Tavares is a 64 y.o. who returns in follow up for Breast Cancer.     Reports feeling much better   Did not some fatigue, admits to overdoing it   She is on her 3rd week of XRT  Skin doing well     Oncologic History:  - self detected, noted a shooting pain in breast first and then a week later felt a lump  Some delay in getting appointment as she was unaware she had to call  - 8/30/19 Diagnostic mammogram and ultrasound:  Left breast 20 mm x 18 mm x 17 mm mass at the 3 o'clock position. Assessment: 4 - Suspicious finding. Biopsy is recommended.   Lymph Node: Left axilla 16 mm x 14 mm x 13 mm lymph node. Assessment: 4 - Suspicious finding. Biopsy is recommended.   Right- There is no mammographic or sonographic evidence of malignancy.  BI-RADS Category:   Overall: 4 - Suspicious  - 9/5/19 Ultrasound guided biopsy  Pathology:  FINAL PATHOLOGIC DIAGNOSIS  1. LEFT BREAST MASS, BIOPSY:  - Invasive ductal carcinoma, grade 3, longest linear length is 10 mm measured on the slide.  - Histologic Grade (Hawa Histologic Score)  Glandular (Acinar/Tubular Differentiation): 3.  Nuclear Pleomorphism: 2.  Mitotic Rate: 3.  Overall Grade: Grade 3 (score 8).  - Microcalcifications: Not identified.  - Lymphovascular invasion: Not identified.  2. LYMPH NODE, LEFT AXILLA, BIOPSY:  - Positive for metastatic carcinoma.  - The metastatic deposit measures 6 mm in longest continuous linear length.  Estrogen receptor: Positive, 90% of the tumor nuclei staining moderate to strongly.  Progesterone receptor: Positive, 30% of the tumor nuclei staining weak to moderately.  HER2: Negative.  Ki-67: 60%.  - ECHO 9/20/19: EF 64%  - PET 9/21/19:  Impression         Hypermetabolic left breast mass and regional left axillary lymphadenopathy consistent with reported breast cancer and corresponding  with recent MRI findings.    Upper limit of normal sized right axillary lymph nodes with normal fatty duane and mildly increased radiotracer uptake.  Findings could represent reactive nodes with metastatic nodes thought less likely.  Lymphscintigraphy with injection in the left breast may considered to assess for drainage to the contralateral axilla.      BX 9/24/19: Right axilla node biopsy is benign  - she underwent neoadjuvant chemotherapy and completed 4 cycles of AC followed by 11 cycles of weekly Taxol.   Stopped with side effects.  - 3/24/20 - she underwent bilateral mastectomies with Lt. sentinel node biopsy and subsequent Lt. axillary dissection and Rt. sentinel node biopsy with Dr. Lopez.  Tissue expanders were placed.   - Pathology from the Lt. breast revealed 1.2 cm of residual invasive ductal carcinoma histologic grade 3, nuclear grade 3 and mitotic index 5 ). There was high grade DCIS. There was lymphovascular invasion.  The margins of resection were negative at > 1 cm.  One axillary node had a 5 mm focus of metastatic carcinoma.  Another Lt. sentinel node had isolated tumor cells.   A third Lt. sentinel node and 7 Lt. axillary nodes were negative.  The Rt. breast and Rt. sentinel nodes were negative.    - XRT pending      Review of Systems   Constitutional: Negative for activity change, appetite change, chills, fatigue, fever and unexpected weight change ( weight loss planned).   HENT: Negative for nasal congestion, rhinorrhea and trouble swallowing.    Eyes: Negative for visual disturbance.   Respiratory: Negative for cough, shortness of breath and wheezing.    Cardiovascular: Negative for chest pain, palpitations and leg swelling.   Gastrointestinal: Negative for abdominal distention, abdominal pain, blood in stool, constipation, diarrhea, nausea and vomiting.   Genitourinary: Negative for decreased urine volume, difficulty urinating, dysuria, frequency and hematuria.   Musculoskeletal: Negative for  arthralgias, back pain, gait problem, joint swelling and neck pain.   Integumentary:  Negative for pallor and rash.   Neurological: Positive for numbness. Negative for dizziness, weakness, light-headedness and headaches.   Hematological: Negative for adenopathy. Does not bruise/bleed easily.   Psychiatric/Behavioral: Negative for confusion, dysphoric mood and sleep disturbance.         Objective:      Physical Exam  Vitals signs and nursing note reviewed.   Constitutional:       General: She is not in acute distress.     Appearance: Normal appearance. She is normal weight. She is not diaphoretic.      Comments: Virtual visit  ECOG=0   HENT:      Head: Normocephalic and atraumatic.   Eyes:      General: No scleral icterus.     Pupils: Pupils are equal, round, and reactive to light.   Neck:      Musculoskeletal: Normal range of motion and neck supple.   Cardiovascular:      Rate and Rhythm: Normal rate and regular rhythm.      Heart sounds: Normal heart sounds. No murmur. No gallop.    Pulmonary:      Effort: Pulmonary effort is normal. No respiratory distress.      Breath sounds: Normal breath sounds. No wheezing.   Chest:      Chest wall: No tenderness.   Abdominal:      General: Bowel sounds are normal. There is no distension.      Palpations: Abdomen is soft.      Tenderness: There is no abdominal tenderness.      Comments: No organomegaly   Musculoskeletal: Normal range of motion.         General: No swelling or tenderness.      Right lower leg: No edema.      Left lower leg: No edema.   Skin:     General: Skin is warm and dry.      Findings: No erythema.   Neurological:      General: No focal deficit present.      Mental Status: She is alert and oriented to person, place, and time.      Coordination: Coordination normal.      Labs- reviewed   Assessment:       1. Breast cancer metastasized to axillary lymph node, left    2. Neuropathy due to chemotherapeutic drug        Plan:     1. Continue endocrine  therapy  Complete XRT  RTC 1 month with labs  2. Rx refilled    25 minutes total

## 2020-06-18 ENCOUNTER — OFFICE VISIT (OUTPATIENT)
Dept: HEMATOLOGY/ONCOLOGY | Facility: CLINIC | Age: 65
End: 2020-06-18
Payer: COMMERCIAL

## 2020-06-18 ENCOUNTER — DOCUMENTATION ONLY (OUTPATIENT)
Dept: RADIATION ONCOLOGY | Facility: CLINIC | Age: 65
End: 2020-06-18

## 2020-06-18 VITALS
RESPIRATION RATE: 16 BRPM | WEIGHT: 181.88 LBS | HEART RATE: 92 BPM | TEMPERATURE: 98 F | OXYGEN SATURATION: 98 % | SYSTOLIC BLOOD PRESSURE: 135 MMHG | DIASTOLIC BLOOD PRESSURE: 87 MMHG | BODY MASS INDEX: 35.71 KG/M2 | HEIGHT: 60 IN

## 2020-06-18 DIAGNOSIS — C50.912 BREAST CANCER METASTASIZED TO AXILLARY LYMPH NODE, LEFT: Primary | ICD-10-CM

## 2020-06-18 DIAGNOSIS — G89.3 NEOPLASM RELATED PAIN: Primary | ICD-10-CM

## 2020-06-18 DIAGNOSIS — G62.0 NEUROPATHY DUE TO CHEMOTHERAPEUTIC DRUG: ICD-10-CM

## 2020-06-18 DIAGNOSIS — T45.1X5A NEUROPATHY DUE TO CHEMOTHERAPEUTIC DRUG: ICD-10-CM

## 2020-06-18 DIAGNOSIS — C77.3 BREAST CANCER METASTASIZED TO AXILLARY LYMPH NODE, LEFT: Primary | ICD-10-CM

## 2020-06-18 PROCEDURE — 3075F PR MOST RECENT SYSTOLIC BLOOD PRESS GE 130-139MM HG: ICD-10-PCS | Mod: CPTII,S$GLB,, | Performed by: INTERNAL MEDICINE

## 2020-06-18 PROCEDURE — 3075F SYST BP GE 130 - 139MM HG: CPT | Mod: CPTII,S$GLB,, | Performed by: INTERNAL MEDICINE

## 2020-06-18 PROCEDURE — 77412 RADIATION TX DELIVERY LVL 3: CPT | Performed by: RADIOLOGY

## 2020-06-18 PROCEDURE — 99999 PR PBB SHADOW E&M-EST. PATIENT-LVL IV: CPT | Mod: PBBFAC,,, | Performed by: INTERNAL MEDICINE

## 2020-06-18 PROCEDURE — 3008F PR BODY MASS INDEX (BMI) DOCUMENTED: ICD-10-PCS | Mod: CPTII,S$GLB,, | Performed by: INTERNAL MEDICINE

## 2020-06-18 PROCEDURE — 77387 PR GUIDANCE FOR RADIATION TREATMENT DELIVERY: ICD-10-PCS | Mod: 26,,, | Performed by: RADIOLOGY

## 2020-06-18 PROCEDURE — 3008F BODY MASS INDEX DOCD: CPT | Mod: CPTII,S$GLB,, | Performed by: INTERNAL MEDICINE

## 2020-06-18 PROCEDURE — 77387 GUIDANCE FOR RADJ TX DLVR: CPT | Mod: 26,,, | Performed by: RADIOLOGY

## 2020-06-18 PROCEDURE — 77387 GUIDANCE FOR RADJ TX DLVR: CPT | Mod: TC | Performed by: RADIOLOGY

## 2020-06-18 PROCEDURE — 99999 PR PBB SHADOW E&M-EST. PATIENT-LVL IV: ICD-10-PCS | Mod: PBBFAC,,, | Performed by: INTERNAL MEDICINE

## 2020-06-18 PROCEDURE — 99214 OFFICE O/P EST MOD 30 MIN: CPT | Mod: S$GLB,,, | Performed by: INTERNAL MEDICINE

## 2020-06-18 PROCEDURE — 99214 PR OFFICE/OUTPT VISIT, EST, LEVL IV, 30-39 MIN: ICD-10-PCS | Mod: S$GLB,,, | Performed by: INTERNAL MEDICINE

## 2020-06-18 PROCEDURE — 3079F DIAST BP 80-89 MM HG: CPT | Mod: CPTII,S$GLB,, | Performed by: INTERNAL MEDICINE

## 2020-06-18 PROCEDURE — 3079F PR MOST RECENT DIASTOLIC BLOOD PRESSURE 80-89 MM HG: ICD-10-PCS | Mod: CPTII,S$GLB,, | Performed by: INTERNAL MEDICINE

## 2020-06-18 PROCEDURE — 77417 THER RADIOLOGY PORT IMAGE(S): CPT | Performed by: RADIOLOGY

## 2020-06-18 RX ORDER — HYDROCODONE BITARTRATE AND ACETAMINOPHEN 5; 325 MG/1; MG/1
1 TABLET ORAL EVERY 8 HOURS PRN
Qty: 30 TABLET | Refills: 0 | Status: SHIPPED | OUTPATIENT
Start: 2020-06-18 | End: 2021-03-15

## 2020-06-19 PROCEDURE — 77412 RADIATION TX DELIVERY LVL 3: CPT | Performed by: RADIOLOGY

## 2020-06-19 PROCEDURE — 77387 GUIDANCE FOR RADJ TX DLVR: CPT | Mod: 26,,, | Performed by: RADIOLOGY

## 2020-06-19 PROCEDURE — 77387 GUIDANCE FOR RADJ TX DLVR: CPT | Mod: TC | Performed by: RADIOLOGY

## 2020-06-19 PROCEDURE — 77387 PR GUIDANCE FOR RADIATION TREATMENT DELIVERY: ICD-10-PCS | Mod: 26,,, | Performed by: RADIOLOGY

## 2020-06-22 PROCEDURE — 77387 GUIDANCE FOR RADJ TX DLVR: CPT | Mod: TC | Performed by: RADIOLOGY

## 2020-06-22 PROCEDURE — 77387 PR GUIDANCE FOR RADIATION TREATMENT DELIVERY: ICD-10-PCS | Mod: 26,,, | Performed by: RADIOLOGY

## 2020-06-22 PROCEDURE — 77412 RADIATION TX DELIVERY LVL 3: CPT | Performed by: RADIOLOGY

## 2020-06-22 PROCEDURE — 77387 GUIDANCE FOR RADJ TX DLVR: CPT | Mod: 26,,, | Performed by: RADIOLOGY

## 2020-06-23 DIAGNOSIS — R30.0 BURNING WITH URINATION: Primary | ICD-10-CM

## 2020-06-23 PROCEDURE — 77336 RADIATION PHYSICS CONSULT: CPT | Performed by: RADIOLOGY

## 2020-06-23 PROCEDURE — 77387 PR GUIDANCE FOR RADIATION TREATMENT DELIVERY: ICD-10-PCS | Mod: 26,,, | Performed by: RADIOLOGY

## 2020-06-23 PROCEDURE — 77412 RADIATION TX DELIVERY LVL 3: CPT | Performed by: RADIOLOGY

## 2020-06-23 PROCEDURE — 77387 GUIDANCE FOR RADJ TX DLVR: CPT | Mod: TC | Performed by: RADIOLOGY

## 2020-06-23 PROCEDURE — 77387 GUIDANCE FOR RADJ TX DLVR: CPT | Mod: 26,,, | Performed by: RADIOLOGY

## 2020-06-24 ENCOUNTER — TELEPHONE (OUTPATIENT)
Dept: OBSTETRICS AND GYNECOLOGY | Facility: CLINIC | Age: 65
End: 2020-06-24

## 2020-06-24 ENCOUNTER — LAB VISIT (OUTPATIENT)
Dept: LAB | Facility: HOSPITAL | Age: 65
End: 2020-06-24
Attending: INTERNAL MEDICINE
Payer: COMMERCIAL

## 2020-06-24 DIAGNOSIS — R30.0 BURNING WITH URINATION: ICD-10-CM

## 2020-06-24 LAB
BACTERIA #/AREA URNS AUTO: ABNORMAL /HPF
BILIRUB UR QL STRIP: NEGATIVE
CLARITY UR REFRACT.AUTO: CLEAR
COLOR UR AUTO: YELLOW
GLUCOSE UR QL STRIP: NEGATIVE
HGB UR QL STRIP: ABNORMAL
KETONES UR QL STRIP: NEGATIVE
LEUKOCYTE ESTERASE UR QL STRIP: ABNORMAL
MICROSCOPIC COMMENT: ABNORMAL
NITRITE UR QL STRIP: NEGATIVE
PH UR STRIP: 5 [PH] (ref 5–8)
PROT UR QL STRIP: NEGATIVE
RBC #/AREA URNS AUTO: 2 /HPF (ref 0–4)
SP GR UR STRIP: 1.03 (ref 1–1.03)
SQUAMOUS #/AREA URNS AUTO: 0 /HPF
URN SPEC COLLECT METH UR: ABNORMAL
WBC #/AREA URNS AUTO: 23 /HPF (ref 0–5)

## 2020-06-24 PROCEDURE — 77412 RADIATION TX DELIVERY LVL 3: CPT | Performed by: RADIOLOGY

## 2020-06-24 PROCEDURE — 77387 PR GUIDANCE FOR RADIATION TREATMENT DELIVERY: ICD-10-PCS | Mod: 26,,, | Performed by: RADIOLOGY

## 2020-06-24 PROCEDURE — 87086 URINE CULTURE/COLONY COUNT: CPT

## 2020-06-24 PROCEDURE — 77387 GUIDANCE FOR RADJ TX DLVR: CPT | Mod: 26,,, | Performed by: RADIOLOGY

## 2020-06-24 PROCEDURE — 77387 GUIDANCE FOR RADJ TX DLVR: CPT | Mod: TC | Performed by: RADIOLOGY

## 2020-06-24 PROCEDURE — 81001 URINALYSIS AUTO W/SCOPE: CPT

## 2020-06-24 NOTE — TELEPHONE ENCOUNTER
RN phoned patient regarding current dysuria. RN lvm pertaining to patient's symptoms requesting a return call for further discussion.     Dr. Villagran has placed orders for a U/A and CUU which are currently in process. SERJIO James.

## 2020-06-25 ENCOUNTER — DOCUMENTATION ONLY (OUTPATIENT)
Dept: RADIATION ONCOLOGY | Facility: CLINIC | Age: 65
End: 2020-06-25

## 2020-06-25 PROCEDURE — 77387 GUIDANCE FOR RADJ TX DLVR: CPT | Mod: TC | Performed by: RADIOLOGY

## 2020-06-25 PROCEDURE — 77387 GUIDANCE FOR RADJ TX DLVR: CPT | Mod: 26,,, | Performed by: RADIOLOGY

## 2020-06-25 PROCEDURE — 77417 THER RADIOLOGY PORT IMAGE(S): CPT | Performed by: RADIOLOGY

## 2020-06-25 PROCEDURE — 77412 RADIATION TX DELIVERY LVL 3: CPT | Performed by: RADIOLOGY

## 2020-06-25 PROCEDURE — 77387 PR GUIDANCE FOR RADIATION TREATMENT DELIVERY: ICD-10-PCS | Mod: 26,,, | Performed by: RADIOLOGY

## 2020-06-26 LAB
BACTERIA UR CULT: NORMAL
BACTERIA UR CULT: NORMAL

## 2020-06-26 PROCEDURE — 77387 PR GUIDANCE FOR RADIATION TREATMENT DELIVERY: ICD-10-PCS | Mod: 26,,, | Performed by: RADIOLOGY

## 2020-06-26 PROCEDURE — 77387 GUIDANCE FOR RADJ TX DLVR: CPT | Mod: TC | Performed by: RADIOLOGY

## 2020-06-26 PROCEDURE — 77412 RADIATION TX DELIVERY LVL 3: CPT | Performed by: RADIOLOGY

## 2020-06-26 PROCEDURE — 77387 GUIDANCE FOR RADJ TX DLVR: CPT | Mod: 26,,, | Performed by: RADIOLOGY

## 2020-06-29 PROCEDURE — 77387 PR GUIDANCE FOR RADIATION TREATMENT DELIVERY: ICD-10-PCS | Mod: 26,,, | Performed by: RADIOLOGY

## 2020-06-29 PROCEDURE — 77387 GUIDANCE FOR RADJ TX DLVR: CPT | Mod: TC | Performed by: RADIOLOGY

## 2020-06-29 PROCEDURE — 77387 GUIDANCE FOR RADJ TX DLVR: CPT | Mod: 26,,, | Performed by: RADIOLOGY

## 2020-06-29 PROCEDURE — 77412 RADIATION TX DELIVERY LVL 3: CPT | Performed by: RADIOLOGY

## 2020-06-29 NOTE — PROGRESS NOTES
Digital Medicine: Health  Follow-Up    Patient reports she is doing well.     The history is provided by the patient. No  was used.   Follow Up  Follow-up reason(s): routine education      Routine Education Topics: weight management, eating patterns and physical activity      INTERVENTION(S)  encouragement/support, denied resources and denied questions    PLAN  patient verbalizes understanding, patient amenable to changes, Clinician follow-up and continue monitoring      There are no preventive care reminders to display for this patient.    Last 5 Patient Entered Readings                                      Current 30 Day Average: 115/80     Recent Readings 6/21/2020 6/7/2020 6/4/2020 6/2/2020 6/2/2020    SBP (mmHg) 130 121 102 108 96    DBP (mmHg) 87 84 74 78 75    Pulse 96 98 103 107 97            Diet Screening   She has the following dietary restrictions: low sodium diet    Patient reports she is monitoing sodium intake. Patient reports she is eating small portions. patient has recently increased vegetable intake. Patient is also avoiding rice and pasta. Patient is losing weight. Patient reports she lost about 40 pounds since getting sick with cancer. Patient would like to continue losing weight (was not specific on goal). Patient reports she is drinking a lot water.     Assigning the following patient goals: maintain low sodium diet    Physical Activity Screening   When asked if exercising, patient responded: yes  Patient has limited mobility: recovering from surgery/rehab5 day(s) a week.  Her level of intensity when exercising is low.    Patient participates in the following activities: physical therapy/rehab    She identified the following barriers to physical activity: no barriers to being active    Patient reports she recently completed physical therapy. Patient reports she is continuing exercises at home.     Intervention(s): goal setting     Assigning the following patient  goal(s): increase physical activity      SDOH- Deferred.

## 2020-06-30 PROCEDURE — 77412 RADIATION TX DELIVERY LVL 3: CPT | Performed by: RADIOLOGY

## 2020-06-30 PROCEDURE — 77336 RADIATION PHYSICS CONSULT: CPT | Performed by: RADIOLOGY

## 2020-06-30 PROCEDURE — 77387 GUIDANCE FOR RADJ TX DLVR: CPT | Mod: 26,,, | Performed by: RADIOLOGY

## 2020-06-30 PROCEDURE — 77387 PR GUIDANCE FOR RADIATION TREATMENT DELIVERY: ICD-10-PCS | Mod: 26,,, | Performed by: RADIOLOGY

## 2020-06-30 PROCEDURE — 77387 GUIDANCE FOR RADJ TX DLVR: CPT | Mod: TC | Performed by: RADIOLOGY

## 2020-07-01 ENCOUNTER — HOSPITAL ENCOUNTER (OUTPATIENT)
Dept: RADIATION THERAPY | Facility: HOSPITAL | Age: 65
Discharge: HOME | End: 2020-07-01
Attending: RADIOLOGY
Payer: COMMERCIAL

## 2020-07-01 PROCEDURE — 77387 GUIDANCE FOR RADJ TX DLVR: CPT | Mod: 26,,, | Performed by: RADIOLOGY

## 2020-07-01 PROCEDURE — 77387 GUIDANCE FOR RADJ TX DLVR: CPT | Mod: TC | Performed by: RADIOLOGY

## 2020-07-01 PROCEDURE — 77387 PR GUIDANCE FOR RADIATION TREATMENT DELIVERY: ICD-10-PCS | Mod: 26,,, | Performed by: RADIOLOGY

## 2020-07-01 PROCEDURE — 77412 RADIATION TX DELIVERY LVL 3: CPT | Performed by: RADIOLOGY

## 2020-07-02 ENCOUNTER — DOCUMENTATION ONLY (OUTPATIENT)
Dept: RADIATION ONCOLOGY | Facility: CLINIC | Age: 65
End: 2020-07-02

## 2020-07-02 PROCEDURE — 77417 THER RADIOLOGY PORT IMAGE(S): CPT | Performed by: RADIOLOGY

## 2020-07-02 PROCEDURE — 77387 GUIDANCE FOR RADJ TX DLVR: CPT | Mod: 26,,, | Performed by: RADIOLOGY

## 2020-07-02 PROCEDURE — 77412 RADIATION TX DELIVERY LVL 3: CPT | Performed by: RADIOLOGY

## 2020-07-02 PROCEDURE — 77387 GUIDANCE FOR RADJ TX DLVR: CPT | Mod: TC | Performed by: RADIOLOGY

## 2020-07-02 PROCEDURE — 77387 PR GUIDANCE FOR RADIATION TREATMENT DELIVERY: ICD-10-PCS | Mod: 26,,, | Performed by: RADIOLOGY

## 2020-07-02 NOTE — PLAN OF CARE
Pt. On day 22 of outpt. Xrt to left breast mound.  Doing well.  Hyperpigmentation of the skin. Skin intact .  Using miaderm.

## 2020-07-06 PROCEDURE — 77387 GUIDANCE FOR RADJ TX DLVR: CPT | Mod: 26,,, | Performed by: RADIOLOGY

## 2020-07-06 PROCEDURE — 77412 RADIATION TX DELIVERY LVL 3: CPT | Performed by: RADIOLOGY

## 2020-07-06 PROCEDURE — 77387 GUIDANCE FOR RADJ TX DLVR: CPT | Mod: TC | Performed by: RADIOLOGY

## 2020-07-06 PROCEDURE — 77387 PR GUIDANCE FOR RADIATION TREATMENT DELIVERY: ICD-10-PCS | Mod: 26,,, | Performed by: RADIOLOGY

## 2020-07-07 PROCEDURE — 77412 RADIATION TX DELIVERY LVL 3: CPT | Performed by: RADIOLOGY

## 2020-07-07 PROCEDURE — 77336 RADIATION PHYSICS CONSULT: CPT | Performed by: RADIOLOGY

## 2020-07-07 PROCEDURE — 77387 PR GUIDANCE FOR RADIATION TREATMENT DELIVERY: ICD-10-PCS | Mod: 26,,, | Performed by: RADIOLOGY

## 2020-07-07 PROCEDURE — 77387 GUIDANCE FOR RADJ TX DLVR: CPT | Mod: 26,,, | Performed by: RADIOLOGY

## 2020-07-07 PROCEDURE — 77387 GUIDANCE FOR RADJ TX DLVR: CPT | Mod: TC | Performed by: RADIOLOGY

## 2020-07-08 PROCEDURE — 77387 PR GUIDANCE FOR RADIATION TREATMENT DELIVERY: ICD-10-PCS | Mod: 26,,, | Performed by: RADIOLOGY

## 2020-07-08 PROCEDURE — 77387 GUIDANCE FOR RADJ TX DLVR: CPT | Mod: TC | Performed by: RADIOLOGY

## 2020-07-08 PROCEDURE — 77387 GUIDANCE FOR RADJ TX DLVR: CPT | Mod: 26,,, | Performed by: RADIOLOGY

## 2020-07-08 PROCEDURE — 77412 RADIATION TX DELIVERY LVL 3: CPT | Performed by: RADIOLOGY

## 2020-07-09 ENCOUNTER — DOCUMENTATION ONLY (OUTPATIENT)
Dept: RADIATION ONCOLOGY | Facility: CLINIC | Age: 65
End: 2020-07-09

## 2020-07-09 PROCEDURE — 77412 RADIATION TX DELIVERY LVL 3: CPT | Performed by: RADIOLOGY

## 2020-07-09 PROCEDURE — 77387 GUIDANCE FOR RADJ TX DLVR: CPT | Mod: 26,,, | Performed by: RADIOLOGY

## 2020-07-09 PROCEDURE — 77387 GUIDANCE FOR RADJ TX DLVR: CPT | Mod: TC | Performed by: RADIOLOGY

## 2020-07-09 PROCEDURE — 77387 PR GUIDANCE FOR RADIATION TREATMENT DELIVERY: ICD-10-PCS | Mod: 26,,, | Performed by: RADIOLOGY

## 2020-07-09 NOTE — PLAN OF CARE
Pt. On day 26 of outpt. Xrt to left breast mound doing well.  Skin intact. Hyperpigmentation of the skin.

## 2020-07-10 PROCEDURE — 77412 RADIATION TX DELIVERY LVL 3: CPT | Performed by: RADIOLOGY

## 2020-07-10 PROCEDURE — 77387 PR GUIDANCE FOR RADIATION TREATMENT DELIVERY: ICD-10-PCS | Mod: 26,,, | Performed by: RADIOLOGY

## 2020-07-10 PROCEDURE — 77387 GUIDANCE FOR RADJ TX DLVR: CPT | Mod: 26,,, | Performed by: RADIOLOGY

## 2020-07-10 PROCEDURE — 77387 GUIDANCE FOR RADJ TX DLVR: CPT | Mod: TC | Performed by: RADIOLOGY

## 2020-07-13 ENCOUNTER — DOCUMENTATION ONLY (OUTPATIENT)
Dept: RADIATION ONCOLOGY | Facility: CLINIC | Age: 65
End: 2020-07-13

## 2020-07-13 PROCEDURE — 77387 GUIDANCE FOR RADJ TX DLVR: CPT | Mod: TC | Performed by: RADIOLOGY

## 2020-07-13 PROCEDURE — 77412 RADIATION TX DELIVERY LVL 3: CPT | Performed by: RADIOLOGY

## 2020-07-13 PROCEDURE — 77336 RADIATION PHYSICS CONSULT: CPT | Performed by: RADIOLOGY

## 2020-07-13 PROCEDURE — 77387 PR GUIDANCE FOR RADIATION TREATMENT DELIVERY: ICD-10-PCS | Mod: 26,,, | Performed by: RADIOLOGY

## 2020-07-13 PROCEDURE — 77387 GUIDANCE FOR RADJ TX DLVR: CPT | Mod: 26,,, | Performed by: RADIOLOGY

## 2020-07-21 ENCOUNTER — PATIENT MESSAGE (OUTPATIENT)
Dept: PRIMARY CARE CLINIC | Facility: CLINIC | Age: 65
End: 2020-07-21

## 2020-07-21 DIAGNOSIS — K62.5 RECTAL BLEED: Primary | ICD-10-CM

## 2020-07-22 ENCOUNTER — PATIENT OUTREACH (OUTPATIENT)
Dept: ADMINISTRATIVE | Facility: OTHER | Age: 65
End: 2020-07-22

## 2020-07-23 ENCOUNTER — OFFICE VISIT (OUTPATIENT)
Dept: SURGERY | Facility: CLINIC | Age: 65
End: 2020-07-23
Payer: COMMERCIAL

## 2020-07-23 ENCOUNTER — TELEPHONE (OUTPATIENT)
Dept: ENDOSCOPY | Facility: HOSPITAL | Age: 65
End: 2020-07-23

## 2020-07-23 VITALS
SYSTOLIC BLOOD PRESSURE: 103 MMHG | BODY MASS INDEX: 34.54 KG/M2 | WEIGHT: 175.94 LBS | DIASTOLIC BLOOD PRESSURE: 67 MMHG | HEART RATE: 138 BPM | HEIGHT: 60 IN

## 2020-07-23 DIAGNOSIS — Z01.812 PRE-PROCEDURE LAB EXAM: Primary | ICD-10-CM

## 2020-07-23 DIAGNOSIS — Z86.010 PERSONAL HISTORY OF COLONIC POLYPS: Primary | ICD-10-CM

## 2020-07-23 DIAGNOSIS — K59.00 CONSTIPATION, UNSPECIFIED CONSTIPATION TYPE: ICD-10-CM

## 2020-07-23 DIAGNOSIS — Z12.11 SPECIAL SCREENING FOR MALIGNANT NEOPLASMS, COLON: Primary | ICD-10-CM

## 2020-07-23 DIAGNOSIS — K62.5 RECTAL BLEED: ICD-10-CM

## 2020-07-23 PROCEDURE — 3008F BODY MASS INDEX DOCD: CPT | Mod: CPTII,S$GLB,, | Performed by: NURSE PRACTITIONER

## 2020-07-23 PROCEDURE — 3074F PR MOST RECENT SYSTOLIC BLOOD PRESSURE < 130 MM HG: ICD-10-PCS | Mod: CPTII,S$GLB,, | Performed by: NURSE PRACTITIONER

## 2020-07-23 PROCEDURE — 3008F PR BODY MASS INDEX (BMI) DOCUMENTED: ICD-10-PCS | Mod: CPTII,S$GLB,, | Performed by: NURSE PRACTITIONER

## 2020-07-23 PROCEDURE — 3074F SYST BP LT 130 MM HG: CPT | Mod: CPTII,S$GLB,, | Performed by: NURSE PRACTITIONER

## 2020-07-23 PROCEDURE — 3078F DIAST BP <80 MM HG: CPT | Mod: CPTII,S$GLB,, | Performed by: NURSE PRACTITIONER

## 2020-07-23 PROCEDURE — 3078F PR MOST RECENT DIASTOLIC BLOOD PRESSURE < 80 MM HG: ICD-10-PCS | Mod: CPTII,S$GLB,, | Performed by: NURSE PRACTITIONER

## 2020-07-23 PROCEDURE — 99999 PR PBB SHADOW E&M-EST. PATIENT-LVL IV: CPT | Mod: PBBFAC,,, | Performed by: NURSE PRACTITIONER

## 2020-07-23 PROCEDURE — 99204 PR OFFICE/OUTPT VISIT, NEW, LEVL IV, 45-59 MIN: ICD-10-PCS | Mod: 25,S$GLB,, | Performed by: NURSE PRACTITIONER

## 2020-07-23 PROCEDURE — 99999 PR PBB SHADOW E&M-EST. PATIENT-LVL IV: ICD-10-PCS | Mod: PBBFAC,,, | Performed by: NURSE PRACTITIONER

## 2020-07-23 PROCEDURE — 46600 DIAGNOSTIC ANOSCOPY SPX: CPT | Mod: S$GLB,,, | Performed by: NURSE PRACTITIONER

## 2020-07-23 PROCEDURE — 99204 OFFICE O/P NEW MOD 45 MIN: CPT | Mod: 25,S$GLB,, | Performed by: NURSE PRACTITIONER

## 2020-07-23 PROCEDURE — 46600 PR DIAG2STIC A2SCOPY: ICD-10-PCS | Mod: S$GLB,,, | Performed by: NURSE PRACTITIONER

## 2020-07-23 RX ORDER — POLYETHYLENE GLYCOL 3350, SODIUM SULFATE ANHYDROUS, SODIUM BICARBONATE, SODIUM CHLORIDE, POTASSIUM CHLORIDE 236; 22.74; 6.74; 5.86; 2.97 G/4L; G/4L; G/4L; G/4L; G/4L
4 POWDER, FOR SOLUTION ORAL ONCE
Qty: 4000 ML | Refills: 0 | Status: SHIPPED | OUTPATIENT
Start: 2020-07-23 | End: 2020-08-02

## 2020-07-23 RX ORDER — POLYETHYLENE GLYCOL 3350 17 G/17G
17 POWDER, FOR SOLUTION ORAL 2 TIMES DAILY
Qty: 1020 G | Refills: 2 | Status: SHIPPED | OUTPATIENT
Start: 2020-07-23

## 2020-07-23 NOTE — LETTER
July 23, 2020      Estephania San MD  [address]  [address]  [facility] [address]           [facility]  [address]  [facility] [address]  Phone: [phone]          Patient: Maria Elena Tavares   MR Number: [id number]   YOB: 1955   Date of Visit: 7/23/2020       Dear Dr. Estephania San:    Thank you for referring Maria Elena Tavares to me for evaluation. Attached you will find relevant portions of my assessment and plan of care.    If you have questions, please do not hesitate to call me. I look forward to following Maria Elena Tavares along with you.    Sincerely,    Kira Harvey, NP    Enclosure  CC:  No Recipients    If you would like to receive this communication electronically, please contact [email or web address] or [phone] to request more information on [employer] access.    For providers and/or their staff who would like to refer a patient to [facility], please contact us through our one-stop-shop provider referral line, [facility], at [phone].    If you feel you have received this communication in error or would no longer like to receive these types of communications, please e-mail [email or web address]

## 2020-07-23 NOTE — PROGRESS NOTES
CRS Office Visit History and Physical    Referring Md:   Estephania San Md  [address] [facility] [address]  [place] [address]    SUBJECTIVE:     Chief Complaint: rectal bleeding    History of Present Illness:  The patient is new patient to this practice.   Course is as follows:  Patient is a 64 y.o. female with hx of breast cancer s/p chemo and radiation, presents with 2 episodes of rectal bleeding. On Monday she has 2 soft formed bowel movements with a large amount of bright red blood in the toilet bowl and mixed in with the stool. She has not noted blood prior or since. She does report that since she finished chemo in February however, she has been constipated and would need to strain to have a bowel movement.   Symptoms have been present for 2 episodes of BRBPR, 5-6 months of constipation.  Has tried miralax.  Associated bleeding: yes  Previous anorectal procedures: no  confirms straining/prolonged time on toilet with bowel movements.  is not currently taking fiber supplement or stool softener. Taking Miralax QOD.   Blood thinners: No    Last Colonoscopy completed on 1/20/2015  - Internal hemorrhoids.   - Anal papilla(e) were hypertrophied.   - One 5 mm polyp in the sigmoid colon. Resected and retrieved.   - One 7 mm polyp in the proximal ascending colon. Resected and retrieved.   - Diverticulosis at the hepatic flexure and in the ascending colon.   - Diverticulosis in the sigmoid colon.   -Path: 1 TA  Family history of colorectal cancer or IBD: maternal aunt with CRC.    Review of patient's allergies indicates:  No Known Allergies    Past Medical History:   Diagnosis Date    BMI 37.0-37.9, adult     Breast cancer 09/05/2019     Left breast, IDC with lymph node metastisis    Colon polyps 2015    Diabetes mellitus type II     Diverticulosis     history of diverticulosisseen on colonoscopy at age 48. Repeat recommended at age 58. Done by     Elevated blood protein     History of elevated  protein. Apparently has seen  for extensive workup including bone marrow biopsy    History of shingles 2006    Hyperlipidemia     Hypertension     Microalbuminuria     due 2 diabetes    Mild vitamin D deficiency     . A low vitamin D    Thyroid disease     hypothyroidism     Past Surgical History:   Procedure Laterality Date    BREAST BIOPSY Left 2019    IDC with mets to node    BREAST BIOPSY Right 2019    core bx, benign node    BREAST BIOPSY Left 10/14/2019    MRI Core bx, + IDC    BREAST BIOPSY Left 10/08/2019    Core bx, ADH     SECTION      HYSTERECTOMY      INSERTION OF BREAST TISSUE EXPANDER Bilateral 3/24/2020    Procedure: INSERTION, TISSUE EXPANDER, BREAST BILATERAL;  Surgeon: Michael Solorzano MD;  Location: [facility] OR [address] [place] [address];  Service: Plastics;  Laterality: Bilateral;    INSERTION OF TUNNELED CENTRAL VENOUS CATHETER (CVC) WITH SUBCUTANEOUS PORT Right 10/4/2019    Procedure: [id number]-D-[id number] RIGHT (CONSENT AM OF) 1.0 hr case;  Surgeon: Elena Lopez MD;  Location: [facility] OR [address] [place] [address];  Service: General;  Laterality: Right;    OOPHORECTOMY      SENTINEL LYMPH NODE BIOPSY Left 3/24/2020    Procedure: BIOPSY, LYMPH NODE, SENTINEL LEFT;  Surgeon: Elena Lopez MD;  Location: [facility] OR [address] [place] [address];  Service: General;  Laterality: Left;    SIMPLE MASTECTOMY Bilateral 3/24/2020    Procedure: MASTECTOMY, SIMPLE BILATERAL;  Surgeon: Elena Lopez MD;  Location: [facility] OR [address] [place] [address];  Service: General;  Laterality: Bilateral;     Family History   Problem Relation Age of Onset    Cancer Mother         lung ca heavy smoker    Lung cancer Mother     Cancer Father     Lung cancer Father     Hypertension Sister     No Known Problems Sister     No Known Problems Daughter     Hypothyroidism Sister     Diabetes Maternal Aunt     Cancer Maternal Aunt     No Known Problems Maternal Grandmother     Breast cancer Paternal Aunt     No Known Problems Paternal Uncle     Lung  cancer Maternal Grandfather     No Known Problems Paternal Grandmother     No Known Problems Paternal Grandfather     Amblyopia Neg Hx     Blindness Neg Hx     Cataracts Neg Hx     Glaucoma Neg Hx     Macular degeneration Neg Hx     Retinal detachment Neg Hx     Strabismus Neg Hx     Stroke Neg Hx     Thyroid disease Neg Hx     Ovarian cancer Neg Hx     Colon cancer Neg Hx      Social History     Tobacco Use    Smoking status: Never Smoker    Smokeless tobacco: Never Used    Tobacco comment: 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  She walks occasionally.   Substance Use Topics    Alcohol use: Not Currently     Comment: Occasionally    Drug use: No        Review of Systems:  Review of Systems   Constitutional: Positive for malaise/fatigue and weight loss. Negative for chills and fever.   Respiratory: Negative for cough and shortness of breath.    Cardiovascular: Negative for chest pain and palpitations.   Gastrointestinal: Positive for blood in stool and constipation. Negative for abdominal pain, diarrhea and melena.   Genitourinary: Negative for dysuria and hematuria.   Skin: Negative for itching and rash.        Skin peeling since radiation   Psychiatric/Behavioral: The patient is not nervous/anxious.    All other systems reviewed and are negative.      OBJECTIVE:     Vital Signs (Most Recent)  Blood Pressure 103/67   Pulse (Abnormal) 138   Height 5' (1.524 m)   Weight 79.8 kg (175 lb 14.8 oz)   Body Mass Index 34.36 kg/m²     Physical Exam:  General: Black or  female in no distress   Neuro: Alert and oriented to person, place, and time.  Moves all extremities.     HEENT: No icterus.  Trachea midline  Respiratory: Respirations are even and unlabored, no cough or audible wheezing  Abdomen: soft, round  Skin: Warm dry and intact, No visible rashes, no jaundice    Labs reviewed today:  Lab Results   Component Value Date    WBC 3.78 (L) 05/11/2020     HGB 10.8 (L) 05/11/2020    HCT 36.7 (L) 05/11/2020     05/11/2020    CHOL 261 (H) 10/09/2019    TRIG 92 10/09/2019    HDL 57 10/09/2019    ALT 10 03/12/2020    AST 18 03/12/2020     03/12/2020    K 3.5 03/12/2020    CL 99 03/12/2020    CREATININE 0.9 03/12/2020    BUN 19 03/12/2020    CO2 28 03/12/2020    TSH 3.951 01/24/2019    HGBA1C 6.4 (H) 03/11/2020       Imaging reviewed today:  10/25/19 US abdomen  - No acute sonographic abnormality.  - Hepatic steatosis.    Endoscopy reviewed today:  Last Colonoscopy completed on 1/20/2015  - Internal hemorrhoids.   - Anal papilla(e) were hypertrophied.   - One 5 mm polyp in the sigmoid colon. Resected and retrieved.   - One 7 mm polyp in the proximal ascending colon. Resected and retrieved.   - Diverticulosis at the hepatic flexure and in the ascending colon.   - Diverticulosis in the sigmoid colon.   -Path: 1 TA    Anorectal Exam:    Anal Skin: Normal    Digital Rectal Exam:  Resting Tone normal  Squeeze normal  Relaxation with bear down present  Mass none  Rectocele  absent  Tenderness  absent    Anoscopy:  Verbal consent was obtained.   Clear plastic anoscope inserted.    Hemorrhoids  present  Stigmata of bleeding  absent  Stigmata of prolapsed  absent  Distal rectal mucosa normal    ASSESSMENT/PLAN:     Maria Elena was seen today for rectal bleeding.    Diagnoses and all orders for this visit:    Personal history of colonic polyps  -     Case request GI: COLONOSCOPY    Rectal bleed  -     Case request GI: COLONOSCOPY  -     Ambulatory referral/consult to Gastroenterology    Constipation, unspecified constipation type  -     polyethylene glycol (GLYCOLAX) 17 gram/dose powder; Mix 17 g  with juice or water an drink 2 (two) times daily.        The patient was instructed to:  Schedule colonoscopy  Miaralax BID  Increase water intake to at least 8-10 glasses of water per day.  Take a daily fiber supplement (Konsyl, Benefiber, Metamucil) and increase dietary intake  to 20-30 grams/day.  Avoid straining or spending >5min/event with bowel movements.  Follow-up in clinic prn.      KIRILL Brambila  Colon and Rectal Surgery

## 2020-07-24 ENCOUNTER — OFFICE VISIT (OUTPATIENT)
Dept: HEMATOLOGY/ONCOLOGY | Facility: CLINIC | Age: 65
End: 2020-07-24
Payer: COMMERCIAL

## 2020-07-24 ENCOUNTER — TELEPHONE (OUTPATIENT)
Dept: HEMATOLOGY/ONCOLOGY | Facility: CLINIC | Age: 65
End: 2020-07-24

## 2020-07-24 DIAGNOSIS — E03.9 ACQUIRED HYPOTHYROIDISM: ICD-10-CM

## 2020-07-24 DIAGNOSIS — C77.3 BREAST CANCER METASTASIZED TO AXILLARY LYMPH NODE, LEFT: Primary | ICD-10-CM

## 2020-07-24 DIAGNOSIS — C50.912 BREAST CANCER METASTASIZED TO AXILLARY LYMPH NODE, LEFT: Primary | ICD-10-CM

## 2020-07-24 DIAGNOSIS — I10 ESSENTIAL HYPERTENSION: ICD-10-CM

## 2020-07-24 DIAGNOSIS — E55.9 MILD VITAMIN D DEFICIENCY: ICD-10-CM

## 2020-07-24 DIAGNOSIS — G62.0 NEUROPATHY DUE TO CHEMOTHERAPEUTIC DRUG: ICD-10-CM

## 2020-07-24 DIAGNOSIS — T45.1X5A NEUROPATHY DUE TO CHEMOTHERAPEUTIC DRUG: ICD-10-CM

## 2020-07-24 PROBLEM — G62.9 NEUROPATHY: Status: RESOLVED | Noted: 2020-03-06 | Resolved: 2020-07-24

## 2020-07-24 PROCEDURE — 99214 OFFICE O/P EST MOD 30 MIN: CPT | Mod: 95,,, | Performed by: NURSE PRACTITIONER

## 2020-07-24 PROCEDURE — 99214 PR OFFICE/OUTPT VISIT, EST, LEVL IV, 30-39 MIN: ICD-10-PCS | Mod: 95,,, | Performed by: NURSE PRACTITIONER

## 2020-07-24 NOTE — PROGRESS NOTES
Subjective:       Patient ID: Maria Elena Tavares is a 64 y.o. female.    Chief Complaint:   Follow-up    HPI URGENT CARE VISIT Patient of Dr. Villagran's Breast cancer, just completed radiation on 7/13/2020.    She notes she has been extremely fatigued, progressively worse since completing radiation. She has been sleeping a lot. Also notes weakness.   She did have some bleeding in the stool, was seen by colorectal surgery and was diagnosed with hemorrhoids. She will also have a colonoscopy.   Denies lightheadedness, shortness of breath and chest pain.    Drinking well. Decreased appetite.        Review of Systems   Constitutional: Positive for appetite change (decreased) and fatigue. Negative for activity change, chills, diaphoresis, fever and unexpected weight change.        Still with taste changes   HENT: Negative for mouth sores, nosebleeds, sore throat and trouble swallowing.    Respiratory: Negative for cough and shortness of breath.    Cardiovascular: Negative for chest pain, palpitations and leg swelling.   Gastrointestinal: Positive for blood in stool (hemorrhoids ). Negative for abdominal distention, abdominal pain, constipation, diarrhea, nausea and vomiting.   Genitourinary: Negative for hematuria and vaginal bleeding.   Musculoskeletal: Negative for arthralgias, back pain and myalgias.   Skin: Negative for pallor and rash.   Allergic/Immunologic: Negative for immunocompromised state.   Neurological: Negative for dizziness, weakness, light-headedness, numbness and headaches.   Hematological: Negative for adenopathy. Does not bruise/bleed easily.   Psychiatric/Behavioral: Negative for confusion. The patient is not nervous/anxious.        Objective:      Physical Exam  Constitutional:       General: She is not in acute distress.     Appearance: She is obese.      Comments: Virtual Visit  Appears fatigued    Pulmonary:      Effort: Pulmonary effort is normal.   Neurological:      Mental Status: She is alert.    Psychiatric:         Mood and Affect: Mood normal.         Assessment:       1. Breast cancer metastasized to axillary lymph node, left    2. Essential hypertension    3. Neuropathy due to chemotherapeutic drug    4. Acquired hypothyroidism    5. Mild vitamin D deficiency    6. Uncontrolled type 2 diabetes mellitus without complication, without long-term current use of insulin        Plan:     1. Continue regularly scheduled follow up  2. Continue current medication and follow up with PCP   3. Continue medications prescribed and follow up with oncology   4. Continue current dose of synthroid and follow up with endocrinology   5. Continue replacement, check yearly  6. Monitored glucose today; continue current medications and follow up with endocrinology   7. Will have labs drawn today; if severely low ill need blood transfusion - did have rectal bleeding earlier this week - followed up with colorectal surgery    Medication List with Changes/Refills   Current Medications    ASPIRIN (ECOTRIN) 81 MG EC TABLET    Take 81 mg by mouth once daily.      BLOOD-GLUCOSE METER KIT    DISPENSE : BLOOD TEST STRIPS AND LANCETS PATIENT TEST BLOOD SUGARS TWICE DAILY  TEST STRIPS: 50 EACH, REFILL 5  LANCETS:  50 EACH , REFILL 5    CHLORTHALIDONE (HYGROTEN) 25 MG TAB    Take 1 tablet by mouth every morning    DULAGLUTIDE (TRULICITY) 1.5 MG/0.5 ML PEN INJECTOR    Inject 1.5 mg into the skin every 7 days.    ERGOCALCIFEROL (VITAMIN D2) 50,000 UNIT CAP    Take 1 capsule (50,000 Units total) by mouth every 7 days.    GABAPENTIN (NEURONTIN) 100 MG CAPSULE    Take 2 capsules (200 mg total) by mouth 3 (three) times daily.    HYDROCODONE-ACETAMINOPHEN (NORCO) 5-325 MG PER TABLET    Take 1 tablet by mouth every 8 (eight) hours as needed for Pain.    IRBESARTAN (AVAPRO) 300 MG TABLET    Take 1 tablet by mouth once daily    LETROZOLE (FEMARA) 2.5 MG TAB    Take 1 tablet (2.5 mg total) by mouth once daily.    LEVOTHYROXINE (SYNTHROID) 200 MCG  TABLET    Take 1 tablet (200 mcg total) by mouth once daily.    LIFITEGRAST (XIIDRA) 5 % DPET    Apply 1 drop to  both eyes  2 (two) times daily.    METFORMIN (GLUCOPHAGE) 1000 MG TABLET    TAKE 1 TABLET (1,000 MG TOTAL) BY MOUTH 2 (TWO) TIMES DAILY WITH MEALS.    ONDANSETRON (ZOFRAN-ODT) 4 MG TBDL    Dissolve 1 tablet (4 mg total) by mouth every 6 (six) hours as needed.    POLYETHYLENE GLYCOL (GLYCOLAX) 17 GRAM/DOSE POWDER    Mix 17 g (1 capful) with juice or water an drink 2 (two) times daily.    POLYETHYLENE GLYCOL (GOLYTELY,NULYTELY) 236-22.74-6.74 -5.86 GRAM SUSPENSION    Take 4,000 mLs (4 L total) by mouth once. for 1 dose       Return to clinic in 1 week with MD appointment and labs.     Patient is in agreement with the proposed treatment plan. All questions were answered to the patient's satisfaction. Patient knows to call clinic for any new or worsening symptoms and if anything is needed before the next clinic visit.          Elsa Reilly, FNP-C  Hematology & Medical Oncology   [address] [place] [address]  ph. [phone]  Fax. [phone]    Patient dicussed with collaborating physician, Dr. Villagran.

## 2020-07-24 NOTE — TELEPHONE ENCOUNTER
"Call to pt.   Pt stated fatigue/weakness started on Weds.  Denies any other concurrent symptoms.  Pt stated that her blood sugars have been normal.   Informed pt would send message to Dr. Villagran and f/u with recs.    Message fwd.         ----- Message from Tere Villagran MD sent at 7/24/2020 10:33 AM CDT -----  This is very atypical  Can you call patient for further info?  ----- Message -----  From: Radha Paige  Sent: 7/24/2020  10:20 AM CDT  To: Tere Villagran MD    Received call from Yissel stating she received a call from pt sister that pt was sleeping excessively and "weak."   Yissel asked if pt on any chemo at this time- to which informed she is on endocrine therapy (which can cause fatigue but should not cause excess fatigue to where cannot stay awake).   Informed Yissel would notify Dr. Villagran and advise.  Yissel verbalized understanding.         Think could be r/t to her DM? She is scheduled for a f/u appt on 7/30 with labs.        "

## 2020-07-27 ENCOUNTER — HOSPITAL ENCOUNTER (INPATIENT)
Facility: HOSPITAL | Age: 65
LOS: 2 days | Discharge: HOME | DRG: 908 | End: 2020-08-01
Attending: EMERGENCY MEDICINE | Admitting: INTERNAL MEDICINE
Payer: COMMERCIAL

## 2020-07-27 ENCOUNTER — INFUSION (OUTPATIENT)
Dept: INFUSION THERAPY | Facility: HOSPITAL | Age: 65
End: 2020-07-27
Attending: INTERNAL MEDICINE
Payer: COMMERCIAL

## 2020-07-27 DIAGNOSIS — R10.13 EPIGASTRIC PAIN: ICD-10-CM

## 2020-07-27 DIAGNOSIS — R07.9 CHEST PAIN: ICD-10-CM

## 2020-07-27 DIAGNOSIS — D63.0 ANEMIA IN NEOPLASTIC DISEASE: ICD-10-CM

## 2020-07-27 DIAGNOSIS — C77.3 BREAST CANCER METASTASIZED TO AXILLARY LYMPH NODE, LEFT: Primary | ICD-10-CM

## 2020-07-27 DIAGNOSIS — R10.9 ABDOMINAL PAIN: ICD-10-CM

## 2020-07-27 DIAGNOSIS — C50.912 BREAST CANCER METASTASIZED TO AXILLARY LYMPH NODE, LEFT: Primary | ICD-10-CM

## 2020-07-27 DIAGNOSIS — K52.9 ENTERITIS: ICD-10-CM

## 2020-07-27 DIAGNOSIS — N61.0 INFECTION OF LEFT BREAST: Primary | ICD-10-CM

## 2020-07-27 DIAGNOSIS — C50.919 MALIGNANT NEOPLASM OF FEMALE BREAST, UNSPECIFIED ESTROGEN RECEPTOR STATUS, UNSPECIFIED LATERALITY, UNSPECIFIED SITE OF BREAST: ICD-10-CM

## 2020-07-27 DIAGNOSIS — T85.9XXA COMPLICATION OF BREAST IMPLANT: ICD-10-CM

## 2020-07-27 LAB
ALBUMIN SERPL BCP-MCNC: 2.9 G/DL (ref 3.5–5.2)
ALBUMIN SERPL BCP-MCNC: 3 G/DL (ref 3.5–5.2)
ALP SERPL-CCNC: 82 U/L (ref 55–135)
ALP SERPL-CCNC: 94 U/L (ref 55–135)
ALT SERPL W/O P-5'-P-CCNC: 10 U/L (ref 10–44)
ALT SERPL W/O P-5'-P-CCNC: 12 U/L (ref 10–44)
ANION GAP SERPL CALC-SCNC: 10 MMOL/L (ref 8–16)
ANION GAP SERPL CALC-SCNC: 11 MMOL/L (ref 8–16)
AST SERPL-CCNC: 18 U/L (ref 10–40)
AST SERPL-CCNC: 20 U/L (ref 10–40)
BACTERIA #/AREA URNS AUTO: ABNORMAL /HPF
BASOPHILS # BLD AUTO: 0.03 K/UL (ref 0–0.2)
BASOPHILS NFR BLD: 0.2 % (ref 0–1.9)
BILIRUB SERPL-MCNC: 0.8 MG/DL (ref 0.1–1)
BILIRUB SERPL-MCNC: 0.8 MG/DL (ref 0.1–1)
BILIRUB UR QL STRIP: NEGATIVE
BUN SERPL-MCNC: 18 MG/DL (ref 8–23)
BUN SERPL-MCNC: 19 MG/DL (ref 8–23)
CALCIUM SERPL-MCNC: 9.5 MG/DL (ref 8.7–10.5)
CALCIUM SERPL-MCNC: 9.9 MG/DL (ref 8.7–10.5)
CHLORIDE SERPL-SCNC: 100 MMOL/L (ref 95–110)
CHLORIDE SERPL-SCNC: 98 MMOL/L (ref 95–110)
CLARITY UR REFRACT.AUTO: CLEAR
CO2 SERPL-SCNC: 27 MMOL/L (ref 23–29)
CO2 SERPL-SCNC: 28 MMOL/L (ref 23–29)
COLOR UR AUTO: YELLOW
CREAT SERPL-MCNC: 0.9 MG/DL (ref 0.5–1.4)
CREAT SERPL-MCNC: 0.9 MG/DL (ref 0.5–1.4)
DIFFERENTIAL METHOD: ABNORMAL
EOSINOPHIL # BLD AUTO: 0 K/UL (ref 0–0.5)
EOSINOPHIL NFR BLD: 0.1 % (ref 0–8)
ERYTHROCYTE [DISTWIDTH] IN BLOOD BY AUTOMATED COUNT: 15.3 % (ref 11.5–14.5)
ERYTHROCYTE [DISTWIDTH] IN BLOOD BY AUTOMATED COUNT: 15.4 % (ref 11.5–14.5)
EST. GFR  (AFRICAN AMERICAN): >60 ML/MIN/1.73 M^2
EST. GFR  (AFRICAN AMERICAN): >60 ML/MIN/1.73 M^2
EST. GFR  (NON AFRICAN AMERICAN): >60 ML/MIN/1.73 M^2
EST. GFR  (NON AFRICAN AMERICAN): >60 ML/MIN/1.73 M^2
GLUCOSE SERPL-MCNC: 185 MG/DL (ref 70–110)
GLUCOSE SERPL-MCNC: 195 MG/DL (ref 70–110)
GLUCOSE UR QL STRIP: NEGATIVE
HCT VFR BLD AUTO: 31.9 % (ref 37–48.5)
HCT VFR BLD AUTO: 32.3 % (ref 37–48.5)
HGB BLD-MCNC: 10.3 G/DL (ref 12–16)
HGB BLD-MCNC: 10.5 G/DL (ref 12–16)
HGB UR QL STRIP: ABNORMAL
HYALINE CASTS UR QL AUTO: 3 /LPF
IMM GRANULOCYTES # BLD AUTO: 0.19 K/UL (ref 0–0.04)
IMM GRANULOCYTES # BLD AUTO: 0.28 K/UL (ref 0–0.04)
IMM GRANULOCYTES NFR BLD AUTO: 1.5 % (ref 0–0.5)
KETONES UR QL STRIP: NEGATIVE
LEUKOCYTE ESTERASE UR QL STRIP: ABNORMAL
LIPASE SERPL-CCNC: 4 U/L (ref 4–60)
LYMPHOCYTES # BLD AUTO: 0.5 K/UL (ref 1–4.8)
LYMPHOCYTES NFR BLD: 3.5 % (ref 18–48)
MCH RBC QN AUTO: 28.9 PG (ref 27–31)
MCH RBC QN AUTO: 29.1 PG (ref 27–31)
MCHC RBC AUTO-ENTMCNC: 32.3 G/DL (ref 32–36)
MCHC RBC AUTO-ENTMCNC: 32.5 G/DL (ref 32–36)
MCV RBC AUTO: 89 FL (ref 82–98)
MCV RBC AUTO: 90 FL (ref 82–98)
MICROSCOPIC COMMENT: ABNORMAL
MONOCYTES # BLD AUTO: 1.4 K/UL (ref 0.3–1)
MONOCYTES NFR BLD: 10.7 % (ref 4–15)
NEUTROPHILS # BLD AUTO: 10.1 K/UL (ref 1.8–7.7)
NEUTROPHILS # BLD AUTO: 10.9 K/UL (ref 1.8–7.7)
NEUTROPHILS NFR BLD: 84 % (ref 38–73)
NITRITE UR QL STRIP: NEGATIVE
NRBC BLD-RTO: 0 /100 WBC
PH UR STRIP: 5 [PH] (ref 5–8)
PLATELET # BLD AUTO: 347 K/UL (ref 150–350)
PLATELET # BLD AUTO: 363 K/UL (ref 150–350)
PMV BLD AUTO: 10 FL (ref 9.2–12.9)
PMV BLD AUTO: 10.3 FL (ref 9.2–12.9)
POTASSIUM SERPL-SCNC: 3.1 MMOL/L (ref 3.5–5.1)
POTASSIUM SERPL-SCNC: 3.1 MMOL/L (ref 3.5–5.1)
PROT SERPL-MCNC: 7.8 G/DL (ref 6–8.4)
PROT SERPL-MCNC: 7.8 G/DL (ref 6–8.4)
PROT UR QL STRIP: NEGATIVE
RBC # BLD AUTO: 3.54 M/UL (ref 4–5.4)
RBC # BLD AUTO: 3.63 M/UL (ref 4–5.4)
RBC #/AREA URNS AUTO: 3 /HPF (ref 0–4)
SARS-COV-2 RDRP RESP QL NAA+PROBE: NEGATIVE
SODIUM SERPL-SCNC: 136 MMOL/L (ref 136–145)
SODIUM SERPL-SCNC: 138 MMOL/L (ref 136–145)
SP GR UR STRIP: 1.02 (ref 1–1.03)
SQUAMOUS #/AREA URNS AUTO: 1 /HPF
TROPONIN I SERPL DL<=0.01 NG/ML-MCNC: <0.006 NG/ML (ref 0–0.03)
URN SPEC COLLECT METH UR: ABNORMAL
WBC # BLD AUTO: 12.18 K/UL (ref 3.9–12.7)
WBC # BLD AUTO: 13 K/UL (ref 3.9–12.7)
WBC #/AREA URNS AUTO: 6 /HPF (ref 0–5)

## 2020-07-27 PROCEDURE — 96375 TX/PRO/DX INJ NEW DRUG ADDON: CPT | Performed by: EMERGENCY MEDICINE

## 2020-07-27 PROCEDURE — 25000003 PHARM REV CODE 250: Performed by: INTERNAL MEDICINE

## 2020-07-27 PROCEDURE — 25500020 PHARM REV CODE 255: Performed by: EMERGENCY MEDICINE

## 2020-07-27 PROCEDURE — 96376 TX/PRO/DX INJ SAME DRUG ADON: CPT | Performed by: EMERGENCY MEDICINE

## 2020-07-27 PROCEDURE — 96374 THER/PROPH/DIAG INJ IV PUSH: CPT

## 2020-07-27 PROCEDURE — 63600175 PHARM REV CODE 636 W HCPCS: Performed by: INTERNAL MEDICINE

## 2020-07-27 PROCEDURE — 93010 ELECTROCARDIOGRAM REPORT: CPT | Mod: ,,, | Performed by: INTERNAL MEDICINE

## 2020-07-27 PROCEDURE — 99220 PR INITIAL OBSERVATION CARE,LEVL III: ICD-10-PCS | Mod: ,,, | Performed by: PHYSICIAN ASSISTANT

## 2020-07-27 PROCEDURE — 63600175 PHARM REV CODE 636 W HCPCS: Performed by: PHYSICIAN ASSISTANT

## 2020-07-27 PROCEDURE — 85027 COMPLETE CBC AUTOMATED: CPT

## 2020-07-27 PROCEDURE — 81001 URINALYSIS AUTO W/SCOPE: CPT

## 2020-07-27 PROCEDURE — 96361 HYDRATE IV INFUSION ADD-ON: CPT | Performed by: EMERGENCY MEDICINE

## 2020-07-27 PROCEDURE — 99285 PR EMERGENCY DEPT VISIT,LEVEL V: ICD-10-PCS | Mod: ,,, | Performed by: EMERGENCY MEDICINE

## 2020-07-27 PROCEDURE — 80053 COMPREHEN METABOLIC PANEL: CPT | Mod: 91

## 2020-07-27 PROCEDURE — 83605 ASSAY OF LACTIC ACID: CPT

## 2020-07-27 PROCEDURE — 84484 ASSAY OF TROPONIN QUANT: CPT

## 2020-07-27 PROCEDURE — A4216 STERILE WATER/SALINE, 10 ML: HCPCS | Performed by: INTERNAL MEDICINE

## 2020-07-27 PROCEDURE — 93005 ELECTROCARDIOGRAM TRACING: CPT

## 2020-07-27 PROCEDURE — 99220 PR INITIAL OBSERVATION CARE,LEVL III: CPT | Mod: ,,, | Performed by: PHYSICIAN ASSISTANT

## 2020-07-27 PROCEDURE — G0378 HOSPITAL OBSERVATION PER HR: HCPCS

## 2020-07-27 PROCEDURE — 63600175 PHARM REV CODE 636 W HCPCS: Performed by: EMERGENCY MEDICINE

## 2020-07-27 PROCEDURE — 80053 COMPREHEN METABOLIC PANEL: CPT

## 2020-07-27 PROCEDURE — 85025 COMPLETE CBC W/AUTO DIFF WBC: CPT

## 2020-07-27 PROCEDURE — 36591 DRAW BLOOD OFF VENOUS DEVICE: CPT

## 2020-07-27 PROCEDURE — 25000003 PHARM REV CODE 250: Performed by: EMERGENCY MEDICINE

## 2020-07-27 PROCEDURE — 99285 EMERGENCY DEPT VISIT HI MDM: CPT | Mod: ,,, | Performed by: EMERGENCY MEDICINE

## 2020-07-27 PROCEDURE — S0028 INJECTION, FAMOTIDINE, 20 MG: HCPCS | Performed by: EMERGENCY MEDICINE

## 2020-07-27 PROCEDURE — 93010 EKG 12-LEAD: ICD-10-PCS | Mod: ,,, | Performed by: INTERNAL MEDICINE

## 2020-07-27 PROCEDURE — 87040 BLOOD CULTURE FOR BACTERIA: CPT | Mod: 59

## 2020-07-27 PROCEDURE — 96375 TX/PRO/DX INJ NEW DRUG ADDON: CPT

## 2020-07-27 PROCEDURE — 63600175 PHARM REV CODE 636 W HCPCS: Performed by: STUDENT IN AN ORGANIZED HEALTH CARE EDUCATION/TRAINING PROGRAM

## 2020-07-27 PROCEDURE — U0002 COVID-19 LAB TEST NON-CDC: HCPCS

## 2020-07-27 PROCEDURE — 99285 EMERGENCY DEPT VISIT HI MDM: CPT | Mod: 25

## 2020-07-27 PROCEDURE — 83690 ASSAY OF LIPASE: CPT

## 2020-07-27 RX ORDER — HEPARIN 100 UNIT/ML
500 SYRINGE INTRAVENOUS
Status: COMPLETED | OUTPATIENT
Start: 2020-07-27 | End: 2020-07-27

## 2020-07-27 RX ORDER — SODIUM CHLORIDE 0.9 % (FLUSH) 0.9 %
10 SYRINGE (ML) INJECTION
Status: COMPLETED | OUTPATIENT
Start: 2020-07-27 | End: 2020-07-27

## 2020-07-27 RX ORDER — CIPROFLOXACIN 2 MG/ML
400 INJECTION, SOLUTION INTRAVENOUS
Status: DISCONTINUED | OUTPATIENT
Start: 2020-07-27 | End: 2020-07-27

## 2020-07-27 RX ORDER — ASPIRIN 81 MG/1
81 TABLET ORAL DAILY
Status: DISCONTINUED | OUTPATIENT
Start: 2020-07-28 | End: 2020-08-01 | Stop reason: HOSPADM

## 2020-07-27 RX ORDER — GLUCAGON 1 MG
1 KIT INJECTION
Status: DISCONTINUED | OUTPATIENT
Start: 2020-07-27 | End: 2020-08-01 | Stop reason: HOSPADM

## 2020-07-27 RX ORDER — OXYCODONE HYDROCHLORIDE 10 MG/1
10 TABLET ORAL EVERY 6 HOURS PRN
Status: DISCONTINUED | OUTPATIENT
Start: 2020-07-27 | End: 2020-07-27

## 2020-07-27 RX ORDER — SODIUM CHLORIDE 0.9 % (FLUSH) 0.9 %
10 SYRINGE (ML) INJECTION
Status: CANCELLED | OUTPATIENT
Start: 2020-07-27

## 2020-07-27 RX ORDER — GABAPENTIN 100 MG/1
200 CAPSULE ORAL 3 TIMES DAILY
Status: DISCONTINUED | OUTPATIENT
Start: 2020-07-28 | End: 2020-08-01 | Stop reason: HOSPADM

## 2020-07-27 RX ORDER — OXYCODONE HYDROCHLORIDE 5 MG/1
5 TABLET ORAL EVERY 4 HOURS PRN
Status: DISCONTINUED | OUTPATIENT
Start: 2020-07-27 | End: 2020-08-01 | Stop reason: HOSPADM

## 2020-07-27 RX ORDER — ONDANSETRON 8 MG/1
8 TABLET, ORALLY DISINTEGRATING ORAL EVERY 8 HOURS PRN
Status: DISCONTINUED | OUTPATIENT
Start: 2020-07-27 | End: 2020-08-01 | Stop reason: HOSPADM

## 2020-07-27 RX ORDER — FENTANYL CITRATE 50 UG/ML
50 INJECTION, SOLUTION INTRAMUSCULAR; INTRAVENOUS
Status: COMPLETED | OUTPATIENT
Start: 2020-07-27 | End: 2020-07-27

## 2020-07-27 RX ORDER — IRBESARTAN 300 MG/1
300 TABLET ORAL DAILY
Status: DISCONTINUED | OUTPATIENT
Start: 2020-07-28 | End: 2020-08-01 | Stop reason: HOSPADM

## 2020-07-27 RX ORDER — LETROZOLE 2.5 MG/1
2.5 TABLET, FILM COATED ORAL DAILY
Status: DISCONTINUED | OUTPATIENT
Start: 2020-07-28 | End: 2020-08-01 | Stop reason: HOSPADM

## 2020-07-27 RX ORDER — IBUPROFEN 200 MG
16 TABLET ORAL
Status: DISCONTINUED | OUTPATIENT
Start: 2020-07-27 | End: 2020-08-01 | Stop reason: HOSPADM

## 2020-07-27 RX ORDER — OXYCODONE HYDROCHLORIDE 10 MG/1
10 TABLET ORAL EVERY 4 HOURS PRN
Status: DISCONTINUED | OUTPATIENT
Start: 2020-07-27 | End: 2020-08-01 | Stop reason: HOSPADM

## 2020-07-27 RX ORDER — MORPHINE SULFATE 4 MG/ML
4 INJECTION, SOLUTION INTRAMUSCULAR; INTRAVENOUS
Status: COMPLETED | OUTPATIENT
Start: 2020-07-27 | End: 2020-07-27

## 2020-07-27 RX ORDER — POTASSIUM CHLORIDE 7.45 MG/ML
10 INJECTION INTRAVENOUS
Status: COMPLETED | OUTPATIENT
Start: 2020-07-28 | End: 2020-07-28

## 2020-07-27 RX ORDER — IPRATROPIUM BROMIDE AND ALBUTEROL SULFATE 2.5; .5 MG/3ML; MG/3ML
3 SOLUTION RESPIRATORY (INHALATION) EVERY 4 HOURS PRN
Status: DISCONTINUED | OUTPATIENT
Start: 2020-07-27 | End: 2020-08-01 | Stop reason: HOSPADM

## 2020-07-27 RX ORDER — LEVOTHYROXINE SODIUM 100 UG/1
200 TABLET ORAL DAILY
Status: DISCONTINUED | OUTPATIENT
Start: 2020-07-28 | End: 2020-08-01 | Stop reason: HOSPADM

## 2020-07-27 RX ORDER — METRONIDAZOLE 500 MG/100ML
500 INJECTION, SOLUTION INTRAVENOUS
Status: DISCONTINUED | OUTPATIENT
Start: 2020-07-27 | End: 2020-07-27

## 2020-07-27 RX ORDER — NALOXONE HCL 0.4 MG/ML
0.4 VIAL (ML) INJECTION
Status: DISCONTINUED | OUTPATIENT
Start: 2020-07-27 | End: 2020-08-01 | Stop reason: HOSPADM

## 2020-07-27 RX ORDER — INSULIN ASPART 100 [IU]/ML
0-5 INJECTION, SOLUTION INTRAVENOUS; SUBCUTANEOUS
Status: DISCONTINUED | OUTPATIENT
Start: 2020-07-27 | End: 2020-08-01 | Stop reason: HOSPADM

## 2020-07-27 RX ORDER — POLYETHYLENE GLYCOL 3350 17 G/17G
17 POWDER, FOR SOLUTION ORAL DAILY
Status: DISCONTINUED | OUTPATIENT
Start: 2020-07-28 | End: 2020-07-28

## 2020-07-27 RX ORDER — TALC
6 POWDER (GRAM) TOPICAL NIGHTLY PRN
Status: DISCONTINUED | OUTPATIENT
Start: 2020-07-27 | End: 2020-08-01 | Stop reason: HOSPADM

## 2020-07-27 RX ORDER — MORPHINE SULFATE 2 MG/ML
4 INJECTION, SOLUTION INTRAMUSCULAR; INTRAVENOUS EVERY 4 HOURS PRN
Status: DISCONTINUED | OUTPATIENT
Start: 2020-07-27 | End: 2020-08-01 | Stop reason: HOSPADM

## 2020-07-27 RX ORDER — SODIUM CHLORIDE 0.9 % (FLUSH) 0.9 %
5 SYRINGE (ML) INJECTION
Status: DISCONTINUED | OUTPATIENT
Start: 2020-07-27 | End: 2020-08-01 | Stop reason: HOSPADM

## 2020-07-27 RX ORDER — HEPARIN 100 UNIT/ML
500 SYRINGE INTRAVENOUS
Status: CANCELLED | OUTPATIENT
Start: 2020-07-27

## 2020-07-27 RX ORDER — SODIUM CHLORIDE 0.9 % (FLUSH) 0.9 %
10 SYRINGE (ML) INJECTION
Status: DISCONTINUED | OUTPATIENT
Start: 2020-07-27 | End: 2020-08-01 | Stop reason: HOSPADM

## 2020-07-27 RX ORDER — SODIUM CHLORIDE, SODIUM LACTATE, POTASSIUM CHLORIDE, CALCIUM CHLORIDE 600; 310; 30; 20 MG/100ML; MG/100ML; MG/100ML; MG/100ML
INJECTION, SOLUTION INTRAVENOUS CONTINUOUS
Status: DISCONTINUED | OUTPATIENT
Start: 2020-07-27 | End: 2020-07-29

## 2020-07-27 RX ORDER — OXYCODONE HYDROCHLORIDE 5 MG/1
5 TABLET ORAL EVERY 6 HOURS PRN
Status: DISCONTINUED | OUTPATIENT
Start: 2020-07-27 | End: 2020-07-27

## 2020-07-27 RX ORDER — FAMOTIDINE 20 MG/1
20 TABLET, FILM COATED ORAL 2 TIMES DAILY
Status: DISCONTINUED | OUTPATIENT
Start: 2020-07-28 | End: 2020-08-01 | Stop reason: HOSPADM

## 2020-07-27 RX ORDER — CEFTRIAXONE 1 G/1
1 INJECTION, POWDER, FOR SOLUTION INTRAMUSCULAR; INTRAVENOUS
Status: DISCONTINUED | OUTPATIENT
Start: 2020-07-27 | End: 2020-07-27

## 2020-07-27 RX ORDER — ACETAMINOPHEN 325 MG/1
650 TABLET ORAL EVERY 4 HOURS PRN
Status: DISCONTINUED | OUTPATIENT
Start: 2020-07-27 | End: 2020-08-01 | Stop reason: HOSPADM

## 2020-07-27 RX ORDER — IBUPROFEN 200 MG
24 TABLET ORAL
Status: DISCONTINUED | OUTPATIENT
Start: 2020-07-27 | End: 2020-08-01 | Stop reason: HOSPADM

## 2020-07-27 RX ORDER — FAMOTIDINE 10 MG/ML
20 INJECTION INTRAVENOUS
Status: COMPLETED | OUTPATIENT
Start: 2020-07-27 | End: 2020-07-27

## 2020-07-27 RX ADMIN — MORPHINE SULFATE 4 MG: 4 INJECTION INTRAVENOUS at 11:07

## 2020-07-27 RX ADMIN — IOHEXOL 75 ML: 350 INJECTION, SOLUTION INTRAVENOUS at 08:07

## 2020-07-27 RX ADMIN — SODIUM CHLORIDE, SODIUM LACTATE, POTASSIUM CHLORIDE, AND CALCIUM CHLORIDE 1000 ML: .6; .31; .03; .02 INJECTION, SOLUTION INTRAVENOUS at 06:07

## 2020-07-27 RX ADMIN — MORPHINE SULFATE 4 MG: 4 INJECTION INTRAVENOUS at 06:07

## 2020-07-27 RX ADMIN — Medication 10 ML: at 12:07

## 2020-07-27 RX ADMIN — FAMOTIDINE 20 MG: 10 INJECTION INTRAVENOUS at 04:07

## 2020-07-27 RX ADMIN — FENTANYL CITRATE 50 MCG: 50 INJECTION INTRAMUSCULAR; INTRAVENOUS at 03:07

## 2020-07-27 RX ADMIN — HEPARIN 500 UNITS: 100 SYRINGE at 12:07

## 2020-07-27 RX ADMIN — SODIUM CHLORIDE, SODIUM LACTATE, POTASSIUM CHLORIDE, AND CALCIUM CHLORIDE: 600; 310; 30; 20 INJECTION, SOLUTION INTRAVENOUS at 11:07

## 2020-07-27 NOTE — ED TRIAGE NOTES
Pt has been having severe abdominal pain since Friday with vomiting, thinks it might be acid reflux. Pt has a history of breast cancer

## 2020-07-27 NOTE — ED PROVIDER NOTES
Encounter Date: 2020       History     Chief Complaint   Patient presents with    Abdominal Pain     vomiting , last radiation , breast cancer       64 year old female with history significant of breast cancer presented with acute onset of abdominal pain started since Friday. Sharp pain at the epigastrium with no radiation of pain. Associated with nausea, reduced appetite, fatigue, and has not eaten anything since Friday. She did not have any bowel movement since Friday and currently feels mildly bloated.  She ate green beans yesterday, but threw up immediately. She had an episode of bloody bowel movement a week ago. She has been constipating for the past 5-6 months. Denies fever, sob, fatigue, chest pain, palpitation, or changes in bladder habits. She has been treated for her breast cancer. Per patient, her last chemo was on this Feb and her last radiation treatment was on 2020.         Review of patient's allergies indicates:  No Known Allergies  Past Medical History:   Diagnosis Date    BMI 37.0-37.9, adult     Breast cancer 2019     Left breast, IDC with lymph node metastisis    Colon polyps     Diabetes mellitus type II     Diverticulosis     history of diverticulosisseen on colonoscopy at age 48. Repeat recommended at age 58. Done by     Elevated blood protein     History of elevated protein. Apparently has seen  for extensive workup including bone marrow biopsy    History of shingles 2006    Hyperlipidemia     Hypertension     Microalbuminuria     due 2 diabetes    Mild vitamin D deficiency     . A low vitamin D    Thyroid disease     hypothyroidism     Past Surgical History:   Procedure Laterality Date    BREAST BIOPSY Left 2019    IDC with mets to node    BREAST BIOPSY Right 2019    core bx, benign node    BREAST BIOPSY Left 10/14/2019    MRI Core bx, + IDC    BREAST BIOPSY Left 10/08/2019    Core bx, ADH     SECTION       HYSTERECTOMY      INSERTION OF BREAST TISSUE EXPANDER Bilateral 3/24/2020    Procedure: INSERTION, TISSUE EXPANDER, BREAST BILATERAL;  Surgeon: Michael Solorzano MD;  Location: [facility] OR [address] [place] [address];  Service: Plastics;  Laterality: Bilateral;    INSERTION OF TUNNELED CENTRAL VENOUS CATHETER (CVC) WITH SUBCUTANEOUS PORT Right 10/4/2019    Procedure: [id number]-L-CATH RIGHT (CONSENT AM OF) 1.0 hr case;  Surgeon: Elena Lopez MD;  [facility];  Service: General;  Laterality: Right;    OOPHORECTOMY      SENTINEL LYMPH NODE BIOPSY Left 3/24/2020    Procedure: BIOPSY, LYMPH NODE, SENTINEL LEFT;  Surgeon: Elena Lopez MD;  Location: [facility] OR [address] [place] [address];  Service: General;  Laterality: Left;    SIMPLE MASTECTOMY Bilateral 3/24/2020    Procedure: MASTECTOMY, SIMPLE BILATERAL;  Surgeon: Elena Lopez MD;  Location: [facility] OR [address] [place] [address];  Service: General;  Laterality: Bilateral;     Family History   Problem Relation Age of Onset    Cancer Mother         lung ca heavy smoker    Lung cancer Mother     Cancer Father     Lung cancer Father     Hypertension Sister     No Known Problems Sister     No Known Problems Daughter     Hypothyroidism Sister     Diabetes Maternal Aunt     Cancer Maternal Aunt     No Known Problems Maternal Grandmother     Breast cancer Paternal Aunt     No Known Problems Paternal Uncle     Lung cancer Maternal Grandfather     No Known Problems Paternal Grandmother     No Known Problems Paternal Grandfather     Amblyopia Neg Hx     Blindness Neg Hx     Cataracts Neg Hx     Glaucoma Neg Hx     Macular degeneration Neg Hx     Retinal detachment Neg Hx     Strabismus Neg Hx     Stroke Neg Hx     Thyroid disease Neg Hx     Ovarian cancer Neg Hx     Colon cancer Neg Hx      Social History     Tobacco Use    Smoking status: Never Smoker    Smokeless tobacco: Never Used    Tobacco comment: 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   Substance Use Topics    Alcohol use: Not Currently     Comment: Occasionally    Drug use: No     Review of Systems   Constitutional: Positive for fatigue. Negative for chills.   HENT: Negative.    Eyes: Negative.    Respiratory: Negative for cough, choking and shortness of breath.    Cardiovascular: Negative for chest pain and palpitations.   Gastrointestinal: Positive for abdominal distention, abdominal pain, blood in stool, constipation, nausea and vomiting.   Endocrine: Negative for polyphagia and polyuria.   Genitourinary: Negative for dysuria.   Neurological: Negative for light-headedness and headaches.       Physical Exam     Initial Vitals [07/27/20 1301]   BP Pulse Resp Temp SpO2   121/75 100 18 99 °F (37.2 °C) 99 %      MAP       --         Physical Exam    Nursing note and vitals reviewed.  Constitutional: She appears well-developed and well-nourished.   HENT:   Head: Normocephalic and atraumatic.   Eyes: Conjunctivae and EOM are normal. Pupils are equal, round, and reactive to light.   Neck: Normal range of motion. Neck supple.   Cardiovascular: Normal rate, regular rhythm, normal heart sounds and intact distal pulses.   Pulmonary/Chest: Breath sounds normal.   Abdominal: She exhibits distension. There is guarding.   Tender to palpation at the epigastrium and periumbilical region. Voluntary guarding.    Musculoskeletal: Normal range of motion.   Neurological: She is alert and oriented to person, place, and time.   Skin: Skin is warm and dry.   Psychiatric: She has a normal mood and affect. Her behavior is normal. Thought content normal.         ED Course   Procedures  Labs Reviewed   CBC W/ AUTO DIFFERENTIAL   COMPREHENSIVE METABOLIC PANEL   LIPASE   URINALYSIS, REFLEX TO URINE CULTURE     EKG Readings: (Independently Interpreted)   Normal sinus rhythm at 90 beats per minute no ST elevation or abnormal T-wave inversion.       Imaging Results    None       X-Rays:   Independently  Interpreted Readings:   Other Readings:  X ray did not show any obvious distension of the bowel or stomach. Non contributory.     Medical Decision Making:   Initial Assessment:   64 year old female with recent hx of radiation presented with abdominal pain, inability eat, no bowel movement. She had an episode of bloody diarrhea a week. DDX include:   1. Bowel obstruction  2. Acute cholecystitis or biliary obstruction  3. Gastritis secondary to radiation.   4. Pneumonia  5. MI      We will do a X ray of the abdomen to initially assess for obstruction. Will do X ray of the lung to rule out pneumonia. EKG and troponin to rule out cardiac injury. CMP to rule our metabolic abnormality. Ultrasound to rule out biliary etiology.   Clinical Tests:   Lab Tests: Ordered and Reviewed  Radiological Study: Ordered and Reviewed  Medical Tests: Ordered and Reviewed       APC / Resident Notes:   8:00 pm Patient abdominal X ray did not reveal any obvious obstruction. CXR was negative for pneumonia. EKG and troponin was WNL. CMP was non contributory. US did not identify biliary etiology. Discussed with heme-onc about her situation, but they endorsed that her complaints are not related to her recent radiation. Patient is still appearing sick. She has pain in the periumbilical region. Will proceed with CT scan of the abdomen with contrast.               ED Course as of Jul 27 1541   Mon Jul 27, 2020   1537 Troponin I [AN]      ED Course User Index  [AN] Rosanne Avendano MD                Clinical Impression:       ICD-10-CM ICD-9-CM   1. Epigastric pain  R10.13 789.06   2. Abdominal pain  R10.9 789.00   3. Abdominal pain  R10.9 789.00                                Rosanne Avendano MD  Resident  07/27/20 5635

## 2020-07-27 NOTE — ED NOTES
LOC: The patient is awake, alert and aware of environment with an appropriate affect, the patient is oriented x 3 and speaking appropriately.  APPEARANCE: patient's clothing is properly fastened.  SKIN: The skin is warm and dry, color consistent with ethnicity  MUSCULOSKELETAL: Patient moving all extremities spontaneously, no obvious swelling or deformities noted.  RESPIRATORY: Airway is open and patent, respirations are spontaneous, patient has a normal effort and rate, no accessory muscle use noted.

## 2020-07-28 PROBLEM — E87.6 HYPOKALEMIA: Status: ACTIVE | Noted: 2020-07-28

## 2020-07-28 LAB
ANION GAP SERPL CALC-SCNC: 9 MMOL/L (ref 8–16)
BASOPHILS # BLD AUTO: 0.03 K/UL (ref 0–0.2)
BASOPHILS NFR BLD: 0.3 % (ref 0–1.9)
BUN SERPL-MCNC: 16 MG/DL (ref 8–23)
C DIFF GDH STL QL: NEGATIVE
C DIFF TOX A+B STL QL IA: NEGATIVE
CALCIUM SERPL-MCNC: 9.2 MG/DL (ref 8.7–10.5)
CHLORIDE SERPL-SCNC: 101 MMOL/L (ref 95–110)
CO2 SERPL-SCNC: 27 MMOL/L (ref 23–29)
CREAT SERPL-MCNC: 0.8 MG/DL (ref 0.5–1.4)
DIFFERENTIAL METHOD: ABNORMAL
EOSINOPHIL # BLD AUTO: 0 K/UL (ref 0–0.5)
EOSINOPHIL NFR BLD: 0.4 % (ref 0–8)
ERYTHROCYTE [DISTWIDTH] IN BLOOD BY AUTOMATED COUNT: 15.4 % (ref 11.5–14.5)
EST. GFR  (AFRICAN AMERICAN): >60 ML/MIN/1.73 M^2
EST. GFR  (NON AFRICAN AMERICAN): >60 ML/MIN/1.73 M^2
ESTIMATED AVG GLUCOSE: 163 MG/DL (ref 68–131)
GLUCOSE SERPL-MCNC: 144 MG/DL (ref 70–110)
HBA1C MFR BLD HPLC: 7.3 % (ref 4–5.6)
HCT VFR BLD AUTO: 29.9 % (ref 37–48.5)
HGB BLD-MCNC: 9.7 G/DL (ref 12–16)
IMM GRANULOCYTES # BLD AUTO: 0.18 K/UL (ref 0–0.04)
IMM GRANULOCYTES NFR BLD AUTO: 1.7 % (ref 0–0.5)
LACTATE SERPL-SCNC: 0.9 MMOL/L (ref 0.5–2.2)
LYMPHOCYTES # BLD AUTO: 0.6 K/UL (ref 1–4.8)
LYMPHOCYTES NFR BLD: 6.1 % (ref 18–48)
MAGNESIUM SERPL-MCNC: 1.9 MG/DL (ref 1.6–2.6)
MCH RBC QN AUTO: 28.4 PG (ref 27–31)
MCHC RBC AUTO-ENTMCNC: 32.4 G/DL (ref 32–36)
MCV RBC AUTO: 88 FL (ref 82–98)
MONOCYTES # BLD AUTO: 1.7 K/UL (ref 0.3–1)
MONOCYTES NFR BLD: 16.4 % (ref 4–15)
NEUTROPHILS # BLD AUTO: 7.9 K/UL (ref 1.8–7.7)
NEUTROPHILS NFR BLD: 75.1 % (ref 38–73)
NRBC BLD-RTO: 0 /100 WBC
PHOSPHATE SERPL-MCNC: 3.1 MG/DL (ref 2.7–4.5)
PLATELET # BLD AUTO: 337 K/UL (ref 150–350)
PMV BLD AUTO: 10.2 FL (ref 9.2–12.9)
POCT GLUCOSE: 107 MG/DL (ref 70–110)
POCT GLUCOSE: 137 MG/DL (ref 70–110)
POCT GLUCOSE: 156 MG/DL (ref 70–110)
POCT GLUCOSE: 178 MG/DL (ref 70–110)
POTASSIUM SERPL-SCNC: 3.5 MMOL/L (ref 3.5–5.1)
RBC # BLD AUTO: 3.41 M/UL (ref 4–5.4)
SODIUM SERPL-SCNC: 137 MMOL/L (ref 136–145)
WBC # BLD AUTO: 10.56 K/UL (ref 3.9–12.7)

## 2020-07-28 PROCEDURE — 96376 TX/PRO/DX INJ SAME DRUG ADON: CPT | Performed by: EMERGENCY MEDICINE

## 2020-07-28 PROCEDURE — 87427 SHIGA-LIKE TOXIN AG IA: CPT

## 2020-07-28 PROCEDURE — 87324 CLOSTRIDIUM AG IA: CPT

## 2020-07-28 PROCEDURE — 99226 PR SUBSEQUENT OBSERVATION CARE,LEVEL III: ICD-10-PCS | Mod: ,,, | Performed by: PHYSICIAN ASSISTANT

## 2020-07-28 PROCEDURE — 87045 FECES CULTURE AEROBIC BACT: CPT

## 2020-07-28 PROCEDURE — 87209 SMEAR COMPLEX STAIN: CPT

## 2020-07-28 PROCEDURE — 82962 GLUCOSE BLOOD TEST: CPT

## 2020-07-28 PROCEDURE — 96375 TX/PRO/DX INJ NEW DRUG ADDON: CPT | Performed by: EMERGENCY MEDICINE

## 2020-07-28 PROCEDURE — 89055 LEUKOCYTE ASSESSMENT FECAL: CPT

## 2020-07-28 PROCEDURE — 82272 OCCULT BLD FECES 1-3 TESTS: CPT

## 2020-07-28 PROCEDURE — 83036 HEMOGLOBIN GLYCOSYLATED A1C: CPT

## 2020-07-28 PROCEDURE — 36415 COLL VENOUS BLD VENIPUNCTURE: CPT

## 2020-07-28 PROCEDURE — G0378 HOSPITAL OBSERVATION PER HR: HCPCS

## 2020-07-28 PROCEDURE — 87329 GIARDIA AG IA: CPT

## 2020-07-28 PROCEDURE — 83735 ASSAY OF MAGNESIUM: CPT

## 2020-07-28 PROCEDURE — 99226 PR SUBSEQUENT OBSERVATION CARE,LEVEL III: CPT | Mod: ,,, | Performed by: PHYSICIAN ASSISTANT

## 2020-07-28 PROCEDURE — 84100 ASSAY OF PHOSPHORUS: CPT

## 2020-07-28 PROCEDURE — 63600175 PHARM REV CODE 636 W HCPCS: Performed by: PHYSICIAN ASSISTANT

## 2020-07-28 PROCEDURE — 25000003 PHARM REV CODE 250: Performed by: PHYSICIAN ASSISTANT

## 2020-07-28 PROCEDURE — 87425 ROTAVIRUS AG IA: CPT

## 2020-07-28 PROCEDURE — 87338 HPYLORI STOOL AG IA: CPT

## 2020-07-28 PROCEDURE — 87449 NOS EACH ORGANISM AG IA: CPT

## 2020-07-28 PROCEDURE — 96361 HYDRATE IV INFUSION ADD-ON: CPT | Performed by: EMERGENCY MEDICINE

## 2020-07-28 PROCEDURE — 87046 STOOL CULTR AEROBIC BACT EA: CPT | Mod: 59

## 2020-07-28 PROCEDURE — 80048 BASIC METABOLIC PNL TOTAL CA: CPT

## 2020-07-28 PROCEDURE — 85025 COMPLETE CBC W/AUTO DIFF WBC: CPT

## 2020-07-28 RX ORDER — POLYETHYLENE GLYCOL 3350 17 G/17G
17 POWDER, FOR SOLUTION ORAL 2 TIMES DAILY
Status: DISCONTINUED | OUTPATIENT
Start: 2020-07-28 | End: 2020-08-01 | Stop reason: HOSPADM

## 2020-07-28 RX ADMIN — SODIUM CHLORIDE, SODIUM LACTATE, POTASSIUM CHLORIDE, AND CALCIUM CHLORIDE: 600; 310; 30; 20 INJECTION, SOLUTION INTRAVENOUS at 04:07

## 2020-07-28 RX ADMIN — POTASSIUM CHLORIDE 10 MEQ: 7.46 INJECTION, SOLUTION INTRAVENOUS at 12:07

## 2020-07-28 RX ADMIN — OXYCODONE HYDROCHLORIDE 10 MG: 10 TABLET ORAL at 09:07

## 2020-07-28 RX ADMIN — POTASSIUM CHLORIDE 10 MEQ: 7.46 INJECTION, SOLUTION INTRAVENOUS at 01:07

## 2020-07-28 RX ADMIN — IRBESARTAN 300 MG: 300 TABLET, FILM COATED ORAL at 08:07

## 2020-07-28 RX ADMIN — MORPHINE SULFATE 4 MG: 2 INJECTION, SOLUTION INTRAMUSCULAR; INTRAVENOUS at 04:07

## 2020-07-28 RX ADMIN — LETROZOLE 2.5 MG: 2.5 TABLET ORAL at 09:07

## 2020-07-28 RX ADMIN — ONDANSETRON 8 MG: 8 TABLET, ORALLY DISINTEGRATING ORAL at 03:07

## 2020-07-28 RX ADMIN — FAMOTIDINE 20 MG: 20 TABLET, FILM COATED ORAL at 09:07

## 2020-07-28 RX ADMIN — ONDANSETRON 8 MG: 8 TABLET, ORALLY DISINTEGRATING ORAL at 05:07

## 2020-07-28 RX ADMIN — LEVOTHYROXINE SODIUM 200 MCG: 100 TABLET ORAL at 08:07

## 2020-07-28 RX ADMIN — PIPERACILLIN AND TAZOBACTAM 4.5 G: 4; .5 INJECTION, POWDER, LYOPHILIZED, FOR SOLUTION INTRAVENOUS; PARENTERAL at 08:07

## 2020-07-28 RX ADMIN — GABAPENTIN 200 MG: 100 CAPSULE ORAL at 08:07

## 2020-07-28 RX ADMIN — PIPERACILLIN AND TAZOBACTAM 4.5 G: 4; .5 INJECTION, POWDER, LYOPHILIZED, FOR SOLUTION INTRAVENOUS; PARENTERAL at 11:07

## 2020-07-28 RX ADMIN — POLYETHYLENE GLYCOL 3350 17 G: 17 POWDER, FOR SOLUTION ORAL at 08:07

## 2020-07-28 RX ADMIN — OXYCODONE HYDROCHLORIDE 10 MG: 10 TABLET ORAL at 02:07

## 2020-07-28 RX ADMIN — ASPIRIN 81 MG: 81 TABLET, COATED ORAL at 08:07

## 2020-07-28 RX ADMIN — GABAPENTIN 200 MG: 100 CAPSULE ORAL at 02:07

## 2020-07-28 RX ADMIN — POTASSIUM CHLORIDE 10 MEQ: 7.46 INJECTION, SOLUTION INTRAVENOUS at 02:07

## 2020-07-28 RX ADMIN — PIPERACILLIN AND TAZOBACTAM 4.5 G: 4; .5 INJECTION, POWDER, LYOPHILIZED, FOR SOLUTION INTRAVENOUS; PARENTERAL at 02:07

## 2020-07-28 NOTE — H&P
"[facility] Hospital Medicine  History & Physical    Patient Name: Maria Elena Tavares  MRN: [id number]  Admission Date: 7/27/2020  Attending Physician: Palomo Andre MD   Primary Care Provider: Estephania San MD    [facility] Medicine Team: [facility] Collin Figueroa PA-C     Patient information was obtained from patient, past medical records and ER records.     Subjective:     Principal Problem:Enteritis    Chief Complaint:   Chief Complaint   Patient presents with    Abdominal Pain     vomiting , last radiation july 13, breast cancer        HPI: Maria Elena Tavares is a 64F with of breast cancer (completed chemo 02/2020, last radiation 07/2020), T2DM, HTN, anemia of neoplastic disease who presents with episgastric pain and tenderness since Friday. Patient reports episodes of shooting pain lasting 3-5 seconds many times a day since Friday. Has ssociated abdominal distension, nausea and has been unable to keep food down. She has vomited multiple times this weekend -- denies hematemesis. Admits to decreased appetite and states she eats smaller amounts of food than she used to. Last BM was on Friday (normal/loose consistency)  but does admit to two episodes of painless BRBPR on 07/23. She states she was constipated at the time and had been straining to have a BM.  Denies any recent illness, chest pain, SOB, fever/chills. Denies flatus but does admit to her stomach feeling "bubbly".     ED: AFVSS, WBC 13, Hbg stable at 10, LFTs WNL, lipase WNL, LA 0.9, UA clean, KUB unremarkable, RUQ US w/o acute findings, CT A/P showing enteritis, EKG no acute ischemia, neg troponin, K 3.1, CXR clear. COVID negative.    Past Medical History:   Diagnosis Date    BMI 37.0-37.9, adult     Breast cancer 09/05/2019     Left breast, IDC with lymph node metastisis    Colon polyps 2015    Diabetes mellitus type II     Diverticulosis     history of diverticulosisseen on colonoscopy at age 48. Repeat recommended at age 58. Done " by     Elevated blood protein     History of elevated protein. Apparently has seen  for extensive workup including bone marrow biopsy    History of shingles 2006    Hyperlipidemia     Hypertension     Microalbuminuria     due 2 diabetes    Mild vitamin D deficiency     . A low vitamin D    Thyroid disease     hypothyroidism       Past Surgical History:   Procedure Laterality Date    BREAST BIOPSY Left 2019    IDC with mets to node    BREAST BIOPSY Right 2019    core bx, benign node    BREAST BIOPSY Left 10/14/2019    MRI Core bx, + IDC    BREAST BIOPSY Left 10/08/2019    Core bx, ADH     SECTION      HYSTERECTOMY      INSERTION OF BREAST TISSUE EXPANDER Bilateral 3/24/2020    Procedure: INSERTION, TISSUE EXPANDER, BREAST BILATERAL;  Surgeon: Michael Solorzano MD;  Location: [facility] OR [address] [place] [address];  Service: Plastics;  Laterality: Bilateral;    INSERTION OF TUNNELED CENTRAL VENOUS CATHETER (CVC) WITH SUBCUTANEOUS PORT Right 10/4/2019    Procedure: [id number]-U-[id number] RIGHT (CONSENT AM OF) 1.0 hr case;  Surgeon: Elena Lopez MD;  [facility];  Service: General;  Laterality: Right;    OOPHORECTOMY      SENTINEL LYMPH NODE BIOPSY Left 3/24/2020    Procedure: BIOPSY, LYMPH NODE, SENTINEL LEFT;  Surgeon: Elena Lopez MD;  [facility];  Service: General;  Laterality: Left;    SIMPLE MASTECTOMY Bilateral 3/24/2020    Procedure: MASTECTOMY, SIMPLE BILATERAL;  Surgeon: Elena Lopez MD;  [facility];  Service: General;  Laterality: Bilateral;       Review of patient's allergies indicates:  No Known Allergies    Current Facility-Administered Medications on File Prior to Encounter   Medication    [COMPLETED] heparin, porcine (PF) 100 unit/mL injection flush 500 Units    [COMPLETED] sodium chloride 0.9% flush 10 mL     Current Outpatient Medications on File Prior to Encounter   Medication Sig    chlorthalidone (HYGROTEN) 25 MG  Tab Take 1 tablet by mouth every morning    irbesartan (AVAPRO) 300 MG tablet Take 1 tablet by mouth once daily    aspirin (ECOTRIN) 81 MG EC tablet Take 81 mg by mouth once daily.      blood-glucose meter kit DISPENSE : BLOOD TEST STRIPS AND LANCETS PATIENT TEST BLOOD SUGARS TWICE DAILY  TEST STRIPS: 50 EACH, REFILL 5  LANCETS:  50 EACH , REFILL 5    dulaglutide (TRULICITY) 1.5 mg/0.5 mL pen injector Inject 1.5 mg into the skin every 7 days.    ergocalciferol (VITAMIN D2) 50,000 unit Cap Take 1 capsule (50,000 Units total) by mouth every 7 days.    gabapentin (NEURONTIN) 100 MG capsule Take 2 capsules (200 mg total) by mouth 3 (three) times daily.    HYDROcodone-acetaminophen (NORCO) 5-325 mg per tablet Take 1 tablet by mouth every 8 (eight) hours as needed for Pain.    letrozole (FEMARA) 2.5 mg Tab Take 1 tablet (2.5 mg total) by mouth once daily.    levothyroxine (SYNTHROID) 200 MCG tablet Take 1 tablet (200 mcg total) by mouth once daily.    lifitegrast (XIIDRA) 5 % Dpet Apply 1 drop to  both eyes  2 (two) times daily.    metFORMIN (GLUCOPHAGE) 1000 MG tablet TAKE 1 TABLET (1,000 MG TOTAL) BY MOUTH 2 (TWO) TIMES DAILY WITH MEALS.    ondansetron (ZOFRAN-ODT) 4 MG TbDL Dissolve 1 tablet (4 mg total) by mouth every 6 (six) hours as needed.    polyethylene glycol (GLYCOLAX) 17 gram/dose powder Mix 17 g (1 capful) with juice or water an drink 2 (two) times daily.     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.   She walks occasionally.   Substance and Sexual Activity    Alcohol use: Not Currently     Comment: Occasionally    Drug use: No    Sexual activity: Not Currently     Partners: Male     Review of Systems   Constitutional: Positive for appetite change and fatigue. Negative for activity change, chills and fever.   HENT: Negative for sore throat and trouble swallowing.    Eyes: Negative for pain and visual disturbance.   Respiratory: Negative for apnea, cough, shortness of breath and wheezing.    Cardiovascular: Negative for chest pain, palpitations and leg swelling.   Gastrointestinal: Positive for abdominal distention, abdominal pain, nausea and vomiting. Negative for blood in stool, constipation and diarrhea.   Endocrine: Negative for polydipsia and polyuria.   Genitourinary: Negative for decreased urine volume, difficulty urinating, dysuria, flank pain, frequency, hematuria, pelvic pain, urgency and vaginal bleeding.   Musculoskeletal: Negative for arthralgias, back pain, gait problem, myalgias and neck stiffness.   Skin: Negative for rash and wound.   Neurological: Negative for dizziness, syncope, facial asymmetry, speech difficulty, weakness, light-headedness, numbness and headaches.   Hematological: Negative for adenopathy. Does not bruise/bleed easily.   Psychiatric/Behavioral: Negative for agitation, confusion, sleep disturbance and suicidal ideas. The patient is not nervous/anxious.      Objective:     Vital Signs (Most Recent):  Temp: 97.8 °F (36.6 °C) (07/28/20 0014)  Pulse: 83 (07/28/20 0014)  Resp: 16 (07/28/20 0014)  BP: (!) 105/55 (07/28/20 0014)  SpO2: 95 % (07/28/20 0014) Vital Signs (24h Range):  Temp:  [97.8 °F (36.6 °C)-99 °F (37.2 °C)] 97.8 °F (36.6 °C)  Pulse:  [] 83  Resp:  [16-20] 16  SpO2:  [95 %-99 %] 95 %  BP: (105-142)/(55-89) 105/55     Weight: 78.8 kg (173 lb 13.3 oz)  Body mass index is 33.95 kg/m².    Physical Exam  Vitals signs and nursing note reviewed.   Constitutional:        General: She is not in acute distress.     Appearance: She is well-developed. She is obese. She is not ill-appearing, toxic-appearing or diaphoretic.   HENT:      Head: Normocephalic and atraumatic.      Right Ear: Hearing and external ear normal.      Left Ear: Hearing and external ear normal.      Nose: Nose normal.      Mouth/Throat:      Mouth: Mucous membranes are moist.      Pharynx: Uvula midline.   Eyes:      General: Lids are normal.      Extraocular Movements: Extraocular movements intact.      Conjunctiva/sclera: Conjunctivae normal.   Neck:      Musculoskeletal: Full passive range of motion without pain, normal range of motion and neck supple.      Trachea: Phonation normal.   Cardiovascular:      Rate and Rhythm: Normal rate and regular rhythm.      Heart sounds: Normal heart sounds.   Pulmonary:      Effort: Pulmonary effort is normal.      Breath sounds: Normal breath sounds. No wheezing or rales.   Abdominal:      General: Bowel sounds are normal.      Palpations: Abdomen is soft.      Tenderness: There is abdominal tenderness (generalized) in the epigastric area. There is no guarding.   Musculoskeletal: Normal range of motion.      Right shoulder: Normal.      Right elbow: Normal.     Right wrist: Normal.      Right lower leg: No edema.      Left lower leg: No edema.   Lymphadenopathy:      Cervical: No cervical adenopathy.   Skin:     General: Skin is warm and dry.      Comments: Radiation burns on chest wall. Port on R    Neurological:      General: No focal deficit present.      Mental Status: She is alert and oriented to person, place, and time. Mental status is at baseline.      Cranial Nerves: No cranial nerve deficit.   Psychiatric:         Speech: Speech normal.         Behavior: Behavior normal.         Thought Content: Thought content normal.         Judgment: Judgment normal.             Significant Labs:   CBC:   Recent Labs   Lab 07/27/20  1223 07/27/20  1320   WBC 12.18 13.00*   HGB  10.3* 10.5*   HCT 31.9* 32.3*    363*     CMP:   Recent Labs   Lab 07/27/20  1223 07/27/20  1511    136   K 3.1* 3.1*    98   CO2 27 28   * 185*   BUN 19 18   CREATININE 0.9 0.9   CALCIUM 9.9 9.5   PROT 7.8 7.8   ALBUMIN 3.0* 2.9*   BILITOT 0.8 0.8   ALKPHOS 94 82   AST 20 18   ALT 12 10   ANIONGAP 11 10   EGFRNONAA >60.0 >60.0     Cardiac Markers: No results for input(s): CKMB, MYOGLOBIN, BNP, TROPISTAT in the last 48 hours.  Lactic Acid:   Recent Labs   Lab 07/27/20  2317   LACTATE 0.9     Lipase:   Recent Labs   Lab 07/27/20  1511   LIPASE 4     Troponin:   Recent Labs   Lab 07/27/20  1511   TROPONINI <0.006     TSH: No results for input(s): TSH in the last 4320 hours.  Urine Culture: No results for input(s): LABURIN in the last 48 hours.  Urine Studies:   Recent Labs   Lab 07/27/20  1329   COLORU Yellow   APPEARANCEUA Clear   PHUR 5.0   SPECGRAV 1.020   PROTEINUA Negative   GLUCUA Negative   KETONESU Negative   BILIRUBINUA Negative   OCCULTUA 1+*   NITRITE Negative   LEUKOCYTESUR 1+*   RBCUA 3   WBCUA 6*   BACTERIA Occasional   SQUAMEPITHEL 1   HYALINECASTS 3*       Significant Imaging: I have reviewed all pertinent imaging results/findings within the past 24 hours.    Assessment/Plan:     * Enteritis  - 2/4 SIRS for WBC 13, and HR >90, LA 0.9  - given breast cancer history will start zosyn and can deescalate to cipro/flagyl at discharge  - she has not had a BM in several days, so unclear if having solid stools vs diarrhea. Will send stool studies if diarrheal illness. If not, can d/c the studies  - IVF  - symptomatic treatment for nausea    Hypokalemia  - replace IV as not able to tolerate PO    Anemia in neoplastic disease  - stable    Essential hypertension  - continue irebsartan  - hold HCTZ to avoid dehydation    Uncontrolled type 2 diabetes mellitus without complication, without long-term current use of insulin  - hold home metformin and trulicity   - low dose SSI  - diabetic  diet  -  on admit  Lab Results   Component Value Date    HGBA1C 6.4 (H) 03/11/2020     VTE Risk Mitigation (From admission, onward)         Ordered     IP VTE HIGH RISK PATIENT  Once      07/27/20 2223     Place sequential compression device  Until discontinued      07/27/20 2223     Place SAMANTHA hose  Until discontinued      07/27/20 2223                   Collin Figueroa PA-C  Department of Hospital Medicine   [facility]

## 2020-07-28 NOTE — ASSESSMENT & PLAN NOTE
- hold home metformin and trulicity   - low dose SSI  - diabetic diet  -  on admit  Lab Results   Component Value Date    HGBA1C 7.3 (H) 07/28/2020

## 2020-07-28 NOTE — ASSESSMENT & PLAN NOTE
- hold home metformin and trulicity   - low dose SSI  - diabetic diet  -  on admit  Lab Results   Component Value Date    HGBA1C 6.4 (H) 03/11/2020

## 2020-07-28 NOTE — SUBJECTIVE & OBJECTIVE
Past Medical History:   Diagnosis Date    BMI 37.0-37.9, adult     Breast cancer 2019     Left breast, IDC with lymph node metastisis    Colon polyps     Diabetes mellitus type II     Diverticulosis     history of diverticulosisseen on colonoscopy at age 48. Repeat recommended at age 58. Done by     Elevated blood protein     History of elevated protein. Apparently has seen  for extensive workup including bone marrow biopsy    History of shingles 2006    Hyperlipidemia     Hypertension     Microalbuminuria     due 2 diabetes    Mild vitamin D deficiency     . A low vitamin D    Thyroid disease     hypothyroidism       Past Surgical History:   Procedure Laterality Date    BREAST BIOPSY Left 2019    IDC with mets to node    BREAST BIOPSY Right 2019    core bx, benign node    BREAST BIOPSY Left 10/14/2019    MRI Core bx, + IDC    BREAST BIOPSY Left 10/08/2019    Core bx, ADH     SECTION      HYSTERECTOMY      INSERTION OF BREAST TISSUE EXPANDER Bilateral 3/24/2020    Procedure: INSERTION, TISSUE EXPANDER, BREAST BILATERAL;  Surgeon: Michael Solorzano MD;  Location: [facility] OR [address] [place] [address];  Service: Plastics;  Laterality: Bilateral;    INSERTION OF TUNNELED CENTRAL VENOUS CATHETER (CVC) WITH SUBCUTANEOUS PORT Right 10/4/2019    Procedure: [id number] RIGHT (CONSENT AM OF) 1.0 hr case;  Surgeon: Elena Lopez MD;  Location: [facility] OR [address] [place] [address];  Service: General;  Laterality: Right;    OOPHORECTOMY      SENTINEL LYMPH NODE BIOPSY Left 3/24/2020    Procedure: BIOPSY, LYMPH NODE, SENTINEL LEFT;  Surgeon: Elena Lopez MD;  Location: [facility] OR [address] [place] [address];  Service: General;  Laterality: Left;    SIMPLE MASTECTOMY Bilateral 3/24/2020    Procedure: MASTECTOMY, SIMPLE BILATERAL;  Surgeon: Elena Lopez MD;  Location: [facility] OR [address] [place] [address];  Service: General;  Laterality: Bilateral;       Review of patient's allergies indicates:  No Known Allergies    Current  Facility-Administered Medications on File Prior to Encounter   Medication    [COMPLETED] heparin, porcine (PF) 100 unit/mL injection flush 500 Units    [COMPLETED] sodium chloride 0.9% flush 10 mL     Current Outpatient Medications on File Prior to Encounter   Medication Sig    chlorthalidone (HYGROTEN) 25 MG Tab Take 1 tablet by mouth every morning    irbesartan (AVAPRO) 300 MG tablet Take 1 tablet by mouth once daily    aspirin (ECOTRIN) 81 MG EC tablet Take 81 mg by mouth once daily.      blood-glucose meter kit DISPENSE : BLOOD TEST STRIPS AND LANCETS PATIENT TEST BLOOD SUGARS TWICE DAILY  TEST STRIPS: 50 EACH, REFILL 5  LANCETS:  50 EACH , REFILL 5    dulaglutide (TRULICITY) 1.5 mg/0.5 mL pen injector Inject 1.5 mg into the skin every 7 days.    ergocalciferol (VITAMIN D2) 50,000 unit Cap Take 1 capsule (50,000 Units total) by mouth every 7 days.    gabapentin (NEURONTIN) 100 MG capsule Take 2 capsules (200 mg total) by mouth 3 (three) times daily.    HYDROcodone-acetaminophen (NORCO) 5-325 mg per tablet Take 1 tablet by mouth every 8 (eight) hours as needed for Pain.    letrozole (FEMARA) 2.5 mg Tab Take 1 tablet (2.5 mg total) by mouth once daily.    levothyroxine (SYNTHROID) 200 MCG tablet Take 1 tablet (200 mcg total) by mouth once daily.    lifitegrast (XIIDRA) 5 % Dpet Apply 1 drop to  both eyes  2 (two) times daily.    metFORMIN (GLUCOPHAGE) 1000 MG tablet TAKE 1 TABLET (1,000 MG TOTAL) BY MOUTH 2 (TWO) TIMES DAILY WITH MEALS.    ondansetron (ZOFRAN-ODT) 4 MG TbDL Dissolve 1 tablet (4 mg total) by mouth every 6 (six) hours as needed.    polyethylene glycol (GLYCOLAX) 17 gram/dose powder Mix 17 g (1 capful) with juice or water an drink 2 (two) times daily.     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  She walks occasionally.   Substance and Sexual Activity    Alcohol use: Not Currently     Comment: Occasionally    Drug use: No    Sexual activity: Not Currently     Partners: Male     Review of Systems   Constitutional: Positive for appetite change and fatigue. Negative for activity change, chills and fever.   HENT: Negative for sore throat and trouble swallowing.    Eyes: Negative for pain and visual disturbance.   Respiratory: Negative for apnea, cough, shortness of breath and wheezing.    Cardiovascular: Negative for chest pain, palpitations and leg swelling.   Gastrointestinal: Positive for abdominal distention, abdominal pain, nausea and vomiting. Negative for blood in stool, constipation and diarrhea.   Endocrine: Negative for polydipsia and polyuria.   Genitourinary: Negative for decreased urine volume, difficulty urinating, dysuria, flank pain, frequency, hematuria, pelvic pain, urgency and vaginal bleeding.   Musculoskeletal: Negative for arthralgias, back pain, gait problem, myalgias and neck stiffness.   Skin: Negative for rash and wound.   Neurological: Negative for dizziness, syncope, facial asymmetry, speech difficulty, weakness, light-headedness, numbness and headaches.   Hematological: Negative for adenopathy. Does not bruise/bleed easily.   Psychiatric/Behavioral: Negative for agitation, confusion, sleep disturbance and suicidal ideas. The patient is not nervous/anxious.      Objective:     Vital Signs (Most Recent):  Temp: 97.8 °F (36.6 °C) (07/28/20 0014)  Pulse: 83 (07/28/20 0014)  Resp: 16 (07/28/20 0014)  BP: (!) 105/55 (07/28/20 0014)  SpO2: 95 % (07/28/20 0014) Vital Signs  (24h Range):  Temp:  [97.8 °F (36.6 °C)-99 °F (37.2 °C)] 97.8 °F (36.6 °C)  Pulse:  [] 83  Resp:  [16-20] 16  SpO2:  [95 %-99 %] 95 %  BP: (105-142)/(55-89) 105/55     Weight: 78.8 kg (173 lb 13.3 oz)  Body mass index is 33.95 kg/m².    Physical Exam  Vitals signs and nursing note reviewed.   Constitutional:       General: She is not in acute distress.     Appearance: She is well-developed. She is obese. She is not ill-appearing, toxic-appearing or diaphoretic.   HENT:      Head: Normocephalic and atraumatic.      Right Ear: Hearing and external ear normal.      Left Ear: Hearing and external ear normal.      Nose: Nose normal.      Mouth/Throat:      Mouth: Mucous membranes are moist.      Pharynx: Uvula midline.   Eyes:      General: Lids are normal.      Extraocular Movements: Extraocular movements intact.      Conjunctiva/sclera: Conjunctivae normal.   Neck:      Musculoskeletal: Full passive range of motion without pain, normal range of motion and neck supple.      Trachea: Phonation normal.   Cardiovascular:      Rate and Rhythm: Normal rate and regular rhythm.      Heart sounds: Normal heart sounds.   Pulmonary:      Effort: Pulmonary effort is normal.      Breath sounds: Normal breath sounds. No wheezing or rales.   Abdominal:      General: Bowel sounds are normal.      Palpations: Abdomen is soft.      Tenderness: There is abdominal tenderness (generalized) in the epigastric area. There is no guarding.   Musculoskeletal: Normal range of motion.      Right shoulder: Normal.      Right elbow: Normal.     Right wrist: Normal.      Right lower leg: No edema.      Left lower leg: No edema.   Lymphadenopathy:      Cervical: No cervical adenopathy.   Skin:     General: Skin is warm and dry.      Comments: Radiation burns on chest wall. Port on R    Neurological:      General: No focal deficit present.      Mental Status: She is alert and oriented to person, place, and time. Mental status is at baseline.       Cranial Nerves: No cranial nerve deficit.   Psychiatric:         Speech: Speech normal.         Behavior: Behavior normal.         Thought Content: Thought content normal.         Judgment: Judgment normal.             Significant Labs:   CBC:   Recent Labs   Lab 07/27/20  1223 07/27/20  1320   WBC 12.18 13.00*   HGB 10.3* 10.5*   HCT 31.9* 32.3*    363*     CMP:   Recent Labs   Lab 07/27/20  1223 07/27/20  1511    136   K 3.1* 3.1*    98   CO2 27 28   * 185*   BUN 19 18   CREATININE 0.9 0.9   CALCIUM 9.9 9.5   PROT 7.8 7.8   ALBUMIN 3.0* 2.9*   BILITOT 0.8 0.8   ALKPHOS 94 82   AST 20 18   ALT 12 10   ANIONGAP 11 10   EGFRNONAA >60.0 >60.0     Cardiac Markers: No results for input(s): CKMB, MYOGLOBIN, BNP, TROPISTAT in the last 48 hours.  Lactic Acid:   Recent Labs   Lab 07/27/20  2317   LACTATE 0.9     Lipase:   Recent Labs   Lab 07/27/20  1511   LIPASE 4     Troponin:   Recent Labs   Lab 07/27/20  1511   TROPONINI <0.006     TSH: No results for input(s): TSH in the last 4320 hours.  Urine Culture: No results for input(s): LABURIN in the last 48 hours.  Urine Studies:   Recent Labs   Lab 07/27/20  1329   COLORU Yellow   APPEARANCEUA Clear   PHUR 5.0   SPECGRAV 1.020   PROTEINUA Negative   GLUCUA Negative   KETONESU Negative   BILIRUBINUA Negative   OCCULTUA 1+*   NITRITE Negative   LEUKOCYTESUR 1+*   RBCUA 3   WBCUA 6*   BACTERIA Occasional   SQUAMEPITHEL 1   HYALINECASTS 3*       Significant Imaging: I have reviewed all pertinent imaging results/findings within the past 24 hours.

## 2020-07-28 NOTE — NURSING
Pt transferred from ED to room 749 via stretcher. Pt oriented to room, safety, and fall precautions discussed. Pt has no complaints at this time. Will continue to monitor.

## 2020-07-28 NOTE — SUBJECTIVE & OBJECTIVE
Interval History: Patient still nauseous this morning, modified diet to bland. Patient requesting something for bowel management.     Review of Systems   Constitutional: Positive for appetite change and fatigue. Negative for fever.   Respiratory: Negative for shortness of breath and wheezing.    Cardiovascular: Negative for chest pain and palpitations.   Gastrointestinal: Positive for abdominal distention, abdominal pain, nausea and vomiting.   Genitourinary: Negative for frequency and urgency.   Musculoskeletal: Negative for back pain and neck stiffness.   Skin: Negative for rash and wound.   Neurological: Negative for weakness and light-headedness.   Psychiatric/Behavioral: Negative for agitation, confusion and suicidal ideas.     Objective:     Vital Signs (Most Recent):  Temp: 96.8 °F (36 °C) (07/28/20 1547)  Pulse: 79 (07/28/20 1547)  Resp: 18 (07/28/20 1547)  BP: (!) 102/55 (07/28/20 1547)  SpO2: 100 % (07/28/20 1547) Vital Signs (24h Range):  Temp:  [96.8 °F (36 °C)-97.8 °F (36.6 °C)] 96.8 °F (36 °C)  Pulse:  [62-84] 79  Resp:  [16-20] 18  SpO2:  [95 %-100 %] 100 %  BP: (102-132)/(55-81) 102/55     Weight: 78.8 kg (173 lb 13.3 oz)  Body mass index is 33.95 kg/m².    Intake/Output Summary (Last 24 hours) at 7/28/2020 1839  Last data filed at 7/28/2020 1038  Gross per 24 hour   Intake --   Output 200 ml   Net -200 ml      Physical Exam  Vitals signs and nursing note reviewed.   Constitutional:       Appearance: She is well-developed. She is obese.   HENT:      Right Ear: Hearing normal.      Left Ear: Hearing normal.      Mouth/Throat:      Pharynx: Uvula midline.   Eyes:      General: Lids are normal.   Neck:      Musculoskeletal: Full passive range of motion without pain.      Trachea: Phonation normal.   Cardiovascular:      Rate and Rhythm: Normal rate and regular rhythm.      Heart sounds: Normal heart sounds.   Pulmonary:      Effort: Pulmonary effort is normal.      Breath sounds: Normal breath sounds.  No wheezing or rales.   Abdominal:      General: Bowel sounds are normal.      Palpations: Abdomen is soft.      Tenderness: There is abdominal tenderness (generalized) in the epigastric area. There is no guarding.   Skin:     Comments: Radiation burns on chest wall. Port on R    Neurological:      General: No focal deficit present.      Mental Status: She is alert and oriented to person, place, and time.   Psychiatric:         Speech: Speech normal.         Significant Labs:   BMP:   Recent Labs   Lab 07/28/20  0426   *      K 3.5      CO2 27   BUN 16   CREATININE 0.8   CALCIUM 9.2   MG 1.9     CBC:   Recent Labs   Lab 07/27/20  1223 07/27/20  1320 07/28/20  0426   WBC 12.18 13.00* 10.56   HGB 10.3* 10.5* 9.7*   HCT 31.9* 32.3* 29.9*    363* 337     All pertinent labs within the past 24 hours have been reviewed.    Significant Imaging: I have reviewed all pertinent imaging results/findings within the past 24 hours.

## 2020-07-28 NOTE — HOSPITAL COURSE
Patient admitted to observation for enteritis. She was started on Zosyn with improvement. Prior to discharge on oral medications, patient's left breast spacer ruptured with purulent fluid. Plastic surgery consulted and plan for explant of TE in OR today (7/30).  She was started on vanc/zosyn and ID consulted. ID de-escalated abx to vanc/CTX. Intra-op cultures showed MSSA.  ID recommended cephalexin and patient discharged to completed 14 total days (end 8/13).  Patient to follow up with ID and plastic surgery.  Patient acknowledged understanding of plan and discharged home in stable condition.

## 2020-07-28 NOTE — PLAN OF CARE
Safety measures maintained. VSS on RA. LR @ 125. Pain moderately managed with PRN meds. Contact precautions maintained. Pt had no bowel movements throughout night. Bed in lowest position, call light within reach, side rails up x2. Pt has no complaints at this time. Will continue to monitor.

## 2020-07-28 NOTE — PLAN OF CARE
CM called patient at the bedside to discuss discharge planning assessment. Pt lives alone and has transportation at discharge. Pt verified PCP and Pharmacy. CM will continue to follow for discharge needs.         07/28/20 1042   Discharge Assessment   Assessment Type Discharge Planning Assessment   Confirmed/corrected address and phone number on facesheet? Yes   Assessment information obtained from? Patient   Expected Length of Stay (days) 2   Communicated expected length of stay with patient/caregiver yes   Prior to hospitilization cognitive status: Alert/Oriented   Prior to hospitalization functional status: Independent   Current cognitive status: Alert/Oriented   Current Functional Status: Independent   Lives With alone   Able to Return to Prior Arrangements yes   Is patient able to care for self after discharge? Yes   Patient's perception of discharge disposition home or selfcare   Readmission Within the Last 30 Days no previous admission in last 30 days   Patient currently being followed by outpatient case management? No   Patient currently receives any other outside agency services? No   Equipment Currently Used at Home glucometer   Do you have any problems affording any of your prescribed medications? No   Is the patient taking medications as prescribed? yes   Does the patient have transportation home? Yes   Transportation Anticipated family or friend will provide   Does the patient receive services at the [facility]? No   Discharge Plan A Home   Discharge Plan B Home with family   DME Needed Upon Discharge  none   Patient/Family in Agreement with Plan yes

## 2020-07-28 NOTE — ASSESSMENT & PLAN NOTE
- 2/4 SIRS for WBC 13, and HR >90, LA 0.9  - given breast cancer history will start zosyn and can deescalate to cipro/flagyl at discharge  - she has not had a BM in several days, so unclear if having solid stools vs diarrhea. Will send stool studies if diarrheal illness. If not, can d/c the studies  - IVF  - symptomatic treatment for nausea  - bland diet

## 2020-07-28 NOTE — HPI
"Maria Elena Tavares is a 64F with of breast cancer (completed chemo 02/2020, last radiation 07/2020), T2DM, HTN, anemia of neoplastic disease who presents with episgastric pain and tenderness since Friday. Patient reports episodes of shooting pain lasting 3-5 seconds many times a day since Friday. Has ssociated abdominal distension, nausea and has been unable to keep food down. She has vomited multiple times this weekend -- denies hematemesis. Admits to decreased appetite and states she eats smaller amounts of food than she used to. Last BM was on Friday (normal/loose consistency)  but does admit to two episodes of painless BRBPR on 07/23. She states she was constipated at the time and had been straining to have a BM.  Denies any recent illness, chest pain, SOB, fever/chills. Denies flatus but does admit to her stomach feeling "bubbly".     ED: AFVSS, WBC 13, Hbg stable at 10, LFTs WNL, lipase WNL, LA 0.9, UA clean, KUB unremarkable, RUQ US w/o acute findings, CT A/P showing enteritis, EKG no acute ischemia, neg troponin, K 3.1, CXR clear. COVID negative.  "

## 2020-07-28 NOTE — ASSESSMENT & PLAN NOTE
- 2/4 SIRS for WBC 13, and HR >90, LA 0.9  - given breast cancer history will start zosyn and can deescalate to cipro/flagyl at discharge  - she has not had a BM in several days, so unclear if having solid stools vs diarrhea. Will send stool studies if diarrheal illness. If not, can d/c the studies  - IVF  - symptomatic treatment for nausea

## 2020-07-28 NOTE — PLAN OF CARE
LR running per MD order. 10mg oxy given at 0955. Zofran given at 1554. Miralax BID ordered. Zosyn given. Plan of care discussed with patient. Fall precautions in place. Patient has no complaints of pain. Discussed medications and care. Patient has no questions at this time. Will continue to monitor.

## 2020-07-28 NOTE — PROGRESS NOTES
"[facility] Hospital Medicine  Progress Note    Patient Name: Maria Elena Tavares  MRN: [id number]  Patient Class: OP- Observation   Admission Date: 7/27/2020  Length of Stay: 0 days  Attending Physician: Palomo Andre MD  Primary Care Provider: Estephania San MD    [facility] Medicine Team: [facility] MED Y Alonzo Hendricks PA-C    Subjective:     Principal Problem:Enteritis        HPI:  Maria Elena Tavares is a 64F with of breast cancer (completed chemo 02/2020, last radiation 07/2020), T2DM, HTN, anemia of neoplastic disease who presents with episgastric pain and tenderness since Friday. Patient reports episodes of shooting pain lasting 3-5 seconds many times a day since Friday. Has ssociated abdominal distension, nausea and has been unable to keep food down. She has vomited multiple times this weekend -- denies hematemesis. Admits to decreased appetite and states she eats smaller amounts of food than she used to. Last BM was on Friday (normal/loose consistency)  but does admit to two episodes of painless BRBPR on 07/23. She states she was constipated at the time and had been straining to have a BM.  Denies any recent illness, chest pain, SOB, fever/chills. Denies flatus but does admit to her stomach feeling "bubbly".     ED: AFVSS, WBC 13, Hbg stable at 10, LFTs WNL, lipase WNL, LA 0.9, UA clean, KUB unremarkable, RUQ US w/o acute findings, CT A/P showing enteritis, EKG no acute ischemia, neg troponin, K 3.1, CXR clear. COVID negative.    Overview/Hospital Course:  Patient admitted to observation for enteritis. She was started on Zosyn, monitoring for improvement.    Interval History: Patient still nauseous this morning, modified diet to bland. Patient requesting something for bowel management.     Review of Systems   Constitutional: Positive for appetite change and fatigue. Negative for fever.   Respiratory: Negative for shortness of breath and wheezing.    Cardiovascular: Negative for chest pain and " palpitations.   Gastrointestinal: Positive for abdominal distention, abdominal pain, nausea and vomiting.   Genitourinary: Negative for frequency and urgency.   Musculoskeletal: Negative for back pain and neck stiffness.   Skin: Negative for rash and wound.   Neurological: Negative for weakness and light-headedness.   Psychiatric/Behavioral: Negative for agitation, confusion and suicidal ideas.     Objective:     Vital Signs (Most Recent):  Temp: 96.8 °F (36 °C) (07/28/20 1547)  Pulse: 79 (07/28/20 1547)  Resp: 18 (07/28/20 1547)  BP: (!) 102/55 (07/28/20 1547)  SpO2: 100 % (07/28/20 1547) Vital Signs (24h Range):  Temp:  [96.8 °F (36 °C)-97.8 °F (36.6 °C)] 96.8 °F (36 °C)  Pulse:  [62-84] 79  Resp:  [16-20] 18  SpO2:  [95 %-100 %] 100 %  BP: (102-132)/(55-81) 102/55     Weight: 78.8 kg (173 lb 13.3 oz)  Body mass index is 33.95 kg/m².    Intake/Output Summary (Last 24 hours) at 7/28/2020 1839  Last data filed at 7/28/2020 1038  Gross per 24 hour   Intake --   Output 200 ml   Net -200 ml      Physical Exam  Vitals signs and nursing note reviewed.   Constitutional:       Appearance: She is well-developed. She is obese.   HENT:      Right Ear: Hearing normal.      Left Ear: Hearing normal.      Mouth/Throat:      Pharynx: Uvula midline.   Eyes:      General: Lids are normal.   Neck:      Musculoskeletal: Full passive range of motion without pain.      Trachea: Phonation normal.   Cardiovascular:      Rate and Rhythm: Normal rate and regular rhythm.      Heart sounds: Normal heart sounds.   Pulmonary:      Effort: Pulmonary effort is normal.      Breath sounds: Normal breath sounds. No wheezing or rales.   Abdominal:      General: Bowel sounds are normal.      Palpations: Abdomen is soft.      Tenderness: There is abdominal tenderness (generalized) in the epigastric area. There is no guarding.   Skin:     Comments: Radiation burns on chest wall. Port on R    Neurological:      General: No focal deficit present.       Mental Status: She is alert and oriented to person, place, and time.   Psychiatric:         Speech: Speech normal.         Significant Labs:   BMP:   Recent Labs   Lab 07/28/20  0426   *      K 3.5      CO2 27   BUN 16   CREATININE 0.8   CALCIUM 9.2   MG 1.9     CBC:   Recent Labs   Lab 07/27/20  1223 07/27/20  1320 07/28/20  0426   WBC 12.18 13.00* 10.56   HGB 10.3* 10.5* 9.7*   HCT 31.9* 32.3* 29.9*    363* 337     All pertinent labs within the past 24 hours have been reviewed.    Significant Imaging: I have reviewed all pertinent imaging results/findings within the past 24 hours.      Assessment/Plan:      * Enteritis  - 2/4 SIRS for WBC 13, and HR >90, LA 0.9  - given breast cancer history will start zosyn and can deescalate to cipro/flagyl at discharge  - she has not had a BM in several days, so unclear if having solid stools vs diarrhea. Will send stool studies if diarrheal illness. If not, can d/c the studies  - IVF  - symptomatic treatment for nausea  - bland diet    Hypokalemia  - replace IV as not able to tolerate PO    Anemia in neoplastic disease  - stable    Essential hypertension  - continue irebsartan  - hold HCTZ to avoid dehydation    Uncontrolled type 2 diabetes mellitus without complication, without long-term current use of insulin  - hold home metformin and trulicity   - low dose SSI  - diabetic diet  -  on admit  Lab Results   Component Value Date    HGBA1C 7.3 (H) 07/28/2020         VTE Risk Mitigation (From admission, onward)         Ordered     IP VTE HIGH RISK PATIENT  Once      07/27/20 2223     Place sequential compression device  Until discontinued      07/27/20 2223     Place SAMANTHA hose  Until discontinued      07/27/20 2223                      Alonzo Hendricks PA-C  Department of Hospital Medicine   [facility]

## 2020-07-29 ENCOUNTER — TELEPHONE (OUTPATIENT)
Dept: HEMATOLOGY/ONCOLOGY | Facility: CLINIC | Age: 65
End: 2020-07-29

## 2020-07-29 ENCOUNTER — ANESTHESIA EVENT (OUTPATIENT)
Dept: SURGERY | Facility: HOSPITAL | Age: 65
DRG: 908 | End: 2020-07-29
Payer: COMMERCIAL

## 2020-07-29 LAB
ANION GAP SERPL CALC-SCNC: 8 MMOL/L (ref 8–16)
BASOPHILS # BLD AUTO: 0.02 K/UL (ref 0–0.2)
BASOPHILS NFR BLD: 0.2 % (ref 0–1.9)
BUN SERPL-MCNC: 12 MG/DL (ref 8–23)
CALCIUM SERPL-MCNC: 8.8 MG/DL (ref 8.7–10.5)
CHLORIDE SERPL-SCNC: 99 MMOL/L (ref 95–110)
CO2 SERPL-SCNC: 29 MMOL/L (ref 23–29)
CREAT SERPL-MCNC: 0.8 MG/DL (ref 0.5–1.4)
CRYPTOSP AG STL QL IA: NEGATIVE
DIFFERENTIAL METHOD: ABNORMAL
EOSINOPHIL # BLD AUTO: 0.2 K/UL (ref 0–0.5)
EOSINOPHIL NFR BLD: 1.7 % (ref 0–8)
ERYTHROCYTE [DISTWIDTH] IN BLOOD BY AUTOMATED COUNT: 15.3 % (ref 11.5–14.5)
EST. GFR  (AFRICAN AMERICAN): >60 ML/MIN/1.73 M^2
EST. GFR  (NON AFRICAN AMERICAN): >60 ML/MIN/1.73 M^2
G LAMBLIA AG STL QL IA: NEGATIVE
GLUCOSE SERPL-MCNC: 123 MG/DL (ref 70–110)
HCT VFR BLD AUTO: 27.4 % (ref 37–48.5)
HGB BLD-MCNC: 8.6 G/DL (ref 12–16)
IMM GRANULOCYTES # BLD AUTO: 0.16 K/UL (ref 0–0.04)
IMM GRANULOCYTES NFR BLD AUTO: 1.6 % (ref 0–0.5)
LYMPHOCYTES # BLD AUTO: 0.6 K/UL (ref 1–4.8)
LYMPHOCYTES NFR BLD: 6 % (ref 18–48)
MAGNESIUM SERPL-MCNC: 1.6 MG/DL (ref 1.6–2.6)
MCH RBC QN AUTO: 28.9 PG (ref 27–31)
MCHC RBC AUTO-ENTMCNC: 31.4 G/DL (ref 32–36)
MCV RBC AUTO: 92 FL (ref 82–98)
MONOCYTES # BLD AUTO: 1.2 K/UL (ref 0.3–1)
MONOCYTES NFR BLD: 12.1 % (ref 4–15)
NEUTROPHILS # BLD AUTO: 7.8 K/UL (ref 1.8–7.7)
NEUTROPHILS NFR BLD: 78.4 % (ref 38–73)
NRBC BLD-RTO: 0 /100 WBC
OB PNL STL: NEGATIVE
PHOSPHATE SERPL-MCNC: 3.5 MG/DL (ref 2.7–4.5)
PLATELET # BLD AUTO: 326 K/UL (ref 150–350)
PMV BLD AUTO: 10.3 FL (ref 9.2–12.9)
POCT GLUCOSE: 139 MG/DL (ref 70–110)
POCT GLUCOSE: 142 MG/DL (ref 70–110)
POCT GLUCOSE: 146 MG/DL (ref 70–110)
POCT GLUCOSE: 172 MG/DL (ref 70–110)
POTASSIUM SERPL-SCNC: 3.1 MMOL/L (ref 3.5–5.1)
RBC # BLD AUTO: 2.98 M/UL (ref 4–5.4)
RV AG STL QL IA.RAPID: NEGATIVE
SODIUM SERPL-SCNC: 136 MMOL/L (ref 136–145)
WBC # BLD AUTO: 9.97 K/UL (ref 3.9–12.7)
WBC #/AREA STL HPF: NORMAL /[HPF]

## 2020-07-29 PROCEDURE — 96376 TX/PRO/DX INJ SAME DRUG ADON: CPT | Performed by: EMERGENCY MEDICINE

## 2020-07-29 PROCEDURE — 96375 TX/PRO/DX INJ NEW DRUG ADDON: CPT | Performed by: EMERGENCY MEDICINE

## 2020-07-29 PROCEDURE — 25000003 PHARM REV CODE 250: Performed by: INTERNAL MEDICINE

## 2020-07-29 PROCEDURE — 84100 ASSAY OF PHOSPHORUS: CPT

## 2020-07-29 PROCEDURE — 25000003 PHARM REV CODE 250: Performed by: STUDENT IN AN ORGANIZED HEALTH CARE EDUCATION/TRAINING PROGRAM

## 2020-07-29 PROCEDURE — 63600175 PHARM REV CODE 636 W HCPCS: Performed by: PHYSICIAN ASSISTANT

## 2020-07-29 PROCEDURE — 25000003 PHARM REV CODE 250: Performed by: PHYSICIAN ASSISTANT

## 2020-07-29 PROCEDURE — 94761 N-INVAS EAR/PLS OXIMETRY MLT: CPT

## 2020-07-29 PROCEDURE — 36415 COLL VENOUS BLD VENIPUNCTURE: CPT

## 2020-07-29 PROCEDURE — 63600175 PHARM REV CODE 636 W HCPCS: Performed by: STUDENT IN AN ORGANIZED HEALTH CARE EDUCATION/TRAINING PROGRAM

## 2020-07-29 PROCEDURE — 80048 BASIC METABOLIC PNL TOTAL CA: CPT

## 2020-07-29 PROCEDURE — 83735 ASSAY OF MAGNESIUM: CPT

## 2020-07-29 PROCEDURE — G0378 HOSPITAL OBSERVATION PER HR: HCPCS

## 2020-07-29 PROCEDURE — 99226 PR SUBSEQUENT OBSERVATION CARE,LEVEL III: ICD-10-PCS | Mod: ,,, | Performed by: PHYSICIAN ASSISTANT

## 2020-07-29 PROCEDURE — 63600175 PHARM REV CODE 636 W HCPCS: Performed by: INTERNAL MEDICINE

## 2020-07-29 PROCEDURE — 99226 PR SUBSEQUENT OBSERVATION CARE,LEVEL III: CPT | Mod: ,,, | Performed by: PHYSICIAN ASSISTANT

## 2020-07-29 PROCEDURE — 85025 COMPLETE CBC W/AUTO DIFF WBC: CPT

## 2020-07-29 RX ORDER — CIPROFLOXACIN 500 MG/1
500 TABLET ORAL EVERY 12 HOURS
Status: DISCONTINUED | OUTPATIENT
Start: 2020-07-29 | End: 2020-07-30

## 2020-07-29 RX ORDER — METRONIDAZOLE 500 MG/1
500 TABLET ORAL EVERY 8 HOURS
Status: DISCONTINUED | OUTPATIENT
Start: 2020-07-29 | End: 2020-07-29

## 2020-07-29 RX ORDER — DICYCLOMINE HYDROCHLORIDE 20 MG/1
20 TABLET ORAL
Status: DISCONTINUED | OUTPATIENT
Start: 2020-07-29 | End: 2020-08-01 | Stop reason: HOSPADM

## 2020-07-29 RX ORDER — SIMETHICONE 80 MG
1 TABLET,CHEWABLE ORAL 3 TIMES DAILY PRN
Status: DISCONTINUED | OUTPATIENT
Start: 2020-07-29 | End: 2020-08-01 | Stop reason: HOSPADM

## 2020-07-29 RX ADMIN — OXYCODONE HYDROCHLORIDE 10 MG: 10 TABLET ORAL at 09:07

## 2020-07-29 RX ADMIN — ACETAMINOPHEN 650 MG: 325 TABLET ORAL at 06:07

## 2020-07-29 RX ADMIN — FAMOTIDINE 20 MG: 20 TABLET, FILM COATED ORAL at 09:07

## 2020-07-29 RX ADMIN — LETROZOLE 2.5 MG: 2.5 TABLET ORAL at 09:07

## 2020-07-29 RX ADMIN — PIPERACILLIN AND TAZOBACTAM 4.5 G: 4; .5 INJECTION, POWDER, LYOPHILIZED, FOR SOLUTION INTRAVENOUS; PARENTERAL at 04:07

## 2020-07-29 RX ADMIN — GABAPENTIN 200 MG: 100 CAPSULE ORAL at 02:07

## 2020-07-29 RX ADMIN — GABAPENTIN 200 MG: 100 CAPSULE ORAL at 09:07

## 2020-07-29 RX ADMIN — POLYETHYLENE GLYCOL 3350 17 G: 17 POWDER, FOR SOLUTION ORAL at 09:07

## 2020-07-29 RX ADMIN — PIPERACILLIN SODIUM AND TAZOBACTAM SODIUM 4.5 G: 4; .5 INJECTION, POWDER, LYOPHILIZED, FOR SOLUTION INTRAVENOUS at 10:07

## 2020-07-29 RX ADMIN — OXYCODONE HYDROCHLORIDE 5 MG: 5 TABLET ORAL at 04:07

## 2020-07-29 RX ADMIN — VANCOMYCIN HYDROCHLORIDE 2000 MG: 100 INJECTION, POWDER, LYOPHILIZED, FOR SOLUTION INTRAVENOUS at 07:07

## 2020-07-29 RX ADMIN — CIPROFLOXACIN 500 MG: 500 TABLET, FILM COATED ORAL at 09:07

## 2020-07-29 RX ADMIN — ASPIRIN 81 MG: 81 TABLET, COATED ORAL at 09:07

## 2020-07-29 RX ADMIN — DICYCLOMINE HYDROCHLORIDE 20 MG: 20 TABLET ORAL at 04:07

## 2020-07-29 RX ADMIN — DICYCLOMINE HYDROCHLORIDE 20 MG: 20 TABLET ORAL at 10:07

## 2020-07-29 RX ADMIN — DICYCLOMINE HYDROCHLORIDE 20 MG: 20 TABLET ORAL at 09:07

## 2020-07-29 RX ADMIN — METRONIDAZOLE 500 MG: 500 TABLET ORAL at 02:07

## 2020-07-29 RX ADMIN — LEVOTHYROXINE SODIUM 200 MCG: 100 TABLET ORAL at 06:07

## 2020-07-29 RX ADMIN — IRBESARTAN 300 MG: 300 TABLET, FILM COATED ORAL at 09:07

## 2020-07-29 RX ADMIN — SIMETHICONE CHEW TAB 80 MG 80 MG: 80 TABLET ORAL at 09:07

## 2020-07-29 NOTE — ASSESSMENT & PLAN NOTE
- left breast spacer ruptured, leaking purulent fluid  - suspect infection  - plastic surgery consulted

## 2020-07-29 NOTE — PROGRESS NOTES
"[facility] Hospital Medicine  Progress Note    Patient Name: Maria Elena Tavares  MRN: [id number]  Patient Class: OP- Observation   Admission Date: 7/27/2020  Length of Stay: 0 days  Attending Physician: Palomo Andre MD  Primary Care Provider: Estephania San MD    [facility] Medicine Team: [facility] MED Y Alonzo Hendricks PA-C    Subjective:     Principal Problem:Enteritis        HPI:  Maria Elena Tavares is a 64F with of breast cancer (completed chemo 02/2020, last radiation 07/2020), T2DM, HTN, anemia of neoplastic disease who presents with episgastric pain and tenderness since Friday. Patient reports episodes of shooting pain lasting 3-5 seconds many times a day since Friday. Has ssociated abdominal distension, nausea and has been unable to keep food down. She has vomited multiple times this weekend -- denies hematemesis. Admits to decreased appetite and states she eats smaller amounts of food than she used to. Last BM was on Friday (normal/loose consistency)  but does admit to two episodes of painless BRBPR on 07/23. She states she was constipated at the time and had been straining to have a BM.  Denies any recent illness, chest pain, SOB, fever/chills. Denies flatus but does admit to her stomach feeling "bubbly".     ED: AFVSS, WBC 13, Hbg stable at 10, LFTs WNL, lipase WNL, LA 0.9, UA clean, KUB unremarkable, RUQ US w/o acute findings, CT A/P showing enteritis, EKG no acute ischemia, neg troponin, K 3.1, CXR clear. COVID negative.    Overview/Hospital Course:  Patient admitted to observation for enteritis. She was started on Zosyn with improvement. Prior to discharge on oral medications, patient's left breast spacer ruptured with purulent fluid. Plastic surgery consulted, awaiting recs.    Interval History: Patient tolerated diet overnight and this morning. Transitioned to oral meds. Prior to discharge, her left breast spacer ruptured and leaked purulent fluid. Patient will stay for plastic surgery " evaluation.    Review of Systems   Constitutional: Positive for appetite change and fatigue. Negative for fever.   Respiratory: Negative for shortness of breath and wheezing.    Cardiovascular: Negative for chest pain and palpitations.   Gastrointestinal: Positive for abdominal distention and diarrhea. Negative for abdominal pain, nausea and vomiting.   Musculoskeletal: Negative for back pain and neck stiffness.   Neurological: Negative for weakness and light-headedness.   Psychiatric/Behavioral: Negative for confusion and suicidal ideas.     Objective:     Vital Signs (Most Recent):  Temp: 98.3 °F (36.8 °C) (07/29/20 1308)  Pulse: 87 (07/29/20 1340)  Resp: 18 (07/29/20 1340)  BP: (!) 100/53 (07/29/20 1308)  SpO2: 98 % (07/29/20 1340) Vital Signs (24h Range):  Temp:  [96.8 °F (36 °C)-98.7 °F (37.1 °C)] 98.3 °F (36.8 °C)  Pulse:  [79-99] 87  Resp:  [12-20] 18  SpO2:  [93 %-100 %] 98 %  BP: (100-118)/(53-69) 100/53     Weight: 78.8 kg (173 lb 13.3 oz)  Body mass index is 33.95 kg/m².  No intake or output data in the 24 hours ending 07/29/20 1523   Physical Exam  Vitals signs and nursing note reviewed.   Constitutional:       Appearance: She is well-developed. She is obese.   HENT:      Right Ear: Hearing normal.      Left Ear: Hearing normal.      Mouth/Throat:      Pharynx: Uvula midline.   Eyes:      General: Lids are normal.   Neck:      Musculoskeletal: Full passive range of motion without pain.      Trachea: Phonation normal.   Cardiovascular:      Rate and Rhythm: Normal rate and regular rhythm.      Heart sounds: Normal heart sounds.   Pulmonary:      Effort: Pulmonary effort is normal.      Breath sounds: Normal breath sounds. No wheezing or rales.   Abdominal:      General: Bowel sounds are normal.      Palpations: Abdomen is soft.      Tenderness: There is abdominal tenderness (generalized) in the epigastric area. There is no guarding.   Skin:     Comments: Radiation burns on chest wall. Port on R     Neurological:      General: No focal deficit present.      Mental Status: She is alert and oriented to person, place, and time.   Psychiatric:         Speech: Speech normal.         Significant Labs:   BMP:   Recent Labs   Lab 07/29/20  0411   *      K 3.1*   CL 99   CO2 29   BUN 12   CREATININE 0.8   CALCIUM 8.8   MG 1.6     CBC:   Recent Labs   Lab 07/28/20  0426 07/29/20  0411   WBC 10.56 9.97   HGB 9.7* 8.6*   HCT 29.9* 27.4*    326     All pertinent labs within the past 24 hours have been reviewed.    Significant Imaging: I have reviewed all pertinent imaging results/findings within the past 24 hours.      Assessment/Plan:      * Enteritis  - 2/4 SIRS for WBC 13, and HR >90, LA 0.9  - given breast cancer history will start zosyn and can deescalate to cipro/flagyl at discharge  - she has not had a BM in several days, so unclear if having solid stools vs diarrhea. Will send stool studies if diarrheal illness. If not, can d/c the studies  - IVF  - symptomatic treatment for nausea  - bland diet    Breast cancer  - left breast spacer ruptured, leaking purulent fluid  - suspect infection  - plastic surgery consulted    Hypokalemia  - replace IV as not able to tolerate PO    Anemia in neoplastic disease  - stable    Essential hypertension  - continue irebsartan  - hold HCTZ to avoid dehydation    Uncontrolled type 2 diabetes mellitus without complication, without long-term current use of insulin  - hold home metformin and trulicity   - low dose SSI  - diabetic diet  -  on admit  Lab Results   Component Value Date    HGBA1C 7.3 (H) 07/28/2020         VTE Risk Mitigation (From admission, onward)         Ordered     IP VTE HIGH RISK PATIENT  Once      07/27/20 2223     Place sequential compression device  Until discontinued      07/27/20 2223     Place SAMANTHA hose  Until discontinued      07/27/20 2223                      Alonzo Hendricks PA-C  Department of Hospital Medicine   [facility]

## 2020-07-29 NOTE — PROGRESS NOTES
Pharmacokinetic Initial Assessment: IV Vancomycin    Assessment/Plan:    Initiate intravenous vancomycin with loading dose of 2000 mg once followed by a maintenance dose of vancomycin 2000 mg IV every 24 hours  Desired empiric serum trough concentration is 10 to 20 mcg/mL  Draw vancomycin trough level 30 min prior to third dose on 7/31/20 at approximately 1930  Pharmacy will continue to follow and monitor vancomycin.      Please contact pharmacy at extension [phone] with any questions regarding this assessment.     Thank you for the consult,   Montse Mcclellan       Patient brief summary:  Maria Elena Tavares is a 64 y.o. female initiated on antimicrobial therapy with IV Vancomycin for treatment of suspected skin & soft tissue infection    Drug Allergies:   Review of patient's allergies indicates:  No Known Allergies    Actual Body Weight:   78.8kg    Renal Function:   Estimated Creatinine Clearance: 66.1 mL/min (based on SCr of 0.8 mg/dL).,     Dialysis Method (if applicable):  N/A    CBC (last 72 hours):  Recent Labs   Lab Result Units 07/27/20  1223 07/27/20  1320 07/28/20  0426 07/29/20  0411   WBC K/uL 12.18 13.00* 10.56 9.97   Hemoglobin g/dL 10.3* 10.5* 9.7* 8.6*   Hemoglobin A1C %  --   --  7.3*  --    Hematocrit % 31.9* 32.3* 29.9* 27.4*   Platelets K/uL 347 363* 337 326   Gran% %  --  84.0* 75.1* 78.4*   Lymph% %  --  3.5* 6.1* 6.0*   Mono% %  --  10.7 16.4* 12.1   Eosinophil% %  --  0.1 0.4 1.7   Basophil% %  --  0.2 0.3 0.2   Differential Method   --  Automated Automated Automated       Metabolic Panel (last 72 hours):  Recent Labs   Lab Result Units 07/27/20  1223 07/27/20  1329 07/27/20  1511 07/28/20  0426 07/29/20  0411   Sodium mmol/L 138  --  136 137 136   Potassium mmol/L 3.1*  --  3.1* 3.5 3.1*   Chloride mmol/L 100  --  98 101 99   CO2 mmol/L 27 --  28 27 29   Glucose mg/dL 195*  --  185* 144* 123*   Glucose, UA   --  Negative  --   --   --    BUN, Bld mg/dL 19  --  18 16 12   Creatinine mg/dL 0.9  --   0.9 0.8 0.8   Albumin g/dL 3.0*  --  2.9*  --   --    Total Bilirubin mg/dL 0.8  --  0.8  --   --    Alkaline Phosphatase U/L 94  --  82  --   --    AST U/L 20  --  18  --   --    ALT U/L 12  --  10  --   --    Magnesium mg/dL  --   --   --  1.9 1.6   Phosphorus mg/dL  --   --   --  3.1 3.5       Drug levels (last 3 results):  No results for input(s): VANCOMYCINRA, VANCOMYCINPE, VANCOMYCINTR in the last 72 hours.    Microbiologic Results:  Microbiology Results (last 7 days)     Procedure Component Value Units Date/Time    Blood culture (site 2) [[id number]] Collected: 07/27/20 2318    Order Status: Completed Specimen: Blood from Peripheral, Wrist, Right Updated: 07/29/20 0805     Blood Culture, Routine No growth to date    Narrative:      Site # 2, aerobic only    Blood culture (site 1) [[id number]] Collected: 07/27/20 2318    Order Status: Completed Specimen: Blood from Line, Subclavian, Right Updated: 07/29/20 0805     Blood Culture, Routine No growth to date    Narrative:      Site # 1, aerobic and anaerobic    Clostridium difficile EIA [[id number]] Collected: 07/28/20 2035    Order Status: Completed Specimen: Stool Updated: 07/28/20 2321     C. diff Antigen Negative     C difficile Toxins A+B, EIA Negative     Comment: Testing not recommended for children <24 months old.       Stool culture [[id number]] Collected: 07/28/20 2035    Order Status: Sent Specimen: Stool Updated: 07/28/20 2125    E. coli 0157 antigen [[id number]] Collected: 07/28/20 2035    Order Status: No result Specimen: Stool Updated: 07/28/20 2125

## 2020-07-29 NOTE — ANESTHESIA PREPROCEDURE EVALUATION
[facility]  Anesthesia Pre-Operative Evaluation         Patient Name: Maria Elena Tavares  YOB: 1955  MRN: [id number]    SUBJECTIVE:     Pre-operative evaluation for Procedure(s) (LRB):  REMOVAL, TISSUE EXPANDER (Left)     07/29/2020    Maria Elena Tavares is a 64 y.o. female w/ a significant PMHx of poorly controlled DM2, HTN, HLD, hypothyroidism, anemia, breast cancer s/p completed chemotherapy and radiation, pancytopenia 2/2 chemotherapy who was admitted for enteritis. She was started on Zosyn with improvement. Prior to discharge on oral medications, patient's left breast spacer ruptured with purulent fluid. Plan for tissue expander removal tomorrow in OR.     Patient now presents for the above procedure(s).      LDA:        Port A Cath Single Lumen 10/04/19 0748 right subclavian (Active)           Prev airway: Easy mask with oral airway, grade II view on DL, ETT placement complicated by Small mouth, Oropharyngeal edema or fat, Short neck, Obesity.    Drips: None.      Patient Active Problem List   Diagnosis    Uncontrolled type 2 diabetes mellitus without complication, without long-term current use of insulin    Essential hypertension    History of shingles    Mild vitamin D deficiency    Hypothyroid    Diverticulitis of colon (without mention of hemorrhage)(562.11)    Breast cancer metastasized to axillary lymph node, left    Thrush    Anemia in neoplastic disease    Chemotherapy induced neutropenia    Neuropathy due to chemotherapeutic drug    Right arm pain    Non-intractable vomiting with nausea    Orthostatic hypotension    Breast cancer    At risk for lymphedema    Decreased shoulder mobility, unspecified laterality    Enteritis    Hypokalemia       Review of patient's allergies indicates:  No Known Allergies    Current Inpatient Medications:   aspirin  81 mg  Oral Daily    ciprofloxacin HCl  500 mg Oral Q12H    dicyclomine  20 mg Oral QID (AC & HS)    famotidine  20 mg Oral BID    gabapentin  200 mg Oral TID    irbesartan  300 mg Oral Daily    letrozole  2.5 mg Oral Daily    levothyroxine  200 mcg Oral Daily    metroNIDAZOLE  500 mg Oral Q8H    polyethylene glycol  17 g Oral BID    vancomycin (VANCOCIN) IVPB  2,000 mg Intravenous Q24H       No current facility-administered medications on file prior to encounter.      Current Outpatient Medications on File Prior to Encounter   Medication Sig Dispense Refill    chlorthalidone (HYGROTEN) 25 MG Tab Take 1 tablet by mouth every morning 90 tablet 1    irbesartan (AVAPRO) 300 MG tablet Take 1 tablet by mouth once daily 90 tablet 1    aspirin (ECOTRIN) 81 MG EC tablet Take 81 mg by mouth once daily.        blood-glucose meter kit DISPENSE : BLOOD TEST STRIPS AND LANCETS PATIENT TEST BLOOD SUGARS TWICE DAILY  TEST STRIPS: 50 EACH, REFILL 5  LANCETS:  50 EACH , REFILL 5 1 each 0    dulaglutide (TRULICITY) 1.5 mg/0.5 mL pen injector Inject 1.5 mg into the skin every 7 days. 2 mL 5    ergocalciferol (VITAMIN D2) 50,000 unit Cap Take 1 capsule (50,000 Units total) by mouth every 7 days. 12 capsule 2    gabapentin (NEURONTIN) 100 MG capsule Take 2 capsules (200 mg total) by mouth 3 (three) times daily. 90 capsule 2    HYDROcodone-acetaminophen (NORCO) 5-325 mg per tablet Take 1 tablet by mouth every 8 (eight) hours as needed for Pain. 30 tablet 0    letrozole (FEMARA) 2.5 mg Tab Take 1 tablet (2.5 mg total) by mouth once daily. 30 tablet 3    levothyroxine (SYNTHROID) 200 MCG tablet Take 1 tablet (200 mcg total) by mouth once daily. 90 tablet 1    lifitegrast (XIIDRA) 5 % Dpet Apply 1 drop to  both eyes  2 (two) times daily. 180 each 4    metFORMIN (GLUCOPHAGE) 1000 MG tablet TAKE 1 TABLET (1,000 MG TOTAL) BY MOUTH 2 (TWO) TIMES DAILY WITH MEALS. 180 tablet 1    ondansetron (ZOFRAN-ODT) 4 MG TbDL Dissolve 1  tablet (4 mg total) by mouth every 6 (six) hours as needed. 60 tablet 2    polyethylene glycol (GLYCOLAX) 17 gram/dose powder Mix 17 g (1 capful) with juice or water an drink 2 (two) times daily. 1020 g 2       Past Surgical History:   Procedure Laterality Date    BREAST BIOPSY Left 2019    IDC with mets to node    BREAST BIOPSY Right 2019    core bx, benign node    BREAST BIOPSY Left 10/14/2019    MRI Core bx, + IDC    BREAST BIOPSY Left 10/08/2019    Core bx, ADH     SECTION      HYSTERECTOMY      INSERTION OF BREAST TISSUE EXPANDER Bilateral 3/24/2020    Procedure: INSERTION, TISSUE EXPANDER, BREAST BILATERAL;  Surgeon: Michael Solorzano MD;  Location: [facility] OR 2ND FLR;  Service: Plastics;  Laterality: Bilateral;    INSERTION OF TUNNELED CENTRAL VENOUS CATHETER (CVC) WITH SUBCUTANEOUS PORT Right 10/4/2019    Procedure: UPIMLAHCG-SRSS-K-CATH RIGHT (CONSENT AM OF) 1.0 hr case;  Surgeon: Elena Lopez MD;  Location: [facility] OR 2ND FLR;  Service: General;  Laterality: Right;    OOPHORECTOMY      SENTINEL LYMPH NODE BIOPSY Left 3/24/2020    Procedure: BIOPSY, LYMPH NODE, SENTINEL LEFT;  Surgeon: Elena Lopez MD;  Location: [facility] OR 2ND FLR;  Service: General;  Laterality: Left;    SIMPLE MASTECTOMY Bilateral 3/24/2020    Procedure: MASTECTOMY, SIMPLE BILATERAL;  Surgeon: Elena Lopez MD;  Location: [facility] OR 2ND FLR;  Service: General;  Laterality: Bilateral;       Social History     Socioeconomic History    Marital status: Single     Spouse name: Not on file    Number of children: Not on file    Years of education: Not on file    Highest education level: Not on file   Occupational History     Employer: [employer] ([employer]   Social Needs    Financial resource strain: Not on file    Food insecurity     Worry: Not on file     Inability: Not on file    Transportation needs     Medical: Not on file     Non-medical: Not on file   Tobacco Use    Smoking status: Never  Smoker    Smokeless tobacco: Never Used    Tobacco comment: 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  She walks occasionally.   Substance and Sexual Activity    Alcohol use: Not Currently     Comment: Occasionally    Drug use: No    Sexual activity: Not Currently     Partners: Male   Lifestyle    Physical activity     Days per week: Not on file     Minutes per session: Not on file    Stress: Not on file   Relationships    Social connections     Talks on phone: Not on file     Gets together: Not on file     Attends Spiritism service: Not on file     Active member of club or organization: Not on file     Attends meetings of clubs or organizations: Not on file     Relationship status: Not on file   Other Topics Concern    Not on file   Social History Narrative    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 daughter1 granddaughter       OBJECTIVE:     Vital Signs Range (Last 24H):  Temp:  [36.2 °C (97.1 °F)-37.1 °C (98.7 °F)]   Pulse:  [80-99]   Resp:  [12-20]   BP: (100-118)/(53-69)   SpO2:  [93 %-99 %]       Significant Labs:  Lab Results   Component Value Date    WBC 9.97 07/29/2020    HGB 8.6 (L) 07/29/2020    HCT 27.4 (L) 07/29/2020     07/29/2020    CHOL 261 (H) 10/09/2019    TRIG 92 10/09/2019    HDL 57 10/09/2019    ALT 10 07/27/2020    AST 18 07/27/2020     07/29/2020    K 3.1 (L) 07/29/2020    CL 99 07/29/2020    CREATININE 0.8 07/29/2020    BUN 12 07/29/2020    CO2 29 07/29/2020    TSH 3.951 01/24/2019    HGBA1C 7.3 (H) 07/28/2020         EKG:   Sinus rhythm with sinus arrhythmia  Nonspecific T wave abnormality    Stress ECHO: 12/11/2019  · There were no arrhythmias during stress.  · The test was stopped because the patient experienced shortness of breath.  · The patient's exercise capacity was moderately impaired.  · The EKG portion of this study is negative for myocardial ischemia.  · Normal left ventricular systolic function. The estimated ejection  fraction is 63%  · Concentric left ventricular remodeling.  · Local segmental wall motion abnormalities.  · Normal LV diastolic function.  · Normal right ventricular systolic function.  · Normal central venous pressure (3 mm Hg).  · The estimated PA systolic pressure is 23 mm Hg  · Trivial posterior pericardial effusion.  · The stress echo portion of this study is negative for myocardial ischemia.        ASSESSMENT/PLAN:         Anesthesia Evaluation    I have reviewed the Patient Summary Reports.      I have reviewed the Medications.     Review of Systems  Anesthesia Hx:  No problems with previous Anesthesia  History of prior surgery of interest to airway management or planning:  Denies Personal Hx of Anesthesia complications.   Social:  Non-Smoker    Hematology/Oncology:         -- Anemia: Current/Recent Cancer. Breast left chemotherapy and radiation   EENT/Dental:EENT/Dental Normal   Cardiovascular:   Hypertension hyperlipidemia    Pulmonary:  Pulmonary Normal    Hepatic/GI:   enteritis   Neurological:  Neurology Normal    Endocrine:   Diabetes, poorly controlled, type 2 Hypothyroidism        Physical Exam  General:  Obesity    Airway/Jaw/Neck:  Airway Findings: Mouth Opening: Normal Tongue: Normal  General Airway Assessment: Adult  Mallampati: III  TM Distance: Normal, at least 6 cm  Jaw/Neck Findings: (short neck, increased girth)  Neck ROM: Normal ROM      Dental:  Dental Findings: In tact   Chest/Lungs:  Chest/Lungs Findings: Clear to auscultation, Normal Respiratory Rate     Heart/Vascular:  Heart Findings: Rate: Normal  Rhythm: Regular Rhythm        Mental Status:  Mental Status Findings:  Cooperative, Alert and Oriented         Anesthesia Plan  Type of Anesthesia, risks & benefits discussed:  Anesthesia Type:  general  Patient's Preference:   Intra-op Monitoring Plan: standard ASA monitors  Intra-op Monitoring Plan Comments:   Post Op Pain Control Plan: multimodal analgesia, IV/PO Opioids PRN and per  primary service following discharge from PACU  Post Op Pain Control Plan Comments:   Induction:   IV  Beta Blocker:         Informed Consent: Patient understands risks and agrees with Anesthesia plan.  Questions answered. Anesthesia consent signed with patient.  ASA Score: 3     Day of Surgery Review of History & Physical:    H&P update referred to the surgeon.         Ready For Surgery From Anesthesia Perspective.

## 2020-07-29 NOTE — TELEPHONE ENCOUNTER
Call made to patient to discuss her tissue expanders. Patient did not answer. Patient call forwarded to Dr Solorzano/surgery team.

## 2020-07-29 NOTE — SUBJECTIVE & OBJECTIVE
Interval History: Patient tolerated diet overnight and this morning. Transitioned to oral meds. Prior to discharge, her left breast spacer ruptured and leaked purulent fluid. Patient will stay for plastic surgery evaluation.    Review of Systems   Constitutional: Positive for appetite change and fatigue. Negative for fever.   Respiratory: Negative for shortness of breath and wheezing.    Cardiovascular: Negative for chest pain and palpitations.   Gastrointestinal: Positive for abdominal distention and diarrhea. Negative for abdominal pain, nausea and vomiting.   Musculoskeletal: Negative for back pain and neck stiffness.   Neurological: Negative for weakness and light-headedness.   Psychiatric/Behavioral: Negative for confusion and suicidal ideas.     Objective:     Vital Signs (Most Recent):  Temp: 98.3 °F (36.8 °C) (07/29/20 1308)  Pulse: 87 (07/29/20 1340)  Resp: 18 (07/29/20 1340)  BP: (!) 100/53 (07/29/20 1308)  SpO2: 98 % (07/29/20 1340) Vital Signs (24h Range):  Temp:  [96.8 °F (36 °C)-98.7 °F (37.1 °C)] 98.3 °F (36.8 °C)  Pulse:  [79-99] 87  Resp:  [12-20] 18  SpO2:  [93 %-100 %] 98 %  BP: (100-118)/(53-69) 100/53     Weight: 78.8 kg (173 lb 13.3 oz)  Body mass index is 33.95 kg/m².  No intake or output data in the 24 hours ending 07/29/20 1523   Physical Exam  Vitals signs and nursing note reviewed.   Constitutional:       Appearance: She is well-developed. She is obese.   HENT:      Right Ear: Hearing normal.      Left Ear: Hearing normal.      Mouth/Throat:      Pharynx: Uvula midline.   Eyes:      General: Lids are normal.   Neck:      Musculoskeletal: Full passive range of motion without pain.      Trachea: Phonation normal.   Cardiovascular:      Rate and Rhythm: Normal rate and regular rhythm.      Heart sounds: Normal heart sounds.   Pulmonary:      Effort: Pulmonary effort is normal.      Breath sounds: Normal breath sounds. No wheezing or rales.   Abdominal:      General: Bowel sounds are normal.       Palpations: Abdomen is soft.      Tenderness: There is abdominal tenderness (generalized) in the epigastric area. There is no guarding.   Skin:     Comments: Radiation burns on chest wall. Port on R    Neurological:      General: No focal deficit present.      Mental Status: She is alert and oriented to person, place, and time.   Psychiatric:         Speech: Speech normal.         Significant Labs:   BMP:   Recent Labs   Lab 07/29/20  0411   *      K 3.1*   CL 99   CO2 29   BUN 12   CREATININE 0.8   CALCIUM 8.8   MG 1.6     CBC:   Recent Labs   Lab 07/28/20  0426 07/29/20  0411   WBC 10.56 9.97   HGB 9.7* 8.6*   HCT 29.9* 27.4*    326     All pertinent labs within the past 24 hours have been reviewed.    Significant Imaging: I have reviewed all pertinent imaging results/findings within the past 24 hours.

## 2020-07-29 NOTE — CONSULTS
Maria Elena Tavares is a 64F with of breast cancer (completed chemo 02/2020, last radiation 07/2020), T2DM, HTN, anemia of neoplastic disease who is admitted for gastritis. Pt s/p B/L mastectomy and TE reconstruction on 3/30/2020, completed chemo in 2/2020, and recently completed radiation this month. Pt says today she noticed purulent drainage from left breast wound.      Objective:      Vital Signs (Most Recent):  Temp: 98.3 °F (36.8 °C) (07/29/20 1308)  Pulse: 87 (07/29/20 1340)  Resp: 18 (07/29/20 1340)  BP: (!) 100/53 (07/29/20 1308)  SpO2: 98 % (07/29/20 1340) Vital Signs (24h Range):  Temp:  [96.8 °F (36 °C)-98.7 °F (37.1 °C)] 98.3 °F (36.8 °C)  Pulse:  [79-99] 87  Resp:  [12-20] 18  SpO2:  [93 %-100 %] 98 %  BP: (100-118)/(53-69) 100/53        Physical Exam  Gen: awake, alert, no distress  Resp: non-labored  Lt Breast: radiation associated skin changes, purulent drainage noted, + tenderness        Significant Labs:   BMP:       Recent Labs   Lab 07/29/20  0411   *      K 3.1*   CL 99   CO2 29   BUN 12   CREATININE 0.8   CALCIUM 8.8   MG 1.6      CBC:        Recent Labs   Lab 07/28/20  0426 07/29/20  0411   WBC 10.56 9.97   HGB 9.7* 8.6*   HCT 29.9* 27.4*    326        A: 64F s/p B/L mastectomy and TE placement, s/p chemo-radiation, now with infected left breast with underlying tissue expander.    Plan  - booked for OR tomorrow, for left TE removal  - please make patient at midnight: NPO, hold DVT prophylaxis  - c/w IV antibiotics (recommend MRSA coverage as well)    James Freeman MD  Plastic Surgery Fellow  [phone]

## 2020-07-29 NOTE — NURSING
Patient pain managed with medications, no falls and no blood in stools. Patient noted large amount of purulent drainage from left breast wound from her tissue expander.  Will have expander removed surgically tomorrow. Using ABD pads to managed drainage.

## 2020-07-29 NOTE — PLAN OF CARE
Safety measures maintained. VSS on RA. Pt had no complaints of pain throughout night. Pt had ~6 Loose stools throughout shift. PA on call notified about K+ of 3.1 Bed in lowest position, call light within reach, side rails up x2. Pt has no complaints at this time. Will continue to monitor.

## 2020-07-29 NOTE — NURSING
Patient up to bathroom, experienced a rush of fluid from left breast - undergoing expansion with cancer treatment/radiation. Contacted Medical Team who assessed patient.  Small hole in bottom of breast leaking yellow purulent drainage. Significant amount on floor.  Patient denies pain, ABD pad under breast to catch discharge. Waiting on consult.

## 2020-07-30 ENCOUNTER — ANESTHESIA (OUTPATIENT)
Dept: SURGERY | Facility: HOSPITAL | Age: 65
DRG: 908 | End: 2020-07-30
Payer: COMMERCIAL

## 2020-07-30 PROBLEM — N61.0 INFECTION OF LEFT BREAST: Status: ACTIVE | Noted: 2020-07-30

## 2020-07-30 PROBLEM — T85.9XXA COMPLICATION OF BREAST IMPLANT: Status: ACTIVE | Noted: 2020-07-30

## 2020-07-30 LAB
ANION GAP SERPL CALC-SCNC: 8 MMOL/L (ref 8–16)
BASOPHILS # BLD AUTO: 0.01 K/UL (ref 0–0.2)
BASOPHILS NFR BLD: 0.1 % (ref 0–1.9)
BUN SERPL-MCNC: 9 MG/DL (ref 8–23)
CALCIUM SERPL-MCNC: 9 MG/DL (ref 8.7–10.5)
CHLORIDE SERPL-SCNC: 100 MMOL/L (ref 95–110)
CO2 SERPL-SCNC: 30 MMOL/L (ref 23–29)
CREAT SERPL-MCNC: 0.8 MG/DL (ref 0.5–1.4)
DIFFERENTIAL METHOD: ABNORMAL
E COLI SXT1 STL QL IA: NEGATIVE
E COLI SXT2 STL QL IA: NEGATIVE
EOSINOPHIL # BLD AUTO: 0.2 K/UL (ref 0–0.5)
EOSINOPHIL NFR BLD: 1.7 % (ref 0–8)
ERYTHROCYTE [DISTWIDTH] IN BLOOD BY AUTOMATED COUNT: 15.5 % (ref 11.5–14.5)
EST. GFR  (AFRICAN AMERICAN): >60 ML/MIN/1.73 M^2
EST. GFR  (NON AFRICAN AMERICAN): >60 ML/MIN/1.73 M^2
GLUCOSE SERPL-MCNC: 116 MG/DL (ref 70–110)
GRAM STN SPEC: NORMAL
HCT VFR BLD AUTO: 26 % (ref 37–48.5)
HGB BLD-MCNC: 8.3 G/DL (ref 12–16)
IMM GRANULOCYTES # BLD AUTO: 0.18 K/UL (ref 0–0.04)
IMM GRANULOCYTES NFR BLD AUTO: 2 % (ref 0–0.5)
LYMPHOCYTES # BLD AUTO: 0.4 K/UL (ref 1–4.8)
LYMPHOCYTES NFR BLD: 5 % (ref 18–48)
MAGNESIUM SERPL-MCNC: 1.5 MG/DL (ref 1.6–2.6)
MCH RBC QN AUTO: 28.7 PG (ref 27–31)
MCHC RBC AUTO-ENTMCNC: 31.9 G/DL (ref 32–36)
MCV RBC AUTO: 90 FL (ref 82–98)
MONOCYTES # BLD AUTO: 0.8 K/UL (ref 0.3–1)
MONOCYTES NFR BLD: 9.4 % (ref 4–15)
NEUTROPHILS # BLD AUTO: 7.3 K/UL (ref 1.8–7.7)
NEUTROPHILS NFR BLD: 81.8 % (ref 38–73)
NRBC BLD-RTO: 0 /100 WBC
O+P STL MICRO: NORMAL
PHOSPHATE SERPL-MCNC: 3.6 MG/DL (ref 2.7–4.5)
PLATELET # BLD AUTO: 351 K/UL (ref 150–350)
PMV BLD AUTO: 9.5 FL (ref 9.2–12.9)
POCT GLUCOSE: 111 MG/DL (ref 70–110)
POCT GLUCOSE: 125 MG/DL (ref 70–110)
POCT GLUCOSE: 130 MG/DL (ref 70–110)
POCT GLUCOSE: 132 MG/DL (ref 70–110)
POCT GLUCOSE: 159 MG/DL (ref 70–110)
POCT GLUCOSE: 282 MG/DL (ref 70–110)
POTASSIUM SERPL-SCNC: 2.8 MMOL/L (ref 3.5–5.1)
RBC # BLD AUTO: 2.89 M/UL (ref 4–5.4)
SODIUM SERPL-SCNC: 138 MMOL/L (ref 136–145)
WBC # BLD AUTO: 8.86 K/UL (ref 3.9–12.7)

## 2020-07-30 PROCEDURE — 63600175 PHARM REV CODE 636 W HCPCS: Performed by: STUDENT IN AN ORGANIZED HEALTH CARE EDUCATION/TRAINING PROGRAM

## 2020-07-30 PROCEDURE — 88300 SURGICAL PATH GROSS: CPT | Performed by: PATHOLOGY

## 2020-07-30 PROCEDURE — 82962 GLUCOSE BLOOD TEST: CPT

## 2020-07-30 PROCEDURE — 85025 COMPLETE CBC W/AUTO DIFF WBC: CPT

## 2020-07-30 PROCEDURE — 87070 CULTURE OTHR SPECIMN AEROBIC: CPT | Mod: 59

## 2020-07-30 PROCEDURE — 88300 PR  SURG PATH,GROSS,LEVEL I: ICD-10-PCS | Mod: 26,,, | Performed by: PATHOLOGY

## 2020-07-30 PROCEDURE — 63600175 PHARM REV CODE 636 W HCPCS: Performed by: PHYSICIAN ASSISTANT

## 2020-07-30 PROCEDURE — 25000003 PHARM REV CODE 250: Performed by: NURSE ANESTHETIST, CERTIFIED REGISTERED

## 2020-07-30 PROCEDURE — D9220A PRA ANESTHESIA: ICD-10-PCS | Mod: ANES,,, | Performed by: ANESTHESIOLOGY

## 2020-07-30 PROCEDURE — 63600175 PHARM REV CODE 636 W HCPCS: Performed by: ANESTHESIOLOGY

## 2020-07-30 PROCEDURE — 11000001 HC ACUTE MED/SURG PRIVATE ROOM

## 2020-07-30 PROCEDURE — 87206 SMEAR FLUORESCENT/ACID STAI: CPT | Mod: 91

## 2020-07-30 PROCEDURE — 37000008 HC ANESTHESIA 1ST 15 MINUTES: Performed by: SURGERY

## 2020-07-30 PROCEDURE — 63600175 PHARM REV CODE 636 W HCPCS: Performed by: INTERNAL MEDICINE

## 2020-07-30 PROCEDURE — 27201423 OPTIME MED/SURG SUP & DEVICES STERILE SUPPLY: Performed by: SURGERY

## 2020-07-30 PROCEDURE — 87205 SMEAR GRAM STAIN: CPT

## 2020-07-30 PROCEDURE — 71000015 HC POSTOP RECOV 1ST HR: Performed by: SURGERY

## 2020-07-30 PROCEDURE — 25000003 PHARM REV CODE 250: Performed by: PHYSICIAN ASSISTANT

## 2020-07-30 PROCEDURE — 11971 PR REMOVE TISSUE EXPANDER(S): ICD-10-PCS | Mod: 51,RT,, | Performed by: SURGERY

## 2020-07-30 PROCEDURE — 25000003 PHARM REV CODE 250: Performed by: SURGERY

## 2020-07-30 PROCEDURE — 88300 SURGICAL PATH GROSS: CPT | Mod: 26,,, | Performed by: PATHOLOGY

## 2020-07-30 PROCEDURE — 99233 PR SUBSEQUENT HOSPITAL CARE,LEVL III: ICD-10-PCS | Mod: ,,, | Performed by: PHYSICIAN ASSISTANT

## 2020-07-30 PROCEDURE — D9220A PRA ANESTHESIA: ICD-10-PCS | Mod: CRNA,,, | Performed by: NURSE ANESTHETIST, CERTIFIED REGISTERED

## 2020-07-30 PROCEDURE — 87102 FUNGUS ISOLATION CULTURE: CPT

## 2020-07-30 PROCEDURE — 82962 GLUCOSE BLOOD TEST: CPT | Performed by: SURGERY

## 2020-07-30 PROCEDURE — 19371 PERI-IMPLT CAPSLC BRST COMPL: CPT | Mod: LT,,, | Performed by: SURGERY

## 2020-07-30 PROCEDURE — 36000707: Performed by: SURGERY

## 2020-07-30 PROCEDURE — 84100 ASSAY OF PHOSPHORUS: CPT

## 2020-07-30 PROCEDURE — 99233 SBSQ HOSP IP/OBS HIGH 50: CPT | Mod: ,,, | Performed by: PHYSICIAN ASSISTANT

## 2020-07-30 PROCEDURE — D9220A PRA ANESTHESIA: Mod: ANES,,, | Performed by: ANESTHESIOLOGY

## 2020-07-30 PROCEDURE — 94761 N-INVAS EAR/PLS OXIMETRY MLT: CPT

## 2020-07-30 PROCEDURE — 71000016 HC POSTOP RECOV ADDL HR: Performed by: SURGERY

## 2020-07-30 PROCEDURE — 87075 CULTR BACTERIA EXCEPT BLOOD: CPT | Mod: 59

## 2020-07-30 PROCEDURE — 25000003 PHARM REV CODE 250: Performed by: STUDENT IN AN ORGANIZED HEALTH CARE EDUCATION/TRAINING PROGRAM

## 2020-07-30 PROCEDURE — 37000009 HC ANESTHESIA EA ADD 15 MINS: Performed by: SURGERY

## 2020-07-30 PROCEDURE — 19371 PR REMOVAL OF BREAST CAPSULE: ICD-10-PCS | Mod: LT,,, | Performed by: SURGERY

## 2020-07-30 PROCEDURE — 80048 BASIC METABOLIC PNL TOTAL CA: CPT

## 2020-07-30 PROCEDURE — 99223 1ST HOSP IP/OBS HIGH 75: CPT | Mod: ,,, | Performed by: PHYSICIAN ASSISTANT

## 2020-07-30 PROCEDURE — 36000706: Performed by: SURGERY

## 2020-07-30 PROCEDURE — 87077 CULTURE AEROBIC IDENTIFY: CPT

## 2020-07-30 PROCEDURE — 99223 PR INITIAL HOSPITAL CARE,LEVL III: ICD-10-PCS | Mod: ,,, | Performed by: PHYSICIAN ASSISTANT

## 2020-07-30 PROCEDURE — 83735 ASSAY OF MAGNESIUM: CPT

## 2020-07-30 PROCEDURE — 25000003 PHARM REV CODE 250: Performed by: INTERNAL MEDICINE

## 2020-07-30 PROCEDURE — 63600175 PHARM REV CODE 636 W HCPCS: Performed by: SURGERY

## 2020-07-30 PROCEDURE — D9220A PRA ANESTHESIA: Mod: CRNA,,, | Performed by: NURSE ANESTHETIST, CERTIFIED REGISTERED

## 2020-07-30 PROCEDURE — 71000033 HC RECOVERY, INTIAL HOUR: Performed by: SURGERY

## 2020-07-30 PROCEDURE — 87116 MYCOBACTERIA CULTURE: CPT

## 2020-07-30 PROCEDURE — 99900035 HC TECH TIME PER 15 MIN (STAT)

## 2020-07-30 PROCEDURE — 87186 SC STD MICRODIL/AGAR DIL: CPT

## 2020-07-30 PROCEDURE — 63600175 PHARM REV CODE 636 W HCPCS: Performed by: NURSE ANESTHETIST, CERTIFIED REGISTERED

## 2020-07-30 PROCEDURE — 96376 TX/PRO/DX INJ SAME DRUG ADON: CPT | Performed by: EMERGENCY MEDICINE

## 2020-07-30 PROCEDURE — 11971 RMVL TIS XPNDR WO INSJ IMPLT: CPT | Mod: 51,RT,, | Performed by: SURGERY

## 2020-07-30 RX ORDER — LIDOCAINE HYDROCHLORIDE 20 MG/ML
INJECTION INTRAVENOUS
Status: DISCONTINUED | OUTPATIENT
Start: 2020-07-30 | End: 2020-07-30

## 2020-07-30 RX ORDER — EPHEDRINE SULFATE 50 MG/ML
INJECTION, SOLUTION INTRAVENOUS
Status: DISCONTINUED | OUTPATIENT
Start: 2020-07-30 | End: 2020-07-30

## 2020-07-30 RX ORDER — PROPOFOL 10 MG/ML
VIAL (ML) INTRAVENOUS
Status: DISCONTINUED | OUTPATIENT
Start: 2020-07-30 | End: 2020-07-30

## 2020-07-30 RX ORDER — BACITRACIN 50000 [IU]/1
INJECTION, POWDER, FOR SOLUTION INTRAMUSCULAR
Status: DISCONTINUED | OUTPATIENT
Start: 2020-07-30 | End: 2020-07-30 | Stop reason: HOSPADM

## 2020-07-30 RX ORDER — SODIUM CHLORIDE 0.9 % (FLUSH) 0.9 %
10 SYRINGE (ML) INJECTION
Status: DISCONTINUED | OUTPATIENT
Start: 2020-07-30 | End: 2020-08-01 | Stop reason: HOSPADM

## 2020-07-30 RX ORDER — PHENYLEPHRINE HYDROCHLORIDE 10 MG/ML
INJECTION INTRAVENOUS
Status: DISCONTINUED | OUTPATIENT
Start: 2020-07-30 | End: 2020-07-30

## 2020-07-30 RX ORDER — LIDOCAINE HYDROCHLORIDE 10 MG/ML
1 INJECTION, SOLUTION EPIDURAL; INFILTRATION; INTRACAUDAL; PERINEURAL ONCE
Status: DISCONTINUED | OUTPATIENT
Start: 2020-07-30 | End: 2020-07-30 | Stop reason: HOSPADM

## 2020-07-30 RX ORDER — MIDAZOLAM HYDROCHLORIDE 1 MG/ML
INJECTION, SOLUTION INTRAMUSCULAR; INTRAVENOUS
Status: DISCONTINUED | OUTPATIENT
Start: 2020-07-30 | End: 2020-07-30

## 2020-07-30 RX ORDER — DIPHENHYDRAMINE HYDROCHLORIDE 50 MG/ML
25 INJECTION INTRAMUSCULAR; INTRAVENOUS EVERY 6 HOURS PRN
Status: DISCONTINUED | OUTPATIENT
Start: 2020-07-30 | End: 2020-07-30 | Stop reason: HOSPADM

## 2020-07-30 RX ORDER — MUPIROCIN 20 MG/G
OINTMENT TOPICAL
Status: DISCONTINUED | OUTPATIENT
Start: 2020-07-30 | End: 2020-07-30 | Stop reason: HOSPADM

## 2020-07-30 RX ORDER — HYDROMORPHONE HYDROCHLORIDE 1 MG/ML
0.2 INJECTION, SOLUTION INTRAMUSCULAR; INTRAVENOUS; SUBCUTANEOUS EVERY 5 MIN PRN
Status: DISCONTINUED | OUTPATIENT
Start: 2020-07-30 | End: 2020-07-30 | Stop reason: HOSPADM

## 2020-07-30 RX ORDER — CIPROFLOXACIN 2 MG/ML
INJECTION, SOLUTION INTRAVENOUS CONTINUOUS PRN
Status: COMPLETED | OUTPATIENT
Start: 2020-07-30 | End: 2020-07-30

## 2020-07-30 RX ORDER — POTASSIUM CHLORIDE 7.45 MG/ML
10 INJECTION INTRAVENOUS
Status: DISPENSED | OUTPATIENT
Start: 2020-07-30 | End: 2020-07-30

## 2020-07-30 RX ORDER — POTASSIUM CHLORIDE 7.45 MG/ML
INJECTION INTRAVENOUS
Status: DISCONTINUED | OUTPATIENT
Start: 2020-07-30 | End: 2020-07-30

## 2020-07-30 RX ORDER — DEXAMETHASONE SODIUM PHOSPHATE 4 MG/ML
INJECTION, SOLUTION INTRA-ARTICULAR; INTRALESIONAL; INTRAMUSCULAR; INTRAVENOUS; SOFT TISSUE
Status: DISCONTINUED | OUTPATIENT
Start: 2020-07-30 | End: 2020-07-30

## 2020-07-30 RX ORDER — FENTANYL CITRATE 50 UG/ML
25 INJECTION, SOLUTION INTRAMUSCULAR; INTRAVENOUS EVERY 5 MIN PRN
Status: DISCONTINUED | OUTPATIENT
Start: 2020-07-30 | End: 2020-07-30 | Stop reason: HOSPADM

## 2020-07-30 RX ORDER — MAGNESIUM SULFATE HEPTAHYDRATE 40 MG/ML
2 INJECTION, SOLUTION INTRAVENOUS ONCE
Status: COMPLETED | OUTPATIENT
Start: 2020-07-30 | End: 2020-07-30

## 2020-07-30 RX ORDER — SODIUM CHLORIDE 9 MG/ML
INJECTION, SOLUTION INTRAVENOUS CONTINUOUS PRN
Status: DISCONTINUED | OUTPATIENT
Start: 2020-07-30 | End: 2020-07-30

## 2020-07-30 RX ORDER — ONDANSETRON 2 MG/ML
4 INJECTION INTRAMUSCULAR; INTRAVENOUS ONCE AS NEEDED
Status: DISCONTINUED | OUTPATIENT
Start: 2020-07-30 | End: 2020-07-30 | Stop reason: HOSPADM

## 2020-07-30 RX ORDER — FENTANYL CITRATE 50 UG/ML
INJECTION, SOLUTION INTRAMUSCULAR; INTRAVENOUS
Status: DISCONTINUED | OUTPATIENT
Start: 2020-07-30 | End: 2020-07-30

## 2020-07-30 RX ORDER — ONDANSETRON 2 MG/ML
INJECTION INTRAMUSCULAR; INTRAVENOUS
Status: DISCONTINUED | OUTPATIENT
Start: 2020-07-30 | End: 2020-07-30

## 2020-07-30 RX ORDER — HEPARIN SODIUM 5000 [USP'U]/ML
5000 INJECTION, SOLUTION INTRAVENOUS; SUBCUTANEOUS EVERY 8 HOURS
Status: DISCONTINUED | OUTPATIENT
Start: 2020-07-30 | End: 2020-07-30 | Stop reason: HOSPADM

## 2020-07-30 RX ADMIN — OXYCODONE HYDROCHLORIDE 10 MG: 10 TABLET ORAL at 06:07

## 2020-07-30 RX ADMIN — ONDANSETRON 4 MG: 2 INJECTION, SOLUTION INTRAMUSCULAR; INTRAVENOUS at 02:07

## 2020-07-30 RX ADMIN — MIDAZOLAM HYDROCHLORIDE 2 MG: 1 INJECTION, SOLUTION INTRAMUSCULAR; INTRAVENOUS at 01:07

## 2020-07-30 RX ADMIN — DICYCLOMINE HYDROCHLORIDE 20 MG: 20 TABLET ORAL at 05:07

## 2020-07-30 RX ADMIN — MAGNESIUM SULFATE IN WATER 2 G: 40 INJECTION, SOLUTION INTRAVENOUS at 10:07

## 2020-07-30 RX ADMIN — GABAPENTIN 200 MG: 100 CAPSULE ORAL at 08:07

## 2020-07-30 RX ADMIN — POTASSIUM BICARBONATE 50 MEQ: 978 TABLET, EFFERVESCENT ORAL at 06:07

## 2020-07-30 RX ADMIN — OXYCODONE HYDROCHLORIDE 10 MG: 10 TABLET ORAL at 02:07

## 2020-07-30 RX ADMIN — FENTANYL CITRATE 100 MCG: 50 INJECTION, SOLUTION INTRAMUSCULAR; INTRAVENOUS at 01:07

## 2020-07-30 RX ADMIN — FAMOTIDINE 20 MG: 20 TABLET, FILM COATED ORAL at 08:07

## 2020-07-30 RX ADMIN — EPHEDRINE SULFATE 5 MG: 50 INJECTION INTRAVENOUS at 01:07

## 2020-07-30 RX ADMIN — MUPIROCIN: 20 OINTMENT TOPICAL at 12:07

## 2020-07-30 RX ADMIN — DICYCLOMINE HYDROCHLORIDE 20 MG: 20 TABLET ORAL at 08:07

## 2020-07-30 RX ADMIN — CEFTRIAXONE 2 G: 2 INJECTION, SOLUTION INTRAVENOUS at 10:07

## 2020-07-30 RX ADMIN — HYDROMORPHONE HYDROCHLORIDE 0.2 MG: 1 INJECTION, SOLUTION INTRAMUSCULAR; INTRAVENOUS; SUBCUTANEOUS at 03:07

## 2020-07-30 RX ADMIN — LIDOCAINE HYDROCHLORIDE 60 MG: 20 INJECTION, SOLUTION INTRAVENOUS at 01:07

## 2020-07-30 RX ADMIN — VANCOMYCIN HYDROCHLORIDE 2000 MG: 100 INJECTION, POWDER, LYOPHILIZED, FOR SOLUTION INTRAVENOUS at 08:07

## 2020-07-30 RX ADMIN — LETROZOLE 2.5 MG: 2.5 TABLET ORAL at 08:07

## 2020-07-30 RX ADMIN — DEXAMETHASONE SODIUM PHOSPHATE 8 MG: 4 INJECTION, SOLUTION INTRAMUSCULAR; INTRAVENOUS at 01:07

## 2020-07-30 RX ADMIN — INSULIN ASPART 1 UNITS: 100 INJECTION, SOLUTION INTRAVENOUS; SUBCUTANEOUS at 10:07

## 2020-07-30 RX ADMIN — PIPERACILLIN SODIUM AND TAZOBACTAM SODIUM 4.5 G: 4; .5 INJECTION, POWDER, LYOPHILIZED, FOR SOLUTION INTRAVENOUS at 06:07

## 2020-07-30 RX ADMIN — HYDROMORPHONE HYDROCHLORIDE 0.2 MG: 1 INJECTION, SOLUTION INTRAMUSCULAR; INTRAVENOUS; SUBCUTANEOUS at 02:07

## 2020-07-30 RX ADMIN — GABAPENTIN 200 MG: 100 CAPSULE ORAL at 02:07

## 2020-07-30 RX ADMIN — IRBESARTAN 300 MG: 300 TABLET, FILM COATED ORAL at 08:07

## 2020-07-30 RX ADMIN — HEPARIN SODIUM 5000 UNITS: 5000 INJECTION INTRAVENOUS; SUBCUTANEOUS at 12:07

## 2020-07-30 RX ADMIN — SODIUM CHLORIDE: 0.9 INJECTION, SOLUTION INTRAVENOUS at 01:07

## 2020-07-30 RX ADMIN — SIMETHICONE CHEW TAB 80 MG 80 MG: 80 TABLET ORAL at 05:07

## 2020-07-30 RX ADMIN — PHENYLEPHRINE HYDROCHLORIDE 100 MCG: 10 INJECTION INTRAVENOUS at 01:07

## 2020-07-30 RX ADMIN — MORPHINE SULFATE 4 MG: 2 INJECTION, SOLUTION INTRAMUSCULAR; INTRAVENOUS at 08:07

## 2020-07-30 RX ADMIN — PROPOFOL 60 MG: 10 INJECTION, EMULSION INTRAVENOUS at 01:07

## 2020-07-30 RX ADMIN — POTASSIUM BICARBONATE 50 MEQ: 978 TABLET, EFFERVESCENT ORAL at 04:07

## 2020-07-30 RX ADMIN — ACETAMINOPHEN 650 MG: 325 TABLET ORAL at 11:07

## 2020-07-30 RX ADMIN — MORPHINE SULFATE 4 MG: 2 INJECTION, SOLUTION INTRAMUSCULAR; INTRAVENOUS at 04:07

## 2020-07-30 RX ADMIN — PROPOFOL 150 MG: 10 INJECTION, EMULSION INTRAVENOUS at 01:07

## 2020-07-30 RX ADMIN — OXYCODONE HYDROCHLORIDE 10 MG: 10 TABLET ORAL at 08:07

## 2020-07-30 RX ADMIN — CIPROFLOXACIN 500 MG: 500 TABLET, FILM COATED ORAL at 08:07

## 2020-07-30 RX ADMIN — DICYCLOMINE HYDROCHLORIDE 20 MG: 20 TABLET ORAL at 10:07

## 2020-07-30 RX ADMIN — PIPERACILLIN SODIUM AND TAZOBACTAM SODIUM 4.5 G: 4; .5 INJECTION, POWDER, LYOPHILIZED, FOR SOLUTION INTRAVENOUS at 01:07

## 2020-07-30 RX ADMIN — LEVOTHYROXINE SODIUM 200 MCG: 100 TABLET ORAL at 05:07

## 2020-07-30 RX ADMIN — POTASSIUM CHLORIDE 10 MEQ: 7.46 INJECTION, SOLUTION INTRAVENOUS at 03:07

## 2020-07-30 RX ADMIN — POTASSIUM CHLORIDE 10 MEQ: 7.46 INJECTION, SOLUTION INTRAVENOUS at 01:07

## 2020-07-30 NOTE — ASSESSMENT & PLAN NOTE
Complication of breast implant  Breast cancer  63 y/o with breast cancer s/p chemo (last 2/2020), XRT (last 7/13/20), s/p BL mastectomy and TE reconstruction 3/30/2020 who presents for fatigue and enteritis, but now with purulent drainage from spontaneous rupture from L breast.   - Plastic Sx consulted and recs given  - going to OR today (7/30/20) for removal of TE  -  Will c/w vanc/zosyn for now  - ID consulted and appreciate recs

## 2020-07-30 NOTE — PROGRESS NOTES
Was just notified that there is an add on case in OR 25. Hand off report obtained from floor nurseArlet.

## 2020-07-30 NOTE — SUBJECTIVE & OBJECTIVE
Past Medical History:   Diagnosis Date    BMI 37.0-37.9, adult     Breast cancer 2019     Left breast, IDC with lymph node metastisis    Colon polyps     Diabetes mellitus type II     Diverticulosis     history of diverticulosisseen on colonoscopy at age 48. Repeat recommended at age 58. Done by     Elevated blood protein     History of elevated protein. Apparently has seen  for extensive workup including bone marrow biopsy    History of shingles 2006    Hyperlipidemia     Hypertension     Microalbuminuria     due 2 diabetes    Mild vitamin D deficiency     . A low vitamin D    Thyroid disease     hypothyroidism       Past Surgical History:   Procedure Laterality Date    BREAST BIOPSY Left 2019    IDC with mets to node    BREAST BIOPSY Right 2019    core bx, benign node    BREAST BIOPSY Left 10/14/2019    MRI Core bx, + IDC    BREAST BIOPSY Left 10/08/2019    Core bx, ADH     SECTION      HYSTERECTOMY      INSERTION OF BREAST TISSUE EXPANDER Bilateral 3/24/2020    Procedure: INSERTION, TISSUE EXPANDER, BREAST BILATERAL;  Surgeon: Michael Solorzano MD;  Location: [facility] OR [address] [place] [address];  Service: Plastics;  Laterality: Bilateral;    INSERTION OF TUNNELED CENTRAL VENOUS CATHETER (CVC) WITH SUBCUTANEOUS PORT Right 10/4/2019    Procedure: [id number] RIGHT (CONSENT AM OF) 1.0 hr case;  Surgeon: Elena Lopez MD;  Location: [facility] OR [address] [place] [address];  Service: General;  Laterality: Right;    OOPHORECTOMY      SENTINEL LYMPH NODE BIOPSY Left 3/24/2020    Procedure: BIOPSY, LYMPH NODE, SENTINEL LEFT;  Surgeon: Elena Lopez MD;  Location: [facility] OR [address] [place] [address];  Service: General;  Laterality: Left;    SIMPLE MASTECTOMY Bilateral 3/24/2020    Procedure: MASTECTOMY, SIMPLE BILATERAL;  Surgeon: Elena Lopez MD;  Location: [facility] OR [address] [place] [address];  Service: General;  Laterality: Bilateral;       Review of patient's allergies indicates:  No Known  Allergies    Medications:  Medications Prior to Admission   Medication Sig    chlorthalidone (HYGROTEN) 25 MG Tab Take 1 tablet by mouth every morning    irbesartan (AVAPRO) 300 MG tablet Take 1 tablet by mouth once daily    aspirin (ECOTRIN) 81 MG EC tablet Take 81 mg by mouth once daily.      blood-glucose meter kit DISPENSE : BLOOD TEST STRIPS AND LANCETS PATIENT TEST BLOOD SUGARS TWICE DAILY  TEST STRIPS: 50 EACH, REFILL 5  LANCETS:  50 EACH , REFILL 5    dulaglutide (TRULICITY) 1.5 mg/0.5 mL pen injector Inject 1.5 mg into the skin every 7 days.    ergocalciferol (VITAMIN D2) 50,000 unit Cap Take 1 capsule (50,000 Units total) by mouth every 7 days.    gabapentin (NEURONTIN) 100 MG capsule Take 2 capsules (200 mg total) by mouth 3 (three) times daily.    HYDROcodone-acetaminophen (NORCO) 5-325 mg per tablet Take 1 tablet by mouth every 8 (eight) hours as needed for Pain.    letrozole (FEMARA) 2.5 mg Tab Take 1 tablet (2.5 mg total) by mouth once daily.    levothyroxine (SYNTHROID) 200 MCG tablet Take 1 tablet (200 mcg total) by mouth once daily.    lifitegrast (XIIDRA) 5 % Dpet Apply 1 drop to  both eyes  2 (two) times daily.    metFORMIN (GLUCOPHAGE) 1000 MG tablet TAKE 1 TABLET (1,000 MG TOTAL) BY MOUTH 2 (TWO) TIMES DAILY WITH MEALS.    ondansetron (ZOFRAN-ODT) 4 MG TbDL Dissolve 1 tablet (4 mg total) by mouth every 6 (six) hours as needed.    polyethylene glycol (GLYCOLAX) 17 gram/dose powder Mix 17 g (1 capful) with juice or water an drink 2 (two) times daily.     Antibiotics (From admission, onward)    Start     Stop Route Frequency Ordered    07/30/20 1310  ciprofloxacin (CIPRO)400mg/200ml D5W IVPB      --  Continuous PRN 07/30/20 1310    07/30/20 1309  bacitracin injection      --  As needed (PRN) 07/30/20 1309    07/30/20 1309  clindamycin phosphate injection      --  As needed (PRN) 07/30/20 1310    07/30/20 1153  mupirocin 2 % ointment      -- Nasl On Call Procedure 07/30/20 1016     07/29/20 2030  piperacillin-tazobactam 4.5 g in sodium chloride 0.9% 100 mL IVPB (ready to mix system)      -- IV Every 8 hours (non-standard times) 07/29/20 1912 07/29/20 2000  vancomycin 2 g in dextrose 5 % 500 mL IVPB      -- IV Every 24 hours (non-standard times) 07/29/20 1716    07/29/20 0915  ciprofloxacin HCl tablet 500 mg      -- Oral Every 12 hours 07/29/20 0800        Antifungals (From admission, onward)    None        Antivirals (From admission, onward)    None           Immunization History   Administered Date(s) Administered    Influenza 10/18/2018    Influenza - Quadrivalent - PF (6 months and older) 10/19/2016, 11/02/2017, 10/18/2018, 09/16/2019    Pneumococcal Conjugate - 13 Valent 01/05/2016    Pneumococcal Polysaccharide - 23 Valent 11/19/2013, 03/11/2020    Td (ADULT) 10/05/2005    Tdap 04/29/2015       Family History     Problem Relation (Age of Onset)    Breast cancer Paternal Aunt    Cancer Mother, Father, Maternal Aunt    Diabetes Maternal Aunt    Hypertension Sister    Hypothyroidism Sister    Lung cancer Mother, Father, Maternal Grandfather    No Known Problems Sister, Daughter, Maternal Grandmother, Paternal Uncle, Paternal Grandmother, Paternal Grandfather        Social History     Socioeconomic History    Marital status: Single     Spouse name: Not on file    Number of children: Not on file    Years of education: Not on file    Highest education level: Not on file   Occupational History     Employer: [employer]   Social Needs    Financial resource strain: Not on file    Food insecurity     Worry: Not on file     Inability: Not on file    Transportation needs     Medical: Not on file     Non-medical: Not on file   Tobacco Use    Smoking status: Never Smoker    Smokeless tobacco: Never Used    Tobacco comment: 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  She walks occasionally.   Substance and Sexual Activity    Alcohol  use: Not Currently     Comment: Occasionally    Drug use: No    Sexual activity: Not Currently     Partners: Male   Lifestyle    Physical activity     Days per week: Not on file     Minutes per session: Not on file    Stress: Not on file   Relationships    Social connections     Talks on phone: Not on file     Gets together: Not on file     Attends Zoroastrian service: Not on file     Active member of club or organization: Not on file     Attends meetings of clubs or organizations: Not on file     Relationship status: Not on file   Other Topics Concern    Not on file   Social History Narrative    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   Respiratory: Negative for cough, shortness of breath and wheezing.    Cardiovascular: Negative for chest pain and palpitations.   Gastrointestinal: Negative for abdominal pain, constipation, diarrhea, nausea and vomiting.   Genitourinary: Negative for decreased urine volume, difficulty urinating and dysuria.   Skin: Positive for color change and wound (L breast). Negative for rash.   Neurological: Negative for headaches.   Psychiatric/Behavioral: Negative for agitation and confusion. The patient is not nervous/anxious.      Objective:     Vital Signs (Most Recent):  Temp: 96.7 °F (35.9 °C) (07/30/20 1121)  Pulse: 81 (07/30/20 1121)  Resp: 18 (07/30/20 1121)  BP: (!) 114/56 (07/30/20 1233)  SpO2: 98 % (07/30/20 1121) Vital Signs (24h Range):  Temp:  [96.7 °F (35.9 °C)-100.2 °F (37.9 °C)] 96.7 °F (35.9 °C)  Pulse:  [63-92] 81  Resp:  [15-20] 18  SpO2:  [95 %-99 %] 98 %  BP: (101-128)/(55-74) 114/56     Weight: 78.8 kg (173 lb 13.3 oz)  Body mass index is 33.95 kg/m².    Estimated Creatinine Clearance: 66.1 mL/min (based on SCr of 0.8 mg/dL).    Physical Exam  Vitals signs and nursing note reviewed.   Constitutional:       Appearance: She is well-developed.  She is obese. She is not toxic-appearing.   HENT:      Head: Normocephalic and atraumatic.      Nose: Nose normal. No congestion.      Mouth/Throat:      Pharynx: Oropharynx is clear. No oropharyngeal exudate.   Eyes:      General: No scleral icterus.     Conjunctiva/sclera: Conjunctivae normal.   Cardiovascular:      Rate and Rhythm: Normal rate and regular rhythm.   Pulmonary:      Effort: Pulmonary effort is normal. No respiratory distress.      Breath sounds: Normal breath sounds.   Abdominal:      General: Bowel sounds are normal. There is no distension.      Palpations: Abdomen is soft.   Skin:     General: Skin is warm and dry.      Comments: Open wound to inferior left breast with exposed tissue expander. Radiation induced skin changes/hyperpigmentation to left breast. See photo below.   Neurological:      Mental Status: She is alert and oriented to person, place, and time.   Psychiatric:         Behavior: Behavior normal.         Thought Content: Thought content normal.         Judgment: Judgment normal.             Significant Labs: All pertinent labs within the past 24 hours have been reviewed.    Significant Imaging: I have reviewed all pertinent imaging results/findings within the past 24 hours.

## 2020-07-30 NOTE — TRANSFER OF CARE
Anesthesia Transfer of Care Note    Patient: Maria Elena Tavares    Procedure(s) Performed: Procedure(s) (LRB):  REMOVAL, TISSUE EXPANDER (Bilateral)    Patient location: PACU    Anesthesia Type: general    Transport from OR: Transported from OR on 6-10 L/min O2 by face mask with adequate spontaneous ventilation    Post pain: pain needs to be addressed    Post assessment: no apparent anesthetic complications    Post vital signs: stable    Level of consciousness: awake, alert and oriented    Nausea/Vomiting: no nausea/vomiting    Complications: none    Transfer of care protocol was followed      Last vitals:   Visit Vitals  /64   Pulse 86   Temp 35.9 °C (96.7 °F) (Oral)   Resp 19   Ht 5' (1.524 m)   Wt 78.8 kg (173 lb 13.3 oz)   LMP  (LMP Unknown)   SpO2 100%   Breastfeeding No   BMI 33.95 kg/m²

## 2020-07-30 NOTE — ASSESSMENT & PLAN NOTE
64 year old female with history of breast cancer s/p bilateral mastectomy and insertion of tissue expanders 3/2020 (completed chemo 02/2020, last radiation 07/2020) who presented 7/27 with epigastric pain, nausea and vomiting. CT showed mesenteric edema and small bowel inflammation c/f possible enteritis. She was started on Zosyn. Blood cx/stool studies negative. She clinically improved. She was planning to be discharged today, however she developed an area of left breast dehiscence and subsequently exposure of her tissue expander and drainage of purulent fluid. Plastic surgery consulted and plans for explant of the tissue expander today. ID consulted for antibiotic recommendations.    Plan  - Continue empiric Vancomycin. Recommend discontinuing Zosyn and Ciprofloxacin and deescalating to Ceftriaxone.  - When taken for surgery please send the purulent fluid for gram stain, aerobic, anaerobic, AFB and fungal cultures   - Will follow cultures to guide antibiotics  - ID will follow.

## 2020-07-30 NOTE — NURSING TRANSFER
Nursing Transfer Note      7/30/2020     Transfer To: 749    Transfer via stretcher    Transfer with n/a    Transported by pct    Medicines sent: 1 ivpb potassium    Chart send with patient: Yes    Notified: daughter via text messaging system    Patient reassessed at: 7/30/2020 8362

## 2020-07-30 NOTE — SUBJECTIVE & OBJECTIVE
"Interval History: NAEON.  Patient started on vanc/zosyn yesterday after episode of purulent spontaneous drainage from L breast. Patient feels at baseline, other than the L breast pain.  She denies any f/c/sweats.  She is amenable to surgery today, but does not want to stay this weekend.     Review of Systems   Constitutional: Negative for chills, diaphoresis, fatigue and fever.   Respiratory: Negative for cough, shortness of breath and wheezing.    Cardiovascular: Negative for chest pain and palpitations.   Gastrointestinal: Negative for abdominal pain, constipation, diarrhea ("loose"), nausea and vomiting.   Genitourinary: Negative for decreased urine volume, difficulty urinating and dysuria.   Skin: Positive for wound (L breast). Negative for rash.     Objective:     Vital Signs (Most Recent):  Temp: 97.7 °F (36.5 °C) (07/30/20 0658)  Pulse: 63 (07/30/20 0658)  Resp: 16 (07/30/20 0810)  BP: (!) 102/58 (07/30/20 0658)  SpO2: 98 % (07/30/20 0926) Vital Signs (24h Range):  Temp:  [97.1 °F (36.2 °C)-100.2 °F (37.9 °C)] 97.7 °F (36.5 °C)  Pulse:  [63-92] 63  Resp:  [15-20] 16  SpO2:  [95 %-99 %] 98 %  BP: (100-128)/(53-70) 102/58     Weight: 78.8 kg (173 lb 13.3 oz)  Body mass index is 33.95 kg/m².    Intake/Output Summary (Last 24 hours) at 7/30/2020 1016  Last data filed at 7/29/2020 1629  Gross per 24 hour   Intake 650 ml   Output --   Net 650 ml      Physical Exam  Vitals signs and nursing note reviewed.   Constitutional:       Appearance: She is well-developed. She is obese.   HENT:      Head: Normocephalic and atraumatic.   Cardiovascular:      Rate and Rhythm: Normal rate and regular rhythm.      Heart sounds: Normal heart sounds.   Pulmonary:      Effort: Pulmonary effort is normal.      Breath sounds: Normal breath sounds.   Abdominal:      General: Bowel sounds are normal. There is no distension.      Palpations: Abdomen is soft. There is no mass.   Musculoskeletal: Normal range of motion.   Skin:     " General: Skin is warm and dry.      Comments: Sternal/L breast dark skin changes (s/p radiation) with flaking skin to L breast.  2 cm circular stoma at inferior L breast, able to see tissue expander near opening   Neurological:      Mental Status: She is alert and oriented to person, place, and time.   Psychiatric:         Behavior: Behavior normal.         Thought Content: Thought content normal.         Judgment: Judgment normal.         Significant Labs:   CBC:   Recent Labs   Lab 07/29/20 0411 07/30/20 0423   WBC 9.97 8.86   HGB 8.6* 8.3*   HCT 27.4* 26.0*    351*     CMP:   Recent Labs   Lab 07/29/20 0411 07/30/20  0423    138   K 3.1* 2.8*   CL 99 100   CO2 29 30*   * 116*   BUN 12 9   CREATININE 0.8 0.8   CALCIUM 8.8 9.0   ANIONGAP 8 8   EGFRNONAA >60.0 >60.0       Significant Imaging: I have reviewed all pertinent imaging results/findings within the past 24 hours.

## 2020-07-30 NOTE — PROGRESS NOTES
Pt s/p removal of bilateral tissue expanders. Left breast wound culture sent. 2 drains placed. Incisions closed and dressing applied. Patient tolerated procedure well.    Plan  - c/w antibiotics  - monitor drain output  - pain control as needed      James Freeman MD  Plastic Surgery Fellow

## 2020-07-30 NOTE — PROGRESS NOTES
"[facility] Hospital Medicine  Progress Note    Patient Name: Maria Elena Tavares  MRN: [id number]  Patient Class: IP- Inpatient   Admission Date: 7/27/2020  Length of Stay: 0 days  Attending Physician: Billy Candelario MD  Primary Care Provider: Estephania San MD    [facility] Medicine Team: Networked reference to record PCT  Shaji Aviles PA-C    Subjective:     Principal Problem:Infection of left breast        HPI:  Maria Elena Tavares is a 64F with of breast cancer (completed chemo 02/2020, last radiation 07/2020), T2DM, HTN, anemia of neoplastic disease who presents with episgastric pain and tenderness since Friday. Patient reports episodes of shooting pain lasting 3-5 seconds many times a day since Friday. Has ssociated abdominal distension, nausea and has been unable to keep food down. She has vomited multiple times this weekend -- denies hematemesis. Admits to decreased appetite and states she eats smaller amounts of food than she used to. Last BM was on Friday (normal/loose consistency)  but does admit to two episodes of painless BRBPR on 07/23. She states she was constipated at the time and had been straining to have a BM.  Denies any recent illness, chest pain, SOB, fever/chills. Denies flatus but does admit to her stomach feeling "bubbly".     ED: AFVSS, WBC 13, Hbg stable at 10, LFTs WNL, lipase WNL, LA 0.9, UA clean, KUB unremarkable, RUQ US w/o acute findings, CT A/P showing enteritis, EKG no acute ischemia, neg troponin, K 3.1, CXR clear. COVID negative.    Overview/Hospital Course:  Patient admitted to observation for enteritis. She was started on Zosyn with improvement. Prior to discharge on oral medications, patient's left breast spacer ruptured with purulent fluid. Plastic surgery consulted and plan for explant of TE in OR today (7/30).  She was started on vanc/zosyn and ID consulted.    Interval History: NAEON.  Patient started on vanc/zosyn yesterday after episode of purulent spontaneous " "drainage from L breast. Patient feels at baseline, other than the L breast pain.  She denies any f/c/sweats.  She is amenable to surgery today, but does not want to stay this weekend.     Review of Systems   Constitutional: Negative for chills, diaphoresis, fatigue and fever.   Respiratory: Negative for cough, shortness of breath and wheezing.    Cardiovascular: Negative for chest pain and palpitations.   Gastrointestinal: Negative for abdominal pain, constipation, diarrhea ("loose"), nausea and vomiting.   Genitourinary: Negative for decreased urine volume, difficulty urinating and dysuria.   Skin: Positive for wound (L breast). Negative for rash.     Objective:     Vital Signs (Most Recent):  Temp: 97.7 °F (36.5 °C) (07/30/20 0658)  Pulse: 63 (07/30/20 0658)  Resp: 16 (07/30/20 0810)  BP: (!) 102/58 (07/30/20 0658)  SpO2: 98 % (07/30/20 0926) Vital Signs (24h Range):  Temp:  [97.1 °F (36.2 °C)-100.2 °F (37.9 °C)] 97.7 °F (36.5 °C)  Pulse:  [63-92] 63  Resp:  [15-20] 16  SpO2:  [95 %-99 %] 98 %  BP: (100-128)/(53-70) 102/58     Weight: 78.8 kg (173 lb 13.3 oz)  Body mass index is 33.95 kg/m².    Intake/Output Summary (Last 24 hours) at 7/30/2020 [facility]  Last data filed at 7/29/2020 1629  Gross per 24 hour   Intake 650 ml   Output --   Net 650 ml      Physical Exam  Vitals signs and nursing note reviewed.   Constitutional:       Appearance: She is well-developed. She is obese.   HENT:      Head: Normocephalic and atraumatic.   Cardiovascular:      Rate and Rhythm: Normal rate and regular rhythm.      Heart sounds: Normal heart sounds.   Pulmonary:      Effort: Pulmonary effort is normal.      Breath sounds: Normal breath sounds.   Abdominal:      General: Bowel sounds are normal. There is no distension.      Palpations: Abdomen is soft. There is no mass.   Musculoskeletal: Normal range of motion.   Skin:     General: Skin is warm and dry.      Comments: Sternal/L breast dark skin changes (s/p radiation) with flaking " skin to L breast.  2 cm circular stoma at inferior L breast, able to see tissue expander near opening   Neurological:      Mental Status: She is alert and oriented to person, place, and time.   Psychiatric:         Behavior: Behavior normal.         Thought Content: Thought content normal.         Judgment: Judgment normal.         Significant Labs:   CBC:   Recent Labs   Lab 07/29/20 0411 07/30/20  0423   WBC 9.97 8.86   HGB 8.6* 8.3*   HCT 27.4* 26.0*    351*     CMP:   Recent Labs   Lab 07/29/20 0411 07/30/20  0423    138   K 3.1* 2.8*   CL 99 100   CO2 29 30*   * 116*   BUN 12 9   CREATININE 0.8 0.8   CALCIUM 8.8 9.0   ANIONGAP 8 8   EGFRNONAA >60.0 >60.0       Significant Imaging: I have reviewed all pertinent imaging results/findings within the past 24 hours.       Assessment/Plan:      * Infection of left breast  Complication of breast implant  Breast cancer  63 y/o with breast cancer s/p chemo (last 2/2020), XRT (last 7/13/20), s/p BL mastectomy and TE reconstruction 3/30/2020 who presents for fatigue and enteritis, but now with purulent drainage from spontaneous rupture from L breast.   - Plastic Sx consulted and recs given  - going to OR today (7/30/20) for removal of TE  -  Will c/w vanc/zosyn for now  - ID consulted and appreciate recs    Hypokalemia  Hypomagnesemia  - replace IV and PO    Uncontrolled type 2 diabetes mellitus without complication, without long-term current use of insulin  - hold home metformin and trulicity   - low dose SSI  - diabetic diet  -  on admit  Lab Results   Component Value Date    HGBA1C 7.3 (H) 07/28/2020       Essential hypertension  - continue irebsartan  - hold HCTZ to avoid dehydation    Enteritis  RESOLVED  - 2/4 SIRS for WBC 13, and HR >90, LA 0.9  - given breast cancer history will start zosyn and can deescalate to cipro/flagyl at discharge  - she has not had a BM in several days, so unclear if having solid stools vs diarrhea. Will send  stool studies if diarrheal illness. If not, can d/c the studies  - IVF  - symptomatic treatment for nausea  - bland diet    Anemia in neoplastic disease  - stable, improved    VTE Risk Mitigation (From admission, onward)         Ordered     IP VTE HIGH RISK PATIENT  Once      07/27/20 2223     Place sequential compression device  Until discontinued      07/27/20 2223     Place SAMANTHA hose  Until discontinued      07/27/20 2223                      Shaji Aviles PA-C  Department of Hospital Medicine   [facility]

## 2020-07-30 NOTE — HPI
Maria Elena Tavares is a 64 year old female with history of breast cancer s/p bilateral mastectomy and insertion of tissue expanders 3/2020 (completed chemo 02/2020, last radiation 07/2020), T2DM, HTN, anemia of neoplastic disease who presented 7/27 with episgastric pain and nausea starting on Friday. Associated symptoms are decreased appetite and vomiting.  Denies any recent illness, chest pain, SOB, fever/chills/sweats. Denies flatus, diarrhea but has had constipation in the recent past.     ED: AFVSS, WBC 13, Hbg stable at 10, LFTs WNL, lipase WNL, LA 0.9, UA clean, KUB unremarkable, RUQ US w/o acute findings, CT A/P showing enteritis, EKG no acute ischemia, neg troponin, K 3.1, CXR clear. COVID negative.     Patient admitted to observation for enteritis. She was started on Zosyn with improvement in her symptoms. She was planning to be discharged on oral antibiotics (cipro and flagyl), however she developed an area of left breast dehiscence with rupture of her tissue expander and drainage of purulent fluid. Plastic surgery consulted and plans for explant of the tissue expander today.  Abx broadened to Vanc/Zosyn/Cipro.        CT Abdomen Pelvis With Contrast [[id number]] (Abnormal) Resulted: 07/27/20 2118   Order Status: Completed Updated: 07/27/20 2120   Narrative:     EXAMINATION:   CT ABDOMEN PELVIS WITH CONTRAST     CLINICAL HISTORY:   Abdominal distension;     TECHNIQUE:   Low dose axial images, sagittal and coronal reformations were obtained from the lung bases to the pubic symphysis following the IV administration of 75 mL of Omnipaque 350.  Oral contrast was not administered.     COMPARISON:   U/S abdomen limited 07/27/2020; CT abdomen pelvis 10/23/2014.     FINDINGS:   Abdomen:     - Lower thorax:Postoperative changes of previous bilateral mastectomy and sentinel lymph node biopsies with partially visualized bilateral breast implants which are peripherally calcified.     - Lung bases: No infiltrates and no  nodules.     - Liver: Enlarged in size measuring 19.2 cm in craniocaudal dimension.  Homogeneously decreased parenchymal attenuation consistent with diffuse fatty infiltration.  Geographic area of hypoattenuation in the left hepatic lobe near the falciform ligament likely focal fatty deposition.  1.3 cm hyperenhancing nodule in the posterior aspect of the right hepatic lobe is stable when compared back to 10/23/2014 and is favored to reflect a benign finding such as hemangioma.  Trace amount of simple free fluid along the inferior tip of the right hepatic lobe.     - Gallbladder: No calcified gallstones.     - Bile Ducts: No evidence of intra or extra hepatic biliary ductal dilation.     - Spleen: Normal size and attenuation.     - Kidneys: Kidneys are normal in size and location and demonstrate normal contrast enhancement.  There is a simple cyst in the left kidney.  No enhancing solid renal mass or hydroureteronephrosis.     - Adrenals: Unremarkable.     - Pancreas: Mild fatty atrophy of the pancreas.  No mass or peripancreatic fat stranding.     - Retroperitoneum:  No significant adenopathy.     - Vascular: No abdominal aortic aneurysm.  The celiac and superior mesenteric arteries are patent.  There is high-grade stenosis of the origin of the inferior mesenteric artery though it appears patent.  The superior mesenteric vein is patent.     - Abdominal wall:  Tiny fat containing umbilical hernia.     Pelvis:     No pelvic mass, adenopathy, or free fluid.  Uterus and ovaries are surgically absent.     Bowel/Mesentery/Peritoneum:     Long segment loop of small bowel in the left mid hemiabdomen which demonstrates diffuse wall thickening with associated mesenteric stranding (coronal series 4, image 16).  There is a small volume of interloop fluid.  No wall hypoenhancement.  No portal venous gas or pneumatosis intestinalis.  No evidence of bowel obstruction.  Small volume abdominopelvic free fluid.  No pneumoperitoneum.      Bones:  No acute osseous abnormality and no suspicious lytic or blastic lesion.    Impression:       Inflammatory changes in a long segment of small bowel in the left hemiabdomen which may represent nonspecific enteritis (infectious, inflammatory, or ischemic).  Associated mesenteric edema and small volume abdominopelvic free fluid.  No overt findings of bowel infarct.     Hepatomegaly and hepatic steatosis.     Simple left renal cyst.     This report was flagged in Epic as abnormal.     Electronically signed by resident: Jefferson Wallace   Date: 07/27/2020   Time: 20:33     Electronically signed by: Bjorn Rojas MD   Date: 07/27/2020   Time: 21:18   US Abdomen Limited [[id number]] Resulted: 07/27/20 1741   Order Status: Completed Updated: 07/27/20 1743   Narrative:     EXAMINATION:   US ABDOMEN LIMITED     CLINICAL HISTORY:   epigastric and upper abdominal pain;     TECHNIQUE:   Limited ultrasound of the right upper quadrant of the abdomen (including pancreas, liver, gallbladder, common bile duct,) Was performed.     COMPARISON:   10/25/2019.     FINDINGS:   The visualized portions of the pancreas are unremarkable.  The gallbladder is nondistended.  No gallbladder wall thickening or gallbladder wall hyperemia.  No pericholecystic fluid.  Sonographic Baldwin sign is negative.  The common duct is not dilated measuring 0.2 cm.  The liver is not enlarged however has diffusely echogenic echotexture suggesting steatosis.  There is fatty sparing about the gallbladder fossa.  The visualized right kidney is unremarkable.  No ascites.    Impression:       No findings to suggest acute cholecystitis.     Findings suggest hepatic steatosis, correlation with LFTs recommended.       Electronically signed by: Wagner Zamudio MD   Date: 07/27/2020   Time: 17:41

## 2020-07-30 NOTE — ANESTHESIA POSTPROCEDURE EVALUATION
Anesthesia Post Evaluation    Patient: Maria Elena Tavares    Procedure(s) Performed: Procedure(s) (LRB):  REMOVAL, TISSUE EXPANDER (Bilateral)    Final Anesthesia Type: general    Patient location during evaluation: PACU  Patient participation: Yes- Able to Participate  Level of consciousness: awake and alert  Post-procedure vital signs: reviewed and stable  Pain management: adequate  Airway patency: patent    PONV status at discharge: No PONV  Anesthetic complications: no      Cardiovascular status: hemodynamically stable  Respiratory status: unassisted  Hydration status: euvolemic  Follow-up not needed.          Vitals Value Taken Time   /65 07/30/20 1532   Temp 36.9 °C (98.5 °F) 07/30/20 1530   Pulse 68 07/30/20 1550   Resp 15 07/30/20 1543   SpO2 97 % 07/30/20 1550   Vitals shown include unvalidated device data.      Event Time   Out of Recovery 07/30/2020 14:36:00         Pain/[place] Score: Pain Rating Prior to Med Admin: 7 (7/30/2020  3:16 PM)  Pain Rating Post Med Admin: 5 (7/30/2020  9:00 AM)  [place] Score: 10 (7/30/2020  2:36 PM)

## 2020-07-30 NOTE — BRIEF OP NOTE
[facility]  Brief Operative Note    SUMMARY     Surgery Date: 7/30/2020     Surgeon(s) and Role:     * Michael Solorzano MD - Primary     * James Freeman MD - Fellow    Assisting Surgeon: None    Pre-op Diagnosis:  Chest pain [R07.9]    Post-op Diagnosis:  Post-Op Diagnosis Codes:     * Chest pain [R07.9]    Procedure(s) (LRB):  REMOVAL, TISSUE EXPANDER (Bilateral)    Anesthesia: General    Description of Procedure: see op note    Description of the findings of the procedure: removal of bilateral tissue expanders    Estimated Blood Loss: * No values recorded between 7/30/2020  1:42 PM and 7/30/2020  2:25 PM *         Specimens:   Specimen (12h ago, onward)    None

## 2020-07-30 NOTE — PROGRESS NOTES
[facility]  Plastic Surgery  Progress Note    Subjective:     History of Present Illness:  Maria Elena Tavares is a 64F with of breast cancer (completed chemo 02/2020, last radiation 07/2020), T2DM, HTN, anemia of neoplastic disease who is admitted for gastritis. Pt s/p B/L mastectomy and TE reconstruction on 3/30/2020, completed chemo in 2/2020, and recently completed radiation this month. Pt now presenting w/ purulent drainage from left breast wound.     Post-Op Info:  Procedure(s) (LRB):  REMOVAL, TISSUE EXPANDER (Left)         Interval History: Patients states that she is okay this AM, states that she is anxious about surgery. She states that she otherwise has no complaints this morning, and her pain is well controlled. Patient denies fevers, chills, nausea, or vomiting.    Medications:  Continuous Infusions:  Scheduled Meds:   aspirin  81 mg Oral Daily    ciprofloxacin HCl  500 mg Oral Q12H    dicyclomine  20 mg Oral QID (AC & HS)    famotidine  20 mg Oral BID    gabapentin  200 mg Oral TID    irbesartan  300 mg Oral Daily    letrozole  2.5 mg Oral Daily    levothyroxine  200 mcg Oral Daily    piperacillin-tazobactam (ZOSYN) IVPB  4.5 g Intravenous Q8H    polyethylene glycol  17 g Oral BID    vancomycin (VANCOCIN) IVPB  2,000 mg Intravenous Q24H     PRN Meds:acetaminophen, albuterol-ipratropium, dextrose 50%, dextrose 50%, glucagon (human recombinant), glucose, glucose, insulin aspart U-100, melatonin, morphine, naloxone, ondansetron, oxyCODONE, oxyCODONE, promethazine (PHENERGAN) IVPB, simethicone, sodium chloride 0.9%, sodium chloride 0.9%     Review of patient's allergies indicates:  No Known Allergies  Objective:     Vital Signs (Most Recent):  Temp: 97.1 °F (36.2 °C) (07/30/20 0423)  Pulse: 65 (07/30/20 0423)  Resp: 20 (07/30/20 0423)  BP: (!) 105/57 (07/30/20 0423)  SpO2: 99 % (07/30/20 0423) Vital Signs (24h Range):  Temp:  [97.1 °F (36.2 °C)-100.2 °F (37.9 °C)] 97.1 °F (36.2  °C)  Pulse:  [65-98] 65  Resp:  [12-20] 20  SpO2:  [95 %-99 %] 99 %  BP: (100-128)/(53-70) 105/57     Weight: 78.8 kg (173 lb 13.3 oz)  Body mass index is 33.95 kg/m².    Intake/Output - Last 3 Shifts       07/28 0700 - 07/29 0659 07/29 0700 - 07/30 0659    P.O.  950    Total Intake(mL/kg)  950 (12.1)    Urine (mL/kg/hr) 200 (0.1)     Total Output 200     Net -200 +950          Urine Occurrence  1 x    Stool Occurrence 7 x 3 x          Physical Exam  Constitutional:       Appearance: Normal appearance.   HENT:      Head: Normocephalic and atraumatic.      Right Ear: External ear normal.      Left Ear: External ear normal.      Nose: Nose normal.      Mouth/Throat:      Mouth: Mucous membranes are moist.      Pharynx: Oropharynx is clear.   Eyes:      Extraocular Movements: Extraocular movements intact.      Pupils: Pupils are equal, round, and reactive to light.   Neck:      Musculoskeletal: Normal range of motion.   Cardiovascular:      Rate and Rhythm: Normal rate.   Pulmonary:      Effort: Pulmonary effort is normal. No respiratory distress.   Abdominal:      General: Abdomen is flat.      Palpations: Abdomen is soft.   Musculoskeletal: Normal range of motion.   Skin:     Comments: Nipples are surgically absent bilaterally. Left breast with significant radiation dermatitis. Inferior portion of left breast with purulent drainage.   Neurological:      General: No focal deficit present.      Mental Status: She is alert and oriented to person, place, and time.   Psychiatric:         Mood and Affect: Mood normal.         Behavior: Behavior normal.         Significant Labs:  CBC:   Recent Labs   Lab 07/30/20  0423   WBC 8.86   RBC 2.89*   HGB 8.3*   HCT 26.0*   *   MCV 90   MCH 28.7   MCHC 31.9*     BMP:   Recent Labs   Lab 07/29/20  0411   *      K 3.1*   CL 99   CO2 29   BUN 12   CREATININE 0.8   CALCIUM 8.8   MG 1.6     CMP:   Recent Labs   Lab 07/27/20  1511  07/29/20  0411   *   < > 123*    CALCIUM 9.5   < > 8.8   ALBUMIN 2.9*  --   --    PROT 7.8  --   --       < > 136   K 3.1*   < > 3.1*   CO2 28   < > 29   CL 98   < > 99   BUN 18   < > 12   CREATININE 0.9   < > 0.8   ALKPHOS 82  --   --    ALT 10  --   --    AST 18  --   --    BILITOT 0.8  --   --     < > = values in this interval not displayed.       Significant Diagnostics:  I have reviewed all pertinent imaging results/findings within the past 24 hours.    Assessment/Plan:     Breast cancer  Maria Elena Tavares is a 64F with of breast cancer (completed chemo 02/2020, last radiation 07/2020), T2DM, HTN, anemia of neoplastic disease, s/p B/L mastectomy and TE reconstruction on 3/30/2020, now presenting with purulent drainage from left breast wound.   - OR today for explant of left breast tissue expander  - DVT ppx held at midnight, NPO at midnight  - patient states that she does not desire any further reconstruction  - c/w IV antibiotics         Ricky Roger MD  Plastic Surgery  [facility]

## 2020-07-30 NOTE — ASSESSMENT & PLAN NOTE
Maria Elena Tavares is a 64F with of breast cancer (completed chemo 02/2020, last radiation 07/2020), T2DM, HTN, anemia of neoplastic disease, s/p B/L mastectomy and TE reconstruction on 3/30/2020, now presenting with purulent drainage from left breast wound.   - OR today for explant of left breast tissue expander  - DVT ppx held at midnight, NPO at midnight  - patient states that she does not desire any further reconstruction  - c/w IV antibiotics

## 2020-07-30 NOTE — ANESTHESIA PROCEDURE NOTES
Intubation  Performed by: Lenore Hand CRNA  Authorized by: Milind Brooke MD     Intubation:     Induction:  Intravenous    Intubated:  Postinduction    Mask Ventilation:  N/a    Attempts:  1    Attempted By:  Student    Difficult Airway Encountered?: No      Complications:  None    Airway Device:  Supraglottic airway/LMA    Airway Device Size:  4.0    Placement Verified By:  Capnometry    Complicating Factors:  None    Findings Post-Intubation:  BS equal bilateral

## 2020-07-30 NOTE — PLAN OF CARE
Plan of care reviewed with patient . Verbalized understanding. Patient has been NPO since midnight. Drsg to Left breast area remains dry and intact. C/o pain relieved by PRN pain med. See Mar. IV antibiotic Therapy in progress. Patient was consulted with  MD and Anesthesiologist on surgery in am. Consent signed and is in chart. No acute distress noted. Call light in reach. All safety precaution maintained.

## 2020-07-30 NOTE — SUBJECTIVE & OBJECTIVE
Interval History: Patients states that she is okay this AM, states that she is anxious about surgery. She states that she otherwise has no complaints this morning, and her pain is well controlled. Patient denies fevers, chills, nausea, or vomiting.    Medications:  Continuous Infusions:  Scheduled Meds:   aspirin  81 mg Oral Daily    ciprofloxacin HCl  500 mg Oral Q12H    dicyclomine  20 mg Oral QID (AC & HS)    famotidine  20 mg Oral BID    gabapentin  200 mg Oral TID    irbesartan  300 mg Oral Daily    letrozole  2.5 mg Oral Daily    levothyroxine  200 mcg Oral Daily    piperacillin-tazobactam (ZOSYN) IVPB  4.5 g Intravenous Q8H    polyethylene glycol  17 g Oral BID    vancomycin (VANCOCIN) IVPB  2,000 mg Intravenous Q24H     PRN Meds:acetaminophen, albuterol-ipratropium, dextrose 50%, dextrose 50%, glucagon (human recombinant), glucose, glucose, insulin aspart U-100, melatonin, morphine, naloxone, ondansetron, oxyCODONE, oxyCODONE, promethazine (PHENERGAN) IVPB, simethicone, sodium chloride 0.9%, sodium chloride 0.9%     Review of patient's allergies indicates:  No Known Allergies  Objective:     Vital Signs (Most Recent):  Temp: 97.1 °F (36.2 °C) (07/30/20 0423)  Pulse: 65 (07/30/20 0423)  Resp: 20 (07/30/20 0423)  BP: (!) 105/57 (07/30/20 0423)  SpO2: 99 % (07/30/20 0423) Vital Signs (24h Range):  Temp:  [97.1 °F (36.2 °C)-100.2 °F (37.9 °C)] 97.1 °F (36.2 °C)  Pulse:  [65-98] 65  Resp:  [12-20] 20  SpO2:  [95 %-99 %] 99 %  BP: (100-128)/(53-70) 105/57     Weight: 78.8 kg (173 lb 13.3 oz)  Body mass index is 33.95 kg/m².    Intake/Output - Last 3 Shifts       07/28 0700 - 07/29 0659 07/29 0700 - 07/30 0659    P.O.  950    Total Intake(mL/kg)  950 (12.1)    Urine (mL/kg/hr) 200 (0.1)     Total Output 200     Net -200 +950          Urine Occurrence  1 x    Stool Occurrence 7 x 3 x          Physical Exam  Constitutional:       Appearance: Normal appearance.   HENT:      Head: Normocephalic and  atraumatic.      Right Ear: External ear normal.      Left Ear: External ear normal.      Nose: Nose normal.      Mouth/Throat:      Mouth: Mucous membranes are moist.      Pharynx: Oropharynx is clear.   Eyes:      Extraocular Movements: Extraocular movements intact.      Pupils: Pupils are equal, round, and reactive to light.   Neck:      Musculoskeletal: Normal range of motion.   Cardiovascular:      Rate and Rhythm: Normal rate.   Pulmonary:      Effort: Pulmonary effort is normal. No respiratory distress.   Abdominal:      General: Abdomen is flat.      Palpations: Abdomen is soft.   Musculoskeletal: Normal range of motion.   Skin:     Comments: Nipples are surgically absent bilaterally. Left breast with significant radiation dermatitis. Inferior portion of left breast with purulent drainage.   Neurological:      General: No focal deficit present.      Mental Status: She is alert and oriented to person, place, and time.   Psychiatric:         Mood and Affect: Mood normal.         Behavior: Behavior normal.         Significant Labs:  CBC:   Recent Labs   Lab 07/30/20  0423   WBC 8.86   RBC 2.89*   HGB 8.3*   HCT 26.0*   *   MCV 90   MCH 28.7   MCHC 31.9*     BMP:   Recent Labs   Lab 07/29/20  0411   *      K 3.1*   CL 99   CO2 29   BUN 12   CREATININE 0.8   CALCIUM 8.8   MG 1.6     CMP:   Recent Labs   Lab 07/27/20  1511  07/29/20  0411   *   < > 123*   CALCIUM 9.5   < > 8.8   ALBUMIN 2.9*  --   --    PROT 7.8  --   --       < > 136   K 3.1*   < > 3.1*   CO2 28   < > 29   CL 98   < > 99   BUN 18   < > 12   CREATININE 0.9   < > 0.8   ALKPHOS 82  --   --    ALT 10  --   --    AST 18  --   --    BILITOT 0.8  --   --     < > = values in this interval not displayed.       Significant Diagnostics:  I have reviewed all pertinent imaging results/findings within the past 24 hours.

## 2020-07-30 NOTE — NURSING
Patient pain managed with medications, no falls or new signs of infection.  Bilateral SASHA drains patent. Patient tolerated small meal. VSS, glucose levels within desired range.

## 2020-07-30 NOTE — ASSESSMENT & PLAN NOTE
RESOLVED  - 2/4 SIRS for WBC 13, and HR >90, LA 0.9  - given breast cancer history will start zosyn and can deescalate to cipro/flagyl at discharge  - she has not had a BM in several days, so unclear if having solid stools vs diarrhea. Will send stool studies if diarrheal illness. If not, can d/c the studies  - IVF  - symptomatic treatment for nausea  - bland diet

## 2020-07-30 NOTE — CONSULTS
[facility]  Infectious Disease  Consult Note    Patient Name: Maria Elena Tavares  MRN: [id number]  Admission Date: 7/27/2020  Hospital Length of Stay: 0 days  Attending Physician: Billy Candelario MD  Primary Care Provider: Estephania San MD     Isolation Status: No active isolations    Patient information was obtained from patient and past medical records.      Inpatient consult to Infectious Diseases  Consult performed by: Brigitte Diaz PA-C  Consult ordered by: Shaji Aviles PA-C        Assessment/Plan:     * Infection of left breast     64 year old female with history of breast cancer s/p bilateral mastectomy and insertion of tissue expanders 3/2020 (completed chemo 02/2020, last radiation 07/2020) who presented 7/27 with epigastric pain, nausea and vomiting. CT showed mesenteric edema and small bowel inflammation c/f possible enteritis. She was started on Zosyn. Blood cx/stool studies negative. She clinically improved. She was planning to be discharged today, however she developed an area of left breast dehiscence and subsequently exposure of her tissue expander and drainage of purulent fluid. Plastic surgery consulted and plans for explant of the tissue expander today. ID consulted for antibiotic recommendations.    Plan  - Continue empiric Vancomycin. Recommend discontinuing Zosyn and Ciprofloxacin and deescalating to Ceftriaxone.  - When taken for surgery please send the purulent fluid for gram stain, aerobic, anaerobic, AFB and fungal cultures   - Will follow cultures to guide antibiotics  - Discussed plan with primary team. ID will follow.           Thank you for the consult. Please call for any questions.  Brigitte Diaz PA-C  Phone: [phone]  Pager: [phone]    Subjective:     Principal Problem: Infection of left breast    HPI:   Maria Elena Tavares is a 64 year old female with history of breast cancer s/p bilateral mastectomy and insertion of tissue expanders 3/2020 (completed chemo  02/2020, last radiation 07/2020), T2DM, HTN, anemia of neoplastic disease who presented 7/27 with episgastric pain and nausea starting on Friday. Associated symptoms are decreased appetite and vomiting.  Denies any recent illness, chest pain, SOB, fever/chills/sweats. Denies flatus, diarrhea but has had constipation in the recent past.     ED: AFVSS, WBC 13, Hbg stable at 10, LFTs WNL, lipase WNL, LA 0.9, UA clean, KUB unremarkable, RUQ US w/o acute findings, CT A/P showing enteritis, EKG no acute ischemia, neg troponin, K 3.1, CXR clear. COVID negative.     Patient admitted to observation for enteritis. She was started on Zosyn with improvement in her symptoms. She was planning to be discharged on oral antibiotics (cipro and flagyl), however she developed an area of left breast dehiscence with rupture of her tissue expander and drainage of purulent fluid. Plastic surgery consulted and plans for explant of the tissue expander today.  Abx broadened to Vanc/Zosyn/Cipro.        CT Abdomen Pelvis With Contrast [[id number]] (Abnormal) Resulted: 07/27/20 2118   Order Status: Completed Updated: 07/27/20 2120   Narrative:     EXAMINATION:   CT ABDOMEN PELVIS WITH CONTRAST     CLINICAL HISTORY:   Abdominal distension;     TECHNIQUE:   Low dose axial images, sagittal and coronal reformations were obtained from the lung bases to the pubic symphysis following the IV administration of 75 mL of Omnipaque 350.  Oral contrast was not administered.     COMPARISON:   U/S abdomen limited 07/27/2020; CT abdomen pelvis 10/23/2014.     FINDINGS:   Abdomen:     - Lower thorax:Postoperative changes of previous bilateral mastectomy and sentinel lymph node biopsies with partially visualized bilateral breast implants which are peripherally calcified.     - Lung bases: No infiltrates and no nodules.     - Liver: Enlarged in size measuring 19.2 cm in craniocaudal dimension.  Homogeneously decreased parenchymal attenuation consistent with diffuse  fatty infiltration.  Geographic area of hypoattenuation in the left hepatic lobe near the falciform ligament likely focal fatty deposition.  1.3 cm hyperenhancing nodule in the posterior aspect of the right hepatic lobe is stable when compared back to 10/23/2014 and is favored to reflect a benign finding such as hemangioma.  Trace amount of simple free fluid along the inferior tip of the right hepatic lobe.     - Gallbladder: No calcified gallstones.     - Bile Ducts: No evidence of intra or extra hepatic biliary ductal dilation.     - Spleen: Normal size and attenuation.     - Kidneys: Kidneys are normal in size and location and demonstrate normal contrast enhancement.  There is a simple cyst in the left kidney.  No enhancing solid renal mass or hydroureteronephrosis.     - Adrenals: Unremarkable.     - Pancreas: Mild fatty atrophy of the pancreas.  No mass or peripancreatic fat stranding.     - Retroperitoneum:  No significant adenopathy.     - Vascular: No abdominal aortic aneurysm.  The celiac and superior mesenteric arteries are patent.  There is high-grade stenosis of the origin of the inferior mesenteric artery though it appears patent.  The superior mesenteric vein is patent.     - Abdominal wall:  Tiny fat containing umbilical hernia.     Pelvis:     No pelvic mass, adenopathy, or free fluid.  Uterus and ovaries are surgically absent.     Bowel/Mesentery/Peritoneum:     Long segment loop of small bowel in the left mid hemiabdomen which demonstrates diffuse wall thickening with associated mesenteric stranding (coronal series 4, image 16).  There is a small volume of interloop fluid.  No wall hypoenhancement.  No portal venous gas or pneumatosis intestinalis.  No evidence of bowel obstruction.  Small volume abdominopelvic free fluid.  No pneumoperitoneum.     Bones:  No acute osseous abnormality and no suspicious lytic or blastic lesion.    Impression:       Inflammatory changes in a long segment of small  bowel in the left hemiabdomen which may represent nonspecific enteritis (infectious, inflammatory, or ischemic).  Associated mesenteric edema and small volume abdominopelvic free fluid.  No overt findings of bowel infarct.     Hepatomegaly and hepatic steatosis.     Simple left renal cyst.     This report was flagged in Epic as abnormal.     Electronically signed by resident: Jefferson Wallace   Date: 07/27/2020   Time: 20:33     Electronically signed by: Bjorn Rojas MD   Date: 07/27/2020   Time: 21:18   US Abdomen Limited [[id number]] Resulted: 07/27/20 1741   Order Status: Completed Updated: 07/27/20 1743   Narrative:     EXAMINATION:   US ABDOMEN LIMITED     CLINICAL HISTORY:   epigastric and upper abdominal pain;     TECHNIQUE:   Limited ultrasound of the right upper quadrant of the abdomen (including pancreas, liver, gallbladder, common bile duct,) Was performed.     COMPARISON:   10/25/2019.     FINDINGS:   The visualized portions of the pancreas are unremarkable.  The gallbladder is nondistended.  No gallbladder wall thickening or gallbladder wall hyperemia.  No pericholecystic fluid.  Sonographic Baldwin sign is negative.  The common duct is not dilated measuring 0.2 cm.  The liver is not enlarged however has diffusely echogenic echotexture suggesting steatosis.  There is fatty sparing about the gallbladder fossa.  The visualized right kidney is unremarkable.  No ascites.    Impression:       No findings to suggest acute cholecystitis.     Findings suggest hepatic steatosis, correlation with LFTs recommended.       Electronically signed by: Wagner Zamudio MD   Date: 07/27/2020   Time: 17:41       Past Medical History:   Diagnosis Date    BMI 37.0-37.9, adult     Breast cancer 09/05/2019     Left breast, IDC with lymph node metastisis    Colon polyps 2015    Diabetes mellitus type II     Diverticulosis     history of diverticulosisseen on colonoscopy at age 48. Repeat recommended at age 58. Done by      Elevated blood protein     History of elevated protein. Apparently has seen  for extensive workup including bone marrow biopsy    History of shingles 2006    Hyperlipidemia     Hypertension     Microalbuminuria     due 2 diabetes    Mild vitamin D deficiency     . A low vitamin D    Thyroid disease     hypothyroidism       Past Surgical History:   Procedure Laterality Date    BREAST BIOPSY Left 2019    IDC with mets to node    BREAST BIOPSY Right 2019    core bx, benign node    BREAST BIOPSY Left 10/14/2019    MRI Core bx, + IDC    BREAST BIOPSY Left 10/08/2019    Core bx, ADH     SECTION      HYSTERECTOMY      INSERTION OF BREAST TISSUE EXPANDER Bilateral 3/24/2020    Procedure: INSERTION, TISSUE EXPANDER, BREAST BILATERAL;  Surgeon: Michael Solorzano MD;  Location: [facility] OR [address] [place] [address];  Service: Plastics;  Laterality: Bilateral;    INSERTION OF TUNNELED CENTRAL VENOUS CATHETER (CVC) WITH SUBCUTANEOUS PORT Right 10/4/2019    Procedure: YZCECUHIH-OAFG-T-CATH RIGHT (CONSENT AM OF) 1.0 hr case;  Surgeon: Elena Lopez MD;  Location: [facility] OR [address] [place] [address];  Service: General;  Laterality: Right;    OOPHORECTOMY      SENTINEL LYMPH NODE BIOPSY Left 3/24/2020    Procedure: BIOPSY, LYMPH NODE, SENTINEL LEFT;  Surgeon: Elena Lopez MD;  Location: [facility] OR [address] [place] [address];  Service: General;  Laterality: Left;    SIMPLE MASTECTOMY Bilateral 3/24/2020    Procedure: MASTECTOMY, SIMPLE BILATERAL;  Surgeon: Elena Lopez MD;  Location: [facility] OR [address] [place] [address];  Service: General;  Laterality: Bilateral;       Review of patient's allergies indicates:  No Known Allergies    Medications:  Medications Prior to Admission   Medication Sig    chlorthalidone (HYGROTEN) 25 MG Tab Take 1 tablet by mouth every morning    irbesartan (AVAPRO) 300 MG tablet Take 1 tablet by mouth once daily    aspirin (ECOTRIN) 81 MG EC tablet Take 81 mg by mouth once daily.      blood-glucose meter kit DISPENSE :  BLOOD TEST STRIPS AND LANCETS PATIENT TEST BLOOD SUGARS TWICE DAILY  TEST STRIPS: 50 EACH, REFILL 5  LANCETS:  50 EACH , REFILL 5    dulaglutide (TRULICITY) 1.5 mg/0.5 mL pen injector Inject 1.5 mg into the skin every 7 days.    ergocalciferol (VITAMIN D2) 50,000 unit Cap Take 1 capsule (50,000 Units total) by mouth every 7 days.    gabapentin (NEURONTIN) 100 MG capsule Take 2 capsules (200 mg total) by mouth 3 (three) times daily.    HYDROcodone-acetaminophen (NORCO) 5-325 mg per tablet Take 1 tablet by mouth every 8 (eight) hours as needed for Pain.    letrozole (FEMARA) 2.5 mg Tab Take 1 tablet (2.5 mg total) by mouth once daily.    levothyroxine (SYNTHROID) 200 MCG tablet Take 1 tablet (200 mcg total) by mouth once daily.    lifitegrast (XIIDRA) 5 % Dpet Apply 1 drop to  both eyes  2 (two) times daily.    metFORMIN (GLUCOPHAGE) 1000 MG tablet TAKE 1 TABLET (1,000 MG TOTAL) BY MOUTH 2 (TWO) TIMES DAILY WITH MEALS.    ondansetron (ZOFRAN-ODT) 4 MG TbDL Dissolve 1 tablet (4 mg total) by mouth every 6 (six) hours as needed.    polyethylene glycol (GLYCOLAX) 17 gram/dose powder Mix 17 g (1 capful) with juice or water an drink 2 (two) times daily.     Antibiotics (From admission, onward)    Start     Stop Route Frequency Ordered    07/30/20 1310  ciprofloxacin (CIPRO)400mg/200ml D5W IVPB      --  Continuous PRN 07/30/20 1310    07/30/20 1309  bacitracin injection      --  As needed (PRN) 07/30/20 1309    07/30/20 1309  clindamycin phosphate injection      --  As needed (PRN) 07/30/20 1310    07/30/20 1153  mupirocin 2 % ointment      -- Nas[facility] On Call Procedure 07/30/20 1153    07/29/20 2030  piperacillin-tazobactam 4.5 g in sodium chloride 0.9% 100 mL IVPB (ready to mix system)      -- IV Every 8 hours (non-standard times) 07/29/20 1912 07/29/20 2000  vancomycin 2 g in dextrose 5 % 500 mL IVPB      -- IV Every 24 hours (non-standard times) 07/29/20 1716    07/29/20 0915  ciprofloxacin HCl tablet 500 mg       -- Oral Every 12 hours 07/29/20 0800        Antifungals (From admission, onward)    None        Antivirals (From admission, onward)    None           Immunization History   Administered Date(s) Administered    Influenza 10/18/2018    Influenza - Quadrivalent - PF (6 months and older) 10/19/2016, 11/02/2017, 10/18/2018, 09/16/2019    Pneumococcal Conjugate - 13 Valent 01/05/2016    Pneumococcal Polysaccharide - 23 Valent 11/19/2013, 03/11/2020    Td (ADULT) 10/05/2005    Tdap 04/29/2015       Family History     Problem Relation (Age of Onset)    Breast cancer Paternal Aunt    Cancer Mother, Father, Maternal Aunt    Diabetes Maternal Aunt    Hypertension Sister    Hypothyroidism Sister    Lung cancer Mother, Father, Maternal Grandfather    No Known Problems Sister, Daughter, Maternal Grandmother, Paternal Uncle, Paternal Grandmother, Paternal Grandfather        Social History     Socioeconomic History    Marital status: Single     Spouse name: Not on file    Number of children: Not on file    Years of education: Not on file    Highest education level: Not on file   Occupational History     Employer: [employer]   Social Needs    Financial resource strain: Not on file    Food insecurity     Worry: Not on file     Inability: Not on file    Transportation needs     Medical: Not on file     Non-medical: Not on file   Tobacco Use    Smoking status: Never Smoker    Smokeless tobacco: Never Used    Tobacco comment: 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  She walks occasionally.   Substance and Sexual Activity    Alcohol use: Not Currently     Comment: Occasionally    Drug use: No    Sexual activity: Not Currently     Partners: Male   Lifestyle    Physical activity     Days per week: Not on file     Minutes per session: Not on file    Stress: Not on file   Relationships    Social connections     Talks on phone: Not on file     Gets together: Not on  file     Attends [employer] service: Not on file     Active member of club or organization: Not on file     Attends meetings of clubs or organizations: Not on file     Relationship status: Not on file   Other Topics Concern    Not on file   Social History Narrative    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   Respiratory: Negative for cough, shortness of breath and wheezing.    Cardiovascular: Negative for chest pain and palpitations.   Gastrointestinal: Negative for abdominal pain, constipation, diarrhea, nausea and vomiting.   Genitourinary: Negative for decreased urine volume, difficulty urinating and dysuria.   Skin: Positive for color change and wound (L breast). Negative for rash.   Neurological: Negative for headaches.   Psychiatric/Behavioral: Negative for agitation and confusion. The patient is not nervous/anxious.      Objective:     Vital Signs (Most Recent):  Temp: 96.7 °F (35.9 °C) (07/30/20 1121)  Pulse: 81 (07/30/20 1121)  Resp: 18 (07/30/20 1121)  BP: (!) 114/56 (07/30/20 1233)  SpO2: 98 % (07/30/20 1121) Vital Signs (24h Range):  Temp:  [96.7 °F (35.9 °C)-100.2 °F (37.9 °C)] 96.7 °F (35.9 °C)  Pulse:  [63-92] 81  Resp:  [15-20] 18  SpO2:  [95 %-99 %] 98 %  BP: (101-128)/(55-74) 114/56     Weight: 78.8 kg (173 lb 13.3 oz)  Body mass index is 33.95 kg/m².    Estimated Creatinine Clearance: 66.1 mL/min (based on SCr of 0.8 mg/dL).    Physical Exam  Vitals signs and nursing note reviewed.   Constitutional:       Appearance: She is well-developed. She is obese. She is not toxic-appearing.   HENT:      Head: Normocephalic and atraumatic.      Nose: Nose normal. No congestion.      Mouth/Throat:      Pharynx: Oropharynx is clear. No oropharyngeal exudate.   Eyes:      General: No scleral icterus.     Conjunctiva/sclera: Conjunctivae normal.   Cardiovascular:      Rate and Rhythm:  Normal rate and regular rhythm.   Pulmonary:      Effort: Pulmonary effort is normal. No respiratory distress.      Breath sounds: Normal breath sounds.   Abdominal:      General: Bowel sounds are normal. There is no distension.      Palpations: Abdomen is soft.   Skin:     General: Skin is warm and dry.      Comments: Open wound to inferior left breast with exposed tissue expander. Radiation induced skin changes/hyperpigmentation to left breast. See photo below.   Neurological:      Mental Status: She is alert and oriented to person, place, and time.   Psychiatric:         Behavior: Behavior normal.         Thought Content: Thought content normal.         Judgment: Judgment normal.             Significant Labs: All pertinent labs within the past 24 hours have been reviewed.    Significant Imaging: I have reviewed all pertinent imaging results/findings within the past 24 hours.

## 2020-07-30 NOTE — HPI
Maria Elena Tavares is a 64F with of breast cancer (completed chemo 02/2020, last radiation 07/2020), T2DM, HTN, anemia of neoplastic disease who is admitted for gastritis. Pt s/p B/L mastectomy and TE reconstruction on 3/30/2020, completed chemo in 2/2020, and recently completed radiation this month. Pt now presenting w/ purulent drainage from left breast wound.     Taken to OR on 7/30 for explant of bilateral tissue expanders.

## 2020-07-31 LAB
ANION GAP SERPL CALC-SCNC: 8 MMOL/L (ref 8–16)
BASOPHILS # BLD AUTO: 0.02 K/UL (ref 0–0.2)
BASOPHILS NFR BLD: 0.1 % (ref 0–1.9)
BUN SERPL-MCNC: 12 MG/DL (ref 8–23)
CALCIUM SERPL-MCNC: 9.2 MG/DL (ref 8.7–10.5)
CHLORIDE SERPL-SCNC: 99 MMOL/L (ref 95–110)
CO2 SERPL-SCNC: 27 MMOL/L (ref 23–29)
CREAT SERPL-MCNC: 1 MG/DL (ref 0.5–1.4)
DIFFERENTIAL METHOD: ABNORMAL
EOSINOPHIL # BLD AUTO: 0 K/UL (ref 0–0.5)
EOSINOPHIL NFR BLD: 0 % (ref 0–8)
ERYTHROCYTE [DISTWIDTH] IN BLOOD BY AUTOMATED COUNT: 15.1 % (ref 11.5–14.5)
EST. GFR  (AFRICAN AMERICAN): >60 ML/MIN/1.73 M^2
EST. GFR  (NON AFRICAN AMERICAN): 59.7 ML/MIN/1.73 M^2
FINAL PATHOLOGIC DIAGNOSIS: NORMAL
GLUCOSE SERPL-MCNC: 214 MG/DL (ref 70–110)
GROSS: NORMAL
HCT VFR BLD AUTO: 28.1 % (ref 37–48.5)
HGB BLD-MCNC: 8.6 G/DL (ref 12–16)
IMM GRANULOCYTES # BLD AUTO: 0.23 K/UL (ref 0–0.04)
IMM GRANULOCYTES NFR BLD AUTO: 1.6 % (ref 0–0.5)
LYMPHOCYTES # BLD AUTO: 0.4 K/UL (ref 1–4.8)
LYMPHOCYTES NFR BLD: 2.6 % (ref 18–48)
MAGNESIUM SERPL-MCNC: 1.9 MG/DL (ref 1.6–2.6)
MCH RBC QN AUTO: 28.8 PG (ref 27–31)
MCHC RBC AUTO-ENTMCNC: 30.6 G/DL (ref 32–36)
MCV RBC AUTO: 94 FL (ref 82–98)
MONOCYTES # BLD AUTO: 0.8 K/UL (ref 0.3–1)
MONOCYTES NFR BLD: 5.1 % (ref 4–15)
NEUTROPHILS # BLD AUTO: 13.3 K/UL (ref 1.8–7.7)
NEUTROPHILS NFR BLD: 90.6 % (ref 38–73)
NRBC BLD-RTO: 0 /100 WBC
PHOSPHATE SERPL-MCNC: 2.1 MG/DL (ref 2.7–4.5)
PLATELET # BLD AUTO: 405 K/UL (ref 150–350)
PMV BLD AUTO: 10 FL (ref 9.2–12.9)
POCT GLUCOSE: 140 MG/DL (ref 70–110)
POCT GLUCOSE: 173 MG/DL (ref 70–110)
POCT GLUCOSE: 211 MG/DL (ref 70–110)
POCT GLUCOSE: 257 MG/DL (ref 70–110)
POTASSIUM SERPL-SCNC: 4.6 MMOL/L (ref 3.5–5.1)
RBC # BLD AUTO: 2.99 M/UL (ref 4–5.4)
SODIUM SERPL-SCNC: 134 MMOL/L (ref 136–145)
VANCOMYCIN TROUGH SERPL-MCNC: 10.1 UG/ML (ref 10–22)
VANCOMYCIN TROUGH SERPL-MCNC: 8.7 UG/ML (ref 10–22)
WBC # BLD AUTO: 14.68 K/UL (ref 3.9–12.7)

## 2020-07-31 PROCEDURE — 80048 BASIC METABOLIC PNL TOTAL CA: CPT

## 2020-07-31 PROCEDURE — 25000003 PHARM REV CODE 250: Performed by: INTERNAL MEDICINE

## 2020-07-31 PROCEDURE — 84100 ASSAY OF PHOSPHORUS: CPT

## 2020-07-31 PROCEDURE — 25000003 PHARM REV CODE 250: Performed by: PHYSICIAN ASSISTANT

## 2020-07-31 PROCEDURE — 99233 PR SUBSEQUENT HOSPITAL CARE,LEVL III: ICD-10-PCS | Mod: ,,, | Performed by: NURSE PRACTITIONER

## 2020-07-31 PROCEDURE — 94761 N-INVAS EAR/PLS OXIMETRY MLT: CPT

## 2020-07-31 PROCEDURE — 99233 SBSQ HOSP IP/OBS HIGH 50: CPT | Mod: ,,, | Performed by: PHYSICIAN ASSISTANT

## 2020-07-31 PROCEDURE — 63600175 PHARM REV CODE 636 W HCPCS: Performed by: PHYSICIAN ASSISTANT

## 2020-07-31 PROCEDURE — 85025 COMPLETE CBC W/AUTO DIFF WBC: CPT

## 2020-07-31 PROCEDURE — 11000001 HC ACUTE MED/SURG PRIVATE ROOM

## 2020-07-31 PROCEDURE — 63600175 PHARM REV CODE 636 W HCPCS: Performed by: INTERNAL MEDICINE

## 2020-07-31 PROCEDURE — 83735 ASSAY OF MAGNESIUM: CPT

## 2020-07-31 PROCEDURE — 80202 ASSAY OF VANCOMYCIN: CPT | Mod: 91

## 2020-07-31 PROCEDURE — 99233 PR SUBSEQUENT HOSPITAL CARE,LEVL III: ICD-10-PCS | Mod: ,,, | Performed by: PHYSICIAN ASSISTANT

## 2020-07-31 PROCEDURE — 99233 SBSQ HOSP IP/OBS HIGH 50: CPT | Mod: ,,, | Performed by: NURSE PRACTITIONER

## 2020-07-31 PROCEDURE — 80202 ASSAY OF VANCOMYCIN: CPT

## 2020-07-31 RX ORDER — SODIUM,POTASSIUM PHOSPHATES 280-250MG
1 POWDER IN PACKET (EA) ORAL EVERY 4 HOURS
Status: DISPENSED | OUTPATIENT
Start: 2020-07-31 | End: 2020-07-31

## 2020-07-31 RX ADMIN — DICYCLOMINE HYDROCHLORIDE 20 MG: 20 TABLET ORAL at 04:07

## 2020-07-31 RX ADMIN — POTASSIUM & SODIUM PHOSPHATES POWDER PACK 280-160-250 MG 1 PACKET: 280-160-250 PACK at 06:07

## 2020-07-31 RX ADMIN — FAMOTIDINE 20 MG: 20 TABLET, FILM COATED ORAL at 08:07

## 2020-07-31 RX ADMIN — GABAPENTIN 200 MG: 100 CAPSULE ORAL at 08:07

## 2020-07-31 RX ADMIN — DICYCLOMINE HYDROCHLORIDE 20 MG: 20 TABLET ORAL at 11:07

## 2020-07-31 RX ADMIN — POLYETHYLENE GLYCOL 3350 17 G: 17 POWDER, FOR SOLUTION ORAL at 08:07

## 2020-07-31 RX ADMIN — POTASSIUM & SODIUM PHOSPHATES POWDER PACK 280-160-250 MG 1 PACKET: 280-160-250 PACK at 11:07

## 2020-07-31 RX ADMIN — MAGNESIUM SULFATE HEPTAHYDRATE 1 G: 500 INJECTION, SOLUTION INTRAMUSCULAR; INTRAVENOUS at 11:07

## 2020-07-31 RX ADMIN — OXYCODONE HYDROCHLORIDE 10 MG: 10 TABLET ORAL at 08:07

## 2020-07-31 RX ADMIN — MORPHINE SULFATE 4 MG: 2 INJECTION, SOLUTION INTRAMUSCULAR; INTRAVENOUS at 06:07

## 2020-07-31 RX ADMIN — Medication 6 MG: at 08:07

## 2020-07-31 RX ADMIN — VANCOMYCIN HYDROCHLORIDE 2000 MG: 100 INJECTION, POWDER, LYOPHILIZED, FOR SOLUTION INTRAVENOUS at 10:07

## 2020-07-31 RX ADMIN — INSULIN ASPART 1 UNITS: 100 INJECTION, SOLUTION INTRAVENOUS; SUBCUTANEOUS at 08:07

## 2020-07-31 RX ADMIN — ASPIRIN 81 MG: 81 TABLET, COATED ORAL at 08:07

## 2020-07-31 RX ADMIN — INSULIN ASPART 3 UNITS: 100 INJECTION, SOLUTION INTRAVENOUS; SUBCUTANEOUS at 07:07

## 2020-07-31 RX ADMIN — LETROZOLE 2.5 MG: 2.5 TABLET ORAL at 08:07

## 2020-07-31 RX ADMIN — OXYCODONE HYDROCHLORIDE 5 MG: 5 TABLET ORAL at 12:07

## 2020-07-31 RX ADMIN — DICYCLOMINE HYDROCHLORIDE 20 MG: 20 TABLET ORAL at 05:07

## 2020-07-31 RX ADMIN — LEVOTHYROXINE SODIUM 200 MCG: 100 TABLET ORAL at 05:07

## 2020-07-31 RX ADMIN — GABAPENTIN 200 MG: 100 CAPSULE ORAL at 06:07

## 2020-07-31 RX ADMIN — DICYCLOMINE HYDROCHLORIDE 20 MG: 20 TABLET ORAL at 10:07

## 2020-07-31 RX ADMIN — OXYCODONE HYDROCHLORIDE 10 MG: 10 TABLET ORAL at 05:07

## 2020-07-31 NOTE — ASSESSMENT & PLAN NOTE
64 year old female with history of breast cancer s/p bilateral mastectomy and insertion of tissue expanders 3/2020 (completed chemo 02/2020, last radiation 07/2020) who presented to hospital 7/27 with epigastric pain, nausea and vomiting. CT showed mesenteric edema and small bowel inflammation c/f possible enteritis. She was started on Zosyn. Blood cx/stool studies negative. She clinically improved and was ready for discharge 7/30 when she developed an area of left breast dehiscence and subsequent exposure of her tissue expander with drainage of purulent fluid. Plastic Surgery consulted and she underwent explantation of bilateral tissues expanders yesterday.  Now POD #1.   ID consulted for antibiotic recommendations.  Surgical cultures showing Staph Aureus, susceptibilities pending (? From both breasts and not just left.  Culture labeling is ambiguous.).  Feels good.  Afebrile.  WBC 14.6 (up from 8.8 pre-operatively) She is anxious to go home      Plan  -  Discontinue ceftriaxone and continue IV vancomycin (Pharmacy to dose) pending susceptibilities of S. Aureus.   -  I called Micro and susceptibilities should be in tomorrow afternoon.   If MSSA, likely plan cefadroxil 500 mg twice daily.  If MRSA, likely doxycycline twice daily.  Anticipate 14 days of antibiotics.   -  Will follow up susceptibilities and follow up tomorrow with final recommendations.   -  ID follow up 10-14 days.   -  For questions or concerns over the weekend, please call MICK Faulkner, at [phone]      Data reviewed and plan discussed with ID staff  Discussed with Primary Team.

## 2020-07-31 NOTE — PROGRESS NOTES
"[facility] Hospital Medicine  Progress Note    Patient Name: Maria Elena Tavares  MRN: [id number]  Patient Class: IP- Inpatient   Admission Date: 7/27/2020  Length of Stay: 1 days  Attending Physician: Billy Cnadelario MD  Primary Care Provider: Estephania San MD    [facility] Medicine Team: [facility]P MED J Shaji Aviles PA-C    Subjective:     Principal Problem:Infection of left breast        HPI:  Maria Elena Tavares is a 64F with of breast cancer (completed chemo 02/2020, last radiation 07/2020), T2DM, HTN, anemia of neoplastic disease who presents with episgastric pain and tenderness since Friday. Patient reports episodes of shooting pain lasting 3-5 seconds many times a day since Friday. Has ssociated abdominal distension, nausea and has been unable to keep food down. She has vomited multiple times this weekend -- denies hematemesis. Admits to decreased appetite and states she eats smaller amounts of food than she used to. Last BM was on Friday (normal/loose consistency)  but does admit to two episodes of painless BRBPR on 07/23. She states she was constipated at the time and had been straining to have a BM.  Denies any recent illness, chest pain, SOB, fever/chills. Denies flatus but does admit to her stomach feeling "bubbly".     ED: AFVSS, WBC 13, Hbg stable at 10, LFTs WNL, lipase WNL, LA 0.9, UA clean, KUB unremarkable, RUQ US w/o acute findings, CT A/P showing enteritis, EKG no acute ischemia, neg troponin, K 3.1, CXR clear. COVID negative.    Overview/Hospital Course:  Patient admitted to observation for enteritis. She was started on Zosyn with improvement. Prior to discharge on oral medications, patient's left breast spacer ruptured with purulent fluid. Plastic surgery consulted and plan for explant of TE in OR today (7/30).  She was started on vanc/zosyn and ID consulted. ID de-escalated abx to vanc/CTX and intra-op cultures are pending    Interval History: NAEON.  Patient reports pain to L " "breast, controlled with PRN pain meds. She denies any f/c/sweats.   Both drains in place, with scant sanguinous fluid. Pt denies abd pain, NVD.    Review of Systems   Constitutional: Negative for chills, diaphoresis, fatigue and fever.   Respiratory: Negative for cough, shortness of breath and wheezing.    Cardiovascular: Negative for chest pain and palpitations.   Gastrointestinal: Negative for abdominal pain, constipation, diarrhea ("loose"), nausea and vomiting.   Genitourinary: Negative for decreased urine volume, difficulty urinating and dysuria.   Skin: Positive for wound (BL breast, post op drains in place). Negative for rash.     Objective:     Vital Signs (Most Recent):  Temp: 99.1 °F (37.3 °C) (07/31/20 1136)  Pulse: 104 (07/31/20 1136)  Resp: 16 (07/31/20 1136)  BP: 107/69 (07/31/20 1136)  SpO2: 96 % (07/31/20 1136) Vital Signs (24h Range):  Temp:  [96.5 °F (35.8 °C)-99.1 °F (37.3 °C)] 99.1 °F (37.3 °C)  Pulse:  [] 104  Resp:  [11-19] 16  SpO2:  [94 %-100 %] 96 %  BP: (103-138)/(56-84) 107/69     Weight: 78.8 kg (173 lb 13.3 oz)  Body mass index is 33.95 kg/m².    Intake/Output Summary (Last 24 hours) at 7/31/2020 1137  Last data filed at 7/31/2020 0500  Gross per 24 hour   Intake 1070 ml   Output 395 ml   Net 675 ml      Physical Exam  Vitals signs and nursing note reviewed.   Constitutional:       Appearance: She is well-developed. She is obese.   HENT:      Head: Normocephalic and atraumatic.   Cardiovascular:      Rate and Rhythm: Normal rate and regular rhythm.      Heart sounds: Normal heart sounds.   Pulmonary:      Effort: Pulmonary effort is normal.      Breath sounds: Normal breath sounds.   Abdominal:      General: Bowel sounds are normal. There is no distension.      Palpations: Abdomen is soft. There is no mass.   Musculoskeletal: Normal range of motion.   Skin:     General: Skin is warm and dry.      Comments: Sternal/L breast dark skin changes (s/p radiation) with flaking skin to L " breast.  BL inframmary bandages appear clean, intact  BL  SASHA drains intact with bulb drains with scant reddish fluid output   Neurological:      Mental Status: She is alert and oriented to person, place, and time.   Psychiatric:         Behavior: Behavior normal.         Thought Content: Thought content normal.         Judgment: Judgment normal.         Significant Labs:   CBC:   Recent Labs   Lab 07/30/20  0423 07/31/20  0502   WBC 8.86 14.68*   HGB 8.3* 8.6*   HCT 26.0* 28.1*   * 405*     CMP:   Recent Labs   Lab 07/30/20  0423 07/31/20  0502    134*   K 2.8* 4.6    99   CO2 30* 27   * 214*   BUN 9 12   CREATININE 0.8 1.0   CALCIUM 9.0 9.2   ANIONGAP 8 8   EGFRNONAA >60.0 59.7*       Significant Imaging: I have reviewed all pertinent imaging results/findings within the past 24 hours.       Assessment/Plan:      * Infection of left breast  Complication of breast implant  Breast cancer  63 y/o with L breast cancer s/p chemo (last 2/2020), XRT (last 7/13/20), s/p BL mastectomy and TE reconstruction 3/30/2020 who presents for fatigue and enteritis, but now with purulent drainage from spontaneous rupture from L breast.   - Plastic Sx consulted and recs given  - going to OR today (7/30/20) for removal of BL TE  - Will c/w vanc/zosyn for now  - ID consulted and appreciate recs   - changing to vanc and CTX   - cultures results pending    Hypokalemia  Hypomagnesemia  - replace IV and PO    Uncontrolled type 2 diabetes mellitus without complication, without long-term current use of insulin  Controlled  - hold home metformin and trulicity   - low dose SSI  - diabetic diet  -  on admit  Lab Results   Component Value Date    HGBA1C 7.3 (H) 07/28/2020       Essential hypertension  - continue irebsartan  - hold HCTZ to avoid dehydation    Enteritis  RESOLVED  - 2/4 SIRS for WBC 13, and HR >90, LA 0.9  - given breast cancer history will start zosyn and can deescalate to cipro/flagyl at discharge  -  she has not had a BM in several days, so unclear if having solid stools vs diarrhea. Will send stool studies if diarrheal illness. If not, can d/c the studies  - IVF  - symptomatic treatment for nausea  - bland diet    Anemia in neoplastic disease  - stable, improved    Breast cancer  - left breast spacer ruptured, leaking purulent fluid  - suspect infection  - plastic surgery consulted      VTE Risk Mitigation (From admission, onward)         Ordered     Place SAMANTHA hose  Until discontinued      07/30/20 1153     Place sequential compression device  Until discontinued      07/30/20 1153     Place sequential compression device  Until discontinued      07/30/20 1153     Place SAMANTHA hose  Until discontinued      07/30/20 1153     Place sequential compression device  Until discontinued      07/27/20 2223     Place SAMANTHA hose  Until discontinued      07/27/20 2223                      Sahji Aviles PA-C  Department of Hospital Medicine   [facility]

## 2020-07-31 NOTE — PLAN OF CARE
Plan of care reviewed with patient. Verbalized understanding. C/o pain is being controlled by PRN pain med. See MAR. Drsg to breast area remains dry and intact. Misha SASHA drains with bulb to suction. Bloody drainage noted. IV antibiotic therapy in progress. Remain afebrile. No acute distress noted. Call light and personal belonging in reach. All safety measure maintained.

## 2020-07-31 NOTE — PROGRESS NOTES
[facility]  Infectious Disease  Progress Note    Patient Name: Maria Elena Tavares  MRN: [id number]  Admission Date: 7/27/2020  Length of Stay: 1 days  Attending Physician: Billy Candelario MD  Primary Care Provider: Estephania San MD    Isolation Status: No active isolations  Assessment/Plan:      * Infection of left breast     64 year old female with history of breast cancer s/p bilateral mastectomy and insertion of tissue expanders 3/2020 (completed chemo 02/2020, last radiation 07/2020) who presented to hospital 7/27 with epigastric pain, nausea and vomiting. CT showed mesenteric edema and small bowel inflammation c/f possible enteritis. She was started on Zosyn. Blood cx/stool studies negative. She clinically improved and was ready for discharge 7/30 when she developed an area of left breast dehiscence and subsequent exposure of her tissue expander with drainage of purulent fluid. Plastic Surgery consulted and she underwent explantation of bilateral tissues expanders yesterday.  Now POD #1.   ID consulted for antibiotic recommendations.  Surgical cultures showing Staph Aureus, susceptibilities pending (? From both breasts and not just left.  Culture labeling is ambiguous.).  Feels good.  Afebrile.  WBC 14.6 (up from 8.8 pre-operatively) She is anxious to go home      Plan  -  Discontinue ceftriaxone and continue IV vancomycin (Pharmacy to dose) pending susceptibilities of S. Aureus.   -  I called [facility] and susceptibilities should be in tomorrow afternoon.   If MSSA, likely plan cefadroxil 500 mg twice daily.  If MRSA, likely doxycycline twice daily.  Anticipate 14 days of antibiotics.   -  Will follow up susceptibilities and follow up tomorrow with final recommendations.   -  ID follow up 10-14 days.   -  For questions or concerns over the weekend, please call MICK Faulkner, at [phone]      Data reviewed and plan discussed with ID staff  Discussed with Primary Team.         Thank you.   Please  call for any questions or concerns.  ABRAM Lujan, ANP-C  [phone] pager, [place] [phone]    Subjective:     Principal Problem:Infection of left breast    HPI:   Maria Elena Tavares is a 64 year old female with history of breast cancer s/p bilateral mastectomy and insertion of tissue expanders 3/2020 (completed chemo 02/2020, last radiation 07/2020), T2DM, HTN, anemia of neoplastic disease who presented 7/27 with episgastric pain and nausea starting on Friday. Associated symptoms are decreased appetite and vomiting.  Denies any recent illness, chest pain, SOB, fever/chills/sweats. Denies flatus, diarrhea but has had constipation in the recent past.     ED: AFVSS, WBC 13, Hbg stable at 10, LFTs WNL, lipase WNL, LA 0.9, UA clean, KUB unremarkable, RUQ US w/o acute findings, CT A/P showing enteritis, EKG no acute ischemia, neg troponin, K 3.1, CXR clear. COVID negative.     Patient admitted to observation for enteritis. She was started on Zosyn with improvement in her symptoms. She was planning to be discharged on oral antibiotics (cipro and flagyl), however she developed an area of left breast dehiscence with rupture of her tissue expander and drainage of purulent fluid. Plastic surgery consulted and plans for explant of the tissue expander today.  Abx broadened to Vanc/Zosyn/Cipro.        CT Abdomen Pelvis With Contrast [[id number]] (Abnormal) Resulted: 07/27/20 2118   Order Status: Completed Updated: 07/27/20 2120   Narrative:     EXAMINATION:   CT ABDOMEN PELVIS WITH CONTRAST     CLINICAL HISTORY:   Abdominal distension;     TECHNIQUE:   Low dose axial images, sagittal and coronal reformations were obtained from the lung bases to the pubic symphysis following the IV administration of 75 mL of Omnipaque 350.  Oral contrast was not administered.     COMPARISON:   U/S abdomen limited 07/27/2020; CT abdomen pelvis 10/23/2014.     FINDINGS:   Abdomen:     - Lower thorax:Postoperative changes of previous bilateral mastectomy  and sentinel lymph node biopsies with partially visualized bilateral breast implants which are peripherally calcified.     - Lung bases: No infiltrates and no nodules.     - Liver: Enlarged in size measuring 19.2 cm in craniocaudal dimension.  Homogeneously decreased parenchymal attenuation consistent with diffuse fatty infiltration.  Geographic area of hypoattenuation in the left hepatic lobe near the falciform ligament likely focal fatty deposition.  1.3 cm hyperenhancing nodule in the posterior aspect of the right hepatic lobe is stable when compared back to 10/23/2014 and is favored to reflect a benign finding such as hemangioma.  Trace amount of simple free fluid along the inferior tip of the right hepatic lobe.     - Gallbladder: No calcified gallstones.     - Bile Ducts: No evidence of intra or extra hepatic biliary ductal dilation.     - Spleen: Normal size and attenuation.     - Kidneys: Kidneys are normal in size and location and demonstrate normal contrast enhancement.  There is a simple cyst in the left kidney.  No enhancing solid renal mass or hydroureteronephrosis.     - Adrenals: Unremarkable.     - Pancreas: Mild fatty atrophy of the pancreas.  No mass or peripancreatic fat stranding.     - Retroperitoneum:  No significant adenopathy.     - Vascular: No abdominal aortic aneurysm.  The celiac and superior mesenteric arteries are patent.  There is high-grade stenosis of the origin of the inferior mesenteric artery though it appears patent.  The superior mesenteric vein is patent.     - Abdominal wall:  Tiny fat containing umbilical hernia.     Pelvis:     No pelvic mass, adenopathy, or free fluid.  Uterus and ovaries are surgically absent.     Bowel/Mesentery/Peritoneum:     Long segment loop of small bowel in the left mid hemiabdomen which demonstrates diffuse wall thickening with associated mesenteric stranding (coronal series 4, image 16).  There is a small volume of interloop fluid.  No wall  hypoenhancement.  No portal venous gas or pneumatosis intestinalis.  No evidence of bowel obstruction.  Small volume abdominopelvic free fluid.  No pneumoperitoneum.     Bones:  No acute osseous abnormality and no suspicious lytic or blastic lesion.    Impression:       Inflammatory changes in a long segment of small bowel in the left hemiabdomen which may represent nonspecific enteritis (infectious, inflammatory, or ischemic).  Associated mesenteric edema and small volume abdominopelvic free fluid.  No overt findings of bowel infarct.     Hepatomegaly and hepatic steatosis.     Simple left renal cyst.     This report was flagged in Epic as abnormal.     Electronically signed by resident: Jefferson Wallace   Date: 07/27/2020   Time: 20:33     Electronically signed by: Bjorn Rojas MD   Date: 07/27/2020   Time: 21:18   US Abdomen Limited [[id number]] Resulted: 07/27/20 1741   Order Status: Completed Updated: 07/27/20 1743   Narrative:     EXAMINATION:   US ABDOMEN LIMITED     CLINICAL HISTORY:   epigastric and upper abdominal pain;     TECHNIQUE:   Limited ultrasound of the right upper quadrant of the abdomen (including pancreas, liver, gallbladder, common bile duct,) Was performed.     COMPARISON:   10/25/2019.     FINDINGS:   The visualized portions of the pancreas are unremarkable.  The gallbladder is nondistended.  No gallbladder wall thickening or gallbladder wall hyperemia.  No pericholecystic fluid.  Sonographic Baldwin sign is negative.  The common duct is not dilated measuring 0.2 cm.  The liver is not enlarged however has diffusely echogenic echotexture suggesting steatosis.  There is fatty sparing about the gallbladder fossa.  The visualized right kidney is unremarkable.  No ascites.    Impression:       No findings to suggest acute cholecystitis.     Findings suggest hepatic steatosis, correlation with LFTs recommended.       Electronically signed by: Wagner Zamudio MD   Date: 07/27/2020   Time: 17:41      Interval History:  POD #1 explantation of bilateral breast tissue expanders.  Feels good.  Afebrile.  WBC 14.6 (up from 8.8 pre-operatively.  Surgical cultures showing Staph Aureus  (? From both breasts and not just left).  Culture labeling is ambiguous.  She is anxious to go home.       Review of Systems   Constitutional: Negative for chills, diaphoresis, fatigue and fever.   Respiratory: Negative for cough, shortness of breath and wheezing.    Cardiovascular: Negative for chest pain and palpitations.   Gastrointestinal: Negative for abdominal pain, constipation, diarrhea, nausea and vomiting.   Genitourinary: Negative for decreased urine volume, difficulty urinating and dysuria.   Skin: Positive for color change and wound (L breast). Negative for rash.   Neurological: Negative for headaches.   Psychiatric/Behavioral: Negative for agitation and confusion. The patient is not nervous/anxious.      Objective:     Vital Signs (Most Recent):  Temp: 98.5 °F (36.9 °C) (07/31/20 1545)  Pulse: 83 (07/31/20 1545)  Resp: 16 (07/31/20 1545)  BP: (!) 86/48 (07/31/20 1545)  SpO2: (!) 94 % (07/31/20 1545) Vital Signs (24h Range):  Temp:  [96.5 °F (35.8 °C)-99.1 °F (37.3 °C)] 98.5 °F (36.9 °C)  Pulse:  [] 83  Resp:  [16-18] 16  SpO2:  [94 %-99 %] 94 %  BP: ()/(48-78) 86/48     Weight: 78.8 kg (173 lb 13.3 oz)  Body mass index is 33.95 kg/m².    Estimated Creatinine Clearance: 52.8 mL/min (based on SCr of 1 mg/dL).    Physical Exam  Vitals signs and nursing note reviewed.   Constitutional:       General: She is not in acute distress.     Appearance: Normal appearance. She is well-developed. She is obese. She is not ill-appearing or toxic-appearing.   HENT:      Head: Normocephalic and atraumatic.   Eyes:      General: No scleral icterus.     Conjunctiva/sclera: Conjunctivae normal.   Cardiovascular:      Rate and Rhythm: Normal rate and regular rhythm.   Pulmonary:      Effort: Pulmonary effort is normal. No  respiratory distress.   Abdominal:      Palpations: Abdomen is soft.   Skin:     General: Skin is warm and dry.      Comments: Radiation induced skin changes/hyperpigmentation to left breast.   Bilateral surgical dressings c/d/i.    There are bilateral SASHA drains.  Scant amount serosanguinous drainage on left and small amount from right drain.  No purulence   Neurological:      Mental Status: She is alert and oriented to person, place, and time.   Psychiatric:         Behavior: Behavior normal.         Thought Content: Thought content normal.         Judgment: Judgment normal.         Significant Labs:   Blood Culture:   Recent Labs   Lab 07/27/20  2318   LABBLOO No growth to date  No Growth to date  No Growth to date  No growth to date  No Growth to date  No Growth to date     CBC:   Recent Labs   Lab 07/30/20  0423 07/31/20  0502   WBC 8.86 14.68*   HGB 8.3* 8.6*   HCT 26.0* 28.1*   * 405*     CMP:   Recent Labs   Lab 07/30/20  0423 07/31/20  0502    134*   K 2.8* 4.6    99   CO2 30* 27   * 214*   BUN 9 12   CREATININE 0.8 1.0   CALCIUM 9.0 9.2   ANIONGAP 8 8   EGFRNONAA >60.0 59.7*     Wound Culture:   Recent Labs   Lab 07/30/20  1358 07/30/20  1416   LABAERO STAPHYLOCOCCUS AUREUS  Few  Susceptibility pending  * STAPHYLOCOCCUS AUREUS  Few  Susceptibility pending  *       Significant Imaging: None

## 2020-07-31 NOTE — SUBJECTIVE & OBJECTIVE
Interval History:  POD #1 explantation of bilateral breast tissue expanders.  Feels good.  Afebrile.  WBC 14.6 (up from 8.8 pre-operatively.  Surgical cultures showing Staph Aureus  (? From both breasts and not just left).  Culture labeling is ambiguous.  She is anxious to go home.       Review of Systems   Constitutional: Negative for chills, diaphoresis, fatigue and fever.   Respiratory: Negative for cough, shortness of breath and wheezing.    Cardiovascular: Negative for chest pain and palpitations.   Gastrointestinal: Negative for abdominal pain, constipation, diarrhea, nausea and vomiting.   Genitourinary: Negative for decreased urine volume, difficulty urinating and dysuria.   Skin: Positive for color change and wound (L breast). Negative for rash.   Neurological: Negative for headaches.   Psychiatric/Behavioral: Negative for agitation and confusion. The patient is not nervous/anxious.      Objective:     Vital Signs (Most Recent):  Temp: 98.5 °F (36.9 °C) (07/31/20 1545)  Pulse: 83 (07/31/20 1545)  Resp: 16 (07/31/20 1545)  BP: (!) 86/48 (07/31/20 1545)  SpO2: (!) 94 % (07/31/20 1545) Vital Signs (24h Range):  Temp:  [96.5 °F (35.8 °C)-99.1 °F (37.3 °C)] 98.5 °F (36.9 °C)  Pulse:  [] 83  Resp:  [16-18] 16  SpO2:  [94 %-99 %] 94 %  BP: ()/(48-78) 86/48     Weight: 78.8 kg (173 lb 13.3 oz)  Body mass index is 33.95 kg/m².    Estimated Creatinine Clearance: 52.8 mL/min (based on SCr of 1 mg/dL).    Physical Exam  Vitals signs and nursing note reviewed.   Constitutional:       General: She is not in acute distress.     Appearance: Normal appearance. She is well-developed. She is obese. She is not ill-appearing or toxic-appearing.   HENT:      Head: Normocephalic and atraumatic.   Eyes:      General: No scleral icterus.     Conjunctiva/sclera: Conjunctivae normal.   Cardiovascular:      Rate and Rhythm: Normal rate and regular rhythm.   Pulmonary:      Effort: Pulmonary effort is normal. No respiratory  distress.   Abdominal:      Palpations: Abdomen is soft.   Skin:     General: Skin is warm and dry.      Comments: Radiation induced skin changes/hyperpigmentation to left breast.   Bilateral surgical dressings c/d/i.    There are bilateral SASHA drains.  Scant amount serosanguinous drainage on left and small amount from right drain.  No purulence   Neurological:      Mental Status: She is alert and oriented to person, place, and time.   Psychiatric:         Behavior: Behavior normal.         Thought Content: Thought content normal.         Judgment: Judgment normal.         Significant Labs:   Blood Culture:   Recent Labs   Lab 07/27/20  2318   LABBLOO No growth to date  No Growth to date  No Growth to date  No growth to date  No Growth to date  No Growth to date     CBC:   Recent Labs   Lab 07/30/20  0423 07/31/20  0502   WBC 8.86 14.68*   HGB 8.3* 8.6*   HCT 26.0* 28.1*   * 405*     CMP:   Recent Labs   Lab 07/30/20  0423 07/31/20  0502    134*   K 2.8* 4.6    99   CO2 30* 27   * 214*   BUN 9 12   CREATININE 0.8 1.0   CALCIUM 9.0 9.2   ANIONGAP 8 8   EGFRNONAA >60.0 59.7*     Wound Culture:   Recent Labs   Lab 07/30/20  1358 07/30/20  1416   LABAERO STAPHYLOCOCCUS AUREUS  Few  Susceptibility pending  * STAPHYLOCOCCUS AUREUS  Few  Susceptibility pending  *       Significant Imaging: None

## 2020-07-31 NOTE — ASSESSMENT & PLAN NOTE
Complication of breast implant  Breast cancer  65 y/o with L breast cancer s/p chemo (last 2/2020), XRT (last 7/13/20), s/p BL mastectomy and TE reconstruction 3/30/2020 who presents for fatigue and enteritis, but now with purulent drainage from spontaneous rupture from L breast.   - Plastic Sx consulted and recs given  - going to OR today (7/30/20) for removal of BL TE  - Will c/w vanc/zosyn for now  - ID consulted and appreciate recs   - changing to vanc and CTX   - cultures results pending

## 2020-07-31 NOTE — ASSESSMENT & PLAN NOTE
Controlled  - hold home metformin and trulicity   - low dose SSI  - diabetic diet  -  on admit  Lab Results   Component Value Date    HGBA1C 7.3 (H) 07/28/2020

## 2020-07-31 NOTE — SUBJECTIVE & OBJECTIVE
"Interval History: NAEON.  Patient reports pain to L breast, controlled with PRN pain meds. She denies any f/c/sweats.   Both drains in place, with scant sanguinous fluid. Pt denies abd pain, NVD.    Review of Systems   Constitutional: Negative for chills, diaphoresis, fatigue and fever.   Respiratory: Negative for cough, shortness of breath and wheezing.    Cardiovascular: Negative for chest pain and palpitations.   Gastrointestinal: Negative for abdominal pain, constipation, diarrhea ("loose"), nausea and vomiting.   Genitourinary: Negative for decreased urine volume, difficulty urinating and dysuria.   Skin: Positive for wound (BL breast, post op drains in place). Negative for rash.     Objective:     Vital Signs (Most Recent):  Temp: 99.1 °F (37.3 °C) (07/31/20 1136)  Pulse: 104 (07/31/20 1136)  Resp: 16 (07/31/20 1136)  BP: 107/69 (07/31/20 1136)  SpO2: 96 % (07/31/20 1136) Vital Signs (24h Range):  Temp:  [96.5 °F (35.8 °C)-99.1 °F (37.3 °C)] 99.1 °F (37.3 °C)  Pulse:  [] 104  Resp:  [11-19] 16  SpO2:  [94 %-100 %] 96 %  BP: (103-138)/(56-84) 107/69     Weight: 78.8 kg (173 lb 13.3 oz)  Body mass index is 33.95 kg/m².    Intake/Output Summary (Last 24 hours) at 7/31/2020 1137  Last data filed at 7/31/2020 0500  Gross per 24 hour   Intake 1070 ml   Output 395 ml   Net 675 ml      Physical Exam  Vitals signs and nursing note reviewed.   Constitutional:       Appearance: She is well-developed. She is obese.   HENT:      Head: Normocephalic and atraumatic.   Cardiovascular:      Rate and Rhythm: Normal rate and regular rhythm.      Heart sounds: Normal heart sounds.   Pulmonary:      Effort: Pulmonary effort is normal.      Breath sounds: Normal breath sounds.   Abdominal:      General: Bowel sounds are normal. There is no distension.      Palpations: Abdomen is soft. There is no mass.   Musculoskeletal: Normal range of motion.   Skin:     General: Skin is warm and dry.      Comments: Sternal/L breast dark " skin changes (s/p radiation) with flaking skin to L breast.  BL inframmary bandages appear clean, intact  BL  SASHA drains intact with bulb drains with scant reddish fluid output   Neurological:      Mental Status: She is alert and oriented to person, place, and time.   Psychiatric:         Behavior: Behavior normal.         Thought Content: Thought content normal.         Judgment: Judgment normal.         Significant Labs:   CBC:   Recent Labs   Lab 07/30/20  0423 07/31/20  0502   WBC 8.86 14.68*   HGB 8.3* 8.6*   HCT 26.0* 28.1*   * 405*     CMP:   Recent Labs   Lab 07/30/20  0423 07/31/20  0502    134*   K 2.8* 4.6    99   CO2 30* 27   * 214*   BUN 9 12   CREATININE 0.8 1.0   CALCIUM 9.0 9.2   ANIONGAP 8 8   EGFRNONAA >60.0 59.7*       Significant Imaging: I have reviewed all pertinent imaging results/findings within the past 24 hours.

## 2020-07-31 NOTE — ASSESSMENT & PLAN NOTE
Maria Elena Tavares is a 64F with of breast cancer (completed chemo 02/2020, last radiation 07/2020), T2DM, HTN, anemia of neoplastic disease, s/p B/L mastectomy and TE reconstruction on 3/30/2020, now s/p explant of bilateral tissue expanders for infected left breast expander.  - OR on 7/30 for explant, patient tolerated procedure well  - diabetic diet   - home meds restarted  - patient to follow up in clinic for drain removal  - c/w IV antibiotics   - will speak to primary team regarding dispo

## 2020-07-31 NOTE — SUBJECTIVE & OBJECTIVE
Interval History: Patient states that she is doing well this am. She states that she is having some incisional pain to the left breast, but that her pain is well controlled on prn pain medication. Denies all other complaints this am.     Medications:  Continuous Infusions:  Scheduled Meds:   aspirin  81 mg Oral Daily    cefTRIAXone (ROCEPHIN) IVPB  2 g Intravenous Q24H    dicyclomine  20 mg Oral QID (AC & HS)    famotidine  20 mg Oral BID    gabapentin  200 mg Oral TID    irbesartan  300 mg Oral Daily    letrozole  2.5 mg Oral Daily    levothyroxine  200 mcg Oral Daily    polyethylene glycol  17 g Oral BID    vancomycin (VANCOCIN) IVPB  2,000 mg Intravenous Q24H     PRN Meds:acetaminophen, albuterol-ipratropium, dextrose 50%, dextrose 50%, glucagon (human recombinant), glucose, glucose, insulin aspart U-100, melatonin, morphine, naloxone, ondansetron, oxyCODONE, oxyCODONE, promethazine (PHENERGAN) IVPB, simethicone, sodium chloride 0.9%, sodium chloride 0.9%, sodium chloride 0.9%     Review of patient's allergies indicates:  No Known Allergies  Objective:     Vital Signs (Most Recent):  Temp: 96.9 °F (36.1 °C) (07/31/20 0559)  Pulse: 85 (07/31/20 0559)  Resp: 18 (07/31/20 0647)  BP: 103/60 (07/31/20 0559)  SpO2: 99 % (07/31/20 0559) Vital Signs (24h Range):  Temp:  [96.5 °F (35.8 °C)-98.9 °F (37.2 °C)] 96.9 °F (36.1 °C)  Pulse:  [] 85  Resp:  [11-19] 18  SpO2:  [94 %-100 %] 99 %  BP: (103-138)/(56-84) 103/60     Weight: 78.8 kg (173 lb 13.3 oz)  Body mass index is 33.95 kg/m².    Intake/Output - Last 3 Shifts       07/29 0700 - 07/30 0659 07/30 0700 - 07/31 0659 07/31 0700 - 08/01 0659    P.O. 950 570     I.V. (mL/kg)  500 (6.3)     Total Intake(mL/kg) 950 (12.1) 1070 (13.6)     Urine (mL/kg/hr)  350 (0.2)     Drains  45     Stool  0     Total Output  395     Net +950 +675            Urine Occurrence 1 x 1 x     Stool Occurrence 3 x 0 x           Physical Exam  Constitutional:       General: She is  not in acute distress.     Appearance: Normal appearance.   HENT:      Head: Normocephalic and atraumatic.      Right Ear: External ear normal.      Left Ear: External ear normal.      Nose: Nose normal.      Mouth/Throat:      Mouth: Mucous membranes are moist.      Pharynx: Oropharynx is clear.   Eyes:      Extraocular Movements: Extraocular movements intact.      Pupils: Pupils are equal, round, and reactive to light.   Neck:      Musculoskeletal: Normal range of motion.   Cardiovascular:      Rate and Rhythm: Normal rate.      Pulses: Normal pulses.   Pulmonary:      Effort: Pulmonary effort is normal. No respiratory distress.   Abdominal:      General: Abdomen is flat.      Palpations: Abdomen is soft.   Musculoskeletal:      Right lower leg: No edema.      Left lower leg: No edema.   Skin:     General: Skin is warm and dry.      Comments: R breast inframammary incisions are c/d/i. R breast drain with minimal SS output. L breast inframammary incisions c/d/i, minimal SS output from L breast drain.    Neurological:      General: No focal deficit present.      Mental Status: She is alert and oriented to person, place, and time.   Psychiatric:         Mood and Affect: Mood normal.         Behavior: Behavior normal.         Significant Labs:  CBC:   Recent Labs   Lab 07/31/20  0502   WBC 14.68*   RBC 2.99*   HGB 8.6*   HCT 28.1*   *   MCV 94   MCH 28.8   MCHC 30.6*     BMP:   Recent Labs   Lab 07/31/20  0502   *   *   K 4.6   CL 99   CO2 27   BUN 12   CREATININE 1.0   CALCIUM 9.2   MG 1.9     CMP:   Recent Labs   Lab 07/27/20  1511  07/31/20  0502   *   < > 214*   CALCIUM 9.5   < > 9.2   ALBUMIN 2.9*  --   --    PROT 7.8  --   --       < > 134*   K 3.1*   < > 4.6   CO2 28   < > 27   CL 98   < > 99   BUN 18   < > 12   CREATININE 0.9   < > 1.0   ALKPHOS 82  --   --    ALT 10  --   --    AST 18  --   --    BILITOT 0.8  --   --     < > = values in this interval not displayed.        Significant Diagnostics:  I have reviewed all pertinent imaging results/findings within the past 24 hours.

## 2020-08-01 VITALS
RESPIRATION RATE: 18 BRPM | SYSTOLIC BLOOD PRESSURE: 101 MMHG | BODY MASS INDEX: 34.12 KG/M2 | DIASTOLIC BLOOD PRESSURE: 55 MMHG | OXYGEN SATURATION: 96 % | HEIGHT: 60 IN | HEART RATE: 80 BPM | TEMPERATURE: 97 F | WEIGHT: 173.81 LBS

## 2020-08-01 LAB
ANION GAP SERPL CALC-SCNC: 10 MMOL/L (ref 8–16)
BACTERIA SPEC AEROBE CULT: ABNORMAL
BACTERIA SPEC AEROBE CULT: ABNORMAL
BACTERIA STL CULT: NORMAL
BASOPHILS # BLD AUTO: 0.02 K/UL (ref 0–0.2)
BASOPHILS NFR BLD: 0.2 % (ref 0–1.9)
BUN SERPL-MCNC: 17 MG/DL (ref 8–23)
CALCIUM SERPL-MCNC: 8.8 MG/DL (ref 8.7–10.5)
CHLORIDE SERPL-SCNC: 103 MMOL/L (ref 95–110)
CO2 SERPL-SCNC: 26 MMOL/L (ref 23–29)
CREAT SERPL-MCNC: 1.1 MG/DL (ref 0.5–1.4)
DIFFERENTIAL METHOD: ABNORMAL
EOSINOPHIL # BLD AUTO: 0.1 K/UL (ref 0–0.5)
EOSINOPHIL NFR BLD: 1.4 % (ref 0–8)
ERYTHROCYTE [DISTWIDTH] IN BLOOD BY AUTOMATED COUNT: 15.6 % (ref 11.5–14.5)
EST. GFR  (AFRICAN AMERICAN): >60 ML/MIN/1.73 M^2
EST. GFR  (NON AFRICAN AMERICAN): 53.2 ML/MIN/1.73 M^2
GLUCOSE SERPL-MCNC: 177 MG/DL (ref 70–110)
HCT VFR BLD AUTO: 27.3 % (ref 37–48.5)
HGB BLD-MCNC: 8.5 G/DL (ref 12–16)
IMM GRANULOCYTES # BLD AUTO: 0.13 K/UL (ref 0–0.04)
IMM GRANULOCYTES NFR BLD AUTO: 1.3 % (ref 0–0.5)
LYMPHOCYTES # BLD AUTO: 0.6 K/UL (ref 1–4.8)
LYMPHOCYTES NFR BLD: 6 % (ref 18–48)
MAGNESIUM SERPL-MCNC: 2.1 MG/DL (ref 1.6–2.6)
MCH RBC QN AUTO: 29.3 PG (ref 27–31)
MCHC RBC AUTO-ENTMCNC: 31.1 G/DL (ref 32–36)
MCV RBC AUTO: 94 FL (ref 82–98)
MONOCYTES # BLD AUTO: 0.8 K/UL (ref 0.3–1)
MONOCYTES NFR BLD: 7.5 % (ref 4–15)
NEUTROPHILS # BLD AUTO: 8.6 K/UL (ref 1.8–7.7)
NEUTROPHILS NFR BLD: 83.6 % (ref 38–73)
NRBC BLD-RTO: 0 /100 WBC
PHOSPHATE SERPL-MCNC: 4.3 MG/DL (ref 2.7–4.5)
PLATELET # BLD AUTO: 380 K/UL (ref 150–350)
PMV BLD AUTO: 10.1 FL (ref 9.2–12.9)
POCT GLUCOSE: 131 MG/DL (ref 70–110)
POCT GLUCOSE: 166 MG/DL (ref 70–110)
POTASSIUM SERPL-SCNC: 4.1 MMOL/L (ref 3.5–5.1)
RBC # BLD AUTO: 2.9 M/UL (ref 4–5.4)
SODIUM SERPL-SCNC: 139 MMOL/L (ref 136–145)
WBC # BLD AUTO: 10.29 K/UL (ref 3.9–12.7)

## 2020-08-01 PROCEDURE — 99233 SBSQ HOSP IP/OBS HIGH 50: CPT | Mod: ,,, | Performed by: PHYSICIAN ASSISTANT

## 2020-08-01 PROCEDURE — 25000003 PHARM REV CODE 250: Performed by: PHYSICIAN ASSISTANT

## 2020-08-01 PROCEDURE — 83735 ASSAY OF MAGNESIUM: CPT

## 2020-08-01 PROCEDURE — 99239 PR HOSPITAL DISCHARGE DAY,>30 MIN: ICD-10-PCS | Mod: ,,, | Performed by: PHYSICIAN ASSISTANT

## 2020-08-01 PROCEDURE — 80048 BASIC METABOLIC PNL TOTAL CA: CPT

## 2020-08-01 PROCEDURE — 84100 ASSAY OF PHOSPHORUS: CPT

## 2020-08-01 PROCEDURE — 99233 PR SUBSEQUENT HOSPITAL CARE,LEVL III: ICD-10-PCS | Mod: ,,, | Performed by: PHYSICIAN ASSISTANT

## 2020-08-01 PROCEDURE — 85025 COMPLETE CBC W/AUTO DIFF WBC: CPT

## 2020-08-01 PROCEDURE — 36415 COLL VENOUS BLD VENIPUNCTURE: CPT

## 2020-08-01 PROCEDURE — 99239 HOSP IP/OBS DSCHRG MGMT >30: CPT | Mod: ,,, | Performed by: PHYSICIAN ASSISTANT

## 2020-08-01 PROCEDURE — 63600175 PHARM REV CODE 636 W HCPCS: Performed by: PHYSICIAN ASSISTANT

## 2020-08-01 RX ORDER — OXYCODONE HYDROCHLORIDE 5 MG/1
5 TABLET ORAL EVERY 4 HOURS PRN
Qty: 24 TABLET | Refills: 0 | Status: SHIPPED | OUTPATIENT
Start: 2020-08-01 | End: 2020-08-07

## 2020-08-01 RX ORDER — HEPARIN 100 UNIT/ML
5 SYRINGE INTRAVENOUS ONCE
Status: COMPLETED | OUTPATIENT
Start: 2020-08-01 | End: 2020-08-01

## 2020-08-01 RX ORDER — SODIUM CHLORIDE 9 MG/ML
INJECTION, SOLUTION INTRAVENOUS CONTINUOUS
Status: DISCONTINUED | OUTPATIENT
Start: 2020-08-01 | End: 2020-08-01 | Stop reason: HOSPADM

## 2020-08-01 RX ORDER — CEPHALEXIN 500 MG/1
500 CAPSULE ORAL EVERY 12 HOURS
Qty: 24 CAPSULE | Refills: 0 | Status: SHIPPED | OUTPATIENT
Start: 2020-08-01 | End: 2020-08-13

## 2020-08-01 RX ADMIN — SODIUM CHLORIDE: 0.9 INJECTION, SOLUTION INTRAVENOUS at 09:08

## 2020-08-01 RX ADMIN — OXYCODONE HYDROCHLORIDE 10 MG: 10 TABLET ORAL at 11:08

## 2020-08-01 RX ADMIN — OXYCODONE HYDROCHLORIDE 10 MG: 10 TABLET ORAL at 04:08

## 2020-08-01 RX ADMIN — HEPARIN 500 UNITS: 100 SYRINGE at 03:08

## 2020-08-01 RX ADMIN — GABAPENTIN 200 MG: 100 CAPSULE ORAL at 09:08

## 2020-08-01 RX ADMIN — FAMOTIDINE 20 MG: 20 TABLET, FILM COATED ORAL at 09:08

## 2020-08-01 RX ADMIN — OXYCODONE HYDROCHLORIDE 10 MG: 10 TABLET ORAL at 12:08

## 2020-08-01 RX ADMIN — DICYCLOMINE HYDROCHLORIDE 20 MG: 20 TABLET ORAL at 12:08

## 2020-08-01 RX ADMIN — GABAPENTIN 200 MG: 100 CAPSULE ORAL at 02:08

## 2020-08-01 RX ADMIN — ASPIRIN 81 MG: 81 TABLET, COATED ORAL at 09:08

## 2020-08-01 RX ADMIN — LEVOTHYROXINE SODIUM 200 MCG: 100 TABLET ORAL at 05:08

## 2020-08-01 RX ADMIN — IRBESARTAN 300 MG: 300 TABLET, FILM COATED ORAL at 09:08

## 2020-08-01 RX ADMIN — POLYETHYLENE GLYCOL 3350 17 G: 17 POWDER, FOR SOLUTION ORAL at 09:08

## 2020-08-01 RX ADMIN — LETROZOLE 2.5 MG: 2.5 TABLET ORAL at 09:08

## 2020-08-01 RX ADMIN — DICYCLOMINE HYDROCHLORIDE 20 MG: 20 TABLET ORAL at 05:08

## 2020-08-01 NOTE — PLAN OF CARE
Pt resting in bed in NAD. VSS. Pain well controlled this shift. SASHA drains draining small amt serosanguineous fluid. Up to bathroom with standby assist. No safety issues this shift

## 2020-08-01 NOTE — ASSESSMENT & PLAN NOTE
- continue irebsartan  - hold HCTZ to avoid dehydation  - c/w home meds at discharge  - mild elevated Cr, given IVF prior to discharge

## 2020-08-01 NOTE — SUBJECTIVE & OBJECTIVE
Interval History: Afebrile white count downtrending.  Cxs w. MSSA.  Pt w/o complaints reports continued improvement.      Review of Systems   Constitutional: Negative for chills, diaphoresis, fatigue and fever.   Respiratory: Negative for cough, shortness of breath and wheezing.    Cardiovascular: Negative for chest pain and palpitations.   Gastrointestinal: Negative for abdominal pain, constipation, diarrhea, nausea and vomiting.   Genitourinary: Negative for decreased urine volume, difficulty urinating and dysuria.   Skin: Positive for color change and wound (L breast). Negative for rash.   Neurological: Negative for headaches.   Psychiatric/Behavioral: Negative for agitation and confusion. The patient is not nervous/anxious.      Objective:     Vital Signs (Most Recent):  Temp: 98.3 °F (36.8 °C) (08/01/20 0808)  Pulse: 86 (08/01/20 0808)  Resp: 16 (08/01/20 0808)  BP: (!) 107/59 (08/01/20 0808)  SpO2: 96 % (08/01/20 0808) Vital Signs (24h Range):  Temp:  [96.5 °F (35.8 °C)-99.1 °F (37.3 °C)] 98.3 °F (36.8 °C)  Pulse:  [] 86  Resp:  [16-17] 16  SpO2:  [94 %-100 %] 96 %  BP: ()/(48-69) 107/59     Weight: 78.8 kg (173 lb 13.3 oz)  Body mass index is 33.95 kg/m².    Estimated Creatinine Clearance: 48 mL/min (based on SCr of 1.1 mg/dL).    Physical Exam  Vitals signs and nursing note reviewed.   Constitutional:       General: She is not in acute distress.     Appearance: Normal appearance. She is well-developed. She is obese. She is not ill-appearing or toxic-appearing.   HENT:      Head: Normocephalic and atraumatic.   Eyes:      General: No scleral icterus.     Conjunctiva/sclera: Conjunctivae normal.   Cardiovascular:      Rate and Rhythm: Normal rate and regular rhythm.   Pulmonary:      Effort: Pulmonary effort is normal. No respiratory distress.   Abdominal:      Palpations: Abdomen is soft.   Skin:     General: Skin is warm and dry.      Comments: Radiation induced skin changes/hyperpigmentation to  left breast.   Bilateral surgical dressings c/d/i.    There are bilateral SASHA drains.  Scant amount serosanguinous drainage on left and small amount from right drain.  No purulence   Neurological:      Mental Status: She is alert and oriented to person, place, and time.   Psychiatric:         Behavior: Behavior normal.         Thought Content: Thought content normal.         Judgment: Judgment normal.         Significant Labs: All pertinent labs within the past 24 hours have been reviewed.    Significant Imaging: I have reviewed all pertinent imaging results/findings within the past 24 hours.

## 2020-08-01 NOTE — DISCHARGE SUMMARY
"[facility] Hospital Medicine  Discharge Summary      Patient Name: Maria Elena Tavares  MRN: [id number]  Admission Date: 7/27/2020  Hospital Length of Stay: 2 days  Discharge Date and Time:  08/01/2020 2:45 PM  Attending Physician: Billy Candelario MD   Discharging Provider: Shaji Aviles PA-C  Primary Care Provider: Estephania San MD  Hospital Medicine Team: [facility] J Shaji Aviles PA-C    HPI:   Maria Elena Tavares is a 64F with of breast cancer (completed chemo 02/2020, last radiation 07/2020), T2DM, HTN, anemia of neoplastic disease who presents with episgastric pain and tenderness since Friday. Patient reports episodes of shooting pain lasting 3-5 seconds many times a day since Friday. Has ssociated abdominal distension, nausea and has been unable to keep food down. She has vomited multiple times this weekend -- denies hematemesis. Admits to decreased appetite and states she eats smaller amounts of food than she used to. Last BM was on Friday (normal/loose consistency)  but does admit to two episodes of painless BRBPR on 07/23. She states she was constipated at the time and had been straining to have a BM.  Denies any recent illness, chest pain, SOB, fever/chills. Denies flatus but does admit to her stomach feeling "bubbly".     ED: AFVSS, WBC 13, Hbg stable at 10, LFTs WNL, lipase WNL, LA 0.9, UA clean, KUB unremarkable, RUQ US w/o acute findings, CT A/P showing enteritis, EKG no acute ischemia, neg troponin, K 3.1, CXR clear. COVID negative.    Procedure(s) (LRB):  REMOVAL, TISSUE EXPANDER (Bilateral)      Hospital Course:   Patient admitted to observation for enteritis. She was started on Zosyn with improvement. Prior to discharge on oral medications, patient's left breast spacer ruptured with purulent fluid. Plastic surgery consulted and plan for explant of TE in OR today (7/30).  She was started on vanc/zosyn and ID consulted. ID de-escalated abx to vanc/CTX. Intra-op cultures showed " MSSA.  ID recommended cephalexin and patient discharged to completed 14 total days (end 8/13).  Patient to follow up with ID and plastic surgery.  Patient acknowledged understanding of plan and discharged home in stable condition.      Consults:   Consults (From admission, onward)        Status Ordering Provider     Inpatient consult to Infectious Diseases  Once     Provider:  (Not yet assigned)    Completed AARON KENNEDY     Inpatient consult to Plastic Surgery  Once     Provider:  (Not yet assigned)    Acknowledged YAAKOV DAMON JR     Pharmacy to dose Vancomycin consult  Once     Provider:  (Not yet assigned)    Acknowledged YAAKOV DAMON JR          * Infection of left breast  Complication of breast implant  Breast cancer  65 y/o with L breast cancer s/p chemo (last 2/2020), XRT (last 7/13/20), s/p BL mastectomy and TE reconstruction 3/30/2020 who presents for fatigue and enteritis, but now with purulent drainage from spontaneous rupture from L breast.   - Plastic Sx consulted and recs given  - going to OR today (7/30/20) for removal of BL TE  - Will c/w vanc/zosyn for now  - ID consulted and appreciate recs   - changing to vanc and CTX   - cultures results pending  - cultures show MSSA; sent home with oral cephalexin    Hypokalemia  Hypomagnesemia  - replace IV and PO    Uncontrolled type 2 diabetes mellitus without complication, without long-term current use of insulin  Controlled  - hold home metformin and trulicity   - low dose SSI  - diabetic diet  -  on admit  Lab Results   Component Value Date    HGBA1C 7.3 (H) 07/28/2020       Essential hypertension  - continue irebsartan  - hold HCTZ to avoid dehydation  - c/w home meds at discharge  - mild elevated Cr, given IVF prior to discharge    Enteritis  RESOLVED  - 2/4 SIRS for WBC 13, and HR >90, LA 0.9  - given breast cancer history will start zosyn and can deescalate to cipro/flagyl at discharge  - she has not had a BM in several  days, so unclear if having solid stools vs diarrhea. Will send stool studies if diarrheal illness. If not, can d/c the studies  - IVF  - symptomatic treatment for nausea  - bland diet    Anemia in neoplastic disease  - stable, improved      Final Active Diagnoses:    Diagnosis Date Noted POA    PRINCIPAL PROBLEM:  Infection of left breast [N61.0] 07/30/2020 Unknown    Hypokalemia [E87.6] 07/28/2020 Yes    Uncontrolled type 2 diabetes mellitus without complication, without long-term current use of insulin [E11.65]  Yes    Essential hypertension [I10]  Yes    Enteritis [K52.9] 07/27/2020 Yes    Anemia in neoplastic disease [D63.0] 12/02/2019 Yes    Complication of breast implant [T85.9XXA] 07/30/2020 Unknown      Problems Resolved During this Admission:       Discharged Condition: good    Disposition: Home or Self Care    Follow Up:    Patient Instructions:      Notify your health care provider if you experience any of the following:  temperature >100.4     Notify your health care provider if you experience any of the following:  redness, tenderness, or signs of infection (pain, swelling, redness, odor or green/yellow discharge around incision site)     Notify your health care provider if you experience any of the following:  persistent dizziness, light-headedness, or visual disturbances     Activity as tolerated     Shower on day dressing removed (No bath)       Significant Diagnostic Studies: Labs:   CMP   Recent Labs   Lab 07/31/20  0502 08/01/20  0336   * 139   K 4.6 4.1   CL 99 103   CO2 27 26   * 177*   BUN 12 17   CREATININE 1.0 1.1   CALCIUM 9.2 8.8   ANIONGAP 8 10   ESTGFRAFRICA >60.0 >60.0   EGFRNONAA 59.7* 53.2*    and CBC   Recent Labs   Lab 07/31/20  0502 08/01/20  0336   WBC 14.68* 10.29   HGB 8.6* 8.5*   HCT 28.1* 27.3*   * 380*       Pending Diagnostic Studies:     Procedure Component Value Units Date/Time    H. pylori antigen, stool [[id number]] Collected: 07/28/20 2036     Order Status: Sent Lab Status: In process Updated: 07/28/20 2126    Specimen: Stool          Medications:  Reconciled Home Medications:      Medication List      START taking these medications    cephALEXin 500 MG capsule  Commonly known as: KEFLEX  Take 1 capsule (500 mg total) by mouth every 12 (twelve) hours. for 12 days     oxyCODONE 5 MG immediate release tablet  Commonly known as: ROXICODONE  Take 1 tablet (5 mg total) by mouth every 4 (four) hours as needed.        CONTINUE taking these medications    aspirin 81 MG EC tablet  Commonly known as: ECOTRIN  Take 81 mg by mouth once daily.     blood-glucose meter kit  DISPENSE : BLOOD TEST STRIPS AND LANCETS PATIENT TEST BLOOD SUGARS TWICE DAILY  TEST STRIPS: 50 EACH, REFILL 5  LANCETS:  50 EACH , REFILL 5     chlorthalidone 25 MG Tab  Commonly known as: HYGROTEN  Take 1 tablet by mouth every morning     gabapentin 100 MG capsule  Commonly known as: NEURONTIN  Take 2 capsules (200 mg total) by mouth 3 (three) times daily.     HYDROcodone-acetaminophen 5-325 mg per tablet  Commonly known as: NORCO  Take 1 tablet by mouth every 8 (eight) hours as needed for Pain.     irbesartan 300 MG tablet  Commonly known as: AVAPRO  Take 1 tablet by mouth once daily     letrozole 2.5 mg Tab  Commonly known as: FEMARA  Take 1 tablet (2.5 mg total) by mouth once daily.     levothyroxine 200 MCG tablet  Commonly known as: SYNTHROID  Take 1 tablet (200 mcg total) by mouth once daily.     metFORMIN 1000 MG tablet  Commonly known as: GLUCOPHAGE  TAKE 1 TABLET (1,000 MG TOTAL) BY MOUTH 2 (TWO) TIMES DAILY WITH MEALS.     ondansetron 4 MG Tbdl  Commonly known as: ZOFRAN-ODT  Dissolve 1 tablet (4 mg total) by mouth every 6 (six) hours as needed.     polyethylene glycol 17 gram/dose powder  Commonly known as: GLYCOLAX  Mix 17 g (1 capful) with juice or water an drink 2 (two) times daily.     TRULICITY 1.5 mg/0.5 mL pen injector  Generic drug: dulaglutide  Inject 1.5 mg into the skin every  7 days.     VITAMIN D2 50,000 unit Cap  Generic drug: ergocalciferol  Take 1 capsule (50,000 Units total) by mouth every 7 days.     XIIDRA 5 % Dpet  Generic drug: lifitegrast  Apply 1 drop to  both eyes  2 (two) times daily.            Indwelling Lines/Drains at time of discharge:   Lines/Drains/Airways     Central Venous Catheter Line                 Port A Cath Single Lumen 10/04/19 0748 right subclavian 302 days          Drain                 Drain/Device  03/24/20 1102 Left breast other (see comments) 130 days         Drain/Device  03/24/20 1104 Left breast collapsible closed device 130 days         Drain/Device  03/24/20 1105 Right chest other (see comments) 130 days         Drain/Device  03/24/20 1106 Right chest collapsible closed device 130 days         Closed/Suction Drain 07/30/20 1402 Left Breast Bulb 15 Fr. 2 days         Closed/Suction Drain 07/30/20 1403 Right Breast Bulb 15 Fr. 2 days                Time spent on the discharge of patient: 38 minutes  Patient was seen and examined on the date of discharge and determined to be suitable for discharge.         Shaji Aviles PA-C  Department of Hospital Medicine  [facility]

## 2020-08-01 NOTE — ASSESSMENT & PLAN NOTE
Complication of breast implant  Breast cancer  63 y/o with L breast cancer s/p chemo (last 2/2020), XRT (last 7/13/20), s/p BL mastectomy and TE reconstruction 3/30/2020 who presents for fatigue and enteritis, but now with purulent drainage from spontaneous rupture from L breast.   - Plastic Sx consulted and recs given  - going to OR today (7/30/20) for removal of BL TE  - Will c/w vanc/zosyn for now  - ID consulted and appreciate recs   - changing to vanc and CTX   - cultures results pending  - cultures show MSSA; sent home with oral cephalexin

## 2020-08-01 NOTE — PROGRESS NOTES
Pharmacokinetic Assessment Follow Up: IV Vancomycin    Vancomycin serum concentration assessment(s):    The trough level was drawn correctly and can be used to guide therapy at this time. The measurement is below the desired definitive target range of 10 to 20 mcg/mL.    Vancomycin Regimen Plan:    Change regimen to Vancomycin 2250 mg IV every 24 hours with next serum trough concentration measured at 2230 prior to third dose on 8/3    Drug levels (last 3 results):  Recent Labs   Lab Result Units 07/31/20  0620 07/31/20  1830   Vancomycin-Trough ug/mL 10.1 8.7*       Pharmacy will continue to follow and monitor vancomycin.    Please contact pharmacy at extension [phone] for questions regarding this assessment.    Thank you for the consult,   Nancy Trejo       Patient brief summary:  Maria Elena Tavares is a 64 y.o. female initiated on antimicrobial therapy with IV Vancomycin for treatment of skin & soft tissue infection    The patient's current regimen is 2250 q24h    Drug Allergies:   Review of patient's allergies indicates:  No Known Allergies    Actual Body Weight:   78.8kg    Renal Function:   Estimated Creatinine Clearance: 52.8 mL/min (based on SCr of 1 mg/dL).,     Dialysis Method (if applicable):  N/A    CBC (last 72 hours):  Recent Labs   Lab Result Units 07/29/20 0411 07/30/20  0423 07/31/20  0502   WBC K/uL 9.97 8.86 14.68*   Hemoglobin g/dL 8.6* 8.3* 8.6*   Hematocrit % 27.4* 26.0* 28.1*   Platelets K/uL 326 351* 405*   Gran% % 78.4* 81.8* 90.6*   Lymph% % 6.0* 5.0* 2.6*   Mono% % 12.1 9.4 5.1   Eosinophil% % 1.7 1.7 0.0   Basophil% % 0.2 0.1 0.1   Differential Method  Automated Automated Automated       Metabolic Panel (last 72 hours):  Recent Labs   Lab Result Units 07/29/20  0411 07/30/20  0423 07/31/20  0502   Sodium mmol/L 136 138 134*   Potassium mmol/L 3.1* 2.8* 4.6   Chloride mmol/L 99 100 99   CO2 mmol/L 29 30* 27   Glucose mg/dL 123* 116* 214*   BUN, Bld mg/dL 12 9 12   Creatinine mg/dL 0.8 0.8  1.0   Magnesium mg/dL 1.6 1.5* 1.9   Phosphorus mg/dL 3.5 3.6 2.1*       Vancomycin Administrations:  vancomycin given in the last 96 hours                   vancomycin 2 g in dextrose 5 % 500 mL IVPB (mg) 2,000 mg New Bag 07/31/20 2257     2,000 mg New Bag 07/30/20 2001     2,000 mg New Bag 07/29/20 1947                Microbiologic Results:  Microbiology Results (last 7 days)     Procedure Component Value Units Date/Time    AFB Culture & Smear [[id number]] Collected: 07/30/20 1358    Order Status: Completed Specimen: Body Fluid from Breast, Left Updated: 07/31/20 2127     AFB Culture & Smear Culture in progress     AFB CULTURE STAIN No acid fast bacilli seen.    Narrative:      Left breast fluid culture (aerobic, anaerobic, AFB, fungus,  gram)    AFB Culture & Smear [[id number]] Collected: 07/30/20 1416    Order Status: Completed Specimen: Body Fluid from Breast, Right Updated: 07/31/20 2127     AFB Culture & Smear Culture in progress     AFB CULTURE STAIN No acid fast bacilli seen.    Narrative:      Left breast fluid culture (aerobic, anaerobic, AFB, fungus,  gram)    Stool culture [[id number]] Collected: 07/28/20 2035    Order Status: Completed Specimen: Stool Updated: 07/31/20 1405     Stool Culture Nothing significant to date    Aerobic culture [[id number]]  (Abnormal) Collected: 07/30/20 1416    Order Status: Completed Specimen: Body Fluid from Breast, Right Updated: 07/31/20 1316     Aerobic Bacterial Culture STAPHYLOCOCCUS AUREUS  Few  Susceptibility pending      Narrative:      Left breast fluid culture (aerobic, anaerobic, AFB, fungus,  gram)    Aerobic culture [[id number]]  (Abnormal) Collected: 07/30/20 1358    Order Status: Completed Specimen: Body Fluid from Breast, Left Updated: 07/31/20 1312     Aerobic Bacterial Culture STAPHYLOCOCCUS AUREUS  Few  Susceptibility pending      Narrative:      Left breast fluid culture (aerobic, anaerobic, AFB, fungus,  gram)    Culture, Anaerobic [[id number]] Collected:  07/30/20 1358    Order Status: Completed Specimen: Body Fluid from Breast, Left Updated: 07/31/20 0905     Anaerobic Culture Culture in progress    Narrative:      Left breast fluid culture (aerobic, anaerobic, AFB, fungus,  gram)    Blood culture (site 2) [[id number]] Collected: 07/27/20 2318    Order Status: Completed Specimen: Blood from Peripheral, Wrist, Right Updated: 07/31/20 0612     Blood Culture, Routine No growth to date      No Growth to date      No Growth to date    Narrative:      Site # 2, aerobic only    Blood culture (site 1) [[id number]] Collected: 07/27/20 2318    Order Status: Completed Specimen: Blood from Line, Subclavian, Right Updated: 07/31/20 0612     Blood Culture, Routine No growth to date      No Growth to date      No Growth to date    Narrative:      Site # 1, aerobic and anaerobic    Gram stain [[id number]] Collected: 07/30/20 1416    Order Status: Completed Specimen: Body Fluid from Breast, Right Updated: 07/30/20 2052     Gram Stain Result No WBC's      No organisms seen    Narrative:      Left breast fluid culture (aerobic, anaerobic, AFB, fungus,  gram)    Gram stain [[id number]] Collected: 07/30/20 1358    Order Status: Completed Specimen: Body Fluid from Breast, Left Updated: 07/30/20 1858     Gram Stain Result Rare WBC's      No organisms seen    Narrative:      Left breast fluid culture (aerobic, anaerobic, AFB, fungus,  gram)    Culture, Anaerobic [[id number]] Collected: 07/30/20 1416    Order Status: Sent Specimen: Body Fluid from Breast, Right Updated: 07/30/20 1436    Fungus culture [[id number]] Collected: 07/30/20 1416    Order Status: Sent Specimen: Body Fluid from Breast, Right Updated: 07/30/20 1435    Fungus culture [[id number]] Collected: 07/30/20 1358    Order Status: Sent Specimen: Body Fluid from Breast, Left Updated: 07/30/20 1434    E. coli 0157 antigen [[id number]] Collected: 07/28/20 2035    Order Status: Completed Specimen: Stool Updated: 07/30/20 0931     Shiga  Toxin 1 E.coli Negative     Shiga Toxin 2 E.coli Negative    Clostridium difficile EIA [[id number]] Collected: 07/28/20 2035    Order Status: Completed Specimen: Stool Updated: 07/28/20 2321     C. diff Antigen Negative     C difficile Toxins A+B, EIA Negative     Comment: Testing not recommended for children <24 months old.

## 2020-08-01 NOTE — ASSESSMENT & PLAN NOTE
64 year old female with history of breast cancer s/p bilateral mastectomy and insertion of tissue expanders 3/2020 (completed chemo 02/2020, last radiation 07/2020) who presented to hospital 7/27 with epigastric pain, nausea and vomiting. CT showed mesenteric edema and small bowel inflammation c/f possible enteritis. She was started on Zosyn. Blood cx/stool studies negative. She clinically improved and was ready for discharge 7/30 when she developed an area of left breast dehiscence and subsequent exposure of her tissue expander with drainage of purulent fluid. Plastic Surgery consulted and she underwent explantation of bilateral tissues expanders yesterday.     ID consulted for antibiotic recommendations.  Surgical cultures showing Staph Aureus, susceptibilities pending (? From both breasts and not just left.  Culture labeling is ambiguous.).  Feels good.  Afebrile.  WBC downtrending. She is anxious to go home      Plan  -  As MSSA, pt can transition to cefadroxil 500 mg twice daily.   - Recommend  14 days of antibiotics. (Est end date 8/13/20)  -  ID follow up 10-14 days.   - ID will sign off      Data reviewed and plan discussed with ID staff  Discussed with Primary Team.

## 2020-08-01 NOTE — PROGRESS NOTES
[facility]  Infectious Disease  Progress Note    Patient Name: Maria Elena Tavares  MRN: [id number]  Admission Date: 7/27/2020  Length of Stay: 2 days  Attending Physician: Billy Candelario MD  Primary Care Provider: Estephania San MD    Isolation Status: No active isolations  Assessment/Plan:      * Infection of left breast     64 year old female with history of breast cancer s/p bilateral mastectomy and insertion of tissue expanders 3/2020 (completed chemo 02/2020, last radiation 07/2020) who presented to hospital 7/27 with epigastric pain, nausea and vomiting. CT showed mesenteric edema and small bowel inflammation c/f possible enteritis. She was started on Zosyn. Blood cx/stool studies negative. She clinically improved and was ready for discharge 7/30 when she developed an area of left breast dehiscence and subsequent exposure of her tissue expander with drainage of purulent fluid. Plastic Surgery consulted and she underwent explantation of bilateral tissues expanders yesterday.     ID consulted for antibiotic recommendations.  Surgical cultures showing Staph Aureus, susceptibilities pending (? From both breasts and not just left.  Culture labeling is ambiguous.).  Feels good.  Afebrile.  WBC downtrending. She is anxious to go home      Plan  -  As MSSA, pt can transition to cefadroxil 500 mg twice daily.   - Recommend  14 days of antibiotics. (Est end date 8/13/20)  -  ID follow up 10-14 days.   - ID will sign off      Data reviewed and plan discussed with ID staff  Discussed with Primary Team.             Thank you for your consult. I will sign off. Please contact us if you have any additional questions.    Michael Terrell PA-C  Infectious Disease  [facility]    Subjective:     Principal Problem:Infection of left breast    HPI:   Maria Elena Tavares is a 64 year old female with history of breast cancer s/p bilateral mastectomy and insertion of tissue expanders 3/2020  (completed chemo 02/2020, last radiation 07/2020), T2DM, HTN, anemia of neoplastic disease who presented 7/27 with episgastric pain and nausea starting on Friday. Associated symptoms are decreased appetite and vomiting.  Denies any recent illness, chest pain, SOB, fever/chills/sweats. Denies flatus, diarrhea but has had constipation in the recent past.     ED: AFVSS, WBC 13, Hbg stable at 10, LFTs WNL, lipase WNL, LA 0.9, UA clean, KUB unremarkable, RUQ US w/o acute findings, CT A/P showing enteritis, EKG no acute ischemia, neg troponin, K 3.1, CXR clear. COVID negative.     Patient admitted to observation for enteritis. She was started on Zosyn with improvement in her symptoms. She was planning to be discharged on oral antibiotics (cipro and flagyl), however she developed an area of left breast dehiscence with rupture of her tissue expander and drainage of purulent fluid. Plastic surgery consulted and plans for explant of the tissue expander today.  Abx broadened to Vanc/Zosyn/Cipro.        CT Abdomen Pelvis With Contrast [[id number]] (Abnormal) Resulted: 07/27/20 2118   Order Status: Completed Updated: 07/27/20 2120   Narrative:     EXAMINATION:   CT ABDOMEN PELVIS WITH CONTRAST     CLINICAL HISTORY:   Abdominal distension;     TECHNIQUE:   Low dose axial images, sagittal and coronal reformations were obtained from the lung bases to the pubic symphysis following the IV administration of 75 mL of Omnipaque 350.  Oral contrast was not administered.     COMPARISON:   U/S abdomen limited 07/27/2020; CT abdomen pelvis 10/23/2014.     FINDINGS:   Abdomen:     - Lower thorax:Postoperative changes of previous bilateral mastectomy and sentinel lymph node biopsies with partially visualized bilateral breast implants which are peripherally calcified.     - Lung bases: No infiltrates and no nodules.     - Liver: Enlarged in size measuring 19.2 cm in craniocaudal dimension.  Homogeneously decreased parenchymal attenuation  consistent with diffuse fatty infiltration.  Geographic area of hypoattenuation in the left hepatic lobe near the falciform ligament likely focal fatty deposition.  1.3 cm hyperenhancing nodule in the posterior aspect of the right hepatic lobe is stable when compared back to 10/23/2014 and is favored to reflect a benign finding such as hemangioma.  Trace amount of simple free fluid along the inferior tip of the right hepatic lobe.     - Gallbladder: No calcified gallstones.     - Bile Ducts: No evidence of intra or extra hepatic biliary ductal dilation.     - Spleen: Normal size and attenuation.     - Kidneys: Kidneys are normal in size and location and demonstrate normal contrast enhancement.  There is a simple cyst in the left kidney.  No enhancing solid renal mass or hydroureteronephrosis.     - Adrenals: Unremarkable.     - Pancreas: Mild fatty atrophy of the pancreas.  No mass or peripancreatic fat stranding.     - Retroperitoneum:  No significant adenopathy.     - Vascular: No abdominal aortic aneurysm.  The celiac and superior mesenteric arteries are patent.  There is high-grade stenosis of the origin of the inferior mesenteric artery though it appears patent.  The superior mesenteric vein is patent.     - Abdominal wall:  Tiny fat containing umbilical hernia.     Pelvis:     No pelvic mass, adenopathy, or free fluid.  Uterus and ovaries are surgically absent.     Bowel/Mesentery/Peritoneum:     Long segment loop of small bowel in the left mid hemiabdomen which demonstrates diffuse wall thickening with associated mesenteric stranding (coronal series 4, image 16).  There is a small volume of interloop fluid.  No wall hypoenhancement.  No portal venous gas or pneumatosis intestinalis.  No evidence of bowel obstruction.  Small volume abdominopelvic free fluid.  No pneumoperitoneum.     Bones:  No acute osseous abnormality and no suspicious lytic or blastic lesion.    Impression:       Inflammatory changes in a  long segment of small bowel in the left hemiabdomen which may represent nonspecific enteritis (infectious, inflammatory, or ischemic).  Associated mesenteric edema and small volume abdominopelvic free fluid.  No overt findings of bowel infarct.     Hepatomegaly and hepatic steatosis.     Simple left renal cyst.     This report was flagged in Epic as abnormal.     Electronically signed by resident: Jefferson Wallace   Date: 07/27/2020   Time: 20:33     Electronically signed by: Bjorn Rojas MD   Date: 07/27/2020   Time: 21:18   US Abdomen Limited [[id number]] Resulted: 07/27/20 1741   Order Status: Completed Updated: 07/27/20 1743   Narrative:     EXAMINATION:   US ABDOMEN LIMITED     CLINICAL HISTORY:   epigastric and upper abdominal pain;     TECHNIQUE:   Limited ultrasound of the right upper quadrant of the abdomen (including pancreas, liver, gallbladder, common bile duct,) Was performed.     COMPARISON:   10/25/2019.     FINDINGS:   The visualized portions of the pancreas are unremarkable.  The gallbladder is nondistended.  No gallbladder wall thickening or gallbladder wall hyperemia.  No pericholecystic fluid.  Sonographic Baldwin sign is negative.  The common duct is not dilated measuring 0.2 cm.  The liver is not enlarged however has diffusely echogenic echotexture suggesting steatosis.  There is fatty sparing about the gallbladder fossa.  The visualized right kidney is unremarkable.  No ascites.    Impression:       No findings to suggest acute cholecystitis.     Findings suggest hepatic steatosis, correlation with LFTs recommended.       Electronically signed by: Wagner Zamudio MD   Date: 07/27/2020   Time: 17:41     Interval History: Afebrile white count downtrending.  Cxs w. MSSA.  Pt w/o complaints reports continued improvement.      Review of Systems   Constitutional: Negative for chills, diaphoresis, fatigue and fever.   Respiratory: Negative for cough, shortness of breath and wheezing.    Cardiovascular:  Negative for chest pain and palpitations.   Gastrointestinal: Negative for abdominal pain, constipation, diarrhea, nausea and vomiting.   Genitourinary: Negative for decreased urine volume, difficulty urinating and dysuria.   Skin: Positive for color change and wound (L breast). Negative for rash.   Neurological: Negative for headaches.   Psychiatric/Behavioral: Negative for agitation and confusion. The patient is not nervous/anxious.      Objective:     Vital Signs (Most Recent):  Temp: 98.3 °F (36.8 °C) (08/01/20 0808)  Pulse: 86 (08/01/20 0808)  Resp: 16 (08/01/20 0808)  BP: (!) 107/59 (08/01/20 0808)  SpO2: 96 % (08/01/20 0808) Vital Signs (24h Range):  Temp:  [96.5 °F (35.8 °C)-99.1 °F (37.3 °C)] 98.3 °F (36.8 °C)  Pulse:  [] 86  Resp:  [16-17] 16  SpO2:  [94 %-100 %] 96 %  BP: ()/(48-69) 107/59     Weight: 78.8 kg (173 lb 13.3 oz)  Body mass index is 33.95 kg/m².    Estimated Creatinine Clearance: 48 mL/min (based on SCr of 1.1 mg/dL).    Physical Exam  Vitals signs and nursing note reviewed.   Constitutional:       General: She is not in acute distress.     Appearance: Normal appearance. She is well-developed. She is obese. She is not ill-appearing or toxic-appearing.   HENT:      Head: Normocephalic and atraumatic.   Eyes:      General: No scleral icterus.     Conjunctiva/sclera: Conjunctivae normal.   Cardiovascular:      Rate and Rhythm: Normal rate and regular rhythm.   Pulmonary:      Effort: Pulmonary effort is normal. No respiratory distress.   Abdominal:      Palpations: Abdomen is soft.   Skin:     General: Skin is warm and dry.      Comments: Radiation induced skin changes/hyperpigmentation to left breast.   Bilateral surgical dressings c/d/i.    There are bilateral SASHA drains.  Scant amount serosanguinous drainage on left and small amount from right drain.  No purulence   Neurological:      Mental Status: She is alert and oriented to person, place, and time.   Psychiatric:          Behavior: Behavior normal.         Thought Content: Thought content normal.         Judgment: Judgment normal.         Significant Labs: All pertinent labs within the past 24 hours have been reviewed.    Significant Imaging: I have reviewed all pertinent imaging results/findings within the past 24 hours.

## 2020-08-01 NOTE — NURSING
Pt given discharge informations and follow up appointments to keep pt verbalized understanding of all. Pt has oxy prescription at bedside and is waiting on pharmacy to deliver her antibiotic.

## 2020-08-02 LAB
BACTERIA BLD CULT: NORMAL
BACTERIA BLD CULT: NORMAL
H PYLORI AG STL QL IA: NOT DETECTED

## 2020-08-03 DIAGNOSIS — C77.3 BREAST CANCER METASTASIZED TO AXILLARY LYMPH NODE, LEFT: Primary | ICD-10-CM

## 2020-08-03 DIAGNOSIS — C50.912 BREAST CANCER METASTASIZED TO AXILLARY LYMPH NODE, LEFT: Primary | ICD-10-CM

## 2020-08-03 LAB — BACTERIA SPEC ANAEROBE CULT: NORMAL

## 2020-08-03 NOTE — PLAN OF CARE
08/03/20 1302   Final Note   Assessment Type Final Discharge Note   Anticipated Discharge Disposition Home   What phone number can be called within the next 1-3 days to see how you are doing after discharge? [phone]   Discharge plans and expectations educations in teach back method with documentation complete? Yes   Right Care Referral Info   Post Acute Recommendation No Care   Post-Acute Status   Post-Acute Authorization Other   Other Status No Post-Acute Service Needs     Future Appointments   Date Time Provider Department Center   8/5/2020 10:15 AM Michael Solorzano MD [facility] PLASTIC [facility]   8/12/2020 10:30 AM LAB, HEMONC CANCER BLDG [facility] LAB [facility]   8/12/2020 11:30 AM José Lara NP [facility] HEMONC3 [facility]   8/13/2020 10:30 AM Criss Myers MD [facility] [place] [facility]   8/13/2020  1:00 PM Ricky Wolff Jr., PA [facility] ID [facility]

## 2020-08-03 NOTE — OP NOTE
Date of surgery 07/30/2020  Preoperative diagnosis infected left tissue expander  Postoperative diagnosis the same  Procedure performed removal of bilateral tissue expanders  Capsulectomy left breast  Surgeon Jeanine  Complications none  Blood loss minimal  Drains x2  Anesthesia general    The patient was placed in the supine position after adequate general endotracheal anesthesia was prepped and draped in a normal sterile fashion.  Previous mastectomy incision on left side was opened..  It was noted to be purulence within the cavity.  The implant was then removed.  Next a curette was then used on all surfaces to scrape out the capsule.  Hemostasis was achieved using the Bovie.  L of antibiotic solution was used to wash out the cavity.    On the right side previous mastectomy incision was opened the implant was removed.  Drains were placed on both sides and the incisions were closed using  interrupted nylon sutures.

## 2020-08-04 ENCOUNTER — TELEPHONE (OUTPATIENT)
Dept: OBSTETRICS AND GYNECOLOGY | Facility: CLINIC | Age: 65
End: 2020-08-04

## 2020-08-04 NOTE — TELEPHONE ENCOUNTER
----- Message from Cheryl Moyer sent at 8/4/2020  1:17 PM CDT -----  Regarding: rt amissed call   Type:  Patient Returning Call    Who Called: OSWALD DANIELLE [[id number]]    Who Left Message for Patient:no    Does the patient know what this is regarding?yes    Best Call Back Number:[phone]    Additional Information:

## 2020-08-05 ENCOUNTER — OFFICE VISIT (OUTPATIENT)
Dept: PLASTIC SURGERY | Facility: CLINIC | Age: 65
End: 2020-08-05
Payer: COMMERCIAL

## 2020-08-05 ENCOUNTER — PATIENT OUTREACH (OUTPATIENT)
Dept: OTHER | Facility: OTHER | Age: 65
End: 2020-08-05

## 2020-08-05 VITALS
WEIGHT: 176 LBS | BODY MASS INDEX: 34.37 KG/M2 | SYSTOLIC BLOOD PRESSURE: 126 MMHG | DIASTOLIC BLOOD PRESSURE: 82 MMHG | HEART RATE: 114 BPM

## 2020-08-05 DIAGNOSIS — Z09 SURGERY FOLLOW-UP EXAMINATION: Primary | ICD-10-CM

## 2020-08-05 LAB — BACTERIA SPEC ANAEROBE CULT: NORMAL

## 2020-08-05 PROCEDURE — 99024 PR POST-OP FOLLOW-UP VISIT: ICD-10-PCS | Mod: S$GLB,,, | Performed by: SURGERY

## 2020-08-05 PROCEDURE — 99999 PR PBB SHADOW E&M-EST. PATIENT-LVL IV: CPT | Mod: PBBFAC,,, | Performed by: SURGERY

## 2020-08-05 PROCEDURE — 99999 PR PBB SHADOW E&M-EST. PATIENT-LVL IV: ICD-10-PCS | Mod: PBBFAC,,, | Performed by: SURGERY

## 2020-08-05 PROCEDURE — 99024 POSTOP FOLLOW-UP VISIT: CPT | Mod: S$GLB,,, | Performed by: SURGERY

## 2020-08-05 RX ORDER — SULFAMETHOXAZOLE AND TRIMETHOPRIM 800; 160 MG/1; MG/1
1 TABLET ORAL 2 TIMES DAILY
Qty: 14 TABLET | Refills: 0 | Status: SHIPPED | OUTPATIENT
Start: 2020-08-05 | End: 2020-08-12

## 2020-08-05 NOTE — PROGRESS NOTES
Subjective:      Maria Elena Tavares is a 64 y.o. 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 Denies fever, chills, nausea, vomiting, or other systemic signs of infection.    Vitals:    08/05/20 1023   BP: 126/82   Pulse: (!) 114        Review of patient's allergies indicates:  No Known Allergies    Current Outpatient Medications on File Prior to Visit   Medication Sig Dispense Refill    aspirin (ECOTRIN) 81 MG EC tablet Take 81 mg by mouth once daily.        blood-glucose meter kit DISPENSE : BLOOD TEST STRIPS AND LANCETS PATIENT TEST BLOOD SUGARS TWICE DAILY  TEST STRIPS: 50 EACH, REFILL 5  LANCETS:  50 EACH , REFILL 5 1 each 0    cephALEXin (KEFLEX) 500 MG capsule Take 1 capsule (500 mg total) by mouth every 12 (twelve) hours. for 12 days 24 capsule 0    chlorthalidone (HYGROTEN) 25 MG Tab Take 1 tablet by mouth every morning 90 tablet 1    dulaglutide (TRULICITY) 1.5 mg/0.5 mL pen injector Inject 1.5 mg into the skin every 7 days. 2 mL 5    ergocalciferol (VITAMIN D2) 50,000 unit Cap Take 1 capsule (50,000 Units total) by mouth every 7 days. 12 capsule 2    gabapentin (NEURONTIN) 100 MG capsule Take 2 capsules (200 mg total) by mouth 3 (three) times daily. 90 capsule 2    HYDROcodone-acetaminophen (NORCO) 5-325 mg per tablet Take 1 tablet by mouth every 8 (eight) hours as needed for Pain. 30 tablet 0    irbesartan (AVAPRO) 300 MG tablet Take 1 tablet by mouth once daily 90 tablet 1    letrozole (FEMARA) 2.5 mg Tab Take 1 tablet (2.5 mg total) by mouth once daily. 30 tablet 3    levothyroxine (SYNTHROID) 200 MCG tablet Take 1 tablet (200 mcg total) by mouth once daily. 90 tablet 1    lifitegrast (XIIDRA) 5 % Dpet Apply 1 drop to  both eyes  2 (two) times daily. 180 each 4    metFORMIN (GLUCOPHAGE) 1000 MG tablet TAKE 1 TABLET (1,000 MG TOTAL) BY MOUTH 2 (TWO) TIMES DAILY  WITH MEALS. 180 tablet 1    ondansetron (ZOFRAN-ODT) 4 MG TbDL Dissolve 1 tablet (4 mg total) by mouth every 6 (six) hours as needed. 60 tablet 2    oxyCODONE (ROXICODONE) 5 MG immediate release tablet Take 1 tablet (5 mg total) by mouth every 4 (four) hours as needed. 24 tablet 0    polyethylene glycol (GLYCOLAX) 17 gram/dose powder Mix 17 g (1 capful) with juice or water an drink 2 (two) times daily. 1020 g 2     No current facility-administered medications on file prior to visit.        Patient Active Problem List   Diagnosis    Uncontrolled type 2 diabetes mellitus without complication, without long-term current use of insulin    Essential hypertension    History of shingles    Mild vitamin D deficiency    Hypothyroid    Diverticulitis of colon (without mention of hemorrhage)(562.11)    Breast cancer metastasized to axillary lymph node, left    Thrush    Anemia in neoplastic disease    Chemotherapy induced neutropenia    Neuropathy due to chemotherapeutic drug    Right arm pain    Non-intractable vomiting with nausea    Orthostatic hypotension    Breast cancer    At risk for lymphedema    Decreased shoulder mobility, unspecified laterality    Enteritis    Hypokalemia    Complication of breast implant    Infection of left breast       Past Surgical History:   Procedure Laterality Date    BREAST BIOPSY Left 2019    IDC with mets to node    BREAST BIOPSY Right 2019    core bx, benign node    BREAST BIOPSY Left 10/14/2019    MRI Core bx, + IDC    BREAST BIOPSY Left 10/08/2019    Core bx, ADH     SECTION      HYSTERECTOMY      INSERTION OF BREAST TISSUE EXPANDER Bilateral 3/24/2020    Procedure: INSERTION, TISSUE EXPANDER, BREAST BILATERAL;  Surgeon: Michael Solorzano MD;  Location: [facility] OR [address] [place] [address];  Service: Plastics;  Laterality: Bilateral;    INSERTION OF TUNNELED CENTRAL VENOUS CATHETER (CVC) WITH SUBCUTANEOUS PORT Right 10/4/2019    Procedure:  APMPFYAMX-UEUG-P-CATH RIGHT (CONSENT AM OF) 1.0 hr case;  Surgeon: Elena Lopez MD;  [facility];  Service: General;  Laterality: Right;    OOPHORECTOMY      SENTINEL LYMPH NODE BIOPSY Left 3/24/2020    Procedure: BIOPSY, LYMPH NODE, SENTINEL LEFT;  Surgeon: Elena Lpoez MD;  [facility];  Service: General;  Laterality: Left;    SIMPLE MASTECTOMY Bilateral 3/24/2020    Procedure: MASTECTOMY, SIMPLE BILATERAL;  Surgeon: Elena Lopez MD;  Location: [facility] OR [address] [place] [address];  Service: General;  Laterality: Bilateral;    TISSUE EXPANDER REMOVAL Bilateral 7/30/2020    Procedure: REMOVAL, TISSUE EXPANDER;  Surgeon: Michael Solorzano MD;  Location: [facility] OR [address] [place] [address];  Service: Plastics;  Laterality: Bilateral;       Social History     Socioeconomic History    Marital status: Single     Spouse name: Not on file    Number of children: Not on file    Years of education: Not on file    Highest education level: Not on file   Occupational History     Employer: [employer]   Social Needs    Financial resource strain: Not on file    Food insecurity     Worry: Not on file     Inability: Not on file    Transportation needs     Medical: Not on file     Non-medical: Not on file   Tobacco Use    Smoking status: Never Smoker    Smokeless tobacco: Never Used    Tobacco comment: 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  She walks occasionally.   Substance and Sexual Activity    Alcohol use: Not Currently     Comment: Occasionally    Drug use: No    Sexual activity: Not Currently     Partners: Male   Lifestyle    Physical activity     Days per week: Not on file     Minutes per session: Not on file    Stress: Not on file   Relationships    Social connections     Talks on phone: Not on file     Gets together: Not on file     Attends Orthodoxy service: Not on file     Active member of club or organization: Not on file     Attends meetings of clubs  or organizations: Not on file     Relationship status: Not on file   Other Topics Concern    Not on file   Social History Narrative    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    Plan:   64 y.o. female status post B/L TE removal 1 week ago.  - Doing well, no issues  - will keep drains in place for 1 more week  - Return to clinic in 1 week  - pt switched from keflex to bactrim after staph sensitivities reviewed      All questions were answered. The patient was advised to call the clinic with any questions or concerns prior to their next visit.

## 2020-08-05 NOTE — PROGRESS NOTES
Patient has not submitted any recent data for [facility]. Will follow up once more data is available to review.     Last 5 Patient Entered Readings                                      Current 30 Day Average: 124/89     Recent Readings 7/8/2020 6/21/2020 6/7/2020 6/4/2020 6/2/2020    SBP (mmHg) 124 130 121 102 108    DBP (mmHg) 89 87 84 74 78    Pulse 78 96 98 103 107        Hypertension Medications             chlorthalidone (HYGROTEN) 25 MG Tab Take 1 tablet by mouth every morning    irbesartan (AVAPRO) 300 MG tablet Take 1 tablet by mouth once daily

## 2020-08-11 ENCOUNTER — INFUSION (OUTPATIENT)
Dept: INFUSION THERAPY | Facility: HOSPITAL | Age: 65
End: 2020-08-11
Attending: INTERNAL MEDICINE
Payer: COMMERCIAL

## 2020-08-11 DIAGNOSIS — C77.3 BREAST CANCER METASTASIZED TO AXILLARY LYMPH NODE, LEFT: Primary | ICD-10-CM

## 2020-08-11 DIAGNOSIS — D63.0 ANEMIA IN NEOPLASTIC DISEASE: ICD-10-CM

## 2020-08-11 DIAGNOSIS — C50.912 BREAST CANCER METASTASIZED TO AXILLARY LYMPH NODE, LEFT: Primary | ICD-10-CM

## 2020-08-11 LAB
ABO + RH BLD: NORMAL
ALBUMIN SERPL BCP-MCNC: 3.6 G/DL (ref 3.5–5.2)
ALP SERPL-CCNC: 86 U/L (ref 55–135)
ALT SERPL W/O P-5'-P-CCNC: 13 U/L (ref 10–44)
ANION GAP SERPL CALC-SCNC: 9 MMOL/L (ref 8–16)
AST SERPL-CCNC: 21 U/L (ref 10–40)
BILIRUB SERPL-MCNC: 0.4 MG/DL (ref 0.1–1)
BLD GP AB SCN CELLS X3 SERPL QL: NORMAL
BUN SERPL-MCNC: 19 MG/DL (ref 8–23)
CALCIUM SERPL-MCNC: 9.8 MG/DL (ref 8.7–10.5)
CHLORIDE SERPL-SCNC: 103 MMOL/L (ref 95–110)
CO2 SERPL-SCNC: 25 MMOL/L (ref 23–29)
CREAT SERPL-MCNC: 1.5 MG/DL (ref 0.5–1.4)
ERYTHROCYTE [DISTWIDTH] IN BLOOD BY AUTOMATED COUNT: 15.6 % (ref 11.5–14.5)
EST. GFR  (AFRICAN AMERICAN): 42.1 ML/MIN/1.73 M^2
EST. GFR  (NON AFRICAN AMERICAN): 36.6 ML/MIN/1.73 M^2
GLUCOSE SERPL-MCNC: 89 MG/DL (ref 70–110)
HCT VFR BLD AUTO: 27.4 % (ref 37–48.5)
HGB BLD-MCNC: 8.4 G/DL (ref 12–16)
IMM GRANULOCYTES # BLD AUTO: 0.02 K/UL (ref 0–0.04)
MCH RBC QN AUTO: 28.8 PG (ref 27–31)
MCHC RBC AUTO-ENTMCNC: 30.7 G/DL (ref 32–36)
MCV RBC AUTO: 94 FL (ref 82–98)
NEUTROPHILS # BLD AUTO: 3.1 K/UL (ref 1.8–7.7)
PLATELET # BLD AUTO: 361 K/UL (ref 150–350)
PMV BLD AUTO: 9.7 FL (ref 9.2–12.9)
POTASSIUM SERPL-SCNC: 3.5 MMOL/L (ref 3.5–5.1)
PROT SERPL-MCNC: 7.8 G/DL (ref 6–8.4)
RBC # BLD AUTO: 2.92 M/UL (ref 4–5.4)
SODIUM SERPL-SCNC: 137 MMOL/L (ref 136–145)
WBC # BLD AUTO: 4.65 K/UL (ref 3.9–12.7)

## 2020-08-11 PROCEDURE — 25000003 PHARM REV CODE 250: Performed by: INTERNAL MEDICINE

## 2020-08-11 PROCEDURE — 36591 DRAW BLOOD OFF VENOUS DEVICE: CPT

## 2020-08-11 PROCEDURE — 63600175 PHARM REV CODE 636 W HCPCS: Performed by: INTERNAL MEDICINE

## 2020-08-11 PROCEDURE — 96523 IRRIG DRUG DELIVERY DEVICE: CPT

## 2020-08-11 PROCEDURE — 80053 COMPREHEN METABOLIC PANEL: CPT

## 2020-08-11 PROCEDURE — A4216 STERILE WATER/SALINE, 10 ML: HCPCS | Performed by: INTERNAL MEDICINE

## 2020-08-11 PROCEDURE — 86901 BLOOD TYPING SEROLOGIC RH(D): CPT

## 2020-08-11 PROCEDURE — 85027 COMPLETE CBC AUTOMATED: CPT

## 2020-08-11 RX ORDER — HEPARIN 100 UNIT/ML
500 SYRINGE INTRAVENOUS
Status: COMPLETED | OUTPATIENT
Start: 2020-08-11 | End: 2020-08-11

## 2020-08-11 RX ORDER — HEPARIN 100 UNIT/ML
500 SYRINGE INTRAVENOUS
Status: CANCELLED | OUTPATIENT
Start: 2020-08-11

## 2020-08-11 RX ORDER — SODIUM CHLORIDE 0.9 % (FLUSH) 0.9 %
10 SYRINGE (ML) INJECTION
Status: COMPLETED | OUTPATIENT
Start: 2020-08-11 | End: 2020-08-11

## 2020-08-11 RX ORDER — SODIUM CHLORIDE 0.9 % (FLUSH) 0.9 %
10 SYRINGE (ML) INJECTION
Status: CANCELLED | OUTPATIENT
Start: 2020-08-11

## 2020-08-11 RX ADMIN — Medication 10 ML: at 01:08

## 2020-08-11 RX ADMIN — HEPARIN 500 UNITS: 100 SYRINGE at 01:08

## 2020-08-11 NOTE — NURSING
Patient present for labs drawn from port. Port accessed without difficulty; blood return present. Labs collected and sent. Patient discharged home independently.

## 2020-08-12 ENCOUNTER — PATIENT OUTREACH (OUTPATIENT)
Dept: ADMINISTRATIVE | Facility: OTHER | Age: 65
End: 2020-08-12

## 2020-08-12 ENCOUNTER — OFFICE VISIT (OUTPATIENT)
Dept: HEMATOLOGY/ONCOLOGY | Facility: CLINIC | Age: 65
End: 2020-08-12
Payer: COMMERCIAL

## 2020-08-12 ENCOUNTER — OFFICE VISIT (OUTPATIENT)
Dept: PLASTIC SURGERY | Facility: CLINIC | Age: 65
End: 2020-08-12
Payer: COMMERCIAL

## 2020-08-12 DIAGNOSIS — C77.3 BREAST CANCER METASTASIZED TO AXILLARY LYMPH NODE, LEFT: Primary | ICD-10-CM

## 2020-08-12 DIAGNOSIS — Z09 SURGERY FOLLOW-UP EXAMINATION: Primary | ICD-10-CM

## 2020-08-12 DIAGNOSIS — C50.912 BREAST CANCER METASTASIZED TO AXILLARY LYMPH NODE, LEFT: Primary | ICD-10-CM

## 2020-08-12 DIAGNOSIS — D64.9 ANEMIA, UNSPECIFIED TYPE: ICD-10-CM

## 2020-08-12 DIAGNOSIS — G62.0 NEUROPATHY DUE TO CHEMOTHERAPEUTIC DRUG: ICD-10-CM

## 2020-08-12 DIAGNOSIS — R79.89 ELEVATED SERUM CREATININE: ICD-10-CM

## 2020-08-12 DIAGNOSIS — T45.1X5A NEUROPATHY DUE TO CHEMOTHERAPEUTIC DRUG: ICD-10-CM

## 2020-08-12 PROCEDURE — 99024 POSTOP FOLLOW-UP VISIT: CPT | Mod: S$GLB,,, | Performed by: PHYSICIAN ASSISTANT

## 2020-08-12 PROCEDURE — 99214 OFFICE O/P EST MOD 30 MIN: CPT | Mod: 95,,, | Performed by: NURSE PRACTITIONER

## 2020-08-12 PROCEDURE — 99999 PR PBB SHADOW E&M-EST. PATIENT-LVL III: ICD-10-PCS | Mod: PBBFAC,,, | Performed by: PHYSICIAN ASSISTANT

## 2020-08-12 PROCEDURE — 99214 PR OFFICE/OUTPT VISIT, EST, LEVL IV, 30-39 MIN: ICD-10-PCS | Mod: 95,,, | Performed by: NURSE PRACTITIONER

## 2020-08-12 PROCEDURE — 99999 PR PBB SHADOW E&M-EST. PATIENT-LVL III: CPT | Mod: PBBFAC,,, | Performed by: PHYSICIAN ASSISTANT

## 2020-08-12 PROCEDURE — 99024 PR POST-OP FOLLOW-UP VISIT: ICD-10-PCS | Mod: S$GLB,,, | Performed by: PHYSICIAN ASSISTANT

## 2020-08-12 NOTE — PROGRESS NOTES
Requested updates from Care Everywhere.  Reviewed chart for overdue AIDAN topics.  Updated Health Maintenance.   Reconciled immunizations in LINKS.

## 2020-08-12 NOTE — Clinical Note
Labs in 1 week. CBC, CMP TSH, T4, Iron/tibc, ferritin, B12, Folate, retic count, LDH.   RTC in 1 month to see Dr. Villagran with a CBC, cMP

## 2020-08-12 NOTE — PROGRESS NOTES
Subjective:      Maria Elena Tavares is a 64 y.o. year old female who presents to the Plastic Surgery Clinic on 08/12/2020 for follow up visit status post removal of B tissue expanders on 07/30/2020. Denies fever, chills, nausea, vomiting, or other systemic signs of infection.    There were no vitals filed for this visit.     Review of patient's allergies indicates:  No Known Allergies    Current Outpatient Medications on File Prior to Visit   Medication Sig Dispense Refill    aspirin (ECOTRIN) 81 MG EC tablet Take 81 mg by mouth once daily.        blood-glucose meter kit DISPENSE : BLOOD TEST STRIPS AND LANCETS PATIENT TEST BLOOD SUGARS TWICE DAILY  TEST STRIPS: 50 EACH, REFILL 5  LANCETS:  50 EACH , REFILL 5 1 each 0    cephALEXin (KEFLEX) 500 MG capsule Take 1 capsule (500 mg total) by mouth every 12 (twelve) hours. for 12 days 24 capsule 0    chlorthalidone (HYGROTEN) 25 MG Tab Take 1 tablet by mouth every morning 90 tablet 1    dulaglutide (TRULICITY) 1.5 mg/0.5 mL pen injector Inject 1.5 mg into the skin every 7 days. 2 mL 5    ergocalciferol (VITAMIN D2) 50,000 unit Cap Take 1 capsule (50,000 Units total) by mouth every 7 days. 12 capsule 2    gabapentin (NEURONTIN) 100 MG capsule Take 2 capsules (200 mg total) by mouth 3 (three) times daily. 90 capsule 2    HYDROcodone-acetaminophen (NORCO) 5-325 mg per tablet Take 1 tablet by mouth every 8 (eight) hours as needed for Pain. 30 tablet 0    irbesartan (AVAPRO) 300 MG tablet Take 1 tablet by mouth once daily 90 tablet 1    letrozole (FEMARA) 2.5 mg Tab Take 1 tablet (2.5 mg total) by mouth once daily. 30 tablet 3    levothyroxine (SYNTHROID) 200 MCG tablet Take 1 tablet (200 mcg total) by mouth once daily. 90 tablet 1    lifitegrast (XIIDRA) 5 % Dpet Apply 1 drop to  both eyes  2 (two) times daily. 180 each 4    metFORMIN (GLUCOPHAGE) 1000 MG tablet TAKE 1 TABLET (1,000 MG TOTAL) BY MOUTH 2 (TWO) TIMES DAILY WITH MEALS. 180 tablet 1    ondansetron  (ZOFRAN-ODT) 4 MG TbDL Dissolve 1 tablet (4 mg total) by mouth every 6 (six) hours as needed. 60 tablet 2    polyethylene glycol (GLYCOLAX) 17 gram/dose powder Mix 17 g (1 capful) with juice or water an drink 2 (two) times daily. 1020 g 2    sulfamethoxazole-trimethoprim 800-160mg (BACTRIM DS) 800-160 mg Tab Take 1 tablet by mouth 2 (two) times daily. for 7 days 14 tablet 0     No current facility-administered medications on file prior to visit.        Patient Active Problem List   Diagnosis    Uncontrolled type 2 diabetes mellitus without complication, without long-term current use of insulin    Essential hypertension    History of shingles    Mild vitamin D deficiency    Hypothyroid    Diverticulitis of colon (without mention of hemorrhage)(562.11)    Breast cancer metastasized to axillary lymph node, left    Thrush    Anemia in neoplastic disease    Chemotherapy induced neutropenia    Neuropathy due to chemotherapeutic drug    Right arm pain    Non-intractable vomiting with nausea    Orthostatic hypotension    Breast cancer    At risk for lymphedema    Decreased shoulder mobility, unspecified laterality    Enteritis    Hypokalemia    Complication of breast implant    Infection of left breast       Past Surgical History:   Procedure Laterality Date    BREAST BIOPSY Left 2019    IDC with mets to node    BREAST BIOPSY Right 2019    core bx, benign node    BREAST BIOPSY Left 10/14/2019    MRI Core bx, + IDC    BREAST BIOPSY Left 10/08/2019    Core bx, ADH     SECTION      HYSTERECTOMY      INSERTION OF BREAST TISSUE EXPANDER Bilateral 3/24/2020    Procedure: INSERTION, TISSUE EXPANDER, BREAST BILATERAL;  Surgeon: Michael Solorzano MD;  Location: [facility] OR [address] [place] [address];  Service: Plastics;  Laterality: Bilateral;    INSERTION OF TUNNELED CENTRAL VENOUS CATHETER (CVC) WITH SUBCUTANEOUS PORT Right 10/4/2019    Procedure: GOZAYSOJI-ZEOU-N-CATH RIGHT (CONSENT AM OF) 1.0 hr  case;  Surgeon: Elena Lopez MD;  Location: [facility] OR [address] [place] [address];  Service: General;  Laterality: Right;    OOPHORECTOMY      SENTINEL LYMPH NODE BIOPSY Left 3/24/2020    Procedure: BIOPSY, LYMPH NODE, SENTINEL LEFT;  Surgeon: Elena Lopez MD;  Location: [facility] OR [address] [place] [address];  Service: General;  Laterality: Left;    SIMPLE MASTECTOMY Bilateral 3/24/2020    Procedure: MASTECTOMY, SIMPLE BILATERAL;  Surgeon: Elena Lopez MD;  Location: [facility] OR [address] [place] [address];  Service: General;  Laterality: Bilateral;    TISSUE EXPANDER REMOVAL Bilateral 7/30/2020    Procedure: REMOVAL, TISSUE EXPANDER;  Surgeon: Michael Solorzano MD;  Location: [facility] OR [address] [place] [address];  Service: Plastics;  Laterality: Bilateral;       Social History     Socioeconomic History    Marital status: Single     Spouse name: Not on file    Number of children: Not on file    Years of education: Not on file    Highest education level: Not on file   Occupational History     Employer: [employer] ([employer])   Social Needs    Financial resource strain: Not on file    Food insecurity     Worry: Not on file     Inability: Not on file    Transportation needs     Medical: Not on file     Non-medical: Not on file   Tobacco Use    Smoking status: Never Smoker    Smokeless tobacco: Never Used    Tobacco comment: 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  She walks occasionally.   Substance and Sexual Activity    Alcohol use: Not Currently     Comment: Occasionally    Drug use: No    Sexual activity: Not Currently     Partners: Male   Lifestyle    Physical activity     Days per week: Not on file     Minutes per session: Not on file    Stress: Not on file   Relationships    Social connections     Talks on phone: Not on file     Gets together: Not on file     Attends Gnosticism service: Not on file     Active member of club or organization: Not on file     Attends meetings of clubs or organizations: Not on file     Relationship  status: Not on file   Other Topics Concern    Not on file   Social History Narrative    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 daughter1 granddaughter     Date of surgery 07/30/2020  Preoperative diagnosis infected left tissue expander  Postoperative diagnosis the same  Procedure performed removal of bilateral tissue expanders  Capsulectomy left breast  Surgeon Jeanine  Complications none  Blood loss minimal  Drains x2  Anesthesia general         Review of Systems: negative    Objective:     Physical Exam:    WD WN NAD  Normal resp effort  R breast - incision CDI, sutures intact, surgically absent nipples, no erythema or drainage  L breast - incision CDI, sutures intact, surgically absent nipples, no erythema or drainage        Assessment:       1. Surgery follow-up examination        Plan:   64 y.o. female status post removal of B breast tissue expanders  - Doing well, no issues  - Seen and evaluated by myself and Dr. Michael Solorzano    - Drains removed as she reports <10cc/24 hours - serous output noted. Encouraged to look at chest every day to check for increased size of breast for possible seroma formation s/p drain removal. Patient voiced understanding.   - Will RTC next for for partial suture removal  - Return to clinic in 1 week, appt scheduled       All questions were answered. The patient was advised to call the clinic with any questions or concerns prior to their next visit.           Lorraine Higuera PA-C  Plastic and Reconstruction Surgery Department  [phone] office

## 2020-08-13 ENCOUNTER — OFFICE VISIT (OUTPATIENT)
Dept: INFECTIOUS DISEASES | Facility: CLINIC | Age: 65
End: 2020-08-13
Payer: COMMERCIAL

## 2020-08-13 ENCOUNTER — TELEPHONE (OUTPATIENT)
Dept: INFECTIOUS DISEASES | Facility: CLINIC | Age: 65
End: 2020-08-13

## 2020-08-13 ENCOUNTER — LAB VISIT (OUTPATIENT)
Dept: LAB | Facility: HOSPITAL | Age: 65
End: 2020-08-13
Payer: COMMERCIAL

## 2020-08-13 VITALS
DIASTOLIC BLOOD PRESSURE: 67 MMHG | SYSTOLIC BLOOD PRESSURE: 96 MMHG | HEART RATE: 103 BPM | TEMPERATURE: 99 F | BODY MASS INDEX: 33.46 KG/M2 | WEIGHT: 170.44 LBS | HEIGHT: 60 IN

## 2020-08-13 DIAGNOSIS — N61.0 INFECTION OF LEFT BREAST: ICD-10-CM

## 2020-08-13 DIAGNOSIS — L03.90 CELLULITIS, UNSPECIFIED CELLULITIS SITE: ICD-10-CM

## 2020-08-13 DIAGNOSIS — A49.01 MSSA INFECTION, NON-INVASIVE: Primary | ICD-10-CM

## 2020-08-13 DIAGNOSIS — N17.9 AKI (ACUTE KIDNEY INJURY): ICD-10-CM

## 2020-08-13 PROBLEM — D64.9 ANEMIA, UNSPECIFIED: Status: ACTIVE | Noted: 2020-08-13

## 2020-08-13 LAB
ANION GAP SERPL CALC-SCNC: 9 MMOL/L (ref 8–16)
BUN SERPL-MCNC: 18 MG/DL (ref 8–23)
CALCIUM SERPL-MCNC: 10.4 MG/DL (ref 8.7–10.5)
CHLORIDE SERPL-SCNC: 107 MMOL/L (ref 95–110)
CO2 SERPL-SCNC: 24 MMOL/L (ref 23–29)
CREAT SERPL-MCNC: 1.4 MG/DL (ref 0.5–1.4)
EST. GFR  (AFRICAN AMERICAN): 45.8 ML/MIN/1.73 M^2
EST. GFR  (NON AFRICAN AMERICAN): 39.7 ML/MIN/1.73 M^2
GLUCOSE SERPL-MCNC: 93 MG/DL (ref 70–110)
POTASSIUM SERPL-SCNC: 4 MMOL/L (ref 3.5–5.1)
SODIUM SERPL-SCNC: 140 MMOL/L (ref 136–145)

## 2020-08-13 PROCEDURE — 80048 BASIC METABOLIC PNL TOTAL CA: CPT

## 2020-08-13 PROCEDURE — 99213 PR OFFICE/OUTPT VISIT, EST, LEVL III, 20-29 MIN: ICD-10-PCS | Mod: S$GLB,,, | Performed by: PHYSICIAN ASSISTANT

## 2020-08-13 PROCEDURE — 99999 PR PBB SHADOW E&M-EST. PATIENT-LVL IV: ICD-10-PCS | Mod: PBBFAC,,, | Performed by: PHYSICIAN ASSISTANT

## 2020-08-13 PROCEDURE — 3008F PR BODY MASS INDEX (BMI) DOCUMENTED: ICD-10-PCS | Mod: CPTII,S$GLB,, | Performed by: PHYSICIAN ASSISTANT

## 2020-08-13 PROCEDURE — 3078F DIAST BP <80 MM HG: CPT | Mod: CPTII,S$GLB,, | Performed by: PHYSICIAN ASSISTANT

## 2020-08-13 PROCEDURE — 3008F BODY MASS INDEX DOCD: CPT | Mod: CPTII,S$GLB,, | Performed by: PHYSICIAN ASSISTANT

## 2020-08-13 PROCEDURE — 3074F PR MOST RECENT SYSTOLIC BLOOD PRESSURE < 130 MM HG: ICD-10-PCS | Mod: CPTII,S$GLB,, | Performed by: PHYSICIAN ASSISTANT

## 2020-08-13 PROCEDURE — 3074F SYST BP LT 130 MM HG: CPT | Mod: CPTII,S$GLB,, | Performed by: PHYSICIAN ASSISTANT

## 2020-08-13 PROCEDURE — 3078F PR MOST RECENT DIASTOLIC BLOOD PRESSURE < 80 MM HG: ICD-10-PCS | Mod: CPTII,S$GLB,, | Performed by: PHYSICIAN ASSISTANT

## 2020-08-13 PROCEDURE — 99213 OFFICE O/P EST LOW 20 MIN: CPT | Mod: S$GLB,,, | Performed by: PHYSICIAN ASSISTANT

## 2020-08-13 PROCEDURE — 99999 PR PBB SHADOW E&M-EST. PATIENT-LVL IV: CPT | Mod: PBBFAC,,, | Performed by: PHYSICIAN ASSISTANT

## 2020-08-13 PROCEDURE — 36415 COLL VENOUS BLD VENIPUNCTURE: CPT

## 2020-08-13 NOTE — TELEPHONE ENCOUNTER
Left patient message that creatinine is improved slightly and should have rechecked next week when back to see heme/onc.      Ricky Wolff PA-C [facility]

## 2020-08-13 NOTE — PROGRESS NOTES
Subjective:      Patient ID: Maria Elena Tavares is a 64 y.o. female.    Chief Complaint:Follow-up      History of Present Illness    64 year old female with history of breast cancer s/p bilateral mastectomy and insertion of tissue expanders 3/2020 (completed chemo 02/2020, last radiation 07/2020) who presented to hospital 7/27 with epigastric pain, nausea and vomiting. CT showed mesenteric edema and small bowel inflammation c/f possible enteritis. She was started on Zosyn. Blood cx/stool studies negative. She clinically improved and was ready for discharge 7/30 when she developed an area of left breast dehiscence and subsequent exposure of her tissue expander with drainage of purulent fluid. Plastic Surgery consulted and she underwent explantation of bilateral tissues expanders 7/30.    Surgical cultures showed MSSA.  ID consulted for antibiotic recommendations.      Cefadroxil 500mg po bid recommended for DC x 14d.  He was discharged on Keflex 500mg po bid.  When seen back in plastics clinic on 8/5 she was changed Bactrim BID which she completed yesterday.  She had he drains removed yesterday 8/12/20.  She had labs done 8/11 and Creatinine had increased to 1.5.  She reports doing well an denies any L breast pain, redness swelling or heat.  The patient denies any recent fever, chills, or sweats.       Review of Systems   Constitution: Positive for decreased appetite and malaise/fatigue. Negative for chills, fever, night sweats, weight gain and weight loss.        Gets cold easily   HENT: Negative for congestion, ear pain, hearing loss, hoarse voice, sore throat and tinnitus.    Eyes: Negative for blurred vision, redness and visual disturbance.   Cardiovascular: Negative for chest pain, leg swelling and palpitations.   Respiratory: Negative for cough, hemoptysis, shortness of breath, sputum production and wheezing.    Endocrine: Negative for cold intolerance and heat intolerance.   Hematologic/Lymphatic: Negative for  adenopathy. Does not bruise/bleed easily.   Skin: Negative for dry skin, itching, rash and suspicious lesions.   Musculoskeletal: Negative for back pain, joint pain, myalgias and neck pain.   Gastrointestinal: Negative for abdominal pain, constipation, diarrhea, heartburn, nausea and vomiting.   Genitourinary: Negative for dysuria, flank pain, frequency, hematuria, hesitancy and urgency.   Neurological: Negative for dizziness, headaches, numbness, paresthesias and weakness.   Psychiatric/Behavioral: Negative for depression and memory loss. The patient does not have insomnia and is not nervous/anxious.    Allergic/Immunologic: Negative for environmental allergies, HIV exposure, hives and persistent infections.     Objective:   Physical Exam  Constitutional:       General: She is not in acute distress.     Appearance: She is well-developed. She is not diaphoretic.       HENT:      Head: Normocephalic and atraumatic.   Cardiovascular:      Rate and Rhythm: Normal rate and regular rhythm.      Heart sounds: Normal heart sounds. No murmur. No friction rub. No gallop.    Pulmonary:      Effort: Pulmonary effort is normal. No respiratory distress.      Breath sounds: Normal breath sounds. No wheezing or rales.   Abdominal:      General: Bowel sounds are normal. There is no distension.      Palpations: Abdomen is soft. There is no mass.      Tenderness: There is no abdominal tenderness. There is no guarding or rebound.   Skin:     General: Skin is warm and dry.   Neurological:      Mental Status: She is alert and oriented to person, place, and time.   Psychiatric:         Behavior: Behavior normal.       Assessment:       1. MSSA infection, non-invasive    2. Cellulitis, unspecified cellulitis site    3. JADEN (acute kidney injury)    4. Infection of left breast        Doing well completed treatment 14d of PO abx on 8/12 for MSSA wound infection and s/p washout.  Wounds healing well without signs of infection though there is  a an area of TTP and induration on left lateral breast which is suspected post operative.    JADEN - suspect secondary to Bactrim which she was changed to on 8/5.  Essentially completed 14d of po abx   Plan:   1. Monitor clinically   2. Signs and sx of infection reviewed with patient she will call for any symptoms - business card provided.  3. JADEN - check BMP today to ensure not worse/resolving - she is to follow up with hematology next week for repeat labs per patient  4.  The patient was encouraged to call the office for further concerns or complaints.

## 2020-08-19 ENCOUNTER — OFFICE VISIT (OUTPATIENT)
Dept: PLASTIC SURGERY | Facility: CLINIC | Age: 65
End: 2020-08-19
Payer: COMMERCIAL

## 2020-08-19 VITALS
HEART RATE: 116 BPM | WEIGHT: 170.44 LBS | BODY MASS INDEX: 33.46 KG/M2 | HEIGHT: 60 IN | SYSTOLIC BLOOD PRESSURE: 121 MMHG | DIASTOLIC BLOOD PRESSURE: 80 MMHG

## 2020-08-19 DIAGNOSIS — Z09 SURGERY FOLLOW-UP EXAMINATION: Primary | ICD-10-CM

## 2020-08-19 PROCEDURE — 99999 PR PBB SHADOW E&M-EST. PATIENT-LVL III: CPT | Mod: PBBFAC,,, | Performed by: PHYSICIAN ASSISTANT

## 2020-08-19 PROCEDURE — 99999 PR PBB SHADOW E&M-EST. PATIENT-LVL III: ICD-10-PCS | Mod: PBBFAC,,, | Performed by: PHYSICIAN ASSISTANT

## 2020-08-19 PROCEDURE — 99024 POSTOP FOLLOW-UP VISIT: CPT | Mod: S$GLB,,, | Performed by: PHYSICIAN ASSISTANT

## 2020-08-19 PROCEDURE — 99024 PR POST-OP FOLLOW-UP VISIT: ICD-10-PCS | Mod: S$GLB,,, | Performed by: PHYSICIAN ASSISTANT

## 2020-08-19 NOTE — PROGRESS NOTES
Subjective:      Maria Elena Tavares is a 64 y.o. year old female who presents to the [facility] on 08/19/2020 for follow up visit status post removal of B tissue expanders on 07/30/2020. She is doing well today with no issues since her visit last week at which time B breast drains were removed. Denies fever, chills, nausea, vomiting, or other systemic signs of infection.    Vitals:    08/19/20 1338   BP: 121/80   Pulse: (!) 116        Review of patient's allergies indicates:  No Known Allergies    Current Outpatient Medications on File Prior to Visit   Medication Sig Dispense Refill    aspirin (ECOTRIN) 81 MG EC tablet Take 81 mg by mouth once daily.        blood-glucose meter kit DISPENSE : BLOOD TEST STRIPS AND LANCETS PATIENT TEST BLOOD SUGARS TWICE DAILY  TEST STRIPS: 50 EACH, REFILL 5  LANCETS:  50 EACH , REFILL 5 1 each 0    chlorthalidone (HYGROTEN) 25 MG Tab Take 1 tablet by mouth every morning 90 tablet 1    dulaglutide (TRULICITY) 1.5 mg/0.5 mL pen injector Inject 1.5 mg into the skin every 7 days. 2 mL 5    ergocalciferol (VITAMIN D2) 50,000 unit Cap Take 1 capsule (50,000 Units total) by mouth every 7 days. 12 capsule 2    gabapentin (NEURONTIN) 100 MG capsule Take 2 capsules (200 mg total) by mouth 3 (three) times daily. 90 capsule 2    HYDROcodone-acetaminophen (NORCO) 5-325 mg per tablet Take 1 tablet by mouth every 8 (eight) hours as needed for Pain. 30 tablet 0    irbesartan (AVAPRO) 300 MG tablet Take 1 tablet by mouth once daily 90 tablet 1    letrozole (FEMARA) 2.5 mg Tab Take 1 tablet (2.5 mg total) by mouth once daily. 30 tablet 3    levothyroxine (SYNTHROID) 200 MCG tablet Take 1 tablet (200 mcg total) by mouth once daily. 90 tablet 1    lifitegrast (XIIDRA) 5 % Dpet Apply 1 drop to  both eyes  2 (two) times daily. 180 each 4    metFORMIN (GLUCOPHAGE) 1000 MG tablet TAKE 1 TABLET (1,000 MG TOTAL) BY MOUTH 2 (TWO) TIMES DAILY WITH MEALS. 180 tablet 1    ondansetron  (ZOFRAN-ODT) 4 MG TbDL Dissolve 1 tablet (4 mg total) by mouth every 6 (six) hours as needed. 60 tablet 2    polyethylene glycol (GLYCOLAX) 17 gram/dose powder Mix 17 g (1 capful) with juice or water an drink 2 (two) times daily. 1020 g 2     No current facility-administered medications on file prior to visit.        Patient Active Problem List   Diagnosis    Uncontrolled type 2 diabetes mellitus without complication, without long-term current use of insulin    Essential hypertension    History of shingles    Mild vitamin D deficiency    Hypothyroid    Diverticulitis of colon (without mention of hemorrhage)(562.11)    Breast cancer metastasized to axillary lymph node, left    Thrush    Anemia in neoplastic disease    Chemotherapy induced neutropenia    Neuropathy due to chemotherapeutic drug    Right arm pain    Non-intractable vomiting with nausea    Orthostatic hypotension    Breast cancer    At risk for lymphedema    Decreased shoulder mobility, unspecified laterality    Enteritis    Hypokalemia    Complication of breast implant    Infection of left breast    Anemia, unspecified       Past Surgical History:   Procedure Laterality Date    BREAST BIOPSY Left 2019    IDC with mets to node    BREAST BIOPSY Right 2019    core bx, benign node    BREAST BIOPSY Left 10/14/2019    MRI Core bx, + IDC    BREAST BIOPSY Left 10/08/2019    Core bx, ADH     SECTION      HYSTERECTOMY      INSERTION OF BREAST TISSUE EXPANDER Bilateral 3/24/2020    Procedure: INSERTION, TISSUE EXPANDER, BREAST BILATERAL;  Surgeon: Michael Solorzano MD;  Location: [facility] OR [address] [place] [address];  Service: Plastics;  Laterality: Bilateral;    INSERTION OF TUNNELED CENTRAL VENOUS CATHETER (CVC) WITH SUBCUTANEOUS PORT Right 10/4/2019    Procedure: [id number]-A-CATH RIGHT (CONSENT AM OF) 1.0 hr case;  Surgeon: Elena Lopez MD;  Location: [facility] OR [address] [place] [address];  Service: General;  Laterality: Right;     OOPHORECTOMY      SENTINEL LYMPH NODE BIOPSY Left 3/24/2020    Procedure: BIOPSY, LYMPH NODE, SENTINEL LEFT;  Surgeon: Elena Lopez MD;  Location: [address];  Service: General;  Laterality: Left;    SIMPLE MASTECTOMY Bilateral 3/24/2020    Procedure: MASTECTOMY, SIMPLE BILATERAL;  Surgeon: Elena Lopez MD;  Location: [address];  Service: General;  Laterality: Bilateral;    TISSUE EXPANDER REMOVAL Bilateral 7/30/2020    Procedure: REMOVAL, TISSUE EXPANDER;  Surgeon: Michael Solorzano MD;  Location: [facility] OR [address] [place] [address];  Service: Plastics;  Laterality: Bilateral;       Social History     Socioeconomic History    Marital status: Single     Spouse name: Not on file    Number of children: Not on file    Years of education: Not on file    Highest education level: Not on file   Occupational History     Employer: [employer]   Social Needs    Financial resource strain: Not on file    Food insecurity     Worry: Not on file     Inability: Not on file    Transportation needs     Medical: Not on file     Non-medical: Not on file   Tobacco Use    Smoking status: Never Smoker    Smokeless tobacco: Never Used    Tobacco comment: 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  She walks occasionally.   Substance and Sexual Activity    Alcohol use: Not Currently     Comment: Occasionally    Drug use: No    Sexual activity: Not Currently     Partners: Male   Lifestyle    Physical activity     Days per week: Not on file     Minutes per session: Not on file    Stress: Not on file   Relationships    Social connections     Talks on phone: Not on file     Gets together: Not on file     Attends Holiness service: Not on file     Active member of club or organization: Not on file     Attends meetings of clubs or organizations: Not on file     Relationship status: Not on file   Other Topics Concern    Not on file   Social History Narrative    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 daughter1 granddaughter     Date of surgery 07/30/2020  Preoperative diagnosis infected left tissue expander  Postoperative diagnosis the same  Procedure performed removal of bilateral tissue expanders  Capsulectomy left breast  Surgeon Jeanine  Complications none  Blood loss minimal  Drains x2  Anesthesia general         Review of Systems: negative    Objective:     Physical Exam:    WD WN NAD  Normal resp effort  R breast - incision CDI, sutures intact, surgically absent nipples, no erythema or drainage, no sign of fluid accumulation  L breast - incision CDI, sutures intact, surgically absent nipples, no erythema or drainage, no sign of fluid accumulation      Assessment:       1. Surgery follow-up examination        Plan:   64 y.o. female status post removal of B breast tissue expanders  - Doing well, no issues  - Seen and evaluated by myself and Dr. Michael Solorzano    - Partial suture removal today from B breast, plan for removal of remaining sutures next week at her follow up visit  - Return to clinic in 1 week, appt scheduled       All questions were answered. The patient was advised to call the clinic with any questions or concerns prior to their next visit.           Lorraine Higuera PA-C  Plastic and Reconstruction Surgery Department  [phone] office

## 2020-08-20 ENCOUNTER — INFUSION (OUTPATIENT)
Dept: INFUSION THERAPY | Facility: HOSPITAL | Age: 65
End: 2020-08-20
Attending: INTERNAL MEDICINE
Payer: COMMERCIAL

## 2020-08-20 DIAGNOSIS — D63.0 ANEMIA IN NEOPLASTIC DISEASE: ICD-10-CM

## 2020-08-20 DIAGNOSIS — C77.3 BREAST CANCER METASTASIZED TO AXILLARY LYMPH NODE, LEFT: Primary | ICD-10-CM

## 2020-08-20 DIAGNOSIS — D64.9 ANEMIA, UNSPECIFIED TYPE: ICD-10-CM

## 2020-08-20 DIAGNOSIS — C50.912 BREAST CANCER METASTASIZED TO AXILLARY LYMPH NODE, LEFT: Primary | ICD-10-CM

## 2020-08-20 LAB
ALBUMIN SERPL BCP-MCNC: 3.3 G/DL (ref 3.5–5.2)
ALP SERPL-CCNC: 82 U/L (ref 55–135)
ALT SERPL W/O P-5'-P-CCNC: 9 U/L (ref 10–44)
ANION GAP SERPL CALC-SCNC: 10 MMOL/L (ref 8–16)
AST SERPL-CCNC: 18 U/L (ref 10–40)
BILIRUB SERPL-MCNC: 0.4 MG/DL (ref 0.1–1)
BUN SERPL-MCNC: 15 MG/DL (ref 8–23)
CALCIUM SERPL-MCNC: 9.8 MG/DL (ref 8.7–10.5)
CHLORIDE SERPL-SCNC: 105 MMOL/L (ref 95–110)
CO2 SERPL-SCNC: 26 MMOL/L (ref 23–29)
CREAT SERPL-MCNC: 0.8 MG/DL (ref 0.5–1.4)
ERYTHROCYTE [DISTWIDTH] IN BLOOD BY AUTOMATED COUNT: 15.3 % (ref 11.5–14.5)
EST. GFR  (AFRICAN AMERICAN): >60 ML/MIN/1.73 M^2
EST. GFR  (NON AFRICAN AMERICAN): >60 ML/MIN/1.73 M^2
FERRITIN SERPL-MCNC: 748 NG/ML (ref 20–300)
FOLATE SERPL-MCNC: 9.9 NG/ML (ref 4–24)
GLUCOSE SERPL-MCNC: 135 MG/DL (ref 70–110)
HCT VFR BLD AUTO: 25.5 % (ref 37–48.5)
HGB BLD-MCNC: 8 G/DL (ref 12–16)
IMM GRANULOCYTES # BLD AUTO: 0.02 K/UL (ref 0–0.04)
IRON SERPL-MCNC: 53 UG/DL (ref 30–160)
LDH SERPL L TO P-CCNC: 127 U/L (ref 110–260)
MCH RBC QN AUTO: 29.2 PG (ref 27–31)
MCHC RBC AUTO-ENTMCNC: 31.4 G/DL (ref 32–36)
MCV RBC AUTO: 93 FL (ref 82–98)
NEUTROPHILS # BLD AUTO: 2.6 K/UL (ref 1.8–7.7)
PLATELET # BLD AUTO: 293 K/UL (ref 150–350)
PMV BLD AUTO: 9.5 FL (ref 9.2–12.9)
POTASSIUM SERPL-SCNC: 3.2 MMOL/L (ref 3.5–5.1)
PROT SERPL-MCNC: 7.2 G/DL (ref 6–8.4)
RBC # BLD AUTO: 2.74 M/UL (ref 4–5.4)
RETICS/RBC NFR AUTO: 2.4 % (ref 0.5–2.5)
SATURATED IRON: 17 % (ref 20–50)
SODIUM SERPL-SCNC: 141 MMOL/L (ref 136–145)
T4 FREE SERPL-MCNC: 1.53 NG/DL (ref 0.71–1.51)
TOTAL IRON BINDING CAPACITY: 303 UG/DL (ref 250–450)
TRANSFERRIN SERPL-MCNC: 205 MG/DL (ref 200–375)
TSH SERPL DL<=0.005 MIU/L-ACNC: 0.03 UIU/ML (ref 0.4–4)
VIT B12 SERPL-MCNC: 390 PG/ML (ref 210–950)
WBC # BLD AUTO: 3.92 K/UL (ref 3.9–12.7)

## 2020-08-20 PROCEDURE — 36415 COLL VENOUS BLD VENIPUNCTURE: CPT

## 2020-08-20 PROCEDURE — 80053 COMPREHEN METABOLIC PANEL: CPT

## 2020-08-20 PROCEDURE — A4216 STERILE WATER/SALINE, 10 ML: HCPCS | Performed by: INTERNAL MEDICINE

## 2020-08-20 PROCEDURE — 82607 VITAMIN B-12: CPT

## 2020-08-20 PROCEDURE — 85045 AUTOMATED RETICULOCYTE COUNT: CPT

## 2020-08-20 PROCEDURE — 82746 ASSAY OF FOLIC ACID SERUM: CPT

## 2020-08-20 PROCEDURE — 63600175 PHARM REV CODE 636 W HCPCS: Performed by: INTERNAL MEDICINE

## 2020-08-20 PROCEDURE — 36591 DRAW BLOOD OFF VENOUS DEVICE: CPT

## 2020-08-20 PROCEDURE — 82728 ASSAY OF FERRITIN: CPT

## 2020-08-20 PROCEDURE — 25000003 PHARM REV CODE 250: Performed by: INTERNAL MEDICINE

## 2020-08-20 PROCEDURE — 83540 ASSAY OF IRON: CPT

## 2020-08-20 PROCEDURE — 84443 ASSAY THYROID STIM HORMONE: CPT

## 2020-08-20 PROCEDURE — 83615 LACTATE (LD) (LDH) ENZYME: CPT

## 2020-08-20 PROCEDURE — 84439 ASSAY OF FREE THYROXINE: CPT

## 2020-08-20 PROCEDURE — 85027 COMPLETE CBC AUTOMATED: CPT

## 2020-08-20 RX ORDER — HEPARIN 100 UNIT/ML
500 SYRINGE INTRAVENOUS
Status: CANCELLED | OUTPATIENT
Start: 2020-08-20

## 2020-08-20 RX ORDER — SODIUM CHLORIDE 0.9 % (FLUSH) 0.9 %
10 SYRINGE (ML) INJECTION
Status: COMPLETED | OUTPATIENT
Start: 2020-08-20 | End: 2020-08-20

## 2020-08-20 RX ORDER — HEPARIN 100 UNIT/ML
500 SYRINGE INTRAVENOUS
Status: COMPLETED | OUTPATIENT
Start: 2020-08-20 | End: 2020-08-20

## 2020-08-20 RX ORDER — SODIUM CHLORIDE 0.9 % (FLUSH) 0.9 %
10 SYRINGE (ML) INJECTION
Status: CANCELLED | OUTPATIENT
Start: 2020-08-20

## 2020-08-20 RX ADMIN — Medication 10 ML: at 12:08

## 2020-08-20 RX ADMIN — HEPARIN 500 UNITS: 100 SYRINGE at 12:08

## 2020-08-20 NOTE — PROGRESS NOTES
Dr. San,   I am forwarding labs for your review. Do you want to adjust her synthroid dose?  Thanks, PJ

## 2020-08-24 DIAGNOSIS — D50.9 IRON DEFICIENCY ANEMIA, UNSPECIFIED IRON DEFICIENCY ANEMIA TYPE: Primary | ICD-10-CM

## 2020-08-27 ENCOUNTER — TELEPHONE (OUTPATIENT)
Dept: OBSTETRICS AND GYNECOLOGY | Facility: CLINIC | Age: 65
End: 2020-08-27

## 2020-08-27 NOTE — TELEPHONE ENCOUNTER
----- Message from Kira Bolaños sent at 8/27/2020  9:16 AM CDT -----  Regarding: Patient appointment  Could you call this patient to reschedule her appointment for Survivorship with Dr. Myers?

## 2020-08-28 NOTE — PROGRESS NOTES
Digital Medicine: Health  Follow-Up    The history is provided by the patient.             Reason for review: Blood pressure at goal        Topics Covered on Call: physical activity and Diet    Additional Follow-up details: Patient reports she is recovering. Patient reports she has been seeing a lot of doctors. Patient is overall doing well.         Diet-no change to diet    No change to diet.        Physical Activity-no change to routine  No change to exercise routine.       Additional physical activity details: Patient reports she is still doing physical therapy exercises at home.       Medication Adherence-Medication Adherence not addressed.          Continue current diet/physical activity routine.       Addressed any questions or concerns and patient has my contact information if needed prior to next outreach. Patient verbalizes understanding.      Explained the importance of self-monitoring and medication adherence. Encouraged the patient to communicate with their health  for lifestyle modifications to help improve or maintain a healthy lifestyle.            There are no preventive care reminders to display for this patient.    Last 5 Patient Entered Readings                                      Current 30 Day Average: 113/67     Recent Readings 8/27/2020 8/15/2020 7/8/2020 6/21/2020 6/7/2020    SBP (mmHg) 116 109 124 130 121    DBP (mmHg) 77 57 89 87 84    Pulse 108 90 78 96 98

## 2020-09-01 ENCOUNTER — OFFICE VISIT (OUTPATIENT)
Dept: PLASTIC SURGERY | Facility: CLINIC | Age: 65
End: 2020-09-01
Payer: COMMERCIAL

## 2020-09-01 VITALS
HEART RATE: 107 BPM | SYSTOLIC BLOOD PRESSURE: 127 MMHG | WEIGHT: 170.44 LBS | HEIGHT: 60 IN | DIASTOLIC BLOOD PRESSURE: 82 MMHG | BODY MASS INDEX: 33.46 KG/M2

## 2020-09-01 DIAGNOSIS — Z09 SURGERY FOLLOW-UP EXAMINATION: Primary | ICD-10-CM

## 2020-09-01 PROCEDURE — 99024 POSTOP FOLLOW-UP VISIT: CPT | Mod: S$GLB,,, | Performed by: PHYSICIAN ASSISTANT

## 2020-09-01 PROCEDURE — 99999 PR PBB SHADOW E&M-EST. PATIENT-LVL III: ICD-10-PCS | Mod: PBBFAC,,, | Performed by: PHYSICIAN ASSISTANT

## 2020-09-01 PROCEDURE — 99024 PR POST-OP FOLLOW-UP VISIT: ICD-10-PCS | Mod: S$GLB,,, | Performed by: PHYSICIAN ASSISTANT

## 2020-09-01 PROCEDURE — 99999 PR PBB SHADOW E&M-EST. PATIENT-LVL III: CPT | Mod: PBBFAC,,, | Performed by: PHYSICIAN ASSISTANT

## 2020-09-01 NOTE — PROGRESS NOTES
Subjective:      Maria Elena Tavares is a 64 y.o. year old female who presents to the [facility]astic Surgery Clinic on 09/01/2020 for follow up visit status post removal of B tissue expanders on 07/30/2020. She is doing well today with no issues since her visit last week at which time I removed half of her breast sutures. Denies fever, chills, nausea, vomiting, or other systemic signs of infection.    Vitals:    09/01/20 1059   BP: 127/82   Pulse: 107        Review of patient's allergies indicates:  No Known Allergies    Current Outpatient Medications on File Prior to Visit   Medication Sig Dispense Refill    aspirin (ECOTRIN) 81 MG EC tablet Take 81 mg by mouth once daily.        blood-glucose meter kit DISPENSE : BLOOD TEST STRIPS AND LANCETS PATIENT TEST BLOOD SUGARS TWICE DAILY  TEST STRIPS: 50 EACH, REFILL 5  LANCETS:  50 EACH , REFILL 5 1 each 0    chlorthalidone (HYGROTEN) 25 MG Tab Take 1 tablet by mouth every morning 90 tablet 1    dulaglutide (TRULICITY) 1.5 mg/0.5 mL pen injector Inject 1.5 mg into the skin every 7 days. 2 mL 5    ergocalciferol (VITAMIN D2) 50,000 unit Cap Take 1 capsule (50,000 Units total) by mouth every 7 days. 12 capsule 2    gabapentin (NEURONTIN) 100 MG capsule Take 2 capsules (200 mg total) by mouth 3 (three) times daily. 90 capsule 2    HYDROcodone-acetaminophen (NORCO) 5-325 mg per tablet Take 1 tablet by mouth every 8 (eight) hours as needed for Pain. 30 tablet 0    irbesartan (AVAPRO) 300 MG tablet Take 1 tablet by mouth once daily 90 tablet 1    letrozole (FEMARA) 2.5 mg Tab Take 1 tablet (2.5 mg total) by mouth once daily. 30 tablet 3    levothyroxine (SYNTHROID) 200 MCG tablet Take 1 tablet (200 mcg total) by mouth once daily. 90 tablet 1    lifitegrast (XIIDRA) 5 % Dpet Apply 1 drop to  both eyes  2 (two) times daily. 180 each 4    metFORMIN (GLUCOPHAGE) 1000 MG tablet TAKE 1 TABLET (1,000 MG TOTAL) BY MOUTH 2 (TWO) TIMES DAILY WITH MEALS. 180 tablet 1    ondansetron  (ZOFRAN-ODT) 4 MG TbDL Dissolve 1 tablet (4 mg total) by mouth every 6 (six) hours as needed. 60 tablet 2    polyethylene glycol (GLYCOLAX) 17 gram/dose powder Mix 17 g (1 capful) with juice or water an drink 2 (two) times daily. 1020 g 2     No current facility-administered medications on file prior to visit.        Patient Active Problem List   Diagnosis    Uncontrolled type 2 diabetes mellitus without complication, without long-term current use of insulin    Essential hypertension    History of shingles    Mild vitamin D deficiency    Hypothyroid    Diverticulitis of colon (without mention of hemorrhage)(562.11)    Breast cancer metastasized to axillary lymph node, left    Thrush    Anemia in neoplastic disease    Chemotherapy induced neutropenia    Neuropathy due to chemotherapeutic drug    Right arm pain    Non-intractable vomiting with nausea    Orthostatic hypotension    Breast cancer    At risk for lymphedema    Decreased shoulder mobility, unspecified laterality    Enteritis    Hypokalemia    Complication of breast implant    Infection of left breast    Anemia, unspecified       Past Surgical History:   Procedure Laterality Date    BREAST BIOPSY Left 2019    IDC with mets to node    BREAST BIOPSY Right 2019    core bx, benign node    BREAST BIOPSY Left 10/14/2019    MRI Core bx, + IDC    BREAST BIOPSY Left 10/08/2019    Core bx, ADH     SECTION      HYSTERECTOMY      INSERTION OF BREAST TISSUE EXPANDER Bilateral 3/24/2020    Procedure: INSERTION, TISSUE EXPANDER, BREAST BILATERAL;  Surgeon: Michael Solorzano MD;  Location: [facility] OR [address] [place] [address];  Service: Plastics;  Laterality: Bilateral;    INSERTION OF TUNNELED CENTRAL VENOUS CATHETER (CVC) WITH SUBCUTANEOUS PORT Right 10/4/2019    Procedure: [id number]-CATH RIGHT (CONSENT AM OF) 1.0 hr case;  Surgeon: Elena Lopez MD;  Location: [facility] OR [address] [place] [address];  Service: General;  Laterality: Right;     OOPHORECTOMY      SENTINEL LYMPH NODE BIOPSY Left 3/24/2020    Procedure: BIOPSY, LYMPH NODE, SENTINEL LEFT;  Surgeon: Elena Lopez MD;  Location: [address];  Service: General;  Laterality: Left;    SIMPLE MASTECTOMY Bilateral 3/24/2020    Procedure: MASTECTOMY, SIMPLE BILATERAL;  Surgeon: Elena Lopez MD;  Location: [address];  Service: General;  Laterality: Bilateral;    TISSUE EXPANDER REMOVAL Bilateral 7/30/2020    Procedure: REMOVAL, TISSUE EXPANDER;  Surgeon: Michael Solorzano MD;  Location: [address];  Service: Plastics;  Laterality: Bilateral;       Social History     Socioeconomic History    Marital status: Single     Spouse name: Not on file    Number of children: Not on file    Years of education: Not on file    Highest education level: Not on file   Occupational History     Employer: [employer]   Social Needs    Financial resource strain: Somewhat hard    Food insecurity     Worry: Never true     Inability: Never true    Transportation needs     Medical: No     Non-medical: No   Tobacco Use    Smoking status: Never Smoker    Smokeless tobacco: Never Used    Tobacco comment: 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  She walks occasionally.   Substance and Sexual Activity    Alcohol use: Not Currently     Frequency: Never     Drinks per session: Patient refused     Binge frequency: Never     Comment: Occasionally    Drug use: No    Sexual activity: Not Currently     Partners: Male   Lifestyle    Physical activity     Days per week: 0 days     Minutes per session: Not on file    Stress: Not at all   Relationships    Social connections     Talks on phone: Not on file     Gets together: Not on file     Attends Mormon service: Not on file     Active member of club or organization: No     Attends meetings of clubs or organizations: Not on file     Relationship status: Not on file   Other Topics Concern    Not on  file   Social History Narrative    Works in [employer] at [employer]1 daughter1 granddaughter     Date of surgery 07/30/2020  Preoperative diagnosis infected left tissue expander  Postoperative diagnosis the same  Procedure performed removal of bilateral tissue expanders  Capsulectomy left breast  Surgeon Jeanine  Complications none  Blood loss minimal  Drains x2  Anesthesia general         Review of Systems: negative    Objective:     Physical Exam:    WD WN NAD  Normal resp effort  R breast - incision CDI, surgically absent nipples, no erythema or drainage, no sign of fluid accumulation  L breast - incision CDI, surgically absent nipples, no erythema or drainage, no sign of fluid accumulation, + skin changes from radiation      Assessment:       1. Surgery follow-up examination        Plan:   64 y.o. female status post removal of B breast tissue expanders  - Doing well, no issues  - All remaining sutures were removed today  - Return to clinic in 4 weeks, appt scheduled       All questions were answered. The patient was advised to call the clinic with any questions or concerns prior to their next visit.           Lorraine Higuera PA-C  Plastic and Reconstruction Surgery Department  [phone] office

## 2020-09-02 ENCOUNTER — OFFICE VISIT (OUTPATIENT)
Dept: PRIMARY CARE CLINIC | Facility: CLINIC | Age: 65
End: 2020-09-02
Attending: FAMILY MEDICINE
Payer: COMMERCIAL

## 2020-09-02 VITALS
HEIGHT: 60 IN | DIASTOLIC BLOOD PRESSURE: 64 MMHG | OXYGEN SATURATION: 99 % | HEART RATE: 103 BPM | BODY MASS INDEX: 32.73 KG/M2 | WEIGHT: 166.69 LBS | SYSTOLIC BLOOD PRESSURE: 110 MMHG

## 2020-09-02 DIAGNOSIS — E78.01 FAMILIAL HYPERCHOLESTEROLEMIA: ICD-10-CM

## 2020-09-02 DIAGNOSIS — E11.21 CONTROLLED TYPE 2 DIABETES MELLITUS WITH DIABETIC NEPHROPATHY, WITHOUT LONG-TERM CURRENT USE OF INSULIN: ICD-10-CM

## 2020-09-02 DIAGNOSIS — G62.9 NEUROPATHY: Primary | ICD-10-CM

## 2020-09-02 DIAGNOSIS — E03.9 ACQUIRED HYPOTHYROIDISM: ICD-10-CM

## 2020-09-02 DIAGNOSIS — Z12.11 SCREEN FOR COLON CANCER: ICD-10-CM

## 2020-09-02 PROCEDURE — 3008F BODY MASS INDEX DOCD: CPT | Mod: CPTII,S$GLB,, | Performed by: FAMILY MEDICINE

## 2020-09-02 PROCEDURE — 3074F PR MOST RECENT SYSTOLIC BLOOD PRESSURE < 130 MM HG: ICD-10-PCS | Mod: CPTII,S$GLB,, | Performed by: FAMILY MEDICINE

## 2020-09-02 PROCEDURE — 3008F PR BODY MASS INDEX (BMI) DOCUMENTED: ICD-10-PCS | Mod: CPTII,S$GLB,, | Performed by: FAMILY MEDICINE

## 2020-09-02 PROCEDURE — 99999 PR PBB SHADOW E&M-EST. PATIENT-LVL V: ICD-10-PCS | Mod: PBBFAC,,, | Performed by: FAMILY MEDICINE

## 2020-09-02 PROCEDURE — 3078F PR MOST RECENT DIASTOLIC BLOOD PRESSURE < 80 MM HG: ICD-10-PCS | Mod: CPTII,S$GLB,, | Performed by: FAMILY MEDICINE

## 2020-09-02 PROCEDURE — 3051F PR MOST RECENT HEMOGLOBIN A1C LEVEL 7.0 - < 8.0%: ICD-10-PCS | Mod: CPTII,S$GLB,, | Performed by: FAMILY MEDICINE

## 2020-09-02 PROCEDURE — 99214 OFFICE O/P EST MOD 30 MIN: CPT | Mod: S$GLB,,, | Performed by: FAMILY MEDICINE

## 2020-09-02 PROCEDURE — 3078F DIAST BP <80 MM HG: CPT | Mod: CPTII,S$GLB,, | Performed by: FAMILY MEDICINE

## 2020-09-02 PROCEDURE — 3051F HG A1C>EQUAL 7.0%<8.0%: CPT | Mod: CPTII,S$GLB,, | Performed by: FAMILY MEDICINE

## 2020-09-02 PROCEDURE — 3074F SYST BP LT 130 MM HG: CPT | Mod: CPTII,S$GLB,, | Performed by: FAMILY MEDICINE

## 2020-09-02 PROCEDURE — 99214 PR OFFICE/OUTPT VISIT, EST, LEVL IV, 30-39 MIN: ICD-10-PCS | Mod: S$GLB,,, | Performed by: FAMILY MEDICINE

## 2020-09-02 PROCEDURE — 99999 PR PBB SHADOW E&M-EST. PATIENT-LVL V: CPT | Mod: PBBFAC,,, | Performed by: FAMILY MEDICINE

## 2020-09-02 RX ORDER — PREGABALIN 75 MG/1
75 CAPSULE ORAL 2 TIMES DAILY
Qty: 60 CAPSULE | Refills: 2 | Status: SHIPPED | OUTPATIENT
Start: 2020-09-02 | End: 2020-10-28

## 2020-09-02 RX ORDER — ATORVASTATIN CALCIUM 10 MG/1
10 TABLET, FILM COATED ORAL DAILY
Qty: 90 TABLET | Refills: 3 | Status: SHIPPED | OUTPATIENT
Start: 2020-09-02 | End: 2021-10-01 | Stop reason: SDUPTHER

## 2020-09-03 LAB
FUNGUS SPEC CULT: NORMAL
FUNGUS SPEC CULT: NORMAL
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
## 2020-09-08 NOTE — PROGRESS NOTES
Subjective:       Patient ID: Marai Elena Tavares is a 64 y.o. female.    Chief Complaint: Annual Exam    Pt presents today for 6 mos f/u DM and HTN. Pt has finished chemo for recent dx of breast CA. Had double mastectomy 3/24. Is in good spirits. Has opted not to do reconstruction   She is here for an a1c and to touch base with me.     States that during chemo her pressures bottom out to where she passed out. She is scared now to go too low. Feels well with pressures where they are at      Diabetes  Pertinent negatives for hypoglycemia include no confusion, dizziness or headaches. Pertinent negatives for diabetes include no chest pain, no polydipsia, no polyuria and no weakness.   Hypertension  Pertinent negatives include no chest pain, headaches, palpitations or shortness of breath.     Review of Systems   Constitutional: Negative.    HENT: Negative for hearing loss, rhinorrhea and trouble swallowing.    Eyes: Negative.    Respiratory: Negative for cough, chest tightness, shortness of breath and wheezing.    Cardiovascular: Negative for chest pain, palpitations and leg swelling.   Gastrointestinal: Negative.    Endocrine: Negative for polydipsia and polyuria.   Musculoskeletal: Negative.  Negative for joint swelling.   Skin: Negative.    Neurological: Negative for dizziness, weakness, light-headedness and headaches.   Psychiatric/Behavioral: Negative for confusion and dysphoric mood.   All other systems reviewed and are negative.      Objective:      Physical Exam  Constitutional:       Appearance: She is well-developed.   HENT:      Head: Normocephalic and atraumatic.   Eyes:      Conjunctiva/sclera: Conjunctivae normal.      Pupils: Pupils are equal, round, and reactive to light.   Neck:      Musculoskeletal: Normal range of motion and neck supple.      Thyroid: No thyromegaly.   Cardiovascular:      Rate and Rhythm: Normal rate and regular rhythm.      Heart sounds: Normal heart sounds. No murmur.   Pulmonary:       Effort: Pulmonary effort is normal. No respiratory distress.      Breath sounds: Normal breath sounds. No wheezing or rales.   Chest:      Chest wall: No tenderness.   Abdominal:      General: Bowel sounds are normal. There is no distension.      Palpations: Abdomen is soft. There is no mass.      Tenderness: There is no abdominal tenderness. There is no guarding or rebound.      Hernia: No hernia is present.   Musculoskeletal: Normal range of motion.   Lymphadenopathy:      Cervical: No cervical adenopathy.   Skin:     General: Skin is warm.      Findings: No erythema or rash.   Neurological:      Mental Status: She is alert and oriented to person, place, and time.      Cranial Nerves: No cranial nerve deficit.      Sensory: No sensory deficit.      Motor: No abnormal muscle tone.      Coordination: Coordination normal.      Deep Tendon Reflexes: Reflexes normal.   Psychiatric:         Behavior: Behavior normal.         Thought Content: Thought content normal.         Judgment: Judgment normal.         Assessment:       1. Neuropathy    2. Screen for colon cancer    3. Familial hypercholesterolemia    4. Acquired hypothyroidism    5. Controlled type 2 diabetes mellitus with diabetic nephropathy, without long-term current use of insulin        Plan:       HTN controlled when she takes her meds. Was too low and will not adjust since pt is fearful of falling  DMT2: stable but a1c is overdue.   Breast CA: surgery 3/24  RTC prn symptoms or q 4-6 mos pending labs  Neuropathy s/p chemo: trial of lyrica if insurance company approves it. If not pt will return to gabapentin  Neuropathy    Screen for colon cancer  -     Ambulatory referral/consult to Gastroenterology; Future; Expected date: 09/09/2020    Familial hypercholesterolemia  -     Comprehensive metabolic panel; Future; Expected date: 09/02/2020  -     atorvastatin (LIPITOR) 10 MG tablet; Take 1 tablet (10 mg total) by mouth once daily.  Dispense: 90 tablet; Refill:  3    Acquired hypothyroidism  -     TSH; Future; Expected date: 09/02/2020    Controlled type 2 diabetes mellitus with diabetic nephropathy, without long-term current use of insulin  -     Hemoglobin A1C; Future; Expected date: 09/02/2020      RTC prn symptoms    Greater than 45 mins spent with pt; 50 % face to face going over her recent surgery and reassuring pt that she has done so well

## 2020-09-10 ENCOUNTER — OFFICE VISIT (OUTPATIENT)
Dept: OBSTETRICS AND GYNECOLOGY | Facility: CLINIC | Age: 65
End: 2020-09-10
Attending: OBSTETRICS & GYNECOLOGY
Payer: COMMERCIAL

## 2020-09-10 ENCOUNTER — LAB VISIT (OUTPATIENT)
Dept: LAB | Facility: OTHER | Age: 65
End: 2020-09-10
Attending: OBSTETRICS & GYNECOLOGY
Payer: COMMERCIAL

## 2020-09-10 VITALS
HEIGHT: 60 IN | WEIGHT: 170 LBS | DIASTOLIC BLOOD PRESSURE: 72 MMHG | SYSTOLIC BLOOD PRESSURE: 124 MMHG | BODY MASS INDEX: 33.38 KG/M2

## 2020-09-10 DIAGNOSIS — E55.9 VITAMIN D DEFICIENCY: ICD-10-CM

## 2020-09-10 DIAGNOSIS — Z12.11 SCREENING FOR MALIGNANT NEOPLASM OF COLON: Primary | ICD-10-CM

## 2020-09-10 DIAGNOSIS — C77.3 BREAST CANCER METASTASIZED TO AXILLARY LYMPH NODE, LEFT: ICD-10-CM

## 2020-09-10 DIAGNOSIS — E55.9 VITAMIN D DEFICIENCY: Primary | ICD-10-CM

## 2020-09-10 DIAGNOSIS — C50.912 BREAST CANCER METASTASIZED TO AXILLARY LYMPH NODE, LEFT: ICD-10-CM

## 2020-09-10 DIAGNOSIS — Z01.419 ENCOUNTER FOR GYNECOLOGICAL EXAMINATION WITHOUT ABNORMAL FINDING: ICD-10-CM

## 2020-09-10 DIAGNOSIS — M25.50 ARTHRALGIA, UNSPECIFIED JOINT: ICD-10-CM

## 2020-09-10 PROCEDURE — 36415 COLL VENOUS BLD VENIPUNCTURE: CPT

## 2020-09-10 PROCEDURE — 3008F BODY MASS INDEX DOCD: CPT | Mod: CPTII,S$GLB,, | Performed by: OBSTETRICS & GYNECOLOGY

## 2020-09-10 PROCEDURE — 82306 VITAMIN D 25 HYDROXY: CPT

## 2020-09-10 PROCEDURE — 99396 PR PREVENTIVE VISIT,EST,40-64: ICD-10-PCS | Mod: S$GLB,,, | Performed by: OBSTETRICS & GYNECOLOGY

## 2020-09-10 PROCEDURE — 3078F DIAST BP <80 MM HG: CPT | Mod: CPTII,S$GLB,, | Performed by: OBSTETRICS & GYNECOLOGY

## 2020-09-10 PROCEDURE — 3074F PR MOST RECENT SYSTOLIC BLOOD PRESSURE < 130 MM HG: ICD-10-PCS | Mod: CPTII,S$GLB,, | Performed by: OBSTETRICS & GYNECOLOGY

## 2020-09-10 PROCEDURE — 3078F PR MOST RECENT DIASTOLIC BLOOD PRESSURE < 80 MM HG: ICD-10-PCS | Mod: CPTII,S$GLB,, | Performed by: OBSTETRICS & GYNECOLOGY

## 2020-09-10 PROCEDURE — 99396 PREV VISIT EST AGE 40-64: CPT | Mod: S$GLB,,, | Performed by: OBSTETRICS & GYNECOLOGY

## 2020-09-10 PROCEDURE — 3008F PR BODY MASS INDEX (BMI) DOCUMENTED: ICD-10-PCS | Mod: CPTII,S$GLB,, | Performed by: OBSTETRICS & GYNECOLOGY

## 2020-09-10 PROCEDURE — 3074F SYST BP LT 130 MM HG: CPT | Mod: CPTII,S$GLB,, | Performed by: OBSTETRICS & GYNECOLOGY

## 2020-09-10 NOTE — PROGRESS NOTES
SUBJECTIVE:   64 y.o. female   for annual routine checkup and to establish care in survivorship.  She has a history of left infiltrating ductal carcinoma,, ER positive TX positive, HER2 negative.  She underwent neoadjuvant DD AC x4.  She then underwent 11 cycles of Taxol which was stopped secondary to side effects.  2020 she underwent bilateral mastectomy with right sentinel biopsy left sentinel node biopsy.  She also underwent radiation is now on aromatase inhibitor. No LMP recorded (lmp unknown). Patient has had a hysterectomy.. .    She reports that she has joint pains No primary care Mariano from Neurontin to Pregabalin- she thinks that this is helping.  She does have neuropathy in her right elbow down to her fingertips,, and her left fingertips and bilaterally in her toes  She does report vaginal dryness and has had urinary tract infections recently    Past Medical History:   Diagnosis Date    BMI 37.0-37.9, adult     Breast cancer 2019     Left breast, IDC with lymph node metastisis    Colon polyps 2015    Colon polyps     Diabetes mellitus type II     Diverticulosis     history of diverticulosisseen on colonoscopy at age 48. Repeat recommended at age 58. Done by     Elevated blood protein     History of elevated protein. Apparently has seen  for extensive workup including bone marrow biopsy    History of shingles 2006    Hyperlipidemia     Hypertension     Microalbuminuria     due 2 diabetes    Mild vitamin D deficiency     . A low vitamin D    Thyroid disease     hypothyroidism     Past Surgical History:   Procedure Laterality Date    BREAST BIOPSY Left 2019    IDC with mets to node    BREAST BIOPSY Right 2019    core bx, benign node    BREAST BIOPSY Left 10/14/2019    MRI Core bx, + IDC    BREAST BIOPSY Left 10/08/2019    Core bx, ADH     SECTION      HYSTERECTOMY      INSERTION OF BREAST TISSUE EXPANDER Bilateral  3/24/2020    Procedure: INSERTION, TISSUE EXPANDER, BREAST BILATERAL;  Surgeon: Michael Solorzano MD;  Location: [facility] OR [facility];  Service: Plastics;  Laterality: Bilateral;    INSERTION OF TUNNELED CENTRAL VENOUS CATHETER (CVC) WITH SUBCUTANEOUS PORT Right 10/4/2019    Procedure: [id number]-A-CATH RIGHT (CONSENT AM OF) 1.0 hr case;  Surgeon: Elena Lopez MD;  [facility];  Service: General;  Laterality: Right;    OOPHORECTOMY      SENTINEL LYMPH NODE BIOPSY Left 3/24/2020    Procedure: BIOPSY, LYMPH NODE, SENTINEL LEFT;  Surgeon: Elena Lopez MD;  Location: [facility] OR [facility] FLR;  Service: General;  Laterality: Left;    SIMPLE MASTECTOMY Bilateral 3/24/2020    Procedure: MASTECTOMY, SIMPLE BILATERAL;  Surgeon: Elena Lopez MD;  Location: [facility] OR [facility]LR;  Service: General;  Laterality: Bilateral;    TISSUE EXPANDER REMOVAL Bilateral 7/30/2020    Procedure: REMOVAL, TISSUE EXPANDER;  Surgeon: Michael Solorzano MD;  [facility];  Service: Plastics;  Laterality: Bilateral;     Social History     Socioeconomic History    Marital status: Single     Spouse name: Not on file    Number of children: Not on file    Years of education: Not on file    Highest education level: Not on file   Occupational History     Employer: [employer] ([employer])   Social Needs    Financial resource strain: Somewhat hard    Food insecurity     Worry: Never true     Inability: Never true    Transportation needs     Medical: No     Non-medical: No   Tobacco Use    Smoking status: Never Smoker    Smokeless tobacco: Never Used    Tobacco comment: 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  She walks occasionally.   Substance and Sexual Activity    Alcohol use: Not Currently     Frequency: Never     Drinks per session: Patient refused     Binge frequency: Never     Comment: Occasionally    Drug use: No    Sexual activity: Not Currently     Partners:  Male   Lifestyle    Physical activity     Days per week: 0 days     Minutes per session: Not on file    Stress: Not at all   Relationships    Social connections     Talks on phone: Not on file     Gets together: Not on file     Attends [employer] service: Not on file     Active member of club or organization: No     Attends meetings of clubs or organizations: Not on file     Relationship status: Not on file   Other Topics Concern    Not on file   Social History Narrative    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 daughter1 granddaughter     Family History   Problem Relation Age of Onset    Cancer Mother         lung ca heavy smoker    Lung cancer Mother     Cancer Father     Lung cancer Father     Hypertension Sister     No Known Problems Sister     No Known Problems Daughter     Hypothyroidism Sister     Diabetes Maternal Aunt     Cancer Maternal Aunt     No Known Problems Maternal Grandmother     Breast cancer Paternal Aunt     No Known Problems Paternal Uncle     Lung cancer Maternal Grandfather     No Known Problems Paternal Grandmother     No Known Problems Paternal Grandfather     Amblyopia Neg Hx     Blindness Neg Hx     Cataracts Neg Hx     Glaucoma Neg Hx     Macular degeneration Neg Hx     Retinal detachment Neg Hx     Strabismus Neg Hx     Stroke Neg Hx     Thyroid disease Neg Hx     Ovarian cancer Neg Hx     Colon cancer Neg Hx      OB History    Para Term  AB Living   1 1 1         SAB TAB Ectopic Multiple Live Births                  # Outcome Date GA Lbr Grey/2nd Weight Sex Delivery Anes PTL Lv   1 Term      [id number]-[id number]              Current Outpatient Medications   Medication Sig Dispense Refill    aspirin (ECOTRIN) 81 MG EC tablet Take 81 mg by mouth once daily.        atorvastatin (LIPITOR) 10 MG tablet Take 1 tablet (10 mg total) by mouth once daily. 90 tablet 3    dulaglutide (TRULICITY) 1.5 mg/0.5 mL pen injector Inject 1.5 mg into the skin  every 7 days. 2 mL 5    ergocalciferol (VITAMIN D2) 50,000 unit Cap Take 1 capsule (50,000 Units total) by mouth every 7 days. 12 capsule 2    HYDROcodone-acetaminophen (NORCO) 5-325 mg per tablet Take 1 tablet by mouth every 8 (eight) hours as needed for Pain. 30 tablet 0    irbesartan (AVAPRO) 300 MG tablet Take 1 tablet by mouth once daily 90 tablet 1    letrozole (FEMARA) 2.5 mg Tab Take 1 tablet (2.5 mg total) by mouth once daily. 30 tablet 3    levothyroxine (SYNTHROID) 200 MCG tablet Take 1 tablet (200 mcg total) by mouth once daily. 90 tablet 1    lifitegrast (XIIDRA) 5 % Dpet Apply 1 drop to  both eyes  2 (two) times daily. 180 each 4    metFORMIN (GLUCOPHAGE) 1000 MG tablet TAKE 1 TABLET (1,000 MG TOTAL) BY MOUTH 2 (TWO) TIMES DAILY WITH MEALS. 180 tablet 1    polyethylene glycol (GLYCOLAX) 17 gram/dose powder Mix 17 g (1 capful) with juice or water an drink 2 (two) times daily. 1020 g 2    pregabalin (LYRICA) 75 MG capsule Take 1 capsule (75 mg total) by mouth 2 (two) times daily. 60 capsule 2    blood-glucose meter kit DISPENSE : BLOOD TEST STRIPS AND LANCETS PATIENT TEST BLOOD SUGARS TWICE DAILY  TEST STRIPS: 50 EACH, REFILL 5  LANCETS:  50 EACH , REFILL 5 1 each 0    chlorthalidone (HYGROTEN) 25 MG Tab Take 1 tablet by mouth every morning (Patient not taking: Reported on 9/10/2020) 90 tablet 1    gabapentin (NEURONTIN) 100 MG capsule Take 2 capsules (200 mg total) by mouth 3 (three) times daily. (Patient not taking: Reported on 9/10/2020) 90 capsule 2    ondansetron (ZOFRAN-ODT) 4 MG TbDL Dissolve 1 tablet (4 mg total) by mouth every 6 (six) hours as needed. (Patient not taking: Reported on 9/2/2020) 60 tablet 2     No current facility-administered medications for this visit.      Allergies: Patient has no known allergies.     The 10-year ASCVD risk score (Dejaalexa JEFFERSON Jr., et al., 2013) is: 22.4%    Values used to calculate the score:      Age: 64 years      Sex: Female      Is Non-  : Yes      Diabetic: Yes      Tobacco smoker: No      Systolic Blood Pressure: 124 mmHg      Is BP treated: Yes      HDL Cholesterol: 57 mg/dL      Total Cholesterol: 261 mg/dL      ROS:  Constitutional: no weight loss, weight gain, fever, fatigue  Eyes:  No vision changes, glasses/contacts  ENT/Mouth: No ulcers, sinus problems, ears ringing, headache  Cardiovascular: No inability to lie flat, chest pain, exercise intolerance, swelling, heart palpitations  Respiratory: No wheezing, coughing blood, shortness of breath, or cough  Gastrointestinal: No diarrhea, bloody stool, nausea/vomiting, constipation, gas, hemorrhoids  Genitourinary: No blood in urine, painful urination, urgency of urination, frequency of urination, incomplete emptying, incontinence, abnormal bleeding, painful periods, heavy periods, vaginal discharge, vaginal odor, painful intercourse, sexual problems, bleeding after intercourse, +vaginal dryness  Musculoskeletal: No muscle weakness, +joint pain  Skin/Breast:+breast cancer  Neurological: No passing out, seizures, numbness, headache  Endocrine: + diabetes, hypothyroid, hyperthyroid, +hot flashes, hair loss, abnormal hair growth, acne  Psychiatric: No depression, crying  Hematologic: No bruises, bleeding, swollen lymph nodes, anemia.      Physical Exam:   Constitutional: She is oriented to person, place, and time. She appears well-developed and well-nourished.      Neck: Normal range of motion. No tracheal deviation present. No thyromegaly present.    Cardiovascular: Exam reveals no edema.     Pulmonary/Chest: Effort normal. She exhibits no mass, no tenderness, no deformity and no retraction. Right breast exhibits absence. Left breast exhibits absence.        Abdominal: Soft. She exhibits no distension and no mass. There is no abdominal tenderness. There is no rebound and no guarding. No hernia. Hernia confirmed negative in the left inguinal area.     Genitourinary: Rectum:      No  external hemorrhoid.   There is no rash, tenderness or lesion on the right labia. There is no rash, tenderness or lesion on the left labia. Uterus is absent. No no adexnal prolapse. Right adnexum displays no mass, no tenderness and no fullness. Left adnexum displays no mass, no tenderness and no fullness. No tenderness, bleeding, rectocele, cystocele or unspecified prolapse of vaginal walls in the vagina. Vaginal cuff normal.Cervix exhibits absence. negative for vaginal discharge          Musculoskeletal: Normal range of motion and moves all extremeties. No edema.       Neurological: She is alert and oriented to person, place, and time.    Skin: No rash noted. No erythema. No pallor.    Psychiatric: She has a normal mood and affect. Her behavior is normal. Judgment and thought content normal.         ASSESSMENT:   WWE  Breast cancer  Use of aromatase inhibitor  Neuropathy  Back in joint pain  Type 2 diabetes  Hypothyroid    PLAN:   Counseled her on risks benefits and side effect profile of intravaginal DHEA-recommend that you she use this twice weekly  Counseled her on possible benefits of acupuncture-for joint pain and neuropathy  Recheck Vitamin D  Message colorectal surgery for colonoscopy

## 2020-09-10 NOTE — LETTER
September 10, 2020      Tere Villagran MD  [address]  [facility] [address]           [place] [address]  [address], [address]  [facility] [address]  Phone: [phone]  Fax: [phone]          Patient: Maria Elena Tavares   MR Number: [id number]   YOB: 1955   Date of Visit: 9/10/2020       Dear Dr. eTre Villagran:    Thank you for referring Maria Elena Tavares to me for evaluation. Attached you will find relevant portions of my assessment and plan of care.    If you have questions, please do not hesitate to call me. I look forward to following Maria Elena Tavares along with you.    Sincerely,    Criss Myers MD    Enclosure  CC:  No Recipients    If you would like to receive this communication electronically, please contact [email or web address] or [phone] to request more information on [employer] access.    For providers and/or their staff who would like to refer a patient to [facility], please contact us through our one-stop-shop provider referral line, [facility], at [phone].    If you feel you have received this communication in error or would no longer like to receive these types of communications, please e-mail [email or web address]

## 2020-09-11 LAB — 25(OH)D3+25(OH)D2 SERPL-MCNC: 23 NG/ML (ref 30–96)

## 2020-09-11 NOTE — PROGRESS NOTES
Digital Medicine: Clinician Follow-Up    The history is provided by the patient.      Review of patient's allergies indicates:  No Known Allergies  Follow-up reason(s): routine follow up.     Hypertension    Patient readings are stable   Patient is not experiencing signs/symptoms of hypotension.  Patient is not experiencing signs/symptoms of hypertension.        Last 5 Patient Entered Readings                                      Current 30 Day Average: 111/69     Recent Readings 9/4/2020 8/27/2020 8/15/2020 7/8/2020 6/21/2020    SBP (mmHg) 107 116 109 124 130    DBP (mmHg) 74 77 57 89 87    Pulse 95 108 90 78 96        BP Readings from Last 3 Encounters:   09/10/20 124/72   09/02/20 110/64   09/01/20 127/82       Depression Screening  Did not address depression screening.    Sleep Apnea Screening    Did not address sleep apnea screening.     Medication Affordability Screening  Did not address medication affordability screening.     Medication Adherence-Medication adherence was asssessed.  Patient continue taking medication as prescribed.            ASSESSMENT(S)  Patients BP average is 111/69 mmHg, which is at goal. Patient's BP goal is less than or equal to 130/80 per 2017 ACC/AHA Hypertension Guidelines.      Hypertension Plan  Continue current therapy.  Instructed patient to call if BP remains > 130/80 or if she begins to experience s/s of hypotension.     Will continue to monitor, WCB in 8-12 weeks.          Addressed any questions or concerns and patient has my contact information if needed prior to next outreach. Patient verbalizes understanding.            Hypertension Medications             chlorthalidone (HYGROTEN) 25 MG Tab Take 1 tablet by mouth every morning    irbesartan (AVAPRO) 300 MG tablet Take 1 tablet by mouth once daily

## 2020-09-14 ENCOUNTER — INFUSION (OUTPATIENT)
Dept: INFUSION THERAPY | Facility: HOSPITAL | Age: 65
End: 2020-09-14
Attending: INTERNAL MEDICINE
Payer: COMMERCIAL

## 2020-09-14 DIAGNOSIS — D63.0 ANEMIA IN NEOPLASTIC DISEASE: ICD-10-CM

## 2020-09-14 DIAGNOSIS — C77.3 BREAST CANCER METASTASIZED TO AXILLARY LYMPH NODE, LEFT: Primary | ICD-10-CM

## 2020-09-14 DIAGNOSIS — C50.912 BREAST CANCER METASTASIZED TO AXILLARY LYMPH NODE, LEFT: Primary | ICD-10-CM

## 2020-09-14 DIAGNOSIS — D50.9 IRON DEFICIENCY ANEMIA, UNSPECIFIED IRON DEFICIENCY ANEMIA TYPE: ICD-10-CM

## 2020-09-14 LAB
ALBUMIN SERPL BCP-MCNC: 3.4 G/DL (ref 3.5–5.2)
ALP SERPL-CCNC: 85 U/L (ref 55–135)
ALT SERPL W/O P-5'-P-CCNC: 11 U/L (ref 10–44)
ANION GAP SERPL CALC-SCNC: 10 MMOL/L (ref 8–16)
AST SERPL-CCNC: 16 U/L (ref 10–40)
BILIRUB SERPL-MCNC: 0.3 MG/DL (ref 0.1–1)
BUN SERPL-MCNC: 16 MG/DL (ref 8–23)
CALCIUM SERPL-MCNC: 9.7 MG/DL (ref 8.7–10.5)
CHLORIDE SERPL-SCNC: 104 MMOL/L (ref 95–110)
CO2 SERPL-SCNC: 26 MMOL/L (ref 23–29)
CREAT SERPL-MCNC: 0.9 MG/DL (ref 0.5–1.4)
ERYTHROCYTE [DISTWIDTH] IN BLOOD BY AUTOMATED COUNT: 14.9 % (ref 11.5–14.5)
EST. GFR  (AFRICAN AMERICAN): >60 ML/MIN/1.73 M^2
EST. GFR  (NON AFRICAN AMERICAN): >60 ML/MIN/1.73 M^2
FERRITIN SERPL-MCNC: 556 NG/ML (ref 20–300)
GLUCOSE SERPL-MCNC: 135 MG/DL (ref 70–110)
HCT VFR BLD AUTO: 26.7 % (ref 37–48.5)
HGB BLD-MCNC: 8.3 G/DL (ref 12–16)
IMM GRANULOCYTES # BLD AUTO: 0.03 K/UL (ref 0–0.04)
IRON SERPL-MCNC: 53 UG/DL (ref 30–160)
MCH RBC QN AUTO: 28.8 PG (ref 27–31)
MCHC RBC AUTO-ENTMCNC: 31.1 G/DL (ref 32–36)
MCV RBC AUTO: 93 FL (ref 82–98)
NEUTROPHILS # BLD AUTO: 2.4 K/UL (ref 1.8–7.7)
PLATELET # BLD AUTO: 294 K/UL (ref 150–350)
PMV BLD AUTO: 9.9 FL (ref 9.2–12.9)
POTASSIUM SERPL-SCNC: 3.5 MMOL/L (ref 3.5–5.1)
PROT SERPL-MCNC: 7 G/DL (ref 6–8.4)
RBC # BLD AUTO: 2.88 M/UL (ref 4–5.4)
SATURATED IRON: 16 % (ref 20–50)
SODIUM SERPL-SCNC: 140 MMOL/L (ref 136–145)
TOTAL IRON BINDING CAPACITY: 334 UG/DL (ref 250–450)
TRANSFERRIN SERPL-MCNC: 226 MG/DL (ref 200–375)
WBC # BLD AUTO: 4.01 K/UL (ref 3.9–12.7)

## 2020-09-14 PROCEDURE — 96523 IRRIG DRUG DELIVERY DEVICE: CPT

## 2020-09-14 PROCEDURE — 25000003 PHARM REV CODE 250: Performed by: INTERNAL MEDICINE

## 2020-09-14 PROCEDURE — A4216 STERILE WATER/SALINE, 10 ML: HCPCS | Performed by: INTERNAL MEDICINE

## 2020-09-14 PROCEDURE — 63600175 PHARM REV CODE 636 W HCPCS: Performed by: INTERNAL MEDICINE

## 2020-09-14 PROCEDURE — 36591 DRAW BLOOD OFF VENOUS DEVICE: CPT

## 2020-09-14 PROCEDURE — 80053 COMPREHEN METABOLIC PANEL: CPT

## 2020-09-14 PROCEDURE — 82728 ASSAY OF FERRITIN: CPT

## 2020-09-14 PROCEDURE — 83540 ASSAY OF IRON: CPT

## 2020-09-14 PROCEDURE — 36415 COLL VENOUS BLD VENIPUNCTURE: CPT

## 2020-09-14 PROCEDURE — 85027 COMPLETE CBC AUTOMATED: CPT

## 2020-09-14 RX ORDER — HEPARIN 100 UNIT/ML
500 SYRINGE INTRAVENOUS
Status: COMPLETED | OUTPATIENT
Start: 2020-09-14 | End: 2020-09-14

## 2020-09-14 RX ORDER — SODIUM CHLORIDE 0.9 % (FLUSH) 0.9 %
10 SYRINGE (ML) INJECTION
Status: CANCELLED | OUTPATIENT
Start: 2020-09-14

## 2020-09-14 RX ORDER — HEPARIN 100 UNIT/ML
500 SYRINGE INTRAVENOUS
Status: CANCELLED | OUTPATIENT
Start: 2020-09-14

## 2020-09-14 RX ORDER — SODIUM CHLORIDE 0.9 % (FLUSH) 0.9 %
10 SYRINGE (ML) INJECTION
Status: COMPLETED | OUTPATIENT
Start: 2020-09-14 | End: 2020-09-14

## 2020-09-14 RX ADMIN — Medication 10 ML: at 03:09

## 2020-09-14 RX ADMIN — HEPARIN 500 UNITS: 100 SYRINGE at 03:09

## 2020-09-15 ENCOUNTER — OFFICE VISIT (OUTPATIENT)
Dept: HEMATOLOGY/ONCOLOGY | Facility: CLINIC | Age: 65
End: 2020-09-15
Payer: COMMERCIAL

## 2020-09-15 VITALS
BODY MASS INDEX: 33.5 KG/M2 | DIASTOLIC BLOOD PRESSURE: 77 MMHG | TEMPERATURE: 98 F | RESPIRATION RATE: 16 BRPM | SYSTOLIC BLOOD PRESSURE: 140 MMHG | OXYGEN SATURATION: 98 % | HEART RATE: 83 BPM | WEIGHT: 170.63 LBS | HEIGHT: 60 IN

## 2020-09-15 DIAGNOSIS — T45.1X5A NEUROPATHY DUE TO CHEMOTHERAPEUTIC DRUG: ICD-10-CM

## 2020-09-15 DIAGNOSIS — C77.3 BREAST CANCER METASTASIZED TO AXILLARY LYMPH NODE, LEFT: Primary | ICD-10-CM

## 2020-09-15 DIAGNOSIS — E55.9 MILD VITAMIN D DEFICIENCY: ICD-10-CM

## 2020-09-15 DIAGNOSIS — G62.0 NEUROPATHY DUE TO CHEMOTHERAPEUTIC DRUG: ICD-10-CM

## 2020-09-15 DIAGNOSIS — E11.21 CONTROLLED TYPE 2 DIABETES MELLITUS WITH DIABETIC NEPHROPATHY, WITHOUT LONG-TERM CURRENT USE OF INSULIN: ICD-10-CM

## 2020-09-15 DIAGNOSIS — C50.912 BREAST CANCER METASTASIZED TO AXILLARY LYMPH NODE, LEFT: Primary | ICD-10-CM

## 2020-09-15 DIAGNOSIS — E55.9 VITAMIN D DEFICIENCY: ICD-10-CM

## 2020-09-15 DIAGNOSIS — I10 ESSENTIAL HYPERTENSION: ICD-10-CM

## 2020-09-15 PROCEDURE — 99214 PR OFFICE/OUTPT VISIT, EST, LEVL IV, 30-39 MIN: ICD-10-PCS | Mod: S$GLB,,, | Performed by: INTERNAL MEDICINE

## 2020-09-15 PROCEDURE — 3078F DIAST BP <80 MM HG: CPT | Mod: CPTII,S$GLB,, | Performed by: INTERNAL MEDICINE

## 2020-09-15 PROCEDURE — 3077F SYST BP >= 140 MM HG: CPT | Mod: CPTII,S$GLB,, | Performed by: INTERNAL MEDICINE

## 2020-09-15 PROCEDURE — 3051F PR MOST RECENT HEMOGLOBIN A1C LEVEL 7.0 - < 8.0%: ICD-10-PCS | Mod: CPTII,S$GLB,, | Performed by: INTERNAL MEDICINE

## 2020-09-15 PROCEDURE — 3008F PR BODY MASS INDEX (BMI) DOCUMENTED: ICD-10-PCS | Mod: CPTII,S$GLB,, | Performed by: INTERNAL MEDICINE

## 2020-09-15 PROCEDURE — 99214 OFFICE O/P EST MOD 30 MIN: CPT | Mod: S$GLB,,, | Performed by: INTERNAL MEDICINE

## 2020-09-15 PROCEDURE — 99999 PR PBB SHADOW E&M-EST. PATIENT-LVL IV: CPT | Mod: PBBFAC,,, | Performed by: INTERNAL MEDICINE

## 2020-09-15 PROCEDURE — 3051F HG A1C>EQUAL 7.0%<8.0%: CPT | Mod: CPTII,S$GLB,, | Performed by: INTERNAL MEDICINE

## 2020-09-15 PROCEDURE — 3078F PR MOST RECENT DIASTOLIC BLOOD PRESSURE < 80 MM HG: ICD-10-PCS | Mod: CPTII,S$GLB,, | Performed by: INTERNAL MEDICINE

## 2020-09-15 PROCEDURE — 99999 PR PBB SHADOW E&M-EST. PATIENT-LVL IV: ICD-10-PCS | Mod: PBBFAC,,, | Performed by: INTERNAL MEDICINE

## 2020-09-15 PROCEDURE — 3008F BODY MASS INDEX DOCD: CPT | Mod: CPTII,S$GLB,, | Performed by: INTERNAL MEDICINE

## 2020-09-15 PROCEDURE — 3077F PR MOST RECENT SYSTOLIC BLOOD PRESSURE >= 140 MM HG: ICD-10-PCS | Mod: CPTII,S$GLB,, | Performed by: INTERNAL MEDICINE

## 2020-09-15 RX ORDER — ERGOCALCIFEROL 1.25 MG/1
50000 CAPSULE ORAL
Qty: 12 CAPSULE | Refills: 2 | Status: SHIPPED | OUTPATIENT
Start: 2020-09-15 | End: 2021-03-15

## 2020-09-15 RX ORDER — ERGOCALCIFEROL 1.25 MG/1
50000 CAPSULE ORAL
Qty: 12 CAPSULE | Refills: 2 | Status: SHIPPED | OUTPATIENT
Start: 2020-09-15 | End: 2020-09-15 | Stop reason: SDUPTHER

## 2020-09-15 NOTE — PROGRESS NOTES
Subjective:       Patient ID: Maria Elena Tavares is a 64 y.o. female.    Chief Complaint: Follow-up    HPI     Reports fatigue and still recovering from most recent surgery  Still notes numbness and some sharp pains in breast area  Eating better  Working on rebuilding strength    Started Lyrica for neuropathy  No new pain issues    History of Present Illness: Ms. Tavares is a 64 y.o. who returns in follow up for Breast Cancer.    Oncologic History:  - self detected, noted a shooting pain in breast first and then a week later felt a lump  Some delay in getting appointment as she was unaware she had to call  - 8/30/19 Diagnostic mammogram and ultrasound:  Left breast 20 mm x 18 mm x 17 mm mass at the 3 o'clock position. Assessment: 4 - Suspicious finding. Biopsy is recommended.   Lymph Node: Left axilla 16 mm x 14 mm x 13 mm lymph node. Assessment: 4 - Suspicious finding. Biopsy is recommended.   Right- There is no mammographic or sonographic evidence of malignancy.  BI-RADS Category:   Overall: 4 - Suspicious  - 9/5/19 Ultrasound guided biopsy  Pathology:  FINAL PATHOLOGIC DIAGNOSIS  1. LEFT BREAST MASS, BIOPSY:  - Invasive ductal carcinoma, grade 3, longest linear length is 10 mm measured on the slide.  - Histologic Grade (Hawa Histologic Score)  Glandular (Acinar/Tubular Differentiation): 3.  Nuclear Pleomorphism: 2.  Mitotic Rate: 3.  Overall Grade: Grade 3 (score 8).  - Microcalcifications: Not identified.  - Lymphovascular invasion: Not identified.  2. LYMPH NODE, LEFT AXILLA, BIOPSY:  - Positive for metastatic carcinoma.  - The metastatic deposit measures 6 mm in longest continuous linear length.  Estrogen receptor: Positive, 90% of the tumor nuclei staining moderate to strongly.  Progesterone receptor: Positive, 30% of the tumor nuclei staining weak to moderately.  HER2: Negative.  Ki-67: 60%.  - ECHO 9/20/19: EF 64%  - PET 9/21/19:  Impression         Hypermetabolic left breast mass and regional left axillary  lymphadenopathy consistent with reported breast cancer and corresponding with recent MRI findings.    Upper limit of normal sized right axillary lymph nodes with normal fatty duane and mildly increased radiotracer uptake.  Findings could represent reactive nodes with metastatic nodes thought less likely.  Lymphscintigraphy with injection in the left breast may considered to assess for drainage to the contralateral axilla.      BX 9/24/19: Right axilla node biopsy is benign  - she underwent neoadjuvant chemotherapy and completed 4 cycles of AC followed by 11 cycles of weekly Taxol.   Stopped with side effects.  - 3/24/20 - she underwent bilateral mastectomies with Lt. sentinel node biopsy and subsequent Lt. axillary dissection and Rt. sentinel node biopsy with Dr. Lopez.  Tissue expanders were placed.   - Pathology from the Lt. breast revealed 1.2 cm of residual invasive ductal carcinoma histologic grade 3, nuclear grade 3 and mitotic index 5 ). There was high grade DCIS. There was lymphovascular invasion.  The margins of resection were negative at > 1 cm.  One axillary node had a 5 mm focus of metastatic carcinoma.  Another Lt. sentinel node had isolated tumor cells.   A third Lt. sentinel node and 7 Lt. axillary nodes were negative.  The Rt. breast and Rt. sentinel nodes were negative.    - XRT pending      Review of Systems   Constitutional: Positive for fatigue. Negative for activity change, appetite change, chills, fever and unexpected weight change ( weight loss planned).   HENT: Negative for nasal congestion, rhinorrhea and trouble swallowing.    Eyes: Negative for visual disturbance.   Respiratory: Negative for cough, shortness of breath and wheezing.    Cardiovascular: Negative for chest pain, palpitations and leg swelling.   Gastrointestinal: Negative for abdominal distention, abdominal pain, blood in stool, constipation, diarrhea, nausea and vomiting.   Genitourinary: Negative for decreased urine volume,  difficulty urinating, dysuria, frequency and hematuria.   Musculoskeletal: Negative for arthralgias, back pain, gait problem, joint swelling and neck pain.   Integumentary:  Negative for pallor and rash.   Neurological: Positive for numbness (neuropathy stable). Negative for dizziness, weakness, light-headedness and headaches.   Hematological: Negative for adenopathy. Does not bruise/bleed easily.   Psychiatric/Behavioral: Negative for confusion, dysphoric mood and sleep disturbance.         Objective:      Physical Exam  Vitals signs and nursing note reviewed.   Constitutional:       General: She is not in acute distress.     Appearance: Normal appearance. She is normal weight. She is not ill-appearing.      Comments: Presents alone   Very pleasant   Eyes:      General: No scleral icterus.     Extraocular Movements: Extraocular movements intact.      Pupils: Pupils are equal, round, and reactive to light.   Neck:      Musculoskeletal: Normal range of motion and neck supple. No muscular tenderness.   Cardiovascular:      Rate and Rhythm: Normal rate and regular rhythm.      Heart sounds: Normal heart sounds. No murmur. No friction rub. No gallop.    Pulmonary:      Effort: Pulmonary effort is normal. No respiratory distress.      Breath sounds: Normal breath sounds. No wheezing, rhonchi or rales.      Comments: No chest wall masses or :LAD  Chest:      Chest wall: No tenderness.   Abdominal:      General: Abdomen is flat. Bowel sounds are normal. There is no distension.      Palpations: Abdomen is soft. There is no mass.      Tenderness: There is no abdominal tenderness. There is no guarding or rebound.      Comments: No organomegaly   Musculoskeletal: Normal range of motion.         General: No swelling, tenderness or deformity.      Right lower leg: No edema.      Left lower leg: No edema.   Lymphadenopathy:      Cervical: No cervical adenopathy.   Skin:     General: Skin is warm and dry.      Coloration: Skin is  not pale.      Findings: No erythema, lesion or rash.   Neurological:      General: No focal deficit present.      Mental Status: She is alert and oriented to person, place, and time. Mental status is at baseline.      Cranial Nerves: No cranial nerve deficit.      Sensory: No sensory deficit.      Motor: No weakness.      Coordination: Coordination normal.      Gait: Gait normal.   Psychiatric:         Mood and Affect: Mood normal.         Behavior: Behavior normal.         Thought Content: Thought content normal.         Judgment: Judgment normal.         Assessment:       1. Breast cancer metastasized to axillary lymph node, left    2. Vitamin D deficiency    3. Controlled type 2 diabetes mellitus with diabetic nephropathy, without long-term current use of insulin    4. Mild vitamin D deficiency    5. Neuropathy due to chemotherapeutic drug    6. Essential hypertension        Plan:     1. KEILY clinically  Post bilateral mastectomies  Continue letrozole  2,4. On replacement and improving  Medication refilled  3. Managed by PCP  5. Currently on Lyrica  6. Managed by PCP  Well controlled at home    25 minutes total

## 2020-09-16 ENCOUNTER — PATIENT MESSAGE (OUTPATIENT)
Dept: HEMATOLOGY/ONCOLOGY | Facility: CLINIC | Age: 65
End: 2020-09-16

## 2020-09-16 ENCOUNTER — PATIENT MESSAGE (OUTPATIENT)
Dept: PLASTIC SURGERY | Facility: CLINIC | Age: 65
End: 2020-09-16

## 2020-09-16 DIAGNOSIS — Z12.11 SPECIAL SCREENING FOR MALIGNANT NEOPLASMS, COLON: Primary | ICD-10-CM

## 2020-09-16 DIAGNOSIS — Z01.818 PRE-OP TESTING: ICD-10-CM

## 2020-09-16 RX ORDER — SODIUM, POTASSIUM,MAG SULFATES 17.5-3.13G
1 SOLUTION, RECONSTITUTED, ORAL ORAL DAILY
Qty: 1 KIT | Refills: 0 | Status: SHIPPED | OUTPATIENT
Start: 2020-09-16 | End: 2020-09-25

## 2020-09-18 ENCOUNTER — TELEPHONE (OUTPATIENT)
Dept: PLASTIC SURGERY | Facility: CLINIC | Age: 65
End: 2020-09-18

## 2020-09-18 NOTE — TELEPHONE ENCOUNTER
I called this patient and left a VM on her cell phone. I asked her to call me back so that I can get her scheduled for a consult. Referred by Dr. Solorzano.

## 2020-10-01 ENCOUNTER — OFFICE VISIT (OUTPATIENT)
Dept: RADIATION ONCOLOGY | Facility: CLINIC | Age: 65
End: 2020-10-01
Attending: RADIOLOGY
Payer: COMMERCIAL

## 2020-10-01 VITALS
RESPIRATION RATE: 16 BRPM | HEART RATE: 95 BPM | DIASTOLIC BLOOD PRESSURE: 104 MMHG | SYSTOLIC BLOOD PRESSURE: 196 MMHG | HEIGHT: 60 IN | BODY MASS INDEX: 33.77 KG/M2 | WEIGHT: 172 LBS

## 2020-10-01 DIAGNOSIS — C50.912 BREAST CANCER METASTASIZED TO AXILLARY LYMPH NODE, LEFT: Primary | ICD-10-CM

## 2020-10-01 DIAGNOSIS — C77.3 BREAST CANCER METASTASIZED TO AXILLARY LYMPH NODE, LEFT: Primary | ICD-10-CM

## 2020-10-01 LAB
ACID FAST MOD KINY STN SPEC: NORMAL
ACID FAST MOD KINY STN SPEC: NORMAL
MYCOBACTERIUM SPEC QL CULT: NORMAL
MYCOBACTERIUM SPEC QL CULT: NORMAL

## 2020-10-01 PROCEDURE — 99499 UNLISTED E&M SERVICE: CPT | Mod: S$GLB,,, | Performed by: RADIOLOGY

## 2020-10-01 PROCEDURE — 99999 PR PBB SHADOW E&M-EST. PATIENT-LVL IV: ICD-10-PCS | Mod: PBBFAC,,, | Performed by: RADIOLOGY

## 2020-10-01 PROCEDURE — 99999 PR PBB SHADOW E&M-EST. PATIENT-LVL IV: CPT | Mod: PBBFAC,,, | Performed by: RADIOLOGY

## 2020-10-01 PROCEDURE — 99499 NO LOS: ICD-10-PCS | Mod: S$GLB,,, | Performed by: RADIOLOGY

## 2020-10-01 NOTE — PROGRESS NOTES
Subjective:       Patient ID: Maria Elena Tavares is a 64 y.o. female.    Chief Complaint: Breast Cancer (f/u after xrt)    This patient presents for her initial follow up visit.      Maria Elena has a history of Stage IIA, (T1c, N1, M0, G3, HER2 Neg, ER Pos, GA Po) Left breast cancer.  She  presented for further evaluation after diagnostic MMG on 8/30/19 revealed a 20 mm hypoechoic mass with indistinct margins in the Lt. breast at the 3 o'clock position along with a 1.5 cm Lt. axillary node.  Biopsy on 9/5/19 revealed infiltrating ductal carcinoma, histologic grade 3, nuclear grade 2 and mitotic index 3.  90% of the tumor was moderate to strongly ER positive, 30% was weak to moderately GA positive, Her - 2 was negative and Ki-67 was 60%.  Biopsy of the lymph node revealed metastatic carcinoma.  Further work up with MRI of the breast on 9/18/20 revealed the 2.4 cm mass in the central region of the Lt. breast with  a 9 mm possible satellite lesion in the UIQ of the Lt. breast.  The smaller Lt. breast mass seen on MRI was biopsied on 10/8/19 and revealed invasive ductal carcinoma, histologic grade 2, nuclear grade 3 and mitotic index 2.  100% of the tumor cells were strongly ER positive, 60% were moderate to strongly GA positive, Her - 2 was 2+ but negative by FISH  Ki-67 was < 5%.  The patient began neoadjuvant chemotherapy and completed 4 cycles of AC followed by 11 cycles of weekly Taxol.  She underwent bilateral mastectomies with Lt. sentinel node biopsy and subsequent Lt. axillary dissection and Rt. sentinel node biopsy.  Tissue expanders were placed.  Pathology from the Lt. breast revealed 1.2 cm of residual invasive ductal carcinoma histologic grade 3, nuclear grade 3 and mitotic index 5 ). There was high grade DCIS. There was lymphovascular invasion.  The margins of resection were negative at > 1 cm.  One axillary node had a 5 mm focus of metastatic carcinoma.  Another Lt. sentinel node had isolated tumor cells.   A  third Lt. sentinel node and 7 Lt. axillary nodes were negative.  The Rt. breast and Rt. sentinel nodes were negative.  The patient was referred for post mastectomy irradiation and completed 50.4 Gy to the Lt. chest wall and regional lymphatics on 7/13/20.  Today the patient states she feels well.  She is currently working from home.  Still with occasional Lt. chest wall pain. Denies lymphedema.        Review of Systems   Constitutional: Negative for activity change, appetite change, chills and fatigue.   Respiratory: Negative for cough and shortness of breath.    Cardiovascular: Negative for chest pain and claudication.   Gastrointestinal: Negative for abdominal pain, nausea and vomiting.         Objective:      Physical Exam  Neck:      Musculoskeletal: Normal range of motion and neck supple.   Chest:       Lymphadenopathy:      Cervical: No cervical adenopathy.      Upper Body:      Right upper body: No supraclavicular adenopathy.      Left upper body: No supraclavicular adenopathy.   Neurological:      Mental Status: She is alert.         Assessment:       1. Breast cancer metastasized to axillary lymph node, left        Plan:       Recovering from the acute effects of radiotherapy.   She is currently receiving hormonal therapy.  Plan follow up with us in 6 months.

## 2020-10-02 DIAGNOSIS — C50.912 BREAST CANCER METASTASIZED TO AXILLARY LYMPH NODE, LEFT: ICD-10-CM

## 2020-10-02 DIAGNOSIS — C77.3 BREAST CANCER METASTASIZED TO AXILLARY LYMPH NODE, LEFT: ICD-10-CM

## 2020-10-02 RX ORDER — LETROZOLE 2.5 MG/1
2.5 TABLET, FILM COATED ORAL DAILY
Qty: 30 TABLET | Refills: 3 | Status: SHIPPED | OUTPATIENT
Start: 2020-10-02 | End: 2021-06-07 | Stop reason: SDUPTHER

## 2020-10-05 ENCOUNTER — LAB VISIT (OUTPATIENT)
Dept: PRIMARY CARE CLINIC | Facility: CLINIC | Age: 65
End: 2020-10-05
Payer: COMMERCIAL

## 2020-10-05 ENCOUNTER — PATIENT MESSAGE (OUTPATIENT)
Dept: OBSTETRICS AND GYNECOLOGY | Facility: CLINIC | Age: 65
End: 2020-10-05

## 2020-10-05 DIAGNOSIS — Z01.818 PRE-OP TESTING: ICD-10-CM

## 2020-10-05 LAB — SARS-COV-2 RNA RESP QL NAA+PROBE: NOT DETECTED

## 2020-10-05 PROCEDURE — U0003 INFECTIOUS AGENT DETECTION BY NUCLEIC ACID (DNA OR RNA); SEVERE ACUTE RESPIRATORY SYNDROME CORONAVIRUS 2 (SARS-COV-2) (CORONAVIRUS DISEASE [COVID-19]), AMPLIFIED PROBE TECHNIQUE, MAKING USE OF HIGH THROUGHPUT TECHNOLOGIES AS DESCRIBED BY CMS-2020-01-R: HCPCS

## 2020-10-05 RX ORDER — DULAGLUTIDE 1.5 MG/.5ML
1.5 INJECTION, SOLUTION SUBCUTANEOUS
Qty: 2 ML | Refills: 0 | Status: SHIPPED | OUTPATIENT
Start: 2020-10-05 | End: 2020-11-03 | Stop reason: SDUPTHER

## 2020-10-06 ENCOUNTER — PATIENT MESSAGE (OUTPATIENT)
Dept: PRIMARY CARE CLINIC | Facility: CLINIC | Age: 65
End: 2020-10-06

## 2020-10-06 ENCOUNTER — TELEPHONE (OUTPATIENT)
Dept: PLASTIC SURGERY | Facility: CLINIC | Age: 65
End: 2020-10-06

## 2020-10-06 NOTE — TELEPHONE ENCOUNTER
Called the pt twice & she picked up, but couldn't seem to hear me. Left a message on the 3rd try to reschedule her 10/14 consult appt for 10/15 at 3pm.

## 2020-10-08 ENCOUNTER — ANESTHESIA EVENT (OUTPATIENT)
Dept: ENDOSCOPY | Facility: HOSPITAL | Age: 65
End: 2020-10-08
Payer: COMMERCIAL

## 2020-10-08 ENCOUNTER — ANESTHESIA (OUTPATIENT)
Dept: ENDOSCOPY | Facility: HOSPITAL | Age: 65
End: 2020-10-08
Payer: COMMERCIAL

## 2020-10-08 ENCOUNTER — HOSPITAL ENCOUNTER (OUTPATIENT)
Facility: HOSPITAL | Age: 65
Discharge: HOME | End: 2020-10-08
Attending: COLON & RECTAL SURGERY | Admitting: COLON & RECTAL SURGERY
Payer: COMMERCIAL

## 2020-10-08 ENCOUNTER — PATIENT OUTREACH (OUTPATIENT)
Dept: OTHER | Facility: OTHER | Age: 65
End: 2020-10-08

## 2020-10-08 VITALS
DIASTOLIC BLOOD PRESSURE: 61 MMHG | HEART RATE: 79 BPM | OXYGEN SATURATION: 99 % | TEMPERATURE: 98 F | RESPIRATION RATE: 16 BRPM | BODY MASS INDEX: 28.32 KG/M2 | SYSTOLIC BLOOD PRESSURE: 109 MMHG | HEIGHT: 65 IN | WEIGHT: 170 LBS

## 2020-10-08 DIAGNOSIS — Z12.11 SCREENING FOR MALIGNANT NEOPLASM OF COLON: Primary | ICD-10-CM

## 2020-10-08 LAB — POCT GLUCOSE: 110 MG/DL (ref 70–110)

## 2020-10-08 PROCEDURE — 88305 TISSUE EXAM BY PATHOLOGIST: ICD-10-PCS | Mod: 26,,, | Performed by: PATHOLOGY

## 2020-10-08 PROCEDURE — 88305 TISSUE EXAM BY PATHOLOGIST: CPT | Mod: 26,,, | Performed by: PATHOLOGY

## 2020-10-08 PROCEDURE — 45380 PR COLONOSCOPY,BIOPSY: ICD-10-PCS | Mod: 33,,, | Performed by: COLON & RECTAL SURGERY

## 2020-10-08 PROCEDURE — 82962 GLUCOSE BLOOD TEST: CPT | Performed by: COLON & RECTAL SURGERY

## 2020-10-08 PROCEDURE — 25000003 PHARM REV CODE 250: Performed by: COLON & RECTAL SURGERY

## 2020-10-08 PROCEDURE — 45380 COLONOSCOPY AND BIOPSY: CPT | Performed by: COLON & RECTAL SURGERY

## 2020-10-08 PROCEDURE — 63600175 PHARM REV CODE 636 W HCPCS: Performed by: STUDENT IN AN ORGANIZED HEALTH CARE EDUCATION/TRAINING PROGRAM

## 2020-10-08 PROCEDURE — E9220 PRA ENDO ANESTHESIA: ICD-10-PCS | Mod: 33,,, | Performed by: STUDENT IN AN ORGANIZED HEALTH CARE EDUCATION/TRAINING PROGRAM

## 2020-10-08 PROCEDURE — 37000008 HC ANESTHESIA 1ST 15 MINUTES: Performed by: COLON & RECTAL SURGERY

## 2020-10-08 PROCEDURE — 45380 COLONOSCOPY AND BIOPSY: CPT | Mod: 33,,, | Performed by: COLON & RECTAL SURGERY

## 2020-10-08 PROCEDURE — 25000003 PHARM REV CODE 250: Performed by: STUDENT IN AN ORGANIZED HEALTH CARE EDUCATION/TRAINING PROGRAM

## 2020-10-08 PROCEDURE — 37000009 HC ANESTHESIA EA ADD 15 MINS: Performed by: COLON & RECTAL SURGERY

## 2020-10-08 PROCEDURE — 27201012 HC FORCEPS, HOT/COLD, DISP: Performed by: COLON & RECTAL SURGERY

## 2020-10-08 PROCEDURE — 88305 TISSUE EXAM BY PATHOLOGIST: CPT | Mod: 59 | Performed by: PATHOLOGY

## 2020-10-08 PROCEDURE — E9220 PRA ENDO ANESTHESIA: HCPCS | Mod: 33,,, | Performed by: STUDENT IN AN ORGANIZED HEALTH CARE EDUCATION/TRAINING PROGRAM

## 2020-10-08 RX ORDER — PHENYLEPHRINE HYDROCHLORIDE 10 MG/ML
INJECTION INTRAVENOUS
Status: DISCONTINUED | OUTPATIENT
Start: 2020-10-08 | End: 2020-10-08

## 2020-10-08 RX ORDER — SODIUM CHLORIDE 9 MG/ML
INJECTION, SOLUTION INTRAVENOUS CONTINUOUS
Status: DISCONTINUED | OUTPATIENT
Start: 2020-10-08 | End: 2020-10-08 | Stop reason: HOSPADM

## 2020-10-08 RX ORDER — LIDOCAINE HYDROCHLORIDE 20 MG/ML
INJECTION INTRAVENOUS
Status: DISCONTINUED | OUTPATIENT
Start: 2020-10-08 | End: 2020-10-08

## 2020-10-08 RX ORDER — PROPOFOL 10 MG/ML
VIAL (ML) INTRAVENOUS
Status: DISCONTINUED | OUTPATIENT
Start: 2020-10-08 | End: 2020-10-08

## 2020-10-08 RX ORDER — PROPOFOL 10 MG/ML
VIAL (ML) INTRAVENOUS CONTINUOUS PRN
Status: DISCONTINUED | OUTPATIENT
Start: 2020-10-08 | End: 2020-10-08

## 2020-10-08 RX ADMIN — PROPOFOL 60 MG: 10 INJECTION, EMULSION INTRAVENOUS at 07:10

## 2020-10-08 RX ADMIN — LIDOCAINE HYDROCHLORIDE 60 MG: 20 INJECTION, SOLUTION INTRAVENOUS at 07:10

## 2020-10-08 RX ADMIN — PROPOFOL 175 MCG/KG/MIN: 10 INJECTION, EMULSION INTRAVENOUS at 07:10

## 2020-10-08 RX ADMIN — SODIUM CHLORIDE: 0.9 INJECTION, SOLUTION INTRAVENOUS at 07:10

## 2020-10-08 RX ADMIN — PHENYLEPHRINE HYDROCHLORIDE 100 MCG: 10 INJECTION INTRAVENOUS at 07:10

## 2020-10-08 RX ADMIN — PHENYLEPHRINE HYDROCHLORIDE 200 MCG: 10 INJECTION INTRAVENOUS at 07:10

## 2020-10-08 NOTE — ANESTHESIA POSTPROCEDURE EVALUATION
Anesthesia Post Evaluation    Patient: Maria Elena Tavares    Procedure(s) Performed: Procedure(s) (LRB):  COLONOSCOPY (N/A)    Final Anesthesia Type: general    Patient location during evaluation: GI PACU  Patient participation: Yes- Able to Participate  Level of consciousness: awake and alert and oriented  Post-procedure vital signs: reviewed and stable  Pain management: adequate  Airway patency: patent    PONV status at discharge: No PONV  Anesthetic complications: no      Cardiovascular status: blood pressure returned to baseline and hemodynamically stable  Respiratory status: unassisted, spontaneous ventilation and room air  Hydration status: euvolemic  Follow-up not needed.          Vitals Value Taken Time   /61 10/08/20 0831   Temp 36.4 °C (97.5 °F) 10/08/20 0802   Pulse 79 10/08/20 0831   Resp 16 10/08/20 0831   SpO2 99 % 10/08/20 0831         Event Time   Out of Recovery 08:38:56         Pain/Guanakito Score: Guanakito Score: 9 (10/8/2020  8:02 AM)

## 2020-10-08 NOTE — ANESTHESIA PREPROCEDURE EVALUATION
10/08/2020  Maria Elena Tavares is a 64 y.o., female.    Past Medical History:   Diagnosis Date    BMI 37.0-37.9, adult     Breast cancer 2019     Left breast, IDC with lymph node metastisis    Colon polyps 2015    Colon polyps     Diabetes mellitus type II     Diverticulosis     history of diverticulosisseen on colonoscopy at age 48. Repeat recommended at age 58. Done by     Elevated blood protein     History of elevated protein. Apparently has seen  for extensive workup including bone marrow biopsy    History of shingles 2006    Hyperlipidemia     Hypertension     Microalbuminuria     due 2 diabetes    Mild vitamin D deficiency     . A low vitamin D    Thyroid disease     hypothyroidism       Past Surgical History:   Procedure Laterality Date    BREAST BIOPSY Left 2019    IDC with mets to node    BREAST BIOPSY Right 2019    core bx, benign node    BREAST BIOPSY Left 10/14/2019    MRI Core bx, + IDC    BREAST BIOPSY Left 10/08/2019    Core bx, ADH     SECTION      HYSTERECTOMY      INSERTION OF BREAST TISSUE EXPANDER Bilateral 3/24/2020    Procedure: INSERTION, TISSUE EXPANDER, BREAST BILATERAL;  Surgeon: Michael Solorzano MD;  Location: [facility] OR [address] [place] [address];  Service: Plastics;  Laterality: Bilateral;    INSERTION OF TUNNELED CENTRAL VENOUS CATHETER (CVC) WITH SUBCUTANEOUS PORT Right 10/4/2019    Procedure: [id number] RIGHT (CONSENT AM OF) 1.0 hr case;  Surgeon: Elena Lopez MD;  Location: [facility] OR [address] [place] [address];  Service: General;  Laterality: Right;    OOPHORECTOMY      SENTINEL LYMPH NODE BIOPSY Left 3/24/2020    Procedure: BIOPSY, LYMPH NODE, SENTINEL LEFT;  Surgeon: Elena Lopez MD;  Location: [facility] OR [address] [place] [address];  Service: General;  Laterality: Left;    SIMPLE MASTECTOMY Bilateral 3/24/2020    Procedure: MASTECTOMY, SIMPLE BILATERAL;   Surgeon: Elena Lopez MD;  Location: [facility] OR [address] [place] [address];  Service: General;  Laterality: Bilateral;    TISSUE EXPANDER REMOVAL Bilateral 7/30/2020    Procedure: REMOVAL, TISSUE EXPANDER;  Surgeon: Michael Solorzano MD;  Location: [facility] OR [address] [place] [address];  Service: Plastics;  Laterality: Bilateral;         Anesthesia Evaluation    I have reviewed the Patient Summary Reports.    I have reviewed the Nursing Notes. I have reviewed the NPO Status.   I have reviewed the Medications.     Review of Systems  Anesthesia Hx:  No problems with previous Anesthesia   Denies Personal Hx of Anesthesia complications.   Social:  Non-Smoker, No Alcohol Use    Cardiovascular:   Exercise tolerance: good Hypertension    Endocrine:   Diabetes, well controlled, type 2 Hypothyroidism        Physical Exam  General:  Well nourished    Airway/Jaw/Neck:  Airway Findings: Mouth Opening: Normal Tongue: Normal  Mallampati: III  TM Distance: Normal, at least 6 cm  Jaw/Neck Findings:  Neck ROM: Normal ROM      Dental:  Dental Findings: In tact   Chest/Lungs:  Chest/Lungs Findings: Clear to auscultation     Heart/Vascular:  Heart Findings: Rate: Normal  Rhythm: Regular Rhythm     Abdomen:  Abdomen Findings:  Normal            Anesthesia Plan  Type of Anesthesia, risks & benefits discussed:  Anesthesia Type:  general  Patient's Preference:   Intra-op Monitoring Plan: standard ASA monitors  Intra-op Monitoring Plan Comments:   Post Op Pain Control Plan: per primary service following discharge from PACU  Post Op Pain Control Plan Comments:   Induction:   IV  Beta Blocker:  Patient is not currently on a Beta-Blocker (No further documentation required).       Informed Consent: Patient understands risks and agrees with Anesthesia plan.  Questions answered. Anesthesia consent signed with patient.  ASA Score: 2     Day of Surgery Review of History & Physical:  There are no significant changes.          Ready For Surgery From Anesthesia Perspective.

## 2020-10-08 NOTE — H&P
COLONOSCOPY HISTORY AND PHYSICAL EXAM    Procedure : Colonoscopy      INDICATIONS: personal history of colon polyps    Family Hx of CRC: None    Last Colonoscopy:  2015  Findings: 7mm adenoma       Past Medical History:   Diagnosis Date    BMI 37.0-37.9, adult     Breast cancer 09/05/2019     Left breast, IDC with lymph node metastisis    Colon polyps 2015    Colon polyps 2015    Diabetes mellitus type II     Diverticulosis     history of diverticulosisseen on colonoscopy at age 48. Repeat recommended at age 58. Done by     Elevated blood protein     History of elevated protein. Apparently has seen  for extensive workup including bone marrow biopsy    History of shingles 2006    Hyperlipidemia     Hypertension     Microalbuminuria     due 2 diabetes    Mild vitamin D deficiency     . A low vitamin D    Thyroid disease     hypothyroidism     Sedation Problems: NO  Family History   Problem Relation Age of Onset    Cancer Mother         lung ca heavy smoker    Lung cancer Mother     Cancer Father     Lung cancer Father     Hypertension Sister     No Known Problems Sister     No Known Problems Daughter     Hypothyroidism Sister     Diabetes Maternal Aunt     Cancer Maternal Aunt     No Known Problems Maternal Grandmother     Breast cancer Paternal Aunt     No Known Problems Paternal Uncle     Lung cancer Maternal Grandfather     No Known Problems Paternal Grandmother     No Known Problems Paternal Grandfather     Amblyopia Neg Hx     Blindness Neg Hx     Cataracts Neg Hx     Glaucoma Neg Hx     Macular degeneration Neg Hx     Retinal detachment Neg Hx     Strabismus Neg Hx     Stroke Neg Hx     Thyroid disease Neg Hx     Ovarian cancer Neg Hx     Colon cancer Neg Hx      Fam Hx of Sedation Problems: NO  Social History     Socioeconomic History    Marital status: Single     Spouse name: Not on file    Number of children: Not on file    Years of education: Not  on file    Highest education level: Not on file   Occupational History     Employer: [employer]   Social Needs    Financial resource strain: Somewhat hard    Food insecurity     Worry: Never true     Inability: Never true    Transportation needs     Medical: No     Non-medical: No   Tobacco Use    Smoking status: Never Smoker    Smokeless tobacco: Never Used    Tobacco comment: 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  She walks occasionally.   Substance and Sexual Activity    Alcohol use: Not Currently     Frequency: Never     Drinks per session: Patient refused     Binge frequency: Never     Comment: Occasionally    Drug use: No    Sexual activity: Not Currently     Partners: Male   Lifestyle    Physical activity     Days per week: 0 days     Minutes per session: Not on file    Stress: Not at all   Relationships    Social connections     Talks on phone: Not on file     Gets together: Not on file     Attends Hindu service: Not on file     Active member of club or organization: No     Attends meetings of clubs or organizations: Not on file     Relationship status: Not on file   Other Topics Concern    Not on file   Social History Narrative    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 daughter1 granddaughter       Review of Systems - Negative except   Respiratory ROS: no dyspnea  Cardiovascular ROS: no exertional chest pain  Gastrointestinal ROS: NO abdominal discomfort,  NO rectal bleeding  Musculoskeletal ROS: no muscular pain  Neurological ROS: no recent stroke    Physical Exam:  LMP  (LMP Unknown)   Breastfeeding No   General: no distress  Head: normocephalic  Mallampati Score   Neck: supple, symmetrical, trachea midline  Lungs:  clear to auscultation bilaterally and normal respiratory effort  Heart: regular rate and rhythm and no murmur  Abdomen: soft, non-tender non-distented; bowel sounds normal; no masses,  no  organomegaly  Extremities: no cyanosis or edema, or clubbing    ASA:  II    PLAN  COLONOSCOPY.    SedationPlan :MAC    The details of the procedure, the possible need for biopsy or polypectomy and the potential risks including bleeding, perforation, missed polyps were discussed in detail.

## 2020-10-08 NOTE — PROVATION PATIENT INSTRUCTIONS
Discharge Summary/Instructions after an Endoscopic Procedure  Patient Name: Maria Elena Tavares  Patient MRN: [id number]  Patient YOB: 1955 Thursday, October 8, 2020  Shreya Mendoza MD  RESTRICTIONS:  During your procedure today, you received medications for sedation.  These   medications may affect your judgment, balance and coordination.  Therefore,   for 24 hours, you have the following restrictions:   - DO NOT drive a car, operate machinery, make legal/financial decisions,   sign important papers or drink alcohol.    ACTIVITY:  Today: no heavy lifting, straining or running due to procedural   sedation/anesthesia.  The following day: return to full activity including work.  DIET:  Eat and drink normally unless instructed otherwise.     TREATMENT FOR COMMON SIDE EFFECTS:  - Mild abdominal pain, nausea, belching, bloating or excessive gas:  rest,   eat lightly and use a heating pad.  - Sore Throat: treat with throat lozenges and/or gargle with warm salt   water.  - Because air was used during the procedure, expelling large amounts of air   from your rectum or belching is normal.  - If a bowel prep was taken, you may not have a bowel movement for 1-3 days.    This is normal.  SYMPTOMS TO WATCH FOR AND REPORT TO YOUR PHYSICIAN:  1. Abdominal pain or bloating, other than gas cramps.  2. Chest pain.  3. Back pain.  4. Signs of infection such as: chills or fever occurring within 24 hours   after the procedure.  5. Rectal bleeding, which would show as bright red, maroon, or black stools.   (A tablespoon of blood from the rectum is not serious, especially if   hemorrhoids are present.)  6. Vomiting.  7. Weakness or dizziness.  GO DIRECTLY TO THE NEAREST EMERGENCY ROOM IF YOU HAVE ANY OF THE FOLLOWING:      Difficulty breathing              Chills and/or fever over 101 F   Persistent vomiting and/or vomiting blood   Severe abdominal pain   Severe chest pain   Black, tarry stools   Bleeding- more than one  tablespoon   Any other symptom or condition that you feel may need urgent attention  Your doctor recommends these additional instructions:  If any biopsies were taken, your doctors clinic will contact you in 1 to 2   weeks with any results.  - Discharge patient to home.   - Resume previous diet.   - Continue present medications.   - Await pathology results.   - Repeat colonoscopy date to be determined after pending pathology results   are reviewed for surveillance.   - Return to referring physician.   - Written discharge instructions were provided to the patient.   - The signs and symptoms of potential delayed complications were discussed   with the patient.   - Patient has a contact number available for emergencies.   - Return to normal activities tomorrow.  For questions, problems or results please call your physician - Shreya Mendoza MD at Work:  [phone].  [facility], EMERGENCY ROOM PHONE NUMBER: [phone]  IF A COMPLICATION OR EMERGENCY SITUATION ARISES AND YOU ARE UNABLE TO REACH   YOUR PHYSICIAN - GO DIRECTLY TO THE EMERGENCY ROOM.  Shreya Mendoza MD  10/8/2020 7:59:14 AM  This report has been verified and signed electronically.  PR[employer]ATION

## 2020-10-08 NOTE — TRANSFER OF CARE
"Anesthesia Transfer of Care Note    Patient: Maria Elena Tavares    Procedure(s) Performed: Procedure(s) (LRB):  COLONOSCOPY (N/A)    Patient location: [facility]    Anesthesia Type: general    Transport from OR: Transported from OR on room air with adequate spontaneous ventilation    Post pain: adequate analgesia    Post assessment: no apparent anesthetic complications and tolerated procedure well    Post vital signs: stable    Level of consciousness: awake    Nausea/Vomiting: no nausea/vomiting    Complications: none    Transfer of care protocol was followed      Last vitals:   Visit Vitals  BP (!) 86/49 (BP Location: Left arm, Patient Position: Sitting)   Pulse 98   Temp 36.4 °C (97.5 °F) (Temporal)   Resp 16   Ht 5' 5" (1.651 m)   Wt 77.1 kg (170 lb)   LMP  (LMP Unknown)   SpO2 99%   Breastfeeding No   BMI 28.29 kg/m²     "

## 2020-10-12 ENCOUNTER — TELEPHONE (OUTPATIENT)
Dept: PLASTIC SURGERY | Facility: CLINIC | Age: 65
End: 2020-10-12

## 2020-10-12 ENCOUNTER — PATIENT MESSAGE (OUTPATIENT)
Dept: PRIMARY CARE CLINIC | Facility: CLINIC | Age: 65
End: 2020-10-12

## 2020-10-12 DIAGNOSIS — M54.50 ACUTE LOW BACK PAIN, UNSPECIFIED BACK PAIN LATERALITY, UNSPECIFIED WHETHER SCIATICA PRESENT: Primary | ICD-10-CM

## 2020-10-12 NOTE — TELEPHONE ENCOUNTER
I returned Maria Elena's VM in regards to rescheduling her consult with Dr. Lopez. She did not answer, a message was left.

## 2020-10-13 LAB
FINAL PATHOLOGIC DIAGNOSIS: NORMAL
GROSS: NORMAL
Lab: NORMAL

## 2020-10-14 ENCOUNTER — PATIENT MESSAGE (OUTPATIENT)
Dept: PRIMARY CARE CLINIC | Facility: CLINIC | Age: 65
End: 2020-10-14

## 2020-10-14 ENCOUNTER — PATIENT MESSAGE (OUTPATIENT)
Dept: HEMATOLOGY/ONCOLOGY | Facility: CLINIC | Age: 65
End: 2020-10-14

## 2020-10-14 DIAGNOSIS — M53.3 SACRAL PAIN: Primary | ICD-10-CM

## 2020-10-18 ENCOUNTER — PATIENT MESSAGE (OUTPATIENT)
Dept: HEMATOLOGY/ONCOLOGY | Facility: CLINIC | Age: 65
End: 2020-10-18

## 2020-10-19 ENCOUNTER — HOSPITAL ENCOUNTER (OUTPATIENT)
Dept: RADIOLOGY | Facility: HOSPITAL | Age: 65
Discharge: HOME | End: 2020-10-19
Attending: INTERNAL MEDICINE
Payer: COMMERCIAL

## 2020-10-19 DIAGNOSIS — M53.3 SACRAL PAIN: ICD-10-CM

## 2020-10-19 PROCEDURE — 72220 X-RAY EXAM SACRUM TAILBONE: CPT | Mod: TC

## 2020-10-19 PROCEDURE — 72220 X-RAY EXAM SACRUM TAILBONE: CPT | Mod: 26,,, | Performed by: RADIOLOGY

## 2020-10-19 PROCEDURE — 72220 XR SACRUM AND COCCYX: ICD-10-PCS | Mod: 26,,, | Performed by: RADIOLOGY

## 2020-10-23 ENCOUNTER — PATIENT MESSAGE (OUTPATIENT)
Dept: HEMATOLOGY/ONCOLOGY | Facility: CLINIC | Age: 65
End: 2020-10-23

## 2020-10-23 DIAGNOSIS — M53.3 SACRAL PAIN: Primary | ICD-10-CM

## 2020-10-28 ENCOUNTER — PATIENT MESSAGE (OUTPATIENT)
Dept: PRIMARY CARE CLINIC | Facility: CLINIC | Age: 65
End: 2020-10-28

## 2020-10-28 ENCOUNTER — PATIENT OUTREACH (OUTPATIENT)
Dept: ADMINISTRATIVE | Facility: OTHER | Age: 65
End: 2020-10-28

## 2020-10-28 ENCOUNTER — PATIENT MESSAGE (OUTPATIENT)
Dept: HEMATOLOGY/ONCOLOGY | Facility: CLINIC | Age: 65
End: 2020-10-28

## 2020-10-28 DIAGNOSIS — G62.9 NEUROPATHY: Primary | ICD-10-CM

## 2020-10-28 RX ORDER — GABAPENTIN 300 MG/1
300 CAPSULE ORAL 3 TIMES DAILY
Qty: 90 CAPSULE | Refills: 4 | Status: SHIPPED | OUTPATIENT
Start: 2020-10-28 | End: 2021-05-10 | Stop reason: SDUPTHER

## 2020-10-28 NOTE — PROGRESS NOTES
Health Maintenance Due   Topic Date Due    Shingles Vaccine (1 of 2) 11/28/2005    Foot Exam  03/20/2020    Influenza Vaccine (1) 08/01/2020    Lipid Panel  10/09/2020    Mammogram  12/23/2020     Updates were requested from care everywhere.  Chart was reviewed for overdue Proactive [facility] Encounters ([facility]) topics (CRS, Breast Cancer Screening, Eye exam)  Health Maintenance has been updated.  LINKS immunization registry triggered.  Immunizations were reconciled.

## 2020-10-29 ENCOUNTER — INITIAL CONSULT (OUTPATIENT)
Dept: PLASTIC SURGERY | Facility: CLINIC | Age: 65
End: 2020-10-29
Attending: PLASTIC SURGERY
Payer: COMMERCIAL

## 2020-10-29 ENCOUNTER — LAB VISIT (OUTPATIENT)
Dept: LAB | Facility: OTHER | Age: 65
End: 2020-10-29
Attending: FAMILY MEDICINE
Payer: COMMERCIAL

## 2020-10-29 VITALS — SYSTOLIC BLOOD PRESSURE: 111 MMHG | HEART RATE: 81 BPM | DIASTOLIC BLOOD PRESSURE: 75 MMHG

## 2020-10-29 DIAGNOSIS — C50.919 MALIGNANT NEOPLASM OF FEMALE BREAST, UNSPECIFIED ESTROGEN RECEPTOR STATUS, UNSPECIFIED LATERALITY, UNSPECIFIED SITE OF BREAST: Primary | ICD-10-CM

## 2020-10-29 DIAGNOSIS — E11.21 CONTROLLED TYPE 2 DIABETES MELLITUS WITH DIABETIC NEPHROPATHY, WITHOUT LONG-TERM CURRENT USE OF INSULIN: ICD-10-CM

## 2020-10-29 DIAGNOSIS — E78.01 FAMILIAL HYPERCHOLESTEROLEMIA: ICD-10-CM

## 2020-10-29 DIAGNOSIS — E03.9 ACQUIRED HYPOTHYROIDISM: ICD-10-CM

## 2020-10-29 LAB
ALBUMIN SERPL BCP-MCNC: 3.9 G/DL (ref 3.5–5.2)
ALP SERPL-CCNC: 102 U/L (ref 55–135)
ALT SERPL W/O P-5'-P-CCNC: 10 U/L (ref 10–44)
ANION GAP SERPL CALC-SCNC: 11 MMOL/L (ref 8–16)
AST SERPL-CCNC: 19 U/L (ref 10–40)
BILIRUB SERPL-MCNC: 0.3 MG/DL (ref 0.1–1)
BUN SERPL-MCNC: 19 MG/DL (ref 8–23)
CALCIUM SERPL-MCNC: 10.3 MG/DL (ref 8.7–10.5)
CHLORIDE SERPL-SCNC: 104 MMOL/L (ref 95–110)
CO2 SERPL-SCNC: 24 MMOL/L (ref 23–29)
CREAT SERPL-MCNC: 0.9 MG/DL (ref 0.5–1.4)
EST. GFR  (AFRICAN AMERICAN): >60 ML/MIN/1.73 M^2
EST. GFR  (NON AFRICAN AMERICAN): >60 ML/MIN/1.73 M^2
ESTIMATED AVG GLUCOSE: 128 MG/DL (ref 68–131)
GLUCOSE SERPL-MCNC: 93 MG/DL (ref 70–110)
HBA1C MFR BLD HPLC: 6.1 % (ref 4–5.6)
POTASSIUM SERPL-SCNC: 3.7 MMOL/L (ref 3.5–5.1)
PROT SERPL-MCNC: 7.9 G/DL (ref 6–8.4)
SODIUM SERPL-SCNC: 139 MMOL/L (ref 136–145)
TSH SERPL DL<=0.005 MIU/L-ACNC: 2.1 UIU/ML (ref 0.4–4)

## 2020-10-29 PROCEDURE — 36415 COLL VENOUS BLD VENIPUNCTURE: CPT

## 2020-10-29 PROCEDURE — 83036 HEMOGLOBIN GLYCOSYLATED A1C: CPT

## 2020-10-29 PROCEDURE — 80053 COMPREHEN METABOLIC PANEL: CPT

## 2020-10-29 PROCEDURE — 84443 ASSAY THYROID STIM HORMONE: CPT

## 2020-10-29 NOTE — PROGRESS NOTES
Patient presents today for evaluation.  She is status post bilateral mastectomy by Dr. Lopez with placement tissue expander by Dr. Solorzano.  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  It was decided to remove both tissue expanders at that.    Today she is here to discuss reconstruction.    On exam:  Extensive radiation damage to the left chest    Well-healed incision from a stacked me bilaterally    Her abdomen is obese with minimal to moderate amount of tissue infra umbilical.    The majority of her weight is in her abdomen and is from intra-abdominal fat.    Assessment:  History of breast cancer status post bilateral mastectomy    Because of the radiation she requires an element of autogenous tissue.    Because of her body habitus and her increased BMI she is not a good candidate at this point.    We will refer her to weight loss and follow up once she lose significant weight to bring her BMI at the level of 25

## 2020-10-30 DIAGNOSIS — Z85.3 HISTORY OF BREAST CANCER: Primary | ICD-10-CM

## 2020-11-02 ENCOUNTER — OFFICE VISIT (OUTPATIENT)
Dept: PRIMARY CARE CLINIC | Facility: CLINIC | Age: 65
End: 2020-11-02
Attending: FAMILY MEDICINE
Payer: COMMERCIAL

## 2020-11-02 DIAGNOSIS — I10 ESSENTIAL HYPERTENSION: ICD-10-CM

## 2020-11-02 DIAGNOSIS — Z90.10 ACUTE PAIN AFTER MASTECTOMY: Primary | ICD-10-CM

## 2020-11-02 DIAGNOSIS — G89.18 ACUTE PAIN AFTER MASTECTOMY: Primary | ICD-10-CM

## 2020-11-02 DIAGNOSIS — G62.0 NEUROPATHY DUE TO CHEMOTHERAPEUTIC DRUG: ICD-10-CM

## 2020-11-02 DIAGNOSIS — E11.21 CONTROLLED TYPE 2 DIABETES MELLITUS WITH DIABETIC NEPHROPATHY, WITHOUT LONG-TERM CURRENT USE OF INSULIN: ICD-10-CM

## 2020-11-02 DIAGNOSIS — T45.1X5A NEUROPATHY DUE TO CHEMOTHERAPEUTIC DRUG: ICD-10-CM

## 2020-11-02 PROBLEM — G62.9 NEUROPATHY: Status: RESOLVED | Noted: 2020-03-06 | Resolved: 2020-11-02

## 2020-11-02 PROCEDURE — 99213 PR OFFICE/OUTPT VISIT, EST, LEVL III, 20-29 MIN: ICD-10-PCS | Mod: 95,,, | Performed by: FAMILY MEDICINE

## 2020-11-02 PROCEDURE — 99213 OFFICE O/P EST LOW 20 MIN: CPT | Mod: 95,,, | Performed by: FAMILY MEDICINE

## 2020-11-02 PROCEDURE — 3044F HG A1C LEVEL LT 7.0%: CPT | Mod: CPTII,,, | Performed by: FAMILY MEDICINE

## 2020-11-02 PROCEDURE — 3044F PR MOST RECENT HEMOGLOBIN A1C LEVEL <7.0%: ICD-10-PCS | Mod: CPTII,,, | Performed by: FAMILY MEDICINE

## 2020-11-03 ENCOUNTER — PATIENT MESSAGE (OUTPATIENT)
Dept: HEMATOLOGY/ONCOLOGY | Facility: CLINIC | Age: 65
End: 2020-11-03

## 2020-11-03 ENCOUNTER — PATIENT MESSAGE (OUTPATIENT)
Dept: PRIMARY CARE CLINIC | Facility: CLINIC | Age: 65
End: 2020-11-03

## 2020-11-03 RX ORDER — DULAGLUTIDE 1.5 MG/.5ML
1.5 INJECTION, SOLUTION SUBCUTANEOUS
Qty: 2 ML | Refills: 0 | Status: SHIPPED | OUTPATIENT
Start: 2020-11-03 | End: 2020-11-30 | Stop reason: SDUPTHER

## 2020-11-03 NOTE — PROGRESS NOTES
Established Patient - Audio Only Telehealth Visit     The patient location is: HOME  The chief complaint leading to consultation is: pain  Visit type: Virtual visit with audio only (telephone)  Total time spent with patient: 15 mins       The reason for the audio only service rather than synchronous audio and video virtual visit was related to technical difficulties or patient preference/necessity.     Each patient to whom I provide medical services by telemedicine is:  (1) informed of the relationship between the physician and patient and the respective role of any other health care provider with respect to management of the patient; and (2) notified that they may decline to receive medical services by telemedicine and may withdraw from such care at any time. Patient verbally consented to receive this service via voice-only telephone call.       HPI: pt called today for f/u to pain due to chemo/cancer. Pt states that gabapentin at 300 mg is helping much better than at 100 mg. Does hv more pain at night. Is having hard time working past 4 hrs per day. Wants to go to work only part time for few months until done with all treatments.        Assessment and plan:  Acute pain: ok to increase gabapentin at night to 600 mg prn qhs. SE's of med d/w pt  Pt states that Dr Villagran will fill out her FMLA since she only seems to be able to handle work part time right now    rtc prn symptoms or in 3 mos  Labs reviewed with pt and a1c stable                        This service was not originating from a related E/M service provided within the previous 7 days nor will  to an E/M service or procedure within the next 24 hours or my soonest available appointment.  Prevailing standard of care was able to be met in this audio-only visit.

## 2020-11-06 ENCOUNTER — PATIENT MESSAGE (OUTPATIENT)
Dept: HEMATOLOGY/ONCOLOGY | Facility: CLINIC | Age: 65
End: 2020-11-06

## 2020-11-09 ENCOUNTER — HOSPITAL ENCOUNTER (OUTPATIENT)
Dept: RADIOLOGY | Facility: OTHER | Age: 65
Discharge: HOME | End: 2020-11-09
Attending: INTERNAL MEDICINE
Payer: COMMERCIAL

## 2020-11-09 DIAGNOSIS — M53.3 SACRAL PAIN: ICD-10-CM

## 2020-11-09 PROCEDURE — 72195 MRI PELVIS W/O DYE: CPT | Mod: TC

## 2020-11-09 PROCEDURE — 72195 MRI PELVIS W/O DYE: CPT | Mod: 26,,, | Performed by: RADIOLOGY

## 2020-11-09 PROCEDURE — 72195 MRI SACRUM/COCCYX (BONY) W/O CONTRAST: ICD-10-PCS | Mod: 26,,, | Performed by: RADIOLOGY

## 2020-11-10 ENCOUNTER — PATIENT MESSAGE (OUTPATIENT)
Dept: OBSTETRICS AND GYNECOLOGY | Facility: CLINIC | Age: 65
End: 2020-11-10

## 2020-11-10 DIAGNOSIS — C77.3 BREAST CANCER METASTASIZED TO AXILLARY LYMPH NODE, LEFT: Primary | ICD-10-CM

## 2020-11-10 DIAGNOSIS — C50.912 BREAST CANCER METASTASIZED TO AXILLARY LYMPH NODE, LEFT: Primary | ICD-10-CM

## 2020-11-11 ENCOUNTER — TELEPHONE (OUTPATIENT)
Dept: HEMATOLOGY/ONCOLOGY | Facility: CLINIC | Age: 65
End: 2020-11-11

## 2020-11-11 ENCOUNTER — PATIENT MESSAGE (OUTPATIENT)
Dept: HEMATOLOGY/ONCOLOGY | Facility: CLINIC | Age: 65
End: 2020-11-11

## 2020-11-11 NOTE — TELEPHONE ENCOUNTER
----- Message from Theodore Goncalves sent at 11/11/2020  9:10 AM CST -----  Contact: Patient  Reschedule existing appt      Appt date rescheduling:: 11/18    Visit type :: Bone scan    Treating Provider:: Sparkle    Do you feel you need to be seen today:: No    Communication preference:: [phone]    Addition info::

## 2020-11-11 NOTE — TELEPHONE ENCOUNTER
Message fwd to .     ----- Message from Tere Villagran MD sent at 11/10/2020  4:31 PM CST -----  Called with results  Bone scan ordered  States finally feels better

## 2020-11-12 ENCOUNTER — PATIENT MESSAGE (OUTPATIENT)
Dept: HEMATOLOGY/ONCOLOGY | Facility: CLINIC | Age: 65
End: 2020-11-12

## 2020-11-13 ENCOUNTER — HOSPITAL ENCOUNTER (OUTPATIENT)
Dept: RADIOLOGY | Facility: HOSPITAL | Age: 65
Discharge: HOME | End: 2020-11-13
Attending: INTERNAL MEDICINE
Payer: COMMERCIAL

## 2020-11-13 DIAGNOSIS — C77.3 BREAST CANCER METASTASIZED TO AXILLARY LYMPH NODE, LEFT: ICD-10-CM

## 2020-11-13 DIAGNOSIS — C50.912 BREAST CANCER METASTASIZED TO AXILLARY LYMPH NODE, LEFT: ICD-10-CM

## 2020-11-13 PROCEDURE — 78306 BONE IMAGING WHOLE BODY: CPT | Mod: 26,,, | Performed by: RADIOLOGY

## 2020-11-13 PROCEDURE — 78306 NM BONE SCAN WHOLE BODY: ICD-10-PCS | Mod: 26,,, | Performed by: RADIOLOGY

## 2020-11-13 PROCEDURE — A9503 TC99M MEDRONATE: HCPCS

## 2020-11-15 ENCOUNTER — PATIENT MESSAGE (OUTPATIENT)
Dept: PLASTIC SURGERY | Facility: CLINIC | Age: 65
End: 2020-11-15

## 2020-11-16 ENCOUNTER — PATIENT MESSAGE (OUTPATIENT)
Dept: PLASTIC SURGERY | Facility: CLINIC | Age: 65
End: 2020-11-16

## 2020-11-17 ENCOUNTER — OFFICE VISIT (OUTPATIENT)
Dept: PLASTIC SURGERY | Facility: CLINIC | Age: 65
End: 2020-11-17
Payer: COMMERCIAL

## 2020-11-17 VITALS
HEIGHT: 65 IN | SYSTOLIC BLOOD PRESSURE: 168 MMHG | HEART RATE: 87 BPM | BODY MASS INDEX: 28.32 KG/M2 | DIASTOLIC BLOOD PRESSURE: 93 MMHG | WEIGHT: 170 LBS

## 2020-11-17 DIAGNOSIS — Z85.3 HISTORY OF BREAST CANCER: Primary | ICD-10-CM

## 2020-11-17 DIAGNOSIS — Z09 SURGERY FOLLOW-UP EXAMINATION: ICD-10-CM

## 2020-11-17 DIAGNOSIS — Z90.13 S/P MASTECTOMY, BILATERAL: ICD-10-CM

## 2020-11-17 PROCEDURE — 3008F PR BODY MASS INDEX (BMI) DOCUMENTED: ICD-10-PCS | Mod: CPTII,S$GLB,, | Performed by: PHYSICIAN ASSISTANT

## 2020-11-17 PROCEDURE — 3077F SYST BP >= 140 MM HG: CPT | Mod: CPTII,S$GLB,, | Performed by: PHYSICIAN ASSISTANT

## 2020-11-17 PROCEDURE — 1126F PR PAIN SEVERITY QUANTIFIED, NO PAIN PRESENT: ICD-10-PCS | Mod: S$GLB,,, | Performed by: PHYSICIAN ASSISTANT

## 2020-11-17 PROCEDURE — 3077F PR MOST RECENT SYSTOLIC BLOOD PRESSURE >= 140 MM HG: ICD-10-PCS | Mod: CPTII,S$GLB,, | Performed by: PHYSICIAN ASSISTANT

## 2020-11-17 PROCEDURE — 3080F DIAST BP >= 90 MM HG: CPT | Mod: CPTII,S$GLB,, | Performed by: PHYSICIAN ASSISTANT

## 2020-11-17 PROCEDURE — 99999 PR PBB SHADOW E&M-EST. PATIENT-LVL III: ICD-10-PCS | Mod: PBBFAC,,, | Performed by: PHYSICIAN ASSISTANT

## 2020-11-17 PROCEDURE — 3072F LOW RISK FOR RETINOPATHY: CPT | Mod: S$GLB,,, | Performed by: PHYSICIAN ASSISTANT

## 2020-11-17 PROCEDURE — 3080F PR MOST RECENT DIASTOLIC BLOOD PRESSURE >= 90 MM HG: ICD-10-PCS | Mod: CPTII,S$GLB,, | Performed by: PHYSICIAN ASSISTANT

## 2020-11-17 PROCEDURE — 3008F BODY MASS INDEX DOCD: CPT | Mod: CPTII,S$GLB,, | Performed by: PHYSICIAN ASSISTANT

## 2020-11-17 PROCEDURE — 99999 PR PBB SHADOW E&M-EST. PATIENT-LVL III: CPT | Mod: PBBFAC,,, | Performed by: PHYSICIAN ASSISTANT

## 2020-11-17 PROCEDURE — 1126F AMNT PAIN NOTED NONE PRSNT: CPT | Mod: S$GLB,,, | Performed by: PHYSICIAN ASSISTANT

## 2020-11-17 PROCEDURE — 99212 PR OFFICE/OUTPT VISIT, EST, LEVL II, 10-19 MIN: ICD-10-PCS | Mod: S$GLB,,, | Performed by: PHYSICIAN ASSISTANT

## 2020-11-17 PROCEDURE — 99212 OFFICE O/P EST SF 10 MIN: CPT | Mod: S$GLB,,, | Performed by: PHYSICIAN ASSISTANT

## 2020-11-17 PROCEDURE — 3072F PR LOW RISK FOR RETINOPATHY: ICD-10-PCS | Mod: S$GLB,,, | Performed by: PHYSICIAN ASSISTANT

## 2020-11-18 DIAGNOSIS — M17.12 PRIMARY OSTEOARTHRITIS OF LEFT KNEE: Primary | ICD-10-CM

## 2020-11-19 NOTE — PROGRESS NOTES
Subjective:      Maria Elena Tavares is a 64 y.o. year old female who presents to the [facility] on 11/19/2020 for follow up visit status post removal of B tissue expanders on 07/30/2020. She is doing well today but concerned about redness of L breast which she noticed 2 days ago. She feels as though the redness may be secondary to her bra rubbing on her skina s she states she is constantly pulling down on her bra and it rubs on the area of redness. Non systemic signs of infection. Denies fever, chills, nausea, vomiting, or other systemic signs of infection.    Vitals:    11/17/20 1107   BP: (!) 168/93   Pulse: 87        Review of patient's allergies indicates:  No Known Allergies    Current Outpatient Medications on File Prior to Visit   Medication Sig Dispense Refill    aspirin (ECOTRIN) 81 MG EC tablet Take 81 mg by mouth once daily.        atorvastatin (LIPITOR) 10 MG tablet Take 1 tablet (10 mg total) by mouth once daily. 90 tablet 3    blood-glucose meter kit DISPENSE : BLOOD TEST STRIPS AND LANCETS PATIENT TEST BLOOD SUGARS TWICE DAILY  TEST STRIPS: 50 EACH, REFILL 5  LANCETS:  50 EACH , REFILL 5 1 each 0    chlorthalidone (HYGROTEN) 25 MG Tab Take 1 tablet by mouth every morning 90 tablet 1    dulaglutide (TRULICITY) 1.5 mg/0.5 mL pen injector Inject 1 pen (1.5 mg) into the skin every 7 days. 2 mL 0    ergocalciferol (VITAMIN D2) 50,000 unit Cap Take 1 capsule (50,000 Units total) by mouth every 7 days. 12 capsule 2    gabapentin (NEURONTIN) 300 MG capsule Take 1 capsule (300 mg total) by mouth 3 (three) times daily. 90 capsule 4    HYDROcodone-acetaminophen (NORCO) 5-325 mg per tablet Take 1 tablet by mouth every 8 (eight) hours as needed for Pain. 30 tablet 0    irbesartan (AVAPRO) 300 MG tablet Take 1 tablet by mouth once daily 90 tablet 1    letrozole (FEMARA) 2.5 mg Tab Take 1 tablet (2.5 mg total) by mouth once daily. 30 tablet 3    levothyroxine (SYNTHROID) 200 MCG tablet Take 1  tablet (200 mcg total) by mouth once daily. 90 tablet 1    lifitegrast (XIIDRA) 5 % Dpet Apply 1 drop to  both eyes  2 (two) times daily. 180 each 4    metFORMIN (GLUCOPHAGE) 1000 MG tablet TAKE 1 TABLET (1,000 MG TOTAL) BY MOUTH 2 (TWO) TIMES DAILY WITH MEALS. 180 tablet 1    ondansetron (ZOFRAN-ODT) 4 MG TbDL Dissolve 1 tablet (4 mg total) by mouth every 6 (six) hours as needed. 60 tablet 2    polyethylene glycol (GLYCOLAX) 17 gram/dose powder Mix 17 g (1 capful) with juice or water an drink 2 (two) times daily. 1020 g 2     No current facility-administered medications on file prior to visit.        Patient Active Problem List   Diagnosis    Controlled type 2 diabetes mellitus with diabetic nephropathy, without long-term current use of insulin    Essential hypertension    History of shingles    Mild vitamin D deficiency    Hypothyroid    Diverticulitis of colon (without mention of hemorrhage)(562.11)    Breast cancer metastasized to axillary lymph node, left    Thrush    Anemia in neoplastic disease    Chemotherapy induced neutropenia    Neuropathy due to chemotherapeutic drug    Right arm pain    Non-intractable vomiting with nausea    Orthostatic hypotension    Breast cancer    At risk for lymphedema    Decreased shoulder mobility, unspecified laterality    Enteritis    Hypokalemia    Complication of breast implant    Infection of left breast    Anemia, unspecified    Screening for malignant neoplasm of colon       Past Surgical History:   Procedure Laterality Date    BREAST BIOPSY Left 2019    IDC with mets to node    BREAST BIOPSY Right 2019    core bx, benign node    BREAST BIOPSY Left 10/14/2019    MRI Core bx, + IDC    BREAST BIOPSY Left 10/08/2019    Core bx, ADH     SECTION      COLONOSCOPY N/A 10/8/2020    Procedure: COLONOSCOPY;  Surgeon: Shreya Mendoza MD;  Location: [address]);  Service: Endoscopy;  Laterality: N/A;  COVID screening on  10/5/20 [facility]    HYSTERECTOMY      INSERTION OF BREAST TISSUE EXPANDER Bilateral 3/24/2020    Procedure: INSERTION, TISSUE EXPANDER, BREAST BILATERAL;  Surgeon: Michael Solorzano MD;  [facility];  Service: Plastics;  Laterality: Bilateral;    INSERTION OF TUNNELED CENTRAL VENOUS CATHETER (CVC) WITH SUBCUTANEOUS PORT Right 10/4/2019    Procedure: [id number]-CATH RIGHT (CONSENT AM OF) 1.0 hr case;  Surgeon: Elena Lopez MD;  [facility];  Service: General;  Laterality: Right;    OOPHORECTOMY      SENTINEL LYMPH NODE BIOPSY Left 3/24/2020    Procedure: BIOPSY, LYMPH NODE, SENTINEL LEFT;  Surgeon: Elena Lopez MD;  [facility];  Service: General;  Laterality: Left;    SIMPLE MASTECTOMY Bilateral 3/24/2020    Procedure: MASTECTOMY, SIMPLE BILATERAL;  Surgeon: Elena Lopez MD;  [facility];  Service: General;  Laterality: Bilateral;    TISSUE EXPANDER REMOVAL Bilateral 7/30/2020    Procedure: REMOVAL, TISSUE EXPANDER;  Surgeon: Michael Solorzano MD;  [facility];  Service: Plastics;  Laterality: Bilateral;       Social History     Socioeconomic History    Marital status: Single     Spouse name: Not on file    Number of children: Not on file    Years of education: Not on file    Highest education level: Not on file   Occupational History     Employer: [employer] ([employer])   Social Needs    Financial resource strain: Somewhat hard    Food insecurity     Worry: Never true     Inability: Never true    Transportation needs     Medical: No     Non-medical: No   Tobacco Use    Smoking status: Never Smoker    Smokeless tobacco: Never Used    Tobacco comment: 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  She walks occasionally.   Substance and Sexual Activity    Alcohol use: Not Currently     Frequency: Never     Drinks per session: Patient refused     Binge frequency:  Never     Comment: Occasionally    Drug use: No    Sexual activity: Not Currently     Partners: Male   Lifestyle    Physical activity     Days per week: 0 days     Minutes per session: Not on file    Stress: Not at all   Relationships    Social connections     Talks on phone: Not on file     Gets together: Not on file     Attends Confucianism service: Not on file     Active member of club or organization: No     Attends meetings of clubs or organizations: Not on file     Relationship status: Not on file   Other Topics Concern    Not on file   Social History Narrative    Works 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 at [email or web address] [email or web address] granddaughter     Date of surgery 07/30/2020  Preoperative diagnosis infected left tissue expander  Postoperative diagnosis the same  Procedure performed removal of bilateral tissue expanders  Capsulectomy left breast  Surgeon Jeanine  Complications none  Blood loss minimal  Drains x2  Anesthesia general         Review of Systems: negative    Objective:     Physical Exam:    WD WN NAD  Normal resp effort  R breast - incision CDI, surgically absent nipples, no erythema or drainage, no sign of fluid accumulation  L breast - incision CDI, surgically absent nipples, mild erythema of left upper outer quadrant - no warmth, NTTP, no sign of fluid accumulation, + skin changes from radiation      Assessment:       1. History of breast cancer    2. Surgery follow-up examination    3. S/P mastectomy, bilateral        Plan:   64 y.o. female status post removal of B breast tissue expanders  - Will send for L breast U/S to rule out fluid collection but likely secondary to irritation from her bra.   - Post mastectomy bra and prosthesis prescription provided, will report to [facility] for fitting.   - Return to clinic once U/S completed to discuss results    All questions were answered. The patient was advised to call the clinic with any questions or concerns prior to their next visit.            Lorraine Higuera PA-C  Plastic and Reconstruction Surgery Department  [phone] office

## 2020-11-20 ENCOUNTER — HOSPITAL ENCOUNTER (OUTPATIENT)
Dept: RADIOLOGY | Facility: HOSPITAL | Age: 65
Discharge: HOME | End: 2020-11-20
Attending: PHYSICIAN ASSISTANT
Payer: COMMERCIAL

## 2020-11-20 ENCOUNTER — OFFICE VISIT (OUTPATIENT)
Dept: ORTHOPEDICS | Facility: CLINIC | Age: 65
End: 2020-11-20
Payer: COMMERCIAL

## 2020-11-20 VITALS — WEIGHT: 178.63 LBS | HEIGHT: 60 IN | BODY MASS INDEX: 35.07 KG/M2

## 2020-11-20 DIAGNOSIS — M17.12 PRIMARY OSTEOARTHRITIS OF LEFT KNEE: ICD-10-CM

## 2020-11-20 DIAGNOSIS — M17.12 PRIMARY OSTEOARTHRITIS OF LEFT KNEE: Primary | ICD-10-CM

## 2020-11-20 PROCEDURE — 3008F PR BODY MASS INDEX (BMI) DOCUMENTED: ICD-10-PCS | Mod: CPTII,S$GLB,, | Performed by: PHYSICIAN ASSISTANT

## 2020-11-20 PROCEDURE — 3072F LOW RISK FOR RETINOPATHY: CPT | Mod: S$GLB,,, | Performed by: PHYSICIAN ASSISTANT

## 2020-11-20 PROCEDURE — 3072F PR LOW RISK FOR RETINOPATHY: ICD-10-PCS | Mod: S$GLB,,, | Performed by: PHYSICIAN ASSISTANT

## 2020-11-20 PROCEDURE — 99213 OFFICE O/P EST LOW 20 MIN: CPT | Mod: 25,S$GLB,, | Performed by: PHYSICIAN ASSISTANT

## 2020-11-20 PROCEDURE — 3008F BODY MASS INDEX DOCD: CPT | Mod: CPTII,S$GLB,, | Performed by: PHYSICIAN ASSISTANT

## 2020-11-20 PROCEDURE — 73564 XR KNEE ORTHO BILAT WITH FLEXION: ICD-10-PCS | Mod: 26,50,, | Performed by: RADIOLOGY

## 2020-11-20 PROCEDURE — 20610 DRAIN/INJ JOINT/BURSA W/O US: CPT | Mod: LT,S$GLB,, | Performed by: PHYSICIAN ASSISTANT

## 2020-11-20 PROCEDURE — 73564 X-RAY EXAM KNEE 4 OR MORE: CPT | Mod: 26,50,, | Performed by: RADIOLOGY

## 2020-11-20 PROCEDURE — 99213 PR OFFICE/OUTPT VISIT, EST, LEVL III, 20-29 MIN: ICD-10-PCS | Mod: 25,S$GLB,, | Performed by: PHYSICIAN ASSISTANT

## 2020-11-20 PROCEDURE — 73564 X-RAY EXAM KNEE 4 OR MORE: CPT | Mod: TC,50

## 2020-11-20 PROCEDURE — 1125F PR PAIN SEVERITY QUANTIFIED, PAIN PRESENT: ICD-10-PCS | Mod: S$GLB,,, | Performed by: PHYSICIAN ASSISTANT

## 2020-11-20 PROCEDURE — 99999 PR PBB SHADOW E&M-EST. PATIENT-LVL III: ICD-10-PCS | Mod: PBBFAC,,, | Performed by: PHYSICIAN ASSISTANT

## 2020-11-20 PROCEDURE — 99999 PR PBB SHADOW E&M-EST. PATIENT-LVL III: CPT | Mod: PBBFAC,,, | Performed by: PHYSICIAN ASSISTANT

## 2020-11-20 PROCEDURE — 20610 PR DRAIN/INJECT LARGE JOINT/BURSA: ICD-10-PCS | Mod: LT,S$GLB,, | Performed by: PHYSICIAN ASSISTANT

## 2020-11-20 PROCEDURE — 1125F AMNT PAIN NOTED PAIN PRSNT: CPT | Mod: S$GLB,,, | Performed by: PHYSICIAN ASSISTANT

## 2020-11-20 RX ORDER — TRIAMCINOLONE ACETONIDE 40 MG/ML
40 INJECTION, SUSPENSION INTRA-ARTICULAR; INTRAMUSCULAR
Status: COMPLETED | OUTPATIENT
Start: 2020-11-20 | End: 2020-11-20

## 2020-11-20 RX ADMIN — TRIAMCINOLONE ACETONIDE 40 MG: 40 INJECTION, SUSPENSION INTRA-ARTICULAR; INTRAMUSCULAR at 04:11

## 2020-11-20 NOTE — PROGRESS NOTES
SUBJECTIVE:     Chief Complaint : left knee pain    History of Present Illness:  Maria Elena Tavares is a 64 y.o. 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 She has been treated with injections. Last injection was 9/3/2019. She has had multiple surgeries, chemo, radiation for breast cancer over the past year. Now that she is feeling better her knee is starting to bother her again.  No trauma to left knee. Pain is medial. Pain is moderate. She has stiffness after long periods of sitting. No previous injury to knee. She denies mechanical symptoms or instability. She is diabetic, last a1c 6.1.    Past Medical History:   Diagnosis Date    BMI 37.0-37.9, adult     Breast cancer 09/05/2019     Left breast, IDC with lymph node metastisis    Colon polyps 2015    Colon polyps 2015    Diabetes mellitus type II     Diverticulosis     history of diverticulosisseen on colonoscopy at age 48. Repeat recommended at age 58. Done by     Elevated blood protein     History of elevated protein. Apparently has seen  for extensive workup including bone marrow biopsy    History of shingles 2006    Hyperlipidemia     Hypertension     Microalbuminuria     due 2 diabetes    Mild vitamin D deficiency     . A low vitamin D    Thyroid disease     hypothyroidism     Review of Systems:  Constitutional: no fever or chills  ENT: no nasal congestion or sore throat  Respiratory: no cough or shortness of breath  Cardiovascular: no chest pain or palpitations  Gastrointestinal: no nausea or vomiting, tolerating diet  Genitourinary: no hematuria or dysuria  Integument/Breast: no rash or pruritis  Hematologic/Lymphatic: no easy bruising or lymphadenopathy  Musculoskeletal: see HPI  Neurological: no seizures or tremors  Behavioral/Psych: no auditory or visual hallucinations    OBJECTIVE:     PHYSICAL EXAM:  Height 5' (1.524 m), weight 81 kg (178 lb 9.6 oz).   General  Appearance: WDWN, NAD  Gait: Normal  Neuro/Psych: Mood & affect appropriate  Lungs: Respirations equal and unlabored.   CV: 2+ bilateral upper and lower extremity pulses.   Skin: Intact throughout LE  Extremities: No LE edema    Right Knee Exam  Range of Motion:0-130 active   Effusion:none  Condition of skin:intact  Location of tenderness:Medial joint line   Strength:5 of 5 quadriceps strength and 5 of 5 hamstring strength  Stability:stable to testing  Susana: negative    Left Knee Exam  Range of Motion:0-130 active   Effusion: no   Condition of skin:intact  Location of tenderness:Medial joint line   Strength:5 of 5 quadriceps strength and 5 of 5 hamstring strength  Stability:stable to testing  Susana: pain medially with testing    Alignment: Mild varus    Right Hip Examination: no pain with PROM     Left Hip Examination: no pain with PROM    RADIOGRAPHS: AP, lateral and merchant knee x-rays obtained and reviewed today by me reveal advanced degenerative changes medial compartment left knee, moderate changes medially in right knee     ASSESSMENT/PLAN:   Primary osteoarthritis left knee  - CSI today. Will monitor glucose.  - Continue weight loss efforts.   - Pt would like to try Euflexxa when pain returns.   - F/u prn.    Knee Injection Procedure Note  Diagnosis: left knee degenerative arthritis  Indications: left knee pain  Procedure Details: Verbal consent was obtained for the procedure. The injection site was identified and the skin was prepared with alcohol. The left knee was injected from an anterolateral approach with 1 ml of Kenalog and 2 ml Lidocaine under sterile technique using a 22 gauge needle. The needle was removed and the area cleansed and dressed.  Complications:  Patient tolerated the procedure well.    she was advised to rest the knee today, using ice and elevation as needed for comfort and swelling.Immediate relief of the knee pain may be short lived and secondary to the lidocaine. she may have  an increase in discomfort tonight followed by steady improvement over the next several days. It may take 1-2 weeks following the injection to get the full benefit of the medication.

## 2020-11-23 ENCOUNTER — PATIENT MESSAGE (OUTPATIENT)
Dept: PLASTIC SURGERY | Facility: CLINIC | Age: 65
End: 2020-11-23

## 2020-11-23 DIAGNOSIS — Z90.10 STATUS POST MASTECTOMY, UNSPECIFIED LATERALITY: Primary | ICD-10-CM

## 2020-11-27 ENCOUNTER — HOSPITAL ENCOUNTER (OUTPATIENT)
Dept: RADIOLOGY | Facility: HOSPITAL | Age: 65
Discharge: HOME | End: 2020-11-27
Attending: PHYSICIAN ASSISTANT
Payer: COMMERCIAL

## 2020-11-27 DIAGNOSIS — I10 HYPERTENSION, UNSPECIFIED TYPE: ICD-10-CM

## 2020-11-27 DIAGNOSIS — Z90.10 STATUS POST MASTECTOMY, UNSPECIFIED LATERALITY: ICD-10-CM

## 2020-11-27 PROCEDURE — 76642 ULTRASOUND BREAST LIMITED: CPT | Mod: 26,LT,, | Performed by: RADIOLOGY

## 2020-11-27 PROCEDURE — 76642 ULTRASOUND BREAST LIMITED: CPT | Mod: TC,LT

## 2020-11-27 PROCEDURE — 76642 US BREAST LEFT LIMITED: ICD-10-PCS | Mod: 26,LT,, | Performed by: RADIOLOGY

## 2020-11-27 RX ORDER — CHLORTHALIDONE 25 MG/1
TABLET ORAL
Qty: 90 TABLET | Refills: 0 | Status: SHIPPED | OUTPATIENT
Start: 2020-11-27 | End: 2021-08-12 | Stop reason: SDUPTHER

## 2020-11-30 ENCOUNTER — PATIENT MESSAGE (OUTPATIENT)
Dept: PRIMARY CARE CLINIC | Facility: CLINIC | Age: 65
End: 2020-11-30

## 2020-11-30 DIAGNOSIS — E11.21 CONTROLLED TYPE 2 DIABETES MELLITUS WITH DIABETIC NEPHROPATHY, WITHOUT LONG-TERM CURRENT USE OF INSULIN: Primary | ICD-10-CM

## 2020-11-30 DIAGNOSIS — I10 HYPERTENSION, UNSPECIFIED TYPE: ICD-10-CM

## 2020-11-30 RX ORDER — IRBESARTAN 300 MG/1
TABLET ORAL
Qty: 90 TABLET | Refills: 1 | Status: SHIPPED | OUTPATIENT
Start: 2020-11-30 | End: 2021-06-15 | Stop reason: SDUPTHER

## 2020-11-30 RX ORDER — DULAGLUTIDE 1.5 MG/.5ML
1.5 INJECTION, SOLUTION SUBCUTANEOUS
Qty: 2 ML | Refills: 3 | Status: SHIPPED | OUTPATIENT
Start: 2020-11-30 | End: 2021-03-30 | Stop reason: SDUPTHER

## 2020-12-02 ENCOUNTER — PATIENT MESSAGE (OUTPATIENT)
Dept: PRIMARY CARE CLINIC | Facility: CLINIC | Age: 65
End: 2020-12-02

## 2020-12-02 ENCOUNTER — PATIENT MESSAGE (OUTPATIENT)
Dept: HEMATOLOGY/ONCOLOGY | Facility: CLINIC | Age: 65
End: 2020-12-02

## 2020-12-02 ENCOUNTER — TELEPHONE (OUTPATIENT)
Dept: HEMATOLOGY/ONCOLOGY | Facility: CLINIC | Age: 65
End: 2020-12-02

## 2020-12-02 NOTE — TELEPHONE ENCOUNTER
Talk with pt inform her of the reason other Dr may send in scripts for pt's pt stated she do understand Sm

## 2020-12-02 NOTE — TELEPHONE ENCOUNTER
"Returned call to pt.  Pt stated she is having a "time with [employer]."  Pt stated she needs letter sent previously re-faxed to [employer] at [phone].  Informed pt would be happy to refax.  Pt verbalized understanding and thanked nurse.      [10:23 AM] Maria Elena Tavares  Good Kitty Garcia can you please give me a call.  [phone].  thank you    "

## 2020-12-03 ENCOUNTER — INFUSION (OUTPATIENT)
Dept: INFUSION THERAPY | Facility: HOSPITAL | Age: 65
End: 2020-12-03
Attending: INTERNAL MEDICINE
Payer: COMMERCIAL

## 2020-12-03 ENCOUNTER — OFFICE VISIT (OUTPATIENT)
Dept: HEMATOLOGY/ONCOLOGY | Facility: CLINIC | Age: 65
End: 2020-12-03
Payer: COMMERCIAL

## 2020-12-03 ENCOUNTER — PATIENT MESSAGE (OUTPATIENT)
Dept: HEMATOLOGY/ONCOLOGY | Facility: CLINIC | Age: 65
End: 2020-12-03

## 2020-12-03 VITALS
OXYGEN SATURATION: 98 % | RESPIRATION RATE: 16 BRPM | HEART RATE: 71 BPM | WEIGHT: 181.44 LBS | BODY MASS INDEX: 30.23 KG/M2 | HEIGHT: 65 IN | DIASTOLIC BLOOD PRESSURE: 89 MMHG | SYSTOLIC BLOOD PRESSURE: 172 MMHG | TEMPERATURE: 98 F

## 2020-12-03 DIAGNOSIS — D50.8 IRON DEFICIENCY ANEMIA SECONDARY TO INADEQUATE DIETARY IRON INTAKE: ICD-10-CM

## 2020-12-03 DIAGNOSIS — C50.912 BREAST CANCER METASTASIZED TO AXILLARY LYMPH NODE, LEFT: Primary | ICD-10-CM

## 2020-12-03 DIAGNOSIS — Z90.13 H/O BILATERAL MASTECTOMY: ICD-10-CM

## 2020-12-03 DIAGNOSIS — G62.0 NEUROPATHY DUE TO CHEMOTHERAPEUTIC DRUG: ICD-10-CM

## 2020-12-03 DIAGNOSIS — C77.3 BREAST CANCER METASTASIZED TO AXILLARY LYMPH NODE, LEFT: Primary | ICD-10-CM

## 2020-12-03 DIAGNOSIS — E55.9 MILD VITAMIN D DEFICIENCY: ICD-10-CM

## 2020-12-03 DIAGNOSIS — T45.1X5A NEUROPATHY DUE TO CHEMOTHERAPEUTIC DRUG: ICD-10-CM

## 2020-12-03 DIAGNOSIS — E66.9 OBESITY (BMI 30-39.9): ICD-10-CM

## 2020-12-03 DIAGNOSIS — D63.0 ANEMIA IN NEOPLASTIC DISEASE: ICD-10-CM

## 2020-12-03 LAB
ALBUMIN SERPL BCP-MCNC: 3.6 G/DL (ref 3.5–5.2)
ALP SERPL-CCNC: 113 U/L (ref 55–135)
ALT SERPL W/O P-5'-P-CCNC: 10 U/L (ref 10–44)
ANION GAP SERPL CALC-SCNC: 8 MMOL/L (ref 8–16)
AST SERPL-CCNC: 15 U/L (ref 10–40)
BILIRUB SERPL-MCNC: 0.4 MG/DL (ref 0.1–1)
BUN SERPL-MCNC: 18 MG/DL (ref 8–23)
CALCIUM SERPL-MCNC: 9.7 MG/DL (ref 8.7–10.5)
CHLORIDE SERPL-SCNC: 101 MMOL/L (ref 95–110)
CO2 SERPL-SCNC: 30 MMOL/L (ref 23–29)
CREAT SERPL-MCNC: 0.9 MG/DL (ref 0.5–1.4)
ERYTHROCYTE [DISTWIDTH] IN BLOOD BY AUTOMATED COUNT: 15.9 % (ref 11.5–14.5)
EST. GFR  (AFRICAN AMERICAN): >60 ML/MIN/1.73 M^2
EST. GFR  (NON AFRICAN AMERICAN): >60 ML/MIN/1.73 M^2
GLUCOSE SERPL-MCNC: 120 MG/DL (ref 70–110)
HCT VFR BLD AUTO: 36.8 % (ref 37–48.5)
HGB BLD-MCNC: 11.2 G/DL (ref 12–16)
IMM GRANULOCYTES # BLD AUTO: 0.02 K/UL (ref 0–0.04)
MCH RBC QN AUTO: 27.9 PG (ref 27–31)
MCHC RBC AUTO-ENTMCNC: 30.4 G/DL (ref 32–36)
MCV RBC AUTO: 92 FL (ref 82–98)
NEUTROPHILS # BLD AUTO: 3.3 K/UL (ref 1.8–7.7)
PLATELET # BLD AUTO: 300 K/UL (ref 150–350)
PMV BLD AUTO: 10 FL (ref 9.2–12.9)
POTASSIUM SERPL-SCNC: 3.8 MMOL/L (ref 3.5–5.1)
PROT SERPL-MCNC: 7.6 G/DL (ref 6–8.4)
RBC # BLD AUTO: 4.02 M/UL (ref 4–5.4)
SODIUM SERPL-SCNC: 139 MMOL/L (ref 136–145)
WBC # BLD AUTO: 4.98 K/UL (ref 3.9–12.7)

## 2020-12-03 PROCEDURE — 36591 DRAW BLOOD OFF VENOUS DEVICE: CPT

## 2020-12-03 PROCEDURE — 3008F PR BODY MASS INDEX (BMI) DOCUMENTED: ICD-10-PCS | Mod: CPTII,S$GLB,, | Performed by: NURSE PRACTITIONER

## 2020-12-03 PROCEDURE — 80053 COMPREHEN METABOLIC PANEL: CPT

## 2020-12-03 PROCEDURE — 25000003 PHARM REV CODE 250: Performed by: INTERNAL MEDICINE

## 2020-12-03 PROCEDURE — 99214 OFFICE O/P EST MOD 30 MIN: CPT | Mod: S$GLB,,, | Performed by: NURSE PRACTITIONER

## 2020-12-03 PROCEDURE — 3077F SYST BP >= 140 MM HG: CPT | Mod: CPTII,S$GLB,, | Performed by: NURSE PRACTITIONER

## 2020-12-03 PROCEDURE — 3288F FALL RISK ASSESSMENT DOCD: CPT | Mod: CPTII,S$GLB,, | Performed by: NURSE PRACTITIONER

## 2020-12-03 PROCEDURE — 1125F AMNT PAIN NOTED PAIN PRSNT: CPT | Mod: S$GLB,,, | Performed by: NURSE PRACTITIONER

## 2020-12-03 PROCEDURE — 85027 COMPLETE CBC AUTOMATED: CPT

## 2020-12-03 PROCEDURE — 99214 PR OFFICE/OUTPT VISIT, EST, LEVL IV, 30-39 MIN: ICD-10-PCS | Mod: S$GLB,,, | Performed by: NURSE PRACTITIONER

## 2020-12-03 PROCEDURE — 3072F PR LOW RISK FOR RETINOPATHY: ICD-10-PCS | Mod: S$GLB,,, | Performed by: NURSE PRACTITIONER

## 2020-12-03 PROCEDURE — 3288F PR FALLS RISK ASSESSMENT DOCUMENTED: ICD-10-PCS | Mod: CPTII,S$GLB,, | Performed by: NURSE PRACTITIONER

## 2020-12-03 PROCEDURE — 3079F DIAST BP 80-89 MM HG: CPT | Mod: CPTII,S$GLB,, | Performed by: NURSE PRACTITIONER

## 2020-12-03 PROCEDURE — A4216 STERILE WATER/SALINE, 10 ML: HCPCS | Performed by: INTERNAL MEDICINE

## 2020-12-03 PROCEDURE — 1125F PR PAIN SEVERITY QUANTIFIED, PAIN PRESENT: ICD-10-PCS | Mod: S$GLB,,, | Performed by: NURSE PRACTITIONER

## 2020-12-03 PROCEDURE — 1101F PR PT FALLS ASSESS DOC 0-1 FALLS W/OUT INJ PAST YR: ICD-10-PCS | Mod: CPTII,S$GLB,, | Performed by: NURSE PRACTITIONER

## 2020-12-03 PROCEDURE — 99999 PR PBB SHADOW E&M-EST. PATIENT-LVL IV: CPT | Mod: PBBFAC,,, | Performed by: NURSE PRACTITIONER

## 2020-12-03 PROCEDURE — 99999 PR PBB SHADOW E&M-EST. PATIENT-LVL IV: ICD-10-PCS | Mod: PBBFAC,,, | Performed by: NURSE PRACTITIONER

## 2020-12-03 PROCEDURE — 1101F PT FALLS ASSESS-DOCD LE1/YR: CPT | Mod: CPTII,S$GLB,, | Performed by: NURSE PRACTITIONER

## 2020-12-03 PROCEDURE — 3079F PR MOST RECENT DIASTOLIC BLOOD PRESSURE 80-89 MM HG: ICD-10-PCS | Mod: CPTII,S$GLB,, | Performed by: NURSE PRACTITIONER

## 2020-12-03 PROCEDURE — 3008F BODY MASS INDEX DOCD: CPT | Mod: CPTII,S$GLB,, | Performed by: NURSE PRACTITIONER

## 2020-12-03 PROCEDURE — 63600175 PHARM REV CODE 636 W HCPCS: Performed by: INTERNAL MEDICINE

## 2020-12-03 PROCEDURE — 3072F LOW RISK FOR RETINOPATHY: CPT | Mod: S$GLB,,, | Performed by: NURSE PRACTITIONER

## 2020-12-03 PROCEDURE — 3077F PR MOST RECENT SYSTOLIC BLOOD PRESSURE >= 140 MM HG: ICD-10-PCS | Mod: CPTII,S$GLB,, | Performed by: NURSE PRACTITIONER

## 2020-12-03 RX ORDER — HEPARIN 100 UNIT/ML
500 SYRINGE INTRAVENOUS
Status: COMPLETED | OUTPATIENT
Start: 2020-12-03 | End: 2020-12-03

## 2020-12-03 RX ORDER — HEPARIN 100 UNIT/ML
500 SYRINGE INTRAVENOUS
Status: CANCELLED | OUTPATIENT
Start: 2020-12-03

## 2020-12-03 RX ORDER — SODIUM CHLORIDE 0.9 % (FLUSH) 0.9 %
10 SYRINGE (ML) INJECTION
Status: COMPLETED | OUTPATIENT
Start: 2020-12-03 | End: 2020-12-03

## 2020-12-03 RX ORDER — SODIUM CHLORIDE 0.9 % (FLUSH) 0.9 %
10 SYRINGE (ML) INJECTION
Status: CANCELLED | OUTPATIENT
Start: 2020-12-03

## 2020-12-03 RX ADMIN — HEPARIN 500 UNITS: 100 SYRINGE at 10:12

## 2020-12-03 RX ADMIN — Medication 10 ML: at 09:12

## 2020-12-03 NOTE — PROGRESS NOTES
Subjective:       Patient ID: Maria Elena Tavares is a 65 y.o. female.    Chief Complaint: Breast cancer metastasized to axillary lymph node, left    HPI     Having trouble with [employer] not paying partial disability. Working 4 hours a day.   On partial disability. Starting back full time January 4 full time.   Had fluid in her left chest wall as she was having tightness and swelling. No further erythema.   Swelling is improving. Cramping to chest wall last night.   +joint pains in hands and feet continue. Could not work last week due to the pain.   Appetite improving and gaining weight.   Reports fatigue and still recovering from most recent surgery.   Still notes numbness in fingers since chemo.   Working on rebuilding strength. ROM limited to arms (left worse than right)  Taking gabapentin for neuropathy. Increased dose improved symptoms.   No new pain issues    History of Present Illness: Ms. Tavares is a 64 y.o. who returns in follow up for Breast Cancer.    Oncologic History: per Dr. Villagran's note  - self detected, noted a shooting pain in breast first and then a week later felt a lump  Some delay in getting appointment as she was unaware she had to call  - 8/30/19 Diagnostic mammogram and ultrasound:  Left breast 20 mm x 18 mm x 17 mm mass at the 3 o'clock position. Assessment: 4 - Suspicious finding. Biopsy is recommended.   Lymph Node: Left axilla 16 mm x 14 mm x 13 mm lymph node. Assessment: 4 - Suspicious finding. Biopsy is recommended.   Right- There is no mammographic or sonographic evidence of malignancy.  BI-RADS Category:   Overall: 4 - Suspicious  - 9/5/19 Ultrasound guided biopsy  Pathology:  FINAL PATHOLOGIC DIAGNOSIS  1. LEFT BREAST MASS, BIOPSY:  - Invasive ductal carcinoma, grade 3, longest linear length is 10 mm measured on the slide.  - Histologic Grade (Hawa Histologic Score)  Glandular (Acinar/Tubular Differentiation): 3.  Nuclear Pleomorphism: 2.  Mitotic Rate: 3.  Overall Grade: Grade 3  (score 8).  - Microcalcifications: Not identified.  - Lymphovascular invasion: Not identified.  2. LYMPH NODE, LEFT AXILLA, BIOPSY:  - Positive for metastatic carcinoma.  - The metastatic deposit measures 6 mm in longest continuous linear length.  Estrogen receptor: Positive, 90% of the tumor nuclei staining moderate to strongly.  Progesterone receptor: Positive, 30% of the tumor nuclei staining weak to moderately.  HER2: Negative.  Ki-67: 60%.  - ECHO 9/20/19: EF 64%  - PET 9/21/19:  Impression         Hypermetabolic left breast mass and regional left axillary lymphadenopathy consistent with reported breast cancer and corresponding with recent MRI findings.    Upper limit of normal sized right axillary lymph nodes with normal fatty duane and mildly increased radiotracer uptake.  Findings could represent reactive nodes with metastatic nodes thought less likely.  Lymphscintigraphy with injection in the left breast may considered to assess for drainage to the contralateral axilla.      BX 9/24/19: Right axilla node biopsy is benign  - she underwent neoadjuvant chemotherapy and completed 4 cycles of AC followed by 11 cycles of weekly Taxol.   Stopped with side effects.  - 3/24/20 - she underwent bilateral mastectomies with Lt. sentinel node biopsy and subsequent Lt. axillary dissection and Rt. sentinel node biopsy with Dr. Lopez.  Tissue expanders were placed.   - Pathology from the Lt. breast revealed 1.2 cm of residual invasive ductal carcinoma histologic grade 3, nuclear grade 3 and mitotic index 5 ). There was high grade DCIS. There was lymphovascular invasion.  The margins of resection were negative at > 1 cm.  One axillary node had a 5 mm focus of metastatic carcinoma.  Another Lt. sentinel node had isolated tumor cells.   A third Lt. sentinel node and 7 Lt. axillary nodes were negative.  The Rt. breast and Rt. sentinel nodes were negative.    - XRT pending      Review of Systems   Constitutional:        See  above   All other systems reviewed and are negative.        Objective:      Physical Exam  Vitals signs and nursing note reviewed.   Constitutional:       General: She is not in acute distress.     Appearance: Normal appearance. She is well-developed.   HENT:      Head: Normocephalic and atraumatic.   Eyes:      General: Lids are normal. No scleral icterus.     Conjunctiva/sclera: Conjunctivae normal.      Pupils: Pupils are equal, round, and reactive to light.   Neck:      Musculoskeletal: Normal range of motion and neck supple.      Thyroid: No thyromegaly.      Vascular: No JVD.      Trachea: Trachea normal.   Cardiovascular:      Rate and Rhythm: Normal rate and regular rhythm.      Heart sounds: Normal heart sounds.   Pulmonary:      Effort: Pulmonary effort is normal.      Breath sounds: Normal breath sounds. No wheezing.      Comments: No chest wall masses. Tightness at incision to left chest wall. Hyperpigmentation from XRT to left chest wall.   Abdominal:      General: Bowel sounds are normal. There is no distension.      Palpations: Abdomen is soft. There is no mass.      Tenderness: There is no abdominal tenderness.      Comments: No organomegaly.    Musculoskeletal: Normal range of motion.      Comments: No spinal or paraspinal tenderness.   Decreased ROM to left shoulder.    Lymphadenopathy:      Head:      Right side of head: No submental or submandibular adenopathy.      Left side of head: No submental or submandibular adenopathy.      Cervical: No cervical adenopathy.      Upper Body:      Right upper body: No supraclavicular or axillary adenopathy.      Left upper body: No supraclavicular or axillary adenopathy.   Skin:     General: Skin is warm and dry.      Capillary Refill: Capillary refill takes less than 2 seconds.      Findings: No bruising or rash.      Nails: There is no clubbing.     Neurological:      Mental Status: She is alert and oriented to person, place, and time.   Psychiatric:          Mood and Affect: Mood normal.         Speech: Speech normal.         Behavior: Behavior normal.         Assessment:       1. Breast cancer metastasized to axillary lymph node, left    2. H/O bilateral mastectomy    3. Iron deficiency anemia secondary to inadequate dietary iron intake    4. Mild vitamin D deficiency    5. Neuropathy due to chemotherapeutic drug    6. Obesity (BMI 30-39.9)        Plan:       KEILY clinically   Post bilateral mastectomies  Continue letrozole  Discussed weight loss program. Waiting to get transportation and will start weight management program at [facility].   Monitor BP and see PCP for management.   Joint pain and neuropathy limiting activity but overall improving.   Continue gabapentin. Ok to increase to TID if needed as may help with chest wall pain as well as neuropathy to hands.   Will start Gentlesorb MWF   Repeat iron levels next visit.   Remove port with Dr. Lopez- needs appt  May need more PT for limited ROM of left shoulder due to tightness in chest wall post surgery-patient will call if wishes to proceed   Massage chest wall for tightness.     RTC in 3 months to see Dr. Villagran with cbc, cmp, iron/tibc, ferritin.     Patient is in agreement with the proposed treatment plan. All questions were answered to the patient's satisfaction. Pt knows to call clinic for any new or worsening symptoms and if anything is needed before the next clinic visit.      SHARON MorejonP-C  Hematology & Oncology  [facility] [place] [address]  ph. [phone]  Fax. [phone]     I spent 30 minutes (face to face) with the patient, more than 50% was in counseling and coordination of care as detailed above.

## 2020-12-07 ENCOUNTER — PATIENT MESSAGE (OUTPATIENT)
Dept: HEMATOLOGY/ONCOLOGY | Facility: CLINIC | Age: 65
End: 2020-12-07

## 2020-12-08 ENCOUNTER — PATIENT MESSAGE (OUTPATIENT)
Dept: SURGERY | Facility: CLINIC | Age: 65
End: 2020-12-08

## 2020-12-09 ENCOUNTER — OFFICE VISIT (OUTPATIENT)
Dept: OBSTETRICS AND GYNECOLOGY | Facility: CLINIC | Age: 65
End: 2020-12-09
Payer: COMMERCIAL

## 2020-12-09 VITALS
SYSTOLIC BLOOD PRESSURE: 136 MMHG | HEIGHT: 65 IN | BODY MASS INDEX: 30.19 KG/M2 | WEIGHT: 181.19 LBS | DIASTOLIC BLOOD PRESSURE: 90 MMHG

## 2020-12-09 DIAGNOSIS — T45.1X5A NEUROPATHY DUE TO CHEMOTHERAPEUTIC DRUG: ICD-10-CM

## 2020-12-09 DIAGNOSIS — C77.3 BREAST CANCER METASTASIZED TO AXILLARY LYMPH NODE, LEFT: Primary | ICD-10-CM

## 2020-12-09 DIAGNOSIS — G62.0 NEUROPATHY DUE TO CHEMOTHERAPEUTIC DRUG: ICD-10-CM

## 2020-12-09 DIAGNOSIS — C50.912 BREAST CANCER METASTASIZED TO AXILLARY LYMPH NODE, LEFT: Primary | ICD-10-CM

## 2020-12-09 PROCEDURE — 1101F PR PT FALLS ASSESS DOC 0-1 FALLS W/OUT INJ PAST YR: ICD-10-PCS | Mod: CPTII,S$GLB,, | Performed by: PHYSICIAN ASSISTANT

## 2020-12-09 PROCEDURE — 3288F FALL RISK ASSESSMENT DOCD: CPT | Mod: CPTII,S$GLB,, | Performed by: PHYSICIAN ASSISTANT

## 2020-12-09 PROCEDURE — 3008F BODY MASS INDEX DOCD: CPT | Mod: CPTII,S$GLB,, | Performed by: PHYSICIAN ASSISTANT

## 2020-12-09 PROCEDURE — 3075F SYST BP GE 130 - 139MM HG: CPT | Mod: CPTII,S$GLB,, | Performed by: PHYSICIAN ASSISTANT

## 2020-12-09 PROCEDURE — 3080F PR MOST RECENT DIASTOLIC BLOOD PRESSURE >= 90 MM HG: ICD-10-PCS | Mod: CPTII,S$GLB,, | Performed by: PHYSICIAN ASSISTANT

## 2020-12-09 PROCEDURE — 1126F PR PAIN SEVERITY QUANTIFIED, NO PAIN PRESENT: ICD-10-PCS | Mod: S$GLB,,, | Performed by: PHYSICIAN ASSISTANT

## 2020-12-09 PROCEDURE — 1126F AMNT PAIN NOTED NONE PRSNT: CPT | Mod: S$GLB,,, | Performed by: PHYSICIAN ASSISTANT

## 2020-12-09 PROCEDURE — 99214 PR OFFICE/OUTPT VISIT, EST, LEVL IV, 30-39 MIN: ICD-10-PCS | Mod: S$GLB,,, | Performed by: PHYSICIAN ASSISTANT

## 2020-12-09 PROCEDURE — 3075F PR MOST RECENT SYSTOLIC BLOOD PRESS GE 130-139MM HG: ICD-10-PCS | Mod: CPTII,S$GLB,, | Performed by: PHYSICIAN ASSISTANT

## 2020-12-09 PROCEDURE — 3288F PR FALLS RISK ASSESSMENT DOCUMENTED: ICD-10-PCS | Mod: CPTII,S$GLB,, | Performed by: PHYSICIAN ASSISTANT

## 2020-12-09 PROCEDURE — 3072F LOW RISK FOR RETINOPATHY: CPT | Mod: S$GLB,,, | Performed by: PHYSICIAN ASSISTANT

## 2020-12-09 PROCEDURE — 3072F PR LOW RISK FOR RETINOPATHY: ICD-10-PCS | Mod: S$GLB,,, | Performed by: PHYSICIAN ASSISTANT

## 2020-12-09 PROCEDURE — 3008F PR BODY MASS INDEX (BMI) DOCUMENTED: ICD-10-PCS | Mod: CPTII,S$GLB,, | Performed by: PHYSICIAN ASSISTANT

## 2020-12-09 PROCEDURE — 1101F PT FALLS ASSESS-DOCD LE1/YR: CPT | Mod: CPTII,S$GLB,, | Performed by: PHYSICIAN ASSISTANT

## 2020-12-09 PROCEDURE — 3080F DIAST BP >= 90 MM HG: CPT | Mod: CPTII,S$GLB,, | Performed by: PHYSICIAN ASSISTANT

## 2020-12-09 PROCEDURE — 99214 OFFICE O/P EST MOD 30 MIN: CPT | Mod: S$GLB,,, | Performed by: PHYSICIAN ASSISTANT

## 2020-12-09 NOTE — PROGRESS NOTES
Subjective:      Maria Elena Tavares is a 65 y.o. female who presents for survivorship follow up. She has a history of left infiltrating ductal carcinoma, ER positive DE positive, HER2 negative.  She underwent neoadjuvant DD AC x4.  She then underwent 11 cycles of Taxol which was stopped secondary to side effects.  03/24/2020 she underwent bilateral mastectomy with right sentinel biopsy left sentinel node biopsy.  She also underwent radiation is now on aromatase inhibitor. Patient has had a hysterectomy.  Still has peripheral neuropathy in fingers and toes. Increased gabpetnein to 600mg QAM and 900mg QHS and is helping. Was referred to acupuncture but insurance would not cover and could not afford. Some fatigue at the end of the day. Working 4 hours in the morning but starting back full time next month. Works from home on the computer. Sleeping well and thinks mood is good. She feels happy. Gaining weight since treatment and admits to eating more ice cream and sugar. Does have history of DM but well controlled with medications. Not currently exercising. Having some hot flashes but tolerable. No significant vaginal dryness, is not currently sexually active.     Infusion on 12/03/2020   Component Date Value Ref Range Status    WBC 12/03/2020 4.98  3.90 - 12.70 K/uL Final    RBC 12/03/2020 4.02  4.00 - 5.40 M/uL Final    Hemoglobin 12/03/2020 11.2* 12.0 - 16.0 g/dL Final    Hematocrit 12/03/2020 36.8* 37.0 - 48.5 % Final    MCV 12/03/2020 92  82 - 98 fL Final    MCH 12/03/2020 27.9  27.0 - 31.0 pg Final    MCHC 12/03/2020 30.4* 32.0 - 36.0 g/dL Final    RDW 12/03/2020 15.9* 11.5 - 14.5 % Final    Platelets 12/03/2020 300  150 - 350 K/uL Final    MPV 12/03/2020 10.0  9.2 - 12.9 fL Final    Gran # (ANC) 12/03/2020 3.3  1.8 - 7.7 K/uL Final    Immature Grans (Abs) 12/03/2020 0.02  0.00 - 0.04 K/uL Final    Sodium 12/03/2020 139  136 - 145 mmol/L Final    Potassium 12/03/2020 3.8  3.5 - 5.1 mmol/L Final     Chloride 12/03/2020 101  95 - 110 mmol/L Final    CO2 12/03/2020 30* 23 - 29 mmol/L Final    Glucose 12/03/2020 120* 70 - 110 mg/dL Final    BUN 12/03/2020 18  8 - 23 mg/dL Final    Creatinine 12/03/2020 0.9  0.5 - 1.4 mg/dL Final    Calcium 12/03/2020 9.7  8.7 - 10.5 mg/dL Final    Total Protein 12/03/2020 7.6  6.0 - 8.4 g/dL Final    Albumin 12/03/2020 3.6  3.5 - 5.2 g/dL Final    Total Bilirubin 12/03/2020 0.4  0.1 - 1.0 mg/dL Final    Alkaline Phosphatase 12/03/2020 113  55 - 135 U/L Final    AST 12/03/2020 15  10 - 40 U/L Final    ALT 12/03/2020 10  10 - 44 U/L Final    Anion Gap 12/03/2020 8  8 - 16 mmol/L Final    eGFR if African American 12/03/2020 >60.0  >60 mL/min/1.73 m^2 Final    eGFR if non African American 12/03/2020 >60.0  >60 mL/min/1.73 m^2 Final   Lab Visit on 10/29/2020   Component Date Value Ref Range Status    TSH 10/29/2020 2.101  0.400 - 4.000 uIU/mL Final    Hemoglobin A1C 10/29/2020 6.1* 4.0 - 5.6 % Final    Estimated Avg Glucose 10/29/2020 128  68 - 131 mg/dL Final    Sodium 10/29/2020 139  136 - 145 mmol/L Final    Potassium 10/29/2020 3.7  3.5 - 5.1 mmol/L Final    Chloride 10/29/2020 104  95 - 110 mmol/L Final    CO2 10/29/2020 24  23 - 29 mmol/L Final    Glucose 10/29/2020 93  70 - 110 mg/dL Final    BUN 10/29/2020 19  8 - 23 mg/dL Final    Creatinine 10/29/2020 0.9  0.5 - 1.4 mg/dL Final    Calcium 10/29/2020 10.3  8.7 - 10.5 mg/dL Final    Total Protein 10/29/2020 7.9  6.0 - 8.4 g/dL Final    Albumin 10/29/2020 3.9  3.5 - 5.2 g/dL Final    Total Bilirubin 10/29/2020 0.3  0.1 - 1.0 mg/dL Final    Alkaline Phosphatase 10/29/2020 102  55 - 135 U/L Final    AST 10/29/2020 19  10 - 40 U/L Final    ALT 10/29/2020 10  10 - 44 U/L Final    Anion Gap 10/29/2020 11  8 - 16 mmol/L Final    eGFR if African American 10/29/2020 >60  >60 mL/min/1.73 m^2 Final    eGFR if non African American 10/29/2020 >60  >60 mL/min/1.73 m^2 Final   Admission on 10/08/2020,  "Discharged on 10/08/2020   Component Date Value Ref Range Status    POCT Glucose 10/08/2020 110  70 - 110 mg/dL Final    Final Pathologic Diagnosis 10/08/2020    Final                    Value:1. Polyp, ascending colon (biopsy):  - Tubular adenoma  - No high-grade dysplasia or malignancy  2.  Polyp, sigmoid colon (biopsy):  - Early tubular adenoma  - No high-grade dysplasia or malignancy      Gross 10/08/2020    Final                    Value:Patient MRN as documented on container and clinic/AP label:  [id number]  The specimen is received in the following amount of parts:  2  1.  The specimen is received in formalin, labeled "ascending colon polyp ",  and consists of  a single pink-tan glistening soft tissue polyp measuring 0.4  x 0.3 x 0.2 cm. The specimen is entirely submitted in cassette  [id number]  2.  The specimen is received in formalin, labeled "sigmoid colon polyp ", and  consists of a single pink glistening soft tissue fragment measuring 0.3 x 0.2  x 0.1 cm.  The specimen is entirely submitted in cassette [id number]  Dictated by: Jada Zamudio      Disclaimer 10/08/2020    Final                    Value:Unless the case is a 'gross only' or additional testing only, the final  diagnosis for each specimen is based on a microscopic examination of  appropriate tissue sections.     Lab Visit on 10/05/2020   Component Date Value Ref Range Status    SARS-CoV2 (COVID-19) Qualitative P* 10/05/2020 Not Detected  Not Detected Final   Infusion on 09/14/2020   Component Date Value Ref Range Status    WBC 09/14/2020 4.01  3.90 - 12.70 K/uL Final    RBC 09/14/2020 2.88* 4.00 - 5.40 M/uL Final    Hemoglobin 09/14/2020 8.3* 12.0 - 16.0 g/dL Final    Hematocrit 09/14/2020 26.7* 37.0 - 48.5 % Final    MCV 09/14/2020 93  82 - 98 fL Final    MCH 09/14/2020 28.8  27.0 - 31.0 pg Final    MCHC 09/14/2020 31.1* 32.0 - 36.0 g/dL Final    RDW 09/14/2020 14.9* 11.5 - 14.5 % Final    Platelets 09/14/2020 294 "  150 - 350 K/uL Final    MPV 09/14/2020 9.9  9.2 - 12.9 fL Final    Gran # (ANC) 09/14/2020 2.4  1.8 - 7.7 K/uL Final    Immature Grans (Abs) 09/14/2020 0.03  0.00 - 0.04 K/uL Final    Sodium 09/14/2020 140  136 - 145 mmol/L Final    Potassium 09/14/2020 3.5  3.5 - 5.1 mmol/L Final    Chloride 09/14/2020 104  95 - 110 mmol/L Final    CO2 09/14/2020 26  23 - 29 mmol/L Final    Glucose 09/14/2020 135* 70 - 110 mg/dL Final    BUN 09/14/2020 16  8 - 23 mg/dL Final    Creatinine 09/14/2020 0.9  0.5 - 1.4 mg/dL Final    Calcium 09/14/2020 9.7  8.7 - 10.5 mg/dL Final    Total Protein 09/14/2020 7.0  6.0 - 8.4 g/dL Final    Albumin 09/14/2020 3.4* 3.5 - 5.2 g/dL Final    Total Bilirubin 09/14/2020 0.3  0.1 - 1.0 mg/dL Final    Alkaline Phosphatase 09/14/2020 85  55 - 135 U/L Final    AST 09/14/2020 16  10 - 40 U/L Final    ALT 09/14/2020 11  10 - 44 U/L Final    Anion Gap 09/14/2020 10  8 - 16 mmol/L Final    eGFR if African American 09/14/2020 >60.0  >60 mL/min/1.73 m^2 Final    eGFR if non African American 09/14/2020 >60.0  >60 mL/min/1.73 m^2 Final    Iron 09/14/2020 53  30 - 160 ug/dL Final    Transferrin 09/14/2020 226  200 - 375 mg/dL Final    TIBC 09/14/2020 334  250 - 450 ug/dL Final    Saturated Iron 09/14/2020 16* 20 - 50 % Final    Ferritin 09/14/2020 556* 20.0 - 300.0 ng/mL Final   Lab Visit on 09/10/2020   Component Date Value Ref Range Status    Vit D, 25-Hydroxy 09/10/2020 23* 30 - 96 ng/mL Final       Past Medical History:   Diagnosis Date    BMI 37.0-37.9, adult     Breast cancer 09/05/2019     Left breast, IDC with lymph node metastisis    Colon polyps 2015    Colon polyps 2015    Diabetes mellitus type II     Diverticulosis     history of diverticulosisseen on colonoscopy at age 48. Repeat recommended at age 58. Done by     Elevated blood protein     History of elevated protein. Apparently has seen  for extensive workup including bone marrow biopsy     History of shingles 2006    Hyperlipidemia     Hypertension     Microalbuminuria     due 2 diabetes    Mild vitamin D deficiency     . A low vitamin D    Thyroid disease     hypothyroidism     Past Surgical History:   Procedure Laterality Date    BREAST BIOPSY Left 2019    IDC with mets to node    BREAST BIOPSY Right 2019    core bx, benign node    BREAST BIOPSY Left 10/14/2019    MRI Core bx, + IDC    BREAST BIOPSY Left 10/08/2019    Core bx, ADH     SECTION      COLONOSCOPY N/A 10/8/2020    Procedure: COLONOSCOPY;  Surgeon: Shreya Mendoza MD;  Location: [facility] (4TH FLR);  Service: Endoscopy;  Laterality: N/A;  COVID screening on 10/5/20 [facility] - ER    HYSTERECTOMY      INSERTION OF BREAST TISSUE EXPANDER Bilateral 3/24/2020    Procedure: INSERTION, TISSUE EXPANDER, BREAST BILATERAL;  Surgeon: Michael Solorzano MD;  Location: [facility] OR 2ND FLR;  Service: Plastics;  Laterality: Bilateral;    INSERTION OF TUNNELED CENTRAL VENOUS CATHETER (CVC) WITH SUBCUTANEOUS PORT Right 10/4/2019    Procedure: [id number] RIGHT (CONSENT AM OF) 1.0 hr case;  Surgeon: Elena Lopez MD;  Location: [facility] OR [address] [place] [address];  Service: General;  Laterality: Right;    OOPHORECTOMY      SENTINEL LYMPH NODE BIOPSY Left 3/24/2020    Procedure: BIOPSY, LYMPH NODE, SENTINEL LEFT;  Surgeon: Elena Lopez MD;  Location: [facility] OR [address] [place] [address];  Service: General;  Laterality: Left;    SIMPLE MASTECTOMY Bilateral 3/24/2020    Procedure: MASTECTOMY, SIMPLE BILATERAL;  Surgeon: Elena Lopez MD;  Location: [facility] OR [address] [place] [address];  Service: General;  Laterality: Bilateral;    TISSUE EXPANDER REMOVAL Bilateral 2020    Procedure: REMOVAL, TISSUE EXPANDER;  Surgeon: Michael Solorzano MD;  Location: [facility] OR [address] [place] [address];  Service: Plastics;  Laterality: Bilateral;     Social History     Tobacco Use    Smoking status: Never Smoker    Smokeless tobacco: Never Used    Tobacco comment: 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  She walks occasionally.   Substance Use Topics    Alcohol use: Not Currently     Frequency: Never     Drinks per session: Patient refused     Binge frequency: Never     Comment: Occasionally    Drug use: No     Family History   Problem Relation Age of Onset    Cancer Mother         lung ca heavy smoker    Lung cancer Mother     Cancer Father     Lung cancer Father     Hypertension Sister     No Known Problems Sister     No Known Problems Daughter     Hypothyroidism Sister     Diabetes Maternal Aunt     Cancer Maternal Aunt     No Known Problems Maternal Grandmother     Breast cancer Paternal Aunt     No Known Problems Paternal Uncle     Lung cancer Maternal Grandfather     No Known Problems Paternal Grandmother     No Known Problems Paternal Grandfather     Amblyopia Neg Hx     Blindness Neg Hx     Cataracts Neg Hx     Glaucoma Neg Hx     Macular degeneration Neg Hx     Retinal detachment Neg Hx     Strabismus Neg Hx     Stroke Neg Hx     Thyroid disease Neg Hx     Ovarian cancer Neg Hx     Colon cancer Neg Hx      OB History    Para Term  AB Living   1 1 1         SAB TAB Ectopic Multiple Live Births                  # Outcome Date GA Lbr Grey/2nd Weight Sex Delivery Anes PTL Lv   1 Term      CS-[id number]          Current Outpatient Medications:     aspirin (ECOTRIN) 81 MG EC tablet, Take 81 mg by mouth once daily.  , Disp: , Rfl:     atorvastatin (LIPITOR) 10 MG tablet, Take 1 tablet (10 mg total) by mouth once daily., Disp: 90 tablet, Rfl: 3    blood-glucose meter kit, DISPENSE : BLOOD TEST STRIPS AND LANCETS PATIENT TEST BLOOD SUGARS TWICE DAILY TEST STRIPS: 50 EACH, REFILL 5 LANCETS:  50 EACH , REFILL 5, Disp: 1 each, Rfl: 0    chlorthalidone (HYGROTEN) 25 MG Tab, Take 1 tablet by mouth every morning, Disp: 90 tablet, Rfl: 0    dulaglutide (TRULICITY) 1.5 mg/0.5 mL pen injector, Inject 1 pen (1.5 mg) into the skin  every 7 days., Disp: 2 mL, Rfl: 3    ergocalciferol (VITAMIN D2) 50,000 unit Cap, Take 1 capsule (50,000 Units total) by mouth every 7 days., Disp: 12 capsule, Rfl: 2    gabapentin (NEURONTIN) 300 MG capsule, Take 1 capsule (300 mg total) by mouth 3 (three) times daily., Disp: 90 capsule, Rfl: 4    HYDROcodone-acetaminophen (NORCO) 5-325 mg per tablet, Take 1 tablet by mouth every 8 (eight) hours as needed for Pain., Disp: 30 tablet, Rfl: 0    irbesartan (AVAPRO) 300 MG tablet, Take 1 tablet by mouth once daily, Disp: 90 tablet, Rfl: 1    letrozole (FEMARA) 2.5 mg Tab, Take 1 tablet (2.5 mg total) by mouth once daily., Disp: 30 tablet, Rfl: 3    levothyroxine (SYNTHROID) 200 MCG tablet, Take 1 tablet (200 mcg total) by mouth once daily., Disp: 90 tablet, Rfl: 1    lifitegrast (XIIDRA) 5 % Dpet, Apply 1 drop to  both eyes  2 (two) times daily., Disp: 180 each, Rfl: 4    metFORMIN (GLUCOPHAGE) 1000 MG tablet, TAKE 1 TABLET (1,000 MG TOTAL) BY MOUTH 2 (TWO) TIMES DAILY WITH MEALS., Disp: 180 tablet, Rfl: 1    ondansetron (ZOFRAN-ODT) 4 MG TbDL, Dissolve 1 tablet (4 mg total) by mouth every 6 (six) hours as needed., Disp: 60 tablet, Rfl: 2    polyethylene glycol (GLYCOLAX) 17 gram/dose powder, Mix 17 g (1 capful) with juice or water an drink 2 (two) times daily., Disp: 1020 g, Rfl: 2    The 10-year ASCVD risk score (Pepperell LI Jr., et al., 2013) is: 28.8%    Values used to calculate the score:      Age: 65 years      Sex: Female      Is Non- : Yes      Diabetic: Yes      Tobacco smoker: No      Systolic Blood Pressure: 136 mmHg      Is BP treated: Yes      HDL Cholesterol: 57 mg/dL      Total Cholesterol: 261 mg/dL    Review of Systems:  General: No fever, chills, or weight loss. +fatigue  Chest: No chest pain, shortness of breath, or palpitations.  Breast: No pain, masses, or nipple discharge.  Vulva: No pain, lesions, or itching.  Vagina: No relaxation, itching, discharge, or  "lesions.  Abdomen: No pain, nausea, vomiting, diarrhea, or constipation.  Urinary: No incontinence, nocturia, frequency, or dysuria.  Extremities:  No leg cramps, edema, or calf pain. +peripheral neuropathy  Neurologic: No headaches, dizziness, or visual changes.    Objective:     Vitals:    12/09/20 1104   BP: (!) 136/90   Weight: 82.2 kg (181 lb 3.5 oz)   Height: 5' 5" (1.651 m)   PainSc: 0-No pain     Body mass index is 30.16 kg/m².    PHYSICAL EXAM:  APPEARANCE: Well nourished, well developed, in no acute distress.  AFFECT: WNL, alert and oriented x 3    Assessment:    Breast cancer metastasized to axillary lymph node, left    Neuropathy due to chemotherapeutic drug        Plan:   Reviewed Care Plan with patient and all questions were answered.  Increase physical activity and improve diet to help with fatigue.  Goal of riding stationary bike for 20 minutes 3x a week after lunch as can tolerate. Slowly build from this.  Reviewed diet. Increase fruits and vegetables. Avoid processed foods and meats. Focus on whole grains when eating a starch. Reduce sweets/refined sugars.  Interested in Survivorship Bootcamp- added to list.  Will resubmit for acupuncture to help with neuropathy in 1/2021, as she should have coverage in the new year.  Continue Gabapentin.  Follow up in 9/2021 for WWE or sooner PRN.    Instructed patient to call if she experiences any side effects or has any questions.  I spent 45 minutes with this patient today, >50% counseling.      "

## 2020-12-23 ENCOUNTER — TELEPHONE (OUTPATIENT)
Dept: SURGERY | Facility: CLINIC | Age: 65
End: 2020-12-23

## 2021-01-05 DIAGNOSIS — G62.0 NEUROPATHY DUE TO CHEMOTHERAPEUTIC DRUG: Primary | ICD-10-CM

## 2021-01-05 DIAGNOSIS — T45.1X5A NEUROPATHY DUE TO CHEMOTHERAPEUTIC DRUG: Primary | ICD-10-CM

## 2021-01-06 ENCOUNTER — PATIENT MESSAGE (OUTPATIENT)
Dept: PRIMARY CARE CLINIC | Facility: CLINIC | Age: 66
End: 2021-01-06

## 2021-01-06 RX ORDER — AZITHROMYCIN 250 MG/1
TABLET, FILM COATED ORAL
Qty: 6 TABLET | Refills: 0 | Status: SHIPPED | OUTPATIENT
Start: 2021-01-06 | End: 2021-01-16

## 2021-01-10 ENCOUNTER — IMMUNIZATION (OUTPATIENT)
Dept: INTERNAL MEDICINE | Facility: CLINIC | Age: 66
End: 2021-01-10
Payer: COMMERCIAL

## 2021-01-10 DIAGNOSIS — Z23 NEED FOR VACCINATION: ICD-10-CM

## 2021-01-10 PROCEDURE — 91300 COVID-19, MRNA, LNP-S, PF, 30 MCG/0.3 ML DOSE VACCINE: CPT | Mod: PBBFAC | Performed by: INTERNAL MEDICINE

## 2021-01-28 ENCOUNTER — HOSPITAL ENCOUNTER (OUTPATIENT)
Dept: VASCULAR SURGERY | Facility: CLINIC | Age: 66
Discharge: HOME | End: 2021-01-28
Attending: INTERNAL MEDICINE
Payer: COMMERCIAL

## 2021-01-28 ENCOUNTER — PATIENT MESSAGE (OUTPATIENT)
Dept: HEMATOLOGY/ONCOLOGY | Facility: CLINIC | Age: 66
End: 2021-01-28

## 2021-01-28 DIAGNOSIS — C50.912 BREAST CANCER METASTASIZED TO AXILLARY LYMPH NODE, LEFT: ICD-10-CM

## 2021-01-28 DIAGNOSIS — C77.3 BREAST CANCER METASTASIZED TO AXILLARY LYMPH NODE, LEFT: ICD-10-CM

## 2021-01-28 DIAGNOSIS — C77.3 BREAST CANCER METASTASIZED TO AXILLARY LYMPH NODE, LEFT: Primary | ICD-10-CM

## 2021-01-28 DIAGNOSIS — M79.89 ARM SWELLING: ICD-10-CM

## 2021-01-28 DIAGNOSIS — C50.912 BREAST CANCER METASTASIZED TO AXILLARY LYMPH NODE, LEFT: Primary | ICD-10-CM

## 2021-01-28 DIAGNOSIS — M79.603 ARM PAIN: ICD-10-CM

## 2021-01-28 PROCEDURE — 93971 PR US DUPLEX, UPPER OR LOWER EXT VENOUS,UNILAT OR LTD: ICD-10-PCS | Mod: S$GLB,,, | Performed by: SURGERY

## 2021-01-28 PROCEDURE — 93971 EXTREMITY STUDY: CPT | Mod: S$GLB,,, | Performed by: SURGERY

## 2021-01-29 ENCOUNTER — OFFICE VISIT (OUTPATIENT)
Dept: HEMATOLOGY/ONCOLOGY | Facility: CLINIC | Age: 66
End: 2021-01-29
Payer: COMMERCIAL

## 2021-01-29 VITALS
BODY MASS INDEX: 37.87 KG/M2 | RESPIRATION RATE: 18 BRPM | DIASTOLIC BLOOD PRESSURE: 98 MMHG | TEMPERATURE: 98 F | HEART RATE: 89 BPM | WEIGHT: 192.88 LBS | HEIGHT: 60 IN | SYSTOLIC BLOOD PRESSURE: 176 MMHG | OXYGEN SATURATION: 99 %

## 2021-01-29 DIAGNOSIS — I89.0 LYMPHEDEMA: Primary | ICD-10-CM

## 2021-01-29 DIAGNOSIS — T45.1X5A CHEMOTHERAPY-INDUCED NEUROPATHY: ICD-10-CM

## 2021-01-29 DIAGNOSIS — C50.912 BREAST CANCER METASTASIZED TO AXILLARY LYMPH NODE, LEFT: ICD-10-CM

## 2021-01-29 DIAGNOSIS — G62.0 CHEMOTHERAPY-INDUCED NEUROPATHY: ICD-10-CM

## 2021-01-29 DIAGNOSIS — C77.3 BREAST CANCER METASTASIZED TO AXILLARY LYMPH NODE, LEFT: ICD-10-CM

## 2021-01-29 PROCEDURE — 3288F FALL RISK ASSESSMENT DOCD: CPT | Mod: CPTII,S$GLB,, | Performed by: NURSE PRACTITIONER

## 2021-01-29 PROCEDURE — 99215 OFFICE O/P EST HI 40 MIN: CPT | Mod: S$GLB,,, | Performed by: NURSE PRACTITIONER

## 2021-01-29 PROCEDURE — 3077F SYST BP >= 140 MM HG: CPT | Mod: CPTII,S$GLB,, | Performed by: NURSE PRACTITIONER

## 2021-01-29 PROCEDURE — 1101F PT FALLS ASSESS-DOCD LE1/YR: CPT | Mod: CPTII,S$GLB,, | Performed by: NURSE PRACTITIONER

## 2021-01-29 PROCEDURE — 1125F PR PAIN SEVERITY QUANTIFIED, PAIN PRESENT: ICD-10-PCS | Mod: S$GLB,,, | Performed by: NURSE PRACTITIONER

## 2021-01-29 PROCEDURE — 3080F DIAST BP >= 90 MM HG: CPT | Mod: CPTII,S$GLB,, | Performed by: NURSE PRACTITIONER

## 2021-01-29 PROCEDURE — 3077F PR MOST RECENT SYSTOLIC BLOOD PRESSURE >= 140 MM HG: ICD-10-PCS | Mod: CPTII,S$GLB,, | Performed by: NURSE PRACTITIONER

## 2021-01-29 PROCEDURE — 1125F AMNT PAIN NOTED PAIN PRSNT: CPT | Mod: S$GLB,,, | Performed by: NURSE PRACTITIONER

## 2021-01-29 PROCEDURE — 3008F PR BODY MASS INDEX (BMI) DOCUMENTED: ICD-10-PCS | Mod: CPTII,S$GLB,, | Performed by: NURSE PRACTITIONER

## 2021-01-29 PROCEDURE — 1101F PR PT FALLS ASSESS DOC 0-1 FALLS W/OUT INJ PAST YR: ICD-10-PCS | Mod: CPTII,S$GLB,, | Performed by: NURSE PRACTITIONER

## 2021-01-29 PROCEDURE — 3008F BODY MASS INDEX DOCD: CPT | Mod: CPTII,S$GLB,, | Performed by: NURSE PRACTITIONER

## 2021-01-29 PROCEDURE — 99999 PR PBB SHADOW E&M-EST. PATIENT-LVL IV: CPT | Mod: PBBFAC,,, | Performed by: NURSE PRACTITIONER

## 2021-01-29 PROCEDURE — 3080F PR MOST RECENT DIASTOLIC BLOOD PRESSURE >= 90 MM HG: ICD-10-PCS | Mod: CPTII,S$GLB,, | Performed by: NURSE PRACTITIONER

## 2021-01-29 PROCEDURE — 99215 PR OFFICE/OUTPT VISIT, EST, LEVL V, 40-54 MIN: ICD-10-PCS | Mod: S$GLB,,, | Performed by: NURSE PRACTITIONER

## 2021-01-29 PROCEDURE — 3288F PR FALLS RISK ASSESSMENT DOCUMENTED: ICD-10-PCS | Mod: CPTII,S$GLB,, | Performed by: NURSE PRACTITIONER

## 2021-01-29 PROCEDURE — 99999 PR PBB SHADOW E&M-EST. PATIENT-LVL IV: ICD-10-PCS | Mod: PBBFAC,,, | Performed by: NURSE PRACTITIONER

## 2021-01-31 ENCOUNTER — IMMUNIZATION (OUTPATIENT)
Dept: INTERNAL MEDICINE | Facility: CLINIC | Age: 66
End: 2021-01-31
Payer: COMMERCIAL

## 2021-01-31 DIAGNOSIS — Z23 NEED FOR VACCINATION: Primary | ICD-10-CM

## 2021-01-31 PROCEDURE — 0002A COVID-19, MRNA, LNP-S, PF, 30 MCG/0.3 ML DOSE VACCINE: CPT | Mod: PBBFAC | Performed by: INTERNAL MEDICINE

## 2021-01-31 PROCEDURE — 91300 COVID-19, MRNA, LNP-S, PF, 30 MCG/0.3 ML DOSE VACCINE: CPT | Mod: PBBFAC | Performed by: INTERNAL MEDICINE

## 2021-02-01 ENCOUNTER — TELEPHONE (OUTPATIENT)
Dept: HEMATOLOGY/ONCOLOGY | Facility: CLINIC | Age: 66
End: 2021-02-01

## 2021-02-10 ENCOUNTER — CLINICAL SUPPORT (OUTPATIENT)
Dept: REHABILITATION | Facility: HOSPITAL | Age: 66
End: 2021-02-10
Payer: COMMERCIAL

## 2021-02-10 DIAGNOSIS — I89.0 LYMPHEDEMA: ICD-10-CM

## 2021-02-10 DIAGNOSIS — C77.3 BREAST CANCER METASTASIZED TO AXILLARY LYMPH NODE, LEFT: Primary | ICD-10-CM

## 2021-02-10 DIAGNOSIS — M25.619 DECREASED SHOULDER MOBILITY, UNSPECIFIED LATERALITY: ICD-10-CM

## 2021-02-10 DIAGNOSIS — M79.89 ARM SWELLING: ICD-10-CM

## 2021-02-10 DIAGNOSIS — C50.912 BREAST CANCER METASTASIZED TO AXILLARY LYMPH NODE, LEFT: Primary | ICD-10-CM

## 2021-02-10 PROCEDURE — 97110 THERAPEUTIC EXERCISES: CPT | Performed by: PHYSICAL MEDICINE & REHABILITATION

## 2021-02-10 PROCEDURE — 97162 PT EVAL MOD COMPLEX 30 MIN: CPT | Performed by: PHYSICAL MEDICINE & REHABILITATION

## 2021-02-16 ENCOUNTER — PATIENT MESSAGE (OUTPATIENT)
Dept: PRIMARY CARE CLINIC | Facility: CLINIC | Age: 66
End: 2021-02-16

## 2021-02-16 DIAGNOSIS — M79.646 THUMB PAIN, UNSPECIFIED LATERALITY: Primary | ICD-10-CM

## 2021-02-17 ENCOUNTER — PATIENT MESSAGE (OUTPATIENT)
Dept: PRIMARY CARE CLINIC | Facility: CLINIC | Age: 66
End: 2021-02-17

## 2021-02-18 ENCOUNTER — CLINICAL SUPPORT (OUTPATIENT)
Dept: REHABILITATION | Facility: HOSPITAL | Age: 66
End: 2021-02-18
Payer: COMMERCIAL

## 2021-02-18 ENCOUNTER — PATIENT MESSAGE (OUTPATIENT)
Dept: PRIMARY CARE CLINIC | Facility: CLINIC | Age: 66
End: 2021-02-18

## 2021-02-18 DIAGNOSIS — C50.912 BREAST CANCER METASTASIZED TO AXILLARY LYMPH NODE, LEFT: Primary | ICD-10-CM

## 2021-02-18 DIAGNOSIS — M79.89 ARM SWELLING: ICD-10-CM

## 2021-02-18 DIAGNOSIS — M25.619 DECREASED SHOULDER MOBILITY, UNSPECIFIED LATERALITY: ICD-10-CM

## 2021-02-18 DIAGNOSIS — C77.3 BREAST CANCER METASTASIZED TO AXILLARY LYMPH NODE, LEFT: Primary | ICD-10-CM

## 2021-02-18 PROCEDURE — 97110 THERAPEUTIC EXERCISES: CPT | Performed by: PHYSICAL MEDICINE & REHABILITATION

## 2021-02-18 PROCEDURE — 97140 MANUAL THERAPY 1/> REGIONS: CPT | Performed by: PHYSICAL MEDICINE & REHABILITATION

## 2021-02-19 DIAGNOSIS — R52 PAIN: Primary | ICD-10-CM

## 2021-02-23 ENCOUNTER — TELEPHONE (OUTPATIENT)
Dept: ORTHOPEDICS | Facility: CLINIC | Age: 66
End: 2021-02-23

## 2021-02-23 ENCOUNTER — PATIENT OUTREACH (OUTPATIENT)
Dept: ADMINISTRATIVE | Facility: OTHER | Age: 66
End: 2021-02-23

## 2021-02-23 DIAGNOSIS — E11.21 CONTROLLED TYPE 2 DIABETES MELLITUS WITH DIABETIC NEPHROPATHY, WITHOUT LONG-TERM CURRENT USE OF INSULIN: Primary | ICD-10-CM

## 2021-02-24 ENCOUNTER — HOSPITAL ENCOUNTER (OUTPATIENT)
Dept: RADIOLOGY | Facility: OTHER | Age: 66
Discharge: HOME | End: 2021-02-24
Attending: PLASTIC SURGERY
Payer: COMMERCIAL

## 2021-02-24 ENCOUNTER — OFFICE VISIT (OUTPATIENT)
Dept: ORTHOPEDICS | Facility: CLINIC | Age: 66
End: 2021-02-24
Attending: FAMILY MEDICINE
Payer: COMMERCIAL

## 2021-02-24 VITALS — BODY MASS INDEX: 37.69 KG/M2 | WEIGHT: 192 LBS | HEIGHT: 60 IN

## 2021-02-24 DIAGNOSIS — M65.312 BILATERAL TRIGGER THUMB: ICD-10-CM

## 2021-02-24 DIAGNOSIS — M65.311 BILATERAL TRIGGER THUMB: ICD-10-CM

## 2021-02-24 DIAGNOSIS — R52 PAIN: ICD-10-CM

## 2021-02-24 PROCEDURE — 1101F PT FALLS ASSESS-DOCD LE1/YR: CPT | Mod: CPTII,S$GLB,, | Performed by: PLASTIC SURGERY

## 2021-02-24 PROCEDURE — 73130 X-RAY EXAM OF HAND: CPT | Mod: TC,50,FY

## 2021-02-24 PROCEDURE — 73130 X-RAY EXAM OF HAND: CPT | Mod: 26,LT,, | Performed by: RADIOLOGY

## 2021-02-24 PROCEDURE — 1125F PR PAIN SEVERITY QUANTIFIED, PAIN PRESENT: ICD-10-PCS | Mod: S$GLB,,, | Performed by: PLASTIC SURGERY

## 2021-02-24 PROCEDURE — 3008F PR BODY MASS INDEX (BMI) DOCUMENTED: ICD-10-PCS | Mod: CPTII,S$GLB,, | Performed by: PLASTIC SURGERY

## 2021-02-24 PROCEDURE — 99999 PR PBB SHADOW E&M-EST. PATIENT-LVL III: ICD-10-PCS | Mod: PBBFAC,,, | Performed by: PLASTIC SURGERY

## 2021-02-24 PROCEDURE — 73130 X-RAY EXAM OF HAND: CPT | Mod: 26,RT,, | Performed by: RADIOLOGY

## 2021-02-24 PROCEDURE — 20550 NJX 1 TENDON SHEATH/LIGAMENT: CPT | Mod: 50,S$GLB,, | Performed by: PLASTIC SURGERY

## 2021-02-24 PROCEDURE — 3288F PR FALLS RISK ASSESSMENT DOCUMENTED: ICD-10-PCS | Mod: CPTII,S$GLB,, | Performed by: PLASTIC SURGERY

## 2021-02-24 PROCEDURE — 99213 OFFICE O/P EST LOW 20 MIN: CPT | Mod: 25,S$GLB,, | Performed by: PLASTIC SURGERY

## 2021-02-24 PROCEDURE — 20550 PR INJECT TENDON SHEATH/LIGAMENT: ICD-10-PCS | Mod: 50,S$GLB,, | Performed by: PLASTIC SURGERY

## 2021-02-24 PROCEDURE — 3288F FALL RISK ASSESSMENT DOCD: CPT | Mod: CPTII,S$GLB,, | Performed by: PLASTIC SURGERY

## 2021-02-24 PROCEDURE — 73130 PR  X-RAY HAND 3+ VW: ICD-10-PCS | Mod: 26,LT,, | Performed by: RADIOLOGY

## 2021-02-24 PROCEDURE — 3008F BODY MASS INDEX DOCD: CPT | Mod: CPTII,S$GLB,, | Performed by: PLASTIC SURGERY

## 2021-02-24 PROCEDURE — 99213 PR OFFICE/OUTPT VISIT, EST, LEVL III, 20-29 MIN: ICD-10-PCS | Mod: 25,S$GLB,, | Performed by: PLASTIC SURGERY

## 2021-02-24 PROCEDURE — 1101F PR PT FALLS ASSESS DOC 0-1 FALLS W/OUT INJ PAST YR: ICD-10-PCS | Mod: CPTII,S$GLB,, | Performed by: PLASTIC SURGERY

## 2021-02-24 PROCEDURE — 1125F AMNT PAIN NOTED PAIN PRSNT: CPT | Mod: S$GLB,,, | Performed by: PLASTIC SURGERY

## 2021-02-24 PROCEDURE — 99999 PR PBB SHADOW E&M-EST. PATIENT-LVL III: CPT | Mod: PBBFAC,,, | Performed by: PLASTIC SURGERY

## 2021-02-24 RX ORDER — TRIAMCINOLONE ACETONIDE 40 MG/ML
40 INJECTION, SUSPENSION INTRA-ARTICULAR; INTRAMUSCULAR
Status: COMPLETED | OUTPATIENT
Start: 2021-02-24 | End: 2021-02-24

## 2021-02-24 RX ADMIN — TRIAMCINOLONE ACETONIDE 40 MG: 40 INJECTION, SUSPENSION INTRA-ARTICULAR; INTRAMUSCULAR at 11:02

## 2021-03-04 ENCOUNTER — OFFICE VISIT (OUTPATIENT)
Dept: OPTOMETRY | Facility: CLINIC | Age: 66
End: 2021-03-04
Payer: COMMERCIAL

## 2021-03-04 ENCOUNTER — CLINICAL SUPPORT (OUTPATIENT)
Dept: REHABILITATION | Facility: HOSPITAL | Age: 66
End: 2021-03-04
Payer: COMMERCIAL

## 2021-03-04 DIAGNOSIS — M25.619 DECREASED SHOULDER MOBILITY, UNSPECIFIED LATERALITY: ICD-10-CM

## 2021-03-04 DIAGNOSIS — H04.123 DRY EYE SYNDROME, BILATERAL: ICD-10-CM

## 2021-03-04 DIAGNOSIS — H52.4 PRESBYOPIA: Primary | ICD-10-CM

## 2021-03-04 DIAGNOSIS — C50.912 BREAST CANCER METASTASIZED TO AXILLARY LYMPH NODE, LEFT: Primary | ICD-10-CM

## 2021-03-04 DIAGNOSIS — E11.21 CONTROLLED TYPE 2 DIABETES MELLITUS WITH DIABETIC NEPHROPATHY, WITHOUT LONG-TERM CURRENT USE OF INSULIN: ICD-10-CM

## 2021-03-04 DIAGNOSIS — M79.89 ARM SWELLING: ICD-10-CM

## 2021-03-04 DIAGNOSIS — I10 ESSENTIAL HYPERTENSION: ICD-10-CM

## 2021-03-04 DIAGNOSIS — H25.13 NS (NUCLEAR SCLEROSIS), BILATERAL: ICD-10-CM

## 2021-03-04 DIAGNOSIS — E11.9 TYPE 2 DIABETES MELLITUS WITHOUT OPHTHALMIC MANIFESTATIONS: ICD-10-CM

## 2021-03-04 DIAGNOSIS — C77.3 BREAST CANCER METASTASIZED TO AXILLARY LYMPH NODE, LEFT: Primary | ICD-10-CM

## 2021-03-04 PROCEDURE — 92014 COMPRE OPH EXAM EST PT 1/>: CPT | Mod: S$GLB,,, | Performed by: OPTOMETRIST

## 2021-03-04 PROCEDURE — 92015 DETERMINE REFRACTIVE STATE: CPT | Mod: S$GLB,,, | Performed by: OPTOMETRIST

## 2021-03-04 PROCEDURE — 97110 THERAPEUTIC EXERCISES: CPT | Performed by: PHYSICAL MEDICINE & REHABILITATION

## 2021-03-04 PROCEDURE — 97140 MANUAL THERAPY 1/> REGIONS: CPT | Performed by: PHYSICAL MEDICINE & REHABILITATION

## 2021-03-04 PROCEDURE — 99999 PR PBB SHADOW E&M-EST. PATIENT-LVL III: CPT | Mod: PBBFAC,,, | Performed by: OPTOMETRIST

## 2021-03-04 PROCEDURE — 92014 PR EYE EXAM, EST PATIENT,COMPREHESV: ICD-10-PCS | Mod: S$GLB,,, | Performed by: OPTOMETRIST

## 2021-03-04 PROCEDURE — 92015 PR REFRACTION: ICD-10-PCS | Mod: S$GLB,,, | Performed by: OPTOMETRIST

## 2021-03-04 PROCEDURE — 99999 PR PBB SHADOW E&M-EST. PATIENT-LVL III: ICD-10-PCS | Mod: PBBFAC,,, | Performed by: OPTOMETRIST

## 2021-03-12 ENCOUNTER — INFUSION (OUTPATIENT)
Dept: INFUSION THERAPY | Facility: HOSPITAL | Age: 66
End: 2021-03-12
Attending: INTERNAL MEDICINE
Payer: COMMERCIAL

## 2021-03-12 ENCOUNTER — OFFICE VISIT (OUTPATIENT)
Dept: HEMATOLOGY/ONCOLOGY | Facility: CLINIC | Age: 66
End: 2021-03-12
Payer: COMMERCIAL

## 2021-03-12 VITALS
TEMPERATURE: 98 F | HEIGHT: 60 IN | OXYGEN SATURATION: 98 % | HEART RATE: 105 BPM | BODY MASS INDEX: 37.74 KG/M2 | RESPIRATION RATE: 16 BRPM | DIASTOLIC BLOOD PRESSURE: 90 MMHG | SYSTOLIC BLOOD PRESSURE: 161 MMHG | WEIGHT: 192.25 LBS

## 2021-03-12 DIAGNOSIS — E11.21 CONTROLLED TYPE 2 DIABETES MELLITUS WITH DIABETIC NEPHROPATHY, WITHOUT LONG-TERM CURRENT USE OF INSULIN: ICD-10-CM

## 2021-03-12 DIAGNOSIS — C50.912 BREAST CANCER METASTASIZED TO AXILLARY LYMPH NODE, LEFT: Primary | ICD-10-CM

## 2021-03-12 DIAGNOSIS — I10 ESSENTIAL HYPERTENSION: ICD-10-CM

## 2021-03-12 DIAGNOSIS — C77.3 BREAST CANCER METASTASIZED TO AXILLARY LYMPH NODE, LEFT: Primary | ICD-10-CM

## 2021-03-12 DIAGNOSIS — E55.9 MILD VITAMIN D DEFICIENCY: ICD-10-CM

## 2021-03-12 DIAGNOSIS — D50.8 IRON DEFICIENCY ANEMIA SECONDARY TO INADEQUATE DIETARY IRON INTAKE: ICD-10-CM

## 2021-03-12 DIAGNOSIS — D63.0 ANEMIA IN NEOPLASTIC DISEASE: ICD-10-CM

## 2021-03-12 LAB
ALBUMIN SERPL BCP-MCNC: 3.6 G/DL (ref 3.5–5.2)
ALP SERPL-CCNC: 108 U/L (ref 55–135)
ALT SERPL W/O P-5'-P-CCNC: 9 U/L (ref 10–44)
ANION GAP SERPL CALC-SCNC: 10 MMOL/L (ref 8–16)
AST SERPL-CCNC: 14 U/L (ref 10–40)
BILIRUB SERPL-MCNC: 0.5 MG/DL (ref 0.1–1)
BUN SERPL-MCNC: 17 MG/DL (ref 8–23)
CALCIUM SERPL-MCNC: 9.8 MG/DL (ref 8.7–10.5)
CHLORIDE SERPL-SCNC: 103 MMOL/L (ref 95–110)
CO2 SERPL-SCNC: 27 MMOL/L (ref 23–29)
CREAT SERPL-MCNC: 0.9 MG/DL (ref 0.5–1.4)
ERYTHROCYTE [DISTWIDTH] IN BLOOD BY AUTOMATED COUNT: 15.3 % (ref 11.5–14.5)
EST. GFR  (AFRICAN AMERICAN): >60 ML/MIN/1.73 M^2
EST. GFR  (NON AFRICAN AMERICAN): >60 ML/MIN/1.73 M^2
FERRITIN SERPL-MCNC: 120 NG/ML (ref 20–300)
GLUCOSE SERPL-MCNC: 115 MG/DL (ref 70–110)
HCT VFR BLD AUTO: 36 % (ref 37–48.5)
HGB BLD-MCNC: 11.5 G/DL (ref 12–16)
IMM GRANULOCYTES # BLD AUTO: 0.01 K/UL (ref 0–0.04)
IRON SERPL-MCNC: 50 UG/DL (ref 30–160)
MCH RBC QN AUTO: 29.3 PG (ref 27–31)
MCHC RBC AUTO-ENTMCNC: 31.9 G/DL (ref 32–36)
MCV RBC AUTO: 92 FL (ref 82–98)
NEUTROPHILS # BLD AUTO: 4 K/UL (ref 1.8–7.7)
PLATELET # BLD AUTO: 264 K/UL (ref 150–350)
PMV BLD AUTO: 9.5 FL (ref 9.2–12.9)
POTASSIUM SERPL-SCNC: 3.6 MMOL/L (ref 3.5–5.1)
PROT SERPL-MCNC: 7.7 G/DL (ref 6–8.4)
RBC # BLD AUTO: 3.92 M/UL (ref 4–5.4)
SATURATED IRON: 13 % (ref 20–50)
SODIUM SERPL-SCNC: 140 MMOL/L (ref 136–145)
TOTAL IRON BINDING CAPACITY: 397 UG/DL (ref 250–450)
TRANSFERRIN SERPL-MCNC: 268 MG/DL (ref 200–375)
WBC # BLD AUTO: 5.82 K/UL (ref 3.9–12.7)

## 2021-03-12 PROCEDURE — 1126F PR PAIN SEVERITY QUANTIFIED, NO PAIN PRESENT: ICD-10-PCS | Mod: S$GLB,,, | Performed by: INTERNAL MEDICINE

## 2021-03-12 PROCEDURE — 3044F HG A1C LEVEL LT 7.0%: CPT | Mod: CPTII,S$GLB,, | Performed by: INTERNAL MEDICINE

## 2021-03-12 PROCEDURE — 3288F PR FALLS RISK ASSESSMENT DOCUMENTED: ICD-10-PCS | Mod: CPTII,S$GLB,, | Performed by: INTERNAL MEDICINE

## 2021-03-12 PROCEDURE — 99214 PR OFFICE/OUTPT VISIT, EST, LEVL IV, 30-39 MIN: ICD-10-PCS | Mod: S$GLB,,, | Performed by: INTERNAL MEDICINE

## 2021-03-12 PROCEDURE — 83540 ASSAY OF IRON: CPT | Performed by: NURSE PRACTITIONER

## 2021-03-12 PROCEDURE — 3080F PR MOST RECENT DIASTOLIC BLOOD PRESSURE >= 90 MM HG: ICD-10-PCS | Mod: CPTII,S$GLB,, | Performed by: INTERNAL MEDICINE

## 2021-03-12 PROCEDURE — 3288F FALL RISK ASSESSMENT DOCD: CPT | Mod: CPTII,S$GLB,, | Performed by: INTERNAL MEDICINE

## 2021-03-12 PROCEDURE — 3044F PR MOST RECENT HEMOGLOBIN A1C LEVEL <7.0%: ICD-10-PCS | Mod: CPTII,S$GLB,, | Performed by: INTERNAL MEDICINE

## 2021-03-12 PROCEDURE — 36591 DRAW BLOOD OFF VENOUS DEVICE: CPT

## 2021-03-12 PROCEDURE — 3008F PR BODY MASS INDEX (BMI) DOCUMENTED: ICD-10-PCS | Mod: CPTII,S$GLB,, | Performed by: INTERNAL MEDICINE

## 2021-03-12 PROCEDURE — 1101F PR PT FALLS ASSESS DOC 0-1 FALLS W/OUT INJ PAST YR: ICD-10-PCS | Mod: CPTII,S$GLB,, | Performed by: INTERNAL MEDICINE

## 2021-03-12 PROCEDURE — 3080F DIAST BP >= 90 MM HG: CPT | Mod: CPTII,S$GLB,, | Performed by: INTERNAL MEDICINE

## 2021-03-12 PROCEDURE — 3077F PR MOST RECENT SYSTOLIC BLOOD PRESSURE >= 140 MM HG: ICD-10-PCS | Mod: CPTII,S$GLB,, | Performed by: INTERNAL MEDICINE

## 2021-03-12 PROCEDURE — 1126F AMNT PAIN NOTED NONE PRSNT: CPT | Mod: S$GLB,,, | Performed by: INTERNAL MEDICINE

## 2021-03-12 PROCEDURE — 82728 ASSAY OF FERRITIN: CPT | Performed by: NURSE PRACTITIONER

## 2021-03-12 PROCEDURE — 99999 PR PBB SHADOW E&M-EST. PATIENT-LVL IV: ICD-10-PCS | Mod: PBBFAC,,, | Performed by: INTERNAL MEDICINE

## 2021-03-12 PROCEDURE — A4216 STERILE WATER/SALINE, 10 ML: HCPCS | Performed by: INTERNAL MEDICINE

## 2021-03-12 PROCEDURE — 99214 OFFICE O/P EST MOD 30 MIN: CPT | Mod: S$GLB,,, | Performed by: INTERNAL MEDICINE

## 2021-03-12 PROCEDURE — 63600175 PHARM REV CODE 636 W HCPCS: Performed by: INTERNAL MEDICINE

## 2021-03-12 PROCEDURE — 80053 COMPREHEN METABOLIC PANEL: CPT | Performed by: NURSE PRACTITIONER

## 2021-03-12 PROCEDURE — 99999 PR PBB SHADOW E&M-EST. PATIENT-LVL IV: CPT | Mod: PBBFAC,,, | Performed by: INTERNAL MEDICINE

## 2021-03-12 PROCEDURE — 1101F PT FALLS ASSESS-DOCD LE1/YR: CPT | Mod: CPTII,S$GLB,, | Performed by: INTERNAL MEDICINE

## 2021-03-12 PROCEDURE — 3077F SYST BP >= 140 MM HG: CPT | Mod: CPTII,S$GLB,, | Performed by: INTERNAL MEDICINE

## 2021-03-12 PROCEDURE — 3008F BODY MASS INDEX DOCD: CPT | Mod: CPTII,S$GLB,, | Performed by: INTERNAL MEDICINE

## 2021-03-12 PROCEDURE — 25000003 PHARM REV CODE 250: Performed by: INTERNAL MEDICINE

## 2021-03-12 PROCEDURE — 85027 COMPLETE CBC AUTOMATED: CPT | Performed by: NURSE PRACTITIONER

## 2021-03-12 RX ORDER — SODIUM CHLORIDE 0.9 % (FLUSH) 0.9 %
10 SYRINGE (ML) INJECTION
Status: COMPLETED | OUTPATIENT
Start: 2021-03-12 | End: 2021-03-12

## 2021-03-12 RX ORDER — HEPARIN 100 UNIT/ML
500 SYRINGE INTRAVENOUS
Status: COMPLETED | OUTPATIENT
Start: 2021-03-12 | End: 2021-03-12

## 2021-03-12 RX ADMIN — Medication 10 ML: at 12:03

## 2021-03-12 RX ADMIN — HEPARIN 500 UNITS: 100 SYRINGE at 12:03

## 2021-03-15 ENCOUNTER — TELEPHONE (OUTPATIENT)
Dept: HEMATOLOGY/ONCOLOGY | Facility: CLINIC | Age: 66
End: 2021-03-15

## 2021-03-15 ENCOUNTER — PROCEDURE VISIT (OUTPATIENT)
Dept: SURGERY | Facility: CLINIC | Age: 66
End: 2021-03-15
Payer: COMMERCIAL

## 2021-03-15 VITALS
DIASTOLIC BLOOD PRESSURE: 96 MMHG | WEIGHT: 192.25 LBS | HEIGHT: 60 IN | HEART RATE: 101 BPM | SYSTOLIC BLOOD PRESSURE: 143 MMHG | BODY MASS INDEX: 37.74 KG/M2

## 2021-03-15 DIAGNOSIS — Z85.3 PERSONAL HISTORY OF BREAST CANCER: Primary | ICD-10-CM

## 2021-03-15 PROCEDURE — 36590 PR REMOVAL TUNNELED CV CATH W SUBQ PORT OR PUMP: ICD-10-PCS | Mod: S$GLB,,, | Performed by: SURGERY

## 2021-03-15 PROCEDURE — 36590 REMOVAL TUNNELED CV CATH: CPT | Mod: S$GLB,,, | Performed by: SURGERY

## 2021-03-16 ENCOUNTER — PATIENT MESSAGE (OUTPATIENT)
Dept: SURGERY | Facility: CLINIC | Age: 66
End: 2021-03-16

## 2021-03-18 ENCOUNTER — PATIENT MESSAGE (OUTPATIENT)
Dept: RESEARCH | Facility: HOSPITAL | Age: 66
End: 2021-03-18

## 2021-03-18 ENCOUNTER — TELEPHONE (OUTPATIENT)
Dept: HEMATOLOGY/ONCOLOGY | Facility: CLINIC | Age: 66
End: 2021-03-18

## 2021-03-19 ENCOUNTER — PATIENT MESSAGE (OUTPATIENT)
Dept: OPTOMETRY | Facility: CLINIC | Age: 66
End: 2021-03-19

## 2021-03-19 ENCOUNTER — OFFICE VISIT (OUTPATIENT)
Dept: HEMATOLOGY/ONCOLOGY | Facility: CLINIC | Age: 66
End: 2021-03-19
Payer: COMMERCIAL

## 2021-03-19 VITALS
SYSTOLIC BLOOD PRESSURE: 145 MMHG | BODY MASS INDEX: 37.87 KG/M2 | HEART RATE: 95 BPM | DIASTOLIC BLOOD PRESSURE: 81 MMHG | HEIGHT: 60 IN | WEIGHT: 192.88 LBS

## 2021-03-19 DIAGNOSIS — T45.1X5A NEUROPATHY DUE TO CHEMOTHERAPEUTIC DRUG: ICD-10-CM

## 2021-03-19 DIAGNOSIS — G62.0 NEUROPATHY DUE TO CHEMOTHERAPEUTIC DRUG: ICD-10-CM

## 2021-03-19 DIAGNOSIS — G89.29 CHRONIC LOW BACK PAIN: ICD-10-CM

## 2021-03-19 DIAGNOSIS — M54.50 CHRONIC LOW BACK PAIN: ICD-10-CM

## 2021-03-19 DIAGNOSIS — C50.919 BREAST CANCER: ICD-10-CM

## 2021-03-19 DIAGNOSIS — N95.2 VAGINAL ATROPHY: Primary | ICD-10-CM

## 2021-03-19 DIAGNOSIS — M53.3 SACRAL PAIN: ICD-10-CM

## 2021-03-19 PROCEDURE — 3288F PR FALLS RISK ASSESSMENT DOCUMENTED: ICD-10-PCS | Mod: CPTII,S$GLB,, | Performed by: OBSTETRICS & GYNECOLOGY

## 2021-03-19 PROCEDURE — 99999 PR PBB SHADOW E&M-EST. PATIENT-LVL III: CPT | Mod: PBBFAC,,, | Performed by: OBSTETRICS & GYNECOLOGY

## 2021-03-19 PROCEDURE — 99999 PR PBB SHADOW E&M-EST. PATIENT-LVL III: ICD-10-PCS | Mod: PBBFAC,,, | Performed by: OBSTETRICS & GYNECOLOGY

## 2021-03-19 PROCEDURE — 3077F PR MOST RECENT SYSTOLIC BLOOD PRESSURE >= 140 MM HG: ICD-10-PCS | Mod: CPTII,S$GLB,, | Performed by: OBSTETRICS & GYNECOLOGY

## 2021-03-19 PROCEDURE — 3288F FALL RISK ASSESSMENT DOCD: CPT | Mod: CPTII,S$GLB,, | Performed by: OBSTETRICS & GYNECOLOGY

## 2021-03-19 PROCEDURE — 1126F AMNT PAIN NOTED NONE PRSNT: CPT | Mod: S$GLB,,, | Performed by: OBSTETRICS & GYNECOLOGY

## 2021-03-19 PROCEDURE — 99213 OFFICE O/P EST LOW 20 MIN: CPT | Mod: S$GLB,,, | Performed by: OBSTETRICS & GYNECOLOGY

## 2021-03-19 PROCEDURE — 1126F PR PAIN SEVERITY QUANTIFIED, NO PAIN PRESENT: ICD-10-PCS | Mod: S$GLB,,, | Performed by: OBSTETRICS & GYNECOLOGY

## 2021-03-19 PROCEDURE — 1101F PR PT FALLS ASSESS DOC 0-1 FALLS W/OUT INJ PAST YR: ICD-10-PCS | Mod: CPTII,S$GLB,, | Performed by: OBSTETRICS & GYNECOLOGY

## 2021-03-19 PROCEDURE — 1101F PT FALLS ASSESS-DOCD LE1/YR: CPT | Mod: CPTII,S$GLB,, | Performed by: OBSTETRICS & GYNECOLOGY

## 2021-03-19 PROCEDURE — 3077F SYST BP >= 140 MM HG: CPT | Mod: CPTII,S$GLB,, | Performed by: OBSTETRICS & GYNECOLOGY

## 2021-03-19 PROCEDURE — 3008F BODY MASS INDEX DOCD: CPT | Mod: CPTII,S$GLB,, | Performed by: OBSTETRICS & GYNECOLOGY

## 2021-03-19 PROCEDURE — 3079F PR MOST RECENT DIASTOLIC BLOOD PRESSURE 80-89 MM HG: ICD-10-PCS | Mod: CPTII,S$GLB,, | Performed by: OBSTETRICS & GYNECOLOGY

## 2021-03-19 PROCEDURE — 3008F PR BODY MASS INDEX (BMI) DOCUMENTED: ICD-10-PCS | Mod: CPTII,S$GLB,, | Performed by: OBSTETRICS & GYNECOLOGY

## 2021-03-19 PROCEDURE — 99213 PR OFFICE/OUTPT VISIT, EST, LEVL III, 20-29 MIN: ICD-10-PCS | Mod: S$GLB,,, | Performed by: OBSTETRICS & GYNECOLOGY

## 2021-03-19 PROCEDURE — 3079F DIAST BP 80-89 MM HG: CPT | Mod: CPTII,S$GLB,, | Performed by: OBSTETRICS & GYNECOLOGY

## 2021-03-19 RX ORDER — PRASTERONE 6.5 MG/1
1 INSERT VAGINAL NIGHTLY
Qty: 28 EACH | Refills: 11 | Status: SHIPPED | OUTPATIENT
Start: 2021-03-19 | End: 2021-05-27 | Stop reason: SDUPTHER

## 2021-03-26 ENCOUNTER — PATIENT MESSAGE (OUTPATIENT)
Dept: RESEARCH | Facility: HOSPITAL | Age: 66
End: 2021-03-26

## 2021-03-26 ENCOUNTER — TELEPHONE (OUTPATIENT)
Dept: HEMATOLOGY/ONCOLOGY | Facility: CLINIC | Age: 66
End: 2021-03-26

## 2021-03-29 ENCOUNTER — DOCUMENTATION ONLY (OUTPATIENT)
Dept: REHABILITATION | Facility: HOSPITAL | Age: 66
End: 2021-03-29

## 2021-03-29 DIAGNOSIS — M25.619 DECREASED SHOULDER MOBILITY, UNSPECIFIED LATERALITY: ICD-10-CM

## 2021-03-29 DIAGNOSIS — C77.3 BREAST CANCER METASTASIZED TO AXILLARY LYMPH NODE, LEFT: Primary | ICD-10-CM

## 2021-03-29 DIAGNOSIS — C50.912 BREAST CANCER METASTASIZED TO AXILLARY LYMPH NODE, LEFT: Primary | ICD-10-CM

## 2021-03-29 DIAGNOSIS — M79.89 ARM SWELLING: ICD-10-CM

## 2021-03-30 DIAGNOSIS — E11.21 CONTROLLED TYPE 2 DIABETES MELLITUS WITH DIABETIC NEPHROPATHY, WITHOUT LONG-TERM CURRENT USE OF INSULIN: ICD-10-CM

## 2021-03-30 RX ORDER — DULAGLUTIDE 1.5 MG/.5ML
1.5 INJECTION, SOLUTION SUBCUTANEOUS
Qty: 6 ML | Refills: 3 | Status: SHIPPED | OUTPATIENT
Start: 2021-03-30 | End: 2021-07-07

## 2021-04-16 ENCOUNTER — PATIENT MESSAGE (OUTPATIENT)
Dept: HEMATOLOGY/ONCOLOGY | Facility: CLINIC | Age: 66
End: 2021-04-16

## 2021-04-19 ENCOUNTER — OFFICE VISIT (OUTPATIENT)
Dept: HEMATOLOGY/ONCOLOGY | Facility: CLINIC | Age: 66
End: 2021-04-19
Payer: COMMERCIAL

## 2021-04-19 ENCOUNTER — CLINICAL SUPPORT (OUTPATIENT)
Dept: HEMATOLOGY/ONCOLOGY | Facility: CLINIC | Age: 66
End: 2021-04-19
Payer: COMMERCIAL

## 2021-04-19 VITALS
DIASTOLIC BLOOD PRESSURE: 79 MMHG | BODY MASS INDEX: 38.26 KG/M2 | HEART RATE: 84 BPM | HEIGHT: 60 IN | SYSTOLIC BLOOD PRESSURE: 133 MMHG | WEIGHT: 194.88 LBS

## 2021-04-19 DIAGNOSIS — E66.9 OBESITY (BMI 30-39.9): ICD-10-CM

## 2021-04-19 DIAGNOSIS — C50.912 BREAST CANCER METASTASIZED TO AXILLARY LYMPH NODE, LEFT: Primary | ICD-10-CM

## 2021-04-19 DIAGNOSIS — G89.29 CHRONIC LOW BACK PAIN: ICD-10-CM

## 2021-04-19 DIAGNOSIS — G62.0 NEUROPATHY DUE TO CHEMOTHERAPEUTIC DRUG: ICD-10-CM

## 2021-04-19 DIAGNOSIS — M54.50 CHRONIC LOW BACK PAIN: ICD-10-CM

## 2021-04-19 DIAGNOSIS — C50.919 BREAST CANCER: ICD-10-CM

## 2021-04-19 DIAGNOSIS — C77.3 BREAST CANCER METASTASIZED TO AXILLARY LYMPH NODE, LEFT: Primary | ICD-10-CM

## 2021-04-19 DIAGNOSIS — T45.1X5A NEUROPATHY DUE TO CHEMOTHERAPEUTIC DRUG: ICD-10-CM

## 2021-04-19 PROCEDURE — 3008F PR BODY MASS INDEX (BMI) DOCUMENTED: ICD-10-PCS | Mod: CPTII,S$GLB,, | Performed by: PHYSICIAN ASSISTANT

## 2021-04-19 PROCEDURE — 99999 PR PBB SHADOW E&M-EST. PATIENT-LVL II: CPT | Mod: PBBFAC,,, | Performed by: ACUPUNCTURIST

## 2021-04-19 PROCEDURE — 97813 PR ACUPUNCT W/ ELEC STIMUL 15 MIN: ICD-10-PCS | Mod: S$GLB,,, | Performed by: ACUPUNCTURIST

## 2021-04-19 PROCEDURE — 99999 PR PBB SHADOW E&M-EST. PATIENT-LVL IV: CPT | Mod: PBBFAC,,, | Performed by: PHYSICIAN ASSISTANT

## 2021-04-19 PROCEDURE — 97813 ACUP 1/> W/ESTIM 1ST 15 MIN: CPT | Mod: S$GLB,,, | Performed by: ACUPUNCTURIST

## 2021-04-19 PROCEDURE — 99999 PR PBB SHADOW E&M-EST. PATIENT-LVL II: ICD-10-PCS | Mod: PBBFAC,,, | Performed by: ACUPUNCTURIST

## 2021-04-19 PROCEDURE — 97814 PR ACUPUNCT W/ ELEC STIMUL ADDL 15M: ICD-10-PCS | Mod: S$GLB,,, | Performed by: ACUPUNCTURIST

## 2021-04-19 PROCEDURE — 3008F BODY MASS INDEX DOCD: CPT | Mod: CPTII,S$GLB,, | Performed by: PHYSICIAN ASSISTANT

## 2021-04-19 PROCEDURE — 3288F PR FALLS RISK ASSESSMENT DOCUMENTED: ICD-10-PCS | Mod: CPTII,S$GLB,, | Performed by: PHYSICIAN ASSISTANT

## 2021-04-19 PROCEDURE — 97814 ACUP 1/> W/ESTIM EA ADDL 15: CPT | Mod: S$GLB,,, | Performed by: ACUPUNCTURIST

## 2021-04-19 PROCEDURE — 99213 PR OFFICE/OUTPT VISIT, EST, LEVL III, 20-29 MIN: ICD-10-PCS | Mod: S$GLB,,, | Performed by: PHYSICIAN ASSISTANT

## 2021-04-19 PROCEDURE — 3288F FALL RISK ASSESSMENT DOCD: CPT | Mod: CPTII,S$GLB,, | Performed by: PHYSICIAN ASSISTANT

## 2021-04-19 PROCEDURE — 1101F PR PT FALLS ASSESS DOC 0-1 FALLS W/OUT INJ PAST YR: ICD-10-PCS | Mod: CPTII,S$GLB,, | Performed by: PHYSICIAN ASSISTANT

## 2021-04-19 PROCEDURE — 1101F PT FALLS ASSESS-DOCD LE1/YR: CPT | Mod: CPTII,S$GLB,, | Performed by: PHYSICIAN ASSISTANT

## 2021-04-19 PROCEDURE — 1126F AMNT PAIN NOTED NONE PRSNT: CPT | Mod: S$GLB,,, | Performed by: PHYSICIAN ASSISTANT

## 2021-04-19 PROCEDURE — 1126F PR PAIN SEVERITY QUANTIFIED, NO PAIN PRESENT: ICD-10-PCS | Mod: S$GLB,,, | Performed by: PHYSICIAN ASSISTANT

## 2021-04-19 PROCEDURE — 99999 PR PBB SHADOW E&M-EST. PATIENT-LVL IV: ICD-10-PCS | Mod: PBBFAC,,, | Performed by: PHYSICIAN ASSISTANT

## 2021-04-19 PROCEDURE — 99213 OFFICE O/P EST LOW 20 MIN: CPT | Mod: S$GLB,,, | Performed by: PHYSICIAN ASSISTANT

## 2021-04-26 ENCOUNTER — CLINICAL SUPPORT (OUTPATIENT)
Dept: HEMATOLOGY/ONCOLOGY | Facility: CLINIC | Age: 66
End: 2021-04-26
Payer: COMMERCIAL

## 2021-04-26 DIAGNOSIS — G62.0 NEUROPATHY DUE TO CHEMOTHERAPEUTIC DRUG: ICD-10-CM

## 2021-04-26 DIAGNOSIS — T45.1X5A NEUROPATHY DUE TO CHEMOTHERAPEUTIC DRUG: ICD-10-CM

## 2021-04-26 DIAGNOSIS — M54.50 CHRONIC BILATERAL LOW BACK PAIN WITHOUT SCIATICA: Primary | ICD-10-CM

## 2021-04-26 DIAGNOSIS — G89.29 CHRONIC BILATERAL LOW BACK PAIN WITHOUT SCIATICA: Primary | ICD-10-CM

## 2021-04-26 PROCEDURE — 97814 PR ACUPUNCT W/ ELEC STIMUL ADDL 15M: ICD-10-PCS | Mod: S$GLB,,, | Performed by: ACUPUNCTURIST

## 2021-04-26 PROCEDURE — 97813 PR ACUPUNCT W/ ELEC STIMUL 15 MIN: ICD-10-PCS | Mod: S$GLB,,, | Performed by: ACUPUNCTURIST

## 2021-04-26 PROCEDURE — 97814 ACUP 1/> W/ESTIM EA ADDL 15: CPT | Mod: S$GLB,,, | Performed by: ACUPUNCTURIST

## 2021-04-26 PROCEDURE — 97813 ACUP 1/> W/ESTIM 1ST 15 MIN: CPT | Mod: S$GLB,,, | Performed by: ACUPUNCTURIST

## 2021-04-27 ENCOUNTER — TELEPHONE (OUTPATIENT)
Dept: HEMATOLOGY/ONCOLOGY | Facility: CLINIC | Age: 66
End: 2021-04-27

## 2021-05-03 ENCOUNTER — PATIENT MESSAGE (OUTPATIENT)
Dept: HEMATOLOGY/ONCOLOGY | Facility: CLINIC | Age: 66
End: 2021-05-03

## 2021-05-03 ENCOUNTER — PATIENT MESSAGE (OUTPATIENT)
Dept: ORTHOPEDICS | Facility: CLINIC | Age: 66
End: 2021-05-03

## 2021-05-05 ENCOUNTER — PATIENT MESSAGE (OUTPATIENT)
Dept: HEMATOLOGY/ONCOLOGY | Facility: CLINIC | Age: 66
End: 2021-05-05

## 2021-05-05 ENCOUNTER — PATIENT MESSAGE (OUTPATIENT)
Dept: ADMINISTRATIVE | Facility: OTHER | Age: 66
End: 2021-05-05

## 2021-05-05 DIAGNOSIS — E66.9 OBESITY, UNSPECIFIED CLASSIFICATION, UNSPECIFIED OBESITY TYPE, UNSPECIFIED WHETHER SERIOUS COMORBIDITY PRESENT: ICD-10-CM

## 2021-05-05 DIAGNOSIS — E11.21 CONTROLLED TYPE 2 DIABETES MELLITUS WITH DIABETIC NEPHROPATHY, WITHOUT LONG-TERM CURRENT USE OF INSULIN: ICD-10-CM

## 2021-05-05 DIAGNOSIS — C77.3 BREAST CANCER METASTASIZED TO AXILLARY LYMPH NODE, LEFT: Primary | ICD-10-CM

## 2021-05-05 DIAGNOSIS — C50.912 BREAST CANCER METASTASIZED TO AXILLARY LYMPH NODE, LEFT: Primary | ICD-10-CM

## 2021-05-07 ENCOUNTER — TELEPHONE (OUTPATIENT)
Dept: HEMATOLOGY/ONCOLOGY | Facility: CLINIC | Age: 66
End: 2021-05-07

## 2021-05-09 ENCOUNTER — PATIENT MESSAGE (OUTPATIENT)
Dept: HEMATOLOGY/ONCOLOGY | Facility: CLINIC | Age: 66
End: 2021-05-09

## 2021-05-10 DIAGNOSIS — G62.9 NEUROPATHY: ICD-10-CM

## 2021-05-10 RX ORDER — GABAPENTIN 300 MG/1
300 CAPSULE ORAL 3 TIMES DAILY
Qty: 90 CAPSULE | Refills: 4 | Status: SHIPPED | OUTPATIENT
Start: 2021-05-10 | End: 2021-06-15

## 2021-05-11 ENCOUNTER — OFFICE VISIT (OUTPATIENT)
Dept: PSYCHIATRY | Facility: CLINIC | Age: 66
End: 2021-05-11
Payer: COMMERCIAL

## 2021-05-11 DIAGNOSIS — C77.3 BREAST CANCER METASTASIZED TO AXILLARY LYMPH NODE, LEFT: ICD-10-CM

## 2021-05-11 DIAGNOSIS — C50.919 MALIGNANT NEOPLASM OF FEMALE BREAST, UNSPECIFIED ESTROGEN RECEPTOR STATUS, UNSPECIFIED LATERALITY, UNSPECIFIED SITE OF BREAST: ICD-10-CM

## 2021-05-11 DIAGNOSIS — E66.9 OBESITY (BMI 30-39.9): Primary | ICD-10-CM

## 2021-05-11 DIAGNOSIS — C50.912 BREAST CANCER METASTASIZED TO AXILLARY LYMPH NODE, LEFT: ICD-10-CM

## 2021-05-11 PROCEDURE — 99999 PR PBB SHADOW E&M-EST. PATIENT-LVL I: CPT | Mod: PBBFAC,,,

## 2021-05-11 PROCEDURE — 99999 PR PBB SHADOW E&M-EST. PATIENT-LVL I: ICD-10-PCS | Mod: PBBFAC,,,

## 2021-05-11 PROCEDURE — 96164 HLTH BHV IVNTJ GRP 1ST 30: CPT | Mod: S$GLB,,, | Performed by: PSYCHOLOGIST

## 2021-05-11 PROCEDURE — 96165 PR INTERVENTION, HEALTH BEHAVIOR, GROUP, EA ADDTL 15 MINS: ICD-10-PCS | Mod: S$GLB,,, | Performed by: PSYCHOLOGIST

## 2021-05-11 PROCEDURE — 96165 HLTH BHV IVNTJ GRP EA ADDL: CPT | Mod: S$GLB,,, | Performed by: PSYCHOLOGIST

## 2021-05-11 PROCEDURE — 96164 PR INTERVENTION, HEALTH BEHAVIOR, GROUP, 1ST 30 MINS: ICD-10-PCS | Mod: S$GLB,,, | Performed by: PSYCHOLOGIST

## 2021-05-14 ENCOUNTER — TELEPHONE (OUTPATIENT)
Dept: HEMATOLOGY/ONCOLOGY | Facility: CLINIC | Age: 66
End: 2021-05-14

## 2021-05-18 ENCOUNTER — CLINICAL SUPPORT (OUTPATIENT)
Dept: HEMATOLOGY/ONCOLOGY | Facility: CLINIC | Age: 66
End: 2021-05-18
Payer: COMMERCIAL

## 2021-05-18 DIAGNOSIS — T45.1X5A NEUROPATHY DUE TO CHEMOTHERAPEUTIC DRUG: ICD-10-CM

## 2021-05-18 DIAGNOSIS — G89.29 CHRONIC BILATERAL LOW BACK PAIN WITHOUT SCIATICA: Primary | ICD-10-CM

## 2021-05-18 DIAGNOSIS — M54.50 CHRONIC BILATERAL LOW BACK PAIN WITHOUT SCIATICA: Primary | ICD-10-CM

## 2021-05-18 DIAGNOSIS — G62.0 NEUROPATHY DUE TO CHEMOTHERAPEUTIC DRUG: ICD-10-CM

## 2021-05-18 PROCEDURE — 97813 ACUP 1/> W/ESTIM 1ST 15 MIN: CPT | Mod: S$GLB,,, | Performed by: ACUPUNCTURIST

## 2021-05-18 PROCEDURE — 97814 PR ACUPUNCT W/ ELEC STIMUL ADDL 15M: ICD-10-PCS | Mod: S$GLB,,, | Performed by: ACUPUNCTURIST

## 2021-05-18 PROCEDURE — 97814 ACUP 1/> W/ESTIM EA ADDL 15: CPT | Mod: S$GLB,,, | Performed by: ACUPUNCTURIST

## 2021-05-18 PROCEDURE — 97813 PR ACUPUNCT W/ ELEC STIMUL 15 MIN: ICD-10-PCS | Mod: S$GLB,,, | Performed by: ACUPUNCTURIST

## 2021-05-20 ENCOUNTER — PATIENT MESSAGE (OUTPATIENT)
Dept: HEMATOLOGY/ONCOLOGY | Facility: CLINIC | Age: 66
End: 2021-05-20

## 2021-05-25 ENCOUNTER — NUTRITION (OUTPATIENT)
Dept: PSYCHIATRY | Facility: CLINIC | Age: 66
End: 2021-05-25
Payer: COMMERCIAL

## 2021-05-25 ENCOUNTER — PATIENT MESSAGE (OUTPATIENT)
Dept: HEMATOLOGY/ONCOLOGY | Facility: CLINIC | Age: 66
End: 2021-05-25

## 2021-05-25 ENCOUNTER — OFFICE VISIT (OUTPATIENT)
Dept: OPTOMETRY | Facility: CLINIC | Age: 66
End: 2021-05-25
Payer: COMMERCIAL

## 2021-05-25 ENCOUNTER — PATIENT MESSAGE (OUTPATIENT)
Dept: OPTOMETRY | Facility: CLINIC | Age: 66
End: 2021-05-25

## 2021-05-25 VITALS — WEIGHT: 200.63 LBS | HEIGHT: 60 IN | BODY MASS INDEX: 39.39 KG/M2

## 2021-05-25 DIAGNOSIS — C50.912 BREAST CANCER METASTASIZED TO AXILLARY LYMPH NODE, LEFT: ICD-10-CM

## 2021-05-25 DIAGNOSIS — H16.102 SUPERFICIAL KERATITIS OF LEFT EYE: Primary | ICD-10-CM

## 2021-05-25 DIAGNOSIS — C77.3 BREAST CANCER METASTASIZED TO AXILLARY LYMPH NODE, LEFT: ICD-10-CM

## 2021-05-25 DIAGNOSIS — E66.9 OBESITY (BMI 30-39.9): ICD-10-CM

## 2021-05-25 DIAGNOSIS — Z71.3 NUTRITIONAL COUNSELING: Primary | ICD-10-CM

## 2021-05-25 PROCEDURE — 92012 PR EYE EXAM, EST PATIENT,INTERMED: ICD-10-PCS | Mod: S$GLB,,, | Performed by: OPTOMETRIST

## 2021-05-25 PROCEDURE — 1126F PR PAIN SEVERITY QUANTIFIED, NO PAIN PRESENT: ICD-10-PCS | Mod: S$GLB,,, | Performed by: OPTOMETRIST

## 2021-05-25 PROCEDURE — 99999 PR PBB SHADOW E&M-EST. PATIENT-LVL III: ICD-10-PCS | Mod: PBBFAC,,, | Performed by: OPTOMETRIST

## 2021-05-25 PROCEDURE — 99999 PR PBB SHADOW E&M-EST. PATIENT-LVL II: CPT | Mod: PBBFAC,,,

## 2021-05-25 PROCEDURE — 97804 MEDICAL NUTRITION GROUP: CPT | Mod: S$GLB,,, | Performed by: DIETITIAN, REGISTERED

## 2021-05-25 PROCEDURE — 99999 PR PBB SHADOW E&M-EST. PATIENT-LVL III: CPT | Mod: PBBFAC,,, | Performed by: OPTOMETRIST

## 2021-05-25 PROCEDURE — 1101F PR PT FALLS ASSESS DOC 0-1 FALLS W/OUT INJ PAST YR: ICD-10-PCS | Mod: CPTII,S$GLB,, | Performed by: OPTOMETRIST

## 2021-05-25 PROCEDURE — 92012 INTRM OPH EXAM EST PATIENT: CPT | Mod: S$GLB,,, | Performed by: OPTOMETRIST

## 2021-05-25 PROCEDURE — 1126F AMNT PAIN NOTED NONE PRSNT: CPT | Mod: S$GLB,,, | Performed by: OPTOMETRIST

## 2021-05-25 PROCEDURE — 97804 PR MED NUTR THER, GROUP, EA 30 MIN: ICD-10-PCS | Mod: S$GLB,,, | Performed by: DIETITIAN, REGISTERED

## 2021-05-25 PROCEDURE — 3288F FALL RISK ASSESSMENT DOCD: CPT | Mod: CPTII,S$GLB,, | Performed by: OPTOMETRIST

## 2021-05-25 PROCEDURE — 99999 PR PBB SHADOW E&M-EST. PATIENT-LVL II: ICD-10-PCS | Mod: PBBFAC,,,

## 2021-05-25 PROCEDURE — 3288F PR FALLS RISK ASSESSMENT DOCUMENTED: ICD-10-PCS | Mod: CPTII,S$GLB,, | Performed by: OPTOMETRIST

## 2021-05-25 PROCEDURE — 1101F PT FALLS ASSESS-DOCD LE1/YR: CPT | Mod: CPTII,S$GLB,, | Performed by: OPTOMETRIST

## 2021-05-25 RX ORDER — ERYTHROMYCIN 5 MG/G
OINTMENT OPHTHALMIC EVERY 4 HOURS
Qty: 3.5 G | Refills: 0 | Status: SHIPPED | OUTPATIENT
Start: 2021-05-25 | End: 2021-05-30

## 2021-05-26 ENCOUNTER — PATIENT OUTREACH (OUTPATIENT)
Dept: ADMINISTRATIVE | Facility: OTHER | Age: 66
End: 2021-05-26

## 2021-05-26 DIAGNOSIS — E11.9 TYPE 2 DIABETES MELLITUS WITHOUT COMPLICATION, WITHOUT LONG-TERM CURRENT USE OF INSULIN: Primary | ICD-10-CM

## 2021-05-26 DIAGNOSIS — M17.12 PRIMARY OSTEOARTHRITIS OF LEFT KNEE: Primary | ICD-10-CM

## 2021-05-26 DIAGNOSIS — E11.21 CONTROLLED TYPE 2 DIABETES MELLITUS WITH DIABETIC NEPHROPATHY, WITHOUT LONG-TERM CURRENT USE OF INSULIN: ICD-10-CM

## 2021-05-27 ENCOUNTER — PATIENT MESSAGE (OUTPATIENT)
Dept: ADMINISTRATIVE | Facility: OTHER | Age: 66
End: 2021-05-27

## 2021-05-27 ENCOUNTER — PATIENT MESSAGE (OUTPATIENT)
Dept: HEMATOLOGY/ONCOLOGY | Facility: CLINIC | Age: 66
End: 2021-05-27

## 2021-05-27 ENCOUNTER — OFFICE VISIT (OUTPATIENT)
Dept: OBSTETRICS AND GYNECOLOGY | Facility: CLINIC | Age: 66
End: 2021-05-27
Attending: OBSTETRICS & GYNECOLOGY
Payer: COMMERCIAL

## 2021-05-27 VITALS
DIASTOLIC BLOOD PRESSURE: 86 MMHG | BODY MASS INDEX: 39.51 KG/M2 | SYSTOLIC BLOOD PRESSURE: 136 MMHG | WEIGHT: 201.25 LBS | HEIGHT: 60 IN

## 2021-05-27 DIAGNOSIS — Z01.419 ENCOUNTER FOR GYNECOLOGICAL EXAMINATION WITHOUT ABNORMAL FINDING: ICD-10-CM

## 2021-05-27 DIAGNOSIS — N95.2 VAGINAL ATROPHY: ICD-10-CM

## 2021-05-27 DIAGNOSIS — E55.9 VITAMIN D DEFICIENCY: Primary | ICD-10-CM

## 2021-05-27 PROCEDURE — 1126F PR PAIN SEVERITY QUANTIFIED, NO PAIN PRESENT: ICD-10-PCS | Mod: S$GLB,,, | Performed by: OBSTETRICS & GYNECOLOGY

## 2021-05-27 PROCEDURE — 3288F PR FALLS RISK ASSESSMENT DOCUMENTED: ICD-10-PCS | Mod: CPTII,S$GLB,, | Performed by: OBSTETRICS & GYNECOLOGY

## 2021-05-27 PROCEDURE — 99397 PR PREVENTIVE VISIT,EST,65 & OVER: ICD-10-PCS | Mod: S$GLB,,, | Performed by: OBSTETRICS & GYNECOLOGY

## 2021-05-27 PROCEDURE — 99397 PER PM REEVAL EST PAT 65+ YR: CPT | Mod: S$GLB,,, | Performed by: OBSTETRICS & GYNECOLOGY

## 2021-05-27 PROCEDURE — 3008F PR BODY MASS INDEX (BMI) DOCUMENTED: ICD-10-PCS | Mod: CPTII,S$GLB,, | Performed by: OBSTETRICS & GYNECOLOGY

## 2021-05-27 PROCEDURE — 1101F PT FALLS ASSESS-DOCD LE1/YR: CPT | Mod: CPTII,S$GLB,, | Performed by: OBSTETRICS & GYNECOLOGY

## 2021-05-27 PROCEDURE — 3008F BODY MASS INDEX DOCD: CPT | Mod: CPTII,S$GLB,, | Performed by: OBSTETRICS & GYNECOLOGY

## 2021-05-27 PROCEDURE — 1101F PR PT FALLS ASSESS DOC 0-1 FALLS W/OUT INJ PAST YR: ICD-10-PCS | Mod: CPTII,S$GLB,, | Performed by: OBSTETRICS & GYNECOLOGY

## 2021-05-27 PROCEDURE — 3288F FALL RISK ASSESSMENT DOCD: CPT | Mod: CPTII,S$GLB,, | Performed by: OBSTETRICS & GYNECOLOGY

## 2021-05-27 PROCEDURE — 1126F AMNT PAIN NOTED NONE PRSNT: CPT | Mod: S$GLB,,, | Performed by: OBSTETRICS & GYNECOLOGY

## 2021-05-27 RX ORDER — PRASTERONE 6.5 MG/1
1 INSERT VAGINAL NIGHTLY
Qty: 28 EACH | Refills: 11 | Status: SHIPPED | OUTPATIENT
Start: 2021-05-27

## 2021-05-28 ENCOUNTER — PATIENT MESSAGE (OUTPATIENT)
Dept: ADMINISTRATIVE | Facility: OTHER | Age: 66
End: 2021-05-28

## 2021-05-28 ENCOUNTER — OFFICE VISIT (OUTPATIENT)
Dept: OPTOMETRY | Facility: CLINIC | Age: 66
End: 2021-05-28
Payer: COMMERCIAL

## 2021-05-28 DIAGNOSIS — H16.102 SUPERFICIAL KERATITIS OF LEFT EYE: Primary | ICD-10-CM

## 2021-05-28 PROCEDURE — 99999 PR PBB SHADOW E&M-EST. PATIENT-LVL III: CPT | Mod: PBBFAC,,, | Performed by: OPTOMETRIST

## 2021-05-28 PROCEDURE — 99999 PR PBB SHADOW E&M-EST. PATIENT-LVL III: ICD-10-PCS | Mod: PBBFAC,,, | Performed by: OPTOMETRIST

## 2021-05-28 PROCEDURE — 3288F PR FALLS RISK ASSESSMENT DOCUMENTED: ICD-10-PCS | Mod: CPTII,S$GLB,, | Performed by: OPTOMETRIST

## 2021-05-28 PROCEDURE — 92012 INTRM OPH EXAM EST PATIENT: CPT | Mod: S$GLB,,, | Performed by: OPTOMETRIST

## 2021-05-28 PROCEDURE — 1101F PT FALLS ASSESS-DOCD LE1/YR: CPT | Mod: CPTII,S$GLB,, | Performed by: OPTOMETRIST

## 2021-05-28 PROCEDURE — 3288F FALL RISK ASSESSMENT DOCD: CPT | Mod: CPTII,S$GLB,, | Performed by: OPTOMETRIST

## 2021-05-28 PROCEDURE — 92012 PR EYE EXAM, EST PATIENT,INTERMED: ICD-10-PCS | Mod: S$GLB,,, | Performed by: OPTOMETRIST

## 2021-05-28 PROCEDURE — 1101F PR PT FALLS ASSESS DOC 0-1 FALLS W/OUT INJ PAST YR: ICD-10-PCS | Mod: CPTII,S$GLB,, | Performed by: OPTOMETRIST

## 2021-05-28 PROCEDURE — 1126F PR PAIN SEVERITY QUANTIFIED, NO PAIN PRESENT: ICD-10-PCS | Mod: S$GLB,,, | Performed by: OPTOMETRIST

## 2021-05-28 PROCEDURE — 1126F AMNT PAIN NOTED NONE PRSNT: CPT | Mod: S$GLB,,, | Performed by: OPTOMETRIST

## 2021-06-01 ENCOUNTER — CLINICAL SUPPORT (OUTPATIENT)
Dept: HEMATOLOGY/ONCOLOGY | Facility: CLINIC | Age: 66
End: 2021-06-01
Payer: COMMERCIAL

## 2021-06-01 DIAGNOSIS — G62.0 NEUROPATHY DUE TO CHEMOTHERAPEUTIC DRUG: ICD-10-CM

## 2021-06-01 DIAGNOSIS — M54.50 CHRONIC BILATERAL LOW BACK PAIN WITHOUT SCIATICA: Primary | ICD-10-CM

## 2021-06-01 DIAGNOSIS — T45.1X5A NEUROPATHY DUE TO CHEMOTHERAPEUTIC DRUG: ICD-10-CM

## 2021-06-01 DIAGNOSIS — G89.29 CHRONIC BILATERAL LOW BACK PAIN WITHOUT SCIATICA: Primary | ICD-10-CM

## 2021-06-01 PROCEDURE — 97814 ACUP 1/> W/ESTIM EA ADDL 15: CPT | Mod: S$GLB,,, | Performed by: ACUPUNCTURIST

## 2021-06-01 PROCEDURE — 97813 PR ACUPUNCT W/ ELEC STIMUL 15 MIN: ICD-10-PCS | Mod: S$GLB,,, | Performed by: ACUPUNCTURIST

## 2021-06-01 PROCEDURE — 97814 PR ACUPUNCT W/ ELEC STIMUL ADDL 15M: ICD-10-PCS | Mod: S$GLB,,, | Performed by: ACUPUNCTURIST

## 2021-06-01 PROCEDURE — 97813 ACUP 1/> W/ESTIM 1ST 15 MIN: CPT | Mod: S$GLB,,, | Performed by: ACUPUNCTURIST

## 2021-06-03 ENCOUNTER — PATIENT MESSAGE (OUTPATIENT)
Dept: HEMATOLOGY/ONCOLOGY | Facility: CLINIC | Age: 66
End: 2021-06-03

## 2021-06-07 ENCOUNTER — PATIENT MESSAGE (OUTPATIENT)
Dept: ORTHOPEDICS | Facility: CLINIC | Age: 66
End: 2021-06-07

## 2021-06-07 DIAGNOSIS — C77.3 BREAST CANCER METASTASIZED TO AXILLARY LYMPH NODE, LEFT: ICD-10-CM

## 2021-06-07 DIAGNOSIS — C50.912 BREAST CANCER METASTASIZED TO AXILLARY LYMPH NODE, LEFT: ICD-10-CM

## 2021-06-07 RX ORDER — LETROZOLE 2.5 MG/1
2.5 TABLET, FILM COATED ORAL DAILY
Qty: 30 TABLET | Refills: 3 | Status: SHIPPED | OUTPATIENT
Start: 2021-06-07 | End: 2022-04-01 | Stop reason: SDUPTHER

## 2021-06-08 ENCOUNTER — OFFICE VISIT (OUTPATIENT)
Dept: PSYCHIATRY | Facility: CLINIC | Age: 66
End: 2021-06-08
Payer: COMMERCIAL

## 2021-06-08 ENCOUNTER — CLINICAL SUPPORT (OUTPATIENT)
Dept: HEMATOLOGY/ONCOLOGY | Facility: CLINIC | Age: 66
End: 2021-06-08
Payer: COMMERCIAL

## 2021-06-08 DIAGNOSIS — M54.50 CHRONIC BILATERAL LOW BACK PAIN WITHOUT SCIATICA: Primary | ICD-10-CM

## 2021-06-08 DIAGNOSIS — E66.9 OBESITY (BMI 30-39.9): Primary | ICD-10-CM

## 2021-06-08 DIAGNOSIS — C50.919 MALIGNANT NEOPLASM OF FEMALE BREAST, UNSPECIFIED ESTROGEN RECEPTOR STATUS, UNSPECIFIED LATERALITY, UNSPECIFIED SITE OF BREAST: ICD-10-CM

## 2021-06-08 DIAGNOSIS — T45.1X5A NEUROPATHY DUE TO CHEMOTHERAPEUTIC DRUG: ICD-10-CM

## 2021-06-08 DIAGNOSIS — G62.0 NEUROPATHY DUE TO CHEMOTHERAPEUTIC DRUG: ICD-10-CM

## 2021-06-08 DIAGNOSIS — G89.29 CHRONIC BILATERAL LOW BACK PAIN WITHOUT SCIATICA: Primary | ICD-10-CM

## 2021-06-08 PROCEDURE — 97814 ACUP 1/> W/ESTIM EA ADDL 15: CPT | Mod: S$GLB,,, | Performed by: ACUPUNCTURIST

## 2021-06-08 PROCEDURE — 99999 PR PBB SHADOW E&M-EST. PATIENT-LVL I: CPT | Mod: PBBFAC,,,

## 2021-06-08 PROCEDURE — 97813 PR ACUPUNCT W/ ELEC STIMUL 15 MIN: ICD-10-PCS | Mod: S$GLB,,, | Performed by: ACUPUNCTURIST

## 2021-06-08 PROCEDURE — 96165 PR INTERVENTION, HEALTH BEHAVIOR, GROUP, EA ADDTL 15 MINS: ICD-10-PCS | Mod: S$GLB,,, | Performed by: PSYCHOLOGIST

## 2021-06-08 PROCEDURE — 99999 PR PBB SHADOW E&M-EST. PATIENT-LVL I: ICD-10-PCS | Mod: PBBFAC,,,

## 2021-06-08 PROCEDURE — 97814 PR ACUPUNCT W/ ELEC STIMUL ADDL 15M: ICD-10-PCS | Mod: S$GLB,,, | Performed by: ACUPUNCTURIST

## 2021-06-08 PROCEDURE — 96164 PR INTERVENTION, HEALTH BEHAVIOR, GROUP, 1ST 30 MINS: ICD-10-PCS | Mod: S$GLB,,, | Performed by: PSYCHOLOGIST

## 2021-06-08 PROCEDURE — 96164 HLTH BHV IVNTJ GRP 1ST 30: CPT | Mod: S$GLB,,, | Performed by: PSYCHOLOGIST

## 2021-06-08 PROCEDURE — 97813 ACUP 1/> W/ESTIM 1ST 15 MIN: CPT | Mod: S$GLB,,, | Performed by: ACUPUNCTURIST

## 2021-06-08 PROCEDURE — 96165 HLTH BHV IVNTJ GRP EA ADDL: CPT | Mod: S$GLB,,, | Performed by: PSYCHOLOGIST

## 2021-06-10 ENCOUNTER — TELEPHONE (OUTPATIENT)
Dept: HEMATOLOGY/ONCOLOGY | Facility: CLINIC | Age: 66
End: 2021-06-10

## 2021-06-15 ENCOUNTER — OFFICE VISIT (OUTPATIENT)
Dept: HEMATOLOGY/ONCOLOGY | Facility: CLINIC | Age: 66
End: 2021-06-15
Payer: COMMERCIAL

## 2021-06-15 ENCOUNTER — LAB VISIT (OUTPATIENT)
Dept: LAB | Facility: HOSPITAL | Age: 66
End: 2021-06-15
Attending: INTERNAL MEDICINE
Payer: COMMERCIAL

## 2021-06-15 ENCOUNTER — TELEPHONE (OUTPATIENT)
Dept: PRIMARY CARE CLINIC | Facility: CLINIC | Age: 66
End: 2021-06-15

## 2021-06-15 ENCOUNTER — OFFICE VISIT (OUTPATIENT)
Dept: PRIMARY CARE CLINIC | Facility: CLINIC | Age: 66
End: 2021-06-15
Attending: FAMILY MEDICINE
Payer: COMMERCIAL

## 2021-06-15 ENCOUNTER — CLINICAL SUPPORT (OUTPATIENT)
Dept: HEMATOLOGY/ONCOLOGY | Facility: CLINIC | Age: 66
End: 2021-06-15
Payer: COMMERCIAL

## 2021-06-15 VITALS
BODY MASS INDEX: 38.82 KG/M2 | SYSTOLIC BLOOD PRESSURE: 131 MMHG | OXYGEN SATURATION: 97 % | HEIGHT: 60 IN | HEART RATE: 103 BPM | TEMPERATURE: 98 F | RESPIRATION RATE: 16 BRPM | WEIGHT: 197.75 LBS | DIASTOLIC BLOOD PRESSURE: 87 MMHG

## 2021-06-15 DIAGNOSIS — R53.83 FATIGUE, UNSPECIFIED TYPE: ICD-10-CM

## 2021-06-15 DIAGNOSIS — G62.0 NEUROPATHY DUE TO CHEMOTHERAPEUTIC DRUG: ICD-10-CM

## 2021-06-15 DIAGNOSIS — E03.8 OTHER SPECIFIED HYPOTHYROIDISM: ICD-10-CM

## 2021-06-15 DIAGNOSIS — C50.912 BREAST CANCER METASTASIZED TO AXILLARY LYMPH NODE, LEFT: ICD-10-CM

## 2021-06-15 DIAGNOSIS — R42 DIZZINESS: Primary | ICD-10-CM

## 2021-06-15 DIAGNOSIS — R20.0 NUMBNESS IN BOTH LEGS: ICD-10-CM

## 2021-06-15 DIAGNOSIS — E55.9 MILD VITAMIN D DEFICIENCY: ICD-10-CM

## 2021-06-15 DIAGNOSIS — C50.912 BREAST CANCER METASTASIZED TO AXILLARY LYMPH NODE, LEFT: Primary | ICD-10-CM

## 2021-06-15 DIAGNOSIS — I10 ESSENTIAL HYPERTENSION: ICD-10-CM

## 2021-06-15 DIAGNOSIS — T45.1X5A NEUROPATHY DUE TO CHEMOTHERAPEUTIC DRUG: ICD-10-CM

## 2021-06-15 DIAGNOSIS — M54.50 CHRONIC BILATERAL LOW BACK PAIN WITHOUT SCIATICA: Primary | ICD-10-CM

## 2021-06-15 DIAGNOSIS — Z90.13 H/O BILATERAL MASTECTOMY: ICD-10-CM

## 2021-06-15 DIAGNOSIS — I10 HYPERTENSION, UNSPECIFIED TYPE: ICD-10-CM

## 2021-06-15 DIAGNOSIS — E11.21 CONTROLLED TYPE 2 DIABETES MELLITUS WITH DIABETIC NEPHROPATHY, WITHOUT LONG-TERM CURRENT USE OF INSULIN: ICD-10-CM

## 2021-06-15 DIAGNOSIS — C77.3 BREAST CANCER METASTASIZED TO AXILLARY LYMPH NODE, LEFT: ICD-10-CM

## 2021-06-15 DIAGNOSIS — C77.3 BREAST CANCER METASTASIZED TO AXILLARY LYMPH NODE, LEFT: Primary | ICD-10-CM

## 2021-06-15 DIAGNOSIS — G89.29 CHRONIC BILATERAL LOW BACK PAIN WITHOUT SCIATICA: Primary | ICD-10-CM

## 2021-06-15 LAB
ALBUMIN SERPL BCP-MCNC: 3.8 G/DL (ref 3.5–5.2)
ALP SERPL-CCNC: 127 U/L (ref 55–135)
ALT SERPL W/O P-5'-P-CCNC: 10 U/L (ref 10–44)
ANION GAP SERPL CALC-SCNC: 12 MMOL/L (ref 8–16)
AST SERPL-CCNC: 16 U/L (ref 10–40)
BASOPHILS # BLD AUTO: 0.04 K/UL (ref 0–0.2)
BASOPHILS NFR BLD: 0.6 % (ref 0–1.9)
BILIRUB SERPL-MCNC: 0.4 MG/DL (ref 0.1–1)
BUN SERPL-MCNC: 19 MG/DL (ref 8–23)
CALCIUM SERPL-MCNC: 10.2 MG/DL (ref 8.7–10.5)
CHLORIDE SERPL-SCNC: 99 MMOL/L (ref 95–110)
CO2 SERPL-SCNC: 26 MMOL/L (ref 23–29)
CREAT SERPL-MCNC: 1.1 MG/DL (ref 0.5–1.4)
DIFFERENTIAL METHOD: ABNORMAL
EOSINOPHIL # BLD AUTO: 0.1 K/UL (ref 0–0.5)
EOSINOPHIL NFR BLD: 1.9 % (ref 0–8)
ERYTHROCYTE [DISTWIDTH] IN BLOOD BY AUTOMATED COUNT: 15.7 % (ref 11.5–14.5)
EST. GFR  (AFRICAN AMERICAN): >60 ML/MIN/1.73 M^2
EST. GFR  (NON AFRICAN AMERICAN): 52.8 ML/MIN/1.73 M^2
GLUCOSE SERPL-MCNC: 247 MG/DL (ref 70–110)
HCT VFR BLD AUTO: 39.3 % (ref 37–48.5)
HGB BLD-MCNC: 12.4 G/DL (ref 12–16)
IMM GRANULOCYTES # BLD AUTO: 0.03 K/UL (ref 0–0.04)
IMM GRANULOCYTES NFR BLD AUTO: 0.5 % (ref 0–0.5)
LYMPHOCYTES # BLD AUTO: 1.1 K/UL (ref 1–4.8)
LYMPHOCYTES NFR BLD: 17.3 % (ref 18–48)
MCH RBC QN AUTO: 29.2 PG (ref 27–31)
MCHC RBC AUTO-ENTMCNC: 31.6 G/DL (ref 32–36)
MCV RBC AUTO: 93 FL (ref 82–98)
MONOCYTES # BLD AUTO: 0.7 K/UL (ref 0.3–1)
MONOCYTES NFR BLD: 11 % (ref 4–15)
NEUTROPHILS # BLD AUTO: 4.3 K/UL (ref 1.8–7.7)
NEUTROPHILS NFR BLD: 68.7 % (ref 38–73)
NRBC BLD-RTO: 0 /100 WBC
PLATELET # BLD AUTO: 299 K/UL (ref 150–450)
PMV BLD AUTO: 9.9 FL (ref 9.2–12.9)
POTASSIUM SERPL-SCNC: 3.8 MMOL/L (ref 3.5–5.1)
PROT SERPL-MCNC: 8.1 G/DL (ref 6–8.4)
RBC # BLD AUTO: 4.25 M/UL (ref 4–5.4)
SODIUM SERPL-SCNC: 137 MMOL/L (ref 136–145)
WBC # BLD AUTO: 6.25 K/UL (ref 3.9–12.7)

## 2021-06-15 PROCEDURE — 3288F FALL RISK ASSESSMENT DOCD: CPT | Mod: CPTII,S$GLB,, | Performed by: INTERNAL MEDICINE

## 2021-06-15 PROCEDURE — 1101F PT FALLS ASSESS-DOCD LE1/YR: CPT | Mod: CPTII,S$GLB,, | Performed by: INTERNAL MEDICINE

## 2021-06-15 PROCEDURE — 97814 ACUP 1/> W/ESTIM EA ADDL 15: CPT | Mod: S$GLB,,, | Performed by: ACUPUNCTURIST

## 2021-06-15 PROCEDURE — 99214 PR OFFICE/OUTPT VISIT, EST, LEVL IV, 30-39 MIN: ICD-10-PCS | Mod: 95,,, | Performed by: FAMILY MEDICINE

## 2021-06-15 PROCEDURE — 99999 PR PBB SHADOW E&M-EST. PATIENT-LVL I: CPT | Mod: PBBFAC,,, | Performed by: ACUPUNCTURIST

## 2021-06-15 PROCEDURE — 99214 OFFICE O/P EST MOD 30 MIN: CPT | Mod: S$GLB,,, | Performed by: INTERNAL MEDICINE

## 2021-06-15 PROCEDURE — 97813 PR ACUPUNCT W/ ELEC STIMUL 15 MIN: ICD-10-PCS | Mod: S$GLB,,, | Performed by: ACUPUNCTURIST

## 2021-06-15 PROCEDURE — 36415 COLL VENOUS BLD VENIPUNCTURE: CPT | Performed by: INTERNAL MEDICINE

## 2021-06-15 PROCEDURE — 3008F BODY MASS INDEX DOCD: CPT | Mod: CPTII,S$GLB,, | Performed by: INTERNAL MEDICINE

## 2021-06-15 PROCEDURE — 3008F PR BODY MASS INDEX (BMI) DOCUMENTED: ICD-10-PCS | Mod: CPTII,S$GLB,, | Performed by: INTERNAL MEDICINE

## 2021-06-15 PROCEDURE — 99999 PR PBB SHADOW E&M-EST. PATIENT-LVL IV: CPT | Mod: PBBFAC,,, | Performed by: INTERNAL MEDICINE

## 2021-06-15 PROCEDURE — 97814 PR ACUPUNCT W/ ELEC STIMUL ADDL 15M: ICD-10-PCS | Mod: S$GLB,,, | Performed by: ACUPUNCTURIST

## 2021-06-15 PROCEDURE — 85025 COMPLETE CBC W/AUTO DIFF WBC: CPT | Performed by: INTERNAL MEDICINE

## 2021-06-15 PROCEDURE — 99999 PR PBB SHADOW E&M-EST. PATIENT-LVL IV: ICD-10-PCS | Mod: PBBFAC,,, | Performed by: INTERNAL MEDICINE

## 2021-06-15 PROCEDURE — 99214 OFFICE O/P EST MOD 30 MIN: CPT | Mod: 95,,, | Performed by: FAMILY MEDICINE

## 2021-06-15 PROCEDURE — 1126F AMNT PAIN NOTED NONE PRSNT: CPT | Mod: S$GLB,,, | Performed by: INTERNAL MEDICINE

## 2021-06-15 PROCEDURE — 99999 PR PBB SHADOW E&M-EST. PATIENT-LVL I: ICD-10-PCS | Mod: PBBFAC,,, | Performed by: ACUPUNCTURIST

## 2021-06-15 PROCEDURE — 80053 COMPREHEN METABOLIC PANEL: CPT | Performed by: INTERNAL MEDICINE

## 2021-06-15 PROCEDURE — 1101F PR PT FALLS ASSESS DOC 0-1 FALLS W/OUT INJ PAST YR: ICD-10-PCS | Mod: CPTII,S$GLB,, | Performed by: INTERNAL MEDICINE

## 2021-06-15 PROCEDURE — 1126F PR PAIN SEVERITY QUANTIFIED, NO PAIN PRESENT: ICD-10-PCS | Mod: S$GLB,,, | Performed by: INTERNAL MEDICINE

## 2021-06-15 PROCEDURE — 97813 ACUP 1/> W/ESTIM 1ST 15 MIN: CPT | Mod: S$GLB,,, | Performed by: ACUPUNCTURIST

## 2021-06-15 PROCEDURE — 99214 PR OFFICE/OUTPT VISIT, EST, LEVL IV, 30-39 MIN: ICD-10-PCS | Mod: S$GLB,,, | Performed by: INTERNAL MEDICINE

## 2021-06-15 PROCEDURE — 3288F PR FALLS RISK ASSESSMENT DOCUMENTED: ICD-10-PCS | Mod: CPTII,S$GLB,, | Performed by: INTERNAL MEDICINE

## 2021-06-15 RX ORDER — IRBESARTAN 300 MG/1
TABLET ORAL
Qty: 90 TABLET | Refills: 1 | Status: SHIPPED | OUTPATIENT
Start: 2021-06-15 | End: 2022-08-24 | Stop reason: SDUPTHER

## 2021-06-15 RX ORDER — GABAPENTIN 300 MG/1
300 CAPSULE ORAL 3 TIMES DAILY
Qty: 90 CAPSULE | Refills: 0 | Status: SHIPPED | OUTPATIENT
Start: 2021-06-15 | End: 2021-06-15

## 2021-06-15 RX ORDER — PREGABALIN 75 MG/1
75 CAPSULE ORAL 2 TIMES DAILY
Qty: 60 CAPSULE | Refills: 5 | Status: SHIPPED | OUTPATIENT
Start: 2021-06-15 | End: 2021-11-02

## 2021-06-16 ENCOUNTER — PATIENT MESSAGE (OUTPATIENT)
Dept: PRIMARY CARE CLINIC | Facility: CLINIC | Age: 66
End: 2021-06-16

## 2021-06-17 ENCOUNTER — LAB VISIT (OUTPATIENT)
Dept: LAB | Facility: HOSPITAL | Age: 66
End: 2021-06-17
Attending: INTERNAL MEDICINE
Payer: COMMERCIAL

## 2021-06-17 ENCOUNTER — PATIENT MESSAGE (OUTPATIENT)
Dept: HEMATOLOGY/ONCOLOGY | Facility: CLINIC | Age: 66
End: 2021-06-17

## 2021-06-17 ENCOUNTER — OFFICE VISIT (OUTPATIENT)
Dept: ORTHOPEDICS | Facility: CLINIC | Age: 66
End: 2021-06-17
Payer: COMMERCIAL

## 2021-06-17 VITALS — BODY MASS INDEX: 39.39 KG/M2 | WEIGHT: 200.63 LBS | HEIGHT: 60 IN

## 2021-06-17 DIAGNOSIS — M17.12 PRIMARY OSTEOARTHRITIS OF LEFT KNEE: Primary | ICD-10-CM

## 2021-06-17 DIAGNOSIS — C50.912 BREAST CANCER METASTASIZED TO AXILLARY LYMPH NODE, LEFT: ICD-10-CM

## 2021-06-17 DIAGNOSIS — I10 ESSENTIAL HYPERTENSION: ICD-10-CM

## 2021-06-17 DIAGNOSIS — C77.3 BREAST CANCER METASTASIZED TO AXILLARY LYMPH NODE, LEFT: ICD-10-CM

## 2021-06-17 DIAGNOSIS — E03.8 OTHER SPECIFIED HYPOTHYROIDISM: ICD-10-CM

## 2021-06-17 LAB
ALBUMIN SERPL BCP-MCNC: 3.7 G/DL (ref 3.5–5.2)
ALP SERPL-CCNC: 119 U/L (ref 55–135)
ALT SERPL W/O P-5'-P-CCNC: 12 U/L (ref 10–44)
ANION GAP SERPL CALC-SCNC: 13 MMOL/L (ref 8–16)
AST SERPL-CCNC: 19 U/L (ref 10–40)
BASOPHILS # BLD AUTO: 0.02 K/UL (ref 0–0.2)
BASOPHILS NFR BLD: 0.4 % (ref 0–1.9)
BILIRUB SERPL-MCNC: 0.4 MG/DL (ref 0.1–1)
BUN SERPL-MCNC: 20 MG/DL (ref 8–23)
CALCIUM SERPL-MCNC: 9.9 MG/DL (ref 8.7–10.5)
CHLORIDE SERPL-SCNC: 100 MMOL/L (ref 95–110)
CHOLEST SERPL-MCNC: 207 MG/DL (ref 120–199)
CHOLEST/HDLC SERPL: 4.7 {RATIO} (ref 2–5)
CO2 SERPL-SCNC: 26 MMOL/L (ref 23–29)
CREAT SERPL-MCNC: 0.9 MG/DL (ref 0.5–1.4)
DIFFERENTIAL METHOD: ABNORMAL
EOSINOPHIL # BLD AUTO: 0.1 K/UL (ref 0–0.5)
EOSINOPHIL NFR BLD: 1.4 % (ref 0–8)
ERYTHROCYTE [DISTWIDTH] IN BLOOD BY AUTOMATED COUNT: 15.5 % (ref 11.5–14.5)
EST. GFR  (AFRICAN AMERICAN): >60 ML/MIN/1.73 M^2
EST. GFR  (NON AFRICAN AMERICAN): >60 ML/MIN/1.73 M^2
ESTIMATED AVG GLUCOSE: 192 MG/DL (ref 68–131)
GLUCOSE SERPL-MCNC: 211 MG/DL (ref 70–110)
HBA1C MFR BLD: 8.3 % (ref 4–5.6)
HCT VFR BLD AUTO: 37.3 % (ref 37–48.5)
HDLC SERPL-MCNC: 44 MG/DL (ref 40–75)
HDLC SERPL: 21.3 % (ref 20–50)
HGB BLD-MCNC: 12.1 G/DL (ref 12–16)
IMM GRANULOCYTES # BLD AUTO: 0.02 K/UL (ref 0–0.04)
IMM GRANULOCYTES NFR BLD AUTO: 0.4 % (ref 0–0.5)
LDLC SERPL CALC-MCNC: 144.6 MG/DL (ref 63–159)
LYMPHOCYTES # BLD AUTO: 1 K/UL (ref 1–4.8)
LYMPHOCYTES NFR BLD: 19.6 % (ref 18–48)
MCH RBC QN AUTO: 29.7 PG (ref 27–31)
MCHC RBC AUTO-ENTMCNC: 32.4 G/DL (ref 32–36)
MCV RBC AUTO: 92 FL (ref 82–98)
MONOCYTES # BLD AUTO: 0.5 K/UL (ref 0.3–1)
MONOCYTES NFR BLD: 10 % (ref 4–15)
NEUTROPHILS # BLD AUTO: 3.5 K/UL (ref 1.8–7.7)
NEUTROPHILS NFR BLD: 68.2 % (ref 38–73)
NONHDLC SERPL-MCNC: 163 MG/DL
NRBC BLD-RTO: 0 /100 WBC
PLATELET # BLD AUTO: 274 K/UL (ref 150–450)
PMV BLD AUTO: 9.7 FL (ref 9.2–12.9)
POTASSIUM SERPL-SCNC: 3.7 MMOL/L (ref 3.5–5.1)
PROT SERPL-MCNC: 7.9 G/DL (ref 6–8.4)
RBC # BLD AUTO: 4.07 M/UL (ref 4–5.4)
SODIUM SERPL-SCNC: 139 MMOL/L (ref 136–145)
TRIGL SERPL-MCNC: 92 MG/DL (ref 30–150)
WBC # BLD AUTO: 5.09 K/UL (ref 3.9–12.7)

## 2021-06-17 PROCEDURE — 99499 NO LOS: ICD-10-PCS | Mod: S$GLB,,, | Performed by: PHYSICIAN ASSISTANT

## 2021-06-17 PROCEDURE — 99499 UNLISTED E&M SERVICE: CPT | Mod: S$GLB,,, | Performed by: PHYSICIAN ASSISTANT

## 2021-06-17 PROCEDURE — 99999 PR PBB SHADOW E&M-EST. PATIENT-LVL III: CPT | Mod: PBBFAC,,, | Performed by: PHYSICIAN ASSISTANT

## 2021-06-17 PROCEDURE — 80053 COMPREHEN METABOLIC PANEL: CPT | Performed by: INTERNAL MEDICINE

## 2021-06-17 PROCEDURE — 3008F PR BODY MASS INDEX (BMI) DOCUMENTED: ICD-10-PCS | Mod: CPTII,S$GLB,, | Performed by: PHYSICIAN ASSISTANT

## 2021-06-17 PROCEDURE — 83036 HEMOGLOBIN GLYCOSYLATED A1C: CPT | Performed by: FAMILY MEDICINE

## 2021-06-17 PROCEDURE — 1101F PT FALLS ASSESS-DOCD LE1/YR: CPT | Mod: CPTII,S$GLB,, | Performed by: PHYSICIAN ASSISTANT

## 2021-06-17 PROCEDURE — 36415 COLL VENOUS BLD VENIPUNCTURE: CPT | Performed by: INTERNAL MEDICINE

## 2021-06-17 PROCEDURE — 99999 PR PBB SHADOW E&M-EST. PATIENT-LVL III: ICD-10-PCS | Mod: PBBFAC,,, | Performed by: PHYSICIAN ASSISTANT

## 2021-06-17 PROCEDURE — 85025 COMPLETE CBC W/AUTO DIFF WBC: CPT | Performed by: INTERNAL MEDICINE

## 2021-06-17 PROCEDURE — 3288F FALL RISK ASSESSMENT DOCD: CPT | Mod: CPTII,S$GLB,, | Performed by: PHYSICIAN ASSISTANT

## 2021-06-17 PROCEDURE — 20610 PR DRAIN/INJECT LARGE JOINT/BURSA: ICD-10-PCS | Mod: LT,S$GLB,, | Performed by: PHYSICIAN ASSISTANT

## 2021-06-17 PROCEDURE — 80061 LIPID PANEL: CPT | Performed by: FAMILY MEDICINE

## 2021-06-17 PROCEDURE — 3288F PR FALLS RISK ASSESSMENT DOCUMENTED: ICD-10-PCS | Mod: CPTII,S$GLB,, | Performed by: PHYSICIAN ASSISTANT

## 2021-06-17 PROCEDURE — 3008F BODY MASS INDEX DOCD: CPT | Mod: CPTII,S$GLB,, | Performed by: PHYSICIAN ASSISTANT

## 2021-06-17 PROCEDURE — 1101F PR PT FALLS ASSESS DOC 0-1 FALLS W/OUT INJ PAST YR: ICD-10-PCS | Mod: CPTII,S$GLB,, | Performed by: PHYSICIAN ASSISTANT

## 2021-06-17 PROCEDURE — 20610 DRAIN/INJ JOINT/BURSA W/O US: CPT | Mod: LT,S$GLB,, | Performed by: PHYSICIAN ASSISTANT

## 2021-06-17 PROCEDURE — 1125F AMNT PAIN NOTED PAIN PRSNT: CPT | Mod: S$GLB,,, | Performed by: PHYSICIAN ASSISTANT

## 2021-06-17 PROCEDURE — 1125F PR PAIN SEVERITY QUANTIFIED, PAIN PRESENT: ICD-10-PCS | Mod: S$GLB,,, | Performed by: PHYSICIAN ASSISTANT

## 2021-06-21 ENCOUNTER — TELEPHONE (OUTPATIENT)
Dept: SURGERY | Facility: CLINIC | Age: 66
End: 2021-06-21

## 2021-06-21 ENCOUNTER — PATIENT MESSAGE (OUTPATIENT)
Dept: HEMATOLOGY/ONCOLOGY | Facility: CLINIC | Age: 66
End: 2021-06-21

## 2021-06-22 ENCOUNTER — TELEPHONE (OUTPATIENT)
Dept: PRIMARY CARE CLINIC | Facility: CLINIC | Age: 66
End: 2021-06-22

## 2021-06-24 ENCOUNTER — OFFICE VISIT (OUTPATIENT)
Dept: ORTHOPEDICS | Facility: CLINIC | Age: 66
End: 2021-06-24
Payer: COMMERCIAL

## 2021-06-24 VITALS — WEIGHT: 194 LBS | HEIGHT: 60 IN | BODY MASS INDEX: 38.09 KG/M2

## 2021-06-24 DIAGNOSIS — M17.12 PRIMARY OSTEOARTHRITIS OF LEFT KNEE: Primary | ICD-10-CM

## 2021-06-24 PROCEDURE — 3288F PR FALLS RISK ASSESSMENT DOCUMENTED: ICD-10-PCS | Mod: CPTII,S$GLB,, | Performed by: PHYSICIAN ASSISTANT

## 2021-06-24 PROCEDURE — 1125F PR PAIN SEVERITY QUANTIFIED, PAIN PRESENT: ICD-10-PCS | Mod: S$GLB,,, | Performed by: PHYSICIAN ASSISTANT

## 2021-06-24 PROCEDURE — 1125F AMNT PAIN NOTED PAIN PRSNT: CPT | Mod: S$GLB,,, | Performed by: PHYSICIAN ASSISTANT

## 2021-06-24 PROCEDURE — 1101F PT FALLS ASSESS-DOCD LE1/YR: CPT | Mod: CPTII,S$GLB,, | Performed by: PHYSICIAN ASSISTANT

## 2021-06-24 PROCEDURE — 20610 DRAIN/INJ JOINT/BURSA W/O US: CPT | Mod: LT,S$GLB,, | Performed by: PHYSICIAN ASSISTANT

## 2021-06-24 PROCEDURE — 99499 UNLISTED E&M SERVICE: CPT | Mod: S$GLB,,, | Performed by: PHYSICIAN ASSISTANT

## 2021-06-24 PROCEDURE — 99999 PR PBB SHADOW E&M-EST. PATIENT-LVL III: ICD-10-PCS | Mod: PBBFAC,,, | Performed by: PHYSICIAN ASSISTANT

## 2021-06-24 PROCEDURE — 99499 NO LOS: ICD-10-PCS | Mod: S$GLB,,, | Performed by: PHYSICIAN ASSISTANT

## 2021-06-24 PROCEDURE — 1101F PR PT FALLS ASSESS DOC 0-1 FALLS W/OUT INJ PAST YR: ICD-10-PCS | Mod: CPTII,S$GLB,, | Performed by: PHYSICIAN ASSISTANT

## 2021-06-24 PROCEDURE — 3008F PR BODY MASS INDEX (BMI) DOCUMENTED: ICD-10-PCS | Mod: CPTII,S$GLB,, | Performed by: PHYSICIAN ASSISTANT

## 2021-06-24 PROCEDURE — 3008F BODY MASS INDEX DOCD: CPT | Mod: CPTII,S$GLB,, | Performed by: PHYSICIAN ASSISTANT

## 2021-06-24 PROCEDURE — 99999 PR PBB SHADOW E&M-EST. PATIENT-LVL III: CPT | Mod: PBBFAC,,, | Performed by: PHYSICIAN ASSISTANT

## 2021-06-24 PROCEDURE — 20610 PR DRAIN/INJECT LARGE JOINT/BURSA: ICD-10-PCS | Mod: LT,S$GLB,, | Performed by: PHYSICIAN ASSISTANT

## 2021-06-24 PROCEDURE — 3288F FALL RISK ASSESSMENT DOCD: CPT | Mod: CPTII,S$GLB,, | Performed by: PHYSICIAN ASSISTANT

## 2021-06-29 ENCOUNTER — PATIENT OUTREACH (OUTPATIENT)
Dept: ADMINISTRATIVE | Facility: OTHER | Age: 66
End: 2021-06-29

## 2021-07-01 ENCOUNTER — OFFICE VISIT (OUTPATIENT)
Dept: ORTHOPEDICS | Facility: CLINIC | Age: 66
End: 2021-07-01
Payer: COMMERCIAL

## 2021-07-01 VITALS — BODY MASS INDEX: 38.09 KG/M2 | HEIGHT: 60 IN | WEIGHT: 194 LBS

## 2021-07-01 DIAGNOSIS — M17.12 PRIMARY OSTEOARTHRITIS OF LEFT KNEE: Primary | ICD-10-CM

## 2021-07-01 PROCEDURE — 20610 DRAIN/INJ JOINT/BURSA W/O US: CPT | Mod: LT,S$GLB,, | Performed by: PHYSICIAN ASSISTANT

## 2021-07-01 PROCEDURE — 99999 PR PBB SHADOW E&M-EST. PATIENT-LVL III: ICD-10-PCS | Mod: PBBFAC,,, | Performed by: PHYSICIAN ASSISTANT

## 2021-07-01 PROCEDURE — 99499 UNLISTED E&M SERVICE: CPT | Mod: S$GLB,,, | Performed by: PHYSICIAN ASSISTANT

## 2021-07-01 PROCEDURE — 3288F FALL RISK ASSESSMENT DOCD: CPT | Mod: CPTII,S$GLB,, | Performed by: PHYSICIAN ASSISTANT

## 2021-07-01 PROCEDURE — 99499 NO LOS: ICD-10-PCS | Mod: S$GLB,,, | Performed by: PHYSICIAN ASSISTANT

## 2021-07-01 PROCEDURE — 3288F PR FALLS RISK ASSESSMENT DOCUMENTED: ICD-10-PCS | Mod: CPTII,S$GLB,, | Performed by: PHYSICIAN ASSISTANT

## 2021-07-01 PROCEDURE — 1101F PT FALLS ASSESS-DOCD LE1/YR: CPT | Mod: CPTII,S$GLB,, | Performed by: PHYSICIAN ASSISTANT

## 2021-07-01 PROCEDURE — 1101F PR PT FALLS ASSESS DOC 0-1 FALLS W/OUT INJ PAST YR: ICD-10-PCS | Mod: CPTII,S$GLB,, | Performed by: PHYSICIAN ASSISTANT

## 2021-07-01 PROCEDURE — 3008F PR BODY MASS INDEX (BMI) DOCUMENTED: ICD-10-PCS | Mod: CPTII,S$GLB,, | Performed by: PHYSICIAN ASSISTANT

## 2021-07-01 PROCEDURE — 20610 PR DRAIN/INJECT LARGE JOINT/BURSA: ICD-10-PCS | Mod: LT,S$GLB,, | Performed by: PHYSICIAN ASSISTANT

## 2021-07-01 PROCEDURE — 99999 PR PBB SHADOW E&M-EST. PATIENT-LVL III: CPT | Mod: PBBFAC,,, | Performed by: PHYSICIAN ASSISTANT

## 2021-07-01 PROCEDURE — 3008F BODY MASS INDEX DOCD: CPT | Mod: CPTII,S$GLB,, | Performed by: PHYSICIAN ASSISTANT

## 2021-07-01 PROCEDURE — 1125F PR PAIN SEVERITY QUANTIFIED, PAIN PRESENT: ICD-10-PCS | Mod: S$GLB,,, | Performed by: PHYSICIAN ASSISTANT

## 2021-07-01 PROCEDURE — 1125F AMNT PAIN NOTED PAIN PRSNT: CPT | Mod: S$GLB,,, | Performed by: PHYSICIAN ASSISTANT

## 2021-07-02 ENCOUNTER — PATIENT OUTREACH (OUTPATIENT)
Dept: ADMINISTRATIVE | Facility: HOSPITAL | Age: 66
End: 2021-07-02

## 2021-07-02 DIAGNOSIS — E11.9 DIABETES MELLITUS WITHOUT COMPLICATION: Primary | ICD-10-CM

## 2021-07-07 ENCOUNTER — OFFICE VISIT (OUTPATIENT)
Dept: PRIMARY CARE CLINIC | Facility: CLINIC | Age: 66
End: 2021-07-07
Attending: FAMILY MEDICINE
Payer: COMMERCIAL

## 2021-07-07 ENCOUNTER — TELEPHONE (OUTPATIENT)
Dept: PRIMARY CARE CLINIC | Facility: CLINIC | Age: 66
End: 2021-07-07

## 2021-07-07 DIAGNOSIS — E11.21 CONTROLLED TYPE 2 DIABETES MELLITUS WITH DIABETIC NEPHROPATHY, WITHOUT LONG-TERM CURRENT USE OF INSULIN: Primary | ICD-10-CM

## 2021-07-07 PROCEDURE — 99212 OFFICE O/P EST SF 10 MIN: CPT | Mod: 95,,, | Performed by: FAMILY MEDICINE

## 2021-07-07 PROCEDURE — 3052F PR MOST RECENT HEMOGLOBIN A1C LEVEL 8.0 - < 9.0%: ICD-10-PCS | Mod: CPTII,,, | Performed by: FAMILY MEDICINE

## 2021-07-07 PROCEDURE — 3052F HG A1C>EQUAL 8.0%<EQUAL 9.0%: CPT | Mod: CPTII,,, | Performed by: FAMILY MEDICINE

## 2021-07-07 PROCEDURE — 99212 PR OFFICE/OUTPT VISIT, EST, LEVL II, 10-19 MIN: ICD-10-PCS | Mod: 95,,, | Performed by: FAMILY MEDICINE

## 2021-07-07 RX ORDER — DULAGLUTIDE 3 MG/.5ML
3 INJECTION, SOLUTION SUBCUTANEOUS
Qty: 4 PEN | Refills: 11 | Status: SHIPPED | OUTPATIENT
Start: 2021-07-07 | End: 2021-11-02

## 2021-07-08 ENCOUNTER — LAB VISIT (OUTPATIENT)
Dept: LAB | Facility: HOSPITAL | Age: 66
End: 2021-07-08
Payer: COMMERCIAL

## 2021-07-08 ENCOUNTER — PATIENT MESSAGE (OUTPATIENT)
Dept: PHARMACY | Facility: CLINIC | Age: 66
End: 2021-07-08

## 2021-07-08 DIAGNOSIS — E11.9 DIABETES MELLITUS WITHOUT COMPLICATION: ICD-10-CM

## 2021-07-08 PROCEDURE — 82570 ASSAY OF URINE CREATININE: CPT | Performed by: FAMILY MEDICINE

## 2021-07-08 PROCEDURE — 82043 UR ALBUMIN QUANTITATIVE: CPT | Performed by: FAMILY MEDICINE

## 2021-07-09 DIAGNOSIS — C77.3 BREAST CANCER METASTASIZED TO AXILLARY LYMPH NODE, LEFT: Primary | ICD-10-CM

## 2021-07-09 DIAGNOSIS — C50.912 BREAST CANCER METASTASIZED TO AXILLARY LYMPH NODE, LEFT: Primary | ICD-10-CM

## 2021-07-09 LAB
ALBUMIN/CREAT UR: 9.5 UG/MG (ref 0–30)
CREAT UR-MCNC: 189 MG/DL (ref 15–325)
MICROALBUMIN UR DL<=1MG/L-MCNC: 18 UG/ML

## 2021-07-15 ENCOUNTER — PATIENT MESSAGE (OUTPATIENT)
Dept: HEMATOLOGY/ONCOLOGY | Facility: CLINIC | Age: 66
End: 2021-07-15

## 2021-07-15 ENCOUNTER — PATIENT MESSAGE (OUTPATIENT)
Dept: PRIMARY CARE CLINIC | Facility: CLINIC | Age: 66
End: 2021-07-15

## 2021-07-20 ENCOUNTER — OFFICE VISIT (OUTPATIENT)
Dept: PODIATRY | Facility: CLINIC | Age: 66
End: 2021-07-20
Attending: FAMILY MEDICINE
Payer: COMMERCIAL

## 2021-07-20 VITALS
HEART RATE: 94 BPM | HEIGHT: 60 IN | DIASTOLIC BLOOD PRESSURE: 81 MMHG | BODY MASS INDEX: 38.09 KG/M2 | WEIGHT: 194 LBS | SYSTOLIC BLOOD PRESSURE: 138 MMHG

## 2021-07-20 DIAGNOSIS — B35.3 TINEA PEDIS OF BOTH FEET: ICD-10-CM

## 2021-07-20 DIAGNOSIS — B35.1 ONYCHOMYCOSIS DUE TO DERMATOPHYTE: Primary | ICD-10-CM

## 2021-07-20 DIAGNOSIS — E11.9 DIABETES MELLITUS WITHOUT COMPLICATION: ICD-10-CM

## 2021-07-20 PROCEDURE — 99999 PR PBB SHADOW E&M-EST. PATIENT-LVL IV: ICD-10-PCS | Mod: PBBFAC,,, | Performed by: PODIATRIST

## 2021-07-20 PROCEDURE — 99203 PR OFFICE/OUTPT VISIT, NEW, LEVL III, 30-44 MIN: ICD-10-PCS | Mod: S$GLB,,, | Performed by: PODIATRIST

## 2021-07-20 PROCEDURE — 1101F PR PT FALLS ASSESS DOC 0-1 FALLS W/OUT INJ PAST YR: ICD-10-PCS | Mod: CPTII,S$GLB,, | Performed by: PODIATRIST

## 2021-07-20 PROCEDURE — 3075F PR MOST RECENT SYSTOLIC BLOOD PRESS GE 130-139MM HG: ICD-10-PCS | Mod: CPTII,S$GLB,, | Performed by: PODIATRIST

## 2021-07-20 PROCEDURE — 3008F PR BODY MASS INDEX (BMI) DOCUMENTED: ICD-10-PCS | Mod: CPTII,S$GLB,, | Performed by: PODIATRIST

## 2021-07-20 PROCEDURE — 99203 OFFICE O/P NEW LOW 30 MIN: CPT | Mod: S$GLB,,, | Performed by: PODIATRIST

## 2021-07-20 PROCEDURE — 3079F DIAST BP 80-89 MM HG: CPT | Mod: CPTII,S$GLB,, | Performed by: PODIATRIST

## 2021-07-20 PROCEDURE — 3288F PR FALLS RISK ASSESSMENT DOCUMENTED: ICD-10-PCS | Mod: CPTII,S$GLB,, | Performed by: PODIATRIST

## 2021-07-20 PROCEDURE — 3075F SYST BP GE 130 - 139MM HG: CPT | Mod: CPTII,S$GLB,, | Performed by: PODIATRIST

## 2021-07-20 PROCEDURE — 3052F PR MOST RECENT HEMOGLOBIN A1C LEVEL 8.0 - < 9.0%: ICD-10-PCS | Mod: CPTII,S$GLB,, | Performed by: PODIATRIST

## 2021-07-20 PROCEDURE — 1126F PR PAIN SEVERITY QUANTIFIED, NO PAIN PRESENT: ICD-10-PCS | Mod: CPTII,S$GLB,, | Performed by: PODIATRIST

## 2021-07-20 PROCEDURE — 99999 PR PBB SHADOW E&M-EST. PATIENT-LVL IV: CPT | Mod: PBBFAC,,, | Performed by: PODIATRIST

## 2021-07-20 PROCEDURE — 3052F HG A1C>EQUAL 8.0%<EQUAL 9.0%: CPT | Mod: CPTII,S$GLB,, | Performed by: PODIATRIST

## 2021-07-20 PROCEDURE — 3079F PR MOST RECENT DIASTOLIC BLOOD PRESSURE 80-89 MM HG: ICD-10-PCS | Mod: CPTII,S$GLB,, | Performed by: PODIATRIST

## 2021-07-20 PROCEDURE — 3008F BODY MASS INDEX DOCD: CPT | Mod: CPTII,S$GLB,, | Performed by: PODIATRIST

## 2021-07-20 PROCEDURE — 3288F FALL RISK ASSESSMENT DOCD: CPT | Mod: CPTII,S$GLB,, | Performed by: PODIATRIST

## 2021-07-20 PROCEDURE — 1126F AMNT PAIN NOTED NONE PRSNT: CPT | Mod: CPTII,S$GLB,, | Performed by: PODIATRIST

## 2021-07-20 PROCEDURE — 1101F PT FALLS ASSESS-DOCD LE1/YR: CPT | Mod: CPTII,S$GLB,, | Performed by: PODIATRIST

## 2021-08-02 ENCOUNTER — PATIENT MESSAGE (OUTPATIENT)
Dept: ORTHOPEDICS | Facility: CLINIC | Age: 66
End: 2021-08-02

## 2021-08-05 ENCOUNTER — HOSPITAL ENCOUNTER (OUTPATIENT)
Dept: RADIOLOGY | Facility: HOSPITAL | Age: 66
Discharge: HOME | End: 2021-08-05
Attending: PHYSICIAN ASSISTANT
Payer: COMMERCIAL

## 2021-08-05 ENCOUNTER — OFFICE VISIT (OUTPATIENT)
Dept: ORTHOPEDICS | Facility: CLINIC | Age: 66
End: 2021-08-05
Payer: COMMERCIAL

## 2021-08-05 VITALS — HEIGHT: 60 IN | BODY MASS INDEX: 38.95 KG/M2 | WEIGHT: 198.38 LBS

## 2021-08-05 DIAGNOSIS — M17.12 PRIMARY OSTEOARTHRITIS OF LEFT KNEE: ICD-10-CM

## 2021-08-05 DIAGNOSIS — M17.12 PRIMARY OSTEOARTHRITIS OF LEFT KNEE: Primary | ICD-10-CM

## 2021-08-05 PROCEDURE — 3288F PR FALLS RISK ASSESSMENT DOCUMENTED: ICD-10-PCS | Mod: CPTII,S$GLB,, | Performed by: PHYSICIAN ASSISTANT

## 2021-08-05 PROCEDURE — 1101F PR PT FALLS ASSESS DOC 0-1 FALLS W/OUT INJ PAST YR: ICD-10-PCS | Mod: CPTII,S$GLB,, | Performed by: PHYSICIAN ASSISTANT

## 2021-08-05 PROCEDURE — 99213 OFFICE O/P EST LOW 20 MIN: CPT | Mod: 25,S$GLB,, | Performed by: PHYSICIAN ASSISTANT

## 2021-08-05 PROCEDURE — 3008F BODY MASS INDEX DOCD: CPT | Mod: CPTII,S$GLB,, | Performed by: PHYSICIAN ASSISTANT

## 2021-08-05 PROCEDURE — 73564 XR KNEE ORTHO LEFT WITH FLEXION: ICD-10-PCS | Mod: 26,LT,, | Performed by: RADIOLOGY

## 2021-08-05 PROCEDURE — 3052F HG A1C>EQUAL 8.0%<EQUAL 9.0%: CPT | Mod: CPTII,S$GLB,, | Performed by: PHYSICIAN ASSISTANT

## 2021-08-05 PROCEDURE — 99999 PR PBB SHADOW E&M-EST. PATIENT-LVL III: ICD-10-PCS | Mod: PBBFAC,,, | Performed by: PHYSICIAN ASSISTANT

## 2021-08-05 PROCEDURE — 1125F PR PAIN SEVERITY QUANTIFIED, PAIN PRESENT: ICD-10-PCS | Mod: CPTII,S$GLB,, | Performed by: PHYSICIAN ASSISTANT

## 2021-08-05 PROCEDURE — 1159F PR MEDICATION LIST DOCUMENTED IN MEDICAL RECORD: ICD-10-PCS | Mod: CPTII,S$GLB,, | Performed by: PHYSICIAN ASSISTANT

## 2021-08-05 PROCEDURE — 73562 XR KNEE ORTHO LEFT WITH FLEXION: ICD-10-PCS | Mod: 26,RT,, | Performed by: RADIOLOGY

## 2021-08-05 PROCEDURE — 73564 X-RAY EXAM KNEE 4 OR MORE: CPT | Mod: 26,LT,, | Performed by: RADIOLOGY

## 2021-08-05 PROCEDURE — 20610 DRAIN/INJ JOINT/BURSA W/O US: CPT | Mod: LT,S$GLB,, | Performed by: PHYSICIAN ASSISTANT

## 2021-08-05 PROCEDURE — 1101F PT FALLS ASSESS-DOCD LE1/YR: CPT | Mod: CPTII,S$GLB,, | Performed by: PHYSICIAN ASSISTANT

## 2021-08-05 PROCEDURE — 73562 X-RAY EXAM OF KNEE 3: CPT | Mod: 26,RT,, | Performed by: RADIOLOGY

## 2021-08-05 PROCEDURE — 1160F RVW MEDS BY RX/DR IN RCRD: CPT | Mod: CPTII,S$GLB,, | Performed by: PHYSICIAN ASSISTANT

## 2021-08-05 PROCEDURE — 3288F FALL RISK ASSESSMENT DOCD: CPT | Mod: CPTII,S$GLB,, | Performed by: PHYSICIAN ASSISTANT

## 2021-08-05 PROCEDURE — 99213 PR OFFICE/OUTPT VISIT, EST, LEVL III, 20-29 MIN: ICD-10-PCS | Mod: 25,S$GLB,, | Performed by: PHYSICIAN ASSISTANT

## 2021-08-05 PROCEDURE — 1160F PR REVIEW ALL MEDS BY PRESCRIBER/CLIN PHARMACIST DOCUMENTED: ICD-10-PCS | Mod: CPTII,S$GLB,, | Performed by: PHYSICIAN ASSISTANT

## 2021-08-05 PROCEDURE — 20610 PR DRAIN/INJECT LARGE JOINT/BURSA: ICD-10-PCS | Mod: LT,S$GLB,, | Performed by: PHYSICIAN ASSISTANT

## 2021-08-05 PROCEDURE — 1125F AMNT PAIN NOTED PAIN PRSNT: CPT | Mod: CPTII,S$GLB,, | Performed by: PHYSICIAN ASSISTANT

## 2021-08-05 PROCEDURE — 99999 PR PBB SHADOW E&M-EST. PATIENT-LVL III: CPT | Mod: PBBFAC,,, | Performed by: PHYSICIAN ASSISTANT

## 2021-08-05 PROCEDURE — 3052F PR MOST RECENT HEMOGLOBIN A1C LEVEL 8.0 - < 9.0%: ICD-10-PCS | Mod: CPTII,S$GLB,, | Performed by: PHYSICIAN ASSISTANT

## 2021-08-05 PROCEDURE — 1159F MED LIST DOCD IN RCRD: CPT | Mod: CPTII,S$GLB,, | Performed by: PHYSICIAN ASSISTANT

## 2021-08-05 PROCEDURE — 3008F PR BODY MASS INDEX (BMI) DOCUMENTED: ICD-10-PCS | Mod: CPTII,S$GLB,, | Performed by: PHYSICIAN ASSISTANT

## 2021-08-05 PROCEDURE — 73564 X-RAY EXAM KNEE 4 OR MORE: CPT | Mod: TC,LT

## 2021-08-05 RX ORDER — TRIAMCINOLONE ACETONIDE 40 MG/ML
40 INJECTION, SUSPENSION INTRA-ARTICULAR; INTRAMUSCULAR
Status: COMPLETED | OUTPATIENT
Start: 2021-08-05 | End: 2021-08-05

## 2021-08-05 RX ADMIN — TRIAMCINOLONE ACETONIDE 40 MG: 40 INJECTION, SUSPENSION INTRA-ARTICULAR; INTRAMUSCULAR at 10:08

## 2021-08-12 ENCOUNTER — OFFICE VISIT (OUTPATIENT)
Dept: SURGERY | Facility: CLINIC | Age: 66
End: 2021-08-12
Attending: SURGERY
Payer: COMMERCIAL

## 2021-08-12 VITALS
WEIGHT: 189 LBS | SYSTOLIC BLOOD PRESSURE: 133 MMHG | HEART RATE: 106 BPM | BODY MASS INDEX: 37.11 KG/M2 | HEIGHT: 60 IN | DIASTOLIC BLOOD PRESSURE: 78 MMHG

## 2021-08-12 DIAGNOSIS — Z85.3 PERSONAL HISTORY OF BREAST CANCER: Primary | ICD-10-CM

## 2021-08-12 DIAGNOSIS — Z90.13 S/P BILATERAL MASTECTOMY: ICD-10-CM

## 2021-08-12 DIAGNOSIS — I10 HYPERTENSION, UNSPECIFIED TYPE: ICD-10-CM

## 2021-08-12 PROCEDURE — 3075F SYST BP GE 130 - 139MM HG: CPT | Mod: CPTII,S$GLB,, | Performed by: SURGERY

## 2021-08-12 PROCEDURE — 3008F PR BODY MASS INDEX (BMI) DOCUMENTED: ICD-10-PCS | Mod: CPTII,S$GLB,, | Performed by: SURGERY

## 2021-08-12 PROCEDURE — 1100F PR PT FALLS ASSESS DOC 2+ FALLS/FALL W/INJURY/YR: ICD-10-PCS | Mod: CPTII,S$GLB,, | Performed by: SURGERY

## 2021-08-12 PROCEDURE — 3052F HG A1C>EQUAL 8.0%<EQUAL 9.0%: CPT | Mod: CPTII,S$GLB,, | Performed by: SURGERY

## 2021-08-12 PROCEDURE — 1100F PTFALLS ASSESS-DOCD GE2>/YR: CPT | Mod: CPTII,S$GLB,, | Performed by: SURGERY

## 2021-08-12 PROCEDURE — 1159F MED LIST DOCD IN RCRD: CPT | Mod: CPTII,S$GLB,, | Performed by: SURGERY

## 2021-08-12 PROCEDURE — 3075F PR MOST RECENT SYSTOLIC BLOOD PRESS GE 130-139MM HG: ICD-10-PCS | Mod: CPTII,S$GLB,, | Performed by: SURGERY

## 2021-08-12 PROCEDURE — 3288F PR FALLS RISK ASSESSMENT DOCUMENTED: ICD-10-PCS | Mod: CPTII,S$GLB,, | Performed by: SURGERY

## 2021-08-12 PROCEDURE — 1125F AMNT PAIN NOTED PAIN PRSNT: CPT | Mod: CPTII,S$GLB,, | Performed by: SURGERY

## 2021-08-12 PROCEDURE — 3288F FALL RISK ASSESSMENT DOCD: CPT | Mod: CPTII,S$GLB,, | Performed by: SURGERY

## 2021-08-12 PROCEDURE — 3008F BODY MASS INDEX DOCD: CPT | Mod: CPTII,S$GLB,, | Performed by: SURGERY

## 2021-08-12 PROCEDURE — 3052F PR MOST RECENT HEMOGLOBIN A1C LEVEL 8.0 - < 9.0%: ICD-10-PCS | Mod: CPTII,S$GLB,, | Performed by: SURGERY

## 2021-08-12 PROCEDURE — 99213 PR OFFICE/OUTPT VISIT, EST, LEVL III, 20-29 MIN: ICD-10-PCS | Mod: S$GLB,,, | Performed by: SURGERY

## 2021-08-12 PROCEDURE — 3078F DIAST BP <80 MM HG: CPT | Mod: CPTII,S$GLB,, | Performed by: SURGERY

## 2021-08-12 PROCEDURE — 3078F PR MOST RECENT DIASTOLIC BLOOD PRESSURE < 80 MM HG: ICD-10-PCS | Mod: CPTII,S$GLB,, | Performed by: SURGERY

## 2021-08-12 PROCEDURE — 1160F RVW MEDS BY RX/DR IN RCRD: CPT | Mod: CPTII,S$GLB,, | Performed by: SURGERY

## 2021-08-12 PROCEDURE — 1160F PR REVIEW ALL MEDS BY PRESCRIBER/CLIN PHARMACIST DOCUMENTED: ICD-10-PCS | Mod: CPTII,S$GLB,, | Performed by: SURGERY

## 2021-08-12 PROCEDURE — 99213 OFFICE O/P EST LOW 20 MIN: CPT | Mod: S$GLB,,, | Performed by: SURGERY

## 2021-08-12 PROCEDURE — 1125F PR PAIN SEVERITY QUANTIFIED, PAIN PRESENT: ICD-10-PCS | Mod: CPTII,S$GLB,, | Performed by: SURGERY

## 2021-08-12 PROCEDURE — 1159F PR MEDICATION LIST DOCUMENTED IN MEDICAL RECORD: ICD-10-PCS | Mod: CPTII,S$GLB,, | Performed by: SURGERY

## 2021-08-14 RX ORDER — CHLORTHALIDONE 25 MG/1
TABLET ORAL
Qty: 90 TABLET | Refills: 0 | Status: SHIPPED | OUTPATIENT
Start: 2021-08-14 | End: 2022-04-01 | Stop reason: SDUPTHER

## 2021-08-17 ENCOUNTER — PATIENT MESSAGE (OUTPATIENT)
Dept: HEMATOLOGY/ONCOLOGY | Facility: CLINIC | Age: 66
End: 2021-08-17

## 2021-09-16 ENCOUNTER — TELEPHONE (OUTPATIENT)
Dept: HEMATOLOGY/ONCOLOGY | Facility: CLINIC | Age: 66
End: 2021-09-16

## 2021-09-20 ENCOUNTER — PATIENT MESSAGE (OUTPATIENT)
Dept: HEMATOLOGY/ONCOLOGY | Facility: CLINIC | Age: 66
End: 2021-09-20

## 2021-09-23 ENCOUNTER — OFFICE VISIT (OUTPATIENT)
Dept: HEMATOLOGY/ONCOLOGY | Facility: CLINIC | Age: 66
End: 2021-09-23
Payer: COMMERCIAL

## 2021-09-23 ENCOUNTER — LAB VISIT (OUTPATIENT)
Dept: LAB | Facility: HOSPITAL | Age: 66
End: 2021-09-23
Payer: COMMERCIAL

## 2021-09-23 VITALS
HEIGHT: 60 IN | SYSTOLIC BLOOD PRESSURE: 150 MMHG | WEIGHT: 195.13 LBS | DIASTOLIC BLOOD PRESSURE: 91 MMHG | BODY MASS INDEX: 38.31 KG/M2 | TEMPERATURE: 98 F | OXYGEN SATURATION: 98 % | HEART RATE: 85 BPM | RESPIRATION RATE: 18 BRPM

## 2021-09-23 DIAGNOSIS — C77.3 BREAST CANCER METASTASIZED TO AXILLARY LYMPH NODE, LEFT: Primary | ICD-10-CM

## 2021-09-23 DIAGNOSIS — C77.3 BREAST CANCER METASTASIZED TO AXILLARY LYMPH NODE, LEFT: ICD-10-CM

## 2021-09-23 DIAGNOSIS — C50.912 BREAST CANCER METASTASIZED TO AXILLARY LYMPH NODE, LEFT: ICD-10-CM

## 2021-09-23 DIAGNOSIS — Z90.13 H/O BILATERAL MASTECTOMY: ICD-10-CM

## 2021-09-23 DIAGNOSIS — E11.21 CONTROLLED TYPE 2 DIABETES MELLITUS WITH DIABETIC NEPHROPATHY, WITHOUT LONG-TERM CURRENT USE OF INSULIN: ICD-10-CM

## 2021-09-23 DIAGNOSIS — C50.912 BREAST CANCER METASTASIZED TO AXILLARY LYMPH NODE, LEFT: Primary | ICD-10-CM

## 2021-09-23 DIAGNOSIS — E55.9 VITAMIN D DEFICIENCY: ICD-10-CM

## 2021-09-23 DIAGNOSIS — T45.1X5A NEUROPATHY DUE TO CHEMOTHERAPEUTIC DRUG: ICD-10-CM

## 2021-09-23 DIAGNOSIS — D50.9 IRON DEFICIENCY ANEMIA, UNSPECIFIED IRON DEFICIENCY ANEMIA TYPE: ICD-10-CM

## 2021-09-23 DIAGNOSIS — G62.0 NEUROPATHY DUE TO CHEMOTHERAPEUTIC DRUG: ICD-10-CM

## 2021-09-23 DIAGNOSIS — I10 ESSENTIAL HYPERTENSION: ICD-10-CM

## 2021-09-23 LAB
ALBUMIN SERPL BCP-MCNC: 3.8 G/DL (ref 3.5–5.2)
ALP SERPL-CCNC: 120 U/L (ref 55–135)
ALT SERPL W/O P-5'-P-CCNC: 12 U/L (ref 10–44)
ANION GAP SERPL CALC-SCNC: 11 MMOL/L (ref 8–16)
AST SERPL-CCNC: 16 U/L (ref 10–40)
BASOPHILS # BLD AUTO: 0.02 K/UL (ref 0–0.2)
BASOPHILS NFR BLD: 0.3 % (ref 0–1.9)
BILIRUB SERPL-MCNC: 0.5 MG/DL (ref 0.1–1)
BUN SERPL-MCNC: 16 MG/DL (ref 8–23)
CALCIUM SERPL-MCNC: 10.5 MG/DL (ref 8.7–10.5)
CHLORIDE SERPL-SCNC: 99 MMOL/L (ref 95–110)
CO2 SERPL-SCNC: 26 MMOL/L (ref 23–29)
CREAT SERPL-MCNC: 0.9 MG/DL (ref 0.5–1.4)
DIFFERENTIAL METHOD: ABNORMAL
EOSINOPHIL # BLD AUTO: 0 K/UL (ref 0–0.5)
EOSINOPHIL NFR BLD: 0.4 % (ref 0–8)
ERYTHROCYTE [DISTWIDTH] IN BLOOD BY AUTOMATED COUNT: 15.5 % (ref 11.5–14.5)
EST. GFR  (AFRICAN AMERICAN): >60 ML/MIN/1.73 M^2
EST. GFR  (NON AFRICAN AMERICAN): >60 ML/MIN/1.73 M^2
GLUCOSE SERPL-MCNC: 137 MG/DL (ref 70–110)
HCT VFR BLD AUTO: 38 % (ref 37–48.5)
HGB BLD-MCNC: 12.3 G/DL (ref 12–16)
IMM GRANULOCYTES # BLD AUTO: 0.02 K/UL (ref 0–0.04)
IMM GRANULOCYTES NFR BLD AUTO: 0.3 % (ref 0–0.5)
LYMPHOCYTES # BLD AUTO: 1.3 K/UL (ref 1–4.8)
LYMPHOCYTES NFR BLD: 18.8 % (ref 18–48)
MCH RBC QN AUTO: 29.1 PG (ref 27–31)
MCHC RBC AUTO-ENTMCNC: 32.4 G/DL (ref 32–36)
MCV RBC AUTO: 90 FL (ref 82–98)
MONOCYTES # BLD AUTO: 0.6 K/UL (ref 0.3–1)
MONOCYTES NFR BLD: 9.3 % (ref 4–15)
NEUTROPHILS # BLD AUTO: 4.8 K/UL (ref 1.8–7.7)
NEUTROPHILS NFR BLD: 70.9 % (ref 38–73)
NRBC BLD-RTO: 0 /100 WBC
PLATELET # BLD AUTO: 330 K/UL (ref 150–450)
PMV BLD AUTO: 10 FL (ref 9.2–12.9)
POTASSIUM SERPL-SCNC: 3.4 MMOL/L (ref 3.5–5.1)
PROT SERPL-MCNC: 8.2 G/DL (ref 6–8.4)
RBC # BLD AUTO: 4.23 M/UL (ref 4–5.4)
SODIUM SERPL-SCNC: 136 MMOL/L (ref 136–145)
WBC # BLD AUTO: 6.74 K/UL (ref 3.9–12.7)

## 2021-09-23 PROCEDURE — 1101F PT FALLS ASSESS-DOCD LE1/YR: CPT | Mod: CPTII,S$GLB,, | Performed by: NURSE PRACTITIONER

## 2021-09-23 PROCEDURE — 3061F PR NEG MICROALBUMINURIA RESULT DOCUMENTED/REVIEW: ICD-10-PCS | Mod: CPTII,S$GLB,, | Performed by: NURSE PRACTITIONER

## 2021-09-23 PROCEDURE — 99214 PR OFFICE/OUTPT VISIT, EST, LEVL IV, 30-39 MIN: ICD-10-PCS | Mod: S$GLB,,, | Performed by: NURSE PRACTITIONER

## 2021-09-23 PROCEDURE — 4010F PR ACE/ARB THEARPY RXD/TAKEN: ICD-10-PCS | Mod: CPTII,S$GLB,, | Performed by: NURSE PRACTITIONER

## 2021-09-23 PROCEDURE — 3077F SYST BP >= 140 MM HG: CPT | Mod: CPTII,S$GLB,, | Performed by: NURSE PRACTITIONER

## 2021-09-23 PROCEDURE — 3077F PR MOST RECENT SYSTOLIC BLOOD PRESSURE >= 140 MM HG: ICD-10-PCS | Mod: CPTII,S$GLB,, | Performed by: NURSE PRACTITIONER

## 2021-09-23 PROCEDURE — 3008F BODY MASS INDEX DOCD: CPT | Mod: CPTII,S$GLB,, | Performed by: NURSE PRACTITIONER

## 2021-09-23 PROCEDURE — 3080F DIAST BP >= 90 MM HG: CPT | Mod: CPTII,S$GLB,, | Performed by: NURSE PRACTITIONER

## 2021-09-23 PROCEDURE — 1101F PR PT FALLS ASSESS DOC 0-1 FALLS W/OUT INJ PAST YR: ICD-10-PCS | Mod: CPTII,S$GLB,, | Performed by: NURSE PRACTITIONER

## 2021-09-23 PROCEDURE — 3288F PR FALLS RISK ASSESSMENT DOCUMENTED: ICD-10-PCS | Mod: CPTII,S$GLB,, | Performed by: NURSE PRACTITIONER

## 2021-09-23 PROCEDURE — 3066F NEPHROPATHY DOC TX: CPT | Mod: CPTII,S$GLB,, | Performed by: NURSE PRACTITIONER

## 2021-09-23 PROCEDURE — 1126F PR PAIN SEVERITY QUANTIFIED, NO PAIN PRESENT: ICD-10-PCS | Mod: CPTII,S$GLB,, | Performed by: NURSE PRACTITIONER

## 2021-09-23 PROCEDURE — 3052F PR MOST RECENT HEMOGLOBIN A1C LEVEL 8.0 - < 9.0%: ICD-10-PCS | Mod: CPTII,S$GLB,, | Performed by: NURSE PRACTITIONER

## 2021-09-23 PROCEDURE — 3008F PR BODY MASS INDEX (BMI) DOCUMENTED: ICD-10-PCS | Mod: CPTII,S$GLB,, | Performed by: NURSE PRACTITIONER

## 2021-09-23 PROCEDURE — 99999 PR PBB SHADOW E&M-EST. PATIENT-LVL IV: ICD-10-PCS | Mod: PBBFAC,,, | Performed by: NURSE PRACTITIONER

## 2021-09-23 PROCEDURE — 3066F PR DOCUMENTATION OF TREATMENT FOR NEPHROPATHY: ICD-10-PCS | Mod: CPTII,S$GLB,, | Performed by: NURSE PRACTITIONER

## 2021-09-23 PROCEDURE — 99214 OFFICE O/P EST MOD 30 MIN: CPT | Mod: S$GLB,,, | Performed by: NURSE PRACTITIONER

## 2021-09-23 PROCEDURE — 1159F PR MEDICATION LIST DOCUMENTED IN MEDICAL RECORD: ICD-10-PCS | Mod: CPTII,S$GLB,, | Performed by: NURSE PRACTITIONER

## 2021-09-23 PROCEDURE — 3288F FALL RISK ASSESSMENT DOCD: CPT | Mod: CPTII,S$GLB,, | Performed by: NURSE PRACTITIONER

## 2021-09-23 PROCEDURE — 3052F HG A1C>EQUAL 8.0%<EQUAL 9.0%: CPT | Mod: CPTII,S$GLB,, | Performed by: NURSE PRACTITIONER

## 2021-09-23 PROCEDURE — 3080F PR MOST RECENT DIASTOLIC BLOOD PRESSURE >= 90 MM HG: ICD-10-PCS | Mod: CPTII,S$GLB,, | Performed by: NURSE PRACTITIONER

## 2021-09-23 PROCEDURE — 80053 COMPREHEN METABOLIC PANEL: CPT | Performed by: NURSE PRACTITIONER

## 2021-09-23 PROCEDURE — 1126F AMNT PAIN NOTED NONE PRSNT: CPT | Mod: CPTII,S$GLB,, | Performed by: NURSE PRACTITIONER

## 2021-09-23 PROCEDURE — 36415 COLL VENOUS BLD VENIPUNCTURE: CPT | Performed by: NURSE PRACTITIONER

## 2021-09-23 PROCEDURE — 3061F NEG MICROALBUMINURIA REV: CPT | Mod: CPTII,S$GLB,, | Performed by: NURSE PRACTITIONER

## 2021-09-23 PROCEDURE — 1159F MED LIST DOCD IN RCRD: CPT | Mod: CPTII,S$GLB,, | Performed by: NURSE PRACTITIONER

## 2021-09-23 PROCEDURE — 99999 PR PBB SHADOW E&M-EST. PATIENT-LVL IV: CPT | Mod: PBBFAC,,, | Performed by: NURSE PRACTITIONER

## 2021-09-23 PROCEDURE — 85025 COMPLETE CBC W/AUTO DIFF WBC: CPT | Performed by: NURSE PRACTITIONER

## 2021-09-23 PROCEDURE — 4010F ACE/ARB THERAPY RXD/TAKEN: CPT | Mod: CPTII,S$GLB,, | Performed by: NURSE PRACTITIONER

## 2021-09-24 ENCOUNTER — PATIENT MESSAGE (OUTPATIENT)
Dept: HEMATOLOGY/ONCOLOGY | Facility: CLINIC | Age: 66
End: 2021-09-24

## 2021-09-28 ENCOUNTER — PATIENT MESSAGE (OUTPATIENT)
Dept: PRIMARY CARE CLINIC | Facility: CLINIC | Age: 66
End: 2021-09-28

## 2021-09-29 ENCOUNTER — PATIENT MESSAGE (OUTPATIENT)
Dept: PRIMARY CARE CLINIC | Facility: CLINIC | Age: 66
End: 2021-09-29

## 2021-10-01 DIAGNOSIS — E78.01 FAMILIAL HYPERCHOLESTEROLEMIA: ICD-10-CM

## 2021-10-01 RX ORDER — ATORVASTATIN CALCIUM 10 MG/1
10 TABLET, FILM COATED ORAL DAILY
Qty: 90 TABLET | Refills: 3 | Status: SHIPPED | OUTPATIENT
Start: 2021-10-01 | End: 2022-08-24 | Stop reason: SDUPTHER

## 2021-10-26 ENCOUNTER — IMMUNIZATION (OUTPATIENT)
Dept: INTERNAL MEDICINE | Facility: CLINIC | Age: 66
End: 2021-10-26
Payer: MEDICAID

## 2021-10-26 ENCOUNTER — LAB VISIT (OUTPATIENT)
Dept: LAB | Facility: OTHER | Age: 66
End: 2021-10-26
Attending: FAMILY MEDICINE
Payer: COMMERCIAL

## 2021-10-26 DIAGNOSIS — Z23 NEED FOR VACCINATION: Primary | ICD-10-CM

## 2021-10-26 DIAGNOSIS — E11.21 CONTROLLED TYPE 2 DIABETES MELLITUS WITH DIABETIC NEPHROPATHY, WITHOUT LONG-TERM CURRENT USE OF INSULIN: ICD-10-CM

## 2021-10-26 LAB
ESTIMATED AVG GLUCOSE: 180 MG/DL (ref 68–131)
HBA1C MFR BLD: 7.9 % (ref 4–5.6)

## 2021-10-26 PROCEDURE — 91300 COVID-19, MRNA, LNP-S, PF, 30 MCG/0.3 ML DOSE VACCINE: CPT | Mod: PBBFAC | Performed by: INTERNAL MEDICINE

## 2021-10-26 PROCEDURE — 83036 HEMOGLOBIN GLYCOSYLATED A1C: CPT | Performed by: FAMILY MEDICINE

## 2021-10-26 PROCEDURE — 36415 COLL VENOUS BLD VENIPUNCTURE: CPT | Performed by: FAMILY MEDICINE

## 2021-10-26 PROCEDURE — 0003A COVID-19, MRNA, LNP-S, PF, 30 MCG/0.3 ML DOSE VACCINE: CPT | Mod: PBBFAC | Performed by: INTERNAL MEDICINE

## 2021-10-28 NOTE — LETTER
December 19, 2019     Maria Elena Tavares  [address] [facility] [address]       Dear Maria Elena,    Thank you for enrolling in [facility]s Digital Medicine Program. To participate, we ask that you submit information at least once weekly through your [facility] account and maintain regular contact with your Care Team. We have not received any data or heard from you in some time.     The Digital Medicine Care Team has attempted to reach you on multiple occasions to determine if you would like to continue participating in the program. While we encourage you to continue participating fully, we understand that circumstances may change.     To continue participating in the program, please contact me at [phone]. If we do not hear back, you will be un-enrolled, and your physician will be notified of your decision.    If you have submitted data and believe you are receiving this letter in error, please call the Digital Medicine Patient Support Line at [phone] for troubleshooting.      We look forward to hearing from you soon.    Sincerely,     Berta Christiansen  [facility]      
GI bleed/abnormal lab result

## 2021-11-02 ENCOUNTER — OFFICE VISIT (OUTPATIENT)
Dept: PRIMARY CARE CLINIC | Facility: CLINIC | Age: 66
End: 2021-11-02
Attending: FAMILY MEDICINE
Payer: COMMERCIAL

## 2021-11-02 ENCOUNTER — LAB VISIT (OUTPATIENT)
Dept: LAB | Facility: HOSPITAL | Age: 66
End: 2021-11-02
Payer: COMMERCIAL

## 2021-11-02 VITALS
HEIGHT: 60 IN | OXYGEN SATURATION: 96 % | HEART RATE: 95 BPM | BODY MASS INDEX: 39.05 KG/M2 | SYSTOLIC BLOOD PRESSURE: 136 MMHG | WEIGHT: 198.88 LBS | DIASTOLIC BLOOD PRESSURE: 84 MMHG

## 2021-11-02 DIAGNOSIS — E66.9 OBESITY (BMI 30-39.9): ICD-10-CM

## 2021-11-02 DIAGNOSIS — E03.9 HYPOTHYROIDISM, UNSPECIFIED TYPE: ICD-10-CM

## 2021-11-02 DIAGNOSIS — E11.21 CONTROLLED TYPE 2 DIABETES MELLITUS WITH DIABETIC NEPHROPATHY, WITHOUT LONG-TERM CURRENT USE OF INSULIN: Primary | ICD-10-CM

## 2021-11-02 DIAGNOSIS — G62.9 NEUROPATHY: ICD-10-CM

## 2021-11-02 DIAGNOSIS — I10 ESSENTIAL HYPERTENSION: ICD-10-CM

## 2021-11-02 DIAGNOSIS — E55.9 MILD VITAMIN D DEFICIENCY: ICD-10-CM

## 2021-11-02 DIAGNOSIS — E87.6 HYPOKALEMIA: ICD-10-CM

## 2021-11-02 PROBLEM — Z12.11 SCREENING FOR MALIGNANT NEOPLASM OF COLON: Status: RESOLVED | Noted: 2020-10-08 | Resolved: 2021-11-02

## 2021-11-02 LAB — TSH SERPL DL<=0.005 MIU/L-ACNC: 2.73 UIU/ML (ref 0.4–4)

## 2021-11-02 PROCEDURE — 99213 OFFICE O/P EST LOW 20 MIN: CPT | Mod: PBBFAC,PN | Performed by: FAMILY MEDICINE

## 2021-11-02 PROCEDURE — 99214 PR OFFICE/OUTPT VISIT, EST, LEVL IV, 30-39 MIN: ICD-10-PCS | Mod: S$GLB,,, | Performed by: FAMILY MEDICINE

## 2021-11-02 PROCEDURE — 84443 ASSAY THYROID STIM HORMONE: CPT | Performed by: FAMILY MEDICINE

## 2021-11-02 PROCEDURE — 99999 PR PBB SHADOW E&M-EST. PATIENT-LVL III: ICD-10-PCS | Mod: PBBFAC,,, | Performed by: FAMILY MEDICINE

## 2021-11-02 PROCEDURE — 36415 COLL VENOUS BLD VENIPUNCTURE: CPT | Mod: PN | Performed by: FAMILY MEDICINE

## 2021-11-02 PROCEDURE — 99999 PR PBB SHADOW E&M-EST. PATIENT-LVL III: CPT | Mod: PBBFAC,,, | Performed by: FAMILY MEDICINE

## 2021-11-02 PROCEDURE — 99214 OFFICE O/P EST MOD 30 MIN: CPT | Mod: S$GLB,,, | Performed by: FAMILY MEDICINE

## 2021-11-02 RX ORDER — ORAL SEMAGLUTIDE 3 MG/1
3 TABLET ORAL DAILY
Qty: 30 TABLET | Refills: 0 | Status: SHIPPED | OUTPATIENT
Start: 2021-11-02 | End: 2022-03-15

## 2021-11-02 RX ORDER — GABAPENTIN 600 MG/1
600 TABLET ORAL 3 TIMES DAILY
Qty: 90 TABLET | Refills: 2 | Status: SHIPPED | OUTPATIENT
Start: 2021-11-02 | End: 2022-04-28 | Stop reason: ALTCHOICE

## 2021-11-04 ENCOUNTER — PATIENT MESSAGE (OUTPATIENT)
Dept: PRIMARY CARE CLINIC | Facility: CLINIC | Age: 66
End: 2021-11-04
Payer: MEDICAID

## 2021-11-23 ENCOUNTER — PATIENT MESSAGE (OUTPATIENT)
Dept: PRIMARY CARE CLINIC | Facility: CLINIC | Age: 66
End: 2021-11-23
Payer: MEDICAID

## 2021-12-08 ENCOUNTER — TELEPHONE (OUTPATIENT)
Dept: HEMATOLOGY/ONCOLOGY | Facility: CLINIC | Age: 66
End: 2021-12-08
Payer: MEDICAID

## 2021-12-09 ENCOUNTER — TELEPHONE (OUTPATIENT)
Dept: INTERNAL MEDICINE | Facility: CLINIC | Age: 66
End: 2021-12-09
Payer: MEDICAID

## 2021-12-09 NOTE — TELEPHONE ENCOUNTER
LM to schedule pt with a provider to discuss stomach pains. Held 12/10 at 3 pm with a  Virtual appt with a provider.

## 2021-12-09 NOTE — TELEPHONE ENCOUNTER
----- Message from Izabela Ruiz sent at 12/8/2021  4:51 PM CST -----  Regarding: Appt Request  Name of Who is Calling:OSWALD DANIELLE [[id number]]    What is the request in detail:pt stated she cancelled appt because she felt better. Currently pt is experiencing stomach pains and wanted to be seen. I was unable to find any available appts.    Can the clinic reply by [facility]:No    What Number to Call Back if not in [facility]:[phone]

## 2021-12-16 ENCOUNTER — LAB VISIT (OUTPATIENT)
Dept: LAB | Facility: HOSPITAL | Age: 66
End: 2021-12-16
Payer: MEDICARE

## 2021-12-16 ENCOUNTER — OFFICE VISIT (OUTPATIENT)
Dept: HEMATOLOGY/ONCOLOGY | Facility: CLINIC | Age: 66
End: 2021-12-16
Payer: MEDICARE

## 2021-12-16 VITALS
HEART RATE: 93 BPM | SYSTOLIC BLOOD PRESSURE: 136 MMHG | OXYGEN SATURATION: 98 % | DIASTOLIC BLOOD PRESSURE: 93 MMHG | HEIGHT: 60 IN | WEIGHT: 191.81 LBS | BODY MASS INDEX: 37.66 KG/M2 | TEMPERATURE: 99 F | RESPIRATION RATE: 16 BRPM

## 2021-12-16 DIAGNOSIS — C50.912 BREAST CANCER METASTASIZED TO AXILLARY LYMPH NODE, LEFT: Primary | ICD-10-CM

## 2021-12-16 DIAGNOSIS — T45.1X5A NEUROPATHY DUE TO CHEMOTHERAPEUTIC DRUG: ICD-10-CM

## 2021-12-16 DIAGNOSIS — C77.3 BREAST CANCER METASTASIZED TO AXILLARY LYMPH NODE, LEFT: ICD-10-CM

## 2021-12-16 DIAGNOSIS — Z90.13 H/O BILATERAL MASTECTOMY: ICD-10-CM

## 2021-12-16 DIAGNOSIS — E55.9 VITAMIN D DEFICIENCY: ICD-10-CM

## 2021-12-16 DIAGNOSIS — E11.21 CONTROLLED TYPE 2 DIABETES MELLITUS WITH DIABETIC NEPHROPATHY, WITHOUT LONG-TERM CURRENT USE OF INSULIN: ICD-10-CM

## 2021-12-16 DIAGNOSIS — C50.912 BREAST CANCER METASTASIZED TO AXILLARY LYMPH NODE, LEFT: ICD-10-CM

## 2021-12-16 DIAGNOSIS — C77.3 BREAST CANCER METASTASIZED TO AXILLARY LYMPH NODE, LEFT: Primary | ICD-10-CM

## 2021-12-16 DIAGNOSIS — G62.0 NEUROPATHY DUE TO CHEMOTHERAPEUTIC DRUG: ICD-10-CM

## 2021-12-16 LAB
25(OH)D3+25(OH)D2 SERPL-MCNC: 24 NG/ML (ref 30–96)
ALBUMIN SERPL BCP-MCNC: 3.8 G/DL (ref 3.5–5.2)
ALP SERPL-CCNC: 123 U/L (ref 55–135)
ALT SERPL W/O P-5'-P-CCNC: 18 U/L (ref 10–44)
ANION GAP SERPL CALC-SCNC: 14 MMOL/L (ref 8–16)
AST SERPL-CCNC: 24 U/L (ref 10–40)
BILIRUB SERPL-MCNC: 0.5 MG/DL (ref 0.1–1)
BUN SERPL-MCNC: 11 MG/DL (ref 8–23)
CALCIUM SERPL-MCNC: 9.9 MG/DL (ref 8.7–10.5)
CHLORIDE SERPL-SCNC: 100 MMOL/L (ref 95–110)
CO2 SERPL-SCNC: 23 MMOL/L (ref 23–29)
CREAT SERPL-MCNC: 0.8 MG/DL (ref 0.5–1.4)
ERYTHROCYTE [DISTWIDTH] IN BLOOD BY AUTOMATED COUNT: 15.6 % (ref 11.5–14.5)
EST. GFR  (AFRICAN AMERICAN): >60 ML/MIN/1.73 M^2
EST. GFR  (NON AFRICAN AMERICAN): >60 ML/MIN/1.73 M^2
GLUCOSE SERPL-MCNC: 205 MG/DL (ref 70–110)
HCT VFR BLD AUTO: 39.6 % (ref 37–48.5)
HGB BLD-MCNC: 12.7 G/DL (ref 12–16)
IMM GRANULOCYTES # BLD AUTO: 0.04 K/UL (ref 0–0.04)
MCH RBC QN AUTO: 29.5 PG (ref 27–31)
MCHC RBC AUTO-ENTMCNC: 32.1 G/DL (ref 32–36)
MCV RBC AUTO: 92 FL (ref 82–98)
NEUTROPHILS # BLD AUTO: 2.6 K/UL (ref 1.8–7.7)
PLATELET # BLD AUTO: 197 K/UL (ref 150–450)
PMV BLD AUTO: 10.7 FL (ref 9.2–12.9)
POTASSIUM SERPL-SCNC: 3.6 MMOL/L (ref 3.5–5.1)
PROT SERPL-MCNC: 8.1 G/DL (ref 6–8.4)
RBC # BLD AUTO: 4.3 M/UL (ref 4–5.4)
SODIUM SERPL-SCNC: 137 MMOL/L (ref 136–145)
WBC # BLD AUTO: 4.32 K/UL (ref 3.9–12.7)

## 2021-12-16 PROCEDURE — 99214 PR OFFICE/OUTPT VISIT, EST, LEVL IV, 30-39 MIN: ICD-10-PCS | Mod: S$PBB,,, | Performed by: NURSE PRACTITIONER

## 2021-12-16 PROCEDURE — 85027 COMPLETE CBC AUTOMATED: CPT | Performed by: NURSE PRACTITIONER

## 2021-12-16 PROCEDURE — 80053 COMPREHEN METABOLIC PANEL: CPT | Performed by: NURSE PRACTITIONER

## 2021-12-16 PROCEDURE — 99999 PR PBB SHADOW E&M-EST. PATIENT-LVL IV: CPT | Mod: PBBFAC,,, | Performed by: NURSE PRACTITIONER

## 2021-12-16 PROCEDURE — 99999 PR PBB SHADOW E&M-EST. PATIENT-LVL IV: ICD-10-PCS | Mod: PBBFAC,,, | Performed by: NURSE PRACTITIONER

## 2021-12-16 PROCEDURE — 36415 COLL VENOUS BLD VENIPUNCTURE: CPT | Performed by: NURSE PRACTITIONER

## 2021-12-16 PROCEDURE — 99214 OFFICE O/P EST MOD 30 MIN: CPT | Mod: S$PBB,,, | Performed by: NURSE PRACTITIONER

## 2021-12-16 PROCEDURE — 82306 VITAMIN D 25 HYDROXY: CPT | Performed by: NURSE PRACTITIONER

## 2021-12-16 PROCEDURE — 99214 OFFICE O/P EST MOD 30 MIN: CPT | Mod: PBBFAC | Performed by: NURSE PRACTITIONER

## 2021-12-17 ENCOUNTER — PATIENT MESSAGE (OUTPATIENT)
Dept: HEMATOLOGY/ONCOLOGY | Facility: CLINIC | Age: 66
End: 2021-12-17
Payer: MEDICAID

## 2021-12-20 ENCOUNTER — PATIENT MESSAGE (OUTPATIENT)
Dept: HEMATOLOGY/ONCOLOGY | Facility: CLINIC | Age: 66
End: 2021-12-20
Payer: MEDICAID

## 2021-12-27 ENCOUNTER — PATIENT MESSAGE (OUTPATIENT)
Dept: ORTHOPEDICS | Facility: CLINIC | Age: 66
End: 2021-12-27
Payer: MEDICAID

## 2021-12-29 ENCOUNTER — HOSPITAL ENCOUNTER (OUTPATIENT)
Dept: RADIOLOGY | Facility: HOSPITAL | Age: 66
Discharge: HOME | End: 2021-12-29
Attending: PHYSICIAN ASSISTANT
Payer: MEDICARE

## 2021-12-29 ENCOUNTER — OFFICE VISIT (OUTPATIENT)
Dept: ORTHOPEDICS | Facility: CLINIC | Age: 66
End: 2021-12-29
Payer: COMMERCIAL

## 2021-12-29 VITALS — BODY MASS INDEX: 37.5 KG/M2 | WEIGHT: 191 LBS | HEIGHT: 60 IN

## 2021-12-29 DIAGNOSIS — M25.571 ACUTE RIGHT ANKLE PAIN: ICD-10-CM

## 2021-12-29 DIAGNOSIS — M25.571 ACUTE RIGHT ANKLE PAIN: Primary | ICD-10-CM

## 2021-12-29 DIAGNOSIS — M17.12 PRIMARY OSTEOARTHRITIS OF LEFT KNEE: ICD-10-CM

## 2021-12-29 PROCEDURE — 73610 X-RAY EXAM OF ANKLE: CPT | Mod: TC,RT

## 2021-12-29 PROCEDURE — 99213 OFFICE O/P EST LOW 20 MIN: CPT | Mod: S$PBB,,, | Performed by: PHYSICIAN ASSISTANT

## 2021-12-29 PROCEDURE — 99213 OFFICE O/P EST LOW 20 MIN: CPT | Mod: PBBFAC | Performed by: PHYSICIAN ASSISTANT

## 2021-12-29 PROCEDURE — 99213 PR OFFICE/OUTPT VISIT, EST, LEVL III, 20-29 MIN: ICD-10-PCS | Mod: S$PBB,,, | Performed by: PHYSICIAN ASSISTANT

## 2021-12-29 PROCEDURE — 73610 X-RAY EXAM OF ANKLE: CPT | Mod: 26,RT,, | Performed by: RADIOLOGY

## 2021-12-29 PROCEDURE — 99999 PR PBB SHADOW E&M-EST. PATIENT-LVL III: ICD-10-PCS | Mod: PBBFAC,,, | Performed by: PHYSICIAN ASSISTANT

## 2021-12-29 PROCEDURE — 99999 PR PBB SHADOW E&M-EST. PATIENT-LVL III: CPT | Mod: PBBFAC,,, | Performed by: PHYSICIAN ASSISTANT

## 2021-12-29 PROCEDURE — 73610 XR ANKLE COMPLETE 3 VIEW RIGHT: ICD-10-PCS | Mod: 26,RT,, | Performed by: RADIOLOGY

## 2021-12-29 RX ORDER — MELOXICAM 15 MG/1
15 TABLET ORAL DAILY
Qty: 30 TABLET | Refills: 0 | Status: SHIPPED | OUTPATIENT
Start: 2021-12-29 | End: 2022-01-28

## 2022-01-11 NOTE — LETTER
"ABSENT NOTE:    Albert Kumar was absent from group 1/11/2022 . This absence was excused. Patient emailed counselor at 12 pm: \"Is it possible to be late? Otherwise I cannot make 12:30 start time. Sorry.\"  Counselor responded at 12:30 pm and patient may not have been able to attend group by then.  Patient is expected to be in group on 1/13/2022    Jacky Nash [facility]  1/11/2022 , 4:50 PM        " May 12, 2020      Elena Lopez MD  [address]  [facility] [place] [address]           [facility]  [address]  [facility] [address]  Phone: [phone]          Patient: Maria Elena Tavares   MR Number: [id number]   YOB: 1955   Date of Visit: 5/12/2020       Dear Dr. Elena Lopez:    Thank you for referring Maria Elena Tavares to me for evaluation. Attached you will find relevant portions of my assessment and plan of care.    If you have questions, please do not hesitate to call me. I look forward to following Maria Elena Tavares along with you.    Sincerely,    Arnoldo Bell Jr., MD    Enclosure  CC:  No Recipients    If you would like to receive this communication electronically, please contact [email or web address] or [phone] to request more information on [facility] access.    For providers and/or their staff who would like to refer a patient to [facility], please contact us through our one-stop-shop provider referral line, [facility], at [phone].    If you feel you have received this communication in error or would no longer like to receive these types of communications, please e-mail [email or web address]

## 2022-01-12 ENCOUNTER — OFFICE VISIT (OUTPATIENT)
Dept: ORTHOPEDICS | Facility: CLINIC | Age: 67
End: 2022-01-12
Payer: MEDICARE

## 2022-01-12 VITALS — HEIGHT: 60 IN | WEIGHT: 190.94 LBS | BODY MASS INDEX: 37.49 KG/M2

## 2022-01-12 DIAGNOSIS — M25.571 ACUTE RIGHT ANKLE PAIN: Primary | ICD-10-CM

## 2022-01-12 PROCEDURE — 99213 OFFICE O/P EST LOW 20 MIN: CPT | Mod: S$PBB,,, | Performed by: ORTHOPAEDIC SURGERY

## 2022-01-12 PROCEDURE — 99999 PR PBB SHADOW E&M-EST. PATIENT-LVL III: ICD-10-PCS | Mod: PBBFAC,,, | Performed by: ORTHOPAEDIC SURGERY

## 2022-01-12 PROCEDURE — 99213 OFFICE O/P EST LOW 20 MIN: CPT | Mod: PBBFAC | Performed by: ORTHOPAEDIC SURGERY

## 2022-01-12 PROCEDURE — 99213 PR OFFICE/OUTPT VISIT, EST, LEVL III, 20-29 MIN: ICD-10-PCS | Mod: S$PBB,,, | Performed by: ORTHOPAEDIC SURGERY

## 2022-01-12 PROCEDURE — 99999 PR PBB SHADOW E&M-EST. PATIENT-LVL III: CPT | Mod: PBBFAC,,, | Performed by: ORTHOPAEDIC SURGERY

## 2022-01-14 NOTE — PROGRESS NOTES
Maria Elena Tavares  This is an established patient clinic.  She comes in for evaluation of right ankle pain that [place]  She was seen by a physician assistant in the orthopedic clinic on 12/29/2021 and had x-rays performed which reveal no obvious bony abnormality.  She comes in today for another opinion.  She reports that her symptoms are improved but her pain was on the lateral aspect of her ankle and hind foot pointing just distal to the fibula.    Examination:  She walks in today with a normal gait.  on inspection there is no obvious swelling of her right ankle.  on sitting exam she has functional motion of her ankle and subtalar joint on the right side.  there is some mild tenderness in the sub fibular and sinus tarsi area.  there is no gross instability of the ankle.  all of her tendons are functioning with good strength.  she is neurovascularly intact    Imaging:  I reviewed the ankle x-rays that were performed on 12/29/2021.  There are no acute bony abnormalities.    Impression:  Right ankle pain, etiology unclear    Recommendation:  I reassured her that structurally her ankle is stable at this time.  I am unclear of the etiology of her symptoms.  if her symptoms do not improve we can consider an MRI at some point.    Follow-up as needed

## 2022-02-10 ENCOUNTER — OFFICE VISIT (OUTPATIENT)
Dept: SURGERY | Facility: CLINIC | Age: 67
End: 2022-02-10
Payer: MEDICARE

## 2022-02-10 VITALS
HEIGHT: 60 IN | WEIGHT: 189 LBS | BODY MASS INDEX: 37.11 KG/M2 | SYSTOLIC BLOOD PRESSURE: 137 MMHG | HEART RATE: 102 BPM | DIASTOLIC BLOOD PRESSURE: 83 MMHG

## 2022-02-10 DIAGNOSIS — Z90.10 ACQUIRED ABSENCE OF BREAST AND ABSENT NIPPLE, UNSPECIFIED LATERALITY: ICD-10-CM

## 2022-02-10 DIAGNOSIS — Z85.3 PERSONAL HISTORY OF BREAST CANCER: ICD-10-CM

## 2022-02-10 DIAGNOSIS — C50.912 MALIGNANT NEOPLASM OF LEFT FEMALE BREAST, UNSPECIFIED ESTROGEN RECEPTOR STATUS, UNSPECIFIED SITE OF BREAST: ICD-10-CM

## 2022-02-10 DIAGNOSIS — Z90.13 S/P BILATERAL MASTECTOMY: Primary | ICD-10-CM

## 2022-02-10 PROCEDURE — 99999 PR PBB SHADOW E&M-EST. PATIENT-LVL III: CPT | Mod: PBBFAC,,, | Performed by: PHYSICIAN ASSISTANT

## 2022-02-10 PROCEDURE — 99213 OFFICE O/P EST LOW 20 MIN: CPT | Mod: S$PBB,,, | Performed by: PHYSICIAN ASSISTANT

## 2022-02-10 PROCEDURE — 99999 PR PBB SHADOW E&M-EST. PATIENT-LVL III: ICD-10-PCS | Mod: PBBFAC,,, | Performed by: PHYSICIAN ASSISTANT

## 2022-02-10 PROCEDURE — 99213 PR OFFICE/OUTPT VISIT, EST, LEVL III, 20-29 MIN: ICD-10-PCS | Mod: S$PBB,,, | Performed by: PHYSICIAN ASSISTANT

## 2022-02-10 PROCEDURE — 99213 OFFICE O/P EST LOW 20 MIN: CPT | Mod: PBBFAC | Performed by: PHYSICIAN ASSISTANT

## 2022-02-10 NOTE — PROGRESS NOTES
[facility]  Department of Surgery  02/10/2022     REFERRING PROVIDER: No referring provider defined for this encounter. Estephania San MD  CHIEF COMPLAINT:   Chief Complaint   Patient presents with    Follow-up     6 mo f/u       DIAGNOSIS:   This is a 66 y.o. female with a history of stage T1cN1a, grade 3, ER +, AK +, HER2 - IDC of the left breast.    TREATMENT:   1. Neoadjuvant chemotherapy with ACT completed 2/11/2020, Dr. Sparkle [facility].   2. S/p bilateral mastectomy with L ALND and R SLNB on 3/24/2020. Dr. John M.D. Surgical Oncology. TE placed but eventually failed. Removed by Dr. Solorzano 7/30/2020.     3. Final Path: 1.2 cm IDC with associated 1.6 cm of DCIS. Negative margins. 1/11 nodes examined from right and left. 1 L node with 5 mm of metastic disease.   4. PMRT completed with Dr. Ray M.D. Radiation Oncology   5.  On Femara with Dr. Sparkle M.D. Medical Oncology     HISTORY OF PRESENT ILLNESS:   Maria Elena Tavares is a 66 y.o. female comes in for oncological follow up.  She does note some tenderness of chest well on the lateral side of her R breast mastectomy bed. Otherwise without other self exam changes such as palpable masses or skin changes. The patient denies constitutional symptoms of night sweats, chills, weight loss, new headaches, visual changes, new back or bony pain, chest pain, or shortness of breath.        Review of Systems: See HPI/Interval History for other systems reviewed.     IMAGING:     None needed      MEDICATIONS/ALLERGIES:     Current Outpatient Medications   Medication Sig    aspirin (ECOTRIN) 81 MG EC tablet Take 81 mg by mouth once daily.    atorvastatin (LIPITOR) 10 MG tablet Take 1 tablet (10 mg total) by mouth once daily.    chlorthalidone (HYGROTEN) 25 MG Tab Take 1 tablet by mouth every morning    gabapentin (NEURONTIN) 600 MG tablet Take 1 tablet (600 mg total) by mouth 3 (three) times daily.    irbesartan (AVAPRO) 300 MG tablet Take 1  tablet by mouth once daily    letrozole (FEMARA) 2.5 mg Tab Take 1 tablet (2.5 mg total) by mouth once daily.    lifitegrast (XIIDRA) 5 % Dpet Apply 1 drop to  both eyes  2 (two) times daily.    metFORMIN (GLUCOPHAGE) 1000 MG tablet TAKE 1 TABLET (1,000 MG TOTAL) BY MOUTH 2 (TWO) TIMES DAILY WITH MEALS.    polyethylene glycol (GLYCOLAX) 17 gram/dose powder Mix 17 g (1 capful) with juice or water an drink 2 (two) times daily.    prasterone, dhea, (INTRAROSA) 6.5 mg Inst Place 1 suppository vaginally nightly.    semaglutide (RYBELSUS) 3 mg tablet Take 1 tablet (3 mg total) by mouth once daily.    semaglutide (RYBELSUS) 7 mg tablet Take 1 tablet (7 mg total) by mouth once daily.    blood-glucose meter kit DISPENSE : BLOOD TEST STRIPS AND LANCETS PATIENT TEST BLOOD SUGARS TWICE DAILY  TEST STRIPS: 50 EACH, REFILL 5  LANCETS:  50 EACH , REFILL 5    levothyroxine (SYNTHROID) 200 MCG tablet Take 1 tablet (200 mcg total) by mouth once daily.     No current facility-administered medications for this visit.      Review of patient's allergies indicates:  No Known Allergies    PHYSICAL EXAM:   /83 (BP Location: Right arm, Patient Position: Sitting, BP Method: Large (Automatic))   Pulse 102   Ht 5' (1.524 m)   Wt 85.7 kg (189 lb)   LMP  (LMP Unknown)   BMI 36.91 kg/m²     Physical Exam   Vitals reviewed.  Constitutional: She is oriented to person, place, and time. She appears well-developed and well-nourished. No distress.   HENT:   Head: Normocephalic and atraumatic.   Eyes: Pupils are equal, round, and reactive to light. Right eye exhibits no discharge. Left eye exhibits no discharge. No scleral icterus.   Neck: No tracheal deviation present.   Cardiovascular: Normal rate and regular rhythm.    Pulmonary/Chest: Effort normal and breath sounds normal. No respiratory distress. She exhibits no mass, no tenderness and no edema. Right breast exhibits no inverted nipple, no mass, no nipple discharge, no skin  change and no tenderness. Left breast exhibits no inverted nipple, no mass, no nipple discharge, no skin change and no tenderness. Breasts are symmetrical.       Abdominal: Soft. She exhibits no mass. There is no abdominal tenderness.   Musculoskeletal: No edema.   Lymphadenopathy:     She has no cervical adenopathy.   Neurological: She is alert and oriented to person, place, and time.   Skin: Skin is warm and dry. No rash noted. She is not diaphoretic. No erythema.     Psychiatric: She has a normal mood and affect.       ASSESSMENT:   This is a 66 y.o. female without evidence of recurrence by exam, history or imaging.       PLAN:   1. Continue to follow up with Dr. Villagran.   2. Given script for bra/prosthesis. Advised she will be contacted by [facility].  3. No need for screening mmg due to bilateral mastectomy.   4. Return to clinic in 1 year .    The patient is in agreement with the plan. Questions were encouraged and answered to patient's satisfaction. Maria Elena will call our office with any questions or concerns.     Isa Chi PA-C  Breast Surgery

## 2022-02-21 DIAGNOSIS — M79.642 BILATERAL HAND PAIN: Primary | ICD-10-CM

## 2022-02-21 DIAGNOSIS — M79.641 BILATERAL HAND PAIN: Primary | ICD-10-CM

## 2022-02-22 ENCOUNTER — TELEPHONE (OUTPATIENT)
Dept: ORTHOPEDICS | Facility: CLINIC | Age: 67
End: 2022-02-22
Payer: MEDICAID

## 2022-02-22 DIAGNOSIS — D84.9 IMMUNOSUPPRESSED STATUS: ICD-10-CM

## 2022-02-23 DIAGNOSIS — Z90.10 ACQUIRED ABSENCE OF BREAST AND ABSENT NIPPLE, UNSPECIFIED LATERALITY: ICD-10-CM

## 2022-02-23 DIAGNOSIS — Z85.3 PERSONAL HISTORY OF BREAST CANCER: ICD-10-CM

## 2022-02-23 DIAGNOSIS — C50.912 MALIGNANT NEOPLASM OF LEFT FEMALE BREAST, UNSPECIFIED ESTROGEN RECEPTOR STATUS, UNSPECIFIED SITE OF BREAST: ICD-10-CM

## 2022-02-23 DIAGNOSIS — Z90.13 S/P BILATERAL MASTECTOMY: Primary | ICD-10-CM

## 2022-03-04 ENCOUNTER — TELEPHONE (OUTPATIENT)
Dept: OBSTETRICS AND GYNECOLOGY | Facility: CLINIC | Age: 67
End: 2022-03-04
Payer: MEDICAID

## 2022-03-04 NOTE — TELEPHONE ENCOUNTER
----- Message from Tracy Kemp MA sent at 2/15/2022  3:09 PM CST -----  Pt need survivorship annual after 5/27

## 2022-03-07 ENCOUNTER — PATIENT MESSAGE (OUTPATIENT)
Dept: HEMATOLOGY/ONCOLOGY | Facility: CLINIC | Age: 67
End: 2022-03-07
Payer: MEDICAID

## 2022-03-08 ENCOUNTER — TELEPHONE (OUTPATIENT)
Dept: ORTHOPEDICS | Facility: CLINIC | Age: 67
End: 2022-03-08
Payer: MEDICAID

## 2022-03-09 ENCOUNTER — HOSPITAL ENCOUNTER (OUTPATIENT)
Dept: RADIOLOGY | Facility: OTHER | Age: 67
Discharge: HOME | End: 2022-03-09
Attending: PLASTIC SURGERY
Payer: MEDICARE

## 2022-03-09 ENCOUNTER — OFFICE VISIT (OUTPATIENT)
Dept: ORTHOPEDICS | Facility: CLINIC | Age: 67
End: 2022-03-09
Payer: MEDICARE

## 2022-03-09 VITALS — BODY MASS INDEX: 35.68 KG/M2 | HEIGHT: 61 IN | WEIGHT: 189 LBS

## 2022-03-09 DIAGNOSIS — M79.642 BILATERAL HAND PAIN: ICD-10-CM

## 2022-03-09 DIAGNOSIS — M79.641 BILATERAL HAND PAIN: ICD-10-CM

## 2022-03-09 DIAGNOSIS — M65.312 TRIGGER THUMB OF LEFT HAND: Primary | ICD-10-CM

## 2022-03-09 PROCEDURE — 20550 NJX 1 TENDON SHEATH/LIGAMENT: CPT | Mod: PBBFAC | Performed by: PLASTIC SURGERY

## 2022-03-09 PROCEDURE — 20600 DRAIN/INJ JOINT/BURSA W/O US: CPT | Mod: PBBFAC

## 2022-03-09 PROCEDURE — 20550 NJX 1 TENDON SHEATH/LIGAMENT: CPT | Mod: S$PBB,LT,, | Performed by: PLASTIC SURGERY

## 2022-03-09 PROCEDURE — 99212 PR OFFICE/OUTPT VISIT, EST, LEVL II, 10-19 MIN: ICD-10-PCS | Mod: 25,S$PBB,, | Performed by: PLASTIC SURGERY

## 2022-03-09 PROCEDURE — 20550 PR INJECT TENDON SHEATH/LIGAMENT: ICD-10-PCS | Mod: S$PBB,LT,, | Performed by: PLASTIC SURGERY

## 2022-03-09 PROCEDURE — 99213 OFFICE O/P EST LOW 20 MIN: CPT | Mod: PBBFAC,25 | Performed by: PLASTIC SURGERY

## 2022-03-09 PROCEDURE — 99999 PR PBB SHADOW E&M-EST. PATIENT-LVL III: CPT | Mod: PBBFAC,,, | Performed by: PLASTIC SURGERY

## 2022-03-09 PROCEDURE — 99999 PR PBB SHADOW E&M-EST. PATIENT-LVL III: ICD-10-PCS | Mod: PBBFAC,,, | Performed by: PLASTIC SURGERY

## 2022-03-09 PROCEDURE — 99212 OFFICE O/P EST SF 10 MIN: CPT | Mod: 25,S$PBB,, | Performed by: PLASTIC SURGERY

## 2022-03-09 RX ORDER — TRIAMCINOLONE ACETONIDE 40 MG/ML
40 INJECTION, SUSPENSION INTRA-ARTICULAR; INTRAMUSCULAR
Status: COMPLETED | OUTPATIENT
Start: 2022-03-09 | End: 2022-03-09

## 2022-03-09 RX ADMIN — TRIAMCINOLONE ACETONIDE 40 MG: 40 INJECTION, SUSPENSION INTRA-ARTICULAR; INTRAMUSCULAR at 11:03

## 2022-03-09 NOTE — PROGRESS NOTES
Subjective:      Patient ID: Maria Elena Tavares is a 66 y.o. female.    Chief Complaint: Pain of the Left Hand    Returns to clinic today with complaints of a painful triggering left thumb and a painful lesion on the volar aspect of her left small finger that has been present for many years she is unsure as to whether she has a foreign body located within the small finger and states that her discomfort is during manipulation of the lesion or gripping.  She states she had a positive response to her previous steroid injections into each trigger thumb over a year ago she recently began to develop symptoms within the left thumb.      Review of Systems   All other systems reviewed and are negative.        Objective:            General    Vitals reviewed.  Constitutional: She is oriented to person, place, and time. She appears well-nourished. No distress.   Pulmonary/Chest: Effort normal.   Neurological: She is alert and oriented to person, place, and time.   Psychiatric: She has a normal mood and affect.         Left Hand/Wrist Exam     Inspection   Effusion: Hand -  absent  Deformity: Hand -  absent    Pain   Hand - The patient exhibits pain of the thumb MCP.    Tenderness   The patient is tender to palpation of the hart area.     Other     Sensory Exam  Median Distribution: normal  Ulnar Distribution: normal  Radial Distribution: normal    Comments:  Tenderness to palpation over the A1 pulley at the base of the thumb.  Small 2 mm intradermal lesion just distal to the MCP flexion crease of the left small finger that is tender to touch          PROCEDURE:  I have explained the risks, benefits, and alternatives of the procedure in detail.  The patient voices understanding and all questions have been answered. So after I performed a sterile prep of the skin, the level of there A-1 pulley of the left thumb is injected using a 25 gauge needle from the volar approach with a  combination of 1cc 1% plain Lidocaine and 40 mg of  Kenalog.  The patient is cautioned and immediate relief of pain is secondary to the local anesthetic and will be temporary.  After the anesthetic wears off there may be a increase in pain that may last for a few hours or a few days and they should use ice to help alleviate this flair up of pain.             Assessment:       Encounter Diagnosis   Name Primary?    Trigger thumb of left hand Yes          Plan:       Maria Elena was seen today for pain.    Diagnoses and all orders for this visit:    Trigger thumb of left hand    Other orders  -     triamcinolone acetonide injection 40 mg        She presents today with a recurrent left thumb triggering.  In addition she was found to have a painful lesion within the left small finger.  She had a positive response to previous injections therefore we decided to repeat the injection into the tendon sheath of the left thumb to help alleviate her symptoms.  I discussed that for the small finger lesion surgical excision would probably be the best treatment for this as it is likely a foreign body reaction.  I discussed this could be performed simultaneously with a left thumb trigger release however the patient would like to avoid surgery at this time.  She was receive the injection today please see the procedure note above.  She will follow up me as needed.

## 2022-03-15 ENCOUNTER — TELEPHONE (OUTPATIENT)
Dept: PAIN MEDICINE | Facility: CLINIC | Age: 67
End: 2022-03-15
Payer: MEDICAID

## 2022-03-15 ENCOUNTER — OFFICE VISIT (OUTPATIENT)
Dept: PRIMARY CARE CLINIC | Facility: CLINIC | Age: 67
End: 2022-03-15
Attending: FAMILY MEDICINE
Payer: MEDICARE

## 2022-03-15 VITALS
DIASTOLIC BLOOD PRESSURE: 82 MMHG | HEART RATE: 90 BPM | WEIGHT: 193 LBS | OXYGEN SATURATION: 98 % | HEIGHT: 61 IN | BODY MASS INDEX: 36.44 KG/M2 | SYSTOLIC BLOOD PRESSURE: 124 MMHG

## 2022-03-15 DIAGNOSIS — G62.0 NEUROPATHY DUE TO CHEMOTHERAPEUTIC DRUG: ICD-10-CM

## 2022-03-15 DIAGNOSIS — I10 ESSENTIAL HYPERTENSION: ICD-10-CM

## 2022-03-15 DIAGNOSIS — M79.601 RIGHT ARM PAIN: ICD-10-CM

## 2022-03-15 DIAGNOSIS — C77.3 BREAST CANCER METASTASIZED TO AXILLARY LYMPH NODE, LEFT: ICD-10-CM

## 2022-03-15 DIAGNOSIS — T45.1X5A NEUROPATHY DUE TO CHEMOTHERAPEUTIC DRUG: ICD-10-CM

## 2022-03-15 DIAGNOSIS — E11.21 CONTROLLED TYPE 2 DIABETES MELLITUS WITH DIABETIC NEPHROPATHY, WITHOUT LONG-TERM CURRENT USE OF INSULIN: Primary | ICD-10-CM

## 2022-03-15 DIAGNOSIS — B02.23 SHINGLES (HERPES ZOSTER) POLYNEUROPATHY: ICD-10-CM

## 2022-03-15 DIAGNOSIS — C50.912 BREAST CANCER METASTASIZED TO AXILLARY LYMPH NODE, LEFT: ICD-10-CM

## 2022-03-15 DIAGNOSIS — E87.6 HYPOKALEMIA: ICD-10-CM

## 2022-03-15 DIAGNOSIS — I95.1 ORTHOSTATIC HYPOTENSION: ICD-10-CM

## 2022-03-15 PROCEDURE — 99215 OFFICE O/P EST HI 40 MIN: CPT | Mod: PBBFAC,PN | Performed by: FAMILY MEDICINE

## 2022-03-15 PROCEDURE — 99999 PR PBB SHADOW E&M-EST. PATIENT-LVL V: ICD-10-PCS | Mod: PBBFAC,,, | Performed by: FAMILY MEDICINE

## 2022-03-15 PROCEDURE — 99999 PR PBB SHADOW E&M-EST. PATIENT-LVL V: CPT | Mod: PBBFAC,,, | Performed by: FAMILY MEDICINE

## 2022-03-15 PROCEDURE — 99214 OFFICE O/P EST MOD 30 MIN: CPT | Mod: S$PBB,,, | Performed by: FAMILY MEDICINE

## 2022-03-15 PROCEDURE — 99214 PR OFFICE/OUTPT VISIT, EST, LEVL IV, 30-39 MIN: ICD-10-PCS | Mod: S$PBB,,, | Performed by: FAMILY MEDICINE

## 2022-03-15 RX ORDER — VALACYCLOVIR HYDROCHLORIDE 1 G/1
1000 TABLET, FILM COATED ORAL 2 TIMES DAILY
Qty: 10 TABLET | Refills: 0 | Status: SHIPPED | OUTPATIENT
Start: 2022-03-15 | End: 2022-08-24

## 2022-03-15 NOTE — TELEPHONE ENCOUNTER
staff spoke with patient in regards to contacting her back to schedule appt once the call center update it.patient verbalized understanding.

## 2022-03-15 NOTE — PROGRESS NOTES
Subjective:       Patient ID: Maria Elena Tavares is a 66 y.o. female.    Chief Complaint: Diabetes and Arm Pain    Pt presents today for annual along w f/u DM and HTN. Pt has finished chemo for and rads for breast CA. Since then has had neuropathic pain in her right arm x 2 years. Pt states that it is now intolerable and this has stopped her from working. She has since retired from [employer] 2/22 and is now going to try for disability  Does note that arm pain is at same spot that she has h/o neuropathy from shingles in the past       Labs are due next month along          Diabetes  She has type 2 diabetes mellitus. No MedicAlert identification noted. Hypoglycemia symptoms include sweats. Pertinent negatives for hypoglycemia include no confusion, dizziness, headaches, hunger, mood changes, nervousness/anxiousness or sleepiness. Associated symptoms include foot paresthesias. Pertinent negatives for diabetes include no chest pain, no fatigue, no foot ulcerations, no polydipsia, no polyphagia, no polyuria, no visual change and no weakness. Pertinent negatives for hypoglycemia complications include no blackouts, no hospitalization, no nocturnal hypoglycemia, no required assistance and no required glucagon injection. Symptoms are stable. Diabetic complications include autonomic neuropathy and peripheral neuropathy. Pertinent negatives for diabetic complications include no CVA, heart disease, nephropathy, PVD or retinopathy. Risk factors for coronary artery disease include hypertension. Current diabetic treatment includes oral agent (dual therapy). She is compliant with treatment most of the time. Her weight is stable. She is following a generally healthy diet. Meal planning includes avoidance of concentrated sweets. She has not had a previous visit with a dietitian. She rarely participates in exercise. She monitors blood glucose at home 1-2 x per week. Blood glucose monitoring compliance is good. There is no change in her home  blood glucose trend. She sees a podiatrist.Eye exam is not current.   Hypertension  Associated symptoms include sweats. Pertinent negatives include no chest pain, headaches, neck pain, palpitations or shortness of breath. There is no history of CVA, PVD or retinopathy.   Arm Pain   Pertinent negatives include no chest pain.     Review of Systems   Constitutional: Positive for activity change. Negative for appetite change, chills, diaphoresis, fatigue, fever and unexpected weight change.   HENT: Negative for congestion, ear pain, hearing loss, rhinorrhea, sinus pressure, sore throat and trouble swallowing.    Eyes: Negative.    Respiratory: Negative for apnea, cough, chest tightness, shortness of breath and wheezing.    Cardiovascular: Negative for chest pain, palpitations and leg swelling.   Gastrointestinal: Negative.    Endocrine: Negative for polydipsia, polyphagia and polyuria.   Musculoskeletal: Positive for arthralgias, joint swelling and myalgias. Negative for back pain, gait problem, neck pain and neck stiffness.   Skin: Negative.    Neurological: Negative for dizziness, weakness, light-headedness and headaches.   Psychiatric/Behavioral: Negative for behavioral problems, confusion, decreased concentration, dysphoric mood and sleep disturbance. The patient is not nervous/anxious.    All other systems reviewed and are negative.      Objective:      Physical Exam  Vitals reviewed.   Constitutional:       General: She is not in acute distress.     Appearance: Normal appearance. She is well-developed. She is obese. She is not ill-appearing, toxic-appearing or diaphoretic.   HENT:      Head: Normocephalic and atraumatic.      Right Ear: Tympanic membrane, ear canal and external ear normal. There is no impacted cerumen.      Left Ear: Tympanic membrane, ear canal and external ear normal. There is no impacted cerumen.      Nose: Nose normal. No congestion or rhinorrhea.      Mouth/Throat:      Pharynx: No  oropharyngeal exudate or posterior oropharyngeal erythema.   Eyes:      Extraocular Movements: Extraocular movements intact.      Conjunctiva/sclera: Conjunctivae normal.      Pupils: Pupils are equal, round, and reactive to light.   Neck:      Thyroid: No thyromegaly.   Cardiovascular:      Rate and Rhythm: Normal rate and regular rhythm.      Pulses: Normal pulses.      Heart sounds: Normal heart sounds. No murmur heard.  Pulmonary:      Effort: Pulmonary effort is normal. No respiratory distress.      Breath sounds: Normal breath sounds. No stridor. No wheezing, rhonchi or rales.   Chest:      Chest wall: No tenderness.   Abdominal:      General: Bowel sounds are normal. There is no distension.      Palpations: Abdomen is soft. There is no mass.      Tenderness: There is no abdominal tenderness. There is no guarding or rebound.      Hernia: No hernia is present.   Musculoskeletal:         General: No tenderness. Normal range of motion.      Cervical back: Normal range of motion and neck supple.      Right lower leg: No edema.      Left lower leg: No edema.   Lymphadenopathy:      Cervical: No cervical adenopathy.   Skin:     General: Skin is warm and dry.      Findings: No erythema or rash.   Neurological:      General: No focal deficit present.      Mental Status: She is alert and oriented to person, place, and time. Mental status is at baseline.      Cranial Nerves: Cranial nerve deficit present.      Sensory: No sensory deficit.      Motor: Weakness present. No abnormal muscle tone.      Coordination: Coordination normal.      Gait: Gait normal.      Deep Tendon Reflexes: Reflexes are normal and symmetric. Reflexes normal.   Psychiatric:         Mood and Affect: Mood normal.         Behavior: Behavior normal.         Thought Content: Thought content normal.         Judgment: Judgment normal.         Assessment:       1. Controlled type 2 diabetes mellitus with diabetic nephropathy, without long-term current  use of insulin    2. Right arm pain    3. Neuropathy due to chemotherapeutic drug    4. Essential hypertension    5. Orthostatic hypotension    6. Hypokalemia        Plan:       HTN controlled   DMT2: stable labs due  Breast CA: remission  RTC prn symptoms or q 4-6 mos pending labs  Neuropathy s/p chemo:gabapentin and referral to pain clinic for input. Can also do trial w antiviral to see if this helps  Controlled type 2 diabetes mellitus with diabetic nephropathy, without long-term current use of insulin  -     Hemoglobin A1C; Future; Expected date: 03/15/2022  -     Lipid Panel; Future; Expected date: 03/15/2022  -     Comprehensive Metabolic Panel; Future; Expected date: 03/15/2022  -     CBC Auto Differential; Future; Expected date: 03/15/2022  -     TSH; Future; Expected date: 03/15/2022    Right arm pain  -     Ambulatory referral/consult to Pain Clinic; Future; Expected date: 03/22/2022  -     Lipid Panel; Future; Expected date: 03/15/2022  -     Comprehensive Metabolic Panel; Future; Expected date: 03/15/2022  -     CBC Auto Differential; Future; Expected date: 03/15/2022  -     TSH; Future; Expected date: 03/15/2022    Neuropathy due to chemotherapeutic drug  -     Lipid Panel; Future; Expected date: 03/15/2022  -     Comprehensive Metabolic Panel; Future; Expected date: 03/15/2022  -     CBC Auto Differential; Future; Expected date: 03/15/2022  -     TSH; Future; Expected date: 03/15/2022    Essential hypertension  -     Lipid Panel; Future; Expected date: 03/15/2022  -     Comprehensive Metabolic Panel; Future; Expected date: 03/15/2022  -     CBC Auto Differential; Future; Expected date: 03/15/2022  -     TSH; Future; Expected date: 03/15/2022    Orthostatic hypotension  -     Lipid Panel; Future; Expected date: 03/15/2022  -     Comprehensive Metabolic Panel; Future; Expected date: 03/15/2022  -     CBC Auto Differential; Future; Expected date: 03/15/2022  -     TSH; Future; Expected date:  03/15/2022    Hypokalemia  -     Lipid Panel; Future; Expected date: 03/15/2022  -     Comprehensive Metabolic Panel; Future; Expected date: 03/15/2022  -     CBC Auto Differential; Future; Expected date: 03/15/2022  -     TSH; Future; Expected date: 03/15/2022      RTC prn symptoms    Greater than 45 mins spent with pt; 50 % face to face

## 2022-03-15 NOTE — TELEPHONE ENCOUNTER
----- Message from Romina Christopher sent at 3/15/2022  1:08 PM CDT -----  Regarding: Appointment Access          Name of Who is Calling:  Maria Elena Tavares    Who Left The Message:  Maria Elenajay Tavares      What is the request in detail:        Patient called requesting a call regarding to schedule a new patient's appointment with Dr. NANNETTE Sanchez.  Patient is being referred by Dr. FLAKITO San.   Please further advise.     Thank you!      Reply by [facility]: Yes      Preferred Call Back  : [phone] (C)

## 2022-03-21 ENCOUNTER — PATIENT MESSAGE (OUTPATIENT)
Dept: HEMATOLOGY/ONCOLOGY | Facility: CLINIC | Age: 67
End: 2022-03-21
Payer: MEDICAID

## 2022-03-22 ENCOUNTER — PATIENT MESSAGE (OUTPATIENT)
Dept: HEMATOLOGY/ONCOLOGY | Facility: CLINIC | Age: 67
End: 2022-03-22
Payer: MEDICAID

## 2022-03-30 ENCOUNTER — TELEPHONE (OUTPATIENT)
Dept: PAIN MEDICINE | Facility: CLINIC | Age: 67
End: 2022-03-30
Payer: MEDICAID

## 2022-03-30 NOTE — TELEPHONE ENCOUNTER
This message is for patient in regards to his/her appointment 3/31/22 at 10 am      [facility] Policy: For the safety of all patients and staff members.     Patient Visitor policy: Due to social distancing and limited seating staff are requesting patient to arrive to their schedule appointments on time. During this visit we are asking all patients to only have one visitor over the age of 18yrs old to accompany them to be seen by  . If patient do not required assistance with their visit, we're asking all visitors to remain outside the waiting area.    Upon arriving to your schedule appointment; patients are required to wear a face mask, if patient do not have a face mask one will be provided entering the facility. We ask patients to contact clinic at this number [phone] to notify staff that they have arrived or they may do so by utilizing the Mobile Jaunt in Dom(if patient have patient portal; clinical staff will send a message through there letting them know it's okay to proceed to their visit). If you have any questions or concerns please contact [phone]       also inquired IPM within message.    [facility] providers and Mid-levels offer interventional, procedure--based options to treat chronic pain. The goal is to manage chronic pain by reducing pain frequency and intensity and address your functional goals for activities of daily living while simultaneously reducing or eliminating your reliance on medications. Please bring any records or images that you have from prior treatments for your pain. You will be presented with multi-modal treatment plan that may or may not include imaging, interventional procedures, physical/occupational/aqua therapy, pain creams, and non-narcotic pain medications used for the treatments of chronic pain.

## 2022-04-01 DIAGNOSIS — C77.3 BREAST CANCER METASTASIZED TO AXILLARY LYMPH NODE, LEFT: ICD-10-CM

## 2022-04-01 DIAGNOSIS — C50.912 BREAST CANCER METASTASIZED TO AXILLARY LYMPH NODE, LEFT: ICD-10-CM

## 2022-04-01 DIAGNOSIS — I10 HYPERTENSION, UNSPECIFIED TYPE: ICD-10-CM

## 2022-04-01 RX ORDER — LETROZOLE 2.5 MG/1
2.5 TABLET, FILM COATED ORAL DAILY
Qty: 90 TABLET | Refills: 3 | Status: SHIPPED | OUTPATIENT
Start: 2022-04-01 | End: 2023-07-17 | Stop reason: SDUPTHER

## 2022-04-01 RX ORDER — CHLORTHALIDONE 25 MG/1
TABLET ORAL
Qty: 90 TABLET | Refills: 0 | Status: SHIPPED | OUTPATIENT
Start: 2022-04-01 | End: 2022-08-24 | Stop reason: SDUPTHER

## 2022-04-01 NOTE — TELEPHONE ENCOUNTER
Care Due:                  Date            Visit Type   Department     Provider  --------------------------------------------------------------------------------                                MYCHART                              FOLLOWUP/OF  [facility] PRIMARY  Last Visit: 03-      FICE VISIT   CARE           Estephania San  Next Visit: None Scheduled  None         None Found                                                            Last  Test          Frequency    Reason                     Performed    Due Date  --------------------------------------------------------------------------------    HBA1C.......  6 months...  metFORMIN, semaglutide...  10-   04-    Lipid Panel.  12 months..  atorvastatin.............  06- 06-    Powered by [employer]. Reference number: [id number].   4/01/2022 12:58:52 PM CDT

## 2022-04-04 ENCOUNTER — PATIENT MESSAGE (OUTPATIENT)
Dept: HEMATOLOGY/ONCOLOGY | Facility: CLINIC | Age: 67
End: 2022-04-04
Payer: MEDICARE

## 2022-04-08 ENCOUNTER — OFFICE VISIT (OUTPATIENT)
Dept: HEMATOLOGY/ONCOLOGY | Facility: CLINIC | Age: 67
End: 2022-04-08
Payer: MEDICARE

## 2022-04-08 ENCOUNTER — LAB VISIT (OUTPATIENT)
Dept: LAB | Facility: HOSPITAL | Age: 67
End: 2022-04-08
Payer: MEDICARE

## 2022-04-08 VITALS
OXYGEN SATURATION: 96 % | BODY MASS INDEX: 38 KG/M2 | TEMPERATURE: 99 F | DIASTOLIC BLOOD PRESSURE: 93 MMHG | WEIGHT: 193.56 LBS | HEART RATE: 102 BPM | HEIGHT: 60 IN | RESPIRATION RATE: 16 BRPM | SYSTOLIC BLOOD PRESSURE: 135 MMHG

## 2022-04-08 DIAGNOSIS — R07.9 CHEST PAIN IN ADULT: Primary | ICD-10-CM

## 2022-04-08 DIAGNOSIS — C50.912 BREAST CANCER METASTASIZED TO AXILLARY LYMPH NODE, LEFT: ICD-10-CM

## 2022-04-08 DIAGNOSIS — G62.0 NEUROPATHY DUE TO CHEMOTHERAPEUTIC DRUG: ICD-10-CM

## 2022-04-08 DIAGNOSIS — C77.3 BREAST CANCER METASTASIZED TO AXILLARY LYMPH NODE, LEFT: ICD-10-CM

## 2022-04-08 DIAGNOSIS — T45.1X5A NEUROPATHY DUE TO CHEMOTHERAPEUTIC DRUG: ICD-10-CM

## 2022-04-08 DIAGNOSIS — Z90.13 H/O BILATERAL MASTECTOMY: ICD-10-CM

## 2022-04-08 DIAGNOSIS — E55.9 MILD VITAMIN D DEFICIENCY: ICD-10-CM

## 2022-04-08 LAB
ALBUMIN SERPL BCP-MCNC: 4.1 G/DL (ref 3.5–5.2)
ALP SERPL-CCNC: 120 U/L (ref 55–135)
ALT SERPL W/O P-5'-P-CCNC: 12 U/L (ref 10–44)
ANION GAP SERPL CALC-SCNC: 9 MMOL/L (ref 8–16)
AST SERPL-CCNC: 24 U/L (ref 10–40)
BILIRUB SERPL-MCNC: 0.7 MG/DL (ref 0.1–1)
BUN SERPL-MCNC: 13 MG/DL (ref 8–23)
CALCIUM SERPL-MCNC: 10.1 MG/DL (ref 8.7–10.5)
CHLORIDE SERPL-SCNC: 98 MMOL/L (ref 95–110)
CO2 SERPL-SCNC: 29 MMOL/L (ref 23–29)
CREAT SERPL-MCNC: 1 MG/DL (ref 0.5–1.4)
ERYTHROCYTE [DISTWIDTH] IN BLOOD BY AUTOMATED COUNT: 14.2 % (ref 11.5–14.5)
EST. GFR  (AFRICAN AMERICAN): >60 ML/MIN/1.73 M^2
EST. GFR  (NON AFRICAN AMERICAN): 58.8 ML/MIN/1.73 M^2
GLUCOSE SERPL-MCNC: 154 MG/DL (ref 70–110)
HCT VFR BLD AUTO: 39.9 % (ref 37–48.5)
HGB BLD-MCNC: 12.7 G/DL (ref 12–16)
IMM GRANULOCYTES # BLD AUTO: 0.02 K/UL (ref 0–0.04)
MCH RBC QN AUTO: 29.8 PG (ref 27–31)
MCHC RBC AUTO-ENTMCNC: 31.8 G/DL (ref 32–36)
MCV RBC AUTO: 94 FL (ref 82–98)
NEUTROPHILS # BLD AUTO: 3.2 K/UL (ref 1.8–7.7)
PLATELET # BLD AUTO: 293 K/UL (ref 150–450)
PMV BLD AUTO: 10.1 FL (ref 9.2–12.9)
POTASSIUM SERPL-SCNC: 3.5 MMOL/L (ref 3.5–5.1)
PROT SERPL-MCNC: 8 G/DL (ref 6–8.4)
RBC # BLD AUTO: 4.26 M/UL (ref 4–5.4)
SODIUM SERPL-SCNC: 136 MMOL/L (ref 136–145)
WBC # BLD AUTO: 4.84 K/UL (ref 3.9–12.7)

## 2022-04-08 PROCEDURE — 36415 COLL VENOUS BLD VENIPUNCTURE: CPT | Performed by: NURSE PRACTITIONER

## 2022-04-08 PROCEDURE — 99999 PR PBB SHADOW E&M-EST. PATIENT-LVL V: ICD-10-PCS | Mod: PBBFAC,,, | Performed by: NURSE PRACTITIONER

## 2022-04-08 PROCEDURE — 99214 OFFICE O/P EST MOD 30 MIN: CPT | Mod: S$PBB,,, | Performed by: NURSE PRACTITIONER

## 2022-04-08 PROCEDURE — 99214 PR OFFICE/OUTPT VISIT, EST, LEVL IV, 30-39 MIN: ICD-10-PCS | Mod: S$PBB,,, | Performed by: NURSE PRACTITIONER

## 2022-04-08 PROCEDURE — 85027 COMPLETE CBC AUTOMATED: CPT | Performed by: NURSE PRACTITIONER

## 2022-04-08 PROCEDURE — 99999 PR PBB SHADOW E&M-EST. PATIENT-LVL V: CPT | Mod: PBBFAC,,, | Performed by: NURSE PRACTITIONER

## 2022-04-08 PROCEDURE — 80053 COMPREHEN METABOLIC PANEL: CPT | Performed by: NURSE PRACTITIONER

## 2022-04-08 PROCEDURE — 99215 OFFICE O/P EST HI 40 MIN: CPT | Mod: PBBFAC | Performed by: NURSE PRACTITIONER

## 2022-04-08 NOTE — PROGRESS NOTES
"Subjective:       Patient ID: Maria Elena Tavares is a 66 y.o. female.    Chief Complaint: Breast cancer metastasized to axillary lymph node, left    HPI     Returns for routine follow up    Overall feels well.   Tolerating femara well - joint pain in fingers and toes.   States continue to have neuropathy- mainly reports pain from right elbow to fingers.   Also notes occasional sharp pain to left chest wall.   Reports chest tightness at times with exertion- states "sometimes I do too much".   describes as a pull and feels that its likely related to surgery.   No pain at this time. No pain with ambulating to appt.   Taking vit D 2000 iu   No other pain issues or complaints.   Appetite good and weight stable.       Oncologic History:   - self detected, noted a shooting pain in breast first and then a week later felt a lump  Some delay in getting appointment as she was unaware she had to call  - 8/30/19 Diagnostic mammogram and ultrasound:  Left breast 20 mm x 18 mm x 17 mm mass at the 3 o'clock position. Assessment: 4 - Suspicious finding. Biopsy is recommended. Lymph Node: Left axilla 16 mm x 14 mm x 13 mm lymph node. Assessment: 4 - Suspicious finding. Biopsy is recommended. Right- There is no mammographic or sonographic evidence of malignancy.  BI-RADS Category:   Overall: 4 - Suspicious  - 9/5/19 Ultrasound guided biopsy  Pathology:  1. LEFT BREAST MASS, BIOPSY:  - Invasive ductal carcinoma, grade 3, longest linear length is 10 mm measured on the slide.  - Histologic Grade (Hawa Histologic Score)  Glandular (Acinar/Tubular Differentiation): 3.  Nuclear Pleomorphism: 2.  Mitotic Rate: 3.  Overall Grade: Grade 3 (score 8).  - Microcalcifications: Not identified.  - Lymphovascular invasion: Not identified.  2. LYMPH NODE, LEFT AXILLA, BIOPSY:  - Positive for metastatic carcinoma.  - The metastatic deposit measures 6 mm in longest continuous linear length.  Estrogen receptor: Positive, 90% of the tumor nuclei staining " moderate to strongly.  Progesterone receptor: Positive, 30% of the tumor nuclei staining weak to moderately.  HER2: Negative.  Ki-67: 60%.  - ECHO 9/20/19: EF 64%  - PET 9/21/19:  Hypermetabolic left breast mass and regional left axillary lymphadenopathy consistent with reported breast cancer and corresponding with recent MRI findings.  Upper limit of normal sized right axillary lymph nodes with normal fatty duane and mildly increased radiotracer uptake.  Findings could represent reactive nodes with metastatic nodes thought less likely.  Lymphscintigraphy with injection in the left breast may considered to assess for drainage to the contralateral axilla.  - BX 9/24/19: Right axilla node biopsy is benign  - she underwent neoadjuvant chemotherapy and completed 4 cycles of AC followed by 11 cycles of weekly Taxol.   Stopped with side effects.  - 3/24/20 - she underwent bilateral mastectomies with Lt. sentinel node biopsy and subsequent Lt. axillary dissection and Rt. sentinel node biopsy with Dr. Lopez.  Tissue expanders were placed.   - Pathology from the Lt. breast revealed 1.2 cm of residual invasive ductal carcinoma histologic grade 3, nuclear grade 3 and mitotic index 5 ). There was high grade DCIS. There was lymphovascular invasion.  The margins of resection were negative at > 1 cm.  One axillary node had a 5 mm focus of metastatic carcinoma.  Another Lt. sentinel node had isolated tumor cells.   A third Lt. sentinel node and 7 Lt. axillary nodes were negative.  The Rt. breast and Rt. sentinel nodes were negative.    - completed adjuvant  XRT   - on Femara    Review of Systems   Constitutional:        See above   All other systems reviewed and are negative.        Objective:      Physical Exam  Vitals and nursing note reviewed.   Constitutional:       General: She is not in acute distress.     Appearance: Normal appearance. She is normal weight. She is not ill-appearing.      Comments: Presents alone   Very  pleasant   Eyes:      General: No scleral icterus.     Extraocular Movements: Extraocular movements intact.      Pupils: Pupils are equal, round, and reactive to light.   Cardiovascular:      Rate and Rhythm: Normal rate and regular rhythm.      Heart sounds: Normal heart sounds. No murmur heard.    No friction rub. No gallop.   Pulmonary:      Effort: Pulmonary effort is normal. No respiratory distress.      Breath sounds: Normal breath sounds. No wheezing, rhonchi or rales.      Comments: No chest wall masses or LAD. Hyperpigmentation to left chest wall.  Chest:      Chest wall: No tenderness.   Abdominal:      General: Abdomen is flat. Bowel sounds are normal. There is no distension.      Palpations: Abdomen is soft. There is no mass.      Tenderness: There is no abdominal tenderness. There is no guarding or rebound.      Comments: No organomegaly   Musculoskeletal:         General: No swelling, tenderness or deformity. Normal range of motion.      Cervical back: Normal range of motion and neck supple. No muscular tenderness.      Right lower leg: No edema.      Left lower leg: No edema.   Lymphadenopathy:      Cervical: No cervical adenopathy.   Skin:     General: Skin is warm and dry.      Coloration: Skin is not pale.      Findings: No erythema, lesion or rash.   Neurological:      General: No focal deficit present.      Mental Status: She is alert and oriented to person, place, and time. Mental status is at baseline.      Sensory: Sensory deficit present.      Motor: No weakness.      Coordination: Coordination normal.      Gait: Gait normal.   Psychiatric:         Mood and Affect: Mood normal.         Behavior: Behavior normal.         Thought Content: Thought content normal.         Judgment: Judgment normal.       Labs- reviewed.   Assessment:       1. Chest pain in adult    2. Breast cancer metastasized to axillary lymph node, left    3. H/O bilateral mastectomy    4. Neuropathy due to chemotherapeutic  drug    5. Mild vitamin D deficiency          Overall doing well, KEILY clinically.   Labs reviewed.     Plan:         Continue Femara  No imaging as bilateral mastectomies.   BMD due 5/2022  Continue Vit D.   continue gabapentin as needed. Seeing pain management.   Will refer to cardiology as a precaution    Continue to follow up with PCP for other health maintenance and all other established providers. Reminded the need for Lipid Panel and Vit D yearly.       Route Chart for Scheduling    Med Onc Chart Routing  Urgent    Follow up with physician 3 months. With labs and bmd   Follow up with JAZZY    Labs CBC and CMP   Lab interval:     Imaging DXA scan      Pharmacy appointment    Other referrals Additional referrals needed         Patient is in agreement with the proposed treatment plan. All questions were answered to the patient's satisfaction. Pt knows to call clinic for any new or worsening symptoms and if anything is needed before the next clinic visit.      BRITT Morejon-PAUL  Hematology & Oncology  [address] [place] [id number]  ph. [phone]  Fax. [phone]     Face to Face time with patient: 30 minutes  35 minutes of total time spent on the encounter, which includes face to face time and non-face to face time preparing to see the patient (eg, review of tests), Obtaining and/or reviewing separately obtained history, Documenting clinical information in the electronic or other health record, Independently interpreting results (not separately reported) and communicating results to the patient/family/caregiver, or Care coordination (not separately reported).

## 2022-04-12 ENCOUNTER — TELEPHONE (OUTPATIENT)
Dept: HEMATOLOGY/ONCOLOGY | Facility: CLINIC | Age: 67
End: 2022-04-12
Payer: MEDICARE

## 2022-04-27 ENCOUNTER — TELEPHONE (OUTPATIENT)
Dept: PAIN MEDICINE | Facility: CLINIC | Age: 67
End: 2022-04-27
Payer: MEDICARE

## 2022-04-27 NOTE — TELEPHONE ENCOUNTER
This message is for patient in regards to his/her appointment 4/28/22 at 3 pm.      [facility] Policy: For the safety of all patients and staff members.     Patient Visitor policy: During this visit we are asking all patients to only have one visitor over the age of 18yrs old to accompany them to be seen by Dr. Sanchez if patient do not required assistance with their visit, we're asking all visitors to remain outside the waiting area.    Upon arriving to your schedule appointment; patients are required to wear a face mask, if patient do not have a face mask one will be provided entering the facility. If you have any questions or concerns please contact [phone]       also inquired IPM within message.    [facility] providers and Mid-levels offer interventional, procedure--based options to treat chronic pain. The goal is to manage chronic pain by reducing pain frequency and intensity and address your functional goals for activities of daily living while simultaneously reducing or eliminating your reliance on medications. Please bring any records or images that you have from prior treatments for your pain. You will be presented with multi-modal treatment plan that may or may not include imaging, interventional procedures, physical/occupational/aqua therapy, pain creams, and non-narcotic pain medications used for the treatments of chronic pain.

## 2022-04-28 ENCOUNTER — OFFICE VISIT (OUTPATIENT)
Dept: PAIN MEDICINE | Facility: CLINIC | Age: 67
End: 2022-04-28
Attending: ANESTHESIOLOGY
Payer: MEDICARE

## 2022-04-28 VITALS
BODY MASS INDEX: 35.68 KG/M2 | SYSTOLIC BLOOD PRESSURE: 135 MMHG | TEMPERATURE: 98 F | HEIGHT: 61 IN | OXYGEN SATURATION: 98 % | HEART RATE: 100 BPM | WEIGHT: 189 LBS | DIASTOLIC BLOOD PRESSURE: 91 MMHG | RESPIRATION RATE: 18 BRPM

## 2022-04-28 DIAGNOSIS — M79.601 RIGHT ARM PAIN: ICD-10-CM

## 2022-04-28 DIAGNOSIS — G62.9 NEUROPATHY: Primary | ICD-10-CM

## 2022-04-28 PROCEDURE — 99215 OFFICE O/P EST HI 40 MIN: CPT | Mod: PBBFAC | Performed by: ANESTHESIOLOGY

## 2022-04-28 PROCEDURE — 99204 PR OFFICE/OUTPT VISIT, NEW, LEVL IV, 45-59 MIN: ICD-10-PCS | Mod: S$PBB,GC,, | Performed by: ANESTHESIOLOGY

## 2022-04-28 PROCEDURE — 99999 PR PBB SHADOW E&M-EST. PATIENT-LVL V: CPT | Mod: PBBFAC,,, | Performed by: ANESTHESIOLOGY

## 2022-04-28 PROCEDURE — 99999 PR PBB SHADOW E&M-EST. PATIENT-LVL V: ICD-10-PCS | Mod: PBBFAC,,, | Performed by: ANESTHESIOLOGY

## 2022-04-28 PROCEDURE — 99204 OFFICE O/P NEW MOD 45 MIN: CPT | Mod: S$PBB,GC,, | Performed by: ANESTHESIOLOGY

## 2022-04-28 RX ORDER — PREGABALIN 75 MG/1
75 CAPSULE ORAL 2 TIMES DAILY
Qty: 60 CAPSULE | Refills: 1 | Status: SHIPPED | OUTPATIENT
Start: 2022-04-28 | End: 2022-04-28 | Stop reason: SDUPTHER

## 2022-04-28 RX ORDER — PREGABALIN 75 MG/1
75 CAPSULE ORAL 2 TIMES DAILY
Qty: 60 CAPSULE | Refills: 0 | Status: SHIPPED | OUTPATIENT
Start: 2022-04-28 | End: 2022-06-24

## 2022-04-28 NOTE — PROGRESS NOTES
Chronic Pain - New Consult    Referring Physician: Estephania San MD    Chief Complaint: Right arm pain and paresthesias     SUBJECTIVE:  Maria Elena Tavares presents to the clinic for the evaluation of pain and paresthesias in her right arm below the elbow. The pain started in 2020. She denies any trauma or inciting event; however, she does have a history of breast cancer and is status post chemo and radiation.  Additionally, she reports an episode of shingles in the same distribution as her current symptoms around 0932-5233.  She also has a history of diabetes with most recent HbA1c of 7.9  She reports that the symptoms have waxed and waned over the past 2 years. The pain is located in the right arm below the elbow and radiates to the right hand.  The pain is described as numbing and tingling and is rated as 10/10. The pain is rated with a score of  0/10 on the BEST day and a score of 5/10 on the WORST day.  Symptoms interfere with daily activity and sleeping. The pain is exacerbated by excess activity such as house work.  The pain is mitigated by medications and rest.  She is currently taking gabapentin, which does offer good relief, but is very sedating for her making it difficult to take consistently.    Patient denies night fever/night sweats, urinary incontinence, bowel incontinence, significant weight loss, significant motor weakness and loss of sensations.    Physical Therapy/Home Exercise: no      Pain Disability Index Review:  Last 3 PDI Scores 4/28/2022   Pain Disability Index (PDI) 12       Pain Medications:    - Adjuvant Medications: Neurontin (Gabapentin)     report:  Reviewed and consistent with medication use as prescribed.    Pain Procedures:  None    Imaging:  None available    Additional Studies:  EMG/NCS - 3/12/20  This is an abnormal study. There is electrophysiologic evidence of:     1. Mild right carpal tunnel syndrome. The patients chemotherapy (Taxol) can potentially cause an axonal sensory  polyneuropathy, but her infusions were only recently initiated and it is likely too soon to attribute any nerve conduction abnormalities to toxic side effects of the Taxol.       Past Medical History:   Diagnosis Date    BMI 37.0-37.9, adult     Breast cancer 2019     Left breast, IDC with lymph node metastisis    Colon polyps     Colon polyps     Diabetes mellitus type II     Diverticulosis     history of diverticulosisseen on colonoscopy at age 48. Repeat recommended at age 58. Done by     Elevated blood protein     History of elevated protein. Apparently has seen  for extensive workup including bone marrow biopsy    History of shingles 2006    Hyperlipidemia     Hypertension     Microalbuminuria     due 2 diabetes    Mild vitamin D deficiency     . A low vitamin D    Thyroid disease     hypothyroidism     Past Surgical History:   Procedure Laterality Date    BREAST BIOPSY Left 2019    IDC with mets to node    BREAST BIOPSY Right 2019    core bx, benign node    BREAST BIOPSY Left 10/14/2019    MRI Core bx, + IDC    BREAST BIOPSY Left 10/08/2019    Core bx, ADH     SECTION      COLONOSCOPY N/A 10/8/2020    Procedure: COLONOSCOPY;  Surgeon: Shreya Mendoza MD;  Location: [facility] (4TH FLR);  Service: Endoscopy;  Laterality: N/A;  COVID screening on 10/5/20 [facility]    HYSTERECTOMY      INSERTION OF BREAST TISSUE EXPANDER Bilateral 3/24/2020    Procedure: INSERTION, TISSUE EXPANDER, BREAST BILATERAL;  Surgeon: Michael Solorzano MD;  Location: [facility] OR 2ND FLR;  Service: Plastics;  Laterality: Bilateral;    INSERTION OF TUNNELED CENTRAL VENOUS CATHETER (CVC) WITH SUBCUTANEOUS PORT Right 10/4/2019    Procedure: [id number] RIGHT (CONSENT AM OF) 1.0 hr case;  Surgeon: Elena Lopez MD;  Location: [facility] OR [address] [place] [address];  Service: General;  Laterality: Right;    OOPHORECTOMY      SENTINEL LYMPH NODE BIOPSY Left 3/24/2020     Procedure: BIOPSY, LYMPH NODE, SENTINEL LEFT;  Surgeon: Elena Lopez MD;  Location: [address];  Service: General;  Laterality: Left;    SIMPLE MASTECTOMY Bilateral 3/24/2020    Procedure: MASTECTOMY, SIMPLE BILATERAL;  Surgeon: Elena Lopez MD;  Location: [address];  Service: General;  Laterality: Bilateral;    TISSUE EXPANDER REMOVAL Bilateral 7/30/2020    Procedure: REMOVAL, TISSUE EXPANDER;  Surgeon: Michael Solorzano MD;  Location: [address];  Service: Plastics;  Laterality: Bilateral;     Social History     Socioeconomic History    Marital status: Single   Occupational History     Employer: [employer]   Tobacco Use    Smoking status: Never Smoker    Smokeless tobacco: Never Used    Tobacco comment: 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  She walks occasionally.   Substance and Sexual Activity    Alcohol use: Not Currently     Comment: Occasionally    Drug use: No    Sexual activity: Not Currently     Partners: Male   Social History Narrative    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 daughter1 granddaughter     Social Determinants of Health     Financial Resource Strain: Medium Risk    Difficulty of Paying Living Expenses: Somewhat hard   Food Insecurity: Food Insecurity Present    Worried About Running Out of Food in the Last Year: Never true    Ran Out of Food in the Last Year: Sometimes true   Transportation Needs: No Transportation Needs    Lack of Transportation (Medical): No    Lack of Transportation (Non-Medical): No   Physical Activity: Insufficiently Active    Days of Exercise per Week: 2 days    Minutes of Exercise per Session: 30 min   Stress: No Stress Concern Present    Feeling of Stress : Not at all   Social Connections: Unknown    Frequency of Communication with Friends and Family: Twice a week    Frequency of Social Gatherings with Friends and Family: Once a week    Active Member of Clubs  or Organizations: No    Attends Club or Organization Meetings: Never    Marital Status: Never    Housing Stability: Low Risk     Unable to Pay for Housing in the Last Year: No    Number of Places Lived in the Last Year: 1    Unstable Housing in the Last Year: No     Family History   Problem Relation Age of Onset    Cancer Mother         lung ca heavy smoker    Lung cancer Mother     Cancer Father     Lung cancer Father     Hypertension Sister     No Known Problems Sister     No Known Problems Daughter     Hypothyroidism Sister     Diabetes Maternal Aunt     Cancer Maternal Aunt     No Known Problems Maternal Grandmother     Breast cancer Paternal Aunt     No Known Problems Paternal Uncle     Lung cancer Maternal Grandfather     No Known Problems Paternal Grandmother     No Known Problems Paternal Grandfather     Amblyopia Neg Hx     Blindness Neg Hx     Cataracts Neg Hx     Glaucoma Neg Hx     Macular degeneration Neg Hx     Retinal detachment Neg Hx     Strabismus Neg Hx     Stroke Neg Hx     Thyroid disease Neg Hx     Ovarian cancer Neg Hx     Colon cancer Neg Hx        Review of patient's allergies indicates:  No Known Allergies    Current Outpatient Medications   Medication Sig    aspirin (ECOTRIN) 81 MG EC tablet Take 81 mg by mouth once daily.    atorvastatin (LIPITOR) 10 MG tablet Take 1 tablet (10 mg total) by mouth once daily.    blood-glucose meter kit DISPENSE : BLOOD TEST STRIPS AND LANCETS PATIENT TEST BLOOD SUGARS TWICE DAILY  TEST STRIPS: 50 EACH, REFILL 5  LANCETS:  50 EACH , REFILL 5    chlorthalidone (HYGROTEN) 25 MG Tab Take 1 tablet by mouth every morning    gabapentin (NEURONTIN) 600 MG tablet Take 1 tablet (600 mg total) by mouth 3 (three) times daily.    irbesartan (AVAPRO) 300 MG tablet Take 1 tablet by mouth once daily    letrozole (FEMARA) 2.5 mg Tab Take 1 tablet (2.5 mg total) by mouth once daily.    levothyroxine (SYNTHROID) 200 MCG tablet  "Take 1 tablet (200 mcg total) by mouth once daily.    lifitegrast (XIIDRA) 5 % Dpet Apply 1 drop to  both eyes  2 (two) times daily.    metFORMIN (GLUCOPHAGE) 1000 MG tablet TAKE 1 TABLET (1,000 MG TOTAL) BY MOUTH 2 (TWO) TIMES DAILY WITH MEALS.    polyethylene glycol (GLYCOLAX) 17 gram/dose powder Mix 17 g (1 capful) with juice or water an drink 2 (two) times daily.    prasterone, dhea, (INTRAROSA) 6.5 mg Inst Place 1 suppository vaginally nightly.    semaglutide (RYBELSUS) 7 mg tablet Take 1 tablet (7 mg total) by mouth once daily.    valACYclovir (VALTREX) 1000 MG tablet Take 1 tablet (1,000 mg total) by mouth 2 (two) times daily. for 5 days     No current facility-administered medications for this visit.       REVIEW OF SYSTEMS:    GENERAL:  No weight loss, malaise or fevers.  HEENT:  Negative for frequent or significant headaches.  NECK:  Negative for lumps, goiter, pain and significant neck swelling.  RESPIRATORY:  Negative for cough, wheezing or shortness of breath.  CARDIOVASCULAR:  Negative for chest pain, leg swelling or palpitations.  GI:  Negative for abdominal discomfort, blood in stools or black stools or change in bowel habits.  MUSCULOSKELETAL:  See HPI.  SKIN:  Negative for lesions, rash, and itching.  PSYCH:  +ve for sleep disturbance, mood disorder and recent psychosocial stressors.  HEMATOLOGY/LYMPHOLOGY:  Negative for prolonged bleeding, bruising easily or swollen nodes.  NEURO:   No history of headaches, syncope, paralysis, seizures or tremors.  All other reviewed and negative other than HPI.    OBJECTIVE:    BP (!) 135/91 (BP Location: Left arm, Patient Position: Sitting, BP Method: Large (Automatic))   Pulse 100   Temp 97.5 °F (36.4 °C) (Temporal)   Resp 18   Ht 5' 1" (1.549 m)   Wt 85.7 kg (189 lb)   LMP  (LMP Unknown)   SpO2 98%   BMI 35.71 kg/m²     PHYSICAL EXAMINATION:    General appearance: Well appearing, in no acute distress, alert and oriented x3.  Psych:  Mood and " affect appropriate.  Skin: Skin color, texture, turgor normal, no rashes or lesions, in both upper and lower body.  Head/face:  Normocephalic, atraumatic. No palpable lymph nodes.  Neck: No pain to palpation over the cervical paraspinous muscles. Spurling Negative. No pain with neck flexion, extension, or lateral flexion.   Cor: RRR  Pulm: CTA  GI:  Soft and non-tender.  Extremities: Peripheral joint ROM is full and pain free without obvious instability or laxity in all four extremities. No deformities, edema, or skin discoloration. Good capillary refill.  Musculoskeletal: Shoulder provocative maneuvers are negative bilaterally.  There is trace weakness of the right upper extremity compared to the left.  No atrophy or tone abnormalities are noted.  Finkelstein's negative on the right.  No tenderness to palpation over the medial or lateral epicondyle the right elbow.  Neuro: Bilateral upper and lower extremity coordination and muscle stretch reflexes are physiologic and symmetric.  No loss of sensation is noted.  No Marin.  No clonus.  Positive Tinel's at the right elbow.  Negative Tinel's at the right wrist.  Gait: Normal.    ASSESSMENT: 66 y.o. year old female with right arm pain, consistent with     1. Neuropathy  MRI Cervical Spine Without Contrast    EMG W/ ULTRASOUND AND NERVE CONDUCTION TEST 1 Extremity   2. Right arm pain  Ambulatory referral/consult to Pain Clinic    MRI Cervical Spine Without Contrast    EMG W/ ULTRASOUND AND NERVE CONDUCTION TEST 1 Extremity     PLAN:     - I have stressed the importance of physical activity and a home exercise plan to help with pain and improve health.  - Order EMG/NCS of the RUE to evaluate for radiculopathy vs entrapment vs peripheral neuropathy.  - Order MRI of the Cervical Spine to help evaluate possible source of pain.  - Discontinue gabapentin and start Lyrica 75 mg and gradually increase to twice a day to help with the neuropathic pain.  - RTC 2-3 weeks to review  above test results and determine further management.    The above plan and management options were discussed at length with patient. Patient is in agreement with the above and verbalized understanding. It will be communicated with the referring physician via electronic record, fax, or mail.    Rony Berg MD  [facility], PGY-3   I have personally reviewed the history and exam of this patient and agree with the resident/fellow/NPs note as stated above.  ePtar Sanchez MD  4/28/22

## 2022-05-16 ENCOUNTER — PATIENT MESSAGE (OUTPATIENT)
Dept: PAIN MEDICINE | Facility: CLINIC | Age: 67
End: 2022-05-16
Payer: COMMERCIAL

## 2022-05-30 ENCOUNTER — PATIENT MESSAGE (OUTPATIENT)
Dept: ORTHOPEDICS | Facility: CLINIC | Age: 67
End: 2022-05-30
Payer: COMMERCIAL

## 2022-05-30 ENCOUNTER — PATIENT MESSAGE (OUTPATIENT)
Dept: PAIN MEDICINE | Facility: CLINIC | Age: 67
End: 2022-05-30
Payer: COMMERCIAL

## 2022-06-01 ENCOUNTER — HOSPITAL ENCOUNTER (OUTPATIENT)
Dept: RADIOLOGY | Facility: HOSPITAL | Age: 67
Discharge: HOME | End: 2022-06-01
Attending: PLASTIC SURGERY
Payer: COMMERCIAL

## 2022-06-01 ENCOUNTER — HOSPITAL ENCOUNTER (OUTPATIENT)
Dept: RADIOLOGY | Facility: HOSPITAL | Age: 67
Discharge: HOME | End: 2022-06-01
Attending: ANESTHESIOLOGY
Payer: COMMERCIAL

## 2022-06-01 DIAGNOSIS — G62.9 NEUROPATHY: ICD-10-CM

## 2022-06-01 DIAGNOSIS — M79.601 RIGHT ARM PAIN: ICD-10-CM

## 2022-06-01 PROCEDURE — 73130 X-RAY EXAM OF HAND: CPT | Mod: 26,50,, | Performed by: RADIOLOGY

## 2022-06-01 PROCEDURE — 72141 MRI NECK SPINE W/O DYE: CPT | Mod: 26,,, | Performed by: INTERNAL MEDICINE

## 2022-06-01 PROCEDURE — 72141 MRI NECK SPINE W/O DYE: CPT | Mod: TC

## 2022-06-01 PROCEDURE — 72141 MRI CERVICAL SPINE WITHOUT CONTRAST: ICD-10-PCS | Mod: 26,,, | Performed by: INTERNAL MEDICINE

## 2022-06-01 PROCEDURE — 73130 XR HAND COMPLETE 3 VIEWS BILATERAL: ICD-10-PCS | Mod: 26,50,, | Performed by: RADIOLOGY

## 2022-06-01 PROCEDURE — 73130 X-RAY EXAM OF HAND: CPT | Mod: TC,50

## 2022-06-07 ENCOUNTER — PATIENT MESSAGE (OUTPATIENT)
Dept: HEMATOLOGY/ONCOLOGY | Facility: CLINIC | Age: 67
End: 2022-06-07
Payer: COMMERCIAL

## 2022-06-08 ENCOUNTER — OFFICE VISIT (OUTPATIENT)
Dept: ORTHOPEDICS | Facility: CLINIC | Age: 67
End: 2022-06-08
Payer: COMMERCIAL

## 2022-06-08 VITALS
WEIGHT: 188.94 LBS | HEIGHT: 61 IN | DIASTOLIC BLOOD PRESSURE: 82 MMHG | HEART RATE: 87 BPM | SYSTOLIC BLOOD PRESSURE: 122 MMHG | BODY MASS INDEX: 35.67 KG/M2

## 2022-06-08 DIAGNOSIS — M65.311 TRIGGER FINGER OF RIGHT THUMB: Primary | ICD-10-CM

## 2022-06-08 PROCEDURE — 99999 PR PBB SHADOW E&M-EST. PATIENT-LVL III: ICD-10-PCS | Mod: PBBFAC,,, | Performed by: PLASTIC SURGERY

## 2022-06-08 PROCEDURE — 20550 NJX 1 TENDON SHEATH/LIGAMENT: CPT | Mod: PBBFAC | Performed by: PLASTIC SURGERY

## 2022-06-08 PROCEDURE — 99212 PR OFFICE/OUTPT VISIT, EST, LEVL II, 10-19 MIN: ICD-10-PCS | Mod: 25,S$PBB,, | Performed by: PLASTIC SURGERY

## 2022-06-08 PROCEDURE — 99999 PR PBB SHADOW E&M-EST. PATIENT-LVL III: CPT | Mod: PBBFAC,,, | Performed by: PLASTIC SURGERY

## 2022-06-08 PROCEDURE — 20550 PR INJECT TENDON SHEATH/LIGAMENT: ICD-10-PCS | Mod: S$PBB,,, | Performed by: PLASTIC SURGERY

## 2022-06-08 PROCEDURE — 99213 OFFICE O/P EST LOW 20 MIN: CPT | Mod: PBBFAC,25 | Performed by: PLASTIC SURGERY

## 2022-06-08 PROCEDURE — 99212 OFFICE O/P EST SF 10 MIN: CPT | Mod: 25,S$PBB,, | Performed by: PLASTIC SURGERY

## 2022-06-08 PROCEDURE — 20550 NJX 1 TENDON SHEATH/LIGAMENT: CPT | Mod: S$PBB,,, | Performed by: PLASTIC SURGERY

## 2022-06-08 RX ORDER — TRIAMCINOLONE ACETONIDE 40 MG/ML
40 INJECTION, SUSPENSION INTRA-ARTICULAR; INTRAMUSCULAR
Status: COMPLETED | OUTPATIENT
Start: 2022-06-08 | End: 2022-06-08

## 2022-06-08 RX ADMIN — TRIAMCINOLONE ACETONIDE 40 MG: 40 INJECTION, SUSPENSION INTRA-ARTICULAR; INTRAMUSCULAR at 09:06

## 2022-06-08 NOTE — PROGRESS NOTES
Subjective:      Patient ID: Maria Elena Tavares is a 66 y.o. female.    Chief Complaint: Pain of the Right Hand    Maria Elena Tavares returns to clinic today with complaints of a right trigger thumb.  She was last injected in February 2021. She had resolution of symptoms within the right thumb up for over year.  She states that her symptoms have returned.  She complains of pain in occasional triggering.  She denies any other complaints.        Review of Systems   All other systems reviewed and are negative.        Objective:            General    Vitals reviewed.  Constitutional: She is oriented to person, place, and time. She appears well-nourished. No distress.   Pulmonary/Chest: Effort normal.   Neurological: She is alert and oriented to person, place, and time.   Psychiatric: She has a normal mood and affect.             Right Hand/Wrist Exam     Inspection   Effusion: Wrist - absent Hand -  absent  Deformity: Wrist - deformity Hand -  deformity    Pain   Hand - The patient exhibits pain of the thumb MCP.    Tenderness   The patient is tender to palpation of the hart area.    Comments:  Tenderness to palpation over the A1 pulley at the base of the thumb          PROCEDURE:  I have explained the risks, benefits, and alternatives of the procedure in detail.  The patient voices understanding and all questions have been answered. So after I performed a sterile prep of the skin, the level of there A-1 pulley of the right thumb is injected using a 25 gauge needle from the volar approach with a  combination of 1cc 1% plain Lidocaine and 40 mg of Kenalog.  The patient is cautioned and immediate relief of pain is secondary to the local anesthetic and will be temporary.  After the anesthetic wears off there may be a increase in pain that may last for a few hours or a few days and they should use ice to help alleviate this flair up of pain.               Assessment:       Encounter Diagnosis   Name Primary?    Trigger finger of  right thumb Yes          Plan:       Maria Elena was seen today for pain.    Diagnoses and all orders for this visit:    Trigger finger of right thumb    Other orders  -     triamcinolone acetonide injection 40 mg        She presents today with recurrent symptoms of a right trigger thumb.  She had a positive response to previous injection.  She was offered a repeat injection into the tendon sheath of the right thumb.  She wished to receive this injection today.  Please see the procedure note above.  She will follow up with me p.r.n.

## 2022-06-09 ENCOUNTER — PATIENT MESSAGE (OUTPATIENT)
Dept: PAIN MEDICINE | Facility: CLINIC | Age: 67
End: 2022-06-09
Payer: COMMERCIAL

## 2022-06-13 ENCOUNTER — PATIENT MESSAGE (OUTPATIENT)
Dept: PAIN MEDICINE | Facility: CLINIC | Age: 67
End: 2022-06-13
Payer: COMMERCIAL

## 2022-06-20 ENCOUNTER — PATIENT MESSAGE (OUTPATIENT)
Dept: ORTHOPEDICS | Facility: CLINIC | Age: 67
End: 2022-06-20
Payer: COMMERCIAL

## 2022-06-20 ENCOUNTER — LAB VISIT (OUTPATIENT)
Dept: LAB | Facility: OTHER | Age: 67
End: 2022-06-20
Attending: FAMILY MEDICINE
Payer: COMMERCIAL

## 2022-06-20 DIAGNOSIS — I95.1 ORTHOSTATIC HYPOTENSION: ICD-10-CM

## 2022-06-20 DIAGNOSIS — E87.6 HYPOKALEMIA: ICD-10-CM

## 2022-06-20 DIAGNOSIS — G62.0 NEUROPATHY DUE TO CHEMOTHERAPEUTIC DRUG: ICD-10-CM

## 2022-06-20 DIAGNOSIS — E11.21 CONTROLLED TYPE 2 DIABETES MELLITUS WITH DIABETIC NEPHROPATHY, WITHOUT LONG-TERM CURRENT USE OF INSULIN: ICD-10-CM

## 2022-06-20 DIAGNOSIS — M79.601 RIGHT ARM PAIN: ICD-10-CM

## 2022-06-20 DIAGNOSIS — I10 ESSENTIAL HYPERTENSION: ICD-10-CM

## 2022-06-20 DIAGNOSIS — T45.1X5A NEUROPATHY DUE TO CHEMOTHERAPEUTIC DRUG: ICD-10-CM

## 2022-06-20 LAB
ALBUMIN SERPL BCP-MCNC: 3.8 G/DL (ref 3.5–5.2)
ALP SERPL-CCNC: 115 U/L (ref 55–135)
ALT SERPL W/O P-5'-P-CCNC: 15 U/L (ref 10–44)
ANION GAP SERPL CALC-SCNC: 16 MMOL/L (ref 8–16)
AST SERPL-CCNC: 19 U/L (ref 10–40)
BASOPHILS # BLD AUTO: 0.02 K/UL (ref 0–0.2)
BASOPHILS NFR BLD: 0.4 % (ref 0–1.9)
BILIRUB SERPL-MCNC: 0.6 MG/DL (ref 0.1–1)
BUN SERPL-MCNC: 20 MG/DL (ref 8–23)
CALCIUM SERPL-MCNC: 10.1 MG/DL (ref 8.7–10.5)
CHLORIDE SERPL-SCNC: 99 MMOL/L (ref 95–110)
CHOLEST SERPL-MCNC: 250 MG/DL (ref 120–199)
CHOLEST/HDLC SERPL: 4.7 {RATIO} (ref 2–5)
CO2 SERPL-SCNC: 24 MMOL/L (ref 23–29)
CREAT SERPL-MCNC: 1.3 MG/DL (ref 0.5–1.4)
DIFFERENTIAL METHOD: NORMAL
EOSINOPHIL # BLD AUTO: 0 K/UL (ref 0–0.5)
EOSINOPHIL NFR BLD: 0.4 % (ref 0–8)
ERYTHROCYTE [DISTWIDTH] IN BLOOD BY AUTOMATED COUNT: 14.4 % (ref 11.5–14.5)
EST. GFR  (AFRICAN AMERICAN): 49 ML/MIN/1.73 M^2
EST. GFR  (NON AFRICAN AMERICAN): 43 ML/MIN/1.73 M^2
ESTIMATED AVG GLUCOSE: 220 MG/DL (ref 68–131)
GLUCOSE SERPL-MCNC: 144 MG/DL (ref 70–110)
HBA1C MFR BLD: 9.3 % (ref 4–5.6)
HCT VFR BLD AUTO: 38.2 % (ref 37–48.5)
HDLC SERPL-MCNC: 53 MG/DL (ref 40–75)
HDLC SERPL: 21.2 % (ref 20–50)
HGB BLD-MCNC: 12.6 G/DL (ref 12–16)
IMM GRANULOCYTES # BLD AUTO: 0.02 K/UL (ref 0–0.04)
IMM GRANULOCYTES NFR BLD AUTO: 0.4 % (ref 0–0.5)
LDLC SERPL CALC-MCNC: 177.8 MG/DL (ref 63–159)
LYMPHOCYTES # BLD AUTO: 1.1 K/UL (ref 1–4.8)
LYMPHOCYTES NFR BLD: 19.8 % (ref 18–48)
MCH RBC QN AUTO: 30.3 PG (ref 27–31)
MCHC RBC AUTO-ENTMCNC: 33 G/DL (ref 32–36)
MCV RBC AUTO: 92 FL (ref 82–98)
MONOCYTES # BLD AUTO: 0.5 K/UL (ref 0.3–1)
MONOCYTES NFR BLD: 9.6 % (ref 4–15)
NEUTROPHILS # BLD AUTO: 3.7 K/UL (ref 1.8–7.7)
NEUTROPHILS NFR BLD: 69.4 % (ref 38–73)
NONHDLC SERPL-MCNC: 197 MG/DL
NRBC BLD-RTO: 0 /100 WBC
PLATELET # BLD AUTO: 322 K/UL (ref 150–450)
PMV BLD AUTO: 9.9 FL (ref 9.2–12.9)
POTASSIUM SERPL-SCNC: 3.6 MMOL/L (ref 3.5–5.1)
PROT SERPL-MCNC: 8.3 G/DL (ref 6–8.4)
RBC # BLD AUTO: 4.16 M/UL (ref 4–5.4)
SODIUM SERPL-SCNC: 139 MMOL/L (ref 136–145)
T4 FREE SERPL-MCNC: 0.76 NG/DL (ref 0.71–1.51)
TRIGL SERPL-MCNC: 96 MG/DL (ref 30–150)
TSH SERPL DL<=0.005 MIU/L-ACNC: 8.5 UIU/ML (ref 0.4–4)
WBC # BLD AUTO: 5.29 K/UL (ref 3.9–12.7)

## 2022-06-20 PROCEDURE — 84443 ASSAY THYROID STIM HORMONE: CPT | Performed by: FAMILY MEDICINE

## 2022-06-20 PROCEDURE — 80061 LIPID PANEL: CPT | Performed by: FAMILY MEDICINE

## 2022-06-20 PROCEDURE — 85025 COMPLETE CBC W/AUTO DIFF WBC: CPT | Performed by: FAMILY MEDICINE

## 2022-06-20 PROCEDURE — 83036 HEMOGLOBIN GLYCOSYLATED A1C: CPT | Performed by: FAMILY MEDICINE

## 2022-06-20 PROCEDURE — 36415 COLL VENOUS BLD VENIPUNCTURE: CPT | Performed by: FAMILY MEDICINE

## 2022-06-20 PROCEDURE — 80053 COMPREHEN METABOLIC PANEL: CPT | Performed by: FAMILY MEDICINE

## 2022-06-20 PROCEDURE — 84439 ASSAY OF FREE THYROXINE: CPT | Performed by: FAMILY MEDICINE

## 2022-06-21 ENCOUNTER — TELEPHONE (OUTPATIENT)
Dept: PRIMARY CARE CLINIC | Facility: CLINIC | Age: 67
End: 2022-06-21
Payer: COMMERCIAL

## 2022-06-21 NOTE — TELEPHONE ENCOUNTER
----- Message from Estephania San MD sent at 6/21/2022  2:36 PM CDT -----  Pt needs telemed appt with me to review her labs, a1c, chol etc.. thanks, PV

## 2022-06-22 ENCOUNTER — TELEPHONE (OUTPATIENT)
Dept: PRIMARY CARE CLINIC | Facility: CLINIC | Age: 67
End: 2022-06-22

## 2022-06-22 ENCOUNTER — OFFICE VISIT (OUTPATIENT)
Dept: PRIMARY CARE CLINIC | Facility: CLINIC | Age: 67
End: 2022-06-22
Attending: FAMILY MEDICINE
Payer: COMMERCIAL

## 2022-06-22 DIAGNOSIS — C77.3 BREAST CANCER METASTASIZED TO AXILLARY LYMPH NODE, LEFT: ICD-10-CM

## 2022-06-22 DIAGNOSIS — F41.9 ANXIETY AND DEPRESSION: ICD-10-CM

## 2022-06-22 DIAGNOSIS — E11.21 CONTROLLED TYPE 2 DIABETES MELLITUS WITH DIABETIC NEPHROPATHY, WITHOUT LONG-TERM CURRENT USE OF INSULIN: ICD-10-CM

## 2022-06-22 DIAGNOSIS — F32.A ANXIETY AND DEPRESSION: ICD-10-CM

## 2022-06-22 DIAGNOSIS — I10 ESSENTIAL HYPERTENSION: ICD-10-CM

## 2022-06-22 DIAGNOSIS — M79.601 PAIN OF RIGHT UPPER EXTREMITY: ICD-10-CM

## 2022-06-22 DIAGNOSIS — E03.9 HYPOTHYROIDISM, UNSPECIFIED TYPE: Primary | ICD-10-CM

## 2022-06-22 DIAGNOSIS — C50.912 BREAST CANCER METASTASIZED TO AXILLARY LYMPH NODE, LEFT: ICD-10-CM

## 2022-06-22 PROCEDURE — 99215 PR OFFICE/OUTPT VISIT, EST, LEVL V, 40-54 MIN: ICD-10-PCS | Mod: 95,,, | Performed by: FAMILY MEDICINE

## 2022-06-22 PROCEDURE — 99215 OFFICE O/P EST HI 40 MIN: CPT | Mod: 95,,, | Performed by: FAMILY MEDICINE

## 2022-06-22 RX ORDER — METFORMIN HYDROCHLORIDE 750 MG/1
750 TABLET, EXTENDED RELEASE ORAL 2 TIMES DAILY WITH MEALS
Qty: 180 TABLET | Refills: 1 | Status: SHIPPED | OUTPATIENT
Start: 2022-06-22 | End: 2023-05-22

## 2022-06-22 NOTE — TELEPHONE ENCOUNTER
----- Message from Estephania San MD sent at 6/22/2022  1:14 PM CDT -----  She needs labs done in 6-8 weeks. And, then needs a virtual with me one week after, or in person. Thanks, PV

## 2022-06-22 NOTE — Clinical Note
She needs labs done in 6-8 weeks. And, then needs a virtual with me one week after, or in person. Thanks, PV

## 2022-06-22 NOTE — PROGRESS NOTES
Established Patient - Audio Only Telehealth Visit     The patient location is: HOME  The chief complaint leading to consultation is: abnormal labs  Visit type: Virtual visit with audio only (telephone)  Total time spent with patient: 30 mins       The reason for the audio only service rather than synchronous audio and video virtual visit was related to technical difficulties or patient preference/necessity.     Each patient to whom I provide medical services by telemedicine is:  (1) informed of the relationship between the physician and patient and the respective role of any other health care provider with respect to management of the patient; and (2) notified that they may decline to receive medical services by telemedicine and may withdraw from such care at any time. Patient verbally consented to receive this service via voice-only telephone call.       HPI: pt presents today after she had labs done via telemed-audio. Pt states that she has been kind of down lately. Tired of all of her meds. Daughter also post surgery from [facility] cancer surgery. Pt states that she has too much on her plate and DOES NOT TAKE HER MEDS AS INSTRUCTED. Has symptoms with metformin and bathroom use. Scared to take it so max is once daily. Not taking thyroid med due to timing of various meds and meals. Knows that this is why TSH is over 8, and her a1c is over 9. Also does note she is very tired     Assessment and plan:    DM: not controlled. Will do trial of ER metformin to help with GI issues and see if improvement. Pt advised to take med as directed. Or to let me know.   HTN: controlled on meds  Hypothyroid: pt advised that if timing of med is causing her to miss doses compliance is much bigger issue and she needs to take her levothy with other meds. Pt amenable. Aware that this could be cause of her fatigue as well    We will repeat labs in 6=8 weeks and should see improvement.   Pt reassured with recent events in family and her ongoing  arm pain post chemo/rads.    Greater than 45 mins spent w pt; 50 %face toface    Controlled type 2 diabetes mellitus with diabetic nephropathy, without long-term current use of insulin  -     metFORMIN (GLUCOPHAGE-XR) 750 MG ER 24hr tablet; Take 1 tablet (750 mg total) by mouth 2 (two) times daily with meals.  Dispense: 180 tablet; Refill: 1  -     Hemoglobin A1C; Future; Expected date: 07/20/2022  -     Lipid Panel; Future; Expected date: 06/22/2022  -     Comprehensive Metabolic Panel; Future; Expected date: 06/22/2022  -     MICROALBUMIN / CREATININE RATIO URINE; Future; Expected date: 06/22/2022    Hypothyroidism, unspecified type  -     TSH; Future; Expected date: 07/20/2022                              This service was not originating from a related E/M service provided within the previous 7 days nor will  to an E/M service or procedure within the next 24 hours or my soonest available appointment.  Prevailing standard of care was able to be met in this audio-only visit.

## 2022-06-23 ENCOUNTER — PATIENT MESSAGE (OUTPATIENT)
Dept: PAIN MEDICINE | Facility: CLINIC | Age: 67
End: 2022-06-23
Payer: COMMERCIAL

## 2022-06-23 ENCOUNTER — PATIENT MESSAGE (OUTPATIENT)
Dept: PRIMARY CARE CLINIC | Facility: CLINIC | Age: 67
End: 2022-06-23
Payer: COMMERCIAL

## 2022-06-23 DIAGNOSIS — R05.9 COUGH: Primary | ICD-10-CM

## 2022-06-23 RX ORDER — BENZONATATE 200 MG/1
200 CAPSULE ORAL 3 TIMES DAILY PRN
Qty: 30 CAPSULE | Refills: 0 | Status: SHIPPED | OUTPATIENT
Start: 2022-06-23 | End: 2022-07-23

## 2022-06-23 NOTE — TELEPHONE ENCOUNTER
----- Message from Sowmya Denise sent at 6/23/2022  3:22 PM CDT -----  Regarding: resend medication  Name of Who is Calling: Maria Elena           What is the request in detail: Patient requesting to have medication resent because it didn't go through.            Can the clinic reply by [facility]: No           What Number to Call Back if not in [facility]: [phone]

## 2022-06-23 NOTE — TELEPHONE ENCOUNTER
Call the pharmacy and they state that they did received the med order for tessalon and are filling it right now. Informed pt. schedule pt for lab on 8/4/22 and with Dr. San on 8/9/22. Pt verbalized understanding.

## 2022-06-24 ENCOUNTER — OFFICE VISIT (OUTPATIENT)
Dept: PAIN MEDICINE | Facility: CLINIC | Age: 67
End: 2022-06-24
Payer: COMMERCIAL

## 2022-06-24 VITALS
DIASTOLIC BLOOD PRESSURE: 79 MMHG | BODY MASS INDEX: 34.75 KG/M2 | HEIGHT: 61 IN | SYSTOLIC BLOOD PRESSURE: 115 MMHG | WEIGHT: 184.06 LBS | HEART RATE: 102 BPM | TEMPERATURE: 97 F

## 2022-06-24 DIAGNOSIS — M47.812 CERVICAL SPONDYLOSIS: ICD-10-CM

## 2022-06-24 DIAGNOSIS — G62.9 NEUROPATHY: Primary | ICD-10-CM

## 2022-06-24 DIAGNOSIS — M79.601 RIGHT ARM PAIN: ICD-10-CM

## 2022-06-24 PROCEDURE — 99214 OFFICE O/P EST MOD 30 MIN: CPT | Mod: S$GLB,,, | Performed by: NURSE PRACTITIONER

## 2022-06-24 PROCEDURE — 99999 PR PBB SHADOW E&M-EST. PATIENT-LVL III: ICD-10-PCS | Mod: PBBFAC,,, | Performed by: NURSE PRACTITIONER

## 2022-06-24 PROCEDURE — 99999 PR PBB SHADOW E&M-EST. PATIENT-LVL III: CPT | Mod: PBBFAC,,, | Performed by: NURSE PRACTITIONER

## 2022-06-24 PROCEDURE — 99214 PR OFFICE/OUTPT VISIT, EST, LEVL IV, 30-39 MIN: ICD-10-PCS | Mod: S$GLB,,, | Performed by: NURSE PRACTITIONER

## 2022-06-24 RX ORDER — PREGABALIN 100 MG/1
100 CAPSULE ORAL 2 TIMES DAILY
Qty: 60 CAPSULE | Refills: 1 | Status: SHIPPED | OUTPATIENT
Start: 2022-06-24 | End: 2022-07-20

## 2022-06-24 NOTE — PROGRESS NOTES
Chronic Pain - Established Visit    Referring Physician: No ref. provider found    Chief Complaint: Right arm pain and paresthesias     SUBJECTIVE:    Interval History 6/24/2022:  The patient presents today for follow up of right upper extremity pain and numbness. Her primary location is the right arm below the elbow. She does not feel radiation from the neck into the entire arm. She does have neck pain. Her recent cervical MRI does show spondylosis with moderate right sided NF narrowing. RUE EMG was ordered at previous encounter but was not scheduled. She had a recent visit with Dr. Shaver for right thumb trigger finger and received a steroid injection. Her most recent A1C was 9.3. She was started on gabapentin in the past which made her drowsy. She was started on Lyrica by Dr. Sanchez 75 mg BID. She ran out and stopped. She denies side effects but does not feel like it was helpful. She is frustrated because of her pain. She feels as thought it started directly after chemo and radiation for breast cancer. Her pain today is 5/10.    Initial Encounter:  Maria Elena Tavares presents to the clinic for the evaluation of pain and paresthesias in her right arm below the elbow. The pain started in 2020. She denies any trauma or inciting event; however, she does have a history of breast cancer and is status post chemo and radiation.  Additionally, she reports an episode of shingles in the same distribution as her current symptoms around 0058-5016.  She also has a history of diabetes with most recent HbA1c of 7.9  She reports that the symptoms have waxed and waned over the past 2 years. The pain is located in the right arm below the elbow and radiates to the right hand.  The pain is described as numbing and tingling and is rated as 10/10. The pain is rated with a score of  0/10 on the BEST day and a score of 5/10 on the WORST day.  Symptoms interfere with daily activity and sleeping. The pain is exacerbated by excess activity  such as house work.  The pain is mitigated by medications and rest.  She is currently taking gabapentin, which does offer good relief, but is very sedating for her making it difficult to take consistently.    Patient denies night fever/night sweats, urinary incontinence, bowel incontinence, significant weight loss, significant motor weakness and loss of sensations.    Physical Therapy/Home Exercise: no      Pain Disability Index Review:  Last 3 PDI Scores 6/24/2022 4/28/2022   Pain Disability Index (PDI) 45 12       Pain Medications:  None     report:  Reviewed and consistent with medication use as prescribed.    Pain Procedures:  None    Imaging:    Narrative & Impression  EXAMINATION:  MRI CERVICAL SPINE WITHOUT CONTRAST     CLINICAL HISTORY:  RUE pain and paresthesias;.  Pain in right arm     TECHNIQUE:  Multiplanar, multisequence MR images of the cervical spine were acquired without the administration of contrast.     COMPARISON:  NM PET 09/21/2019     FINDINGS:  Reversal of normal cervical lordosis centered at C4.  No spondylolisthesis.     No compression fractures.  Fatty metaplasia in the left aspect of the T1, T2, and T3 vertebral bodies, also involving the posterior elements, likely reflecting post radiation changes in this patient with breast cancer.  No aggressive marrow replacing lesion.     Multilevel degenerative changes with disc desiccation and disc space narrowing, described in detail below.  No bone marrow edema.     Visualized structures in the posterior fossa are unremarkable. The cervical spinal cord is unremarkable.     Paraspinal soft tissues are unremarkable.     SIGNIFICANT FINDINGS BY LEVEL:     C2-3: Bilateral facet arthropathy.  No canal or foraminal stenosis.     C3-4: Disc osteophyte complex.  Bilateral facet arthropathy, left greater than right.  Mild canal stenosis with effacement of the ventral thecal sac and abutment of the anterior cord.  Mild left foraminal stenosis.     C4-5:  Disc osteophyte complex.  Mild facet arthropathy.  Mild canal stenosis.  No foraminal stenosis.     C5-6: Disc osteophyte complex.  Minimal canal stenosis.  Mild bilateral foraminal stenosis.     C6-7: Disc osteophyte complex.  Minimal canal stenosis.  Mild bilateral foraminal stenosis.     C7-T1: Disc osteophyte complex.  Minimal canal stenosis.  Moderate right and mild left foraminal stenosis.     Impression:     Multilevel degenerative changes, resulting in moderate foraminal stenosis on the right at C7-T1.  There is mild canal and foraminal stenosis at other levels as described.     Fatty metaplasia in the upper thoracic spine, likely reflecting post radiation changes.    Additional Studies:  EMG/NCS - 3/12/20  This is an abnormal study. There is electrophysiologic evidence of:     1. Mild right carpal tunnel syndrome. The patients chemotherapy (Taxol) can potentially cause an axonal sensory polyneuropathy, but her infusions were only recently initiated and it is likely too soon to attribute any nerve conduction abnormalities to toxic side effects of the Taxol.       Past Medical History:   Diagnosis Date    BMI 37.0-37.9, adult     Breast cancer 09/05/2019     Left breast, IDC with lymph node metastisis    Colon polyps 2015    Colon polyps 2015    Diabetes mellitus type II     Diverticulosis     history of diverticulosisseen on colonoscopy at age 48. Repeat recommended at age 58. Done by     Elevated blood protein     History of elevated protein. Apparently has seen  for extensive workup including bone marrow biopsy    History of shingles 2006    Hyperlipidemia     Hypertension     Microalbuminuria     due 2 diabetes    Mild vitamin D deficiency     . A low vitamin D    Thyroid disease     hypothyroidism     Past Surgical History:   Procedure Laterality Date    BREAST BIOPSY Left 09/05/2019    IDC with mets to node    BREAST BIOPSY Right 09/26/2019    core bx, benign node     BREAST BIOPSY Left 10/14/2019    MRI Core bx, + IDC    BREAST BIOPSY Left 10/08/2019    Core bx, ADH     SECTION      COLONOSCOPY N/A 10/8/2020    Procedure: COLONOSCOPY;  Surgeon: Shreya Mendoza MD;  Location: [facility] (4TH FLR);  Service: Endoscopy;  Laterality: N/A;  COVID screening on 10/5/20 [place] [facility]    HYSTERECTOMY      INSERTION OF BREAST TISSUE EXPANDER Bilateral 3/24/2020    Procedure: INSERTION, TISSUE EXPANDER, BREAST BILATERAL;  Surgeon: Michael Solorzano MD;  Location: [facility] OR 2ND FLR;  Service: Plastics;  Laterality: Bilateral;    INSERTION OF TUNNELED CENTRAL VENOUS CATHETER (CVC) WITH SUBCUTANEOUS PORT Right 10/4/2019    Procedure: NWNDMYORS-IOGI-U-CATH RIGHT (CONSENT AM OF) 1.0 hr case;  Surgeon: Elena Lopez MD;  Location: [facility] OR 2ND FLR;  Service: General;  Laterality: Right;    OOPHORECTOMY      SENTINEL LYMPH NODE BIOPSY Left 3/24/2020    Procedure: BIOPSY, LYMPH NODE, SENTINEL LEFT;  Surgeon: Elena Lopez MD;  Location: [facility] OR 2ND FLR;  Service: General;  Laterality: Left;    SIMPLE MASTECTOMY Bilateral 3/24/2020    Procedure: MASTECTOMY, SIMPLE BILATERAL;  Surgeon: Elena Lopez MD;  [facility];  Service: General;  Laterality: Bilateral;    TISSUE EXPANDER REMOVAL Bilateral 2020    Procedure: REMOVAL, TISSUE EXPANDER;  Surgeon: Michael Solorzano MD;  [facility];  Service: Plastics;  Laterality: Bilateral;     Social History     Socioeconomic History    Marital status: Single   Occupational History     Employer: [employer] ([employer]   Tobacco Use    Smoking status: Never Smoker    Smokeless tobacco: Never Used    Tobacco comment: 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  She walks occasionally.   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 daughter1 granddaughter     Social Determinants of Health     Financial Resource Strain: Medium Risk    Difficulty of Paying Living Expenses: Somewhat hard   Food Insecurity: Food Insecurity Present    Worried About Running Out of Food in the Last Year: Never true    Ran Out of Food in the Last Year: Sometimes true   Transportation Needs: No Transportation Needs    Lack of Transportation (Medical): No    Lack of Transportation (Non-Medical): No   Physical Activity: Insufficiently Active    Days of Exercise per Week: 2 days    Minutes of Exercise per Session: 30 min   Stress: No Stress Concern Present    Feeling of Stress : Not at all   Social Connections: Unknown    Frequency of Communication with Friends and Family: Twice a week    Frequency of Social Gatherings with Friends and Family: Once a week    Active Member of Clubs or Organizations: No    Attends Club or Organization Meetings: Never    Marital Status: Never    Housing Stability: Low Risk     Unable to Pay for Housing in the Last Year: No    Number of Places Lived in the Last Year: 1    Unstable Housing in the Last Year: No     Family History   Problem Relation Age of Onset    Cancer Mother         lung ca heavy smoker    Lung cancer Mother     Cancer Father     Lung cancer Father     Hypertension Sister     No Known Problems Sister     No Known Problems Daughter     Hypothyroidism Sister     Diabetes Maternal Aunt     Cancer Maternal Aunt     No Known Problems Maternal Grandmother     Breast cancer Paternal Aunt     No Known Problems Paternal Uncle     Lung cancer Maternal Grandfather     No Known Problems Paternal Grandmother     No Known Problems Paternal Grandfather     Amblyopia Neg Hx     Blindness Neg Hx     Cataracts Neg Hx     Glaucoma Neg Hx     Macular degeneration Neg Hx     Retinal detachment Neg Hx     Strabismus Neg Hx      Stroke Neg Hx     Thyroid disease Neg Hx     Ovarian cancer Neg Hx     Colon cancer Neg Hx        Review of patient's allergies indicates:  No Known Allergies    Current Outpatient Medications   Medication Sig    aspirin (ECOTRIN) 81 MG EC tablet Take 81 mg by mouth once daily.    atorvastatin (LIPITOR) 10 MG tablet Take 1 tablet (10 mg total) by mouth once daily.    benzonatate (TESSALON) 200 MG capsule Take 1 capsule (200 mg total) by mouth 3 (three) times daily as needed for Cough.    blood-glucose meter kit DISPENSE : BLOOD TEST STRIPS AND LANCETS PATIENT TEST BLOOD SUGARS TWICE DAILY  TEST STRIPS: 50 EACH, REFILL 5  LANCETS:  50 EACH , REFILL 5    chlorthalidone (HYGROTEN) 25 MG Tab Take 1 tablet by mouth every morning    irbesartan (AVAPRO) 300 MG tablet Take 1 tablet by mouth once daily    letrozole (FEMARA) 2.5 mg Tab Take 1 tablet (2.5 mg total) by mouth once daily.    levothyroxine (SYNTHROID) 200 MCG tablet Take 1 tablet (200 mcg total) by mouth once daily.    lifitegrast (XIIDRA) 5 % Dpet Apply 1 drop to  both eyes  2 (two) times daily.    metFORMIN (GLUCOPHAGE-XR) 750 MG ER 24hr tablet Take 1 tablet (750 mg total) by mouth 2 (two) times daily with meals.    polyethylene glycol (GLYCOLAX) 17 gram/dose powder Mix 17 g (1 capful) with juice or water an drink 2 (two) times daily.    prasterone, dhea, (INTRAROSA) 6.5 mg Inst Place 1 suppository vaginally nightly.    pregabalin (LYRICA) 75 MG capsule Take 1 capsule (75 mg total) by mouth 2 (two) times daily.    semaglutide (RYBELSUS) 7 mg tablet Take 1 tablet (7 mg total) by mouth once daily.    valACYclovir (VALTREX) 1000 MG tablet Take 1 tablet (1,000 mg total) by mouth 2 (two) times daily. for 5 days (Patient not taking: Reported on 6/24/2022)     No current facility-administered medications for this visit.       REVIEW OF SYSTEMS:    GENERAL:  No weight loss, malaise or fevers.  HEENT:  Negative for frequent or significant  "headaches.  NECK:  Negative for lumps, goiter, pain and significant neck swelling.  RESPIRATORY:  Negative for cough, wheezing or shortness of breath.  CARDIOVASCULAR:  Negative for chest pain, leg swelling or palpitations.  GI:  Negative for abdominal discomfort, blood in stools or black stools or change in bowel habits.  MUSCULOSKELETAL:  See HPI.  SKIN:  Negative for lesions, rash, and itching.  PSYCH:  +ve for sleep disturbance, mood disorder and recent psychosocial stressors.  HEMATOLOGY/LYMPHOLOGY:  Negative for prolonged bleeding, bruising easily or swollen nodes.  NEURO:   No history of headaches, syncope, paralysis, seizures or tremors.  All other reviewed and negative other than HPI.    OBJECTIVE:    /79 (BP Location: Left arm, Patient Position: Sitting, BP Method: Medium (Automatic))   Pulse 102   Temp 97 °F (36.1 °C)   Ht 5' 1" (1.549 m)   Wt 83.5 kg (184 lb 1.4 oz)   LMP  (LMP Unknown)   BMI 34.78 kg/m²     PHYSICAL EXAMINATION:    General appearance: Well appearing, in no acute distress, alert and oriented x3.  Psych:  Mood and affect appropriate.  Skin: Skin color, texture, turgor normal, no rashes or lesions, in both upper and lower body.  Head/face:  Normocephalic, atraumatic. No palpable lymph nodes.  Neck: There is pain to palpation over the cervical paraspinous and trapezius muscles. Spurling Negative. No pain with neck flexion, extension, or lateral flexion.   Extremities: Periphral joint ROM is full and pain free without obvious instability or laxity in all four extremities. No deformities, edema, or skin discoloration. Good capillary refill.  Musculoskeletal: Shoulder provocative maneuvers are negative bilaterally.  There is trace weakness of the right upper extremity compared to the left.  No atrophy or tone abnormalities are noted.  Finkelstein's negative on the right.  No tenderness to palpation over the medial or lateral epicondyle the right elbow.  Neuro: Decreased sensation " to right elbow.  No Marin.  No clonus.  Positive Tinel's at the right elbow.  Negative Tinel's at the right wrist.  Gait: Normal.    ASSESSMENT: 66 y.o. year old female with right arm pain, consistent with the followin. Neuropathy     2. Right arm pain     3. Cervical spondylosis       PLAN:     - I have stressed the importance of physical activity and a home exercise plan to help with pain and improve health.  - Will have patient scheduled for EMG/NCS of the RUE to evaluate for radiculopathy vs entrapment vs peripheral neuropathy.  - If negative for peripheral neuropathy, consider cervical INOCENCIO when sugar is better controlled.  - Restart Lyrica at 100 mg BID. Can further increase if needed in the future.  - RTC after EMG.    The above plan and management options were discussed at length with patient. Patient is in agreement with the above and verbalized understanding.     BRITT Jo  2022      I spent a total of 30 minutes on the day of the visit.  This includes face to face time and non-face to face time preparing to see the patient by reviewing previous labs/imaging, obtaining and/or reviewing separately obtained history, documenting clinical information in the electronic or other health record, independently interpreting results and communicating results to the patient/family/caregiver.

## 2022-06-27 ENCOUNTER — OFFICE VISIT (OUTPATIENT)
Dept: ORTHOPEDICS | Facility: CLINIC | Age: 67
End: 2022-06-27
Payer: COMMERCIAL

## 2022-06-27 VITALS — WEIGHT: 185.19 LBS | HEIGHT: 60 IN | BODY MASS INDEX: 36.36 KG/M2

## 2022-06-27 DIAGNOSIS — M17.12 PRIMARY OSTEOARTHRITIS OF LEFT KNEE: Primary | ICD-10-CM

## 2022-06-27 PROCEDURE — 20610 PR DRAIN/INJECT LARGE JOINT/BURSA: ICD-10-PCS | Mod: LT,S$GLB,, | Performed by: PHYSICIAN ASSISTANT

## 2022-06-27 PROCEDURE — 99999 PR PBB SHADOW E&M-EST. PATIENT-LVL III: CPT | Mod: PBBFAC,,, | Performed by: PHYSICIAN ASSISTANT

## 2022-06-27 PROCEDURE — 99213 OFFICE O/P EST LOW 20 MIN: CPT | Mod: 25,S$GLB,, | Performed by: PHYSICIAN ASSISTANT

## 2022-06-27 PROCEDURE — 20610 DRAIN/INJ JOINT/BURSA W/O US: CPT | Mod: LT,S$GLB,, | Performed by: PHYSICIAN ASSISTANT

## 2022-06-27 PROCEDURE — 99999 PR PBB SHADOW E&M-EST. PATIENT-LVL III: ICD-10-PCS | Mod: PBBFAC,,, | Performed by: PHYSICIAN ASSISTANT

## 2022-06-27 PROCEDURE — 99213 PR OFFICE/OUTPT VISIT, EST, LEVL III, 20-29 MIN: ICD-10-PCS | Mod: 25,S$GLB,, | Performed by: PHYSICIAN ASSISTANT

## 2022-06-27 RX ORDER — TRIAMCINOLONE ACETONIDE 40 MG/ML
40 INJECTION, SUSPENSION INTRA-ARTICULAR; INTRAMUSCULAR
Status: COMPLETED | OUTPATIENT
Start: 2022-06-27 | End: 2022-06-27

## 2022-06-27 RX ADMIN — TRIAMCINOLONE ACETONIDE 40 MG: 40 INJECTION, SUSPENSION INTRA-ARTICULAR; INTRAMUSCULAR at 03:06

## 2022-06-27 NOTE — PROGRESS NOTES
SUBJECTIVE:     Chief Complaint : left knee pain    History of Present Illness:  Maria Elena Tavares is a 66 y.o. female seen in clinic today with a chief complaint of chronic atraumatic left knee pain. Pain is medial and becoming more bothersome. Pain stops her from dancing. It makes getting up from seated position difficult. She has stiffness after long periods of sitting. No previous injury to knee. She denies mechanical symptoms or instability. She has been treated with injections. Last injections: CSI 8/2021 and Euflexxa 7/2021. She has had multiple surgeries, chemo, radiation for breast cancer over the past few years but has now been declared cancer free. She is diabetic, last a1c 9.3. Pt works for [employer] in [place].    Past Medical History:   Diagnosis Date    BMI 37.0-37.9, adult     Breast cancer 09/05/2019     Left breast, IDC with lymph node metastisis    Colon polyps 2015    Colon polyps 2015    Diabetes mellitus type II     Diverticulosis     history of diverticulosisseen on colonoscopy at age 48. Repeat recommended at age 58. Done by     Elevated blood protein     History of elevated protein. Apparently has seen  for extensive workup including bone marrow biopsy    History of shingles 2006    Hyperlipidemia     Hypertension     Microalbuminuria     due 2 diabetes    Mild vitamin D deficiency     . A low vitamin D    Thyroid disease     hypothyroidism     Review of Systems:  Constitutional: no fever or chills  ENT: no nasal congestion or sore throat  Respiratory: no cough or shortness of breath  Cardiovascular: no chest pain or palpitations  Gastrointestinal: no nausea or vomiting, tolerating diet  Genitourinary: no hematuria or dysuria  Integument/Breast: no rash or pruritis  Hematologic/Lymphatic: no easy bruising or lymphadenopathy  Musculoskeletal: see HPI  Neurological: no seizures or tremors  Behavioral/Psych: no auditory or visual  hallucinations    OBJECTIVE:     PHYSICAL EXAM:  Height 5' (1.524 m), weight 84 kg (185 lb 3.2 oz).   General Appearance: WDWN, NAD  Gait: Normal  Neuro/Psych: Mood & affect appropriate  Lungs: Respirations equal and unlabored.   CV: 2+ bilateral upper and lower extremity pulses.   Skin: Intact throughout LE  Extremities: No LE edema    Right Knee Exam  Range of Motion:0-130 active   Effusion:none  Condition of skin:intact  Location of tenderness:Medial joint line   Strength:5 of 5 quadriceps strength and 5 of 5 hamstring strength  Stability:stable to testing  Susana: negative    Left Knee Exam  Range of Motion:-2-115 active   Effusion: no   Condition of skin:intact  Location of tenderness:Medial joint line   Strength:5 of 5 quadriceps strength and 5 of 5 hamstring strength  Stability:stable to testing  Susana: pain medially with testing    Alignment: Mild varus    Right Hip Examination: no pain with PROM     Left Hip Examination: no pain with PROM    RADIOGRAPHS: AP, lateral and merchant knee x-rays reviewed today by me reveal advanced degenerative changes medial compartment left knee, moderate changes medially in right knee     ASSESSMENT/PLAN:   Primary osteoarthritis left knee  - CSI today. Will monitor glucose.  - BMI 36 - Continue weight loss efforts.   - A1c 9.3 - she has started back on medication. She understands that A1c must be closer to 7 to have surgery.  - Discussed TKA. Pt feels as though she will be ready in next 6 months.  - F/u prn. She will contact me when ready to schedule with surgeon.     Knee Injection Procedure Note  Diagnosis: left knee degenerative arthritis  Indications: left knee pain  Procedure Details: Verbal consent was obtained for the procedure. The injection site was identified and the skin was prepared with alcohol. The left knee was injected from an anterolateral approach with 1 ml of Kenalog and 2 ml Lidocaine under sterile technique using a 22 gauge needle. The needle was  removed and the area cleansed and dressed.  Complications:  Patient tolerated the procedure well.    she was advised to rest the knee today, using ice and elevation as needed for comfort and swelling.Immediate relief of the knee pain may be short lived and secondary to the lidocaine. she may have an increase in discomfort tonight followed by steady improvement over the next several days. It may take 1-2 weeks following the injection to get the full benefit of the medication.

## 2022-06-28 ENCOUNTER — HOSPITAL ENCOUNTER (OUTPATIENT)
Dept: RADIOLOGY | Facility: CLINIC | Age: 67
Discharge: HOME | End: 2022-06-28
Attending: NURSE PRACTITIONER
Payer: COMMERCIAL

## 2022-06-28 DIAGNOSIS — C50.912 BREAST CANCER METASTASIZED TO AXILLARY LYMPH NODE, LEFT: ICD-10-CM

## 2022-06-28 DIAGNOSIS — E03.9 HYPOTHYROIDISM, UNSPECIFIED TYPE: ICD-10-CM

## 2022-06-28 DIAGNOSIS — C77.3 BREAST CANCER METASTASIZED TO AXILLARY LYMPH NODE, LEFT: ICD-10-CM

## 2022-06-28 PROCEDURE — 77080 DXA BONE DENSITY AXIAL: CPT | Mod: TC

## 2022-06-28 PROCEDURE — 77080 DEXA BONE DENSITY SPINE HIP: ICD-10-PCS | Mod: 26,,, | Performed by: INTERNAL MEDICINE

## 2022-06-28 PROCEDURE — 77080 DXA BONE DENSITY AXIAL: CPT | Mod: 26,,, | Performed by: INTERNAL MEDICINE

## 2022-06-28 RX ORDER — LEVOTHYROXINE SODIUM 200 UG/1
200 TABLET ORAL DAILY
Qty: 90 TABLET | Refills: 1 | Status: SHIPPED | OUTPATIENT
Start: 2022-06-28 | End: 2022-08-24

## 2022-06-28 NOTE — TELEPHONE ENCOUNTER
No new care gaps identified.  [facility] Embedded Care Gaps. Reference number: [id number]. 6/28/2022   2:16:34 PM CDT

## 2022-06-28 NOTE — TELEPHONE ENCOUNTER
Refill Routing Note   Medication(s) are not appropriate for processing by [facility] for the following reason(s):      - Required laboratory values are abnormal    [facility] action(s):  Defer          Medication reconciliation completed: No     Appointments  past 12m or future 3m with PCP    Date Provider   Last Visit   6/22/2022 Estephania San MD   Next Visit   8/9/2022 Estephania San MD   ED visits in past 90 days: 0        Note composed:6:20 PM 06/28/2022

## 2022-06-29 ENCOUNTER — PATIENT MESSAGE (OUTPATIENT)
Dept: PRIMARY CARE CLINIC | Facility: CLINIC | Age: 67
End: 2022-06-29
Payer: COMMERCIAL

## 2022-07-01 ENCOUNTER — TELEPHONE (OUTPATIENT)
Dept: HEMATOLOGY/ONCOLOGY | Facility: CLINIC | Age: 67
End: 2022-07-01
Payer: COMMERCIAL

## 2022-07-03 ENCOUNTER — PATIENT MESSAGE (OUTPATIENT)
Dept: PRIMARY CARE CLINIC | Facility: CLINIC | Age: 67
End: 2022-07-03
Payer: COMMERCIAL

## 2022-07-03 DIAGNOSIS — E03.9 HYPOTHYROIDISM, UNSPECIFIED TYPE: ICD-10-CM

## 2022-07-05 RX ORDER — LEVOTHYROXINE SODIUM 200 UG/1
200 TABLET ORAL DAILY
Qty: 90 TABLET | Refills: 1 | Status: SHIPPED | OUTPATIENT
Start: 2022-07-05 | End: 2023-08-22

## 2022-07-05 NOTE — TELEPHONE ENCOUNTER
No new care gaps identified.  [facility] Embedded Care Gaps. Reference number: [id number]. 7/05/2022   1:43:24 PM CDT

## 2022-07-05 NOTE — PROGRESS NOTES
"Last 5 Patient Entered Readings                                                               Current 30 Day Average: 138/83     Recent Readings 2/5/2017 1/28/2017 1/28/2017 1/19/2017 1/15/2017    Systolic BP (mmHg) 139 133 150 143 131    Diastolic BP (mmHg) 81 73 82 87 89    Pulse 99 93 100 88 87        Follow up with Ms. Maria Elena Tavares completed. Ms. Tavares is doing well. Patient has been "working hard" at watching what she is eating and trying to stay as active as she can. Patient did not have any further questions or concerns. I will follow up in a few weeks to see how she is doing and progressing.        "
Moderna dose 1 and 2

## 2022-07-11 ENCOUNTER — LAB VISIT (OUTPATIENT)
Dept: LAB | Facility: HOSPITAL | Age: 67
End: 2022-07-11
Payer: COMMERCIAL

## 2022-07-11 ENCOUNTER — OFFICE VISIT (OUTPATIENT)
Dept: HEMATOLOGY/ONCOLOGY | Facility: CLINIC | Age: 67
End: 2022-07-11
Payer: COMMERCIAL

## 2022-07-11 VITALS
OXYGEN SATURATION: 96 % | WEIGHT: 186.75 LBS | HEART RATE: 94 BPM | BODY MASS INDEX: 36.66 KG/M2 | DIASTOLIC BLOOD PRESSURE: 94 MMHG | SYSTOLIC BLOOD PRESSURE: 130 MMHG | TEMPERATURE: 99 F | HEIGHT: 60 IN | RESPIRATION RATE: 16 BRPM

## 2022-07-11 DIAGNOSIS — E11.21 CONTROLLED TYPE 2 DIABETES MELLITUS WITH DIABETIC NEPHROPATHY, WITHOUT LONG-TERM CURRENT USE OF INSULIN: ICD-10-CM

## 2022-07-11 DIAGNOSIS — Z79.811 AROMATASE INHIBITOR USE: ICD-10-CM

## 2022-07-11 DIAGNOSIS — C50.912 BREAST CANCER METASTASIZED TO AXILLARY LYMPH NODE, LEFT: ICD-10-CM

## 2022-07-11 DIAGNOSIS — C50.912 BREAST CANCER METASTASIZED TO AXILLARY LYMPH NODE, LEFT: Primary | ICD-10-CM

## 2022-07-11 DIAGNOSIS — C77.3 BREAST CANCER METASTASIZED TO AXILLARY LYMPH NODE, LEFT: ICD-10-CM

## 2022-07-11 DIAGNOSIS — C77.3 BREAST CANCER METASTASIZED TO AXILLARY LYMPH NODE, LEFT: Primary | ICD-10-CM

## 2022-07-11 DIAGNOSIS — Z90.13 H/O BILATERAL MASTECTOMY: ICD-10-CM

## 2022-07-11 LAB
ALBUMIN SERPL BCP-MCNC: 3.5 G/DL (ref 3.5–5.2)
ALP SERPL-CCNC: 109 U/L (ref 55–135)
ALT SERPL W/O P-5'-P-CCNC: 15 U/L (ref 10–44)
ANION GAP SERPL CALC-SCNC: 11 MMOL/L (ref 8–16)
AST SERPL-CCNC: 17 U/L (ref 10–40)
BASOPHILS # BLD AUTO: 0.03 K/UL (ref 0–0.2)
BASOPHILS NFR BLD: 0.5 % (ref 0–1.9)
BILIRUB SERPL-MCNC: 0.6 MG/DL (ref 0.1–1)
BUN SERPL-MCNC: 19 MG/DL (ref 8–23)
CALCIUM SERPL-MCNC: 9.9 MG/DL (ref 8.7–10.5)
CHLORIDE SERPL-SCNC: 100 MMOL/L (ref 95–110)
CO2 SERPL-SCNC: 23 MMOL/L (ref 23–29)
CREAT SERPL-MCNC: 0.9 MG/DL (ref 0.5–1.4)
DIFFERENTIAL METHOD: ABNORMAL
EOSINOPHIL # BLD AUTO: 0 K/UL (ref 0–0.5)
EOSINOPHIL NFR BLD: 0.6 % (ref 0–8)
ERYTHROCYTE [DISTWIDTH] IN BLOOD BY AUTOMATED COUNT: 14.6 % (ref 11.5–14.5)
EST. GFR  (AFRICAN AMERICAN): >60 ML/MIN/1.73 M^2
EST. GFR  (NON AFRICAN AMERICAN): >60 ML/MIN/1.73 M^2
GLUCOSE SERPL-MCNC: 169 MG/DL (ref 70–110)
HCT VFR BLD AUTO: 36.1 % (ref 37–48.5)
HGB BLD-MCNC: 12 G/DL (ref 12–16)
IMM GRANULOCYTES # BLD AUTO: 0.03 K/UL (ref 0–0.04)
IMM GRANULOCYTES NFR BLD AUTO: 0.5 % (ref 0–0.5)
LYMPHOCYTES # BLD AUTO: 1.1 K/UL (ref 1–4.8)
LYMPHOCYTES NFR BLD: 16.7 % (ref 18–48)
MCH RBC QN AUTO: 30.3 PG (ref 27–31)
MCHC RBC AUTO-ENTMCNC: 33.2 G/DL (ref 32–36)
MCV RBC AUTO: 91 FL (ref 82–98)
MONOCYTES # BLD AUTO: 0.7 K/UL (ref 0.3–1)
MONOCYTES NFR BLD: 10.8 % (ref 4–15)
NEUTROPHILS # BLD AUTO: 4.6 K/UL (ref 1.8–7.7)
NEUTROPHILS NFR BLD: 70.9 % (ref 38–73)
NRBC BLD-RTO: 0 /100 WBC
PLATELET # BLD AUTO: 171 K/UL (ref 150–450)
PMV BLD AUTO: 12.5 FL (ref 9.2–12.9)
POTASSIUM SERPL-SCNC: 4.5 MMOL/L (ref 3.5–5.1)
PROT SERPL-MCNC: 7.6 G/DL (ref 6–8.4)
RBC # BLD AUTO: 3.96 M/UL (ref 4–5.4)
SODIUM SERPL-SCNC: 134 MMOL/L (ref 136–145)
WBC # BLD AUTO: 6.46 K/UL (ref 3.9–12.7)

## 2022-07-11 PROCEDURE — 99999 PR PBB SHADOW E&M-EST. PATIENT-LVL V: CPT | Mod: PBBFAC,,, | Performed by: INTERNAL MEDICINE

## 2022-07-11 PROCEDURE — 99214 PR OFFICE/OUTPT VISIT, EST, LEVL IV, 30-39 MIN: ICD-10-PCS | Mod: S$GLB,,, | Performed by: INTERNAL MEDICINE

## 2022-07-11 PROCEDURE — 99999 PR PBB SHADOW E&M-EST. PATIENT-LVL V: ICD-10-PCS | Mod: PBBFAC,,, | Performed by: INTERNAL MEDICINE

## 2022-07-11 PROCEDURE — 85025 COMPLETE CBC W/AUTO DIFF WBC: CPT | Performed by: NURSE PRACTITIONER

## 2022-07-11 PROCEDURE — 99499 RISK ADDL DX/OHS AUDIT: ICD-10-PCS | Mod: S$GLB,,, | Performed by: INTERNAL MEDICINE

## 2022-07-11 PROCEDURE — 99214 OFFICE O/P EST MOD 30 MIN: CPT | Mod: S$GLB,,, | Performed by: INTERNAL MEDICINE

## 2022-07-11 PROCEDURE — 36415 COLL VENOUS BLD VENIPUNCTURE: CPT | Performed by: NURSE PRACTITIONER

## 2022-07-11 PROCEDURE — 99499 UNLISTED E&M SERVICE: CPT | Mod: S$GLB,,, | Performed by: INTERNAL MEDICINE

## 2022-07-11 PROCEDURE — 80053 COMPREHEN METABOLIC PANEL: CPT | Performed by: NURSE PRACTITIONER

## 2022-07-11 NOTE — PROGRESS NOTES
Subjective:       Patient ID: Maria Elena Tavares is a 66 y.o. female.    Chief Complaint: Breast cancer metastasized to axillary lymph node, left    HPI     Returns for routine follow up  Lyrica is helping significantly with neuropathy    States tolerating Femara well  Hot flashes are better  Notes some hair thinning  Weight stable  No significant joint aches     Oncologic History:   - self detected, noted a shooting pain in breast first and then a week later felt a lump  Some delay in getting appointment as she was unaware she had to call  - 8/30/19 Diagnostic mammogram and ultrasound:  Left breast 20 mm x 18 mm x 17 mm mass at the 3 o'clock position. Assessment: 4 - Suspicious finding. Biopsy is recommended. Lymph Node: Left axilla 16 mm x 14 mm x 13 mm lymph node. Assessment: 4 - Suspicious finding. Biopsy is recommended. Right- There is no mammographic or sonographic evidence of malignancy.  BI-RADS Category:   Overall: 4 - Suspicious  - 9/5/19 Ultrasound guided biopsy  Pathology:  1. LEFT BREAST MASS, BIOPSY:  - Invasive ductal carcinoma, grade 3, longest linear length is 10 mm measured on the slide.  - Histologic Grade (Ramsey Histologic Score)  Glandular (Acinar/Tubular Differentiation): 3.  Nuclear Pleomorphism: 2.  Mitotic Rate: 3.  Overall Grade: Grade 3 (score 8).  - Microcalcifications: Not identified.  - Lymphovascular invasion: Not identified.  2. LYMPH NODE, LEFT AXILLA, BIOPSY:  - Positive for metastatic carcinoma.  - The metastatic deposit measures 6 mm in longest continuous linear length.  Estrogen receptor: Positive, 90% of the tumor nuclei staining moderate to strongly.  Progesterone receptor: Positive, 30% of the tumor nuclei staining weak to moderately.  HER2: Negative.  Ki-67: 60%.  - ECHO 9/20/19: EF 64%  - PET 9/21/19:  Hypermetabolic left breast mass and regional left axillary lymphadenopathy consistent with reported breast cancer and corresponding with recent MRI findings.  Upper limit of  normal sized right axillary lymph nodes with normal fatty duane and mildly increased radiotracer uptake.  Findings could represent reactive nodes with metastatic nodes thought less likely.  Lymphscintigraphy with injection in the left breast may considered to assess for drainage to the contralateral axilla.  - BX 9/24/19: Right axilla node biopsy is benign  - she underwent neoadjuvant chemotherapy and completed 4 cycles of AC followed by 11 cycles of weekly Taxol.   Stopped with side effects.  - 3/24/20 - she underwent bilateral mastectomies with Lt. sentinel node biopsy and subsequent Lt. axillary dissection and Rt. sentinel node biopsy with Dr. Lopez.  Tissue expanders were placed.   - Pathology from the Lt. breast revealed 1.2 cm of residual invasive ductal carcinoma histologic grade 3, nuclear grade 3 and mitotic index 5 ). There was high grade DCIS. There was lymphovascular invasion.  The margins of resection were negative at > 1 cm.  One axillary node had a 5 mm focus of metastatic carcinoma.  Another Lt. sentinel node had isolated tumor cells.   A third Lt. sentinel node and 7 Lt. axillary nodes were negative.  The Rt. breast and Rt. sentinel nodes were negative.    - completed adjuvant  XRT   - on Femara    HM:  6/2022 BMD  Normal    Review of Systems   Constitutional: Negative for activity change, appetite change, chills, fatigue, fever and unexpected weight change.   HENT: Negative for mouth sores, sinus pressure/congestion, sore throat and trouble swallowing.    Eyes: Negative for visual disturbance.   Respiratory: Negative for cough, shortness of breath and wheezing.    Cardiovascular: Negative for chest pain, palpitations and leg swelling.   Gastrointestinal: Negative for abdominal distention, abdominal pain, change in bowel habit, constipation, diarrhea and change in bowel habit.   Genitourinary: Negative for decreased urine volume, difficulty urinating, dysuria, frequency and urgency.    Musculoskeletal: Negative for back pain.   Integumentary:  Negative for rash.   Neurological: Positive for numbness. Negative for dizziness, weakness and headaches.   Hematological: Negative for adenopathy. Does not bruise/bleed easily.   Psychiatric/Behavioral: Negative for dysphoric mood. The patient is not nervous/anxious.          Objective:      Physical Exam  Vitals and nursing note reviewed.   Constitutional:       General: She is not in acute distress.     Appearance: Normal appearance. She is normal weight. She is not ill-appearing.      Comments: Presents alone  Very pleasant   Eyes:      General: No scleral icterus.     Extraocular Movements: Extraocular movements intact.      Pupils: Pupils are equal, round, and reactive to light.   Cardiovascular:      Rate and Rhythm: Normal rate and regular rhythm.      Heart sounds: Normal heart sounds. No murmur heard.    No friction rub. No gallop.   Pulmonary:      Effort: Pulmonary effort is normal. No respiratory distress.      Breath sounds: Normal breath sounds. No wheezing, rhonchi or rales.      Comments: No chest wall masses or :LAD  Chest:      Chest wall: No tenderness.   Abdominal:      General: Abdomen is flat. Bowel sounds are normal. There is no distension.      Palpations: Abdomen is soft. There is no mass.      Tenderness: There is no abdominal tenderness. There is no guarding or rebound.      Comments: No organomegaly   Musculoskeletal:         General: No swelling, tenderness or deformity. Normal range of motion.      Cervical back: Normal range of motion and neck supple. No muscular tenderness.      Right lower leg: No edema.      Left lower leg: No edema.   Lymphadenopathy:      Cervical: No cervical adenopathy.   Skin:     General: Skin is warm and dry.      Coloration: Skin is not pale.      Findings: No erythema, lesion or rash.   Neurological:      General: No focal deficit present.      Mental Status: She is alert and oriented to person,  place, and time. Mental status is at baseline.      Cranial Nerves: No cranial nerve deficit.      Sensory: No sensory deficit.      Motor: No weakness.      Coordination: Coordination normal.      Gait: Gait normal.   Psychiatric:         Mood and Affect: Mood normal.         Behavior: Behavior normal.         Thought Content: Thought content normal.         Judgment: Judgment normal.       Labs- reviewed  Assessment:       Problem List Items Addressed This Visit     H/O bilateral mastectomy    Controlled type 2 diabetes mellitus with diabetic nephropathy, without long-term current use of insulin    Breast cancer metastasized to axillary lymph node, left - Primary          Plan:       Breast cancer  Post bilateral mastectomies  Continue Letrozole  Tolerating well  Annual BMD  RTC 6 months  Knows to call for any issues in the interval    DM/Neuropathy- Lyrica helping    Route Chart for Scheduling    Med Onc Chart Routing      Follow up with physician 1 year. BMD prior   Follow up with JAZZY 6 months.   Infusion scheduling note    Injection scheduling note    Labs    Imaging    Pharmacy appointment    Other referrals            Therapy Plan Information  Flushes  heparin, porcine (PF) 100 unit/mL injection flush 500 Units  500 Units, Intravenous, Every visit  sodium chloride 0.9% flush 10 mL  10 mL, Intravenous, Every visit

## 2022-07-19 ENCOUNTER — PATIENT MESSAGE (OUTPATIENT)
Dept: PAIN MEDICINE | Facility: CLINIC | Age: 67
End: 2022-07-19
Payer: COMMERCIAL

## 2022-07-19 ENCOUNTER — PATIENT MESSAGE (OUTPATIENT)
Dept: PRIMARY CARE CLINIC | Facility: CLINIC | Age: 67
End: 2022-07-19
Payer: COMMERCIAL

## 2022-07-20 ENCOUNTER — OFFICE VISIT (OUTPATIENT)
Dept: PRIMARY CARE CLINIC | Facility: CLINIC | Age: 67
End: 2022-07-20
Attending: FAMILY MEDICINE
Payer: COMMERCIAL

## 2022-07-20 ENCOUNTER — TELEPHONE (OUTPATIENT)
Dept: PRIMARY CARE CLINIC | Facility: CLINIC | Age: 67
End: 2022-07-20
Payer: COMMERCIAL

## 2022-07-20 DIAGNOSIS — G62.0 NEUROPATHY DUE TO CHEMOTHERAPEUTIC DRUG: ICD-10-CM

## 2022-07-20 DIAGNOSIS — M54.40 ACUTE BILATERAL LOW BACK PAIN WITH SCIATICA, SCIATICA LATERALITY UNSPECIFIED: Primary | ICD-10-CM

## 2022-07-20 DIAGNOSIS — E11.21 CONTROLLED TYPE 2 DIABETES MELLITUS WITH DIABETIC NEPHROPATHY, WITHOUT LONG-TERM CURRENT USE OF INSULIN: ICD-10-CM

## 2022-07-20 DIAGNOSIS — C77.3 BREAST CANCER METASTASIZED TO AXILLARY LYMPH NODE, LEFT: ICD-10-CM

## 2022-07-20 DIAGNOSIS — I10 ESSENTIAL HYPERTENSION: ICD-10-CM

## 2022-07-20 DIAGNOSIS — C50.912 BREAST CANCER METASTASIZED TO AXILLARY LYMPH NODE, LEFT: ICD-10-CM

## 2022-07-20 DIAGNOSIS — T45.1X5A NEUROPATHY DUE TO CHEMOTHERAPEUTIC DRUG: ICD-10-CM

## 2022-07-20 PROCEDURE — 3061F NEG MICROALBUMINURIA REV: CPT | Mod: CPTII,95,, | Performed by: FAMILY MEDICINE

## 2022-07-20 PROCEDURE — 3052F PR MOST RECENT HEMOGLOBIN A1C LEVEL 8.0 - < 9.0%: ICD-10-PCS | Mod: CPTII,95,, | Performed by: FAMILY MEDICINE

## 2022-07-20 PROCEDURE — 99213 OFFICE O/P EST LOW 20 MIN: CPT | Mod: 95,,, | Performed by: FAMILY MEDICINE

## 2022-07-20 PROCEDURE — 3072F PR LOW RISK FOR RETINOPATHY: ICD-10-PCS | Mod: CPTII,95,, | Performed by: FAMILY MEDICINE

## 2022-07-20 PROCEDURE — 3052F HG A1C>EQUAL 8.0%<EQUAL 9.0%: CPT | Mod: CPTII,95,, | Performed by: FAMILY MEDICINE

## 2022-07-20 PROCEDURE — 3072F LOW RISK FOR RETINOPATHY: CPT | Mod: CPTII,95,, | Performed by: FAMILY MEDICINE

## 2022-07-20 PROCEDURE — 3061F PR NEG MICROALBUMINURIA RESULT DOCUMENTED/REVIEW: ICD-10-PCS | Mod: CPTII,95,, | Performed by: FAMILY MEDICINE

## 2022-07-20 PROCEDURE — 3066F NEPHROPATHY DOC TX: CPT | Mod: CPTII,95,, | Performed by: FAMILY MEDICINE

## 2022-07-20 PROCEDURE — 99213 PR OFFICE/OUTPT VISIT, EST, LEVL III, 20-29 MIN: ICD-10-PCS | Mod: 95,,, | Performed by: FAMILY MEDICINE

## 2022-07-20 PROCEDURE — 3066F PR DOCUMENTATION OF TREATMENT FOR NEPHROPATHY: ICD-10-PCS | Mod: CPTII,95,, | Performed by: FAMILY MEDICINE

## 2022-07-20 PROCEDURE — 4010F ACE/ARB THERAPY RXD/TAKEN: CPT | Mod: CPTII,95,, | Performed by: FAMILY MEDICINE

## 2022-07-20 PROCEDURE — 4010F PR ACE/ARB THEARPY RXD/TAKEN: ICD-10-PCS | Mod: CPTII,95,, | Performed by: FAMILY MEDICINE

## 2022-07-20 NOTE — TELEPHONE ENCOUNTER
----- Message from Estephania San MD sent at 7/20/2022  2:05 PM CDT -----  Please call and make pt appt w me telemed Mon for f/u dizziness  Kalie, PV

## 2022-07-22 RX ORDER — LIFITEGRAST 50 MG/ML
1 SOLUTION/ DROPS OPHTHALMIC 2 TIMES DAILY
Qty: 180 EACH | Refills: 4 | Status: SHIPPED | OUTPATIENT
Start: 2022-07-22 | End: 2022-08-25 | Stop reason: SDUPTHER

## 2022-07-25 ENCOUNTER — PATIENT MESSAGE (OUTPATIENT)
Dept: PRIMARY CARE CLINIC | Facility: CLINIC | Age: 67
End: 2022-07-25

## 2022-07-26 ENCOUNTER — OFFICE VISIT (OUTPATIENT)
Dept: PRIMARY CARE CLINIC | Facility: CLINIC | Age: 67
End: 2022-07-26
Attending: FAMILY MEDICINE
Payer: COMMERCIAL

## 2022-07-26 DIAGNOSIS — C50.912 BREAST CANCER METASTASIZED TO AXILLARY LYMPH NODE, LEFT: ICD-10-CM

## 2022-07-26 DIAGNOSIS — I10 ESSENTIAL HYPERTENSION: ICD-10-CM

## 2022-07-26 DIAGNOSIS — G62.0 NEUROPATHY DUE TO CHEMOTHERAPEUTIC DRUG: ICD-10-CM

## 2022-07-26 DIAGNOSIS — E03.9 ACQUIRED HYPOTHYROIDISM: ICD-10-CM

## 2022-07-26 DIAGNOSIS — R42 DIZZINESS: Primary | ICD-10-CM

## 2022-07-26 DIAGNOSIS — T45.1X5A NEUROPATHY DUE TO CHEMOTHERAPEUTIC DRUG: ICD-10-CM

## 2022-07-26 DIAGNOSIS — Z90.13 H/O BILATERAL MASTECTOMY: ICD-10-CM

## 2022-07-26 DIAGNOSIS — E11.21 CONTROLLED TYPE 2 DIABETES MELLITUS WITH DIABETIC NEPHROPATHY, WITHOUT LONG-TERM CURRENT USE OF INSULIN: ICD-10-CM

## 2022-07-26 DIAGNOSIS — C77.3 BREAST CANCER METASTASIZED TO AXILLARY LYMPH NODE, LEFT: ICD-10-CM

## 2022-07-26 DIAGNOSIS — R51.9 BENIGN HEADACHE: ICD-10-CM

## 2022-07-26 DIAGNOSIS — E87.6 HYPOKALEMIA: ICD-10-CM

## 2022-07-26 PROCEDURE — 99214 PR OFFICE/OUTPT VISIT, EST, LEVL IV, 30-39 MIN: ICD-10-PCS | Mod: 95,,, | Performed by: FAMILY MEDICINE

## 2022-07-26 PROCEDURE — 99214 OFFICE O/P EST MOD 30 MIN: CPT | Mod: 95,,, | Performed by: FAMILY MEDICINE

## 2022-07-27 NOTE — PROGRESS NOTES
Established Patient - Audio Only Telehealth Visit     The patient location is: HOME  The chief complaint leading to consultation is: f/u rybelsus  Visit type: Virtual visit with audio only (telephone)  Total time spent with patient: 20 mins       The reason for the audio only service rather than synchronous audio and video virtual visit was related to technical difficulties or patient preference/necessity.     Each patient to whom I provide medical services by telemedicine is:  (1) informed of the relationship between the physician and patient and the respective role of any other health care provider with respect to management of the patient; and (2) notified that they may decline to receive medical services by telemedicine and may withdraw from such care at any time. Patient verbally consented to receive this service via voice-only telephone call.       HPI: pt states that she has been having hair loss, HA's, muscle aches and thinks its her rybelsus. No change in dose. Was dizzy but was also on lyrica at that time      Assessment and plan:  After reviewing pts med list and symptoms suspect that cause is her femara. Pt also had issues with lyrica which she stopped and since then dizziness had stopped. Pt amenable to this and will observe symptoms continue with rybelsus and speak with her onc.                              This service was not originating from a related E/M service provided within the previous 7 days nor will  to an E/M service or procedure within the next 24 hours or my soonest available appointment.  Prevailing standard of care was able to be met in this audio-only visit.

## 2022-08-03 ENCOUNTER — PATIENT MESSAGE (OUTPATIENT)
Dept: PRIMARY CARE CLINIC | Facility: CLINIC | Age: 67
End: 2022-08-03
Payer: COMMERCIAL

## 2022-08-04 ENCOUNTER — LAB VISIT (OUTPATIENT)
Dept: LAB | Facility: HOSPITAL | Age: 67
End: 2022-08-04
Attending: FAMILY MEDICINE
Payer: COMMERCIAL

## 2022-08-04 DIAGNOSIS — E11.21 CONTROLLED TYPE 2 DIABETES MELLITUS WITH DIABETIC NEPHROPATHY, WITHOUT LONG-TERM CURRENT USE OF INSULIN: ICD-10-CM

## 2022-08-04 DIAGNOSIS — E03.9 HYPOTHYROIDISM, UNSPECIFIED TYPE: ICD-10-CM

## 2022-08-04 LAB
ALBUMIN SERPL BCP-MCNC: 3.9 G/DL (ref 3.5–5.2)
ALBUMIN/CREAT UR: 2.6 UG/MG (ref 0–30)
ALP SERPL-CCNC: 80 U/L (ref 55–135)
ALT SERPL W/O P-5'-P-CCNC: 17 U/L (ref 10–44)
ANION GAP SERPL CALC-SCNC: 8 MMOL/L (ref 8–16)
AST SERPL-CCNC: 22 U/L (ref 10–40)
BILIRUB SERPL-MCNC: 0.7 MG/DL (ref 0.1–1)
BUN SERPL-MCNC: 15 MG/DL (ref 8–23)
CALCIUM SERPL-MCNC: 9.3 MG/DL (ref 8.7–10.5)
CHLORIDE SERPL-SCNC: 99 MMOL/L (ref 95–110)
CHOLEST SERPL-MCNC: 251 MG/DL (ref 120–199)
CHOLEST/HDLC SERPL: 4.8 {RATIO} (ref 2–5)
CO2 SERPL-SCNC: 29 MMOL/L (ref 23–29)
CREAT SERPL-MCNC: 0.9 MG/DL (ref 0.5–1.4)
CREAT UR-MCNC: 159 MG/DL (ref 15–325)
EST. GFR  (NO RACE VARIABLE): >60 ML/MIN/1.73 M^2
ESTIMATED AVG GLUCOSE: 203 MG/DL (ref 68–131)
GLUCOSE SERPL-MCNC: 164 MG/DL (ref 70–110)
HBA1C MFR BLD: 8.7 % (ref 4.5–6.2)
HDLC SERPL-MCNC: 52 MG/DL (ref 40–75)
HDLC SERPL: 20.7 % (ref 20–50)
LDLC SERPL CALC-MCNC: 179.4 MG/DL (ref 63–159)
MICROALBUMIN UR DL<=1MG/L-MCNC: 4.2 UG/ML
NONHDLC SERPL-MCNC: 199 MG/DL
POTASSIUM SERPL-SCNC: 3.7 MMOL/L (ref 3.5–5.1)
PROT SERPL-MCNC: 7.6 G/DL (ref 6–8.4)
SODIUM SERPL-SCNC: 136 MMOL/L (ref 136–145)
TRIGL SERPL-MCNC: 98 MG/DL (ref 30–150)
TSH SERPL DL<=0.005 MIU/L-ACNC: 2.81 UIU/ML (ref 0.34–5.6)

## 2022-08-04 PROCEDURE — 82043 UR ALBUMIN QUANTITATIVE: CPT | Performed by: FAMILY MEDICINE

## 2022-08-04 PROCEDURE — 83036 HEMOGLOBIN GLYCOSYLATED A1C: CPT | Performed by: FAMILY MEDICINE

## 2022-08-04 PROCEDURE — 82570 ASSAY OF URINE CREATININE: CPT | Performed by: FAMILY MEDICINE

## 2022-08-04 PROCEDURE — 80061 LIPID PANEL: CPT | Performed by: FAMILY MEDICINE

## 2022-08-04 PROCEDURE — 80053 COMPREHEN METABOLIC PANEL: CPT | Performed by: FAMILY MEDICINE

## 2022-08-04 PROCEDURE — 84443 ASSAY THYROID STIM HORMONE: CPT | Performed by: FAMILY MEDICINE

## 2022-08-04 PROCEDURE — 36415 COLL VENOUS BLD VENIPUNCTURE: CPT | Performed by: FAMILY MEDICINE

## 2022-08-10 ENCOUNTER — PATIENT OUTREACH (OUTPATIENT)
Dept: INTERNAL MEDICINE | Facility: CLINIC | Age: 67
End: 2022-08-10
Payer: COMMERCIAL

## 2022-08-24 ENCOUNTER — OFFICE VISIT (OUTPATIENT)
Dept: PRIMARY CARE CLINIC | Facility: CLINIC | Age: 67
End: 2022-08-24
Attending: FAMILY MEDICINE
Payer: COMMERCIAL

## 2022-08-24 VITALS
OXYGEN SATURATION: 98 % | DIASTOLIC BLOOD PRESSURE: 90 MMHG | HEIGHT: 60 IN | BODY MASS INDEX: 35.56 KG/M2 | SYSTOLIC BLOOD PRESSURE: 138 MMHG | WEIGHT: 181.13 LBS | HEART RATE: 98 BPM

## 2022-08-24 DIAGNOSIS — C77.3 BREAST CANCER METASTASIZED TO AXILLARY LYMPH NODE, LEFT: ICD-10-CM

## 2022-08-24 DIAGNOSIS — M79.601 PAIN IN BOTH UPPER EXTREMITIES: ICD-10-CM

## 2022-08-24 DIAGNOSIS — E78.01 FAMILIAL HYPERCHOLESTEROLEMIA: ICD-10-CM

## 2022-08-24 DIAGNOSIS — I10 ESSENTIAL HYPERTENSION: ICD-10-CM

## 2022-08-24 DIAGNOSIS — C50.912 BREAST CANCER METASTASIZED TO AXILLARY LYMPH NODE, LEFT: ICD-10-CM

## 2022-08-24 DIAGNOSIS — E11.21 CONTROLLED TYPE 2 DIABETES MELLITUS WITH DIABETIC NEPHROPATHY, WITHOUT LONG-TERM CURRENT USE OF INSULIN: Primary | ICD-10-CM

## 2022-08-24 DIAGNOSIS — I10 HYPERTENSION, UNSPECIFIED TYPE: ICD-10-CM

## 2022-08-24 DIAGNOSIS — M79.602 PAIN IN BOTH UPPER EXTREMITIES: ICD-10-CM

## 2022-08-24 PROCEDURE — 1160F RVW MEDS BY RX/DR IN RCRD: CPT | Mod: CPTII,S$GLB,, | Performed by: FAMILY MEDICINE

## 2022-08-24 PROCEDURE — 1101F PR PT FALLS ASSESS DOC 0-1 FALLS W/OUT INJ PAST YR: ICD-10-PCS | Mod: CPTII,S$GLB,, | Performed by: FAMILY MEDICINE

## 2022-08-24 PROCEDURE — 3008F BODY MASS INDEX DOCD: CPT | Mod: CPTII,S$GLB,, | Performed by: FAMILY MEDICINE

## 2022-08-24 PROCEDURE — 3080F DIAST BP >= 90 MM HG: CPT | Mod: CPTII,S$GLB,, | Performed by: FAMILY MEDICINE

## 2022-08-24 PROCEDURE — 3008F PR BODY MASS INDEX (BMI) DOCUMENTED: ICD-10-PCS | Mod: CPTII,S$GLB,, | Performed by: FAMILY MEDICINE

## 2022-08-24 PROCEDURE — 3075F PR MOST RECENT SYSTOLIC BLOOD PRESS GE 130-139MM HG: ICD-10-PCS | Mod: CPTII,S$GLB,, | Performed by: FAMILY MEDICINE

## 2022-08-24 PROCEDURE — 99214 PR OFFICE/OUTPT VISIT, EST, LEVL IV, 30-39 MIN: ICD-10-PCS | Mod: S$GLB,,, | Performed by: FAMILY MEDICINE

## 2022-08-24 PROCEDURE — 3052F HG A1C>EQUAL 8.0%<EQUAL 9.0%: CPT | Mod: CPTII,S$GLB,, | Performed by: FAMILY MEDICINE

## 2022-08-24 PROCEDURE — 3061F NEG MICROALBUMINURIA REV: CPT | Mod: CPTII,S$GLB,, | Performed by: FAMILY MEDICINE

## 2022-08-24 PROCEDURE — 3052F PR MOST RECENT HEMOGLOBIN A1C LEVEL 8.0 - < 9.0%: ICD-10-PCS | Mod: CPTII,S$GLB,, | Performed by: FAMILY MEDICINE

## 2022-08-24 PROCEDURE — 3072F PR LOW RISK FOR RETINOPATHY: ICD-10-PCS | Mod: CPTII,S$GLB,, | Performed by: FAMILY MEDICINE

## 2022-08-24 PROCEDURE — 1126F PR PAIN SEVERITY QUANTIFIED, NO PAIN PRESENT: ICD-10-PCS | Mod: CPTII,S$GLB,, | Performed by: FAMILY MEDICINE

## 2022-08-24 PROCEDURE — 99999 PR PBB SHADOW E&M-EST. PATIENT-LVL III: CPT | Mod: PBBFAC,,, | Performed by: FAMILY MEDICINE

## 2022-08-24 PROCEDURE — 4010F PR ACE/ARB THEARPY RXD/TAKEN: ICD-10-PCS | Mod: CPTII,S$GLB,, | Performed by: FAMILY MEDICINE

## 2022-08-24 PROCEDURE — 1126F AMNT PAIN NOTED NONE PRSNT: CPT | Mod: CPTII,S$GLB,, | Performed by: FAMILY MEDICINE

## 2022-08-24 PROCEDURE — 99214 OFFICE O/P EST MOD 30 MIN: CPT | Mod: S$GLB,,, | Performed by: FAMILY MEDICINE

## 2022-08-24 PROCEDURE — 3066F NEPHROPATHY DOC TX: CPT | Mod: CPTII,S$GLB,, | Performed by: FAMILY MEDICINE

## 2022-08-24 PROCEDURE — 3066F PR DOCUMENTATION OF TREATMENT FOR NEPHROPATHY: ICD-10-PCS | Mod: CPTII,S$GLB,, | Performed by: FAMILY MEDICINE

## 2022-08-24 PROCEDURE — 1159F PR MEDICATION LIST DOCUMENTED IN MEDICAL RECORD: ICD-10-PCS | Mod: CPTII,S$GLB,, | Performed by: FAMILY MEDICINE

## 2022-08-24 PROCEDURE — 3061F PR NEG MICROALBUMINURIA RESULT DOCUMENTED/REVIEW: ICD-10-PCS | Mod: CPTII,S$GLB,, | Performed by: FAMILY MEDICINE

## 2022-08-24 PROCEDURE — 1159F MED LIST DOCD IN RCRD: CPT | Mod: CPTII,S$GLB,, | Performed by: FAMILY MEDICINE

## 2022-08-24 PROCEDURE — 3080F PR MOST RECENT DIASTOLIC BLOOD PRESSURE >= 90 MM HG: ICD-10-PCS | Mod: CPTII,S$GLB,, | Performed by: FAMILY MEDICINE

## 2022-08-24 PROCEDURE — 99999 PR PBB SHADOW E&M-EST. PATIENT-LVL III: ICD-10-PCS | Mod: PBBFAC,,, | Performed by: FAMILY MEDICINE

## 2022-08-24 PROCEDURE — 1160F PR REVIEW ALL MEDS BY PRESCRIBER/CLIN PHARMACIST DOCUMENTED: ICD-10-PCS | Mod: CPTII,S$GLB,, | Performed by: FAMILY MEDICINE

## 2022-08-24 PROCEDURE — 3288F FALL RISK ASSESSMENT DOCD: CPT | Mod: CPTII,S$GLB,, | Performed by: FAMILY MEDICINE

## 2022-08-24 PROCEDURE — 3075F SYST BP GE 130 - 139MM HG: CPT | Mod: CPTII,S$GLB,, | Performed by: FAMILY MEDICINE

## 2022-08-24 PROCEDURE — 1101F PT FALLS ASSESS-DOCD LE1/YR: CPT | Mod: CPTII,S$GLB,, | Performed by: FAMILY MEDICINE

## 2022-08-24 PROCEDURE — 3288F PR FALLS RISK ASSESSMENT DOCUMENTED: ICD-10-PCS | Mod: CPTII,S$GLB,, | Performed by: FAMILY MEDICINE

## 2022-08-24 PROCEDURE — 4010F ACE/ARB THERAPY RXD/TAKEN: CPT | Mod: CPTII,S$GLB,, | Performed by: FAMILY MEDICINE

## 2022-08-24 PROCEDURE — 3072F LOW RISK FOR RETINOPATHY: CPT | Mod: CPTII,S$GLB,, | Performed by: FAMILY MEDICINE

## 2022-08-24 RX ORDER — CHLORTHALIDONE 25 MG/1
TABLET ORAL
Qty: 90 TABLET | Refills: 3 | Status: SHIPPED | OUTPATIENT
Start: 2022-08-24 | End: 2022-08-24

## 2022-08-24 RX ORDER — CHLORTHALIDONE 50 MG/1
50 TABLET ORAL DAILY
Qty: 90 TABLET | Refills: 1 | Status: SHIPPED | OUTPATIENT
Start: 2022-08-24 | End: 2023-05-22

## 2022-08-24 RX ORDER — ATORVASTATIN CALCIUM 10 MG/1
10 TABLET, FILM COATED ORAL DAILY
Qty: 90 TABLET | Refills: 3 | Status: SHIPPED | OUTPATIENT
Start: 2022-08-24 | End: 2023-08-24

## 2022-08-24 RX ORDER — IRBESARTAN 300 MG/1
TABLET ORAL
Qty: 90 TABLET | Refills: 3 | Status: SHIPPED | OUTPATIENT
Start: 2022-08-24 | End: 2023-11-18 | Stop reason: SDUPTHER

## 2022-08-24 NOTE — PROGRESS NOTES
Subjective:       Patient ID: Maria Elena Tavares is a 66 y.o. female.    Chief Complaint: Follow-up and lab results    Pt presents today to review labs. Aware that her chol is up and she has not been taking her statin- forgets at night time    States that neuropathic pain is much better  Pt feels good    Review of Systems   Constitutional: Negative.    Eyes: Negative.    Respiratory: Negative for cough, chest tightness and shortness of breath.    Cardiovascular: Negative for chest pain, palpitations and leg swelling.   Gastrointestinal: Negative.    Musculoskeletal: Negative.  Negative for joint swelling.   Integumentary:  Negative.   Neurological: Negative for dizziness, weakness, light-headedness and headaches.   All other systems reviewed and are negative.        Objective:      Physical Exam  Vitals reviewed.   Constitutional:       General: She is not in acute distress.     Appearance: Normal appearance. She is well-developed. She is not ill-appearing, toxic-appearing or diaphoretic.   HENT:      Head: Normocephalic and atraumatic.   Eyes:      Extraocular Movements: Extraocular movements intact.      Conjunctiva/sclera: Conjunctivae normal.      Pupils: Pupils are equal, round, and reactive to light.   Neck:      Thyroid: No thyromegaly.   Cardiovascular:      Rate and Rhythm: Normal rate and regular rhythm.      Pulses:           Dorsalis pedis pulses are 3+ on the right side and 3+ on the left side.        Posterior tibial pulses are 3+ on the right side and 3+ on the left side.      Heart sounds: Normal heart sounds. No murmur heard.  Pulmonary:      Effort: Pulmonary effort is normal. No respiratory distress.      Breath sounds: Normal breath sounds. No wheezing or rales.   Chest:      Chest wall: No tenderness.   Musculoskeletal:         General: Normal range of motion.      Cervical back: Normal range of motion and neck supple.      Right lower leg: No edema.      Left lower leg: No edema.      Right foot:  Normal range of motion. No deformity, bunion or Charcot foot.      Left foot: Normal range of motion. No deformity, bunion or Charcot foot.   Feet:      Right foot:      Protective Sensation: 6 sites tested. 6 sites sensed.      Skin integrity: Skin integrity normal.      Left foot:      Protective Sensation: 6 sites tested. 6 sites sensed.      Skin integrity: Skin integrity normal.   Lymphadenopathy:      Cervical: No cervical adenopathy.   Skin:     General: Skin is warm.   Neurological:      Mental Status: She is alert and oriented to person, place, and time.   Psychiatric:         Mood and Affect: Mood normal.         Behavior: Behavior normal.         Thought Content: Thought content normal.         Judgment: Judgment normal.         Assessment:       Problem List Items Addressed This Visit    None     Visit Diagnoses     Hypertension, unspecified type        Relevant Medications    irbesartan (AVAPRO) 300 MG tablet    chlorthalidone (HYGROTEN) 50 MG Tab    Familial hypercholesterolemia        Relevant Medications    atorvastatin (LIPITOR) 10 MG tablet          Plan:       Pt advised to resume statin. Pt amenable  HTN elevated and will increase her chlorthalidone today. SEs of med d/w pt  Repeat BP 2 weeks nurse and 3 mos with me

## 2022-08-25 ENCOUNTER — OFFICE VISIT (OUTPATIENT)
Dept: OPTOMETRY | Facility: CLINIC | Age: 67
End: 2022-08-25
Attending: SURGERY
Payer: COMMERCIAL

## 2022-08-25 DIAGNOSIS — I10 ESSENTIAL HYPERTENSION: ICD-10-CM

## 2022-08-25 DIAGNOSIS — H04.123 DRY EYE SYNDROME, BILATERAL: Primary | ICD-10-CM

## 2022-08-25 DIAGNOSIS — H52.4 PRESBYOPIA: ICD-10-CM

## 2022-08-25 DIAGNOSIS — H25.13 NS (NUCLEAR SCLEROSIS), BILATERAL: ICD-10-CM

## 2022-08-25 DIAGNOSIS — E11.9 TYPE 2 DIABETES MELLITUS WITHOUT OPHTHALMIC MANIFESTATIONS: ICD-10-CM

## 2022-08-25 PROCEDURE — 99499 RISK ADDL DX/OHS AUDIT: ICD-10-PCS | Mod: S$GLB,,, | Performed by: OPTOMETRIST

## 2022-08-25 PROCEDURE — 3052F HG A1C>EQUAL 8.0%<EQUAL 9.0%: CPT | Mod: CPTII,S$GLB,, | Performed by: OPTOMETRIST

## 2022-08-25 PROCEDURE — 3061F NEG MICROALBUMINURIA REV: CPT | Mod: CPTII,S$GLB,, | Performed by: OPTOMETRIST

## 2022-08-25 PROCEDURE — 99499 UNLISTED E&M SERVICE: CPT | Mod: S$GLB,,, | Performed by: OPTOMETRIST

## 2022-08-25 PROCEDURE — 4010F PR ACE/ARB THEARPY RXD/TAKEN: ICD-10-PCS | Mod: CPTII,S$GLB,, | Performed by: OPTOMETRIST

## 2022-08-25 PROCEDURE — 3066F NEPHROPATHY DOC TX: CPT | Mod: CPTII,S$GLB,, | Performed by: OPTOMETRIST

## 2022-08-25 PROCEDURE — 92014 COMPRE OPH EXAM EST PT 1/>: CPT | Mod: S$GLB,,, | Performed by: OPTOMETRIST

## 2022-08-25 PROCEDURE — 2023F DILAT RTA XM W/O RTNOPTHY: CPT | Mod: CPTII,S$GLB,, | Performed by: OPTOMETRIST

## 2022-08-25 PROCEDURE — 2023F PR DILATED RETINAL EXAM W/O EVID OF RETINOPATHY: ICD-10-PCS | Mod: CPTII,S$GLB,, | Performed by: OPTOMETRIST

## 2022-08-25 PROCEDURE — 3061F PR NEG MICROALBUMINURIA RESULT DOCUMENTED/REVIEW: ICD-10-PCS | Mod: CPTII,S$GLB,, | Performed by: OPTOMETRIST

## 2022-08-25 PROCEDURE — 3066F PR DOCUMENTATION OF TREATMENT FOR NEPHROPATHY: ICD-10-PCS | Mod: CPTII,S$GLB,, | Performed by: OPTOMETRIST

## 2022-08-25 PROCEDURE — 92015 PR REFRACTION: ICD-10-PCS | Mod: S$GLB,,, | Performed by: OPTOMETRIST

## 2022-08-25 PROCEDURE — 4010F ACE/ARB THERAPY RXD/TAKEN: CPT | Mod: CPTII,S$GLB,, | Performed by: OPTOMETRIST

## 2022-08-25 PROCEDURE — 92014 PR EYE EXAM, EST PATIENT,COMPREHESV: ICD-10-PCS | Mod: S$GLB,,, | Performed by: OPTOMETRIST

## 2022-08-25 PROCEDURE — 92015 DETERMINE REFRACTIVE STATE: CPT | Mod: S$GLB,,, | Performed by: OPTOMETRIST

## 2022-08-25 PROCEDURE — 3052F PR MOST RECENT HEMOGLOBIN A1C LEVEL 8.0 - < 9.0%: ICD-10-PCS | Mod: CPTII,S$GLB,, | Performed by: OPTOMETRIST

## 2022-08-25 RX ORDER — LIFITEGRAST 50 MG/ML
1 SOLUTION/ DROPS OPHTHALMIC 2 TIMES DAILY
Qty: 180 EACH | Refills: 4 | Status: SHIPPED | OUTPATIENT
Start: 2022-08-25 | End: 2023-12-18 | Stop reason: SDUPTHER

## 2022-08-25 NOTE — PROGRESS NOTES
HPI     Annual eye exam  Using xiidra prn only   Blurry sometimes at computer  Using svl computer glasses for reading and computer  Feels like VA gets blurry when wearing them too long   Would like updated script ; no BF    Last edited by Sugey Tavarez on 8/25/2022 10:33 AM. (History)            Assessment /Plan     For exam results, see Encounter Report.         Presbyopia              Rx specs     Type 2 diabetes mellitus without ophthalmic manifestations  Essential hypertension              No retinopathy, monitor yearly     NS (nuclear sclerosis), bilateral    Mild, monitor    Dry eye syndrome, bilateral  Continue with lifitegrast (XIIDRA) 5 % Dpet; Apply 1 drop to  both eyes  2 (two) times daily.  Dispense: 60 each; Refill: 12              Continue with art tears PRN     RTC 1 year, sooner PRN

## 2022-09-06 ENCOUNTER — CLINICAL SUPPORT (OUTPATIENT)
Dept: PRIMARY CARE CLINIC | Facility: CLINIC | Age: 67
End: 2022-09-06
Payer: COMMERCIAL

## 2022-09-06 ENCOUNTER — TELEPHONE (OUTPATIENT)
Dept: PRIMARY CARE CLINIC | Facility: CLINIC | Age: 67
End: 2022-09-06

## 2022-09-06 VITALS — OXYGEN SATURATION: 99 % | SYSTOLIC BLOOD PRESSURE: 112 MMHG | DIASTOLIC BLOOD PRESSURE: 82 MMHG | HEART RATE: 105 BPM

## 2022-09-06 PROCEDURE — 99999 PR PBB SHADOW E&M-EST. PATIENT-LVL I: ICD-10-PCS | Mod: PBBFAC,,,

## 2022-09-06 PROCEDURE — 99999 PR PBB SHADOW E&M-EST. PATIENT-LVL I: CPT | Mod: PBBFAC,,,

## 2022-09-06 NOTE — TELEPHONE ENCOUNTER
Maria Elena Tavares 66 y.o. female is here for Blood Pressure check. in person    Manual Blood pressure reading was  112/82, Pulse 105. (Checked at the end of the visit)    If high, was it repeated after 15 minutes? no    Diagnosed with Hypertension yes.    Patient took blood pressure medication today yes.  Last dose of blood pressure medication was taken at 10 AM. Patient took irbesartan 300 mg and chlorthalidone 50 mg.     All Medications and OTC medication updated yes    Does patient have record of home blood pressure readings / Blood Pressure Log on digital hypertension.     Does the pt have any complaints today in regards to their blood pressure medication? no. Patient is asymptomatic.     Were you sitting still for 5-10 minutes prior to taking your Blood pressure? yes     Has your blood pressure monitor ever been checked? yes    Updated vitals yes    Pt does not have a follow up date at this time.    Dr. San notified.

## 2022-09-06 NOTE — PROGRESS NOTES
Maria Elena Tavares 66 y.o. female is here for Blood Pressure check. in person    Manual Blood pressure reading was  112/82, Pulse 105. (Checked at the end of the visit)    If high, was it repeated after 15 minutes? no    Diagnosed with Hypertension yes.    Patient took blood pressure medication today yes.  Last dose of blood pressure medication was taken at 10 AM. Patient took irbesartan 300 mg and chlorthalidone 50 mg.     All Medications and OTC medication updated yes    Does patient have record of home blood pressure readings / Blood Pressure Log on digital hypertension.     Does the pt have any complaints today in regards to their blood pressure medication? no. Patient is asymptomatic.     Were you sitting still for 5-10 minutes prior to taking your Blood pressure? yes     Has your blood pressure monitor ever been checked? yes    Updated vitals yes    Pt does not have a follow up date at this time.    Dr. San notified.     Creatinine   Date Value Ref Range Status   08/04/2022 0.9 0.5 - 1.4 mg/dL Final     Sodium   Date Value Ref Range Status   08/04/2022 136 136 - 145 mmol/L Final     Potassium   Date Value Ref Range Status   08/04/2022 3.7 3.5 - 5.1 mmol/L Final         
DISPLAY PLAN FREE TEXT
DISPLAY PLAN FREE TEXT

## 2022-09-14 NOTE — PROGRESS NOTES
Subjective:       Patient ID: Maria Elena Tavares is a 66 y.o. female.    Chief Complaint: backpain    The patient location is: home   The chief complaint leading to consultation is: back pain    Visit type: audiovisual    Face to Face time with patient: 20 minutes 30 minutes for encounter, which includes face to face time and non-face to face time preparing to see the patient (eg, review of tests), Obtaining and/or reviewing separately obtained history, Documenting clinical information in the electronic or other health record, Independently interpreting results (not separately reported) and communicating results to the patient/family/caregiver, or Care coordination (not separately reported).     Each patient to whom he or she provides medical services by telemedicine is:  (1) informed of the relationship between the physician and patient and the respective role of any other health care provider with respect to management of the patient; and (2) notified that he or she may decline to receive medical services by telemedicine and may withdraw from such care at any time.    Notes:     Pt states that her lbp not new is still giving her issues and is daily occurrence. Has not taken anything for this pain  Denies any saddle paresthesias    Back Pain  This is a new problem. The current episode started in the past 7 days. The problem occurs daily. The problem is unchanged. The pain is present in the lumbar spine and costovertebral angle. The quality of the pain is described as aching. The pain does not radiate. The pain is mild. The pain is The same all the time. The symptoms are aggravated by position. Stiffness is present All day. Associated symptoms include paresthesias and tingling. Pertinent negatives include no abdominal pain, bladder incontinence, bowel incontinence, chest pain, dysuria, fever, headaches, leg pain, numbness, paresis, pelvic pain, perianal numbness, weakness or weight loss.   Review of Systems    Constitutional:  Negative for activity change, appetite change, chills, fatigue, fever and weight loss.   HENT:  Negative for congestion, ear pain, sinus pressure, sore throat and trouble swallowing.    Eyes:  Negative for photophobia, pain and visual disturbance.   Respiratory:  Negative for apnea, cough, chest tightness, shortness of breath and wheezing.    Cardiovascular:  Negative for chest pain, palpitations and leg swelling.   Gastrointestinal:  Negative for abdominal distention, abdominal pain, bowel incontinence, constipation, diarrhea, nausea and vomiting.   Genitourinary: Negative.  Negative for bladder incontinence, dysuria, hematuria and pelvic pain.   Musculoskeletal:  Positive for arthralgias, back pain and joint swelling. Negative for neck pain.   Skin: Negative.    Neurological:  Positive for tingling and paresthesias. Negative for dizziness, tremors, weakness, numbness and headaches.   Psychiatric/Behavioral:  Negative for behavioral problems, decreased concentration and sleep disturbance. The patient is not nervous/anxious.    All other systems reviewed and are negative.      Objective:      Physical Exam  Vitals reviewed.   Constitutional:       General: She is not in acute distress.     Appearance: Normal appearance. She is well-developed. She is not ill-appearing, toxic-appearing or diaphoretic.   HENT:      Head: Normocephalic and atraumatic.   Eyes:      Extraocular Movements: Extraocular movements intact.   Pulmonary:      Effort: Pulmonary effort is normal.   Musculoskeletal:         General: Normal range of motion.      Cervical back: Neck supple.      Right lower leg: No edema.      Left lower leg: No edema.   Neurological:      Mental Status: She is alert and oriented to person, place, and time.   Psychiatric:         Mood and Affect: Mood normal.         Behavior: Behavior normal.         Thought Content: Thought content normal.         Judgment: Judgment normal.       Assessment:          LBP not new  Plan:   Suggest that pt take tylenol arthritis, use heat, ice and stretch on the hour  Sleep on firm mattress  Come in if symptoms worsen        Answers submitted by the patient for this visit:  Back Pain Questionnaire (Submitted on 7/20/2022)  Chief Complaint: Back pain  genital pain: No

## 2022-09-27 ENCOUNTER — TELEPHONE (OUTPATIENT)
Dept: PODIATRY | Facility: CLINIC | Age: 67
End: 2022-09-27
Payer: COMMERCIAL

## 2022-10-10 ENCOUNTER — OFFICE VISIT (OUTPATIENT)
Dept: PODIATRY | Facility: CLINIC | Age: 67
End: 2022-10-10
Payer: COMMERCIAL

## 2022-10-10 VITALS
SYSTOLIC BLOOD PRESSURE: 165 MMHG | WEIGHT: 181 LBS | DIASTOLIC BLOOD PRESSURE: 79 MMHG | BODY MASS INDEX: 35.53 KG/M2 | HEIGHT: 60 IN | HEART RATE: 89 BPM

## 2022-10-10 DIAGNOSIS — E11.21 CONTROLLED TYPE 2 DIABETES MELLITUS WITH DIABETIC NEPHROPATHY, WITHOUT LONG-TERM CURRENT USE OF INSULIN: ICD-10-CM

## 2022-10-10 DIAGNOSIS — T45.1X5A NEUROPATHY DUE TO CHEMOTHERAPEUTIC DRUG: ICD-10-CM

## 2022-10-10 DIAGNOSIS — G62.0 NEUROPATHY DUE TO CHEMOTHERAPEUTIC DRUG: ICD-10-CM

## 2022-10-10 DIAGNOSIS — B35.1 ONYCHOMYCOSIS DUE TO DERMATOPHYTE: Primary | ICD-10-CM

## 2022-10-10 PROCEDURE — 3288F FALL RISK ASSESSMENT DOCD: CPT | Mod: CPTII,S$GLB,, | Performed by: PODIATRIST

## 2022-10-10 PROCEDURE — 3078F DIAST BP <80 MM HG: CPT | Mod: CPTII,S$GLB,, | Performed by: PODIATRIST

## 2022-10-10 PROCEDURE — 3061F PR NEG MICROALBUMINURIA RESULT DOCUMENTED/REVIEW: ICD-10-PCS | Mod: CPTII,S$GLB,, | Performed by: PODIATRIST

## 2022-10-10 PROCEDURE — 3008F PR BODY MASS INDEX (BMI) DOCUMENTED: ICD-10-PCS | Mod: CPTII,S$GLB,, | Performed by: PODIATRIST

## 2022-10-10 PROCEDURE — 1160F RVW MEDS BY RX/DR IN RCRD: CPT | Mod: CPTII,S$GLB,, | Performed by: PODIATRIST

## 2022-10-10 PROCEDURE — 3077F SYST BP >= 140 MM HG: CPT | Mod: CPTII,S$GLB,, | Performed by: PODIATRIST

## 2022-10-10 PROCEDURE — 3008F BODY MASS INDEX DOCD: CPT | Mod: CPTII,S$GLB,, | Performed by: PODIATRIST

## 2022-10-10 PROCEDURE — 3066F NEPHROPATHY DOC TX: CPT | Mod: CPTII,S$GLB,, | Performed by: PODIATRIST

## 2022-10-10 PROCEDURE — 99999 PR PBB SHADOW E&M-EST. PATIENT-LVL III: CPT | Mod: PBBFAC,,, | Performed by: PODIATRIST

## 2022-10-10 PROCEDURE — 4010F ACE/ARB THERAPY RXD/TAKEN: CPT | Mod: CPTII,S$GLB,, | Performed by: PODIATRIST

## 2022-10-10 PROCEDURE — 1160F PR REVIEW ALL MEDS BY PRESCRIBER/CLIN PHARMACIST DOCUMENTED: ICD-10-PCS | Mod: CPTII,S$GLB,, | Performed by: PODIATRIST

## 2022-10-10 PROCEDURE — 3072F PR LOW RISK FOR RETINOPATHY: ICD-10-PCS | Mod: CPTII,S$GLB,, | Performed by: PODIATRIST

## 2022-10-10 PROCEDURE — 3052F PR MOST RECENT HEMOGLOBIN A1C LEVEL 8.0 - < 9.0%: ICD-10-PCS | Mod: CPTII,S$GLB,, | Performed by: PODIATRIST

## 2022-10-10 PROCEDURE — 11721 DEBRIDE NAIL 6 OR MORE: CPT | Mod: S$GLB,,, | Performed by: PODIATRIST

## 2022-10-10 PROCEDURE — 1101F PR PT FALLS ASSESS DOC 0-1 FALLS W/OUT INJ PAST YR: ICD-10-PCS | Mod: CPTII,S$GLB,, | Performed by: PODIATRIST

## 2022-10-10 PROCEDURE — 99999 PR PBB SHADOW E&M-EST. PATIENT-LVL III: ICD-10-PCS | Mod: PBBFAC,,, | Performed by: PODIATRIST

## 2022-10-10 PROCEDURE — 3052F HG A1C>EQUAL 8.0%<EQUAL 9.0%: CPT | Mod: CPTII,S$GLB,, | Performed by: PODIATRIST

## 2022-10-10 PROCEDURE — 3072F LOW RISK FOR RETINOPATHY: CPT | Mod: CPTII,S$GLB,, | Performed by: PODIATRIST

## 2022-10-10 PROCEDURE — 3288F PR FALLS RISK ASSESSMENT DOCUMENTED: ICD-10-PCS | Mod: CPTII,S$GLB,, | Performed by: PODIATRIST

## 2022-10-10 PROCEDURE — 11721 ROUTINE FOOT CARE: ICD-10-PCS | Mod: S$GLB,,, | Performed by: PODIATRIST

## 2022-10-10 PROCEDURE — 99214 OFFICE O/P EST MOD 30 MIN: CPT | Mod: 25,S$GLB,, | Performed by: PODIATRIST

## 2022-10-10 PROCEDURE — 99214 PR OFFICE/OUTPT VISIT, EST, LEVL IV, 30-39 MIN: ICD-10-PCS | Mod: 25,S$GLB,, | Performed by: PODIATRIST

## 2022-10-10 PROCEDURE — 1159F PR MEDICATION LIST DOCUMENTED IN MEDICAL RECORD: ICD-10-PCS | Mod: CPTII,S$GLB,, | Performed by: PODIATRIST

## 2022-10-10 PROCEDURE — 1126F AMNT PAIN NOTED NONE PRSNT: CPT | Mod: CPTII,S$GLB,, | Performed by: PODIATRIST

## 2022-10-10 PROCEDURE — 3061F NEG MICROALBUMINURIA REV: CPT | Mod: CPTII,S$GLB,, | Performed by: PODIATRIST

## 2022-10-10 PROCEDURE — 3077F PR MOST RECENT SYSTOLIC BLOOD PRESSURE >= 140 MM HG: ICD-10-PCS | Mod: CPTII,S$GLB,, | Performed by: PODIATRIST

## 2022-10-10 PROCEDURE — 1101F PT FALLS ASSESS-DOCD LE1/YR: CPT | Mod: CPTII,S$GLB,, | Performed by: PODIATRIST

## 2022-10-10 PROCEDURE — 3078F PR MOST RECENT DIASTOLIC BLOOD PRESSURE < 80 MM HG: ICD-10-PCS | Mod: CPTII,S$GLB,, | Performed by: PODIATRIST

## 2022-10-10 PROCEDURE — 4010F PR ACE/ARB THEARPY RXD/TAKEN: ICD-10-PCS | Mod: CPTII,S$GLB,, | Performed by: PODIATRIST

## 2022-10-10 PROCEDURE — 3066F PR DOCUMENTATION OF TREATMENT FOR NEPHROPATHY: ICD-10-PCS | Mod: CPTII,S$GLB,, | Performed by: PODIATRIST

## 2022-10-10 PROCEDURE — 1126F PR PAIN SEVERITY QUANTIFIED, NO PAIN PRESENT: ICD-10-PCS | Mod: CPTII,S$GLB,, | Performed by: PODIATRIST

## 2022-10-10 PROCEDURE — 1159F MED LIST DOCD IN RCRD: CPT | Mod: CPTII,S$GLB,, | Performed by: PODIATRIST

## 2022-10-10 RX ORDER — CICLOPIROX 7.7 MG/G
GEL TOPICAL 2 TIMES DAILY
Qty: 30 G | Refills: 6 | Status: SHIPPED | OUTPATIENT
Start: 2022-10-10

## 2022-10-10 NOTE — PATIENT INSTRUCTIONS
How to Check Your Feet    Below are tips to help you look for foot problems. Try to check your feet at the same time each day, such as when you get out of bed in the morning.    Check the top of each foot. The tops of toes, back of the heel, and outer edge of the foot can get a lot of rubbing from poor-fitting shoes.    Check the bottom of each foot. Daily wear and tear often leads to problems at pressure spots.    Check the toes and nails. Fungal infections often occur between toes. Toenail problems can also be a sign of fungal infections or lead to breaks in the skin.    Check your shoes, too. Loose objects inside a shoe can injure the foot. Use your hand to feel inside your shoes for things like chase, loose stitching, or rough areas that could irritate your skin.        Diabetic Foot Care    Diabetes can lead to a number of different foot complications. Fortunately, most of these complications can be prevented with a little extra foot care. If diabetes is not well controlled, the high blood sugar can cause damage to blood vessels and result in poor circulation to the foot. When the skin does not get enough blood flow, it becomes prone to pressure sores and ulcers, which heal slowly.  High blood sugar can also damage nerves, interfering with the ability to feel pain and pressure. When you cant feel your foot normally, it is easy to injure your skin, bones and joints without knowing it. For these reasons diabetes increases the risk of fungal infections, bunions and ulcers. Deep ulcers can lead to bone infection. Gangrene is the most serious foot complication of diabetes. It usually occurs on the tips of the toes as blacked areas of skin. The black area is dead tissue. In severe cases, gangrene spreads to involve the entire toe, other toes and the entire foot. Foot or toe amputation may be required. Good foot care and blood sugar control can prevent this.    Home Care  Wear comfortable, proper fitting  shoes.  Wash your feet daily with warm water and mild soap.  After drying, apply a moisturizing cream or lotion.  Check your feet daily for skin breaks, blisters, swelling, or redness. Look between your toes also.  Wear cotton socks and change them every day.  Trim toe nails carefully and do not cut your cuticles.  Strive to keep your blood sugar under control with a combination of medicines, diet and activity.  If you smoke and have diabetes, it is very important that you stop. Smoking reduces blood flow to your foot.  Avoid activities that increase your risk of foot injury:  Do not walk barefoot.  Do not use heating pads or hot water bottles on your feet.  Do not put your foot in a hot tub without first checking the temperature with your hand.  10) Schedule yearly foot exams.    Follow Up  with your doctor or as advised by our staff. Report any cut, puncture, scrape, other injury, blister, ingrown toenail or ulcer on your foot.    Get Prompt Medical Attention  if any of the following occur:  -- Open ulcer with pus draining from the wound  -- Increasing foot or leg pain  -- New areas of redness or swelling or tender areas of the foot    © 6003-5948 [employer]. [address] [place] [address], [place] [address]. All rights reserved. This information is not intended as a substitute for professional medical care. Always follow your healthcare professional's instructions.     General nail care measures for abnormal nails include:  Keeping nails trimmed short, but avoid overzealous trimming  Avoiding trauma   Avoiding contact irritants   Keeping nails dry (avoiding wet work)  Avoiding all nail cosmetics  Wearing shoes that fit well at the toe box.  Avoid tight shoes.

## 2022-10-10 NOTE — PROGRESS NOTES
Subjective:      Patient ID: Maria Elena Tavares is a 66 y.o. female.    Chief Complaint: Foot Pain (Foot, neuropathy)    Maria Elena is a 66 y.o. female who presents to the clinic for evaluation and treatment of high risk feet.  The patient's chief concern is neuropathy and also thick toenails.Gradual onset, aggravated by increased weight bearing, shoe gear, pressure.  No previous medical treatment.  Denies trauma,surgery.        PCP: Estephania San MD    Date Last Seen by PCP:   Foot Pain (Foot, neuropathy)         Current shoe gear:  Affected Foot: Casual shoes     Unaffected Foot: Casual shoes        Hemoglobin A1C   Date Value Ref Range Status   08/04/2022 8.7 (H) 4.5 - 6.2 % Final     Comment:     According to ADA guidelines, hemoglobin A1C <7.0% represents  optimal control in non-pregnant diabetic patients.  Different  metrics may apply to specific populations.   Standards of Medical Care in Diabetes - 2016.    For the purpose of screening for the presence of diabetes:  <5.7%     Consistent with the absence of diabetes  5.7-6.4%  Consistent with increasing risk for diabetes   (prediabetes)  >or=6.5%  Consistent with diabetes    Currently no consensus exists for use of hemoglobin A1C  for diagnosis of diabetes for children.     06/20/2022 9.3 (H) 4.0 - 5.6 % Final     Comment:     ADA Screening Guidelines:  5.7-6.4%  Consistent with prediabetes  >or=6.5%  Consistent with diabetes    High levels of fetal hemoglobin interfere with the HbA1C  assay. Heterozygous hemoglobin variants (HbS, HgC, etc)do  not significantly interfere with this assay.   However, presence of multiple variants may affect accuracy.     10/26/2021 7.9 (H) 4.0 - 5.6 % Final     Comment:     ADA Screening Guidelines:  5.7-6.4%  Consistent with prediabetes  >or=6.5%  Consistent with diabetes    High levels of fetal hemoglobin interfere with the HbA1C  assay. Heterozygous hemoglobin variants (HbS, HgC, etc)do  not significantly interfere with this assay.    However, presence of multiple variants may affect accuracy.         Review of Systems   Constitutional: Negative for chills, diaphoresis, fever, malaise/fatigue and night sweats.   Cardiovascular:  Negative for claudication, cyanosis, leg swelling and syncope.   Skin:  Positive for dry skin and nail changes. Negative for color change, rash, suspicious lesions and unusual hair distribution.   Musculoskeletal:  Negative for falls, joint pain, joint swelling, muscle cramps, muscle weakness and stiffness.   Gastrointestinal:  Negative for constipation, diarrhea, nausea and vomiting.   Neurological:  Positive for paresthesias and sensory change. Negative for brief paralysis, disturbances in coordination, focal weakness, numbness and tremors.         Objective:      Physical Exam  Constitutional:       General: She is not in acute distress.     Appearance: She is well-developed. She is not diaphoretic.   Cardiovascular:      Pulses:           Popliteal pulses are 2+ on the right side and 2+ on the left side.        Dorsalis pedis pulses are 2+ on the right side and 2+ on the left side.        Posterior tibial pulses are 2+ on the right side and 2+ on the left side.      Comments: Capillary refill 3 seconds all toes/distal feet, all toes/both feet warm to touch.      Negative lymphadenopathy bilateral popliteal fossa and tarsal tunnel.      Negavie lower extremity edema bilateral.    Musculoskeletal:      Right ankle: No swelling, deformity, ecchymosis or lacerations. Normal range of motion. Normal pulse.      Right Achilles Tendon: Normal. No defects. Rod's test negative.      Comments: Normal angle, base, station of gait. All ten toes without clubbing, cyanosis, or signs of ischemia.  No pain to palpation bilateral lower extremities.  Range of motion, stability, muscle strength, and muscle tone normal bilateral feet and legs.    Lymphadenopathy:      Lower Body: No right inguinal adenopathy. No left inguinal  adenopathy.      Comments: Negative lymphadenopathy bilateral popliteal fossa and tarsal tunnel.    Negative lymphangitic streaking bilateral feet/ankles/legs.   Skin:     General: Skin is warm and dry.      Capillary Refill: Capillary refill takes 2 to 3 seconds.      Coloration: Skin is not pale.      Findings: No abrasion, bruising, burn, ecchymosis, erythema, laceration, lesion or rash.      Nails: There is no clubbing.      Comments:   Mild, dry scale with superficial flakes bottom of both arches  without ulceration, drainage, pus, tracking, fluctuance, malodor, or cardinal signs infection.    Otherwise, Skin is normal age and health appropriate color, turgor, texture, and temperature bilateral lower extremities without ulceration, hyperpigmentation, discoloration, masses nodules or cords palpated.  No ecchymosis, erythema, edema, or cardinal signs of infection bilateral lower extremities.    Toenails 1st, 2nd, 3rd, 4th, 5th  bilateral are hypertrophic thickened 2-3 mm, dystrophic, discolored tanish brown with tan, gray crumbly subungual debris.  Neatly trimmed and nontender to distal nail plate pressure, without periungual skin abnormality of each.     Neurological:      Mental Status: She is alert and oriented to person, place, and time.      Sensory: No sensory deficit.      Motor: No tremor, atrophy or abnormal muscle tone.      Gait: Gait normal.      Deep Tendon Reflexes:      Reflex Scores:       Patellar reflexes are 2+ on the right side and 2+ on the left side.       Achilles reflexes are 2+ on the right side and 2+ on the left side.     Comments: Negative tinel sign to percussion sural, superficial peroneal, deep peroneal, saphenous, and posterior tibial nerves right and left ankles and feet.    Paresthesias, and burning bilateral feet with no clearly identified trigger or source.    Protective sensation intact to 10 g monofilament both feet at all 10 sites tested             Assessment:        Encounter Diagnoses   Name Primary?    Onychomycosis due to dermatophyte Yes    Controlled type 2 diabetes mellitus with diabetic nephropathy, without long-term current use of insulin     Neuropathy due to chemotherapeutic drug          Plan:       Maria Elena was seen today for foot pain.    Diagnoses and all orders for this visit:    Onychomycosis due to dermatophyte  -     ciclopirox 0.77 % Gel; Apply topically 2 (two) times daily. To thickened discolored toenails.    Controlled type 2 diabetes mellitus with diabetic nephropathy, without long-term current use of insulin    Neuropathy due to chemotherapeutic drug    Other orders  -     Routine Foot Care    I counseled the patient on her conditions, their implications and medical management.  Shoe inspection.     Continue good nutrition and blood sugar control to help prevent podiatric complications of diabetes.   Maintain proper foot hygiene.   Continue wearing proper shoe gear, daily foot inspections, never walking without protective shoe gear, never putting sharp instruments to feet.  Consider OTC NeuropAway.  Routine Foot Care    Date/Time: 10/10/2022 10:00 AM  Performed by: Becca Rodriguez DPM  Authorized by: Becca Rodriguez DPM     Consent Done?:  Yes (Verbal)    Nail Care Type:  Debride  Location(s): All  (Left 1st Toe, Left 3rd Toe, Left 2nd Toe, Left 4th Toe, Left 5th Toe, Right 1st Toe, Right 2nd Toe, Right 3rd Toe, Right 4th Toe and Right 5th Toe)  Patient tolerance:  Patient tolerated the procedure well with no immediate complications     With patient's permission, the toenails mentioned above were reduced and debrided using a nail nipper, removing offending nail and debris. The patient will continue to monitor the areas daily, inspect the feet, wear protective shoe gear when ambulatory, and moisturizer to maintain skin integrity.

## 2022-10-17 ENCOUNTER — PATIENT MESSAGE (OUTPATIENT)
Dept: OBSTETRICS AND GYNECOLOGY | Facility: CLINIC | Age: 67
End: 2022-10-17
Payer: COMMERCIAL

## 2022-10-17 ENCOUNTER — TELEPHONE (OUTPATIENT)
Dept: HEMATOLOGY/ONCOLOGY | Facility: CLINIC | Age: 67
End: 2022-10-17
Payer: COMMERCIAL

## 2022-10-17 DIAGNOSIS — C50.919 BREAST CANCER: Primary | ICD-10-CM

## 2022-10-17 DIAGNOSIS — T45.1X5A AROMATASE INHIBITOR-ASSOCIATED ARTHRALGIA: ICD-10-CM

## 2022-10-17 DIAGNOSIS — M25.50 AROMATASE INHIBITOR-ASSOCIATED ARTHRALGIA: ICD-10-CM

## 2022-10-17 DIAGNOSIS — G62.0 CHEMOTHERAPY-INDUCED NEUROPATHY: ICD-10-CM

## 2022-10-17 DIAGNOSIS — T45.1X5A CHEMOTHERAPY-INDUCED NEUROPATHY: ICD-10-CM

## 2022-10-17 NOTE — NURSING
DIAGNOSIS:   This is a 66 y.o. 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     TREATMENT:   1. Neoadjuvant chemotherapy with ACT completed 2/11/2020, Dr. Sparkle [facility].   2. S/p bilateral mastectomy with L ALND and R SLNB on 3/24/2020. Dr. John M.D. Surgical Oncology. TE placed but eventually failed. Removed by Dr. Solorzano 7/30/2020.     3. Final Path: 1.2 cm IDC with associated 1.6 cm of DCIS. Negative margins. 1/11 nodes examined from right and left. 1 L node with 5 mm of metastic disease.   4. PMRT completed with Dr. Ray M.D. Radiation Oncology   5.  On Femara with Dr. Sparkle M.D. Medical Oncology      Patient notes right breast tenderness similar to the discomfort she experienced during her follow-up with Dr. Isa Chi last February.   Patient notes right breast discomfort started 2 weeks ago. She has lifted her water jugs recently, denies other weight-lifting. Patient mentions helping her daughter with grandchildren in [place], [place]. Otherwise, no new activity. Patient would like to see Breast Surgery to ensure residual post-operative changes.     Patient mentions persistent neuropathy.   Acupuncture was minimally effective for her.   Patient participated in 6 sessions, 4/19/21-6/15/21. Patient mentions a supplement Neurop Away recommended by her Podiatrist. She would like to know if various formulas interact with the effectiveness of her Letrozole therapy.     Patient admits she has lost weight using Metformin and Semaglutide. She has heard of Ozempic via commericials- questions if this is more effective than semaglutide for weight loss and A1c.     Hot flashes and night sweats have become problematic using Letrozole. Patient rates these as 8-9 out out 10. Disrupts her sleep pattern in addition to sweats during the day.     Virtual visit coordinated to address VMS, neuropathy, wt loss questions with MICK Dumont for Monday 10/24/22  at 1300. Virtual access confirmed.     OT Therapeutic Yoga placed per protocol to assist with patient's mobility, strength, balance and neuropathic discomfort.     RN Navigator will message Breast Surgery to coordinate follow-up. Patient verbalizes understanding re: plan of care.     Annual GYN setup with Dr. Myers per pt preference, [facility] only. SERJIO James.

## 2022-10-20 ENCOUNTER — TELEPHONE (OUTPATIENT)
Dept: PRIMARY CARE CLINIC | Facility: CLINIC | Age: 67
End: 2022-10-20

## 2022-10-20 NOTE — TELEPHONE ENCOUNTER
----- Message from Shalonda Matson sent at 10/20/2022 11:58 AM CDT -----  Regarding: call back  Name of Who is Calling: MILDRED Enciso           What is the request in detail: MILDRED is calling in reference to medication called Statin , patient is a diabetic and could not take           Can the clinic reply by [facility]: no           What Number to Call Back if not in [facility]: [phone]

## 2022-10-21 ENCOUNTER — OFFICE VISIT (OUTPATIENT)
Dept: SURGERY | Facility: CLINIC | Age: 67
End: 2022-10-21
Payer: COMMERCIAL

## 2022-10-21 VITALS
HEIGHT: 60 IN | SYSTOLIC BLOOD PRESSURE: 132 MMHG | DIASTOLIC BLOOD PRESSURE: 90 MMHG | HEART RATE: 103 BPM | WEIGHT: 185 LBS | BODY MASS INDEX: 36.32 KG/M2

## 2022-10-21 DIAGNOSIS — Z12.39 SCREENING BREAST EXAMINATION: ICD-10-CM

## 2022-10-21 DIAGNOSIS — Z90.13 S/P BILATERAL MASTECTOMY: ICD-10-CM

## 2022-10-21 DIAGNOSIS — N64.4 BREAST PAIN, RIGHT: Primary | ICD-10-CM

## 2022-10-21 DIAGNOSIS — Z85.3 PERSONAL HISTORY OF BREAST CANCER: ICD-10-CM

## 2022-10-21 PROCEDURE — 99999 PR PBB SHADOW E&M-EST. PATIENT-LVL III: ICD-10-PCS | Mod: PBBFAC,,, | Performed by: PHYSICIAN ASSISTANT

## 2022-10-21 PROCEDURE — 99214 PR OFFICE/OUTPT VISIT, EST, LEVL IV, 30-39 MIN: ICD-10-PCS | Mod: S$GLB,,, | Performed by: PHYSICIAN ASSISTANT

## 2022-10-21 PROCEDURE — 1101F PR PT FALLS ASSESS DOC 0-1 FALLS W/OUT INJ PAST YR: ICD-10-PCS | Mod: CPTII,S$GLB,, | Performed by: PHYSICIAN ASSISTANT

## 2022-10-21 PROCEDURE — 4010F PR ACE/ARB THEARPY RXD/TAKEN: ICD-10-PCS | Mod: CPTII,S$GLB,, | Performed by: PHYSICIAN ASSISTANT

## 2022-10-21 PROCEDURE — 3080F PR MOST RECENT DIASTOLIC BLOOD PRESSURE >= 90 MM HG: ICD-10-PCS | Mod: CPTII,S$GLB,, | Performed by: PHYSICIAN ASSISTANT

## 2022-10-21 PROCEDURE — 1125F AMNT PAIN NOTED PAIN PRSNT: CPT | Mod: CPTII,S$GLB,, | Performed by: PHYSICIAN ASSISTANT

## 2022-10-21 PROCEDURE — 1159F MED LIST DOCD IN RCRD: CPT | Mod: CPTII,S$GLB,, | Performed by: PHYSICIAN ASSISTANT

## 2022-10-21 PROCEDURE — 3288F FALL RISK ASSESSMENT DOCD: CPT | Mod: CPTII,S$GLB,, | Performed by: PHYSICIAN ASSISTANT

## 2022-10-21 PROCEDURE — 3075F PR MOST RECENT SYSTOLIC BLOOD PRESS GE 130-139MM HG: ICD-10-PCS | Mod: CPTII,S$GLB,, | Performed by: PHYSICIAN ASSISTANT

## 2022-10-21 PROCEDURE — 4010F ACE/ARB THERAPY RXD/TAKEN: CPT | Mod: CPTII,S$GLB,, | Performed by: PHYSICIAN ASSISTANT

## 2022-10-21 PROCEDURE — 3066F PR DOCUMENTATION OF TREATMENT FOR NEPHROPATHY: ICD-10-PCS | Mod: CPTII,S$GLB,, | Performed by: PHYSICIAN ASSISTANT

## 2022-10-21 PROCEDURE — 3072F PR LOW RISK FOR RETINOPATHY: ICD-10-PCS | Mod: CPTII,S$GLB,, | Performed by: PHYSICIAN ASSISTANT

## 2022-10-21 PROCEDURE — 1125F PR PAIN SEVERITY QUANTIFIED, PAIN PRESENT: ICD-10-PCS | Mod: CPTII,S$GLB,, | Performed by: PHYSICIAN ASSISTANT

## 2022-10-21 PROCEDURE — 3066F NEPHROPATHY DOC TX: CPT | Mod: CPTII,S$GLB,, | Performed by: PHYSICIAN ASSISTANT

## 2022-10-21 PROCEDURE — 3075F SYST BP GE 130 - 139MM HG: CPT | Mod: CPTII,S$GLB,, | Performed by: PHYSICIAN ASSISTANT

## 2022-10-21 PROCEDURE — 1160F RVW MEDS BY RX/DR IN RCRD: CPT | Mod: CPTII,S$GLB,, | Performed by: PHYSICIAN ASSISTANT

## 2022-10-21 PROCEDURE — 3052F HG A1C>EQUAL 8.0%<EQUAL 9.0%: CPT | Mod: CPTII,S$GLB,, | Performed by: PHYSICIAN ASSISTANT

## 2022-10-21 PROCEDURE — 3061F NEG MICROALBUMINURIA REV: CPT | Mod: CPTII,S$GLB,, | Performed by: PHYSICIAN ASSISTANT

## 2022-10-21 PROCEDURE — 1160F PR REVIEW ALL MEDS BY PRESCRIBER/CLIN PHARMACIST DOCUMENTED: ICD-10-PCS | Mod: CPTII,S$GLB,, | Performed by: PHYSICIAN ASSISTANT

## 2022-10-21 PROCEDURE — 3072F LOW RISK FOR RETINOPATHY: CPT | Mod: CPTII,S$GLB,, | Performed by: PHYSICIAN ASSISTANT

## 2022-10-21 PROCEDURE — 1159F PR MEDICATION LIST DOCUMENTED IN MEDICAL RECORD: ICD-10-PCS | Mod: CPTII,S$GLB,, | Performed by: PHYSICIAN ASSISTANT

## 2022-10-21 PROCEDURE — 3052F PR MOST RECENT HEMOGLOBIN A1C LEVEL 8.0 - < 9.0%: ICD-10-PCS | Mod: CPTII,S$GLB,, | Performed by: PHYSICIAN ASSISTANT

## 2022-10-21 PROCEDURE — 3080F DIAST BP >= 90 MM HG: CPT | Mod: CPTII,S$GLB,, | Performed by: PHYSICIAN ASSISTANT

## 2022-10-21 PROCEDURE — 99999 PR PBB SHADOW E&M-EST. PATIENT-LVL III: CPT | Mod: PBBFAC,,, | Performed by: PHYSICIAN ASSISTANT

## 2022-10-21 PROCEDURE — 3288F PR FALLS RISK ASSESSMENT DOCUMENTED: ICD-10-PCS | Mod: CPTII,S$GLB,, | Performed by: PHYSICIAN ASSISTANT

## 2022-10-21 PROCEDURE — 3061F PR NEG MICROALBUMINURIA RESULT DOCUMENTED/REVIEW: ICD-10-PCS | Mod: CPTII,S$GLB,, | Performed by: PHYSICIAN ASSISTANT

## 2022-10-21 PROCEDURE — 99214 OFFICE O/P EST MOD 30 MIN: CPT | Mod: S$GLB,,, | Performed by: PHYSICIAN ASSISTANT

## 2022-10-21 PROCEDURE — 1101F PT FALLS ASSESS-DOCD LE1/YR: CPT | Mod: CPTII,S$GLB,, | Performed by: PHYSICIAN ASSISTANT

## 2022-10-21 NOTE — PROGRESS NOTES
[facility]  Department of Surgery  10/21/2022     REFERRING PROVIDER: No referring provider defined for this encounter. Estephania San MD  CHIEF COMPLAINT:   Chief Complaint   Patient presents with    Breast Pain     Right Breast Pain .       DIAGNOSIS:   This is a 66 y.o. female with a history of stage T1cN1a, grade 3, ER +, [place] +, HER2 - IDC of the left breast.    TREATMENT:   1. Neoadjuvant chemotherapy with ACT completed 2/11/2020, Dr. Sparkle [facility].   2. S/p bilateral mastectomy with L ALND and R SLNB on 3/24/2020. Dr. John M.D. Surgical Oncology. TE placed but eventually failed. Removed by Dr. Solorzano 7/30/2020.     3. Final Path: 1.2 cm IDC with associated 1.6 cm of DCIS. Negative margins. 1/11 nodes examined from right and left. 1 L node with 5 mm of metastic disease.   4. PMRT completed with Dr. Ray M.D. Radiation Oncology   5.  On Femara with Dr. Sparkle M[facility]. Medical Oncology     HISTORY OF PRESENT ILLNESS:   Maria Elena Tavares is a 66 y.o. female comes for evaluation of new pain of her right chest wall. Patient states the pain is intermittent, but comes on several times a day. Onset about a month ago. Mild tenderness was noted at her last exam but patient is concerned that it is more persistnet now. Otherwise without other self exam changes such as palpable masses or skin changes. The patient denies constitutional symptoms of night sweats, chills, weight loss, new headaches, visual changes, new back or bony pain, chest pain, or shortness of breath.        Review of Systems: See HPI/Interval History for other systems reviewed.     IMAGING:     None      MEDICATIONS/ALLERGIES:     Current Outpatient Medications   Medication Sig    aspirin (ECOTRIN) 81 MG EC tablet Take 81 mg by mouth once daily.    atorvastatin (LIPITOR) 10 MG tablet Take 1 tablet (10 mg total) by mouth once daily.    chlorthalidone (HYGROTEN) 50 MG Tab Take 1 tablet (50 mg total) by mouth once daily.     ciclopirox 0.77 % Gel Apply topically 2 (two) times daily. To thickened discolored toenails.    irbesartan (AVAPRO) 300 MG tablet Take 1 tablet by mouth once daily    letrozole (FEMARA) 2.5 mg Tab Take 1 tablet (2.5 mg total) by mouth once daily.    levothyroxine (SYNTHROID) 200 MCG tablet Take 1 tablet (200 mcg total) by mouth once daily.    lifitegrast (XIIDRA) 5 % Dpet Apply 1 drop to  both eyes  2 (two) times daily.    metFORMIN (GLUCOPHAGE-XR) 750 MG ER 24hr tablet Take 1 tablet (750 mg total) by mouth 2 (two) times daily with meals.    polyethylene glycol (GLYCOLAX) 17 gram/dose powder Mix 17 g (1 capful) with juice or water an drink 2 (two) times daily.    prasterone, dhea, (INTRAROSA) 6.5 mg Inst Place 1 suppository vaginally nightly.    semaglutide (RYBELSUS) 7 mg tablet Take 1 tablet (7 mg total) by mouth once daily.    blood-glucose meter kit DISPENSE : BLOOD TEST STRIPS AND LANCETS PATIENT TEST BLOOD SUGARS TWICE DAILY  TEST STRIPS: 50 EACH, REFILL 5  LANCETS:  50 EACH , REFILL 5     No current facility-administered medications for this visit.      Review of patient's allergies indicates:  No Known Allergies    PHYSICAL EXAM:   BP (!) 132/90 (BP Location: Right arm, Patient Position: Sitting, BP Method: Large (Automatic))   Pulse 103   Ht 5' (1.524 m)   Wt 83.9 kg (185 lb)   LMP  (LMP Unknown)   BMI 36.13 kg/m²     Physical Exam   Vitals reviewed.  Constitutional: She is oriented to person, place, and time. She appears well-developed. No distress.   HENT:   Head: Normocephalic and atraumatic.   Eyes: Pupils are equal, round, and reactive to light. Right eye exhibits no discharge. Left eye exhibits no discharge. No scleral icterus.   Neck: No tracheal deviation present.   Cardiovascular:  Normal rate and regular rhythm.            Pulmonary/Chest: Effort normal and breath sounds normal. No respiratory distress. She exhibits no mass, no tenderness and no edema. Right breast exhibits no inverted  nipple, no mass, no nipple discharge, no skin change and no tenderness. Left breast exhibits no inverted nipple, no mass, no nipple discharge, no skin change and no tenderness. Breasts are symmetrical.       Abdominal: Soft. She exhibits no mass. There is no abdominal tenderness.   Lymphadenopathy:     She has no cervical adenopathy.   Neurological: She is alert and oriented to person, place, and time.   Skin: Skin is warm and dry. No rash noted. She is not diaphoretic. No erythema.       ASSESSMENT:   This is a 66 y.o. female without evidence of recurrence by exam, history or imaging.       PLAN:   1. Continue to follow up with Dr. Villagran.   2. Given the changes in quality and persistence of her right chest wall, will proceed with diagnostic work up.   3.  Will contact with results.    4. If negative, advised to return to clinic in February 2023.     The patient is in agreement with the plan. Questions were encouraged and answered to patient's satisfaction. Maria Elena will call our office with any questions or concerns.     Isa Chi PA-C  Breast Surgery

## 2022-10-21 NOTE — Clinical Note
Can we schedule this sweet lady for a diagnostic right mammogram/US? Has had a bilateral mastectomy but has new right breast pain. Thank you!!

## 2022-10-24 ENCOUNTER — OFFICE VISIT (OUTPATIENT)
Dept: HEMATOLOGY/ONCOLOGY | Facility: CLINIC | Age: 67
End: 2022-10-24
Payer: COMMERCIAL

## 2022-10-24 DIAGNOSIS — G62.0 CHEMOTHERAPY-INDUCED NEUROPATHY: ICD-10-CM

## 2022-10-24 DIAGNOSIS — N95.1 MENOPAUSAL SYMPTOMS: Primary | ICD-10-CM

## 2022-10-24 DIAGNOSIS — C77.3 BREAST CANCER METASTASIZED TO AXILLARY LYMPH NODE, LEFT: ICD-10-CM

## 2022-10-24 DIAGNOSIS — C50.912 BREAST CANCER METASTASIZED TO AXILLARY LYMPH NODE, LEFT: ICD-10-CM

## 2022-10-24 DIAGNOSIS — T45.1X5A CHEMOTHERAPY-INDUCED NEUROPATHY: ICD-10-CM

## 2022-10-24 PROCEDURE — 4010F ACE/ARB THERAPY RXD/TAKEN: CPT | Mod: CPTII,95,, | Performed by: PHYSICIAN ASSISTANT

## 2022-10-24 PROCEDURE — 99499 RISK ADDL DX/OHS AUDIT: ICD-10-PCS | Mod: 95,,, | Performed by: PHYSICIAN ASSISTANT

## 2022-10-24 PROCEDURE — 3061F NEG MICROALBUMINURIA REV: CPT | Mod: CPTII,95,, | Performed by: PHYSICIAN ASSISTANT

## 2022-10-24 PROCEDURE — 3052F PR MOST RECENT HEMOGLOBIN A1C LEVEL 8.0 - < 9.0%: ICD-10-PCS | Mod: CPTII,95,, | Performed by: PHYSICIAN ASSISTANT

## 2022-10-24 PROCEDURE — 3066F NEPHROPATHY DOC TX: CPT | Mod: CPTII,95,, | Performed by: PHYSICIAN ASSISTANT

## 2022-10-24 PROCEDURE — 3072F PR LOW RISK FOR RETINOPATHY: ICD-10-PCS | Mod: CPTII,95,, | Performed by: PHYSICIAN ASSISTANT

## 2022-10-24 PROCEDURE — 1159F PR MEDICATION LIST DOCUMENTED IN MEDICAL RECORD: ICD-10-PCS | Mod: CPTII,95,, | Performed by: PHYSICIAN ASSISTANT

## 2022-10-24 PROCEDURE — 3061F PR NEG MICROALBUMINURIA RESULT DOCUMENTED/REVIEW: ICD-10-PCS | Mod: CPTII,95,, | Performed by: PHYSICIAN ASSISTANT

## 2022-10-24 PROCEDURE — 1160F RVW MEDS BY RX/DR IN RCRD: CPT | Mod: CPTII,95,, | Performed by: PHYSICIAN ASSISTANT

## 2022-10-24 PROCEDURE — 3072F LOW RISK FOR RETINOPATHY: CPT | Mod: CPTII,95,, | Performed by: PHYSICIAN ASSISTANT

## 2022-10-24 PROCEDURE — 99499 UNLISTED E&M SERVICE: CPT | Mod: 95,,, | Performed by: PHYSICIAN ASSISTANT

## 2022-10-24 PROCEDURE — 1160F PR REVIEW ALL MEDS BY PRESCRIBER/CLIN PHARMACIST DOCUMENTED: ICD-10-PCS | Mod: CPTII,95,, | Performed by: PHYSICIAN ASSISTANT

## 2022-10-24 PROCEDURE — 4010F PR ACE/ARB THEARPY RXD/TAKEN: ICD-10-PCS | Mod: CPTII,95,, | Performed by: PHYSICIAN ASSISTANT

## 2022-10-24 PROCEDURE — 1159F MED LIST DOCD IN RCRD: CPT | Mod: CPTII,95,, | Performed by: PHYSICIAN ASSISTANT

## 2022-10-24 PROCEDURE — 99214 OFFICE O/P EST MOD 30 MIN: CPT | Mod: 95,,, | Performed by: PHYSICIAN ASSISTANT

## 2022-10-24 PROCEDURE — 3052F HG A1C>EQUAL 8.0%<EQUAL 9.0%: CPT | Mod: CPTII,95,, | Performed by: PHYSICIAN ASSISTANT

## 2022-10-24 PROCEDURE — 3066F PR DOCUMENTATION OF TREATMENT FOR NEPHROPATHY: ICD-10-PCS | Mod: CPTII,95,, | Performed by: PHYSICIAN ASSISTANT

## 2022-10-24 PROCEDURE — 99214 PR OFFICE/OUTPT VISIT, EST, LEVL IV, 30-39 MIN: ICD-10-PCS | Mod: 95,,, | Performed by: PHYSICIAN ASSISTANT

## 2022-10-24 RX ORDER — VENLAFAXINE HYDROCHLORIDE 37.5 MG/1
37.5 CAPSULE, EXTENDED RELEASE ORAL DAILY
Qty: 30 CAPSULE | Refills: 5 | Status: SHIPPED | OUTPATIENT
Start: 2022-10-24 | End: 2023-09-18

## 2022-10-24 NOTE — PROGRESS NOTES
The patient location is: Home  The chief complaint leading to consultation is: Follow up    Visit type: audiovisual    Face to Face time with patient: 25 minutes  35 minutes of total time spent on the encounter, which includes face to face time and non-face to face time preparing to see the patient (eg, review of tests), Obtaining and/or reviewing separately obtained history, Documenting clinical information in the electronic or other health record, Independently interpreting results (not separately reported) and communicating results to the patient/family/caregiver, or Care coordination (not separately reported).         Each patient to whom he or she provides medical services by telemedicine is:  (1) informed of the relationship between the physician and patient and the respective role of any other health care provider with respect to management of the patient; and (2) notified that he or she may decline to receive medical services by telemedicine and may withdraw from such care at any time.        Integrative Health and Medicine Follow Up    HPI  66 year old female presents for follow up for integrative oncology. She has a history of left breast infiltrating ductal carcinoma, ER positive TN positive, HER2 negative.  She underwent neoadjuvant DD AC x4 and 11 cycles of Taxol which was stopped secondary to side effects. S/p bilateral mastectomy on 03/24/2020. She then completed radiation is now taking Femara daily.   Reports neuropathy for which she tried acupuncture but had minimal improvement. Podiatrist recommended Neuropaway otc supplement but wanted to make sure it would not interact with letrozole before starting.  Reports hot flashes that are now worse then before. Tried acupuncture but did not see improvement. Impacting sleep and waking throughout the night. She is tired throughout the day.  No formal exercise but active throughout the daily. Mood is ok. Sometimes irritable but denies increased stress or  anxiety.   History of type 2 DM. Reports weight loss with starting metformin and oral GLP-1.    Supplements: Vitamin D, Tumeric/Curcumin      Past Medical History  Past Medical History:   Diagnosis Date    BMI 37.0-37.9, adult     Breast cancer 2019     Left breast, IDC with lymph node metastisis    Colon polyps 2015    Colon polyps     Diabetes mellitus type II     Diverticulosis     history of diverticulosisseen on colonoscopy at age 48. Repeat recommended at age 58. Done by     Elevated blood protein     History of elevated protein. Apparently has seen  for extensive workup including bone marrow biopsy    History of shingles 2006    Hyperlipidemia     Hypertension     Microalbuminuria     due 2 diabetes    Mild vitamin D deficiency     . A low vitamin D    Thyroid disease     hypothyroidism        Past Surgical History   Past Surgical History:   Procedure Laterality Date    BREAST BIOPSY Left 2019    IDC with mets to node    BREAST BIOPSY Right 2019    core bx, benign node    BREAST BIOPSY Left 10/14/2019    MRI Core bx, + IDC    BREAST BIOPSY Left 10/08/2019    Core bx, ADH     SECTION      COLONOSCOPY N/A 10/8/2020    Procedure: COLONOSCOPY;  Surgeon: Shreya Mendoza MD;  Location: [facility] (4TH FLR);  Service: Endoscopy;  Laterality: N/A;  COVID screening on 10/5/20 [facility]    HYSTERECTOMY      INSERTION OF BREAST TISSUE EXPANDER Bilateral 3/24/2020    Procedure: INSERTION, TISSUE EXPANDER, BREAST BILATERAL;  Surgeon: Michael Solorzano MD;  Location: [facility] OR 2ND FLR;  Service: Plastics;  Laterality: Bilateral;    INSERTION OF TUNNELED CENTRAL VENOUS CATHETER (CVC) WITH SUBCUTANEOUS PORT Right 10/4/2019    Procedure: [id number] RIGHT (CONSENT AM OF) 1.0 hr case;  Surgeon: Elena Lopez MD;  Location: [facility] OR [address] [place] [address];  Service: General;  Laterality: Right;    OOPHORECTOMY      SENTINEL LYMPH NODE BIOPSY Left 3/24/2020    Procedure:  BIOPSY, LYMPH NODE, SENTINEL LEFT;  Surgeon: Elena Lopez MD;  Location: [facility] OR [address] [place] [address];  Service: General;  Laterality: Left;    SIMPLE MASTECTOMY Bilateral 3/24/2020    Procedure: MASTECTOMY, SIMPLE BILATERAL;  Surgeon: Elena Lopez MD;  Location: [facility] OR [address] [place] [address];  Service: General;  Laterality: Bilateral;    TISSUE EXPANDER REMOVAL Bilateral 7/30/2020    Procedure: REMOVAL, TISSUE EXPANDER;  Surgeon: Michael Solorzano MD;  Location: [facility] OR [address] [place] [address];  Service: Plastics;  Laterality: Bilateral;        Family History   Family History   Problem Relation Age of Onset    Cancer Mother         lung ca heavy smoker    Lung cancer Mother     Cancer Father     Lung cancer Father     Hypertension Sister     No Known Problems Sister     No Known Problems Daughter     Hypothyroidism Sister     Diabetes Maternal Aunt     Cancer Maternal Aunt     No Known Problems Maternal Grandmother     Breast cancer Paternal Aunt     No Known Problems Paternal Uncle     Lung cancer Maternal Grandfather     No Known Problems Paternal Grandmother     No Known Problems Paternal Grandfather     Amblyopia Neg Hx     Blindness Neg Hx     Cataracts Neg Hx     Glaucoma Neg Hx     Macular degeneration Neg Hx     Retinal detachment Neg Hx     Strabismus Neg Hx     Stroke Neg Hx     Thyroid disease Neg Hx     Ovarian cancer Neg Hx     Colon cancer Neg Hx         Social History  Social History     Socioeconomic History    Marital status: Single   Occupational History     Employer: [employer]   Tobacco Use    Smoking status: Never    Smokeless tobacco: Never    Tobacco comments:     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  She walks occasionally.   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 daughter1  granddaughter     Social Determinants of Health     Financial Resource Strain: Medium Risk    Difficulty of Paying Living Expenses: Somewhat hard   Food Insecurity: No Food Insecurity    Worried About Running Out of Food in the Last Year: Never true    Ran Out of Food in the Last Year: Never true   Transportation Needs: No Transportation Needs    Lack of Transportation (Medical): No    Lack of Transportation (Non-Medical): No   Physical Activity: Insufficiently Active    Days of Exercise per Week: 1 day    Minutes of Exercise per Session: 30 min   Stress: No Stress Concern Present    Feeling of Stress : Not at all   Social Connections: Unknown    Frequency of Communication with Friends and Family: Three times a week    Frequency of Social Gatherings with Friends and Family: Once a week    Active Member of Clubs or Organizations: No    Attends Club or Organization Meetings: Never    Marital Status: Never    Housing Stability: Low Risk     Unable to Pay for Housing in the Last Year: No    Number of Places Lived in the Last Year: 1    Unstable Housing in the Last Year: No        Allergies  Review of patient's allergies indicates:  No Known Allergies     Current Medications:    Current Outpatient Medications:     aspirin (ECOTRIN) 81 MG EC tablet, Take 81 mg by mouth once daily., Disp: , Rfl:     atorvastatin (LIPITOR) 10 MG tablet, Take 1 tablet (10 mg total) by mouth once daily., Disp: 90 tablet, Rfl: 3    blood-glucose meter kit, DISPENSE : BLOOD TEST STRIPS AND LANCETS PATIENT TEST BLOOD SUGARS TWICE DAILY TEST STRIPS: 50 EACH, REFILL 5 LANCETS:  50 EACH , REFILL 5, Disp: 1 each, Rfl: 0    chlorthalidone (HYGROTEN) 50 MG Tab, Take 1 tablet (50 mg total) by mouth once daily., Disp: 90 tablet, Rfl: 1    ciclopirox 0.77 % Gel, Apply topically 2 (two) times daily. To thickened discolored toenails., Disp: 30 g, Rfl: 6    irbesartan (AVAPRO) 300 MG tablet, Take 1 tablet by mouth once daily, Disp: 90 tablet, Rfl:  3    letrozole (FEMARA) 2.5 mg Tab, Take 1 tablet (2.5 mg total) by mouth once daily., Disp: 90 tablet, Rfl: 3    levothyroxine (SYNTHROID) 200 MCG tablet, Take 1 tablet (200 mcg total) by mouth once daily., Disp: 90 tablet, Rfl: 1    lifitegrast (XIIDRA) 5 % Dpet, Apply 1 drop to  both eyes  2 (two) times daily., Disp: 180 each, Rfl: 4    metFORMIN (GLUCOPHAGE-XR) 750 MG ER 24hr tablet, Take 1 tablet (750 mg total) by mouth 2 (two) times daily with meals., Disp: 180 tablet, Rfl: 1    polyethylene glycol (GLYCOLAX) 17 gram/dose powder, Mix 17 g (1 capful) with juice or water an drink 2 (two) times daily., Disp: 1020 g, Rfl: 2    prasterone, dhea, (INTRAROSA) 6.5 mg Inst, Place 1 suppository vaginally nightly., Disp: 28 each, Rfl: 11    semaglutide (RYBELSUS) 7 mg tablet, Take 1 tablet (7 mg total) by mouth once daily., Disp: 90 tablet, Rfl: 3    venlafaxine (EFFEXOR XR) 37.5 MG 24 hr capsule, Take 1 capsule (37.5 mg total) by mouth once daily., Disp: 30 capsule, Rfl: 5     Review of Systems  Constitutional: +fatigue  Eyes:  No vision changes, glasses/contacts  ENT/Mouth: No ulcers, sinus problems, ears ringing, headache  Cardiovascular: No inability to lie flat, chest pain, exercise intolerance, swelling, heart palpitations  Respiratory: No wheezing, coughing blood, shortness of breath, or cough  Gastrointestinal: No diarrhea, bloody stool, nausea/vomiting, constipation, gas, hemorrhoids  Genitourinary: No blood in urine, painful urination, urgency of urination, frequency of urination, incomplete emptying, incontinence, abnormal bleeding, painful periods, heavy periods, vaginal discharge, vaginal odor, painful intercourse, sexual problems, bleeding after intercourse.  Musculoskeletal: No muscle weakness  Skin/Breast: No painful breasts, nipple discharge, masses, rash, ulcers  Neurological: No passing out, seizures, numbness, headache  Endocrine: No diabetes, hypothyroid, hyperthyroid, hot flashes, hair loss,  abnormal hair growth, acne  Psychiatric: No depression, crying  Hematologic: No bruises, bleeding, swollen lymph nodes, anemia.    Physical Exam   There were no vitals filed for this visit.  There is no height or weight on file to calculate BMI.  Physical Exam - Deferred    ASSESSMENT:     Menopausal symptoms  -     venlafaxine (EFFEXOR XR) 37.5 MG 24 hr capsule; Take 1 capsule (37.5 mg total) by mouth once daily.  Dispense: 30 capsule; Refill: 5    Chemotherapy-induced neuropathy    Breast cancer metastasized to axillary lymph node, left      PLAN:  Effexor 37.5mg QAM  Reviewed common side effects and risks.  Monitor blood pressure with starting effexor.  Reviewed ingredients in Neuropaway and does not interact with Letrozole.  Magnesium Glycinate 400-500mg QHS.  Reviewed benefits of exercise for fatigue.  Therapeutic yoga scheduled.  Reviewed diet. Reduce starch, processed foods. Lean protein with every meal.  WWE with Dr. Myers is scheduled.  Follow up in 3 months or PRN.

## 2022-11-02 ENCOUNTER — HOSPITAL ENCOUNTER (OUTPATIENT)
Dept: RADIOLOGY | Facility: HOSPITAL | Age: 67
Discharge: HOME | End: 2022-11-02
Attending: PHYSICIAN ASSISTANT
Payer: COMMERCIAL

## 2022-11-02 VITALS — HEIGHT: 61 IN | WEIGHT: 182 LBS | BODY MASS INDEX: 34.36 KG/M2

## 2022-11-02 DIAGNOSIS — N64.4 BREAST PAIN, RIGHT: ICD-10-CM

## 2022-11-02 DIAGNOSIS — Z90.13 S/P BILATERAL MASTECTOMY: ICD-10-CM

## 2022-11-02 DIAGNOSIS — Z85.3 PERSONAL HISTORY OF BREAST CANCER: ICD-10-CM

## 2022-11-02 PROCEDURE — 76642 US BREAST BILATERAL LIMITED: ICD-10-PCS | Mod: 26,50,, | Performed by: RADIOLOGY

## 2022-11-02 PROCEDURE — 76642 ULTRASOUND BREAST LIMITED: CPT | Mod: 26,50,, | Performed by: RADIOLOGY

## 2022-11-02 PROCEDURE — 76642 ULTRASOUND BREAST LIMITED: CPT | Mod: TC,50

## 2022-11-14 ENCOUNTER — OFFICE VISIT (OUTPATIENT)
Dept: URGENT CARE | Facility: CLINIC | Age: 67
End: 2022-11-14
Payer: COMMERCIAL

## 2022-11-14 VITALS
RESPIRATION RATE: 19 BRPM | DIASTOLIC BLOOD PRESSURE: 62 MMHG | HEIGHT: 61 IN | SYSTOLIC BLOOD PRESSURE: 105 MMHG | BODY MASS INDEX: 34.36 KG/M2 | TEMPERATURE: 99 F | WEIGHT: 182 LBS | OXYGEN SATURATION: 96 % | HEART RATE: 109 BPM

## 2022-11-14 DIAGNOSIS — B96.89 ACUTE BACTERIAL SINUSITIS: Primary | ICD-10-CM

## 2022-11-14 DIAGNOSIS — J01.90 ACUTE BACTERIAL SINUSITIS: Primary | ICD-10-CM

## 2022-11-14 DIAGNOSIS — R09.81 NASAL CONGESTION: ICD-10-CM

## 2022-11-14 DIAGNOSIS — R05.9 COUGH, UNSPECIFIED TYPE: ICD-10-CM

## 2022-11-14 LAB
CTP QC/QA: YES
CTP QC/QA: YES
POC MOLECULAR INFLUENZA A AGN: NEGATIVE
POC MOLECULAR INFLUENZA B AGN: NEGATIVE
SARS-COV-2 AG RESP QL IA.RAPID: NEGATIVE

## 2022-11-14 PROCEDURE — 3066F NEPHROPATHY DOC TX: CPT | Mod: CPTII,S$GLB,, | Performed by: NURSE PRACTITIONER

## 2022-11-14 PROCEDURE — 4010F ACE/ARB THERAPY RXD/TAKEN: CPT | Mod: CPTII,S$GLB,, | Performed by: NURSE PRACTITIONER

## 2022-11-14 PROCEDURE — 99214 OFFICE O/P EST MOD 30 MIN: CPT | Mod: S$GLB,,, | Performed by: NURSE PRACTITIONER

## 2022-11-14 PROCEDURE — 87502 INFLUENZA DNA AMP PROBE: CPT | Mod: QW,S$GLB,, | Performed by: NURSE PRACTITIONER

## 2022-11-14 PROCEDURE — 3061F NEG MICROALBUMINURIA REV: CPT | Mod: CPTII,S$GLB,, | Performed by: NURSE PRACTITIONER

## 2022-11-14 PROCEDURE — 1125F PR PAIN SEVERITY QUANTIFIED, PAIN PRESENT: ICD-10-PCS | Mod: CPTII,S$GLB,, | Performed by: NURSE PRACTITIONER

## 2022-11-14 PROCEDURE — 3072F LOW RISK FOR RETINOPATHY: CPT | Mod: CPTII,S$GLB,, | Performed by: NURSE PRACTITIONER

## 2022-11-14 PROCEDURE — 1125F AMNT PAIN NOTED PAIN PRSNT: CPT | Mod: CPTII,S$GLB,, | Performed by: NURSE PRACTITIONER

## 2022-11-14 PROCEDURE — 3078F PR MOST RECENT DIASTOLIC BLOOD PRESSURE < 80 MM HG: ICD-10-PCS | Mod: CPTII,S$GLB,, | Performed by: NURSE PRACTITIONER

## 2022-11-14 PROCEDURE — 3061F PR NEG MICROALBUMINURIA RESULT DOCUMENTED/REVIEW: ICD-10-PCS | Mod: CPTII,S$GLB,, | Performed by: NURSE PRACTITIONER

## 2022-11-14 PROCEDURE — U0002 COVID-19 LAB TEST NON-CDC: HCPCS | Mod: QW,S$GLB,, | Performed by: NURSE PRACTITIONER

## 2022-11-14 PROCEDURE — 3074F SYST BP LT 130 MM HG: CPT | Mod: CPTII,S$GLB,, | Performed by: NURSE PRACTITIONER

## 2022-11-14 PROCEDURE — 3052F HG A1C>EQUAL 8.0%<EQUAL 9.0%: CPT | Mod: CPTII,S$GLB,, | Performed by: NURSE PRACTITIONER

## 2022-11-14 PROCEDURE — 3052F PR MOST RECENT HEMOGLOBIN A1C LEVEL 8.0 - < 9.0%: ICD-10-PCS | Mod: CPTII,S$GLB,, | Performed by: NURSE PRACTITIONER

## 2022-11-14 PROCEDURE — 3072F PR LOW RISK FOR RETINOPATHY: ICD-10-PCS | Mod: CPTII,S$GLB,, | Performed by: NURSE PRACTITIONER

## 2022-11-14 PROCEDURE — 3074F PR MOST RECENT SYSTOLIC BLOOD PRESSURE < 130 MM HG: ICD-10-PCS | Mod: CPTII,S$GLB,, | Performed by: NURSE PRACTITIONER

## 2022-11-14 PROCEDURE — U0002 SARS CORONAVIRUS 2 ANTIGEN POCT, MANUAL READ: ICD-10-PCS | Mod: QW,S$GLB,, | Performed by: NURSE PRACTITIONER

## 2022-11-14 PROCEDURE — 3008F PR BODY MASS INDEX (BMI) DOCUMENTED: ICD-10-PCS | Mod: CPTII,S$GLB,, | Performed by: NURSE PRACTITIONER

## 2022-11-14 PROCEDURE — 87502 POCT INFLUENZA A/B MOLECULAR: ICD-10-PCS | Mod: QW,S$GLB,, | Performed by: NURSE PRACTITIONER

## 2022-11-14 PROCEDURE — 4010F PR ACE/ARB THEARPY RXD/TAKEN: ICD-10-PCS | Mod: CPTII,S$GLB,, | Performed by: NURSE PRACTITIONER

## 2022-11-14 PROCEDURE — 3078F DIAST BP <80 MM HG: CPT | Mod: CPTII,S$GLB,, | Performed by: NURSE PRACTITIONER

## 2022-11-14 PROCEDURE — 1159F PR MEDICATION LIST DOCUMENTED IN MEDICAL RECORD: ICD-10-PCS | Mod: CPTII,S$GLB,, | Performed by: NURSE PRACTITIONER

## 2022-11-14 PROCEDURE — 3008F BODY MASS INDEX DOCD: CPT | Mod: CPTII,S$GLB,, | Performed by: NURSE PRACTITIONER

## 2022-11-14 PROCEDURE — 99214 PR OFFICE/OUTPT VISIT, EST, LEVL IV, 30-39 MIN: ICD-10-PCS | Mod: S$GLB,,, | Performed by: NURSE PRACTITIONER

## 2022-11-14 PROCEDURE — 3066F PR DOCUMENTATION OF TREATMENT FOR NEPHROPATHY: ICD-10-PCS | Mod: CPTII,S$GLB,, | Performed by: NURSE PRACTITIONER

## 2022-11-14 PROCEDURE — 1159F MED LIST DOCD IN RCRD: CPT | Mod: CPTII,S$GLB,, | Performed by: NURSE PRACTITIONER

## 2022-11-14 RX ORDER — PROMETHAZINE HYDROCHLORIDE AND DEXTROMETHORPHAN HYDROBROMIDE 6.25; 15 MG/5ML; MG/5ML
5 SYRUP ORAL NIGHTLY PRN
Qty: 120 ML | Refills: 0 | Status: SHIPPED | OUTPATIENT
Start: 2022-11-14 | End: 2022-12-08

## 2022-11-14 RX ORDER — AMOXICILLIN AND CLAVULANATE POTASSIUM 875; 125 MG/1; MG/1
1 TABLET, FILM COATED ORAL 2 TIMES DAILY
Qty: 20 TABLET | Refills: 0 | Status: SHIPPED | OUTPATIENT
Start: 2022-11-14 | End: 2022-11-24

## 2022-11-14 RX ORDER — BENZONATATE 200 MG/1
200 CAPSULE ORAL 3 TIMES DAILY PRN
Qty: 30 CAPSULE | Refills: 0 | Status: SHIPPED | OUTPATIENT
Start: 2022-11-14 | End: 2022-11-24

## 2022-11-14 NOTE — PROGRESS NOTES
"Subjective:       Patient ID: Maria Elena Tavares is a 66 y.o. female.    Vitals:  height is 5' 1" (1.549 m) and weight is 82.6 kg (182 lb). Her temperature is 98.7 °F (37.1 °C). Her blood pressure is 105/62 and her pulse is 109. Her respiration is 19 and oxygen saturation is 96%.     Chief Complaint: Cough    C/o sinus like symptoms with a cold last week.  Worse this morning.  Denies fever or sick contacts.    Cough  This is a new problem. The current episode started in the past 7 days (7 days now). The problem has been gradually worsening. The problem occurs every few minutes. The cough is Productive of sputum (dark green). Associated symptoms include ear congestion, headaches, nasal congestion, postnasal drip and a sore throat. Pertinent negatives include no chest pain, chills, ear pain, fever, heartburn, hemoptysis, myalgias, rash, rhinorrhea, shortness of breath, sweats, weight loss or wheezing. Nothing aggravates the symptoms. She has tried OTC cough suppressant (tussin dm otc) for the symptoms. Her past medical history is significant for bronchitis. There is no history of asthma, COPD, emphysema or pneumonia.     Constitution: Negative for chills, sweating, fatigue and fever.   HENT:  Positive for congestion, postnasal drip, sinus pain, sinus pressure, sore throat and voice change. Negative for ear pain and trouble swallowing.    Cardiovascular:  Negative for chest pain.   Respiratory:  Positive for cough and sputum production. Negative for bloody sputum, shortness of breath and wheezing.    Gastrointestinal:  Negative for heartburn.   Musculoskeletal:  Negative for muscle ache.   Skin:  Negative for rash.   Neurological:  Positive for headaches.     Objective:      Physical Exam   Constitutional: She is oriented to person, place, and time. She appears well-developed. She is cooperative.  Non-toxic appearance. She does not appear ill. No distress.   HENT:   Head: Normocephalic and atraumatic.   Ears:   Right Ear: " Hearing, external ear and ear canal normal. Tympanic membrane is erythematous. A middle ear effusion is present.   Left Ear: Hearing, external ear and ear canal normal. A middle ear effusion is present.   Nose: Mucosal edema, purulent discharge and sinus tenderness present. No rhinorrhea or nasal deformity. No epistaxis. Right sinus exhibits no maxillary sinus tenderness and no frontal sinus tenderness. Left sinus exhibits no maxillary sinus tenderness and no frontal sinus tenderness.   Mouth/Throat: Uvula is midline and mucous membranes are normal. No trismus in the jaw. Normal dentition. No uvula swelling. Posterior oropharyngeal erythema present. No oropharyngeal exudate or posterior oropharyngeal edema.   Serous fluid bilaterally      Comments: Serous fluid bilaterally  Eyes: Conjunctivae and lids are normal. No scleral icterus.   Neck: Trachea normal and phonation normal. Neck supple. No edema present. No erythema present. No neck rigidity present.   Cardiovascular: Normal rate, regular rhythm, normal heart sounds and normal pulses.   Pulmonary/Chest: Effort normal and breath sounds normal. No respiratory distress. She has no decreased breath sounds. She has no rhonchi.   Abdominal: Normal appearance.   Musculoskeletal: Normal range of motion.         General: No deformity. Normal range of motion.   Neurological: She is alert and oriented to person, place, and time. She exhibits normal muscle tone. Coordination normal.   Skin: Skin is warm, dry, intact, not diaphoretic and not pale.   Psychiatric: Her speech is normal and behavior is normal. Judgment and thought content normal.   Nursing note and vitals reviewed.        Results for orders placed or performed in visit on 11/14/22   POCT Influenza A/B MOLECULAR   Result Value Ref Range    POC Molecular Influenza A Ag Negative Negative, Not Reported    POC Molecular Influenza B Ag Negative Negative, Not Reported     Acceptable Yes    SARS  Coronavirus 2 Antigen, POCT Manual Read   Result Value Ref Range    SARS Coronavirus 2 Antigen Negative Negative     Acceptable Yes      *Note: Due to a large number of results and/or encounters for the requested time period, some results have not been displayed. A complete set of results can be found in Results Review.       Assessment:       1. Acute bacterial sinusitis    2. Cough, unspecified type    3. Nasal congestion          Plan:       Lab reviewed.  Acute bacterial sinusitis  -     amoxicillin-clavulanate 875-125mg (AUGMENTIN) 875-125 mg per tablet; Take 1 tablet by mouth 2 (two) times daily. for 10 days  Dispense: 20 tablet; Refill: 0    Cough, unspecified type  -     POCT Influenza A/B MOLECULAR  -     SARS Coronavirus 2 Antigen, POCT Manual Read  -     benzonatate (TESSALON) 200 MG capsule; Take 1 capsule (200 mg total) by mouth 3 (three) times daily as needed for Cough.  Dispense: 30 capsule; Refill: 0  -     promethazine-dextromethorphan (PROMETHAZINE-DM) 6.25-15 mg/5 mL Syrp; Take 5 mLs by mouth nightly as needed (cough).  Dispense: 120 mL; Refill: 0    Nasal congestion  -     SARS Coronavirus 2 Antigen, POCT Manual Read       Patient Instructions   Please drink plenty of fluids.  Please get plenty of rest.  Please return here or go to the Emergency Department for any concerns or worsening of condition.  If you were prescribed antibiotics, please take them to completion.  If you do not have Hypertension or any history of palpitations, it is ok to take over the counter Sudafed or Mucinex D or Allegra-D or Claritin-D or Zyrtec-D.  If you do take one of the above, it is ok to combine that with plain over the counter Mucinex or Allegra or Claritin or Zyrtec.  If for example you are taking Zyrtec -D, you can combine that with Mucinex, but not Mucinex-D.  If you are taking Mucinex-D, you can combine that with plain Allegra or Claritin or Zyrtec.   If you do have Hypertension or palpitations,  it is safe to take Coricidin HBP for relief of sinus symptoms.  We recommend you take over the counter Flonase (Fluticasone) or another nasally inhaled steroid unless you are already taking one.  Nasal irrigation with a saline spray or Netti Pot like device per their directions is also recommended.  If not allergic, please take over the counter Tylenol (Acetaminophen) and/or Motrin (Ibuprofen) as directed for control of pain and/or fever.  Please follow up with your primary care doctor or specialist as needed.    If you  smoke, please stop smoking.

## 2022-11-17 ENCOUNTER — PATIENT MESSAGE (OUTPATIENT)
Dept: PRIMARY CARE CLINIC | Facility: CLINIC | Age: 67
End: 2022-11-17
Payer: COMMERCIAL

## 2022-11-17 ENCOUNTER — OFFICE VISIT (OUTPATIENT)
Dept: PRIMARY CARE CLINIC | Facility: CLINIC | Age: 67
End: 2022-11-17
Payer: COMMERCIAL

## 2022-11-17 DIAGNOSIS — J06.9 URI, ACUTE: Primary | ICD-10-CM

## 2022-11-17 PROCEDURE — 1159F PR MEDICATION LIST DOCUMENTED IN MEDICAL RECORD: ICD-10-PCS | Mod: CPTII,95,, | Performed by: FAMILY MEDICINE

## 2022-11-17 PROCEDURE — 1160F PR REVIEW ALL MEDS BY PRESCRIBER/CLIN PHARMACIST DOCUMENTED: ICD-10-PCS | Mod: CPTII,95,, | Performed by: FAMILY MEDICINE

## 2022-11-17 PROCEDURE — 99212 OFFICE O/P EST SF 10 MIN: CPT | Mod: 95,,, | Performed by: FAMILY MEDICINE

## 2022-11-17 PROCEDURE — 1159F MED LIST DOCD IN RCRD: CPT | Mod: CPTII,95,, | Performed by: FAMILY MEDICINE

## 2022-11-17 PROCEDURE — 4010F PR ACE/ARB THEARPY RXD/TAKEN: ICD-10-PCS | Mod: CPTII,95,, | Performed by: FAMILY MEDICINE

## 2022-11-17 PROCEDURE — 3072F LOW RISK FOR RETINOPATHY: CPT | Mod: CPTII,95,, | Performed by: FAMILY MEDICINE

## 2022-11-17 PROCEDURE — 3061F NEG MICROALBUMINURIA REV: CPT | Mod: CPTII,95,, | Performed by: FAMILY MEDICINE

## 2022-11-17 PROCEDURE — 3066F PR DOCUMENTATION OF TREATMENT FOR NEPHROPATHY: ICD-10-PCS | Mod: CPTII,95,, | Performed by: FAMILY MEDICINE

## 2022-11-17 PROCEDURE — 3061F PR NEG MICROALBUMINURIA RESULT DOCUMENTED/REVIEW: ICD-10-PCS | Mod: CPTII,95,, | Performed by: FAMILY MEDICINE

## 2022-11-17 PROCEDURE — 3072F PR LOW RISK FOR RETINOPATHY: ICD-10-PCS | Mod: CPTII,95,, | Performed by: FAMILY MEDICINE

## 2022-11-17 PROCEDURE — 4010F ACE/ARB THERAPY RXD/TAKEN: CPT | Mod: CPTII,95,, | Performed by: FAMILY MEDICINE

## 2022-11-17 PROCEDURE — 3052F PR MOST RECENT HEMOGLOBIN A1C LEVEL 8.0 - < 9.0%: ICD-10-PCS | Mod: CPTII,95,, | Performed by: FAMILY MEDICINE

## 2022-11-17 PROCEDURE — 3052F HG A1C>EQUAL 8.0%<EQUAL 9.0%: CPT | Mod: CPTII,95,, | Performed by: FAMILY MEDICINE

## 2022-11-17 PROCEDURE — 1160F RVW MEDS BY RX/DR IN RCRD: CPT | Mod: CPTII,95,, | Performed by: FAMILY MEDICINE

## 2022-11-17 PROCEDURE — 3066F NEPHROPATHY DOC TX: CPT | Mod: CPTII,95,, | Performed by: FAMILY MEDICINE

## 2022-11-17 PROCEDURE — 99212 PR OFFICE/OUTPT VISIT, EST, LEVL II, 10-19 MIN: ICD-10-PCS | Mod: 95,,, | Performed by: FAMILY MEDICINE

## 2022-11-17 NOTE — PROGRESS NOTES
The patient location is: [place]  The chief complaint leading to consultation is: URI    Visit type: audiovisual    Face to Face time with patient:   10 minutes of total time spent on the encounter, which includes face to face time and non-face to face time preparing to see the patient (eg, review of tests), Obtaining and/or reviewing separately obtained history, Documenting clinical information in the electronic or other health record, Independently interpreting results (not separately reported) and communicating results to the patient/family/caregiver, or Care coordination (not separately reported).         Each patient to whom he or she provides medical services by telemedicine is:  (1) informed of the relationship between the physician and patient and the respective role of any other health care provider with respect to management of the patient; and (2) notified that he or she may decline to receive medical services by telemedicine and may withdraw from such care at any time.    Notes:    History of present illness:  Patient is a 66-year-old female who presents with URI symptoms of cough, nasal congestion with blood-tinged mucus, headache and thick mucus in her throat for past 11 days.  This past week she developed hoarseness as well.  She was seen by urgent care and tested negative for COVID and flu.      Review of systems: No fever, body aches, or shortness of breath    Past medical history:  Hypertension     Physical exam:  General: Alert, no acute distress   HEENT:  NC/AT, no facial swelling of deformity  Neck:  supple  Resp:  Normal effort, no respiratory distress   Psych:  Normal affect     Diagnoses and all orders for this visit:    URI, acute  Recommend Flonase nasal spray 2 sprays in each nostril daily, normal saline and Mucinex DM as needed for nasal congestion and cough. Patient advised to follow-up with PCP in 2-3 days if her symptoms does not improve.

## 2022-12-05 NOTE — OP NOTE
Central Line Placement Procedure Note        Physician: Surgeon(s) and Role:     * Elena Lopez MD - Primary    Procedure:   1.Central Line Placement port a cath right with ultrasound and fluoroscopy    preop diagnosis: left breast cancer    Postop diagnosis: same    Date: 10/4/2019    Location: right internal jugular vein    Anesthesia: Local and Moderate Sedation  Full Drape Used: Yes    Procedure:  After informed consent and timeout was performed, the patient was taken to the operating room. Patient was placed supine on the table and arms were tucked by her side. Ultrasound was used to ensure the right internal jugular vein was patent. Next, local anesthesia was injected and a small nick was made in the skin in the right neck. Ultrasound was used for guidance and an 18-gauge hollow needle was used to access the vessel. Wire was inserted through the access needle and fluoroscopy and ultrasound were used to confirm the wire was in the correct position. Next, a counter incision was made.  Following this local anesthetic was injected on the anterior right chest.  A small incision was made and the port pocket was created using electrocautery. Next,  the catheter was tunneled from the anterior right chest. Then over the wire, the vessel was dilated and the catheter was passed into the vessel via the Seldinger technique under direct fluoroscopic guidance. The catheter was confirmed to be in proper position in the SVC using fluoroscopy. The catheter was aspirated and blood return was good. Next, the catheter was cut to the appropriate length and attached to the port. Again, blood return was noted, and we then flushed the catheter with heparinized saline. The port was sutured in place using a 3-0 vicryl. The port incision was closed using interuppted 3-0 vicryl followed by running 4-0 monocryl subcuticular.  All skin incisions were closed with a 4-0 monocryl. Dressings were applied and all counts were correct at the end  Spoke to patient briefly via phone. Requested call closer to end of day, 12/5/22.   of the procedure. Patient tolerated the procedure well. A chest x-ray will be performed in the recovery room.    Confirmation: Fluoroscopy intraop    Complications/Comments:  none    Estimated Blood Loss: 2 cc    Disposition: PACU in stable condition    Attestation: I performed the procedure.

## 2022-12-09 ENCOUNTER — IMMUNIZATION (OUTPATIENT)
Dept: INTERNAL MEDICINE | Facility: CLINIC | Age: 67
End: 2022-12-09
Payer: COMMERCIAL

## 2022-12-09 ENCOUNTER — OFFICE VISIT (OUTPATIENT)
Dept: HEMATOLOGY/ONCOLOGY | Facility: CLINIC | Age: 67
End: 2022-12-09
Payer: COMMERCIAL

## 2022-12-09 ENCOUNTER — CLINICAL SUPPORT (OUTPATIENT)
Dept: REHABILITATION | Facility: HOSPITAL | Age: 67
End: 2022-12-09
Attending: OBSTETRICS & GYNECOLOGY
Payer: COMMERCIAL

## 2022-12-09 ENCOUNTER — PATIENT MESSAGE (OUTPATIENT)
Dept: HEMATOLOGY/ONCOLOGY | Facility: CLINIC | Age: 67
End: 2022-12-09

## 2022-12-09 VITALS
OXYGEN SATURATION: 100 % | BODY MASS INDEX: 35.93 KG/M2 | SYSTOLIC BLOOD PRESSURE: 132 MMHG | WEIGHT: 183 LBS | TEMPERATURE: 98 F | HEIGHT: 60 IN | DIASTOLIC BLOOD PRESSURE: 86 MMHG | HEART RATE: 86 BPM

## 2022-12-09 DIAGNOSIS — F43.29 STRESS AND ADJUSTMENT REACTION: ICD-10-CM

## 2022-12-09 DIAGNOSIS — R30.0 DYSURIA: Primary | ICD-10-CM

## 2022-12-09 DIAGNOSIS — G62.0 CHEMOTHERAPY-INDUCED NEUROPATHY: ICD-10-CM

## 2022-12-09 DIAGNOSIS — N89.8 VAGINAL ITCHING: ICD-10-CM

## 2022-12-09 DIAGNOSIS — T45.1X5A AROMATASE INHIBITOR-ASSOCIATED ARTHRALGIA: ICD-10-CM

## 2022-12-09 DIAGNOSIS — Z17.0 MALIGNANT NEOPLASM OF LOWER-OUTER QUADRANT OF LEFT BREAST OF FEMALE, ESTROGEN RECEPTOR POSITIVE: ICD-10-CM

## 2022-12-09 DIAGNOSIS — C50.512 MALIGNANT NEOPLASM OF LOWER-OUTER QUADRANT OF LEFT BREAST OF FEMALE, ESTROGEN RECEPTOR POSITIVE: ICD-10-CM

## 2022-12-09 DIAGNOSIS — R53.81 PHYSICAL DECONDITIONING: Primary | ICD-10-CM

## 2022-12-09 DIAGNOSIS — C50.919 BREAST CANCER: ICD-10-CM

## 2022-12-09 DIAGNOSIS — M25.50 AROMATASE INHIBITOR-ASSOCIATED ARTHRALGIA: ICD-10-CM

## 2022-12-09 DIAGNOSIS — Z23 NEED FOR VACCINATION: Primary | ICD-10-CM

## 2022-12-09 DIAGNOSIS — T45.1X5A CHEMOTHERAPY-INDUCED NEUROPATHY: ICD-10-CM

## 2022-12-09 PROCEDURE — 0124A COVID-19, MRNA, LNP-S, BIVALENT BOOSTER, PF, 30 MCG/0.3 ML DOSE: CPT | Mod: CV19,PBBFAC | Performed by: INTERNAL MEDICINE

## 2022-12-09 PROCEDURE — 3066F NEPHROPATHY DOC TX: CPT | Mod: CPTII,S$GLB,, | Performed by: OBSTETRICS & GYNECOLOGY

## 2022-12-09 PROCEDURE — 3061F PR NEG MICROALBUMINURIA RESULT DOCUMENTED/REVIEW: ICD-10-PCS | Mod: CPTII,S$GLB,, | Performed by: OBSTETRICS & GYNECOLOGY

## 2022-12-09 PROCEDURE — 91312 COVID-19, MRNA, LNP-S, BIVALENT BOOSTER, PF, 30 MCG/0.3 ML DOSE: CPT | Mod: S$GLB,,, | Performed by: INTERNAL MEDICINE

## 2022-12-09 PROCEDURE — 3079F DIAST BP 80-89 MM HG: CPT | Mod: CPTII,S$GLB,, | Performed by: OBSTETRICS & GYNECOLOGY

## 2022-12-09 PROCEDURE — 4010F ACE/ARB THERAPY RXD/TAKEN: CPT | Mod: CPTII,S$GLB,, | Performed by: OBSTETRICS & GYNECOLOGY

## 2022-12-09 PROCEDURE — 97110 THERAPEUTIC EXERCISES: CPT

## 2022-12-09 PROCEDURE — 3066F PR DOCUMENTATION OF TREATMENT FOR NEPHROPATHY: ICD-10-PCS | Mod: CPTII,S$GLB,, | Performed by: OBSTETRICS & GYNECOLOGY

## 2022-12-09 PROCEDURE — 3072F PR LOW RISK FOR RETINOPATHY: ICD-10-PCS | Mod: CPTII,S$GLB,, | Performed by: OBSTETRICS & GYNECOLOGY

## 2022-12-09 PROCEDURE — 3052F PR MOST RECENT HEMOGLOBIN A1C LEVEL 8.0 - < 9.0%: ICD-10-PCS | Mod: CPTII,S$GLB,, | Performed by: OBSTETRICS & GYNECOLOGY

## 2022-12-09 PROCEDURE — 1101F PR PT FALLS ASSESS DOC 0-1 FALLS W/OUT INJ PAST YR: ICD-10-PCS | Mod: CPTII,S$GLB,, | Performed by: OBSTETRICS & GYNECOLOGY

## 2022-12-09 PROCEDURE — 3008F PR BODY MASS INDEX (BMI) DOCUMENTED: ICD-10-PCS | Mod: CPTII,S$GLB,, | Performed by: OBSTETRICS & GYNECOLOGY

## 2022-12-09 PROCEDURE — 97535 SELF CARE MNGMENT TRAINING: CPT

## 2022-12-09 PROCEDURE — 1159F MED LIST DOCD IN RCRD: CPT | Mod: CPTII,S$GLB,, | Performed by: OBSTETRICS & GYNECOLOGY

## 2022-12-09 PROCEDURE — 3075F PR MOST RECENT SYSTOLIC BLOOD PRESS GE 130-139MM HG: ICD-10-PCS | Mod: CPTII,S$GLB,, | Performed by: OBSTETRICS & GYNECOLOGY

## 2022-12-09 PROCEDURE — 3075F SYST BP GE 130 - 139MM HG: CPT | Mod: CPTII,S$GLB,, | Performed by: OBSTETRICS & GYNECOLOGY

## 2022-12-09 PROCEDURE — 1159F PR MEDICATION LIST DOCUMENTED IN MEDICAL RECORD: ICD-10-PCS | Mod: CPTII,S$GLB,, | Performed by: OBSTETRICS & GYNECOLOGY

## 2022-12-09 PROCEDURE — 3052F HG A1C>EQUAL 8.0%<EQUAL 9.0%: CPT | Mod: CPTII,S$GLB,, | Performed by: OBSTETRICS & GYNECOLOGY

## 2022-12-09 PROCEDURE — 3072F LOW RISK FOR RETINOPATHY: CPT | Mod: CPTII,S$GLB,, | Performed by: OBSTETRICS & GYNECOLOGY

## 2022-12-09 PROCEDURE — 99999 PR PBB SHADOW E&M-EST. PATIENT-LVL IV: ICD-10-PCS | Mod: PBBFAC,,, | Performed by: OBSTETRICS & GYNECOLOGY

## 2022-12-09 PROCEDURE — 3288F FALL RISK ASSESSMENT DOCD: CPT | Mod: CPTII,S$GLB,, | Performed by: OBSTETRICS & GYNECOLOGY

## 2022-12-09 PROCEDURE — 3061F NEG MICROALBUMINURIA REV: CPT | Mod: CPTII,S$GLB,, | Performed by: OBSTETRICS & GYNECOLOGY

## 2022-12-09 PROCEDURE — 1125F PR PAIN SEVERITY QUANTIFIED, PAIN PRESENT: ICD-10-PCS | Mod: CPTII,S$GLB,, | Performed by: OBSTETRICS & GYNECOLOGY

## 2022-12-09 PROCEDURE — 99999 PR PBB SHADOW E&M-EST. PATIENT-LVL IV: CPT | Mod: PBBFAC,,, | Performed by: OBSTETRICS & GYNECOLOGY

## 2022-12-09 PROCEDURE — G0101 CA SCREEN;PELVIC/BREAST EXAM: HCPCS | Mod: S$GLB,,, | Performed by: OBSTETRICS & GYNECOLOGY

## 2022-12-09 PROCEDURE — 1125F AMNT PAIN NOTED PAIN PRSNT: CPT | Mod: CPTII,S$GLB,, | Performed by: OBSTETRICS & GYNECOLOGY

## 2022-12-09 PROCEDURE — 3288F PR FALLS RISK ASSESSMENT DOCUMENTED: ICD-10-PCS | Mod: CPTII,S$GLB,, | Performed by: OBSTETRICS & GYNECOLOGY

## 2022-12-09 PROCEDURE — 3079F PR MOST RECENT DIASTOLIC BLOOD PRESSURE 80-89 MM HG: ICD-10-PCS | Mod: CPTII,S$GLB,, | Performed by: OBSTETRICS & GYNECOLOGY

## 2022-12-09 PROCEDURE — 3008F BODY MASS INDEX DOCD: CPT | Mod: CPTII,S$GLB,, | Performed by: OBSTETRICS & GYNECOLOGY

## 2022-12-09 PROCEDURE — G0101 PR CA SCREEN;PELVIC/BREAST EXAM: ICD-10-PCS | Mod: S$GLB,,, | Performed by: OBSTETRICS & GYNECOLOGY

## 2022-12-09 PROCEDURE — 81514 NFCT DS BV&VAGINITIS DNA ALG: CPT | Performed by: OBSTETRICS & GYNECOLOGY

## 2022-12-09 PROCEDURE — 4010F PR ACE/ARB THEARPY RXD/TAKEN: ICD-10-PCS | Mod: CPTII,S$GLB,, | Performed by: OBSTETRICS & GYNECOLOGY

## 2022-12-09 PROCEDURE — 91312 COVID-19, MRNA, LNP-S, BIVALENT BOOSTER, PF, 30 MCG/0.3 ML DOSE: ICD-10-PCS | Mod: S$GLB,,, | Performed by: INTERNAL MEDICINE

## 2022-12-09 PROCEDURE — 1101F PT FALLS ASSESS-DOCD LE1/YR: CPT | Mod: CPTII,S$GLB,, | Performed by: OBSTETRICS & GYNECOLOGY

## 2022-12-09 NOTE — PLAN OF CARE
[facility]   Occupational Therapy Initial Evaluation - Therapeutic Yoga    Date: 12/9/2022   Name: Maria Elena Tavares  Clinic Number: [id number]    Therapy Diagnosis:   Encounter Diagnoses   Name Primary?    Breast cancer     Chemotherapy-induced neuropathy     Aromatase inhibitor-associated arthralgia     Malignant neoplasm of lower-outer quadrant of left breast of female, estrogen receptor positive     Physical deconditioning Yes    Stress and adjustment reaction      Physician: Criss Myers, *    Physician Orders: Eval and Treat   Medical Diagnosis from Referral: Breast cancer [C50.919], Chemotherapy-induced neuropathy [G62.0, T45.1X5A], Aromatase inhibitor-associated arthralgia [M25.50, T45.1X5A]  Evaluation Date: 12/9/2022  Authorization Period Expiration: 12/31/22  Plan of Care Expiration: 03/9/23  Progress Note Due: 02/09/23  Visit # / Visits authorized: 1/ 1     Precautions: Standard, Diabetes, and cancer     Time In: 9:45am  Time Out: 10:45am  Total Appointment Time (timed & untimed codes): 60 minutes      SUBJECTIVE     Date of onset: 9/6/2019    History of current condition: Maria Elena reports: chemotherapy until 2/20 and subsequent bilateral mastectomy with tissue expanders followed by radiation  She developed an infection in tissue expander and these were removed.  She currently has breast prosthesis.    Falls: tripped in pharmacy  due to obstacle.     Prior Therapy: PT for left shoulder, post masyectomy.  Social History: Pt lives alone.  Her daughter has two children and was recently diagnosed with cancer.  Daughter is undergoing chemotherapy and Maria Elena spends Monday-Friday  at her daughters home to assist with her grandchildren.     Occupation: retired  Prior Level of Function: Pt. Was working full time and reported no deficites to ADL's  Current Level of Function: Pt. Reports significant fatigue that limits her functional activity.  She is able to drive to her daughters home  and sit with her grandchild emily but has difficulty being up and active.  She is unable to work and has difficulty managing her house, prepare meals, and taking care of grocery shopping.  Her daughter is currently  paying for someone to clean her home.    Pain:  Occasional pain at her left axilla    Patients goals: Be able to clean my own house, be able to grocery  shop without fatigue, be able to walk 5 blocks, get in and out of my bath tub     Medical History:   Past Medical History:   Diagnosis Date    BMI 37.0-37.9, adult     Breast cancer 2019     Left breast, IDC with lymph node metastisis    Colon polyps 2015    Colon polyps     Diabetes mellitus type II     Diverticulosis     history of diverticulosisseen on colonoscopy at age 48. Repeat recommended at age 58. Done by     Elevated blood protein     History of elevated protein. Apparently has seen  for extensive workup including bone marrow biopsy    History of shingles 2006    Hyperlipidemia     Hypertension     Microalbuminuria     due 2 diabetes    Mild vitamin D deficiency     . A low vitamin D    Thyroid disease     hypothyroidism    Usual hyperplasia of lactiferous duct     Not sure       Surgical History:   Maria Elena Tavares  has a past surgical history that includes Hysterectomy; Oophorectomy; Insertion of tunneled central venous catheter (CVC) with subcutaneous port (Right, 10/04/2019); Breast biopsy (Left, 2019); Breast biopsy (Right, 2019); Breast biopsy (Left, 10/14/2019); Breast biopsy (Left, 10/08/2019); Simple mastectomy (Bilateral, 2020); [place] lymph node biopsy (Left, 2020); Insertion of breast tissue expander (Bilateral, 2020);  section; Tissue expander removal (Bilateral, 2020); and Colonoscopy (N/A, 10/08/2020).    Medications:   Maria Elena has a current medication list which includes the following prescription(s): aspirin, atorvastatin, blood-glucose meter, chlorthalidone,  ciclopirox, clindamycin, fluconazole, irbesartan, letrozole, levothyroxine, xiidra, metformin, polyethylene glycol, intrarosa, semaglutide, and venlafaxine.    Allergies:   Review of patient's allergies indicates:  No Known Allergies       OBJECTIVE       Emotional Stress Response: anxious, frustrated  Physical Stress Response: muscles tighten, SOB, upset stomach  Behavioral Stress Response: prayer, rest, watch TV, talk it out    Self-Care: decreased ability for bathing, home care, community activity, meal prep, caring for grandchildren due to fatigue.    Patient Specific Functional Scale:         Activity 12/9/22       1.cleaning my house 5       2.walk three blocks without needing to sit down. 4       3.getting in and out the bath tub 1       4.         5.        6.        SCORE    3.3         Total Score = Sum of activity scores / number of activities  Minimum Detectable Change (90% CII) for average score = 2 points  Minimum Detectable Change (90% CI) for single activity score = 3 points    Lifestyle Questionnaire:      Lifestyle Response   Emotional Response to Health Status good   Patient's Communication Skills good   Reported Eating Habits fair   Reported Sleeping Patterns fair   Reported Energy Level poor   Knowledge of Exercises & Fitness poor   Frequency of Exercise Sessions/Week <3       PATIENT EDUCATION AND HOME EXERCISES     Education provided:   - - physiology of yoga/meditation and immune health     Written Home Exercises Provided: yes. Exercises were reviewed and Maria Elena was able to demonstrate them prior to the end of the session.  Maria Elena demonstrated good  understanding of the education provided. See EMR under Patient Instructions for exercises provided during therapy sessions.    ASSESSMENT     Maria Elena is a 67 y.o. female referred to outpatient Occupational Therapy with a medical diagnosis of Breast cancer [C50.919], Chemotherapy-induced neuropathy [G62.0, T45.1X5A], Aromatase inhibitor-associated  arthralgia [M25.50, T45.1X5A]. Patient presents with the following impairments:      Self-Care Limitations, Deconditioning, Activity Pacing, Poor Stress Coping Skills, and Upper Extremity - Decreased Strength    Following medical record review, it is determined that Maria Elena will benefit from skilled outpatient Occupational Therapy to address the impairments stated above and in the chart below in order to maximize functional activities. The following goals were discussed with the patient and patient is in agreement with them as addressed in the treatment plan. The patient's rehab potential is Good.       Plan of care discussed with patient: Yes  Patient's spiritual, cultural and educational needs considered and patient is agreeable to the plan of care and goals as stated below:     Anticipated Barriers for therapy: none    Medical Necessity is demonstrated by the following    Profile and History Assessment of Occupational Performance Level of Clinical Decision Making Complexity Score   Occupational Profile:   Maria Elena Tavares is a 67 y.o. female who lives alone and is retired. Maria Elena has difficulty with  ADLs and IADLs as listed previously, which  Affecting herdaily functional abilities.      Comorbidities:    has a past medical history of BMI 37.0-37.9, adult, Breast cancer, Colon polyps, Colon polyps, Diabetes mellitus type II, Diverticulosis, Elevated blood protein, History of shingles, Hyperlipidemia, Hypertension, Microalbuminuria, Mild vitamin D deficiency, Thyroid disease, and Usual hyperplasia of lactiferous duct.    Medical and Therapy History Review:   Brief               Performance Deficits    Physical:  Joint Stability  Muscle Power/Strength  Muscle Endurance  Proprioception Functions  Postural Control    Cognitive:  No Deficits    Psychosocial:    No Deficits     Clinical Decision Making:  low    Assessment Process:  Problem-Focused Assessments    Modification/Need for Assistance:  Minimal-Moderate  Modifications/Assistance    Intervention Selection:  Multiple Treatment Options       low  Based on PMHX, co morbidities , data from assessments and functional level of assistance required with task and clinical presentation directly impacting function.         Goals:  Short Term Goals: 6 weeks      Goal # Goal Status   1 Patient will demonstrate independence with diaphragmatic breathing in sit and supine. Progressing   2 Pt. will identify resource for audio body scan to assist with stress management/immune health    3 Patient will identify 2 new stress coping skills for stress management/immune health. Progressing   4 Patient will identify activity pacing problems.  Patient will then implement new plan for daily activity to increase endurance for ADL's. Progressing      Long Term Goals: 12 weeks      Goal # Goal Status   1 Patient will demonstrate independence with yoga Home Exercise Program to increase strength and endurance for ADL's. Progressing   2 Patient will demonstrate independence with relaxation techniques to manage stress for immune health. Progressing   3 Patient will verbalize good understanding of stress/immune health relationship.  Progressing   4           PLAN   Plan of care Certification: 12/9/2022 to04/09/23 .    Outpatient Occupational Therapy 1 times weekly for 1 weeks to include the following interventions: Patient Education, Self Care, and Therapeutic Exercise.     Benjamin Capellan, OT      I

## 2022-12-09 NOTE — PROGRESS NOTES
"SUBJECTIVE:   67 y.o. female   presents today for [employer]. No LMP recorded (lmp unknown). Patient has had a hysterectomy..  She had left breast IDC- ER+, OH+ Her 2 -. She had bilateral mastectomy, DDAC +Taxol, XRT and is now on Femara.   She reprots Effexor has helped her.   She says that she has recently had vaginal itching and "not able to hold her bladder. .        Past Medical History:   Diagnosis Date    BMI 37.0-37.9, adult     Breast cancer 2019     Left breast, IDC with lymph node metastisis    Colon polyps     Colon polyps     Diabetes mellitus type II     Diverticulosis     history of diverticulosisseen on colonoscopy at age 48. Repeat recommended at age 58. Done by     Elevated blood protein     History of elevated protein. Apparently has seen  for extensive workup including bone marrow biopsy    History of shingles 2006    Hyperlipidemia     Hypertension     Microalbuminuria     due 2 diabetes    Mild vitamin D deficiency     . A low vitamin D    Thyroid disease     hypothyroidism    Usual hyperplasia of lactiferous duct     Not sure     Past Surgical History:   Procedure Laterality Date    BREAST BIOPSY Left 2019    IDC with mets to node    BREAST BIOPSY Right 2019    core bx, benign node    BREAST BIOPSY Left 10/14/2019    MRI Core bx, + IDC    BREAST BIOPSY Left 10/08/2019    Core bx, ADH     SECTION      COLONOSCOPY N/A 10/08/2020    Procedure: COLONOSCOPY;  Surgeon: Shreya Mendoza MD;  Location: [facility] (4TH FLR);  Service: Endoscopy;  Laterality: N/A;  COVID screening on 10/5/20 [facility]    HYSTERECTOMY      INSERTION OF BREAST TISSUE EXPANDER Bilateral 2020    Procedure: INSERTION, TISSUE EXPANDER, BREAST BILATERAL;  Surgeon: Michael Solorzano MD;  Location: [address];  Service: Plastics;  Laterality: Bilateral;    INSERTION OF TUNNELED CENTRAL VENOUS CATHETER (CVC) WITH SUBCUTANEOUS PORT Right 10/04/2019    " Procedure: [id number] RIGHT (CONSENT AM OF) 1.0 hr case;  Surgeon: Elena Lopez MD;  Location: [facility] OR [address] [place] [address];  Service: General;  Laterality: Right;    OOPHORECTOMY      SENTINEL LYMPH NODE BIOPSY Left 03/24/2020    Procedure: BIOPSY, LYMPH NODE, SENTINEL LEFT;  Surgeon: Elena Lopez MD;  Location: [address];  Service: General;  Laterality: Left;    SIMPLE MASTECTOMY Bilateral 03/24/2020    Procedure: MASTECTOMY, SIMPLE BILATERAL;  Surgeon: Elena Lopez MD;  Location: [address];  Service: General;  Laterality: Bilateral;    TISSUE EXPANDER REMOVAL Bilateral 07/30/2020    Procedure: REMOVAL, TISSUE EXPANDER;  Surgeon: Michael Solorzano MD;  Location: [address];  Service: Plastics;  Laterality: Bilateral;     Social History     Socioeconomic History    Marital status: Single   Occupational History     Employer: [employer]   Tobacco Use    Smoking status: Never    Smokeless tobacco: Never    Tobacco comments:     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  She walks occasionally.   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 daughter1 granddaughter     Social Determinants of Health     Financial Resource Strain: Medium Risk    Difficulty of Paying Living Expenses: Somewhat hard   Food Insecurity: No Food Insecurity    Worried About Running Out of Food in the Last Year: Never true    Ran Out of Food in the Last Year: Never true   Transportation Needs: No Transportation Needs    Lack of Transportation (Medical): No    Lack of Transportation (Non-Medical): No   Physical Activity: Insufficiently Active    Days of Exercise per Week: 1 day    Minutes of Exercise per Session: 30 min   Stress: No Stress Concern Present    Feeling of Stress : Not at all    Social Connections: Unknown    Frequency of Communication with Friends and Family: Three times a week    Frequency of Social Gatherings with Friends and Family: Once a week    Active Member of Clubs or Organizations: No    Attends Club or Organization Meetings: Never    Marital Status: Never    Housing Stability: Low Risk     Unable to Pay for Housing in the Last Year: No    Number of Places Lived in the Last Year: 1    Unstable Housing in the Last Year: No     Family History   Problem Relation Age of Onset    Cancer Mother         lung ca heavy smoker    Lung cancer Mother     Cancer Father     Lung cancer Father     Hypertension Sister     No Known Problems Sister     No Known Problems Daughter     Hypothyroidism Sister     Diabetes Maternal Aunt     Cancer Maternal Aunt     No Known Problems Maternal Grandmother     Breast cancer Paternal Aunt     No Known Problems Paternal Uncle     Lung cancer Maternal Grandfather     No Known Problems Paternal Grandmother     No Known Problems Paternal Grandfather     Amblyopia Neg Hx     Blindness Neg Hx     Cataracts Neg Hx     Glaucoma Neg Hx     Macular degeneration Neg Hx     Retinal detachment Neg Hx     Strabismus Neg Hx     Stroke Neg Hx     Thyroid disease Neg Hx     Ovarian cancer Neg Hx     Colon cancer Neg Hx      OB History    Para Term  AB Living   1 1 1         SAB IAB Ectopic Multiple Live Births                  # Outcome Date GA Lbr Grey/2nd Weight Sex Delivery Anes PTL Lv   1 Term      CS-LTranv              Current Outpatient Medications   Medication Sig Dispense Refill    aspirin (ECOTRIN) 81 MG EC tablet Take 81 mg by mouth once daily.      atorvastatin (LIPITOR) 10 MG tablet Take 1 tablet (10 mg total) by mouth once daily. 90 tablet 3    chlorthalidone (HYGROTEN) 50 MG Tab Take 1 tablet (50 mg total) by mouth once daily. 90 tablet 1    ciclopirox 0.77 % Gel Apply topically 2 (two) times daily. To thickened discolored toenails. 30 g  6    flu vac 2022 [id number],PF, 60 mcg (15 mcg x 4)/0.5 mL Syrg Inject 0.5 mLs into the muscle once. for 1 dose 0.5 mL 0    irbesartan (AVAPRO) 300 MG tablet Take 1 tablet by mouth once daily 90 tablet 3    letrozole (FEMARA) 2.5 mg Tab Take 1 tablet (2.5 mg total) by mouth once daily. 90 tablet 3    levothyroxine (SYNTHROID) 200 MCG tablet Take 1 tablet (200 mcg total) by mouth once daily. 90 tablet 1    lifitegrast (XIIDRA) 5 % Dpet Apply 1 drop to  both eyes  2 (two) times daily. 180 each 4    metFORMIN (GLUCOPHAGE-XR) 750 MG ER 24hr tablet Take 1 tablet (750 mg total) by mouth 2 (two) times daily with meals. 180 tablet 1    polyethylene glycol (GLYCOLAX) 17 gram/dose powder Mix 17 g (1 capful) with juice or water an drink 2 (two) times daily. 1020 g 2    prasterone, dhea, (INTRAROSA) 6.5 mg Inst Place 1 suppository vaginally nightly. 28 each 11    semaglutide (RYBELSUS) 7 mg tablet Take 1 tablet (7 mg total) by mouth once daily. 90 tablet 3    venlafaxine (EFFEXOR XR) 37.5 MG 24 hr capsule Take 1 capsule (37.5 mg total) by mouth once daily. 30 capsule 5    blood-glucose meter kit DISPENSE : BLOOD TEST STRIPS AND LANCETS PATIENT TEST BLOOD SUGARS TWICE DAILY  TEST STRIPS: 50 EACH, REFILL 5  LANCETS:  50 EACH , REFILL 5 1 each 0     No current facility-administered medications for this visit.     Allergies: Patient has no known allergies.     The 10-year ASCVD risk score (Julio DK, et al., 2019) is: 29.2%    Values used to calculate the score:      Age: 67 years      Sex: Female      Is Non- : Yes      Diabetic: Yes      Tobacco smoker: No      Systolic Blood Pressure: 132 mmHg      Is BP treated: Yes      HDL Cholesterol: 52 mg/dL      Total Cholesterol: 251 mg/dL      ROS:  Constitutional: no weight loss, weight gain, fever, fatigue  Eyes:  No vision changes, glasses/contacts  ENT/Mouth: No ulcers, sinus problems, ears ringing, headache  Cardiovascular: No inability to lie flat,  chest pain, exercise intolerance, swelling, heart palpitations  Respiratory: No wheezing, coughing blood, shortness of breath, or cough  Gastrointestinal: No diarrhea, bloody stool, nausea/vomiting, constipation, gas, hemorrhoids  Genitourinary: No blood in urine, painful urination, urgency of urination, frequency of urination, incomplete emptying, +incontinence, abnormal bleeding, painful periods, heavy periods, vaginal discharge, vaginal odor, painful intercourse, sexual problems, bleeding after intercourse.  Musculoskeletal: No muscle weakness  Skin/Breast: No painful breasts, nipple discharge, masses, rash, ulcers, +breast cancer  Neurological: No passing out, seizures, numbness, headache  Endocrine: No diabetes, hypothyroid, hyperthyroid, +hot flashes, hair loss, abnormal hair growth, acne  Psychiatric: No depression, crying  Hematologic: No bruises, bleeding, swollen lymph nodes, anemia.      Physical Exam:   Constitutional: She is oriented to person, place, and time. She appears well-developed and well-nourished.      Neck: No tracheal deviation present. No thyromegaly present.    Cardiovascular:       Exam reveals no edema.        Pulmonary/Chest: Effort normal. She exhibits no mass, no tenderness, no deformity and no retraction. Right breast exhibits absence. Left breast exhibits absence. Breasts are symmetrical.        Abdominal: Soft. She exhibits no distension and no mass. There is no abdominal tenderness. There is no rebound and no guarding. No hernia. Hernia confirmed negative in the left inguinal area.     Genitourinary: Rectum:      No external hemorrhoid.   There is no rash, tenderness or lesion on the right labia. There is no rash, tenderness or lesion on the left labia. No no adexnal prolapse. Right adnexum displays no mass, no tenderness and no fullness. Left adnexum displays no mass, no tenderness and no fullness. Vaginal cuff normal.  No  no vaginal discharge, tenderness, bleeding, rectocele,  cystocele or unspecified prolapse of vaginal walls in the vagina. Cervix is absent.Uterus is absent.           Musculoskeletal: Normal range of motion and moves all extremeties. No edema.       Neurological: She is alert and oriented to person, place, and time.    Skin: No rash noted. No erythema. No pallor.    Psychiatric: She has a normal mood and affect. Her behavior is normal. Judgment and thought content normal.       ASSESSMENT:   well woman  Vaginal itching  Incontince  PLAN:   Follow up affirm- she was on Amoxicillin 2 weeks ago for sinus infection  CC UA with C&S  return annually or prn

## 2022-12-12 ENCOUNTER — LAB VISIT (OUTPATIENT)
Dept: LAB | Facility: HOSPITAL | Age: 67
End: 2022-12-12
Attending: OBSTETRICS & GYNECOLOGY
Payer: COMMERCIAL

## 2022-12-12 ENCOUNTER — TELEPHONE (OUTPATIENT)
Dept: HEMATOLOGY/ONCOLOGY | Facility: CLINIC | Age: 67
End: 2022-12-12
Payer: COMMERCIAL

## 2022-12-12 DIAGNOSIS — R30.0 DYSURIA: Primary | ICD-10-CM

## 2022-12-12 DIAGNOSIS — N89.8 VAGINAL ITCHING: ICD-10-CM

## 2022-12-12 DIAGNOSIS — R30.0 DYSURIA: ICD-10-CM

## 2022-12-12 LAB
BACTERIAL VAGINOSIS DNA: NEGATIVE
CANDIDA GLABRATA DNA: NEGATIVE
CANDIDA KRUSEI DNA: NEGATIVE
CANDIDA RRNA VAG QL PROBE: NEGATIVE
T VAGINALIS RRNA GENITAL QL PROBE: NEGATIVE

## 2022-12-12 PROCEDURE — 87086 URINE CULTURE/COLONY COUNT: CPT | Performed by: OBSTETRICS & GYNECOLOGY

## 2022-12-12 NOTE — TELEPHONE ENCOUNTER
"Called pt back. Pt would like UA and culture to be collected at [facility] for 2p.     ----- Message from Lee Stern sent at 12/12/2022  9:07 AM CST -----  Consult/Advisory:          Name Of Caller: Self      Contact Preference?: [phone]       Provider Name: Louis      Does patient feel the need to be seen today? No      What is the nature of the call?: Returning call to schedule urinalysis          Additional Notes:  "Thank you for all that you do for our patients"       "

## 2022-12-13 LAB — BACTERIA UR CULT: NORMAL

## 2022-12-26 ENCOUNTER — PATIENT MESSAGE (OUTPATIENT)
Dept: ADMINISTRATIVE | Facility: HOSPITAL | Age: 67
End: 2022-12-26
Payer: COMMERCIAL

## 2023-01-06 ENCOUNTER — TELEPHONE (OUTPATIENT)
Dept: OBSTETRICS AND GYNECOLOGY | Facility: CLINIC | Age: 68
End: 2023-01-06
Payer: COMMERCIAL

## 2023-01-06 ENCOUNTER — PATIENT MESSAGE (OUTPATIENT)
Dept: HEMATOLOGY/ONCOLOGY | Facility: CLINIC | Age: 68
End: 2023-01-06
Payer: COMMERCIAL

## 2023-01-06 RX ORDER — TERCONAZOLE 4 MG/G
1 CREAM VAGINAL NIGHTLY
Qty: 45 G | Refills: 0 | Status: SHIPPED | OUTPATIENT
Start: 2023-01-06 | End: 2023-01-13

## 2023-01-12 ENCOUNTER — OFFICE VISIT (OUTPATIENT)
Dept: ORTHOPEDICS | Facility: CLINIC | Age: 68
End: 2023-01-12
Payer: COMMERCIAL

## 2023-01-12 ENCOUNTER — LAB VISIT (OUTPATIENT)
Dept: LAB | Facility: HOSPITAL | Age: 68
End: 2023-01-12
Payer: COMMERCIAL

## 2023-01-12 VITALS
HEIGHT: 60 IN | WEIGHT: 174.69 LBS | DIASTOLIC BLOOD PRESSURE: 89 MMHG | HEART RATE: 94 BPM | SYSTOLIC BLOOD PRESSURE: 119 MMHG | BODY MASS INDEX: 34.3 KG/M2

## 2023-01-12 DIAGNOSIS — E11.9 TYPE 2 DIABETES MELLITUS WITHOUT COMPLICATION, UNSPECIFIED WHETHER LONG TERM INSULIN USE: ICD-10-CM

## 2023-01-12 DIAGNOSIS — E11.9 TYPE 2 DIABETES MELLITUS WITHOUT COMPLICATION, UNSPECIFIED WHETHER LONG TERM INSULIN USE: Primary | ICD-10-CM

## 2023-01-12 LAB
ESTIMATED AVG GLUCOSE: ABNORMAL MG/DL (ref 68–131)
HBA1C MFR BLD: >14 % (ref 4–5.6)

## 2023-01-12 PROCEDURE — 3079F PR MOST RECENT DIASTOLIC BLOOD PRESSURE 80-89 MM HG: ICD-10-PCS | Mod: CPTII,S$GLB,, | Performed by: PHYSICIAN ASSISTANT

## 2023-01-12 PROCEDURE — 1159F PR MEDICATION LIST DOCUMENTED IN MEDICAL RECORD: ICD-10-PCS | Mod: CPTII,S$GLB,, | Performed by: PHYSICIAN ASSISTANT

## 2023-01-12 PROCEDURE — 3008F PR BODY MASS INDEX (BMI) DOCUMENTED: ICD-10-PCS | Mod: CPTII,S$GLB,, | Performed by: PHYSICIAN ASSISTANT

## 2023-01-12 PROCEDURE — 99214 OFFICE O/P EST MOD 30 MIN: CPT | Mod: S$GLB,,, | Performed by: PHYSICIAN ASSISTANT

## 2023-01-12 PROCEDURE — 3079F DIAST BP 80-89 MM HG: CPT | Mod: CPTII,S$GLB,, | Performed by: PHYSICIAN ASSISTANT

## 2023-01-12 PROCEDURE — 1159F MED LIST DOCD IN RCRD: CPT | Mod: CPTII,S$GLB,, | Performed by: PHYSICIAN ASSISTANT

## 2023-01-12 PROCEDURE — 1101F PT FALLS ASSESS-DOCD LE1/YR: CPT | Mod: CPTII,S$GLB,, | Performed by: PHYSICIAN ASSISTANT

## 2023-01-12 PROCEDURE — 99999 PR PBB SHADOW E&M-EST. PATIENT-LVL IV: CPT | Mod: PBBFAC,,, | Performed by: PHYSICIAN ASSISTANT

## 2023-01-12 PROCEDURE — 1160F PR REVIEW ALL MEDS BY PRESCRIBER/CLIN PHARMACIST DOCUMENTED: ICD-10-PCS | Mod: CPTII,S$GLB,, | Performed by: PHYSICIAN ASSISTANT

## 2023-01-12 PROCEDURE — 3072F PR LOW RISK FOR RETINOPATHY: ICD-10-PCS | Mod: CPTII,S$GLB,, | Performed by: PHYSICIAN ASSISTANT

## 2023-01-12 PROCEDURE — 1125F AMNT PAIN NOTED PAIN PRSNT: CPT | Mod: CPTII,S$GLB,, | Performed by: PHYSICIAN ASSISTANT

## 2023-01-12 PROCEDURE — 99214 PR OFFICE/OUTPT VISIT, EST, LEVL IV, 30-39 MIN: ICD-10-PCS | Mod: S$GLB,,, | Performed by: PHYSICIAN ASSISTANT

## 2023-01-12 PROCEDURE — 83036 HEMOGLOBIN GLYCOSYLATED A1C: CPT | Performed by: PHYSICIAN ASSISTANT

## 2023-01-12 PROCEDURE — 3288F FALL RISK ASSESSMENT DOCD: CPT | Mod: CPTII,S$GLB,, | Performed by: PHYSICIAN ASSISTANT

## 2023-01-12 PROCEDURE — 1125F PR PAIN SEVERITY QUANTIFIED, PAIN PRESENT: ICD-10-PCS | Mod: CPTII,S$GLB,, | Performed by: PHYSICIAN ASSISTANT

## 2023-01-12 PROCEDURE — 99999 PR PBB SHADOW E&M-EST. PATIENT-LVL IV: ICD-10-PCS | Mod: PBBFAC,,, | Performed by: PHYSICIAN ASSISTANT

## 2023-01-12 PROCEDURE — 3074F PR MOST RECENT SYSTOLIC BLOOD PRESSURE < 130 MM HG: ICD-10-PCS | Mod: CPTII,S$GLB,, | Performed by: PHYSICIAN ASSISTANT

## 2023-01-12 PROCEDURE — 3288F PR FALLS RISK ASSESSMENT DOCUMENTED: ICD-10-PCS | Mod: CPTII,S$GLB,, | Performed by: PHYSICIAN ASSISTANT

## 2023-01-12 PROCEDURE — 1160F RVW MEDS BY RX/DR IN RCRD: CPT | Mod: CPTII,S$GLB,, | Performed by: PHYSICIAN ASSISTANT

## 2023-01-12 PROCEDURE — 1101F PR PT FALLS ASSESS DOC 0-1 FALLS W/OUT INJ PAST YR: ICD-10-PCS | Mod: CPTII,S$GLB,, | Performed by: PHYSICIAN ASSISTANT

## 2023-01-12 PROCEDURE — 3074F SYST BP LT 130 MM HG: CPT | Mod: CPTII,S$GLB,, | Performed by: PHYSICIAN ASSISTANT

## 2023-01-12 PROCEDURE — 3008F BODY MASS INDEX DOCD: CPT | Mod: CPTII,S$GLB,, | Performed by: PHYSICIAN ASSISTANT

## 2023-01-12 PROCEDURE — 36415 COLL VENOUS BLD VENIPUNCTURE: CPT | Performed by: PHYSICIAN ASSISTANT

## 2023-01-12 PROCEDURE — 3072F LOW RISK FOR RETINOPATHY: CPT | Mod: CPTII,S$GLB,, | Performed by: PHYSICIAN ASSISTANT

## 2023-01-12 NOTE — PROGRESS NOTES
SUBJECTIVE:     Chief Complaint & History of Present Illness:  Maria Elena Tavares is a Established patient 67 y.o. female who is seen here today with a complaint of    Chief Complaint   Patient presents with    Right Knee - Pain    Left Knee - Pain    .  She is patient well-known to us was last seen treated the clinic for this condition 06/27/2022 by Lorraine Singh PA-C at that time she had undergone cortisone injection of the left knee and had done fairly well she is had return of significant pain in the knee left much more than right she is aware she has severe arthritis and does need to begin moving towards knee replacement surgery.  She is struggled with her blood sugars related to her diabetes her A1 last A1c was 8.3.  With this in mind she states she has been working towards getting her A1c down will schedule a new A1c test today.  If we can have her below 7.5 will return tomorrow for cortisone injection.  On a scale of 1-10, with 10 being worst pain imaginable, he rates this pain as 5 on good days and 8 on bad days.  she describes the pain as sore and achy.    Review of patient's allergies indicates:  No Known Allergies      Current Outpatient Medications   Medication Sig Dispense Refill    aspirin (ECOTRIN) 81 MG EC tablet Take 81 mg by mouth once daily.      atorvastatin (LIPITOR) 10 MG tablet Take 1 tablet (10 mg total) by mouth once daily. 90 tablet 3    chlorthalidone (HYGROTEN) 50 MG Tab Take 1 tablet (50 mg total) by mouth once daily. 90 tablet 1    ciclopirox 0.77 % Gel Apply topically 2 (two) times daily. To thickened discolored toenails. 30 g 6    irbesartan (AVAPRO) 300 MG tablet Take 1 tablet by mouth once daily 90 tablet 3    letrozole (FEMARA) 2.5 mg Tab Take 1 tablet (2.5 mg total) by mouth once daily. 90 tablet 3    levothyroxine (SYNTHROID) 200 MCG tablet Take 1 tablet (200 mcg total) by mouth once daily. 90 tablet 1    lifitegrast (XIIDRA) 5 % Dpet Apply 1 drop to  both eyes  2 (two) times  daily. 180 each 4    metFORMIN (GLUCOPHAGE-XR) 750 MG ER 24hr tablet Take 1 tablet (750 mg total) by mouth 2 (two) times daily with meals. 180 tablet 1    polyethylene glycol (GLYCOLAX) 17 gram/dose powder Mix 17 g (1 capful) with juice or water an drink 2 (two) times daily. 1020 g 2    prasterone, dhea, (INTRAROSA) 6.5 mg Inst Place 1 suppository vaginally nightly. 28 each 11    semaglutide (RYBELSUS) 7 mg tablet Take 1 tablet (7 mg total) by mouth once daily. 90 tablet 3    terconazole (TERAZOL 7) 0.4 % Crea Place 1 applicator vaginally every evening. for 7 days 45 g 0    venlafaxine (EFFEXOR XR) 37.5 MG 24 hr capsule Take 1 capsule (37.5 mg total) by mouth once daily. 30 capsule 5    blood-glucose meter kit DISPENSE : BLOOD TEST STRIPS AND LANCETS PATIENT TEST BLOOD SUGARS TWICE DAILY  TEST STRIPS: 50 EACH, REFILL 5  LANCETS:  50 EACH , REFILL 5 1 each 0     No current facility-administered medications for this visit.       Past Medical History:   Diagnosis Date    BMI 37.0-37.9, adult     Breast cancer 09/05/2019     Left breast, IDC with lymph node metastisis    Colon polyps 2015    Colon polyps 2015    Diabetes mellitus type II     Diverticulosis     history of diverticulosisseen on colonoscopy at age 48. Repeat recommended at age 58. Done by     Elevated blood protein     History of elevated protein. Apparently has seen  for extensive workup including bone marrow biopsy    History of shingles 2006    Hyperlipidemia     Hypertension     Microalbuminuria     due 2 diabetes    Mild vitamin D deficiency     . A low vitamin D    Thyroid disease     hypothyroidism    Usual hyperplasia of lactiferous duct     Not sure       Past Surgical History:   Procedure Laterality Date    BREAST BIOPSY Left 09/05/2019    IDC with mets to node    BREAST BIOPSY Right 09/26/2019    core bx, benign node    BREAST BIOPSY Left 10/14/2019    MRI Core bx, + IDC    BREAST BIOPSY Left 10/08/2019    Core bx, ADH      SECTION      COLONOSCOPY N/A 10/08/2020    Procedure: COLONOSCOPY;  Surgeon: Shreya Mendoza MD;  Location: [facility] ENDO (4TH FLR);  Service: Endoscopy;  Laterality: N/A;  COVID screening on 10/5/20 [facility] - ER    HYSTERECTOMY      INSERTION OF BREAST TISSUE EXPANDER Bilateral 2020    Procedure: INSERTION, TISSUE EXPANDER, BREAST BILATERAL;  Surgeon: Michael Solorzano MD;  Location: [facility] OR 2ND FLR;  Service: Plastics;  Laterality: Bilateral;    INSERTION OF TUNNELED CENTRAL VENOUS CATHETER (CVC) WITH SUBCUTANEOUS PORT Right 10/04/2019    Procedure: [id number] RIGHT (CONSENT AM OF) 1.0 hr case;  Surgeon: Elena Lopez MD;  Location: [facility] OR 2ND FLR;  Service: General;  Laterality: Right;    OOPHORECTOMY      SENTINEL LYMPH NODE BIOPSY Left 2020    Procedure: BIOPSY, LYMPH NODE, SENTINEL LEFT;  Surgeon: Elena Lopez MD;  [facility];  Service: General;  Laterality: Left;    SIMPLE MASTECTOMY Bilateral 2020    Procedure: MASTECTOMY, SIMPLE BILATERAL;  Surgeon: Elena Lopez MD;  [facility];  Service: General;  Laterality: Bilateral;    TISSUE EXPANDER REMOVAL Bilateral 2020    Procedure: REMOVAL, TISSUE EXPANDER;  Surgeon: Michael Solorzano MD;  Location: [facility] OR 2ND FLR;  Service: Plastics;  Laterality: Bilateral;       Vital Signs (Most Recent)  Vitals:    23 0810   BP: 119/89   Pulse: 94           Review of Systems:  ROS:  Constitutional: no fever or chills  Eyes: no visual changes  ENT: no nasal congestion or sore throat  Respiratory: no cough or shortness of breath  Cardiovascular: no chest pain or palpitations, positive orthostatic hypotension  Gastrointestinal: no nausea or vomiting, tolerating diet,  Positive enteritis   Genitourinary: no hematuria or dysuria  Integument/Breast: no rash or pruritis, status post bilateral mastectomy location of breast implant  Hematologic/Lymphatic: no easy bruising or  lymphadenopathy, positive breast cancer with metastases that axial lymph nodes chemotherapy-induced neutropenia    Musculoskeletal: no arthralgias or myalgias  Neurological: no seizures or tremors, positive chemotherapy induced neuropathy  Behavioral/Psych: no auditory or visual hallucinations  Endocrine: no heat or cold intolerance, anemia positive vitamin-D deficiency, thyroid disease, hypothyroidism                OBJECTIVE:     PHYSICAL EXAM:  Height: 5' (152.4 cm) Weight: 79.2 kg (174 lb 11.4 oz), General Appearance: Well nourished, well developed, in no acute distress.  Neurological: Mood & affect are normal.    left  Knee Exam:  Knee Range of Motion:5-120 degrees flexion   Effusion:  Mild  Condition of skin:intact  Location of tenderness:Medial joint line   Strength:limited by pain and 5 of 5  Stability:  Lachman: stable, LCL: stable, MCL: stable, PCL: stable, and posteromedial (dial): stable  Varus /Valgus stress:  normal  Susana:   negative/negative    right  Knee Exam:  Knee Range of Motion:0-120 degrees flexion   Effusion:none  Condition of skin:intact  Location of tenderness:Medial joint line   Strength:limited by pain and 5 of 5  Stability:  Lachman: stable, LCL: stable, MCL: stable, PCL: stable, and posteromedial (dial): stable  Varus /Valgus stress:  normal  Susana:   negative/negative      Hip Examination:  full painless range of motion, without tenderness    RADIOGRAPHS:  X-rays from previous visit reviewed by me today demonstrate complete loss of medial joint space on the left marked osteophytic spurring sclerotic changes noted tricompartmentally right knee demonstrates moderate arthritic changes primarily medial compartment with osteophytic spurring and sclerotic changes no evidence of fractures or dislocation iron knee    ASSESSMENT/PLAN:       ICD-10-CM ICD-9-CM   1. Type 2 diabetes mellitus without complication, unspecified whether long term insulin use  E11.9 250.00       Plan: We  discussed with the patient at length all the different treatment options available for  the knee including anti-inflammatories, acetaminophen, rest, ice, knee strengthening exercise, occasional cortisone injections for temporary relief, Viscosupplimentation injections, arthroscopic debridement osteotomy, and finally knee arthroplasty.   Patient is aware that she is in need of knee replacement surgery has been putting this off since she is been dealing with her cancer for review of her recent reports from Oncology she has been cleared from a cancer standpoint she is still working with her primary care to get her blood sugars under control with this in mind we can tentatively plan to begin moving towards knee replacement surgery  A1c to be performed today  Follow-up tomorrow if A1c is less than 7.5 to move forward with cortisone injection therapy

## 2023-01-13 ENCOUNTER — CLINICAL SUPPORT (OUTPATIENT)
Dept: REHABILITATION | Facility: HOSPITAL | Age: 68
End: 2023-01-13
Payer: COMMERCIAL

## 2023-01-13 DIAGNOSIS — F43.29 STRESS AND ADJUSTMENT REACTION: ICD-10-CM

## 2023-01-13 DIAGNOSIS — R53.81 PHYSICAL DECONDITIONING: Primary | ICD-10-CM

## 2023-01-13 PROCEDURE — 97110 THERAPEUTIC EXERCISES: CPT

## 2023-01-13 PROCEDURE — 97535 SELF CARE MNGMENT TRAINING: CPT

## 2023-01-13 NOTE — PROGRESS NOTES
[facility]  Occupational Therapy Treatment Note - Therapeutic Yoga Progam    Date: 1/13/2023  Name: Maria Elena Tavares  Clinic Number: [id number]    Therapy Diagnosis:   Encounter Diagnoses   Name Primary?    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   She was compliant with home exercise program given last session.   Response to previous treatment:I love this!  Functional change: none-eval only    Pain: 3/10  Location: knees and back    OBJECTIVE     Objective Measures updated at progress report unless specified.  Patient Specific Functional Scale:         Activity 12/9/22           1.cleaning my house 5           2.walk three blocks without needing to sit down. 4           3.getting in and out the bath tub 1           4.              5.             6.             SCORE    3.3              Total Score = Sum of activity scores / number of activities  Minimum Detectable Change (90% CII) for average score = 2 points  Minimum Detectable Change (90% CI) for single activity score = 3 points      Treatment     Maria Elena received the treatments listed below:       Date 1/13/23        Therapeutic Yoga Exercises - 51667 Therapeutic Ex 15 minutes  minutes  minutes  minutes  minutes  minutes   Seated Yoga   chair yoga:  Cat-Cow,forward bend, back bend, twist,          Quadruped         Supine  Bridge, core with block between knees, twist x 3, side lying clam shells and leg lifts 2 sets of 15 each        Prone         standing Warrior 2, chair, down dog with chair, wide straddle with hands on floor and crown of head on chair with block                          Self-Care/Home Management  -[id number]   15 minutes  minutes  minutes  minutes  minutes  minutes            Relaxation techniques Diaphragmatic breathing, (DB), body scan        Restorative  Fish and bridge with block  HEP        Activity Pacing                           Stress Management/Education                       Patient Education and Home Exercises      Education provided:   - - physiology of yoga/meditation and immune health   - Progress towards goals     Written Home Exercises Provided: yes.  Exercises were reviewed and Maria Elena was able to demonstrate them prior to the end of the session.  Maria Elena demonstrated good  understanding of the HEP provided. See EMR under Patient Instructions for exercises provided during therapy sessions.       Assessment     In today's session, Maria Elena was taught her HEP with max assist.  She tolerated all exercise and was able to increase her breath length and depth.  She was advised to start walking 2 blocks daily.    Maria Elena is progressing well towards her goals and there are no updates to goals at this time. Pt prognosis is Good.     Pt will continue to benefit from skilled outpatient occupational therapy to address the deficits listed in the problem list on initial evaluation provide pt/family education and to maximize pt's level of independence in the home and community environment.     Pt's spiritual, cultural and educational needs considered and pt agreeable to plan of care and goals.    Anticipated barriers to occupational therapy: none    Goals:  Short Term Goals: 6 weeks      Goal # Goal Status   1 Patient will demonstrate independence with diaphragmatic breathing in sit and supine. Progressing   2 Pt. will  identify resource for audio body scan to assist with stress management/immune health    3 Patient will identify 2 new stress coping skills for stress management/immune health. Progressing   4 Patient will identify activity pacing problems.  Patient will then implement new plan for daily activity to increase endurance for ADL's. Progressing   5 Walk 2 blocks 5x week       Long Term Goals: 12 weeks      Goal # Goal Status   1 Patient will demonstrate independence with yoga Home Exercise Program to increase strength and endurance for ADL's. Progressing   2 Patient will demonstrate independence with relaxation techniques to manage stress for immune health. Progressing   3 Patient will verbalize good understanding of stress/immune health relationship.  Progressing   4             PLAN     Plan of care Certification: 1/13/2023 to 3/9/23.    Outpatient Occupational Therapy 1 times weekly for 12 weeks to include the following interventions: Patient Education, Self Care, and Therapeutic Exercise.     Benjamin Capellan OT

## 2023-01-20 ENCOUNTER — CLINICAL SUPPORT (OUTPATIENT)
Dept: REHABILITATION | Facility: HOSPITAL | Age: 68
End: 2023-01-20
Payer: COMMERCIAL

## 2023-01-20 DIAGNOSIS — F43.29 STRESS AND ADJUSTMENT REACTION: ICD-10-CM

## 2023-01-20 PROBLEM — R53.81 PHYSICAL DECONDITIONING: Status: RESOLVED | Noted: 2023-01-13 | Resolved: 2023-01-20

## 2023-01-20 PROBLEM — R53.81 PHYSICAL DECONDITIONING: Status: ACTIVE | Noted: 2023-01-20

## 2023-01-20 PROCEDURE — 97110 THERAPEUTIC EXERCISES: CPT

## 2023-01-20 PROCEDURE — 97535 SELF CARE MNGMENT TRAINING: CPT

## 2023-01-20 NOTE — PROGRESS NOTES
[facility]  Occupational Therapy Treatment Note - Therapeutic Yoga Progam    Date: 1/20/2023  Name: Maria Elena Tavares  Clinic Number: [id number]    Therapy Diagnosis:   Encounter Diagnosis   Name Primary?    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  She did notdo every exercise on Her HEP due to questions.  She was compliant with home exercise program given last session.   Response to previous treatment:I felt good.  Functional change: I am moving faster and I stop before I get too tired to take a break and then resume.    Pain: 3/10  Location: knees and back    OBJECTIVE     Objective Measures updated at progress report unless specified.  Patient Specific Functional Scale:         Activity 12/9/22           1.cleaning my house 5           2.walk three blocks without needing to sit down. 4           3.getting in and out the bath tub 1           4.              5.             6.             SCORE    3.3              Total Score = Sum of activity scores / number of activities  Minimum Detectable Change (90% CII) for average score = 2 points  Minimum Detectable Change (90% CI) for single activity score = 3 points      Treatment     Maria Elena received the treatments listed below:       Date 1/13/23 1/20/23       Therapeutic Yoga Exercises - [id number] Therapeutic Ex 15 minutes  45 minutes   minutes  minutes  minutes  minutes   Seated Yoga   chair yoga:  Cat-Cow,forward bend, back bend, twist,   hair yoga:  Cat-Cow,forward bend, back bend, twist,        Quadruped         Supine Bridge, core with block between knees, twist x 3, side lying clam shells and leg lifts 2 sets of 15 each Bridge, core with block between knees, twist x 3, side lying clam shells and leg lifts 2 sets of 15 each       Prone         standing Warrior 2, chair, down dog with chair, wide straddle with hands on floor and crown of head on chair with block Warrior 2, chair, down dog with chair, wide straddle with hands on floor and crown of head on chair with block                         Self-Care/Home Management  -[id number]   15 minutes  15 minutes  minutes  minutes  minutes  minutes            Relaxation techniques Diaphragmatic breathing, (DB), body scan Diaphragmatic breathing, (DB), body scan       Restorative  Fish and bridge with block  HEP christiane and bridge with block  HEP       Activity Pacing                           Stress Management/Education                       Patient Education and Home Exercises      Education provided:   - - physiology of yoga/meditation and immune health   - Progress towards goals     Written Home Exercises Provided: yes.  Exercises were reviewed and Maria Elena was able to demonstrate them prior to the end of the session.  Maria Elena demonstrated good  understanding of the HEP provided. See EMR under Patient Instructions for exercises provided during therapy sessions.       Assessment     In today's session, Maria Elena was taught her HEP with max assist.  She tolerated all exercise and was still able to increase her breath length and depth.  She reported  walking 2 blocks daily.  She used  activity pacing stratagies and reported increased energy.    Maria Elena is progressing well towards her goals and there are no updates to goals at this time. Pt prognosis is Good.     Pt will continue to benefit from skilled outpatient  occupational therapy to address the deficits listed in the problem list on initial evaluation provide pt/family education and to maximize pt's level of independence in the home and community environment.     Pt's spiritual, cultural and educational needs considered and pt agreeable to plan of care and goals.    Anticipated barriers to occupational therapy: none    Goals:  Short Term Goals: 6 weeks      Goal # Goal Status   1 Patient will demonstrate independence with diaphragmatic breathing in sit and supine. Progressing   2 Pt. will identify resource for audio body scan to assist with stress management/immune health    3 Patient will identify 2 new stress coping skills for stress management/immune health. Progressing   4 Patient will identify activity pacing problems.  Patient will then implement new plan for daily activity to increase endurance for ADL's. Progressing   5 Walk 2 blocks 5x week       Long Term Goals: 12 weeks      Goal # Goal Status   1 Patient will demonstrate independence with yoga Home Exercise Program to increase strength and endurance for ADL's. Progressing   2 Patient will demonstrate independence with relaxation techniques to manage stress for immune health. Progressing   3 Patient will verbalize good understanding of stress/immune health relationship.  Progressing   4             PLAN     Plan of care Certification: 1/20/2023 to 3/9/23.    Outpatient Occupational Therapy 1 times weekly for 12 weeks to include the following interventions: Patient Education, Self Care, and Therapeutic Exercise.     Benjamin Capellan, OT

## 2023-02-09 ENCOUNTER — OFFICE VISIT (OUTPATIENT)
Dept: SURGERY | Facility: CLINIC | Age: 68
End: 2023-02-09
Payer: COMMERCIAL

## 2023-02-09 VITALS
HEIGHT: 60 IN | BODY MASS INDEX: 35.34 KG/M2 | HEART RATE: 108 BPM | DIASTOLIC BLOOD PRESSURE: 61 MMHG | SYSTOLIC BLOOD PRESSURE: 104 MMHG | WEIGHT: 180 LBS

## 2023-02-09 DIAGNOSIS — Z85.3 PERSONAL HISTORY OF BREAST CANCER: Primary | ICD-10-CM

## 2023-02-09 DIAGNOSIS — Z90.13 S/P MASTECTOMY, BILATERAL: ICD-10-CM

## 2023-02-09 DIAGNOSIS — Z12.39 SCREENING BREAST EXAMINATION: ICD-10-CM

## 2023-02-09 PROCEDURE — 3072F LOW RISK FOR RETINOPATHY: CPT | Mod: CPTII,S$GLB,, | Performed by: PHYSICIAN ASSISTANT

## 2023-02-09 PROCEDURE — 3072F PR LOW RISK FOR RETINOPATHY: ICD-10-PCS | Mod: CPTII,S$GLB,, | Performed by: PHYSICIAN ASSISTANT

## 2023-02-09 PROCEDURE — 99999 PR PBB SHADOW E&M-EST. PATIENT-LVL III: CPT | Mod: PBBFAC,,, | Performed by: PHYSICIAN ASSISTANT

## 2023-02-09 PROCEDURE — 3078F DIAST BP <80 MM HG: CPT | Mod: CPTII,S$GLB,, | Performed by: PHYSICIAN ASSISTANT

## 2023-02-09 PROCEDURE — 3288F FALL RISK ASSESSMENT DOCD: CPT | Mod: CPTII,S$GLB,, | Performed by: PHYSICIAN ASSISTANT

## 2023-02-09 PROCEDURE — 3288F PR FALLS RISK ASSESSMENT DOCUMENTED: ICD-10-PCS | Mod: CPTII,S$GLB,, | Performed by: PHYSICIAN ASSISTANT

## 2023-02-09 PROCEDURE — 3008F BODY MASS INDEX DOCD: CPT | Mod: CPTII,S$GLB,, | Performed by: PHYSICIAN ASSISTANT

## 2023-02-09 PROCEDURE — 3046F HEMOGLOBIN A1C LEVEL >9.0%: CPT | Mod: CPTII,S$GLB,, | Performed by: PHYSICIAN ASSISTANT

## 2023-02-09 PROCEDURE — 1160F PR REVIEW ALL MEDS BY PRESCRIBER/CLIN PHARMACIST DOCUMENTED: ICD-10-PCS | Mod: CPTII,S$GLB,, | Performed by: PHYSICIAN ASSISTANT

## 2023-02-09 PROCEDURE — 1160F RVW MEDS BY RX/DR IN RCRD: CPT | Mod: CPTII,S$GLB,, | Performed by: PHYSICIAN ASSISTANT

## 2023-02-09 PROCEDURE — 3008F PR BODY MASS INDEX (BMI) DOCUMENTED: ICD-10-PCS | Mod: CPTII,S$GLB,, | Performed by: PHYSICIAN ASSISTANT

## 2023-02-09 PROCEDURE — 3046F PR MOST RECENT HEMOGLOBIN A1C LEVEL > 9.0%: ICD-10-PCS | Mod: CPTII,S$GLB,, | Performed by: PHYSICIAN ASSISTANT

## 2023-02-09 PROCEDURE — 99214 PR OFFICE/OUTPT VISIT, EST, LEVL IV, 30-39 MIN: ICD-10-PCS | Mod: S$GLB,,, | Performed by: PHYSICIAN ASSISTANT

## 2023-02-09 PROCEDURE — 1101F PR PT FALLS ASSESS DOC 0-1 FALLS W/OUT INJ PAST YR: ICD-10-PCS | Mod: CPTII,S$GLB,, | Performed by: PHYSICIAN ASSISTANT

## 2023-02-09 PROCEDURE — 1159F MED LIST DOCD IN RCRD: CPT | Mod: CPTII,S$GLB,, | Performed by: PHYSICIAN ASSISTANT

## 2023-02-09 PROCEDURE — 99999 PR PBB SHADOW E&M-EST. PATIENT-LVL III: ICD-10-PCS | Mod: PBBFAC,,, | Performed by: PHYSICIAN ASSISTANT

## 2023-02-09 PROCEDURE — 3078F PR MOST RECENT DIASTOLIC BLOOD PRESSURE < 80 MM HG: ICD-10-PCS | Mod: CPTII,S$GLB,, | Performed by: PHYSICIAN ASSISTANT

## 2023-02-09 PROCEDURE — 99214 OFFICE O/P EST MOD 30 MIN: CPT | Mod: S$GLB,,, | Performed by: PHYSICIAN ASSISTANT

## 2023-02-09 PROCEDURE — 1159F PR MEDICATION LIST DOCUMENTED IN MEDICAL RECORD: ICD-10-PCS | Mod: CPTII,S$GLB,, | Performed by: PHYSICIAN ASSISTANT

## 2023-02-09 PROCEDURE — 1101F PT FALLS ASSESS-DOCD LE1/YR: CPT | Mod: CPTII,S$GLB,, | Performed by: PHYSICIAN ASSISTANT

## 2023-02-09 PROCEDURE — 3074F PR MOST RECENT SYSTOLIC BLOOD PRESSURE < 130 MM HG: ICD-10-PCS | Mod: CPTII,S$GLB,, | Performed by: PHYSICIAN ASSISTANT

## 2023-02-09 PROCEDURE — 3074F SYST BP LT 130 MM HG: CPT | Mod: CPTII,S$GLB,, | Performed by: PHYSICIAN ASSISTANT

## 2023-02-09 NOTE — PROGRESS NOTES
[facility]  Department of Surgery  02/09/2023     REFERRING PROVIDER: No referring provider defined for this encounter. Estephania San MD  CHIEF COMPLAINT:   Chief Complaint   Patient presents with    Annual Exam     cbe       DIAGNOSIS:   This is a 67 y.o. female with a history of stage T1cN1a, grade 3, ER +, WA +, HER2 - IDC of the left breast.    TREATMENT:   1. Neoadjuvant chemotherapy with ACT completed 2/11/2020, Dr. Sparkle [facility].   2. S/p bilateral mastectomy with L ALND and R SLNB on 3/24/2020. Dr. John M.D. Surgical Oncology. TE placed but eventually failed. Removed by Dr. Solorzano 7/30/2020.     3. Final Path: 1.2 cm IDC with associated 1.6 cm of DCIS. Negative margins. 1/11 nodes examined from right and left. 1 L node with 5 mm of metastic disease.   4. PMRT completed with Dr. Ray M.D. Radiation Oncology   5.  On Femara with Dr. Sparkle M.D. Medical Oncology     HISTORY OF PRESENT ILLNESS:   Maria Elena Tavares is a 67 y.o. female comes for follow up CBE. Patient continues to have occasional burning pain, but states it has improved some since our last visit. Now doing yoga at the cancer center. Otherwise without other self exam changes such as palpable masses or skin changes. The patient denies constitutional symptoms of night sweats, chills, weight loss, new headaches, visual changes, new back or bony pain, chest pain, or shortness of breath.        Review of Systems: See HPI/Interval History for other systems reviewed.     IMAGING:     None      MEDICATIONS/ALLERGIES:     Current Outpatient Medications   Medication Sig    aspirin (ECOTRIN) 81 MG EC tablet Take 81 mg by mouth once daily.    atorvastatin (LIPITOR) 10 MG tablet Take 1 tablet (10 mg total) by mouth once daily.    chlorthalidone (HYGROTEN) 50 MG Tab Take 1 tablet (50 mg total) by mouth once daily.    ciclopirox 0.77 % Gel Apply topically 2 (two) times daily. To thickened discolored toenails.    clindamycin  (CLEOCIN) 300 MG capsule Take 1 capsule by mouth three times daily. until finished.    fluconazole (DIFLUCAN) 150 MG Tab Take 1 tablet by mouth as directed. for one dose.    irbesartan (AVAPRO) 300 MG tablet Take 1 tablet by mouth once daily    letrozole (FEMARA) 2.5 mg Tab Take 1 tablet (2.5 mg total) by mouth once daily.    levothyroxine (SYNTHROID) 200 MCG tablet Take 1 tablet (200 mcg total) by mouth once daily.    lifitegrast (XIIDRA) 5 % Dpet Apply 1 drop to  both eyes  2 (two) times daily.    metFORMIN (GLUCOPHAGE-XR) 750 MG ER 24hr tablet Take 1 tablet (750 mg total) by mouth 2 (two) times daily with meals.    polyethylene glycol (GLYCOLAX) 17 gram/dose powder Mix 17 g (1 capful) with juice or water an drink 2 (two) times daily.    prasterone, dhea, (INTRAROSA) 6.5 mg Inst Place 1 suppository vaginally nightly.    semaglutide (RYBELSUS) 7 mg tablet Take 1 tablet (7 mg total) by mouth once daily.    venlafaxine (EFFEXOR XR) 37.5 MG 24 hr capsule Take 1 capsule (37.5 mg total) by mouth once daily.    blood-glucose meter kit DISPENSE : BLOOD TEST STRIPS AND LANCETS PATIENT TEST BLOOD SUGARS TWICE DAILY  TEST STRIPS: 50 EACH, REFILL 5  LANCETS:  50 EACH , REFILL 5     No current facility-administered medications for this visit.      Review of patient's allergies indicates:  No Known Allergies    PHYSICAL EXAM:   /61 (BP Location: Right arm, Patient Position: Sitting, BP Method: Large (Automatic))   Pulse 108   Ht 5' (1.524 m)   Wt 81.6 kg (180 lb)   LMP  (LMP Unknown)   BMI 35.15 kg/m²     Physical Exam   Vitals reviewed.  Constitutional: She is oriented to person, place, and time. She appears well-developed. No distress.   HENT:   Head: Normocephalic and atraumatic.   Eyes: Pupils are equal, round, and reactive to light. Right eye exhibits no discharge. Left eye exhibits no discharge. No scleral icterus.   Neck: No tracheal deviation present.   Cardiovascular:  Normal rate and regular rhythm.             Pulmonary/Chest: Effort normal and breath sounds normal. No respiratory distress. She exhibits no mass, no tenderness and no edema. Right breast exhibits no inverted nipple, no mass, no nipple discharge, no skin change and no tenderness. Left breast exhibits no inverted nipple, no mass, no nipple discharge, no skin change and no tenderness. Breasts are symmetrical.       Abdominal: Soft. She exhibits no mass. There is no abdominal tenderness.   Lymphadenopathy:     She has no cervical adenopathy.   Neurological: She is alert and oriented to person, place, and time.   Skin: Skin is warm and dry. No rash noted. She is not diaphoretic. No erythema.       ASSESSMENT:   This is a 67 y.o. female without evidence of recurrence by exam, history or imaging.       PLAN:   1. Continue to follow up with Dr. Villagran.   2. Given the changes in quality and persistence of her right chest wall, will proceed with diagnostic work up.   3. Patient continues to having occasional burning at lateral end of mastectomy incisions. Now doing yoga at the cancer center which helps. Will consider PT if this issue worsens.   4. Follow up in 6 months for CBE.     The patient is in agreement with the plan. Questions were encouraged and answered to patient's satisfaction. Maria Elena will call our office with any questions or concerns.     Isa Chi PA-C  Breast Surgery

## 2023-02-10 ENCOUNTER — TELEPHONE (OUTPATIENT)
Dept: HEMATOLOGY/ONCOLOGY | Facility: CLINIC | Age: 68
End: 2023-02-10
Payer: COMMERCIAL

## 2023-02-10 ENCOUNTER — CLINICAL SUPPORT (OUTPATIENT)
Dept: REHABILITATION | Facility: HOSPITAL | Age: 68
End: 2023-02-10
Payer: COMMERCIAL

## 2023-02-10 DIAGNOSIS — R53.81 PHYSICAL DECONDITIONING: Primary | ICD-10-CM

## 2023-02-10 PROCEDURE — 97110 THERAPEUTIC EXERCISES: CPT

## 2023-02-10 PROCEDURE — 97112 NEUROMUSCULAR REEDUCATION: CPT

## 2023-02-10 PROCEDURE — 97535 SELF CARE MNGMENT TRAINING: CPT

## 2023-02-10 NOTE — PROGRESS NOTES
[facility]  Occupational Therapy Progress and Treatment Note - Therapeutic Yoga Progam    Date: 2/10/2023  Name: Maria Elena Tavares  Clinic Number: [id number]    Therapy Diagnosis:   Encounter Diagnosis   Name Primary?    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  I am generally not feeling well because I went off of my diabetic diet over the holidays and Wilber having trouble getting back on it.    She was  inconsistantly compliant with home exercise program given last session due to knee pain and fatigue.   Response to previous treatment:I felt good.  Functional change: I was able to clean my house and prepare a meal for my  because I used the pacing strategies you gave me. I have not been able to do that for a long time.      Pain: 5/10  Location: knees    OBJECTIVE     Objective Measures updated at progress report unless specified.  Patient Specific Functional Scale:         Activity 12/9/22 2/10/23         1.cleaning my house 5    7         2.walk three blocks without needing to sit down. 4  4         3.getting in and out the bath tub 1    1         4.              5.             6.             SCORE    3.3  4.0            Total Score = Sum of activity scores / number of activities  Minimum Detectable Change (90% CII) for average score = 2 points  Minimum Detectable Change  (90% CI) for single activity score = 3 points      Treatment     Maria Elena received the treatments listed below:       Date 1/13/23 1/20/23 2/10/23      Therapeutic Yoga Exercises - [id number] Therapeutic Ex 15 minutes  45 minutes  45 minutes  PN  minutes  minutes  minutes   Seated Yoga   chair yoga:  Cat-Cow,forward bend, back bend, twist,   hair yoga:  Cat-Cow,forward bend, back bend, twist,  chair yoga:  Cat-Cow,forward bend, back bend, twist,      Quadruped         Supine Bridge, core with block between knees, twist x 3, side lying clam shells and leg lifts 2 sets of 15 each Bridge, core with block between knees, twist x 3, side lying clam shells and leg lifts 2 sets of 15 each Bridge, core with block between knees, twist x 3, side lying clam shells and leg lifts 2 sets of 15 each      Prone         standing Warrior 2, chair, down dog with chair, wide straddle with hands on floor and crown of head on chair with block Warrior 2, chair, down dog with chair, wide straddle with hands on floor and crown of head on chair with block Down dog with chair                        Self-Care/Home Management  -[id number]   15 minutes  15 minutes  15 minutes  minutes  minutes  minutes            Relaxation techniques Diaphragmatic breathing, (DB), body scan Diaphragmatic breathing, (DB), body scan Diaphragmatic breathing, (DB), body scan      Restorative  Fish and bridge with block  HEP christiane and bridge with block  HEP Supine with 2 bolsters      Activity Pacing                           Stress Management/Education                       Patient Education and Home Exercises      Education provided:   - - physiology of yoga/meditation and immune health   - Progress towards goals     Written Home Exercises Provided: yes.  Exercises were reviewed and Maria Elena was able to demonstrate them prior to the end of the session.  Maria Elena demonstrated good  understanding of the HEP provided. See EMR under Patient Instructions for exercises provided during  therapy sessions.       Assessment     In today's session we only practiced the home exercises that do not strain her knees.    She tolerated these well.  She reported  walking 2 blocks 3x this week.  She used  activity pacing stratagies and reported increased energy/function.     aMria Elena is progressing well towards her goals.  She has missed 2 visits due to family emergency and knee pain and  has been seen for 3 visits. She reported increased ability to complete ADL's using pacing stratagies.  She also is able to maintain her walking plan using pacing/breaks.  She did report blood sugar problems and described her typical daily diet. She demonstrated a poor understanding of nutrition and is eating processed sugar rich foods daily that she did not realize contained sugar.  OT contacted this program's RD regarding this. Her PSFS has increased from 3.3 to 4.0 indicating minimal improvement after 3 visits.  Pt prognosis is Good.  Pt will continue to benefit from skilled outpatient occupational therapy to address the deficits listed in the problem list on initial evaluation provide pt/family education and to maximize pt's level of independence in the home and community environment.   Pt's spiritual, cultural and educational needs considered and pt agreeable to plan of care and goals.    Anticipated barriers to occupational therapy: none    Goals:  Short Term Goals: 6 weeks      Goal # Goal Status   1 Patient will demonstrate independence with diaphragmatic breathing in sit and supine. met   2 Pt. will identify resource for audio body scan to assist with stress management/immune health    3 Patient will identify 2 new stress coping skills for stress management/immune health. Progressing   4 Patient will identify activity pacing problems.  Patient will then implement new plan for daily activity to increase endurance for ADL's. Progressing   5 Walk 2 blocks 5x week       Long Term Goals: 12 weeks      Goal # Goal Status   1  Patient will demonstrate independence with yoga Home Exercise Program to increase strength and endurance for ADL's. Progressing   2 Patient will demonstrate independence with relaxation techniques to manage stress for immune health. Progressing   3 Patient will verbalize good understanding of stress/immune health relationship.  Progressing   4             PLAN     Plan of care Certification: 2/10/2023 to 3/9/23.    Outpatient Occupational Therapy 1 times weekly for 12 weeks to include the following interventions: Patient Education, Self Care, and Therapeutic Exercise.     Benjamin Capellan OT

## 2023-02-13 ENCOUNTER — PATIENT MESSAGE (OUTPATIENT)
Dept: ADMINISTRATIVE | Facility: OTHER | Age: 68
End: 2023-02-13
Payer: COMMERCIAL

## 2023-02-28 ENCOUNTER — TELEPHONE (OUTPATIENT)
Dept: PRIMARY CARE CLINIC | Facility: CLINIC | Age: 68
End: 2023-02-28
Payer: COMMERCIAL

## 2023-02-28 NOTE — PROGRESS NOTES
Oncology Nutrition Assessment for Medical Nutrition Therapy  Initial Visit    Maria Elena Tavares   1955    Referring Provider:  Criss Myers, *      Reason for Visit: Pt in for education and nutrition counseling     PMHx:   Past Medical History:   Diagnosis Date    BMI 37.0-37.9, adult     Breast cancer 09/05/2019     Left breast, IDC with lymph node metastisis    Colon polyps 2015    Colon polyps 2015    Diabetes mellitus type II     Diverticulosis     history of diverticulosisseen on colonoscopy at age 48. Repeat recommended at age 58. Done by     Elevated blood protein     History of elevated protein. Apparently has seen  for extensive workup including bone marrow biopsy    History of shingles 2006    Hyperlipidemia     Hypertension     Microalbuminuria     due 2 diabetes    Mild vitamin D deficiency     . A low vitamin D    Thyroid disease     hypothyroidism    Usual hyperplasia of lactiferous duct     Not sure       Nutrition Assessment    This is a 67 y.o.female with history of left breast IDC ER+, FL+ Her 2 -. She had bilateral mastectomy, DDAC +Taxol, XRT and is now on Femara. Referred to nutrition through integrative department. PMH also includes DM2, HTN, HLD.   She reports a lot of leftover neuropathy (hands and feet) since treatment.   She reports she has been trying to get into the digital medicine program for diabetes but having a lot of technical difficulties. A1c extremely elevated last check. However she reports fasting glucose is 155. Checks twice per day and reports it's usually in this range. She reports her A1c was really elevated due to the holidays. She really went off her usual diet and ate a lot of sugar like praline candies. Has been monitoring at home and reports a home A1c kit showed she's down to 13. Reports symptoms like frequent urination have gone away.   She would like to know what to eat for blood sugar, inflammation/neuropathy. Also reports her appetite  has not been very good for the past month and she struggles to cook for just herself. Unable to walk at home due to repairs on street and also knee problems. Doing yoga/stretches. Considering joining the gym.   Diet recall:   6:30/7am- coffee with cream and splenda   Breakfast- Oatmeal or boiled eggs with cracker   Lunch- salad with lettuce, tomato, cucumber, boiled egg, pickled beets, lite ranch  Dinner- chicken, greens   Drinking a least 4 bottles of water per day, sometimes diet cranberry juice or crystal light    Occasional red or white wine      Weight:81.6 kg (179 lb 14.3 oz)  Height:5' (1.524 m)  BMI:Body mass index is 35.13 kg/m².   IBW: Ideal body weight: 45.5 kg (100 lb 4.9 oz)  Adjusted ideal body weight: 59.9 kg (132 lb 2.3 oz)    Usual BW: 175-180lb  Weight Change:stable     Allergies: Patient has no known allergies.    Current Medications:    Current Outpatient Medications:     aspirin (ECOTRIN) 81 MG EC tablet, Take 81 mg by mouth once daily., Disp: , Rfl:     atorvastatin (LIPITOR) 10 MG tablet, Take 1 tablet (10 mg total) by mouth once daily., Disp: 90 tablet, Rfl: 3    blood-glucose meter kit, DISPENSE : BLOOD TEST STRIPS AND LANCETS PATIENT TEST BLOOD SUGARS TWICE DAILY TEST STRIPS: 50 EACH, REFILL 5 LANCETS:  50 EACH , REFILL 5, Disp: 1 each, Rfl: 0    chlorthalidone (HYGROTEN) 50 MG Tab, Take 1 tablet (50 mg total) by mouth once daily., Disp: 90 tablet, Rfl: 1    ciclopirox 0.77 % Gel, Apply topically 2 (two) times daily. To thickened discolored toenails., Disp: 30 g, Rfl: 6    clindamycin (CLEOCIN) 300 MG capsule, Take 1 capsule (300 mg total) by mouth 3 (three) times daily., Disp: 27 capsule, Rfl: 0    fluconazole (DIFLUCAN) 150 MG Tab, Take 1 tablet by mouth as directed. for one dose., Disp: 1 tablet, Rfl: 0    fluconazole (DIFLUCAN) 150 MG Tab, Take 1 tablet (150 mg total) by mouth As instructed., Disp: 1 tablet, Rfl: 0    irbesartan (AVAPRO) 300 MG tablet, Take 1 tablet by mouth once daily,  Disp: 90 tablet, Rfl: 3    letrozole (FEMARA) 2.5 mg Tab, Take 1 tablet (2.5 mg total) by mouth once daily., Disp: 90 tablet, Rfl: 3    levothyroxine (SYNTHROID) 200 MCG tablet, Take 1 tablet (200 mcg total) by mouth once daily., Disp: 90 tablet, Rfl: 1    lifitegrast (XIIDRA) 5 % Dpet, Apply 1 drop to  both eyes  2 (two) times daily., Disp: 180 each, Rfl: 4    metFORMIN (GLUCOPHAGE-XR) 750 MG ER 24hr tablet, Take 1 tablet (750 mg total) by mouth 2 (two) times daily with meals., Disp: 180 tablet, Rfl: 1    polyethylene glycol (GLYCOLAX) 17 gram/dose powder, Mix 17 g (1 capful) with juice or water an drink 2 (two) times daily., Disp: 1020 g, Rfl: 2    prasterone, dhea, (INTRAROSA) 6.5 mg Inst, Place 1 suppository vaginally nightly., Disp: 28 each, Rfl: 11    semaglutide (RYBELSUS) 7 mg tablet, Take 1 tablet (7 mg total) by mouth once daily., Disp: 90 tablet, Rfl: 3    venlafaxine (EFFEXOR XR) 37.5 MG 24 hr capsule, Take 1 capsule (37.5 mg total) by mouth once daily., Disp: 30 capsule, Rfl: 5    Vitamins/Supplements: none currently (previously took gummy fiber and multivitamins)     Labs: Reviewed from 1/12- A1c noted to be >14    Nutrition Diagnosis    Problem: nutrition related knowledge deficit   Etiology (related to): lack of prior need for nutrition education  Signs/Symptoms (as evidenced by):  chemotherapy side effects, elevated A1c    Nutrition Intervention    Nutrition Prescription   1250 Kcals (15kcal/kg)  65 g protein (0.8g/kg)   2050 mL fluid (25mL/kg)    Recommendations:  Can use sugar free metamucil for fiber supplement  Try fish oil 1000-2000mg daily for inflammation   Breakfast: Plain oatmeal- add fruit and cinnamon and boiled egg or toast with peanut butter   Lunch: salad with multiple vegetables and protein or Iconic protein with fruit if on the go  Try Dr. Dewayne white    Dinner: protein and vegetables   Focus on foods with dark, rich colors   Try aqua exercise      Materials Provided/Reviewed   Discussed simple cooking techniques, recipes   Discussed importance of continuing to self monitor blood glucose and watch for symptoms of hyperglycemia     Nutrition Monitoring and Evaluation    Monitor: energy intake, rate/formulation of TPN, weight, and labs A1c    Goals: improve diet quality, slow weight loss, A1c under 7    Follow up In 6 weeks     Communication to referring provider/care team: note available in chart     Counseling time: 1 Hour    Jazmyn Serna, MPH, RD, , LDN, FAND   [phone]

## 2023-02-28 NOTE — TELEPHONE ENCOUNTER
----- Message from Estephania San MD sent at 2/28/2023  3:56 PM CST -----  Thanks for bringing this to my attention. I did not get this result.  I will ask my staff to schedule her an appt with me so we cna review and treat. Thanks! PV  ----- Message -----  From: Jazmyn Serna RD  Sent: 2/28/2023   3:54 PM CST  To: MD Dr. Jaswinder Ingram,  I'm a dietitian in the cancer center. I am seeing Ms. Tavares tomorrow and I noticed her most recent A1c was >14. I didn't see any recent changes to her medications. I apologize if I am missing something, but it looks like the labs were ordered from a different clinic so I wasn't sure if the results ever reached you.   Do you want any additional labs tomorrow while she's here or anything specific I can address? I can of course do some diabetes diet education, but the referral to me was cancer related and I incidentally saw this. And I realize diet can't bring down an A1c that high on its own.     Thanks!

## 2023-03-01 ENCOUNTER — PATIENT MESSAGE (OUTPATIENT)
Dept: PRIMARY CARE CLINIC | Facility: CLINIC | Age: 68
End: 2023-03-01
Payer: COMMERCIAL

## 2023-03-01 ENCOUNTER — CLINICAL SUPPORT (OUTPATIENT)
Dept: HEMATOLOGY/ONCOLOGY | Facility: CLINIC | Age: 68
End: 2023-03-01
Payer: COMMERCIAL

## 2023-03-01 VITALS — WEIGHT: 179.88 LBS | HEIGHT: 60 IN | BODY MASS INDEX: 35.31 KG/M2

## 2023-03-01 DIAGNOSIS — E66.9 CLASS 1 OBESITY IN ADULT, UNSPECIFIED BMI, UNSPECIFIED OBESITY TYPE, UNSPECIFIED WHETHER SERIOUS COMORBIDITY PRESENT: ICD-10-CM

## 2023-03-01 DIAGNOSIS — C77.3 BREAST CANCER METASTASIZED TO AXILLARY LYMPH NODE, LEFT: ICD-10-CM

## 2023-03-01 DIAGNOSIS — E11.9 TYPE 2 DIABETES MELLITUS WITHOUT COMPLICATION, WITHOUT LONG-TERM CURRENT USE OF INSULIN: ICD-10-CM

## 2023-03-01 DIAGNOSIS — E11.21 CONTROLLED TYPE 2 DIABETES MELLITUS WITH DIABETIC NEPHROPATHY, WITHOUT LONG-TERM CURRENT USE OF INSULIN: ICD-10-CM

## 2023-03-01 DIAGNOSIS — C50.912 BREAST CANCER METASTASIZED TO AXILLARY LYMPH NODE, LEFT: ICD-10-CM

## 2023-03-01 DIAGNOSIS — Z71.3 NUTRITIONAL COUNSELING: Primary | ICD-10-CM

## 2023-03-01 PROCEDURE — 97802 MEDICAL NUTRITION INDIV IN: CPT | Mod: S$GLB,,, | Performed by: DIETITIAN, REGISTERED

## 2023-03-01 PROCEDURE — 99999 PR PBB SHADOW E&M-EST. PATIENT-LVL I: CPT | Mod: PBBFAC,,, | Performed by: DIETITIAN, REGISTERED

## 2023-03-01 PROCEDURE — 99999 PR PBB SHADOW E&M-EST. PATIENT-LVL I: ICD-10-PCS | Mod: PBBFAC,,, | Performed by: DIETITIAN, REGISTERED

## 2023-03-01 PROCEDURE — 97802 PR MED NUTR THER, 1ST, INDIV, EA 15 MIN: ICD-10-PCS | Mod: S$GLB,,, | Performed by: DIETITIAN, REGISTERED

## 2023-03-02 ENCOUNTER — TELEPHONE (OUTPATIENT)
Dept: PRIMARY CARE CLINIC | Facility: CLINIC | Age: 68
End: 2023-03-02
Payer: COMMERCIAL

## 2023-03-10 NOTE — LETTER
February 19, 2020    Maria Elena Tavares  [address] [facility] [address]         [facility]  [address]  [place] [address]  Phone: [phone] February 19, 2020     Patient: Maria Elena Tavares   YOB: 1955   Date of Visit: 2/19/2020       To Whom It May Concern:    It is my medical opinion that Maria Elena Tavares may return to work on 2/24/2020. .    If you have any questions or concerns, please don't hesitate to call.    Sincerely,        José Lara, NP     
Home

## 2023-03-15 ENCOUNTER — PATIENT OUTREACH (OUTPATIENT)
Dept: ADMINISTRATIVE | Facility: HOSPITAL | Age: 68
End: 2023-03-15
Payer: COMMERCIAL

## 2023-03-28 ENCOUNTER — LAB VISIT (OUTPATIENT)
Dept: LAB | Facility: HOSPITAL | Age: 68
End: 2023-03-28
Attending: FAMILY MEDICINE
Payer: COMMERCIAL

## 2023-03-28 ENCOUNTER — PATIENT MESSAGE (OUTPATIENT)
Dept: ORTHOPEDICS | Facility: CLINIC | Age: 68
End: 2023-03-28
Payer: COMMERCIAL

## 2023-03-28 ENCOUNTER — PATIENT MESSAGE (OUTPATIENT)
Dept: HEMATOLOGY/ONCOLOGY | Facility: CLINIC | Age: 68
End: 2023-03-28
Payer: COMMERCIAL

## 2023-03-28 ENCOUNTER — OFFICE VISIT (OUTPATIENT)
Dept: PRIMARY CARE CLINIC | Facility: CLINIC | Age: 68
End: 2023-03-28
Attending: FAMILY MEDICINE
Payer: COMMERCIAL

## 2023-03-28 VITALS
WEIGHT: 179.44 LBS | BODY MASS INDEX: 35.23 KG/M2 | HEART RATE: 101 BPM | SYSTOLIC BLOOD PRESSURE: 112 MMHG | DIASTOLIC BLOOD PRESSURE: 78 MMHG | HEIGHT: 60 IN | OXYGEN SATURATION: 90 %

## 2023-03-28 DIAGNOSIS — G62.0 NEUROPATHY DUE TO CHEMOTHERAPEUTIC DRUG: ICD-10-CM

## 2023-03-28 DIAGNOSIS — Z91.89 AT RISK FOR LYMPHEDEMA: ICD-10-CM

## 2023-03-28 DIAGNOSIS — T45.1X5A NEUROPATHY DUE TO CHEMOTHERAPEUTIC DRUG: ICD-10-CM

## 2023-03-28 DIAGNOSIS — E10.649 UNCONTROLLED TYPE 1 DIABETES MELLITUS WITH HYPOGLYCEMIA WITHOUT COMA: Primary | ICD-10-CM

## 2023-03-28 DIAGNOSIS — F43.29 STRESS AND ADJUSTMENT REACTION: ICD-10-CM

## 2023-03-28 DIAGNOSIS — E11.21 CONTROLLED TYPE 2 DIABETES MELLITUS WITH DIABETIC NEPHROPATHY, WITHOUT LONG-TERM CURRENT USE OF INSULIN: ICD-10-CM

## 2023-03-28 DIAGNOSIS — R53.81 PHYSICAL DECONDITIONING: ICD-10-CM

## 2023-03-28 DIAGNOSIS — E87.6 HYPOKALEMIA: ICD-10-CM

## 2023-03-28 DIAGNOSIS — I10 ESSENTIAL HYPERTENSION: ICD-10-CM

## 2023-03-28 DIAGNOSIS — E10.649 UNCONTROLLED TYPE 1 DIABETES MELLITUS WITH HYPOGLYCEMIA WITHOUT COMA: ICD-10-CM

## 2023-03-28 DIAGNOSIS — R52 PAIN: Primary | ICD-10-CM

## 2023-03-28 DIAGNOSIS — C77.3 BREAST CANCER METASTASIZED TO AXILLARY LYMPH NODE, LEFT: ICD-10-CM

## 2023-03-28 DIAGNOSIS — C50.912 BREAST CANCER METASTASIZED TO AXILLARY LYMPH NODE, LEFT: ICD-10-CM

## 2023-03-28 LAB
ESTIMATED AVG GLUCOSE: 263 MG/DL (ref 68–131)
HBA1C MFR BLD: 10.8 % (ref 4–5.6)

## 2023-03-28 PROCEDURE — 1160F PR REVIEW ALL MEDS BY PRESCRIBER/CLIN PHARMACIST DOCUMENTED: ICD-10-PCS | Mod: CPTII,S$GLB,, | Performed by: FAMILY MEDICINE

## 2023-03-28 PROCEDURE — 3078F PR MOST RECENT DIASTOLIC BLOOD PRESSURE < 80 MM HG: ICD-10-PCS | Mod: CPTII,S$GLB,, | Performed by: FAMILY MEDICINE

## 2023-03-28 PROCEDURE — 3074F SYST BP LT 130 MM HG: CPT | Mod: CPTII,S$GLB,, | Performed by: FAMILY MEDICINE

## 2023-03-28 PROCEDURE — 3072F LOW RISK FOR RETINOPATHY: CPT | Mod: CPTII,S$GLB,, | Performed by: FAMILY MEDICINE

## 2023-03-28 PROCEDURE — 3078F DIAST BP <80 MM HG: CPT | Mod: CPTII,S$GLB,, | Performed by: FAMILY MEDICINE

## 2023-03-28 PROCEDURE — 1160F RVW MEDS BY RX/DR IN RCRD: CPT | Mod: CPTII,S$GLB,, | Performed by: FAMILY MEDICINE

## 2023-03-28 PROCEDURE — 1125F PR PAIN SEVERITY QUANTIFIED, PAIN PRESENT: ICD-10-PCS | Mod: CPTII,S$GLB,, | Performed by: FAMILY MEDICINE

## 2023-03-28 PROCEDURE — 99999 PR PBB SHADOW E&M-EST. PATIENT-LVL III: ICD-10-PCS | Mod: PBBFAC,,, | Performed by: FAMILY MEDICINE

## 2023-03-28 PROCEDURE — 3008F PR BODY MASS INDEX (BMI) DOCUMENTED: ICD-10-PCS | Mod: CPTII,S$GLB,, | Performed by: FAMILY MEDICINE

## 2023-03-28 PROCEDURE — 1125F AMNT PAIN NOTED PAIN PRSNT: CPT | Mod: CPTII,S$GLB,, | Performed by: FAMILY MEDICINE

## 2023-03-28 PROCEDURE — 36415 COLL VENOUS BLD VENIPUNCTURE: CPT | Mod: PN | Performed by: FAMILY MEDICINE

## 2023-03-28 PROCEDURE — 99214 PR OFFICE/OUTPT VISIT, EST, LEVL IV, 30-39 MIN: ICD-10-PCS | Mod: S$GLB,,, | Performed by: FAMILY MEDICINE

## 2023-03-28 PROCEDURE — 1101F PR PT FALLS ASSESS DOC 0-1 FALLS W/OUT INJ PAST YR: ICD-10-PCS | Mod: CPTII,S$GLB,, | Performed by: FAMILY MEDICINE

## 2023-03-28 PROCEDURE — 1159F MED LIST DOCD IN RCRD: CPT | Mod: CPTII,S$GLB,, | Performed by: FAMILY MEDICINE

## 2023-03-28 PROCEDURE — 3288F PR FALLS RISK ASSESSMENT DOCUMENTED: ICD-10-PCS | Mod: CPTII,S$GLB,, | Performed by: FAMILY MEDICINE

## 2023-03-28 PROCEDURE — 3072F PR LOW RISK FOR RETINOPATHY: ICD-10-PCS | Mod: CPTII,S$GLB,, | Performed by: FAMILY MEDICINE

## 2023-03-28 PROCEDURE — 83036 HEMOGLOBIN GLYCOSYLATED A1C: CPT | Performed by: FAMILY MEDICINE

## 2023-03-28 PROCEDURE — 1159F PR MEDICATION LIST DOCUMENTED IN MEDICAL RECORD: ICD-10-PCS | Mod: CPTII,S$GLB,, | Performed by: FAMILY MEDICINE

## 2023-03-28 PROCEDURE — 99499 UNLISTED E&M SERVICE: CPT | Mod: S$GLB,,, | Performed by: FAMILY MEDICINE

## 2023-03-28 PROCEDURE — 99214 OFFICE O/P EST MOD 30 MIN: CPT | Mod: S$GLB,,, | Performed by: FAMILY MEDICINE

## 2023-03-28 PROCEDURE — 3288F FALL RISK ASSESSMENT DOCD: CPT | Mod: CPTII,S$GLB,, | Performed by: FAMILY MEDICINE

## 2023-03-28 PROCEDURE — 99499 RISK ADDL DX/OHS AUDIT: ICD-10-PCS | Mod: S$GLB,,, | Performed by: FAMILY MEDICINE

## 2023-03-28 PROCEDURE — 3008F BODY MASS INDEX DOCD: CPT | Mod: CPTII,S$GLB,, | Performed by: FAMILY MEDICINE

## 2023-03-28 PROCEDURE — 1101F PT FALLS ASSESS-DOCD LE1/YR: CPT | Mod: CPTII,S$GLB,, | Performed by: FAMILY MEDICINE

## 2023-03-28 PROCEDURE — 99999 PR PBB SHADOW E&M-EST. PATIENT-LVL III: CPT | Mod: PBBFAC,,, | Performed by: FAMILY MEDICINE

## 2023-03-28 PROCEDURE — 3046F HEMOGLOBIN A1C LEVEL >9.0%: CPT | Mod: CPTII,S$GLB,, | Performed by: FAMILY MEDICINE

## 2023-03-28 PROCEDURE — 3074F PR MOST RECENT SYSTOLIC BLOOD PRESSURE < 130 MM HG: ICD-10-PCS | Mod: CPTII,S$GLB,, | Performed by: FAMILY MEDICINE

## 2023-03-28 PROCEDURE — 3046F PR MOST RECENT HEMOGLOBIN A1C LEVEL > 9.0%: ICD-10-PCS | Mod: CPTII,S$GLB,, | Performed by: FAMILY MEDICINE

## 2023-03-28 RX ORDER — TIRZEPATIDE 5 MG/.5ML
5 INJECTION, SOLUTION SUBCUTANEOUS
Qty: 12 PEN | Refills: 3 | Status: SHIPPED | OUTPATIENT
Start: 2023-03-28 | End: 2023-03-31 | Stop reason: SDUPTHER

## 2023-03-28 NOTE — PROGRESS NOTES
Subjective:       Patient ID: Maria Elena Tavares is a 67 y.o. female.    Chief Complaint: No chief complaint on file.    Pt presents today for DM uncontrolled.  Her a1c is greater than 14. States that she was eating lots of sweets and knows that this is the cause. Has stopped and feels that her numbers should be better    States that neuropathic pain is not much better. Left knee hurting but cannot do anything bc she needs a1c controlled before she can have surgery  Pt is feeling down but not giving up    Review of Systems   Constitutional: Negative.    Eyes: Negative.    Respiratory:  Negative for cough, chest tightness and shortness of breath.    Cardiovascular:  Negative for chest pain, palpitations and leg swelling.   Gastrointestinal: Negative.    Musculoskeletal: Negative.  Negative for joint swelling.   Integumentary:  Negative.   Neurological:  Negative for dizziness, weakness, light-headedness and headaches.   All other systems reviewed and are negative.      Objective:      Physical Exam  Vitals reviewed.   Constitutional:       General: She is not in acute distress.     Appearance: Normal appearance. She is well-developed. She is not ill-appearing, toxic-appearing or diaphoretic.   HENT:      Head: Normocephalic and atraumatic.   Eyes:      Extraocular Movements: Extraocular movements intact.      Conjunctiva/sclera: Conjunctivae normal.      Pupils: Pupils are equal, round, and reactive to light.   Neck:      Thyroid: No thyromegaly.   Cardiovascular:      Rate and Rhythm: Normal rate and regular rhythm.      Pulses:           Dorsalis pedis pulses are 3+ on the right side and 3+ on the left side.        Posterior tibial pulses are 3+ on the right side and 3+ on the left side.      Heart sounds: Normal heart sounds. No murmur heard.  Pulmonary:      Effort: Pulmonary effort is normal. No respiratory distress.      Breath sounds: Normal breath sounds. No wheezing or rales.   Chest:      Chest wall: No  tenderness.   Musculoskeletal:         General: Normal range of motion.      Cervical back: Normal range of motion and neck supple.      Right lower leg: No edema.      Left lower leg: No edema.      Right foot: Normal range of motion. No deformity, bunion or Charcot foot.      Left foot: Normal range of motion. No deformity, bunion or Charcot foot.   Feet:      Right foot:      Protective Sensation: 6 sites tested.  6 sites sensed.      Skin integrity: Skin integrity normal.      Left foot:      Protective Sensation: 6 sites tested.  6 sites sensed.      Skin integrity: Skin integrity normal.   Lymphadenopathy:      Cervical: No cervical adenopathy.   Skin:     General: Skin is warm.   Neurological:      Mental Status: She is alert and oriented to person, place, and time.   Psychiatric:         Mood and Affect: Mood normal.         Behavior: Behavior normal.         Thought Content: Thought content normal.         Judgment: Judgment normal.       Assessment:       Problem List Items Addressed This Visit       Breast cancer metastasized to axillary lymph node, left    Controlled type 2 diabetes mellitus with diabetic nephropathy, without long-term current use of insulin    Relevant Orders    Hemoglobin A1C    Essential hypertension    Hypokalemia     Other Visit Diagnoses       Uncontrolled type 1 diabetes mellitus with hypoglycemia without coma    -  Primary    Relevant Medications    tirzepatide (MOUNJARO) 5 mg/0.5 mL PnIj    Other Relevant Orders    Hemoglobin A1C            Plan:     DMt2: poorly controlled. Suggest that we do MOUNJARO. Stop rybelsus. Eat healthier, exercise. SES of med d/w pt pt amenable  Also suggested to pt that her neuropathy does not appear to be related to DM but rather chemo or perhaps carpal tunnel so pt will make appt to see hand doc    HTN BP stable SEs of med d/w pt  A1c today and in 3 months

## 2023-03-30 ENCOUNTER — TELEPHONE (OUTPATIENT)
Dept: PHARMACY | Facility: CLINIC | Age: 68
End: 2023-03-30
Payer: COMMERCIAL

## 2023-03-31 ENCOUNTER — PATIENT MESSAGE (OUTPATIENT)
Dept: PRIMARY CARE CLINIC | Facility: CLINIC | Age: 68
End: 2023-03-31
Payer: COMMERCIAL

## 2023-03-31 DIAGNOSIS — E11.00 UNCONTROLLED TYPE 2 DM WITH HYPEROSMOLAR NONKETOTIC HYPERGLYCEMIA: Primary | ICD-10-CM

## 2023-03-31 DIAGNOSIS — E10.649 UNCONTROLLED TYPE 1 DIABETES MELLITUS WITH HYPOGLYCEMIA WITHOUT COMA: ICD-10-CM

## 2023-03-31 RX ORDER — TIRZEPATIDE 5 MG/.5ML
5 INJECTION, SOLUTION SUBCUTANEOUS
Qty: 12 PEN | Refills: 3 | Status: SHIPPED | OUTPATIENT
Start: 2023-03-31 | End: 2023-04-25 | Stop reason: DRUGHIGH

## 2023-03-31 NOTE — TELEPHONE ENCOUNTER
No new care gaps identified.  [facility] Embedded Care Gaps. Reference number: [id number]. 3/31/2023   9:23:36 AM [id number]

## 2023-04-05 ENCOUNTER — PATIENT MESSAGE (OUTPATIENT)
Dept: PRIMARY CARE CLINIC | Facility: CLINIC | Age: 68
End: 2023-04-05
Payer: COMMERCIAL

## 2023-04-06 ENCOUNTER — TELEPHONE (OUTPATIENT)
Dept: PHARMACY | Facility: CLINIC | Age: 68
End: 2023-04-06
Payer: COMMERCIAL

## 2023-04-11 ENCOUNTER — PATIENT MESSAGE (OUTPATIENT)
Dept: PRIMARY CARE CLINIC | Facility: CLINIC | Age: 68
End: 2023-04-11
Payer: COMMERCIAL

## 2023-04-12 ENCOUNTER — HOSPITAL ENCOUNTER (OUTPATIENT)
Dept: RADIOLOGY | Facility: OTHER | Age: 68
Discharge: HOME | End: 2023-04-12
Attending: PLASTIC SURGERY
Payer: COMMERCIAL

## 2023-04-12 ENCOUNTER — OFFICE VISIT (OUTPATIENT)
Dept: ORTHOPEDICS | Facility: CLINIC | Age: 68
End: 2023-04-12
Payer: COMMERCIAL

## 2023-04-12 VITALS
HEART RATE: 89 BPM | SYSTOLIC BLOOD PRESSURE: 124 MMHG | OXYGEN SATURATION: 100 % | DIASTOLIC BLOOD PRESSURE: 83 MMHG | HEIGHT: 60 IN | WEIGHT: 186.06 LBS | BODY MASS INDEX: 36.53 KG/M2 | RESPIRATION RATE: 17 BRPM

## 2023-04-12 DIAGNOSIS — M65.311 TRIGGER THUMB OF RIGHT HAND: ICD-10-CM

## 2023-04-12 DIAGNOSIS — R52 PAIN: ICD-10-CM

## 2023-04-12 DIAGNOSIS — G56.01 RIGHT CARPAL TUNNEL SYNDROME: ICD-10-CM

## 2023-04-12 DIAGNOSIS — M65.312 TRIGGER THUMB OF LEFT HAND: Primary | ICD-10-CM

## 2023-04-12 PROCEDURE — 20550 PR INJECT TENDON SHEATH/LIGAMENT: ICD-10-PCS | Mod: 50,S$GLB,, | Performed by: PLASTIC SURGERY

## 2023-04-12 PROCEDURE — 3288F FALL RISK ASSESSMENT DOCD: CPT | Mod: CPTII,S$GLB,, | Performed by: PLASTIC SURGERY

## 2023-04-12 PROCEDURE — 1101F PT FALLS ASSESS-DOCD LE1/YR: CPT | Mod: CPTII,S$GLB,, | Performed by: PLASTIC SURGERY

## 2023-04-12 PROCEDURE — 20526 THER INJECTION CARP TUNNEL: CPT | Mod: 51,XS,RT,S$GLB | Performed by: PLASTIC SURGERY

## 2023-04-12 PROCEDURE — 99999 PR PBB SHADOW E&M-EST. PATIENT-LVL IV: ICD-10-PCS | Mod: PBBFAC,,, | Performed by: PLASTIC SURGERY

## 2023-04-12 PROCEDURE — 1101F PR PT FALLS ASSESS DOC 0-1 FALLS W/OUT INJ PAST YR: ICD-10-PCS | Mod: CPTII,S$GLB,, | Performed by: PLASTIC SURGERY

## 2023-04-12 PROCEDURE — 3079F PR MOST RECENT DIASTOLIC BLOOD PRESSURE 80-89 MM HG: ICD-10-PCS | Mod: CPTII,S$GLB,, | Performed by: PLASTIC SURGERY

## 2023-04-12 PROCEDURE — 99213 OFFICE O/P EST LOW 20 MIN: CPT | Mod: 25,S$GLB,, | Performed by: PLASTIC SURGERY

## 2023-04-12 PROCEDURE — 3072F LOW RISK FOR RETINOPATHY: CPT | Mod: CPTII,S$GLB,, | Performed by: PLASTIC SURGERY

## 2023-04-12 PROCEDURE — 20550 NJX 1 TENDON SHEATH/LIGAMENT: CPT | Mod: 50,S$GLB,, | Performed by: PLASTIC SURGERY

## 2023-04-12 PROCEDURE — 73110 XR WRIST COMPLETE 3 VIEWS BILATERAL: ICD-10-PCS | Mod: 26,50,, | Performed by: RADIOLOGY

## 2023-04-12 PROCEDURE — 3074F SYST BP LT 130 MM HG: CPT | Mod: CPTII,S$GLB,, | Performed by: PLASTIC SURGERY

## 2023-04-12 PROCEDURE — 3072F PR LOW RISK FOR RETINOPATHY: ICD-10-PCS | Mod: CPTII,S$GLB,, | Performed by: PLASTIC SURGERY

## 2023-04-12 PROCEDURE — 73130 X-RAY EXAM OF HAND: CPT | Mod: TC,50,FY

## 2023-04-12 PROCEDURE — 73110 X-RAY EXAM OF WRIST: CPT | Mod: 26,50,, | Performed by: RADIOLOGY

## 2023-04-12 PROCEDURE — 73130 XR HAND COMPLETE 3 VIEWS BILATERAL: ICD-10-PCS | Mod: 26,50,, | Performed by: RADIOLOGY

## 2023-04-12 PROCEDURE — 73110 X-RAY EXAM OF WRIST: CPT | Mod: TC,50,FY

## 2023-04-12 PROCEDURE — 1159F PR MEDICATION LIST DOCUMENTED IN MEDICAL RECORD: ICD-10-PCS | Mod: CPTII,S$GLB,, | Performed by: PLASTIC SURGERY

## 2023-04-12 PROCEDURE — 3046F HEMOGLOBIN A1C LEVEL >9.0%: CPT | Mod: CPTII,S$GLB,, | Performed by: PLASTIC SURGERY

## 2023-04-12 PROCEDURE — 3046F PR MOST RECENT HEMOGLOBIN A1C LEVEL > 9.0%: ICD-10-PCS | Mod: CPTII,S$GLB,, | Performed by: PLASTIC SURGERY

## 2023-04-12 PROCEDURE — 99213 PR OFFICE/OUTPT VISIT, EST, LEVL III, 20-29 MIN: ICD-10-PCS | Mod: 25,S$GLB,, | Performed by: PLASTIC SURGERY

## 2023-04-12 PROCEDURE — 1159F MED LIST DOCD IN RCRD: CPT | Mod: CPTII,S$GLB,, | Performed by: PLASTIC SURGERY

## 2023-04-12 PROCEDURE — 3074F PR MOST RECENT SYSTOLIC BLOOD PRESSURE < 130 MM HG: ICD-10-PCS | Mod: CPTII,S$GLB,, | Performed by: PLASTIC SURGERY

## 2023-04-12 PROCEDURE — 99999 PR PBB SHADOW E&M-EST. PATIENT-LVL IV: CPT | Mod: PBBFAC,,, | Performed by: PLASTIC SURGERY

## 2023-04-12 PROCEDURE — 3079F DIAST BP 80-89 MM HG: CPT | Mod: CPTII,S$GLB,, | Performed by: PLASTIC SURGERY

## 2023-04-12 PROCEDURE — 73130 X-RAY EXAM OF HAND: CPT | Mod: 26,50,, | Performed by: RADIOLOGY

## 2023-04-12 PROCEDURE — 3008F PR BODY MASS INDEX (BMI) DOCUMENTED: ICD-10-PCS | Mod: CPTII,S$GLB,, | Performed by: PLASTIC SURGERY

## 2023-04-12 PROCEDURE — 3288F PR FALLS RISK ASSESSMENT DOCUMENTED: ICD-10-PCS | Mod: CPTII,S$GLB,, | Performed by: PLASTIC SURGERY

## 2023-04-12 PROCEDURE — 3008F BODY MASS INDEX DOCD: CPT | Mod: CPTII,S$GLB,, | Performed by: PLASTIC SURGERY

## 2023-04-12 PROCEDURE — 20526 PR INJECT CARPAL TUNNEL: ICD-10-PCS | Mod: 51,XS,RT,S$GLB | Performed by: PLASTIC SURGERY

## 2023-04-12 PROCEDURE — 1125F PR PAIN SEVERITY QUANTIFIED, PAIN PRESENT: ICD-10-PCS | Mod: CPTII,S$GLB,, | Performed by: PLASTIC SURGERY

## 2023-04-12 PROCEDURE — 1125F AMNT PAIN NOTED PAIN PRSNT: CPT | Mod: CPTII,S$GLB,, | Performed by: PLASTIC SURGERY

## 2023-04-12 RX ORDER — TRIAMCINOLONE ACETONIDE 40 MG/ML
80 INJECTION, SUSPENSION INTRA-ARTICULAR; INTRAMUSCULAR
Status: COMPLETED | OUTPATIENT
Start: 2023-04-12 | End: 2023-04-12

## 2023-04-12 RX ADMIN — TRIAMCINOLONE ACETONIDE 80 MG: 40 INJECTION, SUSPENSION INTRA-ARTICULAR; INTRAMUSCULAR at 11:04

## 2023-04-12 NOTE — PROGRESS NOTES
Subjective:     Patient ID: Maria Elena Tavares is a 67 y.o. female.    Chief Complaint: Pain of the Right Elbow    Maria Elena Tavares returns to clinic today with complaints of bilateral triggering thumbs as well as numbness and tingling within the right hand that radiates up the forearm.  She does have a previous history of right carpal tunnel syndrome diagnosed by a previous EMG.  She denies any recent history of trauma she has no other complaints today.  She has had previous injections into the thumb tendon sheath with resolution of symptoms.      Review of Systems   All other systems reviewed and are negative.    Objective:       General    Vitals reviewed.  Constitutional: She is oriented to person, place, and time. She appears well-nourished. No distress.   Pulmonary/Chest: Effort normal.   Neurological: She is alert and oriented to person, place, and time.   Psychiatric: She has a normal mood and affect.             Right Hand/Wrist Exam     Inspection   Effusion:  Hand -  absent    Pain   Hand - The patient exhibits pain of the thumb MCP.    Tenderness   The patient is tender to palpation of the hart area.    Tests   Tinel's sign (median nerve): positive    Atrophy   Thenar:  negative  Hypothenar:  negative  Intrinsic:  negative  1st Dorsal Interosseous: negative    Other     Neuorologic Exam    Median Distribution: normal  Ulnar Distribution: normal  Radial Distribution: normal    Comments:  Active triggering with flexion and extension of the IP joint.      Left Hand/Wrist Exam     Inspection   Effusion:  Hand -  absent  Deformity:  Hand -  absent    Pain   Hand - The patient exhibits pain of the thumb MCP.    Tenderness   The patient is tender to palpation of the hart area.     Tests     Atrophy  Thenar:  Negative  Hypothenar:  negative  Intrinsic: negative  1st Dorsal Interosseous:  negative    Other     Sensory Exam  Median Distribution: normal  Ulnar Distribution: normal  Radial Distribution:  normal    Comments:  Active triggering with flexion and extension of the IP joint.          Vascular Exam       Capillary Refill  Right Hand: normal capillary refill  Left Hand: normal capillary refill        Physical Exam  Vitals reviewed.   Constitutional:       General: She is not in acute distress.     Appearance: She is well-nourished.   Pulmonary:      Effort: Pulmonary effort is normal.   Musculoskeletal:      Right hand: Normal sensation of the ulnar distribution, median distribution and radial distribution. Normal capillary refill.      Left hand: No deformity. Normal sensation of the ulnar distribution, median distribution and radial distribution. Normal capillary refill.   Neurological:      Mental Status: She is alert and oriented to person, place, and time.   Psychiatric:         Mood and Affect: Mood and affect normal.     Radiology impression:   EXAMINATION:  XR WRIST COMPLETE 3 VIEWS BILATERAL     CLINICAL HISTORY:  Pain, unspecified     FINDINGS:  Wrist complete three views bilateral.     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  No acute fracture, dislocation, or bone destruction seen.     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  No acute fracture dislocation bone destruction seen.     PROCEDURE:  I have explained the risks, benefits, and alternatives of the procedure in detail.  The patient voices understanding and all questions have been answered. So after I performed a sterile prep of the skin, the level of there A-1 pulley of the bilateral thumb is injected using a 25 gauge needle from the volar approach with a  combination of 1cc 1% plain Lidocaine and 20 mg of Kenalog each.  The patient is cautioned and immediate relief of pain is secondary to the local anesthetic and will be temporary.  After the anesthetic wears off there may be a increase in pain that may last for a few hours or a few days and they should use ice to help alleviate this flair up of pain.     I have  explained the risks, benefits, and alternatives of the procedure in detail.  The patient voices understanding and all questions have been answered.  The patient agrees to proceed as planned. After a sterile prep of the skin the right carpal tunnel is injected from the volar approach using a 25 gauge needle with a combination of 1cc 1% plain xylocaine and 40 mg of Kenalog.  The patient is cautioned and immediate relief of pain is secondary to the local anesthetic and will be temporary.  After the anesthetic wears off there may be a increase in pain that may last for a few hours or a few days and they should use ice to help alleviate this flair up of pain.       Assessment:     Encounter Diagnoses   Name Primary?    Trigger thumb of left hand Yes    Trigger thumb of right hand     Right carpal tunnel syndrome        Plan:      Maria Elena was seen today for pain.    Diagnoses and all orders for this visit:    Trigger thumb of left hand    Trigger thumb of right hand    Right carpal tunnel syndrome    Other orders  -     triamcinolone acetonide injection 80 mg       She presents today with recurrent symptoms of her right trigger thumb and she also has symptoms within her left.  In addition it appears that her right carpal tunnel symptoms have worsened over last several months.  I discussed using a brace at night while sleeping to maintain a neutral position of the wrist.  She was offered a corticosteroid injection to the right carpal tunnel as well as into bilateral trigger thumbs to help alleviate her painful symptoms.  She wished to receive these injections today please see the procedure note above.  She will follow up with me as needed.  All questions concerns were addressed

## 2023-04-17 ENCOUNTER — PATIENT MESSAGE (OUTPATIENT)
Dept: HEMATOLOGY/ONCOLOGY | Facility: CLINIC | Age: 68
End: 2023-04-17
Payer: COMMERCIAL

## 2023-04-19 NOTE — PROGRESS NOTES
The patient location is: [place]  The chief complaint leading to consultation is: breast cancer and uncontrolled diabetes     Visit type: audiovisual    Face to Face time with patient: 15 minutes   27 minutes of total time spent on the encounter, which includes face to face time and non-face to face time preparing to see the patient (eg, review of tests), Obtaining and/or reviewing separately obtained history, Documenting clinical information in the electronic or other health record, Independently interpreting results (not separately reported) and communicating results to the patient/family/caregiver, or Care coordination (not separately reported).     Each patient to whom he or she provides medical services by telemedicine is:  (1) informed of the relationship between the physician and patient and the respective role of any other health care provider with respect to management of the patient; and (2) notified that he or she may decline to receive medical services by telemedicine and may withdraw from such care at any time.    Oncology Nutrition Assessment for Medical Nutrition Therapy  Follow up Visit    Maria Elena Tavares   1955    Referring Provider:  No ref. provider found      Reason for Visit: Pt in for education and nutrition counseling     PMHx:   Past Medical History:   Diagnosis Date    BMI 37.0-37.9, adult     Breast cancer 09/05/2019     Left breast, IDC with lymph node metastisis    Colon polyps 2015    Colon polyps 2015    Diabetes mellitus type II     Diverticulosis     history of diverticulosisseen on colonoscopy at age 48. Repeat recommended at age 58. Done by     Elevated blood protein     History of elevated protein. Apparently has seen  for extensive workup including bone marrow biopsy    History of shingles 2006    Hyperlipidemia     Hypertension     Microalbuminuria     due 2 diabetes    Mild vitamin D deficiency     . A low vitamin D    Thyroid disease     hypothyroidism  "   Usual hyperplasia of lactiferous duct     Not sure       Nutrition Assessment    This is a 67 y.o.female with history of left breast IDC ER+, AR+ Her 2 -. She had bilateral mastectomy, DDAC +Taxol, XRT and is now on Femara. Referred to nutrition through integrative department. PMH also includes DM2, HTN, HLD. Follow up today for anti-inflammatory diabetic diet.   Today she reports significant improvement in A1c from previous visit. Also on Mounjaro now (has done 2 injections so far). Reports her appetite has decreased. Also reports she has to watch what she eats -heavy/fried foods upset her stomach. Drinking a lot of water. Her blood sugar was 130 this morning fasting.   Reports she is trying to eat three times per day. Eats breakfast (boiled egg, 1/2 English muffin, coffee), lunch (salad, Atkins shake, or fruit) dinner (baked chicken and mixed vegetables).   She is using sugar free metamucil, which has helped with her bowel movements. Still has intermittent constipation but only using fiber 3 days per week. Reports fish oil supplements have helped some with pain/inflammation. Has not gotten into an exercise routine yet.     Weight:84.4 kg (186 lb)  Height:5' 5" (1.651 m)  BMI:Body mass index is 30.95 kg/m².   IBW: Ideal body weight: 57 kg (125 lb 10.6 oz)  Adjusted ideal body weight: 67.9 kg (149 lb 12.8 oz)    Usual BW: 175-180lb  Weight Change: per last clinic visit up about 5lb- she plans to weigh herself at home today     Allergies: Amoxicillin    Current Medications:    Current Outpatient Medications:     aspirin (ECOTRIN) 81 MG EC tablet, Take 81 mg by mouth once daily., Disp: , Rfl:     atorvastatin (LIPITOR) 10 MG tablet, Take 1 tablet (10 mg total) by mouth once daily., Disp: 90 tablet, Rfl: 3    blood-glucose meter kit, DISPENSE : BLOOD TEST STRIPS AND LANCETS PATIENT TEST BLOOD SUGARS TWICE DAILY TEST STRIPS: 50 EACH, REFILL 5 LANCETS:  50 EACH , REFILL 5, Disp: 1 each, Rfl: 0    chlorthalidone " (HYGROTEN) 50 MG Tab, Take 1 tablet (50 mg total) by mouth once daily., Disp: 90 tablet, Rfl: 1    ciclopirox 0.77 % Gel, Apply topically 2 (two) times daily. To thickened discolored toenails., Disp: 30 g, Rfl: 6    clindamycin (CLEOCIN) 300 MG capsule, Take 1 capsule (300 mg total) by mouth 3 (three) times daily., Disp: 27 capsule, Rfl: 0    fluconazole (DIFLUCAN) 150 MG Tab, Take 1 tablet by mouth as directed. for one dose., Disp: 1 tablet, Rfl: 0    fluconazole (DIFLUCAN) 150 MG Tab, Take 1 tablet (150 mg total) by mouth As instructed., Disp: 1 tablet, Rfl: 0    irbesartan (AVAPRO) 300 MG tablet, Take 1 tablet by mouth once daily, Disp: 90 tablet, Rfl: 3    letrozole (FEMARA) 2.5 mg Tab, Take 1 tablet (2.5 mg total) by mouth once daily., Disp: 90 tablet, Rfl: 3    levothyroxine (SYNTHROID) 200 MCG tablet, Take 1 tablet (200 mcg total) by mouth once daily., Disp: 90 tablet, Rfl: 1    lifitegrast (XIIDRA) 5 % Dpet, Apply 1 drop to  both eyes  2 (two) times daily., Disp: 180 each, Rfl: 4    metFORMIN (GLUCOPHAGE-XR) 750 MG ER 24hr tablet, Take 1 tablet (750 mg total) by mouth 2 (two) times daily with meals., Disp: 180 tablet, Rfl: 1    polyethylene glycol (GLYCOLAX) 17 gram/dose powder, Mix 17 g (1 capful) with juice or water an drink 2 (two) times daily., Disp: 1020 g, Rfl: 2    prasterone, dhea, (INTRAROSA) 6.5 mg Inst, Place 1 suppository vaginally nightly., Disp: 28 each, Rfl: 11    tirzepatide (MOUNJARO) 5 mg/0.5 mL PnIj, Inject 5 mg into the skin every 7 days., Disp: 12 pen, Rfl: 3    venlafaxine (EFFEXOR XR) 37.5 MG 24 hr capsule, Take 1 capsule (37.5 mg total) by mouth once daily., Disp: 30 capsule, Rfl: 5    Vitamins/Supplements: fiber gummy, fish oil     Labs: Reviewed from 3/28- A1c 10.8 (significantly improved from >14 on 1/12/23)    Nutrition Diagnosis    Problem: nutrition related knowledge deficit   Etiology (related to): lack of prior need for nutrition education  Signs/Symptoms (as evidenced by):   chemotherapy side effects, elevated A1c    Nutrition Intervention    Nutrition Prescription   1250 Kcals (15kcal/kg)  65 g protein (0.8g/kg)   2050 mL fluid (25mL/kg)    Recommendations:  Fiber supplement daily   Increase water to at least 4 16oz bottles daily   Continue fish oil   Try water aerobics for exercise   Balance meals between protein, fat, carbohydrate     Nutrition Monitoring and Evaluation    Monitor: energy intake, rate/formulation of TPN, weight, and labs A1c    Goals: improve diet quality, slow weight loss, A1c <6.5    Follow up PRN- she will follow up via patient portal after next A1c    Communication to referring provider/care team: note available in chart     Counseling time: 15 minutes     Jazmyn Serna, MPH, RD, , LDN, FAND   [phone]

## 2023-04-20 ENCOUNTER — CLINICAL SUPPORT (OUTPATIENT)
Dept: HEMATOLOGY/ONCOLOGY | Facility: CLINIC | Age: 68
End: 2023-04-20
Payer: COMMERCIAL

## 2023-04-20 VITALS — WEIGHT: 186 LBS | BODY MASS INDEX: 30.99 KG/M2 | HEIGHT: 65 IN

## 2023-04-20 DIAGNOSIS — Z71.3 NUTRITIONAL COUNSELING: Primary | ICD-10-CM

## 2023-04-20 DIAGNOSIS — C77.3 BREAST CANCER METASTASIZED TO AXILLARY LYMPH NODE, LEFT: ICD-10-CM

## 2023-04-20 DIAGNOSIS — E11.21 CONTROLLED TYPE 2 DIABETES MELLITUS WITH DIABETIC NEPHROPATHY, WITHOUT LONG-TERM CURRENT USE OF INSULIN: ICD-10-CM

## 2023-04-20 DIAGNOSIS — E66.9 CLASS 1 OBESITY IN ADULT, UNSPECIFIED BMI, UNSPECIFIED OBESITY TYPE, UNSPECIFIED WHETHER SERIOUS COMORBIDITY PRESENT: ICD-10-CM

## 2023-04-20 DIAGNOSIS — C50.912 BREAST CANCER METASTASIZED TO AXILLARY LYMPH NODE, LEFT: ICD-10-CM

## 2023-06-01 ENCOUNTER — PATIENT MESSAGE (OUTPATIENT)
Dept: PRIMARY CARE CLINIC | Facility: CLINIC | Age: 68
End: 2023-06-01
Payer: MEDICARE

## 2023-06-01 NOTE — TELEPHONE ENCOUNTER
Pt states that she feel in the tub last Wednesday while sitting on the right side of the tub and filling it up for her grandson. She states that her ribs are hurting on the right side where she fell. Pt denies having a bruise and states that she has been doing deep breathing exercise just fine. Pt just want to make sure that she didn't hurt herself badly after the slip and fall in the bath tub. Schedule pt for 6/6/23 with Dr. San. Pt verbalized understanding.

## 2023-06-06 ENCOUNTER — LAB VISIT (OUTPATIENT)
Dept: LAB | Facility: HOSPITAL | Age: 68
End: 2023-06-06
Payer: MEDICARE

## 2023-06-06 ENCOUNTER — OFFICE VISIT (OUTPATIENT)
Dept: PRIMARY CARE CLINIC | Facility: CLINIC | Age: 68
End: 2023-06-06
Attending: FAMILY MEDICINE
Payer: MEDICARE

## 2023-06-06 VITALS
DIASTOLIC BLOOD PRESSURE: 92 MMHG | OXYGEN SATURATION: 100 % | HEART RATE: 86 BPM | WEIGHT: 175.06 LBS | RESPIRATION RATE: 17 BRPM | SYSTOLIC BLOOD PRESSURE: 119 MMHG | BODY MASS INDEX: 29.17 KG/M2 | HEIGHT: 65 IN

## 2023-06-06 DIAGNOSIS — E03.9 HYPOTHYROIDISM, UNSPECIFIED TYPE: ICD-10-CM

## 2023-06-06 DIAGNOSIS — D50.8 OTHER IRON DEFICIENCY ANEMIA: ICD-10-CM

## 2023-06-06 DIAGNOSIS — E87.6 HYPOKALEMIA: ICD-10-CM

## 2023-06-06 DIAGNOSIS — E11.21 CONTROLLED TYPE 2 DIABETES MELLITUS WITH DIABETIC NEPHROPATHY, WITHOUT LONG-TERM CURRENT USE OF INSULIN: ICD-10-CM

## 2023-06-06 DIAGNOSIS — E66.9 OBESITY (BMI 30-39.9): ICD-10-CM

## 2023-06-06 DIAGNOSIS — E55.9 VITAMIN D DEFICIENCY: ICD-10-CM

## 2023-06-06 DIAGNOSIS — I10 ESSENTIAL HYPERTENSION: ICD-10-CM

## 2023-06-06 DIAGNOSIS — E11.21 CONTROLLED TYPE 2 DIABETES MELLITUS WITH DIABETIC NEPHROPATHY, WITHOUT LONG-TERM CURRENT USE OF INSULIN: Primary | ICD-10-CM

## 2023-06-06 LAB
ESTIMATED AVG GLUCOSE: 157 MG/DL (ref 68–131)
HBA1C MFR BLD: 7.1 % (ref 4–5.6)

## 2023-06-06 PROCEDURE — 99214 PR OFFICE/OUTPT VISIT, EST, LEVL IV, 30-39 MIN: ICD-10-PCS | Mod: HCNC,S$GLB,, | Performed by: FAMILY MEDICINE

## 2023-06-06 PROCEDURE — 3288F PR FALLS RISK ASSESSMENT DOCUMENTED: ICD-10-PCS | Mod: HCNC,CPTII,S$GLB, | Performed by: FAMILY MEDICINE

## 2023-06-06 PROCEDURE — 3051F PR MOST RECENT HEMOGLOBIN A1C LEVEL 7.0 - < 8.0%: ICD-10-PCS | Mod: HCNC,CPTII,S$GLB, | Performed by: FAMILY MEDICINE

## 2023-06-06 PROCEDURE — 3008F PR BODY MASS INDEX (BMI) DOCUMENTED: ICD-10-PCS | Mod: HCNC,CPTII,S$GLB, | Performed by: FAMILY MEDICINE

## 2023-06-06 PROCEDURE — 99999 PR PBB SHADOW E&M-EST. PATIENT-LVL III: ICD-10-PCS | Mod: PBBFAC,HCNC,, | Performed by: FAMILY MEDICINE

## 2023-06-06 PROCEDURE — 1101F PT FALLS ASSESS-DOCD LE1/YR: CPT | Mod: HCNC,CPTII,S$GLB, | Performed by: FAMILY MEDICINE

## 2023-06-06 PROCEDURE — 83036 HEMOGLOBIN GLYCOSYLATED A1C: CPT | Performed by: FAMILY MEDICINE

## 2023-06-06 PROCEDURE — 3051F HG A1C>EQUAL 7.0%<8.0%: CPT | Mod: HCNC,CPTII,S$GLB, | Performed by: FAMILY MEDICINE

## 2023-06-06 PROCEDURE — 3072F LOW RISK FOR RETINOPATHY: CPT | Mod: HCNC,CPTII,S$GLB, | Performed by: FAMILY MEDICINE

## 2023-06-06 PROCEDURE — 3080F PR MOST RECENT DIASTOLIC BLOOD PRESSURE >= 90 MM HG: ICD-10-PCS | Mod: HCNC,CPTII,S$GLB, | Performed by: FAMILY MEDICINE

## 2023-06-06 PROCEDURE — 1101F PR PT FALLS ASSESS DOC 0-1 FALLS W/OUT INJ PAST YR: ICD-10-PCS | Mod: HCNC,CPTII,S$GLB, | Performed by: FAMILY MEDICINE

## 2023-06-06 PROCEDURE — 4010F ACE/ARB THERAPY RXD/TAKEN: CPT | Mod: HCNC,CPTII,S$GLB, | Performed by: FAMILY MEDICINE

## 2023-06-06 PROCEDURE — 3074F SYST BP LT 130 MM HG: CPT | Mod: HCNC,CPTII,S$GLB, | Performed by: FAMILY MEDICINE

## 2023-06-06 PROCEDURE — 36415 COLL VENOUS BLD VENIPUNCTURE: CPT | Mod: PN | Performed by: FAMILY MEDICINE

## 2023-06-06 PROCEDURE — 3074F PR MOST RECENT SYSTOLIC BLOOD PRESSURE < 130 MM HG: ICD-10-PCS | Mod: HCNC,CPTII,S$GLB, | Performed by: FAMILY MEDICINE

## 2023-06-06 PROCEDURE — 4010F PR ACE/ARB THEARPY RXD/TAKEN: ICD-10-PCS | Mod: HCNC,CPTII,S$GLB, | Performed by: FAMILY MEDICINE

## 2023-06-06 PROCEDURE — 99999 PR PBB SHADOW E&M-EST. PATIENT-LVL III: CPT | Mod: PBBFAC,HCNC,, | Performed by: FAMILY MEDICINE

## 2023-06-06 PROCEDURE — 99214 OFFICE O/P EST MOD 30 MIN: CPT | Mod: HCNC,S$GLB,, | Performed by: FAMILY MEDICINE

## 2023-06-06 PROCEDURE — 3288F FALL RISK ASSESSMENT DOCD: CPT | Mod: HCNC,CPTII,S$GLB, | Performed by: FAMILY MEDICINE

## 2023-06-06 PROCEDURE — 3072F PR LOW RISK FOR RETINOPATHY: ICD-10-PCS | Mod: HCNC,CPTII,S$GLB, | Performed by: FAMILY MEDICINE

## 2023-06-06 PROCEDURE — 99213 OFFICE O/P EST LOW 20 MIN: CPT | Mod: PBBFAC,HCNC,PN | Performed by: FAMILY MEDICINE

## 2023-06-06 PROCEDURE — 3008F BODY MASS INDEX DOCD: CPT | Mod: HCNC,CPTII,S$GLB, | Performed by: FAMILY MEDICINE

## 2023-06-06 PROCEDURE — 3080F DIAST BP >= 90 MM HG: CPT | Mod: HCNC,CPTII,S$GLB, | Performed by: FAMILY MEDICINE

## 2023-06-07 ENCOUNTER — PATIENT MESSAGE (OUTPATIENT)
Dept: PRIMARY CARE CLINIC | Facility: CLINIC | Age: 68
End: 2023-06-07
Payer: MEDICARE

## 2023-06-16 ENCOUNTER — PATIENT MESSAGE (OUTPATIENT)
Dept: PRIMARY CARE CLINIC | Facility: CLINIC | Age: 68
End: 2023-06-16
Payer: MEDICARE

## 2023-06-16 DIAGNOSIS — R82.90 URINE ABNORMALITY: Primary | ICD-10-CM

## 2023-06-21 ENCOUNTER — LAB VISIT (OUTPATIENT)
Dept: LAB | Facility: HOSPITAL | Age: 68
End: 2023-06-21
Payer: MEDICARE

## 2023-06-21 DIAGNOSIS — R82.90 URINE ABNORMALITY: ICD-10-CM

## 2023-06-21 LAB
BACTERIA #/AREA URNS AUTO: ABNORMAL /HPF
BILIRUB UR QL STRIP: NEGATIVE
CAOX CRY UR QL COMP ASSIST: ABNORMAL
CLARITY UR REFRACT.AUTO: CLEAR
COLOR UR AUTO: YELLOW
GLUCOSE UR QL STRIP: NEGATIVE
HGB UR QL STRIP: NEGATIVE
HYALINE CASTS UR QL AUTO: 14 /LPF
KETONES UR QL STRIP: NEGATIVE
LEUKOCYTE ESTERASE UR QL STRIP: ABNORMAL
MICROSCOPIC COMMENT: ABNORMAL
NITRITE UR QL STRIP: NEGATIVE
NON-SQ EPI CELLS #/AREA URNS AUTO: 2 /HPF
PH UR STRIP: 6 [PH] (ref 5–8)
PROT UR QL STRIP: ABNORMAL
RBC #/AREA URNS AUTO: 4 /HPF (ref 0–4)
SP GR UR STRIP: 1.02 (ref 1–1.03)
SQUAMOUS #/AREA URNS AUTO: 2 /HPF
URN SPEC COLLECT METH UR: ABNORMAL
WBC #/AREA URNS AUTO: >100 /HPF (ref 0–5)

## 2023-06-21 PROCEDURE — 81001 URINALYSIS AUTO W/SCOPE: CPT | Performed by: FAMILY MEDICINE

## 2023-06-22 ENCOUNTER — PATIENT MESSAGE (OUTPATIENT)
Dept: PRIMARY CARE CLINIC | Facility: CLINIC | Age: 68
End: 2023-06-22
Payer: MEDICARE

## 2023-06-22 RX ORDER — CEPHALEXIN 500 MG/1
500 CAPSULE ORAL EVERY 12 HOURS
Qty: 10 CAPSULE | Refills: 0 | Status: SHIPPED | OUTPATIENT
Start: 2023-06-22 | End: 2023-07-01

## 2023-06-24 ENCOUNTER — OFFICE VISIT (OUTPATIENT)
Dept: URGENT CARE | Facility: CLINIC | Age: 68
End: 2023-06-24
Payer: MEDICARE

## 2023-06-24 VITALS
OXYGEN SATURATION: 97 % | TEMPERATURE: 98 F | SYSTOLIC BLOOD PRESSURE: 117 MMHG | RESPIRATION RATE: 19 BRPM | HEART RATE: 88 BPM | DIASTOLIC BLOOD PRESSURE: 83 MMHG

## 2023-06-24 DIAGNOSIS — K08.89 TOOTHACHE: ICD-10-CM

## 2023-06-24 DIAGNOSIS — K04.7 DENTAL ABSCESS: Primary | ICD-10-CM

## 2023-06-24 PROCEDURE — 99213 PR OFFICE/OUTPT VISIT, EST, LEVL III, 20-29 MIN: ICD-10-PCS | Mod: S$GLB,,, | Performed by: FAMILY MEDICINE

## 2023-06-24 PROCEDURE — 99213 OFFICE O/P EST LOW 20 MIN: CPT | Mod: S$GLB,,, | Performed by: FAMILY MEDICINE

## 2023-06-24 RX ORDER — CLINDAMYCIN HYDROCHLORIDE 300 MG/1
300 CAPSULE ORAL 4 TIMES DAILY
Qty: 28 CAPSULE | Refills: 0 | Status: SHIPPED | OUTPATIENT
Start: 2023-06-24 | End: 2023-07-01

## 2023-06-24 RX ORDER — IBUPROFEN 600 MG/1
600 TABLET ORAL EVERY 8 HOURS PRN
Qty: 15 TABLET | Refills: 0 | Status: SHIPPED | OUTPATIENT
Start: 2023-06-24 | End: 2023-08-30

## 2023-06-24 NOTE — PROGRESS NOTES
Subjective:      Patient ID: Maria Elena Tavares is a 67 y.o. female.    Vitals:  temperature is 97.9 °F (36.6 °C). Her blood pressure is 117/83 and her pulse is 88. Her respiration is 19 and oxygen saturation is 97%.     Chief Complaint: Facial Swelling and Dental Pain    Patient reports to the clinic for Dental pain resulting in facial swelling on the right side starting last night.  Reports toothache started 3 days ago swelling noticed yesterday.  Patient denies any fever, chills, cough, sore throat, sinus congestion, headache, body aches, dizziness, nausea/vomiting.  Patient called her dentist today and was told to come to urgent care to start antibiotics first.    Dental Pain   This is a new problem. The current episode started yesterday. The problem occurs constantly. The problem has been unchanged. The pain is at a severity of 9/10. The pain is moderate. Associated symptoms include facial pain. Pertinent negatives include no difficulty swallowing, fever, oral bleeding, sinus pressure or thermal sensitivity. She has tried ice (Oragel) for the symptoms.     Constitution: Negative for fever.   HENT:  Negative for sinus pressure.     Objective:     Physical Exam   Constitutional: She is oriented to person, place, and time. She appears well-developed. She is cooperative.  Non-toxic appearance. She does not appear ill. No distress.   HENT:   Head: Normocephalic and atraumatic.   Ears:   Right Ear: Hearing, tympanic membrane, external ear and ear canal normal. impacted cerumen  Left Ear: Hearing, tympanic membrane, external ear and ear canal normal. impacted cerumen  Nose: Nose normal. No mucosal edema, rhinorrhea, nasal deformity or congestion. No epistaxis. Right sinus exhibits no maxillary sinus tenderness and no frontal sinus tenderness. Left sinus exhibits no maxillary sinus tenderness and no frontal sinus tenderness.   Mouth/Throat: Uvula is midline, oropharynx is clear and moist and mucous membranes are normal. No  trismus in the jaw. Normal dentition. No uvula swelling. No oropharyngeal exudate, posterior oropharyngeal edema or posterior oropharyngeal erythema.      Comments:   Positive moderate right facial swelling.  No redness, induration, fluctuation.  No significant warmth.  Positive right upper premolar gum redness and swelling,  No discharge seen.  Eyes: Conjunctivae and lids are normal. No scleral icterus.   Neck: Trachea normal and phonation normal. Neck supple. No edema present. No erythema present. No neck rigidity present.   Cardiovascular: Normal rate, regular rhythm, normal heart sounds and normal pulses.   No murmur heard.  Pulmonary/Chest: Effort normal and breath sounds normal. No stridor. No respiratory distress. She has no decreased breath sounds. She has no wheezes. She has no rhonchi. She has no rales.   Abdominal: Normal appearance. She exhibits no distension. There is no abdominal tenderness. There is no left CVA tenderness and no right CVA tenderness.   Musculoskeletal: Normal range of motion.         General: No deformity. Normal range of motion.      Cervical back: She exhibits no tenderness.   Lymphadenopathy:     She has no cervical adenopathy.   Neurological: She is alert and oriented to person, place, and time. She exhibits normal muscle tone. Coordination normal.   Skin: Skin is warm, dry, intact, not diaphoretic and not pale.   Psychiatric: Her speech is normal and behavior is normal. Judgment and thought content normal.   Nursing note and vitals reviewed.    Assessment:     1. Dental abscess    2. Toothache        Plan:   Discussed diagnosis/plan with patient in clinic. Advised to f/u with PCP within 2-5 days. ER precautions given if symptoms get any worse. All questions answered. Patient verbally understood and agreed with treatment plan.     Dental abscess    Toothache    Other orders  -     clindamycin (CLEOCIN) 300 MG capsule; Take 1 capsule (300 mg total) by mouth 4 (four) times daily.  for 7 days  Dispense: 28 capsule; Refill: 0  -     ibuprofen (ADVIL,MOTRIN) 600 MG tablet; Take 1 tablet (600 mg total) by mouth every 8 (eight) hours as needed for Pain.  Dispense: 15 tablet; Refill: 0

## 2023-07-09 PROBLEM — T45.1X5A CHEMOTHERAPY INDUCED NEUTROPENIA: Status: RESOLVED | Noted: 2019-12-05 | Resolved: 2023-07-09

## 2023-07-09 PROBLEM — D70.1 CHEMOTHERAPY INDUCED NEUTROPENIA: Status: RESOLVED | Noted: 2019-12-05 | Resolved: 2023-07-09

## 2023-07-09 NOTE — PROGRESS NOTES
Subjective:       Patient ID: Maria Elena Tavares is a 67 y.o. female.    Chief Complaint:   Pt presents today to review labs. Aware that her chol is up and she has not been taking her statin- forgets at night time    States that neuropathic pain is much better  Pt feels good    Review of Systems   Constitutional: Negative.    Eyes: Negative.    Respiratory:  Negative for cough, chest tightness and shortness of breath.    Cardiovascular:  Negative for chest pain, palpitations and leg swelling.   Gastrointestinal: Negative.    Musculoskeletal: Negative.  Negative for joint swelling.   Integumentary:  Negative.   Neurological:  Negative for dizziness, weakness, light-headedness and headaches.   All other systems reviewed and are negative.      Objective:      Physical Exam  Vitals reviewed.   Constitutional:       General: She is not in acute distress.     Appearance: Normal appearance. She is well-developed. She is not ill-appearing, toxic-appearing or diaphoretic.   HENT:      Head: Normocephalic and atraumatic.   Eyes:      Extraocular Movements: Extraocular movements intact.      Conjunctiva/sclera: Conjunctivae normal.      Pupils: Pupils are equal, round, and reactive to light.   Neck:      Thyroid: No thyromegaly.   Cardiovascular:      Rate and Rhythm: Normal rate and regular rhythm.      Pulses:           Dorsalis pedis pulses are 3+ on the right side and 3+ on the left side.        Posterior tibial pulses are 3+ on the right side and 3+ on the left side.      Heart sounds: Normal heart sounds. No murmur heard.  Pulmonary:      Effort: Pulmonary effort is normal. No respiratory distress.      Breath sounds: Normal breath sounds. No wheezing or rales.   Chest:      Chest wall: No tenderness.   Musculoskeletal:         General: Normal range of motion.      Cervical back: Normal range of motion and neck supple.      Right lower leg: No edema.      Left lower leg: No edema.      Right foot: Normal range of motion. No  deformity, bunion or Charcot foot.      Left foot: Normal range of motion. No deformity, bunion or Charcot foot.   Feet:      Right foot:      Protective Sensation: 6 sites tested.  6 sites sensed.      Skin integrity: Skin integrity normal.      Left foot:      Protective Sensation: 6 sites tested.  6 sites sensed.      Skin integrity: Skin integrity normal.   Lymphadenopathy:      Cervical: No cervical adenopathy.   Skin:     General: Skin is warm.   Neurological:      Mental Status: She is alert and oriented to person, place, and time.   Psychiatric:         Mood and Affect: Mood normal.         Behavior: Behavior normal.         Thought Content: Thought content normal.         Judgment: Judgment normal.       Assessment:       Problem List Items Addressed This Visit       Anemia, unspecified    Relevant Orders    Hemoglobin A1C (Completed)    CBC Auto Differential    Comprehensive Metabolic Panel    Microalbumin/Creatinine Ratio, Urine    TSH    Controlled type 2 diabetes mellitus with diabetic nephropathy, without long-term current use of insulin - Primary    Relevant Orders    Hemoglobin A1C (Completed)    CBC Auto Differential    Comprehensive Metabolic Panel    Lipid Panel    Microalbumin/Creatinine Ratio, Urine    TSH    Hemoglobin A1C    Essential hypertension    Relevant Orders    Hemoglobin A1C (Completed)    CBC Auto Differential    Comprehensive Metabolic Panel    Lipid Panel    Microalbumin/Creatinine Ratio, Urine    TSH    Hypokalemia    Relevant Orders    Hemoglobin A1C (Completed)    CBC Auto Differential    Comprehensive Metabolic Panel    Lipid Panel    Microalbumin/Creatinine Ratio, Urine    TSH    Hypothyroid    Relevant Orders    Hemoglobin A1C (Completed)    CBC Auto Differential    Comprehensive Metabolic Panel    Lipid Panel    Microalbumin/Creatinine Ratio, Urine    TSH    Obesity (BMI 30-39.9)    Relevant Orders    Hemoglobin A1C (Completed)    CBC Auto Differential    Comprehensive  Metabolic Panel    Lipid Panel    Microalbumin/Creatinine Ratio, Urine    TSH    Vitamin D deficiency    Relevant Orders    Hemoglobin A1C (Completed)    CBC Auto Differential    Comprehensive Metabolic Panel    Lipid Panel    Microalbumin/Creatinine Ratio, Urine    TSH    Vitamin D       Plan:       DM pt doing well. Will repeat a1c  HTN BP control.  SEs of med d/w pt    RTC prn

## 2023-07-10 ENCOUNTER — PATIENT MESSAGE (OUTPATIENT)
Dept: HEMATOLOGY/ONCOLOGY | Facility: CLINIC | Age: 68
End: 2023-07-10
Payer: MEDICARE

## 2023-07-10 ENCOUNTER — HOSPITAL ENCOUNTER (OUTPATIENT)
Dept: RADIOLOGY | Facility: HOSPITAL | Age: 68
Discharge: HOME | End: 2023-07-10
Attending: INTERNAL MEDICINE
Payer: MEDICARE

## 2023-07-10 DIAGNOSIS — Z79.811 AROMATASE INHIBITOR USE: ICD-10-CM

## 2023-07-10 PROCEDURE — 77080 DEXA BONE DENSITY SPINE HIP: ICD-10-PCS | Mod: 26,,, | Performed by: INTERNAL MEDICINE

## 2023-07-10 PROCEDURE — 77080 DXA BONE DENSITY AXIAL: CPT | Mod: TC

## 2023-07-10 PROCEDURE — 77080 DXA BONE DENSITY AXIAL: CPT | Mod: 26,,, | Performed by: INTERNAL MEDICINE

## 2023-07-11 ENCOUNTER — PATIENT MESSAGE (OUTPATIENT)
Dept: PRIMARY CARE CLINIC | Facility: CLINIC | Age: 68
End: 2023-07-11
Payer: MEDICARE

## 2023-07-11 NOTE — TELEPHONE ENCOUNTER
Would you like patient to schedule a telemed visit with you on Monday?  She leave to go out of town on Tuesday.

## 2023-07-13 ENCOUNTER — PATIENT MESSAGE (OUTPATIENT)
Dept: HEMATOLOGY/ONCOLOGY | Facility: CLINIC | Age: 68
End: 2023-07-13
Payer: MEDICARE

## 2023-07-13 ENCOUNTER — OFFICE VISIT (OUTPATIENT)
Dept: HEMATOLOGY/ONCOLOGY | Facility: CLINIC | Age: 68
End: 2023-07-13
Payer: MEDICARE

## 2023-07-13 VITALS
TEMPERATURE: 98 F | BODY MASS INDEX: 32.34 KG/M2 | SYSTOLIC BLOOD PRESSURE: 139 MMHG | RESPIRATION RATE: 18 BRPM | WEIGHT: 171.31 LBS | HEART RATE: 97 BPM | OXYGEN SATURATION: 100 % | HEIGHT: 61 IN | DIASTOLIC BLOOD PRESSURE: 85 MMHG

## 2023-07-13 DIAGNOSIS — E11.21 CONTROLLED TYPE 2 DIABETES MELLITUS WITH DIABETIC NEPHROPATHY, WITHOUT LONG-TERM CURRENT USE OF INSULIN: ICD-10-CM

## 2023-07-13 DIAGNOSIS — C50.912 BREAST CANCER METASTASIZED TO AXILLARY LYMPH NODE, LEFT: Primary | ICD-10-CM

## 2023-07-13 DIAGNOSIS — C77.3 BREAST CANCER METASTASIZED TO AXILLARY LYMPH NODE, LEFT: Primary | ICD-10-CM

## 2023-07-13 DIAGNOSIS — E55.9 VITAMIN D DEFICIENCY: ICD-10-CM

## 2023-07-13 DIAGNOSIS — I10 ESSENTIAL HYPERTENSION: ICD-10-CM

## 2023-07-13 PROCEDURE — 1160F RVW MEDS BY RX/DR IN RCRD: CPT | Mod: HCNC,CPTII,S$GLB, | Performed by: INTERNAL MEDICINE

## 2023-07-13 PROCEDURE — 1159F PR MEDICATION LIST DOCUMENTED IN MEDICAL RECORD: ICD-10-PCS | Mod: HCNC,CPTII,S$GLB, | Performed by: INTERNAL MEDICINE

## 2023-07-13 PROCEDURE — 99214 PR OFFICE/OUTPT VISIT, EST, LEVL IV, 30-39 MIN: ICD-10-PCS | Mod: HCNC,S$GLB,, | Performed by: INTERNAL MEDICINE

## 2023-07-13 PROCEDURE — 99999 PR PBB SHADOW E&M-EST. PATIENT-LVL IV: ICD-10-PCS | Mod: PBBFAC,HCNC,, | Performed by: INTERNAL MEDICINE

## 2023-07-13 PROCEDURE — 3051F HG A1C>EQUAL 7.0%<8.0%: CPT | Mod: HCNC,CPTII,S$GLB, | Performed by: INTERNAL MEDICINE

## 2023-07-13 PROCEDURE — 99214 OFFICE O/P EST MOD 30 MIN: CPT | Mod: HCNC,S$GLB,, | Performed by: INTERNAL MEDICINE

## 2023-07-13 PROCEDURE — 1159F MED LIST DOCD IN RCRD: CPT | Mod: HCNC,CPTII,S$GLB, | Performed by: INTERNAL MEDICINE

## 2023-07-13 PROCEDURE — 3008F BODY MASS INDEX DOCD: CPT | Mod: HCNC,CPTII,S$GLB, | Performed by: INTERNAL MEDICINE

## 2023-07-13 PROCEDURE — 3079F PR MOST RECENT DIASTOLIC BLOOD PRESSURE 80-89 MM HG: ICD-10-PCS | Mod: HCNC,CPTII,S$GLB, | Performed by: INTERNAL MEDICINE

## 2023-07-13 PROCEDURE — 3008F PR BODY MASS INDEX (BMI) DOCUMENTED: ICD-10-PCS | Mod: HCNC,CPTII,S$GLB, | Performed by: INTERNAL MEDICINE

## 2023-07-13 PROCEDURE — 1125F PR PAIN SEVERITY QUANTIFIED, PAIN PRESENT: ICD-10-PCS | Mod: HCNC,CPTII,S$GLB, | Performed by: INTERNAL MEDICINE

## 2023-07-13 PROCEDURE — 99999 PR PBB SHADOW E&M-EST. PATIENT-LVL IV: CPT | Mod: PBBFAC,HCNC,, | Performed by: INTERNAL MEDICINE

## 2023-07-13 PROCEDURE — 3072F LOW RISK FOR RETINOPATHY: CPT | Mod: HCNC,CPTII,S$GLB, | Performed by: INTERNAL MEDICINE

## 2023-07-13 PROCEDURE — 4010F ACE/ARB THERAPY RXD/TAKEN: CPT | Mod: HCNC,CPTII,S$GLB, | Performed by: INTERNAL MEDICINE

## 2023-07-13 PROCEDURE — 3288F PR FALLS RISK ASSESSMENT DOCUMENTED: ICD-10-PCS | Mod: HCNC,CPTII,S$GLB, | Performed by: INTERNAL MEDICINE

## 2023-07-13 PROCEDURE — 3051F PR MOST RECENT HEMOGLOBIN A1C LEVEL 7.0 - < 8.0%: ICD-10-PCS | Mod: HCNC,CPTII,S$GLB, | Performed by: INTERNAL MEDICINE

## 2023-07-13 PROCEDURE — 4010F PR ACE/ARB THEARPY RXD/TAKEN: ICD-10-PCS | Mod: HCNC,CPTII,S$GLB, | Performed by: INTERNAL MEDICINE

## 2023-07-13 PROCEDURE — 1125F AMNT PAIN NOTED PAIN PRSNT: CPT | Mod: HCNC,CPTII,S$GLB, | Performed by: INTERNAL MEDICINE

## 2023-07-13 PROCEDURE — 1101F PR PT FALLS ASSESS DOC 0-1 FALLS W/OUT INJ PAST YR: ICD-10-PCS | Mod: HCNC,CPTII,S$GLB, | Performed by: INTERNAL MEDICINE

## 2023-07-13 PROCEDURE — 1160F PR REVIEW ALL MEDS BY PRESCRIBER/CLIN PHARMACIST DOCUMENTED: ICD-10-PCS | Mod: HCNC,CPTII,S$GLB, | Performed by: INTERNAL MEDICINE

## 2023-07-13 PROCEDURE — 3072F PR LOW RISK FOR RETINOPATHY: ICD-10-PCS | Mod: HCNC,CPTII,S$GLB, | Performed by: INTERNAL MEDICINE

## 2023-07-13 PROCEDURE — 3075F PR MOST RECENT SYSTOLIC BLOOD PRESS GE 130-139MM HG: ICD-10-PCS | Mod: HCNC,CPTII,S$GLB, | Performed by: INTERNAL MEDICINE

## 2023-07-13 PROCEDURE — 3075F SYST BP GE 130 - 139MM HG: CPT | Mod: HCNC,CPTII,S$GLB, | Performed by: INTERNAL MEDICINE

## 2023-07-13 PROCEDURE — 3288F FALL RISK ASSESSMENT DOCD: CPT | Mod: HCNC,CPTII,S$GLB, | Performed by: INTERNAL MEDICINE

## 2023-07-13 PROCEDURE — 1101F PT FALLS ASSESS-DOCD LE1/YR: CPT | Mod: HCNC,CPTII,S$GLB, | Performed by: INTERNAL MEDICINE

## 2023-07-13 PROCEDURE — 3079F DIAST BP 80-89 MM HG: CPT | Mod: HCNC,CPTII,S$GLB, | Performed by: INTERNAL MEDICINE

## 2023-07-13 NOTE — PROGRESS NOTES
Subjective     Patient ID: Maria Elena Tavares is a 67 y.o. female.    Chief Complaint: Breast cancer metastasized to axillary lymph node, left    HPI    Returns for routine follow up of breast cancer  BMD result pending from today    Reports still working on blood pressure control- she had some episodes of Hypotension- her PCP stopped some of her medications as a result - monitoring closely  Reports still working on BG control- started new medications and noting weight loss and better control (feels contributing to need to change blood pressure medications)    Chronic knee pain- planned replacement on left knee and waiting for better BG control  Right hand carpal tunnel as well- post injections, may require surgery if pain continues  Note recent fall in bathtub- healed up now  Neuropathy stable- stopped medications    Weight loss (see above) and feels better  Some exercise    States tolerating Femara well  Hot flashes are better  No significant joint aches     Oncologic History:   - self detected, noted a shooting pain in breast first and then a week later felt a lump  Some delay in getting appointment as she was unaware she had to call  - 8/30/19 Diagnostic mammogram and ultrasound:  Left breast 20 mm x 18 mm x 17 mm mass at the 3 o'clock position. Assessment: 4 - Suspicious finding. Biopsy is recommended. Lymph Node: Left axilla 16 mm x 14 mm x 13 mm lymph node. Assessment: 4 - Suspicious finding. Biopsy is recommended. Right- There is no mammographic or sonographic evidence of malignancy.  BI-RADS Category:   Overall: 4 - Suspicious  - 9/5/19 Ultrasound guided biopsy  Pathology:  1. LEFT BREAST MASS, BIOPSY:  - Invasive ductal carcinoma, grade 3, longest linear length is 10 mm measured on the slide.  - Histologic Grade (Hawa Histologic Score)  Glandular (Acinar/Tubular Differentiation): 3.  Nuclear Pleomorphism: 2.  Mitotic Rate: 3.  Overall Grade: Grade 3 (score 8).  - Microcalcifications: Not identified.  -  Lymphovascular invasion: Not identified.  2. LYMPH NODE, LEFT AXILLA, BIOPSY:  - Positive for metastatic carcinoma.  - The metastatic deposit measures 6 mm in longest continuous linear length.  Estrogen receptor: Positive, 90% of the tumor nuclei staining moderate to strongly.  Progesterone receptor: Positive, 30% of the tumor nuclei staining weak to moderately.  HER2: Negative.  Ki-67: 60%.  - ECHO 9/20/19: EF 64%  - PET 9/21/19:  Hypermetabolic left breast mass and regional left axillary lymphadenopathy consistent with reported breast cancer and corresponding with recent MRI findings.  Upper limit of normal sized right axillary lymph nodes with normal fatty duane and mildly increased radiotracer uptake.  Findings could represent reactive nodes with metastatic nodes thought less likely.  Lymphscintigraphy with injection in the left breast may considered to assess for drainage to the contralateral axilla.  - BX 9/24/19: Right axilla node biopsy is benign  - she underwent neoadjuvant chemotherapy and completed 4 cycles of AC followed by 11 cycles of weekly Taxol.   Stopped with side effects.  - 3/24/20 - she underwent bilateral mastectomies with Lt. sentinel node biopsy and subsequent Lt. axillary dissection and Rt. sentinel node biopsy with Dr. Lopez.  Tissue expanders were placed.   - Pathology from the Lt. breast revealed 1.2 cm of residual invasive ductal carcinoma histologic grade 3, nuclear grade 3 and mitotic index 5 ). There was high grade DCIS. There was lymphovascular invasion.  The margins of resection were negative at > 1 cm.  One axillary node had a 5 mm focus of metastatic carcinoma.  Another Lt. sentinel node had isolated tumor cells.   A third Lt. sentinel node and 7 Lt. axillary nodes were negative.  The Rt. breast and Rt. sentinel nodes were negative.    - completed adjuvant  XRT   - on Femara     HM:  6/2022 BMD  Normal    Review of Systems   Constitutional:  Negative for activity change, appetite  change, chills, fatigue, fever and unexpected weight change.   HENT:  Negative for mouth sores, sinus pressure/congestion, sore throat and trouble swallowing.    Eyes:  Negative for visual disturbance.   Respiratory:  Negative for cough, shortness of breath and wheezing.    Cardiovascular:  Negative for chest pain, palpitations and leg swelling.   Gastrointestinal:  Negative for abdominal distention, abdominal pain, change in bowel habit, constipation, diarrhea and change in bowel habit.   Genitourinary:  Negative for decreased urine volume, difficulty urinating, dysuria, frequency and urgency.   Musculoskeletal:  Negative for back pain.   Integumentary:  Negative for rash.   Neurological:  Negative for dizziness, weakness, numbness and headaches.   Hematological:  Negative for adenopathy. Does not bruise/bleed easily.   Psychiatric/Behavioral:  Negative for dysphoric mood. The patient is not nervous/anxious.         Objective     Physical Exam  Vitals and nursing note reviewed.   Constitutional:       General: She is not in acute distress.     Appearance: Normal appearance. She is normal weight. She is not ill-appearing.      Comments: Presents alone  Very pleasant   Eyes:      General: No scleral icterus.     Extraocular Movements: Extraocular movements intact.      Pupils: Pupils are equal, round, and reactive to light.   Cardiovascular:      Rate and Rhythm: Normal rate and regular rhythm.      Heart sounds: Normal heart sounds. No murmur heard.    No friction rub. No gallop.   Pulmonary:      Effort: Pulmonary effort is normal. No respiratory distress.      Breath sounds: Normal breath sounds. No wheezing, rhonchi or rales.      Comments: No chest wall masses or :LAD  Post reconstruction  Chest:      Chest wall: No tenderness.   Abdominal:      General: Abdomen is flat. Bowel sounds are normal. There is no distension.      Palpations: Abdomen is soft. There is no mass.      Tenderness: There is no abdominal  tenderness. There is no guarding or rebound.      Comments: No organomegaly   Musculoskeletal:         General: No swelling, tenderness or deformity. Normal range of motion.      Cervical back: Normal range of motion and neck supple. No muscular tenderness.      Right lower leg: No edema.      Left lower leg: No edema.   Lymphadenopathy:      Cervical: No cervical adenopathy.   Skin:     General: Skin is warm and dry.      Coloration: Skin is not pale.      Findings: No erythema, lesion or rash.   Neurological:      General: No focal deficit present.      Mental Status: She is alert and oriented to person, place, and time. Mental status is at baseline.      Sensory: No sensory deficit.      Motor: No weakness.      Coordination: Coordination normal.      Gait: Gait normal.   Psychiatric:         Mood and Affect: Mood normal.         Behavior: Behavior normal.         Thought Content: Thought content normal.         Judgment: Judgment normal.     Labs- reviewed     Assessment and Plan     1. Breast cancer metastasized to axillary lymph node, left    2. Vitamin D deficiency  Overview:  . A low vitamin D      3. Essential hypertension    4. Controlled type 2 diabetes mellitus with diabetic nephropathy, without long-term current use of insulin    Breast cancer  Continue Barber  Knows to call for any issues  BMD up to date, result pending    2. Vit D deficiency  On replacement    3,4. Better control, working with PCP     Route Chart for Scheduling    Med Onc Chart Routing      Follow up with physician    Follow up with [facility] 6 months. cbc, cmp prior   Infusion scheduling note    Injection scheduling note    Labs    Imaging    Pharmacy appointment    Other referrals            Therapy Plan Information  Flushes  heparin, porcine (PF) 100 unit/mL injection flush 500 Units  500 Units, Intravenous, Every visit  sodium chloride 0.9% flush 10 mL  10 mL, Intravenous, Every visit

## 2023-07-17 ENCOUNTER — OFFICE VISIT (OUTPATIENT)
Dept: PRIMARY CARE CLINIC | Facility: CLINIC | Age: 68
End: 2023-07-17
Attending: FAMILY MEDICINE
Payer: MEDICAID

## 2023-07-17 DIAGNOSIS — I95.1 ORTHOSTATIC HYPOTENSION: Primary | ICD-10-CM

## 2023-07-17 DIAGNOSIS — C50.912 BREAST CANCER METASTASIZED TO AXILLARY LYMPH NODE, LEFT: ICD-10-CM

## 2023-07-17 DIAGNOSIS — C77.3 BREAST CANCER METASTASIZED TO AXILLARY LYMPH NODE, LEFT: ICD-10-CM

## 2023-07-17 DIAGNOSIS — I10 ESSENTIAL HYPERTENSION: ICD-10-CM

## 2023-07-17 PROCEDURE — 99214 OFFICE O/P EST MOD 30 MIN: CPT | Mod: 95,,, | Performed by: FAMILY MEDICINE

## 2023-07-17 PROCEDURE — 4010F PR ACE/ARB THEARPY RXD/TAKEN: ICD-10-PCS | Mod: CPTII,95,, | Performed by: FAMILY MEDICINE

## 2023-07-17 PROCEDURE — 4010F ACE/ARB THERAPY RXD/TAKEN: CPT | Mod: CPTII,95,, | Performed by: FAMILY MEDICINE

## 2023-07-17 PROCEDURE — 3051F HG A1C>EQUAL 7.0%<8.0%: CPT | Mod: CPTII,95,, | Performed by: FAMILY MEDICINE

## 2023-07-17 PROCEDURE — 3072F LOW RISK FOR RETINOPATHY: CPT | Mod: CPTII,95,, | Performed by: FAMILY MEDICINE

## 2023-07-17 PROCEDURE — 1160F PR REVIEW ALL MEDS BY PRESCRIBER/CLIN PHARMACIST DOCUMENTED: ICD-10-PCS | Mod: CPTII,95,, | Performed by: FAMILY MEDICINE

## 2023-07-17 PROCEDURE — 1160F RVW MEDS BY RX/DR IN RCRD: CPT | Mod: CPTII,95,, | Performed by: FAMILY MEDICINE

## 2023-07-17 PROCEDURE — 99214 PR OFFICE/OUTPT VISIT, EST, LEVL IV, 30-39 MIN: ICD-10-PCS | Mod: 95,,, | Performed by: FAMILY MEDICINE

## 2023-07-17 PROCEDURE — 1159F PR MEDICATION LIST DOCUMENTED IN MEDICAL RECORD: ICD-10-PCS | Mod: CPTII,95,, | Performed by: FAMILY MEDICINE

## 2023-07-17 PROCEDURE — 3051F PR MOST RECENT HEMOGLOBIN A1C LEVEL 7.0 - < 8.0%: ICD-10-PCS | Mod: CPTII,95,, | Performed by: FAMILY MEDICINE

## 2023-07-17 PROCEDURE — 3072F PR LOW RISK FOR RETINOPATHY: ICD-10-PCS | Mod: CPTII,95,, | Performed by: FAMILY MEDICINE

## 2023-07-17 PROCEDURE — 1159F MED LIST DOCD IN RCRD: CPT | Mod: CPTII,95,, | Performed by: FAMILY MEDICINE

## 2023-07-17 RX ORDER — LETROZOLE 2.5 MG/1
2.5 TABLET, FILM COATED ORAL DAILY
Qty: 90 TABLET | Refills: 3 | Status: SHIPPED | OUTPATIENT
Start: 2023-07-17 | End: 2024-07-16

## 2023-07-17 NOTE — PROGRESS NOTES
Subjective:       Patient ID: Maria Elena Tavares is a 67 y.o. female.    Chief Complaint: hypotension    The patient location is: home  The chief complaint leading to consultation is: blood pressures    Visit type: audiovisual    Face to Face time with patient: 20 minutes 20 minutes of total time spent on the encounter, which includes face to face time and non-face to face time preparing to see the patient (eg, review of tests), Obtaining and/or reviewing separately obtained history, Documenting clinical information in the electronic or other health record, Independently interpreting results (not separately reported) and communicating results to the patient/family/caregiver, or Care coordination (not separately reported).     Each patient to whom he or she provides medical services by telemedicine is:  (1) informed of the relationship between the physician and patient and the respective role of any other health care provider with respect to management of the patient; and (2) notified that he or she may decline to receive medical services by telemedicine and may withdraw from such care at any time.    Notes:     Pt presents with episodes of hypotension. Stopped ARB. States then pressures went up and she restarted the med at 1/2 pill daily  Pt feels fine no SEs with 1/2 dosing abd BPs range at 130/80's    Hypertension  This is a recurrent problem. The current episode started more than 1 year ago. The problem has been gradually improving since onset. The problem is resistant. Associated symptoms include blurred vision, headaches and malaise/fatigue. Pertinent negatives include no chest pain, palpitations or shortness of breath. Risk factors for coronary artery disease include diabetes mellitus. Past treatments include nothing. The current treatment provides moderate improvement.   Review of Systems   Constitutional:  Positive for malaise/fatigue.   Eyes:  Positive for blurred vision.   Respiratory:  Negative for cough,  chest tightness and shortness of breath.    Cardiovascular:  Negative for chest pain, palpitations and leg swelling.   Gastrointestinal: Negative.    Musculoskeletal: Negative.  Negative for joint swelling.   Skin: Negative.    Neurological:  Positive for headaches. Negative for dizziness, weakness and light-headedness.   All other systems reviewed and are negative.      Objective:      Physical Exam  Vitals reviewed.   Constitutional:       General: She is not in acute distress.     Appearance: Normal appearance. She is well-developed and normal weight. She is not ill-appearing, toxic-appearing or diaphoretic.   HENT:      Head: Normocephalic and atraumatic.   Eyes:      Extraocular Movements: Extraocular movements intact.      Conjunctiva/sclera: Conjunctivae normal.      Pupils: Pupils are equal, round, and reactive to light.   Neck:      Thyroid: No thyromegaly.   Cardiovascular:      Rate and Rhythm: Normal rate and regular rhythm.      Heart sounds: Normal heart sounds. No murmur heard.  Pulmonary:      Effort: Pulmonary effort is normal. No respiratory distress.      Breath sounds: Normal breath sounds. No wheezing or rales.   Chest:      Chest wall: No tenderness.   Musculoskeletal:         General: Normal range of motion.      Cervical back: Normal range of motion and neck supple.      Right lower leg: No edema.      Left lower leg: No edema.   Lymphadenopathy:      Cervical: No cervical adenopathy.   Skin:     General: Skin is warm.   Neurological:      Mental Status: She is alert and oriented to person, place, and time.   Psychiatric:         Behavior: Behavior normal.         Thought Content: Thought content normal.         Judgment: Judgment normal.      Assessment:     Hypertension/hypotension      Plan:   For now continue ARB at 150 mg daily. If  symptoms persist we need to stop med and trial of another medicine  Pt amenable  RTC prn symptoms

## 2023-07-20 ENCOUNTER — PATIENT MESSAGE (OUTPATIENT)
Dept: HEMATOLOGY/ONCOLOGY | Facility: CLINIC | Age: 68
End: 2023-07-20
Payer: MEDICARE

## 2023-07-20 DIAGNOSIS — Z86.010 HISTORY OF COLON POLYPS: Primary | ICD-10-CM

## 2023-07-21 ENCOUNTER — PATIENT MESSAGE (OUTPATIENT)
Dept: ORTHOPEDICS | Facility: CLINIC | Age: 68
End: 2023-07-21
Payer: MEDICARE

## 2023-07-28 ENCOUNTER — OFFICE VISIT (OUTPATIENT)
Dept: ORTHOPEDICS | Facility: CLINIC | Age: 68
End: 2023-07-28
Payer: MEDICARE

## 2023-07-28 VITALS — HEIGHT: 61 IN | BODY MASS INDEX: 32.28 KG/M2 | WEIGHT: 171 LBS

## 2023-07-28 DIAGNOSIS — M77.11 RIGHT LATERAL EPICONDYLITIS: ICD-10-CM

## 2023-07-28 DIAGNOSIS — G56.01 RIGHT CARPAL TUNNEL SYNDROME: Primary | ICD-10-CM

## 2023-07-28 PROCEDURE — 20526 THER INJECTION CARP TUNNEL: CPT | Mod: HCNC,RT,S$GLB, | Performed by: PLASTIC SURGERY

## 2023-07-28 PROCEDURE — 1159F PR MEDICATION LIST DOCUMENTED IN MEDICAL RECORD: ICD-10-PCS | Mod: HCNC,CPTII,S$GLB, | Performed by: PLASTIC SURGERY

## 2023-07-28 PROCEDURE — 4010F PR ACE/ARB THEARPY RXD/TAKEN: ICD-10-PCS | Mod: HCNC,CPTII,S$GLB, | Performed by: PLASTIC SURGERY

## 2023-07-28 PROCEDURE — 99999 PR PBB SHADOW E&M-EST. PATIENT-LVL III: CPT | Mod: PBBFAC,HCNC,, | Performed by: PLASTIC SURGERY

## 2023-07-28 PROCEDURE — 99999 PR PBB SHADOW E&M-EST. PATIENT-LVL III: ICD-10-PCS | Mod: PBBFAC,HCNC,, | Performed by: PLASTIC SURGERY

## 2023-07-28 PROCEDURE — 3072F LOW RISK FOR RETINOPATHY: CPT | Mod: HCNC,CPTII,S$GLB, | Performed by: PLASTIC SURGERY

## 2023-07-28 PROCEDURE — 1159F MED LIST DOCD IN RCRD: CPT | Mod: HCNC,CPTII,S$GLB, | Performed by: PLASTIC SURGERY

## 2023-07-28 PROCEDURE — 99212 PR OFFICE/OUTPT VISIT, EST, LEVL II, 10-19 MIN: ICD-10-PCS | Mod: 25,HCNC,S$GLB, | Performed by: PLASTIC SURGERY

## 2023-07-28 PROCEDURE — 99212 OFFICE O/P EST SF 10 MIN: CPT | Mod: 25,HCNC,S$GLB, | Performed by: PLASTIC SURGERY

## 2023-07-28 PROCEDURE — 3051F PR MOST RECENT HEMOGLOBIN A1C LEVEL 7.0 - < 8.0%: ICD-10-PCS | Mod: HCNC,CPTII,S$GLB, | Performed by: PLASTIC SURGERY

## 2023-07-28 PROCEDURE — 3051F HG A1C>EQUAL 7.0%<8.0%: CPT | Mod: HCNC,CPTII,S$GLB, | Performed by: PLASTIC SURGERY

## 2023-07-28 PROCEDURE — 3008F PR BODY MASS INDEX (BMI) DOCUMENTED: ICD-10-PCS | Mod: HCNC,CPTII,S$GLB, | Performed by: PLASTIC SURGERY

## 2023-07-28 PROCEDURE — 4010F ACE/ARB THERAPY RXD/TAKEN: CPT | Mod: HCNC,CPTII,S$GLB, | Performed by: PLASTIC SURGERY

## 2023-07-28 PROCEDURE — 3072F PR LOW RISK FOR RETINOPATHY: ICD-10-PCS | Mod: HCNC,CPTII,S$GLB, | Performed by: PLASTIC SURGERY

## 2023-07-28 PROCEDURE — 3008F BODY MASS INDEX DOCD: CPT | Mod: HCNC,CPTII,S$GLB, | Performed by: PLASTIC SURGERY

## 2023-07-28 PROCEDURE — 20526 PR INJECT CARPAL TUNNEL: ICD-10-PCS | Mod: HCNC,RT,S$GLB, | Performed by: PLASTIC SURGERY

## 2023-07-28 RX ORDER — TRIAMCINOLONE ACETONIDE 40 MG/ML
40 INJECTION, SUSPENSION INTRA-ARTICULAR; INTRAMUSCULAR
Status: COMPLETED | OUTPATIENT
Start: 2023-07-28 | End: 2023-07-28

## 2023-07-28 RX ADMIN — TRIAMCINOLONE ACETONIDE 40 MG: 40 INJECTION, SUSPENSION INTRA-ARTICULAR; INTRAMUSCULAR at 09:07

## 2023-07-28 NOTE — PROGRESS NOTES
Subjective:     Patient ID: Maria Elena Tavares is a 67 y.o. female.    Chief Complaint: No chief complaint on file.    Maria Elena Tavares returns to clinic today with complaints of right hand pain and numbness and tingling.  She also complains of right elbow pain.  She was last seen in April where she received an injection in bilateral thumbs and the right carpal tunnel.  She has improvement in the thumbs however her symptoms within the right carpal tunnel has returned.  Her last EMG was performed in 2020.      Review of Systems   All other systems reviewed and are negative.    Objective:       General    Vitals reviewed.  Constitutional: She is oriented to person, place, and time. She appears well-nourished. No distress.   Pulmonary/Chest: Effort normal.   Neurological: She is alert and oriented to person, place, and time.   Psychiatric: She has a normal mood and affect.             Right Hand/Wrist Exam     Inspection   Effusion:  Hand -  absent  Deformity:  Hand -  deformity    Range of Motion   The patient has normal right hand/wrist ROM.    Tests   Tinel's sign (median nerve): positive  Carpal Tunnel Compression Test: positive    Atrophy   Thenar:  negative  Hypothenar:  negative    Other     Neuorologic Exam    Median Distribution: normal  Ulnar Distribution: normal  Radial Distribution: normal      Right Elbow Exam     Inspection   Effusion: absent  Deformity: absent    Pain   The patient exhibits pain of the lateral epicondyle    Tenderness   The patient is tender to palpation of the lateral epicondyle.     Range of Motion   The patient has normal right elbow ROM.          Vascular Exam       Capillary Refill  Right Hand: normal capillary refill        Physical Exam  Vitals reviewed.   Constitutional:       General: She is not in acute distress.     Appearance: She is well-nourished.   Pulmonary:      Effort: Pulmonary effort is normal.   Musculoskeletal:      Right elbow: No deformity or effusion.      Right hand: No  deformity. Normal sensation of the ulnar distribution, median distribution and radial distribution. Normal capillary refill.   Neurological:      Mental Status: She is alert and oriented to person, place, and time.   Psychiatric:         Mood and Affect: Mood and affect normal.     I have explained the risks, benefits, and alternatives of the procedure in detail.  The patient voices understanding and all questions have been answered.  The patient agrees to proceed as planned. After a sterile prep of the skin the right carpal tunnel is injected from the volar approach using a 25 gauge needle with a combination of 1cc 1% plain xylocaine and 40 mg of Kenalog.  The patient is cautioned and immediate relief of pain is secondary to the local anesthetic and will be temporary.  After the anesthetic wears off there may be a increase in pain that may last for a few hours or a few days and they should use ice to help alleviate this flair up of pain.       Assessment:     Encounter Diagnosis   Name Primary?    Right carpal tunnel syndrome Yes       Plan:      Diagnoses and all orders for this visit:    Right carpal tunnel syndrome  -     EMG W/ ULTRASOUND AND NERVE CONDUCTION TEST 1 Extremity; Future    Other orders  -     triamcinolone acetonide injection 40 mg      She presents today with recurrent symptoms of right carpal tunnel syndrome as well as right lateral epicondylitis.  I discussed I would like to obtain a repeat EMG of her right upper extremity since her last EMG was performed over 3 years ago.  She was offered a repeat injection into the carpal tunnel to help alleviate her symptoms until the EMG can be performed.  She was also discussed perform stretching exercises of the right forearm to help with her right lateral epicondylitis.  She will follow up with me after the EMG is performed.  All questions concerns were addressed

## 2023-08-16 ENCOUNTER — LAB VISIT (OUTPATIENT)
Dept: LAB | Facility: OTHER | Age: 68
End: 2023-08-16
Attending: FAMILY MEDICINE
Payer: MEDICARE

## 2023-08-16 DIAGNOSIS — E87.6 HYPOKALEMIA: ICD-10-CM

## 2023-08-16 DIAGNOSIS — I10 ESSENTIAL HYPERTENSION: ICD-10-CM

## 2023-08-16 DIAGNOSIS — D50.8 OTHER IRON DEFICIENCY ANEMIA: ICD-10-CM

## 2023-08-16 DIAGNOSIS — E11.21 CONTROLLED TYPE 2 DIABETES MELLITUS WITH DIABETIC NEPHROPATHY, WITHOUT LONG-TERM CURRENT USE OF INSULIN: ICD-10-CM

## 2023-08-16 DIAGNOSIS — E55.9 VITAMIN D DEFICIENCY: ICD-10-CM

## 2023-08-16 DIAGNOSIS — E66.9 OBESITY (BMI 30-39.9): ICD-10-CM

## 2023-08-16 DIAGNOSIS — E03.9 HYPOTHYROIDISM, UNSPECIFIED TYPE: ICD-10-CM

## 2023-08-16 LAB
25(OH)D3+25(OH)D2 SERPL-MCNC: 20 NG/ML (ref 30–96)
ALBUMIN SERPL BCP-MCNC: 3.9 G/DL (ref 3.5–5.2)
ALP SERPL-CCNC: 96 U/L (ref 55–135)
ALT SERPL W/O P-5'-P-CCNC: 7 U/L (ref 10–44)
ANION GAP SERPL CALC-SCNC: 11 MMOL/L (ref 8–16)
AST SERPL-CCNC: 19 U/L (ref 10–40)
BASOPHILS # BLD AUTO: 0.02 K/UL (ref 0–0.2)
BASOPHILS NFR BLD: 0.4 % (ref 0–1.9)
BILIRUB SERPL-MCNC: 0.6 MG/DL (ref 0.1–1)
BUN SERPL-MCNC: 17 MG/DL (ref 8–23)
CALCIUM SERPL-MCNC: 9.9 MG/DL (ref 8.7–10.5)
CHLORIDE SERPL-SCNC: 105 MMOL/L (ref 95–110)
CHOLEST SERPL-MCNC: 245 MG/DL (ref 120–199)
CHOLEST/HDLC SERPL: 4.6 {RATIO} (ref 2–5)
CO2 SERPL-SCNC: 23 MMOL/L (ref 23–29)
CREAT SERPL-MCNC: 0.9 MG/DL (ref 0.5–1.4)
DIFFERENTIAL METHOD: ABNORMAL
EOSINOPHIL # BLD AUTO: 0 K/UL (ref 0–0.5)
EOSINOPHIL NFR BLD: 0.6 % (ref 0–8)
ERYTHROCYTE [DISTWIDTH] IN BLOOD BY AUTOMATED COUNT: 15.5 % (ref 11.5–14.5)
EST. GFR  (NO RACE VARIABLE): >60 ML/MIN/1.73 M^2
ESTIMATED AVG GLUCOSE: 131 MG/DL (ref 68–131)
GLUCOSE SERPL-MCNC: 86 MG/DL (ref 70–110)
HBA1C MFR BLD: 6.2 % (ref 4–5.6)
HCT VFR BLD AUTO: 38.5 % (ref 37–48.5)
HDLC SERPL-MCNC: 53 MG/DL (ref 40–75)
HDLC SERPL: 21.6 % (ref 20–50)
HGB BLD-MCNC: 12.3 G/DL (ref 12–16)
IMM GRANULOCYTES # BLD AUTO: 0.01 K/UL (ref 0–0.04)
IMM GRANULOCYTES NFR BLD AUTO: 0.2 % (ref 0–0.5)
LDLC SERPL CALC-MCNC: 176.2 MG/DL (ref 63–159)
LYMPHOCYTES # BLD AUTO: 1 K/UL (ref 1–4.8)
LYMPHOCYTES NFR BLD: 19.3 % (ref 18–48)
MCH RBC QN AUTO: 29.6 PG (ref 27–31)
MCHC RBC AUTO-ENTMCNC: 31.9 G/DL (ref 32–36)
MCV RBC AUTO: 93 FL (ref 82–98)
MONOCYTES # BLD AUTO: 0.6 K/UL (ref 0.3–1)
MONOCYTES NFR BLD: 11.2 % (ref 4–15)
NEUTROPHILS # BLD AUTO: 3.4 K/UL (ref 1.8–7.7)
NEUTROPHILS NFR BLD: 68.3 % (ref 38–73)
NONHDLC SERPL-MCNC: 192 MG/DL
NRBC BLD-RTO: 0 /100 WBC
PLATELET # BLD AUTO: 293 K/UL (ref 150–450)
PMV BLD AUTO: 9.6 FL (ref 9.2–12.9)
POTASSIUM SERPL-SCNC: 4 MMOL/L (ref 3.5–5.1)
PROT SERPL-MCNC: 8.3 G/DL (ref 6–8.4)
RBC # BLD AUTO: 4.15 M/UL (ref 4–5.4)
SODIUM SERPL-SCNC: 139 MMOL/L (ref 136–145)
T4 FREE SERPL-MCNC: 0.79 NG/DL (ref 0.71–1.51)
TRIGL SERPL-MCNC: 79 MG/DL (ref 30–150)
TSH SERPL DL<=0.005 MIU/L-ACNC: 6.29 UIU/ML (ref 0.4–4)
WBC # BLD AUTO: 5.02 K/UL (ref 3.9–12.7)

## 2023-08-16 PROCEDURE — 36415 COLL VENOUS BLD VENIPUNCTURE: CPT | Mod: HCNC | Performed by: FAMILY MEDICINE

## 2023-08-16 PROCEDURE — 83036 HEMOGLOBIN GLYCOSYLATED A1C: CPT | Mod: HCNC | Performed by: FAMILY MEDICINE

## 2023-08-16 PROCEDURE — 84439 ASSAY OF FREE THYROXINE: CPT | Mod: HCNC | Performed by: FAMILY MEDICINE

## 2023-08-16 PROCEDURE — 82306 VITAMIN D 25 HYDROXY: CPT | Mod: HCNC | Performed by: FAMILY MEDICINE

## 2023-08-16 PROCEDURE — 80053 COMPREHEN METABOLIC PANEL: CPT | Mod: HCNC | Performed by: FAMILY MEDICINE

## 2023-08-16 PROCEDURE — 85025 COMPLETE CBC W/AUTO DIFF WBC: CPT | Mod: HCNC | Performed by: FAMILY MEDICINE

## 2023-08-16 PROCEDURE — 80061 LIPID PANEL: CPT | Mod: HCNC | Performed by: FAMILY MEDICINE

## 2023-08-16 PROCEDURE — 84443 ASSAY THYROID STIM HORMONE: CPT | Mod: HCNC | Performed by: FAMILY MEDICINE

## 2023-08-22 ENCOUNTER — OFFICE VISIT (OUTPATIENT)
Dept: PRIMARY CARE CLINIC | Facility: CLINIC | Age: 68
End: 2023-08-22
Attending: FAMILY MEDICINE
Payer: MEDICARE

## 2023-08-22 ENCOUNTER — PATIENT MESSAGE (OUTPATIENT)
Dept: HEMATOLOGY/ONCOLOGY | Facility: CLINIC | Age: 68
End: 2023-08-22
Payer: MEDICARE

## 2023-08-22 VITALS
DIASTOLIC BLOOD PRESSURE: 82 MMHG | SYSTOLIC BLOOD PRESSURE: 124 MMHG | HEIGHT: 61 IN | HEART RATE: 96 BPM | BODY MASS INDEX: 31.48 KG/M2 | WEIGHT: 166.75 LBS | RESPIRATION RATE: 16 BRPM | OXYGEN SATURATION: 98 %

## 2023-08-22 DIAGNOSIS — I10 ESSENTIAL HYPERTENSION: ICD-10-CM

## 2023-08-22 DIAGNOSIS — E87.6 HYPOKALEMIA: ICD-10-CM

## 2023-08-22 DIAGNOSIS — T45.1X5A NEUROPATHY DUE TO CHEMOTHERAPEUTIC DRUG: ICD-10-CM

## 2023-08-22 DIAGNOSIS — E11.21 CONTROLLED TYPE 2 DIABETES MELLITUS WITH DIABETIC NEPHROPATHY, WITHOUT LONG-TERM CURRENT USE OF INSULIN: ICD-10-CM

## 2023-08-22 DIAGNOSIS — M25.562 CHRONIC PAIN OF LEFT KNEE: Primary | ICD-10-CM

## 2023-08-22 DIAGNOSIS — M25.562 LEFT KNEE PAIN, UNSPECIFIED CHRONICITY: Primary | ICD-10-CM

## 2023-08-22 DIAGNOSIS — G89.29 CHRONIC PAIN OF LEFT KNEE: Primary | ICD-10-CM

## 2023-08-22 DIAGNOSIS — Z90.13 H/O BILATERAL MASTECTOMY: ICD-10-CM

## 2023-08-22 DIAGNOSIS — E03.9 HYPOTHYROIDISM, UNSPECIFIED TYPE: ICD-10-CM

## 2023-08-22 DIAGNOSIS — G62.0 NEUROPATHY DUE TO CHEMOTHERAPEUTIC DRUG: ICD-10-CM

## 2023-08-22 PROCEDURE — 3288F FALL RISK ASSESSMENT DOCD: CPT | Mod: HCNC,CPTII,S$GLB, | Performed by: FAMILY MEDICINE

## 2023-08-22 PROCEDURE — 3079F DIAST BP 80-89 MM HG: CPT | Mod: HCNC,CPTII,S$GLB, | Performed by: FAMILY MEDICINE

## 2023-08-22 PROCEDURE — 4010F PR ACE/ARB THEARPY RXD/TAKEN: ICD-10-PCS | Mod: HCNC,CPTII,S$GLB, | Performed by: FAMILY MEDICINE

## 2023-08-22 PROCEDURE — 1101F PR PT FALLS ASSESS DOC 0-1 FALLS W/OUT INJ PAST YR: ICD-10-PCS | Mod: HCNC,CPTII,S$GLB, | Performed by: FAMILY MEDICINE

## 2023-08-22 PROCEDURE — 1160F PR REVIEW ALL MEDS BY PRESCRIBER/CLIN PHARMACIST DOCUMENTED: ICD-10-PCS | Mod: HCNC,CPTII,S$GLB, | Performed by: FAMILY MEDICINE

## 2023-08-22 PROCEDURE — 3072F LOW RISK FOR RETINOPATHY: CPT | Mod: HCNC,CPTII,S$GLB, | Performed by: FAMILY MEDICINE

## 2023-08-22 PROCEDURE — 3288F PR FALLS RISK ASSESSMENT DOCUMENTED: ICD-10-PCS | Mod: HCNC,CPTII,S$GLB, | Performed by: FAMILY MEDICINE

## 2023-08-22 PROCEDURE — 3061F NEG MICROALBUMINURIA REV: CPT | Mod: HCNC,CPTII,S$GLB, | Performed by: FAMILY MEDICINE

## 2023-08-22 PROCEDURE — 3044F PR MOST RECENT HEMOGLOBIN A1C LEVEL <7.0%: ICD-10-PCS | Mod: HCNC,CPTII,S$GLB, | Performed by: FAMILY MEDICINE

## 2023-08-22 PROCEDURE — 1101F PT FALLS ASSESS-DOCD LE1/YR: CPT | Mod: HCNC,CPTII,S$GLB, | Performed by: FAMILY MEDICINE

## 2023-08-22 PROCEDURE — 3008F PR BODY MASS INDEX (BMI) DOCUMENTED: ICD-10-PCS | Mod: HCNC,CPTII,S$GLB, | Performed by: FAMILY MEDICINE

## 2023-08-22 PROCEDURE — 3074F PR MOST RECENT SYSTOLIC BLOOD PRESSURE < 130 MM HG: ICD-10-PCS | Mod: HCNC,CPTII,S$GLB, | Performed by: FAMILY MEDICINE

## 2023-08-22 PROCEDURE — 4010F ACE/ARB THERAPY RXD/TAKEN: CPT | Mod: HCNC,CPTII,S$GLB, | Performed by: FAMILY MEDICINE

## 2023-08-22 PROCEDURE — 3074F SYST BP LT 130 MM HG: CPT | Mod: HCNC,CPTII,S$GLB, | Performed by: FAMILY MEDICINE

## 2023-08-22 PROCEDURE — 3079F PR MOST RECENT DIASTOLIC BLOOD PRESSURE 80-89 MM HG: ICD-10-PCS | Mod: HCNC,CPTII,S$GLB, | Performed by: FAMILY MEDICINE

## 2023-08-22 PROCEDURE — 99999 PR PBB SHADOW E&M-EST. PATIENT-LVL IV: ICD-10-PCS | Mod: PBBFAC,HCNC,, | Performed by: FAMILY MEDICINE

## 2023-08-22 PROCEDURE — 1160F RVW MEDS BY RX/DR IN RCRD: CPT | Mod: HCNC,CPTII,S$GLB, | Performed by: FAMILY MEDICINE

## 2023-08-22 PROCEDURE — 3044F HG A1C LEVEL LT 7.0%: CPT | Mod: HCNC,CPTII,S$GLB, | Performed by: FAMILY MEDICINE

## 2023-08-22 PROCEDURE — 1159F MED LIST DOCD IN RCRD: CPT | Mod: HCNC,CPTII,S$GLB, | Performed by: FAMILY MEDICINE

## 2023-08-22 PROCEDURE — 1159F PR MEDICATION LIST DOCUMENTED IN MEDICAL RECORD: ICD-10-PCS | Mod: HCNC,CPTII,S$GLB, | Performed by: FAMILY MEDICINE

## 2023-08-22 PROCEDURE — 99215 OFFICE O/P EST HI 40 MIN: CPT | Mod: HCNC,S$GLB,, | Performed by: FAMILY MEDICINE

## 2023-08-22 PROCEDURE — 1126F AMNT PAIN NOTED NONE PRSNT: CPT | Mod: HCNC,CPTII,S$GLB, | Performed by: FAMILY MEDICINE

## 2023-08-22 PROCEDURE — 3072F PR LOW RISK FOR RETINOPATHY: ICD-10-PCS | Mod: HCNC,CPTII,S$GLB, | Performed by: FAMILY MEDICINE

## 2023-08-22 PROCEDURE — 3066F NEPHROPATHY DOC TX: CPT | Mod: HCNC,CPTII,S$GLB, | Performed by: FAMILY MEDICINE

## 2023-08-22 PROCEDURE — 99999 PR PBB SHADOW E&M-EST. PATIENT-LVL IV: CPT | Mod: PBBFAC,HCNC,, | Performed by: FAMILY MEDICINE

## 2023-08-22 PROCEDURE — 99215 PR OFFICE/OUTPT VISIT, EST, LEVL V, 40-54 MIN: ICD-10-PCS | Mod: HCNC,S$GLB,, | Performed by: FAMILY MEDICINE

## 2023-08-22 PROCEDURE — 3061F PR NEG MICROALBUMINURIA RESULT DOCUMENTED/REVIEW: ICD-10-PCS | Mod: HCNC,CPTII,S$GLB, | Performed by: FAMILY MEDICINE

## 2023-08-22 PROCEDURE — 3066F PR DOCUMENTATION OF TREATMENT FOR NEPHROPATHY: ICD-10-PCS | Mod: HCNC,CPTII,S$GLB, | Performed by: FAMILY MEDICINE

## 2023-08-22 PROCEDURE — 3008F BODY MASS INDEX DOCD: CPT | Mod: HCNC,CPTII,S$GLB, | Performed by: FAMILY MEDICINE

## 2023-08-22 PROCEDURE — 1126F PR PAIN SEVERITY QUANTIFIED, NO PAIN PRESENT: ICD-10-PCS | Mod: HCNC,CPTII,S$GLB, | Performed by: FAMILY MEDICINE

## 2023-08-23 ENCOUNTER — LAB VISIT (OUTPATIENT)
Dept: LAB | Facility: HOSPITAL | Age: 68
End: 2023-08-23
Attending: FAMILY MEDICINE
Payer: MEDICARE

## 2023-08-23 DIAGNOSIS — E11.21 CONTROLLED TYPE 2 DIABETES MELLITUS WITH DIABETIC NEPHROPATHY, WITHOUT LONG-TERM CURRENT USE OF INSULIN: ICD-10-CM

## 2023-08-23 DIAGNOSIS — E03.9 HYPOTHYROIDISM, UNSPECIFIED TYPE: ICD-10-CM

## 2023-08-23 LAB
ALBUMIN/CREAT UR: 12.4 UG/MG (ref 0–30)
CREAT UR-MCNC: 129 MG/DL (ref 15–325)
MICROALBUMIN UR DL<=1MG/L-MCNC: 16 UG/ML
TSH SERPL DL<=0.005 MIU/L-ACNC: 3.86 UIU/ML (ref 0.4–4)

## 2023-08-23 PROCEDURE — 36415 COLL VENOUS BLD VENIPUNCTURE: CPT | Mod: HCNC,PN | Performed by: FAMILY MEDICINE

## 2023-08-23 PROCEDURE — 82570 ASSAY OF URINE CREATININE: CPT | Mod: HCNC | Performed by: FAMILY MEDICINE

## 2023-08-23 PROCEDURE — 84443 ASSAY THYROID STIM HORMONE: CPT | Mod: HCNC | Performed by: FAMILY MEDICINE

## 2023-08-29 ENCOUNTER — HOSPITAL ENCOUNTER (OUTPATIENT)
Dept: RADIOLOGY | Facility: HOSPITAL | Age: 68
Discharge: HOME | End: 2023-08-29
Attending: PHYSICIAN ASSISTANT
Payer: MEDICARE

## 2023-08-29 ENCOUNTER — TELEPHONE (OUTPATIENT)
Dept: ORTHOPEDICS | Facility: CLINIC | Age: 68
End: 2023-08-29
Payer: MEDICARE

## 2023-08-29 ENCOUNTER — OFFICE VISIT (OUTPATIENT)
Dept: ORTHOPEDICS | Facility: CLINIC | Age: 68
End: 2023-08-29
Payer: MEDICARE

## 2023-08-29 VITALS
HEIGHT: 61 IN | SYSTOLIC BLOOD PRESSURE: 121 MMHG | BODY MASS INDEX: 31.74 KG/M2 | HEART RATE: 94 BPM | DIASTOLIC BLOOD PRESSURE: 73 MMHG | WEIGHT: 168.13 LBS

## 2023-08-29 DIAGNOSIS — M25.562 LEFT KNEE PAIN, UNSPECIFIED CHRONICITY: ICD-10-CM

## 2023-08-29 DIAGNOSIS — M17.12 PRIMARY OSTEOARTHRITIS OF LEFT KNEE: Primary | ICD-10-CM

## 2023-08-29 PROCEDURE — 99214 OFFICE O/P EST MOD 30 MIN: CPT | Mod: HCNC,25,S$GLB, | Performed by: PHYSICIAN ASSISTANT

## 2023-08-29 PROCEDURE — 20610 PR DRAIN/INJECT LARGE JOINT/BURSA: ICD-10-PCS | Mod: HCNC,LT,S$GLB, | Performed by: PHYSICIAN ASSISTANT

## 2023-08-29 PROCEDURE — 73562 X-RAY EXAM OF KNEE 3: CPT | Mod: TC,HCNC,LT

## 2023-08-29 PROCEDURE — 3008F BODY MASS INDEX DOCD: CPT | Mod: HCNC,CPTII,S$GLB, | Performed by: PHYSICIAN ASSISTANT

## 2023-08-29 PROCEDURE — 99214 PR OFFICE/OUTPT VISIT, EST, LEVL IV, 30-39 MIN: ICD-10-PCS | Mod: HCNC,25,S$GLB, | Performed by: PHYSICIAN ASSISTANT

## 2023-08-29 PROCEDURE — 4010F ACE/ARB THERAPY RXD/TAKEN: CPT | Mod: HCNC,CPTII,S$GLB, | Performed by: PHYSICIAN ASSISTANT

## 2023-08-29 PROCEDURE — 73562 X-RAY EXAM OF KNEE 3: CPT | Mod: 26,HCNC,LT, | Performed by: RADIOLOGY

## 2023-08-29 PROCEDURE — 73560 X-RAY EXAM OF KNEE 1 OR 2: CPT | Mod: 26,HCNC,RT, | Performed by: RADIOLOGY

## 2023-08-29 PROCEDURE — 3072F LOW RISK FOR RETINOPATHY: CPT | Mod: HCNC,CPTII,S$GLB, | Performed by: PHYSICIAN ASSISTANT

## 2023-08-29 PROCEDURE — 3061F PR NEG MICROALBUMINURIA RESULT DOCUMENTED/REVIEW: ICD-10-PCS | Mod: HCNC,CPTII,S$GLB, | Performed by: PHYSICIAN ASSISTANT

## 2023-08-29 PROCEDURE — 3288F FALL RISK ASSESSMENT DOCD: CPT | Mod: HCNC,CPTII,S$GLB, | Performed by: PHYSICIAN ASSISTANT

## 2023-08-29 PROCEDURE — 1159F PR MEDICATION LIST DOCUMENTED IN MEDICAL RECORD: ICD-10-PCS | Mod: HCNC,CPTII,S$GLB, | Performed by: PHYSICIAN ASSISTANT

## 2023-08-29 PROCEDURE — 3066F PR DOCUMENTATION OF TREATMENT FOR NEPHROPATHY: ICD-10-PCS | Mod: HCNC,CPTII,S$GLB, | Performed by: PHYSICIAN ASSISTANT

## 2023-08-29 PROCEDURE — 3008F PR BODY MASS INDEX (BMI) DOCUMENTED: ICD-10-PCS | Mod: HCNC,CPTII,S$GLB, | Performed by: PHYSICIAN ASSISTANT

## 2023-08-29 PROCEDURE — 3074F PR MOST RECENT SYSTOLIC BLOOD PRESSURE < 130 MM HG: ICD-10-PCS | Mod: HCNC,CPTII,S$GLB, | Performed by: PHYSICIAN ASSISTANT

## 2023-08-29 PROCEDURE — 20610 DRAIN/INJ JOINT/BURSA W/O US: CPT | Mod: HCNC,LT,S$GLB, | Performed by: PHYSICIAN ASSISTANT

## 2023-08-29 PROCEDURE — 3078F DIAST BP <80 MM HG: CPT | Mod: HCNC,CPTII,S$GLB, | Performed by: PHYSICIAN ASSISTANT

## 2023-08-29 PROCEDURE — 3066F NEPHROPATHY DOC TX: CPT | Mod: HCNC,CPTII,S$GLB, | Performed by: PHYSICIAN ASSISTANT

## 2023-08-29 PROCEDURE — 3074F SYST BP LT 130 MM HG: CPT | Mod: HCNC,CPTII,S$GLB, | Performed by: PHYSICIAN ASSISTANT

## 2023-08-29 PROCEDURE — 4010F PR ACE/ARB THEARPY RXD/TAKEN: ICD-10-PCS | Mod: HCNC,CPTII,S$GLB, | Performed by: PHYSICIAN ASSISTANT

## 2023-08-29 PROCEDURE — 1101F PT FALLS ASSESS-DOCD LE1/YR: CPT | Mod: HCNC,CPTII,S$GLB, | Performed by: PHYSICIAN ASSISTANT

## 2023-08-29 PROCEDURE — 3061F NEG MICROALBUMINURIA REV: CPT | Mod: HCNC,CPTII,S$GLB, | Performed by: PHYSICIAN ASSISTANT

## 2023-08-29 PROCEDURE — 1125F AMNT PAIN NOTED PAIN PRSNT: CPT | Mod: HCNC,CPTII,S$GLB, | Performed by: PHYSICIAN ASSISTANT

## 2023-08-29 PROCEDURE — 3078F PR MOST RECENT DIASTOLIC BLOOD PRESSURE < 80 MM HG: ICD-10-PCS | Mod: HCNC,CPTII,S$GLB, | Performed by: PHYSICIAN ASSISTANT

## 2023-08-29 PROCEDURE — 3044F PR MOST RECENT HEMOGLOBIN A1C LEVEL <7.0%: ICD-10-PCS | Mod: HCNC,CPTII,S$GLB, | Performed by: PHYSICIAN ASSISTANT

## 2023-08-29 PROCEDURE — 1101F PR PT FALLS ASSESS DOC 0-1 FALLS W/OUT INJ PAST YR: ICD-10-PCS | Mod: HCNC,CPTII,S$GLB, | Performed by: PHYSICIAN ASSISTANT

## 2023-08-29 PROCEDURE — 3072F PR LOW RISK FOR RETINOPATHY: ICD-10-PCS | Mod: HCNC,CPTII,S$GLB, | Performed by: PHYSICIAN ASSISTANT

## 2023-08-29 PROCEDURE — 1125F PR PAIN SEVERITY QUANTIFIED, PAIN PRESENT: ICD-10-PCS | Mod: HCNC,CPTII,S$GLB, | Performed by: PHYSICIAN ASSISTANT

## 2023-08-29 PROCEDURE — 99999 PR PBB SHADOW E&M-EST. PATIENT-LVL IV: ICD-10-PCS | Mod: PBBFAC,HCNC,, | Performed by: PHYSICIAN ASSISTANT

## 2023-08-29 PROCEDURE — 73560 XR KNEE ORTHO LEFT: ICD-10-PCS | Mod: 26,HCNC,RT, | Performed by: RADIOLOGY

## 2023-08-29 PROCEDURE — 3288F PR FALLS RISK ASSESSMENT DOCUMENTED: ICD-10-PCS | Mod: HCNC,CPTII,S$GLB, | Performed by: PHYSICIAN ASSISTANT

## 2023-08-29 PROCEDURE — 1160F PR REVIEW ALL MEDS BY PRESCRIBER/CLIN PHARMACIST DOCUMENTED: ICD-10-PCS | Mod: HCNC,CPTII,S$GLB, | Performed by: PHYSICIAN ASSISTANT

## 2023-08-29 PROCEDURE — 1160F RVW MEDS BY RX/DR IN RCRD: CPT | Mod: HCNC,CPTII,S$GLB, | Performed by: PHYSICIAN ASSISTANT

## 2023-08-29 PROCEDURE — 73562 XR KNEE ORTHO LEFT: ICD-10-PCS | Mod: 26,HCNC,LT, | Performed by: RADIOLOGY

## 2023-08-29 PROCEDURE — 99999 PR PBB SHADOW E&M-EST. PATIENT-LVL IV: CPT | Mod: PBBFAC,HCNC,, | Performed by: PHYSICIAN ASSISTANT

## 2023-08-29 PROCEDURE — 73560 X-RAY EXAM OF KNEE 1 OR 2: CPT | Mod: TC,HCNC,RT

## 2023-08-29 PROCEDURE — 1159F MED LIST DOCD IN RCRD: CPT | Mod: HCNC,CPTII,S$GLB, | Performed by: PHYSICIAN ASSISTANT

## 2023-08-29 PROCEDURE — 3044F HG A1C LEVEL LT 7.0%: CPT | Mod: HCNC,CPTII,S$GLB, | Performed by: PHYSICIAN ASSISTANT

## 2023-08-29 RX ORDER — TRIAMCINOLONE ACETONIDE 40 MG/ML
40 INJECTION, SUSPENSION INTRA-ARTICULAR; INTRAMUSCULAR
Status: COMPLETED | OUTPATIENT
Start: 2023-08-29 | End: 2023-08-29

## 2023-08-29 RX ADMIN — TRIAMCINOLONE ACETONIDE 40 MG: 40 INJECTION, SUSPENSION INTRA-ARTICULAR; INTRAMUSCULAR at 03:08

## 2023-08-29 NOTE — PROGRESS NOTES
SUBJECTIVE:     Chief Complaint & History of Present Illness:  Maria Elena Tavares is a Established patient 67 y.o. female who is seen here today with a complaint of    Chief Complaint   Patient presents with    Left Knee - Pain    .  She is patient well-known to me was last seen treated the clinic for this condition 01/12/2023.  She is long history of bilateral knee pain left much more than right with moderate to severe arthritis.  Will unable to undergo cortisone injection in her last visit secondary to an elevated A1c.  She is worked diligently gotten her A1c down to a level we can proceed with injection therapy.  Over the course of the past 6 months patient has decided that she would like to begin moving forward which consider knee replacement surgery  On a scale of 1-10, with 10 being worst pain imaginable, he rates this pain as 5 on good days and 8 on bad days.  she describes the pain as sore and achy.    Review of patient's allergies indicates:   Allergen Reactions    Amoxil [amoxicillin] Rash         Current Outpatient Medications   Medication Sig Dispense Refill    aspirin (ECOTRIN) 81 MG EC tablet Take 81 mg by mouth once daily.      chlorthalidone (HYGROTEN) 25 MG Tab Take 0.5 tablets (12.5 mg total) by mouth once daily. 15 tablet 5    ciclopirox 0.77 % Gel Apply topically 2 (two) times daily. To thickened discolored toenails. 30 g 6    ibuprofen (ADVIL,MOTRIN) 600 MG tablet Take 1 tablet (600 mg total) by mouth every 8 (eight) hours as needed for Pain. 15 tablet 0    irbesartan (AVAPRO) 300 MG tablet Take 1 tablet by mouth once daily 90 tablet 3    letrozole (FEMARA) 2.5 mg Tab Take 1 tablet (2.5 mg total) by mouth once daily. 90 tablet 3    lifitegrast (XIIDRA) 5 % Dpet Apply 1 drop to  both eyes  2 (two) times daily. 180 each 4    polyethylene glycol (GLYCOLAX) 17 gram/dose powder Mix 17 g (1 capful) with juice or water an drink 2 (two) times daily. 1020 g 2    prasterone, dhea, (INTRAROSA) 6.5 mg Inst  Place 1 suppository vaginally nightly. 28 each 11    tirzepatide 7.5 mg/0.5 mL PnIj Inject 7.5 mg into the skin every 7 days. 4 pen 5    venlafaxine (EFFEXOR XR) 37.5 MG 24 hr capsule Take 1 capsule (37.5 mg total) by mouth once daily. 30 capsule 5    atorvastatin (LIPITOR) 10 MG tablet Take 1 tablet (10 mg total) by mouth once daily. 90 tablet 3    blood-glucose meter kit DISPENSE : BLOOD TEST STRIPS AND LANCETS PATIENT TEST BLOOD SUGARS TWICE DAILY  TEST STRIPS: 50 EACH, REFILL 5  LANCETS:  50 EACH , REFILL 5 1 each 0    fluconazole (DIFLUCAN) 150 MG Tab Take 1 tablet (150 mg total) by mouth As instructed. 1 tablet 0    HYDROcodone-acetaminophen (NORCO) 5-325 mg per tablet take 1 tablet by mouth every 4 to 6 hours as needed for pain (Patient not taking: Reported on 8/29/2023) 14 tablet 0    levothyroxine (SYNTHROID) 200 MCG tablet Take 1 tablet (200 mcg total) by mouth once daily. 90 tablet 1     No current facility-administered medications for this visit.       Past Medical History:   Diagnosis Date    BMI 37.0-37.9, adult     Breast cancer 09/05/2019     Left breast, IDC with lymph node metastisis    Colon polyps 2015    Colon polyps 2015    Diabetes mellitus type II     Diverticulosis     history of diverticulosisseen on colonoscopy at age 48. Repeat recommended at age 58. Done by     Elevated blood protein     History of elevated protein. Apparently has seen  for extensive workup including bone marrow biopsy    History of shingles 2006    Hyperlipidemia     Hypertension     Microalbuminuria     due 2 diabetes    Mild vitamin D deficiency     . A low vitamin D    Thyroid disease     hypothyroidism    Usual hyperplasia of lactiferous duct     Not sure       Past Surgical History:   Procedure Laterality Date    BREAST BIOPSY Left 09/05/2019    IDC with mets to node    BREAST BIOPSY Right 09/26/2019    core bx, benign node    BREAST BIOPSY Left 10/14/2019    MRI Core bx, + IDC    BREAST BIOPSY  Left 10/08/2019    Core bx, ADH     SECTION      COLONOSCOPY N/A 10/08/2020    Procedure: COLONOSCOPY;  Surgeon: Shreya Mendoza MD;  Location: [facility] (4TH FLR);  Service: Endoscopy;  Laterality: N/A;  COVID screening on 10/5/20 [facility]    HYSTERECTOMY      INSERTION OF BREAST TISSUE EXPANDER Bilateral 2020    Procedure: INSERTION, TISSUE EXPANDER, BREAST BILATERAL;  Surgeon: Michael Solorzano MD;  Location: [facility] OR 2ND FLR;  Service: Plastics;  Laterality: Bilateral;    INSERTION OF TUNNELED CENTRAL VENOUS CATHETER (CVC) WITH SUBCUTANEOUS PORT Right 10/04/2019    Procedure: [id number] RIGHT (CONSENT AM OF) 1.0 hr case;  Surgeon: Elena Lopez MD;  [facility];  Service: General;  Laterality: Right;    OOPHORECTOMY      SENTINEL LYMPH NODE BIOPSY Left 2020    Procedure: BIOPSY, LYMPH NODE, SENTINEL LEFT;  Surgeon: Elena Lopez MD;  [facility];  Service: General;  Laterality: Left;    SIMPLE MASTECTOMY Bilateral 2020    Procedure: MASTECTOMY, SIMPLE BILATERAL;  Surgeon: Elena Lopez MD;  [facility];  Service: General;  Laterality: Bilateral;    TISSUE EXPANDER REMOVAL Bilateral 2020    Procedure: REMOVAL, TISSUE EXPANDER;  Surgeon: Michael Solorzano MD;  [facility];  Service: Plastics;  Laterality: Bilateral;       Vital Signs (Most Recent)  Vitals:    23 1432   BP: 121/73   Pulse: 94           Review of Systems:  ROS:  Constitutional: no fever or chills  Eyes: no visual changes  ENT: no nasal congestion or sore throat  Respiratory: no cough or shortness of breath  Cardiovascular: no chest pain or palpitations, positive orthostatic hypotension  Gastrointestinal: no nausea or vomiting, tolerating diet,  Positive enteritis   Genitourinary: no hematuria or dysuria  Integument/Breast: no rash or pruritis, status post bilateral mastectomy location of breast  "implant  Hematologic/Lymphatic: no easy bruising or lymphadenopathy, positive breast cancer with metastases that axial lymph nodes chemotherapy-induced neutropenia    Musculoskeletal: no arthralgias or myalgias  Neurological: no seizures or tremors, positive chemotherapy induced neuropathy  Behavioral/Psych: no auditory or visual hallucinations  Endocrine: no heat or cold intolerance, anemia positive vitamin-D deficiency, thyroid disease, hypothyroidism                OBJECTIVE:     PHYSICAL EXAM:  Height: 5' 1" (154.9 cm) Weight: 76.2 kg (168 lb 1.6 oz), General Appearance: Well nourished, well developed, in no acute distress.  Neurological: Mood & affect are normal.  left  Knee Exam:  Knee Range of Motion:5-115 degrees flexion   Effusion:  Mild  Condition of skin:intact  Location of tenderness:Medial joint line   Strength:limited by pain and 5 of 5  Stability:  Lachman: stable, LCL: stable, MCL: stable, PCL: stable, and posteromedial (dial): stable  Varus /Valgus stress:  normal  Susana:   negative/negative     right  Knee Exam:  Knee Range of Motion:0-120 degrees flexion   Effusion:none  Condition of skin:intact  Location of tenderness:Medial joint line   Strength:limited by pain and 5 of 5  Stability:  Lachman: stable, LCL: stable, MCL: stable, PCL: stable, and posteromedial (dial): stable  Varus /Valgus stress:  normal  Susana:   negative/negative        Hip Examination:  full painless range of motion, without tenderness      RADIOGRAPHS:  X-rays taken today films reviewed by me demonstrate complete loss of medial joint space of the left knee with sclerotic changes and osteophytic spurring tricompartmentally 1-2 mm of medial joint space left on the right no evidence of fracture dislocation    ASSESSMENT/PLAN:       ICD-10-CM ICD-9-CM   1. Primary osteoarthritis of left knee  M17.12 715.16   2. Left knee pain, unspecified chronicity  M25.562 719.46       Plan: We discussed with the patient at length all the " different treatment options available for  the knee including anti-inflammatories, acetaminophen, rest, ice, knee strengthening exercise, occasional cortisone injections for temporary relief, Viscosupplimentation injections, arthroscopic debridement osteotomy, and finally knee arthroplasty.   Will proceed with therapeutic cortisone injection of the left knee to alleviate her immediate symptoms.    Will schedule with adult reconstructive surgery to begin the process towards knee placement surgery     The risks, benefits, pros, cons, and potential side effects of the procedure were discussed with the patient in detail all questions were answered.  The patient is comfortable and willing to proceed with the procedure. Verbal consent was obtained and the proper joint was identified by the patient and provider        The injection site was identified and the skin was prepared with a betadine solution. The   left  knee was injected with 1 ml of kenalog and 5 ml Lidocaine under sterile technique. Maria Elena Tavares tolerated the procedure well, she was advised to rest the knee today, ice and elevation. she did receive immediate relief of the pain in and about his knee she was told this would be short lived and is secondary to the lidocaine. she may have an increase in his discomfort tonight followed by steady improvement over the next several days. I may take 1-3 weeks following the injection to get the full benefit of the medication.  I will see her back in 4 weeks. Sooner if he has any problems or concerns.    Maria Elena Tavares was advised to monitor her blood sugars closely over the next several days. The steroid may cause a rise in them. If her glucose levels rise to a point she is uncomfortable or he is unable to control them is is to contact his PCP or go immediately to the emergency department.

## 2023-08-30 ENCOUNTER — PATIENT MESSAGE (OUTPATIENT)
Dept: ORTHOPEDICS | Facility: CLINIC | Age: 68
End: 2023-08-30
Payer: MEDICARE

## 2023-08-30 DIAGNOSIS — M17.12 PRIMARY OSTEOARTHRITIS OF LEFT KNEE: ICD-10-CM

## 2023-08-30 DIAGNOSIS — M25.572 ACUTE LEFT ANKLE PAIN: Primary | ICD-10-CM

## 2023-08-30 RX ORDER — MELOXICAM 15 MG/1
15 TABLET ORAL DAILY PRN
Qty: 30 TABLET | Refills: 3 | Status: SHIPPED | OUTPATIENT
Start: 2023-08-30 | End: 2024-02-23

## 2023-08-31 ENCOUNTER — OFFICE VISIT (OUTPATIENT)
Dept: ORTHOPEDICS | Facility: CLINIC | Age: 68
End: 2023-08-31
Payer: MEDICARE

## 2023-08-31 ENCOUNTER — PATIENT MESSAGE (OUTPATIENT)
Dept: ORTHOPEDICS | Facility: CLINIC | Age: 68
End: 2023-08-31
Payer: MEDICARE

## 2023-08-31 ENCOUNTER — PATIENT MESSAGE (OUTPATIENT)
Dept: PRIMARY CARE CLINIC | Facility: CLINIC | Age: 68
End: 2023-08-31
Payer: MEDICARE

## 2023-08-31 ENCOUNTER — HOSPITAL ENCOUNTER (OUTPATIENT)
Dept: RADIOLOGY | Facility: HOSPITAL | Age: 68
Discharge: HOME | End: 2023-08-31
Attending: ORTHOPAEDIC SURGERY
Payer: MEDICARE

## 2023-08-31 ENCOUNTER — RESEARCH ENCOUNTER (OUTPATIENT)
Dept: RESEARCH | Facility: HOSPITAL | Age: 68
End: 2023-08-31
Payer: MEDICARE

## 2023-08-31 VITALS
HEIGHT: 61 IN | BODY MASS INDEX: 31.84 KG/M2 | WEIGHT: 168.63 LBS | HEART RATE: 91 BPM | DIASTOLIC BLOOD PRESSURE: 82 MMHG | SYSTOLIC BLOOD PRESSURE: 119 MMHG

## 2023-08-31 DIAGNOSIS — G89.29 CHRONIC PAIN OF LEFT KNEE: ICD-10-CM

## 2023-08-31 DIAGNOSIS — M25.562 CHRONIC PAIN OF LEFT KNEE: ICD-10-CM

## 2023-08-31 DIAGNOSIS — M17.12 PRIMARY OSTEOARTHRITIS OF LEFT KNEE: Primary | ICD-10-CM

## 2023-08-31 DIAGNOSIS — M25.562 LEFT KNEE PAIN, UNSPECIFIED CHRONICITY: ICD-10-CM

## 2023-08-31 PROCEDURE — 3288F PR FALLS RISK ASSESSMENT DOCUMENTED: ICD-10-PCS | Mod: HCNC,CPTII,S$GLB, | Performed by: ORTHOPAEDIC SURGERY

## 2023-08-31 PROCEDURE — 3288F FALL RISK ASSESSMENT DOCD: CPT | Mod: HCNC,CPTII,S$GLB, | Performed by: ORTHOPAEDIC SURGERY

## 2023-08-31 PROCEDURE — 1125F PR PAIN SEVERITY QUANTIFIED, PAIN PRESENT: ICD-10-PCS | Mod: HCNC,CPTII,S$GLB, | Performed by: ORTHOPAEDIC SURGERY

## 2023-08-31 PROCEDURE — 1125F AMNT PAIN NOTED PAIN PRSNT: CPT | Mod: HCNC,CPTII,S$GLB, | Performed by: ORTHOPAEDIC SURGERY

## 2023-08-31 PROCEDURE — 1101F PT FALLS ASSESS-DOCD LE1/YR: CPT | Mod: HCNC,CPTII,S$GLB, | Performed by: ORTHOPAEDIC SURGERY

## 2023-08-31 PROCEDURE — 3008F BODY MASS INDEX DOCD: CPT | Mod: HCNC,CPTII,S$GLB, | Performed by: ORTHOPAEDIC SURGERY

## 2023-08-31 PROCEDURE — 3066F NEPHROPATHY DOC TX: CPT | Mod: HCNC,CPTII,S$GLB, | Performed by: ORTHOPAEDIC SURGERY

## 2023-08-31 PROCEDURE — 3072F LOW RISK FOR RETINOPATHY: CPT | Mod: HCNC,CPTII,S$GLB, | Performed by: ORTHOPAEDIC SURGERY

## 2023-08-31 PROCEDURE — 1159F PR MEDICATION LIST DOCUMENTED IN MEDICAL RECORD: ICD-10-PCS | Mod: HCNC,CPTII,S$GLB, | Performed by: ORTHOPAEDIC SURGERY

## 2023-08-31 PROCEDURE — 3074F PR MOST RECENT SYSTOLIC BLOOD PRESSURE < 130 MM HG: ICD-10-PCS | Mod: HCNC,CPTII,S$GLB, | Performed by: ORTHOPAEDIC SURGERY

## 2023-08-31 PROCEDURE — 3044F HG A1C LEVEL LT 7.0%: CPT | Mod: HCNC,CPTII,S$GLB, | Performed by: ORTHOPAEDIC SURGERY

## 2023-08-31 PROCEDURE — 73564 X-RAY EXAM KNEE 4 OR MORE: CPT | Mod: 26,HCNC,LT, | Performed by: INTERNAL MEDICINE

## 2023-08-31 PROCEDURE — 3008F PR BODY MASS INDEX (BMI) DOCUMENTED: ICD-10-PCS | Mod: HCNC,CPTII,S$GLB, | Performed by: ORTHOPAEDIC SURGERY

## 2023-08-31 PROCEDURE — 1101F PR PT FALLS ASSESS DOC 0-1 FALLS W/OUT INJ PAST YR: ICD-10-PCS | Mod: HCNC,CPTII,S$GLB, | Performed by: ORTHOPAEDIC SURGERY

## 2023-08-31 PROCEDURE — 1159F MED LIST DOCD IN RCRD: CPT | Mod: HCNC,CPTII,S$GLB, | Performed by: ORTHOPAEDIC SURGERY

## 2023-08-31 PROCEDURE — 3074F SYST BP LT 130 MM HG: CPT | Mod: HCNC,CPTII,S$GLB, | Performed by: ORTHOPAEDIC SURGERY

## 2023-08-31 PROCEDURE — 3066F PR DOCUMENTATION OF TREATMENT FOR NEPHROPATHY: ICD-10-PCS | Mod: HCNC,CPTII,S$GLB, | Performed by: ORTHOPAEDIC SURGERY

## 2023-08-31 PROCEDURE — 3061F PR NEG MICROALBUMINURIA RESULT DOCUMENTED/REVIEW: ICD-10-PCS | Mod: HCNC,CPTII,S$GLB, | Performed by: ORTHOPAEDIC SURGERY

## 2023-08-31 PROCEDURE — 3061F NEG MICROALBUMINURIA REV: CPT | Mod: HCNC,CPTII,S$GLB, | Performed by: ORTHOPAEDIC SURGERY

## 2023-08-31 PROCEDURE — 4010F PR ACE/ARB THEARPY RXD/TAKEN: ICD-10-PCS | Mod: HCNC,CPTII,S$GLB, | Performed by: ORTHOPAEDIC SURGERY

## 2023-08-31 PROCEDURE — 4010F ACE/ARB THERAPY RXD/TAKEN: CPT | Mod: HCNC,CPTII,S$GLB, | Performed by: ORTHOPAEDIC SURGERY

## 2023-08-31 PROCEDURE — 3044F PR MOST RECENT HEMOGLOBIN A1C LEVEL <7.0%: ICD-10-PCS | Mod: HCNC,CPTII,S$GLB, | Performed by: ORTHOPAEDIC SURGERY

## 2023-08-31 PROCEDURE — 73564 X-RAY EXAM KNEE 4 OR MORE: CPT | Mod: TC,HCNC,PN,LT

## 2023-08-31 PROCEDURE — 3079F DIAST BP 80-89 MM HG: CPT | Mod: HCNC,CPTII,S$GLB, | Performed by: ORTHOPAEDIC SURGERY

## 2023-08-31 PROCEDURE — 3072F PR LOW RISK FOR RETINOPATHY: ICD-10-PCS | Mod: HCNC,CPTII,S$GLB, | Performed by: ORTHOPAEDIC SURGERY

## 2023-08-31 PROCEDURE — 99214 PR OFFICE/OUTPT VISIT, EST, LEVL IV, 30-39 MIN: ICD-10-PCS | Mod: HCNC,S$GLB,, | Performed by: ORTHOPAEDIC SURGERY

## 2023-08-31 PROCEDURE — 3079F PR MOST RECENT DIASTOLIC BLOOD PRESSURE 80-89 MM HG: ICD-10-PCS | Mod: HCNC,CPTII,S$GLB, | Performed by: ORTHOPAEDIC SURGERY

## 2023-08-31 PROCEDURE — 99999 PR PBB SHADOW E&M-EST. PATIENT-LVL IV: CPT | Mod: PBBFAC,HCNC,, | Performed by: ORTHOPAEDIC SURGERY

## 2023-08-31 PROCEDURE — 99999 PR PBB SHADOW E&M-EST. PATIENT-LVL IV: ICD-10-PCS | Mod: PBBFAC,HCNC,, | Performed by: ORTHOPAEDIC SURGERY

## 2023-08-31 PROCEDURE — 73564 XR KNEE COMP 4 OR MORE VIEWS LEFT: ICD-10-PCS | Mod: 26,HCNC,LT, | Performed by: INTERNAL MEDICINE

## 2023-08-31 PROCEDURE — 99214 OFFICE O/P EST MOD 30 MIN: CPT | Mod: HCNC,S$GLB,, | Performed by: ORTHOPAEDIC SURGERY

## 2023-08-31 NOTE — PROGRESS NOTES
Subjective:      Patient ID: Maria Elena Tavares is a 67 y.o. female.    Chief Complaint: Pain of the Left Knee      Patient ID: Maria Elena Tavares is a 67 y.o. female.    Chief Complaint: Pain of the Left Knee      KNEE PAIN: Complains of pain to the leftknee.   PAIN LOCATED: anterior and medial  ONSET: 10 years ago.  QUALITY:  Patient states the pain is worsening  HISTORY: Previous knee injury/surgery: No  Hx:   ASSOCIATED SYMPTOM AND TRIGGERS:Standing/Weightbearing, walking, trouble w stairs, stiffness, swelling, limping, stiffness w sitting   USES ASSISTIVE DEVICE: none  RELIEVED BY:rest, heat, ice, medication: Aleve, Ibuprofen, Tylenol used but not effective, avoiding painful activities, min relief w steroid injections  PATIENT DENIES: bruising, redness, deformity              Social History     Occupational History     Employer: [employer]   Tobacco Use    Smoking status: Never     Passive exposure: Never    Smokeless tobacco: Never    Tobacco comments:     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  She walks occasionally.   Substance and Sexual Activity    Alcohol use: Not Currently     Comment: Occasionally    Drug use: No    Sexual activity: Not Currently     Partners: Male      Review of Systems   Constitutional: Negative for diaphoresis.   HENT:  Negative for ear discharge, nosebleeds and stridor.    Eyes:  Negative for photophobia.   Cardiovascular:  Negative for syncope.   Respiratory:  Negative for hemoptysis, shortness of breath and wheezing.    Neurological:  Negative for tremors.   Psychiatric/Behavioral: Negative.           Objective:    General    Constitutional: She is oriented to person, place, and time. She appears well-developed and well-nourished.   HENT:   Head: Normocephalic and atraumatic.   Right Ear: External ear normal.   Left Ear: External ear normal.   Eyes: EOM are normal.   Pulmonary/Chest: Effort normal.   Neurological: She is alert and  oriented to person, place, and time.   Psychiatric: She has a normal mood and affect. Her behavior is normal. Judgment and thought content normal.     General Musculoskeletal Exam   Gait: antalgic and abnormal         Left Knee Exam     Inspection   Swelling: present  Effusion: present    Tenderness   The patient tender to palpation of the medial joint line.    Crepitus   The patient has crepitus of the patella.    Range of Motion   Extension:  5   Flexion:  100     Tests   Stability   MCL - Valgus: normal (0 to 2mm)  LCL - Varus: normal (0 to 2mm)    Other   Sensation: normal    Muscle Strength   Left Lower Extremity   Quadriceps:  4/5   Hamstrin/5          Assessment:       1. Primary osteoarthritis of left knee    2. Chronic pain of left knee          Plan:       I discussed a new treatment therapy called Iovera, which is cryotherapy, to provide symptomatic relief along the sensory distribution of the infrapatellar tendon branch of the saphenous nerve, AFCN, and LFCN.  The patient elected to move forward with this.  We did discuss the fact that this is a fairly novel procedure and the is very limited scientific data around this; however, it is FDA approved.  In a few patients there can be continued pain along the treated nerve but the exact risk has not been quantified but seems to be rare. The patient was given patient information and literature to review prior to the procedure as well. Based on this, the patient feels the benefits outweigh the risks.

## 2023-08-31 NOTE — PROGRESS NOTES
Protocol: innovations in Genicular Outcomes Registry (Tanya)  Protocol#: Tanya  IRB#: 2021.156  Version Date: 10-Ramon-2023  PI: Jamshid Reece MD  Patient Initials: E.R.     Study Recruitment Note:     Research coordinator met with patient, in private clinic room, to discuss above mentioned protocol. Patient is alert and oriented x 3. Mood and affect appropriate to the situation. Patient states that she is not participating in any other research studies. Patient states understanding about the diagnosis of osteoarthritis of the knee and of the procedure to being done on that knee.  Purpose of study reviewed with the patient.  Patient states understanding of this information.   Length of study and number of participants reviewed with patient; patient states understanding of the length of the study.   Study procedure discussed in detail with patient. Patient states understanding of this information.  Potential benefits of study along with costs and/or payment reimbursement per insurance discussed with patient; Patient states understanding that insurance will cover cost of all standard of care medications and procedures. Patient aware of $20 payment for qualifying visits with completed questionnaires.  Alternative treatment methods discussed with patient; Patient states understanding of this information.   Study related questions/compensation for injury, and whom to contact regarding rights as a research subject all reviewed with patient; Patient verbalizes understanding of this information.   Voluntary participation and withdrawal from research stressed to patient; patient states understanding that she may withdraw consent at any time without compromise to care.   Confidentiality and HIPAA discussed with patient. Patient verbalizes understanding of this information.          Patient states her stated her interest in the study and would like to think about participation. Study brochure and paper copy of consent form was given  to patient for review. She was instructed to call if she had any questions or concerns.  She is schedule for Iovera treatment 9-.

## 2023-09-06 NOTE — LETTER
April 23, 2019     Maria Elena Tavares  [address] [address]       Dear Maria Elena,    Thank you for enrolling in [facility]s Digital [facility] Program. To participate, we ask that you submit information at least once weekly through your [facility] account and maintain regular contact with your Care Team. We have not received any data or heard from you in some time.     The Digital Medicine Care Team has attempted to reach you on multiple occasions to determine if you would like to continue participating in the program. While we encourage you to continue participating fully, we understand that circumstances may change.     To continue participating in the program, please contact me at [phone]. If we do not hear back, you will be un-enrolled, and your physician will be notified of your decision.    If you have submitted data and believe you are receiving this letter in error, please call the Digital Medicine Patient Support Line at [phone] for troubleshooting.      We look forward to hearing from you soon.    Sincerely,     Jazmyn Franco MA, [facility]  [facility]       Full Thickness Lip Wedge Repair (Flap) Text: Given the location of the defect and the proximity to free margins a full thickness wedge repair was deemed most appropriate. Using a sterile surgical marker, the appropriate repair was drawn incorporating the defect and placing the expected incisions perpendicular to the vermilion border.  The vermilion border was also meticulously outlined to ensure appropriate reapproximation during the repair.  The area thus outlined was incised through and through with a #15 scalpel blade.  The muscularis and dermis were reaproximated with deep sutures following hemostasis. Care was taken to realign the vermilion border before proceeding with the superficial closure.  Once the vermilion was realigned the superfical and mucosal closure was finished.

## 2023-09-11 ENCOUNTER — RESEARCH ENCOUNTER (OUTPATIENT)
Dept: RESEARCH | Facility: HOSPITAL | Age: 68
End: 2023-09-11
Payer: MEDICARE

## 2023-09-11 ENCOUNTER — PATIENT MESSAGE (OUTPATIENT)
Dept: PRIMARY CARE CLINIC | Facility: CLINIC | Age: 68
End: 2023-09-11
Payer: MEDICARE

## 2023-09-11 NOTE — PROGRESS NOTES
Protocol: innovations in Genicular Outcomes Registry (Tanya)  Protocol#: Tanya  IRB#: 2021.156  Version Date: 10-Ramon-2023  PI: Jamshid Reece MD  Patient Initials: E.R.     Declined Study Note    Patient was called today by Magan to confirm OA treatment appointment for tomorrow. Patient confirmed appoint but declined to consent to Tanya study.

## 2023-09-12 ENCOUNTER — PROCEDURE VISIT (OUTPATIENT)
Dept: ORTHOPEDICS | Facility: CLINIC | Age: 68
End: 2023-09-12
Payer: MEDICARE

## 2023-09-12 VITALS
HEART RATE: 86 BPM | HEIGHT: 61 IN | DIASTOLIC BLOOD PRESSURE: 87 MMHG | WEIGHT: 169.56 LBS | SYSTOLIC BLOOD PRESSURE: 136 MMHG | BODY MASS INDEX: 32.01 KG/M2

## 2023-09-12 DIAGNOSIS — M25.562 CHRONIC PAIN OF LEFT KNEE: ICD-10-CM

## 2023-09-12 DIAGNOSIS — G89.29 CHRONIC PAIN OF LEFT KNEE: ICD-10-CM

## 2023-09-12 PROCEDURE — 99499 NO LOS: ICD-10-PCS | Mod: HCNC,S$GLB,, | Performed by: ORTHOPAEDIC SURGERY

## 2023-09-12 PROCEDURE — 99499 UNLISTED E&M SERVICE: CPT | Mod: HCNC,S$GLB,, | Performed by: ORTHOPAEDIC SURGERY

## 2023-09-12 PROCEDURE — 64640 PR DESTRUCT BY NEURO AGENT; OTHER PERIPH NERVE: ICD-10-PCS | Mod: HCNC,LT,S$GLB, | Performed by: ORTHOPAEDIC SURGERY

## 2023-09-12 PROCEDURE — 64640 INJECTION TREATMENT OF NERVE: CPT | Mod: HCNC,LT,S$GLB, | Performed by: ORTHOPAEDIC SURGERY

## 2023-09-12 NOTE — PROCEDURES
Procedures  Procedure Note Iovera:    DATE OF PROCEDURE:  09/12/2023    PREOPERATIVE DIAGNOSIS: left knee pain.     POSTOPERATIVE DIAGNOSIS: left knee pain.     PROCEDURE: Iovera treatment of anterior femoral cutaneous nerve, Lateral femoral cut nerve, and both branches of infrapatellar saphenous nerve using at least 4 different punctures to treat all 4 nerves. (cpt 64640 x4)    ATTENDING SURGEON: Jamshid Reece M.D.   ASSISTANT: rip bah    COMPLICATIONS: None.     IMPLANTS:  None    ESTIMATED BLOOD LOSS:  < 5cc    SPECIMENS REMOVED:  None    ANESTHESIA: Local lidocaine    INDICATIONS FOR PROCEDURE: This is a 67 y.o. female with longstanding knee pain. They have failed non operative management including injections.I discussed a new treatment therapy called Iovera, which is cryotherapy, to provide symptomatic relief along the sensory distribution of the infrapatellar tendon branch of the saphenous nerve  and AFCN. The patient elected to move forward with this  We did discuss the fact that this is a fairly novel procedure and there is very limited scientific data around this.  However, it FDA approved.  The patient was given patient information and literature to review prior to the procedure as well.  Based on this, the patient agreed to move forward with doing the procedure.      PROCEDURE:    The patient was placed supine on the exam table and the proximal medial aspect of the left tibia and anterior aspect of distal femur was prepped with sterile Betadine and alcohol.  A line was drawn extending approximately 5 cm medial to inferior pole of the patella distally to a point approximately 5 cm medial to the tibial tubercle.  A second line was drawn in a medial to lateral direction the width of the patella approximately 7 cm proximal to the patella. We then infiltrated the skin with lidocaine along both lines using a 25g needle. We then introduced the Iovera device along these lines and this device penetrated the skin,  creating cryotherapy to both branches of the infrapatellar saphenous nerve, a third treatment to the anterior femoral cutaneous nerve, and fourth LFCN. 7 punctures of the skin were made to treat the 2 branches of the ISN and another 7 punctures were made to treat the AFCN and LFCN. There were a total of 4 nerves treated with iovera. The patient tolerated the procedure well with no problems.

## 2023-09-13 DIAGNOSIS — I10 HYPERTENSION, UNSPECIFIED TYPE: ICD-10-CM

## 2023-09-13 RX ORDER — CHLORTHALIDONE 25 MG/1
TABLET ORAL
Qty: 90 TABLET | Refills: 3 | OUTPATIENT
Start: 2023-09-13

## 2023-09-13 NOTE — TELEPHONE ENCOUNTER
Refill Encounter    PCP Visits: Recent Visits  Date Type Provider Dept   08/22/23 Office Visit Estephania San MD [facility]   07/17/23 Office Visit Estephania San MD [facility]   06/06/23 Office Visit Estephania San MD [facility]   03/28/23 Office Visit Estephania San MD [facility]   Showing recent visits within past 360 days and meeting all other requirements  Future Appointments  Date Type Provider Dept   02/27/24 Appointment Estephania San MD [facility]   Showing future appointments within next 720 days and meeting all other requirements     Last 3 Blood Pressure:   BP Readings from Last 3 Encounters:   09/12/23 136/87   08/31/23 119/82   08/29/23 121/73     Preferred Pharmacy:   [facility] [place] [address]  [facility] [address]  Phone: [phone] Fax: [phone]    [id number] [place], [place] - [facility] [address] AT [facility] [address]  [facility] [address]  [facility] [address]  Phone: [phone] Fax: [phone]    Requested RX:  Requested Prescriptions     Pending Prescriptions Disp Refills    chlorthalidone (HYGROTEN) 25 MG Tab 90 tablet 3     Sig: Take 1 tablet by mouth every morning      RX Route: Normal

## 2023-09-13 NOTE — TELEPHONE ENCOUNTER
No care due was identified.  [facility] Embedded Care Due Messages. Reference number: [id number].   9/13/2023 10:46:31 AM CDT

## 2023-09-18 NOTE — PROGRESS NOTES
Subjective:       Patient ID: Maria Elena Tavares is a 67 y.o. female.    Chief Complaint: Diabetes and Hypertension    Pt presents today for 6 mos f/u DM and HTN as well as annual exam.  Pt has finished chemo for recent dx of breast CA. Had double mastectomy in past.  Is in good spirits. Has opted not to do reconstruction  Patient still has some neuropathic lower extremity pain.  Otherwise patient is doing well today she states.    .          Diabetes  Pertinent negatives for hypoglycemia include no confusion, dizziness, headaches or nervousness/anxiousness. Pertinent negatives for diabetes include no chest pain, no polydipsia, no polyuria and no weakness.   Hypertension  Pertinent negatives include no chest pain, headaches, palpitations or shortness of breath.     Review of Systems   Constitutional: Negative.    HENT:  Negative for congestion, ear pain, hearing loss, rhinorrhea, sinus pressure, sore throat and trouble swallowing.    Eyes: Negative.    Respiratory:  Negative for apnea, cough, chest tightness, shortness of breath and wheezing.    Cardiovascular:  Negative for chest pain, palpitations and leg swelling.   Gastrointestinal: Negative.    Endocrine: Negative for polydipsia and polyuria.   Musculoskeletal: Negative.  Negative for joint swelling.   Skin: Negative.    Neurological:  Negative for dizziness, weakness, light-headedness and headaches.   Psychiatric/Behavioral:  Negative for behavioral problems, confusion, decreased concentration, dysphoric mood and sleep disturbance. The patient is not nervous/anxious.    All other systems reviewed and are negative.      Objective:      Physical Exam  Vitals reviewed.   Constitutional:       General: She is not in acute distress.     Appearance: Normal appearance. She is well-developed. She is obese. She is not ill-appearing, toxic-appearing or diaphoretic.   HENT:      Head: Normocephalic and atraumatic.      Right Ear: Tympanic membrane, ear canal and external ear  normal. There is no impacted cerumen.      Left Ear: Tympanic membrane, ear canal and external ear normal. There is no impacted cerumen.      Nose: Nose normal.      Mouth/Throat:      Pharynx: No oropharyngeal exudate or posterior oropharyngeal erythema.   Eyes:      Extraocular Movements: Extraocular movements intact.      Conjunctiva/sclera: Conjunctivae normal.      Pupils: Pupils are equal, round, and reactive to light.   Neck:      Thyroid: No thyromegaly.   Cardiovascular:      Rate and Rhythm: Normal rate and regular rhythm.      Pulses: Normal pulses.      Heart sounds: Normal heart sounds. No murmur heard.  Pulmonary:      Effort: Pulmonary effort is normal. No respiratory distress.      Breath sounds: Normal breath sounds. No stridor. No wheezing, rhonchi or rales.   Chest:      Chest wall: No tenderness.   Abdominal:      General: Bowel sounds are normal. There is no distension.      Palpations: Abdomen is soft. There is no mass.      Tenderness: There is no abdominal tenderness. There is no guarding or rebound.      Hernia: No hernia is present.   Musculoskeletal:         General: No tenderness. Normal range of motion.      Cervical back: Normal range of motion and neck supple.      Right lower leg: No edema.      Left lower leg: No edema.   Lymphadenopathy:      Cervical: No cervical adenopathy.   Skin:     General: Skin is warm and dry.      Findings: No erythema or rash.   Neurological:      General: No focal deficit present.      Mental Status: She is alert and oriented to person, place, and time. Mental status is at baseline.      Cranial Nerves: No cranial nerve deficit.      Sensory: No sensory deficit.      Motor: No weakness or abnormal muscle tone.      Coordination: Coordination normal.      Gait: Gait normal.      Deep Tendon Reflexes: Reflexes are normal and symmetric. Reflexes normal.   Psychiatric:         Mood and Affect: Mood normal.         Behavior: Behavior normal.         Thought  Content: Thought content normal.         Judgment: Judgment normal.         Assessment:       1. Chronic pain of left knee    2. Hypothyroidism, unspecified type    3. Controlled type 2 diabetes mellitus with diabetic nephropathy, without long-term current use of insulin    4. Essential hypertension    5. Neuropathy due to chemotherapeutic drug    6. Hypokalemia    7. H/O bilateral mastectomy        Plan:       HTN controlled when she takes her meds. Was too low and will not adjust since pt is fearful of falling  DMT2: stable but a1c is improving.  Breast CA: surgery 3/24  RTC prn symptoms or q 4-6 mos pending labs  Neuropathy s/p chemo: pt on gabapentin  Chronic pain of left knee  -     Ambulatory referral/consult to Orthopedics; Future; Expected date: 08/29/2023    Hypothyroidism, unspecified type  -     TSH; Future; Expected date: 08/22/2023    Controlled type 2 diabetes mellitus with diabetic nephropathy, without long-term current use of insulin  -     Microalbumin/Creatinine Ratio, Urine; Future; Expected date: 08/22/2023    Essential hypertension    Neuropathy due to chemotherapeutic drug    Hypokalemia    H/O bilateral mastectomy      RTC prn symptoms    Greater than 45 mins spent with pt; 50 % face to face going over her recent surgery and reassuring pt that she has done so well

## 2023-09-22 NOTE — TELEPHONE ENCOUNTER
----- Message from Estephania San MD sent at 2/28/2023  3:56 PM CST -----  Thanks for bringing this to my attention. I did not get this result.  I will ask my staff to schedule her an appt with me so we cna review and treat. Thanks! PV  ----- Message -----  From: Jazmyn Serna RD  Sent: 2/28/2023   3:54 PM CST  To: MD Dr. Jaswinder Ingram,  I'm a dietitian in the cancer center. I am seeing Ms. Tavares tomorrow and I noticed her most recent A1c was >14. I didn't see any recent changes to her medications. I apologize if I am missing something, but it looks like the labs were ordered from a different clinic so I wasn't sure if the results ever reached you.   Do you want any additional labs tomorrow while she's here or anything specific I can address? I can of course do some diabetes diet education, but the referral to me was cancer related and I incidentally saw this. And I realize diet can't bring down an A1c that high on its own.     Thanks!       Patient notified and verbalizes understanding.

## 2023-09-25 ENCOUNTER — PATIENT MESSAGE (OUTPATIENT)
Dept: PRIMARY CARE CLINIC | Facility: CLINIC | Age: 68
End: 2023-09-25
Payer: MEDICARE

## 2023-09-25 ENCOUNTER — TELEPHONE (OUTPATIENT)
Dept: ENDOSCOPY | Facility: HOSPITAL | Age: 68
End: 2023-09-25

## 2023-09-25 ENCOUNTER — CLINICAL SUPPORT (OUTPATIENT)
Dept: ENDOSCOPY | Facility: HOSPITAL | Age: 68
End: 2023-09-25
Attending: INTERNAL MEDICINE
Payer: MEDICARE

## 2023-09-25 DIAGNOSIS — Z86.010 HISTORY OF COLON POLYPS: ICD-10-CM

## 2023-09-25 NOTE — TELEPHONE ENCOUNTER
Colonoscopy not due at this time. Last colonoscopy was 10/8/2020 and recommended follow up 7 years. Patient verbalized understanding.

## 2023-09-25 NOTE — PLAN OF CARE
Colonoscopy not due at this time. Last colonoscopy was 10/8/2020 and recommended follow up 7 years.

## 2023-09-27 ENCOUNTER — PATIENT MESSAGE (OUTPATIENT)
Dept: ORTHOPEDICS | Facility: CLINIC | Age: 68
End: 2023-09-27
Payer: MEDICARE

## 2023-10-03 ENCOUNTER — PATIENT OUTREACH (OUTPATIENT)
Dept: ADMINISTRATIVE | Facility: HOSPITAL | Age: 68
End: 2023-10-03
Payer: MEDICARE

## 2023-10-19 ENCOUNTER — OFFICE VISIT (OUTPATIENT)
Dept: ORTHOPEDICS | Facility: CLINIC | Age: 68
End: 2023-10-19
Payer: MEDICARE

## 2023-10-19 VITALS
WEIGHT: 169.56 LBS | BODY MASS INDEX: 32.01 KG/M2 | DIASTOLIC BLOOD PRESSURE: 92 MMHG | HEIGHT: 61 IN | HEART RATE: 88 BPM | SYSTOLIC BLOOD PRESSURE: 149 MMHG

## 2023-10-19 DIAGNOSIS — M17.12 PRIMARY OSTEOARTHRITIS OF LEFT KNEE: ICD-10-CM

## 2023-10-19 DIAGNOSIS — G89.29 CHRONIC PAIN OF LEFT KNEE: Primary | ICD-10-CM

## 2023-10-19 DIAGNOSIS — M25.562 CHRONIC PAIN OF LEFT KNEE: Primary | ICD-10-CM

## 2023-10-19 PROCEDURE — 3072F PR LOW RISK FOR RETINOPATHY: ICD-10-PCS | Mod: HCNC,CPTII,S$GLB, | Performed by: ORTHOPAEDIC SURGERY

## 2023-10-19 PROCEDURE — 99213 PR OFFICE/OUTPT VISIT, EST, LEVL III, 20-29 MIN: ICD-10-PCS | Mod: HCNC,S$GLB,, | Performed by: ORTHOPAEDIC SURGERY

## 2023-10-19 PROCEDURE — 3008F PR BODY MASS INDEX (BMI) DOCUMENTED: ICD-10-PCS | Mod: HCNC,CPTII,S$GLB, | Performed by: ORTHOPAEDIC SURGERY

## 2023-10-19 PROCEDURE — 99999 PR PBB SHADOW E&M-EST. PATIENT-LVL V: ICD-10-PCS | Mod: PBBFAC,HCNC,, | Performed by: ORTHOPAEDIC SURGERY

## 2023-10-19 PROCEDURE — 3080F DIAST BP >= 90 MM HG: CPT | Mod: HCNC,CPTII,S$GLB, | Performed by: ORTHOPAEDIC SURGERY

## 2023-10-19 PROCEDURE — 3061F PR NEG MICROALBUMINURIA RESULT DOCUMENTED/REVIEW: ICD-10-PCS | Mod: HCNC,CPTII,S$GLB, | Performed by: ORTHOPAEDIC SURGERY

## 2023-10-19 PROCEDURE — 1125F AMNT PAIN NOTED PAIN PRSNT: CPT | Mod: HCNC,CPTII,S$GLB, | Performed by: ORTHOPAEDIC SURGERY

## 2023-10-19 PROCEDURE — 1101F PT FALLS ASSESS-DOCD LE1/YR: CPT | Mod: HCNC,CPTII,S$GLB, | Performed by: ORTHOPAEDIC SURGERY

## 2023-10-19 PROCEDURE — 3066F NEPHROPATHY DOC TX: CPT | Mod: HCNC,CPTII,S$GLB, | Performed by: ORTHOPAEDIC SURGERY

## 2023-10-19 PROCEDURE — 1159F MED LIST DOCD IN RCRD: CPT | Mod: HCNC,CPTII,S$GLB, | Performed by: ORTHOPAEDIC SURGERY

## 2023-10-19 PROCEDURE — 99999 PR PBB SHADOW E&M-EST. PATIENT-LVL V: CPT | Mod: PBBFAC,HCNC,, | Performed by: ORTHOPAEDIC SURGERY

## 2023-10-19 PROCEDURE — 3061F NEG MICROALBUMINURIA REV: CPT | Mod: HCNC,CPTII,S$GLB, | Performed by: ORTHOPAEDIC SURGERY

## 2023-10-19 PROCEDURE — 1159F PR MEDICATION LIST DOCUMENTED IN MEDICAL RECORD: ICD-10-PCS | Mod: HCNC,CPTII,S$GLB, | Performed by: ORTHOPAEDIC SURGERY

## 2023-10-19 PROCEDURE — 3288F PR FALLS RISK ASSESSMENT DOCUMENTED: ICD-10-PCS | Mod: HCNC,CPTII,S$GLB, | Performed by: ORTHOPAEDIC SURGERY

## 2023-10-19 PROCEDURE — 3077F PR MOST RECENT SYSTOLIC BLOOD PRESSURE >= 140 MM HG: ICD-10-PCS | Mod: HCNC,CPTII,S$GLB, | Performed by: ORTHOPAEDIC SURGERY

## 2023-10-19 PROCEDURE — 3080F PR MOST RECENT DIASTOLIC BLOOD PRESSURE >= 90 MM HG: ICD-10-PCS | Mod: HCNC,CPTII,S$GLB, | Performed by: ORTHOPAEDIC SURGERY

## 2023-10-19 PROCEDURE — 3044F HG A1C LEVEL LT 7.0%: CPT | Mod: HCNC,CPTII,S$GLB, | Performed by: ORTHOPAEDIC SURGERY

## 2023-10-19 PROCEDURE — 3288F FALL RISK ASSESSMENT DOCD: CPT | Mod: HCNC,CPTII,S$GLB, | Performed by: ORTHOPAEDIC SURGERY

## 2023-10-19 PROCEDURE — 1125F PR PAIN SEVERITY QUANTIFIED, PAIN PRESENT: ICD-10-PCS | Mod: HCNC,CPTII,S$GLB, | Performed by: ORTHOPAEDIC SURGERY

## 2023-10-19 PROCEDURE — 1101F PR PT FALLS ASSESS DOC 0-1 FALLS W/OUT INJ PAST YR: ICD-10-PCS | Mod: HCNC,CPTII,S$GLB, | Performed by: ORTHOPAEDIC SURGERY

## 2023-10-19 PROCEDURE — 3077F SYST BP >= 140 MM HG: CPT | Mod: HCNC,CPTII,S$GLB, | Performed by: ORTHOPAEDIC SURGERY

## 2023-10-19 PROCEDURE — 3072F LOW RISK FOR RETINOPATHY: CPT | Mod: HCNC,CPTII,S$GLB, | Performed by: ORTHOPAEDIC SURGERY

## 2023-10-19 PROCEDURE — 3008F BODY MASS INDEX DOCD: CPT | Mod: HCNC,CPTII,S$GLB, | Performed by: ORTHOPAEDIC SURGERY

## 2023-10-19 PROCEDURE — 4010F ACE/ARB THERAPY RXD/TAKEN: CPT | Mod: HCNC,CPTII,S$GLB, | Performed by: ORTHOPAEDIC SURGERY

## 2023-10-19 PROCEDURE — 4010F PR ACE/ARB THEARPY RXD/TAKEN: ICD-10-PCS | Mod: HCNC,CPTII,S$GLB, | Performed by: ORTHOPAEDIC SURGERY

## 2023-10-19 PROCEDURE — 3066F PR DOCUMENTATION OF TREATMENT FOR NEPHROPATHY: ICD-10-PCS | Mod: HCNC,CPTII,S$GLB, | Performed by: ORTHOPAEDIC SURGERY

## 2023-10-19 PROCEDURE — 99213 OFFICE O/P EST LOW 20 MIN: CPT | Mod: HCNC,S$GLB,, | Performed by: ORTHOPAEDIC SURGERY

## 2023-10-19 PROCEDURE — 3044F PR MOST RECENT HEMOGLOBIN A1C LEVEL <7.0%: ICD-10-PCS | Mod: HCNC,CPTII,S$GLB, | Performed by: ORTHOPAEDIC SURGERY

## 2023-10-19 NOTE — PROGRESS NOTES
Subjective:      Patient ID: Maria Elena Tavares is a 67 y.o. female.    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  She walks occasionally.   Substance and Sexual Activity    Alcohol use: Not Currently     Comment: Occasionally    Drug use: No    Sexual activity: Not Currently     Partners: Male      Review of Systems   Constitutional: Negative for diaphoresis.   HENT:  Negative for ear discharge, nosebleeds and stridor.    Eyes:  Negative for photophobia.   Cardiovascular:  Negative for syncope.   Respiratory:  Negative for hemoptysis, shortness of breath and wheezing.    Neurological:  Negative for tremors.   Psychiatric/Behavioral: Negative.           Objective:    General    Constitutional: She is oriented to person, place, and time. She appears well-developed and well-nourished.   HENT:   Head: Normocephalic and atraumatic.   Right Ear: External ear normal.   Left Ear: External ear normal.   Eyes: EOM are normal.   Pulmonary/Chest: Effort normal.   Neurological: She is alert and oriented to person, place, and time.   Psychiatric: She has a normal mood and affect. Her behavior is normal. Judgment and thought content normal.     General Musculoskeletal Exam   Gait: antalgic and abnormal         Left Knee Exam     Inspection   Swelling: present  Effusion: present    Tenderness   The patient tender to palpation of the medial joint line.    Crepitus   The patient has crepitus of the patella.    Other   Sensation: normal    Muscle Strength   Left Lower Extremity   Quadriceps:  4/5   Hamstrin/5          Assessment:       1. Chronic  pain of left knee    2. Primary osteoarthritis of left knee          Plan:       Will plan for zilretta injections  Tka in April

## 2023-10-20 ENCOUNTER — PATIENT MESSAGE (OUTPATIENT)
Dept: ORTHOPEDICS | Facility: CLINIC | Age: 68
End: 2023-10-20
Payer: MEDICARE

## 2023-10-27 ENCOUNTER — TELEPHONE (OUTPATIENT)
Dept: INTERNAL MEDICINE | Facility: CLINIC | Age: 68
End: 2023-10-27
Payer: MEDICARE

## 2023-10-27 ENCOUNTER — OFFICE VISIT (OUTPATIENT)
Dept: ORTHOPEDICS | Facility: CLINIC | Age: 68
End: 2023-10-27
Payer: MEDICARE

## 2023-10-27 VITALS
BODY MASS INDEX: 32.38 KG/M2 | HEART RATE: 76 BPM | DIASTOLIC BLOOD PRESSURE: 86 MMHG | WEIGHT: 171.5 LBS | HEIGHT: 61 IN | SYSTOLIC BLOOD PRESSURE: 136 MMHG

## 2023-10-27 DIAGNOSIS — G89.29 CHRONIC PAIN OF LEFT KNEE: ICD-10-CM

## 2023-10-27 DIAGNOSIS — M25.562 CHRONIC PAIN OF LEFT KNEE: ICD-10-CM

## 2023-10-27 DIAGNOSIS — M17.12 PRIMARY OSTEOARTHRITIS OF LEFT KNEE: Primary | ICD-10-CM

## 2023-10-27 PROCEDURE — 99499 NO LOS: ICD-10-PCS | Mod: HCNC,S$GLB,, | Performed by: PHYSICIAN ASSISTANT

## 2023-10-27 PROCEDURE — 99499 UNLISTED E&M SERVICE: CPT | Mod: HCNC,S$GLB,, | Performed by: PHYSICIAN ASSISTANT

## 2023-10-27 PROCEDURE — 20610 DRAIN/INJ JOINT/BURSA W/O US: CPT | Mod: HCNC,LT,S$GLB, | Performed by: PHYSICIAN ASSISTANT

## 2023-10-27 PROCEDURE — 99999 PR PBB SHADOW E&M-EST. PATIENT-LVL III: ICD-10-PCS | Mod: PBBFAC,HCNC,, | Performed by: PHYSICIAN ASSISTANT

## 2023-10-27 PROCEDURE — 99999 PR PBB SHADOW E&M-EST. PATIENT-LVL III: CPT | Mod: PBBFAC,HCNC,, | Performed by: PHYSICIAN ASSISTANT

## 2023-10-27 PROCEDURE — 20610 LARGE JOINT ASPIRATION/INJECTION: L KNEE: ICD-10-PCS | Mod: HCNC,LT,S$GLB, | Performed by: PHYSICIAN ASSISTANT

## 2023-10-27 NOTE — PROGRESS NOTES
"Subjective:      Patient ID: Maria Elena Tavares is a 67 y.o. female.    Chief Complaint: Pain and Injections of the Left Knee      Patient is here for First Zilretta  injection(s) for left knee osteoarthritis. 2 week relief iovera.  No new complaints today.           ROS    Review of patient's allergies indicates:   Allergen Reactions    Amoxil [amoxicillin] Rash        Current Outpatient Medications   Medication Sig Dispense Refill    aspirin (ECOTRIN) 81 MG EC tablet Take 81 mg by mouth once daily.      ciclopirox 0.77 % Gel Apply topically 2 (two) times daily. To thickened discolored toenails. 30 g 6    irbesartan (AVAPRO) 300 MG tablet Take 1 tablet by mouth once daily 90 tablet 3    letrozole (FEMARA) 2.5 mg Tab Take 1 tablet (2.5 mg total) by mouth once daily. 90 tablet 3    lifitegrast (XIIDRA) 5 % Dpet Apply 1 drop to  both eyes  2 (two) times daily. 180 each 4    meloxicam (MOBIC) 15 MG tablet Take 1 tablet (15 mg total) by mouth daily as needed for Pain. 30 tablet 3    polyethylene glycol (GLYCOLAX) 17 gram/dose powder Mix 17 g (1 capful) with juice or water an drink 2 (two) times daily. 1020 g 2    prasterone, dhea, (INTRAROSA) 6.5 mg Inst Place 1 suppository vaginally nightly. 28 each 11    tirzepatide (MOUNJARO) 7.5 mg/0.5 mL PnIj Inject 7.5 mg into the skin every 7 days. 12 pen 1    atorvastatin (LIPITOR) 10 MG tablet Take 1 tablet (10 mg total) by mouth once daily. 90 tablet 3    blood-glucose meter kit DISPENSE : BLOOD TEST STRIPS AND LANCETS PATIENT TEST BLOOD SUGARS TWICE DAILY  TEST STRIPS: 50 EACH, REFILL 5  LANCETS:  50 EACH , REFILL 5 1 each 0    levothyroxine (SYNTHROID) 200 MCG tablet Take 1 tablet (200 mcg total) by mouth once daily. 90 tablet 1     No current facility-administered medications for this visit.        The patient's relevant past medical, surgical, and social history was reviewed in Epic.       Objective:      VITAL SIGNS: /86   Pulse 76   Ht 5' 1" (1.549 m)   Wt 77.8 kg " (171 lb 8.3 oz)   LMP  (LMP Unknown)   BMI 32.41 kg/m²     Ortho/SPM Exam         Assessment:       1. Primary osteoarthritis of left knee    2. Chronic pain of left knee          Plan:         Maria Elena was seen today for pain and injections.    Diagnoses and all orders for this visit:    Primary osteoarthritis of left knee  -     Large Joint Aspiration/Injection: L knee    Chronic pain of left knee  -     Large Joint Aspiration/Injection: L knee    I injected the left knee with one dose of Zilretta  today.  We will see the patient back in 6 mos or as needed.      Diagnoses and plan discussed with the patient, as well as the expected course and duration of his symptoms.  All questions and concerns were addressed prior to the end of the visit.   Instructed patient to call office if they have any future questions/concerns or to schedule apt. Patient will return to see me if symptoms worsen or fail to improve    Note dictated with voice recognition software, please excuse any grammatical errors.        Kerline Keys PA-C   10/27/2023

## 2023-10-27 NOTE — PROCEDURES
Large Joint Aspiration/Injection: L knee    Date/Time: 10/27/2023 10:00 AM    Performed by: Kerline Keys PA-C  Authorized by: Kerline Keys PA-C    Consent Done?:  Yes (Verbal)  Indications:  Pain  Site marked: the procedure site was marked    Timeout: prior to procedure the correct patient, procedure, and site was verified    Prep: patient was prepped and draped in usual sterile fashion    Local anesthesia used?: No    Local anesthetic:  Topical anesthetic    Details:  Needle Size:  22 G  Ultrasonic Guidance for needle placement?: No    Approach:  Anterolateral  Location:  Knee  Site:  L knee  Medications:  32 mg triamcinolone acetonide 32 mg  Patient tolerance:  Patient tolerated the procedure well with no immediate complications

## 2023-10-30 ENCOUNTER — TELEPHONE (OUTPATIENT)
Dept: ORTHOPEDICS | Facility: CLINIC | Age: 68
End: 2023-10-30
Payer: MEDICARE

## 2023-10-30 ENCOUNTER — PATIENT MESSAGE (OUTPATIENT)
Dept: ORTHOPEDICS | Facility: CLINIC | Age: 68
End: 2023-10-30
Payer: MEDICARE

## 2023-10-30 NOTE — TELEPHONE ENCOUNTER
LVM. Need to know which time slot she would prefer on 11/10/23: 9:15 am or 10:15 am. She is booked twice on the schedule that day.

## 2023-11-02 ENCOUNTER — PATIENT MESSAGE (OUTPATIENT)
Dept: OTHER | Facility: OTHER | Age: 68
End: 2023-11-02
Payer: MEDICARE

## 2023-11-07 ENCOUNTER — TELEPHONE (OUTPATIENT)
Dept: PRIMARY CARE CLINIC | Facility: CLINIC | Age: 68
End: 2023-11-07
Payer: MEDICARE

## 2023-11-07 ENCOUNTER — OFFICE VISIT (OUTPATIENT)
Dept: PRIMARY CARE CLINIC | Facility: CLINIC | Age: 68
End: 2023-11-07
Attending: FAMILY MEDICINE
Payer: MEDICARE

## 2023-11-07 VITALS — BODY MASS INDEX: 31.18 KG/M2 | WEIGHT: 165 LBS | SYSTOLIC BLOOD PRESSURE: 142 MMHG | DIASTOLIC BLOOD PRESSURE: 87 MMHG

## 2023-11-07 DIAGNOSIS — I10 ESSENTIAL HYPERTENSION: ICD-10-CM

## 2023-11-07 DIAGNOSIS — G62.0 NEUROPATHY DUE TO CHEMOTHERAPEUTIC DRUG: ICD-10-CM

## 2023-11-07 DIAGNOSIS — T45.1X5A NEUROPATHY DUE TO CHEMOTHERAPEUTIC DRUG: ICD-10-CM

## 2023-11-07 DIAGNOSIS — I10 PRIMARY HYPERTENSION: Primary | ICD-10-CM

## 2023-11-07 PROCEDURE — 1126F AMNT PAIN NOTED NONE PRSNT: CPT | Mod: HCNC,CPTII,95, | Performed by: FAMILY MEDICINE

## 2023-11-07 PROCEDURE — 99214 OFFICE O/P EST MOD 30 MIN: CPT | Mod: HCNC,95,, | Performed by: FAMILY MEDICINE

## 2023-11-07 PROCEDURE — 3288F FALL RISK ASSESSMENT DOCD: CPT | Mod: HCNC,CPTII,95, | Performed by: FAMILY MEDICINE

## 2023-11-07 PROCEDURE — 3077F SYST BP >= 140 MM HG: CPT | Mod: HCNC,CPTII,95, | Performed by: FAMILY MEDICINE

## 2023-11-07 PROCEDURE — 3008F BODY MASS INDEX DOCD: CPT | Mod: HCNC,CPTII,95, | Performed by: FAMILY MEDICINE

## 2023-11-07 PROCEDURE — 1101F PT FALLS ASSESS-DOCD LE1/YR: CPT | Mod: HCNC,CPTII,95, | Performed by: FAMILY MEDICINE

## 2023-11-07 PROCEDURE — 3079F DIAST BP 80-89 MM HG: CPT | Mod: HCNC,CPTII,95, | Performed by: FAMILY MEDICINE

## 2023-11-07 RX ORDER — AMLODIPINE BESYLATE 2.5 MG/1
2.5 TABLET ORAL DAILY
Qty: 30 TABLET | Refills: 1 | Status: SHIPPED | OUTPATIENT
Start: 2023-11-07 | End: 2023-11-29

## 2023-11-07 NOTE — TELEPHONE ENCOUNTER
----- Message from Estephania San MD sent at 11/7/2023  2:19 PM CST -----  Pl make telemed appt for pt with in 3-4 weeks

## 2023-11-08 ENCOUNTER — PATIENT MESSAGE (OUTPATIENT)
Dept: ORTHOPEDICS | Facility: CLINIC | Age: 68
End: 2023-11-08
Payer: MEDICARE

## 2023-11-10 ENCOUNTER — PATIENT MESSAGE (OUTPATIENT)
Dept: PRIMARY CARE CLINIC | Facility: CLINIC | Age: 68
End: 2023-11-10
Payer: MEDICARE

## 2023-11-18 DIAGNOSIS — I10 HYPERTENSION, UNSPECIFIED TYPE: ICD-10-CM

## 2023-11-18 NOTE — TELEPHONE ENCOUNTER
Care Due:                  Date            Visit Type   Department     Provider  --------------------------------------------------------------------------------                                ESTABLISHED                              PATIENT -    [facility]  Last Visit: 11-      [facility]           Estephania San                              ESTABLISHED                              PATIENT -    [facility]  Next Visit: 11-      [facility]           Estephania San                                                            Last  Test          Frequency    Reason                     Performed    Due Date  --------------------------------------------------------------------------------    HBA1C.......  6 months...  tirzepatide..............  08- 02-    [facility] Embedded Care Due Messages. Reference number: [id number].   11/18/2023 1:03:48 PM CST

## 2023-11-19 NOTE — TELEPHONE ENCOUNTER
Refill Routing Note   Medication(s) are not appropriate for processing by [facility] for the following reason(s):        Required vitals abnormal    [facility] action(s):  Defer     Requires labs : Yes             Appointments  past 12m or future 3m with PCP    Date Provider   Last Visit   11/7/2023 Estephania San MD   Next Visit   11/29/2023 Estephania San MD   ED visits in past 90 days: 0        Note composed:2:00 AM 11/19/2023

## 2023-11-20 RX ORDER — IRBESARTAN 300 MG/1
TABLET ORAL
Qty: 90 TABLET | Refills: 2 | Status: SHIPPED | OUTPATIENT
Start: 2023-11-20

## 2023-11-21 ENCOUNTER — PATIENT MESSAGE (OUTPATIENT)
Dept: PRIMARY CARE CLINIC | Facility: CLINIC | Age: 68
End: 2023-11-21
Payer: MEDICARE

## 2023-11-21 ENCOUNTER — PROCEDURE VISIT (OUTPATIENT)
Dept: NEUROLOGY | Facility: CLINIC | Age: 68
End: 2023-11-21
Payer: MEDICARE

## 2023-11-21 DIAGNOSIS — G56.01 RIGHT CARPAL TUNNEL SYNDROME: ICD-10-CM

## 2023-11-21 PROCEDURE — 95911 PR NERVE CONDUCTION STUDY; 9-10 STUDIES: ICD-10-PCS | Mod: HCNC,S$GLB,, | Performed by: PHYSICAL MEDICINE & REHABILITATION

## 2023-11-21 PROCEDURE — 95886 PR EMG COMPLETE, W/ NERVE CONDUCTION STUDIES, 5+ MUSCLES: ICD-10-PCS | Mod: HCNC,S$GLB,, | Performed by: PHYSICAL MEDICINE & REHABILITATION

## 2023-11-21 PROCEDURE — 95911 NRV CNDJ TEST 9-10 STUDIES: CPT | Mod: HCNC,S$GLB,, | Performed by: PHYSICAL MEDICINE & REHABILITATION

## 2023-11-21 PROCEDURE — 95886 MUSC TEST DONE W/N TEST COMP: CPT | Mod: HCNC,S$GLB,, | Performed by: PHYSICAL MEDICINE & REHABILITATION

## 2023-11-21 NOTE — PROCEDURES
Test Date:  2023    Patient: maria elena dowd : 1955 Physician: Tapan Ewing D.O.   ID#: [id number] SEX: Female Ref. Phys: Jose Shaver Jr*     HPI: Maria Elena Dowd is a 67 y.o.female who presents for NCS/EMG to evaluate CTS on the right.  Patient also with some symptoms on the left.  Left checked for comparison.  Had NCS/EMG done  at outside hospital and this was reported as normal.      NCV & EMG Findings:  All nerve conduction studies (as indicated in the following tables) were within normal limits.  All examined muscles (as indicated in the following table) showed no evidence of electrical instability.    Impression:  This is a normal electrophysiologic study of the right upper extremity.  No electrodiagnostic evidence of a median neuropathy of either upper extremity.          ___________________________  Tapan Ewing D.O.        NCS+  Motor Nerve Results      Latency Amplitude F-Lat Segment Distance CV Comment   Site (ms) Norm (mV) Norm (ms)  (cm) (m/s) Norm    Left Median (APB)   Wrist 3.4  < 4.4 6.7  > 3.8  Wrist-Palm - - -    Elbow 7.5 - 6.1 -  Elbow-Wrist 26.5 65  > 51    Right Median (APB)   Wrist 3.9  < 4.4 7.2  > 3.8  Wrist-Palm - - -    Elbow 7.7 - 6.6 -  Elbow-Wrist 22 58  > 51    Left Ulnar (ADM)   Wrist 3.0  < 3.7 6.4  > 3.0         Bel Elbow 7.0 - 6.3 -  Bel Elbow-Wrist 26 65  > 52    Right Ulnar (ADM)   Wrist 3.4  < 3.7 7.0  > 3.0         Bel Elbow 6.9 - 6.2 -  Bel Elbow-Wrist 23 66  > 52    Abv Elbow 8.6 - 7.0 -  Abv Elbow-Bel Elbow 9 53  > 43      Sensory Nerve Results      Latency (Peak) Amplitude (P-P) Segment Distance CV Comment   Site (ms) Norm (µV) Norm  (cm) (m/s) Norm    Left Median   Wrist-Dig I 2.9 - 41 - Wrist-Dig I 10 34 -    Wrist-Dig II 3.5  < 4.0 57  > 8 Wrist-Dig II 14 40  > 39    Palm-Wrist 1.80 - 34 - Palm-Wrist - - -    Right Median   Wrist-Dig I 3.0 - 28 - Wrist-Dig I 10 33 -    Wrist-Dig II 3.5  < 4.0 48  > 8 Wrist-Dig II 14 40  > 39     Palm-Wrist 2.1 - 27 - Palm-Wrist - - -    Left Ulnar   Palm-Wrist 1.90 - 7 - Palm-Wrist - - -    Right Ulnar   Palm-Wrist 2.1 - 8 - Palm-Wrist - - -    Left Radial   Wrist-Dig I 2.8 - 8 - Wrist-Dig I 10 36 -    Right Radial   Wrist-Dig I 2.8 - 16 - Wrist-Dig I 10 36 -      Inter-Nerve Comparisons     Nerve 1 Value 1 Nerve 2 Value 2 Parameter Result Normal   Sensory Sites   R Median Wrist-Dig I 3.0 ms R Radial Wrist-Dig I 2.8 ms Peak Lat Diff 0.20 ms <0.40   L Median Wrist-Dig I 2.9 ms L Radial Wrist-Dig I 2.8 ms Peak Lat Diff 0.10 ms <0.40   R Median Palm-Wrist 2.1 ms R Ulnar Palm-Wrist 2.1 ms Peak Lat Diff 0.00 ms <0.30   L Median Palm-Wrist 1.8 ms L Ulnar Palm-Wrist 1.9 ms Peak Lat Diff 0.10 ms <0.30     EMG+     Side Muscle Nerve Root Ins Act Fibs Psw Amp Dur Poly Recrt Int Pat Comment   Right Deltoid Axillary C5-C6 Nml Nml Nml Nml Nml 0 Nml Nml    Right Triceps Radial C6-C8 Nml Nml Nml Nml Nml 0 Nml Nml    Right Biceps Musculocut C5-C6 Nml Nml Nml Nml Nml 0 Nml Nml    Right Pronator Teres Median C6-C7 Nml Nml Nml Nml Nml 0 Nml Nml    Right FDI Ulnar C8-T1 Nml Nml Nml Nml Nml 0 Nml Nml            Waveforms:    Motor              Sensory

## 2023-11-29 ENCOUNTER — OFFICE VISIT (OUTPATIENT)
Dept: PRIMARY CARE CLINIC | Facility: CLINIC | Age: 68
End: 2023-11-29
Attending: FAMILY MEDICINE
Payer: MEDICARE

## 2023-11-29 ENCOUNTER — TELEPHONE (OUTPATIENT)
Dept: PRIMARY CARE CLINIC | Facility: CLINIC | Age: 68
End: 2023-11-29
Payer: MEDICARE

## 2023-11-29 DIAGNOSIS — E11.21 CONTROLLED TYPE 2 DIABETES MELLITUS WITH DIABETIC NEPHROPATHY, WITHOUT LONG-TERM CURRENT USE OF INSULIN: ICD-10-CM

## 2023-11-29 DIAGNOSIS — I10 ESSENTIAL HYPERTENSION: Primary | ICD-10-CM

## 2023-11-29 PROCEDURE — 1159F MED LIST DOCD IN RCRD: CPT | Mod: HCNC,CPTII,95, | Performed by: FAMILY MEDICINE

## 2023-11-29 PROCEDURE — 99214 OFFICE O/P EST MOD 30 MIN: CPT | Mod: HCNC,95,, | Performed by: FAMILY MEDICINE

## 2023-11-29 PROCEDURE — 1160F RVW MEDS BY RX/DR IN RCRD: CPT | Mod: HCNC,CPTII,95, | Performed by: FAMILY MEDICINE

## 2023-11-29 RX ORDER — AMLODIPINE BESYLATE 5 MG/1
5 TABLET ORAL DAILY
Qty: 90 TABLET | Refills: 0 | Status: SHIPPED | OUTPATIENT
Start: 2023-11-29 | End: 2024-02-22 | Stop reason: SDUPTHER

## 2023-11-29 NOTE — TELEPHONE ENCOUNTER
----- Message from Estephania San MD sent at 11/29/2023  1:16 PM CST -----  Pl schedule A1c for pt in 3 mos thks, PV

## 2023-12-12 ENCOUNTER — PATIENT MESSAGE (OUTPATIENT)
Dept: HEMATOLOGY/ONCOLOGY | Facility: CLINIC | Age: 68
End: 2023-12-12
Payer: MEDICARE

## 2023-12-14 ENCOUNTER — OFFICE VISIT (OUTPATIENT)
Dept: OBSTETRICS AND GYNECOLOGY | Facility: CLINIC | Age: 68
End: 2023-12-14
Attending: OBSTETRICS & GYNECOLOGY
Payer: MEDICARE

## 2023-12-14 ENCOUNTER — PATIENT MESSAGE (OUTPATIENT)
Dept: OBSTETRICS AND GYNECOLOGY | Facility: CLINIC | Age: 68
End: 2023-12-14

## 2023-12-14 VITALS
BODY MASS INDEX: 33.38 KG/M2 | WEIGHT: 170 LBS | SYSTOLIC BLOOD PRESSURE: 143 MMHG | DIASTOLIC BLOOD PRESSURE: 82 MMHG | HEIGHT: 60 IN | HEART RATE: 91 BPM

## 2023-12-14 DIAGNOSIS — Z01.419 ENCOUNTER FOR GYNECOLOGICAL EXAMINATION WITHOUT ABNORMAL FINDING: Primary | ICD-10-CM

## 2023-12-14 DIAGNOSIS — R53.81 PHYSICAL DECONDITIONING: ICD-10-CM

## 2023-12-14 DIAGNOSIS — Z85.3 HISTORY OF BREAST CANCER: ICD-10-CM

## 2023-12-14 PROCEDURE — 99999 PR PBB SHADOW E&M-EST. PATIENT-LVL IV: CPT | Mod: PBBFAC,HCNC,, | Performed by: OBSTETRICS & GYNECOLOGY

## 2023-12-14 PROCEDURE — 1101F PT FALLS ASSESS-DOCD LE1/YR: CPT | Mod: HCNC,CPTII,S$GLB, | Performed by: OBSTETRICS & GYNECOLOGY

## 2023-12-14 PROCEDURE — 4010F PR ACE/ARB THEARPY RXD/TAKEN: ICD-10-PCS | Mod: HCNC,CPTII,S$GLB, | Performed by: OBSTETRICS & GYNECOLOGY

## 2023-12-14 PROCEDURE — 3072F LOW RISK FOR RETINOPATHY: CPT | Mod: HCNC,CPTII,S$GLB, | Performed by: OBSTETRICS & GYNECOLOGY

## 2023-12-14 PROCEDURE — 3044F PR MOST RECENT HEMOGLOBIN A1C LEVEL <7.0%: ICD-10-PCS | Mod: HCNC,CPTII,S$GLB, | Performed by: OBSTETRICS & GYNECOLOGY

## 2023-12-14 PROCEDURE — 3079F DIAST BP 80-89 MM HG: CPT | Mod: HCNC,CPTII,S$GLB, | Performed by: OBSTETRICS & GYNECOLOGY

## 2023-12-14 PROCEDURE — 1159F PR MEDICATION LIST DOCUMENTED IN MEDICAL RECORD: ICD-10-PCS | Mod: HCNC,CPTII,S$GLB, | Performed by: OBSTETRICS & GYNECOLOGY

## 2023-12-14 PROCEDURE — 3066F NEPHROPATHY DOC TX: CPT | Mod: HCNC,CPTII,S$GLB, | Performed by: OBSTETRICS & GYNECOLOGY

## 2023-12-14 PROCEDURE — 1159F MED LIST DOCD IN RCRD: CPT | Mod: HCNC,CPTII,S$GLB, | Performed by: OBSTETRICS & GYNECOLOGY

## 2023-12-14 PROCEDURE — 1126F AMNT PAIN NOTED NONE PRSNT: CPT | Mod: HCNC,CPTII,S$GLB, | Performed by: OBSTETRICS & GYNECOLOGY

## 2023-12-14 PROCEDURE — G0101 PR CA SCREEN;PELVIC/BREAST EXAM: ICD-10-PCS | Mod: GZ,HCNC,S$GLB, | Performed by: OBSTETRICS & GYNECOLOGY

## 2023-12-14 PROCEDURE — 3061F NEG MICROALBUMINURIA REV: CPT | Mod: HCNC,CPTII,S$GLB, | Performed by: OBSTETRICS & GYNECOLOGY

## 2023-12-14 PROCEDURE — 4010F ACE/ARB THERAPY RXD/TAKEN: CPT | Mod: HCNC,CPTII,S$GLB, | Performed by: OBSTETRICS & GYNECOLOGY

## 2023-12-14 PROCEDURE — 3077F PR MOST RECENT SYSTOLIC BLOOD PRESSURE >= 140 MM HG: ICD-10-PCS | Mod: HCNC,CPTII,S$GLB, | Performed by: OBSTETRICS & GYNECOLOGY

## 2023-12-14 PROCEDURE — 3288F PR FALLS RISK ASSESSMENT DOCUMENTED: ICD-10-PCS | Mod: HCNC,CPTII,S$GLB, | Performed by: OBSTETRICS & GYNECOLOGY

## 2023-12-14 PROCEDURE — 99999 PR PBB SHADOW E&M-EST. PATIENT-LVL IV: ICD-10-PCS | Mod: PBBFAC,HCNC,, | Performed by: OBSTETRICS & GYNECOLOGY

## 2023-12-14 PROCEDURE — 3288F FALL RISK ASSESSMENT DOCD: CPT | Mod: HCNC,CPTII,S$GLB, | Performed by: OBSTETRICS & GYNECOLOGY

## 2023-12-14 PROCEDURE — 3044F HG A1C LEVEL LT 7.0%: CPT | Mod: HCNC,CPTII,S$GLB, | Performed by: OBSTETRICS & GYNECOLOGY

## 2023-12-14 PROCEDURE — 3077F SYST BP >= 140 MM HG: CPT | Mod: HCNC,CPTII,S$GLB, | Performed by: OBSTETRICS & GYNECOLOGY

## 2023-12-14 PROCEDURE — 1101F PR PT FALLS ASSESS DOC 0-1 FALLS W/OUT INJ PAST YR: ICD-10-PCS | Mod: HCNC,CPTII,S$GLB, | Performed by: OBSTETRICS & GYNECOLOGY

## 2023-12-14 PROCEDURE — 1126F PR PAIN SEVERITY QUANTIFIED, NO PAIN PRESENT: ICD-10-PCS | Mod: HCNC,CPTII,S$GLB, | Performed by: OBSTETRICS & GYNECOLOGY

## 2023-12-14 PROCEDURE — 3061F PR NEG MICROALBUMINURIA RESULT DOCUMENTED/REVIEW: ICD-10-PCS | Mod: HCNC,CPTII,S$GLB, | Performed by: OBSTETRICS & GYNECOLOGY

## 2023-12-14 PROCEDURE — 3072F PR LOW RISK FOR RETINOPATHY: ICD-10-PCS | Mod: HCNC,CPTII,S$GLB, | Performed by: OBSTETRICS & GYNECOLOGY

## 2023-12-14 PROCEDURE — 3079F PR MOST RECENT DIASTOLIC BLOOD PRESSURE 80-89 MM HG: ICD-10-PCS | Mod: HCNC,CPTII,S$GLB, | Performed by: OBSTETRICS & GYNECOLOGY

## 2023-12-14 PROCEDURE — G0101 CA SCREEN;PELVIC/BREAST EXAM: HCPCS | Mod: GZ,HCNC,S$GLB, | Performed by: OBSTETRICS & GYNECOLOGY

## 2023-12-14 PROCEDURE — 3066F PR DOCUMENTATION OF TREATMENT FOR NEPHROPATHY: ICD-10-PCS | Mod: HCNC,CPTII,S$GLB, | Performed by: OBSTETRICS & GYNECOLOGY

## 2023-12-14 NOTE — PROGRESS NOTES
SUBJECTIVE:   68 y.o. female   for annual routine checkup. No LMP recorded (lmp unknown). Patient has had a hysterectomy..  She had a bilateral mastectomy  She is on letrozole.  She is followed by Dr. Villagran in oncology  She reports hair loss and hot flashes.  She did OT yoga in the past and now does yoga at home.  She is met with nutrition and did lose weight after this  She is upcoming left knee surgery in April.        Past Medical History:   Diagnosis Date    BMI 37.0-37.9, adult     Breast cancer 2019     Left breast, IDC with lymph node metastisis    Colon polyps 2015    Colon polyps     Diabetes mellitus type II     Diverticulosis     history of diverticulosisseen on colonoscopy at age 48. Repeat recommended at age 58. Done by     Elevated blood protein     History of elevated protein. Apparently has seen  for extensive workup including bone marrow biopsy    History of shingles 2006    Hyperlipidemia     Hypertension     Microalbuminuria     due 2 diabetes    Mild vitamin D deficiency     . A low vitamin D    Primary osteoarthritis of left knee 2023    Thyroid disease     hypothyroidism    Usual hyperplasia of lactiferous duct     Not sure     Past Surgical History:   Procedure Laterality Date    BREAST BIOPSY Left 2019    IDC with mets to node    BREAST BIOPSY Right 2019    core bx, benign node    BREAST BIOPSY Left 10/14/2019    MRI Core bx, + IDC    BREAST BIOPSY Left 10/08/2019    Core bx, ADH     SECTION      COLONOSCOPY N/A 10/08/2020    Procedure: COLONOSCOPY;  Surgeon: Shreya Mendoza MD;  Location: [facility] (4TH FLR);  Service: Endoscopy;  Laterality: N/A;  COVID screening on 10/5/20 [facility]    HYSTERECTOMY      INSERTION OF BREAST TISSUE EXPANDER Bilateral 2020    Procedure: INSERTION, TISSUE EXPANDER, BREAST BILATERAL;  Surgeon: Michael Solorzano MD;  [facility];  Service: Plastics;  Laterality:  Bilateral;    INSERTION OF TUNNELED CENTRAL VENOUS CATHETER (CVC) WITH SUBCUTANEOUS PORT Right 10/04/2019    Procedure: QJIHZFOAD-UYEO-S-CATH RIGHT (CONSENT AM OF) 1.0 hr case;  Surgeon: Elena Lopez MD;  Location: [address];  Service: General;  Laterality: Right;    OOPHORECTOMY      SENTINEL LYMPH NODE BIOPSY Left 03/24/2020    Procedure: BIOPSY, LYMPH NODE, SENTINEL LEFT;  Surgeon: Elena Lopez MD;  Location: [address];  Service: General;  Laterality: Left;    SIMPLE MASTECTOMY Bilateral 03/24/2020    Procedure: MASTECTOMY, SIMPLE BILATERAL;  Surgeon: Elena Lopez MD;  [facility] OR [address] [place] [address];  Service: General;  Laterality: Bilateral;    TISSUE EXPANDER REMOVAL Bilateral 07/30/2020    Procedure: REMOVAL, TISSUE EXPANDER;  Surgeon: Michael Solorzano MD;  Location: [address];  Service: Plastics;  Laterality: Bilateral;     Social History     Socioeconomic History    Marital status: Single   Occupational History     Employer: [employer] ([employer])   Tobacco Use    Smoking status: Never     Passive exposure: Never    Smokeless tobacco: Never    Tobacco comments:     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  She walks occasionally.   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 daughter1 granddaughter     Social Determinants of Health     Financial Resource Strain: Unknown (11/27/2023)    Overall Financial Resource Strain (CARDIA)     Difficulty of Paying Living Expenses: Patient refused   Food Insecurity: Unknown (11/27/2023)    Hunger Vital Sign     Worried About Running Out of Food in the Last Year: Patient refused     Ran Out of Food in the Last Year: Patient refused   Transportation Needs: Unknown (11/27/2023)    PRAPARE - Transportation     Lack of  Transportation (Medical): Patient refused     Lack of Transportation (Non-Medical): Patient refused   Physical Activity: Unknown (2023)    Exercise Vital Sign     Days of Exercise per Week: Patient refused     Minutes of Exercise per Session: 20 min   Stress: Unknown (2023)    [employer] [place] [employer] - Occupational Stress Questionnaire     Feeling of Stress : Patient refused   Social Connections: Unknown (2023)    Social Connection and Isolation Panel [NHANES]     Frequency of Communication with Friends and Family: Patient refused     Frequency of Social Gatherings with Friends and Family: Patient refused     Active Member of Clubs or Organizations: Patient refused     Attends Club or Organization Meetings: Patient refused     Marital Status: Patient refused   Housing Stability: Unknown (2023)    Housing Stability Vital Sign     Unable to Pay for Housing in the Last Year: Patient refused     Number of Places Lived in the Last Year: 1     Unstable Housing in the Last Year: Patient refused     Family History   Problem Relation Age of Onset    No Known Problems Paternal Grandfather     No Known Problems Paternal Grandmother     No Known Problems Maternal Grandmother     Lung cancer Maternal Grandfather     Cancer Father     Lung cancer Father     Cancer Mother         lung ca heavy smoker    Lung cancer Mother     Hypertension Sister     No Known Problems Sister     Hypothyroidism Sister     No Known Problems Daughter     Diabetes Maternal Aunt     Cancer Maternal Aunt     Breast cancer Paternal Aunt     No Known Problems Paternal Uncle     Amblyopia Neg Hx     Blindness Neg Hx     Cataracts Neg Hx     Glaucoma Neg Hx     Macular degeneration Neg Hx     Retinal detachment Neg Hx     Strabismus Neg Hx     Stroke Neg Hx     Thyroid disease Neg Hx     Ovarian cancer Neg Hx     Colon cancer Neg Hx     Cervical cancer Neg Hx     Uterine cancer Neg Hx      OB History    Para  Term  AB Living   1 1 1         SAB IAB Ectopic Multiple Live Births                  # Outcome Date GA Lbr Grey/2nd Weight Sex Delivery Anes PTL Lv   1 Term      [id number]              Current Outpatient Medications   Medication Sig Dispense Refill    amLODIPine (NORVASC) 5 MG tablet Take 1 tablet (5 mg total) by mouth once daily. 90 tablet 0    aspirin (ECOTRIN) 81 MG EC tablet Take 81 mg by mouth once daily.      atorvastatin (LIPITOR) 10 MG tablet Take 1 tablet (10 mg total) by mouth once daily. 90 tablet 3    blood-glucose meter kit DISPENSE : BLOOD TEST STRIPS AND LANCETS PATIENT TEST BLOOD SUGARS TWICE DAILY  TEST STRIPS: 50 EACH, REFILL 5  LANCETS:  50 EACH , REFILL 5 1 each 0    ciclopirox 0.77 % Gel Apply topically 2 (two) times daily. To thickened discolored toenails. 30 g 6    irbesartan (AVAPRO) 300 MG tablet Take 1 tablet by mouth once daily 90 tablet 2    letrozole (FEMARA) 2.5 mg Tab Take 1 tablet (2.5 mg total) by mouth once daily. 90 tablet 3    levothyroxine (SYNTHROID) 200 MCG tablet Take 1 tablet (200 mcg total) by mouth once daily. 90 tablet 1    lifitegrast (XIIDRA) 5 % Dpet Apply 1 drop to  both eyes  2 (two) times daily. 180 each 4    meloxicam (MOBIC) 15 MG tablet Take 1 tablet (15 mg total) by mouth daily as needed for Pain. 30 tablet 3    polyethylene glycol (GLYCOLAX) 17 gram/dose powder Mix 17 g (1 capful) with juice or water an drink 2 (two) times daily. 1020 g 2    prasterone, dhea, (INTRAROSA) 6.5 mg Inst Place 1 suppository vaginally nightly. 28 each 11    tirzepatide (MOUNJARO) 7.5 mg/0.5 mL PnIj Inject 7.5 mg into the skin every 7 days. 12 pen 1     No current facility-administered medications for this visit.     Allergies: Amoxil [amoxicillin]     The 10-year ASCVD risk score (Julio MURILLO, et al., 2019) is: 34.3%    Values used to calculate the score:      Age: 68 years      Sex: Female      Is Non- : Yes      Diabetic: Yes      Tobacco smoker: No       Systolic Blood Pressure: 143 mmHg      Is BP treated: Yes      HDL Cholesterol: 53 mg/dL      Total Cholesterol: 245 mg/dL      ROS:  Constitutional: no weight loss, weight gain, fever, fatigue  Eyes:  No vision changes, glasses/contacts  ENT/Mouth: No ulcers, sinus problems, ears ringing, headache  Cardiovascular: No inability to lie flat, chest pain, exercise intolerance, swelling, heart palpitations  Respiratory: No wheezing, coughing blood, shortness of breath, or cough  Gastrointestinal: No diarrhea, bloody stool, nausea/vomiting, constipation, gas, hemorrhoids  Genitourinary: No blood in urine, painful urination, urgency of urination, frequency of urination, incomplete emptying, incontinence, abnormal bleeding, painful periods, heavy periods, vaginal discharge, vaginal odor, painful intercourse, sexual problems, bleeding after intercourse.  Musculoskeletal: No muscle weakness, +left knee pain  Skin/Breast: No painful breasts, nipple discharge, masses, rash, ulcers  Neurological: No passing out, seizures, numbness, headache  Endocrine: + diabetes, +hypothyroid, hyperthyroid, +hot flashes, +hair loss, abnormal hair growth, acne  Psychiatric: No depression, crying  Hematologic: No bruises, bleeding, swollen lymph nodes, anemia.      Physical Exam:   Constitutional: She is oriented to person, place, and time. She appears well-developed and well-nourished.      Neck: No tracheal deviation present. No thyromegaly present.    Cardiovascular:       Exam reveals no edema.        Pulmonary/Chest: Effort normal. She exhibits no mass, no tenderness, no deformity and no retraction. Right breast exhibits absence. Left breast exhibits absence. Breasts are symmetrical.        Abdominal: Soft. She exhibits no distension and no mass. There is no abdominal tenderness. There is no rebound and no guarding. No hernia. Hernia confirmed negative in the left inguinal area.     Genitourinary: Rectum:      No external hemorrhoid.    There is no rash, tenderness or lesion on the right labia. There is no rash, tenderness or lesion on the left labia. No no adexnal prolapse. Right adnexum displays no mass, no tenderness and no fullness. Left adnexum displays no mass, no tenderness and no fullness. No vaginal discharge, tenderness, bleeding, rectocele, cystocele or prolapse of vaginal walls in the vagina. Cervix is absent.Uterus is absent.           Musculoskeletal: Normal range of motion and moves all extremeties. No edema.       Neurological: She is alert and oriented to person, place, and time.    Skin: No rash noted. No erythema. No pallor.    Psychiatric: She has a normal mood and affect. Her behavior is normal. Judgment and thought content normal.         ASSESSMENT:   well woman  History of breast cancer  PLAN:   Counseled her on possible benefits of acupuncture- she has done this in the past and declines  Counseled her on benefit of PT for balance , strength and stamina  return annually or prn

## 2023-12-18 RX ORDER — VENLAFAXINE HYDROCHLORIDE 37.5 MG/1
37.5 CAPSULE, EXTENDED RELEASE ORAL DAILY
Qty: 30 CAPSULE | Refills: 4 | Status: CANCELLED | OUTPATIENT
Start: 2023-12-18 | End: 2024-12-17

## 2023-12-18 RX ORDER — LIFITEGRAST 50 MG/ML
1 SOLUTION/ DROPS OPHTHALMIC 2 TIMES DAILY
Qty: 180 EACH | Refills: 4 | Status: SHIPPED | OUTPATIENT
Start: 2023-12-18

## 2023-12-19 ENCOUNTER — PATIENT MESSAGE (OUTPATIENT)
Dept: OBSTETRICS AND GYNECOLOGY | Facility: CLINIC | Age: 68
End: 2023-12-19
Payer: MEDICARE

## 2023-12-29 ENCOUNTER — OFFICE VISIT (OUTPATIENT)
Dept: ORTHOPEDICS | Facility: CLINIC | Age: 68
End: 2023-12-29
Payer: MEDICARE

## 2023-12-29 VITALS — BODY MASS INDEX: 33.38 KG/M2 | HEIGHT: 60 IN | WEIGHT: 170 LBS

## 2023-12-29 DIAGNOSIS — G56.01 RIGHT CARPAL TUNNEL SYNDROME: Primary | ICD-10-CM

## 2023-12-29 PROCEDURE — 1126F PR PAIN SEVERITY QUANTIFIED, NO PAIN PRESENT: ICD-10-PCS | Mod: HCNC,CPTII,S$GLB, | Performed by: PLASTIC SURGERY

## 2023-12-29 PROCEDURE — 3066F NEPHROPATHY DOC TX: CPT | Mod: HCNC,CPTII,S$GLB, | Performed by: PLASTIC SURGERY

## 2023-12-29 PROCEDURE — 3008F PR BODY MASS INDEX (BMI) DOCUMENTED: ICD-10-PCS | Mod: HCNC,CPTII,S$GLB, | Performed by: PLASTIC SURGERY

## 2023-12-29 PROCEDURE — 1100F PTFALLS ASSESS-DOCD GE2>/YR: CPT | Mod: HCNC,CPTII,S$GLB, | Performed by: PLASTIC SURGERY

## 2023-12-29 PROCEDURE — 4010F PR ACE/ARB THEARPY RXD/TAKEN: ICD-10-PCS | Mod: HCNC,CPTII,S$GLB, | Performed by: PLASTIC SURGERY

## 2023-12-29 PROCEDURE — 3288F PR FALLS RISK ASSESSMENT DOCUMENTED: ICD-10-PCS | Mod: HCNC,CPTII,S$GLB, | Performed by: PLASTIC SURGERY

## 2023-12-29 PROCEDURE — 3044F PR MOST RECENT HEMOGLOBIN A1C LEVEL <7.0%: ICD-10-PCS | Mod: HCNC,CPTII,S$GLB, | Performed by: PLASTIC SURGERY

## 2023-12-29 PROCEDURE — 3066F PR DOCUMENTATION OF TREATMENT FOR NEPHROPATHY: ICD-10-PCS | Mod: HCNC,CPTII,S$GLB, | Performed by: PLASTIC SURGERY

## 2023-12-29 PROCEDURE — 1126F AMNT PAIN NOTED NONE PRSNT: CPT | Mod: HCNC,CPTII,S$GLB, | Performed by: PLASTIC SURGERY

## 2023-12-29 PROCEDURE — 3072F PR LOW RISK FOR RETINOPATHY: ICD-10-PCS | Mod: HCNC,CPTII,S$GLB, | Performed by: PLASTIC SURGERY

## 2023-12-29 PROCEDURE — 3072F LOW RISK FOR RETINOPATHY: CPT | Mod: HCNC,CPTII,S$GLB, | Performed by: PLASTIC SURGERY

## 2023-12-29 PROCEDURE — 4010F ACE/ARB THERAPY RXD/TAKEN: CPT | Mod: HCNC,CPTII,S$GLB, | Performed by: PLASTIC SURGERY

## 2023-12-29 PROCEDURE — 1159F PR MEDICATION LIST DOCUMENTED IN MEDICAL RECORD: ICD-10-PCS | Mod: HCNC,CPTII,S$GLB, | Performed by: PLASTIC SURGERY

## 2023-12-29 PROCEDURE — 3044F HG A1C LEVEL LT 7.0%: CPT | Mod: HCNC,CPTII,S$GLB, | Performed by: PLASTIC SURGERY

## 2023-12-29 PROCEDURE — 3061F PR NEG MICROALBUMINURIA RESULT DOCUMENTED/REVIEW: ICD-10-PCS | Mod: HCNC,CPTII,S$GLB, | Performed by: PLASTIC SURGERY

## 2023-12-29 PROCEDURE — 3008F BODY MASS INDEX DOCD: CPT | Mod: HCNC,CPTII,S$GLB, | Performed by: PLASTIC SURGERY

## 2023-12-29 PROCEDURE — 99212 OFFICE O/P EST SF 10 MIN: CPT | Mod: HCNC,S$GLB,, | Performed by: PLASTIC SURGERY

## 2023-12-29 PROCEDURE — 99212 PR OFFICE/OUTPT VISIT, EST, LEVL II, 10-19 MIN: ICD-10-PCS | Mod: HCNC,S$GLB,, | Performed by: PLASTIC SURGERY

## 2023-12-29 PROCEDURE — 99999 PR PBB SHADOW E&M-EST. PATIENT-LVL III: CPT | Mod: PBBFAC,HCNC,, | Performed by: PLASTIC SURGERY

## 2023-12-29 PROCEDURE — 1100F PR PT FALLS ASSESS DOC 2+ FALLS/FALL W/INJURY/YR: ICD-10-PCS | Mod: HCNC,CPTII,S$GLB, | Performed by: PLASTIC SURGERY

## 2023-12-29 PROCEDURE — 99999 PR PBB SHADOW E&M-EST. PATIENT-LVL III: ICD-10-PCS | Mod: PBBFAC,HCNC,, | Performed by: PLASTIC SURGERY

## 2023-12-29 PROCEDURE — 3288F FALL RISK ASSESSMENT DOCD: CPT | Mod: HCNC,CPTII,S$GLB, | Performed by: PLASTIC SURGERY

## 2023-12-29 PROCEDURE — 3061F NEG MICROALBUMINURIA REV: CPT | Mod: HCNC,CPTII,S$GLB, | Performed by: PLASTIC SURGERY

## 2023-12-29 PROCEDURE — 1159F MED LIST DOCD IN RCRD: CPT | Mod: HCNC,CPTII,S$GLB, | Performed by: PLASTIC SURGERY

## 2023-12-29 NOTE — PROGRESS NOTES
Subjective:     Patient ID: Maria Elena Tavares is a 68 y.o. female.    Chief Complaint: Pain of the Right Hand    Maria Elena Tavares returns today discuss the results of her recent EMG of the right upper extremity.  She states that her symptoms have greatly improved after the carpal tunnel injection.  She has no longer experiencing numbness and tingling in the hand.  She has no other complaints        Review of Systems   All other systems reviewed and are negative.      Objective:       General    Vitals reviewed.  Constitutional: She is oriented to person, place, and time. She appears well-nourished. No distress.   Pulmonary/Chest: Effort normal.   Neurological: She is alert and oriented to person, place, and time.   Psychiatric: She has a normal mood and affect.             Right Hand/Wrist Exam     Inspection   Effusion: Wrist - absent Hand -  absent  Deformity: Wrist - deformity Hand -  deformity    Other     Neuorologic Exam    Median Distribution: normal  Ulnar Distribution: normal  Radial Distribution: normal          Vascular Exam       Capillary Refill  Right Hand: normal capillary refill          Physical Exam  Vitals reviewed.   Constitutional:       General: She is not in acute distress.     Appearance: She is well-nourished.   Pulmonary:      Effort: Pulmonary effort is normal.   Musculoskeletal:      Right wrist: No deformity or effusion.      Right hand: No deformity. Normal sensation of the ulnar distribution, median distribution and radial distribution. Normal capillary refill.   Neurological:      Mental Status: She is alert and oriented to person, place, and time.   Psychiatric:         Mood and Affect: Mood and affect normal.       EMG impression:  Normal electrophysiologic study  Assessment:     Encounter Diagnosis   Name Primary?    Right carpal tunnel syndrome Yes       Plan:      Maria Elena was seen today for pain.    Diagnoses and all orders for this visit:    Right carpal tunnel syndrome      Based on  her recent EMG there is no evidence of carpal tunnel on the test and her symptoms have improved after corticosteroid injection.  She was advised to brace the wrist at night to maintain a neutral position while sleeping to avoid further compression of the nerve.  I discussed that we will continue to monitor symptoms over the next several months after the injection.  She was advised to follow up as needed.  All questions and concerns were addressed

## 2024-01-10 ENCOUNTER — OFFICE VISIT (OUTPATIENT)
Dept: OPTOMETRY | Facility: CLINIC | Age: 69
End: 2024-01-10
Payer: MEDICARE

## 2024-01-10 DIAGNOSIS — I10 ESSENTIAL HYPERTENSION: ICD-10-CM

## 2024-01-10 DIAGNOSIS — H26.9 CORTICAL CATARACT OF BOTH EYES: ICD-10-CM

## 2024-01-10 DIAGNOSIS — C50.912 BREAST CANCER METASTASIZED TO AXILLARY LYMPH NODE, LEFT: ICD-10-CM

## 2024-01-10 DIAGNOSIS — E11.9 TYPE 2 DIABETES MELLITUS WITHOUT OPHTHALMIC MANIFESTATIONS: ICD-10-CM

## 2024-01-10 DIAGNOSIS — C77.3 BREAST CANCER METASTASIZED TO AXILLARY LYMPH NODE, LEFT: ICD-10-CM

## 2024-01-10 DIAGNOSIS — E11.21 CONTROLLED TYPE 2 DIABETES MELLITUS WITH DIABETIC NEPHROPATHY, WITHOUT LONG-TERM CURRENT USE OF INSULIN: ICD-10-CM

## 2024-01-10 DIAGNOSIS — H04.123 DRY EYE SYNDROME, BILATERAL: ICD-10-CM

## 2024-01-10 DIAGNOSIS — H25.13 NS (NUCLEAR SCLEROSIS), BILATERAL: ICD-10-CM

## 2024-01-10 DIAGNOSIS — Z90.13 H/O BILATERAL MASTECTOMY: ICD-10-CM

## 2024-01-10 DIAGNOSIS — H52.4 PRESBYOPIA: Primary | ICD-10-CM

## 2024-01-10 PROCEDURE — 3288F FALL RISK ASSESSMENT DOCD: CPT | Mod: HCNC,CPTII,S$GLB, | Performed by: OPTOMETRIST

## 2024-01-10 PROCEDURE — 92014 COMPRE OPH EXAM EST PT 1/>: CPT | Mod: HCNC,S$GLB,, | Performed by: OPTOMETRIST

## 2024-01-10 PROCEDURE — 99999 PR PBB SHADOW E&M-EST. PATIENT-LVL II: CPT | Mod: PBBFAC,HCNC,, | Performed by: OPTOMETRIST

## 2024-01-10 PROCEDURE — 92015 DETERMINE REFRACTIVE STATE: CPT | Mod: HCNC,S$GLB,, | Performed by: OPTOMETRIST

## 2024-01-10 PROCEDURE — 1126F AMNT PAIN NOTED NONE PRSNT: CPT | Mod: HCNC,CPTII,S$GLB, | Performed by: OPTOMETRIST

## 2024-01-10 PROCEDURE — 1159F MED LIST DOCD IN RCRD: CPT | Mod: HCNC,CPTII,S$GLB, | Performed by: OPTOMETRIST

## 2024-01-10 PROCEDURE — 2023F DILAT RTA XM W/O RTNOPTHY: CPT | Mod: HCNC,CPTII,S$GLB, | Performed by: OPTOMETRIST

## 2024-01-10 PROCEDURE — 1101F PT FALLS ASSESS-DOCD LE1/YR: CPT | Mod: HCNC,CPTII,S$GLB, | Performed by: OPTOMETRIST

## 2024-01-10 NOTE — PROGRESS NOTES
HPI    JULIO: 8/25/2022 - Dr. Ruiz     CC: Pt is here today for a routine eye exam. Pt states that OS vision has   been blurry and tearing often.     (-)Dryness  (-)Burning  (-)Itchiness  (+)Tearing - OS  (-)Flashes  (+)Floaters - Occasionally   (-)Photophobia  (-)Eye Pain      Diabetic: yes  A1C: 6.2 on 8/16/2023    Contact Lens: no    Eye Meds: Xiidra BID   Last edited by Dominique Pham MA on 1/10/2024 11:12 AM.            Assessment /Plan     For exam results, see Encounter Report.       Presbyopia              Rx specs     Type 2 diabetes mellitus without ophthalmic manifestations  Essential hypertension              No retinopathy, monitor yearly        Dry eye syndrome, bilateral  Continue with lifitegrast (XIIDRA) 5 % Dpet; Apply 1 drop to  both eyes  2 (two) times daily.  Dispense: 60 each; Refill: 12              Continue with art tears PRN       Breast cancer metastasized to axillary lymph node, left  H/O bilateral mastectomy      NS (nuclear sclerosis), bilateral  Cortical cataract of both eyes  OD>OS   Monitor yearly         RTC 1 year, sooner PRN

## 2024-01-11 ENCOUNTER — CLINICAL SUPPORT (OUTPATIENT)
Dept: REHABILITATION | Facility: HOSPITAL | Age: 69
End: 2024-01-11
Payer: MEDICARE

## 2024-01-11 DIAGNOSIS — Z74.09 DECREASED FUNCTIONAL MOBILITY AND ENDURANCE: ICD-10-CM

## 2024-01-11 DIAGNOSIS — R29.898 WEAKNESS OF BOTH UPPER EXTREMITIES: Primary | ICD-10-CM

## 2024-01-11 DIAGNOSIS — R53.81 PHYSICAL DECONDITIONING: ICD-10-CM

## 2024-01-11 DIAGNOSIS — R29.898 WEAKNESS OF BOTH LOWER EXTREMITIES: ICD-10-CM

## 2024-01-11 DIAGNOSIS — R26.89 BALANCE PROBLEM: ICD-10-CM

## 2024-01-11 PROCEDURE — 97162 PT EVAL MOD COMPLEX 30 MIN: CPT | Mod: HCNC

## 2024-01-11 PROCEDURE — 97140 MANUAL THERAPY 1/> REGIONS: CPT | Mod: HCNC

## 2024-01-11 NOTE — PLAN OF CARE
[facility]  Physical Therapy Initial Evaluation  Name: Maria Elena Tavares  Clinic Number: [id number]    Therapy Diagnosis:   Encounter Diagnoses   Name Primary?    Physical deconditioning     Weakness of both upper extremities Yes    Weakness of both lower extremities     Balance problem     Decreased functional mobility and endurance         Physician: Criss Myers, *    Physician Orders: PT Eval and Treat   Medical Diagnosis from Referral: R53.81 (ICD-10-CM) - Physical deconditioning   Evaluation Date: 1/11/2024  Authorization Period Expiration: 12/31/2024  Plan of Care Expiration: 03/08/2024  Progress Note Due: 02/08/2024  Visit # / Visits authorized: 1/ 1   FOTO: 1/3    Precautions: Standard and cancer     Time In: 10:30 am  Time Out: 11:15 am  Total Appointment Time (timed & untimed codes): 45 minutes    Subjective   Date of onset: after her cancer treatments  History of current condition - Maria Elena reports: having trouble with her arms since her B mastectomy and trouble with her L knee(she is scheduled for TKA). She needs to work on her muscles as she has lost a lot of her strength and this makes it difficult to perform her usual activities. She does have some neuropathy in fingers and toes, the symptoms in her hands are what trouble her more. She does have difficulty ascending/descending stairs in a reciprocal gait pattern, sometimes requires assistance with bringing her groceries into the home, trouble getting up from the bottom of the tub(she likes to soak in the tub), trouble with prolonged walking due to her knee, and sometimes has difficulty playing with her grandchildren. She reports some dizziness with ADLs but this may be due to her medications and she will be discussing this with her PCP in the near future.   Cancer Related Surgery and Date:  7/28/20 removal of tissue expanders due to infection; 3/24/20 bilateral mastectomies with SLNB and ALNB and tissue expander  reconstruction     Chemotherapy: viji-adjuvant chemotherapy from 10/19 to    Radiation:  to       Medical History:   Past Medical History:   Diagnosis Date    BMI 37.0-37.9, adult     Breast cancer 2019     Left breast, IDC with lymph node metastisis    Colon polyps     Colon polyps     Diabetes mellitus type II     Diverticulosis     history of diverticulosisseen on colonoscopy at age 48. Repeat recommended at age 58. Done by     Elevated blood protein     History of elevated protein. Apparently has seen  for extensive workup including bone marrow biopsy    History of shingles 2006    Hyperlipidemia     Hypertension     Microalbuminuria     due 2 diabetes    Mild vitamin D deficiency     . A low vitamin D    Primary osteoarthritis of left knee 2023    Thyroid disease     hypothyroidism    Usual hyperplasia of lactiferous duct     Not sure       Surgical History:   Maria Elena Tavares  has a past surgical history that includes Hysterectomy; Oophorectomy; Insertion of tunneled central venous catheter (CVC) with subcutaneous port (Right, 10/04/2019); Breast biopsy (Left, 2019); Breast biopsy (Right, 2019); Breast biopsy (Left, 10/14/2019); Breast biopsy (Left, 10/08/2019); Simple mastectomy (Bilateral, 2020); [place] lymph node biopsy (Left, 2020); Insertion of breast tissue expander (Bilateral, 2020);  section; Tissue expander removal (Bilateral, 2020); and Colonoscopy (N/A, 10/08/2020).    Medications:   Maria Elena has a current medication list which includes the following prescription(s): amlodipine, aspirin, atorvastatin, blood-glucose meter, ciclopirox, irbesartan, letrozole, levothyroxine, xiidra, meloxicam, polyethylene glycol, intrarosa, and mounjaro.    Allergies:   Review of patient's allergies indicates:   Allergen Reactions    Amoxil [amoxicillin] Rash        Imaging, X-ray: Knee ortho left 2023  FINDINGS:  Three views  left knee: There is DJD and a varus deformity.  No fracture dislocation bone destruction seen.  No joint effusion seen.     Right knee: There is DJD and a varus deformity.    Prior Therapy: PT after mastectomy, OT for therapeutic yoga  Social History: single story home, 9 steps to the porch, handrails on both sides, lives alone  Occupation: retired  Prior Level of Function: independent  Current Level of Function: Mod I  Exercise Routine prior to onset: does some chair exercises  Dominant hand:  right     Pain:  Current 0/10, worst 10/10, best 0/10   Location: left knee, B axilla  Description: Aching and Sharp  Aggravating Factors: Standing, Walking, and random  Easing Factors: massage, pain medication, heating pad, and injection    Functional Mobility (Bed mobility, transfers)  Bed mobility: I  Supine to sit: I  Sit to supine: I  Transfers to bed: I  Transfers to toilet: Mod I  Sit to stand:  Mod I  Stand pivot:  I  Car transfers: I  Wheelchair mobility: N/A    ADL's:  Feeding: I  Grooming: I  Hygiene: I  UB Dressing: I  LB Dressing: I  Toileting: I  Bathing: Mod I    Pts goals: that I build my muscles, and I am able to kneel, get in/out of the tub      Objective     Lab Values 8/16/23  Hemoglobin: 12.3  Hematocrit: 38.5  Platelets: 293  ANC: 3.4        Vitals: BP: 128/91; HR: 81 bpm; SpO2: 99%    Mental status: alert, oriented to person, place, and time, normal mood, behavior, speech, dress, motor activity, and thought processes, affect appropriate to mood  Appearance: Casually dressed  Behavior:  calm, cooperative, and adequate rapport can be established  Attention Span and Concentration:  Normal    Postural examination/scapula alignment: Rounded shoulder and Head forward      Sensation:   Light Touch UEs  Impaired: numbness/tingling in fingertips  Light Touch LEs  Impaired: numbness in toes  Proprioception: Intact,            ROM:     Active/Passive ROM: (measured in degrees)     Shoulder RUE LUE  Pain/Dysfunction with movement   Flexion 170 165 Pulling in L axilla   Abduction 170 165 Pulling in L axilla   Extension TBA TBA N/A   ER T1 C7 N/A   IR T7 T8 N/A     Elbow RUE LUE Pain/Dysfunction with movement   Flexion WNL WNL N/A   Extension WNL WNL N/A     B LE AROM WFL    Strength: manual muscle test grades below     Upper Extremity Strength  (R) UE  (L) UE    Shoulder flexion: 3+/5 Shoulder flexion: 3+/5   Shoulder Abduction: 4/5 Shoulder abduction: 4/5   Shoulder ER 3+/5 Shoulder ER 3+/5   Shoulder IR 4/5 Shoulder IR 4/5   Elbow flexion: 4/5 Elbow flexion: 4/5   Elbow extension: 4-/5 Elbow extension: 4-/5   Wrist flexion: 3+/5 Wrist flexion: 3+/5   Wrist extension: 3+/5 Wrist extension: 3+/5         Lower Extremity Strength  Right LE  Left LE    Hip Flexion: 4/5 Hip Flexion: 3+/5   Hip Extension:  3+/5 Hip Extension: 3+/5   Hip Abduction: 4+/5 Hip Abduction: 4+/5   Hip Adduction: 4+/5 Hip Adduction 4+/5   Knee Extension: 4/5 Knee Extension: 4/5   Knee Flexion: 4/5 Knee Flexion: 4-/5   Ankle Dorsiflexion: 5/5 Ankle Dorsiflexion: 5/5   Ankle Plantarflexion: 5/5 Ankle Plantarflexion: 5/5     Abdominal Strength: NT    Special Tests      Strength (in pounds, setting II one maximum trial):   Right Left    II 25.5 23.5     Normal  Average Values  Female Right Left Male: Right Left   20-29 66 lbs 62 lbs         94 lbs 86 lbs   30-39 68 lbs 64 lbs 90 lbs 82 lbs   40-49 64 lbs 62 lbs 80 lbs 74 lbs   50-59 62 lbs 57 lbs 72 lbs 65 lbs   60-69 53 lbs 51 lbs 63 lbs 56 lbs   70+ 44 lbs 42 lbs 54 lbs 48 lbs       Balance Assessment:     Evaluation   Single Limb Stance R LE 3.81 sec  (<10 sec = HIGH FALL RISK)   Single Limb Stance L LE 1.78 sec  (<10 sec = HIGH FALL RISK)      Evaluation   Timed Up and Go 10.76 sec  < 20 sec safe for independent transfers, < 30 sec safe for dependent transfers/assist required       Table: Population Norms for TUG    Age  Average TUG    60 - 69 years  8.1 seconds    70 - 79 years  9.2  "seconds    80 - 99 years  11.3 seconds        Endurance:    6 Minute Walk Test Distance in meters: 291.28 meters (955' 07")    - AD used: none  - Assistance: none  - Distance: 955' 07"    GAIT DEVIATIONS:  Maria Elena displays the following deviations with ambulation: antalgic gait after 3 minutes    Impairments contributing to deviations: impaired motor control, OA L knee, muscle weakness    Endurance Assessment:   Evaluation   30 second Chair Rise  (adults > 59 y/o) 6 completed with no arms           Intake Outcome Measure for FOTO Cancer Survey    Therapist reviewed FOTO scores for Maria Elena Tavares on 1/11/2024.   FOTO documents entered into [employer] - see Media section.    Intake Score: 61%           TREATMENT     Total Treatment time separate from Evaluation: 8 minutes    Maria Elena received the following manual therapy techniques were applied for 8 minutes, including:  Patient was screened for use of kinesiotape and was cleared for use.  1. Has the patient ever had a reaction to the adhesive in bandaids? Yes/No  2. Has the patient ever uses athletic/kinesiotape in the past? Yes/No  3. Is the patient hemodynamically impaired (PE, DVT, CHF, Kidney failure)? Yes/No  4. Can the PT/PTA apply the tape to your skin? Yes/No    Patient was instructed on duration to wear the tape, proper drying techniques for the tape, and to remove the tape if he/she had any issues with it.    Patient verbalized understanding of these instructions. 3 "I strips" with 50% tension were applied in a supportive pattern on her L knee.    Home Exercises and Patient Education Provided    Education provided:   - Role of physical therapy in multi-disciplinary team  - Goals for physical therapy, scheduling  - Home exercise program, exercise technique  - Energy conservation techniques    Written Home Exercises Provided:  to be provided next visit .    Assessment   Maria Elena is a 68 y.o. female referred to outpatient Physical Therapy with a medical diagnosis of " Physical deconditioning. Pt presents with weakness of both upper and lower extremities, decreased balance, antalgic gait, decreased endurance, and decreased functional mobility.    Pt prognosis is Good.   Pt will benefit from skilled outpatient Physical Therapy to address the deficits stated above and in the chart below, provide pt/family education, and to maximize pt's level of independence.     Plan of care discussed with patient: Yes  Pt's spiritual, cultural and educational needs considered and patient is agreeable to the plan of care and goals as stated below:     Anticipated Barriers for therapy: none anticipated    Medical Necessity is demonstrated by the following  History  Co-morbidities and personal factors that may impact the plan of care [] LOW: no personal factors / co-morbidities  [] MODERATE: 1-2 personal factors / co-morbidities  [x] HIGH: 3+ personal factors / co-morbidities    Moderate / High Support Documentation:   Co-morbidities affecting plan of care: hypotension, history of cancer, OA, high BMI, DM,     Personal Factors:   no deficits     Examination  Body Structures and Functions, activity limitations and participation restrictions that may impact the plan of care [] LOW: addressing 1-2 elements  [] MODERATE: 3+ elements  [x] HIGH: 4+ elements (please support below)    Moderate / High Support Documentation: difficulty with prolonged standing, prolonged ambulation, transfers from low surfaces, muscle weakness, balance issues, decreased endurance     Clinical Presentation [] LOW: stable  [x] MODERATE: Evolving  [] HIGH: Unstable     Decision Making/ Complexity Score: moderate         Goals:  Short Term Goals:  4 weeks  1. Pt. to demonstrate increased strength of bilateral lower extremities to 4/5  to increase stability during community ambulation (progressing, not met)  2. Pt to demonstrate increased strength of bilateral upper extremities to 4/5 in order to perform functional activities  (progressing, not met)  3. Pt. will improve TUG test score to 8.1 seconds or less in order to perform safe transfers and for patient to be in low/moderate risk for falls category (progressing, not met)  4. Pt will improve 6 minute walk test score by 15 meters in order to ambulate safely in community (progressing, not met)  5. Pt will initiate home exercise program to improve strength, flexibility, endurance, mobility & balance to return pt to PLOF (progressing, not met)  6. Pt will tolerate 10 min or greater of time in light-->moderate intensity cardio (I.e. Bike, NuStep) to improve endurance. (progressing, not met)  7. Pt to demonstrate tandem stance 30 sec or greater w/out UE support in order to improve balance to progress to singe leg stance to decrease fall risk in performance of ADL's (progressing, not met)    Long Term Goals: 8 weeks  1. Pt will increase strength of bilateral upper extremities to 5/5  in order to perform functional activities independently  (progressing, not met)  2. Pt will increase strength of bilateral lower extremities to 5/5  to increase stability during ambulation on uneven surfaces,to increase tolerance for ADL and work activities. (progressing, not met)  3. Pt will be independent with HEP to improve ROM, strength, balance,  and independence with ADL's (progressing, not met)  4. Pt. will improve Single Leg Balance test score to 30 seconds each LE in order to ambulate safely in community and for patient to be in low risk for falls category (progressing, not met)  5. Pt. will improve 6 minute walk test score by 25 meters in order to transfer independently and for patient to be in low risk for falls category (progressing, not met)  6. Patient will report compliance with an aerobic endurance program 5x week for 20 - 30 min each day to improve overall cardiovascular function and decrease cancer related fatigue at discharge. (progressing, not met)    Plan   Plan of care Certification:  1/11/2024 to 03/08/2024.    Outpatient Physical Therapy 2 times weekly for 8 weeks to include the following interventions: Gait Training, Manual Therapy, Neuromuscular Re-ed, Patient Education, Self Care, Therapeutic Activities, Therapeutic Exercise, and IASTM . Dry needling with manual therapy techniques to decrease pain, inflammation and swelling, increase circulation and promote healing process      Rubi Rodriguez, PT

## 2024-01-12 ENCOUNTER — TELEPHONE (OUTPATIENT)
Dept: HEMATOLOGY/ONCOLOGY | Facility: CLINIC | Age: 69
End: 2024-01-12
Payer: MEDICARE

## 2024-01-12 DIAGNOSIS — C77.3 BREAST CANCER METASTASIZED TO AXILLARY LYMPH NODE, LEFT: Primary | ICD-10-CM

## 2024-01-12 DIAGNOSIS — T45.1X5A NEUROPATHY DUE TO CHEMOTHERAPEUTIC DRUG: ICD-10-CM

## 2024-01-12 DIAGNOSIS — Z90.13 H/O BILATERAL MASTECTOMY: ICD-10-CM

## 2024-01-12 DIAGNOSIS — M25.50 AROMATASE INHIBITOR-ASSOCIATED ARTHRALGIA: ICD-10-CM

## 2024-01-12 DIAGNOSIS — C50.912 BREAST CANCER METASTASIZED TO AXILLARY LYMPH NODE, LEFT: Primary | ICD-10-CM

## 2024-01-12 DIAGNOSIS — G62.0 NEUROPATHY DUE TO CHEMOTHERAPEUTIC DRUG: ICD-10-CM

## 2024-01-12 DIAGNOSIS — T45.1X5A AROMATASE INHIBITOR-ASSOCIATED ARTHRALGIA: ICD-10-CM

## 2024-01-12 NOTE — NURSING
OT Yoga orders placed per protocol for immune health, neuropathy, mobility and arthralgias per protocol, SERJIO James.

## 2024-01-16 NOTE — PROGRESS NOTES
Subjective:       Patient ID: Maria Elena Tavares is a 68 y.o. female.    Chief Complaint: Discuss blood pressure      The patient location is: home  The chief complaint leading to consultation is: above    Visit type: audiovisual    Face to Face time with patient: 20 minutes 20 minutes of total time spent on the encounter, which includes face to face time and non-face to face time preparing to see the patient (eg, review of tests), Obtaining and/or reviewing separately obtained history, Documenting clinical information in the electronic or other health record, Independently interpreting results (not separately reported) and communicating results to the patient/family/caregiver, or Care coordination (not separately reported).     Each patient to whom he or she provides medical services by telemedicine is:  (1) informed of the relationship between the physician and patient and the respective role of any other health care provider with respect to management of the patient; and (2) notified that he or she may decline to receive medical services by telemedicine and may withdraw from such care at any time.    Notes:     Patient presents today to review blood pressure.  Feels well no complaints.  There are concerns that patient's blood pressure is dropping.    Hypertension  This is a recurrent problem. The current episode started 1 to 4 weeks ago. The problem has been waxing and waning since onset. The problem is controlled. Associated symptoms include headaches and neck pain. Pertinent negatives include no anxiety, blurred vision, chest pain, malaise/fatigue, orthopnea, palpitations, peripheral edema, PND, shortness of breath or sweats. Agents associated with hypertension include thyroid hormones. Risk factors for coronary artery disease include diabetes mellitus and dyslipidemia. Past treatments include nothing. There are no compliance problems.      Review of Systems   Constitutional:  Positive for activity change. Negative  for malaise/fatigue.   Eyes: Negative.  Negative for blurred vision.   Respiratory:  Negative for cough, chest tightness and shortness of breath.    Cardiovascular:  Negative for chest pain, palpitations, orthopnea, leg swelling and PND.   Gastrointestinal: Negative.    Musculoskeletal:  Positive for neck pain. Negative for joint swelling.   Skin: Negative.    Neurological:  Positive for headaches. Negative for dizziness, weakness and light-headedness.   All other systems reviewed and are negative.        Objective:      Physical Exam  Vitals reviewed.   Constitutional:       General: She is not in acute distress.     Appearance: Normal appearance. She is well-developed and normal weight. She is not ill-appearing, toxic-appearing or diaphoretic.   HENT:      Head: Normocephalic and atraumatic.   Eyes:      Extraocular Movements: Extraocular movements intact.      Conjunctiva/sclera: Conjunctivae normal.      Pupils: Pupils are equal, round, and reactive to light.   Neck:      Thyroid: No thyromegaly.   Cardiovascular:      Rate and Rhythm: Normal rate and regular rhythm.      Heart sounds: Normal heart sounds. No murmur heard.  Pulmonary:      Effort: Pulmonary effort is normal. No respiratory distress.      Breath sounds: Normal breath sounds. No wheezing or rales.   Chest:      Chest wall: No tenderness.   Musculoskeletal:         General: Normal range of motion.      Cervical back: Normal range of motion and neck supple.      Right lower leg: No edema.      Left lower leg: No edema.   Lymphadenopathy:      Cervical: No cervical adenopathy.   Skin:     General: Skin is warm.      Findings: No erythema.   Neurological:      General: No focal deficit present.      Mental Status: She is alert and oriented to person, place, and time. Mental status is at baseline.   Psychiatric:         Mood and Affect: Mood normal.         Behavior: Behavior normal.         Thought Content: Thought content normal.         Judgment:  Judgment normal.         Assessment:       1. Primary hypertension        Plan:   1. Primary hypertension  -     Discontinue: amLODIPine (NORVASC) 2.5 MG tablet; Take 1 tablet (2.5 mg total) by mouth once daily.  Dispense: 30 tablet; Refill: 1    Hypertension continue with amlodipine 2.5 mg once a day follow up with me p.r.n. 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

## 2024-01-18 ENCOUNTER — LAB VISIT (OUTPATIENT)
Dept: LAB | Facility: HOSPITAL | Age: 69
End: 2024-01-18
Payer: MEDICARE

## 2024-01-18 ENCOUNTER — OFFICE VISIT (OUTPATIENT)
Dept: HEMATOLOGY/ONCOLOGY | Facility: CLINIC | Age: 69
End: 2024-01-18
Payer: MEDICARE

## 2024-01-18 VITALS
OXYGEN SATURATION: 98 % | BODY MASS INDEX: 33.93 KG/M2 | HEIGHT: 60 IN | HEART RATE: 80 BPM | RESPIRATION RATE: 17 BRPM | DIASTOLIC BLOOD PRESSURE: 85 MMHG | WEIGHT: 172.81 LBS | TEMPERATURE: 97 F | SYSTOLIC BLOOD PRESSURE: 156 MMHG

## 2024-01-18 DIAGNOSIS — C77.3 BREAST CANCER METASTASIZED TO AXILLARY LYMPH NODE, LEFT: ICD-10-CM

## 2024-01-18 DIAGNOSIS — I10 ESSENTIAL HYPERTENSION: ICD-10-CM

## 2024-01-18 DIAGNOSIS — R42 DIZZINESS: ICD-10-CM

## 2024-01-18 DIAGNOSIS — M25.50 AROMATASE INHIBITOR-ASSOCIATED ARTHRALGIA: ICD-10-CM

## 2024-01-18 DIAGNOSIS — R23.2 HOT FLASHES RELATED TO AROMATASE INHIBITOR THERAPY: ICD-10-CM

## 2024-01-18 DIAGNOSIS — T45.1X5A AROMATASE INHIBITOR-ASSOCIATED ARTHRALGIA: ICD-10-CM

## 2024-01-18 DIAGNOSIS — C50.912 BREAST CANCER METASTASIZED TO AXILLARY LYMPH NODE, LEFT: ICD-10-CM

## 2024-01-18 DIAGNOSIS — T45.1X5A NEUROPATHY DUE TO CHEMOTHERAPEUTIC DRUG: ICD-10-CM

## 2024-01-18 DIAGNOSIS — C77.3 BREAST CANCER METASTASIZED TO AXILLARY LYMPH NODE, LEFT: Primary | ICD-10-CM

## 2024-01-18 DIAGNOSIS — G62.0 NEUROPATHY DUE TO CHEMOTHERAPEUTIC DRUG: ICD-10-CM

## 2024-01-18 DIAGNOSIS — T45.1X5A HOT FLASHES RELATED TO AROMATASE INHIBITOR THERAPY: ICD-10-CM

## 2024-01-18 DIAGNOSIS — Z90.13 H/O BILATERAL MASTECTOMY: ICD-10-CM

## 2024-01-18 DIAGNOSIS — C50.912 BREAST CANCER METASTASIZED TO AXILLARY LYMPH NODE, LEFT: Primary | ICD-10-CM

## 2024-01-18 LAB
ALBUMIN SERPL BCP-MCNC: 3.7 G/DL (ref 3.5–5.2)
ALP SERPL-CCNC: 126 U/L (ref 55–135)
ALT SERPL W/O P-5'-P-CCNC: 10 U/L (ref 10–44)
ANION GAP SERPL CALC-SCNC: 11 MMOL/L (ref 8–16)
AST SERPL-CCNC: 18 U/L (ref 10–40)
BILIRUB SERPL-MCNC: 0.5 MG/DL (ref 0.1–1)
BUN SERPL-MCNC: 17 MG/DL (ref 8–23)
CALCIUM SERPL-MCNC: 9.8 MG/DL (ref 8.7–10.5)
CHLORIDE SERPL-SCNC: 102 MMOL/L (ref 95–110)
CO2 SERPL-SCNC: 21 MMOL/L (ref 23–29)
CREAT SERPL-MCNC: 0.9 MG/DL (ref 0.5–1.4)
ERYTHROCYTE [DISTWIDTH] IN BLOOD BY AUTOMATED COUNT: 14.4 % (ref 11.5–14.5)
EST. GFR  (NO RACE VARIABLE): >60 ML/MIN/1.73 M^2
GLUCOSE SERPL-MCNC: 96 MG/DL (ref 70–110)
HCT VFR BLD AUTO: 42.1 % (ref 37–48.5)
HGB BLD-MCNC: 12.8 G/DL (ref 12–16)
IMM GRANULOCYTES # BLD AUTO: 0.02 K/UL (ref 0–0.04)
MCH RBC QN AUTO: 28.6 PG (ref 27–31)
MCHC RBC AUTO-ENTMCNC: 30.4 G/DL (ref 32–36)
MCV RBC AUTO: 94 FL (ref 82–98)
NEUTROPHILS # BLD AUTO: 4 K/UL (ref 1.8–7.7)
PLATELET # BLD AUTO: 278 K/UL (ref 150–450)
PMV BLD AUTO: 9.2 FL (ref 9.2–12.9)
POTASSIUM SERPL-SCNC: 4.2 MMOL/L (ref 3.5–5.1)
PROT SERPL-MCNC: 8.5 G/DL (ref 6–8.4)
RBC # BLD AUTO: 4.48 M/UL (ref 4–5.4)
SODIUM SERPL-SCNC: 134 MMOL/L (ref 136–145)
WBC # BLD AUTO: 5.79 K/UL (ref 3.9–12.7)

## 2024-01-18 PROCEDURE — 80053 COMPREHEN METABOLIC PANEL: CPT | Mod: HCNC | Performed by: NURSE PRACTITIONER

## 2024-01-18 PROCEDURE — 1159F MED LIST DOCD IN RCRD: CPT | Mod: HCNC,CPTII,S$GLB, | Performed by: NURSE PRACTITIONER

## 2024-01-18 PROCEDURE — 99214 OFFICE O/P EST MOD 30 MIN: CPT | Mod: HCNC,S$GLB,, | Performed by: NURSE PRACTITIONER

## 2024-01-18 PROCEDURE — 3077F SYST BP >= 140 MM HG: CPT | Mod: HCNC,CPTII,S$GLB, | Performed by: NURSE PRACTITIONER

## 2024-01-18 PROCEDURE — 36415 COLL VENOUS BLD VENIPUNCTURE: CPT | Mod: HCNC | Performed by: NURSE PRACTITIONER

## 2024-01-18 PROCEDURE — 3079F DIAST BP 80-89 MM HG: CPT | Mod: HCNC,CPTII,S$GLB, | Performed by: NURSE PRACTITIONER

## 2024-01-18 PROCEDURE — 1126F AMNT PAIN NOTED NONE PRSNT: CPT | Mod: HCNC,CPTII,S$GLB, | Performed by: NURSE PRACTITIONER

## 2024-01-18 PROCEDURE — 3008F BODY MASS INDEX DOCD: CPT | Mod: HCNC,CPTII,S$GLB, | Performed by: NURSE PRACTITIONER

## 2024-01-18 PROCEDURE — 1101F PT FALLS ASSESS-DOCD LE1/YR: CPT | Mod: HCNC,CPTII,S$GLB, | Performed by: NURSE PRACTITIONER

## 2024-01-18 PROCEDURE — 3288F FALL RISK ASSESSMENT DOCD: CPT | Mod: HCNC,CPTII,S$GLB, | Performed by: NURSE PRACTITIONER

## 2024-01-18 PROCEDURE — 85027 COMPLETE CBC AUTOMATED: CPT | Mod: HCNC | Performed by: NURSE PRACTITIONER

## 2024-01-18 PROCEDURE — 99999 PR PBB SHADOW E&M-EST. PATIENT-LVL IV: CPT | Mod: PBBFAC,HCNC,, | Performed by: NURSE PRACTITIONER

## 2024-01-18 NOTE — PROGRESS NOTES
Subjective     Patient ID: Maria Elena Tavares is a 68 y.o. female.    Chief Complaint: Breast cancer metastasized to axillary lymph node, left    HPI    Returns for routine follow up of breast cancer    Overall feels well.   Intentional weight loss but feels gained a little back.   States tolerating Femara well  Hair thinning.   Hot flashes returned. Mainly when sleeping.   Fingers stiff   Neuropathy stable in toes.   Reports getting dizzy at times. Room spins if lays down. This has been occurring for months  Occasional headache and ear ache.   Reports visual changes and recent Rx due to changes.   Undergoing PT- for strength   Left knee surgery planned for April.   BP up and down. Working with PCP to regulate. Amlodipine increased last year.   Glucose doing well. Has hgb A1C next month  Recent URI- resolved.     BMD normal 7/10/2023.   Takes Vit D 5000u MWF.    Right hand carpal tunnel as well- post injections, may require surgery if pain continues         Oncologic History:   - self detected, noted a shooting pain in breast first and then a week later felt a lump  Some delay in getting appointment as she was unaware she had to call  - 8/30/19 Diagnostic mammogram and ultrasound:  Left breast 20 mm x 18 mm x 17 mm mass at the 3 o'clock position. Assessment: 4 - Suspicious finding. Biopsy is recommended. Lymph Node: Left axilla 16 mm x 14 mm x 13 mm lymph node. Assessment: 4 - Suspicious finding. Biopsy is recommended. Right- There is no mammographic or sonographic evidence of malignancy.  BI-RADS Category:   Overall: 4 - Suspicious  - 9/5/19 Ultrasound guided biopsy  Pathology:  1. LEFT BREAST MASS, BIOPSY:  - Invasive ductal carcinoma, grade 3, longest linear length is 10 mm measured on the slide.  - Histologic Grade (Hawa Histologic Score)  Glandular (Acinar/Tubular Differentiation): 3.  Nuclear Pleomorphism: 2.  Mitotic Rate: 3.  Overall Grade: Grade 3 (score 8).  - Microcalcifications: Not identified.  -  Lymphovascular invasion: Not identified.  2. LYMPH NODE, LEFT AXILLA, BIOPSY:  - Positive for metastatic carcinoma.  - The metastatic deposit measures 6 mm in longest continuous linear length.  Estrogen receptor: Positive, 90% of the tumor nuclei staining moderate to strongly.  Progesterone receptor: Positive, 30% of the tumor nuclei staining weak to moderately.  HER2: Negative.  Ki-67: 60%.  - ECHO 9/20/19: EF 64%  - PET 9/21/19:  Hypermetabolic left breast mass and regional left axillary lymphadenopathy consistent with reported breast cancer and corresponding with recent MRI findings.  Upper limit of normal sized right axillary lymph nodes with normal fatty duane and mildly increased radiotracer uptake.  Findings could represent reactive nodes with metastatic nodes thought less likely.  Lymphscintigraphy with injection in the left breast may considered to assess for drainage to the contralateral axilla.  - BX 9/24/19: Right axilla node biopsy is benign  - she underwent neoadjuvant chemotherapy and completed 4 cycles of AC followed by 11 cycles of weekly Taxol.   Stopped with side effects.  - 3/24/20 - she underwent bilateral mastectomies with Lt. sentinel node biopsy and subsequent Lt. axillary dissection and Rt. sentinel node biopsy with Dr. Lopez.  Tissue expanders were placed.   - Pathology from the Lt. breast revealed 1.2 cm of residual invasive ductal carcinoma histologic grade 3, nuclear grade 3 and mitotic index 5 ). There was high grade DCIS. There was lymphovascular invasion.  The margins of resection were negative at > 1 cm.  One axillary node had a 5 mm focus of metastatic carcinoma.  Another Lt. sentinel node had isolated tumor cells.   A third Lt. sentinel node and 7 Lt. axillary nodes were negative.  The Rt. breast and Rt. sentinel nodes were negative.    - completed adjuvant  XRT   - on Femara as of 5/2020     HM:  6/2022 BMD  Normal    Review of Systems   Constitutional:         See above   All  other systems reviewed and are negative.         Objective     Physical Exam  Vitals and nursing note reviewed.   Constitutional:       General: She is not in acute distress.     Appearance: Normal appearance. She is normal weight. She is not ill-appearing.      Comments: Presents alone  Very pleasant   Eyes:      General: No scleral icterus.     Extraocular Movements: Extraocular movements intact.      Pupils: Pupils are equal, round, and reactive to light.   Cardiovascular:      Rate and Rhythm: Normal rate and regular rhythm.      Heart sounds: Normal heart sounds. No murmur heard.     No friction rub. No gallop.   Pulmonary:      Effort: Pulmonary effort is normal. No respiratory distress.      Breath sounds: Normal breath sounds. No wheezing, rhonchi or rales.      Comments: No chest wall masses or :LAD  Post reconstruction  Chest:      Chest wall: No tenderness.   Abdominal:      General: Abdomen is flat. Bowel sounds are normal. There is no distension.      Palpations: Abdomen is soft. There is no mass.      Tenderness: There is no abdominal tenderness. There is no guarding or rebound.      Comments: No organomegaly   Musculoskeletal:         General: No swelling, tenderness or deformity. Normal range of motion.      Cervical back: Normal range of motion and neck supple. No muscular tenderness.      Right lower leg: No edema.      Left lower leg: No edema.   Lymphadenopathy:      Cervical: No cervical adenopathy.   Skin:     General: Skin is warm and dry.      Coloration: Skin is not pale.      Findings: No erythema, lesion or rash.   Neurological:      General: No focal deficit present.      Mental Status: She is alert and oriented to person, place, and time. Mental status is at baseline.      Sensory: No sensory deficit.      Motor: No weakness.      Coordination: Coordination normal.      Gait: Gait normal.   Psychiatric:         Mood and Affect: Mood normal.         Behavior: Behavior normal.          Thought Content: Thought content normal.         Judgment: Judgment normal.       Labs- reviewed most recent     Assessment and Plan     1. Breast cancer metastasized to axillary lymph node, left  [facility] Oncology    Comprehensive Metabolic Panel    MRI Brain W WO Contrast      2. Aromatase inhibitor-associated arthralgia        3. Dizziness  [facility] Oncology    Comprehensive Metabolic Panel    MRI Brain W WO Contrast      4. Hot flashes related to aromatase inhibitor therapy        5. H/O bilateral mastectomy        6. Neuropathy due to chemotherapeutic drug        7. Essential hypertension            Breast cancer  MRI brain for dizziness, Ha and visual changes  Labs today  Continue Femara 5/2025  BMD due 7/2024  Vit E for hot flashes.   Vit D deficiency-On replacement  Neuropath stable.   Continue current regimen and f/u with all other established providers.     Route Chart for Scheduling    Med Onc Chart Routing      Follow up with physician 6 months. see Dr. Villagran   Follow up with [facility]    Infusion scheduling note    Injection scheduling note    Labs    Imaging    Pharmacy appointment    Other referrals                  Therapy Plan Information  Flushes  heparin, porcine (PF) 100 unit/mL injection flush 500 Units  500 Units, Intravenous, Every visit  sodium chloride 0.9% flush 10 mL  10 mL, Intravenous, Every visit       Patient is in agreement with the proposed treatment plan. All questions were answered to the patient's satisfaction. Pt knows to call clinic for any new or worsening symptoms and if anything is needed before the next clinic visit.    José Lara, MSN, APRN, FNP-C  Nurse Practitioner to Dr. Tere Villagran  [facility]  [facility]  Hematology & Medical Oncology  [address] [place] [id number]  ph. [phone] ext [phone]  Fax. [phone]              30 minutes of total time spent on the encounter, which includes face to face time and non-face to  face time preparing to see the patient (eg, review of tests), Obtaining and/or reviewing separately obtained history, Documenting clinical information in the electronic or other health record, Independently interpreting results (not separately reported) and communicating results to the patient/family/caregiver, or Care coordination (not separately reported).

## 2024-01-19 ENCOUNTER — PATIENT MESSAGE (OUTPATIENT)
Dept: PRIMARY CARE CLINIC | Facility: CLINIC | Age: 69
End: 2024-01-19
Payer: MEDICARE

## 2024-01-19 ENCOUNTER — PATIENT MESSAGE (OUTPATIENT)
Dept: HEMATOLOGY/ONCOLOGY | Facility: CLINIC | Age: 69
End: 2024-01-19
Payer: MEDICARE

## 2024-01-22 NOTE — PROGRESS NOTES
Subjective:       Patient ID: aMria Elena Tavares is a 68 y.o. female.    Chief Complaint: hypotension    The patient location is: home  The chief complaint leading to consultation is: blood pressures    Visit type: audiovisual    Face to Face time with patient: 20 minutes 20 minutes of total time spent on the encounter, which includes face to face time and non-face to face time preparing to see the patient (eg, review of tests), Obtaining and/or reviewing separately obtained history, Documenting clinical information in the electronic or other health record, Independently interpreting results (not separately reported) and communicating results to the patient/family/caregiver, or Care coordination (not separately reported).     Each patient to whom he or she provides medical services by telemedicine is:  (1) informed of the relationship between the physician and patient and the respective role of any other health care provider with respect to management of the patient; and (2) notified that he or she may decline to receive medical services by telemedicine and may withdraw from such care at any time.    Notes:     Pt presents with episodes of hypotension but overall doing well.  Continue present management  · Essential hypertension   amLODIPine (NORVASC) 5 MG tablet; Take 1 tablet (5 mg total) by mouth once daily.  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 The current episode started more than 1 year ago. The problem has been gradually improving since onset. The problem is resistant. Associated symptoms include blurred vision, headaches and malaise/fatigue. Pertinent negatives include no anxiety, chest pain, neck pain, orthopnea, palpitations, peripheral edema, PND, shortness of breath or sweats. Risk  factors for coronary artery disease include diabetes mellitus. Past treatments include nothing. The current treatment provides moderate improvement.     Review of Systems   Constitutional:  Positive for malaise/fatigue.   Eyes:  Positive for blurred vision.   Respiratory:  Negative for cough, chest tightness and shortness of breath.    Cardiovascular:  Negative for chest pain, palpitations, orthopnea, leg swelling and PND.   Gastrointestinal: Negative.    Musculoskeletal: Negative.  Negative for joint swelling and neck pain.   Skin: Negative.    Neurological:  Positive for headaches. Negative for dizziness, weakness and light-headedness.   All other systems reviewed and are negative.        Objective:      Physical Exam  Vitals reviewed.   Constitutional:       General: She is not in acute distress.     Appearance: Normal appearance. She is well-developed and normal weight. She is not ill-appearing, toxic-appearing or diaphoretic.   HENT:      Head: Normocephalic and atraumatic.   Eyes:      Extraocular Movements: Extraocular movements intact.      Conjunctiva/sclera: Conjunctivae normal.      Pupils: Pupils are equal, round, and reactive to light.   Neck:      Thyroid: No thyromegaly.   Cardiovascular:      Rate and Rhythm: Normal rate and regular rhythm.      Heart sounds: Normal heart sounds. No murmur heard.  Pulmonary:      Effort: Pulmonary effort is normal. No respiratory distress.      Breath sounds: Normal breath sounds. No wheezing or rales.   Chest:      Chest wall: No tenderness.   Musculoskeletal:         General: Normal range of motion.      Cervical back: Normal range of motion and neck supple.      Right lower leg: No edema.      Left lower leg: No edema.   Lymphadenopathy:      Cervical: No cervical adenopathy.   Skin:     General: Skin is warm.   Neurological:      Mental Status: She is alert and oriented to person, place, and time.   Psychiatric:         Behavior: Behavior normal.         Thought  Content: Thought content normal.         Judgment: Judgment normal.        Assessment:     Hypertension/hypotension      Plan:   For now continue ARB at 150 mg daily. If  symptoms persist we need to stop med and trial of another medicine  Pt amenable  RTC prn symptoms

## 2024-01-22 NOTE — TELEPHONE ENCOUNTER
Ghulam Juan,  Labs that are ordered by another provider (specialist and not a covering partner) needs to be reviewed with the patient by that provider. Please let pt know that. If they think she needs to see me, she will then need to make a telemed appt with me.  Thanks, PV

## 2024-01-23 ENCOUNTER — CLINICAL SUPPORT (OUTPATIENT)
Dept: REHABILITATION | Facility: HOSPITAL | Age: 69
End: 2024-01-23
Payer: MEDICARE

## 2024-01-23 DIAGNOSIS — R29.898 WEAKNESS OF BOTH LOWER EXTREMITIES: ICD-10-CM

## 2024-01-23 DIAGNOSIS — R29.898 WEAKNESS OF BOTH UPPER EXTREMITIES: Primary | ICD-10-CM

## 2024-01-23 DIAGNOSIS — Z74.09 DECREASED FUNCTIONAL MOBILITY AND ENDURANCE: ICD-10-CM

## 2024-01-23 DIAGNOSIS — R26.89 BALANCE PROBLEM: ICD-10-CM

## 2024-01-23 PROCEDURE — 97112 NEUROMUSCULAR REEDUCATION: CPT | Mod: HCNC

## 2024-01-23 PROCEDURE — 97110 THERAPEUTIC EXERCISES: CPT | Mod: HCNC

## 2024-01-23 PROCEDURE — 97140 MANUAL THERAPY 1/> REGIONS: CPT | Mod: HCNC

## 2024-01-23 NOTE — TELEPHONE ENCOUNTER
Spoke to pt, pt states the provider gave her a call to discuss her results. States she was anxious and just wanted to see if another provider could possibly discuss the results with her but she

## 2024-01-23 NOTE — PATIENT INSTRUCTIONS
Knee Extension: Resisted (Sitting)        With band looped around right ankle and under other foot, straighten leg with ankle loop. Keep other leg bent to increase resistance.  Repeat 10 times per set. Do 3 sets per session. Do 1 sessions per day.    Hamstring Curl: Resisted (Sitting)        Facing anchor with tubing on right ankle, leg straight out, bend knee.  Repeat 10 times per set. Do 3 sets per session. Do 1 sessions per day.      Strengthening: Hip Abductor - Resisted        With band looped around both legs above knees, push thighs apart.  Repeat 10 times per set. Do 3 sets per session. Do 1 sessions per day.           Theraband shoulder external rotation    Hold the band out to the front with both hands, elbows at your sides and bent 90 degrees.     Stretch the band apart as far as you can without pain. Keep the elbows in contact with your ribs. Squeeze the shoulder blades together.   Then return to start position.  Do 10 reps per set. Do 3 sets per session. Do 1 sessions per day.          ELASTIC BAND BILATERAL HORIZONTAL ABDUCTION    Start by holding an elastic band in front of your chest with your elbows straight. Then, pull your arms apart and towards the side. Return to starting position and repeat.   Do 10 reps per set. Do 3 sets per session. Do 1 session per day.    Scapular Retraction (Standing)        With arms at sides, pinch shoulder blades together.  Repeat 10 times per set. Do 2 sets per session. Do 2 sessions per day.     https://orth.[employer].us/944     Copyright © I. All rights reserved.           Shoulder Abduction - Theraband    Place one end of theraband under foot and one in hand.  Keeping elbow straight, raise the arm out to the side. Do 10 reps per set. Do 3 sets per session. Do 1 times a day.          Shoulder Flexion - Theraband    Place one end of the theraband under your foot and one in your hand.  Keeping elbow straight, raise arm straight out in front.  Do 10 reps per set. Do 3  sets per session. Do 1 session per day.

## 2024-01-23 NOTE — PROGRESS NOTES
Physical Therapy Daily Treatment Note     Name: Maria Elena Tavares  Clinic Number: [id number]    Therapy Diagnosis:   Encounter Diagnoses   Name Primary?    Weakness of both upper extremities Yes    Weakness of both lower extremities     Balance problem     Decreased functional mobility and endurance      Physician: Criss Myers, *    Visit Date: 1/23/2024    Physician Orders: PT Eval and Treat   Medical Diagnosis from Referral: R53.81 (ICD-10-CM) - Physical deconditioning   Evaluation Date: 1/11/2024  Authorization Period Expiration: 12/31/2024  Plan of Care Expiration: 03/08/2024  Progress Note Due: 02/08/2024  Visit # / Visits authorized: 1/ 10 (plus eval)   FOTO: 1/3    Time In: 10:30 am  Time Out: 11:15  Total Billable Time: 45 minutes    Precautions: Standard and cancer    Subjective     Pt reports: felt like the tape helped, the cold weather got her last week. She would like to do the taping again today.  She was not issued a home exercise program.  Response to previous treatment: no adverse events  Functional change: nothing yet    Pain: 7/10  Location: left knee      Objective     Objective Measures updated at progress report unless specified.     BP: 122/78; HR: 89 bpm; SpO2: 98%     Lab Values 1/18/24  Hemoglobin: 12.8  Hematocrit: 42.1  Platelets: 278  ANC: 4.0    Treatment       Maria Elena received therapeutic exercises to develop strength, endurance, ROM, flexibility, posture and core stabilization for 22 minutes including:  Stationary bike, seat 3, level 1, 6 minutes 30 seconds  UBE, seat 5, level 1 , 2'/2'  Shoulder flexion, 1#, 1 set x 10 reps  Shoulder abd, 1#, 1 set x 10 reps  LAQ, red, 1 set x 10 reps      Maria Elena received the following manual therapy techniques: Kinesiotaping were applied to the: L knee for 8 minutes, including:  Patient was screened for use of kinesiotape and was cleared for use.  1. Has the patient ever had a reaction to the adhesive in bandaids? Yes/No  2. Has the patient ever  "uses athletic/kinesiotape in the past? Yes/No  3. Is the patient hemodynamically impaired (PE, DVT, CHF, Kidney failure)? Yes/No  4. Can the PT/PTA apply the tape to your skin? Yes/No     Patient was instructed on duration to wear the tape, proper drying techniques for the tape, and to remove the tape if he/she had any issues with it.     Patient verbalized understanding of these instructions. 3 "I strips" with 50% tension were applied in a supportive pattern on her L knee.    Maria Elena participated in neuromuscular re-education activities to improve: Balance, Coordination, Proprioception and Posture for 15 minutes. The following activities were included:  B shoulder ER, red, 1 set x 10 reps  Horizontal abd, red, 1 set x 10 reps  Scapula retraction, 1 set x 10 reps    Home Exercises Provided and Patient Education Provided     Education provided:   - Role of physical therapy in multi-disciplinary team  - Goals for physical therapy, scheduling  - Home exercise program, exercise technique  - Energy conservation techniques    Written Home Exercises Provided: yes.  Exercises were reviewed and Maria Elena was able to demonstrate them prior to the end of the session.  Maria Elena demonstrated good  understanding of the education provided.     See EMR under Patient Instructions for exercises provided 1/23/2024.    Assessment     Patient is responding well to physical therapy. She was able to begin making progress towards her goals as she was able to perform all of today's activities with no increase in symptoms prior to leaving the clinic. She required occasional cues for correct technique with her exercises and to not let the resistance band snap her back to the starting position with hamstring curls. She endorsed adequate challenge with postural strengthening exercises. Will progress as tolerated.  Maria Elena Is progressing well towards her goals.   Pt prognosis is Good.     Pt will continue to benefit from skilled outpatient physical " therapy to address the deficits listed in the problem list box on initial evaluation, provide pt/family education and to maximize pt's level of independence in the home and community environment.     Pt's spiritual, cultural and educational needs considered and pt agreeable to plan of care and goals.     Anticipated barriers to physical therapy: none anticipated    Goals:   Short Term Goals:  4 weeks  1. Pt. to demonstrate increased strength of bilateral lower extremities to 4/5  to increase stability during community ambulation (progressing, not met)  2. Pt to demonstrate increased strength of bilateral upper extremities to 4/5 in order to perform functional activities (progressing, not met)  3. Pt. will improve TUG test score to 8.1 seconds or less in order to perform safe transfers and for patient to be in low/moderate risk for falls category (progressing, not met)  4. Pt will improve 6 minute walk test score by 15 meters in order to ambulate safely in community (progressing, not met)  5. Pt will initiate home exercise program to improve strength, flexibility, endurance, mobility & balance to return pt to PLOF (progressing, not met)  6. Pt will tolerate 10 min or greater of time in light-->moderate intensity cardio (I.e. Bike, NuStep) to improve endurance. (progressing, not met)  7. Pt to demonstrate tandem stance 30 sec or greater w/out UE support in order to improve balance to progress to singe leg stance to decrease fall risk in performance of ADL's (progressing, not met)     Long Term Goals: 8 weeks  1. Pt will increase strength of bilateral upper extremities to 5/5  in order to perform functional activities independently  (progressing, not met)  2. Pt will increase strength of bilateral lower extremities to 5/5  to increase stability during ambulation on uneven surfaces,to increase tolerance for ADL and work activities. (progressing, not met)  3. Pt will be independent with HEP to improve ROM, strength,  balance,  and independence with ADL's (progressing, not met)  4. Pt. will improve Single Leg Balance test score to 30 seconds each LE in order to ambulate safely in community and for patient to be in low risk for falls category (progressing, not met)  5. Pt. will improve 6 minute walk test score by 25 meters in order to transfer independently and for patient to be in low risk for falls category (progressing, not met)  6. Patient will report compliance with an aerobic endurance program 5x week for 20 - 30 min each day to improve overall cardiovascular function and decrease cancer related fatigue at discharge. (progressing, not met)  Plan     Outpatient Physical Therapy 2 times weekly for 8 weeks to include the following interventions: Gait Training, Manual Therapy, Neuromuscular Re-ed, Patient Education, Self Care, Therapeutic Activities, Therapeutic Exercise, and IASTM . Dry needling with manual therapy techniques to decrease pain, inflammation and swelling, increase circulation and promote healing process       Rubi Rodriguez, PT

## 2024-01-29 ENCOUNTER — HOSPITAL ENCOUNTER (OUTPATIENT)
Dept: RADIOLOGY | Facility: HOSPITAL | Age: 69
Discharge: HOME | End: 2024-01-29
Attending: NURSE PRACTITIONER
Payer: MEDICARE

## 2024-01-29 DIAGNOSIS — C50.912 BREAST CANCER METASTASIZED TO AXILLARY LYMPH NODE, LEFT: ICD-10-CM

## 2024-01-29 DIAGNOSIS — C77.3 BREAST CANCER METASTASIZED TO AXILLARY LYMPH NODE, LEFT: ICD-10-CM

## 2024-01-29 DIAGNOSIS — R42 DIZZINESS: ICD-10-CM

## 2024-01-30 ENCOUNTER — PATIENT MESSAGE (OUTPATIENT)
Dept: HEMATOLOGY/ONCOLOGY | Facility: CLINIC | Age: 69
End: 2024-01-30
Payer: MEDICARE

## 2024-01-30 ENCOUNTER — CLINICAL SUPPORT (OUTPATIENT)
Dept: REHABILITATION | Facility: HOSPITAL | Age: 69
End: 2024-01-30
Payer: MEDICARE

## 2024-01-30 DIAGNOSIS — R29.898 WEAKNESS OF BOTH LOWER EXTREMITIES: ICD-10-CM

## 2024-01-30 DIAGNOSIS — Z74.09 DECREASED FUNCTIONAL MOBILITY AND ENDURANCE: ICD-10-CM

## 2024-01-30 DIAGNOSIS — R29.898 WEAKNESS OF BOTH UPPER EXTREMITIES: Primary | ICD-10-CM

## 2024-01-30 DIAGNOSIS — R26.89 BALANCE PROBLEM: ICD-10-CM

## 2024-01-30 PROCEDURE — 97112 NEUROMUSCULAR REEDUCATION: CPT | Mod: HCNC

## 2024-01-30 PROCEDURE — 97110 THERAPEUTIC EXERCISES: CPT | Mod: HCNC

## 2024-01-30 NOTE — PROGRESS NOTES
Physical Therapy Daily Treatment Note     Name: Maria Elena Tavares  Clinic Number: [id number]    Therapy Diagnosis:   Encounter Diagnoses   Name Primary?    Weakness of both upper extremities Yes    Weakness of both lower extremities     Balance problem     Decreased functional mobility and endurance      Physician: Criss Myers, *    Visit Date: 1/30/2024    Physician Orders: PT Eval and Treat   Medical Diagnosis from Referral: R53.81 (ICD-10-CM) - Physical deconditioning   Evaluation Date: 1/11/2024  Authorization Period Expiration: 12/31/2024  Plan of Care Expiration: 03/08/2024  Progress Note Due: 02/08/2024  Visit # / Visits authorized: 2/ 10 (plus eval)   FOTO: 1/3    Time In: 10:30 am  Time Out: 11:20 am  Total Billable Time: 50 minutes    Precautions: Standard and cancer    Subjective     Pt reports: feeling better this morning. Her knee is feeling better so she doesn't need the taping today, but she does have some tape at home to tape herself if she needs it. She also found her exercise tubes with handles and has started to use them at home.   She was compliant with her home exercise program.  Response to previous treatment: some muscle soreness  Functional change: nothing yet    Pain: 3/10  Location: left leg, especially the toes    Objective     Objective Measures updated at progress report unless specified.     BP: 130/91; HR: 85 bpm; SpO2: 98%     Lab Values 1/18/24  Hemoglobin: 12.8  Hematocrit: 42.1  Platelets: 278  ANC: 4.0    Treatment       Maria Elena received therapeutic exercises to develop strength, endurance, ROM, flexibility, posture and core stabilization for 20 minutes including:  Stationary bike, seat 3, level 1,  5 minutes 45 seconds  UBE, seat 5, level 1.5,   2'/2'  Standing hip flex, red, alayna  1 set x 10 reps  Standing hip abd, red, alayna  1 set x 10 reps  Standing hip add, red, alayna  1 set x 10 reps  Standing hip ext, red, alayna  1 set x 10 reps  Triceps ext, red   1 set x 10 reps    Maria Elena  participated in neuromuscular re-education activities to improve: Balance, Coordination, Proprioception and Posture for 30 minutes. The following activities were included:  - Rows, red       1 set x 10 reps  - Shoulder ext, red      1 set x 10 reps  - shoulder ER, red,       1 set x 10 reps  - Shoulder IR, red,       1 set x 10 reps  - Horizontal abd, red,       1 set x 15 reps  - Diagonals, red, alayna      1 set x 10 reps each  - Sensory re-integration with spiked hedgehogs (feet) 2 min x 2 reps(1 rep seated, 1 rep standing)    Home Exercises Provided and Patient Education Provided     Education provided:   - Role of physical therapy in multi-disciplinary team  - Goals for physical therapy, scheduling  - Home exercise program, exercise technique  - Energy conservation techniques    Written Home Exercises Provided: yes.  Exercises were reviewed and Maria Elena was able to demonstrate them prior to the end of the session.  Maria Elena demonstrated good  understanding of the education provided.     See EMR under Patient Instructions for exercises provided 1/23/2024.    Assessment     Patient is responding well to physical therapy. Patient was able to perform all of today's new exercises and progressions with no increase in symptoms prior to leaving the clinic. She endorsed adequate challenge with diagonals and standing hip exercises. She required occasional cues for posture with standing exercises. Will progress as tolerated.  Maria Elena Is progressing well towards her goals.   Pt prognosis is Good.     Pt will continue to benefit from skilled outpatient physical therapy to address the deficits listed in the problem list box on initial evaluation, provide pt/family education and to maximize pt's level of independence in the home and community environment.     Pt's spiritual, cultural and educational needs considered and pt agreeable to plan of care and goals.     Anticipated barriers to physical therapy: none anticipated    Goals:    Short Term Goals:  4 weeks  1. Pt. to demonstrate increased strength of bilateral lower extremities to 4/5  to increase stability during community ambulation (progressing, not met)  2. Pt to demonstrate increased strength of bilateral upper extremities to 4/5 in order to perform functional activities (progressing, not met)  3. Pt. will improve TUG test score to 8.1 seconds or less in order to perform safe transfers and for patient to be in low/moderate risk for falls category (progressing, not met)  4. Pt will improve 6 minute walk test score by 15 meters in order to ambulate safely in community (progressing, not met)  5. Pt will initiate home exercise program to improve strength, flexibility, endurance, mobility & balance to return pt to [facility] (progressing, not met)  6. Pt will tolerate 10 min or greater of time in light-->moderate intensity cardio (I.e. Bike, NuStep) to improve endurance. (progressing, not met)  7. Pt to demonstrate tandem stance 30 sec or greater w/out UE support in order to improve balance to progress to singe leg stance to decrease fall risk in performance of ADL's (progressing, not met)     Long Term Goals: 8 weeks  1. Pt will increase strength of bilateral upper extremities to 5/5  in order to perform functional activities independently  (progressing, not met)  2. Pt will increase strength of bilateral lower extremities to 5/5  to increase stability during ambulation on uneven surfaces,to increase tolerance for ADL and work activities. (progressing, not met)  3. Pt will be independent with HEP to improve ROM, strength, balance,  and independence with ADL's (progressing, not met)  4. Pt. will improve Single Leg Balance test score to 30 seconds each LE in order to ambulate safely in community and for patient to be in low risk for falls category (progressing, not met)  5. Pt. will improve 6 minute walk test score by 25 meters in order to transfer independently and for patient to be in low risk  for falls category (progressing, not met)  6. Patient will report compliance with an aerobic endurance program 5x week for 20 - 30 min each day to improve overall cardiovascular function and decrease cancer related fatigue at discharge. (progressing, not met)  Plan     Outpatient Physical Therapy 2 times weekly for 8 weeks to include the following interventions: Gait Training, Manual Therapy, Neuromuscular Re-ed, Patient Education, Self Care, Therapeutic Activities, Therapeutic Exercise, and IASTM . Dry needling with manual therapy techniques to decrease pain, inflammation and swelling, increase circulation and promote healing process       Rubi Rodriguez, PT

## 2024-01-30 NOTE — PATIENT INSTRUCTIONS
Strengthening: Hip Adduction - Resisted        With tubing around left leg, bring leg across body.  Repeat 10 times per set. Do 3 sets per session. Do 1 sessions per day.     Strengthening: Hip Abduction - Resisted        With tubing around right leg, other side toward anchor, extend leg out from side.  Repeat 10 times per set. Do 3 sets per session. Do 1 sessions per day.     Strengthening: Hip Extension - Resisted        With tubing around right ankle, face anchor and pull leg straight back.  Repeat 10 times per set. Do 3 sets per session. Do 1 sessions per day.     Strengthening: Hip Flexion - Resisted        With tubing around left ankle, anchor behind, bring leg forward, keeping knee straight.  Repeat 10 times per set. Do 3 sets per session. Do 1 sessions per day.         ELASTIC BAND EXTENSION BILATERAL SHOULDER    While holding an elastic band with both arms in front of you with your elbows straight, pull the band downwards and back towards your side.  Reps 10 per set. Do 3 sets per session. Do 1 session per day        Banded Single Arm Shoulder External Rotation    Place a rolled up towel between your elbow and your side. Bend your elbow 90°. Squeeze the towel with your elbow. Grab the end of the band and slowly turn your arm outward, but keep your elbow bent. Stop when you feel a stretch. Hold this position as directed. Slowly return to the starting position. Do 10 reps per set. Do 3 sets per session. Do 1 sessions per day.          Shoulder Internal Rotation Resistance Band Exercise     Stand so that body is in line with door with the affected side of body closest to door. Grab one side of resistance band with affected side hand and bend elbow to create a 90 degree angle with arm at your side. Slowly rotate forearm inward towards navel, remembering to keep elbow close to the side of your body as much as possible. Once forearm has gone through this range of motion, slowly bring forearm back to starting  "position with elbow bent at your side.  Do 10 reps per set. Do 3 sets per session. Do 1 sessions per day.        ELASTIC BAND ROWS     Holding elastic band with both hands, draw back the band as you bend your elbows. Keep your elbows near the side of your body. Do 10 reps per set. Do 3 sets per session. Do 1 session per day.          Banded Shoulder Diagonals    Standing with tall posture and engaged core.    Pull the band across your chest in diagonal direction.    Keep elbows straight through the entire pull, squeezing your shoulder blades together as you pull.    Hand going up should be "thumb up" hand position.     Repeat both sides.   Reps 10 per set. Do 3 sets per session. Do 1 session per day.         Tricep Extension    Start: With elbows at sides and holding exercise band in each hand. Elbows should be bent at 90 degree angle.    Movement: Straighten elbows by extending them downward.      End: Slowly return to bent position.   Do 10 reps per set. Do 3 sets per session. Do 1 session per day.  "

## 2024-02-06 ENCOUNTER — CLINICAL SUPPORT (OUTPATIENT)
Dept: REHABILITATION | Facility: HOSPITAL | Age: 69
End: 2024-02-06
Payer: MEDICARE

## 2024-02-06 DIAGNOSIS — Z74.09 DECREASED FUNCTIONAL MOBILITY AND ENDURANCE: ICD-10-CM

## 2024-02-06 DIAGNOSIS — R26.89 BALANCE PROBLEM: ICD-10-CM

## 2024-02-06 DIAGNOSIS — R29.898 WEAKNESS OF BOTH UPPER EXTREMITIES: Primary | ICD-10-CM

## 2024-02-06 DIAGNOSIS — R29.898 WEAKNESS OF BOTH LOWER EXTREMITIES: ICD-10-CM

## 2024-02-06 PROCEDURE — 97112 NEUROMUSCULAR REEDUCATION: CPT | Mod: HCNC

## 2024-02-06 PROCEDURE — 97110 THERAPEUTIC EXERCISES: CPT | Mod: HCNC

## 2024-02-06 NOTE — PROGRESS NOTES
Physical Therapy Daily Treatment Note     Name: Maria Elena Tavares  Clinic Number: [id number]    Therapy Diagnosis:   Encounter Diagnoses   Name Primary?    Weakness of both upper extremities Yes    Weakness of both lower extremities     Balance problem     Decreased functional mobility and endurance        Physician: Criss Myers, *    Visit Date: 2/6/2024    Physician Orders: PT Eval and Treat   Medical Diagnosis from Referral: R53.81 (ICD-10-CM) - Physical deconditioning   Evaluation Date: 1/11/2024  Authorization Period Expiration: 12/31/2024  Plan of Care Expiration: 03/08/2024  Progress Note Due: 02/08/2024  Visit # / Visits authorized: 3/ 10 (plus eval)   FOTO: 1/3    Time In: 10:30 am  Time Out: 11:15 am  Total Billable Time: 45 minutes    Precautions: Standard and cancer    Subjective     Pt reports: had some trouble with her right hand over the weekend, it was cold but it was better after putting on a glove  She was compliant with her home exercise program.  Response to previous treatment: some muscle soreness  Functional change: nothing yet    Pain: 5/10  Location: left leg    Objective     Objective Measures updated at progress report unless specified.     BP: 142/90 mmHg; HR: 88 bpm; SpO2: 98%     Lab Values 1/18/24  Hemoglobin: 12.8  Hematocrit: 42.1  Platelets: 278  ANC: 4.0    Treatment       Maria Elena received therapeutic exercises to develop strength, endurance, ROM, flexibility, posture and core stabilization for 30 minutes including:  Stationary bike, seat 3, level 1- 4,  5 minutes  UBE, seat 5, level 2,    2'/2'  Standing hip flex, red, alayna  1 set x 15 reps  Standing hip abd, red, alayna  1 set x 15 reps  Standing hip add, red, alayna  1 set x 15 reps  Standing hip ext, red, alayna  1 set x 15 reps  Triceps ext, red   2 sets x 10 reps  Biceps curls, red   2 sets x 10 reps    Maria Elena participated in neuromuscular re-education activities to improve: Balance, Coordination, Proprioception and Posture for 15  minutes. The following activities were included:  - Rows, red       2 set x 10 reps  - Horizontal abd, red,       2 set x 10 reps  - Diagonals, red, alayna      1 set x 10 reps each  - B shoulder ER, red      2 sets x 10 reps    Home Exercises Provided and Patient Education Provided     Education provided:   - Role of physical therapy in multi-disciplinary team  - Goals for physical therapy, scheduling  - Home exercise program, exercise technique  - Energy conservation techniques    Written Home Exercises Provided: yes.  Exercises were reviewed and Maria Elena was able to demonstrate them prior to the end of the session.  Maria Elena demonstrated good  understanding of the education provided.     See EMR under Patient Instructions for exercises provided 1/23/2024.    Assessment     Patient is responding well to physical therapy. She was able to perform all of today's progressions with no increase in symptoms prior to leaving the clinic. She endorsed adequate challenge with increased resistance on the stationary bike. She also required occasional cues for technique with UE strengthening with resistance bands. Will progress as tolerated.  Maria Elena Is progressing well towards her goals.   Pt prognosis is Good.     Pt will continue to benefit from skilled outpatient physical therapy to address the deficits listed in the problem list box on initial evaluation, provide pt/family education and to maximize pt's level of independence in the home and community environment.     Pt's spiritual, cultural and educational needs considered and pt agreeable to plan of care and goals.     Anticipated barriers to physical therapy: none anticipated    Goals:   Short Term Goals:  4 weeks  1. Pt. to demonstrate increased strength of bilateral lower extremities to 4/5  to increase stability during community ambulation (progressing, not met)  2. Pt to demonstrate increased strength of bilateral upper extremities to 4/5 in order to perform functional  activities (progressing, not met)  3. Pt. will improve TUG test score to 8.1 seconds or less in order to perform safe transfers and for patient to be in low/moderate risk for falls category (progressing, not met)  4. Pt will improve 6 minute walk test score by 15 meters in order to ambulate safely in community (progressing, not met)  5. Pt will initiate home exercise program to improve strength, flexibility, endurance, mobility & balance to return pt to PLOF (progressing, not met)  6. Pt will tolerate 10 min or greater of time in light-->moderate intensity cardio (I.e. Bike, NuStep) to improve endurance. (progressing, not met)  7. Pt to demonstrate tandem stance 30 sec or greater w/out UE support in order to improve balance to progress to singe leg stance to decrease fall risk in performance of ADL's (progressing, not met)     Long Term Goals: 8 weeks  1. Pt will increase strength of bilateral upper extremities to 5/5  in order to perform functional activities independently  (progressing, not met)  2. Pt will increase strength of bilateral lower extremities to 5/5  to increase stability during ambulation on uneven surfaces,to increase tolerance for ADL and work activities. (progressing, not met)  3. Pt will be independent with HEP to improve ROM, strength, balance,  and independence with ADL's (progressing, not met)  4. Pt. will improve Single Leg Balance test score to 30 seconds each LE in order to ambulate safely in community and for patient to be in low risk for falls category (progressing, not met)  5. Pt. will improve 6 minute walk test score by 25 meters in order to transfer independently and for patient to be in low risk for falls category (progressing, not met)  6. Patient will report compliance with an aerobic endurance program 5x week for 20 - 30 min each day to improve overall cardiovascular function and decrease cancer related fatigue at discharge. (progressing, not met)  Plan     Outpatient Physical  Therapy 2 times weekly for 8 weeks to include the following interventions: Gait Training, Manual Therapy, Neuromuscular Re-ed, Patient Education, Self Care, Therapeutic Activities, Therapeutic Exercise, and IASTM . Dry needling with manual therapy techniques to decrease pain, inflammation and swelling, increase circulation and promote healing process       Rubi Rodriguez, PT

## 2024-02-15 ENCOUNTER — HOSPITAL ENCOUNTER (OUTPATIENT)
Dept: RADIOLOGY | Facility: HOSPITAL | Age: 69
Discharge: HOME | End: 2024-02-15
Attending: NURSE PRACTITIONER
Payer: MEDICARE

## 2024-02-15 PROCEDURE — 70553 MRI BRAIN STEM W/O & W/DYE: CPT | Mod: 26,HCNC,, | Performed by: STUDENT IN AN ORGANIZED HEALTH CARE EDUCATION/TRAINING PROGRAM

## 2024-02-15 PROCEDURE — 70553 MRI BRAIN STEM W/O & W/DYE: CPT | Mod: TC,HCNC

## 2024-02-15 PROCEDURE — 25500020 PHARM REV CODE 255: Mod: HCNC | Performed by: NURSE PRACTITIONER

## 2024-02-15 PROCEDURE — A9585 GADOBUTROL INJECTION: HCPCS | Mod: HCNC | Performed by: NURSE PRACTITIONER

## 2024-02-15 RX ORDER — GADOBUTROL 604.72 MG/ML
8 INJECTION INTRAVENOUS
Status: COMPLETED | OUTPATIENT
Start: 2024-02-15 | End: 2024-02-15

## 2024-02-15 RX ADMIN — GADOBUTROL 8 ML: 604.72 INJECTION INTRAVENOUS at 04:02

## 2024-02-16 ENCOUNTER — PATIENT MESSAGE (OUTPATIENT)
Dept: HEMATOLOGY/ONCOLOGY | Facility: CLINIC | Age: 69
End: 2024-02-16
Payer: MEDICARE

## 2024-02-20 ENCOUNTER — CLINICAL SUPPORT (OUTPATIENT)
Dept: REHABILITATION | Facility: HOSPITAL | Age: 69
End: 2024-02-20
Payer: MEDICARE

## 2024-02-20 DIAGNOSIS — R29.898 WEAKNESS OF BOTH UPPER EXTREMITIES: Primary | ICD-10-CM

## 2024-02-20 DIAGNOSIS — Z74.09 DECREASED FUNCTIONAL MOBILITY AND ENDURANCE: ICD-10-CM

## 2024-02-20 DIAGNOSIS — R26.89 BALANCE PROBLEM: ICD-10-CM

## 2024-02-20 DIAGNOSIS — R29.898 WEAKNESS OF BOTH LOWER EXTREMITIES: ICD-10-CM

## 2024-02-20 PROCEDURE — 97140 MANUAL THERAPY 1/> REGIONS: CPT | Mod: HCNC

## 2024-02-20 PROCEDURE — 97110 THERAPEUTIC EXERCISES: CPT | Mod: HCNC

## 2024-02-20 NOTE — PROGRESS NOTES
Physical Therapy Daily Treatment Note     Name: Maria Elena Tavares  Clinic Number: [id number]    Therapy Diagnosis:   Encounter Diagnoses   Name Primary?    Weakness of both upper extremities Yes    Weakness of both lower extremities     Balance problem     Decreased functional mobility and endurance        Physician: Criss Myers, *    Visit Date: 2/20/2024    Physician Orders: PT Eval and Treat   Medical Diagnosis from Referral: R53.81 (ICD-10-CM) - Physical deconditioning   Evaluation Date: 1/11/2024  Authorization Period Expiration: 12/31/2024  Plan of Care Expiration: 03/08/2024  Progress Note Due: 02/08/2024  Visit # / Visits authorized: 4/ 10 (plus eval)   FOTO: 1/3    Time In: 10:36 am  Time Out: 11:19 am  Total Billable Time: 43 minutes    Precautions: Standard and cancer    Subjective     Pt reports: she is feeling fatigued and having some pain in her knee. She is having trouble with her hands also, they feel swollen and the triggers in her thumbs have been acting up.   She was compliant with her home exercise program.  Response to previous treatment: some muscle soreness  Functional change: nothing yet    Pain: 6/10  Location: left leg, right hand    Objective     Objective Measures updated at progress report unless specified.     BP: 116/87 mmHg; HR: 98 bpm; SpO2: 98%     Lab Values 1/18/24  Hemoglobin: 12.8  Hematocrit: 42.1  Platelets: 278  ANC: 4.0    Intake Outcome Measure for FOTO Cancer Survey     Therapist reviewed FOTO scores for Maria Elena Tavares on 2/20/2024.   FOTO documents entered into [employer] - see Media section.     Intake Score: 66%  Treatment       Maria Elena received therapeutic exercises to develop strength, endurance, ROM, flexibility, posture and core stabilization for 23 minutes including:  Stationary bike, seat 3, level 1- 4,   6 minutes  UBE, seat 5, level 2,     2'/2'  Putty extension, table spread, orange putty 1 set x 10 reps  Finger ext, orange putty   1 set x 10 reps  Thumb ext, orange  putty   1 set x 10 reps  Finger Adduction, orange putty  1 set x 5 reps      Maria Elena received the following manual therapy techniques: Myofacial release and Soft tissue Mobilization were applied to the: B hands for 20 minutes minutes, including:  IASTM to thumbs and palmar surface of B hands    Home Exercises Provided and Patient Education Provided     Education provided:   - Role of physical therapy in multi-disciplinary team  - Goals for physical therapy, scheduling  - Home exercise program, exercise technique  - Energy conservation techniques  - Avoiding finger flexion exercises when her trigger thumbs are inflamed.   Written Home Exercises Provided: yes.  Exercises were reviewed and Maria Elena was able to demonstrate them prior to the end of the session.  Maria Elena demonstrated good  understanding of the education provided.     See EMR under Patient Instructions for exercises provided 1/23/2024.    Assessment     Patient is responding well to physical therapy. Patient was able to perform all of today's new exercises with no increase in symptoms prior to leaving the clinic. Today's visit focused more on STM and finger extension exercises due to patient reports of increased pain/discomfort in her hands and thumbs. She responded well to manual therapy techniques reporting less tightness and catching at the end of the session. Will progress as tolerated.  Maria Elena Is progressing well towards her goals.   Pt prognosis is Good.     Pt will continue to benefit from skilled outpatient physical therapy to address the deficits listed in the problem list box on initial evaluation, provide pt/family education and to maximize pt's level of independence in the home and community environment.     Pt's spiritual, cultural and educational needs considered and pt agreeable to plan of care and goals.     Anticipated barriers to physical therapy: none anticipated    Goals:   Short Term Goals:  4 weeks  1. Pt. to demonstrate increased  strength of bilateral lower extremities to 4/5  to increase stability during community ambulation (progressing, not met)  2. Pt to demonstrate increased strength of bilateral upper extremities to 4/5 in order to perform functional activities (progressing, not met)  3. Pt. will improve TUG test score to 8.1 seconds or less in order to perform safe transfers and for patient to be in low/moderate risk for falls category (progressing, not met)  4. Pt will improve 6 minute walk test score by 15 meters in order to ambulate safely in community (progressing, not met)  5. Pt will initiate home exercise program to improve strength, flexibility, endurance, mobility & balance to return pt to PLOF (progressing, not met)  6. Pt will tolerate 10 min or greater of time in light-->moderate intensity cardio (I.e. Bike, NuStep) to improve endurance. (progressing, not met)  7. Pt to demonstrate tandem stance 30 sec or greater w/out UE support in order to improve balance to progress to singe leg stance to decrease fall risk in performance of ADL's (progressing, not met)     Long Term Goals: 8 weeks  1. Pt will increase strength of bilateral upper extremities to 5/5  in order to perform functional activities independently  (progressing, not met)  2. Pt will increase strength of bilateral lower extremities to 5/5  to increase stability during ambulation on uneven surfaces,to increase tolerance for ADL and work activities. (progressing, not met)  3. Pt will be independent with HEP to improve ROM, strength, balance,  and independence with ADL's (progressing, not met)  4. Pt. will improve Single Leg Balance test score to 30 seconds each LE in order to ambulate safely in community and for patient to be in low risk for falls category (progressing, not met)  5. Pt. will improve 6 minute walk test score by 25 meters in order to transfer independently and for patient to be in low risk for falls category (progressing, not met)  6. Patient will  report compliance with an aerobic endurance program 5x week for 20 - 30 min each day to improve overall cardiovascular function and decrease cancer related fatigue at discharge. (progressing, not met)  Plan     Outpatient Physical Therapy 2 times weekly for 8 weeks to include the following interventions: Gait Training, Manual Therapy, Neuromuscular Re-ed, Patient Education, Self Care, Therapeutic Activities, Therapeutic Exercise, and IASTM . Dry needling with manual therapy techniques to decrease pain, inflammation and swelling, increase circulation and promote healing process       Rubi Rodriguez, PT

## 2024-02-20 NOTE — PATIENT INSTRUCTIONS
PUTTY EXTENSION TABLE SPREAD    Roll up some putty into a ball and then press and flatten it on a table. Next, spread the putty out with your finger tips as shown.        PUTTY ADDUCTION    Roll up small balls of putty. One at a time, place a ball of putty between 2 fingers and squeeze the putty.         PUTTY SINGLE FINGER EXTENSION LOOP    Create a small tubular sections of putty. Form a loop around 1 fingers and then pull it upwards as shown.        Thumb Extension 1 Theraputty    - Form putty into roll & cross over thumb    -Hold putty in palm    -Lift thumb up as shown

## 2024-02-21 ENCOUNTER — PATIENT MESSAGE (OUTPATIENT)
Dept: ORTHOPEDICS | Facility: CLINIC | Age: 69
End: 2024-02-21
Payer: MEDICARE

## 2024-02-22 ENCOUNTER — LAB VISIT (OUTPATIENT)
Dept: LAB | Facility: HOSPITAL | Age: 69
End: 2024-02-22
Attending: FAMILY MEDICINE
Payer: MEDICARE

## 2024-02-22 DIAGNOSIS — E11.21 CONTROLLED TYPE 2 DIABETES MELLITUS WITH DIABETIC NEPHROPATHY, WITHOUT LONG-TERM CURRENT USE OF INSULIN: ICD-10-CM

## 2024-02-22 DIAGNOSIS — I10 ESSENTIAL HYPERTENSION: ICD-10-CM

## 2024-02-22 LAB
ESTIMATED AVG GLUCOSE: 131 MG/DL (ref 68–131)
HBA1C MFR BLD: 6.2 % (ref 4–5.6)

## 2024-02-22 PROCEDURE — 36415 COLL VENOUS BLD VENIPUNCTURE: CPT | Mod: HCNC,PN | Performed by: FAMILY MEDICINE

## 2024-02-22 PROCEDURE — 83036 HEMOGLOBIN GLYCOSYLATED A1C: CPT | Mod: HCNC | Performed by: FAMILY MEDICINE

## 2024-02-22 RX ORDER — AMLODIPINE BESYLATE 5 MG/1
5 TABLET ORAL DAILY
Qty: 90 TABLET | Refills: 3 | Status: SHIPPED | OUTPATIENT
Start: 2024-02-22 | End: 2025-02-21

## 2024-02-22 NOTE — TELEPHONE ENCOUNTER
Refill Routing Note   Medication(s) are not appropriate for processing by [facility] for the following reason(s):        New or recently adjusted medication  Required vitals abnormal    [facility] action(s):  Defer   Requires labs : Yes             Appointments  past 12m or future 3m with PCP    Date Provider   Last Visit   11/29/2023 Estephania San MD   Next Visit   2/27/2024 Estephania San MD   ED visits in past 90 days: 0        Note composed:10:36 AM 02/22/2024

## 2024-02-22 NOTE — TELEPHONE ENCOUNTER
Care Due:                  Date            Visit Type   Department     Provider  --------------------------------------------------------------------------------                                ESTABLISHED                              PATIENT -    [facility] PRIMARY  Last Visit: 11-      VIRTUAL      CARE           Estephania San                              EP -                              PRIMARY      [facility] PRIMARY  Next Visit: 02-      CARE ([facility])   CARE           Estephania San                                                            Last  Test          Frequency    Reason                     Performed    Due Date  --------------------------------------------------------------------------------    HBA1C.......  6 months...  tirzepatide..............  08- 02-    [employer] Embedded Care Due Messages. Reference number: [id number].   2/22/2024 10:24:07 AM CST

## 2024-02-23 ENCOUNTER — TELEPHONE (OUTPATIENT)
Dept: ORTHOPEDICS | Facility: CLINIC | Age: 69
End: 2024-02-23
Payer: MEDICARE

## 2024-02-23 ENCOUNTER — PATIENT MESSAGE (OUTPATIENT)
Dept: ORTHOPEDICS | Facility: CLINIC | Age: 69
End: 2024-02-23
Payer: MEDICARE

## 2024-02-23 RX ORDER — DICLOFENAC SODIUM 75 MG/1
75 TABLET, DELAYED RELEASE ORAL 2 TIMES DAILY
Qty: 28 TABLET | Refills: 0 | Status: SHIPPED | OUTPATIENT
Start: 2024-02-23 | End: 2024-03-08

## 2024-02-26 ENCOUNTER — PATIENT MESSAGE (OUTPATIENT)
Dept: ORTHOPEDICS | Facility: CLINIC | Age: 69
End: 2024-02-26
Payer: MEDICARE

## 2024-02-27 ENCOUNTER — PATIENT MESSAGE (OUTPATIENT)
Dept: BEHAVIORAL HEALTH | Facility: CLINIC | Age: 69
End: 2024-02-27
Payer: MEDICARE

## 2024-02-27 ENCOUNTER — OFFICE VISIT (OUTPATIENT)
Dept: PRIMARY CARE CLINIC | Facility: CLINIC | Age: 69
End: 2024-02-27
Attending: FAMILY MEDICINE
Payer: MEDICARE

## 2024-02-27 DIAGNOSIS — I70.0 AORTIC ATHEROSCLEROSIS: ICD-10-CM

## 2024-02-27 DIAGNOSIS — R45.89 ANXIETY ABOUT HEALTH: Primary | ICD-10-CM

## 2024-02-27 DIAGNOSIS — E11.21 CONTROLLED TYPE 2 DIABETES MELLITUS WITH DIABETIC NEPHROPATHY, WITHOUT LONG-TERM CURRENT USE OF INSULIN: ICD-10-CM

## 2024-02-27 DIAGNOSIS — F33.1 MDD (MAJOR DEPRESSIVE DISORDER), RECURRENT EPISODE, MODERATE: ICD-10-CM

## 2024-02-27 DIAGNOSIS — E78.01 FAMILIAL HYPERCHOLESTEROLEMIA: ICD-10-CM

## 2024-02-27 DIAGNOSIS — I10 ESSENTIAL HYPERTENSION: ICD-10-CM

## 2024-02-27 PROCEDURE — 3075F SYST BP GE 130 - 139MM HG: CPT | Mod: HCNC,CPTII,S$GLB, | Performed by: FAMILY MEDICINE

## 2024-02-27 PROCEDURE — 1100F PTFALLS ASSESS-DOCD GE2>/YR: CPT | Mod: HCNC,CPTII,S$GLB, | Performed by: FAMILY MEDICINE

## 2024-02-27 PROCEDURE — 3078F DIAST BP <80 MM HG: CPT | Mod: HCNC,CPTII,S$GLB, | Performed by: FAMILY MEDICINE

## 2024-02-27 PROCEDURE — 99215 OFFICE O/P EST HI 40 MIN: CPT | Mod: HCNC,S$GLB,, | Performed by: FAMILY MEDICINE

## 2024-02-27 PROCEDURE — 3008F BODY MASS INDEX DOCD: CPT | Mod: HCNC,CPTII,S$GLB, | Performed by: FAMILY MEDICINE

## 2024-02-27 PROCEDURE — 3288F FALL RISK ASSESSMENT DOCD: CPT | Mod: HCNC,CPTII,S$GLB, | Performed by: FAMILY MEDICINE

## 2024-02-27 PROCEDURE — 3044F HG A1C LEVEL LT 7.0%: CPT | Mod: HCNC,CPTII,S$GLB, | Performed by: FAMILY MEDICINE

## 2024-02-27 PROCEDURE — 4010F ACE/ARB THERAPY RXD/TAKEN: CPT | Mod: HCNC,CPTII,S$GLB, | Performed by: FAMILY MEDICINE

## 2024-02-27 PROCEDURE — 1126F AMNT PAIN NOTED NONE PRSNT: CPT | Mod: HCNC,CPTII,S$GLB, | Performed by: FAMILY MEDICINE

## 2024-02-27 PROCEDURE — 99999 PR PBB SHADOW E&M-EST. PATIENT-LVL IV: CPT | Mod: PBBFAC,HCNC,, | Performed by: FAMILY MEDICINE

## 2024-02-28 ENCOUNTER — TELEPHONE (OUTPATIENT)
Dept: BEHAVIORAL HEALTH | Facility: CLINIC | Age: 69
End: 2024-02-28
Payer: MEDICARE

## 2024-02-28 NOTE — PROGRESS NOTES
Behavioral Health Community Health Worker  Initial Assessment  Completed by:  Niya Hayes    Date:  2/28/2024    Patient Enrollment in Behavioral Health Program:  Patient verbalized understanding of Behavioral Health Integration services to include:  Patient understands that CHW, LCSW, PharmD and consulting Psychiatrist are members of the care team working collaboratively with his/her primary care provider: Yes  Patient understands that [facility] of [facility] portal account is required for accessing the full scope of team services: Yes  Patient understands that some counseling sessions may occur via video: Yes  Clinic visits with the psychiatrist may be subject to a co-pay based on your insurance: Yes  Patient consents to enroll in BHI program: Yes    Assessments     Single Item Health Literacy Scale:  How often do you need to have someone help you read instructions, pamphlets or other written material from your doctor or pharmacy?: Never    Promis 10:  Promis 10 Responses  In general, would you say your health is: Good  In general, would you say your quality of life is: Fair  In general, how would you rate your physical health?: Good  In general, how would you rate your mental health, including your mood and your ability to think?: Good  In general, how would you rate your satisfaction with your social activities and relationships?: Fair  In general, please rate how well you carry out your usual social activities and roles. (This includes activities at home, at work and in your community, and responsibilities as a parent, child, spouse, employee, friend, etc.): Fair  To what extent are you able to carry out your everyday physical activities such as walking, climbing stairs, carrying groceries, or moving a chair? : Completely  How often have you been bothered by emotional problems such as feeling anxious, depressed or irritable?: Sometimes  In the past 7 days, how would you rate your fatigue on  average?: Mild  In the past 7 days, on a scale of 0 to 10 (where 0 is no pain and 10 is the worst pain imaginable) how would you rate your pain on average?: 7  Global Physical Health: 17  Global Mental health Score: 10    Depression PHQ:      2/28/2024     8:36 AM 2/27/2024    11:11 AM 12/14/2023    10:39 AM 10/27/2023     9:58 AM 6/6/2023    12:11 PM 4/12/2023    11:11 AM 3/28/2023    11:34 AM   PHQ-9 Depression Patient Health Questionnaire   Over the last two weeks how often have you been bothered by little interest or pleasure in doing things 3 0 0 0 0 0 0   Over the last two weeks how often have you been bothered by feeling down, depressed or hopeless 3 0 0 0 0 0 0   Over the last two weeks how often have you been bothered by trouble falling or staying asleep, or sleeping too much 3         Over the last two weeks how often have you been bothered by feeling tired or having little energy 3         Over the last two weeks how often have you been bothered by a poor appetite or overeating 0         Over the last two weeks how often have you been bothered by feeling bad about yourself - or that you are a failure or have let yourself or your family down 3         Over the last two weeks how often have you been bothered by trouble concentrating on things, such as reading the newspaper or watching television 0         Over the last two weeks how often have you been bothered by moving or speaking so slowly that other people could have noticed. 3         Over the last two weeks how often have you been bothered by thoughts that you would be better off dead, or of hurting yourself 0         If you checked off any problems, how difficult have these problems made it for you to do your work, take care of things at home or get along with other people? Somewhat difficult         PHQ-9 Score 18                Generalized Anxiety Disorder 7-Item Scale:      2/28/2024     8:39 AM   GAD7   1. Feeling nervous, anxious, or on edge? 1    2. Not being able to stop or control worrying? 3   3. Worrying too much about different things? 3   4. Trouble relaxing? 2   5. Being so restless that it is hard to sit still? 0   6. Becoming easily annoyed or irritable? 0   7. Feeling afraid as if something awful might happen? 0   8. If you checked off any problems, how difficult have these problems made it for you to do your work, take care of things at home, or get along with other people? 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  She walks occasionally.   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 daughter1 granddaughter     Social Determinants of Health     Financial Resource Strain: Medium Risk (1/4/2024)    Overall Financial Resource Strain (CARDIA)     Difficulty of Paying Living Expenses: Somewhat hard   Food Insecurity: No Food Insecurity (1/4/2024)    Hunger Vital Sign     Worried About Running Out of Food in the Last Year: Never true     Ran Out of Food in the Last Year: Never true   Transportation Needs: No Transportation Needs (1/4/2024)    PRAPARE - Transportation     Lack of Transportation (Medical): No     Lack of Transportation (Non-Medical): No   Physical Activity: Insufficiently Active (1/4/2024)    Exercise Vital Sign     Days of Exercise per Week: 2 days     Minutes of Exercise per Session: 20 min   Stress: No Stress Concern Present (1/4/2024)    [place] [employer] - Occupational Stress Questionnaire     Feeling of  "Stress : Only a little   Social Connections: Unknown (1/4/2024)    Social Connection and Isolation Panel [NHANES]     Frequency of Communication with Friends and Family: Three times a week     Frequency of Social Gatherings with Friends and Family: Once a week     Active Member of Clubs or Organizations: No     Attends Club or Organization Meetings: Never     Marital Status: Never    Housing Stability: Low Risk  (1/4/2024)    Housing Stability Vital Sign     Unable to Pay for Housing in the Last Year: No     Number of Places Lived in the Last Year: 1     Unstable Housing in the Last Year: No       Call Summary   Patient was referred to the BHI (Non-opioid) program by Primary Care Provider, Dr. San CHW contacted Maria Elena Tavares who reports anxiety that limits [his/her] activities of daily living (ADLs).   Patient scored "18" on the PHQ9 and "9" on the REJI 7. Based on these scores patient is eligible for the Behavioral health Integration (Non-opioid) Program. CHW completed the intake and scheduled an appointment for patient with Dr Concetta Tam, LPC,PhD on 3/8/24.          "

## 2024-03-05 ENCOUNTER — PATIENT MESSAGE (OUTPATIENT)
Dept: PRIMARY CARE CLINIC | Facility: CLINIC | Age: 69
End: 2024-03-05
Payer: MEDICARE

## 2024-03-05 DIAGNOSIS — E11.21 CONTROLLED TYPE 2 DIABETES MELLITUS WITH DIABETIC NEPHROPATHY, WITHOUT LONG-TERM CURRENT USE OF INSULIN: Primary | ICD-10-CM

## 2024-03-08 ENCOUNTER — OFFICE VISIT (OUTPATIENT)
Dept: BEHAVIORAL HEALTH | Facility: CLINIC | Age: 69
End: 2024-03-08
Payer: MEDICARE

## 2024-03-08 DIAGNOSIS — F41.1 GAD (GENERALIZED ANXIETY DISORDER): ICD-10-CM

## 2024-03-08 DIAGNOSIS — F33.1 MDD (MAJOR DEPRESSIVE DISORDER), RECURRENT EPISODE, MODERATE: Primary | ICD-10-CM

## 2024-03-08 PROCEDURE — 90791 PSYCH DIAGNOSTIC EVALUATION: CPT | Mod: HCNC,S$GLB,, | Performed by: COUNSELOR

## 2024-03-08 PROCEDURE — 4010F ACE/ARB THERAPY RXD/TAKEN: CPT | Mod: HCNC,CPTII,S$GLB, | Performed by: COUNSELOR

## 2024-03-08 PROCEDURE — 3044F HG A1C LEVEL LT 7.0%: CPT | Mod: HCNC,CPTII,S$GLB, | Performed by: COUNSELOR

## 2024-03-08 NOTE — PROGRESS NOTES
Primary Care Behavioral Health Integration: Initial  Date:  3/8/2024  Referral Source:  Estephania San MD  Type of Visit:  In person  Length of Appointment: 45  Chief Complaint/Reason for Encounter:  Depression    History of Present Illness: Maria Elena Tavraes, a 68 y.o. female referred by Estephania San MD.  Patient was seen, examined and chart was reviewed. Met with patient.     Current Session: Patient reported being depressed over family situations. Has one daughter who is  with 2 girls. Pt doesn't always agree with daughter's choices and often has disagreements with son-in-law. Daughter sides with her . Pt's sisters are not as close to her as they used to be. Says she gets blamed a lot. 2 sisters are ministers and tell her what to do all the time. Pt is a cancer survivor and often feels bad about that. Wants to find a man bc she is lonely. Also wants to get a part-time job.    Past Medical History:   Diagnosis Date    BMI 37.0-37.9, adult     Breast cancer 09/05/2019     Left breast, IDC with lymph node metastisis    Colon polyps 2015    Colon polyps 2015    Diabetes mellitus type II     Diverticulosis     history of diverticulosisseen on colonoscopy at age 48. Repeat recommended at age 58. Done by     Elevated blood protein     History of elevated protein. Apparently has seen  for extensive workup including bone marrow biopsy    History of shingles 2006    Hyperlipidemia     Hypertension     Microalbuminuria     due 2 diabetes    Mild vitamin D deficiency     . A low vitamin D    Primary osteoarthritis of left knee 8/31/2023    Thyroid disease     hypothyroidism    Usual hyperplasia of lactiferous duct     Not sure         Current Outpatient Medications:     amLODIPine (NORVASC) 5 MG tablet, Take 1 tablet (5 mg total) by mouth once daily., Disp: 90 tablet, Rfl: 3    aspirin (ECOTRIN) 81 MG EC tablet, Take 81 mg by mouth once daily., Disp: , Rfl:     atorvastatin (LIPITOR) 10 MG  tablet, Take 1 tablet (10 mg total) by mouth once daily., Disp: 90 tablet, Rfl: 3    blood-glucose meter kit, DISPENSE : BLOOD TEST STRIPS AND LANCETS PATIENT TEST BLOOD SUGARS TWICE DAILY TEST STRIPS: 50 EACH, REFILL 5 LANCETS:  50 EACH , REFILL 5, Disp: 1 each, Rfl: 0    ciclopirox 0.77 % Gel, Apply topically 2 (two) times daily. To thickened discolored toenails., Disp: 30 g, Rfl: 6    diclofenac (VOLTAREN) 75 MG EC tablet, Take 1 tablet (75 mg total) by mouth 2 (two) times daily. for 14 days, Disp: 28 tablet, Rfl: 0    irbesartan (AVAPRO) 300 MG tablet, Take 1 tablet by mouth once daily, Disp: 90 tablet, Rfl: 2    letrozole (FEMARA) 2.5 mg Tab, Take 1 tablet (2.5 mg total) by mouth once daily. (Patient not taking: Reported on 2/27/2024.), Disp: 90 tablet, Rfl: 3    levothyroxine (SYNTHROID) 200 MCG tablet, Take 1 tablet (200 mcg total) by mouth once daily., Disp: 90 tablet, Rfl: 1    lifitegrast (XIIDRA) 5 % Dpet, Apply 1 drop to  both eyes  2 (two) times daily., Disp: 180 each, Rfl: 4    polyethylene glycol (GLYCOLAX) 17 gram/dose powder, Mix 17 g (1 capful) with juice or water an drink 2 (two) times daily., Disp: 1020 g, Rfl: 2    prasterone, dhea, (INTRAROSA) 6.5 mg Inst, Place 1 suppository vaginally nightly., Disp: 28 each, Rfl: 11    tirzepatide 10 mg/0.5 mL PnIj, Inject 10 mg into the skin every 7 days., Disp: 12 pen , Rfl: 3    Current symptoms:  Depression Symptoms: insomnia and worthlessness/guilt.  Anxiety Symptoms: excessive worrying and restlessness.  Sleep Difficulties: difficulty falling asleep.  Manic Symptoms:  denies.  Psychosis: denies .    Risk assessment:  Patient reports no suicidal ideation  Patient reports no homicidal ideation  Patient reports no self-injurious behavior  Patient reports no violent behavior    Patient advised to call [phone] or present the the nearest ED if they experience suicidal or homicidal ideation, plan or intent.      Psychiatric History:  Diagnosis:    Current  Psychiatric Medication: No    Medication Trial History:  Medication Trials: No    Outpatient Treatment: No   Inpatient Treatment: No   Suicide Attempts: No   Access to Firearms: No   History of Trauma: Yes      Current and Past Substance Use:  Alcohol: Occasionally   Drugs: Denied.   Nicotine: denied   Caffeine:  Coffee     Mental Status Exam  General Appearance:  appears stated age, neatly dressed, well groomed   Speech: normal tone, normal rate, normal pitch, normal volume      Level of Cooperation: cooperative      Thought Processes: linear, logical, goal-directed   Mood: anxious, depressed      Thought Content: {relevant and appropriate   Affect: congruent and appropriate   Orientation: Oriented x3   Memory/Attention/Concentration: No gross cognitive deficits made evident during conversation   Judgment & Insight: poor   Language  intact          No data to display                    2/28/2024     8:39 AM   GAD7   1. Feeling nervous, anxious, or on edge? 1   2. Not being able to stop or control worrying? 3   3. Worrying too much about different things? 3   4. Trouble relaxing? 2   5. Being so restless that it is hard to sit still? 0   6. Becoming easily annoyed or irritable? 0   7. Feeling afraid as if something awful might happen? 0   8. If you checked off any problems, how difficult have these problems made it for you to do your work, take care of things at home, or get along with other people? 1   REJI-7 Score 9       Impression: Initial appointment focused on gathering history, identifying treatment goals and developing a treatment plan.      Diagnosis:  1. MDD (major depressive disorder), recurrent episode, moderate        2. REJI (generalized anxiety disorder)  Ambulatory referral/consult to Primary Care Behavioral Health (Non-Opioids)          Treatment Goals and Plan:   Anxiety: reducing negative automatic thoughts  Depression: increasing motivation and reducing fatigue    Future treatment will utilize CBT  and Solution-focused Therapy.      Return to Clinic: No follow-ups on file.

## 2024-03-19 ENCOUNTER — OFFICE VISIT (OUTPATIENT)
Dept: ORTHOPEDICS | Facility: CLINIC | Age: 69
End: 2024-03-19
Payer: MEDICARE

## 2024-03-19 ENCOUNTER — HOSPITAL ENCOUNTER (OUTPATIENT)
Dept: RADIOLOGY | Facility: HOSPITAL | Age: 69
Discharge: HOME | End: 2024-03-19
Attending: ORTHOPAEDIC SURGERY
Payer: MEDICARE

## 2024-03-19 ENCOUNTER — CLINICAL SUPPORT (OUTPATIENT)
Dept: LAB | Facility: HOSPITAL | Age: 69
End: 2024-03-19
Attending: ORTHOPAEDIC SURGERY
Payer: MEDICARE

## 2024-03-19 VITALS — WEIGHT: 177 LBS | BODY MASS INDEX: 34.75 KG/M2 | HEIGHT: 60 IN

## 2024-03-19 DIAGNOSIS — I10 ESSENTIAL HYPERTENSION: ICD-10-CM

## 2024-03-19 DIAGNOSIS — C50.912 BREAST CANCER METASTASIZED TO AXILLARY LYMPH NODE, LEFT: ICD-10-CM

## 2024-03-19 DIAGNOSIS — C77.3 BREAST CANCER METASTASIZED TO AXILLARY LYMPH NODE, LEFT: ICD-10-CM

## 2024-03-19 DIAGNOSIS — E11.21 CONTROLLED TYPE 2 DIABETES MELLITUS WITH DIABETIC NEPHROPATHY, WITHOUT LONG-TERM CURRENT USE OF INSULIN: ICD-10-CM

## 2024-03-19 DIAGNOSIS — F41.1 GAD (GENERALIZED ANXIETY DISORDER): ICD-10-CM

## 2024-03-19 DIAGNOSIS — M62.81 QUADRICEPS WEAKNESS: ICD-10-CM

## 2024-03-19 DIAGNOSIS — M17.12 PRIMARY OSTEOARTHRITIS OF LEFT KNEE: ICD-10-CM

## 2024-03-19 DIAGNOSIS — M25.562 CHRONIC PAIN OF LEFT KNEE: ICD-10-CM

## 2024-03-19 DIAGNOSIS — E03.9 HYPOTHYROIDISM, UNSPECIFIED TYPE: ICD-10-CM

## 2024-03-19 DIAGNOSIS — F33.1 MDD (MAJOR DEPRESSIVE DISORDER), RECURRENT EPISODE, MODERATE: ICD-10-CM

## 2024-03-19 DIAGNOSIS — E66.9 OBESITY (BMI 30-39.9): ICD-10-CM

## 2024-03-19 DIAGNOSIS — G89.29 CHRONIC PAIN OF LEFT KNEE: ICD-10-CM

## 2024-03-19 DIAGNOSIS — M17.12 PRIMARY OSTEOARTHRITIS OF LEFT KNEE: Primary | ICD-10-CM

## 2024-03-19 PROCEDURE — 3288F FALL RISK ASSESSMENT DOCD: CPT | Mod: HCNC,CPTII,S$GLB, | Performed by: ORTHOPAEDIC SURGERY

## 2024-03-19 PROCEDURE — 77073 BONE LENGTH STUDIES: CPT | Mod: TC,HCNC,FY

## 2024-03-19 PROCEDURE — 1101F PT FALLS ASSESS-DOCD LE1/YR: CPT | Mod: HCNC,CPTII,S$GLB, | Performed by: ORTHOPAEDIC SURGERY

## 2024-03-19 PROCEDURE — 1159F MED LIST DOCD IN RCRD: CPT | Mod: HCNC,CPTII,S$GLB, | Performed by: ORTHOPAEDIC SURGERY

## 2024-03-19 PROCEDURE — 93010 ELECTROCARDIOGRAM REPORT: CPT | Mod: HCNC,,, | Performed by: INTERNAL MEDICINE

## 2024-03-19 PROCEDURE — 71045 X-RAY EXAM CHEST 1 VIEW: CPT | Mod: TC,HCNC,FY

## 2024-03-19 PROCEDURE — 3008F BODY MASS INDEX DOCD: CPT | Mod: HCNC,CPTII,S$GLB, | Performed by: ORTHOPAEDIC SURGERY

## 2024-03-19 PROCEDURE — 99999 PR PBB SHADOW E&M-EST. PATIENT-LVL IV: CPT | Mod: PBBFAC,HCNC,, | Performed by: ORTHOPAEDIC SURGERY

## 2024-03-19 PROCEDURE — 1125F AMNT PAIN NOTED PAIN PRSNT: CPT | Mod: HCNC,CPTII,S$GLB, | Performed by: ORTHOPAEDIC SURGERY

## 2024-03-19 PROCEDURE — 77073 BONE LENGTH STUDIES: CPT | Mod: 26,HCNC,, | Performed by: RADIOLOGY

## 2024-03-19 PROCEDURE — 93005 ELECTROCARDIOGRAM TRACING: CPT | Mod: HCNC

## 2024-03-19 PROCEDURE — 99215 OFFICE O/P EST HI 40 MIN: CPT | Mod: HCNC,S$GLB,, | Performed by: ORTHOPAEDIC SURGERY

## 2024-03-19 PROCEDURE — G2211 COMPLEX E/M VISIT ADD ON: HCPCS | Mod: HCNC,S$GLB,, | Performed by: ORTHOPAEDIC SURGERY

## 2024-03-19 PROCEDURE — 71045 X-RAY EXAM CHEST 1 VIEW: CPT | Mod: 26,HCNC,, | Performed by: STUDENT IN AN ORGANIZED HEALTH CARE EDUCATION/TRAINING PROGRAM

## 2024-03-19 PROCEDURE — 4010F ACE/ARB THERAPY RXD/TAKEN: CPT | Mod: HCNC,CPTII,S$GLB, | Performed by: ORTHOPAEDIC SURGERY

## 2024-03-19 PROCEDURE — 3044F HG A1C LEVEL LT 7.0%: CPT | Mod: HCNC,CPTII,S$GLB, | Performed by: ORTHOPAEDIC SURGERY

## 2024-03-19 NOTE — PROGRESS NOTES
Subjective:      Patient ID: Maria Elena Tavares is a 68 y.o. female.    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  She walks occasionally.   Substance and Sexual Activity    Alcohol use: Not Currently     Comment: Occasionally    Drug use: No    Sexual activity: Not Currently     Partners: Male      Review of Systems   Constitutional: Negative for diaphoresis.   HENT:  Negative for ear discharge, nosebleeds and stridor.    Eyes:  Negative for photophobia.   Cardiovascular:  Negative for syncope.   Respiratory:  Negative for hemoptysis, shortness of breath and wheezing.    Neurological:  Negative for tremors.   Psychiatric/Behavioral: Negative.           Objective:    General    Constitutional: She is oriented to person, place, and time. She appears well-developed and well-nourished.   HENT:   Head: Normocephalic and atraumatic.   Right Ear: External ear normal.   Left Ear: External ear normal.   Eyes: EOM are normal.   Pulmonary/Chest: Effort normal.   Neurological: She is alert and oriented to person, place, and time.   Psychiatric: She has a normal mood and affect. Her behavior is normal. Judgment and thought content normal.     General Musculoskeletal Exam   Gait: antalgic and abnormal         Left Knee Exam     Inspection   Swelling: present  Effusion: present    Tenderness   The patient tender to palpation of the medial joint line.    Crepitus   The patient has crepitus of the patella.    Other   Sensation: normal    Muscle Strength   Left Lower Extremity   Quadriceps:  4/5   Hamstrin/5          Assessment:       1. Primary osteoarthritis of left  knee    2. Chronic pain of left knee    3. Quadriceps weakness    4. MDD (major depressive disorder), recurrent episode, moderate    5. REJI (generalized anxiety disorder)    6. Essential hypertension    7. Breast cancer metastasized to axillary lymph node, left    8. Obesity (BMI 30-39.9)    9. Hypothyroidism, unspecified type    10. Controlled type 2 diabetes mellitus with diabetic nephropathy, without long-term current use of insulin          Plan:       The patient has failed non operative management, has painful crepitus, and has swelling. The natural history of severe degenerative arthritis was discussed at length and nonoperative and operative treatment was reviewed. Due to the pain and associated disability, I recommend left total knee replacement for KL 4.  The risks, benefits, convalescence, and alternatives were reviewed.  Numerous questions were asked and answered.  Models were used as an education tool.  Surgery will be scheduled at a convenient time.  The discussion with the patient included a description of a total knee replacement.  A total knee replacement (TKR) means a resurfacing of all three surfaces-the patella, femur, and tibia, with metal and plastic parts. The prosthetic parts are usually cemented into position and will allow a patient a full range of motion from. The postoperative motion, however, is determined by multiple factors, the most important of which is preoperative motion. In general, the better the motion preoperatively, the better the motion postoperatively.  In patients with good bone stock and bone quality (ie males, younger patients) I will generally use an uncemented tibial component and leave the patella unresurfaced, in an effort to preserve bone stock for any future revision surgeries. In those patients we discuss the risk of anterior knee pain in a small subset of patients (5-10%) with an unresurfaced patella. The operation, pending medical clearance, generally requires a  hospitalization of 0-3 days for one knee (possibly longer for a bilateral replacement). In general, we prefer to perform the procedure under epidural/spinal anesthesia.  Patient blood donation will be based on the individual patient but is not common and based on preoperative hemoglobin/hematocrit levels.The operation will be done preferably in a minimally invasive fashion which will facilitate recovery.  While performing the procedure in a minimally invasive manner is our preference, some patients are not candidates.  In that situation, a non-minimally invasive technique will be performed.  This will not adversely affect the long term success of the TKR.  Postoperatively, the patient is  walking the day of surgery and may be discahrged the day of surgery if stable with a walker or cane.  The first couple of days can be painful and the pain medication will alleviate but not eliminate the pain.  Thus, the patient must really push hard to get the range of motion.   The cane is to be dispensed with once the patient is secure enough.  In general, there is no cast or brace required with a routine knee replacement. In the long term, we do not encourage our patients to run for the sake of running; although, pending their preoperative status, we often allow patients to play doubles tennis or comparable activities.  We also allow them to do gentle intermediate downhill skiing if they are truly an expert skier.  Biking is encouraged as well as swimming.  The follow-up periods are usually at 2 weeks, 3 months, six months, and yearly intervals.  Potential complications with total knee replacements include infection (less than 2%), which is much higher in patients with obesity, diabetes, or other conditions which adversely affect the immune system.  (Patients with a previous history of osteomyelitis can have infection rates as high as 15%).  By nerve damage we mean a peroneal nerve palsy with a foot drop (a flapping foot with  ambulation).  This is particularly more apt to occur with a bad valgus (knock knee) deformity.  The incidence can be quite high in this particular patient population.  There are always areas of skin numbness, but this is not an untoward effect, nor do we consider it a complication.  Other potential complications include dislocation of the patellar component (less than 2%).  The loosening of either the tibial or femoral component is much more infrequent.  It usually occurs with infection or long-term use in a patient population that is at extreme risk (e.g., markedly overweight and not conscientious about their exercises).  Major blood vessel damage is also extremely rare.  Theoretically, because of the anatomic proximity of the popliteal artery, it could be lacerated with subsequent repairs required.  Albeit unlikely, a disruption of the popliteal artery could theoretically result in an amputation.  Similarly, infection could theoretically result in an amputation if one were to grow out an organism that cannot be controlled with antibiotics.  General medical complications include phlebitis for which we prophylactically anticoagulate, but it could still occur and fatal pulmonary embolus has been reported.  Cardiovascular problems, such as a heart attack or ischemia, are always a concern with such hemodynamic changes in the blood vascular system.  Our goal is to improve at least 80% of the pain and hopefully 100% but some aspects of the patients anatomy or other factors may not provide complete pain relief. Other general complications are very rare but in medicine anything could theoretically happen. We also discussed performing a pre-operative peripheral sensory block of the bracnhes of the AFCN and ISN of the same knee using iovera to help with pain control post surgery. This is a relatively new technology with early data demonstrating significant pain improvement and functional recovery. However the science  defining all the associated risks is still developing. From what we know thus far, a small number of patients can have nerve pain along the areas of the treated nerves. I think the patient understands the risk benefit ratio of total knee replacement and the iovera treatment and would like to pursue the total knee replacement and iovera treatment. we will begin the pre-operative process.

## 2024-03-20 LAB
OHS QRS DURATION: 72 MS
OHS QTC CALCULATION: 453 MS

## 2024-03-21 ENCOUNTER — PATIENT MESSAGE (OUTPATIENT)
Dept: BEHAVIORAL HEALTH | Facility: CLINIC | Age: 69
End: 2024-03-21
Payer: MEDICARE

## 2024-03-22 ENCOUNTER — PATIENT MESSAGE (OUTPATIENT)
Dept: ORTHOPEDICS | Facility: CLINIC | Age: 69
End: 2024-03-22
Payer: MEDICARE

## 2024-03-22 ENCOUNTER — OFFICE VISIT (OUTPATIENT)
Dept: BEHAVIORAL HEALTH | Facility: CLINIC | Age: 69
End: 2024-03-22
Payer: MEDICARE

## 2024-03-22 ENCOUNTER — TELEPHONE (OUTPATIENT)
Dept: BEHAVIORAL HEALTH | Facility: CLINIC | Age: 69
End: 2024-03-22
Payer: MEDICARE

## 2024-03-22 ENCOUNTER — PATIENT MESSAGE (OUTPATIENT)
Dept: REHABILITATION | Facility: HOSPITAL | Age: 69
End: 2024-03-22
Payer: MEDICARE

## 2024-03-22 DIAGNOSIS — F41.1 GAD (GENERALIZED ANXIETY DISORDER): Primary | ICD-10-CM

## 2024-03-22 DIAGNOSIS — F33.1 MDD (MAJOR DEPRESSIVE DISORDER), RECURRENT EPISODE, MODERATE: ICD-10-CM

## 2024-03-22 PROCEDURE — 90834 PSYTX W PT 45 MINUTES: CPT | Mod: HCNC,S$GLB,, | Performed by: COUNSELOR

## 2024-03-22 PROCEDURE — 4010F ACE/ARB THERAPY RXD/TAKEN: CPT | Mod: HCNC,CPTII,S$GLB, | Performed by: COUNSELOR

## 2024-03-22 PROCEDURE — 3044F HG A1C LEVEL LT 7.0%: CPT | Mod: HCNC,CPTII,S$GLB, | Performed by: COUNSELOR

## 2024-03-22 RX ORDER — DICLOFENAC SODIUM 75 MG/1
75 TABLET, DELAYED RELEASE ORAL 2 TIMES DAILY
Qty: 28 TABLET | Refills: 0 | Status: SHIPPED | OUTPATIENT
Start: 2024-03-22 | End: 2024-04-08

## 2024-03-22 NOTE — PROGRESS NOTES
Primary Care Behavioral Health Integration: Initial  Date:  3/8/2024  Referral Source:  Estephania San MD  Type of Visit:  In person  Length of Appointment: 45  Chief Complaint/Reason for Encounter:  Depression    History of Present Illness: Maria Elena Tavares, a 68 y.o. female referred by Estephania San MD.  Patient was seen, examined and chart was reviewed. Met with patient.     Current Session: Patient brought goals for 2024. We worked together to make them realistic. Discussed money, budgeting, health, and spending. Pt will have surgery soon for a knee replacement. Has learned to take care of herself emotionally. Also developing healthy coping skills and exercises to work out her feelings. Said plants touch and heal her soul. 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    Current symptoms:  Depression Symptoms: insomnia and worthlessness/guilt.  Anxiety Symptoms: excessive worrying and restlessness.  Sleep Difficulties: difficulty falling asleep.  Manic Symptoms:  denies.  Psychosis: denies .    Risk assessment:  Patient reports no suicidal ideation  Patient reports no homicidal ideation  Patient reports no self-injurious behavior  Patient reports no violent behavior    Patient advised to call [phone] or present the the nearest ED if they experience suicidal or homicidal ideation, plan or intent.      Psychiatric History:  Diagnosis:    Current Psychiatric Medication: No    Medication Trial History:  Medication Trials: No    Outpatient Treatment: No   Inpatient Treatment: No   Suicide Attempts: No   Access to Firearms: No   History of Trauma: Yes      Current and Past Substance Use:  Alcohol: Occasionally   Drugs: Denied.   Nicotine: denied   Caffeine:  Coffee     Mental Status Exam  General Appearance:  appears stated age, neatly dressed, well groomed   Speech: normal tone, normal rate, normal pitch, normal volume      Level of Cooperation: cooperative      Thought Processes:  linear, logical, goal-directed   Mood: anxious, depressed      Thought Content: {relevant and appropriate   Affect: congruent and appropriate   Orientation: Oriented x3   Memory/Attention/Concentration: No gross cognitive deficits made evident during conversation   Judgment & Insight: poor   Language  intact          No data to display                    2/28/2024     8:39 AM   GAD7   1. Feeling nervous, anxious, or on edge? 1   2. Not being able to stop or control worrying? 3   3. Worrying too much about different things? 3   4. Trouble relaxing? 2   5. Being so restless that it is hard to sit still? 0   6. Becoming easily annoyed or irritable? 0   7. Feeling afraid as if something awful might happen? 0   8. If you checked off any problems, how difficult have these problems made it for you to do your work, take care of things at home, or get along with other people? 1   REJI-7 Score 9       Impression: Initial appointment focused on gathering history, identifying treatment goals and developing a treatment plan.      Diagnosis:  1. MDD (major depressive disorder), recurrent episode, moderate        2. REJI (generalized anxiety disorder)  Ambulatory referral/consult to Primary Care Behavioral Health (Non-Opioids)          Treatment Goals and Plan:   Anxiety: reducing negative automatic thoughts  Depression: increasing motivation and reducing fatigue    Future treatment will utilize CBT and Solution-focused Therapy.      Return to Clinic: No follow-ups on file.

## 2024-04-02 ENCOUNTER — DOCUMENTATION ONLY (OUTPATIENT)
Dept: REHABILITATION | Facility: HOSPITAL | Age: 69
End: 2024-04-02

## 2024-04-02 DIAGNOSIS — R29.898 WEAKNESS OF BOTH UPPER EXTREMITIES: Primary | ICD-10-CM

## 2024-04-02 DIAGNOSIS — R26.89 BALANCE PROBLEM: ICD-10-CM

## 2024-04-02 DIAGNOSIS — R29.898 WEAKNESS OF BOTH LOWER EXTREMITIES: ICD-10-CM

## 2024-04-02 DIAGNOSIS — Z74.09 DECREASED FUNCTIONAL MOBILITY AND ENDURANCE: ICD-10-CM

## 2024-04-02 NOTE — PROGRESS NOTES
PHYSICAL THERAPY  Discharge  Date of Discharge 4/2/2024    Name: Maria Elena [facility] #: [id number]    Pt was seen in Physical Therapy for initial evaluation on:1/11/2024  Pt's last date of treatment in Physical Therapy was:2/20/24  Number of treatment sessions completed: 5  Reason for discharge:    self discharge/holding until after surgery  Status at Discharge:  Goals not met

## 2024-04-08 ENCOUNTER — PATIENT MESSAGE (OUTPATIENT)
Dept: ORTHOPEDICS | Facility: CLINIC | Age: 69
End: 2024-04-08
Payer: MEDICARE

## 2024-04-08 DIAGNOSIS — R52 PAIN: Primary | ICD-10-CM

## 2024-04-10 ENCOUNTER — ANESTHESIA EVENT (OUTPATIENT)
Dept: SURGERY | Facility: HOSPITAL | Age: 69
End: 2024-04-10
Payer: MEDICARE

## 2024-04-10 ENCOUNTER — OFFICE VISIT (OUTPATIENT)
Dept: FAMILY MEDICINE | Facility: HOSPITAL | Age: 69
End: 2024-04-10
Attending: NURSE PRACTITIONER
Payer: MEDICARE

## 2024-04-10 ENCOUNTER — HOSPITAL ENCOUNTER (OUTPATIENT)
Dept: PREADMISSION TESTING | Facility: HOSPITAL | Age: 69
Discharge: HOME | End: 2024-04-10
Attending: NURSE PRACTITIONER
Payer: MEDICARE

## 2024-04-10 ENCOUNTER — TELEPHONE (OUTPATIENT)
Dept: FAMILY MEDICINE | Facility: HOSPITAL | Age: 69
End: 2024-04-10
Payer: MEDICARE

## 2024-04-10 ENCOUNTER — PATIENT MESSAGE (OUTPATIENT)
Dept: ORTHOPEDICS | Facility: CLINIC | Age: 69
End: 2024-04-10
Payer: MEDICARE

## 2024-04-10 VITALS
SYSTOLIC BLOOD PRESSURE: 140 MMHG | SYSTOLIC BLOOD PRESSURE: 140 MMHG | HEIGHT: 60 IN | BODY MASS INDEX: 34.36 KG/M2 | OXYGEN SATURATION: 100 % | DIASTOLIC BLOOD PRESSURE: 87 MMHG | HEART RATE: 90 BPM | TEMPERATURE: 98 F | DIASTOLIC BLOOD PRESSURE: 87 MMHG | HEIGHT: 60 IN | WEIGHT: 175.69 LBS | RESPIRATION RATE: 16 BRPM | OXYGEN SATURATION: 100 % | HEART RATE: 90 BPM | WEIGHT: 175 LBS | BODY MASS INDEX: 34.49 KG/M2

## 2024-04-10 DIAGNOSIS — Z01.818 PRE-OP EVALUATION: Primary | ICD-10-CM

## 2024-04-10 DIAGNOSIS — E78.01 FAMILIAL HYPERCHOLESTEROLEMIA: ICD-10-CM

## 2024-04-10 DIAGNOSIS — I10 ESSENTIAL HYPERTENSION: ICD-10-CM

## 2024-04-10 DIAGNOSIS — E11.21 CONTROLLED TYPE 2 DIABETES MELLITUS WITH DIABETIC NEPHROPATHY, WITHOUT LONG-TERM CURRENT USE OF INSULIN: ICD-10-CM

## 2024-04-10 DIAGNOSIS — E66.9 OBESITY (BMI 30-39.9): ICD-10-CM

## 2024-04-10 PROCEDURE — 99214 OFFICE O/P EST MOD 30 MIN: CPT | Mod: HCNC | Performed by: STUDENT IN AN ORGANIZED HEALTH CARE EDUCATION/TRAINING PROGRAM

## 2024-04-10 RX ORDER — SODIUM CHLORIDE, SODIUM LACTATE, POTASSIUM CHLORIDE, CALCIUM CHLORIDE 600; 310; 30; 20 MG/100ML; MG/100ML; MG/100ML; MG/100ML
INJECTION, SOLUTION INTRAVENOUS CONTINUOUS
Status: CANCELLED | OUTPATIENT
Start: 2024-04-10

## 2024-04-10 RX ORDER — ATORVASTATIN CALCIUM 10 MG/1
10 TABLET, FILM COATED ORAL DAILY
Qty: 30 TABLET | Refills: 11 | Status: SHIPPED | OUTPATIENT
Start: 2024-04-10 | End: 2025-04-10

## 2024-04-10 RX ORDER — LIDOCAINE HYDROCHLORIDE 10 MG/ML
1 INJECTION, SOLUTION EPIDURAL; INFILTRATION; INTRACAUDAL; PERINEURAL ONCE
Status: CANCELLED | OUTPATIENT
Start: 2024-04-10 | End: 2024-04-10

## 2024-04-10 RX ORDER — SCOLOPAMINE TRANSDERMAL SYSTEM 1 MG/1
1 PATCH, EXTENDED RELEASE TRANSDERMAL
Status: CANCELLED | OUTPATIENT
Start: 2024-04-10

## 2024-04-10 RX ORDER — TRIAMCINOLONE ACETONIDE 32 MG
SUSPENSION,EXTENDED RELEASE VIAL (EA) INTRAARTICULAR
COMMUNITY
Start: 2023-10-27 | End: 2024-04-10

## 2024-04-10 NOTE — PROGRESS NOTES
Progress Note  [facility] Family Medicine      Subjective:     Maria Elena Tavares is a 68 y.o. year old female with PMHx of Breast cancer, HTN, and DM who presented to clinic today for a pre-op evaluation. Patient is scheduled to undergo L TKA with Dr. Reece on 4/29    For patient's HTN, she is taking Irbesartan and amlodipine. Patient reports good compliance with this.     Review of Systems   Constitutional:  Negative for chills, fever and malaise/fatigue.   HENT:  Negative for congestion, sinus pain and sore throat.    Eyes:  Negative for blurred vision and discharge.   Respiratory:  Negative for cough and shortness of breath.    Cardiovascular:  Negative for chest pain and leg swelling.   Gastrointestinal:  Negative for abdominal pain, diarrhea, nausea and vomiting.   Genitourinary:  Negative for dysuria and hematuria.   Musculoskeletal:  Negative for back pain.   Skin:  Negative for rash.   Neurological:  Negative for dizziness and headaches.   Psychiatric/Behavioral:  The patient is not nervous/anxious.        Objective:     Vitals:    04/10/24 1008   BP: (!) 140/87   Pulse: 90       Wt Readings from Last 1 Encounters:   04/10/24 79.7 kg (175 lb 11.3 oz)       Physical Exam  Constitutional:       Appearance: Normal appearance. She is normal weight.   HENT:      Head: Normocephalic and atraumatic.      Right Ear: External ear normal.      Left Ear: External ear normal.   Eyes:      Conjunctiva/sclera: Conjunctivae normal.   Cardiovascular:      Rate and Rhythm: Normal rate and regular rhythm.      Pulses: Normal pulses.      Heart sounds: Normal heart sounds.   Pulmonary:      Effort: Pulmonary effort is normal.      Breath sounds: Normal breath sounds.   Abdominal:      General: Abdomen is flat. Bowel sounds are normal.      Tenderness: There is no abdominal tenderness.   Musculoskeletal:      Cervical back: Normal range of motion.      Right lower leg: No edema.      Left lower leg: No edema.   Skin:     Capillary Refill:  "Capillary refill takes less than 2 seconds.   Neurological:      Mental Status: She is alert and oriented to person, place, and time. Mental status is at baseline.   Psychiatric:         Mood and Affect: Mood normal.         Behavior: Behavior normal.         Thought Content: Thought content normal.         Assessment/Plan:     Pre-operative evaluation with risk assessment              -           Scheduled for L TKA with Dr. Reece on 4/29  -           DASI score: 19.45 w/ Functional Capacity in METS: 5.13 (>4) with a revised cardiac risk index as below.    - she  is not on antiplatelets/anticoagulation (but does take ASA prn for heart health, advised to discontinue).   - she does not have a history of blood dyscrasias or previous reaction to anesthesia   - she  is a NEVER SMOKER.  - she does have a prior h/o HTN. BP: 140/87 w/ similar repeat.   - she does have a prior h/o HLD/CAD w/ either MI or CVA. See below for latest ASCVD if all necessary values are available. Appears to have been previously on statin, she has discontinued 2/2 to not "wanting it to affect her kidneys". Had discussion regarding patient's risk of future CVA/ACS events with her high cholesterol, DM, and HTN. Shared decision making with decision to begin medical management w/ Lipitor. She will likely need the medication titrated to an appropriate dose. Will re-check lipid panel now.  - she does have a prior h/o DM. Currently on Tirzepitide . See below for last A1C.  - she does not have a prior h/o thyroid disease. See below for last TSH.   - she does not have a prior h/o COPD/Asthma/JOSEFINA/OHS. does not use CPAP. does not have changes from baseline function.   - she does not have a prior h/o HF.  - BMI: Body mass index is 34.32 kg/m².               -           The patient is medically optimized with a low to moderate risk to proceed with (per ACC/AHA iram-operative guidelines) a moderate risk surgery.   - Please call our office for any additional " questions or concerns.    The 10-year ASCVD risk score (Julio MURILLO, et al., 2019) is: 33.1%    Values used to calculate the score:      Age: 68 years      Sex: Female      Is Non- : Yes      Diabetic: Yes      Tobacco smoker: No      Systolic Blood Pressure: 140 mmHg      Is BP treated: Yes      HDL Cholesterol: 53 mg/dL      Total Cholesterol: 245 mg/dL  Lab Results   Component Value Date    HGBA1C 6.3 (H) 03/19/2024      Lab Results   Component Value Date    TSH 3.863 08/23/2023              Case discussed with staff: Dr. Yfn Membreno MD PGY-2  [facility]   04/10/2024 10:19 AM    This note was partially created using [employer] Voice Recognition software. Typographical and content errors may occur with this process. While efforts are made to detect and correct such errors, in some cases errors will persist. For this reason, wording in this document should be considered in the proper context and not strictly verbatim.

## 2024-04-10 NOTE — PRE-PROCEDURE INSTRUCTIONS
Sister.      Allergies, medical, surgical, family and psychosocial histories reviewed with patient. Periop plan of care reviewed. Preop instructions given, including medications to take and to hold. Hibiclens soap and instructions on use given. Time allotted for questions to be addressed.  Patient verbalized understanding.    Arrival time 0530.    Mounjaro last dose on 4/21/24.

## 2024-04-10 NOTE — TELEPHONE ENCOUNTER
Yes, there is a harris period of 20 minutes. ----- Message from Khushboo Boo sent at 4/10/2024  8:30 AM CDT -----  Regarding: appointment  Type: Being Late    Who Called: pt  Would the patient rather a call back or a response via [facility]? Call  Best Call Back Number: [phone]     Additional Information: pt would like to know due to  weather is it ok if she is late will she still be seen

## 2024-04-10 NOTE — H&P (VIEW-ONLY)
Progress Note  \[facility]\"" Family Medicine      Subjective:     Maria Elena Tavares is a 68 y.o. year old female with PMHx of Breast cancer, HTN, and DM who presented to clinic today for a pre-op evaluation. Patient is scheduled to undergo L TKA with Dr. Reece on 4/29    For patient's HTN, she is taking Irbesartan and amlodipine. Patient reports good compliance with this.     Review of Systems   Constitutional:  Negative for chills, fever and malaise/fatigue.   HENT:  Negative for congestion, sinus pain and sore throat.    Eyes:  Negative for blurred vision and discharge.   Respiratory:  Negative for cough and shortness of breath.    Cardiovascular:  Negative for chest pain and leg swelling.   Gastrointestinal:  Negative for abdominal pain, diarrhea, nausea and vomiting.   Genitourinary:  Negative for dysuria and hematuria.   Musculoskeletal:  Negative for back pain.   Skin:  Negative for rash.   Neurological:  Negative for dizziness and headaches.   Psychiatric/Behavioral:  The patient is not nervous/anxious.        Objective:     Vitals:    04/10/24 1008   BP: (!) 140/87   Pulse: 90       Wt Readings from Last 1 Encounters:   04/10/24 79.7 kg (175 lb 11.3 oz)       Physical Exam  Constitutional:       Appearance: Normal appearance. She is normal weight.   HENT:      Head: Normocephalic and atraumatic.      Right Ear: External ear normal.      Left Ear: External ear normal.   Eyes:      Conjunctiva/sclera: Conjunctivae normal.   Cardiovascular:      Rate and Rhythm: Normal rate and regular rhythm.      Pulses: Normal pulses.      Heart sounds: Normal heart sounds.   Pulmonary:      Effort: Pulmonary effort is normal.      Breath sounds: Normal breath sounds.   Abdominal:      General: Abdomen is flat. Bowel sounds are normal.      Tenderness: There is no abdominal tenderness.   Musculoskeletal:      Cervical back: Normal range of motion.      Right lower leg: No edema.      Left lower leg: No edema.   Skin:     Capillary Refill:  "Capillary refill takes less than 2 seconds.   Neurological:      Mental Status: She is alert and oriented to person, place, and time. Mental status is at baseline.   Psychiatric:         Mood and Affect: Mood normal.         Behavior: Behavior normal.         Thought Content: Thought content normal.         Assessment/Plan:     Pre-operative evaluation with risk assessment              -           Scheduled for L TKA with Dr. Reece on 4/29  -           DASI score: 19.45 w/ Functional Capacity in METS: 5.13 (>4) with a revised cardiac risk index as below.    - she  is not on antiplatelets/anticoagulation (but does take ASA prn for heart health, advised to discontinue).   - she does not have a history of blood dyscrasias or previous reaction to anesthesia   - she  is a NEVER SMOKER.  - she does have a prior h/o HTN. BP: 140/87 w/ similar repeat.   - she does have a prior h/o HLD/CAD w/ either MI or CVA. See below for latest ASCVD if all necessary values are available. Appears to have been previously on statin, she has discontinued 2/2 to not "wanting it to affect her kidneys". Had discussion regarding patient's risk of future CVA/ACS events with her high cholesterol, DM, and HTN. Shared decision making with decision to begin medical management w/ Lipitor. She will likely need the medication titrated to an appropriate dose. Will re-check lipid panel now.  - she does have a prior h/o DM. Currently on Tirzepitide . See below for last A1C.  - she does not have a prior h/o thyroid disease. See below for last TSH.   - she does not have a prior h/o COPD/Asthma/JOSEFINA/OHS. does not use CPAP. does not have changes from baseline function.   - she does not have a prior h/o HF.  - BMI: Body mass index is 34.32 kg/m².               -           The patient is medically optimized with a low to moderate risk to proceed with (per ACC/AHA iram-operative guidelines) a moderate risk surgery.   - Please call our office for any additional " questions or concerns.    The 10-year ASCVD risk score (Julio MURILLO, et al., 2019) is: 33.1%    Values used to calculate the score:      Age: 68 years      Sex: Female      Is Non- : Yes      Diabetic: Yes      Tobacco smoker: No      Systolic Blood Pressure: 140 mmHg      Is BP treated: Yes      HDL Cholesterol: 53 mg/dL      Total Cholesterol: 245 mg/dL  Lab Results   Component Value Date    HGBA1C 6.3 (H) 03/19/2024      Lab Results   Component Value Date    TSH 3.863 08/23/2023              Case discussed with staff: Dr. Yfn Membreno MD PGY-2  [facility]   04/10/2024 10:19 AM    This note was partially created using [employer] Voice Recognition software. Typographical and content errors may occur with this process. While efforts are made to detect and correct such errors, in some cases errors will persist. For this reason, wording in this document should be considered in the proper context and not strictly verbatim.

## 2024-04-10 NOTE — DISCHARGE INSTRUCTIONS
Your surgery is scheduled for 4/29/24.    Please report to [facility] on the 1st Floor at 530 a.m.    THIS TIME IS SUBJECT TO CHANGE.  YOU WILL RECEIVE A PHONE CALL THE DAY BEFORE SURGERY BY 3:30 PM TO CONFIRM YOUR TIME OF ARRIVAL.  IF YOU HAVE NOT RECEIVED A PHONE CALL BY 3:30 PM THE DAY BEFORE YOUR SURGERY PLEASE CALL [phone]     INSTRUCTIONS IMPORTANT!!!  ¨ Do not eat or drink after 12 midnight-including water, candy, gum, & mints. OK to brush teeth.      ____  Proceed to [facility] on *** for additional testing.        ____  Do not wear makeup, including mascara.  ____  No powder, lotions or creams to surgical area.  ____  Please remove all jewelry, including piercings and leave at home.  ____  No money or valuables needed. Please leave at home.  ____  Please bring any documents given by your doctor.  ____  If going home the same day, arrange for a ride home. You will not be able to             drive if Anesthesia was used.  ____  Children under 18 years require a parent / guardian present the entire time             they are in surgery / recovery.  ____  Wear loose fitting clothing. Allow for dressings, bandages.  ____  Stop Aspirin, Ibuprofen, Motrin, Aleve, Goody's/BC powders, Excedrine and Naproxen (NSAIDS) at least 3-5 days before surgery, unless otherwise instructed by your doctor, or the nurse.   You MAY use Tylenol/acetaminophen until day of surgery.  ____  Wash the surgical area with Hibiclens or Dial Antibacterial bar soap the night before surgery, and again the             morning of surgery.  Be sure to rinse hibiclens or Dial Antibacterial bar soap off completely (if instructed by   nurse).  ____  If you take diabetic medication including Metformin, Glimepiride, Glipizide, Glyburide, Byetta, Januvia, Actos, do not take am of surgery unless instructed by Doctor.  ____ Hold Invokana, Farxiga, and Jardiance for 3 days prior to surgery.   ____  Call MD for temperature  above 101 degrees or any other signs of infection such as Urinary (bladder) infection, Upper respiratory infection, skin boils, etc.  ____ Stop taking any Fish Oil supplement or any Vitamins that contain Vitamin E at least 5 days prior to surgery.  ____ Do Not wear your contact lenses the day of your procedure.  You may wear your glasses.      ____Do not shave surgical site for 3 days prior to surgery.  ____ Practice Good hand washing before, during, and after procedure.      I have read or had read and explained to me, and understand the above information.  Additional comments or instructions:  For additional questions call [phone]      ANESTHESIA SIDE EFFECTS  -For the first 24 hours after surgery:  Do not drive, use heavy equipment, make important decisions, or drink alcohol  -It is normal to feel sleepy for several hours.  Rest until you are more awake.  -Have someone stay with you, if needed.  They can watch for problems and help keep you safe.  -Some possible post anesthesia side effects include: nausea and vomiting, sore throat and hoarseness, sleepiness, and dizziness.        Pre-Op Bathing Instructions    Before surgery, you can play an important role in your own health.    Because skin is not sterile, we need to be sure that your skin is as free of germs as possible. By following the instructions below, you can reduce the number of germs on your skin before surgery.    IMPORTANT: You will need to shower with a special soap called Hibiclens*, available at any pharmacy.  If you are allergic to Chlorhexidine (the antiseptic in Hibiclens), use an antibacterial soap such as Dial Soap for your preoperative shower.  You will shower with Hibiclens both the night before your surgery and the morning of your surgery.  Do not use Hibiclens on the head, face or genitals to avoid injury to those areas.    STEP #1: THE NIGHT BEFORE YOUR SURGERY     Do not shave the area of your body where your surgery will be  performed.  Shower and wash your hair and body as usual with your normal soap and shampoo.  Rinse your hair and body thoroughly after you shower to remove all soap residue.  With your hand, apply one packet of Hibiclens soap to the surgical site.   Wash the site gently for five (5) minutes. Do not scrub your skin too hard.   Do not wash with your regular soap after Hibiclens is used.  Rinse your body thoroughly.  Pat yourself dry with a clean, soft towel.  Do not use lotion, cream, or powder.  Wear clean clothes.    STEP #2: THE MORNING OF YOUR SURGERY     Repeat Step #1.    * Not to be used by people allergic to Chlorhexidine.

## 2024-04-10 NOTE — ANESTHESIA PREPROCEDURE EVALUATION
"                                                                                                             04/10/2024  Maria Elena Tavares is a 68 y.o., female scheduled for ARTHROPLASTY, KNEE, SIGHT ASSISTED (Left: Knee) 24.    Left arm limb alert    Per family medicine Dr. Membreno, "The patient is medically optimized with a low to moderate risk to proceed with (per ACC/AHA iram-operative guidelines) a moderate risk surgery".    Past Medical History:   Diagnosis Date    BMI 37.0-37.9, adult     Breast cancer 2019     Left breast, IDC with lymph node metastisis    Colon polyps     Colon polyps     Diabetes mellitus type II     Diverticulosis     history of diverticulosisseen on colonoscopy at age 48. Repeat recommended at age 58. Done by     Elevated blood protein     History of elevated protein. Apparently has seen  for extensive workup including bone marrow biopsy    History of shingles 2006    Hyperlipidemia     Hypertension     Microalbuminuria     due 2 diabetes    Mild vitamin D deficiency     . A low vitamin D    Primary osteoarthritis of left knee 2023    Thyroid disease     hypothyroidism    Usual hyperplasia of lactiferous duct     Not sure     Past Surgical History:   Procedure Laterality Date    BREAST BIOPSY Left 2019    IDC with mets to node    BREAST BIOPSY Right 2019    core bx, benign node    BREAST BIOPSY Left 10/14/2019    MRI Core bx, + IDC    BREAST BIOPSY Left 10/08/2019    Core bx, ADH     SECTION      COLONOSCOPY N/A 10/08/2020    Procedure: COLONOSCOPY;  Surgeon: Shreya Mendoza MD;  Location: [facility] (4TH FLR);  Service: Endoscopy;  Laterality: N/A;  COVID screening on 10/5/20 [facility]    HYSTERECTOMY      INSERTION OF BREAST TISSUE EXPANDER Bilateral 2020    Procedure: INSERTION, TISSUE EXPANDER, BREAST BILATERAL;  Surgeon: Michael Solorzano MD;  Location: [address];  Service: Plastics;  " Laterality: Bilateral;    INSERTION OF TUNNELED CENTRAL VENOUS CATHETER (CVC) WITH SUBCUTANEOUS PORT Right 10/04/2019    Procedure: [id number] RIGHT (CONSENT AM OF) 1.0 hr case;  Surgeon: Elena Lopez MD;  Location: [address];  Service: General;  Laterality: Right;    OOPHORECTOMY      SENTINEL LYMPH NODE BIOPSY Left 03/24/2020    Procedure: BIOPSY, LYMPH NODE, SENTINEL LEFT;  Surgeon: Elena Lopez MD;  Location: [address];  Service: General;  Laterality: Left;    SIMPLE MASTECTOMY Bilateral 03/24/2020    Procedure: MASTECTOMY, SIMPLE BILATERAL;  Surgeon: Elena Lopez MD;  Location: [address];  Service: General;  Laterality: Bilateral;    TISSUE EXPANDER REMOVAL Bilateral 07/30/2020    Procedure: REMOVAL, TISSUE EXPANDER;  Surgeon: Michael Solorzano MD;  Location: [address];  Service: Plastics;  Laterality: Bilateral;     Review of patient's allergies indicates:   Allergen Reactions    Amoxil [amoxicillin] Rash       Pre-op Assessment    I have reviewed the Patient Summary Reports.    I have reviewed the NPO Status.   I have reviewed the Medications.     Review of Systems  Anesthesia Hx:  No problems with previous Anesthesia              Personal Hx of Anesthesia complications, Post-Operative Nausea/Vomiting, in the past, but not with recent anesthetics / prophylaxis                    Social:  Non-Smoker, Social Alcohol Use       Hematology/Oncology:       -- Anemia:                    --  Cancer in past history:       Breast    left axillary node dissection lymphedema surgery, chemotherapy and radiation       Cardiovascular:  Exercise tolerance: good   Denies Pacemaker. Hypertension       Denies Angina.                                  Pulmonary:  Pulmonary Normal      Denies Shortness of breath.                  Renal/:  Chronic Renal Disease                Hepatic/GI:  Hepatic/GI Normal                 Musculoskeletal:  Arthritis               Neurological:  Denies  TIA.  Denies CVA.    Denies Seizures.          Peripheral Neuropathy                          Endocrine:  Diabetes (a1c 6.3 3/2024), well controlled, type 2 Hypothyroidism        Obesity / BMI > 30  Psych:  Psychiatric History anxiety depression                Physical Exam  General: Cooperative, Oriented, Well nourished and Alert    Airway:  Mallampati: I   Mouth Opening: Normal  TM Distance: Normal  Tongue: Normal  Neck ROM: Normal ROM    Dental:  Intact    Chest/Lungs:  Clear to auscultation, Normal Respiratory Rate    Heart:  Rate: Normal  Rhythm: Regular Rhythm  Sounds: Normal      Lab Results   Component Value Date    WBC 5.85 03/19/2024    HGB 13.0 03/19/2024    HCT 40.7 03/19/2024     03/19/2024    CHOL 245 (H) 08/16/2023    TRIG 79 08/16/2023    HDL 53 08/16/2023    ALT 22 03/19/2024    AST 25 03/19/2024     03/19/2024    K 3.6 03/19/2024     03/19/2024    CREATININE 0.9 03/19/2024    BUN 19 03/19/2024    CO2 24 03/19/2024    TSH 3.863 08/23/2023    INR 1.3 (H) 03/19/2024    HGBA1C 6.3 (H) 03/19/2024         Anesthesia Plan  Type of Anesthesia, risks & benefits discussed:    Anesthesia Type: Spinal, Regional  Intra-op Monitoring Plan: Standard ASA Monitors  Post Op Pain Control Plan: multimodal analgesia, peripheral nerve block and IV/PO Opioids PRN  Induction:  IV  ASA Score: 2  Anesthesia Plan Notes: Anesthesia consent to be signed prior to surgery 4/29/24      Ready For Surgery From Anesthesia Perspective.     .

## 2024-04-11 ENCOUNTER — HOSPITAL ENCOUNTER (OUTPATIENT)
Dept: RADIOLOGY | Facility: HOSPITAL | Age: 69
Discharge: HOME | End: 2024-04-11
Attending: PLASTIC SURGERY
Payer: MEDICARE

## 2024-04-11 ENCOUNTER — PATIENT MESSAGE (OUTPATIENT)
Dept: PRIMARY CARE CLINIC | Facility: CLINIC | Age: 69
End: 2024-04-11
Payer: MEDICARE

## 2024-04-11 DIAGNOSIS — R52 PAIN: ICD-10-CM

## 2024-04-11 DIAGNOSIS — E11.21 CONTROLLED TYPE 2 DIABETES MELLITUS WITH DIABETIC NEPHROPATHY, WITHOUT LONG-TERM CURRENT USE OF INSULIN: ICD-10-CM

## 2024-04-11 PROCEDURE — 73130 X-RAY EXAM OF HAND: CPT | Mod: 26,50,HCNC, | Performed by: RADIOLOGY

## 2024-04-11 PROCEDURE — 73130 X-RAY EXAM OF HAND: CPT | Mod: TC,50,HCNC,PN

## 2024-04-11 NOTE — TELEPHONE ENCOUNTER
Please inquire if pt would like to increase the dose or stay at 7.5 (if either dose can be found at a pharmacy)

## 2024-04-11 NOTE — TELEPHONE ENCOUNTER
No care due was identified.  [facility] Embedded Care Due Messages. Reference number: [id number].   4/11/2024 11:07:43 AM CDT

## 2024-04-14 VITALS
OXYGEN SATURATION: 95 % | HEART RATE: 86 BPM | DIASTOLIC BLOOD PRESSURE: 77 MMHG | SYSTOLIC BLOOD PRESSURE: 130 MMHG | HEIGHT: 60 IN | WEIGHT: 177 LBS | BODY MASS INDEX: 34.75 KG/M2

## 2024-04-14 PROBLEM — I70.0 AORTIC ATHEROSCLEROSIS: Status: ACTIVE | Noted: 2024-04-14

## 2024-04-14 NOTE — PROGRESS NOTES
Subjective:       Patient ID: Maria Elena Tavares is a 68 y.o. female.    Chief Complaint: Hypertension (6 month follow up )    Pt presents today for 6 mos f/u DM and HTN.  Patient states that overall she is doing well       She is here for an a1c and to touch base with me.            Diabetes  Pertinent negatives for hypoglycemia include no confusion, dizziness, headaches or nervousness/anxiousness. Pertinent negatives for diabetes include no chest pain, no polydipsia, no polyuria and no weakness.   Hypertension  Pertinent negatives include no chest pain, headaches, palpitations or shortness of breath.     Review of Systems   Constitutional: Negative.    HENT:  Negative for congestion, ear pain, hearing loss, rhinorrhea, sinus pressure, sore throat and trouble swallowing.    Eyes: Negative.    Respiratory:  Negative for apnea, cough, chest tightness, shortness of breath and wheezing.    Cardiovascular:  Negative for chest pain, palpitations and leg swelling.   Gastrointestinal: Negative.    Endocrine: Negative for polydipsia and polyuria.   Musculoskeletal: Negative.  Negative for joint swelling.   Skin: Negative.    Neurological:  Negative for dizziness, weakness, light-headedness and headaches.   Psychiatric/Behavioral:  Negative for behavioral problems, confusion, decreased concentration, dysphoric mood and sleep disturbance. The patient is not nervous/anxious.    All other systems reviewed and are negative.      Objective:      Physical Exam  Vitals reviewed.   Constitutional:       General: She is not in acute distress.     Appearance: Normal appearance. She is well-developed. She is obese. She is not ill-appearing, toxic-appearing or diaphoretic.   HENT:      Head: Normocephalic and atraumatic.      Right Ear: Tympanic membrane, ear canal and external ear normal. There is no impacted cerumen.      Left Ear: Tympanic membrane, ear canal and external ear normal. There is no impacted cerumen.      Nose: Nose normal.       Mouth/Throat:      Pharynx: No oropharyngeal exudate or posterior oropharyngeal erythema.   Eyes:      Extraocular Movements: Extraocular movements intact.      Conjunctiva/sclera: Conjunctivae normal.      Pupils: Pupils are equal, round, and reactive to light.   Neck:      Thyroid: No thyromegaly.   Cardiovascular:      Rate and Rhythm: Normal rate and regular rhythm.      Pulses: Normal pulses.      Heart sounds: Normal heart sounds. No murmur heard.  Pulmonary:      Effort: Pulmonary effort is normal. No respiratory distress.      Breath sounds: Normal breath sounds. No stridor. No wheezing, rhonchi or rales.   Chest:      Chest wall: No tenderness.   Abdominal:      General: Bowel sounds are normal. There is no distension.      Palpations: Abdomen is soft. There is no mass.      Tenderness: There is no abdominal tenderness. There is no guarding or rebound.      Hernia: No hernia is present.   Musculoskeletal:         General: No tenderness. Normal range of motion.      Cervical back: Normal range of motion and neck supple.      Right lower leg: No edema.      Left lower leg: No edema.   Lymphadenopathy:      Cervical: No cervical adenopathy.   Skin:     General: Skin is warm and dry.      Findings: No erythema or rash.   Neurological:      General: No focal deficit present.      Mental Status: She is alert and oriented to person, place, and time. Mental status is at baseline.      Cranial Nerves: No cranial nerve deficit.      Sensory: No sensory deficit.      Motor: No weakness or abnormal muscle tone.      Coordination: Coordination normal.      Gait: Gait normal.      Deep Tendon Reflexes: Reflexes are normal and symmetric. Reflexes normal.   Psychiatric:         Mood and Affect: Mood normal.         Behavior: Behavior normal.         Thought Content: Thought content normal.         Judgment: Judgment normal.         Assessment:       1. Anxiety about health    2. Controlled type 2 diabetes mellitus  with diabetic nephropathy, without long-term current use of insulin    3. MDD (major depressive disorder), recurrent episode, moderate    4. Essential hypertension    5. Familial hypercholesterolemia    6. Aortic atherosclerosis        Plan:       HTN controlled when she takes her meds. Was too low and will not adjust since pt is fearful of falling  DMT2: stable but a1c is overdue.   Breast CA: surgery 3/24  RTC prn symptoms or q 4-6 mos pending labs  Neuropathy s/p chemo:  Stable  Anxiety about health  -     Ambulatory referral/consult to Primary Care Behavioral Health (Non-Opioids); Future; Expected date: 03/05/2024    Controlled type 2 diabetes mellitus with diabetic nephropathy, without long-term current use of insulin  -     Discontinue: tirzepatide 10 mg/0.5 mL PnIj; Inject 10 mg into the skin every 7 days.  Dispense: 12 pen ; Refill: 3  -     Cancel: Ambulatory referral/consult to [facility] Behavioral [facility] (Non-Opioids); Future; Expected date: 03/05/2024    MDD (major depressive disorder), recurrent episode, moderate    Essential hypertension    Familial hypercholesterolemia    Aortic atherosclerosis    Hypercholesterolemia stable on medications  Hypertension stable on medications  Major depression stable on medications  Aortic atherosclerosis stable on medications  RTC prn symptoms or 6 mos annual    Greater than 45 mins spent with pt; 50 % face to face going over her recent surgery and reassuring pt that she has done so well

## 2024-04-16 ENCOUNTER — TELEPHONE (OUTPATIENT)
Dept: BEHAVIORAL HEALTH | Facility: CLINIC | Age: 69
End: 2024-04-16
Payer: MEDICARE

## 2024-04-16 ENCOUNTER — PATIENT MESSAGE (OUTPATIENT)
Dept: BEHAVIORAL HEALTH | Facility: CLINIC | Age: 69
End: 2024-04-16
Payer: MEDICARE

## 2024-04-23 ENCOUNTER — PATIENT MESSAGE (OUTPATIENT)
Dept: ORTHOPEDICS | Facility: CLINIC | Age: 69
End: 2024-04-23
Payer: MEDICARE

## 2024-04-23 ENCOUNTER — TELEPHONE (OUTPATIENT)
Dept: ORTHOPEDICS | Facility: CLINIC | Age: 69
End: 2024-04-23
Payer: MEDICARE

## 2024-04-23 NOTE — TELEPHONE ENCOUNTER
Spoke with patient regarding her insurance denial for her Iovera  procedure. Patient verbally understand.

## 2024-04-23 NOTE — TELEPHONE ENCOUNTER
----- Message from Kristi Lopez sent at 4/23/2024  9:19 AM CDT -----  Regarding: Call back  Contact: [phone]  Who Called: PT     Patient is calling to talk to nurse in regards to to the denial of her procedure. Please advice

## 2024-04-24 RX ORDER — FAMOTIDINE 20 MG/1
20 TABLET, FILM COATED ORAL 2 TIMES DAILY
Qty: 84 TABLET | Refills: 0 | Status: SHIPPED | OUTPATIENT
Start: 2024-04-24 | End: 2024-04-30 | Stop reason: SDUPTHER

## 2024-04-24 RX ORDER — DICLOFENAC SODIUM 75 MG/1
75 TABLET, DELAYED RELEASE ORAL 2 TIMES DAILY
Qty: 84 TABLET | Refills: 0 | Status: SHIPPED | OUTPATIENT
Start: 2024-04-24 | End: 2024-04-30 | Stop reason: SDUPTHER

## 2024-04-24 RX ORDER — CEPHALEXIN 500 MG/1
500 CAPSULE ORAL EVERY 6 HOURS
Qty: 4 CAPSULE | Refills: 0 | Status: SHIPPED | OUTPATIENT
Start: 2024-04-24 | End: 2024-04-25

## 2024-04-24 RX ORDER — ACETAMINOPHEN 325 MG/1
325 TABLET ORAL EVERY 4 HOURS
Qty: 84 TABLET | Refills: 0 | Status: SHIPPED | OUTPATIENT
Start: 2024-04-24 | End: 2024-04-30 | Stop reason: SDUPTHER

## 2024-04-24 RX ORDER — ASPIRIN 81 MG/1
81 TABLET ORAL 2 TIMES DAILY
Qty: 84 TABLET | Refills: 0 | Status: SHIPPED | OUTPATIENT
Start: 2024-04-24 | End: 2024-04-30 | Stop reason: SDUPTHER

## 2024-04-24 RX ORDER — TRANEXAMIC ACID 650 MG/1
650 TABLET ORAL 2 TIMES DAILY
Qty: 28 TABLET | Refills: 0 | Status: SHIPPED | OUTPATIENT
Start: 2024-04-24 | End: 2024-04-30 | Stop reason: SDUPTHER

## 2024-04-24 NOTE — H&P
"Interval H&P    Patient seen and examined in preop holding area. No changes to history or exam other than noted below.    To OR today for LEFT total knee arthroplasty                     04/10/2024  Maria Elena Tavares is a 68 y.o., female scheduled for ARTHROPLASTY, KNEE, SIGHT ASSISTED (Left: Knee) 24.     Left arm limb alert     Per family medicine Dr. Membreno, "The patient is medically optimized with a low to moderate risk to proceed with (per ACC/AHA iram-operative guidelines) a moderate risk surgery".          Past Medical History:   Diagnosis Date    BMI 37.0-37.9, adult      Breast cancer 2019      Left breast, IDC with lymph node metastisis    Colon polyps     Colon polyps     Diabetes mellitus type II      Diverticulosis       history of diverticulosisseen on colonoscopy at age 48. Repeat recommended at age 58. Done by     Elevated blood protein       History of elevated protein. Apparently has seen  for extensive workup including bone marrow biopsy    History of shingles 2006    Hyperlipidemia      Hypertension      Microalbuminuria       due 2 diabetes    Mild vitamin D deficiency       . A low vitamin D    Primary osteoarthritis of left knee 2023    Thyroid disease       hypothyroidism    Usual hyperplasia of lactiferous duct       Not sure            Past Surgical History:   Procedure Laterality Date    BREAST BIOPSY Left 2019     IDC with mets to node    BREAST BIOPSY Right 2019     core bx, benign node    BREAST BIOPSY Left 10/14/2019     MRI Core bx, + IDC    BREAST BIOPSY Left 10/08/2019     Core bx, ADH     SECTION        COLONOSCOPY N/A 10/08/2020     Procedure: COLONOSCOPY;  Surgeon: Shreya Mendoza MD;  Location: [address];  Service: Endoscopy;  Laterality: N/A;  COVID screening on 10/5/20 [facility]    HYSTERECTOMY        INSERTION OF BREAST TISSUE EXPANDER Bilateral 2020     Procedure: INSERTION, TISSUE " EXPANDER, BREAST BILATERAL;  Surgeon: Michael Solorzano MD;  Location: [address];  Service: Plastics;  Laterality: Bilateral;    INSERTION OF TUNNELED CENTRAL VENOUS CATHETER (CVC) WITH SUBCUTANEOUS PORT Right 10/04/2019     Procedure: [id number] RIGHT (CONSENT AM OF) 1.0 hr case;  Surgeon: Elena Lopez MD;  Location: [address];  Service: General;  Laterality: Right;    OOPHORECTOMY        SENTINEL LYMPH NODE BIOPSY Left 03/24/2020     Procedure: BIOPSY, LYMPH NODE, SENTINEL LEFT;  Surgeon: Elena Lopez MD;  [facility] [address] [place] [address];  Service: General;  Laterality: Left;    SIMPLE MASTECTOMY Bilateral 03/24/2020     Procedure: MASTECTOMY, SIMPLE BILATERAL;  Surgeon: Elena Lopez MD;  Location: [address];  Service: General;  Laterality: Bilateral;    TISSUE EXPANDER REMOVAL Bilateral 07/30/2020     Procedure: REMOVAL, TISSUE EXPANDER;  Surgeon: Michael Solorzano MD;  Location: [address];  Service: Plastics;  Laterality: Bilateral;           Review of patient's allergies indicates:   Allergen Reactions    Amoxil [amoxicillin] Rash         Pre-op Assessment     I have reviewed the Patient Summary Reports.     I have reviewed the Nursing Notes.    I have reviewed the Medications.      Review of Systems  Anesthesia Hx:              Personal Hx of Anesthesia complications, Post-Operative Nausea/Vomiting, in the past, but not with recent anesthetics / prophylaxis                    Social:  Non-Smoker, Social Alcohol Use       Hematology/Oncology:         -- Anemia:                    --  Cancer in past history:       Breast    left axillary node dissection lymphedema surgery, chemotherapy and radiation       Cardiovascular:  Exercise tolerance: good   Denies Pacemaker. Hypertension       Denies Angina.                                  Pulmonary:  Pulmonary Normal      Denies Shortness of breath.                  Renal/:  Chronic Renal Disease                 Hepatic/GI:  Hepatic/GI Normal                 Musculoskeletal:  Arthritis               Neurological:  Denies TIA.  Denies CVA.    Denies Seizures.          Peripheral Neuropathy                          Endocrine:  Diabetes (a1c 6.3 3/2024), well controlled, type 2 Hypothyroidism        Obesity / BMI > 30  Psych:  Psychiatric History anxiety depression                   Physical Exam  General: Cooperative and Oriented     Airway:  Mallampati: I   Mouth Opening: Normal  TM Distance: Normal  Tongue: Normal  Neck ROM: Normal ROM     Dental:  Intact     Chest/Lungs:  Clear to auscultation, Normal Respiratory Rate     Heart:  Rate: Normal  Rhythm: Regular Rhythm  Sounds: Normal              Lab Results   Component Value Date     WBC 5.85 03/19/2024     HGB 13.0 03/19/2024     HCT 40.7 03/19/2024      03/19/2024     CHOL 245 (H) 08/16/2023     TRIG 79 08/16/2023     HDL 53 08/16/2023     ALT 22 03/19/2024     AST 25 03/19/2024      03/19/2024     K 3.6 03/19/2024      03/19/2024     CREATININE 0.9 03/19/2024     BUN 19 03/19/2024     CO2 24 03/19/2024     TSH 3.863 08/23/2023     INR 1.3 (H) 03/19/2024     HGBA1C 6.3 (H) 03/19/2024            Anesthesia Plan  Type of Anesthesia, risks & benefits discussed:     Anesthesia Type: Spinal  Intra-op Monitoring Plan: Standard ASA Monitors  Post Op Pain Control Plan: multimodal analgesia  Induction:  IV  ASA Score: 2  Anesthesia Plan Notes: Anesthesia consent to be signed prior to surgery 4/29/24    Subjective:         Subjective  Patient ID: Maria Elena Tavares is a 67 y.o. female.     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  She walks occasionally.   Substance and Sexual Activity    Alcohol use: Not Currently       Comment: Occasionally    Drug use: No    Sexual activity: Not Currently       Partners: Male      Review of Systems   Constitutional: Negative for diaphoresis.   HENT:  Negative for ear discharge, nosebleeds and stridor.    Eyes:  Negative for photophobia.   Cardiovascular:  Negative for syncope.   Respiratory:  Negative for hemoptysis, shortness of breath and wheezing.    Neurological:  Negative for tremors.   Psychiatric/Behavioral: Negative.              Objective:      Objective  General     Constitutional: She is oriented to person, place, and time. She appears well-developed and well-nourished.   HENT:   Head: Normocephalic and atraumatic.   Right Ear: External ear normal.   Left Ear: External ear normal.   Eyes: EOM are normal.   Pulmonary/Chest: Effort normal.   Neurological: She is alert and oriented to person, place, and time.   Psychiatric: She has a normal mood and affect. Her behavior is normal. Judgment and thought content normal.      General Musculoskeletal Exam   Gait: antalgic and abnormal            Left Knee Exam      Inspection   Swelling: present  Effusion: present     Tenderness   The patient tender to palpation of the medial joint line.     Crepitus   The patient has crepitus of the patella.     Other   Sensation: normal     Muscle Strength   Left Lower Extremity   Quadriceps:  4/5   Hamstrin/5            Assessment:      Assessment  1. Chronic pain of left knee    2. Primary osteoarthritis of left knee             Plan:      Plan  Will plan for zilretta injections  Tka in April

## 2024-04-24 NOTE — DISCHARGE INSTRUCTIONS
Post Op Total Knee Arthroplasty Instructions     1. Enteric coated aspirin 81 mg by mouth twice a day for 6 weeks to prevent blood clots,  unless otherwise indicated.  2. Please take a stomach reflux medication such as pepcid, prevacid, nexium (H-2 blocker or PPI) while on aspirin to prevent stomach ulcers. You will be given a prescription for pepcid.  3. Ruddy stockings should be worn as much as possible for 6 weeks to prevent blood clots.  4. Do not start antibiotics for any suspected infections related to the surgery until evaluated by dr martinez staff  5. No driving for approximately 2-4 weeks  6. You can shower once the incision is completely dry, otherwise place a new dry dressing twice a day if there is drainage. Please call the office if the drainage increases after discharge.  7. You may resume all pre-surgery medications unless otherwise indicated  8. All patients should be seen in Dr Martinez office approx 2 weeks after surgery  9. Dr Reece prefers outpatient physical therapy upon discharge home. If home PT is needed, please contact Dr Reece for approval  10. Patients should see their primary care doctor after discharge home   ANESTHESIA  -For the first 24 hours after surgery:  Do not drive, use heavy equipment, make important decisions, or drink alcohol  -It is normal to feel sleepy for several hours.  Rest until you are more awake.  -Have someone stay with you, if needed.  They can watch for problems and help keep you safe.  -Some possible post anesthesia side effects include: nausea and vomiting, sore throat and hoarseness, sleepiness, and dizziness.    PAIN  -If you have pain after surgery, pain medicine will help you feel better.  Take it as directed, before pain becomes severe.  Most pain relievers taken by mouth need at least 20-30 minutes to start working.  -Do not drive or drink alcohol while taking pain medicine.  -Pain medication can upset your stomach.  Taking them with a little food may  help.  -Other ways to help control pain: elevation, ice, and relaxation  -Call your surgeon if still having unmanageable pain an hour after taking pain medicine.  -Pain medicine can cause constipation.  Taking an over-the counter stool softener while on prescription pain medicine and drinking plenty of fluids can prevent this side effect.  -Call your surgeon if you have severe side effects like: breathing problems, trouble waking up, dizziness, confusion, or severe constipation.    NAUSEA  -Some people have nausea after surgery.  This is often because of anesthesia, pain, pain medicine, or the stress of surgery.  -Do not push yourself to eat.  Start off with clear liquids and soup.  Slowly move to solid foods.  Don't eat fatty, rich, spicy foods at first.  Eat smaller amounts.  -If you develop persistent nausea and vomiting please notify your surgeon immediately.    BLEEDING  -Different types of surgery require different types of care and dressing changes.  It is important to follow all instructions and advice from your surgeon.  Change dressing as directed.  Call your surgeon for any concerns regarding postop bleeding.    SIGNS OF INFECTION  -Signs of infection include: fever, swelling, drainage, and redness  -Notify your surgeon if you have a fever of 100.4 F (38.0 C) or higher.  -Notify your surgeon if you notice redness, swelling, increased pain, pus, or a foul smell at the incision site.

## 2024-04-29 ENCOUNTER — ANESTHESIA (OUTPATIENT)
Dept: SURGERY | Facility: HOSPITAL | Age: 69
End: 2024-04-29
Payer: MEDICARE

## 2024-04-29 ENCOUNTER — PATIENT MESSAGE (OUTPATIENT)
Dept: PRIMARY CARE CLINIC | Facility: CLINIC | Age: 69
End: 2024-04-29
Payer: MEDICARE

## 2024-04-29 ENCOUNTER — HOSPITAL ENCOUNTER (OUTPATIENT)
Facility: HOSPITAL | Age: 69
Discharge: HOME | End: 2024-04-30
Attending: ORTHOPAEDIC SURGERY | Admitting: ORTHOPAEDIC SURGERY
Payer: MEDICARE

## 2024-04-29 DIAGNOSIS — M25.562 CHRONIC PAIN OF LEFT KNEE: ICD-10-CM

## 2024-04-29 DIAGNOSIS — E11.9 TYPE 2 DIABETES MELLITUS WITHOUT COMPLICATION, WITHOUT LONG-TERM CURRENT USE OF INSULIN: ICD-10-CM

## 2024-04-29 DIAGNOSIS — R26.89 BALANCE PROBLEM: ICD-10-CM

## 2024-04-29 DIAGNOSIS — M17.12 OSTEOARTHRITIS OF LEFT KNEE, UNSPECIFIED OSTEOARTHRITIS TYPE: Primary | ICD-10-CM

## 2024-04-29 DIAGNOSIS — G89.29 CHRONIC PAIN OF LEFT KNEE: ICD-10-CM

## 2024-04-29 DIAGNOSIS — M17.12 PRIMARY OSTEOARTHRITIS OF LEFT KNEE: ICD-10-CM

## 2024-04-29 DIAGNOSIS — Z74.09 DECREASED FUNCTIONAL MOBILITY AND ENDURANCE: ICD-10-CM

## 2024-04-29 DIAGNOSIS — R29.898 WEAKNESS OF BOTH LOWER EXTREMITIES: ICD-10-CM

## 2024-04-29 DIAGNOSIS — E03.9 HYPOTHYROIDISM, UNSPECIFIED TYPE: ICD-10-CM

## 2024-04-29 DIAGNOSIS — R53.81 PHYSICAL DECONDITIONING: ICD-10-CM

## 2024-04-29 DIAGNOSIS — E78.5 HYPERLIPIDEMIA, UNSPECIFIED HYPERLIPIDEMIA TYPE: ICD-10-CM

## 2024-04-29 LAB
POCT GLUCOSE: 112 MG/DL (ref 70–110)
POCT GLUCOSE: 176 MG/DL (ref 70–110)
POCT GLUCOSE: 190 MG/DL (ref 70–110)

## 2024-04-29 PROCEDURE — 25000003 PHARM REV CODE 250: Mod: HCNC | Performed by: ORTHOPAEDIC SURGERY

## 2024-04-29 PROCEDURE — 64447 NJX AA&/STRD FEMORAL NRV IMG: CPT | Mod: HCNC,59,LT | Performed by: ANESTHESIOLOGY

## 2024-04-29 PROCEDURE — 94799 UNLISTED PULMONARY SVC/PX: CPT | Mod: HCNC

## 2024-04-29 PROCEDURE — 63600175 PHARM REV CODE 636 W HCPCS: Mod: JG,HCNC | Performed by: ORTHOPAEDIC SURGERY

## 2024-04-29 PROCEDURE — 25000003 PHARM REV CODE 250: Mod: HCNC | Performed by: NURSE PRACTITIONER

## 2024-04-29 PROCEDURE — D9220A PRA ANESTHESIA: Mod: ANES,,, | Performed by: ANESTHESIOLOGY

## 2024-04-29 PROCEDURE — 25000003 PHARM REV CODE 250: Mod: HCNC

## 2024-04-29 PROCEDURE — D9220A PRA ANESTHESIA: Mod: CRNA,,, | Performed by: NURSE ANESTHETIST, CERTIFIED REGISTERED

## 2024-04-29 PROCEDURE — 37000008 HC ANESTHESIA 1ST 15 MINUTES: Mod: HCNC | Performed by: ORTHOPAEDIC SURGERY

## 2024-04-29 PROCEDURE — 63600175 PHARM REV CODE 636 W HCPCS: Mod: HCNC | Performed by: NURSE ANESTHETIST, CERTIFIED REGISTERED

## 2024-04-29 PROCEDURE — 63600175 PHARM REV CODE 636 W HCPCS: Mod: JG,HCNC

## 2024-04-29 PROCEDURE — 36000711: Mod: HCNC | Performed by: ORTHOPAEDIC SURGERY

## 2024-04-29 PROCEDURE — 37000009 HC ANESTHESIA EA ADD 15 MINS: Mod: HCNC | Performed by: ORTHOPAEDIC SURGERY

## 2024-04-29 PROCEDURE — 71000033 HC RECOVERY, INTIAL HOUR: Mod: HCNC | Performed by: ORTHOPAEDIC SURGERY

## 2024-04-29 PROCEDURE — 71000016 HC POSTOP RECOV ADDL HR: Mod: HCNC | Performed by: ORTHOPAEDIC SURGERY

## 2024-04-29 PROCEDURE — 97161 PT EVAL LOW COMPLEX 20 MIN: CPT | Mod: HCNC

## 2024-04-29 PROCEDURE — 97165 OT EVAL LOW COMPLEX 30 MIN: CPT | Mod: HCNC

## 2024-04-29 PROCEDURE — 99900035 HC TECH TIME PER 15 MIN (STAT): Mod: HCNC

## 2024-04-29 PROCEDURE — 25000003 PHARM REV CODE 250: Mod: HCNC | Performed by: STUDENT IN AN ORGANIZED HEALTH CARE EDUCATION/TRAINING PROGRAM

## 2024-04-29 PROCEDURE — 20985 CPTR-ASST DIR MS PX: CPT | Mod: HCNC,,, | Performed by: ORTHOPAEDIC SURGERY

## 2024-04-29 PROCEDURE — 97530 THERAPEUTIC ACTIVITIES: CPT | Mod: HCNC

## 2024-04-29 PROCEDURE — 63600175 PHARM REV CODE 636 W HCPCS: Mod: HCNC | Performed by: ANESTHESIOLOGY

## 2024-04-29 PROCEDURE — C1776 JOINT DEVICE (IMPLANTABLE): HCPCS | Mod: HCNC | Performed by: ORTHOPAEDIC SURGERY

## 2024-04-29 PROCEDURE — C1713 ANCHOR/SCREW BN/BN,TIS/BN: HCPCS | Mod: HCNC | Performed by: ORTHOPAEDIC SURGERY

## 2024-04-29 PROCEDURE — 27447 TOTAL KNEE ARTHROPLASTY: CPT | Mod: HCNC,LT,, | Performed by: ORTHOPAEDIC SURGERY

## 2024-04-29 PROCEDURE — 25000003 PHARM REV CODE 250: Mod: HCNC | Performed by: ANESTHESIOLOGY

## 2024-04-29 PROCEDURE — 36000710: Mod: HCNC | Performed by: ORTHOPAEDIC SURGERY

## 2024-04-29 PROCEDURE — 63600175 PHARM REV CODE 636 W HCPCS: Mod: HCNC | Performed by: STUDENT IN AN ORGANIZED HEALTH CARE EDUCATION/TRAINING PROGRAM

## 2024-04-29 PROCEDURE — A4247 BETADINE/IODINE SWABS/WIPES: HCPCS | Mod: HCNC

## 2024-04-29 PROCEDURE — 71000015 HC POSTOP RECOV 1ST HR: Mod: HCNC | Performed by: ORTHOPAEDIC SURGERY

## 2024-04-29 PROCEDURE — 27201423 OPTIME MED/SURG SUP & DEVICES STERILE SUPPLY: Mod: HCNC | Performed by: ORTHOPAEDIC SURGERY

## 2024-04-29 PROCEDURE — 97535 SELF CARE MNGMENT TRAINING: CPT | Mod: HCNC

## 2024-04-29 PROCEDURE — 25000003 PHARM REV CODE 250: Mod: HCNC | Performed by: NURSE ANESTHETIST, CERTIFIED REGISTERED

## 2024-04-29 DEVICE — PIN PINABALL HEADLESS: Type: IMPLANTABLE DEVICE | Site: KNEE | Status: FUNCTIONAL

## 2024-04-29 DEVICE — CEMENT BONE SURG SMPLX P RADPQ: Type: IMPLANTABLE DEVICE | Site: KNEE | Status: FUNCTIONAL

## 2024-04-29 DEVICE — TIBIAL BASE 4 METALBACKED
Type: IMPLANTABLE DEVICE | Site: KNEE | Status: FUNCTIONAL
Brand: FREEDOM KNEE

## 2024-04-29 RX ORDER — IBUPROFEN 200 MG
16 TABLET ORAL
Status: DISCONTINUED | OUTPATIENT
Start: 2024-04-29 | End: 2024-04-30 | Stop reason: HOSPADM

## 2024-04-29 RX ORDER — SODIUM CHLORIDE, SODIUM LACTATE, POTASSIUM CHLORIDE, CALCIUM CHLORIDE 600; 310; 30; 20 MG/100ML; MG/100ML; MG/100ML; MG/100ML
INJECTION, SOLUTION INTRAVENOUS CONTINUOUS
Status: DISCONTINUED | OUTPATIENT
Start: 2024-04-29 | End: 2024-04-30 | Stop reason: HOSPADM

## 2024-04-29 RX ORDER — PREGABALIN 75 MG/1
75 CAPSULE ORAL 2 TIMES DAILY
Status: DISCONTINUED | OUTPATIENT
Start: 2024-04-29 | End: 2024-04-30 | Stop reason: HOSPADM

## 2024-04-29 RX ORDER — ATORVASTATIN CALCIUM 10 MG/1
10 TABLET, FILM COATED ORAL DAILY
Status: DISCONTINUED | OUTPATIENT
Start: 2024-04-30 | End: 2024-04-30 | Stop reason: HOSPADM

## 2024-04-29 RX ORDER — ACETAMINOPHEN 500 MG
1000 TABLET ORAL ONCE
Status: COMPLETED | OUTPATIENT
Start: 2024-04-29 | End: 2024-04-29

## 2024-04-29 RX ORDER — EPINEPHRINE 1 MG/ML
INJECTION, SOLUTION, CONCENTRATE INTRAVENOUS
Status: DISPENSED
Start: 2024-04-29 | End: 2024-04-29

## 2024-04-29 RX ORDER — TRANEXAMIC ACID 650 MG/1
1950 TABLET ORAL ONCE
Status: COMPLETED | OUTPATIENT
Start: 2024-04-29 | End: 2024-04-29

## 2024-04-29 RX ORDER — PHENYLEPHRINE HYDROCHLORIDE 10 MG/ML
INJECTION INTRAVENOUS
Status: DISCONTINUED | OUTPATIENT
Start: 2024-04-29 | End: 2024-04-29

## 2024-04-29 RX ORDER — LOSARTAN POTASSIUM 50 MG/1
100 TABLET ORAL DAILY
Status: DISCONTINUED | OUTPATIENT
Start: 2024-04-30 | End: 2024-04-30 | Stop reason: HOSPADM

## 2024-04-29 RX ORDER — PROCHLORPERAZINE EDISYLATE 5 MG/ML
5 INJECTION INTRAMUSCULAR; INTRAVENOUS EVERY 30 MIN PRN
Status: DISCONTINUED | OUTPATIENT
Start: 2024-04-29 | End: 2024-04-29 | Stop reason: HOSPADM

## 2024-04-29 RX ORDER — FAMOTIDINE 20 MG/1
20 TABLET, FILM COATED ORAL 2 TIMES DAILY
Status: DISCONTINUED | OUTPATIENT
Start: 2024-04-29 | End: 2024-04-29 | Stop reason: HOSPADM

## 2024-04-29 RX ORDER — CELECOXIB 100 MG/1
200 CAPSULE ORAL 2 TIMES DAILY
Status: DISCONTINUED | OUTPATIENT
Start: 2024-04-29 | End: 2024-04-29 | Stop reason: HOSPADM

## 2024-04-29 RX ORDER — SODIUM CHLORIDE 0.9 % (FLUSH) 0.9 %
10 SYRINGE (ML) INJECTION
Status: DISCONTINUED | OUTPATIENT
Start: 2024-04-29 | End: 2024-04-29 | Stop reason: HOSPADM

## 2024-04-29 RX ORDER — CEFAZOLIN SODIUM 1 G/3ML
INJECTION, POWDER, FOR SOLUTION INTRAMUSCULAR; INTRAVENOUS
Status: DISCONTINUED | OUTPATIENT
Start: 2024-04-29 | End: 2024-04-29

## 2024-04-29 RX ORDER — TRANEXAMIC ACID 10 MG/ML
1000 INJECTION, SOLUTION INTRAVENOUS ONCE
Status: DISCONTINUED | OUTPATIENT
Start: 2024-04-29 | End: 2024-04-29 | Stop reason: HOSPADM

## 2024-04-29 RX ORDER — NAPROXEN SODIUM 220 MG/1
81 TABLET, FILM COATED ORAL 2 TIMES DAILY
Qty: 84 TABLET | Refills: 0 | Status: DISCONTINUED | OUTPATIENT
Start: 2024-04-29 | End: 2024-04-29 | Stop reason: HOSPADM

## 2024-04-29 RX ORDER — NAPROXEN SODIUM 220 MG/1
81 TABLET, FILM COATED ORAL 2 TIMES DAILY
Status: DISCONTINUED | OUTPATIENT
Start: 2024-04-29 | End: 2024-04-30 | Stop reason: HOSPADM

## 2024-04-29 RX ORDER — TRANEXAMIC ACID 100 MG/ML
INJECTION, SOLUTION INTRAVENOUS
Status: DISCONTINUED | OUTPATIENT
Start: 2024-04-29 | End: 2024-04-29 | Stop reason: HOSPADM

## 2024-04-29 RX ORDER — TALC
6 POWDER (GRAM) TOPICAL NIGHTLY PRN
Status: DISCONTINUED | OUTPATIENT
Start: 2024-04-29 | End: 2024-04-29 | Stop reason: HOSPADM

## 2024-04-29 RX ORDER — ONDANSETRON HYDROCHLORIDE 2 MG/ML
INJECTION, SOLUTION INTRAVENOUS
Status: DISCONTINUED | OUTPATIENT
Start: 2024-04-29 | End: 2024-04-29

## 2024-04-29 RX ORDER — DEXAMETHASONE SODIUM PHOSPHATE 4 MG/ML
10 INJECTION, SOLUTION INTRA-ARTICULAR; INTRALESIONAL; INTRAMUSCULAR; INTRAVENOUS; SOFT TISSUE ONCE
Status: COMPLETED | OUTPATIENT
Start: 2024-04-29 | End: 2024-04-29

## 2024-04-29 RX ORDER — BISACODYL 10 MG/1
10 SUPPOSITORY RECTAL 2 TIMES DAILY PRN
Status: DISCONTINUED | OUTPATIENT
Start: 2024-04-29 | End: 2024-04-30 | Stop reason: HOSPADM

## 2024-04-29 RX ORDER — CEFAZOLIN SODIUM 2 G/50ML
2 SOLUTION INTRAVENOUS
Qty: 100 ML | Refills: 0 | Status: DISCONTINUED | OUTPATIENT
Start: 2024-04-29 | End: 2024-04-29

## 2024-04-29 RX ORDER — FAMOTIDINE 20 MG/1
20 TABLET, FILM COATED ORAL 2 TIMES DAILY
Status: DISCONTINUED | OUTPATIENT
Start: 2024-04-29 | End: 2024-04-30 | Stop reason: HOSPADM

## 2024-04-29 RX ORDER — CELECOXIB 100 MG/1
200 CAPSULE ORAL 2 TIMES DAILY
Status: DISCONTINUED | OUTPATIENT
Start: 2024-04-29 | End: 2024-04-30 | Stop reason: HOSPADM

## 2024-04-29 RX ORDER — PROPOFOL 10 MG/ML
VIAL (ML) INTRAVENOUS
Status: DISCONTINUED | OUTPATIENT
Start: 2024-04-29 | End: 2024-04-29

## 2024-04-29 RX ORDER — LIDOCAINE HYDROCHLORIDE 10 MG/ML
1 INJECTION, SOLUTION EPIDURAL; INFILTRATION; INTRACAUDAL; PERINEURAL ONCE
Status: DISCONTINUED | OUTPATIENT
Start: 2024-04-29 | End: 2024-04-29 | Stop reason: HOSPADM

## 2024-04-29 RX ORDER — ACETAMINOPHEN 325 MG/1
650 TABLET ORAL EVERY 6 HOURS
Status: DISCONTINUED | OUTPATIENT
Start: 2024-04-29 | End: 2024-04-29 | Stop reason: HOSPADM

## 2024-04-29 RX ORDER — BUPIVACAINE HYDROCHLORIDE 2.5 MG/ML
INJECTION, SOLUTION EPIDURAL; INFILTRATION; INTRACAUDAL
Status: DISCONTINUED | OUTPATIENT
Start: 2024-04-29 | End: 2024-04-29

## 2024-04-29 RX ORDER — ONDANSETRON HYDROCHLORIDE 2 MG/ML
4 INJECTION, SOLUTION INTRAVENOUS EVERY 12 HOURS PRN
Status: DISCONTINUED | OUTPATIENT
Start: 2024-04-29 | End: 2024-04-30 | Stop reason: HOSPADM

## 2024-04-29 RX ORDER — OXYCODONE HYDROCHLORIDE 5 MG/1
5 TABLET ORAL
Status: DISCONTINUED | OUTPATIENT
Start: 2024-04-29 | End: 2024-04-29 | Stop reason: HOSPADM

## 2024-04-29 RX ORDER — TRANEXAMIC ACID 10 MG/ML
1000 INJECTION, SOLUTION INTRAVENOUS EVERY 6 HOURS
Status: DISCONTINUED | OUTPATIENT
Start: 2024-04-29 | End: 2024-04-29 | Stop reason: HOSPADM

## 2024-04-29 RX ORDER — MIDAZOLAM HYDROCHLORIDE 1 MG/ML
INJECTION INTRAMUSCULAR; INTRAVENOUS
Status: DISCONTINUED | OUTPATIENT
Start: 2024-04-29 | End: 2024-04-29

## 2024-04-29 RX ORDER — AMLODIPINE BESYLATE 5 MG/1
5 TABLET ORAL DAILY
Status: DISCONTINUED | OUTPATIENT
Start: 2024-04-30 | End: 2024-04-30 | Stop reason: HOSPADM

## 2024-04-29 RX ORDER — DIPHENHYDRAMINE HCL 25 MG
25 CAPSULE ORAL EVERY 6 HOURS PRN
Status: DISCONTINUED | OUTPATIENT
Start: 2024-04-29 | End: 2024-04-30 | Stop reason: HOSPADM

## 2024-04-29 RX ORDER — LEVOTHYROXINE SODIUM 100 UG/1
200 TABLET ORAL
Status: DISCONTINUED | OUTPATIENT
Start: 2024-04-30 | End: 2024-04-30 | Stop reason: HOSPADM

## 2024-04-29 RX ORDER — PROPOFOL 10 MG/ML
VIAL (ML) INTRAVENOUS CONTINUOUS PRN
Status: DISCONTINUED | OUTPATIENT
Start: 2024-04-29 | End: 2024-04-29

## 2024-04-29 RX ORDER — AMOXICILLIN 250 MG
1 CAPSULE ORAL 2 TIMES DAILY
Status: DISCONTINUED | OUTPATIENT
Start: 2024-04-29 | End: 2024-04-30 | Stop reason: HOSPADM

## 2024-04-29 RX ORDER — ACETAMINOPHEN 325 MG/1
650 TABLET ORAL EVERY 6 HOURS
Status: DISCONTINUED | OUTPATIENT
Start: 2024-04-29 | End: 2024-04-30 | Stop reason: HOSPADM

## 2024-04-29 RX ORDER — INSULIN ASPART 100 [IU]/ML
0-10 INJECTION, SOLUTION INTRAVENOUS; SUBCUTANEOUS
Status: DISCONTINUED | OUTPATIENT
Start: 2024-04-29 | End: 2024-04-30 | Stop reason: HOSPADM

## 2024-04-29 RX ORDER — PREGABALIN 75 MG/1
150 CAPSULE ORAL ONCE
Status: COMPLETED | OUTPATIENT
Start: 2024-04-29 | End: 2024-04-29

## 2024-04-29 RX ORDER — TRANEXAMIC ACID 10 MG/ML
1000 INJECTION, SOLUTION INTRAVENOUS EVERY 6 HOURS
Qty: 200 ML | Refills: 0 | Status: COMPLETED | OUTPATIENT
Start: 2024-04-29 | End: 2024-04-30

## 2024-04-29 RX ORDER — DIPHENHYDRAMINE HCL 25 MG
25 CAPSULE ORAL EVERY 6 HOURS PRN
Status: DISCONTINUED | OUTPATIENT
Start: 2024-04-29 | End: 2024-04-29 | Stop reason: HOSPADM

## 2024-04-29 RX ORDER — POVIDONE-IODINE 10 %
SOLUTION, NON-ORAL TOPICAL
Status: DISCONTINUED | OUTPATIENT
Start: 2024-04-29 | End: 2024-04-29 | Stop reason: HOSPADM

## 2024-04-29 RX ORDER — BUPIVACAINE HYDROCHLORIDE 2.5 MG/ML
INJECTION, SOLUTION INFILTRATION; PERINEURAL
Status: DISCONTINUED | OUTPATIENT
Start: 2024-04-29 | End: 2024-04-29 | Stop reason: HOSPADM

## 2024-04-29 RX ORDER — CELECOXIB 100 MG/1
400 CAPSULE ORAL ONCE
Status: COMPLETED | OUTPATIENT
Start: 2024-04-29 | End: 2024-04-29

## 2024-04-29 RX ORDER — BISACODYL 10 MG/1
10 SUPPOSITORY RECTAL DAILY PRN
Status: DISCONTINUED | OUTPATIENT
Start: 2024-04-29 | End: 2024-04-29 | Stop reason: HOSPADM

## 2024-04-29 RX ORDER — AMOXICILLIN 250 MG
1 CAPSULE ORAL 2 TIMES DAILY
Status: DISCONTINUED | OUTPATIENT
Start: 2024-04-29 | End: 2024-04-29 | Stop reason: HOSPADM

## 2024-04-29 RX ORDER — SCOLOPAMINE TRANSDERMAL SYSTEM 1 MG/1
1 PATCH, EXTENDED RELEASE TRANSDERMAL
Status: DISCONTINUED | OUTPATIENT
Start: 2024-04-29 | End: 2024-04-29 | Stop reason: HOSPADM

## 2024-04-29 RX ORDER — HYDROMORPHONE HYDROCHLORIDE 2 MG/ML
0.2 INJECTION, SOLUTION INTRAMUSCULAR; INTRAVENOUS; SUBCUTANEOUS EVERY 5 MIN PRN
Status: DISCONTINUED | OUTPATIENT
Start: 2024-04-29 | End: 2024-04-29 | Stop reason: HOSPADM

## 2024-04-29 RX ORDER — ONDANSETRON HYDROCHLORIDE 2 MG/ML
4 INJECTION, SOLUTION INTRAVENOUS EVERY 12 HOURS PRN
Status: DISCONTINUED | OUTPATIENT
Start: 2024-04-29 | End: 2024-04-29 | Stop reason: HOSPADM

## 2024-04-29 RX ORDER — IBUPROFEN 200 MG
24 TABLET ORAL
Status: DISCONTINUED | OUTPATIENT
Start: 2024-04-29 | End: 2024-04-30 | Stop reason: HOSPADM

## 2024-04-29 RX ORDER — TALC
6 POWDER (GRAM) TOPICAL NIGHTLY PRN
Status: DISCONTINUED | OUTPATIENT
Start: 2024-04-29 | End: 2024-04-30 | Stop reason: HOSPADM

## 2024-04-29 RX ORDER — GLUCAGON 1 MG
1 KIT INJECTION
Status: DISCONTINUED | OUTPATIENT
Start: 2024-04-29 | End: 2024-04-30 | Stop reason: HOSPADM

## 2024-04-29 RX ORDER — CEFAZOLIN SODIUM 2 G/50ML
2 SOLUTION INTRAVENOUS EVERY 8 HOURS
Status: DISCONTINUED | OUTPATIENT
Start: 2024-04-29 | End: 2024-04-29 | Stop reason: SDUPTHER

## 2024-04-29 RX ORDER — PREGABALIN 75 MG/1
75 CAPSULE ORAL 2 TIMES DAILY
Status: DISCONTINUED | OUTPATIENT
Start: 2024-04-29 | End: 2024-04-29 | Stop reason: HOSPADM

## 2024-04-29 RX ORDER — CEFAZOLIN SODIUM 2 G/50ML
2 SOLUTION INTRAVENOUS ONCE
Status: DISCONTINUED | OUTPATIENT
Start: 2024-04-29 | End: 2024-04-29 | Stop reason: HOSPADM

## 2024-04-29 RX ADMIN — ACETAMINOPHEN 650 MG: 325 TABLET ORAL at 02:04

## 2024-04-29 RX ADMIN — MIDAZOLAM HYDROCHLORIDE 2 MG: 1 INJECTION, SOLUTION INTRAMUSCULAR; INTRAVENOUS at 07:04

## 2024-04-29 RX ADMIN — MEPIVACAINE HYDROCHLORIDE 3 ML: 15 INJECTION, SOLUTION EPIDURAL; INFILTRATION at 07:04

## 2024-04-29 RX ADMIN — SCOPOLAMINE 1 PATCH: 1.5 PATCH, EXTENDED RELEASE TRANSDERMAL at 06:04

## 2024-04-29 RX ADMIN — DOCUSATE SODIUM AND SENNOSIDES 1 TABLET: 8.6; 5 TABLET, FILM COATED ORAL at 09:04

## 2024-04-29 RX ADMIN — OXYCODONE 5 MG: 5 TABLET ORAL at 09:04

## 2024-04-29 RX ADMIN — TRANEXAMIC ACID 1000 MG: 10 INJECTION, SOLUTION INTRAVENOUS at 06:04

## 2024-04-29 RX ADMIN — CEFAZOLIN 2 G: 330 INJECTION, POWDER, FOR SOLUTION INTRAMUSCULAR; INTRAVENOUS at 07:04

## 2024-04-29 RX ADMIN — SODIUM CHLORIDE: 0.9 INJECTION, SOLUTION INTRAVENOUS at 07:04

## 2024-04-29 RX ADMIN — CELECOXIB 200 MG: 100 CAPSULE ORAL at 09:04

## 2024-04-29 RX ADMIN — ACETAMINOPHEN 650 MG: 325 TABLET ORAL at 07:04

## 2024-04-29 RX ADMIN — BUPIVACAINE HYDROCHLORIDE 20 ML: 2.5 INJECTION, SOLUTION EPIDURAL; INFILTRATION; INTRACAUDAL; PERINEURAL at 09:04

## 2024-04-29 RX ADMIN — PROPOFOL 70 MG: 10 INJECTION, EMULSION INTRAVENOUS at 07:04

## 2024-04-29 RX ADMIN — ONDANSETRON 4 MG: 2 INJECTION, SOLUTION INTRAMUSCULAR; INTRAVENOUS at 07:04

## 2024-04-29 RX ADMIN — INSULIN ASPART 2 UNITS: 100 INJECTION, SOLUTION INTRAVENOUS; SUBCUTANEOUS at 05:04

## 2024-04-29 RX ADMIN — ASPIRIN 81 MG CHEWABLE TABLET 81 MG: 81 TABLET CHEWABLE at 09:04

## 2024-04-29 RX ADMIN — ACETAMINOPHEN 1000 MG: 500 TABLET ORAL at 06:04

## 2024-04-29 RX ADMIN — PREGABALIN 75 MG: 75 CAPSULE ORAL at 09:04

## 2024-04-29 RX ADMIN — TRANEXAMIC ACID 1000 MG: 10 INJECTION, SOLUTION INTRAVENOUS at 02:04

## 2024-04-29 RX ADMIN — PHENYLEPHRINE HYDROCHLORIDE 100 MCG: 10 INJECTION INTRAVENOUS at 07:04

## 2024-04-29 RX ADMIN — PROPOFOL 80 MCG/KG/MIN: 10 INJECTION, EMULSION INTRAVENOUS at 07:04

## 2024-04-29 RX ADMIN — FAMOTIDINE 20 MG: 20 TABLET ORAL at 09:04

## 2024-04-29 RX ADMIN — CELECOXIB 400 MG: 100 CAPSULE ORAL at 06:04

## 2024-04-29 RX ADMIN — DEXAMETHASONE SODIUM PHOSPHATE 4 MG: 4 INJECTION, SOLUTION INTRAMUSCULAR; INTRAVENOUS at 07:04

## 2024-04-29 RX ADMIN — TRANEXAMIC ACID 1950 MG: 650 TABLET ORAL at 06:04

## 2024-04-29 RX ADMIN — PREGABALIN 150 MG: 75 CAPSULE ORAL at 06:04

## 2024-04-29 RX ADMIN — INSULIN ASPART 1 UNITS: 100 INJECTION, SOLUTION INTRAVENOUS; SUBCUTANEOUS at 10:04

## 2024-04-29 RX ADMIN — CEFAZOLIN SODIUM 2 G: 2 SOLUTION INTRAVENOUS at 03:04

## 2024-04-29 RX ADMIN — HYDROMORPHONE HYDROCHLORIDE 0.2 MG: 2 INJECTION, SOLUTION INTRAMUSCULAR; INTRAVENOUS; SUBCUTANEOUS at 10:04

## 2024-04-29 NOTE — PLAN OF CARE
Problem: Occupational Therapy  Goal: Occupational Therapy Goal  Description: Goals to be met by: 5/29/24     Patient will increase functional independence with ADLs by performing:    LE Dressing with Modified Traverse.  Grooming while standing with Modified Traverse.  Toileting from toilet with Modified Traverse for hygiene and clothing management.   Supine to sit with Modified Traverse.  Step transfer with Modified Traverse  Toilet transfer to toilet with Modified Traverse.    Outcome: Progressing       Pt would benefit from cont OT services in order to maximize functional independence. Pt seen post op x 2; pt remains with BLE buckling causing inability to safely participate In gait trials, t/fs, and /or ADLs. Pt unsafe to d/c this date. Recommending pt stay over night 2/2 inability to mobilize at this time. Will progress pt as able.

## 2024-04-29 NOTE — ANESTHESIA PROCEDURE NOTES
Peripheral Block    Patient location during procedure: post-op   Block not for primary anesthetic.  Reason for block: at surgeon's request and post-op pain management   Post-op Pain Location: L knee   Start time: 4/29/2024 9:40 AM  Timeout: 4/29/2024 9:40 AM   End time: 4/29/2024 9:45 AM    Staffing  Authorizing Provider: Alyssia Gonzalez MD  Performing Provider: Alyssia Gonzalez MD    Staffing  Performed by: Alyssia Gonzalez MD  Authorized by: Alyssia Gonzalez MD    Preanesthetic Checklist  Completed: patient identified, IV checked, site marked, risks and benefits discussed, surgical consent, monitors and equipment checked, pre-op evaluation and timeout performed  Peripheral Block  Patient position: supine  Prep: ChloraPrep  Patient monitoring: heart rate, cardiac monitor, continuous pulse ox, continuous capnometry and frequent blood pressure checks  Block type: adductor canal  Laterality: left  Injection technique: single shot  Needle  Needle type: Stimuplex   Needle gauge: 20 G  Needle length: 4 in  Needle localization: anatomical landmarks and ultrasound guidance   -ultrasound image captured on disc.  Assessment  Injection assessment: negative aspiration, negative parasthesia and local visualized surrounding nerve  Paresthesia pain: none  Heart rate change: no  Slow fractionated injection: yes    Medications:    Medications: bupivacaine (pf) (MARCAINE) injection 0.25% - Perineural   20 mL - 4/29/2024 9:43:00 AM    Additional Notes  VSS.  [facility] RN monitoring vitals throughout procedure.  Patient tolerated procedure well. Added 5mcg/ml of epinephrine to local solution

## 2024-04-29 NOTE — PLAN OF CARE
Patient attempted to walk with PT but was unable to do so, legs were too weak, will try again later

## 2024-04-29 NOTE — PROGRESS NOTES
VIRTUAL NURSE: Pt arrived to unit. Permission received per patient to turn camera to view patient. VIP model explained; patient informed this VN will be working with bedside nurse and the rest of the care team. Plan of care reviewed with patient.  Educated patient on VTE. Call light within reach, side rails up x2. Admission questions completed. Patient instucted to ask staff for assistance. Patient verbalized complete understanding. Patient denies complaints or any needs at this time. Will continue to be available and intervene as needed. Family at bedside.               04/29/24 1724   Admission   Initial VN Admission Questions Complete   Communication Issues? None   Shift   Virtual Nurse - Rounding Complete   Virtual Nurse - Patient Verbalized Approval Of Camera Use   Safety/Activity   Patient Rounds bed in low position;call light in patient/parent reach;clutter free environment maintained;visualized patient   Safety Promotion/Fall Prevention side rails raised x 2;family to remain at bedside   Positioning   Body Position supine   Head of Bed (HOB) Positioning HOB elevated

## 2024-04-29 NOTE — ANESTHESIA POSTPROCEDURE EVALUATION
Anesthesia Post Evaluation    Patient: Maria Elena Tavares    Procedure(s) Performed: Procedure(s) (LRB):  ARTHROPLASTY, KNEE, SIGHT ASSISTED (Left)    Final Anesthesia Type: spinal      Patient location during evaluation: PACU  Patient participation: Yes- Able to Participate  Level of consciousness: oriented and awake  Post-procedure vital signs: reviewed and stable  Pain management: adequate  Airway patency: patent    PONV status at discharge: No PONV  Anesthetic complications: no      Cardiovascular status: hemodynamically stable  Respiratory status: unassisted  Hydration status: euvolemic  Follow-up not needed.              Vitals Value Taken Time   /66 04/29/24 1004   Temp 36.3 °C (97.3 °F) 04/29/24 0940   Pulse 80 04/29/24 1010   Resp 18 04/29/24 1044   SpO2 95 % 04/29/24 1010   Vitals shown include unfiled device data.      Event Time   Out of Recovery 10:06:57         Pain/[place] Score: Pain Rating Prior to Med Admin: 7 (4/29/2024 10:44 AM)  [place] Score: 10 (4/29/2024 10:00 AM)

## 2024-04-29 NOTE — CONSULTS
Consult note  [facility]    Consulted by:Dr. Олег Beck - [facility]  Reason for Consult: Medical co-management    History of Present Illness:  Patient is a 68 y.o. female with past medical history of hypertension, hyperlipidemia, hypothyroidism, breast cancer status post treatment now in remission, type 2 diabetes who presents today 4/29/24 for left TKA secondary to bone-on-bone osteoarthritis, with Dr. Reece and Dr. Beck.  Surgery went well with no immediate complications, patient was able to work with PT partially however was noted to have persistent postop weakness requiring overnight stay.  [facility] consulted for medical comanagement.  Current Facility-Administered Medications   Medication Dose Route Frequency Provider Last Rate Last Admin    [START ON 4/30/2024] amLODIPine tablet 5 mg  5 mg Oral Daily Shankar Herrera MD        [START ON 4/30/2024] atorvastatin tablet 10 mg  10 mg Oral Daily Shankar Herrera MD        ceFAZolin 2 g in dextrose 5 % in water (D5W) 50 mL IVPB (MB+)  2 g Intravenous Q8H Jamshid Reece MD        dextrose 10% bolus 125 mL 125 mL  12.5 g Intravenous PRN Shankar Herrera MD        dextrose 10% bolus 250 mL 250 mL  25 g Intravenous PRN Shankar Herrera MD        EPINEPHrine (PF) (ADRENALIN) 1 mg/mL (1 mL) injection             glucagon (human recombinant) injection 1 mg  1 mg Intramuscular PRN Shankar Herrera MD        glucose chewable tablet 16 g  16 g Oral PRN Shankar Herrera MD        glucose chewable tablet 24 g  24 g Oral PRN Shankar Herrera MD        insulin aspart U-100 pen 0-10 Units  0-10 Units Subcutaneous QID (AC + HS) PRN Shankar Herrera MD        lactated ringers infusion   Intravenous Continuous Karishma De La O, DNP        [START ON 4/30/2024] levothyroxine tablet 200 mcg  200 mcg Oral Before breakfast Shankar Herrera MD        [START ON 4/30/2024] losartan tablet 100 mg  100 mg Oral Daily Shankar Herrera MD        tranexamic acid (CYKLOKAPRON) 1,000 mg in sodium chloride 0.9  % 100 mL IVPB (MB+)  1,000 mg Intravenous Q6H Олег Beck MD           Review of patient's allergies indicates:   Allergen Reactions    Amoxil [amoxicillin] Rash       Past Medical History:   Diagnosis Date    BMI 37.0-37.9, adult     Breast cancer 2019     Left breast, IDC with lymph node metastisis    Colon polyps     Colon polyps     Diabetes mellitus type II     Diverticulosis     history of diverticulosisseen on colonoscopy at age 48. Repeat recommended at age 58. Done by     Elevated blood protein     History of elevated protein. Apparently has seen  for extensive workup including bone marrow biopsy    History of shingles 2006    Hyperlipidemia     Hypertension     Microalbuminuria     due 2 diabetes    Mild vitamin D deficiency     . A low vitamin D    Primary osteoarthritis of left knee 2023    Thyroid disease     hypothyroidism    Usual hyperplasia of lactiferous duct     Not sure     Past Surgical History:   Procedure Laterality Date    BREAST BIOPSY Left 2019    IDC with mets to node    BREAST BIOPSY Right 2019    core bx, benign node    BREAST BIOPSY Left 10/14/2019    MRI Core bx, + IDC    BREAST BIOPSY Left 10/08/2019    Core bx, ADH     SECTION      COLONOSCOPY N/A 10/08/2020    Procedure: COLONOSCOPY;  Surgeon: Shreya Mendoza MD;  Location: [facility] (4TH FLR);  Service: Endoscopy;  Laterality: N/A;  COVID screening on 10/5/20 [facility]    HYSTERECTOMY      INSERTION OF BREAST TISSUE EXPANDER Bilateral 2020    Procedure: INSERTION, TISSUE EXPANDER, BREAST BILATERAL;  Surgeon: Michael Solorzano MD;  Location: [facility] OR 2ND FLR;  Service: Plastics;  Laterality: Bilateral;    INSERTION OF TUNNELED CENTRAL VENOUS CATHETER (CVC) WITH SUBCUTANEOUS PORT Right 10/04/2019    Procedure: [id number] RIGHT (CONSENT AM OF) 1.0 hr case;  Surgeon: Elena Lopez MD;  Location: [facility] OR 2ND FLR;  Service: General;   Laterality: Right;    OOPHORECTOMY      SENTINEL LYMPH NODE BIOPSY Left 03/24/2020    Procedure: BIOPSY, LYMPH NODE, SENTINEL LEFT;  Surgeon: Elena Lopez MD;  Location: [address];  Service: General;  Laterality: Left;    SIMPLE MASTECTOMY Bilateral 03/24/2020    Procedure: MASTECTOMY, SIMPLE BILATERAL;  Surgeon: Elena Lopez MD;  Location: [address];  Service: General;  Laterality: Bilateral;    TISSUE EXPANDER REMOVAL Bilateral 07/30/2020    Procedure: REMOVAL, TISSUE EXPANDER;  Surgeon: Michael Solorzano MD;  Location: [address];  Service: Plastics;  Laterality: Bilateral;     Family History   Problem Relation Name Age of Onset    No Known Problems Paternal Grandfather      No Known Problems Paternal Grandmother      No Known Problems Maternal Grandmother      Lung cancer Maternal Grandfather      Cancer Father      Lung cancer Father      Cancer Mother          lung ca heavy smoker    Lung cancer Mother      Hypertension Sister      No Known Problems Sister      Hypothyroidism Sister      No Known Problems Daughter      Diabetes Maternal Aunt      Cancer Maternal Aunt      Breast cancer Paternal Aunt Not sure     No Known Problems Paternal Uncle      Amblyopia Neg Hx      Blindness Neg Hx      Cataracts Neg Hx      Glaucoma Neg Hx      Macular degeneration Neg Hx      Retinal detachment Neg Hx      Strabismus Neg Hx      Stroke Neg Hx      Thyroid disease Neg Hx      Ovarian cancer Neg Hx      Colon cancer Neg Hx      Cervical cancer Neg Hx      Uterine cancer Neg Hx       Social History     Tobacco Use    Smoking status: Never     Passive exposure: Never    Smokeless tobacco: Never    Tobacco comments:     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  She walks occasionally.   Substance Use Topics    Alcohol use: Not Currently     Comment: Occasionally    Drug use: No        Review of Systems:   Review of Systems   Constitutional:  Negative for chills and  fever.   HENT:  Negative for congestion, ear pain, sinus pain and sore throat.    Eyes:  Negative for blurred vision, pain and redness.   Respiratory:  Negative for cough, shortness of breath and wheezing.    Cardiovascular:  Negative for chest pain, palpitations and orthopnea.   Gastrointestinal:  Negative for diarrhea, heartburn, nausea and vomiting.   Genitourinary:  Negative for dysuria and hematuria.   Musculoskeletal:  Positive for joint pain. Negative for myalgias and neck pain.   Skin:  Negative for itching and rash.   Neurological:  Negative for dizziness, weakness and headaches.   Psychiatric/Behavioral:  Negative for depression and suicidal ideas.       OBJECTIVE:     Vital Signs (Most Recent)  Temp: 98 °F (36.7 °C) (04/29/24 1630)  Pulse: 69 (04/29/24 1709)  Resp: 18 (04/29/24 1709)  BP: (!) 108/58 (04/29/24 1709)  SpO2: 98 % (04/29/24 1709)    Physical Exam  Vitals and nursing note reviewed.   Constitutional:       Appearance: Normal appearance.   HENT:      Head: Normocephalic and atraumatic.      Right Ear: Tympanic membrane normal.      Left Ear: Tympanic membrane normal.      Mouth/Throat:      Mouth: Mucous membranes are moist.      Pharynx: Oropharynx is clear.   Eyes:      Extraocular Movements: Extraocular movements intact.      Pupils: Pupils are equal, round, and reactive to light.   Cardiovascular:      Rate and Rhythm: Normal rate and regular rhythm.      Pulses: Normal pulses.      Heart sounds: Normal heart sounds.   Pulmonary:      Effort: Pulmonary effort is normal.      Breath sounds: Normal breath sounds.   Abdominal:      General: Abdomen is flat.      Palpations: Abdomen is soft.   Musculoskeletal:         General: Normal range of motion.      Cervical back: Normal range of motion.      Comments: Left knee incision dressed, clean dry intact, neurovascularly intact distally   Skin:     General: Skin is warm.   Neurological:      General: No focal deficit present.      Mental Status:  She is alert and oriented to person, place, and time.   Psychiatric:         Mood and Affect: Mood normal.          Laboratory  Lab Results   Component Value Date    WBC 5.85 03/19/2024    HGB 13.0 03/19/2024    HCT 40.7 03/19/2024    MCV 93 03/19/2024     03/19/2024      CMP  Sodium   Date Value Ref Range Status   03/19/2024 140 136 - 145 mmol/L Final     Potassium   Date Value Ref Range Status   03/19/2024 3.6 3.5 - 5.1 mmol/L Final     Chloride   Date Value Ref Range Status   03/19/2024 103 95 - 110 mmol/L Final     CO2   Date Value Ref Range Status   03/19/2024 24 23 - 29 mmol/L Final     Glucose   Date Value Ref Range Status   03/19/2024 101 70 - 110 mg/dL Final     BUN   Date Value Ref Range Status   03/19/2024 19 8 - 23 mg/dL Final     Creatinine   Date Value Ref Range Status   03/19/2024 0.9 0.5 - 1.4 mg/dL Final     Calcium   Date Value Ref Range Status   03/19/2024 10.0 8.7 - 10.5 mg/dL Final     Total Protein   Date Value Ref Range Status   03/19/2024 8.9 (H) 6.0 - 8.4 g/dL Final     Albumin   Date Value Ref Range Status   03/19/2024 4.0 3.5 - 5.2 g/dL Final   03/19/2024 4.0 3.5 - 5.2 g/dL Final     Total Bilirubin   Date Value Ref Range Status   03/19/2024 0.5 0.1 - 1.0 mg/dL Final     Comment:     For infants and newborns, interpretation of results should be based  on gestational age, weight and in agreement with clinical  observations.    Premature Infant recommended reference ranges:  Up to 24 hours.............<8.0 mg/dL  Up to 48 hours............<12.0 mg/dL  3-5 days..................<15.0 mg/dL  6-29 days.................<15.0 mg/dL       Alkaline Phosphatase   Date Value Ref Range Status   03/19/2024 134 55 - 135 U/L Final     AST   Date Value Ref Range Status   03/19/2024 25 10 - 40 U/L Final     ALT   Date Value Ref Range Status   03/19/2024 22 10 - 44 U/L Final     Anion Gap   Date Value Ref Range Status   03/19/2024 13 8 - 16 mmol/L Final     eGFR if    Date Value Ref  "Range Status   07/11/2022 >60.0 >60 mL/min/1.73 m^2 Final     eGFR if non    Date Value Ref Range Status   07/11/2022 >60.0 >60 mL/min/1.73 m^2 Final     Comment:     Calculation used to obtain the estimated glomerular filtration  rate (eGFR) is the CKD-EPI equation.           Lab Results   Component Value Date    INR 1.3 (H) 03/19/2024      No results for input(s): "CPK", "CPKMB", "TROPONINI", "MB" in the last 168 hours.   No results for input(s): "TROPONINI", "CKTOTAL", "CKMB" in the last 168 hours.  ABGs  No results for input(s): "PH", "PCO2", "PO2", "HCO3", "POCSATURATED", "BE" in the last 24 hours.  BNP  No results for input(s): "BNP" in the last 168 hours.    Urinalysis  No results for input(s): "COLORU", "CLARITYU", "SPECGRAV", "PHUR", "PROTEINUA", "GLUCOSEU", "BILIRUBINCON", "BLOODU", "WBCU", "RBCU", "BACTERIA", "MUCUS", "NITRITE", "LEUKOCYTESUR", "UROBILINOGEN", "HYALINECASTS" in the last 24 hours.   LAST HbA1c  Lab Results   Component Value Date    HGBA1C 6.3 (H) 03/19/2024         Diagnostic Results:  Labs: Reviewed  X-Ray: Reviewed      ASSESSMENT/PLAN:   68 y.o.female has a past medical history of BMI 37.0-37.9, adult, Breast cancer (09/05/2019), Colon polyps (2015), Colon polyps (2015), Diabetes mellitus type II, Diverticulosis, Elevated blood protein, History of shingles (2006), Hyperlipidemia, Hypertension, Microalbuminuria, Mild vitamin D deficiency, Primary osteoarthritis of left knee (8/31/2023), Thyroid disease, and Usual hyperplasia of lactiferous duct. here for left TKA requiring overnight stay and medical comanagement    Plan:    #L TKA  - continue work with physical therapy for dispo  -pain and postop anticoagulation regimen per primary  - recommend CBC, CMP if patient stays over 48 hours    #DM2   well-controlled on Mounjaro outpatient, last A1c 6.3    -ordered MDSSI while inpatient  -POCT glucose checks while inpatient  -ordered diabetic diet    #HTN  On amlodipine 5 mg and " irbesartan 300 mg at home took medications prior to surgery, has remained normotensive while inpatient    - ordered home amlodipine, substitute irbesartan with losartan 100 mg while inpatient  - continue to monitor BP    # Hx of L breast cancer  S/p bilateral mastectomy 3/24/20 and adjuvant XRT, on Femara qd maintenance since 5/2020    - Femara not on inpatient formulary, pt took medication day of surgery  - If staying > 24 hr can give home femara as nonformulary medication      #HLD  - ordered home atorvastatin 10 mg    #Hypothyroidism  - ordered home Synthroid 200 mcg    Family medicine will continue to follow. Please contact us if you have any further questions. Thank you for the consult.     Shankar Herrera MD, MPH  [facility], PGY-3

## 2024-04-29 NOTE — TRANSFER OF CARE
Anesthesia Transfer of Care Note    Patient: Maria Elena Tavares    Procedure(s) Performed: Procedure(s) (LRB):  ARTHROPLASTY, KNEE, SIGHT ASSISTED (Left)    Patient location: PACU    Anesthesia Type: general    Transport from OR: Transported from OR on room air with adequate spontaneous ventilation    Post pain: adequate analgesia    Post assessment: no apparent anesthetic complications    Post vital signs: stable    Level of consciousness: awake and alert    Nausea/Vomiting: no nausea/vomiting    Complications: none    Transfer of care protocol was followed    Last vitals: Visit Vitals  BP (!) 127/59 (BP Location: Right arm, Patient Position: Lying)   Pulse 79   Temp 36.7 °C (98.1 °F) (Tympanic)   Resp 16   Ht 5' (1.524 m)   Wt 79.4 kg (175 lb)   LMP  (LMP Unknown)   SpO2 98%   Breastfeeding No   BMI 34.18 kg/m²

## 2024-04-29 NOTE — ANESTHESIA PROCEDURE NOTES
Spinal    Diagnosis: L knee  Patient location during procedure: OR  Start time: 4/29/2024 7:06 AM  Timeout: 4/29/2024 7:05 AM  End time: 4/29/2024 7:13 AM    Staffing  Authorizing Provider: Alyssia Gonzalez MD  Performing Provider: Alyssia Gonzalez MD    Staffing  Performed by: Alyssia Gonzalez MD  Authorized by: Alyssia Gonzalez MD    Preanesthetic Checklist  Completed: patient identified, IV checked, site marked, risks and benefits discussed, surgical consent, monitors and equipment checked, pre-op evaluation and timeout performed  Spinal Block  Patient position: sitting  Prep: Betasept  Patient monitoring: heart rate, cardiac monitor, continuous pulse ox and continuous capnometry  Approach: midline  Location: L3-4  Injection technique: single shot  CSF Fluid: clear free-flowing CSF  Needle  Needle type: pencil-tip   Needle gauge: 25 G  Needle length: 3.5 in  Additional Documentation: incremental injection, negative aspiration for heme and no paresthesia on injection  Needle localization: anatomical landmarks  Medications:    Medications: mepivacaine (CARBOCAINE) injection 15 mg/mL (1.5%) - Other   3 mL - 4/29/2024 7:13:00 AM

## 2024-04-29 NOTE — OP NOTE
DATE OF PROCEDURE:4/29/2024     PREOPERATIVE DIAGNOSIS: left knee bone-on-bone osteoarthritis.     POSTOPERATIVE DIAGNOSIS: left knee bone-on-bone osteoarthritis.     PROCEDURE: Image less Computer-assisted left total knee arthroplasty with resurfaced patella.     ATTENDING SURGEON: Jamshid Reece M.D.   ASSISTANT: Dr. nino     Risk Adjustment: Please see media tab for any additional diagnoses faxed from my office related to theKA.    COMPLICATIONS: None.     Estimated Blood loss: <100cc    IMPLANTS:   1. JAVIER ortho tibial tray size 4   2. JAVIER ortho tibial insert, UC 9  mm height.   2. JAVIER OrthoFemoral component, size  E  left  3. JAVIER Ortho all-poly patella, size 28    4. bone cement x2     INDICATIONS FOR PROCEDURE: This is a 68 y.o. female with longstanding knee pain. They have failed nonoperative management including injections. Risks and benefits of total knee arthroplasty were explained to the patient. The patient agreed to move forward with total knee arthroplasty.     FINDINGS: The patient had a complete articular cartilage loss down to subchondral bone throughout the knee.     PROCEDURE IN DETAIL: The patient had adductor nerve block prior to entry to the OR. The patient was then brought to the Operating Room and spinal anesthesia started without any difficulties. The patient was placed supine on the operative table.  Knee was then prepped and draped in sterile fashion. Prior to incision, proper site and procedure as well as antibiotic administration was verified. AFCN and ISN nerves were then injected with marcaine. Anterior midline incision was created overlying center patella proximally to the medial border of the tibial tubercle distally. Skin, subcutaneous fat and deep fascial layer were sharply incised until we came to extensor mechanism retinaculum. Flap was then elevated medially to VMO and laterally to lateral border of patella. Knee was then aspirated and synovial fluid sent for cell count.  Medial parapatellar arthrotomy was injected with Marcaine and a medial parapatellar arthrotomy was then created. Synovium overlying the anterolateral distal femur was then excised as well as the infrapatellar tendon fat pad. Sleeve of soft tissue was elevated off the proximal medial tibia. Periphery of the patella was denervated using bovie cautery, Hohmann retractors then placed medially and laterally along the distal femur to protect the collateral ligaments. ACL and anterior horn of lateral meniscus were then transected. We then pinned our navigation tracker on to distal femur and then performed our anatomy survey for the femur. We placed distal femoral cutting guide such that we were perpendicular to mechanical axis, aiming for about 1 degree of flexion and about 9 mm of bony resection. Distal femur was then resected. Next, we placed AP sizing guide on distal femur and measured for a size  E femoral component. We then yuan out Whitesides line and placed our AP cutting block such that we were perpendicular to Whitesides line and created 2 pin holes in 3  degrees of external rotation relative to the posterior condylar axis.being parallel to transepicondylar axis and perpidicualar to barbys line. Resected posterior femoral bone measured approx. 3 mm in difference with the lateral piece thinner than the medial piece. Next, we subluxed the tibia forward and resected medial and lateral menisci. We then pinned our navigation tracker on to the proximal tibia and then performed our anatomy survey for tibia. We placed our proximal tibial cutting guide such that we were perpendicular to mechanical axis, aiming for about 1 to 2 mm of bony resection medially and about 4 degrees posterior slope. Proximal tibial bone was then resected and detached from posterior soft tissues using Bovie cautery. Next, with knee at 90 degrees, we placed a laminar  in lateral compartment and resected the ACL and the PCL as well as  small osteophytes posteriorly along the femur. We then injected the posterior capsule with marcaine. We then assessed our gaps using a 9  mm spacer block. With that spacer block, the knee came out to full extension with nice stability with varus and valgus stress, and nice symmetry between flexion and extension. We assessed our tibial cut and our alignment britton fell within the center of the ankle. Once we were happy with soft tissue sleeves and bony cuts, we then placed tibial protector on the proximal tibia and our chamfer cutting guide for the femur. We then created our chamfer cuts. We then placed our box cutting guide as lateral as possible while maintaining a symmetric condylar resection and reamed for our box cut. Next, we subluxed the tibia forward, and sized our proximal tibia for a size 4 baseplate, the center of which was rotated such that it was in line with medial third tibial tubercle. This was then pinned in place. We then trialed using a  9  UC trial polyethylene. With that polyethylene, the knee came out to full extension. We had nice stability with varus and valgus stress and nice symmetry between flexion and extension. Patella tracked centrally within trochlear groove. We then everted our patella and measured our patellar  thickness for 20 mm. We then resected down to approximately 12 mm of remaining bone. We then sized patella for a 28 patellar button. Three peg holes were then drilled for the patellar button and a patellar trial was then placed. We then assessed our tracking. Patella tracked centrally within trochlear groove. Once we were happy with all our trial components, we then removed all our trial components, except the tibial baseplate. We then reamed and broached for the tibial baseplate. We then drilled the sclerotic bone along the tibia using a short (4mm) drill bit as well. We then placed the knee in extension and examined the posterior aspect of knee for bleeding. Once we achieve  hemostasis, we then packed the knee and inflated the tourniquet. We then prepared our bony surfaces for cementation using pulse lavage antibiotic saline. Femoral component cement was then placed using a pressurized cement gun and then the femoral component impacted and all excess bony cement removed from the periphery and notch. Cement gun was then used to pressurize cement for the tibia the tibial component was impacted into place and all excess bony cement was removed from the periphery.We inserted the final tibial insert and then reduced the knee, everted the patella and cemented the patellar component on last. We then paused to allow for cement to harden. Once cement was hardened, we then let the tourniquet down and reexamined our gaps, stability and tracking; all of which remained unchanged. We then copiously irrigated the knee with pulse lavage betadine saline. We then closed our medial parapatellar arthrotomy with a running #2 barbed suture, subcutaneous deep fascial layer was closed with simple interrupted # 1 vicryl suture, subcutaneous skin with 2-0 Vicryl and incision with Dermabond and Steri-Strips, was then dressed with silver water proof dressing. The patient was then transferred to Recovery Room bed in stable condition.

## 2024-04-29 NOTE — PLAN OF CARE
Problem: Physical Therapy  Goal: Physical Therapy Goal  Description: Goals to be met by: 2024     Patient will increase functional independence with mobility by performin. Supine to sit with Modified [place]  2. Sit to supine with Modified [place]  3. Sit to stand transfer with Modified [place] using Rolling Walker  4. Bed to chair transfer with Modified [place] using Rolling Walker  5. Gait  x 60 feet with Modified [place] using Rolling Walker.   6. Ascend/descend 9 stairs with bilateral Handrails Stand-by Assistance and Contact Guard Assistance  7. Lower extremity exercise program x10 reps with supervision    Outcome: Progressing   Patient will benefit from cont PT services in order to maximize functional independence; pt seen post op x 2 visits, however, pt still with BLE buckling rendering pt unable to participate in transfers/amb/ADLs safely with high risk for falls; pt is unsafe to d/c this date; recommend pt remain in hospital over night to regain ability to transfer/amb; will progress as able.

## 2024-04-29 NOTE — PT/OT/SLP EVAL
Occupational Therapy   Evaluation/Treatment     Name: Maria Elena Tavares  MRN: [id number]  Admitting Diagnosis: <principal problem not specified>  Recent Surgery: Procedure(s) (LRB):  ARTHROPLASTY, KNEE, SIGHT ASSISTED (Left) Day of Surgery    Recommendations:     Discharge Recommendations: Low Intensity Therapy  Discharge Equipment Recommendations:  bedside commode, bath bench  Barriers to discharge:  None    Assessment:     Maria Elena Tavares is a 68 y.o. female with a medical diagnosis of <principal problem not specified>.  She presents with The primary encounter diagnosis was Osteoarthritis of left knee, unspecified osteoarthritis type. A diagnosis of Primary osteoarthritis of left knee was also pertinent to this visit.  . Performance deficits affecting function: weakness, impaired endurance, decreased ROM, impaired coordination, orthopedic precautions, impaired self care skills, impaired functional mobility, decreased lower extremity function, impaired skin, gait instability, impaired balance, pain.      Pt would benefit from cont OT services in order to maximize functional independence. Pt seen post op x 2; pt remains with BLE buckling causing inability to safely participate In gait trials, t/fs, and /or ADLs. Pt unsafe to d/c this date. Recommending pt stay over night 2/2 inability to mobilize at this time. Will progress pt as able.     Rehab Prognosis: Good; patient would benefit from acute skilled OT services to address these deficits and reach maximum level of function.       Plan:     Patient to be seen 5 x/week to address the above listed problems via self-care/home management, therapeutic activities, therapeutic exercises  Plan of Care Expires: 05/29/24  Plan of Care Reviewed with: patient, sibling    Subjective     Chief Complaint: need to waletr restroom; upset she is unable to walk post surgery   Patient/Family Comments/goals: return home     Occupational Profile:  Living Environment: alone ( but sister lives next  door to her in duplex), SSH, 9 steps to enter, B rails, t/s combo  Previous level of function: independent; drives; does not work   Equipment Used at Home: none (purchased RW for surgery)  Assistance upon Discharge: from sister     Pain/Comfort:  Pain Rating 1:  (did not rate)  Location - Side 1: Left  Location - Orientation 1: generalized  Location 1: knee  Pain Addressed 1: Reposition, Distraction, Cessation of Activity, Nurse notified  Pain Rating Post-Intervention 1:  (did not rate)    Patients cultural, spiritual, Evangelical conflicts given the current situation:      Objective:     Communicated with: nsg prior to session.  Patient found supine with   upon OT entry to room.    General Precautions: Standard, fall  Orthopedic Precautions: LLE weight bearing as tolerated  Braces: N/A  Respiratory Status: Room air    Occupational Performance:    Bed Mobility:    Patient completed Scooting/Bridging with stand by assistance and contact guard assistance  Patient completed Supine to Sit with stand by assistance and contact guard assistance  Patient completed Sit to Supine with stand by assistance and contact guard assistance    Functional Mobility/Transfers:  Patient completed Sit <> Stand Transfer with minimum assistance and moderate assistance  with  rolling walker   Functional Mobility: mod A of 2 attempts to step with RW 2/2 BLE buckling     Activities of Daily Living:  Toileting: total assistance      Cognitive/Visual Perceptual:  WFL     Physical Exam:  Balance:    -       WFL seated; poor standing   BUE WFL   Surgical L knee   Mld edema surgical     AMPAC 6 Click ADL:  AMPAC Total Score: 18    Treatment & Education:    FIRST session   Pt performing skills as listed above  Pt re-educated on impt of elevating LLE with correct placement of prop to encourage knee ext  Educated on use of ice pack/restricition/precautions   Pt performed bed mobility as above   Pt stood x 2 trials with RW; therapist demo'ing use of RW  prior to standing and hand placement   In stance, pt with BLE buckling despite use of BUEs; requires assist to safely sit EOB and prevent fall; buckling x 2 and pt unable to safely step/t/f despite blocking of surgical extremity   Returned to supine     Second session   Pt performed bed mobility CBA-SBA  Stood again x 2 trials; cues for hand placement   Pt noted again with BLE buckling with marching in place and tactile cues at L quad; pt requiring locking out in ext to hold self in stance, but unable to hold contraction for extended time period   Returned to supine 2/2 unsafe to perform further trials and LOB with trials   Placed on bed pan; pt able to perform bridging and assist with placement of bed pan     Patient left supine with all lines intact, call button in reach, and nsg notified    GOALS:   Multidisciplinary Problems       Occupational Therapy Goals          Problem: Occupational Therapy    Goal Priority Disciplines Outcome Interventions   Occupational Therapy Goal     OT, PT/OT Progressing    Description: Goals to be met by: 5/29/24     Patient will increase functional independence with ADLs by performing:    LE Dressing with Modified [place].  Grooming while standing with Modified [place].  Toileting from toilet with Modified [place] for hygiene and clothing management.   Supine to sit with Modified [place].  Step transfer with Modified [place]  Toilet transfer to toilet with Modified [place].                         History:     Past Medical History:   Diagnosis Date    BMI 37.0-37.9, adult     Breast cancer 09/05/2019     Left breast, IDC with lymph node metastisis    Colon polyps 2015    Colon polyps 2015    Diabetes mellitus type II     Diverticulosis     history of diverticulosisseen on colonoscopy at age 48. Repeat recommended at age 58. Done by     Elevated blood protein     History of elevated protein. Apparently has seen  for extensive workup  including bone marrow biopsy    History of shingles 2006    Hyperlipidemia     Hypertension     Microalbuminuria     due 2 diabetes    Mild vitamin D deficiency     . A low vitamin D    Primary osteoarthritis of left knee 2023    Thyroid disease     hypothyroidism    Usual hyperplasia of lactiferous duct     Not sure         Past Surgical History:   Procedure Laterality Date    BREAST BIOPSY Left 2019    IDC with mets to node    BREAST BIOPSY Right 2019    core bx, benign node    BREAST BIOPSY Left 10/14/2019    MRI Core bx, + IDC    BREAST BIOPSY Left 10/08/2019    Core bx, ADH     SECTION      COLONOSCOPY N/A 10/08/2020    Procedure: COLONOSCOPY;  Surgeon: Shreya Mendoza MD;  Location: [facility] ENDO (4TH FLR);  Service: Endoscopy;  Laterality: N/A;  COVID screening on 10/5/20 [facility]    HYSTERECTOMY      INSERTION OF BREAST TISSUE EXPANDER Bilateral 2020    Procedure: INSERTION, TISSUE EXPANDER, BREAST BILATERAL;  Surgeon: Michael Solorzano MD;  Location: [facility] OR 2ND FLR;  Service: Plastics;  Laterality: Bilateral;    INSERTION OF TUNNELED CENTRAL VENOUS CATHETER (CVC) WITH SUBCUTANEOUS PORT Right 10/04/2019    Procedure: [id number]-Z-CATH RIGHT (CONSENT AM OF) 1.0 hr case;  Surgeon: Elena Lopez MD;  [facility];  Service: General;  Laterality: Right;    OOPHORECTOMY      SENTINEL LYMPH NODE BIOPSY Left 2020    Procedure: BIOPSY, LYMPH NODE, SENTINEL LEFT;  Surgeon: Elena Lopez MD;  Location: [facility] OR [address] [place] [address];  Service: General;  Laterality: Left;    SIMPLE MASTECTOMY Bilateral 2020    Procedure: MASTECTOMY, SIMPLE BILATERAL;  Surgeon: Elena Lopez MD;  [facility];  Service: General;  Laterality: Bilateral;    TISSUE EXPANDER REMOVAL Bilateral 2020    Procedure: REMOVAL, TISSUE EXPANDER;  Surgeon: Michael Solorzano MD;  Location: [facility] OR [address] [place] [address];  Service: Plastics;  Laterality: Bilateral;       Time  Tracking:     OT Date of Treatment: 04/29/24  OT Start Time: 1326 (1449)  OT Stop Time: 1340 (1502)  OT Total Time (min): 14 min + 13 min     Billable Minutes:Evaluation 14  Self Care/Home Management 13 2nd session     4/29/2024

## 2024-04-30 ENCOUNTER — CLINICAL SUPPORT (OUTPATIENT)
Dept: REHABILITATION | Facility: HOSPITAL | Age: 69
End: 2024-04-30
Attending: ORTHOPAEDIC SURGERY
Payer: MEDICARE

## 2024-04-30 VITALS
RESPIRATION RATE: 18 BRPM | OXYGEN SATURATION: 97 % | DIASTOLIC BLOOD PRESSURE: 62 MMHG | SYSTOLIC BLOOD PRESSURE: 130 MMHG | HEART RATE: 74 BPM | WEIGHT: 178.56 LBS | TEMPERATURE: 97 F | HEIGHT: 60 IN | BODY MASS INDEX: 35.05 KG/M2

## 2024-04-30 DIAGNOSIS — M25.662 DECREASED RANGE OF MOTION (ROM) OF LEFT KNEE: Primary | ICD-10-CM

## 2024-04-30 DIAGNOSIS — R26.89 ANTALGIC GAIT: ICD-10-CM

## 2024-04-30 DIAGNOSIS — M17.12 PRIMARY OSTEOARTHRITIS OF LEFT KNEE: ICD-10-CM

## 2024-04-30 DIAGNOSIS — G89.29 CHRONIC PAIN OF LEFT KNEE: ICD-10-CM

## 2024-04-30 DIAGNOSIS — M25.562 CHRONIC PAIN OF LEFT KNEE: ICD-10-CM

## 2024-04-30 DIAGNOSIS — M62.81 QUADRICEPS WEAKNESS: ICD-10-CM

## 2024-04-30 PROBLEM — E78.5 HYPERLIPIDEMIA: Status: ACTIVE | Noted: 2024-04-30

## 2024-04-30 LAB
POCT GLUCOSE: 113 MG/DL (ref 70–110)
POCT GLUCOSE: 148 MG/DL (ref 70–110)

## 2024-04-30 PROCEDURE — 94799 UNLISTED PULMONARY SVC/PX: CPT | Mod: HCNC

## 2024-04-30 PROCEDURE — 25000003 PHARM REV CODE 250: Mod: HCNC | Performed by: STUDENT IN AN ORGANIZED HEALTH CARE EDUCATION/TRAINING PROGRAM

## 2024-04-30 PROCEDURE — 97530 THERAPEUTIC ACTIVITIES: CPT | Mod: HCNC

## 2024-04-30 PROCEDURE — 97530 THERAPEUTIC ACTIVITIES: CPT | Mod: HCNC,PN

## 2024-04-30 PROCEDURE — 97116 GAIT TRAINING THERAPY: CPT | Mod: HCNC

## 2024-04-30 PROCEDURE — 25000003 PHARM REV CODE 250: Mod: HCNC | Performed by: ORTHOPAEDIC SURGERY

## 2024-04-30 PROCEDURE — 97535 SELF CARE MNGMENT TRAINING: CPT | Mod: HCNC

## 2024-04-30 PROCEDURE — 97162 PT EVAL MOD COMPLEX 30 MIN: CPT | Mod: HCNC,PN

## 2024-04-30 PROCEDURE — 97140 MANUAL THERAPY 1/> REGIONS: CPT | Mod: HCNC,PN

## 2024-04-30 PROCEDURE — 63600175 PHARM REV CODE 636 W HCPCS: Mod: HCNC | Performed by: ORTHOPAEDIC SURGERY

## 2024-04-30 RX ORDER — CEPHALEXIN 500 MG/1
500 CAPSULE ORAL EVERY 6 HOURS
Qty: 4 CAPSULE | Refills: 0 | Status: SHIPPED | OUTPATIENT
Start: 2024-04-30 | End: 2024-05-01

## 2024-04-30 RX ORDER — FAMOTIDINE 20 MG/1
20 TABLET, FILM COATED ORAL 2 TIMES DAILY
Qty: 84 TABLET | Refills: 0 | Status: SHIPPED | OUTPATIENT
Start: 2024-04-30 | End: 2024-06-11

## 2024-04-30 RX ORDER — DICLOFENAC SODIUM 75 MG/1
75 TABLET, DELAYED RELEASE ORAL 2 TIMES DAILY
Qty: 84 TABLET | Refills: 0 | Status: SHIPPED | OUTPATIENT
Start: 2024-04-30 | End: 2024-07-17

## 2024-04-30 RX ORDER — ASPIRIN 81 MG/1
81 TABLET ORAL 2 TIMES DAILY
Qty: 84 TABLET | Refills: 0 | Status: SHIPPED | OUTPATIENT
Start: 2024-04-30 | End: 2024-06-11

## 2024-04-30 RX ORDER — ACETAMINOPHEN 325 MG/1
325 TABLET ORAL EVERY 4 HOURS
Qty: 84 TABLET | Refills: 0 | Status: SHIPPED | OUTPATIENT
Start: 2024-04-30 | End: 2024-05-14

## 2024-04-30 RX ORDER — TRANEXAMIC ACID 650 MG/1
650 TABLET ORAL 2 TIMES DAILY
Qty: 28 TABLET | Refills: 0 | Status: SHIPPED | OUTPATIENT
Start: 2024-04-30 | End: 2024-05-14

## 2024-04-30 RX ADMIN — TRANEXAMIC ACID 1000 MG: 10 INJECTION, SOLUTION INTRAVENOUS at 12:04

## 2024-04-30 RX ADMIN — PREGABALIN 75 MG: 75 CAPSULE ORAL at 08:04

## 2024-04-30 RX ADMIN — ASPIRIN 81 MG CHEWABLE TABLET 81 MG: 81 TABLET CHEWABLE at 08:04

## 2024-04-30 RX ADMIN — CELECOXIB 200 MG: 100 CAPSULE ORAL at 08:04

## 2024-04-30 RX ADMIN — ATORVASTATIN CALCIUM 10 MG: 10 TABLET, FILM COATED ORAL at 08:04

## 2024-04-30 RX ADMIN — FAMOTIDINE 20 MG: 20 TABLET ORAL at 08:04

## 2024-04-30 RX ADMIN — ACETAMINOPHEN 650 MG: 325 TABLET ORAL at 12:04

## 2024-04-30 RX ADMIN — DOCUSATE SODIUM AND SENNOSIDES 1 TABLET: 8.6; 5 TABLET, FILM COATED ORAL at 08:04

## 2024-04-30 RX ADMIN — AMLODIPINE BESYLATE 5 MG: 5 TABLET ORAL at 08:04

## 2024-04-30 RX ADMIN — CEFAZOLIN 2 G: 2 INJECTION, POWDER, FOR SOLUTION INTRAMUSCULAR; INTRAVENOUS at 12:04

## 2024-04-30 RX ADMIN — LEVOTHYROXINE SODIUM 200 MCG: 100 TABLET ORAL at 06:04

## 2024-04-30 RX ADMIN — LOSARTAN POTASSIUM 100 MG: 50 TABLET, FILM COATED ORAL at 08:04

## 2024-04-30 NOTE — PLAN OF CARE
Introduced as VN and will be reviewing discharge instructions.  Educated patient on reason for admission, home medication list, and discharge instructions including when to return to ED and the following doctor appointments.  Education per flowsheet.  Opportunity given for questions and questions answered.  Nurse  notified of   completion of discharge education.  Wheelchair requested

## 2024-04-30 NOTE — DISCHARGE SUMMARY
[facility] Surg  Short Stay  Discharge Summary    Admit Date: 4/29/2024    Discharge Date and Time: 4/30/24, 1011AM     Discharge Attending Physician: Jamshid Reece MD     Hospital Course (synopsis of major diagnoses, care, treatment, and services provided during the course of the hospital stay): Patient presented to [facility] on day of scheduled surgery and underwent left total knee arthroplasty, please see operative report for further detail. Patient did well postoperatively and was found to be fit for discharge on POD# 1. Discharged home.     Final Diagnoses:    Principal Problem:  Left knee osteoarthritis, status post left total knee arthroplasty    Discharged Condition: stable    Disposition: Home or Self Care    Follow up/Patient Instructions:    Medications:  Reconciled Home Medications:      Medication List        START taking these medications      acetaminophen 325 MG tablet  Commonly known as: TYLENOL  Take 1 tablet (325 mg total) by mouth every 4 (four) hours. for 14 days     aspirin 81 MG EC tablet  Commonly known as: ECOTRIN  Take 1 tablet (81 mg total) by mouth 2 (two) times a day.     diclofenac 75 MG EC tablet  Commonly known as: VOLTAREN  Take 1 tablet (75 mg total) by mouth 2 (two) times daily.     famotidine 20 MG tablet  Commonly known as: PEPCID  Take 1 tablet (20 mg total) by mouth 2 (two) times daily.     tranexamic acid 650 mg tablet  Commonly known as: LYSTEDA  Take 1 tablet (650 mg total) by mouth 2 (two) times daily. for 14 days            CONTINUE taking these medications      amLODIPine 5 MG tablet  Commonly known as: NORVASC  Take 1 tablet (5 mg total) by mouth once daily.     atorvastatin 10 MG tablet  Commonly known as: LIPITOR  Take 1 tablet (10 mg total) by mouth once daily.     blood-glucose meter kit  DISPENSE : BLOOD TEST STRIPS AND LANCETS PATIENT TEST BLOOD SUGARS TWICE DAILY  TEST STRIPS: 50 EACH, REFILL 5  LANCETS:  50 EACH , REFILL 5     ciclopirox 0.77 % Gel  Apply  "topically 2 (two) times daily. To thickened discolored toenails.     INTRAROSA 6.5 mg Inst  Generic drug: prasterone (dhea)  Place 1 suppository vaginally nightly.     irbesartan 300 MG tablet  Commonly known as: AVAPRO  Take 1 tablet by mouth once daily     letrozole 2.5 mg Tab  Commonly known as: FEMARA  Take 1 tablet (2.5 mg total) by mouth once daily.     levothyroxine 200 MCG tablet  Commonly known as: SYNTHROID  Take 1 tablet (200 mcg total) by mouth once daily.     MOUNJARO 10 mg/0.5 mL Pnij  Generic drug: tirzepatide  Inject 10 mg into the skin every 7 days.     polyethylene glycol 17 gram/dose powder  Commonly known as: GLYCOLAX  Mix 17 g (1 capful) with juice or water an drink 2 (two) times daily.     XIIDRA 5 % Dpet  Generic drug: lifitegrast  Apply 1 drop to  both eyes  2 (two) times daily.            ASK your doctor about these medications      cephALEXin 500 MG capsule  Commonly known as: KEFLEX  Take 1 capsule (500 mg total) by mouth every 6 (six) hours. for 1 day  Ask about: Should I take this medication?            Discharge Procedure Orders   WALKER FOR HOME USE     Order Specific Question Answer Comments   Type of Walker: Adult (5'4"-6'6")    With wheels? Yes    Height: 5'    Weight: 175    Length of need (1-99 months): 99    Please check all that apply: Patient's condition impairs ambulation.      Notify your health care provider if you experience any of the following:  temperature >100.4     Notify your health care provider if you experience any of the following:  persistent nausea and vomiting or diarrhea     Notify your health care provider if you experience any of the following:  severe uncontrolled pain     Notify your health care provider if you experience any of the following:  redness, tenderness, or signs of infection (pain, swelling, redness, odor or green/yellow discharge around incision site)     Notify your health care provider if you experience any of the following:  temperature " >100.4     Notify your health care provider if you experience any of the following:  persistent nausea and vomiting or diarrhea     Notify your health care provider if you experience any of the following:  severe uncontrolled pain     Notify your health care provider if you experience any of the following:  redness, tenderness, or signs of infection (pain, swelling, redness, odor or green/yellow discharge around incision site)     Weight bearing restrictions (specify):   Order Comments: WBAT okay for knee ROM

## 2024-04-30 NOTE — PLAN OF CARE
Problem: Physical Therapy  Goal: Physical Therapy Goal  Description: Goals to be met by: 2024     Patient will increase functional independence with mobility by performin. Supine to sit with Modified [place]  2. Sit to supine with Modified [place]  3. Sit to stand transfer with Modified [place] using Rolling Walker  4. Bed to chair transfer with Modified [place] using Rolling Walker  5. Gait  x 60 feet with Modified [place] using Rolling Walker.   6. Ascend/descend 9 stairs with bilateral Handrails Stand-by Assistance and Contact Guard Assistance  7. Lower extremity exercise program x10 reps with supervision    Outcome: Progressing   Patient progressing toward goals adequate to meet d/c from PT in acute setting; pt educated on proper technique for sit<>stand and amb using RW including turns and back stepping; educated on HEP which pt has from joint camp and emphasizing use of ice and elevation for swelling and pain, balance between rest and exercise/activity, performing ROM to decrease scar tissue formation; also educated in home safety, car transfers; pt plans to attend OP PT today; will d/c acute PT services.

## 2024-04-30 NOTE — PLAN OF CARE
CM met with pt - Pt is AAO x 3 - confirmed demographics   Pt lives in 1/2 double next door to her sister Suly Jeffers [phone]    Pt's sister will be primary caregiver and will transport pt to home at d/c    s/p L TKA  4/29; dx bone on bone Osteo.     Pt discussed OP therapy vs HH with therapy with Physical Therapist.  Pt has elected to continue with plan for OP therapy.     Per therapy recc -- BSC and Tub Bench   CM confirmed with [place] with [facility] -- pt's insurance will not cover either item.    Pt updated - she will purchase these items.   Pt's own RW is at pt's bedside.          04/30/24 1146   Discharge Planning   Assessment Type Discharge Planning Brief Assessment   Resource/Environmental Concerns none   Support Systems Family members   Equipment Currently Used at Home walker, rolling   Current Living Arrangements home   Patient/Family Anticipates Transition to home;home with family   Patient/Family Anticipated Services at Transition   (outpt physical therapy)   DME Needed Upon Discharge    (BSC, TTb)   Discharge Plan A Home;Home with family  (oupt physical therapy)   Discharge Plan B Home;Home with family;Home Health

## 2024-04-30 NOTE — PT/OT/SLP PROGRESS
Occupational Therapy   Treatment/Dc Summary     Name: Maria Elena Tavares  MRN: [id number]  Admitting Diagnosis:  <principal problem not specified>  1 Day Post-Op    Recommendations:     Discharge Recommendations: Low Intensity Therapy  Discharge Equipment Recommendations:  shower chair  Barriers to discharge:  None    Assessment:     Maria Elena Tavares is a 68 y.o. female with a medical diagnosis of <principal problem not specified>.  She presents with The primary encounter diagnosis was Osteoarthritis of left knee, unspecified osteoarthritis type. A diagnosis of Primary osteoarthritis of left knee was also pertinent to this visit.  . Performance deficits affecting function are pain, gait instability, decreased ROM, impaired functional mobility, impaired self care skills.     Pt has progressed towards goals adequate to meet d/c from OT services in acute setting. . Pt educated on adaptive dsg post surgery, home safety, car/toilet/shower/tub t/fs, use of DME for functional mobility and ADLs. Pt to attend OP PT D/c OT     Rehab Prognosis:  Good; patient would benefit from acute skilled OT services to address these deficits and reach maximum level of function.       Plan:     Patient to be seen  (pt set to d/c today's date) to address the above listed problems via self-care/home management, therapeutic activities, therapeutic exercises  Plan of Care Expires: 05/29/24  Plan of Care Reviewed with: patient    Subjective     Chief Complaint: pt reports she is anxious   Patient/Family Comments/goals: return to independent PLOF   Pain/Comfort:  Pain Rating 1: 7/10  Location - Side 1: Left  Location - Orientation 1: generalized  Location 1: knee  Pain Addressed 1: Reposition, Distraction, Cessation of Activity, Nurse notified  Pain Rating Post-Intervention 1:  (di dnot rate; asking for medication)    Objective:     Communicated with: nsg prior to session.  Patient found supine with   upon OT entry to room.    General Precautions: Standard,  fall    Orthopedic Precautions:LLE weight bearing as tolerated  Braces: N/A  Respiratory Status: Room air     Occupational Performance:     Bed Mobility:    Patient completed Scooting/Bridging with stand by assistance  Patient completed Supine to Sit with stand by assistance     Functional Mobility/Transfers:  Patient completed Sit <> Stand Transfer with contact guard assistance  with  rolling walker   Patient completed Bed <> Chair Transfer using Step Transfer technique with stand by assistance and contact guard assistance with rolling walker  Patient completed Toilet Transfer Step Transfer technique with stand by assistance and contact guard assistance with  rolling walker  Functional Mobility: CGa with RW progressing to SBA with RW; cues for 3 pt gait, cues for safety with direction changes and navigating in small/tight spaces with RW     Activities of Daily Living:  Grooming: stand by assistance and contact guard assistance at sink   Lower Body Dressing: moderate assistance naseem underwear   Toileting: minimum assistance clothing management       [facility] 6 Click ADL: 20    Treatment & Education:  Pt performing skills as listed above  Pt re-educated on impt of elevating LLE with correct placement of prop to encourage knee ext  Educated on use of ice pack/restricition/precautions   Therapist demonstrating car t/f and safety   Adjustment of RW for appropriate height   Functional mobility performed in room/ hallway with adaptive sequence following therapist demonstration   Educated on home safety with showers and/or shower t/f; educated on placement/use of DME (shower chair, RW, BSC, etc)   Toileting performed with assist for clothing management;  Adaptive LBD performed while seated on toilet following therapist demonstration  Stood at sink x 2 during session for G&H axs   Pt performed stair trng following therapist demo and instruction; demo'd use of 1 vs 2 rails   Increased time spent discussing d/c options with pt  and preference over HH vs OP  Communicated with MD/resident and CM during session as well for d/c plan       Patient left up in chair with all lines intact, call button in reach, nsg notified, and dghtr present    GOALS:   Multidisciplinary Problems       Occupational Therapy Goals          Problem: Occupational Therapy    Goal Priority Disciplines Outcome Interventions   Occupational Therapy Goal     OT, PT/OT Progressing    Description: Goals to be met by: 5/29/24     Patient will increase functional independence with ADLs by performing:    LE Dressing with Modified [place].  Grooming while standing with Modified [place].  Toileting from toilet with Modified [place] for hygiene and clothing management.   Supine to sit with Modified [place].  Step transfer with Modified [place]  Toilet transfer to toilet with Modified [place].                         Time Tracking:     OT Date of Treatment: 04/30/24  OT Start Time: 0844  OT Stop Time: 0946  OT Total Time (min): 62 min    Billable Minutes:Self Care/Home Management 31  Therapeutic Activity 31    OT/YANA: OT     Number of YANA visits since last OT visit: 0    4/30/2024

## 2024-04-30 NOTE — PT/OT/SLP EVAL
Physical Therapy Evaluation    Patient Name:  Maria Elena Tavares   MRN:  [id number]    Recommendations:     Discharge Recommendations: Low Intensity Therapy   Discharge Equipment Recommendations: bedside commode, bath bench   Barriers to discharge: None    Assessment:     Maria Elena Tavares is a 68 y.o. female admitted with a medical diagnosis of The primary encounter diagnosis was Osteoarthritis of left knee, unspecified osteoarthritis type. A diagnosis of Primary osteoarthritis of left knee was also pertinent to this visit. .  She presents with the following impairments/functional limitations: weakness, impaired endurance, impaired self care skills, impaired functional mobility, gait instability, impaired balance, decreased ROM, decreased lower extremity function, impaired skin, pain, impaired coordination Patient will benefit from cont PT services in order to maximize functional independence; pt seen post op x 2 visits, however, pt still with BLE buckling rendering pt unable to participate in transfers/amb/ADLs safely with high risk for falls; pt is unsafe to d/c this date; recommend pt remain in hospital over night to regain ability to transfer/amb; will progress as able. .    Rehab Prognosis: Good; patient would benefit from acute skilled PT services to address these deficits and reach maximum level of function.    Recent Surgery: Procedure(s) (LRB):  ARTHROPLASTY, KNEE, SIGHT ASSISTED (Left) Day of Surgery    Plan:     During this hospitalization, patient to be seen daily to address the identified rehab impairments via gait training, therapeutic activities, therapeutic exercises, neuromuscular re-education and progress toward the following goals:    Plan of Care Expires:  05/29/24    Subjective     Chief Complaint: needs to use restroom; wants to be able to walk post sx  Patient/Family Comments/goals: return to home  Pain/Comfort:  Pain Rating 1:  (did not rate)  Location - Side 1: Left  Location - Orientation 1:  generalized  Location 1: knee  Pain Addressed 1: Reposition, Distraction, Cessation of Activity, Nurse notified  Pain Rating Post-Intervention 1:  (did not rate)    Patients cultural, spiritual, Rastafarian conflicts given the current situation: no    Living Environment:  alone ( but sister lives next door to her in duplex), SSH, 9 steps to enter, B rails, t/s combo  Previous level of function: independent; drives; does not work   Equipment Used at Home: none (purchased RW for surgery)  Assistance upon Discharge: from sister     Objective:     Communicated with nurse prior to session.  Patient found HOB elevated with peripheral IV (port A cath)  upon PT entry to room.    General Precautions: Standard, fall  Orthopedic Precautions:LLE weight bearing as tolerated   Braces: N/A  Respiratory Status: Room air    Exams:  Cognitive Exam:  Patient is oriented to Person, Place, Time, Situation, and follows one and two step commands  Gross Motor Coordination:  impaired 2/2 buckling of LEs with standing  Postural Exam:  Patient presented with the following abnormalities:    -       Rounded shoulders  -       Forward head  Skin Integrity/Edema:      -       Skin integrity: Wound surgical L knee  -       Edema: Mild L knee  RLE ROM: WFL  RLE Strength: 4 to 4+ grossly  LLE ROM: hip/ankle WFL, knee~0-80*  LLE Strength: pt able to SLR with difficulty lifting; moving LLE throughout ROM actively    Functional Mobility:  Bed Mobility:     Scooting: stand by assistance and contact guard assistance  Supine to Sit: stand by assistance and contact guard assistance  Sit to Supine: stand by assistance and contact guard assistance  Transfers:     Sit to Stand:  minimum assistance and moderate assistance with rolling walker  Gait: modA of 2 persons for attempts to step with RW 2/2 buckling  Balance: sit static/dynamic~fair; stand static/dynamic~poor    AM-PAC 6 CLICK MOBILITY  Total Score:13    Treatment & Education:  First Session:  Patient  performed skills as listed above; pt educated on elevating LLE with pillow from below knee to ankle; educated on use of ice pack including precautions; pt performed bed mobility as described above; demonstratio of RW use provided for sit <>stand; sit to stand x 2 trials; pt with BLE buckling requiring assist of 2 persons to prevent fall and safely sit; buckling x 2 trials and pt unable to take steps; returned to supine  Second Session:   Pt performed bed mobility with CGA/SBA; pt stoodx 2 trials again; VCs for hand placement; BLE buckled again with marching in place and tactile cues to L quad; pt requiring locking out L knee into extension to hold stance position but unable to hold for extended time; returned to supine 2/2 unsafe  to advance further with amb trials; pt placed on bedpan while performing bridging to assist.    Patient left supine with all lines intact, call button in reach, and nurse notified.    GOALS:   Multidisciplinary Problems       Physical Therapy Goals          Problem: Physical Therapy    Goal Priority Disciplines Outcome Goal Variances Interventions   Physical Therapy Goal     PT, PT/OT Progressing     Description: Goals to be met by: 2024     Patient will increase functional independence with mobility by performin. Supine to sit with Modified [place]  2. Sit to supine with Modified [place]  3. Sit to stand transfer with Modified [place] using Rolling Walker  4. Bed to chair transfer with Modified [place] using Rolling Walker  5. Gait  x 60 feet with Modified [place] using Rolling Walker.   6. Ascend/descend 9 stairs with bilateral Handrails Stand-by Assistance and Contact Guard Assistance  7. Lower extremity exercise program x10 reps with supervision                         History:     Past Medical History:   Diagnosis Date    BMI 37.0-37.9, adult     Breast cancer 2019     Left breast, IDC with lymph node metastisis    Colon polyps 2015    Colon  polyps     Diabetes mellitus type II     Diverticulosis     history of diverticulosisseen on colonoscopy at age 48. Repeat recommended at age 58. Done by     Elevated blood protein     History of elevated protein. Apparently has seen  for extensive workup including bone marrow biopsy    History of shingles 2006    Hyperlipidemia     Hypertension     Microalbuminuria     due 2 diabetes    Mild vitamin D deficiency     . A low vitamin D    Primary osteoarthritis of left knee 2023    Thyroid disease     hypothyroidism    Usual hyperplasia of lactiferous duct     Not sure       Past Surgical History:   Procedure Laterality Date    BREAST BIOPSY Left 2019    IDC with mets to node    BREAST BIOPSY Right 2019    core bx, benign node    BREAST BIOPSY Left 10/14/2019    MRI Core bx, + IDC    BREAST BIOPSY Left 10/08/2019    Core bx, ADH     SECTION      COLONOSCOPY N/A 10/08/2020    Procedure: COLONOSCOPY;  Surgeon: Shreya Mendoza MD;  Location: [facility]);  Service: Endoscopy;  Laterality: N/A;  COVID screening on 10/5/20 [facility] - [facility]    HYSTERECTOMY      INSERTION OF BREAST TISSUE EXPANDER Bilateral 2020    Procedure: INSERTION, TISSUE EXPANDER, BREAST BILATERAL;  Surgeon: Michael Solorzano MD;  Location: [facility] OR [address] [place] [address];  Service: Plastics;  Laterality: Bilateral;    INSERTION OF TUNNELED CENTRAL VENOUS CATHETER (CVC) WITH SUBCUTANEOUS PORT Right 10/04/2019    Procedure: [id number] RIGHT (CONSENT AM OF) 1.0 hr case;  Surgeon: Elena Lopez MD;  Location: [address];  Service: General;  Laterality: Right;    OOPHORECTOMY      SENTINEL LYMPH NODE BIOPSY Left 2020    Procedure: BIOPSY, LYMPH NODE, SENTINEL LEFT;  Surgeon: Elena Lopez MD;  Location: [address];  Service: General;  Laterality: Left;    SIMPLE MASTECTOMY Bilateral 2020    Procedure: MASTECTOMY, SIMPLE BILATERAL;  Surgeon: Elena Lopez MD;   Location: [facility] OR 2ND FLR;  Service: General;  Laterality: Bilateral;    TISSUE EXPANDER REMOVAL Bilateral 07/30/2020    Procedure: REMOVAL, TISSUE EXPANDER;  Surgeon: Michael Solorzano MD;  [facility];  Service: Plastics;  Laterality: Bilateral;       Time Tracking:     PT Received On: 04/29/24  PT Start Time: 1326   (1449)  PT Stop Time: 1340   (1502)  PT Total Time (min): 14 min, 13 min    Billable Minutes: Evaluation 14 first session  Therapeutic Activity 13 min second session      04/29/2024

## 2024-04-30 NOTE — PLAN OF CARE
Problem: Occupational Therapy  Goal: Occupational Therapy Goal  Description: Goals to be met by: 5/29/24     Patient will increase functional independence with ADLs by performing:    LE Dressing with Modified [place].  Grooming while standing with Modified [place].  Toileting from toilet with Modified [place] for hygiene and clothing management.   Supine to sit with Modified [place].  Step transfer with Modified [place]  Toilet transfer to toilet with Modified [place].    Outcome: Progressing       Pt has progressed towards goals adequate to meet d/c from OT services in acute setting. . Pt educated on adaptive dsg post surgery, home safety, car/toilet/shower/tub t/fs, use of DME for functional mobility and ADLs. Pt to attend OP PT D/c OT

## 2024-04-30 NOTE — PROGRESS NOTES
Future Appointments   Date Time Provider Department Center   4/30/2024  1:00 PM Steph, Irais, PT [facility] [place] [facility]   5/1/2024  8:00 AM Concetta Tam, GENEVIEVE [facility] [place]   5/1/2024 12:00 PM Damian Sneed, PT [facility] [place] [facility]   5/2/2024  1:00 PM Steph, Irais, PT [facility] [place] [facility]   5/7/2024 11:00 AM Steph, Irais, PT [facility] [place] [facility]   5/8/2024 10:00 AM Steph, Irais, PT [facility] [place] [facility]   5/9/2024 10:00 AM Steph, Irais, PT [facility] [place] [facility]   5/10/2024 11:00 AM Steph, Irais, PT [facility] [place] [facility]   5/13/2024  1:00 PM Damian Sneed, PT [facility] [place] [facility]   5/14/2024 10:45 AM Jamshid Reece MD [facility] [id number] [place] [facility]   5/15/2024  1:00 PM Steph, Irais, PT [facility] [place] [facility]   5/17/2024 12:00 PM Damian Sneed, PT [facility] [place] [facility]   5/20/2024  1:00 PM Damian Sneed, PT [facility] [place] [facility]   5/22/2024  1:00 PM Steph, Irais, PT [facility] [place] [facility]   5/24/2024 12:00 PM Steph, Irais, PT [facility] [place] [facility]   5/28/2024 10:00 AM Steph, Irais, PT [facility] [place] [facility]   5/30/2024 11:00 AM Steph, Irais, PT [facility] [place] [facility]   6/4/2024 10:00 AM Steph, Riais, PT [facility] [place] [facility]   6/6/2024 10:00 AM Steph, Irais, PT [facility] [place] [facility]

## 2024-04-30 NOTE — PLAN OF CARE
Pt for d/c to home today   Sister to transport to home   Discharging nurse to review all d/c meds/instructions   Pt to purchase bsc and ttb   Future Appointments   Date Time Provider Department Center   4/30/2024  1:00 PM Irais Choi, PT [facility] [place] [facility]   5/1/2024  8:00 AM Concetta Tam [facility]PC [facility] [place]   5/1/2024 12:00 PM Damian Sneed, PT [facility] [place] [facility]   5/2/2024  1:00 PM Irais Cohi, PT [facility] [place] [facility]   5/7/2024 11:00 AM StephIrais christensen, PT [facility] [place] [facility]   5/8/2024 10:00 AM StephIrais christensen, PT [facility] [place] [facility]   5/9/2024 10:00 AM Irais Choi, PT [facility] [place] [facility]   5/10/2024 11:00 AM Irais Choi, PT [facility] [place] [facility]   5/13/2024  1:00 PM Damian Sneed, PT [facility] [place] [facility]   5/14/2024 10:45 AM Jamshid Reece MD [facility] [id number] [place] [facility]   5/15/2024  1:00 PM Irais Choi, PT [facility] [place] [facility]   5/17/2024 12:00 PM Damian Sneed, PT [facility] [place] [facility]   5/20/2024  1:00 PM Damian Sneed, PT [facility] [place] [facility]   5/22/2024  1:00 PM Irais Choi, PT [facility] [place] [facility]   5/24/2024 12:00 PM Irais Choi, PT [facility] [place] [facility]   5/28/2024 10:00 AM StephIrais christensen, PT [facility] [place] [facility]   5/30/2024 11:00 AM StephIrais christensen, PT [facility] [place] [facility]   6/4/2024 10:00 AM StephIrais christensen, PT [facility] [place] [facility]   6/6/2024 10:00 AM Irais Choi PT [facility] [place] [facility]        04/30/24 1158   Final Note   Assessment Type Final Discharge Note   Anticipated Discharge Disposition Home  (OUTPT PHYSICAL THERAPY)   What phone number can be called within the next 1-3 days to see how you are doing after discharge? [phone]   Hospital Resources/Appts/Education Provided Appointments scheduled and added to AVS   Post-Acute Status   Discharge Delays None known at this time

## 2024-04-30 NOTE — PLAN OF CARE
[facility]   Physical Therapy Initial Evaluation      Name: Maria Elena Tavares  [facility] Number: [id number]    Therapy Diagnosis:   Encounter Diagnoses   Name Primary?    Primary osteoarthritis of left knee     Chronic pain of left knee     Quadriceps weakness         Physician: Jamshid Reece MD    Physician Orders: PT Eval and Treat   Medical Diagnosis from Referral:   M17.12 (ICD-10-CM) - Primary osteoarthritis of left knee   M25.562,G89.29 (ICD-10-CM) - Chronic pain of left knee   M62.81 (ICD-10-CM) - Quadriceps weakness     Evaluation Date: 4/30/2024  Authorization Period Expiration: 12/31/2024  Plan of Care Expiration: 6/6/2024  Progress Note Due: 5/21/2024  Date of Surgery: 4/30/2024  Visit # / Visits authorized: 1/ 20   FOTO: 1/5    Precautions: Standard and Diabetes     Time In: 1:05 pm  Time Out: 2:00 pm   Total Billable Time: 55 minutes    Subjective     Date of onset: L TKA on 4/29/2024     History of current condition - Maria Elena reports L TKA on 4/29/2024. Ambulating with rolling walker. High pain levels currently. MD ordered something for pain and nausea. Lives in double next to her sister who will be helping her for now.      Falls: none     Imaging:  See EMR    Prior Therapy: yes  Social History: lives next door to her sister in a double. 9 steps to enter with two handrails. No steps inside.   Occupation: retired   Prior Level of Function: independent   Current Level of Function: dependent at current state.     Pain:  Current 10/10, worst 10/10, best 8/10   Location: left knee    Description: Aching, Dull, and Sharp  Aggravating Factors: Standing, Walking, and Getting out of bed/chair  Easing Factors: ice, rest, and elevation    Patients goals: Return to prior level of function and ADL's      Medical History:   Past Medical History:   Diagnosis Date    BMI 37.0-37.9, adult     Breast cancer 09/05/2019     Left breast, IDC with lymph node metastisis    Colon polyps 2015    Colon polyps  2015    Diabetes mellitus type II     Diverticulosis     history of diverticulosisseen on colonoscopy at age 48. Repeat recommended at age 58. Done by     Elevated blood protein     History of elevated protein. Apparently has seen  for extensive workup including bone marrow biopsy    History of shingles 2006    Hyperlipidemia     Hypertension     Microalbuminuria     due 2 diabetes    Mild vitamin D deficiency     . A low vitamin D    Primary osteoarthritis of left knee 2023    Thyroid disease     hypothyroidism    Usual hyperplasia of lactiferous duct     Not sure       Surgical History:   Maria Elena Tavares  has a past surgical history that includes Hysterectomy; Oophorectomy; Insertion of tunneled central venous catheter (CVC) with subcutaneous port (Right, 10/04/2019); Breast biopsy (Left, 2019); Breast biopsy (Right, 2019); Breast biopsy (Left, 10/14/2019); Breast biopsy (Left, 10/08/2019); Simple mastectomy (Bilateral, 2020); [place] lymph node biopsy (Left, 2020); Insertion of breast tissue expander (Bilateral, 2020);  section; Tissue expander removal (Bilateral, 2020); and Colonoscopy (N/A, 10/08/2020).    Medications:   Maria Elena has a current medication list which includes the following prescription(s): acetaminophen, amlodipine, aspirin, atorvastatin, blood-glucose meter, cephalexin, ciclopirox, diclofenac, famotidine, irbesartan, letrozole, levothyroxine, xiidra, polyethylene glycol, intrarosa, tirzepatide, and tranexamic acid.    Allergies:   Review of patient's allergies indicates:   Allergen Reactions    Amoxil [amoxicillin] Rash        Objective      Knee active range of motion:    Right: 6-0-125 degrees    Left: 0-10*-70* degrees    Lower extremity strength with [employer] handheld dynamometer Right Left Pain/dysfunction with movement   (approx 4 sec hold w/ max contraction)   Hip flexion 8.7 kg  3.0 kg    Hip abduction 9.6 kg  5.1 kg   "  Quadriceps 16.3 kg  4.1 kg    Hamstrings 11.0 kg  2.5 kg        Timed Up and Go:  58.69 seconds (with assistive device)    30" Chair Stand: NOT PERFORMED TODAY DUE TO PAIN. WILL PERFORM NEXT.      Intake Outcome Measure for FOTO Knee Survey    Therapist reviewed FOTO scores for Maria Elena Tavares on 4/30/2024.   FOTO report - see Media section or FOTO account episode details.    Intake Score: 75%         Treatment     Total Treatment time (time-based codes) separate from Evaluation: 25 minutes     Maria Elena received the treatments listed below:      therapeutic activities to improve functional performance for 15 minutes, including:    Supine heel prop: 1 minute with small maroon bolster   Quad sets: 10x5"  Short arc quads: 10x3"  Calf stretch with towel: 3x30"  Ankle pumps: 15x   Seated heel slides: 15x     manual therapy techniques: Joint mobilizations and Soft tissue Mobilization were applied for 10 minutes, including:  Patellar Mobilizations - grade I-II     Patient Education and Home Exercises     Education provided:   - Findings; prognosis and plan of care  - Home exercise program  - Modality options  - Therapist contact information      Written Home Exercises Provided: yes.  Exercises were reviewed and Maria Elena was able to demonstrate them prior to the end of the session.  Maria Elena demonstrated good understanding of the education provided. See EMR under Patient Instructions for exercises provided during therapy sessions.    Assessment     Maria Elena is a 68 y.o. female referred to outpatient Physical Therapy with a medical diagnosis of M17.12 (ICD-10-CM) - Primary osteoarthritis of left knee, M25.562,G89.29 (ICD-10-CM) - Chronic pain of left knee, and M62.81 (ICD-10-CM) - Quadriceps weakness. Patient presents s/p 1 day L TKA. Knee extension deficits of 10 degrees and knee flexion > 70 degrees. Quadriceps activation good for current post-op state. Mild glute co-contraction with quad sets, but improves with tactile and verbal " cueing. Improved pain levels with patellar mobilizations for pain modulation. Education provided on the benefits of outpatient physical therapy versus home health for better outcomes post-op with good understanding.    Patient prognosis is Excellent.   Patient will benefit from skilled outpatient Physical Therapy to address the deficits stated above and in the chart below, provide patient /family education, and to maximize patientt's level of independence.     Plan of care discussed with patient: yes  Patient's spiritual, cultural and educational needs considered and patient is agreeable to the plan of care and goals as stated below:     Anticipated Barriers for therapy: transportation    Medical Necessity is demonstrated by the following  History  Co-morbidities and personal factors that may impact the plan of care [] LOW: no personal factors / co-morbidities  [] MODERATE: 1-2 personal factors / co-morbidities  [x] HIGH: 3+ personal factors / co-morbidities    Moderate / High Support Documentation:   Co-morbidities affecting plan of care: DM, hx of cancer, BMI > 34, HLD, HTN, Thyroid Disease,     Personal Factors:   age  coping style  social background  lifestyle     Examination  Body Structures and Functions, activity limitations and participation restrictions that may impact the plan of care [] LOW: addressing 1-2 elements  [x] MODERATE: 3+ elements  [] HIGH: 4+ elements (please support below)    Moderate / High Support Documentation: Based on PMHX, co morbidities , data from assessments and functional level of assistance required with task and clinical presentation directly impacting function.         Clinical Presentation [] LOW: stable  [x] MODERATE: Evolving  [] HIGH: Unstable     Decision Making/ Complexity Score: moderate       Goals:  Short Term Goals (3 Weeks):   1. Patient will be independent with home exercise program to supplement physical therapy treatment in improving functional status.  2. Patient  will improve left lower extremity strength to at least 75% of right lower extremity strength as measured via [employer] handheld dynamometer to improve strength for closed chain tasks.   3. Patient will improve left knee active range of motion to 0-90 degrees to promote increased ease of sit<>stand transfers.  4. Patient will perform timed up and go with less restrictive assistive device in < 20 seconds to improve gait speed and safety with community ambulation.     Long Term Goals (6 Weeks):   1. Patient will improve left lower extremity strength to at least 90% of right lower extremity strength as measured via [employer] handheld dynamometer to improve strength for closed chain tasks.   2. Patient will improve the total FOTO Knee Survey Score to </= 33% limited to demonstrate increased perceived functional mobility.  3. Patient will perform timed up and go with least restrictive assistive device in < 13.5 seconds to improve gait speed and safety with community ambulation.  4. Patient will perform at least 12 sit to stands in 30 seconds without UE support to demonstrate increased functional strength.   5. Patient will improve left knee active range of motion to 0-120 degrees to promote higher level transfers and transitions without limitation.       Plan     Plan of care Certification: 4/30/2024 to 6/6/2024.    Outpatient Physical Therapy 3-4 times weekly for 6 weeks to include the following interventions: Gait Training, Manual Therapy, Moist Heat/ Ice, Neuromuscular Re-ed, Patient Education, Self Care, Therapeutic Activities, and Therapeutic Exercise.     Irais Choi PT        Physician's Signature: _________________________________________ Date: ________________

## 2024-04-30 NOTE — PT/OT/SLP PROGRESS
Physical Therapy Treatment/Discharge    Patient Name:  Maria Elena Tavares   MRN:  [id number]    Recommendations:     Discharge Recommendations: Low Intensity Therapy  Discharge Equipment Recommendations: shower chair  Barriers to discharge: None    Assessment:     Maria Elena Tavares is a 68 y.o. female admitted with a medical diagnosis of The primary encounter diagnosis was Osteoarthritis of left knee, unspecified osteoarthritis type. Diagnoses of Primary osteoarthritis of left knee, Chronic pain of left knee, Physical deconditioning, Weakness of both lower extremities, Decreased functional mobility and endurance, Balance problem, Type 2 diabetes mellitus without complication, without long-term current use of insulin [E11.9], Hyperlipidemia, unspecified hyperlipidemia type [E78.5], and Hypothyroidism, unspecified type [E03.9] were also pertinent to this visit. .  She presents with the following impairments/functional limitations: pain, gait instability, decreased ROM, impaired functional mobility, impaired self care skills Pt has progressed towards goals adequate to meet d/c from OT services in acute setting. . Pt educated on adaptive dsg post surgery, home safety, car/toilet/shower/tub t/fs, use of DME for functional mobility and ADLs. Pt to attend OP PT D/c OT .    Rehab Prognosis: Good; patient would benefit from acute skilled PT services to address these deficits and reach maximum level of function.    Recent Surgery: Procedure(s) (LRB):  ARTHROPLASTY, KNEE, SIGHT ASSISTED (Left) 1 Day Post-Op    Plan:     During this hospitalization, patient to be seen daily to address the identified rehab impairments via gait training, therapeutic activities, therapeutic exercises, neuromuscular re-education and progress toward the following goals:    Plan of Care Expires:  05/29/24    Subjective     Chief Complaint: pain  Patient/Family Comments/goals: to return home  Pain/Comfort:  Pain Rating 1: 7/10  Location - Side 1: Left  Location -  Orientation 1: generalized  Location 1: knee  Pain Addressed 1: Reposition, Distraction, Cessation of Activity, Nurse notified  Pain Rating Post-Intervention 1:  (did not rate; asking for medication)      Objective:     Communicated with nurse prior to session.  Patient found HOB elevated with  (port A cath) upon PT entry to room.     General Precautions: Standard, fall  Orthopedic Precautions: LLE weight bearing as tolerated  Braces: N/A  Respiratory Status: Room air     Functional Mobility:  Bed Mobility:     Scooting: stand by assistance   Supine to Sit: stand by assistance  Transfers:     Sit to Stand:  stand by assistance and contact guard assistance with rolling walker and progressing to SBA  Bed to Chair: stand by assistance, contact guard assistance, and progressing to SBA  with  rolling walker  using  Step Transfer  Toilet Transfer: stand by assistance and contact guard assistance with  rolling walker  using  Step Transfer  Gait: Patient amb using 3 pt gait with RW, initially with SBA/CGA progressing to SBA; VCs/visual cues for placement of RW, sequencing, turning/backstepping and remaining inside the RW    AM-PAC 6 CLICK MOBILITY  Turning over in bed (including adjusting bedclothes, sheets and blankets)?: 3  Sitting down on and standing up from a chair with arms (e.g., wheelchair, bedside commode, etc.): 3  Moving from lying on back to sitting on the side of the bed?: 3  Moving to and from a bed to a chair (including a wheelchair)?: 3  Need to walk in hospital room?: 3  Climbing 3-5 steps with a railing?: 3  Basic Mobility Total Score: 18       Treatment & Education:  Patient performed skills as described above; pt re-educated on elevation and ice to LLE with correct placement of pillow to encourage knee ext and precautions with use of ice pack; demonstrated car transfer; adjusted RW for proper height; educated pt through demonstration of sit<>stand and and 3pt gait using RW; pt amb 40ft in room and  transferred to toilet; pt amb ~15ft to sink, for washing hands and brushing hair; pt amb ~10ft to wheelchair and wheeled to steps; pt instructed in up/down steps with B rails which patient performed with SBA/CGA and in using 1 rail; pt amb another 12ft from wheelchair to bedside chair; also educated on use of DME- shower chair, RW, BSC; pt wanting to amb to sink a second time and brush teeth ; pt sit to stand with SBA and use of RW - amb with RW ~8-10 ft to sink with SBA and brushed her teeth and hair with SBA; amb 8-10 ft back to bedside chair; elevated BLEs in chair and instructed patient in BLE ROM including APs, QS, GS, heel slides in reclined position and hip fl, SLR while sitting with use of strap/sheet; L knee ROM 0-75*; increased time for discussing d/c options and pt preferred HH vs OP; communicated to MD/resident; after speaking to patient again, pt agreed to go to OP; communicated with CM and MD                                      Patient left up in chair with all lines intact, call button in reach, chair alarm on, nurse notified, daughter present, and ice pack to L knee ..    GOALS:   Multidisciplinary Problems       Physical Therapy Goals       Not on file              Multidisciplinary Problems (Resolved)          Problem: Physical Therapy    Goal Priority Disciplines Outcome Goal Variances Interventions   Physical Therapy Goal   (Resolved)     PT, PT/OT Met     Description: Goals to be met by: 2024     Patient will increase functional independence with mobility by performin. Supine to sit with Modified Oliver  2. Sit to supine with Modified Oliver  3. Sit to stand transfer with Modified Oliver using Rolling Walker  4. Bed to chair transfer with Modified Oliver using Rolling Walker  5. Gait  x 60 feet with Modified Oliver using Rolling Walker.   6. Ascend/descend 9 stairs with bilateral Handrails Stand-by Assistance and Contact Guard Assistance  7. Lower  extremity exercise program x10 reps with supervision                         Time Tracking:     PT Received On: 04/30/24  PT Start Time: 0844     PT Stop Time: 0946  PT Total Time (min): 62 min     Billable Minutes: Gait Training 23, Therapeutic Activity 21, and   Self Care 18     PT/PTA: PT     Number of PTA visits since last PT visit: 0     04/30/2024

## 2024-04-30 NOTE — PLAN OF CARE
Problem: Adult Inpatient Plan of Care  Goal: Patient-Specific Goal (Individualized)  Outcome: Progressing  Goal: Optimal Comfort and Wellbeing  Outcome: Progressing     Problem: Diabetes Comorbidity  Goal: Blood Glucose Level Within Targeted Range  Outcome: Progressing     Problem: Infection  Goal: Absence of Infection Signs and Symptoms  Outcome: Progressing     Problem: Wound  Goal: Optimal Coping  Outcome: Progressing  Goal: Optimal Pain Control and Function  Outcome: Progressing  Goal: Optimal Wound Healing  Outcome: Progressing

## 2024-04-30 NOTE — PROGRESS NOTES
"[facility] Progress Note     In brief, 68 y.o. female past medical history of hypertension, hyperlipidemia, hypothyroidism, breast cancer status post treatment now in remission, type 2 diabetes who had left total knee arthroplasty 4/29/24      S:  She was unable to work with physical therapy postop due to weakness.  She had some increased pain overnight.  This morning her pain is controlled. No new numbness or tingling.  Eating breakfast, denies nausea/vomiting/chest pain/shortness of breath.     O:    Temp:  [96.7 °F (35.9 °C)-98.2 °F (36.8 °C)] 97.9 °F (36.6 °C)  Pulse:  [69-92] 72  Resp:  [15-18] 18  SpO2:  [94 %-100 %] 99 %  BP: (108-134)/(58-81) 125/72      MSK:     Left knee  Dressing with a pinpoint area of strikethrough  Appropriately tender to palpation  Compartments: soft and compressible   Motor Intact:  EHL/FHL/TA/GSC  SILT:  SP/DP/saphenous/sural/tibial  Foot warm well perfused     Labs:  Recent Results (from the past 24 hour(s))   POCT glucose    Collection Time: 04/29/24  5:25 PM   Result Value Ref Range    POCT Glucose 190 (H) 70 - 110 mg/dL   POCT glucose    Collection Time: 04/29/24  8:36 PM   Result Value Ref Range    POCT Glucose 176 (H) 70 - 110 mg/dL   POCT glucose    Collection Time: 04/30/24  5:23 AM   Result Value Ref Range    POCT Glucose 148 (H) 70 - 110 mg/dL       No results for input(s): "WBC", "HGB", "HCT", "PLT" in the last 72 hours.  No results for input(s): "NA", "K", "CL", "CO2", "HCO3C", "BUN", "LABCREA", "GLU" in the last 72 hours.  No results for input(s): "ESR", "CRP" in the last 72 hours.     Imaging:  X-rays of the left knee show total knee arthroplasty components in place without evidence failure     Assessment/Plan:     68 y.o. female status post left total knee arthroplasty 04/29/2024    -total knee arthroplasty protocol  -discharge after cleared by physical therapy/occupational therapy today  -aspirin 81 mg twice daily for DVT prophylaxis, SCDs and " Ruddy  -appreciate medical comanagement  -weightbearing as tolerated      Олег Beck MD  [facility]  4/30/2024 8:10 AM

## 2024-04-30 NOTE — PROGRESS NOTES
Consult note  [facility]    Consulted by:Dr. Олег Beck - [facility]  Reason for Consult: Medical co-management    History of Present Illness:  Patient is a 68 y.o. female with past medical history of hypertension, hyperlipidemia, hypothyroidism, breast cancer status post treatment now in remission, type 2 diabetes who presents today 4/29/24 for left TKA secondary to bone-on-bone osteoarthritis, with Dr. Reece and Dr. Beck.  Surgery went well with no immediate complications, patient was able to work with PT partially however was noted to have persistent postop weakness requiring overnight stay.  [facility] consulted for medical comanagement.    Interval Hx  NAEON, pt eating breakfast comfortably on exam, pain moderately well controlled on current regimen    Current Facility-Administered Medications   Medication Dose Route Frequency Provider Last Rate Last Admin    acetaminophen tablet 650 mg  650 mg Oral Q6H Олег Beck MD   650 mg at 04/30/24 0009    amLODIPine tablet 5 mg  5 mg Oral Daily Shankar Herrera MD        aspirin chewable tablet 81 mg  81 mg Oral BID Олег Beck MD   81 mg at 04/29/24 2155    atorvastatin tablet 10 mg  10 mg Oral Daily Shankar Herrera MD        bisacodyL suppository 10 mg  10 mg Rectal BID PRN Олег Beck MD        celecoxib capsule 200 mg  200 mg Oral BID Олег Beck MD   200 mg at 04/29/24 2155    dextrose 10% bolus 125 mL 125 mL  12.5 g Intravenous PRN Shankar Herrera MD        dextrose 10% bolus 250 mL 250 mL  25 g Intravenous PRN Shankar Herrera MD        diphenhydrAMINE capsule 25 mg  25 mg Oral Q6H PRN Олег Beck MD        famotidine tablet 20 mg  20 mg Oral BID Олег Beck MD   20 mg at 04/29/24 2156    glucagon (human recombinant) injection 1 mg  1 mg Intramuscular PRN Shankar Herrera MD        glucose chewable tablet 16 g  16 g Oral PRN Shankar Herrera MD        glucose chewable tablet 24 g  24 g Oral PRN Shankar Herrera MD        insulin aspart  U-100 pen 0-10 Units  0-10 Units Subcutaneous QID (AC + HS) PRN Shankar Herrera MD   1 Units at 24 2206    lactated ringers infusion   Intravenous Continuous Karishma De La O, DNP        levothyroxine tablet 200 mcg  200 mcg Oral Before breakfast Shankar Herrera MD   200 mcg at 24 0602    losartan tablet 100 mg  100 mg Oral Daily Shankar Herrera MD        melatonin tablet 6 mg  6 mg Oral Nightly PRN Олег Beck MD        ondansetron injection 4 mg  4 mg Intravenous Q12H PRN Олег Beck MD        pregabalin capsule 75 mg  75 mg Oral BID Олег Beck MD   75 mg at 24    senna-docusate 8.6-50 mg per tablet 1 tablet  1 tablet Oral BID Олег Beck MD   1 tablet at 24 215       Review of patient's allergies indicates:   Allergen Reactions    Amoxil [amoxicillin] Rash       Past Medical History:   Diagnosis Date    BMI 37.0-37.9, adult     Breast cancer 2019     Left breast, IDC with lymph node metastisis    Colon polyps 2015    Colon polyps     Diabetes mellitus type II     Diverticulosis     history of diverticulosisseen on colonoscopy at age 48. Repeat recommended at age 58. Done by     Elevated blood protein     History of elevated protein. Apparently has seen  for extensive workup including bone marrow biopsy    History of shingles 2006    Hyperlipidemia     Hypertension     Microalbuminuria     due 2 diabetes    Mild vitamin D deficiency     . A low vitamin D    Primary osteoarthritis of left knee 2023    Thyroid disease     hypothyroidism    Usual hyperplasia of lactiferous duct     Not sure     Past Surgical History:   Procedure Laterality Date    BREAST BIOPSY Left 2019    IDC with mets to node    BREAST BIOPSY Right 2019    core bx, benign node    BREAST BIOPSY Left 10/14/2019    MRI Core bx, + IDC    BREAST BIOPSY Left 10/08/2019    Core bx, ADH     SECTION      COLONOSCOPY N/A 10/08/2020    Procedure: COLONOSCOPY;  Surgeon:  Shreya Mendoza MD;  Location: [facility] ENDO (4TH FLR);  Service: Endoscopy;  Laterality: N/A;  COVID screening on 10/5/20 [place] - [facility]    HYSTERECTOMY      INSERTION OF BREAST TISSUE EXPANDER Bilateral 03/24/2020    Procedure: INSERTION, TISSUE EXPANDER, BREAST BILATERAL;  Surgeon: Michael Solorzano MD;  Location: [facility] OR 2ND FLR;  Service: Plastics;  Laterality: Bilateral;    INSERTION OF TUNNELED CENTRAL VENOUS CATHETER (CVC) WITH SUBCUTANEOUS PORT Right 10/04/2019    Procedure: [id number]-B-CATH RIGHT (CONSENT AM OF) 1.0 hr case;  Surgeon: Elena Lopez MD;  [facility];  Service: General;  Laterality: Right;    OOPHORECTOMY      SENTINEL LYMPH NODE BIOPSY Left 03/24/2020    Procedure: BIOPSY, LYMPH NODE, SENTINEL LEFT;  Surgeon: Elena Lopez MD;  [facility];  Service: General;  Laterality: Left;    SIMPLE MASTECTOMY Bilateral 03/24/2020    Procedure: MASTECTOMY, SIMPLE BILATERAL;  Surgeon: Elena Lopez MD;  [facility];  Service: General;  Laterality: Bilateral;    TISSUE EXPANDER REMOVAL Bilateral 07/30/2020    Procedure: REMOVAL, TISSUE EXPANDER;  Surgeon: Michael Solorzano MD;  [facility];  Service: Plastics;  Laterality: Bilateral;     Family History   Problem Relation Name Age of Onset    No Known Problems Paternal Grandfather      No Known Problems Paternal Grandmother      No Known Problems Maternal Grandmother      Lung cancer Maternal Grandfather      Cancer Father      Lung cancer Father      Cancer Mother          lung ca heavy smoker    Lung cancer Mother      Hypertension Sister      No Known Problems Sister      Hypothyroidism Sister      No Known Problems Daughter      Diabetes Maternal Aunt      Cancer Maternal Aunt      Breast cancer Paternal Aunt Not sure     No Known Problems Paternal Uncle      Amblyopia Neg Hx      Blindness Neg Hx      Cataracts Neg Hx      Glaucoma Neg Hx      Macular degeneration Neg Hx       Retinal detachment Neg Hx      Strabismus Neg Hx      Stroke Neg Hx      Thyroid disease Neg Hx      Ovarian cancer Neg Hx      Colon cancer Neg Hx      Cervical cancer Neg Hx      Uterine cancer Neg Hx       Social History     Tobacco Use    Smoking status: Never     Passive exposure: Never    Smokeless tobacco: Never    Tobacco comments:     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  She walks occasionally.   Substance Use Topics    Alcohol use: Not Currently     Comment: Occasionally    Drug use: No        Review of Systems:   Review of Systems   Constitutional:  Negative for chills and fever.   HENT:  Negative for congestion, ear pain, sinus pain and sore throat.    Eyes:  Negative for blurred vision, pain and redness.   Respiratory:  Negative for cough, shortness of breath and wheezing.    Cardiovascular:  Negative for chest pain, palpitations and orthopnea.   Gastrointestinal:  Negative for diarrhea, heartburn, nausea and vomiting.   Genitourinary:  Negative for dysuria and hematuria.   Musculoskeletal:  Positive for joint pain. Negative for myalgias and neck pain.   Skin:  Negative for itching and rash.   Neurological:  Negative for dizziness, weakness and headaches.   Psychiatric/Behavioral:  Negative for depression and suicidal ideas.       OBJECTIVE:     Vital Signs (Most Recent)  Temp: 97.9 °F (36.6 °C) (04/30/24 0724)  Pulse: 72 (04/30/24 0724)  Resp: 18 (04/30/24 0724)  BP: 125/72 (04/30/24 0724)  SpO2: 99 % (04/30/24 0724)    Physical Exam  Vitals and nursing note reviewed.   Constitutional:       Appearance: Normal appearance.   HENT:      Head: Normocephalic and atraumatic.      Right Ear: Tympanic membrane normal.      Left Ear: Tympanic membrane normal.      Mouth/Throat:      Mouth: Mucous membranes are moist.      Pharynx: Oropharynx is clear.   Eyes:      Extraocular Movements: Extraocular movements intact.      Pupils: Pupils are equal, round, and reactive to  light.   Cardiovascular:      Rate and Rhythm: Normal rate and regular rhythm.      Pulses: Normal pulses.      Heart sounds: Normal heart sounds.   Pulmonary:      Effort: Pulmonary effort is normal.      Breath sounds: Normal breath sounds.   Abdominal:      General: Abdomen is flat.      Palpations: Abdomen is soft.   Musculoskeletal:         General: Normal range of motion.      Cervical back: Normal range of motion.      Comments: Left knee incision dressed, clean dry intact, neurovascularly intact distally   Skin:     General: Skin is warm.   Neurological:      General: No focal deficit present.      Mental Status: She is alert and oriented to person, place, and time.   Psychiatric:         Mood and Affect: Mood normal.          Laboratory  Lab Results   Component Value Date    WBC 5.85 03/19/2024    HGB 13.0 03/19/2024    HCT 40.7 03/19/2024    MCV 93 03/19/2024     03/19/2024      CMP  Sodium   Date Value Ref Range Status   03/19/2024 140 136 - 145 mmol/L Final     Potassium   Date Value Ref Range Status   03/19/2024 3.6 3.5 - 5.1 mmol/L Final     Chloride   Date Value Ref Range Status   03/19/2024 103 95 - 110 mmol/L Final     CO2   Date Value Ref Range Status   03/19/2024 24 23 - 29 mmol/L Final     Glucose   Date Value Ref Range Status   03/19/2024 101 70 - 110 mg/dL Final     BUN   Date Value Ref Range Status   03/19/2024 19 8 - 23 mg/dL Final     Creatinine   Date Value Ref Range Status   03/19/2024 0.9 0.5 - 1.4 mg/dL Final     Calcium   Date Value Ref Range Status   03/19/2024 10.0 8.7 - 10.5 mg/dL Final     Total Protein   Date Value Ref Range Status   03/19/2024 8.9 (H) 6.0 - 8.4 g/dL Final     Albumin   Date Value Ref Range Status   03/19/2024 4.0 3.5 - 5.2 g/dL Final   03/19/2024 4.0 3.5 - 5.2 g/dL Final     Total Bilirubin   Date Value Ref Range Status   03/19/2024 0.5 0.1 - 1.0 mg/dL Final     Comment:     For infants and newborns, interpretation of results should be based  on gestational  "age, weight and in agreement with clinical  observations.    Premature Infant recommended reference ranges:  Up to 24 hours.............<8.0 mg/dL  Up to 48 hours............<12.0 mg/dL  3-5 days..................<15.0 mg/dL  6-29 days.................<15.0 mg/dL       Alkaline Phosphatase   Date Value Ref Range Status   03/19/2024 134 55 - 135 U/L Final     AST   Date Value Ref Range Status   03/19/2024 25 10 - 40 U/L Final     ALT   Date Value Ref Range Status   03/19/2024 22 10 - 44 U/L Final     Anion Gap   Date Value Ref Range Status   03/19/2024 13 8 - 16 mmol/L Final     eGFR if    Date Value Ref Range Status   07/11/2022 >60.0 >60 mL/min/1.73 m^2 Final     eGFR if non    Date Value Ref Range Status   07/11/2022 >60.0 >60 mL/min/1.73 m^2 Final     Comment:     Calculation used to obtain the estimated glomerular filtration  rate (eGFR) is the CKD-EPI equation.           Lab Results   Component Value Date    INR 1.3 (H) 03/19/2024      No results for input(s): "CPK", "CPKMB", "TROPONINI", "MB" in the last 168 hours.   No results for input(s): "TROPONINI", "CKTOTAL", "CKMB" in the last 168 hours.  ABGs  No results for input(s): "PH", "PCO2", "PO2", "HCO3", "POCSATURATED", "BE" in the last 24 hours.  BNP  No results for input(s): "BNP" in the last 168 hours.    Urinalysis  No results for input(s): "COLORU", "CLARITYU", "SPECGRAV", "PHUR", "PROTEINUA", "GLUCOSEU", "BILIRUBINCON", "BLOODU", "WBCU", "RBCU", "BACTERIA", "MUCUS", "NITRITE", "LEUKOCYTESUR", "UROBILINOGEN", "HYALINECASTS" in the last 24 hours.   LAST HbA1c  Lab Results   Component Value Date    HGBA1C 6.3 (H) 03/19/2024         Diagnostic Results:  Labs: Reviewed  X-Ray: Reviewed      ASSESSMENT/PLAN:   68 y.o.female has a past medical history of BMI 37.0-37.9, adult, Breast cancer (09/05/2019), Colon polyps (2015), Colon polyps (2015), Diabetes mellitus type II, Diverticulosis, Elevated blood protein, History of shingles " (2006), Hyperlipidemia, Hypertension, Microalbuminuria, Mild vitamin D deficiency, Primary osteoarthritis of left knee (8/31/2023), Thyroid disease, and Usual hyperplasia of lactiferous duct. here for left TKA requiring overnight stay and medical comanagement    Plan:    #L TKA  - continue work with physical therapy for dispo  - pain and postop anticoagulation regimen per primary  - recommend CBC, CMP if patient stays over 48 hours    #DM2   well-controlled on [facility] outpatient, last A1c 6.3    -ordered MDSSI while inpatient  -POCT glucose checks while inpatient  -ordered diabetic diet    #HTN  On amlodipine 5 mg and irbesartan 300 mg at home took medications prior to surgery, has remained normotensive while inpatient    - ordered home amlodipine, substitute irbesartan with losartan 100 mg while inpatient  - continue to monitor BP    # Hx of L breast cancer  S/p bilateral mastectomy 3/24/20 and adjuvant XRT, on Femara qd maintenance since 5/2020    - Femara not on inpatient formulary, pt took medication day of surgery  - If staying > 24 hr can give home femara as nonformulary medication      #HLD  - ordered home atorvastatin 10 mg    #Hypothyroidism  - ordered home Synthroid 200 mcg    Ok to DC from our perspective if cleared by PT    Family medicine will continue to follow. Please contact us if you have any further questions. Thank you for the consult.     Shankar Herrera MD, MPH  [facility], PGY-3

## 2024-05-01 ENCOUNTER — CLINICAL SUPPORT (OUTPATIENT)
Dept: BEHAVIORAL HEALTH | Facility: CLINIC | Age: 69
End: 2024-05-01
Payer: MEDICARE

## 2024-05-01 ENCOUNTER — CLINICAL SUPPORT (OUTPATIENT)
Dept: REHABILITATION | Facility: HOSPITAL | Age: 69
End: 2024-05-01
Payer: MEDICARE

## 2024-05-01 DIAGNOSIS — M62.81 QUADRICEPS WEAKNESS: Primary | ICD-10-CM

## 2024-05-01 DIAGNOSIS — F33.1 MDD (MAJOR DEPRESSIVE DISORDER), RECURRENT EPISODE, MODERATE: Primary | ICD-10-CM

## 2024-05-01 DIAGNOSIS — R26.89 ANTALGIC GAIT: ICD-10-CM

## 2024-05-01 DIAGNOSIS — F41.1 GAD (GENERALIZED ANXIETY DISORDER): ICD-10-CM

## 2024-05-01 DIAGNOSIS — M25.662 DECREASED RANGE OF MOTION (ROM) OF LEFT KNEE: ICD-10-CM

## 2024-05-01 PROCEDURE — 90832 PSYTX W PT 30 MINUTES: CPT | Mod: HCNC,95,, | Performed by: COUNSELOR

## 2024-05-01 PROCEDURE — 97112 NEUROMUSCULAR REEDUCATION: CPT | Mod: HCNC,PN

## 2024-05-01 PROCEDURE — 97110 THERAPEUTIC EXERCISES: CPT | Mod: HCNC,PN

## 2024-05-01 NOTE — PROGRESS NOTES
I assume primary medical responsibility for this patient. I have reviewed the history, physical, and assessement & treatment plan with the resident and agree that the care is reasonable and necessary. This service has been performed by a resident without the presence of a teaching physician under the primary care exception. If necessary, an addendum of additional findings or evaluation beyond the resident documentation will be noted below.     Rox Coulter MD

## 2024-05-01 NOTE — PROGRESS NOTES
Primary Care Behavioral Health Integration: Initial  Date:  5/1/2024  Referral Source:  Estephania San MD  Type of Visit:  In person  Length of Appointment: 45  Chief Complaint/Reason for Encounter:  Depression    History of Present Illness: Maria Elena Tavares, a 68 y.o. female referred by Estephania San MD.  Patient was seen, examined and chart was reviewed. Met with patient.     Current Session: Patient was in a lot of pain today. Had knee replacement surgery 2 days ago. Just got out of the hospital yesterday. Already started physical therapy. Is very painful and patient cries a lot. Easter week pt found out that her granddaughter has been cutting herself. Pt found granddaughter's journal and read it. This caused a huge fight with her daughter about pt crossing boundaries. Pt doesn't feel like she did bc she feels like she needed to find out what was going on with her. Granddaughter doesn't know pt ready her journal. A friend at school told her to stop cutting herself and introduced her to school counselor and has been seeing him.     Current symptoms:  Depression Symptoms: insomnia and worthlessness/guilt.  Anxiety Symptoms: excessive worrying and restlessness.  Sleep Difficulties: difficulty falling asleep.  Manic Symptoms:  denies.  Psychosis: denies .    Risk assessment:  Patient reports no suicidal ideation  Patient reports no homicidal ideation  Patient reports no self-injurious behavior  Patient reports no violent behavior    Patient advised to call [phone] or present the the nearest ED if they experience suicidal or homicidal ideation, plan or intent.      Psychiatric History:  Diagnosis:    Current Psychiatric Medication: No    Medication Trial History:  Medication Trials: No    Outpatient Treatment: No   Inpatient Treatment: No   Suicide Attempts: No   Access to Firearms: No   History of Trauma: Yes      Current and Past Substance Use:  Alcohol: Occasionally   Drugs: Denied.   Nicotine: denied   Caffeine:   Coffee     Mental Status Exam  General Appearance:  appears stated age, neatly dressed, well groomed   Speech: normal tone, normal rate, normal pitch, normal volume      Level of Cooperation: cooperative      Thought Processes: linear, logical, goal-directed   Mood: anxious, depressed      Thought Content: {relevant and appropriate   Affect: congruent and appropriate   Orientation: Oriented x3   Memory/Attention/Concentration: No gross cognitive deficits made evident during conversation   Judgment & Insight: poor   Language  intact         3/21/2024    10:44 AM   Results of the PHQ8   Little interest or pleasure in doing things Not at all   Feeling down, depressed, or hopeless Several days   Trouble falling or staying asleep, or sleeping too much More than half the days   Feeling tired or having little energy More than half the days   Poor appetite or overeating Not at all   Feeling bad about yourself - or that you are a failure or have let yourself or your family down More than half the days   Trouble concentrating on things, such as reading the newspaper or watching television Not at all   Moving or speaking so slowly that other people could have noticed. Or the opposite - being so fidgety or restless that you have been moving around a lot more than usual Not at all   Total Score  7           3/21/2024    10:46 AM 2/28/2024     8:39 AM   GAD7   1. Feeling nervous, anxious, or on edge? 2 1   2. Not being able to stop or control worrying? 3 3   3. Worrying too much about different things? 3 3   4. Trouble relaxing? 0 2   5. Being so restless that it is hard to sit still? 0 0   6. Becoming easily annoyed or irritable? 1 0   7. Feeling afraid as if something awful might happen? 1 0   8. If you checked off any problems, how difficult have these problems made it for you to do your work, take care of things at home, or get along with other people?  1   REJI-7 Score 10 9       Impression: Initial appointment focused on  gathering history, identifying treatment goals and developing a treatment plan.      Diagnosis:  1. MDD (major depressive disorder), recurrent episode, moderate        2. REJI (generalized anxiety disorder)              Treatment Goals and Plan:   Anxiety: reducing negative automatic thoughts  Depression: increasing motivation and reducing fatigue    Future treatment will utilize CBT and Solution-focused Therapy.      Return to Clinic: No follow-ups on file.

## 2024-05-01 NOTE — PROGRESS NOTES
"Physician Orders: PT Eval and Treat   Medical Diagnosis from Referral:   M17.12 (ICD-10-CM) - Primary osteoarthritis of left knee   M25.562,G89.29 (ICD-10-CM) - Chronic pain of left knee   M62.81 (ICD-10-CM) - Quadriceps weakness      Evaluation Date: 4/30/2024  Authorization Period Expiration: 12/31/2024  Plan of Care Expiration: 6/6/2024  Progress Note Due: 5/21/2024  Date of Surgery: 4/30/2024  Visit # / Visits authorized: 2/ 20 (+1)   FOTO: 1/5     Precautions: Standard and Diabetes      Time In: 12:05 pm  Time Out: 1:00 pm   Total Billable Time: 30 minutes      PTA Visit #: 1/5         Subjective      Patient reports: she has been doing well and the pain level is under control.      She was compliant with home exercise program.  Response to previous treatment: ongoing   Functional change: ongoing      Pain: 4/10  Location: left knee       Objective       Objective Measures updated at progress report unless specified.      5/2/2024:  Left knee active range of motion: 0-0-90 degrees      Observation: Minimal drainage on bandage (dried blood); mild bloody drainage at inferior incision upon removal but does not increase with pressure applied to incision     Treatment      Maria Elena received the treatments listed below:          Maria Elena received therapeutic exercises to develop strength, endurance, ROM, flexibility and posture for 30 minutes including:  Heel slides: 20x5" with slide board and straps  Seated marching: 2x10   Seated heel raises: 3x10  Side lying hip abduction: 2x10 left   LAQ: 2x10      neuromuscular re-education activities to improve: Balance, Kinesthetic, Proprioception, and Posture for 25 minutes. The following activities were included:     Quad set + heel lift: 20x5" with towel roll   SLR: 2x10  SAQ: 20x5"  Sit to stands from high low: 2x10     manual therapy techniques: Joint mobilizations and Soft tissue Mobilization were applied for 10 minutes, including:     Bandage removal and " cleansing  Abdominal pad placement for potential drainage.   Steri-strip reapplication      Patient Education and Home Exercises        Education provided:   - walker use for safety and decreasing load on knee joint      Written Home Exercises Provided: yes. Exercises were reviewed and Maria Elena was able to demonstrate them prior to the end of the session.  Maria Elena demonstrated good  understanding of the education provided. See Electronic Medical Record under Patient Instructions for exercises provided during therapy sessions     Assessment      Maria Elena is a 68 y.o. female referred to outpatient Physical Therapy with a medical diagnosis of M17.12 (ICD-10-CM) - Primary osteoarthritis of left knee, M25.562,G89.29 (ICD-10-CM) - Chronic pain of left knee, and M62.81 (ICD-10-CM) - Quadriceps weakness. Patient progressing above expectations for current post-op state and already established terminal knee extension to 0 degrees and knee flexion to 90 degrees. Initiated quadriceps strengthening.      Maria Elena Is progressing well towards her goals.   Patient prognosis is Excellent.      Patient will continue to benefit from skilled outpatient physical therapy to address the deficits listed in the problem list box on initial evaluation, provide pt/family education and to maximize pt's level of independence in the home and community environment.      Patient's spiritual, cultural and educational needs considered and pt agreeable to plan of care and goals.     Anticipated barriers to physical therapy: transportation     Goals:   Short Term Goals (3 Weeks):   1. Patient will be independent with home exercise program to supplement physical therapy treatment in improving functional status.  2. Patient will improve left lower extremity strength to at least 75% of right lower extremity strength as measured via MicroFet handheld dynamometer to improve strength for closed chain tasks.   3. Patient will improve left knee active range of motion  to 0-90 degrees to promote increased ease of sit<>stand transfers.  4. Patient will perform timed up and go with less restrictive assistive device in < 20 seconds to improve gait speed and safety with community ambulation.     Long Term Goals (6 Weeks):   1. Patient will improve left lower extremity strength to at least 90% of right lower extremity strength as measured via MicroFet handheld dynamometer to improve strength for closed chain tasks.   2. Patient will improve the total FOTO Knee Survey Score to </= 33% limited to demonstrate increased perceived functional mobility.  3. Patient will perform timed up and go with least restrictive assistive device in < 13.5 seconds to improve gait speed and safety with community ambulation.  4. Patient will perform at least 12 sit to stands in 30 seconds without UE support to demonstrate increased functional strength.   5. Patient will improve left knee active range of motion to 0-120 degrees to promote higher level transfers and transitions without limitation.         Plan   Maintain knee extension at 0 degrees and progress knee flexion to > 100 degrees.  Establish quadriceps control      Damian Sneed

## 2024-05-02 ENCOUNTER — CLINICAL SUPPORT (OUTPATIENT)
Dept: REHABILITATION | Facility: HOSPITAL | Age: 69
End: 2024-05-02
Payer: MEDICARE

## 2024-05-02 DIAGNOSIS — M62.81 QUADRICEPS WEAKNESS: Primary | ICD-10-CM

## 2024-05-02 DIAGNOSIS — R26.89 ANTALGIC GAIT: ICD-10-CM

## 2024-05-02 DIAGNOSIS — M25.662 DECREASED RANGE OF MOTION (ROM) OF LEFT KNEE: ICD-10-CM

## 2024-05-02 PROCEDURE — 97112 NEUROMUSCULAR REEDUCATION: CPT | Mod: HCNC,PN

## 2024-05-02 PROCEDURE — 97110 THERAPEUTIC EXERCISES: CPT | Mod: HCNC,PN

## 2024-05-02 NOTE — PROGRESS NOTES
"[facility]   Physical Therapy Treatment Note      Name: Maria Elena Tavares  Clinic Number: [id number]    Therapy Diagnosis:   Encounter Diagnoses   Name Primary?    Quadriceps weakness Yes    Decreased range of motion (ROM) of left knee     Antalgic gait      Physician: Jamshid Reece MD    Visit Date: 5/2/2024    Physician Orders: PT Eval and Treat   Medical Diagnosis from Referral:   M17.12 (ICD-10-CM) - Primary osteoarthritis of left knee   M25.562,G89.29 (ICD-10-CM) - Chronic pain of left knee   M62.81 (ICD-10-CM) - Quadriceps weakness      Evaluation Date: 4/30/2024  Authorization Period Expiration: 12/31/2024  Plan of Care Expiration: 6/6/2024  Progress Note Due: 5/21/2024  Date of Surgery: 4/30/2024  Visit # / Visits authorized: 2/ 20 (+1)   FOTO: 1/5     Precautions: Standard and Diabetes      Time In: 1:05 pm  Time Out: 1:45 pm   Total Billable Time: 40 minutes x 1:1 physical therapist x 30 minutes     PTA Visit #: 1/5       Subjective     Patient reports: she has been propping her knee every hour. No longer using rolling walker in the home as she can get around without it. Does all her exercises multiple times a day even before she came today.    She was compliant with home exercise program.  Response to previous treatment: ongoing   Functional change: ongoing     Pain: 4/10  Location: left knee      Objective      Objective Measures updated at progress report unless specified.     5/2/2024:  Left knee active range of motion: 0-0-90 degrees     Observation: Minimal drainage on bandage (dried blood); mild bloody drainage at inferior incision upon removal but does not increase with pressure applied to incision    Treatment     Maria Elena received the treatments listed below:        Maria Elena received therapeutic exercises to develop strength, endurance, ROM, flexibility and posture for 15 minutes including:    Heel slides: 20x5" with slide board and straps  Seated marching: 2x10   Seated heel " "raises: 3x10  Side lying hip abduction: 2x10 left   LAQ: 2x10     neuromuscular re-education activities to improve: Balance, Kinesthetic, Proprioception, and Posture for 15 minutes. The following activities were included:    Quad set + heel lift: 20x5" with towel roll   SLR: 2x10  SAQ: 20x5"  Sit to stands from high low: 2x10    manual therapy techniques: Joint mobilizations and Soft tissue Mobilization were applied for 10 minutes, including:    Bandage removal and cleansing  Abdominal pad placement for potential drainage.   Steri-strip reapplication     Patient Education and Home Exercises       Education provided:   - walker use for safety and decreasing load on knee joint     Written Home Exercises Provided: yes. Exercises were reviewed and Maria Elena was able to demonstrate them prior to the end of the session.  Maria Elena demonstrated good  understanding of the education provided. See Electronic Medical Record under Patient Instructions for exercises provided during therapy sessions    Assessment     Maria Elena is a 68 y.o. female referred to outpatient Physical Therapy with a medical diagnosis of M17.12 (ICD-10-CM) - Primary osteoarthritis of left knee, M25.562,G89.29 (ICD-10-CM) - Chronic pain of left knee, and M62.81 (ICD-10-CM) - Quadriceps weakness. Patient is 3 days post-op. Patient progressing above expectations for current post-op state and already established terminal knee extension to 0 degrees and knee flexion to 90 degrees. Excellent quadriceps neuromuscular with quad setting and straight leg raise - no quad lag. Bandage removal with some drainage at inferior incision.     Maria Elena Is progressing well towards her goals.   Patient prognosis is Excellent.     Patient will continue to benefit from skilled outpatient physical therapy to address the deficits listed in the problem list box on initial evaluation, provide pt/family education and to maximize pt's level of independence in the home and community " environment.     Patient's spiritual, cultural and educational needs considered and pt agreeable to plan of care and goals.     Anticipated barriers to physical therapy: transportation    Goals:   Short Term Goals (3 Weeks):   1. Patient will be independent with home exercise program to supplement physical therapy treatment in improving functional status.  2. Patient will improve left lower extremity strength to at least 75% of right lower extremity strength as measured via MicroFet handheld dynamometer to improve strength for closed chain tasks.   3. Patient will improve left knee active range of motion to 0-90 degrees to promote increased ease of sit<>stand transfers.  4. Patient will perform timed up and go with less restrictive assistive device in < 20 seconds to improve gait speed and safety with community ambulation.     Long Term Goals (6 Weeks):   1. Patient will improve left lower extremity strength to at least 90% of right lower extremity strength as measured via MicroFet handheld dynamometer to improve strength for closed chain tasks.   2. Patient will improve the total FOTO Knee Survey Score to </= 33% limited to demonstrate increased perceived functional mobility.  3. Patient will perform timed up and go with least restrictive assistive device in < 13.5 seconds to improve gait speed and safety with community ambulation.  4. Patient will perform at least 12 sit to stands in 30 seconds without UE support to demonstrate increased functional strength.   5. Patient will improve left knee active range of motion to 0-120 degrees to promote higher level transfers and transitions without limitation.        Plan   Maintain knee extension at 0 degrees and progress knee flexion to > 100 degrees.  Establish quadriceps control     Irais Choi, PT

## 2024-05-04 ENCOUNTER — NURSE TRIAGE (OUTPATIENT)
Dept: ADMINISTRATIVE | Facility: CLINIC | Age: 69
End: 2024-05-04
Payer: MEDICARE

## 2024-05-04 NOTE — TELEPHONE ENCOUNTER
Patient is calling, post op 4/29, arthoplasty. She states L knee op knee, noted more swelling,  feels a little tight. She did put ice on for 4 hours and swelling is going down. No CP/SOB or fever or S/S infection noted. Advised re proper use of ice and elevation as I can see education done at PT. She states she did feel pretty good and has been walking around the house without her walker. Triage done- dispo call PCP. Advised I would reach out to provider re S/S and call her back. Verb understanding.     Patient called back, advised of need to take it easy, ice and rest, call back for any S/S of infection, drainage, fever, SOB/CP or any other concerns. Assured of 24/7 phone to provider if needed and on call RN. Patient is in agreement re overdoing it, states she has been walking around the house a lot without walker and doing some housework. Advised to rest, and continue to monitor. Verb understanding.   Reason for Disposition   [1] Caller has URGENT question AND [2] triager unable to answer question    Additional Information   Negative: Sounds like a life-threatening emergency to the triager   Negative: Chest pain   Negative: Difficulty breathing   Negative: New or worsening leg (calf, thigh) pain   Negative: [1] Widespread rash AND [2] bright red, sunburn-like   Negative: [1] SEVERE headache AND [2] after spinal (epidural) anesthesia   Negative: [1] Vomiting AND [2] persists > 4 hours   Negative: [1] Vomiting AND [2] abdomen looks much more swollen than usual   Negative: [1] Drinking very little AND [2] dehydration suspected (e.g., no urine > 12 hours, very dry mouth, very lightheaded)   Negative: Patient sounds very sick or weak to the triager   Negative: Sounds like a serious complication to the triager   Negative: Fever > 100.4 F (38.0 C)   Negative: [1] SEVERE post-op pain (e.g., excruciating, pain scale 8-10) AND [2] not controlled with pain medications    Protocols used: Post-Op Symptoms and  [id number]

## 2024-05-07 ENCOUNTER — CLINICAL SUPPORT (OUTPATIENT)
Dept: REHABILITATION | Facility: HOSPITAL | Age: 69
End: 2024-05-07
Payer: MEDICARE

## 2024-05-07 DIAGNOSIS — M25.662 DECREASED RANGE OF MOTION (ROM) OF LEFT KNEE: ICD-10-CM

## 2024-05-07 DIAGNOSIS — R26.89 ANTALGIC GAIT: ICD-10-CM

## 2024-05-07 DIAGNOSIS — M62.81 QUADRICEPS WEAKNESS: Primary | ICD-10-CM

## 2024-05-07 PROCEDURE — 97112 NEUROMUSCULAR REEDUCATION: CPT | Mod: HCNC,PN

## 2024-05-07 PROCEDURE — 97110 THERAPEUTIC EXERCISES: CPT | Mod: HCNC,PN

## 2024-05-07 NOTE — PROGRESS NOTES
[facility]   Physical Therapy Treatment Note      Name: Maria Elena Tavares  Clinic Number: [id number]    Therapy Diagnosis:   Encounter Diagnoses   Name Primary?    Quadriceps weakness Yes    Decreased range of motion (ROM) of left knee     Antalgic gait      Physician: Jamshid Reece MD    Visit Date: 5/7/2024    Physician Orders: PT Eval and Treat   Medical Diagnosis from Referral:   M17.12 (ICD-10-CM) - Primary osteoarthritis of left knee   M25.562,G89.29 (ICD-10-CM) - Chronic pain of left knee   M62.81 (ICD-10-CM) - Quadriceps weakness      Evaluation Date: 4/30/2024  Authorization Period Expiration: 12/31/2024  Plan of Care Expiration: 6/6/2024  Progress Note Due: 5/21/2024  Date of Surgery: 4/30/2024  Visit # / Visits authorized: 3/ 20 (+1)   FOTO: 3/5     Precautions: Standard and Diabetes      Time In: 11:07 am   Time Out: 12:15 pm   Total Billable Time: 68 minutes x 1:1 physical therapist x 30 minutes     PTA Visit #: 1/5       Subjective     Patient reports: Some increased pain today that she contributes to how she slept. Had minimal pain all weekend until today. Used the walker on and off. Had some trouble sleeping this weekend, but was able to sleep all the way through last night.     She was compliant with home exercise program.  Response to previous treatment: ongoing   Functional change: ongoing     Pain: 4/10  Location: left knee      Objective      Objective Measures updated at progress report unless specified.     5/7/2024  Left knee active range of motion: 2-0-90 degrees beginning of session            2-0-100 degrees after heel slides     Treatment     Maria Elena received the treatments listed below:        Maria Elena received therapeutic exercises to develop strength, endurance, ROM, flexibility and posture for 33 minutes including:    Nu-step: level 3 for 5 mintue for knee range of motion and lower extremity endurance. Progress to recumbent bike next (unavailable during session)  "  Heel slides: 20x5" with slide board and straps  LAQ: 3x10 with 3" hold   Standing heel raises: 2x10    neuromuscular re-education activities to improve: Balance, Kinesthetic, Proprioception, and Posture for 15 minutes. The following activities were included:    Quad set + heel lift: 20x5" with towel roll   SLR: 3x10  Sit to stands from high low: 2x10    manual therapy techniques: Joint mobilizations and Soft tissue Mobilization were applied for 10 minutes, including:    Patellar mobilizations - grade II-III  Passive knee flexion     Cold Pack for 10 minutes end of session.    Patient Education and Home Exercises       Education provided:   - walker use for safety and decreasing load on knee joint     Written Home Exercises Provided: yes. Exercises were reviewed and Maria Elena was able to demonstrate them prior to the end of the session.  Maria Elena demonstrated good  understanding of the education provided. See Electronic Medical Record under Patient Instructions for exercises provided during therapy sessions    Assessment     Maria Elena is a 68 y.o. female referred to outpatient Physical Therapy with a medical diagnosis of M17.12 (ICD-10-CM) - Primary osteoarthritis of left knee, M25.562,G89.29 (ICD-10-CM) - Chronic pain of left knee, and M62.81 (ICD-10-CM) - Quadriceps weakness. Patient is 1 week post-op. Patient progressing above expectations for current post-op state. Maintained terminal knee extension to -2 degrees and progressed knee flexion by 10 degrees. Mild quad lag during third set of straight leg raises. Great quadriceps activation and neuromuscular control. Will progress to recumbent bike next visit to promote greater knee flexion range of motion.     Maria Elena Is progressing well towards her goals.   Patient prognosis is Excellent.     Patient will continue to benefit from skilled outpatient physical therapy to address the deficits listed in the problem list box on initial evaluation, provide pt/family education " and to maximize pt's level of independence in the home and community environment.     Patient's spiritual, cultural and educational needs considered and pt agreeable to plan of care and goals.     Anticipated barriers to physical therapy: transportation    Goals:   Short Term Goals (3 Weeks):   1. Patient will be independent with home exercise program to supplement physical therapy treatment in improving functional status.  2. Patient will improve left lower extremity strength to at least 75% of right lower extremity strength as measured via MicroFet handheld dynamometer to improve strength for closed chain tasks.   3. Patient will improve left knee active range of motion to 0-90 degrees to promote increased ease of sit<>stand transfers.  4. Patient will perform timed up and go with less restrictive assistive device in < 20 seconds to improve gait speed and safety with community ambulation.     Long Term Goals (6 Weeks):   1. Patient will improve left lower extremity strength to at least 90% of right lower extremity strength as measured via MicroFet handheld dynamometer to improve strength for closed chain tasks.   2. Patient will improve the total FOTO Knee Survey Score to </= 33% limited to demonstrate increased perceived functional mobility.  3. Patient will perform timed up and go with least restrictive assistive device in < 13.5 seconds to improve gait speed and safety with community ambulation.  4. Patient will perform at least 12 sit to stands in 30 seconds without UE support to demonstrate increased functional strength.   5. Patient will improve left knee active range of motion to 0-120 degrees to promote higher level transfers and transitions without limitation.        Plan   Maintain knee extension at 0 degrees and progress knee flexion to > 100 degrees.  Establish quadriceps control     Irais Choi, PT

## 2024-05-09 ENCOUNTER — CLINICAL SUPPORT (OUTPATIENT)
Dept: REHABILITATION | Facility: HOSPITAL | Age: 69
End: 2024-05-09
Payer: MEDICARE

## 2024-05-09 DIAGNOSIS — M62.81 QUADRICEPS WEAKNESS: Primary | ICD-10-CM

## 2024-05-09 DIAGNOSIS — R26.89 ANTALGIC GAIT: ICD-10-CM

## 2024-05-09 DIAGNOSIS — M25.662 DECREASED RANGE OF MOTION (ROM) OF LEFT KNEE: ICD-10-CM

## 2024-05-09 PROCEDURE — 97112 NEUROMUSCULAR REEDUCATION: CPT | Mod: KX,HCNC,PN

## 2024-05-09 PROCEDURE — 97110 THERAPEUTIC EXERCISES: CPT | Mod: KX,HCNC,PN

## 2024-05-09 NOTE — PROGRESS NOTES
"[facility]   Physical Therapy Treatment Note      Name: Maria Elena Tavares  Clinic Number: [id number]    Therapy Diagnosis:   Encounter Diagnoses   Name Primary?    Quadriceps weakness Yes    Decreased range of motion (ROM) of left knee     Antalgic gait      Physician: Jamshid Reece MD    Visit Date: 5/9/2024    Physician Orders: PT Eval and Treat   Medical Diagnosis from Referral:   M17.12 (ICD-10-CM) - Primary osteoarthritis of left knee   M25.562,G89.29 (ICD-10-CM) - Chronic pain of left knee   M62.81 (ICD-10-CM) - Quadriceps weakness      Evaluation Date: 4/30/2024  Authorization Period Expiration: 12/31/2024  Plan of Care Expiration: 6/6/2024  Progress Note Due: 5/21/2024  Date of Surgery: 4/30/2024  Visit # / Visits authorized: 4/ 20 (+1)   FOTO: 4/5     Precautions: Standard and Diabetes      Time In: 10:00 am   Time Out: 11:10 am  Total Billable Time: 60 minutes x 1:1 physical therapist x 30 minutes + 10 minutes ice     PTA Visit #: 1/5       Subjective     Patient reports: Sleeping relatively good. Has some pain at lateral aspect of knee. Keeping her step count low. Performs knee propping about 3x/day along with other exercises.    She was compliant with home exercise program.  Response to previous treatment: soreness   Functional change: ambulation with single point cane      Pain: 6/10; 0/10 end of session  Location: left lateral aspect of knee      Objective      Objective Measures updated at progress report unless specified.     5/9/2024  Left knee active range of motion: 0-1-105 degrees                                  2-0-105 degrees      Treatment     Maria Elena received the treatments listed below:        Maria Elena received therapeutic exercises to develop strength, endurance, ROM, flexibility and posture for 35 minutes including:    Recumbent Bike: level 1 for 5 minutes with full revolutions for knee range of motion and tissue flexibility.    Heel slides: 20x5" with slide board and " "straps; 10x second round  Heel prop: 3 minutes on ailyn arceo  LAQ: 3x10 with 5" hold   Standing heel raises: 3x10  Gastroc fitter stretch: 3x30" left     neuromuscular re-education activities to improve: Balance, Kinesthetic, Proprioception, and Posture for 15 minutes. The following activities were included:    Quad set + heel lift: 20x5" with towel roll   SLR: 3x10  Sit to stands from black chair: x10    manual therapy techniques: Joint mobilizations and Soft tissue Mobilization were applied for 10 minutes, including:    Patellar mobilizations - grade II-III  Passive knee flexion     Cold Pack for 10 minutes end of session.    Patient Education and Home Exercises       Education provided:   - use single point cane to reduce load through knee joint  - perform updated HEP     Written Home Exercises Provided: yes. Exercises were reviewed and Maria Elena was able to demonstrate them prior to the end of the session.  Maria Elena demonstrated good  understanding of the education provided. See Electronic Medical Record under Patient Instructions for exercises provided during therapy sessions    Assessment     Maria Elena is a 68 y.o. female referred to outpatient Physical Therapy with a medical diagnosis of M17.12 (ICD-10-CM) - Primary osteoarthritis of left knee, M25.562,G89.29 (ICD-10-CM) - Chronic pain of left knee, and M62.81 (ICD-10-CM) - Quadriceps weakness. Patient is 1 week (+3 days) post-op. Patient progressing above expectations for current post-op state; therefore, will reduce frequency to two times per week as long as range of motion and quadriceps control remains. Range of motion progressing current expectations at 2-0-105 degrees. Occasional quad lag with straight leg raises. Able to tolerate recumbent bike for knee flexion assist.      Maria Elena Is progressing well towards her goals.   Patient prognosis is Excellent.     Patient will continue to benefit from skilled outpatient physical therapy to address the deficits " listed in the problem list box on initial evaluation, provide pt/family education and to maximize pt's level of independence in the home and community environment.     Patient's spiritual, cultural and educational needs considered and pt agreeable to plan of care and goals.     Anticipated barriers to physical therapy: transportation    Goals:   Short Term Goals (3 Weeks):   1. Patient will be independent with home exercise program to supplement physical therapy treatment in improving functional status.  2. Patient will improve left lower extremity strength to at least 75% of right lower extremity strength as measured via MicroFet handheld dynamometer to improve strength for closed chain tasks.   3. Patient will improve left knee active range of motion to 0-90 degrees to promote increased ease of sit<>stand transfers.  4. Patient will perform timed up and go with less restrictive assistive device in < 20 seconds to improve gait speed and safety with community ambulation.     Long Term Goals (6 Weeks):   1. Patient will improve left lower extremity strength to at least 90% of right lower extremity strength as measured via MicroFet handheld dynamometer to improve strength for closed chain tasks.   2. Patient will improve the total FOTO Knee Survey Score to </= 33% limited to demonstrate increased perceived functional mobility.  3. Patient will perform timed up and go with least restrictive assistive device in < 13.5 seconds to improve gait speed and safety with community ambulation.  4. Patient will perform at least 12 sit to stands in 30 seconds without UE support to demonstrate increased functional strength.   5. Patient will improve left knee active range of motion to 0-120 degrees to promote higher level transfers and transitions without limitation.        Plan   Maintain knee extension at 0 degrees and progress knee flexion to > 100 degrees.  Establish quadriceps control     Irais Choi, PT             Propranolol Pregnancy And Lactation Text: This medication is Pregnancy Category C and it isn't known if it is safe during pregnancy. It is excreted in breast milk.

## 2024-05-14 ENCOUNTER — OFFICE VISIT (OUTPATIENT)
Dept: ORTHOPEDICS | Facility: CLINIC | Age: 69
End: 2024-05-14
Payer: MEDICARE

## 2024-05-14 ENCOUNTER — PATIENT MESSAGE (OUTPATIENT)
Dept: PRIMARY CARE CLINIC | Facility: CLINIC | Age: 69
End: 2024-05-14
Payer: MEDICARE

## 2024-05-14 VITALS — BODY MASS INDEX: 35.05 KG/M2 | HEIGHT: 60 IN | WEIGHT: 178.56 LBS

## 2024-05-14 DIAGNOSIS — Z96.652 AFTERCARE FOLLOWING LEFT KNEE JOINT REPLACEMENT SURGERY: Primary | ICD-10-CM

## 2024-05-14 DIAGNOSIS — Z47.1 AFTERCARE FOLLOWING LEFT KNEE JOINT REPLACEMENT SURGERY: Primary | ICD-10-CM

## 2024-05-14 PROCEDURE — 1101F PT FALLS ASSESS-DOCD LE1/YR: CPT | Mod: HCNC,CPTII,S$GLB, | Performed by: ORTHOPAEDIC SURGERY

## 2024-05-14 PROCEDURE — 3288F FALL RISK ASSESSMENT DOCD: CPT | Mod: HCNC,CPTII,S$GLB, | Performed by: ORTHOPAEDIC SURGERY

## 2024-05-14 PROCEDURE — 3044F HG A1C LEVEL LT 7.0%: CPT | Mod: HCNC,CPTII,S$GLB, | Performed by: ORTHOPAEDIC SURGERY

## 2024-05-14 PROCEDURE — 99999 PR PBB SHADOW E&M-EST. PATIENT-LVL III: CPT | Mod: PBBFAC,HCNC,, | Performed by: ORTHOPAEDIC SURGERY

## 2024-05-14 PROCEDURE — 99024 POSTOP FOLLOW-UP VISIT: CPT | Mod: HCNC,S$GLB,, | Performed by: ORTHOPAEDIC SURGERY

## 2024-05-14 PROCEDURE — 1159F MED LIST DOCD IN RCRD: CPT | Mod: HCNC,CPTII,S$GLB, | Performed by: ORTHOPAEDIC SURGERY

## 2024-05-14 PROCEDURE — 4010F ACE/ARB THERAPY RXD/TAKEN: CPT | Mod: HCNC,CPTII,S$GLB, | Performed by: ORTHOPAEDIC SURGERY

## 2024-05-14 PROCEDURE — 1125F AMNT PAIN NOTED PAIN PRSNT: CPT | Mod: HCNC,CPTII,S$GLB, | Performed by: ORTHOPAEDIC SURGERY

## 2024-05-14 RX ORDER — CYCLOBENZAPRINE HCL 5 MG
5 TABLET ORAL NIGHTLY
Qty: 14 TABLET | Refills: 0 | Status: SHIPPED | OUTPATIENT
Start: 2024-05-14 | End: 2024-05-28

## 2024-05-14 RX ORDER — GABAPENTIN 300 MG/1
600 CAPSULE ORAL 2 TIMES DAILY
Qty: 56 CAPSULE | Refills: 0 | Status: SHIPPED | OUTPATIENT
Start: 2024-05-14 | End: 2024-06-03 | Stop reason: SDUPTHER

## 2024-05-14 NOTE — PROGRESS NOTES
[facility] [address]          Subjective:     Patient ID: Maria Elena Tavares is a 68 y.o. female.    Chief Complaint: Pain and Post-op Evaluation of the Left Knee    Patient is 2 weeks s/p  left primary total knee replacement  Anterior knee pain: No  Has improved pain  Is in physical therapy  Yes  Problems w incision  No  Is  happy with result  Yes  Opiod free: Yes  She has been taking diclofenac, but is not really helping.  She requests something stronger.  She has been taking all of her other medications as prescribed.      Past Medical History:   Diagnosis Date    BMI 37.0-37.9, adult     Breast cancer 2019     Left breast, IDC with lymph node metastisis    Colon polyps 2015    Colon polyps     Diabetes mellitus type II     Diverticulosis     history of diverticulosisseen on colonoscopy at age 48. Repeat recommended at age 58. Done by     Elevated blood protein     History of elevated protein. Apparently has seen  for extensive workup including bone marrow biopsy    History of shingles 2006    Hyperlipidemia     Hypertension     Microalbuminuria     due 2 diabetes    Mild vitamin D deficiency     . A low vitamin D    Primary osteoarthritis of left knee 2023    Thyroid disease     hypothyroidism    Usual hyperplasia of lactiferous duct     Not sure        Past Surgical History:   Procedure Laterality Date    ARTHROPLASTY, KNEE, TOTAL, SIGHT ASSISTED Left 2024    Procedure: ARTHROPLASTY, KNEE, SIGHT ASSISTED;  Surgeon: Jamshid Reece MD;  Location: [facility] OR;  Service: Orthopedics;  Laterality: Left;  bmi - 34.57    BREAST BIOPSY Left 2019    IDC with mets to node    BREAST BIOPSY Right 2019    core bx, benign node    BREAST BIOPSY Left 10/14/2019    MRI Core bx, + IDC    BREAST BIOPSY Left 10/08/2019    Core bx, ADH     SECTION      COLONOSCOPY N/A 10/08/2020    Procedure: COLONOSCOPY;  Surgeon: Shreya Mendoza MD;  Location: [facility] (4TH FLR);  Service:  Endoscopy;  Laterality: N/A;  COVID screening on 10/5/20 [facility]    HYSTERECTOMY      INSERTION OF BREAST TISSUE EXPANDER Bilateral 03/24/2020    Procedure: INSERTION, TISSUE EXPANDER, BREAST BILATERAL;  Surgeon: Michael Solorzano MD;  [facility];  Service: Plastics;  Laterality: Bilateral;    INSERTION OF TUNNELED CENTRAL VENOUS CATHETER (CVC) WITH SUBCUTANEOUS PORT Right 10/04/2019    Procedure: [id number] RIGHT (CONSENT AM OF) 1.0 hr case;  Surgeon: Elena Lopez MD;  [facility];  Service: General;  Laterality: Right;    OOPHORECTOMY      SENTINEL LYMPH NODE BIOPSY Left 03/24/2020    Procedure: BIOPSY, LYMPH NODE, SENTINEL LEFT;  Surgeon: Elena Lopez MD;  [facility];  Service: General;  Laterality: Left;    SIMPLE MASTECTOMY Bilateral 03/24/2020    Procedure: MASTECTOMY, SIMPLE BILATERAL;  Surgeon: Elena Lopez MD;  [facility];  Service: General;  Laterality: Bilateral;    TISSUE EXPANDER REMOVAL Bilateral 07/30/2020    Procedure: REMOVAL, TISSUE EXPANDER;  Surgeon: Michael Solorzano MD;  [facility];  Service: Plastics;  Laterality: Bilateral;        Current Outpatient Medications   Medication Instructions    acetaminophen (TYLENOL) 325 mg, Oral, Every 4 hours    amLODIPine (NORVASC) 5 mg, Oral, Daily    aspirin (ECOTRIN) 81 mg, Oral, 2 times daily    atorvastatin (LIPITOR) 10 mg, Oral, Daily    blood-glucose meter kit DISPENSE : BLOOD TEST STRIPS AND LANCETS PATIENT TEST BLOOD SUGARS TWICE DAILY<BR>TEST STRIPS: 50 EACH, REFILL 5<BR>LANCETS:  50 EACH , REFILL 5    ciclopirox 0.77 % Gel Topical, 2 times daily, To thickened discolored toenails.    cyclobenzaprine (FLEXERIL) 5 mg, Oral, Nightly    diclofenac (VOLTAREN) 75 mg, Oral, 2 times daily    famotidine (PEPCID) 20 mg, Oral, 2 times daily    gabapentin (NEURONTIN) 600 mg, Oral, 2 times daily    irbesartan (AVAPRO) 300 MG tablet Take 1 tablet by mouth  once daily    letrozole (FEMARA) 2.5 mg, Oral, Daily    levothyroxine (SYNTHROID) 200 mcg, Oral, Daily    lifitegrast (XIIDRA) 5 % Dpet Apply 1 drop to  both eyes  2 (two) times daily.    MOUNJARO 10 mg, Subcutaneous, Every 7 days    polyethylene glycol (GLYCOLAX) 17 gram/dose powder Mix 17 g (1 capful) with juice or water an drink 2 (two) times daily.    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  She walks occasionally.   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 daughter1 granddaughter     Social Determinants of Health     Financial Resource Strain: Medium Risk (1/4/2024)    Overall Financial Resource Strain (CARDIA)     Difficulty of Paying Living Expenses: Somewhat hard   Food Insecurity: No Food Insecurity (1/4/2024)    Hunger Vital Sign     Worried About Running Out of Food in the Last Year: Never true     Ran Out of Food in the Last Year: Never true   Transportation Needs: No Transportation Needs (1/4/2024)    PRAPARE - Transportation     Lack of Transportation (Medical): No     Lack of Transportation (Non-Medical): No   Physical Activity: Insufficiently Active (1/4/2024)    Exercise Vital Sign     Days of Exercise per Week: 2 days     Minutes of Exercise per Session: 20 min   Stress: No Stress Concern Present  (1/4/2024)    [employer] - Occupational Stress Questionnaire     Feeling of Stress : Only a little   Housing Stability: Low Risk  (1/4/2024)    Housing Stability Vital Sign     Unable to Pay for Housing in the Last Year: No     Number of Places Lived in the Last Year: 1     Unstable Housing in the Last Year: No       Family History   Problem Relation Name Age of Onset    No Known Problems Paternal Grandfather      No Known Problems Paternal Grandmother      No Known Problems Maternal Grandmother      Lung cancer Maternal Grandfather      Cancer Father      Lung cancer Father      Cancer Mother          lung ca heavy smoker    Lung cancer Mother      Hypertension Sister      No Known Problems Sister      Hypothyroidism Sister      No Known Problems Daughter      Diabetes Maternal Aunt      Cancer Maternal Aunt      Breast cancer Paternal Aunt Not sure     No Known Problems Paternal Uncle      Amblyopia Neg Hx      Blindness Neg Hx      Cataracts Neg Hx      Glaucoma Neg Hx      Macular degeneration Neg Hx      Retinal detachment Neg Hx      Strabismus Neg Hx      Stroke Neg Hx      Thyroid disease Neg Hx      Ovarian cancer Neg Hx      Colon cancer Neg Hx      Cervical cancer Neg Hx      Uterine cancer Neg Hx           Review of systems negative except as noted above    Objective:   Physical Exam:     Left knee  Well-healing incision with Steri-Strips in place   Moderate effusion  Appropriately tender palpation  ROM 0-105  Stable components  Grossly NVI distally    Assessment:        Maria Elena Tavares is a 68 y.o. female with   Encounter Diagnosis   Name Primary?    Aftercare following left knee joint replacement surgery Yes       Plan :      -activity as tolerated, continue PT, no high impact activity  -follow up in 3 months with XR L knee          No orders of the defined types were placed in this encounter.       Олег Beck MD   05/14/2024

## 2024-05-15 ENCOUNTER — CLINICAL SUPPORT (OUTPATIENT)
Dept: REHABILITATION | Facility: HOSPITAL | Age: 69
End: 2024-05-15
Payer: MEDICARE

## 2024-05-15 DIAGNOSIS — M62.81 QUADRICEPS WEAKNESS: Primary | ICD-10-CM

## 2024-05-15 DIAGNOSIS — R26.89 ANTALGIC GAIT: ICD-10-CM

## 2024-05-15 DIAGNOSIS — M25.662 DECREASED RANGE OF MOTION (ROM) OF LEFT KNEE: ICD-10-CM

## 2024-05-15 PROCEDURE — 97530 THERAPEUTIC ACTIVITIES: CPT | Mod: KX,HCNC,PN

## 2024-05-15 NOTE — PROGRESS NOTES
"[facility]   Physical Therapy Treatment Note      Name: Maria Elena Tavares  Clinic Number: [id number]    Therapy Diagnosis:   Encounter Diagnoses   Name Primary?    Quadriceps weakness Yes    Decreased range of motion (ROM) of left knee     Antalgic gait      Physician: Jamshid Reece MD    Visit Date: 5/15/2024    Physician Orders: PT Eval and Treat   Medical Diagnosis from Referral:   M17.12 (ICD-10-CM) - Primary osteoarthritis of left knee   M25.562,G89.29 (ICD-10-CM) - Chronic pain of left knee   M62.81 (ICD-10-CM) - Quadriceps weakness      Evaluation Date: 4/30/2024  Authorization Period Expiration: 12/31/2024  Plan of Care Expiration: 6/6/2024  Progress Note Due: 5/21/2024  Date of Surgery: 4/30/2024  Visit # / Visits authorized: 5/ 20 (+1)   FOTO: 5/5     Precautions: Standard and Diabetes      Time In: 1:05 pm   Time Out: 2:10 pm  Total Billable Time: 55 minutes x 1:1 physical therapist x 30 minutes + 10 minutes ice     PTA Visit #: 1/5       Subjective     Patient reports:received some pills for swelling and inflammation from doctor. Some gross soreness today.    She was compliant with home exercise program.  Response to previous treatment: soreness   Functional change: ambulation with single point cane      Pain: 7/10; 0/10 end of session  Location: left lateral aspect of knee      Objective      Objective Measures updated at progress report unless specified.     5/15/2024  Left knee active range of motion: 2-0-111 degrees       Treatment     Maria Elena received the treatments listed below:        Maria Elena received therapeutic exercises to develop strength, endurance, ROM, flexibility and posture for 20 minutes including:    Recumbent Bike: level 3 for 5 minutes with full revolutions for knee range of motion and tissue flexibility.    Heel prop: 3 minutes on Cadence Bancorper  Heel slides: 20x5" with slide board and straps  Standing heel raises: 3x10  LAQ: 3x10 with 5" hold - 3# ankle weight " "  Gastroc fitter stretch: 5x30" left     neuromuscular re-education activities to improve: Balance, Kinesthetic, Proprioception, and Posture for 10 minutes. The following activities were included:    Quad set + heel lift: 10x5" with towel roll   SLR: 3x10      therapeutic activities to improve functional performance for 25 minutes, including:    Step ups: 4" step - 2x10 with bilateral upper extremity support   Lateral step up and over: 4" step - 10x   Sit to stands from black chair with thigh support: 2x10  Step lunges: x10 left     Cold Pack for 10 minutes end of session.    Patient Education and Home Exercises       Education provided:   - use single point cane to reduce load through knee joint  - perform updated HEP     Written Home Exercises Provided: yes. Exercises were reviewed and Maria Elena was able to demonstrate them prior to the end of the session.  Maria Elena demonstrated good  understanding of the education provided. See Electronic Medical Record under Patient Instructions for exercises provided during therapy sessions    Assessment     Maria Elena is a 68 y.o. female referred to outpatient Physical Therapy with a medical diagnosis of M17.12 (ICD-10-CM) - Primary osteoarthritis of left knee, M25.562,G89.29 (ICD-10-CM) - Chronic pain of left knee, and M62.81 (ICD-10-CM) - Quadriceps weakness. Patient is 2 weeks post-op. Improvements noted in knee flexion by 5 degrees. Maintaining terminal knee extension. Progressed to closed chain loading with great tolerance. Less quad lag with straight leg raise, <5 repetitions. Continuing to progress above expectations.     Maria Elena Is progressing well towards her goals.   Patient prognosis is Excellent.     Patient will continue to benefit from skilled outpatient physical therapy to address the deficits listed in the problem list box on initial evaluation, provide pt/family education and to maximize pt's level of independence in the home and community environment.     Patient's " spiritual, cultural and educational needs considered and pt agreeable to plan of care and goals.     Anticipated barriers to physical therapy: transportation    Goals:   Short Term Goals (3 Weeks):   1. Patient will be independent with home exercise program to supplement physical therapy treatment in improving functional status.  2. Patient will improve left lower extremity strength to at least 75% of right lower extremity strength as measured via MicroFet handheld dynamometer to improve strength for closed chain tasks.   3. Patient will improve left knee active range of motion to 0-90 degrees to promote increased ease of sit<>stand transfers.  4. Patient will perform timed up and go with less restrictive assistive device in < 20 seconds to improve gait speed and safety with community ambulation.     Long Term Goals (6 Weeks):   1. Patient will improve left lower extremity strength to at least 90% of right lower extremity strength as measured via MicroFet handheld dynamometer to improve strength for closed chain tasks.   2. Patient will improve the total FOTO Knee Survey Score to </= 33% limited to demonstrate increased perceived functional mobility.  3. Patient will perform timed up and go with least restrictive assistive device in < 13.5 seconds to improve gait speed and safety with community ambulation.  4. Patient will perform at least 12 sit to stands in 30 seconds without UE support to demonstrate increased functional strength.   5. Patient will improve left knee active range of motion to 0-120 degrees to promote higher level transfers and transitions without limitation.        Plan   Maintain knee extension at 0 degrees and progress knee flexion to > 100 degrees.  Establish quadriceps control     Irais Choi, PT

## 2024-05-17 ENCOUNTER — CLINICAL SUPPORT (OUTPATIENT)
Dept: REHABILITATION | Facility: HOSPITAL | Age: 69
End: 2024-05-17
Payer: MEDICARE

## 2024-05-17 DIAGNOSIS — M62.81 QUADRICEPS WEAKNESS: Primary | ICD-10-CM

## 2024-05-17 DIAGNOSIS — R26.89 ANTALGIC GAIT: ICD-10-CM

## 2024-05-17 DIAGNOSIS — M25.662 DECREASED RANGE OF MOTION (ROM) OF LEFT KNEE: ICD-10-CM

## 2024-05-17 PROCEDURE — 97110 THERAPEUTIC EXERCISES: CPT | Mod: HCNC,PN

## 2024-05-17 PROCEDURE — 97530 THERAPEUTIC ACTIVITIES: CPT | Mod: HCNC,PN

## 2024-05-21 ENCOUNTER — PATIENT MESSAGE (OUTPATIENT)
Dept: BEHAVIORAL HEALTH | Facility: CLINIC | Age: 69
End: 2024-05-21
Payer: MEDICARE

## 2024-05-22 ENCOUNTER — CLINICAL SUPPORT (OUTPATIENT)
Dept: REHABILITATION | Facility: HOSPITAL | Age: 69
End: 2024-05-22
Payer: MEDICARE

## 2024-05-22 DIAGNOSIS — M62.81 QUADRICEPS WEAKNESS: Primary | ICD-10-CM

## 2024-05-22 DIAGNOSIS — R26.89 ANTALGIC GAIT: ICD-10-CM

## 2024-05-22 DIAGNOSIS — M25.662 DECREASED RANGE OF MOTION (ROM) OF LEFT KNEE: ICD-10-CM

## 2024-05-22 PROCEDURE — 97530 THERAPEUTIC ACTIVITIES: CPT | Mod: KX,HCNC,PN

## 2024-05-22 NOTE — PROGRESS NOTES
"[facility]   Physical Therapy Treatment Note      Name: Maria Elena Tavares  Clinic Number: [id number]    Therapy Diagnosis:   Encounter Diagnoses   Name Primary?    Quadriceps weakness Yes    Decreased range of motion (ROM) of left knee     Antalgic gait      Physician: Jamshid Reece MD    Visit Date: 5/22/2024    Physician Orders: PT Eval and Treat   Medical Diagnosis from Referral:   M17.12 (ICD-10-CM) - Primary osteoarthritis of left knee   M25.562,G89.29 (ICD-10-CM) - Chronic pain of left knee   M62.81 (ICD-10-CM) - Quadriceps weakness      Evaluation Date: 4/30/2024  Authorization Period Expiration: 12/31/2024  Plan of Care Expiration: 6/6/2024  Progress Note Due: 5/21/2024  Date of Surgery: 4/30/2024  Visit # / Visits authorized: 7/ 20 (+1)   FOTO: 7/10     Precautions: Standard and Diabetes      Time In: 1:05 pm   Time Out: 2:05 pm  Total Billable Time: 60 minutes x 1:1 physical therapist x 30 minutes + 10 minutes ice     PTA Visit #: 1/5       Subjective     Patient reports: doing well really. Just some pain at night with sleeping.     She was compliant with home exercise program.  Response to previous treatment: soreness   Functional change: trouble sleeping; ambulation with single point cane      Pain: 5/10; 0/10 end of session  Location: left lateral aspect of knee      Objective      Objective Measures updated at progress report unless specified.     5/15/2024  Left knee active range of motion: 2-0-111 degrees  2-0-118 degrees (following heel slides)    Treatment     Maria Elena received the treatments listed below:        Maria Elena received therapeutic exercises to develop strength, endurance, ROM, flexibility and posture for 10 minutes including:    Recumbent Bike: level 5 for 5 minutes with full revolutions for knee range of motion and tissue flexibility.  Gastroc fitter stretch: 5x30" left   Standing heel raises: 3x10    therapeutic activities to improve functional performance for 40 " "minutes, including:    Step ups: 6" step - 2x10 with unilateral upper extremity support   Lateral step up and over: 6" step - 2x10   Sit to stands from black chair with thigh support: 3x10  Step lunges: 2x10 left   Split squat: tapping 6" step - 10x each with bilateral upper extremity support   Single leg stance: 3x10" - attempts < 10" not counted.      Cold Pack for 10 minutes end of session.    Patient Education and Home Exercises       Education provided:   - use single point cane to reduce load through knee joint  - perform updated HEP     Written Home Exercises Provided: yes. Exercises were reviewed and Maria Elena was able to demonstrate them prior to the end of the session.  Maria Elena demonstrated good  understanding of the education provided. See Electronic Medical Record under Patient Instructions for exercises provided during therapy sessions    Assessment     Maria Elena is a 68 y.o. female referred to outpatient Physical Therapy with a medical diagnosis of M17.12 (ICD-10-CM) - Primary osteoarthritis of left knee, M25.562,G89.29 (ICD-10-CM) - Chronic pain of left knee, and M62.81 (ICD-10-CM) - Quadriceps weakness. Patient is 3 weeks post-op. Knee flexion tightness beginning of session that resolved with heel slides. Progressed to partial lunging to promote functional independence with squatting and bending activities. Instructed to continue heel slides in home exercise program to improve flexion range of motion. Overall, progressing above expectations for current post-op state. Most likely to be discharged before 6 week jessica.    Maria Elena Is progressing well towards her goals.   Patient prognosis is Excellent.     Patient will continue to benefit from skilled outpatient physical therapy to address the deficits listed in the problem list box on initial evaluation, provide pt/family education and to maximize pt's level of independence in the home and community environment.     Patient's spiritual, cultural and educational " needs considered and pt agreeable to plan of care and goals.     Anticipated barriers to physical therapy: transportation    Goals:   Short Term Goals (3 Weeks):   1. Patient will be independent with home exercise program to supplement physical therapy treatment in improving functional status.  2. Patient will improve left lower extremity strength to at least 75% of right lower extremity strength as measured via MicroFet handheld dynamometer to improve strength for closed chain tasks.   3. Patient will improve left knee active range of motion to 0-90 degrees to promote increased ease of sit<>stand transfers.  4. Patient will perform timed up and go with less restrictive assistive device in < 20 seconds to improve gait speed and safety with community ambulation.     Long Term Goals (6 Weeks):   1. Patient will improve left lower extremity strength to at least 90% of right lower extremity strength as measured via MicroFet handheld dynamometer to improve strength for closed chain tasks.   2. Patient will improve the total FOTO Knee Survey Score to </= 33% limited to demonstrate increased perceived functional mobility.  3. Patient will perform timed up and go with least restrictive assistive device in < 13.5 seconds to improve gait speed and safety with community ambulation.  4. Patient will perform at least 12 sit to stands in 30 seconds without UE support to demonstrate increased functional strength.   5. Patient will improve left knee active range of motion to 0-120 degrees to promote higher level transfers and transitions without limitation.        Plan   Maintain knee extension at 0 degrees and progress knee flexion to > 100 degrees.  Establish quadriceps control     Irais Choi, PT

## 2024-05-24 ENCOUNTER — CLINICAL SUPPORT (OUTPATIENT)
Dept: REHABILITATION | Facility: HOSPITAL | Age: 69
End: 2024-05-24
Payer: MEDICARE

## 2024-05-24 ENCOUNTER — CLINICAL SUPPORT (OUTPATIENT)
Dept: BEHAVIORAL HEALTH | Facility: CLINIC | Age: 69
End: 2024-05-24
Payer: MEDICARE

## 2024-05-24 DIAGNOSIS — F33.1 MDD (MAJOR DEPRESSIVE DISORDER), RECURRENT EPISODE, MODERATE: ICD-10-CM

## 2024-05-24 DIAGNOSIS — F41.1 GAD (GENERALIZED ANXIETY DISORDER): Primary | ICD-10-CM

## 2024-05-24 DIAGNOSIS — M25.662 DECREASED RANGE OF MOTION (ROM) OF LEFT KNEE: ICD-10-CM

## 2024-05-24 DIAGNOSIS — R26.89 ANTALGIC GAIT: ICD-10-CM

## 2024-05-24 DIAGNOSIS — M62.81 QUADRICEPS WEAKNESS: Primary | ICD-10-CM

## 2024-05-24 PROCEDURE — 97530 THERAPEUTIC ACTIVITIES: CPT | Mod: KX,HCNC,PN

## 2024-05-24 PROCEDURE — 90832 PSYTX W PT 30 MINUTES: CPT | Mod: HCNC,95,, | Performed by: COUNSELOR

## 2024-05-24 NOTE — PROGRESS NOTES
"[facility]   Physical Therapy Treatment Note      Name: Maria Elena Tavares  Clinic Number: [id number]    Therapy Diagnosis:   Encounter Diagnoses   Name Primary?    Quadriceps weakness Yes    Decreased range of motion (ROM) of left knee     Antalgic gait        Physician: Jamshid Reece MD    Visit Date: 5/24/2024    Physician Orders: PT Eval and Treat   Medical Diagnosis from Referral:   M17.12 (ICD-10-CM) - Primary osteoarthritis of left knee   M25.562,G89.29 (ICD-10-CM) - Chronic pain of left knee   M62.81 (ICD-10-CM) - Quadriceps weakness      Evaluation Date: 4/30/2024  Authorization Period Expiration: 12/31/2024  Plan of Care Expiration: 6/6/2024  Progress Note Due: 5/21/2024  Date of Surgery: 4/30/2024  Visit # / Visits authorized: 8/ 20 (+1)   FOTO: 8/10     Precautions: Standard and Diabetes      Time In: 12:00 pm   Time Out: 12:55 pm  Total Billable Time: 30 minutes + 10 minutes ice     PTA Visit #: 1/5       Subjective     Patient reports: doing well really. Just some pain at night with sleeping.     She was compliant with home exercise program.  Response to previous treatment: soreness   Functional change: trouble sleeping; ambulation with single point cane      Pain: 5/10; 0/10 end of session  Location: left lateral aspect of knee      Objective      Objective Measures updated at progress report unless specified.     5/15/2024  Left knee active range of motion: 2-0-111 degrees  2-0-118 degrees (following heel slides)    Treatment     Maria Elena received the treatments listed below:        Maria Elena received therapeutic exercises to develop strength, endurance, ROM, flexibility and posture for 10 minutes including:    Recumbent Bike: level 5 for 5 minutes with full revolutions for knee range of motion and tissue flexibility.  Gastroc fitter stretch: 5x30" left   Standing heel raises: 3x15    therapeutic activities to improve functional performance for 45 minutes, including:    Lateral step " "down: 4" step - 2x10 with bilateral upper extremity support   Stair negotiation: 3 laps with bilateral upper extremity support   Step ups: 6" step - 2x10 without upper extremity support   Single leg stance: 5x30" with occasional finger tip support  Split squat: tapping 4" step - 10x each with bilateral upper extremity support   Lateral squat: 10x each    Cold Pack for 10 minutes end of session.    Patient Education and Home Exercises       Education provided:   - use single point cane to reduce load through knee joint  - perform updated HEP     Written Home Exercises Provided: yes. Exercises were reviewed and Maria Elena was able to demonstrate them prior to the end of the session.  Maria Elena demonstrated good  understanding of the education provided. See Electronic Medical Record under Patient Instructions for exercises provided during therapy sessions    Assessment     Maria Elena is a 68 y.o. female referred to outpatient Physical Therapy with a medical diagnosis of M17.12 (ICD-10-CM) - Primary osteoarthritis of left knee, M25.562,G89.29 (ICD-10-CM) - Chronic pain of left knee, and M62.81 (ICD-10-CM) - Quadriceps weakness. Patient is 3 weeks post-op. Able to ascend stairs without upper extremity support, but slight hopping strategy noted. Further progressed deep lunging and squatting exercises. Increased difficulty leading with operated lower extremity for lunges. Knee flexion to 118 degrees following heel slides.    Maria Elena Is progressing well towards her goals.   Patient prognosis is Excellent.     Patient will continue to benefit from skilled outpatient physical therapy to address the deficits listed in the problem list box on initial evaluation, provide pt/family education and to maximize pt's level of independence in the home and community environment.     Patient's spiritual, cultural and educational needs considered and pt agreeable to plan of care and goals.     Anticipated barriers to physical therapy: " transportation    Goals:   Short Term Goals (3 Weeks):   1. Patient will be independent with home exercise program to supplement physical therapy treatment in improving functional status.  2. Patient will improve left lower extremity strength to at least 75% of right lower extremity strength as measured via MicroFet handheld dynamometer to improve strength for closed chain tasks.   3. Patient will improve left knee active range of motion to 0-90 degrees to promote increased ease of sit<>stand transfers.  4. Patient will perform timed up and go with less restrictive assistive device in < 20 seconds to improve gait speed and safety with community ambulation.     Long Term Goals (6 Weeks):   1. Patient will improve left lower extremity strength to at least 90% of right lower extremity strength as measured via MicroFet handheld dynamometer to improve strength for closed chain tasks.   2. Patient will improve the total FOTO Knee Survey Score to </= 33% limited to demonstrate increased perceived functional mobility.  3. Patient will perform timed up and go with least restrictive assistive device in < 13.5 seconds to improve gait speed and safety with community ambulation.  4. Patient will perform at least 12 sit to stands in 30 seconds without UE support to demonstrate increased functional strength.   5. Patient will improve left knee active range of motion to 0-120 degrees to promote higher level transfers and transitions without limitation.        Plan   Maintain knee extension at 0 degrees and progress knee flexion to > 100 degrees.  Establish quadriceps control     Irais Choi PT

## 2024-05-27 NOTE — PROGRESS NOTES
"[facility]   Physical Therapy Treatment Note      Name: Maria Elena Tavares  Clinic Number: [id number]    Therapy Diagnosis:   Encounter Diagnoses   Name Primary?    Quadriceps weakness Yes    Decreased range of motion (ROM) of left knee     Antalgic gait      Physician: Jamshid Reece MD    Visit Date: 5/17/2024    Physician Orders: PT Eval and Treat   Medical Diagnosis from Referral:   M17.12 (ICD-10-CM) - Primary osteoarthritis of left knee   M25.562,G89.29 (ICD-10-CM) - Chronic pain of left knee   M62.81 (ICD-10-CM) - Quadriceps weakness      Evaluation Date: 4/30/2024  Authorization Period Expiration: 12/31/2024  Plan of Care Expiration: 6/6/2024  Progress Note Due: 5/21/2024  Date of Surgery: 4/30/2024  Visit # / Visits authorized: 5/ 20 (+1)   FOTO: 5/5     Precautions: Standard and Diabetes      Time In: 10:05 am   Time Out: 11:00 am  Total Billable Time: 30 minutes     PTA Visit #: 1/5       Subjective     Patient reports: continuing to progress well with rehab. Slightly increased pain recently.     She was compliant with home exercise program.  Response to previous treatment: soreness   Functional change: ambulation with single point cane      Pain: 7/10; 0/10 end of session  Location: left lateral aspect of knee      Objective      Objective Measures updated at progress report unless specified.     5/15/2024  Left knee active range of motion: 2-0-111 degrees       Treatment     Maria Elena received the treatments listed below:        Maria Elena received therapeutic exercises to develop strength, endurance, ROM, flexibility and posture for 20 minutes including:    Recumbent Bike: level 3 for 5 minutes with full revolutions for knee range of motion and tissue flexibility.    Heel prop: 3 minutes on [employer]  Heel slides: 20x5" with slide board and straps  Standing heel raises: 3x10  LAQ: 3x10 with 5" hold - 3# ankle weight   Gastroc fitter stretch: 5x30" left     neuromuscular re-education " "activities to improve: Balance, Kinesthetic, Proprioception, and Posture for 10 minutes. The following activities were included:  Quad set + heel lift: 10x5" with towel roll   SLR: 3x10    therapeutic activities to improve functional performance for 25 minutes, including:  Step ups: 4" step - 2x10 with bilateral upper extremity support   Lateral step up and over: 4" step - 10x   Sit to stands from black chair with thigh support: 2x10  Step lunges: x10 left     Cold Pack for 10 minutes end of session.    Patient Education and Home Exercises       Education provided:   - use single point cane to reduce load through knee joint  - perform updated HEP     Written Home Exercises Provided: yes. Exercises were reviewed and Maria Elena was able to demonstrate them prior to the end of the session.  Maria Elena demonstrated good  understanding of the education provided. See Electronic Medical Record under Patient Instructions for exercises provided during therapy sessions    Assessment     Maria Elena is a 68 y.o. female referred to outpatient Physical Therapy with a medical diagnosis of M17.12 (ICD-10-CM) - Primary osteoarthritis of left knee, M25.562,G89.29 (ICD-10-CM) - Chronic pain of left knee, and M62.81 (ICD-10-CM) - Quadriceps weakness. Patient is 2 weeks post-op. Improvements noted in knee flexion by 5 degrees. Maintaining terminal knee extension. Progressed to closed chain loading with great tolerance. Less quad lag with straight leg raise, <5 repetitions. Continuing to progress above expectations.     Maria Elena Is progressing well towards her goals.   Patient prognosis is Excellent.     Patient will continue to benefit from skilled outpatient physical therapy to address the deficits listed in the problem list box on initial evaluation, provide pt/family education and to maximize pt's level of independence in the home and community environment.     Patient's spiritual, cultural and educational needs considered and pt agreeable to plan " of care and goals.     Anticipated barriers to physical therapy: transportation    Goals:   Short Term Goals (3 Weeks):   1. Patient will be independent with home exercise program to supplement physical therapy treatment in improving functional status.  2. Patient will improve left lower extremity strength to at least 75% of right lower extremity strength as measured via MicroFet handheld dynamometer to improve strength for closed chain tasks.   3. Patient will improve left knee active range of motion to 0-90 degrees to promote increased ease of sit<>stand transfers.  4. Patient will perform timed up and go with less restrictive assistive device in < 20 seconds to improve gait speed and safety with community ambulation.     Long Term Goals (6 Weeks):   1. Patient will improve left lower extremity strength to at least 90% of right lower extremity strength as measured via MicroFet handheld dynamometer to improve strength for closed chain tasks.   2. Patient will improve the total FOTO Knee Survey Score to </= 33% limited to demonstrate increased perceived functional mobility.  3. Patient will perform timed up and go with least restrictive assistive device in < 13.5 seconds to improve gait speed and safety with community ambulation.  4. Patient will perform at least 12 sit to stands in 30 seconds without UE support to demonstrate increased functional strength.   5. Patient will improve left knee active range of motion to 0-120 degrees to promote higher level transfers and transitions without limitation.        Plan   Maintain knee extension at 0 degrees and progress knee flexion to > 100 degrees.  Establish quadriceps control     Damian Sneed, PT

## 2024-05-28 ENCOUNTER — TELEPHONE (OUTPATIENT)
Dept: OPTOMETRY | Facility: CLINIC | Age: 69
End: 2024-05-28
Payer: MEDICARE

## 2024-05-29 NOTE — PROGRESS NOTES
"[facility]   Physical Therapy Treatment Note      Name: Maria Elena Tavares  Clinic Number: [id number]    Therapy Diagnosis:   Encounter Diagnoses   Name Primary?    Quadriceps weakness Yes    Decreased range of motion (ROM) of left knee     Antalgic gait            Physician: Jamshid Reece MD    Visit Date: 5/30/2024    Physician Orders: PT Eval and Treat   Medical Diagnosis from Referral:   M17.12 (ICD-10-CM) - Primary osteoarthritis of left knee   M25.562,G89.29 (ICD-10-CM) - Chronic pain of left knee   M62.81 (ICD-10-CM) - Quadriceps weakness      Evaluation Date: 4/30/2024  Authorization Period Expiration: 12/31/2024  Plan of Care Expiration: 6/6/2024  Progress Note Due: 5/21/2024  Date of Surgery: 4/30/2024  Visit # / Visits authorized:  9/20 (+1)   FOTO: 9/10     Precautions: Standard and Diabetes      Time In: 11:00 am   Time Out: 11:55 am  Total Billable Time: 30 minutes + 10 minutes ice     PTA Visit #: 1/5       Subjective     Patient reports: doing well really. Just some pain at night with sleeping.     She was compliant with home exercise program.  Response to previous treatment: soreness   Functional change: trouble sleeping; ambulation with single point cane      Pain: 5/10; 0/10 end of session  Location: left lateral aspect of knee      Objective      Objective Measures updated at progress report unless specified.     5/15/2024  Left knee active range of motion: 2-0-111 degrees  2-0-118 degrees (following heel slides)    Treatment     Maria Elena received the treatments listed below:        Maria Elena received therapeutic exercises to develop strength, endurance, ROM, flexibility and posture for 10 minutes including: additional time supervised     Recumbent Bike: level 5 for 5 minutes with full revolutions for knee range of motion and tissue flexibility.  Gastroc fitter stretch: 5x30" left   Standing heel raises: 3x15    therapeutic activities to improve functional performance for 10 " "minutes, including: additional time supervised     Lateral step down: 4" step - 2x10 with bilateral upper extremity support   Stair negotiation: 3 laps with bilateral upper extremity support   Step ups: 6" step - 2x10 without upper extremity support   Single leg stance: 5x30" with occasional finger tip support  Split squat: tapping 4" step - 10x each with bilateral upper extremity support   Lateral squat: 10x each    NMR balance ,coordination,agility, posture for 10 minutes  additional time supervised   3 laps of fwd reciprocal stepping over (6) six inch hurdles, no upper extremity support SBA   3 laps of  lateral reciprocal stepping over (6) six inch hurdles, no upper extremity support SBA     Cold Pack for 10 minutes end of session.    Patient Education and Home Exercises       Education provided:   - use single point cane to reduce load through knee joint  - perform updated HEP     Written Home Exercises Provided: yes. Exercises were reviewed and Maria Elena was able to demonstrate them prior to the end of the session.  Maria Elena demonstrated good  understanding of the education provided. See Electronic Medical Record under Patient Instructions for exercises provided during therapy sessions    Assessment     Maria Elena is a 68 y.o. female referred to outpatient Physical Therapy with a medical diagnosis of M17.12 (ICD-10-CM) - Primary osteoarthritis of left knee, M25.562,G89.29 (ICD-10-CM) - Chronic pain of left knee, and M62.81 (ICD-10-CM) - Quadriceps weakness. Patient is 3 weeks post-op. Able to ascend stairs without upper extremity support, but slight hopping strategy due to eccentric loading noted. Further progressed deep lunging and squatting exercises. Increased difficulty leading with operated lower extremity for lunges. Also initiated balance coordination intervention stepping over hurdles. Knee flexion to 118 degrees following heel slides.  won Arredondo Is progressing well towards her goals.   Patient prognosis is " Excellent.     Patient will continue to benefit from skilled outpatient physical therapy to address the deficits listed in the problem list box on initial evaluation, provide pt/family education and to maximize pt's level of independence in the home and community environment.     Patient's spiritual, cultural and educational needs considered and pt agreeable to plan of care and goals.     Anticipated barriers to physical therapy: transportation    Goals:   Short Term Goals (3 Weeks):   1. Patient will be independent with home exercise program to supplement physical therapy treatment in improving functional status.  2. Patient will improve left lower extremity strength to at least 75% of right lower extremity strength as measured via MicroFet handheld dynamometer to improve strength for closed chain tasks.   3. Patient will improve left knee active range of motion to 0-90 degrees to promote increased ease of sit<>stand transfers.  4. Patient will perform timed up and go with less restrictive assistive device in < 20 seconds to improve gait speed and safety with community ambulation.     Long Term Goals (6 Weeks):   1. Patient will improve left lower extremity strength to at least 90% of right lower extremity strength as measured via MicroFet handheld dynamometer to improve strength for closed chain tasks.   2. Patient will improve the total FOTO Knee Survey Score to </= 33% limited to demonstrate increased perceived functional mobility.  3. Patient will perform timed up and go with least restrictive assistive device in < 13.5 seconds to improve gait speed and safety with community ambulation.  4. Patient will perform at least 12 sit to stands in 30 seconds without UE support to demonstrate increased functional strength.   5. Patient will improve left knee active range of motion to 0-120 degrees to promote higher level transfers and transitions without limitation.        Plan   Maintain knee extension at 0 degrees and  progress knee flexion to > 100 degrees.  Establish quadriceps control     Sung Susie, HANK

## 2024-05-30 ENCOUNTER — CLINICAL SUPPORT (OUTPATIENT)
Dept: REHABILITATION | Facility: HOSPITAL | Age: 69
End: 2024-05-30
Payer: MEDICARE

## 2024-05-30 DIAGNOSIS — M62.81 QUADRICEPS WEAKNESS: Primary | ICD-10-CM

## 2024-05-30 DIAGNOSIS — R26.89 ANTALGIC GAIT: ICD-10-CM

## 2024-05-30 DIAGNOSIS — M25.662 DECREASED RANGE OF MOTION (ROM) OF LEFT KNEE: ICD-10-CM

## 2024-05-30 PROCEDURE — 97110 THERAPEUTIC EXERCISES: CPT | Mod: HCNC,PN,CQ

## 2024-05-30 PROCEDURE — 97530 THERAPEUTIC ACTIVITIES: CPT | Mod: HCNC,PN,CQ

## 2024-06-04 ENCOUNTER — CLINICAL SUPPORT (OUTPATIENT)
Dept: REHABILITATION | Facility: HOSPITAL | Age: 69
End: 2024-06-04
Payer: MEDICARE

## 2024-06-04 DIAGNOSIS — M25.662 DECREASED RANGE OF MOTION (ROM) OF LEFT KNEE: ICD-10-CM

## 2024-06-04 DIAGNOSIS — M62.81 QUADRICEPS WEAKNESS: Primary | ICD-10-CM

## 2024-06-04 DIAGNOSIS — R26.89 ANTALGIC GAIT: ICD-10-CM

## 2024-06-04 PROCEDURE — 97530 THERAPEUTIC ACTIVITIES: CPT | Mod: KX,HCNC,PN

## 2024-06-04 RX ORDER — GABAPENTIN 300 MG/1
600 CAPSULE ORAL 2 TIMES DAILY
Qty: 56 CAPSULE | Refills: 0 | Status: SHIPPED | OUTPATIENT
Start: 2024-06-04 | End: 2024-06-19

## 2024-06-04 NOTE — PROGRESS NOTES
[facility]   Physical Therapy Treatment Note      Name: Maria Elena Tavares  Clinic Number: [id number]    Therapy Diagnosis:   Encounter Diagnoses   Name Primary?    Quadriceps weakness Yes    Decreased range of motion (ROM) of left knee     Antalgic gait        Physician: Jamshid Reece MD    Visit Date: 6/4/2024    Physician Orders: PT Eval and Treat   Medical Diagnosis from Referral:   M17.12 (ICD-10-CM) - Primary osteoarthritis of left knee   M25.562,G89.29 (ICD-10-CM) - Chronic pain of left knee   M62.81 (ICD-10-CM) - Quadriceps weakness      Evaluation Date: 4/30/2024  Authorization Period Expiration: 12/31/2024  Plan of Care Expiration: 6/6/2024  Progress Note Due: 5/21/2024  Date of Surgery: 4/30/2024  Visit # / Visits authorized:  10/20 (+1)   FOTO: 10/10 NEXT     Precautions: Standard and Diabetes      Time In: 10:00 am   Time Out: 10:55 am  Total Billable Time: 30 minutes + 10 minutes ice     PTA Visit #: 1/5       Subjective     Patient reports: no complaints. Continuing to improve.     She was compliant with home exercise program.  Response to previous treatment: soreness   Functional change: ambulation without AD    Pain: 2/10; 0/10 end of session  Location: left lateral aspect of knee      Objective      Objective Measures updated at progress report unless specified.     5/15/2024  Left knee active range of motion: 2-0-111 degrees  2-0-118 degrees (following heel slides)    Treatment     Maria Elena received the treatments listed below:        Maria Elena received therapeutic exercises to develop strength, endurance, ROM, flexibility and posture for 25 minutes including: additional time supervised     Cybex hamstring curls: 2 plates - 3x10 single   Cybex knee extensions: 10# - 3x10 single  Standing heel raises: 3x15  Recumbent Bike: level 5 for 5 minutes with full revolutions for knee range of motion and tissue flexibility.    therapeutic activities to improve functional performance for 30  "minutes, including: additional time supervised     Reciprocal stair negotiation: 5 laps with unilateral upper extremity support  Forward heel taps: 6" stair - 10x with bilateral upper extremity support   Bilateral kneelin" step + airex - 2 minute   Split stance sit to stands: 20x with thigh support   Lateral squat: 2x10 each    Cold Pack for 10 minutes end of session.    Patient Education and Home Exercises       Education provided:   - use single point cane to reduce load through knee joint  - perform updated HEP     Written Home Exercises Provided: yes. Exercises were reviewed and Maria Elena was able to demonstrate them prior to the end of the session.  Maria Elena demonstrated good  understanding of the education provided. See Electronic Medical Record under Patient Instructions for exercises provided during therapy sessions    Assessment     Maria Elena is a 68 y.o. female referred to outpatient Physical Therapy with a medical diagnosis of M17.12 (ICD-10-CM) - Primary osteoarthritis of left knee, M25.562,G89.29 (ICD-10-CM) - Chronic pain of left knee, and M62.81 (ICD-10-CM) - Quadriceps weakness. Patient is 5 weeks post-op. Added bilateral kneeling without upper extremity support without any pain or discomfort. Good depth with lunges. Fatigue with single lower extremity cybex resistance training. Has returned to all functional activities in the home pain-free. Patient has one visit remaining, potential discharge then.      Maria Elena Is progressing well towards her goals.   Patient prognosis is Excellent.     Patient will continue to benefit from skilled outpatient physical therapy to address the deficits listed in the problem list box on initial evaluation, provide pt/family education and to maximize pt's level of independence in the home and community environment.     Patient's spiritual, cultural and educational needs considered and pt agreeable to plan of care and goals.     Anticipated barriers to physical therapy: " transportation    Goals:   Short Term Goals (3 Weeks):   1. Patient will be independent with home exercise program to supplement physical therapy treatment in improving functional status.  2. Patient will improve left lower extremity strength to at least 75% of right lower extremity strength as measured via MicroFet handheld dynamometer to improve strength for closed chain tasks.   3. Patient will improve left knee active range of motion to 0-90 degrees to promote increased ease of sit<>stand transfers.  4. Patient will perform timed up and go with less restrictive assistive device in < 20 seconds to improve gait speed and safety with community ambulation.     Long Term Goals (6 Weeks):   1. Patient will improve left lower extremity strength to at least 90% of right lower extremity strength as measured via MicroFet handheld dynamometer to improve strength for closed chain tasks.   2. Patient will improve the total FOTO Knee Survey Score to </= 33% limited to demonstrate increased perceived functional mobility.  3. Patient will perform timed up and go with least restrictive assistive device in < 13.5 seconds to improve gait speed and safety with community ambulation.  4. Patient will perform at least 12 sit to stands in 30 seconds without UE support to demonstrate increased functional strength.   5. Patient will improve left knee active range of motion to 0-120 degrees to promote higher level transfers and transitions without limitation.        Plan   Maintain knee extension at 0 degrees and progress knee flexion to > 100 degrees.  Establish quadriceps control     Irais Choi PT

## 2024-06-08 ENCOUNTER — PATIENT MESSAGE (OUTPATIENT)
Dept: ORTHOPEDICS | Facility: CLINIC | Age: 69
End: 2024-06-08
Payer: MEDICARE

## 2024-06-08 DIAGNOSIS — Z47.1 AFTERCARE FOLLOWING LEFT KNEE JOINT REPLACEMENT SURGERY: Primary | ICD-10-CM

## 2024-06-08 DIAGNOSIS — Z96.652 AFTERCARE FOLLOWING LEFT KNEE JOINT REPLACEMENT SURGERY: Primary | ICD-10-CM

## 2024-06-10 ENCOUNTER — PATIENT MESSAGE (OUTPATIENT)
Dept: BEHAVIORAL HEALTH | Facility: CLINIC | Age: 69
End: 2024-06-10
Payer: MEDICARE

## 2024-06-19 ENCOUNTER — PATIENT MESSAGE (OUTPATIENT)
Dept: OPTOMETRY | Facility: CLINIC | Age: 69
End: 2024-06-19
Payer: MEDICARE

## 2024-06-21 ENCOUNTER — PATIENT MESSAGE (OUTPATIENT)
Dept: ORTHOPEDICS | Facility: CLINIC | Age: 69
End: 2024-06-21
Payer: MEDICARE

## 2024-07-08 ENCOUNTER — PATIENT MESSAGE (OUTPATIENT)
Dept: PRIMARY CARE CLINIC | Facility: CLINIC | Age: 69
End: 2024-07-08
Payer: MEDICARE

## 2024-07-08 DIAGNOSIS — E11.21 CONTROLLED TYPE 2 DIABETES MELLITUS WITH DIABETIC NEPHROPATHY, WITHOUT LONG-TERM CURRENT USE OF INSULIN: ICD-10-CM

## 2024-07-08 RX ORDER — TIRZEPATIDE 10 MG/.5ML
10 INJECTION, SOLUTION SUBCUTANEOUS
Qty: 12 PEN | Refills: 0 | Status: SHIPPED | OUTPATIENT
Start: 2024-07-08

## 2024-07-08 NOTE — TELEPHONE ENCOUNTER
Care Due:                  Date            Visit Type   Department     Provider  --------------------------------------------------------------------------------                                EP -                              PRIMARY      [facility]  Last Visit: 02-      CARE ([facility])   CARE           Estephania San  Next Visit: None Scheduled  None         None Found                                                            Last  Test          Frequency    Reason                     Performed    Due Date  --------------------------------------------------------------------------------    HBA1C.......  6 months...  tirzepatide..............  03-   09-    Health [facility] Embedded Care Due Messages. Reference number: [id number].   7/08/2024 11:34:26 AM CDT

## 2024-07-10 ENCOUNTER — OFFICE VISIT (OUTPATIENT)
Dept: PRIMARY CARE CLINIC | Facility: CLINIC | Age: 69
End: 2024-07-10
Attending: FAMILY MEDICINE
Payer: MEDICARE

## 2024-07-10 DIAGNOSIS — C77.3 BREAST CANCER METASTASIZED TO AXILLARY LYMPH NODE, LEFT: ICD-10-CM

## 2024-07-10 DIAGNOSIS — I10 ESSENTIAL HYPERTENSION: Primary | ICD-10-CM

## 2024-07-10 DIAGNOSIS — R79.9 ABNORMAL FINDING OF BLOOD CHEMISTRY, UNSPECIFIED: ICD-10-CM

## 2024-07-10 DIAGNOSIS — C50.912 BREAST CANCER METASTASIZED TO AXILLARY LYMPH NODE, LEFT: ICD-10-CM

## 2024-07-10 DIAGNOSIS — R93.3 ABNORMAL FINDINGS ON DIAGNOSTIC IMAGING OF OTHER PARTS OF DIGESTIVE TRACT: ICD-10-CM

## 2024-07-10 DIAGNOSIS — E11.21 CONTROLLED TYPE 2 DIABETES MELLITUS WITH DIABETIC NEPHROPATHY, WITHOUT LONG-TERM CURRENT USE OF INSULIN: ICD-10-CM

## 2024-07-10 DIAGNOSIS — D50.0 IRON DEFICIENCY ANEMIA DUE TO CHRONIC BLOOD LOSS: ICD-10-CM

## 2024-07-10 DIAGNOSIS — E55.9 VITAMIN D DEFICIENCY, UNSPECIFIED: ICD-10-CM

## 2024-07-10 DIAGNOSIS — R93.89 ABNORMAL FINDINGS ON DIAGNOSTIC IMAGING OF OTHER SPECIFIED BODY STRUCTURES: ICD-10-CM

## 2024-07-10 PROCEDURE — 99214 OFFICE O/P EST MOD 30 MIN: CPT | Mod: HCNC,95,, | Performed by: FAMILY MEDICINE

## 2024-07-10 PROCEDURE — 4010F ACE/ARB THERAPY RXD/TAKEN: CPT | Mod: HCNC,CPTII,95, | Performed by: FAMILY MEDICINE

## 2024-07-10 PROCEDURE — 3044F HG A1C LEVEL LT 7.0%: CPT | Mod: HCNC,CPTII,95, | Performed by: FAMILY MEDICINE

## 2024-07-10 PROCEDURE — 3066F NEPHROPATHY DOC TX: CPT | Mod: HCNC,CPTII,95, | Performed by: FAMILY MEDICINE

## 2024-07-10 PROCEDURE — 1159F MED LIST DOCD IN RCRD: CPT | Mod: HCNC,CPTII,95, | Performed by: FAMILY MEDICINE

## 2024-07-10 PROCEDURE — 3061F NEG MICROALBUMINURIA REV: CPT | Mod: HCNC,CPTII,95, | Performed by: FAMILY MEDICINE

## 2024-07-10 PROCEDURE — 1160F RVW MEDS BY RX/DR IN RCRD: CPT | Mod: HCNC,CPTII,95, | Performed by: FAMILY MEDICINE

## 2024-07-10 RX ORDER — AMLODIPINE BESYLATE 5 MG/1
5 TABLET ORAL DAILY
Qty: 90 TABLET | Refills: 3 | Status: SHIPPED | OUTPATIENT
Start: 2024-07-10 | End: 2025-07-10

## 2024-07-11 ENCOUNTER — PATIENT MESSAGE (OUTPATIENT)
Dept: ADMINISTRATIVE | Facility: OTHER | Age: 69
End: 2024-07-11
Payer: MEDICARE

## 2024-07-12 ENCOUNTER — LAB VISIT (OUTPATIENT)
Dept: LAB | Facility: HOSPITAL | Age: 69
End: 2024-07-12
Attending: FAMILY MEDICINE
Payer: MEDICARE

## 2024-07-12 DIAGNOSIS — R79.9 ABNORMAL FINDING OF BLOOD CHEMISTRY, UNSPECIFIED: ICD-10-CM

## 2024-07-12 DIAGNOSIS — C77.3 BREAST CANCER METASTASIZED TO AXILLARY LYMPH NODE, LEFT: ICD-10-CM

## 2024-07-12 DIAGNOSIS — C50.912 BREAST CANCER METASTASIZED TO AXILLARY LYMPH NODE, LEFT: ICD-10-CM

## 2024-07-12 DIAGNOSIS — R93.3 ABNORMAL FINDINGS ON DIAGNOSTIC IMAGING OF OTHER PARTS OF DIGESTIVE TRACT: ICD-10-CM

## 2024-07-12 DIAGNOSIS — E55.9 VITAMIN D DEFICIENCY, UNSPECIFIED: ICD-10-CM

## 2024-07-12 DIAGNOSIS — R93.89 ABNORMAL FINDINGS ON DIAGNOSTIC IMAGING OF OTHER SPECIFIED BODY STRUCTURES: ICD-10-CM

## 2024-07-12 DIAGNOSIS — D50.0 IRON DEFICIENCY ANEMIA DUE TO CHRONIC BLOOD LOSS: ICD-10-CM

## 2024-07-12 PROCEDURE — 82306 VITAMIN D 25 HYDROXY: CPT | Mod: HCNC | Performed by: FAMILY MEDICINE

## 2024-07-12 PROCEDURE — 84443 ASSAY THYROID STIM HORMONE: CPT | Mod: HCNC | Performed by: FAMILY MEDICINE

## 2024-07-12 PROCEDURE — 85025 COMPLETE CBC W/AUTO DIFF WBC: CPT | Mod: HCNC | Performed by: INTERNAL MEDICINE

## 2024-07-12 PROCEDURE — 80053 COMPREHEN METABOLIC PANEL: CPT | Mod: HCNC | Performed by: FAMILY MEDICINE

## 2024-07-12 PROCEDURE — 84439 ASSAY OF FREE THYROXINE: CPT | Mod: HCNC | Performed by: FAMILY MEDICINE

## 2024-07-12 PROCEDURE — 36415 COLL VENOUS BLD VENIPUNCTURE: CPT | Mod: HCNC,PN | Performed by: FAMILY MEDICINE

## 2024-07-12 PROCEDURE — 80061 LIPID PANEL: CPT | Mod: HCNC | Performed by: FAMILY MEDICINE

## 2024-07-12 PROCEDURE — 83036 HEMOGLOBIN GLYCOSYLATED A1C: CPT | Mod: HCNC | Performed by: FAMILY MEDICINE

## 2024-07-13 LAB
25(OH)D3+25(OH)D2 SERPL-MCNC: 20 NG/ML (ref 30–96)
ALBUMIN SERPL BCP-MCNC: 3.9 G/DL (ref 3.5–5.2)
ALP SERPL-CCNC: 124 U/L (ref 55–135)
ALT SERPL W/O P-5'-P-CCNC: 9 U/L (ref 10–44)
ANION GAP SERPL CALC-SCNC: 11 MMOL/L (ref 8–16)
AST SERPL-CCNC: 19 U/L (ref 10–40)
BASOPHILS # BLD AUTO: 0.02 K/UL (ref 0–0.2)
BASOPHILS # BLD AUTO: 0.02 K/UL (ref 0–0.2)
BASOPHILS NFR BLD: 0.4 % (ref 0–1.9)
BASOPHILS NFR BLD: 0.4 % (ref 0–1.9)
BILIRUB SERPL-MCNC: 0.4 MG/DL (ref 0.1–1)
BUN SERPL-MCNC: 16 MG/DL (ref 8–23)
CALCIUM SERPL-MCNC: 9.5 MG/DL (ref 8.7–10.5)
CHLORIDE SERPL-SCNC: 102 MMOL/L (ref 95–110)
CHOLEST SERPL-MCNC: 239 MG/DL (ref 120–199)
CHOLEST/HDLC SERPL: 5.1 {RATIO} (ref 2–5)
CO2 SERPL-SCNC: 23 MMOL/L (ref 23–29)
CREAT SERPL-MCNC: 1.1 MG/DL (ref 0.5–1.4)
DIFFERENTIAL METHOD BLD: ABNORMAL
DIFFERENTIAL METHOD BLD: ABNORMAL
EOSINOPHIL # BLD AUTO: 0.2 K/UL (ref 0–0.5)
EOSINOPHIL # BLD AUTO: 0.2 K/UL (ref 0–0.5)
EOSINOPHIL NFR BLD: 4.5 % (ref 0–8)
EOSINOPHIL NFR BLD: 4.5 % (ref 0–8)
ERYTHROCYTE [DISTWIDTH] IN BLOOD BY AUTOMATED COUNT: 15.1 % (ref 11.5–14.5)
ERYTHROCYTE [DISTWIDTH] IN BLOOD BY AUTOMATED COUNT: 15.1 % (ref 11.5–14.5)
EST. GFR  (NO RACE VARIABLE): 54.7 ML/MIN/1.73 M^2
ESTIMATED AVG GLUCOSE: 123 MG/DL (ref 68–131)
GLUCOSE SERPL-MCNC: 140 MG/DL (ref 70–110)
HBA1C MFR BLD: 5.9 % (ref 4–5.6)
HCT VFR BLD AUTO: 37.7 % (ref 37–48.5)
HCT VFR BLD AUTO: 37.7 % (ref 37–48.5)
HDLC SERPL-MCNC: 47 MG/DL (ref 40–75)
HDLC SERPL: 19.7 % (ref 20–50)
HGB BLD-MCNC: 11.6 G/DL (ref 12–16)
HGB BLD-MCNC: 11.6 G/DL (ref 12–16)
IMM GRANULOCYTES # BLD AUTO: 0.02 K/UL (ref 0–0.04)
IMM GRANULOCYTES # BLD AUTO: 0.02 K/UL (ref 0–0.04)
IMM GRANULOCYTES NFR BLD AUTO: 0.4 % (ref 0–0.5)
IMM GRANULOCYTES NFR BLD AUTO: 0.4 % (ref 0–0.5)
LDLC SERPL CALC-MCNC: 164.6 MG/DL (ref 63–159)
LYMPHOCYTES # BLD AUTO: 1 K/UL (ref 1–4.8)
LYMPHOCYTES # BLD AUTO: 1 K/UL (ref 1–4.8)
LYMPHOCYTES NFR BLD: 18.9 % (ref 18–48)
LYMPHOCYTES NFR BLD: 18.9 % (ref 18–48)
MCH RBC QN AUTO: 29.4 PG (ref 27–31)
MCH RBC QN AUTO: 29.4 PG (ref 27–31)
MCHC RBC AUTO-ENTMCNC: 30.8 G/DL (ref 32–36)
MCHC RBC AUTO-ENTMCNC: 30.8 G/DL (ref 32–36)
MCV RBC AUTO: 95 FL (ref 82–98)
MCV RBC AUTO: 95 FL (ref 82–98)
MONOCYTES # BLD AUTO: 0.5 K/UL (ref 0.3–1)
MONOCYTES # BLD AUTO: 0.5 K/UL (ref 0.3–1)
MONOCYTES NFR BLD: 8.9 % (ref 4–15)
MONOCYTES NFR BLD: 8.9 % (ref 4–15)
NEUTROPHILS # BLD AUTO: 3.4 K/UL (ref 1.8–7.7)
NEUTROPHILS # BLD AUTO: 3.4 K/UL (ref 1.8–7.7)
NEUTROPHILS NFR BLD: 66.9 % (ref 38–73)
NEUTROPHILS NFR BLD: 66.9 % (ref 38–73)
NONHDLC SERPL-MCNC: 192 MG/DL
NRBC BLD-RTO: 0 /100 WBC
NRBC BLD-RTO: 0 /100 WBC
PLATELET # BLD AUTO: 330 K/UL (ref 150–450)
PLATELET # BLD AUTO: 330 K/UL (ref 150–450)
PMV BLD AUTO: 10.1 FL (ref 9.2–12.9)
PMV BLD AUTO: 10.1 FL (ref 9.2–12.9)
POTASSIUM SERPL-SCNC: 3.7 MMOL/L (ref 3.5–5.1)
PROT SERPL-MCNC: 8.1 G/DL (ref 6–8.4)
RBC # BLD AUTO: 3.95 M/UL (ref 4–5.4)
RBC # BLD AUTO: 3.95 M/UL (ref 4–5.4)
SODIUM SERPL-SCNC: 136 MMOL/L (ref 136–145)
T4 FREE SERPL-MCNC: 0.76 NG/DL (ref 0.71–1.51)
TRIGL SERPL-MCNC: 137 MG/DL (ref 30–150)
TSH SERPL DL<=0.005 MIU/L-ACNC: 10.45 UIU/ML (ref 0.4–4)
WBC # BLD AUTO: 5.08 K/UL (ref 3.9–12.7)
WBC # BLD AUTO: 5.08 K/UL (ref 3.9–12.7)

## 2024-07-14 ENCOUNTER — PATIENT MESSAGE (OUTPATIENT)
Dept: ADMINISTRATIVE | Facility: OTHER | Age: 69
End: 2024-07-14
Payer: MEDICARE

## 2024-07-15 ENCOUNTER — LAB VISIT (OUTPATIENT)
Dept: LAB | Facility: HOSPITAL | Age: 69
End: 2024-07-15
Attending: FAMILY MEDICINE
Payer: MEDICARE

## 2024-07-15 DIAGNOSIS — C77.3 BREAST CANCER METASTASIZED TO AXILLARY LYMPH NODE, LEFT: ICD-10-CM

## 2024-07-15 DIAGNOSIS — D50.0 IRON DEFICIENCY ANEMIA DUE TO CHRONIC BLOOD LOSS: ICD-10-CM

## 2024-07-15 DIAGNOSIS — C50.912 BREAST CANCER METASTASIZED TO AXILLARY LYMPH NODE, LEFT: ICD-10-CM

## 2024-07-15 LAB
ALBUMIN/CREAT UR: 9.3 UG/MG (ref 0–30)
CREAT UR-MCNC: 108 MG/DL (ref 15–325)
MICROALBUMIN UR DL<=1MG/L-MCNC: 10 UG/ML

## 2024-07-15 PROCEDURE — 82570 ASSAY OF URINE CREATININE: CPT | Mod: HCNC | Performed by: FAMILY MEDICINE

## 2024-07-21 NOTE — PROGRESS NOTES
Subjective:       Patient ID: Maria Elena Tavares is a 68 y.o. female.    Chief Complaint: hypotension    The patient location is: home  The chief complaint leading to consultation is: blood pressures    Visit type: audiovisual    Face to Face time with patient: 20 minutes 20 minutes of total time spent on the encounter, which includes face to face time and non-face to face time preparing to see the patient (eg, review of tests), Obtaining and/or reviewing separately obtained history, Documenting clinical information in the electronic or other health record, Independently interpreting results (not separately reported) and communicating results to the patient/family/caregiver, or Care coordination (not separately reported).     Each patient to whom he or she provides medical services by telemedicine is:  (1) informed of the relationship between the physician and patient and the respective role of any other health care provider with respect to management of the patient; and (2) notified that he or she may decline to receive medical services by telemedicine and may withdraw from such care at any time.    Notes:     Pt presents for hypertension and diabetes follow up.  Patient did her knee replacement, states that it went well.  Feels good does at times have some headaches      Hypertension  This is a recurrent problem. The current episode started more than 1 year ago. The problem has been gradually improving since onset. The problem is resistant. Associated symptoms include blurred vision, headaches and malaise/fatigue. Pertinent negatives include no anxiety, chest pain, neck pain, orthopnea, palpitations, peripheral edema, PND, shortness of breath or sweats. Risk factors for coronary artery disease include diabetes mellitus. Past treatments include nothing. The current treatment provides moderate improvement. There is no history of CVA, PVD or retinopathy.   Diabetes  She has type 2 diabetes mellitus. No MedicAlert  identification noted. Hypoglycemia symptoms include headaches. Pertinent negatives for hypoglycemia include no dizziness or sweats. Associated symptoms include blurred vision. Pertinent negatives for diabetes include no chest pain, no foot paresthesias and no weakness. Symptoms are improving. Diabetic complications include autonomic neuropathy and peripheral neuropathy. Pertinent negatives for diabetic complications include no CVA, heart disease, nephropathy, PVD or retinopathy. Risk factors for coronary artery disease include dyslipidemia and hypertension. Current diabetic treatment includes oral agent (monotherapy). She is compliant with treatment all of the time. She is currently taking insulin pre-breakfast and at bedtime. Rotation sites for injection include the abdominal wall. Her weight is stable. She is following a generally healthy diet. Meal planning includes avoidance of concentrated sweets. She has not had a previous visit with a dietitian. She participates in exercise daily. She monitors blood glucose at home 1-2 x per day. Blood glucose monitoring compliance is good. Her home blood glucose trend is decreasing steadily. She sees a podiatrist.Eye exam is current.     Review of Systems   Constitutional:  Positive for malaise/fatigue.   Eyes:  Positive for blurred vision.   Respiratory:  Negative for cough, chest tightness and shortness of breath.    Cardiovascular:  Negative for chest pain, palpitations, orthopnea, leg swelling and PND.   Gastrointestinal: Negative.    Musculoskeletal: Negative.  Negative for joint swelling and neck pain.   Skin: Negative.    Neurological:  Positive for headaches. Negative for dizziness, weakness and light-headedness.   All other systems reviewed and are negative.        Objective:      Physical Exam  Vitals reviewed.   Constitutional:       General: She is not in acute distress.     Appearance: Normal appearance. She is well-developed and normal weight. She is not  ill-appearing, toxic-appearing or diaphoretic.   HENT:      Head: Normocephalic and atraumatic.   Eyes:      Extraocular Movements: Extraocular movements intact.      Conjunctiva/sclera: Conjunctivae normal.      Pupils: Pupils are equal, round, and reactive to light.   Neck:      Thyroid: No thyromegaly.   Cardiovascular:      Rate and Rhythm: Normal rate and regular rhythm.      Heart sounds: Normal heart sounds. No murmur heard.  Pulmonary:      Effort: Pulmonary effort is normal. No respiratory distress.      Breath sounds: Normal breath sounds. No wheezing or rales.   Chest:      Chest wall: No tenderness.   Musculoskeletal:         General: Normal range of motion.      Cervical back: Normal range of motion and neck supple.      Right lower leg: No edema.      Left lower leg: No edema.   Lymphadenopathy:      Cervical: No cervical adenopathy.   Skin:     General: Skin is warm.   Neurological:      Mental Status: She is alert and oriented to person, place, and time.   Psychiatric:         Behavior: Behavior normal.         Thought Content: Thought content normal.         Judgment: Judgment normal.        Assessment:     Hypertension  Diabetes type 2      Plan:   Diabetes type 2 controlled on medication  Amlodipine continue 5 mg once daily  Essential hypertension  -     amLODIPine (NORVASC) 5 MG tablet; Take 1 tablet (5 mg total) by mouth once daily.  Dispense: 90 tablet; Refill: 3    Iron deficiency anemia due to chronic blood loss  -     CBC Auto Differential; Future; Expected date: 07/10/2024  -     Comprehensive Metabolic Panel; Future; Expected date: 07/10/2024  -     Lipid Panel; Future; Expected date: 07/10/2024  -     Hemoglobin A1C; Future; Expected date: 07/10/2024  -     TSH; Future; Expected date: 07/10/2024  -     Microalbumin/Creatinine Ratio, Urine; Future; Expected date: 07/10/2024  -     Vitamin D; Future; Expected date: 07/10/2024    Breast cancer metastasized to axillary lymph node, left  -      CBC Auto Differential; Future; Expected date: 07/10/2024  -     Comprehensive Metabolic Panel; Future; Expected date: 07/10/2024  -     Lipid Panel; Future; Expected date: 07/10/2024  -     Hemoglobin A1C; Future; Expected date: 07/10/2024  -     TSH; Future; Expected date: 07/10/2024  -     Microalbumin/Creatinine Ratio, Urine; Future; Expected date: 07/10/2024  -     Vitamin D; Future; Expected date: 07/10/2024    Abnormal findings on diagnostic imaging of other parts of digestive tract  -     Lipid Panel; Future; Expected date: 07/10/2024    Abnormal finding of blood chemistry, unspecified  -     Hemoglobin A1C; Future; Expected date: 07/10/2024    Abnormal findings on diagnostic imaging of other specified body structures  -     TSH; Future; Expected date: 07/10/2024    Vitamin D deficiency, unspecified  -     Vitamin D; Future; Expected date: 07/10/2024    Controlled type 2 diabetes mellitus with diabetic nephropathy, without long-term current use of insulin    Patient will continue to exercise and do lifestyle modifications.  Repeat labs due

## 2024-07-23 ENCOUNTER — PATIENT MESSAGE (OUTPATIENT)
Dept: ORTHOPEDICS | Facility: CLINIC | Age: 69
End: 2024-07-23
Payer: MEDICARE

## 2024-07-23 ENCOUNTER — TELEPHONE (OUTPATIENT)
Dept: PRIMARY CARE CLINIC | Facility: CLINIC | Age: 69
End: 2024-07-23
Payer: MEDICARE

## 2024-07-23 NOTE — TELEPHONE ENCOUNTER
----- Message from Estephania San MD sent at 7/23/2024  3:49 PM CDT -----  Please contact the patient and schedule appt for thyroid lab review thanks, PV

## 2024-07-24 DIAGNOSIS — C77.3 BREAST CANCER METASTASIZED TO AXILLARY LYMPH NODE, LEFT: ICD-10-CM

## 2024-07-24 DIAGNOSIS — C50.912 BREAST CANCER METASTASIZED TO AXILLARY LYMPH NODE, LEFT: ICD-10-CM

## 2024-07-25 RX ORDER — LETROZOLE 2.5 MG/1
2.5 TABLET, FILM COATED ORAL DAILY
Qty: 90 TABLET | Refills: 3 | Status: SHIPPED | OUTPATIENT
Start: 2024-07-25 | End: 2025-07-25

## 2024-07-29 ENCOUNTER — OFFICE VISIT (OUTPATIENT)
Dept: URGENT CARE | Facility: CLINIC | Age: 69
End: 2024-07-29
Payer: MEDICARE

## 2024-07-29 VITALS
SYSTOLIC BLOOD PRESSURE: 134 MMHG | HEART RATE: 108 BPM | BODY MASS INDEX: 34.16 KG/M2 | OXYGEN SATURATION: 98 % | WEIGHT: 174 LBS | DIASTOLIC BLOOD PRESSURE: 65 MMHG | HEIGHT: 60 IN | RESPIRATION RATE: 18 BRPM | TEMPERATURE: 99 F
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
## 2024-07-29 DIAGNOSIS — T63.441A BEE STING, ACCIDENTAL OR UNINTENTIONAL, INITIAL ENCOUNTER: Primary | ICD-10-CM

## 2024-07-29 DIAGNOSIS — S00.261A: ICD-10-CM

## 2024-07-29 PROCEDURE — 99213 OFFICE O/P EST LOW 20 MIN: CPT | Mod: S$GLB,,, | Performed by: NURSE PRACTITIONER

## 2024-07-29 RX ORDER — HYDROCORTISONE 1 %
CREAM (GRAM) TOPICAL 2 TIMES DAILY PRN
Qty: 28.4 G | Refills: 0 | Status: SHIPPED | OUTPATIENT
Start: 2024-07-29

## 2024-07-29 RX ORDER — CETIRIZINE HYDROCHLORIDE 10 MG/1
10 TABLET ORAL DAILY PRN
Qty: 30 TABLET | Refills: 0 | Status: SHIPPED | OUTPATIENT
Start: 2024-07-29

## 2024-07-29 NOTE — PROGRESS NOTES
Subjective:      Patient ID: Maria Elena Tavares is a 68 y.o. female.    Vitals:  height is 5' (1.524 m) and weight is 78.9 kg (174 lb). Her oral temperature is 98.8 °F (37.1 °C). Her blood pressure is 134/65 and her pulse is 108. Her respiration is 18 and oxygen saturation is 98%.     Chief Complaint: Insect Bite    67 yo female c/o bee sting on right side of face above eyebrow. Pt reports sting occurred approx 2 hours ago. Pt reports she did not self medication and pain level is a 6.    68 year old female presents to clinic with complaints of right eyebrow pain and irritation after sting from a bee approximately 2 hours prior to clinic arrival.     Insect Bite  This is a new problem. The current episode started today. The problem occurs constantly. The problem has been unchanged. Pertinent negatives include no abdominal pain, anorexia, arthralgias, change in bowel habit, chest pain, chills, congestion, coughing, diaphoresis, fatigue, fever, headaches, joint swelling, myalgias, nausea, neck pain, numbness, rash, sore throat, swollen glands, urinary symptoms, vertigo, visual change, vomiting or weakness. Nothing aggravates the symptoms. She has tried nothing for the symptoms. The treatment provided no relief.       Constitution: Negative for chills, sweating, fatigue and fever.   HENT:  Negative for congestion and sore throat.    Neck: Negative for neck pain.   Cardiovascular:  Negative for chest pain.   Respiratory:  Negative for chest tightness, cough, shortness of breath and wheezing.    Gastrointestinal:  Negative for abdominal pain, nausea and vomiting.   Musculoskeletal:  Negative for joint pain, joint swelling and muscle ache.   Skin:  Positive for puncture wound and erythema. Negative for rash and hives.   Allergic/Immunologic: Negative for hives, itching and sneezing.   Neurological:  Negative for history of vertigo, headaches and numbness.      Objective:     Physical Exam   Constitutional: She is oriented to  person, place, and time. She appears well-developed.   HENT:   Head: Normocephalic and atraumatic. Head is without abrasion, without contusion and without laceration.       Ears:   Right Ear: External ear normal.   Left Ear: External ear normal.   Nose: Nose normal. No rhinorrhea.   Mouth/Throat: Oropharynx is clear and moist and mucous membranes are normal.   Right eyebrow assessed for stinger using magnifier, none noted      Comments: Right eyebrow assessed for stinger using magnifier, none noted  Eyes: Conjunctivae, EOM and lids are normal. Pupils are equal, round, and reactive to light. Extraocular movement intact   Neck: Trachea normal and phonation normal. Neck supple.   Cardiovascular: Tachycardia present.   Pulmonary/Chest: Effort normal. No stridor. No respiratory distress.   Musculoskeletal: Normal range of motion.         General: Normal range of motion.   Neurological: She is alert and oriented to person, place, and time.   Skin: Skin is warm, dry, intact and no rash. Capillary refill takes less than 2 seconds. erythema No abrasion, No burn, No bruising and No ecchymosis   Psychiatric: Her speech is normal and behavior is normal. Judgment and thought content normal.   Nursing note and vitals reviewed.      Assessment:     1. Bee sting, accidental or unintentional, initial encounter    2. Arthropod bite of right eyebrow, initial encounter        Plan:       Bee sting, accidental or unintentional, initial encounter  -     cetirizine (ZYRTEC) 10 MG tablet; Take 1 tablet (10 mg total) by mouth daily as needed for Allergies (itching).  Dispense: 30 tablet; Refill: 0  -     hydrocortisone 1 % cream; Apply topically 2 (two) times daily as needed (skin irritation).  Dispense: 28.4 g; Refill: 0    Arthropod bite of right eyebrow, initial encounter  -     cetirizine (ZYRTEC) 10 MG tablet; Take 1 tablet (10 mg total) by mouth daily as needed for Allergies (itching).  Dispense: 30 tablet; Refill: 0  -      hydrocortisone 1 % cream; Apply topically 2 (two) times daily as needed (skin irritation).  Dispense: 28.4 g; Refill: 0        Patient Instructions   Keep the area clean and try not to scratch it.  Use cool compresses on the skin. They may help with swelling and itching. Dip a cloth in cold water and put it right on your itchy skin.  Use an over-the-counter cream or ointment to help with itching.  You may want to take drugs like ibuprofen, naproxen, or acetaminophen if the bite or sting is painful or very swollen.  Please return here or go to the Emergency Department for any concerns or worsening of condition.  Please follow up with your primary care doctor or specialist as needed.    If you  smoke, please stop smoking.

## 2024-07-29 NOTE — PATIENT INSTRUCTIONS
Keep the area clean and try not to scratch it.  Use cool compresses on the skin. They may help with swelling and itching. Dip a cloth in cold water and put it right on your itchy skin.  Use an over-the-counter cream or ointment to help with itching.  You may want to take drugs like ibuprofen, naproxen, or acetaminophen if the bite or sting is painful or very swollen.  Please return here or go to the Emergency Department for any concerns or worsening of condition.  Please follow up with your primary care doctor or specialist as needed.    If you  smoke, please stop smoking.

## 2024-07-30 ENCOUNTER — OFFICE VISIT (OUTPATIENT)
Dept: PRIMARY CARE CLINIC | Facility: CLINIC | Age: 69
End: 2024-07-30
Attending: FAMILY MEDICINE
Payer: MEDICARE

## 2024-07-30 DIAGNOSIS — E55.9 VITAMIN D DEFICIENCY: ICD-10-CM

## 2024-07-30 DIAGNOSIS — E08.22 DIABETES MELLITUS DUE TO UNDERLYING CONDITION WITH DIABETIC CHRONIC KIDNEY DISEASE: ICD-10-CM

## 2024-07-30 DIAGNOSIS — F41.1 GAD (GENERALIZED ANXIETY DISORDER): ICD-10-CM

## 2024-07-30 DIAGNOSIS — E03.9 HYPOTHYROIDISM, UNSPECIFIED TYPE: ICD-10-CM

## 2024-07-30 DIAGNOSIS — E78.01 FAMILIAL HYPERCHOLESTEROLEMIA: ICD-10-CM

## 2024-07-30 DIAGNOSIS — E78.00 PURE HYPERCHOLESTEROLEMIA: ICD-10-CM

## 2024-07-30 DIAGNOSIS — G62.0 NEUROPATHY DUE TO CHEMOTHERAPEUTIC DRUG: ICD-10-CM

## 2024-07-30 DIAGNOSIS — T45.1X5A NEUROPATHY DUE TO CHEMOTHERAPEUTIC DRUG: ICD-10-CM

## 2024-07-30 DIAGNOSIS — E03.8 OTHER SPECIFIED HYPOTHYROIDISM: Primary | ICD-10-CM

## 2024-07-30 PROCEDURE — 3066F NEPHROPATHY DOC TX: CPT | Mod: HCNC,CPTII,95, | Performed by: FAMILY MEDICINE

## 2024-07-30 PROCEDURE — 4010F ACE/ARB THERAPY RXD/TAKEN: CPT | Mod: HCNC,CPTII,95, | Performed by: FAMILY MEDICINE

## 2024-07-30 PROCEDURE — 3044F HG A1C LEVEL LT 7.0%: CPT | Mod: HCNC,CPTII,95, | Performed by: FAMILY MEDICINE

## 2024-07-30 PROCEDURE — 99215 OFFICE O/P EST HI 40 MIN: CPT | Mod: HCNC,95,, | Performed by: FAMILY MEDICINE

## 2024-07-30 PROCEDURE — 3061F NEG MICROALBUMINURIA REV: CPT | Mod: HCNC,CPTII,95, | Performed by: FAMILY MEDICINE

## 2024-07-30 RX ORDER — LEVOTHYROXINE SODIUM 200 UG/1
200 TABLET ORAL DAILY
Qty: 90 TABLET | Refills: 1 | Status: SHIPPED | OUTPATIENT
Start: 2024-07-30 | End: 2025-01-26

## 2024-07-30 RX ORDER — ERGOCALCIFEROL 1.25 MG/1
50000 CAPSULE ORAL
Qty: 6 CAPSULE | Refills: 3 | Status: SHIPPED | OUTPATIENT
Start: 2024-07-30

## 2024-07-30 NOTE — Clinical Note
Pl change appt in Sept to telemed. And, schedule labs 3 months fasting with in person appt with me 1-2 weeks after. Thanks, PV

## 2024-08-07 ENCOUNTER — OFFICE VISIT (OUTPATIENT)
Dept: ENDOCRINOLOGY | Facility: CLINIC | Age: 69
End: 2024-08-07
Payer: MEDICARE

## 2024-08-07 VITALS
OXYGEN SATURATION: 96 % | BODY MASS INDEX: 34.04 KG/M2 | DIASTOLIC BLOOD PRESSURE: 80 MMHG | WEIGHT: 174.25 LBS | HEART RATE: 95 BPM | SYSTOLIC BLOOD PRESSURE: 110 MMHG

## 2024-08-07 DIAGNOSIS — E11.21 CONTROLLED TYPE 2 DIABETES MELLITUS WITH DIABETIC NEPHROPATHY, WITHOUT LONG-TERM CURRENT USE OF INSULIN: ICD-10-CM

## 2024-08-07 DIAGNOSIS — E03.8 OTHER SPECIFIED HYPOTHYROIDISM: Primary | ICD-10-CM

## 2024-08-07 PROCEDURE — 3079F DIAST BP 80-89 MM HG: CPT | Mod: HCNC,CPTII,S$GLB, | Performed by: INTERNAL MEDICINE

## 2024-08-07 PROCEDURE — 1126F AMNT PAIN NOTED NONE PRSNT: CPT | Mod: HCNC,CPTII,S$GLB, | Performed by: INTERNAL MEDICINE

## 2024-08-07 PROCEDURE — 3288F FALL RISK ASSESSMENT DOCD: CPT | Mod: HCNC,CPTII,S$GLB, | Performed by: INTERNAL MEDICINE

## 2024-08-07 PROCEDURE — 3066F NEPHROPATHY DOC TX: CPT | Mod: HCNC,CPTII,S$GLB, | Performed by: INTERNAL MEDICINE

## 2024-08-07 PROCEDURE — 1159F MED LIST DOCD IN RCRD: CPT | Mod: HCNC,CPTII,S$GLB, | Performed by: INTERNAL MEDICINE

## 2024-08-07 PROCEDURE — 99204 OFFICE O/P NEW MOD 45 MIN: CPT | Mod: HCNC,S$GLB,, | Performed by: INTERNAL MEDICINE

## 2024-08-07 PROCEDURE — 3008F BODY MASS INDEX DOCD: CPT | Mod: HCNC,CPTII,S$GLB, | Performed by: INTERNAL MEDICINE

## 2024-08-07 PROCEDURE — 1160F RVW MEDS BY RX/DR IN RCRD: CPT | Mod: HCNC,CPTII,S$GLB, | Performed by: INTERNAL MEDICINE

## 2024-08-07 PROCEDURE — 99999 PR PBB SHADOW E&M-EST. PATIENT-LVL V: CPT | Mod: PBBFAC,HCNC,, | Performed by: INTERNAL MEDICINE

## 2024-08-07 PROCEDURE — 3074F SYST BP LT 130 MM HG: CPT | Mod: HCNC,CPTII,S$GLB, | Performed by: INTERNAL MEDICINE

## 2024-08-07 PROCEDURE — 1101F PT FALLS ASSESS-DOCD LE1/YR: CPT | Mod: HCNC,CPTII,S$GLB, | Performed by: INTERNAL MEDICINE

## 2024-08-07 PROCEDURE — 3044F HG A1C LEVEL LT 7.0%: CPT | Mod: HCNC,CPTII,S$GLB, | Performed by: INTERNAL MEDICINE

## 2024-08-07 PROCEDURE — 4010F ACE/ARB THERAPY RXD/TAKEN: CPT | Mod: HCNC,CPTII,S$GLB, | Performed by: INTERNAL MEDICINE

## 2024-08-07 PROCEDURE — 3061F NEG MICROALBUMINURIA REV: CPT | Mod: HCNC,CPTII,S$GLB, | Performed by: INTERNAL MEDICINE

## 2024-08-12 ENCOUNTER — OFFICE VISIT (OUTPATIENT)
Dept: HEMATOLOGY/ONCOLOGY | Facility: CLINIC | Age: 69
End: 2024-08-12
Payer: MEDICARE

## 2024-08-12 VITALS
DIASTOLIC BLOOD PRESSURE: 78 MMHG | RESPIRATION RATE: 12 BRPM | OXYGEN SATURATION: 99 % | WEIGHT: 173.75 LBS | HEIGHT: 60 IN | SYSTOLIC BLOOD PRESSURE: 132 MMHG | TEMPERATURE: 98 F | HEART RATE: 94 BPM | BODY MASS INDEX: 34.11 KG/M2

## 2024-08-12 DIAGNOSIS — E78.5 HYPERLIPIDEMIA, UNSPECIFIED HYPERLIPIDEMIA TYPE: ICD-10-CM

## 2024-08-12 DIAGNOSIS — Z90.13 H/O BILATERAL MASTECTOMY: ICD-10-CM

## 2024-08-12 DIAGNOSIS — I10 ESSENTIAL HYPERTENSION: ICD-10-CM

## 2024-08-12 DIAGNOSIS — C77.3 BREAST CANCER METASTASIZED TO AXILLARY LYMPH NODE, LEFT: Primary | ICD-10-CM

## 2024-08-12 DIAGNOSIS — E03.9 HYPOTHYROIDISM, UNSPECIFIED TYPE: ICD-10-CM

## 2024-08-12 DIAGNOSIS — E11.21 CONTROLLED TYPE 2 DIABETES MELLITUS WITH DIABETIC NEPHROPATHY, WITHOUT LONG-TERM CURRENT USE OF INSULIN: ICD-10-CM

## 2024-08-12 DIAGNOSIS — C50.912 BREAST CANCER METASTASIZED TO AXILLARY LYMPH NODE, LEFT: Primary | ICD-10-CM

## 2024-08-12 PROCEDURE — 3066F NEPHROPATHY DOC TX: CPT | Mod: HCNC,CPTII,S$GLB, | Performed by: INTERNAL MEDICINE

## 2024-08-12 PROCEDURE — 3288F FALL RISK ASSESSMENT DOCD: CPT | Mod: HCNC,CPTII,S$GLB, | Performed by: INTERNAL MEDICINE

## 2024-08-12 PROCEDURE — 1159F MED LIST DOCD IN RCRD: CPT | Mod: HCNC,CPTII,S$GLB, | Performed by: INTERNAL MEDICINE

## 2024-08-12 PROCEDURE — 3075F SYST BP GE 130 - 139MM HG: CPT | Mod: HCNC,CPTII,S$GLB, | Performed by: INTERNAL MEDICINE

## 2024-08-12 PROCEDURE — 1125F AMNT PAIN NOTED PAIN PRSNT: CPT | Mod: HCNC,CPTII,S$GLB, | Performed by: INTERNAL MEDICINE

## 2024-08-12 PROCEDURE — 99999 PR PBB SHADOW E&M-EST. PATIENT-LVL IV: CPT | Mod: PBBFAC,HCNC,, | Performed by: INTERNAL MEDICINE

## 2024-08-12 PROCEDURE — G2211 COMPLEX E/M VISIT ADD ON: HCPCS | Mod: HCNC,S$GLB,, | Performed by: INTERNAL MEDICINE

## 2024-08-12 PROCEDURE — 3008F BODY MASS INDEX DOCD: CPT | Mod: HCNC,CPTII,S$GLB, | Performed by: INTERNAL MEDICINE

## 2024-08-12 PROCEDURE — 1101F PT FALLS ASSESS-DOCD LE1/YR: CPT | Mod: HCNC,CPTII,S$GLB, | Performed by: INTERNAL MEDICINE

## 2024-08-12 PROCEDURE — 99214 OFFICE O/P EST MOD 30 MIN: CPT | Mod: HCNC,S$GLB,, | Performed by: INTERNAL MEDICINE

## 2024-08-12 PROCEDURE — 3078F DIAST BP <80 MM HG: CPT | Mod: HCNC,CPTII,S$GLB, | Performed by: INTERNAL MEDICINE

## 2024-08-12 PROCEDURE — 1160F RVW MEDS BY RX/DR IN RCRD: CPT | Mod: HCNC,CPTII,S$GLB, | Performed by: INTERNAL MEDICINE

## 2024-08-12 PROCEDURE — 3044F HG A1C LEVEL LT 7.0%: CPT | Mod: HCNC,CPTII,S$GLB, | Performed by: INTERNAL MEDICINE

## 2024-08-12 PROCEDURE — 4010F ACE/ARB THERAPY RXD/TAKEN: CPT | Mod: HCNC,CPTII,S$GLB, | Performed by: INTERNAL MEDICINE

## 2024-08-12 PROCEDURE — 3061F NEG MICROALBUMINURIA REV: CPT | Mod: HCNC,CPTII,S$GLB, | Performed by: INTERNAL MEDICINE

## 2024-08-12 NOTE — PROGRESS NOTES
Subjective     Patient ID: Maria Elena Tavares is a 68 y.o. female.    Chief Complaint: Breast cancer metastasized to axillary lymph node, left    HPI    Returns for routine follow up of breast cancer     Overall feels well.   States she had her knee surgery in 4/2024 and is still strengthening this area    States she sometimes has pain in her right axilla    Weight is overall stable  States tolerating Femara well  Tolerable hot flashes  Mild joint issues    Better BG control    BMD normal 7/10/2023.   Takes Vit D 5000u MWF.       Oncologic History:   - self detected, noted a shooting pain in breast first and then a week later felt a lump  Some delay in getting appointment as she was unaware she had to call  - 8/30/19 Diagnostic mammogram and ultrasound:  Left breast 20 mm x 18 mm x 17 mm mass at the 3 o'clock position. Assessment: 4 - Suspicious finding. Biopsy is recommended. Lymph Node: Left axilla 16 mm x 14 mm x 13 mm lymph node. Assessment: 4 - Suspicious finding. Biopsy is recommended. Right- There is no mammographic or sonographic evidence of malignancy.  BI-RADS Category:   Overall: 4 - Suspicious  - 9/5/19 Ultrasound guided biopsy  Pathology:  1. LEFT BREAST MASS, BIOPSY:  - Invasive ductal carcinoma, grade 3, longest linear length is 10 mm measured on the slide.  - Histologic Grade (Marland Histologic Score)  Glandular (Acinar/Tubular Differentiation): 3.  Nuclear Pleomorphism: 2.  Mitotic Rate: 3.  Overall Grade: Grade 3 (score 8).  - Microcalcifications: Not identified.  - Lymphovascular invasion: Not identified.  2. LYMPH NODE, LEFT AXILLA, BIOPSY:  - Positive for metastatic carcinoma.  - The metastatic deposit measures 6 mm in longest continuous linear length.  Estrogen receptor: Positive, 90% of the tumor nuclei staining moderate to strongly.  Progesterone receptor: Positive, 30% of the tumor nuclei staining weak to moderately.  HER2: Negative.  Ki-67: 60%.  - ECHO 9/20/19: EF 64%  - PET  9/21/19:  Hypermetabolic left breast mass and regional left axillary lymphadenopathy consistent with reported breast cancer and corresponding with recent MRI findings.  Upper limit of normal sized right axillary lymph nodes with normal fatty duane and mildly increased radiotracer uptake.  Findings could represent reactive nodes with metastatic nodes thought less likely.  Lymphscintigraphy with injection in the left breast may considered to assess for drainage to the contralateral axilla.  - BX 9/24/19: Right axilla node biopsy is benign  - she underwent neoadjuvant chemotherapy and completed 4 cycles of AC followed by 11 cycles of weekly Taxol.   Stopped with side effects.  - 3/24/20 - she underwent bilateral mastectomies with Lt. sentinel node biopsy and subsequent Lt. axillary dissection and Rt. sentinel node biopsy with Dr. Lopez.  Tissue expanders were placed.   - Pathology from the Lt. breast revealed 1.2 cm of residual invasive ductal carcinoma histologic grade 3, nuclear grade 3 and mitotic index 5 ). There was high grade DCIS. There was lymphovascular invasion.  The margins of resection were negative at > 1 cm.  One axillary node had a 5 mm focus of metastatic carcinoma.  Another Lt. sentinel node had isolated tumor cells.   A third Lt. sentinel node and 7 Lt. axillary nodes were negative.  The Rt. breast and Rt. sentinel nodes were negative.    - completed adjuvant  XRT   - on Femara as of 5/2020     HM:  7/2023 BMD  Normal    Review of Systems   Constitutional:  Negative for activity change, appetite change, chills, fatigue, fever and unexpected weight change.   HENT:  Negative for mouth sores, sinus pressure/congestion, sore throat and trouble swallowing.    Eyes:  Negative for visual disturbance.   Respiratory:  Negative for cough, shortness of breath and wheezing.    Cardiovascular:  Negative for chest pain, palpitations and leg swelling.   Gastrointestinal:  Negative for abdominal distention, abdominal  pain, change in bowel habit, constipation and diarrhea.   Genitourinary:  Negative for decreased urine volume, difficulty urinating, dysuria, frequency and urgency.   Musculoskeletal:  Negative for back pain.   Integumentary:  Positive for breast tenderness (reports pain in right axilla at times). Negative for rash.   Neurological:  Negative for dizziness, weakness, numbness and headaches.   Hematological:  Negative for adenopathy. Does not bruise/bleed easily.   Psychiatric/Behavioral:  Negative for dysphoric mood and sleep disturbance. The patient is not nervous/anxious.    Breast: Positive for tenderness (reports pain in right axilla at times).         Objective     Physical Exam  Vitals and nursing note reviewed.   Constitutional:       General: She is not in acute distress.     Appearance: Normal appearance. She is normal weight. She is not ill-appearing.      Comments: Presents alone  Very pleasant   Eyes:      General: No scleral icterus.     Extraocular Movements: Extraocular movements intact.      Pupils: Pupils are equal, round, and reactive to light.   Cardiovascular:      Rate and Rhythm: Normal rate and regular rhythm.      Heart sounds: Normal heart sounds. No murmur heard.     No friction rub. No gallop.   Pulmonary:      Effort: Pulmonary effort is normal. No respiratory distress.      Breath sounds: Normal breath sounds. No wheezing, rhonchi or rales.      Comments: No chest wall masses or :LAD  Post reconstruction  Chest:      Chest wall: No tenderness.   Abdominal:      General: Abdomen is flat. Bowel sounds are normal. There is no distension.      Palpations: Abdomen is soft. There is no mass.      Tenderness: There is no abdominal tenderness. There is no guarding or rebound.      Comments: No organomegaly   Musculoskeletal:         General: No swelling, tenderness or deformity. Normal range of motion.      Cervical back: Normal range of motion and neck supple. No muscular tenderness.      Right  lower leg: No edema.      Left lower leg: No edema.   Lymphadenopathy:      Cervical: No cervical adenopathy.   Skin:     General: Skin is warm and dry.      Coloration: Skin is not pale.      Findings: No erythema, lesion or rash.   Neurological:      General: No focal deficit present.      Mental Status: She is alert and oriented to person, place, and time. Mental status is at baseline.      Sensory: No sensory deficit.      Motor: No weakness.      Coordination: Coordination normal.      Gait: Gait normal.   Psychiatric:         Mood and Affect: Mood normal.         Behavior: Behavior normal.         Thought Content: Thought content normal.         Judgment: Judgment normal.   Labs- reviewed       Assessment and Plan     1. Breast cancer metastasized to axillary lymph node, left    2. H/O bilateral mastectomy    3. Essential hypertension    4. Hyperlipidemia, unspecified hyperlipidemia type    5. Controlled type 2 diabetes mellitus with diabetic nephropathy, without long-term current use of insulin    6. Hypothyroidism, unspecified type        Breast cancer  Post bilateral mastectomy  She has pain in right axilla and we will image this area  Continue Femara through [id number]   BMD due next year-- 7/2025    HTN,DM, Hyperlipidemia  Better control  Managed with her PCP    Hypothyroidism  She has follow up and repeat TSH pending per her report    Route Chart for Scheduling    Med Onc Chart Routing      Follow up with physician 6 months. cbc, cmp and EP   Follow up with JAZZY    Infusion scheduling note    Injection scheduling note    Labs    Imaging    Pharmacy appointment    Other referrals              Therapy Plan Information  Flushes  heparin, porcine (PF) 100 unit/mL injection flush 500 Units  500 Units, Intravenous, Every visit  sodium chloride 0.9% flush 10 mL  10 mL, Intravenous, Every visit

## 2024-08-15 ENCOUNTER — PATIENT MESSAGE (OUTPATIENT)
Dept: HEMATOLOGY/ONCOLOGY | Facility: CLINIC | Age: 69
End: 2024-08-15
Payer: MEDICARE

## 2024-08-15 DIAGNOSIS — Z90.13 H/O BILATERAL MASTECTOMY: ICD-10-CM

## 2024-08-15 DIAGNOSIS — C50.912 BREAST CANCER METASTASIZED TO AXILLARY LYMPH NODE, LEFT: Primary | ICD-10-CM

## 2024-08-15 DIAGNOSIS — C77.3 BREAST CANCER METASTASIZED TO AXILLARY LYMPH NODE, LEFT: Primary | ICD-10-CM

## 2024-08-19 ENCOUNTER — PATIENT OUTREACH (OUTPATIENT)
Dept: ADMINISTRATIVE | Facility: HOSPITAL | Age: 69
End: 2024-08-19
Payer: MEDICARE

## 2024-08-19 ENCOUNTER — PATIENT MESSAGE (OUTPATIENT)
Dept: ADMINISTRATIVE | Facility: HOSPITAL | Age: 69
End: 2024-08-19
Payer: MEDICARE

## 2024-08-19 ENCOUNTER — PATIENT MESSAGE (OUTPATIENT)
Dept: PRIMARY CARE CLINIC | Facility: CLINIC | Age: 69
End: 2024-08-19
Payer: MEDICARE

## 2024-08-19 DIAGNOSIS — M17.12 PRIMARY OSTEOARTHRITIS OF LEFT KNEE: Primary | ICD-10-CM

## 2024-08-19 DIAGNOSIS — Z12.31 ENCOUNTER FOR SCREENING MAMMOGRAM FOR BREAST CANCER: Primary | ICD-10-CM

## 2024-08-20 ENCOUNTER — DOCUMENTATION ONLY (OUTPATIENT)
Dept: HEMATOLOGY/ONCOLOGY | Facility: CLINIC | Age: 69
End: 2024-08-20
Payer: MEDICARE

## 2024-08-20 ENCOUNTER — TELEPHONE (OUTPATIENT)
Dept: PRIMARY CARE CLINIC | Facility: CLINIC | Age: 69
End: 2024-08-20
Payer: MEDICARE

## 2024-08-20 NOTE — TELEPHONE ENCOUNTER
----- Message from Catarina Sheriff sent at 8/20/2024 10:54 AM CDT -----  Regarding: pt condition  Type:  Patient Returning Call      Name of who is calling:Vesta        What is request in detail:Mirlande is requesting a call back in regards to patient current medical status, she needs verbal confirmation of diabetes, chronic heart failure or cardiovascular disease.  Ref # [id number]        Can clinic reply by [facility]:no        [facility][phone]

## 2024-08-20 NOTE — PROGRESS NOTES
received an inbox message regarding patient needing to be set up for lymphedema.  confirmed with [facility] that they received the referral, and they said they have not been able to make contact with the patient.   called the patient, who had no preferences of agencies, and gave her the number for [facility] -[phone]. Instructed pt to call them to set it up.     remains available to assist patient as needed.    Leon Barker, [facility]  Oncology Social Worker  Adriana Esteves [facility]  [phone]

## 2024-08-20 NOTE — TELEPHONE ENCOUNTER
Spoke to Basia, and confirmed that the patient does have Type 2 diabetes. Representative states they will update the patients chart so that she is eligible for the program.

## 2024-08-20 NOTE — PROGRESS NOTES
received notification that patient was inquiring about the set up of her lymphedema care. SW called patient back and asked if pt had called the number for [facility] that SW gave her this morning. Pt said she called them and was awaiting a returned call. SW encouraged pt to try again and let SW know if she has trouble making contact.    Also, patient expressed concerns about finances, medical bills, copays, etc. Pt is wondering if she needs and/or would qualify for Medicaid. SW told pt that she would do some research on that, as well as look into possible grants/foundations that could be helpul.    SW will call pt back tomorrow to follow up.    Leon Barker, [facility]  Oncology Social Worker  Adriana Esteves [facility]  [phone]

## 2024-08-21 ENCOUNTER — DOCUMENTATION ONLY (OUTPATIENT)
Dept: HEMATOLOGY/ONCOLOGY | Facility: CLINIC | Age: 69
End: 2024-08-21
Payer: MEDICARE

## 2024-08-21 ENCOUNTER — CLINICAL SUPPORT (OUTPATIENT)
Dept: REHABILITATION | Facility: HOSPITAL | Age: 69
End: 2024-08-21
Payer: MEDICARE

## 2024-08-21 DIAGNOSIS — I97.2 POST-MASTECTOMY LYMPHEDEMA SYNDROME: Chronic | ICD-10-CM

## 2024-08-21 DIAGNOSIS — C50.912 BREAST CANCER METASTASIZED TO AXILLARY LYMPH NODE, LEFT: ICD-10-CM

## 2024-08-21 DIAGNOSIS — M79.602 PAIN IN BOTH UPPER EXTREMITIES: Primary | ICD-10-CM

## 2024-08-21 DIAGNOSIS — Z91.89 AT RISK FOR LYMPHEDEMA: ICD-10-CM

## 2024-08-21 DIAGNOSIS — M79.601 PAIN IN BOTH UPPER EXTREMITIES: Primary | ICD-10-CM

## 2024-08-21 DIAGNOSIS — Z90.13 H/O BILATERAL MASTECTOMY: ICD-10-CM

## 2024-08-21 DIAGNOSIS — C77.3 BREAST CANCER METASTASIZED TO AXILLARY LYMPH NODE, LEFT: ICD-10-CM

## 2024-08-21 PROCEDURE — 97140 MANUAL THERAPY 1/> REGIONS: CPT

## 2024-08-21 PROCEDURE — 97162 PT EVAL MOD COMPLEX 30 MIN: CPT

## 2024-08-21 PROCEDURE — 97110 THERAPEUTIC EXERCISES: CPT

## 2024-08-21 NOTE — PATIENT INSTRUCTIONS
Attendance policy:     Consistent attendance is critical to the success of the rehabilitation plan that you and your therapist have established.     If you have any concerns with your schedule, please contact the  at least 24 hours prior to your scheduled appointment. If you are more than 15 minutes late to your appointment, you may be asked to reschedule.     If you no show 2 appointments or miss 3 visits consecutively, you will be removed from the schedule or discharged from therapy. Appointments will then be scheduled on a [id number] basis.       [facility] 2nd Floor   Direct Phone: [phone]

## 2024-08-21 NOTE — PLAN OF CARE
[facility]  Physical Therapy Initial Evaluation    Name: Maria Elena Tavares  [facility] Number: [id number]    Therapy Diagnosis:   Encounter Diagnoses   Name Primary?    Breast cancer metastasized to axillary lymph node, left     H/O bilateral mastectomy     Pain in both upper extremities Yes    At risk for lymphedema     Post-mastectomy lymphedema syndrome      Physician: Tere Villagran MD    Physician Orders: PT Eval and Treat   Medical Diagnosis from Referral:   Diagnosis   C50.912,C77.3 (ICD-10-CM) - Breast cancer metastasized to axillary lymph node, left   Z90.13 (ICD-10-CM) - H/O bilateral mastectomy   Evaluation Date: 8/21/2024  Authorization Period Expiration: 8/12/25  Plan of Care Expiration: 11/14/24  Visit # / Visits authorized: 1/ 1    Time In: 1050a  Time Out: 1205p  Total Billable Time: 75 minutes    Precautions: Standard, cancer, and lymphedema    Subjective   Date of onset: admits L TKA has completed initial PT and will follow with orthopedics.  Pt is discussing health insurance needs and Medicaid with Oncology Social Worker.  History of current condition - Maria Elena reports: pt reports L arm since surgery had tenderness under arm,  L arm seemed to get bigger in upper arm, R arm also has pain, tender in R axilla, feels both shoulders are sore,  R arm also seems to be bigger.  Clothing and sleeve fit has changed.  Reviewed cancer treatments to L side but questions issues with R side.   [facility]- improved her range of motion  This year symptoms have increased around shoulders on both sides, seems both arms are getting larger.  Arms get tired with household chores.    Surgery: B mastectomy SLND L - AND- possible 3+/10, R SLNB which was negative 3/24/2020  No reconstruction B prosthesis   Radiation:  yes 7/2020  Chemotherapy: viji adjuvant 2019    Hormonal Medications: Letrozole      Pt denies CHF, KF- admits DM II under control.     Medical  History:   Past Medical History:   Diagnosis Date    BMI 37.0-37.9, adult     Breast cancer 2019     Left breast, IDC with lymph node metastisis    Colon polyps 2015    Colon polyps     Diabetes mellitus type II     Diverticulosis     history of diverticulosisseen on colonoscopy at age 48. Repeat recommended at age 58. Done by     Elevated blood protein     History of elevated protein. Apparently has seen  for extensive workup including bone marrow biopsy    History of shingles 2006    Hyperlipidemia     Hypertension     Microalbuminuria     due 2 diabetes    Mild vitamin D deficiency     . A low vitamin D    Primary osteoarthritis of left knee 2023    Thyroid disease     hypothyroidism    Usual hyperplasia of lactiferous duct     Not sure       Surgical History:   Maria Elena Tavares  has a past surgical history that includes Hysterectomy; Oophorectomy; Insertion of tunneled central venous catheter (CVC) with subcutaneous port (Right, 10/04/2019); Breast biopsy (Left, 2019); Breast biopsy (Right, 2019); Breast biopsy (Left, 10/14/2019); Breast biopsy (Left, 10/08/2019); Simple mastectomy (Bilateral, 2020); Mildred lymph node biopsy (Left, 2020); Insertion of breast tissue expander (Bilateral, 2020);  section; Tissue expander removal (Bilateral, 2020); Colonoscopy (N/A, 10/08/2020); and arthroplasty, knee, total, sight assisted (Left, 2024).    Medications:   Maria Elena has a current medication list which includes the following prescription(s): amlodipine, aspirin, atorvastatin, blood-glucose meter, cetirizine, ciclopirox, ergocalciferol, famotidine, gabapentin, hydrocortisone, irbesartan, letrozole, levothyroxine, xiidra, polyethylene glycol, intrarosa, and mounjaro.    Allergies:   Review of patient's allergies indicates:   Allergen Reactions    Amoxil [amoxicillin] Rash      Imaging: no  Previous Lymphedema Treatment: yes  Prior Therapy: yes  "recent for TKA   Social History: lives alone, + driving  Occupation: not working, wishes she could at least part time   Environmental barriers: steps to enter 9-10 steps, no longer needs device.    Abuse/Neglect: none noted   Nutritional status: BMI 33   Educational needs: met   Spiritual/Cultural: met   Fall risk: prior to knee surgery     Prior Level of Function: mod I  Current Level of Function: fit of clothing,  dressing MOD I, hair care able to reach, tired with curling  Gait: amb no device L TKA   Transfers:I   Bed Mobility:  I     Pain:  Current 3/10, worst 5/10, best 1/10   Location: bilateral arms, shoulders and under arms   Description: Aching- tender to touch  Aggravating Factors: Lifting and reach, arms overhead  Easing Factors:  move and massage    Pts goals: address swelling and heaviness to both arms/shoulders, lessen pain and stiffness    Objective   Picture of B UE taken via [employer] on therapist's cell phone with verbal consent from patient:      Female amb to dept, no device, no compression  Amount of Swelling/Location of Swelling: mod to L UE forearm, elbow and upper arm with fullness to tissues B UE post upper arm, body habitus   Surgical site: ant chest B Mastectomy, R chest prior port site, R SLNB, L SLNB then AND  Skin Integrity: radiation skin changes to L ant chest wall to axilla, dryness, color changes   Palpation/Texture: fullness along L forearm ridge, tissue bulk fullness post arm   Circulation: intact   Posture:  FH, rounded shoulders, protraction, sh with slight IR   Hand Dominant: R    Girth Measurements (in centimeters)  LANDMARK RIGHT UE  8/21/24 LEFT UE  8/21/24 DIFF  At eval   E + 6" 39.0 cm 41.0 cm 2.0 cm   E + 4" 37.5 cm 40.0 cm 2.5 cm   E + 2" 34.0 cm 37.5 cm 3.5 cm   Elbow 28.5 cm 32.0 cm 3.5 cm   W+ 8" 29.0 cm 30.5 cm 2.5 cm   W +  6" 27.0 cm 29.0 cm 2.0 cm   W + 4" 23.0 cm 24.5 cm 1.5 cm   Wrist 16.5 cm 17.5 cm 1.0 cm   DPC 20.0 cm 20.0 cm 0 cm   IP Thumb 6.3 cm 6.5 cm " 0.2 cm     Active(Passive) ROM: (measured in degrees)      Shoulder RUE LUE Pain/Dysfunction with movement   Flexion 140 (165) 125 (145) B pulling and tightness ant chest, axilla, upper arm L > R   Abduction 145 135 Similar stretch   IR Below bra line (at 90 -60) Below bra line ( at 90 - 60) End range stretch   ER To back of head ( at 90 -80) Back of head  (At 90 -80) End range stretch      Elbow RUE LUE Pain/Dysfunction with movement   Flexion WNL WNL N/A   Extension WNL WNL N/A        Upper Extremity Strength  (R) UE   (L) UE     Shoulder flexion: 4+ Shoulder flexion: 4+   Shoulder Abduction: 4 Shoulder abduction: 4   Shoulder ER nt Shoulder ER nt   Shoulder IR nt Shoulder IR nt   Elbow flexion: 5 Elbow flexion: 5   Elbow extension: 4+ Elbow extension: 4+   Wrist flexion: 5 Wrist flexion: 5   Wrist extension: 5 Wrist extension: 5      Sensation: intact to lt touch, admits tingling at times, mild numbness ant chest site and L axilla     CMS Impairment/Limitation/Restriction for FOTO Survey  Therapist reviewed FOTO scores for Maria Elena Tavares on 8/21/2024.   FOTO documents entered into [employer] - see Media section.       TREATMENT   Treatment Time In: 1130a  Treatment Time Out: 1205p  Total Treatment time separate from Evaluation: 35 minutes    Maria Elena received therapeutic exercises to develop strength, endurance, ROM, flexibility, and posture for 15 minutes including:  Cueing on posture, shoulder alignment, body mechanics - avoid irritation, avoid pain - mild stretch discomfort    Pt was instructed in and performed dowel assisted sh flex, scapular retractions, wall postural extension, bow and arrow style stretch of reach and rotate, and UE flexion and spinal elongation via table slides or table walk aways.   Positional stretch with L arm overhead in flex abd and er with 1-2 pillows as needed for comfort with assisted stretch and soft tissue movements to ant chest wall and axilla with opposite hand.  Exercises may be  modified as needed for sitting or standing postures  Pt was able to demonstrate and voice understanding of performance.      Cueing on performance and practice required.     Maria Elena received the following manual therapy techniques: Manual Lymphatic Drainage as plan of care and Lymphedema risk  were applied to the: B UE/quadrant with risk likely L  for 15 minutes, including:  Complete Decongestive Therapy components discussed and reviewed goals towards therapy plan of care.   Compression needs and education with vendor list provided and product information.    Use of posters, verbal and demonstration training for Lymphedema risk, management, shoulder and chest mobility, ROM, biomechanics as plan of care.     Home Exercises and Patient Education Provided:  - UE and posture, self massage, ROM.  Will likely add compression needs as tolerance warrants.   - Pt was educated in lymphedema etiology and management plans.  Pt was provided with written risk reductions and precautions for managing lymphedema.      Discussed compression wear schedule, don/doff, wash and management of products.  Size and compression class and AM/PM needs.    Consideration for a form of temporary compression use until securing compression needs.   Product information provided.   Vendor list provided.    Informed coverage of compression is per DME provider with insurance plans covering cost per plan or out of pocket expense.   Commitment to attendance as well as commitment to securing compression needs is critical to edema management.     This patient is in agreement to participate in Lymphedema treatment.    Written Home Exercises Provided: yes.  Home management plan and Exercises were reviewed and Maria Elena was able to demonstrate them prior to the end of the session.  Maria Elena demonstrated good  understanding of the education provided.     See EMR under Patient Instructions for exercises provided 8/21/2024.    Assessment   Maria Elena is a 68 y.o. female  referred to outpatient Physical Therapy with a medical diagnosis of   Diagnosis   C50.912,C77.3 (ICD-10-CM) - Breast cancer metastasized to axillary lymph node, left   Z90.13 (ICD-10-CM) - H/O bilateral mastectomy   This patient presents s/p L breast CA, B mastectomy,R SLNB,  L SLNB to AND with radiation, chemotherapy, no reconstruction, noted fatigue and upper back and shoulder discomfort  resulting in: L UE/quadrant mod lymphedema of the L UE with pain in B axilla, chest wall, soft tissue restrictions, tightness, faulty posture and bio mechanics, increased pain, increased stiffness in the ant chest wall, axilla, shoulder B UE, as well as difficulty performing reach, lift, carry, and placing the pt at higher risk of infection.     Pt prognosis is Good.   Pt will benefit from skilled outpatient Physical Therapy to address the deficits stated above and in the chart below, provide pt/family education, and to maximize pt's level of independence.     Plan of care discussed with patient: Yes  Pt's spiritual, cultural and educational needs considered and patient is agreeable to the plan of care and goals as stated below:     Medical Necessity is demonstrated by the following:  History  Co-morbidities and personal factors that may impact the plan of care [] LOW: no personal factors / co-morbidities  [x] MODERATE: 1-2 personal factors / co-morbidities  [] HIGH: 3+ personal factors / co-morbidities    Moderate / High Support Documentation:  s/p L breast CA, B mastectomy, R SLNB,  L SLNB to AND, radiation, chemotherapy, obesity, lymphedema of the L UE, DMII, HTN     Examination  Body Structures and Functions, activity limitations and participation restrictions that may impact the plan of care [] LOW: addressing 1-2 elements  [x] MODERATE: 3+ elements  [] HIGH: 4+ elements (please support below)    Moderate / High Support Documentation: fatigue, upper back ,shoulder discomfort, swelling, size, shape, firmness of lymphedema of  the L UE, pain in B axilla, chest wall, soft tissue restrictions, tightness, faulty posture and bio mechanics,increased stiffness in the ant chest wall, axilla, shoulder B UE     Clinical Presentation [] LOW: stable  [x] MODERATE: Evolving  [] HIGH: Unstable     Decision Making/ Complexity Score: moderate       Anticipated Barriers for therapy: B complaints, insurance and cost    The following goals were discussed with the patient and patient is in agreement with them as to be addressed in the treatment plan.   Short Term Goals: (6 weeks)  1. Decrease girth in L upper arm, forearm, wrist and hand by up to 1cm to allow for improved UE symmetry, clothing choice, ROM, and UE function. (progressing, not met)  2. Patient will demonstrate 100% knowledge of lymphedema precautions and signs of infection to allow for reduced risk of lymphedema, reduced risk of infection, and/or exacerbation.  (progressing, not met)  3. Patient will perform self-bandaging techniques to enhance lymphatic drainage, tissue density, and self management of condition.  (progressing, not met)  4. Patient will perform self lymph drainage techniques to enhance lymphatic drainage and mobility.  (progressing, not met)  5. Patient will tolerate daily activities with multilayered bandaging to enhance lymphatic drainage and skin elasticity.  (progressing, not met)  6. Pt to show improved AROM to allow sh flex by 10 B UE to allow reach to second cabinet shelf with min to no reported onset of pain (progressing, not met)    Long Term Goals: (12 weeks)  1. Patient to show decreased girth in L UE by up to 2 cm to allow for improved UE symmetry, clothing choice, ROM, and UE function.  (progressing, not met)  2. Patient will show reduction in density to mild or less with improved contour of limb to allow for improved cosmesis, improved lymphatic drainage, and functional mobility.  (progressing, not met)  3. Patient to naseem/doff compression garment with daily  compliance to support lymphatic mobility and skin elasticity.  (progressing, not met)  4. Pt to show improved postural awareness and alignment.  (progressing, not met)  5. Pt to be I and compliant with HEP to allow for improved ROM, reach and carry with functional endurance and strength.    (progressing, not met)    Plan   Plan of care Certification: 8/21/2024 to 11/14/2024.    Outpatient Physical Therapy 2 times weekly for 10 weeks to include the following interventions: Patient Education, Self Care, Therapeutic Activities, and Therapeutic Exercise. Complete Decongestive Therapy- compression and home equipment needs to be addressed and assisted.    Pt may be seen by a PTA as part of the Rehab treatment team.  Plan of care was discussed with Rebeca Asher PTA as a member of the rehab team.     Montse Nuñez, PT

## 2024-08-21 NOTE — PROGRESS NOTES
call patient with updates regarding her financial concerns. JOHN notified her that SW referred pt to [facility] ([facility]) and is awaiting a reply from them to begin her process of applying for Medicaid.    SW verified that pt is not working- she retired when she started cancer treatment.    SW verified that pt did make contact with [facility] outpatient physical therapy, and pt said that she began services with them this morning.    Pt is concerned about copays for treatments, PT, medical appointments, etc. SW looked through several resources for breast cancer patients, and there aren't any currently open (no funding available at this current time.)    JOHN left her contact info with pt again, since she had sent a portal message to the team about not being able to find SW's number.    Leon Barker, [facility]  Oncology Social Worker  [facility]  [phone]

## 2024-08-23 ENCOUNTER — DOCUMENTATION ONLY (OUTPATIENT)
Dept: HEMATOLOGY/ONCOLOGY | Facility: CLINIC | Age: 69
End: 2024-08-23
Payer: MEDICARE

## 2024-08-23 NOTE — PROGRESS NOTES
Patient called  to say that she didn't qualify for Medicaid but that she was told to apply for [employer].  notified [facility] and asked if they could assist pt with this.  also notified Alex Zhong about doing a financial assistance application with pt.    SW will keep in touch with pt as needed to help with financial resources.    Leon Barker, [facility]  Oncology Social Worker  [facility]  [phone]

## 2024-08-27 ENCOUNTER — HOSPITAL ENCOUNTER (OUTPATIENT)
Dept: RADIOLOGY | Facility: HOSPITAL | Age: 69
Discharge: HOME | End: 2024-08-27
Attending: PHYSICIAN ASSISTANT
Payer: MEDICARE

## 2024-08-27 ENCOUNTER — OFFICE VISIT (OUTPATIENT)
Dept: ORTHOPEDICS | Facility: CLINIC | Age: 69
End: 2024-08-27
Payer: MEDICARE

## 2024-08-27 VITALS — BODY MASS INDEX: 33.19 KG/M2 | WEIGHT: 169.06 LBS | HEIGHT: 60 IN

## 2024-08-27 DIAGNOSIS — Z47.1 AFTERCARE FOLLOWING LEFT KNEE JOINT REPLACEMENT SURGERY: Primary | ICD-10-CM

## 2024-08-27 DIAGNOSIS — M62.81 QUADRICEPS WEAKNESS: ICD-10-CM

## 2024-08-27 DIAGNOSIS — Z96.652 AFTERCARE FOLLOWING LEFT KNEE JOINT REPLACEMENT SURGERY: Primary | ICD-10-CM

## 2024-08-27 DIAGNOSIS — M17.12 PRIMARY OSTEOARTHRITIS OF LEFT KNEE: ICD-10-CM

## 2024-08-27 PROCEDURE — 3066F NEPHROPATHY DOC TX: CPT | Mod: HCNC,CPTII,S$GLB, | Performed by: PHYSICIAN ASSISTANT

## 2024-08-27 PROCEDURE — 3008F BODY MASS INDEX DOCD: CPT | Mod: HCNC,CPTII,S$GLB, | Performed by: PHYSICIAN ASSISTANT

## 2024-08-27 PROCEDURE — 3061F NEG MICROALBUMINURIA REV: CPT | Mod: HCNC,CPTII,S$GLB, | Performed by: PHYSICIAN ASSISTANT

## 2024-08-27 PROCEDURE — 3288F FALL RISK ASSESSMENT DOCD: CPT | Mod: HCNC,CPTII,S$GLB, | Performed by: PHYSICIAN ASSISTANT

## 2024-08-27 PROCEDURE — 1159F MED LIST DOCD IN RCRD: CPT | Mod: HCNC,CPTII,S$GLB, | Performed by: PHYSICIAN ASSISTANT

## 2024-08-27 PROCEDURE — 4010F ACE/ARB THERAPY RXD/TAKEN: CPT | Mod: HCNC,CPTII,S$GLB, | Performed by: PHYSICIAN ASSISTANT

## 2024-08-27 PROCEDURE — 77073 BONE LENGTH STUDIES: CPT | Mod: TC,HCNC,PN

## 2024-08-27 PROCEDURE — 99999 PR PBB SHADOW E&M-EST. PATIENT-LVL III: CPT | Mod: PBBFAC,HCNC,, | Performed by: PHYSICIAN ASSISTANT

## 2024-08-27 PROCEDURE — 99213 OFFICE O/P EST LOW 20 MIN: CPT | Mod: HCNC,S$GLB,, | Performed by: PHYSICIAN ASSISTANT

## 2024-08-27 PROCEDURE — 1126F AMNT PAIN NOTED NONE PRSNT: CPT | Mod: HCNC,CPTII,S$GLB, | Performed by: PHYSICIAN ASSISTANT

## 2024-08-27 PROCEDURE — 1101F PT FALLS ASSESS-DOCD LE1/YR: CPT | Mod: HCNC,CPTII,S$GLB, | Performed by: PHYSICIAN ASSISTANT

## 2024-08-27 PROCEDURE — 3044F HG A1C LEVEL LT 7.0%: CPT | Mod: HCNC,CPTII,S$GLB, | Performed by: PHYSICIAN ASSISTANT

## 2024-08-27 PROCEDURE — 73562 X-RAY EXAM OF KNEE 3: CPT | Mod: TC,HCNC,PN,LT

## 2024-08-27 PROCEDURE — 77073 BONE LENGTH STUDIES: CPT | Mod: 26,HCNC,, | Performed by: RADIOLOGY

## 2024-08-27 NOTE — PROGRESS NOTES
Subjective:      Chief Complaint: Pain of the Left Knee (L TKA 24)    Patient ID: Maria Elena Tavares is a 68 y.o. female.  Patient is 4 months s/p  left primary total knee replacement  Anterior knee pain: Yes. Notes pain when she wakes up and at night.   Has improved pain  Is in physical therapy  No  Problems w incision  No  Is  happy with result  Yes  Opiod free: Yes   States she continues home exercises         Past Medical History:   Diagnosis Date    BMI 37.0-37.9, adult     Breast cancer 2019     Left breast, IDC with lymph node metastisis    Colon polyps     Colon polyps     Diabetes mellitus type II     Diverticulosis     history of diverticulosisseen on colonoscopy at age 48. Repeat recommended at age 58. Done by     Elevated blood protein     History of elevated protein. Apparently has seen  for extensive workup including bone marrow biopsy    History of shingles 2006    Hyperlipidemia     Hypertension     Microalbuminuria     due 2 diabetes    Mild vitamin D deficiency     . A low vitamin D    Primary osteoarthritis of left knee 2023    Thyroid disease     hypothyroidism    Usual hyperplasia of lactiferous duct     Not sure        Past Surgical History:   Procedure Laterality Date    ARTHROPLASTY, KNEE, TOTAL, SIGHT ASSISTED Left 2024    Procedure: ARTHROPLASTY, KNEE, SIGHT ASSISTED;  Surgeon: Jamshid Reece MD;  Location: [facility];  Service: Orthopedics;  Laterality: Left;  bmi - 34.57    BREAST BIOPSY Left 2019    IDC with mets to node    BREAST BIOPSY Right 2019    core bx, benign node    BREAST BIOPSY Left 10/14/2019    MRI Core bx, + IDC    BREAST BIOPSY Left 10/08/2019    Core bx, ADH     SECTION      COLONOSCOPY N/A 10/08/2020    Procedure: COLONOSCOPY;  Surgeon: Shreya Mendoza MD;  Location: [address]);  Service: Endoscopy;  Laterality: N/A;  COVID screening on 10/5/20 [facility]    HYSTERECTOMY      INSERTION OF  BREAST TISSUE EXPANDER Bilateral 03/24/2020    Procedure: INSERTION, TISSUE EXPANDER, BREAST BILATERAL;  Surgeon: Michael Solorzano MD;  [facility];  Service: Plastics;  Laterality: Bilateral;    INSERTION OF TUNNELED CENTRAL VENOUS CATHETER (CVC) WITH SUBCUTANEOUS PORT Right 10/04/2019    Procedure: [id number] RIGHT (CONSENT AM OF) 1.0 hr case;  Surgeon: Elena Lopez MD;  Location: [facility] OR [facility];  Service: General;  Laterality: Right;    OOPHORECTOMY      SENTINEL LYMPH NODE BIOPSY Left 03/24/2020    Procedure: BIOPSY, LYMPH NODE, SENTINEL LEFT;  Surgeon: Elena Lopez MD;  Location: [facility];  Service: General;  Laterality: Left;    SIMPLE MASTECTOMY Bilateral 03/24/2020    Procedure: MASTECTOMY, SIMPLE BILATERAL;  Surgeon: Elena Lopez MD;  [facility];  Service: General;  Laterality: Bilateral;    TISSUE EXPANDER REMOVAL Bilateral 07/30/2020    Procedure: REMOVAL, TISSUE EXPANDER;  Surgeon: Michael Solorzano MD;  [facility];  Service: Plastics;  Laterality: Bilateral;        Current Outpatient Medications   Medication Instructions    amLODIPine (NORVASC) 5 mg, Oral, Daily    aspirin (ECOTRIN) 81 mg, Oral, 2 times daily    atorvastatin (LIPITOR) 10 mg, Oral, Daily    blood-glucose meter kit DISPENSE : BLOOD TEST STRIPS AND LANCETS PATIENT TEST BLOOD SUGARS TWICE DAILY<BR>TEST STRIPS: 50 EACH, REFILL 5<BR>LANCETS:  50 EACH , REFILL 5    cetirizine (ZYRTEC) 10 mg, Oral, Daily PRN    ciclopirox 0.77 % Gel Topical, 2 times daily, To thickened discolored toenails.    famotidine (PEPCID) 20 mg, Oral, 2 times daily    gabapentin (NEURONTIN) 600 mg, Oral, 2 times daily    hydrocortisone 1 % cream Topical (Top), 2 times daily PRN    irbesartan (AVAPRO) 300 MG tablet Take 1 tablet by mouth once daily    letrozole (FEMARA) 2.5 mg, Oral, Daily    levothyroxine (SYNTHROID) 200 mcg, Oral, Daily    lifitegrast (XIIDRA) 5 % Dpet Apply 1 drop to  both  eyes  2 (two) times daily.    MOUNJARO 10 mg, Subcutaneous, Every 7 days    polyethylene glycol (GLYCOLAX) 17 gram/dose powder Mix 17 g (1 capful) with juice or water an drink 2 (two) times daily.    prasterone, dhea, (INTRAROSA) 6.5 mg Inst 1 suppository, Vaginal, Nightly    VITAMIN D2 50,000 Units, Oral, Every 14 days        Review of patient's allergies indicates:   Allergen Reactions    Amoxil [amoxicillin] Rash       Review of Systems   Constitutional: Negative for fever and malaise/fatigue.   Eyes:  Negative for blurred vision.   Cardiovascular:  Negative for chest pain.   Respiratory:  Negative for shortness of breath.    Skin:  Negative for poor wound healing.   Musculoskeletal:  Positive for joint pain and myalgias.   Genitourinary:  Negative for bladder incontinence.   Neurological:  Negative for dizziness, numbness and paresthesias.   Psychiatric/Behavioral:  Negative for altered mental status.        The patient's relevant past medical, surgical, and social history was reviewed in Epic.       Objective:      VITAL SIGNS: Ht 5' (1.524 m)   Wt 76.7 kg (169 lb 1.5 oz)   LMP  (LMP Unknown)   BMI 33.02 kg/m²     General    Constitutional: She is oriented to person, place, and time. She appears well-developed and well-nourished.   Neurological: She is alert and oriented to person, place, and time.     General Musculoskeletal Exam   Gait: normal         Left Knee Exam     Inspection   Scars: present  Swelling: present    Tenderness   The patient tender to palpation of the medial joint line.    Range of Motion   Extension:  5   Flexion:  110     Tests   Stability   Lachman: normal (-1 to 2mm)   MCL - Valgus: normal (0 to 2mm)  LCL - Varus: normal (0 to 2mm)    Other   Sensation: normal    Muscle Strength   Left Lower Extremity   Quadriceps:  4/5   Hamstrin/5        Imaging  X-Ray Knee 3 View Left  Narrative: EXAMINATION:  XR HIP TO ANKLE; XR KNEE 3 VIEW LEFT    CLINICAL HISTORY:  Unilateral primary  osteoarthritis, left knee    TECHNIQUE:  AP views of the bilateral lower extremities.  Three views of the left knee.    COMPARISON:  Radiographs 03/19/2024, 04/29/2024    FINDINGS:  Left total knee arthroplasty with hardware in gross anatomic position.  No displaced periprosthetic fractures.  No periprosthetic lucencies to suggest osteolysis or loosening.  Articular spaces are preserved.  No subluxation or dislocation.  No joint effusion.  Trace atherosclerotic calcification.    No leg length discrepancy.  Right knee mild-to-moderate tibiofemoral osteoarthritis.  Impression: Left total knee arthroplasty with hardware in gross anatomic position.    Electronically signed by: Morgan Freedman MD  Date:    08/27/2024  Time:    10:10  X-Ray Hip to Ankle  Narrative: EXAMINATION:  XR HIP TO ANKLE; XR KNEE 3 VIEW LEFT    CLINICAL HISTORY:  Unilateral primary osteoarthritis, left knee    TECHNIQUE:  AP views of the bilateral lower extremities.  Three views of the left knee.    COMPARISON:  Radiographs 03/19/2024, 04/29/2024    FINDINGS:  Left total knee arthroplasty with hardware in gross anatomic position.  No displaced periprosthetic fractures.  No periprosthetic lucencies to suggest osteolysis or loosening.  Articular spaces are preserved.  No subluxation or dislocation.  No joint effusion.  Trace atherosclerotic calcification.    No leg length discrepancy.  Right knee mild-to-moderate tibiofemoral osteoarthritis.  Impression: Left total knee arthroplasty with hardware in gross anatomic position.    Electronically signed by: Morgan Freedman MD  Date:    08/27/2024  Time:    10:10    I have reviewed the above radiograph and agree with the findings stated by the radiologist.           Assessment:       Maria Elena Tavares is a 68 y.o. female seen in the office today for   1. Aftercare following left knee joint replacement surgery    2. Quadriceps weakness           Plan:       Maria Elena was seen today for pain.    Diagnoses and all  orders for this visit:    Aftercare following left knee joint replacement surgery    Quadriceps weakness      Patient is doing well.  Recommend working on quad strengthening exercises.  Exercises printed out today for her to try at home.  She will follow-up in about 3 months with x-rays.    Diagnoses and plan discussed with the patient, as well as the expected course and duration of his symptoms.  All questions and concerns were addressed prior to the end of the visit.   Instructed patient to call office if they have any future questions/concerns or to schedule apt. Patient will return to see me if symptoms worsen or fail to improve    Note dictated with voice recognition software, please excuse any grammatical errors.        Kerline Keys PA-C      Department of Orthopedic Surgery  [facility]  Office: [phone]  08/27/2024

## 2024-09-08 ENCOUNTER — PATIENT MESSAGE (OUTPATIENT)
Dept: ORTHOPEDICS | Facility: CLINIC | Age: 69
End: 2024-09-08
Payer: MEDICARE

## 2024-09-08 PROBLEM — E08.22 DIABETES MELLITUS DUE TO UNDERLYING CONDITION WITH DIABETIC CHRONIC KIDNEY DISEASE: Status: ACTIVE | Noted: 2024-09-08

## 2024-09-08 NOTE — PROGRESS NOTES
Encounter Date:06/05/2018    Patient ID: Cristiane Harris is a 88 y.o. female who resides in [place], [place].    CC/Reason for visit:  Chest pain syndrome; Stenosis of carotid artery; and Surgical Clearance (Right knee replacement)            Cristiane Harris returns to my office today in follow up of asymptomatic sinus bradycardia, carotid artery disease, and cardiac risk factors. The patient has been well from a cardiovascular standpoint with no angina, heart failure, TIA, or stroke symptoms. She has been dealing with arthritis of the right knee and is presently planning to undergo total arthroplasty in the next several weeks in [facility]. She continues to deny any syncope or near-syncope despite having a low resting heart rate. Her blood pressures have been well controlled, if not low.    Review of Systems   Constitution: Negative for weakness and malaise/fatigue.   Eyes: Negative for vision loss in left eye and vision loss in right eye.   Cardiovascular: Negative for chest pain, dyspnea on exertion, near-syncope, orthopnea, palpitations, paroxysmal nocturnal dyspnea and syncope.   Musculoskeletal: Positive for arthritis, joint pain, joint swelling and muscle cramps. Negative for myalgias.   Gastrointestinal: Positive for heartburn.   Neurological: Negative for brief paralysis, excessive daytime sleepiness, focal weakness, numbness and paresthesias.   All other systems reviewed and are negative.      The patient's past medical, social, family history and ROS reviewed in the patient's electronic medical record.    Allergies  Patient has no known allergies.    Outpatient Prescriptions Marked as Taking for the 6/5/18 encounter (Office Visit) with Matt Espinoza IV, MD   Medication Sig Dispense Refill   • aspirin 81 MG tablet Take 81 mg by mouth Daily.     • diltiaZEM CD (CARDIZEM CD) 120 MG 24 hr capsule Take 1 capsule by mouth Daily for 360 days. 90 capsule 3   • dorzolamide (TRUSOPT) 2 %  Subjective:       Patient ID: Maria Elena Tavares is a 68 y.o. female.    Chief Complaint:  Elevated TSH elevated lipids improvement in A1c    The patient location is: home  The chief complaint leading to consultation is:  Above    Visit type: audiovisual    Face to Face time with patient: 20 minutes 20 minutes of total time spent on the encounter, which includes face to face time and non-face to face time preparing to see the patient (eg, review of tests), Obtaining and/or reviewing separately obtained history, Documenting clinical information in the electronic or other health record, Independently interpreting results (not separately reported) and communicating results to the patient/family/caregiver, or Care coordination (not separately reported).     Each patient to whom he or she provides medical services by telemedicine is:  (1) informed of the relationship between the physician and patient and the respective role of any other health care provider with respect to management of the patient; and (2) notified that he or she may decline to receive medical services by telemedicine and may withdraw from such care at any time.    Notes:     Pt presents  but overall doing well.  For follow up on labs  Patient with A1c that has improved.  Patient with TSH that has skyrocketed.  However patient has been under a great deal of duress and TSH could be related to that  And elevated cholesterol, patient states that her eating habits have not been the same    Hypertension  This is a recurrent problem. The current episode started more than 1 year ago. The problem has been gradually improving since onset. The problem is resistant. Associated symptoms include blurred vision, headaches and malaise/fatigue. Pertinent negatives include no anxiety, chest pain, neck pain, orthopnea, palpitations, peripheral edema, PND, shortness of breath or sweats. Risk factors for coronary artery disease include diabetes mellitus. Past treatments include  "ophthalmic solution Administer 1 drop to both eyes 2 (Two) Times a Day.     • esomeprazole (nexIUM) 40 MG capsule Take 40 mg by mouth 2 (Two) Times a Day.     • Glucosamine HCl (GLUCOSAMINE PO) Take 1 tablet by mouth Daily.     • imatinib (GLEEVEC) 400 MG chemo tablet Take 200 mg by mouth daily.     • Multiple Vitamins-Minerals (MULTIVITAMIN PO) Take 1 tablet by mouth Daily.     • omeprazole (PriLOSEC) 20 MG capsule Take 20 mg by mouth Daily.     • pentosan polysulfate (ELMIRON) 100 MG capsule Take  by mouth 2 (two) times a day.     • simvastatin (ZOCOR) 20 MG tablet Take 20 mg by mouth Every Night.     • telmisartan (MICARDIS) 40 MG tablet Take 40 mg by mouth Daily.     • timolol (TIMOPTIC) 0.5 % ophthalmic solution Administer 1 drop to both eyes 2 (Two) Times a Day.     • TRAMADOL HCL PO Take 1 tablet by mouth As Needed.     • TRAVATAN Z 0.004 % solution ophthalmic solution Administer 1 drop to both eyes Daily.     • [DISCONTINUED] diltiazem CD (CARDIZEM CD) 180 MG 24 hr capsule Take 180 mg by mouth Daily.     • [DISCONTINUED] dorzolamide-timolol (COSOPT) 22.3-6.8 MG/ML ophthalmic solution Administer 1 drop to both eyes Every 12 (Twelve) Hours.     • [DISCONTINUED] Travoprost (TRAVATAN OP) Apply 1 drop to eye Daily.           Blood pressure 136/56, pulse (!) 46, height 157.5 cm (62\"), weight 49.6 kg (109 lb 6.4 oz).  Body mass index is 20.01 kg/m².    Physical Exam   Constitutional: She is oriented to person, place, and time. She appears well-developed and well-nourished.   HENT:   Head: Normocephalic and atraumatic.   Eyes: Pupils are equal, round, and reactive to light. No scleral icterus.   Neck: No JVD present. Carotid bruit is not present. No thyromegaly present.   Cardiovascular: Normal rate, regular rhythm, S1 normal and S2 normal.  Exam reveals no gallop.    No murmur heard.  Pulmonary/Chest: Effort normal and breath sounds normal.   Abdominal: Soft. There is no hepatosplenomegaly. There is no tenderness. " nothing. The current treatment provides moderate improvement. There is no history of CVA, PVD or retinopathy.   Diabetes  She has type 2 diabetes mellitus. No MedicAlert identification noted. The initial diagnosis of diabetes was made 2006 years ago. Hypoglycemia symptoms include headaches. Pertinent negatives for hypoglycemia include no confusion, dizziness, hunger, mood changes, nervousness/anxiousness, pallor, seizures, sleepiness, speech difficulty, sweats or tremors. Associated symptoms include blurred vision and weight loss. Pertinent negatives for diabetes include no chest pain, no fatigue, no foot paresthesias, no foot ulcerations, no polydipsia, no polyphagia, no polyuria, no visual change and no weakness. Pertinent negatives for hypoglycemia complications include no blackouts, no hospitalization, no nocturnal hypoglycemia, no required assistance and no required glucagon injection. Symptoms are improving. Pertinent negatives for diabetic complications include no autonomic neuropathy, CVA, heart disease, nephropathy, peripheral neuropathy, PVD or retinopathy. Risk factors for coronary artery disease include dyslipidemia and hypertension. Current diabetic treatment includes oral agent (monotherapy). She is compliant with treatment all of the time. She is currently taking insulin pre-breakfast and at bedtime. Rotation sites for injection include the abdominal wall. Her weight is decreasing steadily. She is following a generally healthy diet. Meal planning includes avoidance of concentrated sweets. She has not had a previous visit with a dietitian. She participates in exercise daily. She monitors blood glucose at home 1-2 x per day. Blood glucose monitoring compliance is good. Her home blood glucose trend is decreasing steadily. She does not see a podiatrist.Eye exam is current.     Review of Systems   Constitutional:  Positive for activity change, appetite change, malaise/fatigue and weight loss. Negative for    Neurological: She is alert and oriented to person, place, and time.   Skin: Skin is warm and dry. No cyanosis. Nails show no clubbing.   Psychiatric: She has a normal mood and affect. Her behavior is normal.       Data Review:    Labs (5/25/2018):  · Hgb A1c 5.7  · , Trig 157, HDL 53, LDL 30  · WBC 7.0, RBC 3.20, HGB 10.9, HCT 32.2, ,   · Glucose 91, BUN 18, Creatinine 0.75, Sodium 144, Potassium 4.2, AST 28, ALT 22       ECG 12 Lead  Date/Time: 6/5/2018 2:48 PM  Performed by: JOANNA GONZALEZ IV  Authorized by: JOANNA GONZALEZ IV   Rhythm: sinus bradycardia  BPM: 46  Clinical impression: normal ECG               Problem List Items Addressed This Visit        Cardiovascular and Mediastinum    Atherosclerosis of both carotid arteries    Overview     · 2012 Right carotid endarterectomy by Dr Kendell Willams   · 2015 Left carotid endarterectomy by Kendell Lazar  · Carotid ultrasound (08/28/2017): Mild carotid disease bilaterally         Current Assessment & Plan     · Asymptomatic  · Continue aspirin, ARB, and statin therapy         Essential hypertension - Primary    Current Assessment & Plan     · Well-controlled  · Reduce her diltiazem dose from 180 to 120 mg daily due to bradycardia         Relevant Medications    diltiaZEM CD (CARDIZEM CD) 120 MG 24 hr capsule    Hyperlipidemia LDL goal <100    Overview     · Follow-up with PCP to have lipids checked  · Titrate statin therapy to achieve LDL less than 100            Other    Bradycardia on ECG    Overview     · Noted on EKG on 09/12/2017         Relevant Orders    ECG 12 Lead    Preoperative cardiovascular examination    Overview     · EKG (6/5/18): Sinus bradycardia 46 BPM. Otherwise normal.         Current Assessment & Plan     The patient is asymptomatic for angina and heart failure symptoms. Her EKG today is normal with the exception of sinus bradycardia, which is been chronic and asymptomatic. She is at acceptable  fatigue.   Eyes:  Positive for blurred vision.   Respiratory:  Negative for cough, chest tightness and shortness of breath.    Cardiovascular:  Negative for chest pain, palpitations, orthopnea, leg swelling and PND.   Gastrointestinal: Negative.    Endocrine: Negative for polydipsia, polyphagia and polyuria.   Musculoskeletal: Negative.  Negative for joint swelling and neck pain.   Skin: Negative.  Negative for pallor.   Neurological:  Positive for headaches. Negative for dizziness, tremors, seizures, speech difficulty, weakness and light-headedness.   Psychiatric/Behavioral:  Negative for confusion. The patient is not nervous/anxious.    All other systems reviewed and are negative.        Objective:      Physical Exam  Vitals reviewed.   Constitutional:       General: She is not in acute distress.     Appearance: Normal appearance. She is well-developed. She is obese. She is not ill-appearing, toxic-appearing or diaphoretic.   HENT:      Head: Normocephalic and atraumatic.   Eyes:      Extraocular Movements: Extraocular movements intact.      Conjunctiva/sclera: Conjunctivae normal.      Pupils: Pupils are equal, round, and reactive to light.   Neck:      Thyroid: No thyromegaly.   Cardiovascular:      Rate and Rhythm: Normal rate and regular rhythm.      Heart sounds: Normal heart sounds. No murmur heard.  Pulmonary:      Effort: Pulmonary effort is normal. No respiratory distress.      Breath sounds: Normal breath sounds. No wheezing or rales.   Chest:      Chest wall: No tenderness.   Musculoskeletal:         General: Normal range of motion.      Cervical back: Normal range of motion and neck supple.      Right lower leg: No edema.      Left lower leg: No edema.   Lymphadenopathy:      Cervical: No cervical adenopathy.   Skin:     General: Skin is warm.   Neurological:      Mental Status: She is alert and oriented to person, place, and time.   Psychiatric:         Behavior: Behavior normal.         Thought  Content: Thought content normal.         Judgment: Judgment normal.        Assessment:     Elevated cholesterol  Diabetes  Hypertension  Elevated TSH      Plan:   We did increase the levo to 200 mcg.  Patient does not show any symptoms of hyperthyroid.  We will need additional lab work to assess the thyroid further.  At this point patient needs refer patient to Endocrine.   Hypertension controlled on medication  Diabetes A1c has improved continue present management on meds  Elevated cholesterol lifestyle modification discussed with patient.  Continue with statin  Other specified hypothyroidism  -     Ambulatory referral/consult to Endocrinology; Future; Expected date: 08/06/2024    Hypothyroidism, unspecified type  -     levothyroxine (SYNTHROID) 200 MCG tablet; Take 1 tablet (200 mcg total) by mouth once daily.  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  Dispense: 6 capsule; Refill: 3          Pt amenable  RTC prn symptoms         cardiovascular risk to proceed with right total knee arthroplasty.              88-year-old female with atherosclerotic disease as manifested by carotid artery disease who is asymptomatic for cardiovascular complaints. She remains a active 88-year-old. She plans to undergo right total knee arthroplasty under regional anesthesia. She is at acceptable cardiovascular risk for surgery.       · Proceed with total knee arthroplasty without any further cardiac testing required  · Reduce diltiazem to 120 mg daily due to bradycardia, even though asymptomatic  · Follow-up with me in one year otherwise    Matt Espinoza IV, MD  6/5/2018

## 2024-09-09 ENCOUNTER — PATIENT MESSAGE (OUTPATIENT)
Dept: PRIMARY CARE CLINIC | Facility: CLINIC | Age: 69
End: 2024-09-09

## 2024-09-09 ENCOUNTER — OFFICE VISIT (OUTPATIENT)
Dept: URGENT CARE | Facility: CLINIC | Age: 69
End: 2024-09-09
Payer: MEDICARE

## 2024-09-09 ENCOUNTER — DOCUMENTATION ONLY (OUTPATIENT)
Dept: HEMATOLOGY/ONCOLOGY | Facility: CLINIC | Age: 69
End: 2024-09-09
Payer: MEDICARE

## 2024-09-09 VITALS
WEIGHT: 168 LBS | TEMPERATURE: 98 F | BODY MASS INDEX: 32.98 KG/M2 | RESPIRATION RATE: 16 BRPM | HEIGHT: 60 IN | HEART RATE: 97 BPM | DIASTOLIC BLOOD PRESSURE: 62 MMHG | OXYGEN SATURATION: 98 % | SYSTOLIC BLOOD PRESSURE: 120 MMHG

## 2024-09-09 DIAGNOSIS — M79.2 NEUROPATHIC PAIN, ARM: Primary | ICD-10-CM

## 2024-09-09 DIAGNOSIS — E11.9 TYPE 2 DIABETES MELLITUS: ICD-10-CM

## 2024-09-09 DIAGNOSIS — G89.29 OTHER CHRONIC PAIN: ICD-10-CM

## 2024-09-09 LAB — GLUCOSE SERPL-MCNC: 117 MG/DL (ref 70–110)

## 2024-09-09 RX ORDER — CEFAZOLIN SODIUM 1 G/3ML
INJECTION, POWDER, FOR SOLUTION INTRAMUSCULAR; INTRAVENOUS
COMMUNITY
Start: 2024-04-29

## 2024-09-09 RX ORDER — BUPIVACAINE HYDROCHLORIDE 2.5 MG/ML
INJECTION, SOLUTION EPIDURAL; INFILTRATION; INTRACAUDAL
COMMUNITY
Start: 2024-04-29

## 2024-09-09 RX ORDER — CEFAZOLIN SODIUM 2 G/50ML
SOLUTION INTRAVENOUS
COMMUNITY
Start: 2024-04-29

## 2024-09-09 RX ORDER — DEXAMETHASONE SODIUM PHOSPHATE 4 MG/ML
INJECTION, SOLUTION INTRA-ARTICULAR; INTRALESIONAL; INTRAMUSCULAR; INTRAVENOUS; SOFT TISSUE
COMMUNITY
Start: 2024-04-29

## 2024-09-09 RX ORDER — PROPOFOL 10 MG/ML
INJECTION, EMULSION INTRAVENOUS
COMMUNITY
Start: 2024-04-29

## 2024-09-09 RX ORDER — MIDAZOLAM HYDROCHLORIDE 1 MG/ML
INJECTION, SOLUTION INTRAMUSCULAR; INTRAVENOUS
COMMUNITY
Start: 2024-04-29

## 2024-09-09 RX ORDER — METHYLENE BLUE 10 MG/ML
INJECTION INTRAVENOUS
COMMUNITY
Start: 2024-04-29

## 2024-09-09 RX ORDER — ONDANSETRON HYDROCHLORIDE 2 MG/ML
INJECTION, SOLUTION INTRAVENOUS
COMMUNITY
Start: 2024-04-29

## 2024-09-09 RX ORDER — MEPIVACAINE HYDROCHLORIDE 15 MG/ML
INJECTION, SOLUTION EPIDURAL; INFILTRATION
COMMUNITY
Start: 2024-04-29

## 2024-09-09 RX ORDER — CEFAZOLIN 2 G/1
INJECTION, POWDER, FOR SOLUTION INTRAMUSCULAR; INTRAVENOUS
COMMUNITY
Start: 2024-04-30

## 2024-09-09 RX ORDER — KETOROLAC TROMETHAMINE 30 MG/ML
15 INJECTION, SOLUTION INTRAMUSCULAR; INTRAVENOUS ONCE
Status: COMPLETED | OUTPATIENT
Start: 2024-09-09 | End: 2024-09-09

## 2024-09-09 RX ORDER — PHENYLEPHRINE HYDROCHLORIDE 10 MG/ML
INJECTION INTRAVENOUS
COMMUNITY
Start: 2024-04-29

## 2024-09-09 RX ORDER — HYDROMORPHONE HYDROCHLORIDE 2 MG/ML
INJECTION, SOLUTION INTRAMUSCULAR; INTRAVENOUS; SUBCUTANEOUS
COMMUNITY
Start: 2024-04-29

## 2024-09-09 RX ORDER — GABAPENTIN 300 MG/1
600 CAPSULE ORAL 2 TIMES DAILY
Qty: 56 CAPSULE | Refills: 0 | Status: SHIPPED | OUTPATIENT
Start: 2024-09-09 | End: 2024-09-10

## 2024-09-09 RX ADMIN — KETOROLAC TROMETHAMINE 15 MG: 30 INJECTION, SOLUTION INTRAMUSCULAR; INTRAVENOUS at 08:09

## 2024-09-09 NOTE — PROGRESS NOTES
Patient left a voicemail for  regarding her financial assistance application, as well as her status in qualifying for Medicaid secondary insurance. SW emailed [facility] (Aura Molina) about pt's medicaid eligibility. SW called patient and left a message, saying that she did in fact qualify for [employer] (it was approved in August), and that SW was waiting to hear back about the Medicaid.    3:04PM- SW received an emailed response from [facility], which said that patient did not qualify for Medicaid secondary insurance due to exceeding the income level for all programs. SW emailed patient, as a different way to try to reach her, and let her know what [employer] said.     SW will follow up and be available as needed for patient.    Leon Barker, [facility]  Oncology Social Worker  [facility]  [phone]

## 2024-09-09 NOTE — PATIENT INSTRUCTIONS
Take your gabapentin as prescribed for your neuropathic pain.  Please follow-up with your orthopedic doctor regarding your chronic neuropathic pain for your injections.    - Rest.    - Drink plenty of fluids.    - Acetaminophen (tylenol) or Ibuprofen (advil,motrin) as directed as needed for fever/pain. Avoid tylenol if you have a history of liver disease. Do not take ibuprofen if you have a history of GI bleeding, kidney disease, or if you take blood thinners.     - Follow up with your PCP or specialty clinic as directed in the next 1-2 weeks if not improved or as needed.  You can call [phone] to schedule an appointment with the appropriate provider.    - Go to the ER or seek medical attention immediately if you develop new or worsening symptoms.     - You must understand that you have received an Urgent Care treatment only and that you may be released before all of your medical problems are known or treated.   - You, the patient, will arrange for follow up care as instructed.   - If your condition worsens or fails to improve we recommend that you receive another evaluation at the ER immediately or contact your PCP to discuss your concerns or return here.

## 2024-09-09 NOTE — PROGRESS NOTES
Subjective:      Patient ID: Maria Elena Tavares is a 68 y.o. female.    Vitals:  height is 5' (1.524 m) and weight is 76.2 kg (167 lb 15.9 oz). Her oral temperature is 97.7 °F (36.5 °C). Her blood pressure is 120/62 and her pulse is 97. Her respiration is 16 and oxygen saturation is 98%.     Chief Complaint: Arm Pain    68 year old female patient with history of carpal tunnel, chronic neuropathic pain presents with ongoing sharp, tingling pain radiating from right elbow to right hand.  She follows up with orthopedic for injections for her neuropathy.  She states she is unable to see her orthopedic. Also reports she is out of her gabapentin. She takes 600 mg BID. No injury or trauma. She has been taking tylenol with no improvement.     Arm Pain   The incident occurred 5 to 7 days ago. The incident occurred at home. There was no injury mechanism. The pain is present in the right elbow (to right fingertips). The quality of the pain is described as aching. The pain radiates to the right hand. The pain is at a severity of 10/10. The pain is severe. The pain has been Constant since the incident. Pertinent negatives include no chest pain, muscle weakness, numbness or tingling. The symptoms are aggravated by movement and lifting. She has tried acetaminophen and heat for the symptoms. The treatment provided no relief.       Constitution: Negative for activity change, appetite change, chills and sweating.   HENT:  Negative for ear pain, ear discharge, foreign body in ear, tinnitus, sore throat, trouble swallowing and voice change.    Neck: Negative for neck pain, neck stiffness and painful lymph nodes.   Cardiovascular:  Negative for chest trauma, chest pain and leg swelling.   Eyes:  Negative for eye trauma, foreign body in eye, eye discharge, eye itching and eye pain.   Respiratory:  Negative for sleep apnea, chest tightness, cough, sputum production and asthma.    Gastrointestinal:  Negative for abdominal trauma, abdominal  pain, abdominal bloating, history of abdominal surgery, nausea and bowel incontinence.   Endocrine: hair loss, cold intolerance, heat intolerance and excessive thirst.   Genitourinary:  Negative for dysuria, frequency, urgency, urine decreased and flank pain.   Musculoskeletal:  Negative for pain, trauma, joint pain, joint swelling and abnormal ROM of joint.   Skin:  Negative for color change, pale, rash, wound, abrasion and laceration.   Allergic/Immunologic: Negative for environmental allergies, seasonal allergies, food allergies, eczema and asthma.   Neurological:  Negative for dizziness, history of vertigo, light-headedness, passing out, facial drooping, disorientation, altered mental status and numbness.        Chronic neuropathic pain, right arm   Hematologic/Lymphatic: Negative for swollen lymph nodes, easy bruising/bleeding and trouble clotting. Does not bruise/bleed easily.   Psychiatric/Behavioral:  Negative for altered mental status, disorientation, confusion, agitation, nervous/anxious and sleep disturbance. The patient is not nervous/anxious.       Objective:     Physical Exam   Constitutional: She is oriented to person, place, and time. She appears well-developed. She is cooperative. No distress.   HENT:   Head: Normocephalic and atraumatic.   Nose: Nose normal.   Mouth/Throat: Oropharynx is clear and moist and mucous membranes are normal.   Eyes: Conjunctivae and lids are normal.   Neck: Trachea normal and phonation normal. Neck supple.   Cardiovascular: Normal rate, regular rhythm, normal heart sounds and normal pulses.   Pulmonary/Chest: Effort normal and breath sounds normal.   Abdominal: Normal appearance and bowel sounds are normal. She exhibits no mass. Soft.   Musculoskeletal:         General: No swelling, tenderness, deformity or signs of injury.      Right lower leg: No edema.      Left lower leg: No edema.   Neurological: She is alert and oriented to person, place, and time. She has normal  strength and normal reflexes. No sensory deficit.   Skin: Skin is warm, dry, intact and not diaphoretic.   Psychiatric: Her speech is normal and behavior is normal. Judgment and thought content normal.   Nursing note and vitals reviewed.    Results for orders placed or performed in visit on 09/09/24   POCT Glucose, Hand-Held Device   Result Value Ref Range    POC Glucose 117 (A) 70 - 110 MG/DL     *Note: Due to a large number of results and/or encounters for the requested time period, some results have not been displayed. A complete set of results can be found in Results Review.        Assessment:     1. Neuropathic pain, arm    2. Type 2 diabetes mellitus    3. Other chronic pain        Plan:       Neuropathic pain, arm  -     POCT Glucose, Hand-Held Device  -     gabapentin (NEURONTIN) 300 MG capsule; Take 2 capsules (600 mg total) by mouth 2 (two) times daily. for 14 days  Dispense: 56 capsule; Refill: 0    Type 2 diabetes mellitus  -     POCT Glucose, Hand-Held Device    Other chronic pain  -     gabapentin (NEURONTIN) 300 MG capsule; Take 2 capsules (600 mg total) by mouth 2 (two) times daily. for 14 days  Dispense: 56 capsule; Refill: 0  -     ketorolac injection 15 mg

## 2024-09-10 ENCOUNTER — OFFICE VISIT (OUTPATIENT)
Dept: PRIMARY CARE CLINIC | Facility: CLINIC | Age: 69
End: 2024-09-10
Attending: FAMILY MEDICINE
Payer: MEDICARE

## 2024-09-10 DIAGNOSIS — G56.11 MEDIAN NEUROPATHY AT UPPER ARM, RIGHT: ICD-10-CM

## 2024-09-10 DIAGNOSIS — M79.601 RIGHT ARM PAIN: Primary | ICD-10-CM

## 2024-09-10 PROCEDURE — 4010F ACE/ARB THERAPY RXD/TAKEN: CPT | Mod: HCNC,CPTII,95, | Performed by: FAMILY MEDICINE

## 2024-09-10 PROCEDURE — 3044F HG A1C LEVEL LT 7.0%: CPT | Mod: HCNC,CPTII,95, | Performed by: FAMILY MEDICINE

## 2024-09-10 PROCEDURE — 1160F RVW MEDS BY RX/DR IN RCRD: CPT | Mod: HCNC,CPTII,95, | Performed by: FAMILY MEDICINE

## 2024-09-10 PROCEDURE — 3066F NEPHROPATHY DOC TX: CPT | Mod: HCNC,CPTII,95, | Performed by: FAMILY MEDICINE

## 2024-09-10 PROCEDURE — 99214 OFFICE O/P EST MOD 30 MIN: CPT | Mod: HCNC,95,, | Performed by: FAMILY MEDICINE

## 2024-09-10 PROCEDURE — 3061F NEG MICROALBUMINURIA REV: CPT | Mod: HCNC,CPTII,95, | Performed by: FAMILY MEDICINE

## 2024-09-10 PROCEDURE — 1159F MED LIST DOCD IN RCRD: CPT | Mod: HCNC,CPTII,95, | Performed by: FAMILY MEDICINE

## 2024-09-10 RX ORDER — TRAMADOL HYDROCHLORIDE 50 MG/1
50 TABLET ORAL EVERY 8 HOURS PRN
Qty: 21 TABLET | Refills: 0 | Status: SHIPPED | OUTPATIENT
Start: 2024-09-10 | End: 2024-09-20

## 2024-09-10 RX ORDER — GABAPENTIN 800 MG/1
800 TABLET ORAL 3 TIMES DAILY
Qty: 90 TABLET | Refills: 0 | Status: SHIPPED | OUTPATIENT
Start: 2024-09-10 | End: 2025-09-10

## 2024-09-11 NOTE — PROGRESS NOTES
Subjective:       Patient ID: Maria Elena Tavares is a 68 y.o. female.    Chief Complaint:  Right arm pain    The patient location is: home  The chief complaint leading to consultation is:  Above    Visit type: audiovisual    Face to Face time with patient: 20 minutes 20 minutes of total time spent on the encounter, which includes face to face time and non-face to face time preparing to see the patient (eg, review of tests), Obtaining and/or reviewing separately obtained history, Documenting clinical information in the electronic or other health record, Independently interpreting results (not separately reported) and communicating results to the patient/family/caregiver, or Care coordination (not separately reported).     Each patient to whom he or she provides medical services by telemedicine is:  (1) informed of the relationship between the physician and patient and the respective role of any other health care provider with respect to management of the patient; and (2) notified that he or she may decline to receive medical services by telemedicine and may withdraw from such care at any time.    Notes:     Pt presents for her right arm.  This is not new patient has a longstanding history of neuropathy status post her breast cancer.  Patient states that she went to urgent care and that the gabapentin dose is not helping patient states that she is taking 1200 mg a day.  Upon reviewing her medication list it does appear that patient is only been given 300 mg to take 2 tablets at once.  Patient checks her bottle and does restage that it is 600 mg that she is taking twice a day.  States that this is not enough and states that pain has led her to be in tears.  She does have an appointment with her orthopedic doctor in a few weeks she is looking for something to help her in the interim, patient is a reliable historian    Diabetes  She has type 2 diabetes mellitus. No MedicAlert identification noted. Pertinent negatives for  hypoglycemia include no dizziness, headaches or sweats. Associated symptoms include blurred vision. Pertinent negatives for diabetes include no chest pain, no foot paresthesias and no weakness. Symptoms are improving. Diabetic complications include autonomic neuropathy and peripheral neuropathy. Pertinent negatives for diabetic complications include no CVA, heart disease, nephropathy, PVD or retinopathy. Risk factors for coronary artery disease include dyslipidemia and hypertension. Current diabetic treatment includes oral agent (monotherapy). She is compliant with treatment all of the time. She is currently taking insulin pre-breakfast and at bedtime. Rotation sites for injection include the abdominal wall. Her weight is stable. She is following a generally healthy diet. Meal planning includes avoidance of concentrated sweets. She has not had a previous visit with a dietitian. She participates in exercise daily. She monitors blood glucose at home 1-2 x per day. Blood glucose monitoring compliance is good. Her home blood glucose trend is decreasing steadily. She sees a podiatrist.Eye exam is current.   General Illness  Pertinent negatives include no headaches, chest pain, coughing, shortness of breath, joint swelling or neck pain.     Review of Systems   Eyes:  Positive for blurred vision.   Respiratory:  Negative for cough, chest tightness and shortness of breath.    Cardiovascular:  Negative for chest pain, palpitations and leg swelling.   Gastrointestinal: Negative.    Musculoskeletal:  Positive for arthralgias. Negative for joint swelling and neck pain.   Skin: Negative.    Neurological:  Negative for dizziness, weakness, light-headedness and headaches.   All other systems reviewed and are negative.        Objective:      Physical Exam  Vitals reviewed.   Constitutional:       General: She is not in acute distress.     Appearance: Normal appearance. She is well-developed and normal weight. She is not  ill-appearing, toxic-appearing or diaphoretic.   HENT:      Head: Normocephalic and atraumatic.   Eyes:      Extraocular Movements: Extraocular movements intact.      Conjunctiva/sclera: Conjunctivae normal.      Pupils: Pupils are equal, round, and reactive to light.   Neck:      Thyroid: No thyromegaly.   Cardiovascular:      Rate and Rhythm: Normal rate and regular rhythm.      Heart sounds: Normal heart sounds. No murmur heard.  Pulmonary:      Effort: Pulmonary effort is normal. No respiratory distress.      Breath sounds: Normal breath sounds. No wheezing or rales.   Chest:      Chest wall: No tenderness.   Musculoskeletal:         General: Normal range of motion.      Cervical back: Normal range of motion and neck supple.      Right lower leg: No edema.      Left lower leg: No edema.   Lymphadenopathy:      Cervical: No cervical adenopathy.   Skin:     General: Skin is warm.   Neurological:      Mental Status: She is alert and oriented to person, place, and time.   Psychiatric:         Behavior: Behavior normal.         Thought Content: Thought content normal.         Judgment: Judgment normal.        Assessment:     Hypertension  Diabetes type 2      Plan:   Diabetes type 2 controlled on medication  HTN controlled Amlodipine continue 5 mg once daily    Right arm pain I will increase the gabapentin to 800 mg t.i.d..  Side-effects of med discussed with patient.  I will also give patient tramadol.  She is aware that this is an opioid.  It is a controlled medication.  It will not be refilled by any of my partners.  And that I will tightly monitor the refills of this medication.  Patient amenable with plan.  We will follow up with the orthopedic specialists as scheduled.    Answers submitted by the patient for this visit:  Review of Systems Questionnaire (Submitted on 9/9/2024)  activity change: No  unexpected weight change: No  neck pain: No  hearing loss: No  rhinorrhea: No  trouble swallowing: No  eye  discharge: No  visual disturbance: No  chest tightness: No  wheezing: No  chest pain: No  palpitations: No  blood in stool: No  constipation: No  vomiting: No  diarrhea: No  polydipsia: No  polyuria: No  difficulty urinating: No  hematuria: No  menstrual problem: No  dysuria: No  joint swelling: No  arthralgias: Yes  headaches: No  weakness: No  confusion: No  dysphoric mood: No

## 2024-09-12 ENCOUNTER — PATIENT MESSAGE (OUTPATIENT)
Dept: REHABILITATION | Facility: HOSPITAL | Age: 69
End: 2024-09-12

## 2024-09-13 ENCOUNTER — HOSPITAL ENCOUNTER (EMERGENCY)
Facility: HOSPITAL | Age: 69
Discharge: HOME | End: 2024-09-13
Attending: EMERGENCY MEDICINE
Payer: MEDICARE

## 2024-09-13 VITALS
DIASTOLIC BLOOD PRESSURE: 74 MMHG | HEIGHT: 60 IN | OXYGEN SATURATION: 99 % | RESPIRATION RATE: 20 BRPM | BODY MASS INDEX: 32.98 KG/M2 | HEART RATE: 94 BPM | SYSTOLIC BLOOD PRESSURE: 121 MMHG | TEMPERATURE: 98 F | WEIGHT: 168 LBS

## 2024-09-13 DIAGNOSIS — M77.11 EPICONDYLITIS, LATERAL, RIGHT: Primary | ICD-10-CM

## 2024-09-13 DIAGNOSIS — R52 PAIN: ICD-10-CM

## 2024-09-13 PROCEDURE — 99284 EMERGENCY DEPT VISIT MOD MDM: CPT | Mod: 25,HCNC

## 2024-09-13 PROCEDURE — 25000003 PHARM REV CODE 250: Mod: HCNC

## 2024-09-13 PROCEDURE — 63600175 PHARM REV CODE 636 W HCPCS: Mod: HCNC

## 2024-09-13 PROCEDURE — 96372 THER/PROPH/DIAG INJ SC/IM: CPT

## 2024-09-13 RX ORDER — KETOROLAC TROMETHAMINE 30 MG/ML
15 INJECTION, SOLUTION INTRAMUSCULAR; INTRAVENOUS
Status: COMPLETED | OUTPATIENT
Start: 2024-09-13 | End: 2024-09-13

## 2024-09-13 RX ORDER — LIDOCAINE 50 MG/G
1 PATCH TOPICAL
Status: DISCONTINUED | OUTPATIENT
Start: 2024-09-13 | End: 2024-09-13 | Stop reason: HOSPADM

## 2024-09-13 RX ORDER — OXYCODONE AND ACETAMINOPHEN 5; 325 MG/1; MG/1
1 TABLET ORAL EVERY 6 HOURS PRN
Qty: 6 TABLET | Refills: 0 | Status: SHIPPED | OUTPATIENT
Start: 2024-09-13

## 2024-09-13 RX ORDER — LIDOCAINE 50 MG/G
1 PATCH TOPICAL
Qty: 30 PATCH | Refills: 0 | Status: SHIPPED | OUTPATIENT
Start: 2024-09-13

## 2024-09-13 RX ADMIN — KETOROLAC TROMETHAMINE 15 MG: 30 INJECTION, SOLUTION INTRAMUSCULAR; INTRAVENOUS at 09:09

## 2024-09-13 RX ADMIN — LIDOCAINE 1 PATCH: 50 PATCH CUTANEOUS at 09:09

## 2024-09-13 NOTE — ED NOTES
"Patient states severe " chronic pain " right arm, from elbow to fingertips, Seen at UC and PMD, Tramadol and Gabapentin    "

## 2024-09-13 NOTE — DISCHARGE INSTRUCTIONS
You were seen in the [facility] ED for pain in your right elbow.  You were found to have the diagnosis of epicondylitis which is an inflammatory process of the elbow from overuse.  You will be prescribed pain medication, please use as prescribed and directed.  You will also be given a referral to sports Medicine for further evaluation and care.  Please schedule an appointment with them within the week.  Please follow up with your primary care provider regarding your ED visit.  She had you experience redness, swelling of the joint, inability to flex than your elbow, weakness or numbness please return to the ED for further evaluation.  Thank you for letting us take care of you.

## 2024-09-13 NOTE — ED PROVIDER NOTES
Encounter Date: 9/13/2024       History     Chief Complaint   Patient presents with    Arm Pain     Right arm pain x3 weeks. Seen at  and given toradol shot without relief. Denies recent falls/trauma     Maria Elena Tavares is a 68 y.o. female who has a past medical history of BMI 37.0-37.9, adult, Breast cancer (09/05/2019), Colon polyps (2015), Colon polyps (2015), Diabetes mellitus type II, Diverticulosis, Elevated blood protein, History of shingles (2006), Hyperlipidemia, Hypertension, Microalbuminuria, Mild vitamin D deficiency, Primary osteoarthritis of left knee (8/31/2023), Thyroid disease, and Usual hyperplasia of lactiferous duct.    The patient presents to the ED due to right elbow pain.  Patient endorses a dull pinching burning sensation past several weeks that has worsened via range of motion.  Patient was recently seen at urgent care for similar complaint at which time they gave Toradol and supportive care.  Patient denies improvement despite treatment.  Patient denies numbness, erythema, injury, reduced range of motion.        The history is provided by the patient and medical records. No  was used.     Review of patient's allergies indicates:   Allergen Reactions    Amoxil [amoxicillin] Rash     Past Medical History:   Diagnosis Date    BMI 37.0-37.9, adult     Breast cancer 09/05/2019     Left breast, IDC with lymph node metastisis    Colon polyps 2015    Colon polyps 2015    Diabetes mellitus type II     Diverticulosis     history of diverticulosisseen on colonoscopy at age 48. Repeat recommended at age 58. Done by     Elevated blood protein     History of elevated protein. Apparently has seen  for extensive workup including bone marrow biopsy    History of shingles 2006    Hyperlipidemia     Hypertension     Microalbuminuria     due 2 diabetes    Mild vitamin D deficiency     . A low vitamin D    Primary osteoarthritis of left knee 8/31/2023    Thyroid disease      hypothyroidism    Usual hyperplasia of lactiferous duct     Not sure     Past Surgical History:   Procedure Laterality Date    ARTHROPLASTY, KNEE, TOTAL, SIGHT ASSISTED Left 2024    Procedure: ARTHROPLASTY, KNEE, SIGHT ASSISTED;  Surgeon: Jamshid Reece MD;  Location: [facility];  Service: Orthopedics;  Laterality: Left;  bmi - 34.57    BREAST BIOPSY Left 2019    IDC with mets to node    BREAST BIOPSY Right 2019    core bx, benign node    BREAST BIOPSY Left 10/14/2019    MRI Core bx, + IDC    BREAST BIOPSY Left 10/08/2019    Core bx, ADH     SECTION      COLONOSCOPY N/A 10/08/2020    Procedure: COLONOSCOPY;  Surgeon: Shreya Mendoza MD;  Location: [facility] (4TH FLR);  Service: Endoscopy;  Laterality: N/A;  COVID screening on 10/5/20 [facility]    HYSTERECTOMY      INSERTION OF BREAST TISSUE EXPANDER Bilateral 2020    Procedure: INSERTION, TISSUE EXPANDER, BREAST BILATERAL;  Surgeon: Michael Solorzano MD;  Location: [address];  Service: Plastics;  Laterality: Bilateral;    INSERTION OF TUNNELED CENTRAL VENOUS CATHETER (CVC) WITH SUBCUTANEOUS PORT Right 10/04/2019    Procedure: FWOMULZGH-SUAR-E-CATH RIGHT (CONSENT AM OF) 1.0 hr case;  Surgeon: Elena Lopez MD;  Location: [address];  Service: General;  Laterality: Right;    OOPHORECTOMY      SENTINEL LYMPH NODE BIOPSY Left 2020    Procedure: BIOPSY, LYMPH NODE, SENTINEL LEFT;  Surgeon: Elena Lopez MD;  Location: [address];  Service: General;  Laterality: Left;    SIMPLE MASTECTOMY Bilateral 2020    Procedure: MASTECTOMY, SIMPLE BILATERAL;  Surgeon: Elena Lopez MD;  Location: [address];  Service: General;  Laterality: Bilateral;    TISSUE EXPANDER REMOVAL Bilateral 2020    Procedure: REMOVAL, TISSUE EXPANDER;  Surgeon: Michael Solorzano MD;  Location: [address];  Service: Plastics;  Laterality: Bilateral;     Family History   Problem Relation Name Age of Onset    No  Known Problems Paternal Grandfather      No Known Problems Paternal Grandmother      No Known Problems Maternal Grandmother      Lung cancer Maternal Grandfather      Cancer Father      Lung cancer Father      Cancer Mother          lung ca heavy smoker    Lung cancer Mother      Hypertension Sister      No Known Problems Sister      Hypothyroidism Sister      No Known Problems Daughter      Diabetes Maternal Aunt      Cancer Maternal Aunt      Breast cancer Paternal Aunt Not sure     No Known Problems Paternal Uncle      Amblyopia Neg Hx      Blindness Neg Hx      Cataracts Neg Hx      Glaucoma Neg Hx      Macular degeneration Neg Hx      Retinal detachment Neg Hx      Strabismus Neg Hx      Stroke Neg Hx      Thyroid disease Neg Hx      Ovarian cancer Neg Hx      Colon cancer Neg Hx      Cervical cancer Neg Hx      Uterine cancer Neg Hx       Social History     Tobacco Use    Smoking status: Never     Passive exposure: Never    Smokeless tobacco: Never    Tobacco comments:     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  She walks occasionally.   Substance Use Topics    Alcohol use: Not Currently     Comment: Occasionally    Drug use: No     Review of Systems   All other systems reviewed and are negative.      Physical Exam     Initial Vitals [09/13/24 0843]   BP Pulse Resp Temp SpO2   133/84 99 20 97.8 °F (36.6 °C) 98 %      MAP       --         Physical Exam    Nursing note and vitals reviewed.  Constitutional: She appears well-developed and well-nourished.   HENT:   Head: Normocephalic and atraumatic.   Eyes: Conjunctivae and EOM are normal. Pupils are equal, round, and reactive to light.   Neck: Neck supple.   Normal range of motion.  Cardiovascular:  Normal rate, normal heart sounds and intact distal pulses.           Abdominal: Abdomen is soft.   Musculoskeletal:         General: No edema. Normal range of motion.      Cervical back: Normal range of motion and neck supple.       Comments: Tenderness to palpation of the lateral epicondyle of the right elbow.  No erythema or swelling appreciated.  No paresthesias.  Neurovascularly intact     Neurological: She is alert and oriented to person, place, and time. She has normal strength. GCS score is 15. GCS eye subscore is 4. GCS verbal subscore is 5. GCS motor subscore is 6.   Skin: Skin is warm and dry. Capillary refill takes less than 2 seconds.   Psychiatric: She has a normal mood and affect. Her behavior is normal. Judgment and thought content normal.         ED Course   Procedures  Labs Reviewed - No data to display         Imaging Results              X-Ray Elbow Complete Right (Final result)  Result time 09/13/24 09:48:30      Final result by Ankit Banks MD (09/13/24 09:48:30)                   Impression:      See above      Electronically signed by: Ankit Banks MD  Date:    09/13/2024  Time:    09:48               Narrative:    EXAMINATION:  XR ELBOW COMPLETE 3 VIEW RIGHT    CLINICAL HISTORY:  . Pain, unspecified    TECHNIQUE:  AP, lateral, and oblique views of the right elbow were performed.    COMPARISON:  None    FINDINGS:  No fracture or dislocation.  No bone destruction identified.                                    X-Rays:   Independently Interpreted Readings:   Other Readings:  As per my interpretation no evidence of fracture or acute dislocation or soft tissue crepitus or swelling on plain film radiography of right elbow    Medications   ketorolac injection 15 mg (15 mg Intramuscular Given 9/13/24 0923)     Medical Decision Making  68-year-old female as described above presenting for acute on chronic right elbow pain.     DDx includes but not limited to:   Fracture, dislocation, contusion, muscular strain, muscular sprain.  Most likely diagnosis is epicondylitis right elbow.  No acute findings on x-ray.  Pain well controlled with multimodal pain control.  We will discharge home with referral to sports Medicine.    Amount  and/or Complexity of Data Reviewed  Radiology: ordered.    Risk  Prescription drug management.                                      Clinical Impression:  Final diagnoses:  [R52] Pain  [M77.11] Epicondylitis, lateral, right (Primary)          ED Disposition Condition    Discharge Stable          ED Prescriptions       Medication Sig Dispense Start Date End Date Auth. Provider    oxyCODONE-acetaminophen (PERCOCET) 5-325 mg per tablet Take 1 tablet by mouth every 6 (six) hours as needed for Pain. 6 tablet 9/13/2024 -- Luis Fernando Moody MD    LIDOcaine (LIDODERM) 5 % Place 1 patch onto the skin as needed. Remove & Discard patch within 12 hours or as directed by MD 30 patch 9/13/2024 -- Luis Fernando Moody MD          Follow-up Information       Follow up With Specialties Details Why Contact Info    Estephania San MD Family Medicine Schedule an appointment as soon as possible for a visit in 1 day  [address] [address]  [phone]      [facility] - Emergency Dept Emergency Medicine Go to  As needed [address] [facility] [id number] [phone]    Babita Abraham MD Sports Medicine Schedule an appointment as soon as possible for a visit in 1 week  [address] [facility]  [facility] [address]  [phone]               Luis Fernando Moody MD  Resident  09/13/24 5611

## 2024-09-13 NOTE — Clinical Note
"Maria Elena Titusen" Chaitanya was seen and treated in our emergency department on 9/13/2024.  She may return to work on 09/17/2024.       If you have any questions or concerns, please don't hesitate to call.      Luis Fernando Moody MD"

## 2024-09-13 NOTE — ED NOTES
Patient identifiers verified and correct for MS Tavares  C/C: Pain to right arm SEE NN  APPEARANCE: awake and alert in NAD. PAIN 10*/10  SKIN: warm, dry and intact. No breakdown or bruising.  MUSCULOSKELETAL: Patient moving all extremities spontaneously, no obvious swelling or deformities noted. Ambulates independently.  RESPIRATORY: Denies shortness of breath.Respirations unlabored.   CARDIAC: Denies CP, 2+ distal pulses; no peripheral edema  ABDOMEN: S/ND/NT, Denies nausea  : voids spontaneously, denies difficulty  Neurologic: AAO x 4; follows commands equal strength in all extremities; denies numbness/tingling. Denies dizziness  Denies new weakness,  reports pain from elbow to fingertips

## 2024-09-16 ENCOUNTER — PATIENT OUTREACH (OUTPATIENT)
Dept: EMERGENCY MEDICINE | Facility: HOSPITAL | Age: 69
End: 2024-09-16
Payer: MEDICARE

## 2024-09-16 DIAGNOSIS — E11.21 CONTROLLED TYPE 2 DIABETES MELLITUS WITH DIABETIC NEPHROPATHY, WITHOUT LONG-TERM CURRENT USE OF INSULIN: ICD-10-CM

## 2024-09-16 NOTE — PROGRESS NOTES
Spoke with Pt regarding their recent ED visit for right arm pain. Pt rates the pain as a 8. She is scheduled to f/u with Ortho on 9-17-24 and is taking medication as ordered with help from her sister with ADL's. Pt states she could use assistance with utility payments. Resource information sent via email. ED navigator will follow-up with patient to assist as needed and to remind of upcoming appt's.

## 2024-09-17 ENCOUNTER — OFFICE VISIT (OUTPATIENT)
Dept: ORTHOPEDICS | Facility: CLINIC | Age: 69
End: 2024-09-17
Payer: MEDICARE

## 2024-09-17 VITALS — HEIGHT: 60 IN | BODY MASS INDEX: 33.38 KG/M2 | WEIGHT: 170 LBS

## 2024-09-17 DIAGNOSIS — M77.11 RIGHT LATERAL EPICONDYLITIS: Primary | ICD-10-CM

## 2024-09-17 DIAGNOSIS — G56.21 CUBITAL TUNNEL SYNDROME ON RIGHT: ICD-10-CM

## 2024-09-17 PROCEDURE — 99999 PR PBB SHADOW E&M-EST. PATIENT-LVL IV: CPT | Mod: PBBFAC,HCNC,, | Performed by: PHYSICIAN ASSISTANT

## 2024-09-17 PROCEDURE — 1125F AMNT PAIN NOTED PAIN PRSNT: CPT | Mod: HCNC,CPTII,S$GLB, | Performed by: PHYSICIAN ASSISTANT

## 2024-09-17 PROCEDURE — 3044F HG A1C LEVEL LT 7.0%: CPT | Mod: HCNC,CPTII,S$GLB, | Performed by: PHYSICIAN ASSISTANT

## 2024-09-17 PROCEDURE — 3061F NEG MICROALBUMINURIA REV: CPT | Mod: HCNC,CPTII,S$GLB, | Performed by: PHYSICIAN ASSISTANT

## 2024-09-17 PROCEDURE — 20605 DRAIN/INJ JOINT/BURSA W/O US: CPT | Mod: HCNC,RT,S$GLB, | Performed by: PHYSICIAN ASSISTANT

## 2024-09-17 PROCEDURE — 3066F NEPHROPATHY DOC TX: CPT | Mod: HCNC,CPTII,S$GLB, | Performed by: PHYSICIAN ASSISTANT

## 2024-09-17 PROCEDURE — 4010F ACE/ARB THERAPY RXD/TAKEN: CPT | Mod: HCNC,CPTII,S$GLB, | Performed by: PHYSICIAN ASSISTANT

## 2024-09-17 PROCEDURE — 1101F PT FALLS ASSESS-DOCD LE1/YR: CPT | Mod: HCNC,CPTII,S$GLB, | Performed by: PHYSICIAN ASSISTANT

## 2024-09-17 PROCEDURE — 3288F FALL RISK ASSESSMENT DOCD: CPT | Mod: HCNC,CPTII,S$GLB, | Performed by: PHYSICIAN ASSISTANT

## 2024-09-17 PROCEDURE — 99213 OFFICE O/P EST LOW 20 MIN: CPT | Mod: 25,HCNC,S$GLB, | Performed by: PHYSICIAN ASSISTANT

## 2024-09-17 PROCEDURE — 3008F BODY MASS INDEX DOCD: CPT | Mod: HCNC,CPTII,S$GLB, | Performed by: PHYSICIAN ASSISTANT

## 2024-09-17 PROCEDURE — 1159F MED LIST DOCD IN RCRD: CPT | Mod: HCNC,CPTII,S$GLB, | Performed by: PHYSICIAN ASSISTANT

## 2024-09-17 RX ORDER — TIRZEPATIDE 10 MG/.5ML
10 INJECTION, SOLUTION SUBCUTANEOUS
Qty: 12 PEN | Refills: 2 | Status: SHIPPED | OUTPATIENT
Start: 2024-09-17

## 2024-09-17 RX ORDER — TRIAMCINOLONE ACETONIDE 40 MG/ML
40 INJECTION, SUSPENSION INTRA-ARTICULAR; INTRAMUSCULAR
Status: DISCONTINUED | OUTPATIENT
Start: 2024-09-17 | End: 2024-09-17 | Stop reason: HOSPADM

## 2024-09-17 RX ADMIN — TRIAMCINOLONE ACETONIDE 40 MG: 40 INJECTION, SUSPENSION INTRA-ARTICULAR; INTRAMUSCULAR at 03:09

## 2024-09-17 NOTE — TELEPHONE ENCOUNTER
Refill Encounter    PCP Visits: Recent Visits  Date Type Provider Dept   09/10/24 Office Visit Estephania San MD [facility]   07/30/24 Office Visit Estephania San MD [facility]   07/10/24 Office Visit Estephania San MD [facility]   02/27/24 Office Visit Estephania San MD [facility]   11/29/23 Office Visit Estephania San MD [facility]   11/07/23 Office Visit Estephania San MD [facility]   Showing recent visits within past 360 days and meeting all other requirements  Future Appointments  Date Type Provider Dept   11/05/24 Appointment Estephania San MD [facility]   Showing future appointments within next 720 days and meeting all other requirements     Last 3 Blood Pressure:   BP Readings from Last 3 Encounters:   09/13/24 121/74   09/09/24 120/62   08/12/24 132/78     Preferred Pharmacy:   [facility] [address]  [facility] [address]  Phone: [phone] Fax: [phone]    [id number] [place] [address] AT [facility] [address]  [facility] [address] [facility] [address]  Phone: [phone] Fax: [phone]    Requested RX:  Requested Prescriptions     Pending Prescriptions Disp Refills    tirzepatide (MOUNJARO) 10 mg/0.5 mL PnIj 12 Pen 0     Sig: Inject 10 mg into the skin every 7 days.      RX Route: Normal

## 2024-09-17 NOTE — PROGRESS NOTES
Subjective:      Chief Complaint: Pain of the Right Elbow    Patient ID: Maria Elena Tavares is a 68 y.o. female.  67yo RHD female presents with 2 week history of right lateral elbow pain.  Symptoms are worse with picking up objects.  She has radicular symptoms that radiate to her pinky with the associated paresthesias.  She notes weakness in her right upper extremity.  She has history neuropathy and cubital tunnel syndrome.  She is currently taking gabapentin, tramadol, Percocet with minimal relief.  She is starting physical therapy on Friday for upper extremity strengthening.  She has an appointment with her hand specialist in 2 weeks.        Past Medical History:   Diagnosis Date    BMI 37.0-37.9, adult     Breast cancer 2019     Left breast, IDC with lymph node metastisis    Colon polyps     Colon polyps     Diabetes mellitus type II     Diverticulosis     history of diverticulosisseen on colonoscopy at age 48. Repeat recommended at age 58. Done by     Elevated blood protein     History of elevated protein. Apparently has seen  for extensive workup including bone marrow biopsy    History of shingles 2006    Hyperlipidemia     Hypertension     Microalbuminuria     due 2 diabetes    Mild vitamin D deficiency     . A low vitamin D    Primary osteoarthritis of left knee 2023    Thyroid disease     hypothyroidism    Usual hyperplasia of lactiferous duct     Not sure        Past Surgical History:   Procedure Laterality Date    ARTHROPLASTY, KNEE, TOTAL, SIGHT ASSISTED Left 2024    Procedure: ARTHROPLASTY, KNEE, SIGHT ASSISTED;  Surgeon: Jamshid Reece MD;  Location: [facility];  Service: Orthopedics;  Laterality: Left;  bmi - 34.57    BREAST BIOPSY Left 2019    IDC with mets to node    BREAST BIOPSY Right 2019    core bx, benign node    BREAST BIOPSY Left 10/14/2019    MRI Core bx, + IDC    BREAST BIOPSY Left 10/08/2019    Core bx, ADH     SECTION      COLONOSCOPY N/A  10/08/2020    Procedure: COLONOSCOPY;  Surgeon: Shreya Mendoza MD;  Location: [facility] ENDO (4TH FLR);  Service: Endoscopy;  Laterality: N/A;  COVID screening on 10/5/20 [facility] - ER    HYSTERECTOMY      INSERTION OF BREAST TISSUE EXPANDER Bilateral 03/24/2020    Procedure: INSERTION, TISSUE EXPANDER, BREAST BILATERAL;  Surgeon: Michael Solorzano MD;  Location: [facility] OR 2ND FLR;  Service: Plastics;  Laterality: Bilateral;    INSERTION OF TUNNELED CENTRAL VENOUS CATHETER (CVC) WITH SUBCUTANEOUS PORT Right 10/04/2019    Procedure: [id number] RIGHT (CONSENT AM OF) 1.0 hr case;  Surgeon: Elena Lopez MD;  Location: [facility] OR 2ND FLR;  Service: General;  Laterality: Right;    OOPHORECTOMY      SENTINEL LYMPH NODE BIOPSY Left 03/24/2020    Procedure: BIOPSY, LYMPH NODE, SENTINEL LEFT;  Surgeon: Elena Lopez MD;  Location: [facility] OR 2ND FLR;  Service: General;  Laterality: Left;    SIMPLE MASTECTOMY Bilateral 03/24/2020    Procedure: MASTECTOMY, SIMPLE BILATERAL;  Surgeon: Elena Lopez MD;  Location: [facility] OR 2ND FLR;  Service: General;  Laterality: Bilateral;    TISSUE EXPANDER REMOVAL Bilateral 07/30/2020    Procedure: REMOVAL, TISSUE EXPANDER;  Surgeon: Michael Solorzano MD;  Location: [facility] OR 2ND FLR;  Service: Plastics;  Laterality: Bilateral;        Current Outpatient Medications   Medication Instructions    amLODIPine (NORVASC) 5 mg, Oral, Daily    aspirin (ECOTRIN) 81 mg, Oral, 2 times daily    atorvastatin (LIPITOR) 10 mg, Oral, Daily    blood-glucose meter kit DISPENSE : BLOOD TEST STRIPS AND LANCETS PATIENT TEST BLOOD SUGARS TWICE DAILY<BR>TEST STRIPS: 50 EACH, REFILL 5<BR>LANCETS:  50 EACH , REFILL 5    BUPivacaine, PF, 0.25%, 2.5 mg/ml, (MARCAINE) 0.25 % (2.5 mg/mL) injection     ceFAZolin (ANCEF) 1 gram injection     ceFAZolin 2 g/50mL Dextrose IVPB (ANCEF) 2 gram/50 mL PgBk     ceFAZolin 2 gram SolR     cetirizine (ZYRTEC) 10 mg, Oral, Daily PRN    ciclopirox 0.77 % Gel Topical, 2  times daily, To thickened discolored toenails.    dexAMETHasone (DECADRON) 4 mg/mL injection     DIPRIVAN 10 mg/mL infusion     famotidine (PEPCID) 20 mg, Oral, 2 times daily    gabapentin (NEURONTIN) 800 mg, Oral, 3 times daily    hydrocortisone 1 % cream Topical (Top), 2 times daily PRN    HYDROmorphone (DILAUDID) 2 mg/mL injection     irbesartan (AVAPRO) 300 MG tablet Take 1 tablet by mouth once daily    letrozole (FEMARA) 2.5 mg, Oral, Daily    levothyroxine (SYNTHROID) 200 mcg, Oral, Daily    LIDOcaine (LIDODERM) 5 % 1 patch, Transdermal, As needed (PRN), Remove & Discard patch within 12 hours or as directed by MD schultzitegrast (XIIDRA) 5 % Dpet Apply 1 drop to  both eyes  2 (two) times daily.    methylene blue 1 % (10 mg/mL) injection     midazolam (VERSED) 1 mg/mL injection     MOUNJARO 10 mg, Subcutaneous, Every 7 days    ondansetron 4 mg/2 mL Soln     oxyCODONE-acetaminophen (PERCOCET) 5-325 mg per tablet 1 tablet, Oral, Every 6 hours PRN    PHENYLephrine 10 mg/mL injection     POLOCAINE-MPF 15 mg/mL (1.5 %) injection     polyethylene glycol (GLYCOLAX) 17 gram/dose powder Mix 17 g (1 capful) with juice or water an drink 2 (two) times daily.    prasterone, dhea, (INTRAROSA) 6.5 mg Inst 1 suppository, Vaginal, Nightly    traMADoL (ULTRAM) 50 mg, Oral, Every 8 hours PRN    VITAMIN D2 50,000 Units, Oral, Every 14 days        Review of patient's allergies indicates:   Allergen Reactions    Amoxil [amoxicillin] Rash       Review of Systems   Constitutional: Negative for fever and malaise/fatigue.   Eyes:  Negative for blurred vision.   Cardiovascular:  Negative for chest pain.   Respiratory:  Negative for shortness of breath.    Skin:  Negative for poor wound healing.   Musculoskeletal:  Positive for joint pain, muscle weakness and myalgias.   Genitourinary:  Negative for bladder incontinence.   Neurological:  Negative for dizziness, numbness and paresthesias.   Psychiatric/Behavioral:  Negative for altered  mental status.        The patient's relevant past medical, surgical, and social history was reviewed in Epic.       Objective:      VITAL SIGNS: Ht 5' (1.524 m)   Wt 77.1 kg (169 lb 15.6 oz)   LMP  (LMP Unknown)   BMI 33.20 kg/m²     General    Nursing note and vitals reviewed.  Constitutional: She is oriented to person, place, and time. She appears well-developed and well-nourished.   Neurological: She is alert and oriented to person, place, and time.             Right Hand/Wrist Exam     Range of Motion     Wrist   Extension:  normal   Flexion:  normal       Right Elbow Exam     Pain   The patient exhibits pain of the lateral epicondyle    Range of Motion   Extension:  0 normal   Flexion:  120   Pronation:  normal   Supination:  normal     Tests   Tinel's sign (cubital tunnel): positive  Tennis Elbow: severe    Other   Sensation: normal          Muscle Strength   Right Upper Extremity   Wrist extension: 4/5   Wrist flexion: 4/5   : 4/5   Elbow Pronation:  4/5   Elbow Supination:  4/5   Elbow Extension: 4/5  Elbow Flexion: 4/5    Vascular Exam     Right Pulses      Radial:                    2+         Imaging  X-Ray Elbow Complete Right  Narrative: EXAMINATION:  XR ELBOW COMPLETE 3 VIEW RIGHT    CLINICAL HISTORY:  . Pain, unspecified    TECHNIQUE:  AP, lateral, and oblique views of the right elbow were performed.    COMPARISON:  None    FINDINGS:  No fracture or dislocation.  No bone destruction identified.  Impression: See above    Electronically signed by: Ankit Banks MD  Date:    09/13/2024  Time:    09:48    I have reviewed the above radiograph and agree with the findings stated by the radiologist.           Assessment:       Maria Elena Tavares is a 68 y.o. female seen in the office today for   1. Right lateral epicondylitis    2. Cubital tunnel syndrome on right     right lateral epicondylitis. Explained the etiology of the condition to the patient. Usually due to overuse. I recommend conservative  treatment at this time which includes stopping aggravating movements, rest, ice, NSAIDs, tennis elbow brace use, physical therapy and/or injections.   Due to the severity of her pain today, we will try right lateral epicondyle injection.  I would like her to follow-up with her hand specialist for her cubital tunnel and other hand complaints.        Plan:       Maria Elena was seen today for pain.    Diagnoses and all orders for this visit:    Right lateral epicondylitis  -     Intermediate Joint Aspiration/Injection: R elbow    Cubital tunnel syndrome on right      Right lateral epicondyle kenalog injections  Start PT on Friday for upper extremities  Follow up with hand specialists for other complaints     Diagnoses and plan discussed with the patient, as well as the expected course and duration of his symptoms.  All questions and concerns were addressed prior to the end of the visit.   Instructed patient to call office if they have any future questions/concerns or to schedule apt. Patient will return to see me if symptoms worsen or fail to improve    Note dictated with voice recognition software, please excuse any grammatical errors.        Kerline Keys PA-C      Department of Orthopedic Surgery  [facility]  Office: [phone]  09/17/2024

## 2024-09-17 NOTE — TELEPHONE ENCOUNTER
No care due was identified.  [facility] Embedded Care Due Messages. Reference number: [id number].   9/16/2024 7:16:20 PM CDT

## 2024-09-17 NOTE — PROCEDURES
R lateral epicondyleIntermediate Joint Aspiration/Injection: R elbow    Date/Time: 9/17/2024 3:30 PM    Performed by: Kerline Keys PA-C  Authorized by: Kerline Keys PA-C    Consent Done?:  Yes (Verbal)  Indications:  Pain  Timeout: Prior to procedure the correct patient, procedure, and site was verified      Location:  Elbow  Site:  R elbow  Ultrasonic Guidance for needle placement: No  Needle size:  25 G  Approach:  Anterolateral  Medications:  40 mg triamcinolone acetonide 40 mg/mL  Patient tolerance:  Patient tolerated the procedure well with no immediate complications

## 2024-09-18 ENCOUNTER — PATIENT MESSAGE (OUTPATIENT)
Dept: ORTHOPEDICS | Facility: CLINIC | Age: 69
End: 2024-09-18
Payer: MEDICARE

## 2024-09-18 ENCOUNTER — LAB VISIT (OUTPATIENT)
Dept: LAB | Facility: HOSPITAL | Age: 69
End: 2024-09-18
Attending: INTERNAL MEDICINE
Payer: MEDICARE

## 2024-09-18 DIAGNOSIS — E03.8 OTHER SPECIFIED HYPOTHYROIDISM: ICD-10-CM

## 2024-09-18 LAB
T4 FREE SERPL-MCNC: 1.26 NG/DL (ref 0.71–1.51)
TSH SERPL DL<=0.005 MIU/L-ACNC: 0.07 UIU/ML (ref 0.4–4)

## 2024-09-18 PROCEDURE — 84439 ASSAY OF FREE THYROXINE: CPT | Mod: HCNC | Performed by: INTERNAL MEDICINE

## 2024-09-18 PROCEDURE — 36415 COLL VENOUS BLD VENIPUNCTURE: CPT | Mod: HCNC,PN | Performed by: INTERNAL MEDICINE

## 2024-09-18 PROCEDURE — 84443 ASSAY THYROID STIM HORMONE: CPT | Mod: HCNC | Performed by: INTERNAL MEDICINE

## 2024-09-20 ENCOUNTER — PATIENT MESSAGE (OUTPATIENT)
Dept: REHABILITATION | Facility: HOSPITAL | Age: 69
End: 2024-09-20
Payer: MEDICARE

## 2024-09-20 DIAGNOSIS — E03.9 HYPOTHYROIDISM, UNSPECIFIED TYPE: Primary | ICD-10-CM

## 2024-09-20 RX ORDER — LEVOTHYROXINE SODIUM 200 UG/1
200 TABLET ORAL DAILY
Qty: 90 TABLET | Refills: 1 | Status: SHIPPED | OUTPATIENT
Start: 2024-09-20 | End: 2025-03-19

## 2024-09-24 ENCOUNTER — HOSPITAL ENCOUNTER (OUTPATIENT)
Dept: RADIOLOGY | Facility: HOSPITAL | Age: 69
Discharge: HOME | End: 2024-09-24
Attending: INTERNAL MEDICINE
Payer: MEDICARE

## 2024-09-24 DIAGNOSIS — E03.8 OTHER SPECIFIED HYPOTHYROIDISM: ICD-10-CM

## 2024-09-24 PROCEDURE — 76536 US EXAM OF HEAD AND NECK: CPT | Mod: 26,HCNC,, | Performed by: RADIOLOGY

## 2024-09-24 PROCEDURE — 76536 US EXAM OF HEAD AND NECK: CPT | Mod: TC,HCNC

## 2024-09-25 ENCOUNTER — PATIENT MESSAGE (OUTPATIENT)
Dept: ORTHOPEDICS | Facility: CLINIC | Age: 69
End: 2024-09-25
Payer: MEDICARE

## 2024-09-25 DIAGNOSIS — Z00.00 ENCOUNTER FOR MEDICARE ANNUAL WELLNESS EXAM: ICD-10-CM

## 2024-09-27 ENCOUNTER — OFFICE VISIT (OUTPATIENT)
Dept: ORTHOPEDICS | Facility: CLINIC | Age: 69
End: 2024-09-27
Payer: MEDICARE

## 2024-09-27 VITALS — HEIGHT: 60 IN | BODY MASS INDEX: 33.38 KG/M2 | WEIGHT: 170 LBS

## 2024-09-27 DIAGNOSIS — M77.11 RIGHT LATERAL EPICONDYLITIS: Primary | ICD-10-CM

## 2024-09-27 PROCEDURE — 99999 PR PBB SHADOW E&M-EST. PATIENT-LVL IV: CPT | Mod: PBBFAC,HCNC,, | Performed by: PLASTIC SURGERY

## 2024-09-27 RX ORDER — DICLOFENAC SODIUM 75 MG/1
75 TABLET, DELAYED RELEASE ORAL 2 TIMES DAILY
Qty: 28 TABLET | Refills: 0 | Status: SHIPPED | OUTPATIENT
Start: 2024-09-27 | End: 2024-10-11

## 2024-09-27 NOTE — PROGRESS NOTES
Subjective     Patient ID: Maria Elena Tavares is a 68 y.o. female.    Chief Complaint: Pain of the Right Hand    Maria Elena Tavares presents today with complaints continued right elbow pain.  The patient states that over last few weeks she has experienced severe pain in the right elbow and forearm.  She was seen in urgent Care, then the emergency room, and by orthopedics.  She was diagnosed with right epicondylitis and on the 17th of September she received a steroid injection for right epicondylitis.  She states that her pain began to improve proximally 5 days after the injection however she continues to have pain over the dorsal aspect of her forearm.  She also complains of pain that radiates down to the small and ring digits.  She denies any weakness of the right hand.        Review of Systems   All other systems reviewed and are negative.         Objective     General    Vitals reviewed.  Constitutional: She is oriented to person, place, and time. She appears well-nourished. No distress.   Pulmonary/Chest: Effort normal.   Neurological: She is alert and oriented to person, place, and time.   Psychiatric: She has a normal mood and affect.             Right Hand/Wrist Exam     Inspection   Effusion: Wrist - absent   Deformity: Wrist - deformity     Pain   Wrist - The patient exhibits pain of the extensory musculature.    Tenderness   The patient is tender to palpation of the dorsal area.    Range of Motion   The patient has normal right hand/wrist ROM.    Other     Neuorologic Exam    Median Distribution: normal  Ulnar Distribution: normal  Radial Distribution: normal      Right Elbow Exam     Inspection   Effusion: absent  Deformity: absent    Pain   The patient exhibits pain of the extensor musculature and lateral epicondyle    Tenderness   The patient is tender to palpation of the lateral epicondyle.     Range of Motion   The patient has normal right elbow ROM.    Tests   Tinel's sign (cubital tunnel): negative    Other    Sensation: normal          Vascular Exam       Capillary Refill  Right Hand: normal capillary refill        Edema  Right Forearm: absent      Physical Exam  Vitals reviewed.   Constitutional:       General: She is not in acute distress.     Appearance: She is well-nourished.   Pulmonary:      Effort: Pulmonary effort is normal.   Musculoskeletal:      Right elbow: No deformity or effusion.      Right forearm: No edema.      Right wrist: No deformity or effusion.      Right hand: Normal sensation of the ulnar distribution, median distribution and radial distribution. Normal capillary refill.   Neurological:      Mental Status: She is alert and oriented to person, place, and time.   Psychiatric:         Mood and Affect: Mood and affect normal.             Assessment and Plan     Encounter Diagnosis   Name Primary?    Right lateral epicondylitis Yes         Maria Elena was seen today for pain.    Diagnoses and all orders for this visit:    Right lateral epicondylitis  -     Ambulatory referral/consult to Physical/Occupational Therapy; Future    Other orders  -     diclofenac (VOLTAREN) 75 MG EC tablet; Take 1 tablet (75 mg total) by mouth 2 (two) times daily for 14 days    She presents today with complaints right forearm pain consistent with overuse and lateral epicondylitis.  I discussed the pathophysiology progression treatment of this condition in detail and the patient was advised to begin occupational therapy as well as perform stretching exercises at home which were demonstrated to the patient today.  I discussed that due to her recent steroid injection she should see improvement in her symptoms and additional injections are not needed at this time.  She was given a script for 14 days of diclofenac 75 mg twice a day.  I would like to see if this would help her with her current inflammation and pain.  The patient was advised to follow up if her symptoms fail to improve worsen.  All questions concerns were  addressed

## 2024-10-07 ENCOUNTER — DOCUMENTATION ONLY (OUTPATIENT)
Dept: REHABILITATION | Facility: HOSPITAL | Age: 69
End: 2024-10-07
Payer: MEDICARE

## 2024-10-07 NOTE — PROGRESS NOTES
Physical Therapy     Pt was evaluated 8/21/24 per referral with Dr. Villagran.    Pt has cancelled follow up visits 9/6/24 - 10/11/24.    Pt chart messages state UE pain with orthopedics following.     Next scheduled PT visit 10/14/24.

## 2024-10-09 ENCOUNTER — CLINICAL SUPPORT (OUTPATIENT)
Dept: REHABILITATION | Facility: HOSPITAL | Age: 69
End: 2024-10-09
Attending: PLASTIC SURGERY
Payer: MEDICARE

## 2024-10-09 DIAGNOSIS — M77.11 RIGHT LATERAL EPICONDYLITIS: ICD-10-CM

## 2024-10-09 PROCEDURE — 97165 OT EVAL LOW COMPLEX 30 MIN: CPT | Mod: HCNC

## 2024-10-14 ENCOUNTER — CLINICAL SUPPORT (OUTPATIENT)
Dept: REHABILITATION | Facility: HOSPITAL | Age: 69
End: 2024-10-14
Payer: MEDICARE

## 2024-10-14 DIAGNOSIS — Z90.13 H/O BILATERAL MASTECTOMY: ICD-10-CM

## 2024-10-14 DIAGNOSIS — I97.2 POST-MASTECTOMY LYMPHEDEMA SYNDROME: Primary | Chronic | ICD-10-CM

## 2024-10-14 DIAGNOSIS — M79.602 PAIN IN BOTH UPPER EXTREMITIES: ICD-10-CM

## 2024-10-14 DIAGNOSIS — R29.898 WEAKNESS OF BOTH UPPER EXTREMITIES: ICD-10-CM

## 2024-10-14 DIAGNOSIS — M79.601 PAIN IN BOTH UPPER EXTREMITIES: ICD-10-CM

## 2024-10-14 DIAGNOSIS — Z91.89 AT RISK FOR LYMPHEDEMA: ICD-10-CM

## 2024-10-14 PROCEDURE — 97140 MANUAL THERAPY 1/> REGIONS: CPT

## 2024-10-14 PROCEDURE — 97535 SELF CARE MNGMENT TRAINING: CPT

## 2024-10-14 NOTE — PROGRESS NOTES
Physical Therapy Daily Treatment Note     Name: Maria Elena Tavares  [facility] Number: [id number]    Therapy Diagnosis:   Encounter Diagnoses   Name Primary?    Post-mastectomy lymphedema syndrome Yes    Pain in both upper extremities     At risk for lymphedema     H/O bilateral mastectomy     Weakness of both upper extremities      Physician: Tere Villagran MD    Visit Date: 10/14/2024    Physician: Tere Villagran MD     Physician Orders: PT Eval and Treat   Medical Diagnosis from Referral:   Diagnosis   C50.912,C77.3 (ICD-10-CM) - Breast cancer metastasized to axillary lymph node, left   Z90.13 (ICD-10-CM) - H/O bilateral mastectomy   Evaluation Date: 8/21/2024  Authorization Period Expiration: 11/15/2024  Plan of Care Expiration: 11/14/24  Visit # / Visits authorized: 2/20     Time In: 1000a  Time Out: 1100a  Total Billable Time: 60minutes     Precautions: Standard, cancer, and lymphedema  Subjective     Pt reports: she cancelled her prior visits due to R UE nerve pain - saw urgent care, ED, ortho and OT - has recommended therapy for tendonitis vs nerve.  She missed all Lymphedema visits since evaluation 8/21/24 L UE due to this.  Pt has OT evaluation at [facility] location - considering OT with [place] for this R UE and if continuing PT for L UE lymphedema.    She was somewhat compliant with home exercise/compression support program.  Response to previous treatment: pt has been dealing with arm pain on R which limited her management for L UE. She has performed some stretches and exercises.  Pt relates past breast CA, mastectomy, infected implants that required removal.  Pt feels her L arm is bigger than R and causes change in clothing choices.    Functional change: limited presently by R arm, elbow and hand  pains.  L arm some tightness in under  arm.   Pt hopes to have PT and OT at same [place] location.     Pain: 2/10  Location: left chest to axilla, under arm  R UE to be followed per OT, orthopedic referral.        Objective      Female amb to dept, no device, no compression  Amount of Swelling/Location of Swelling: mod to L UE forearm, elbow and upper arm with fullness to tissues B UE post upper arm, body habitus   Surgical site: ant chest B Mastectomy, R chest prior port site, R SLNB, L SLNB then AND  Skin Integrity: radiation skin changes to L ant chest wall to axilla, dryness, color changes   Palpation/Texture: fullness along L forearm ridge,soft tissue bulk fullness post arm   R hand with basic arthritis glove  Circulation: intact   Posture:  FH, rounded shoulders, protraction, sh with slight IR   Hand Dominant: R    Treatment:   Maria Elena received the following manual therapy techniques:- Manual Lymphatic Drainage were applied to the: L UE/quadrant  for 45 minutes, including: MLD and short stretch compression bandaging   R UE- hand with glove     MANUAL LYMPHATIC DRAINAGE:  Drainage of L upper quadrant and L UE   While supine with arm elevated,  Drainage along neck, B subclavian regions,  Across ant chest and top of shoulder,  Axillary regions,  drainage of entire UE especially upper arm, forearm, wrist and hand with return of all areas proximally,  In sidelying stimulation of substitution pathways,  drainage post arm, and across back and down along trunk  Gentle soft tissue mobilizations to ant, lateral chest wall to axilla with AAROM    MULTILAYERED BANDAGING:  Issued supplies and bandaged L UE with cotton stockinet, Arteflex padding, 6cm, 8cm, and 10 cm rosidal rolls - cotton padding at elbow crease, bandaged from hand to prox arm,  To leave intact 12 -24 hours, dc with any problems,  Return rolled bandages next session. Confirmed wash and wear schedules.     Issued tubigrip E segment to L UE to use as temporary compression daytime when not in therapy bandaging.     Maria Elena received therapeutic exercises to develop strength, endurance, ROM, flexibility, and posture for 5 minutes including: review of breathing,  posture, arom, aarom, rotation of chest and ribs, UE reach for scapular mobility.   THERAPEUTIC EXERCISES:  Continue HEP of AROM, stretching, and postural correction.     Home Exercises Provided and Patient Education Provided:  Self Care Home Management Training/Functional Therapeutic Activity x 15 minutes  Discussed chronic lymphedema management of L UE along with acute needs for tendonitis vs nerve of R UE - OT per orthopedics.  Staff messaging and schedule discussion.   Communication with OT supervisor [place]- pt discussion,  messaged OT at [facility] evaluation 10/9/24.  Pt wishes to have both OT for R UE and PT for L UE at the [place] location.  Reviewed scheduling and consideration regarding pain, acute needs, location and attendance.  10/21/24 for R UE with OT, L UE with PT for lymphedema - will determine follow up based on  needs.   Present compression:  none  Orders and recommendations: L UE compression arm sleeve 20-30mmHg  PATIENT/FAMILY Education: bandaging/compression wear schedule,  HEP,  Beginning of self massage,  Self or assisted bandaging, compression options, and Risk reduction    Written Home Exercises Provided: Patient instructed to cont prior HEP.  Exercises were reviewed and Maria Elena was able to demonstrate them prior to the end of the session.  Maria Elena demonstrated good  understanding of the education provided.     Assessment   Maria Elena is a 68 y.o. female referred to outpatient Physical Therapy with a medical diagnosis of   Diagnosis   C50.912,C77.3 (ICD-10-CM) - Breast cancer metastasized to axillary lymph node, left   Z90.13 (ICD-10-CM) - H/O bilateral mastectomy   This patient presents s/p L breast CA, B mastectomy,R SLNB,  L SLNB to AND with radiation, chemotherapy, no reconstruction, noted fatigue and upper back and shoulder discomfort  resulting in: L UE/quadrant mod lymphedema of the L UE with pain in B axilla, chest wall, soft tissue restrictions, tightness, faulty posture and bio  mechanics, increased pain, increased stiffness in the ant chest wall, axilla, shoulder B UE, as well as difficulty performing reach, lift, carry, and placing the pt at higher risk of infection.     Pt is experiencing acute issue with pain to her R UE tendonitis vs nerve pain which has limited her attendance and attention to her L UE lymphedema management.  Pt returns to PT following evaluation 8/21/24.  Pt may have to address acute needs of her R UE/pain to allow success in managing her lymphedema risk and more long term management needs of L UE lymphedema secondary to breast CA.  Initiated Complete Decongestive Therapy, CDT, to L UE and upper quadrant.  Will have both OT for R UE and PT for L UE 10/21/24 and will determine follow up plan to best meet her present needs.     Maria Elena Is not progressing well towards her goals. - just returned to therapy   Pt prognosis is Good.     Pt will continue to benefit from skilled outpatient physical therapy to address the deficits listed in the problem list box on initial evaluation, provide pt/family education and to maximize pt's level of independence in the home and community environment.     Anticipated Barriers for therapy: B complaints, insurance and cost     The following goals were discussed with the patient and patient is in agreement with them as to be addressed in the treatment plan.   Short Term Goals: (6 weeks)  1. Decrease girth in L upper arm, forearm, wrist and hand by up to 1cm to allow for improved UE symmetry, clothing choice, ROM, and UE function. (progressing, not met)  2. Patient will demonstrate 100% knowledge of lymphedema precautions and signs of infection to allow for reduced risk of lymphedema, reduced risk of infection, and/or exacerbation.  (progressing, not met)  3. Patient will perform self-bandaging techniques to enhance lymphatic drainage, tissue density, and self management of condition.  (progressing, not met)  4. Patient will perform self  lymph drainage techniques to enhance lymphatic drainage and mobility.  (progressing, not met)  5. Patient will tolerate daily activities with multilayered bandaging to enhance lymphatic drainage and skin elasticity.  (progressing, not met)  6. Pt to show improved AROM to allow sh flex by 10 B UE to allow reach to second cabinet shelf with min to no reported onset of pain (progressing, not met)     Long Term Goals: (12 weeks)  1. Patient to show decreased girth in L UE by up to 2 cm to allow for improved UE symmetry, clothing choice, ROM, and UE function.  (progressing, not met)  2. Patient will show reduction in density to mild or less with improved contour of limb to allow for improved cosmesis, improved lymphatic drainage, and functional mobility.  (progressing, not met)  3. Patient to naseem/doff compression garment with daily compliance to support lymphatic mobility and skin elasticity.  (progressing, not met)  4. Pt to show improved postural awareness and alignment.  (progressing, not met)  5. Pt to be I and compliant with HEP to allow for improved ROM, reach and carry with functional endurance and strength.    (progressing, not met)     Plan   Plan of care Certification: 8/21/2024 to 11/14/2024.     Outpatient Physical Therapy 2 times weekly for 10 weeks to include the following interventions: Patient Education, Self Care, Therapeutic Activities, and Therapeutic Exercise. Complete Decongestive Therapy- compression and home equipment needs to be addressed and assisted.  Evaluation 8/21/24, cancelled follow up 9/6-10/11/24 due to R arm pain,  OT evaluation 10/9/24 per orthopedics, will have OT and PT 10/21/24     Montse Nuñez, PT

## 2024-10-14 NOTE — PLAN OF CARE
"  [facility]  Occupational Therapy Initial Evaluation     Name: Maria Elena Tavares  Clinic Number: [id number]    Therapy Diagnosis:   Encounter Diagnosis   Name Primary?    Right lateral epicondylitis      Physician: Jose Shaver Jr*    Physician Orders: Eval and Treat  Medical Diagnosis: M77.11 (ICD-10-CM) - Right lateral epicondylitis   Evaluation Date: 10/9/2024  Insurance Authorization Period Expiration: 9/27/2024 - 12/31/2024   Plan of Care Certification Period: 12 weeks; 12/31/2024  Date of Return to MD: TBD  Visit # / Visits authorized: 1 / 1  FOTO: 0/ 3    Precautions:  Standard    Time In: 10:00  Time Out: 10:45  Total Billable Time: 45 minutes    Subjective     History of Current Condition/Mechanism of Injury: Maria Elena reports: She has noted pain and tightness throughout the back of her forearm with radiating and numbness at ulnar digits. She reports she is used to some numbness in the past secondary to neuropathy from cancer treatments, however she notes it is now more discomforting in both the ring and small finger rather than numbness.     Per Dr. Johsnton' last visit note "Maria Elena Tavares presents today with complaints continued right elbow pain. The patient states that over last few weeks she has experienced severe pain in the right elbow and forearm. She was seen in urgent Care, then the emergency room, and by orthopedics. She was diagnosed with right epicondylitis and on the 17th of September she received a steroid injection for right epicondylitis. She states that her pain began to improve proximally 5 days after the injection however she continues to have pain over the dorsal aspect of her forearm. She also complains of pain that radiates down to the small and ring digits. She denies any weakness of the right hand."    Involved Side: Right  Dominant Side: Right      Surgical Procedure: n/a  Imaging:  EMG; 11/21/2023; normal results         Pain:  Functional Pain Scale " "Rating 0-10:   6/10 on average  4/10 at best  8/10 at worst  Location: R forearm and ulnar digits  Description: Aching, Throbbing, Tight, and Tingling  Aggravating Factors: unable to describe  Easing Factors: nothing    Occupation:  retired      Functional Limitations/Social History:    Previous functional status includes: Independent with all ADLs. I    Current Functional Status   Home/Living environment: lives with their family      Limitation of Functional Status as follows:   ADLs/IADLs:     - Feeding: I    - Bathing: I    - Dressing: I  - Grooming: I    - Home Management: I    - Driving: I     Leisure: cooking    Patient's Goals for Therapy: "Just to stop hurting so much"    Past Medical History/Physical Systems Review:   Maria Elena Tavares  has a past medical history of BMI 37.0-37.9, adult, Breast cancer, Colon polyps, Colon polyps, Diabetes mellitus type II, Diverticulosis, Elevated blood protein, History of shingles, Hyperlipidemia, Hypertension, Microalbuminuria, Mild vitamin D deficiency, Primary osteoarthritis of left knee, Thyroid disease, and Usual hyperplasia of lactiferous duct.    Maria Elena Tavares  has a past surgical history that includes Hysterectomy; Oophorectomy; Insertion of tunneled central venous catheter (CVC) with subcutaneous port (Right, 10/04/2019); Breast biopsy (Left, 2019); Breast biopsy (Right, 2019); Breast biopsy (Left, 10/14/2019); Breast biopsy (Left, 10/08/2019); Simple mastectomy (Bilateral, 2020); [place] lymph node biopsy (Left, 2020); Insertion of breast tissue expander (Bilateral, 2020);  section; Tissue expander removal (Bilateral, 2020); Colonoscopy (N/A, 10/08/2020); and arthroplasty, knee, total, sight assisted (Left, 2024).    Maria Elena has a current medication list which includes the following prescription(s): amlodipine, aspirin, atorvastatin, blood-glucose meter, bupivacaine (pf) 0.25% (2.5 mg/ml), cefazolin, cefazolin 2 g/50ml " dextrose ivpb, cefazolin, cetirizine, ciclopirox, dexamethasone, diclofenac, diprivan, ergocalciferol, famotidine, gabapentin, hydrocortisone, hydromorphone, irbesartan, letrozole, levothyroxine, lidocaine, xiidra, methylene blue, midazolam, ondansetron, oxycodone-acetaminophen, phenylephrine, polocaine-mpf, polyethylene glycol, intrarosa, and mounjaro.    Review of patient's allergies indicates:   Allergen Reactions    Amoxil [amoxicillin] Rash        Objective     Observation/Appearance:  Pt reports dorsum of R hand and forearm are painful with tingling and discomfort         Elbow and Wrist ROM. Measured in degrees.   10/9/2024 10/9/2024    Left Right   Supination/Pronation WFL WFL   Wrist Ext/Flex 55/60 50/65   Wrist RD/UD 20/20 20/20       Sensation  Ulnar Nerve  Distribution 10/9/2024 10/9/2024    Right  Left    [place]     Normal 1.65-2.83 X X   Diminished Light Touch 3.22-3.61     Diminished Protective 3.84-4.31     Loss of Protective 4.56-6.65     Untestable >6.65       Special Tests:   Right  Positive tinel's at medial elbow   Positive for increased pain at elbow when in a fully flexed position for longer than 30 secs          Strength (Dyanmometer) and Pinch Strength (Pinch Gauge)  Measured in pounds and psi. Average of three trials.   10/9/2024 10/9/2024    Right  Left    Rung II 25,18, 15 35,30,32   Key Pinch 8 8   3pt Pinch 5 5   2pt Pinch           Intake Outcome Measure for FOTO R hand Survey    Therapist reviewed FOTO scores for Maria Elena Tavares on 10/9/2024.   FOTO report - see Media section or FOTO account episode details.    Intake Score: Next visit%         Treatment     Total Treatment time separate from Evaluation time:    Maria Elena received the treatments listed below:    Initiated HEP  Patient Education and Home Exercises      Education provided:   -role of OT, goals for OT, scheduling/cancellations, insurance limitations with patient.  -Additional Education provided: HEP and current  condition    Written Home Exercises Provided: Yes.  Exercises were reviewed and Maria Elena was able to demonstrate them prior to the end of the session.    Maria Elena demonstrated good  understanding of the education provided.     Pt was advised to perform these exercises free of pain, and to stop performing them if pain occurs.    See EMR under Patient Instructions for exercises provided 10/9/2024.    Assessment     Maria Elena Tavares is a 68 y.o. female referred to outpatient occupational therapy and presents with a medical diagnosis of right lateral epicondylitis.  Pt was initially referred due to the symptoms of pain at lateral elbow, however has complaints which are consistent with cubital tunnel syndrome with coinciding provocative testing.      Following medical record review it is determined that pt will benefit from occupational therapy services in order to maximize pain free and/or functional use of right arm and hand. The following goals were discussed with the patient and patient is in agreement with them as to be addressed in the treatment plan. The patient's rehab potential is Good.     Anticipated barriers to occupational therapy: history of neuropathy d/t cancer treatment     Plan of care discussed with patient: Yes  Patient's spiritual, cultural and educational needs considered and patient is agreeable to the plan of care and goals as stated below:     Medical Necessity is demonstrated by the following  Occupational Profile/History  Co-morbidities and personal factors that may impact the plan of care [x] LOW: Brief chart review  [] MODERATE: Expanded chart review   [] HIGH: Extensive chart review    Moderate / High Support Documentation: -     Examination  Performance deficits relating to physical, cognitive or psychosocial skills that result in activity limitations and/or participation restrictions  [x] LOW: addressing 1-3 Performance deficits  [] MODERATE: 3-5 Performance deficits  [] HIGH: 5+ Performance  deficits (please support below)    Moderate / High Support Documentation:    Physical:  Joint Mobility  Muscle Power/Strength  Muscle Endurance  Edema   Strength  Pinch Strength  Gross Motor Coordination  Fine Motor Coordination  Pain    Cognitive:  No Deficits    Psychosocial:    No Deficits     Treatment Options [x] LOW: Limited options  [] MODERATE: Several options  [] HIGH: Multiple options      Decision Making/ Complexity Score: low       The following goals were discussed with the patient and patient is in agreement with them as to be addressed in the treatment plan.     Goals:   Short Term Goals: (in 4 weeks)  1) Patient will be independent in HEP  2) Decrease pain in R arm to no more than 4/10 worst in ADL/IADL's  3) Increase R  strength by at least 5-8 psi for increased functional use    Long Term Goals: (in by discharge)  1) Decrease pain in R arm to no more than 2/10 worst in ADL/IADL's  2) Increase AROM of R wrist to WFL for increased functioning in ADL/IADL's  3) Increase strength in R  to at least 32 for improved functioning in ADL/IADL's  4) Increased functioning in ADL/IADL's, as evidenced by a FOTO impairment rating of 50  5) Decreased reports of pain and aching with positional changes or maintained elbow flexion      Plan   Certification Period/Plan of care expiration: 10/9/2024 to 12/31/2024.    Outpatient Occupational Therapy 1-2 times weekly for 12 weeks to include the following interventions: Paraffin, Fluidotherapy, Manual therapy/joint mobilizations, Modalities for pain management, US 3 mhz, Therapeutic exercises/activities., Iontophoresis with 2.0 cc Dexamethasone, Strengthening, Orthotic Fabrication/Fit/Training, Edema Control, Scar Management, Wound Care, Electrical Modalities, Joint Protection, and Energy Conservation.    Criss Ashby OT        Physician's Signature: _________________________________________ Date: ________________

## 2024-10-15 ENCOUNTER — PATIENT MESSAGE (OUTPATIENT)
Dept: PRIMARY CARE CLINIC | Facility: CLINIC | Age: 69
End: 2024-10-15
Payer: MEDICARE

## 2024-10-20 DIAGNOSIS — M79.601 RIGHT ARM PAIN: ICD-10-CM

## 2024-10-20 NOTE — TELEPHONE ENCOUNTER
Care Due:                  Date            Visit Type   Department     Provider  --------------------------------------------------------------------------------                                ESTABLISHED                              PATIENT -    [facility]  Last Visit: 09-      [facility]           Estephania San                              ESTABLISHED                              PATIENT -    [facility]  Next Visit: 11-      [facility]           Estephania San                                                            Last  Test          Frequency    Reason                     Performed    Due Date  --------------------------------------------------------------------------------    HBA1C.......  6 months...  tirzepatide..............  07- 01-    [facility] Embedded Care Due Messages. Reference number: [id number].   10/20/2024 10:11:43 AM CDT

## 2024-10-21 ENCOUNTER — CLINICAL SUPPORT (OUTPATIENT)
Dept: REHABILITATION | Facility: HOSPITAL | Age: 69
End: 2024-10-21
Attending: PLASTIC SURGERY
Payer: MEDICARE

## 2024-10-21 DIAGNOSIS — R20.0 FINGER NUMBNESS: ICD-10-CM

## 2024-10-21 DIAGNOSIS — I97.2 POST-MASTECTOMY LYMPHEDEMA SYNDROME: Primary | Chronic | ICD-10-CM

## 2024-10-21 DIAGNOSIS — R29.898 WEAKNESS OF BOTH UPPER EXTREMITIES: ICD-10-CM

## 2024-10-21 DIAGNOSIS — Z90.13 H/O BILATERAL MASTECTOMY: ICD-10-CM

## 2024-10-21 DIAGNOSIS — Z91.89 AT RISK FOR LYMPHEDEMA: ICD-10-CM

## 2024-10-21 DIAGNOSIS — M25.521 RIGHT ELBOW PAIN: Primary | ICD-10-CM

## 2024-10-21 DIAGNOSIS — M79.602 PAIN IN BOTH UPPER EXTREMITIES: ICD-10-CM

## 2024-10-21 DIAGNOSIS — M79.601 PAIN IN BOTH UPPER EXTREMITIES: ICD-10-CM

## 2024-10-21 PROCEDURE — 97110 THERAPEUTIC EXERCISES: CPT

## 2024-10-21 PROCEDURE — 97112 NEUROMUSCULAR REEDUCATION: CPT

## 2024-10-21 PROCEDURE — 97140 MANUAL THERAPY 1/> REGIONS: CPT

## 2024-10-21 RX ORDER — GABAPENTIN 800 MG/1
800 TABLET ORAL 3 TIMES DAILY
Qty: 90 TABLET | Refills: 0 | Status: SHIPPED | OUTPATIENT
Start: 2024-10-21 | End: 2025-10-21

## 2024-10-21 NOTE — PROGRESS NOTES
Physical Therapy Daily Treatment Note     Name: Maria Elena Tavares  [facility] Number: [id number]    Therapy Diagnosis:   Encounter Diagnoses   Name Primary?    Post-mastectomy lymphedema syndrome Yes    Pain in both upper extremities     At risk for lymphedema     H/O bilateral mastectomy     Weakness of both upper extremities      Physician: Tere Villagran MD    Visit Date: 10/21/2024    Physician: Tere Villagran MD     Physician Orders: PT Eval and Treat   Medical Diagnosis from Referral:   Diagnosis   C50.912,C77.3 (ICD-10-CM) - Breast cancer metastasized to axillary lymph node, left   Z90.13 (ICD-10-CM) - H/O bilateral mastectomy   Evaluation Date: 8/21/2024  Authorization Period Expiration: 11/15/2024  Plan of Care Expiration: 11/14/24  Visit # / Visits authorized: 3/20     Time In: 800a  Time Out: 855a  Total Billable Time:  60 minutes     Precautions: Standard, cancer, and lymphedema  Subjective     Pt reports: R elbow all the way to fingers is most problematic. OT follow up for this referral today at [place].    She was somewhat compliant with home exercise/compression support program.  Response to previous treatment: bandaging was tolerated, noted some support with tubigrip but does roll at top edge.   Pt may need to address more pressing problem of R arm pain.   Pt wishes to proceed with new compression arm sleeves.   Functional change: limited by R arm, elbow and hand  pains.  L arm some tightness in under  arm.   Pt  with PT and OT at same [place] location- will schedule visits today. .     Pain: 2/10  Location: stiffness under L arm and side of chest   R UE to be followed per OT per orthopedic referral.       Objective      Female amb to dept, no device, Tubigrip to L arm, glove to R hand  Amount of Swelling/Location of Swelling: mod to L UE forearm, elbow and upper arm with fullness to tissues B UE post upper arm, body habitus   Tissue fold/crease mid upper arm, tubigrip rolls down slightly at top  Surgical  site: ant chest B Mastectomy, R chest prior port site, R SLNB, L SLNB then AND  Skin Integrity: radiation skin changes to L ant chest wall to axilla, dryness, color changes   Palpation/Texture: fullness along L forearm ridge, soft tissue bulk fullness post arm   R hand with basic arthritis glove  Circulation: intact   Posture:  FH, rounded shoulders, protraction, sh with slight IR   Hand Dominant: R    Treatment:   Maria Elena received the following manual therapy techniques:- Manual Lymphatic Drainage were applied to the: L UE/quadrant  for 45 minutes, including: MLD and short stretch compression bandaging   R UE- hand with glove     MANUAL LYMPHATIC DRAINAGE:  Drainage of L upper quadrant and L UE   While supine with arm elevated,  Drainage along neck, B subclavian regions,  Across ant chest and top of shoulder,  Axillary regions,  drainage of entire UE especially upper arm, forearm, wrist and hand with return of all areas proximally,  In sidelying stimulation of substitution pathways,  drainage post arm, and across back and down along trunk  Gentle soft tissue mobilizations to ant, lateral chest wall to axilla with AAROM    MULTILAYERED BANDAGING:  Issued supplies and bandaged L UE with cotton stockinet, Arteflex padding, 6cm, 8cm, and 10 cm rosidal rolls - cotton padding at elbow crease, bandaged from hand to prox arm,  To leave intact 12 -24 hours, dc with any problems,  Return rolled bandages next session. Confirmed wash and wear schedules.     Issued tubigrip F + G to use on upper arm, has full segment or will trial 1 + 1 to monitor roll down at top.   Advised as temporary compression daytime when not in therapy bandaging.     Maria Elena received therapeutic exercises to develop strength, endurance, ROM, flexibility, and posture for 10 minutes including: review of breathing, posture, arom, aarom, rotation of chest and ribs, UE reach for scapular mobility.   Instructed in and performed, assisted sh ROM  Sidelying    reach protraction, retraction with elbow flexion  Sh abduction to 90 then overhead for stretch  Supine  Snow dev style motions with LTR - add other hand soft tissue mobilizations as allowed  Therapist assisted UE ANNETTEOM  Arm on pillow overhead in sh flex abd and er- assisted ROM, assisted tissue mobility and training in HEP  sitting  Scapular retractions with 5 sec hold  THERAPEUTIC EXERCISES:  Continue HEP of AROM, stretching, and postural correction.     Home Exercises Provided and Patient Education Provided:  Self Care Home Management Training/Functional Therapeutic Activity x 5 minutes  chronic lymphedema management of L UE along with acute needs for tendonitis vs nerve of R UE - OT per orthopedics.  Requested orders for new compression arm sleeve from Dr. Villagran 10/21/24  OT Jesus Alberto for R UE per orthopedics - will coordinate follow up with both disciplines and referrals as able.   Present compression:  none  Orders and recommendations: L UE compression arm sleeve 20-30mmHg  PATIENT/FAMILY Education: bandaging/compression wear schedule,  HEP,  Beginning of self massage,  Self or assisted bandaging, compression options, and Risk reduction    Written Home Exercises Provided: Patient instructed to cont prior HEP.  Exercises were reviewed and Maria Elena was able to demonstrate them prior to the end of the session.  Maria Elena demonstrated good  understanding of the education provided.     Assessment   Maria Elena is a 68 y.o. female referred to outpatient Physical Therapy with a medical diagnosis of   Diagnosis   C50.912,C77.3 (ICD-10-CM) - Breast cancer metastasized to axillary lymph node, left   Z90.13 (ICD-10-CM) - H/O bilateral mastectomy   This patient presents s/p L breast CA, B mastectomy,R SLNB,  L SLNB to AND with radiation, chemotherapy, no reconstruction, noted fatigue and upper back and shoulder discomfort  resulting in: L UE/quadrant mod lymphedema of the L UE with pain in B axilla, chest wall, soft tissue  restrictions, tightness, faulty posture and bio mechanics, increased pain, increased stiffness in the ant chest wall, axilla, shoulder B UE, as well as difficulty performing reach, lift, carry, and placing the pt at higher risk of infection.     Patient has stage II lymphedema secondary to Breast cancer/cancer management.  She is participating in active (Phase I) stage of treatment.  A compression garment is required to reduce and maintain limb girth and prevent progression of lymphedema to prevent infections, wounds, pain and orthopedic issues.   L UE with soft fullness and added size/shape especially of her upper posterior arm as compared to R UE.  Pt is also having firmness and tightness of her ant/lateral chest wall to axilla with limited ROM L sh.  Pt will address a separate R UE referral per orthopedics with OT.   Lymphedema management and reduction will continue with recommended compression sleeve for daily support.   Maria Elena Is progressing well towards her goals.   Pt prognosis is Good.     Pt will continue to benefit from skilled outpatient physical therapy to address the deficits listed in the problem list box on initial evaluation, provide pt/family education and to maximize pt's level of independence in the home and community environment.     Anticipated Barriers for therapy: B complaints, insurance and cost     The following goals were discussed with the patient and patient is in agreement with them as to be addressed in the treatment plan.   Short Term Goals: (6 weeks)  1. Decrease girth in L upper arm, forearm, wrist and hand by up to 1cm to allow for improved UE symmetry, clothing choice, ROM, and UE function. (progressing, not met)  2. Patient will demonstrate 100% knowledge of lymphedema precautions and signs of infection to allow for reduced risk of lymphedema, reduced risk of infection, and/or exacerbation.  (progressing, not met)  3. Patient will perform self-bandaging techniques to enhance  lymphatic drainage, tissue density, and self management of condition.  (progressing, not met)  4. Patient will perform self lymph drainage techniques to enhance lymphatic drainage and mobility.  (progressing, not met)  5. Patient will tolerate daily activities with multilayered bandaging to enhance lymphatic drainage and skin elasticity.  (progressing, not met)  6. Pt to show improved AROM to allow sh flex by 10 B UE to allow reach to second cabinet shelf with min to no reported onset of pain (progressing, not met)     Long Term Goals: (12 weeks)  1. Patient to show decreased girth in L UE by up to 2 cm to allow for improved UE symmetry, clothing choice, ROM, and UE function.  (progressing, not met)  2. Patient will show reduction in density to mild or less with improved contour of limb to allow for improved cosmesis, improved lymphatic drainage, and functional mobility.  (progressing, not met)  3. Patient to naseem/doff compression garment with daily compliance to support lymphatic mobility and skin elasticity.  (progressing, not met)  4. Pt to show improved postural awareness and alignment.  (progressing, not met)  5. Pt to be I and compliant with HEP to allow for improved ROM, reach and carry with functional endurance and strength.    (progressing, not met)     Plan   Plan of care Certification: 8/21/2024 to 11/14/2024.     Outpatient Physical Therapy 2 times weekly for 10 weeks to include the following interventions: Patient Education, Self Care, Therapeutic Activities, and Therapeutic Exercise. Complete Decongestive Therapy- compression and home equipment needs to be addressed and assisted.     Montse Nuñez, PT

## 2024-10-22 DIAGNOSIS — I97.2 POST-MASTECTOMY LYMPHEDEMA SYNDROME: Primary | Chronic | ICD-10-CM

## 2024-10-31 ENCOUNTER — PATIENT MESSAGE (OUTPATIENT)
Dept: ADMINISTRATIVE | Facility: CLINIC | Age: 69
End: 2024-10-31
Payer: MEDICARE

## 2024-11-04 ENCOUNTER — PATIENT MESSAGE (OUTPATIENT)
Dept: REHABILITATION | Facility: HOSPITAL | Age: 69
End: 2024-11-04

## 2024-11-04 ENCOUNTER — CLINICAL SUPPORT (OUTPATIENT)
Dept: REHABILITATION | Facility: HOSPITAL | Age: 69
End: 2024-11-04
Payer: MEDICARE

## 2024-11-04 DIAGNOSIS — Z90.13 H/O BILATERAL MASTECTOMY: ICD-10-CM

## 2024-11-04 DIAGNOSIS — M79.602 PAIN IN BOTH UPPER EXTREMITIES: ICD-10-CM

## 2024-11-04 DIAGNOSIS — I97.2 POST-MASTECTOMY LYMPHEDEMA SYNDROME: Primary | Chronic | ICD-10-CM

## 2024-11-04 DIAGNOSIS — R20.0 FINGER NUMBNESS: ICD-10-CM

## 2024-11-04 DIAGNOSIS — M25.521 RIGHT ELBOW PAIN: Primary | ICD-10-CM

## 2024-11-04 DIAGNOSIS — Z91.89 AT RISK FOR LYMPHEDEMA: ICD-10-CM

## 2024-11-04 DIAGNOSIS — M79.601 PAIN IN BOTH UPPER EXTREMITIES: ICD-10-CM

## 2024-11-04 DIAGNOSIS — R29.898 WEAKNESS OF BOTH UPPER EXTREMITIES: ICD-10-CM

## 2024-11-04 PROCEDURE — 97112 NEUROMUSCULAR REEDUCATION: CPT

## 2024-11-04 PROCEDURE — 97140 MANUAL THERAPY 1/> REGIONS: CPT

## 2024-11-04 PROCEDURE — 97535 SELF CARE MNGMENT TRAINING: CPT

## 2024-11-04 PROCEDURE — 97110 THERAPEUTIC EXERCISES: CPT

## 2024-11-04 NOTE — PROGRESS NOTES
[facility]  Occupational Therapy Treatment Note    Date: 11/4/2024  Name: Maria Elena Tavares  Clinic Number: [id number]    Therapy Diagnosis:   Encounter Diagnoses   Name Primary?    Right elbow pain Yes    Finger numbness      Physician: Jose Shaver Jr*       Physician Orders: Eval and Treat  Medical Diagnosis: M77.11 (ICD-10-CM) - Right lateral epicondylitis   Evaluation Date: 10/9/2024  Insurance Authorization Period Expiration: 9/27/2024 - 12/31/2024   Plan of Care Certification Period: 12 weeks; 12/31/2024  Date of Return to MD: TBD  Visit # / Visits authorized: 3 / 21  FOTO: 0/ 3     Precautions:  Standard, hx of CA, lymphedema in left UE  FOTO: date and score TBA  FOTO Lobby Code:       Time In: 9:00  Time Out: 9:50  Total Billable Time: 45 minutes      SUBJECTIVE     Pt reports: She reports it is good today, not having pain. She reports pain over the weekend, radiating from her shoulder and going to the back in her scapula.   She was partially compliant with home exercise program given last session.   Response to previous treatment:fair. Continued pain and numbness  Functional change: cont to have pain with some daily activities. Overall improving pain    Pain: 0/10  Location: right elbow, forearm, and SF/Rf    OBJECTIVE   Objective Measures updated at progress report unless specified.    Observation/Appearance:  Pt reports dorsum of R hand and forearm are painful with tingling and discomfort            Elbow and Wrist ROM. Measured in degrees.    10/9/2024 10/9/2024     Left Right   Supination/Pronation WFL WFL   Wrist Ext/Flex 55/60 50/65   Wrist RD/UD 20/20 20/20         Sensation  Ulnar Nerve  Distribution 10/9/2024 10/9/2024     Right  Left    [place]       Normal 1.65-2.83 X X   Diminished Light Touch 3.22-3.61       Diminished Protective 3.84-4.31       Loss of Protective 4.56-6.65       Untestable >6.65          Special Tests:   Right  Positive tinel's at  medial elbow   Positive for increased pain at elbow when in a fully flexed position for longer than 30 secs             Strength (Dynamometer) and Pinch Strength (Pinch Gauge)  Measured in pounds and psi. Average of three trials.    10/9/2024 10/9/2024 11/4/24     Right  Left  R   Rung II 25,18, 15 35,30,32 31   Key Pinch 8 8 8   3pt Pinch 5 5 6   2pt Pinch                  Treatment     Maria Elena received the treatments listed below:     Supervised modalities after being cleared for contradictions: Hot Pack -   Patient received MH x 5 min to R elbow and hand to increase blood flow, circulation and tissue elasticity prior to therex      Manual therapy techniques: Soft tissue Mobilization were applied to the: R UE for 10 minutes, including:  Soft tissue mobilization with IASTM tools to R hand, forearm, elbow, upper arm as tolerated    Therapeutic exercises to develop ROM and flexibility for 22 minutes, including:  -reviewed HEP and stretches    ASTYM stretchs 1-3 30 sec holds x 3 repetitions   Elbow AROM- ext/flx x 3 ways X 10 reps   Shoulder shrugs/depression X 15 reps   Scapular retraction X 15 reps   Bilateral elbow extension with yellow band X 15 reps   Brachioradialis flex with yellow band X 15 reps   UBE, Level 1,  X 3' back         Therapeutic activities to improve functional performance for 0  minutes, including:  NT    Neuromuscular re-education activities to improve Sense and Proprioception for 8 minutes. The following activities were included:  -reviewed ulnar nerve glides given at last session  -reviewed and performed radial nerve flossing x 10 reps as tolerated    Patient Education and Home Exercises      Education provided:   - reviewed HEP and stretches  - Progress towards goals     Written Home Exercises Provided: Patient instructed to cont prior HEP.  Exercises were reviewed and Maria Elena was able to demonstrate them prior to the end of the session.  Maria Elena demonstrated good  understanding of the HEP  provided. See EMR under Patient Instructions for exercises provided during therapy sessions.      ASSESSMENT      Pt tolerated tx fairly well today. She cont to present with R arm pain, numbness, and limitation with functional tasks. However, reports improving pain. Per reassessment, she demonstrates improving  and pinch strength. She did report some increased pain/soreness in R shoulder after UBE. She is also getting PT Lymphedema therapy for her left UE. Treatment performed as tolerated today.      Maria Elena is progressing fairly towards her goals and there are no updates to goals at this time. Pt prognosis is Fair.     Pt will continue to benefit from skilled outpatient occupational therapy to address the deficits listed in the problem list on initial evaluation, provide pt/family education and to maximize pt's level of independence in the home and community environment.     Pt's spiritual, cultural and educational needs considered and pt agreeable to plan of care and goals.    Anticipated barriers to occupational therapy: history of neuropathy d/t cancer treatment    Goals:  Short Term Goals: (in 4 weeks)  1) Patient will be independent in HEP  2) Decrease pain in R arm to no more than 4/10 worst in ADL/IADL's  3) Increase R  strength by at least 5-8 psi for increased functional use     Long Term Goals: (in by discharge)  1) Decrease pain in R arm to no more than 2/10 worst in ADL/IADL's  2) Increase AROM of R wrist to WFL for increased functioning in ADL/IADL's  3) Increase strength in R  to at least 32 for improved functioning in ADL/IADL's  4) Increased functioning in ADL/IADL's, as evidenced by a FOTO impairment rating of 50  5) Decreased reports of pain and aching with positional changes or maintained elbow flexion    PLAN     Continue skilled occupational therapy with individualized plan of care focusing on improving pain and maximizing use of her right UE.     Updates/Grading for next session:  cont to progress as tolerated    DONTRELL Beckman, CHT

## 2024-11-04 NOTE — PROGRESS NOTES
Physical Therapy Daily Treatment Note     Name: Maria Elena Tavares  [facility] Number: [id number]    Therapy Diagnosis:   Encounter Diagnoses   Name Primary?    Post-mastectomy lymphedema syndrome Yes    Pain in both upper extremities     At risk for lymphedema     H/O bilateral mastectomy     Weakness of both upper extremities      Physician: Tere Villagran MD    Visit Date: 11/4/2024    Physician: Tere Villagran MD     Physician Orders: PT Eval and Treat   Medical Diagnosis from Referral:   Diagnosis   C50.912,C77.3 (ICD-10-CM) - Breast cancer metastasized to axillary lymph node, left   Z90.13 (ICD-10-CM) - H/O bilateral mastectomy   Evaluation Date: 8/21/2024  Authorization Period Expiration: 11/15/2024  Plan of Care Expiration: 11/14/24  Visit # / Visits authorized: 4/20     Time In: 800a  Time Out: 900a  Total Billable Time:  60 minutes     Precautions: Standard, cancer, and lymphedema  Subjective     Pt reports: reviewed cost/coverage for her compression arm sleeve- advised on her potential cost, pt is checking with her insurance for cost/coverage and deductible - she will then be able to choose quantity.  Pt also qualifies for financial assistance- she will see if this applies to compression.   She was somewhat compliant with home exercise/compression support program.  Response to previous treatment: some tightness in front of her chest L  - mild pulling with overhead motions but understands stretching and posture.   Reviewed AND and radiation with lymphedema risk.  Bandaging is helpful, using tubigrip as temporary sleeve.   Pt wishes to proceed with new compression arm sleeves.   Functional change: limited by R arm, elbow and hand  pains.  L arm some tightness in under  arm.   PT and OT at same [place] location.     Pain: 2/10  Location: stiffness under L arm and side of chest   R UE to be followed per OT per orthopedic referral.       Objective      Female amb to dept, no device, Tubigrip to L arm  Amount of  Swelling/Location of Swelling: mod to L UE forearm, elbow and upper arm with fullness to tissues B UE post upper arm, body habitus   Tissue fold/crease mid upper arm, tubigrip rolls down slightly at top  Surgical site: ant chest B Mastectomy, R chest prior port site, R SLNB, L SLNB then AND  Skin Integrity: radiation skin changes to L ant chest wall to axilla, dryness, color changes   Palpation/Texture: fullness along L forearm ridge, soft tissue bulk fullness post arm   R hand with basic arthritis glove  Circulation: intact   Posture:  FH, rounded shoulders, protraction, sh with slight IR   Hand Dominant: R    Treatment:   Maria Elena received the following manual therapy techniques:- Manual Lymphatic Drainage were applied to the: L UE/quadrant  for 45 minutes, including: MLD and short stretch compression bandaging     MANUAL LYMPHATIC DRAINAGE:  Drainage of L upper quadrant and L UE   While supine with arm elevated,  Drainage along neck, B subclavian regions,  Across ant chest and top of shoulder,  Axillary regions,  drainage of entire UE especially upper arm, forearm, wrist and hand with return of all areas proximally,  In sidelying stimulation of substitution pathways,  drainage post arm, and across back and down along trunk  Gentle soft tissue mobilizations to ant, lateral chest wall to axilla with AAROM    MULTILAYERED BANDAGING:  Issued supplies and bandaged L UE with cotton stockinet, Arteflex padding, 6cm, 8cm, and 10 cm rosidal rolls - cotton padding at elbow crease, bandaged from hand to prox arm,  To leave intact 12 -24 hours, dc with any problems,  Return rolled bandages next session. Confirmed wash and wear schedules.     tubigrip F + G to use on upper arm, or full Tubigrip F as one full segment - awaiting new compression.   Advised as temporary compression daytime when not in therapy bandaging.     Maria Elena received therapeutic exercises to develop strength, endurance, ROM, flexibility, and posture for 5  "minutes including: review of breathing, posture, arom, aarom, rotation of chest and ribs, UE reach for scapular mobility.   Assisted L UE and shoulder motions with soft tissue massage, skin mobility, drainage to trunk, chest, axilla and upper arm.  Instructed in and performed, assisted sh ROM  Supine  Snow dev style motions with LTR - add other hand soft tissue mobilizations as allowed  Therapist assisted UE AAROM  Continue at home:  Arm on pillow overhead in sh flex abd and er- assisted ROM, assisted tissue mobility and training in HEP  sitting  Scapular retractions with 5 sec hold  THERAPEUTIC EXERCISES:  Continue HEP of AROM, stretching, and postural correction.     Home Exercises Provided and Patient Education Provided:  Self Care Home Management Training/Functional Therapeutic Activity x 15 minutes  New compression options:  Pt to question potential deductible at $225.60 listed by [facility], pt will contact [employer] managed medicare  Therapist measured:  W 17.5cm  E 29.0  A 41.0  Length 43cm    L UE compression arm  sleeve 20-30mmHg  JOBST Rosario Lite arm sleeve 20-30mmHg with 2" silicone top band  Size Large, length regular, color beige  Quantity 3 / L arm  Pt may choose quantity based on her financial situation and co pay.     chronic lymphedema management of L UE along with acute needs for tendonitis vs nerve of R UE - OT per orthopedics.  orders for new compression arm sleeve from Dr. Villagran 10/21/24  Routed to [facility] 10/14/24  Updated product selection 11/4/24  OT Jesus Alberto for R UE per orthopedics - will coordinate follow up with both disciplines and referrals as able.   Present compression:  none  Orders and recommendations: L UE compression arm sleeve 20-30mmHg  PATIENT/FAMILY Education: bandaging/compression wear schedule,  HEP,  Beginning of self massage,  Self or assisted bandaging, compression options, and Risk reduction    Written Home Exercises Provided: Patient instructed to cont prior " HEP.  Exercises were reviewed and Maria Elena was able to demonstrate them prior to the end of the session.  Maria Elena demonstrated good  understanding of the education provided.     Assessment   Maria Elena is a 68 y.o. female referred to outpatient Physical Therapy with a medical diagnosis of   Diagnosis   C50.912,C77.3 (ICD-10-CM) - Breast cancer metastasized to axillary lymph node, left   Z90.13 (ICD-10-CM) - H/O bilateral mastectomy   This patient presents s/p L breast CA, B mastectomy,R SLNB,  L SLNB to AND with radiation, chemotherapy, no reconstruction, noted fatigue and upper back and shoulder discomfort  resulting in: L UE/quadrant mod lymphedema of the L UE with pain in B axilla, chest wall, soft tissue restrictions, tightness, faulty posture and bio mechanics, increased pain, increased stiffness in the ant chest wall, axilla, shoulder B UE, as well as difficulty performing reach, lift, carry, and placing the pt at higher risk of infection.   Patient has stage II lymphedema secondary to Breast cancer/cancer management.  She is participating in active (Phase I) stage of treatment.  A compression garment is required to reduce and maintain limb girth and prevent progression of lymphedema to prevent infections, wounds, pain and orthopedic issues.     Soft fullness of lymphedema mostly in upper arm region with tightness and tension ant chest to L axilla post mastectomy, AND, and radiation secondary to CA management.    Recommend new compression sleeve support to assist lymphatic mobility, skin support, appearance, and comfort.  Advance posture, breathing and HEP for soft tissue mobility, ROM, strength, posture and lymphatic assist. Lymphedema management and reduction will continue with recommended compression sleeve for daily support.   Maria Elena Is progressing well towards her goals.   Pt prognosis is Good.     Pt will continue to benefit from skilled outpatient physical therapy to address the deficits listed in the problem  list box on initial evaluation, provide pt/family education and to maximize pt's level of independence in the home and community environment.     Anticipated Barriers for therapy: B complaints, insurance and cost     The following goals were discussed with the patient and patient is in agreement with them as to be addressed in the treatment plan.   Short Term Goals: (6 weeks)  1. Decrease girth in L upper arm, forearm, wrist and hand by up to 1cm to allow for improved UE symmetry, clothing choice, ROM, and UE function. (progressing, not met)  2. Patient will demonstrate 100% knowledge of lymphedema precautions and signs of infection to allow for reduced risk of lymphedema, reduced risk of infection, and/or exacerbation.  (progressing, not met)  3. Patient will perform self-bandaging techniques to enhance lymphatic drainage, tissue density, and self management of condition.  (progressing, not met)  4. Patient will perform self lymph drainage techniques to enhance lymphatic drainage and mobility.  (progressing, not met)  5. Patient will tolerate daily activities with multilayered bandaging to enhance lymphatic drainage and skin elasticity.  (progressing, not met)  6. Pt to show improved AROM to allow sh flex by 10 B UE to allow reach to second cabinet shelf with min to no reported onset of pain (progressing, not met)     Long Term Goals: (12 weeks)  1. Patient to show decreased girth in L UE by up to 2 cm to allow for improved UE symmetry, clothing choice, ROM, and UE function.  (progressing, not met)  2. Patient will show reduction in density to mild or less with improved contour of limb to allow for improved cosmesis, improved lymphatic drainage, and functional mobility.  (progressing, not met)  3. Patient to naseem/doff compression garment with daily compliance to support lymphatic mobility and skin elasticity.  (progressing, not met)  4. Pt to show improved postural awareness and alignment.  (progressing, not  met)  5. Pt to be I and compliant with HEP to allow for improved ROM, reach and carry with functional endurance and strength.    (progressing, not met)     Plan   Plan of care Certification: 8/21/2024 to 11/14/2024.     Outpatient Physical Therapy 2 times weekly for 10 weeks to include the following interventions: Patient Education, Self Care, Therapeutic Activities, and Therapeutic Exercise. Complete Decongestive Therapy- compression and home equipment needs to be addressed and assisted.     Montse Nuñez, PT

## 2024-11-05 ENCOUNTER — OFFICE VISIT (OUTPATIENT)
Dept: FAMILY MEDICINE | Facility: CLINIC | Age: 69
End: 2024-11-05
Payer: MEDICARE

## 2024-11-05 ENCOUNTER — TELEPHONE (OUTPATIENT)
Dept: FAMILY MEDICINE | Facility: CLINIC | Age: 69
End: 2024-11-05
Payer: MEDICARE

## 2024-11-05 VITALS — HEIGHT: 60 IN | BODY MASS INDEX: 32 KG/M2 | WEIGHT: 163 LBS

## 2024-11-05 DIAGNOSIS — C77.3 BREAST CANCER METASTASIZED TO AXILLARY LYMPH NODE, LEFT: ICD-10-CM

## 2024-11-05 DIAGNOSIS — R20.0 FINGER NUMBNESS: ICD-10-CM

## 2024-11-05 DIAGNOSIS — I70.0 AORTIC ATHEROSCLEROSIS: ICD-10-CM

## 2024-11-05 DIAGNOSIS — E78.01 FAMILIAL HYPERCHOLESTEROLEMIA: ICD-10-CM

## 2024-11-05 DIAGNOSIS — Z00.00 ENCOUNTER FOR PREVENTIVE HEALTH EXAMINATION: Primary | ICD-10-CM

## 2024-11-05 DIAGNOSIS — E66.9 OBESITY (BMI 30-39.9): ICD-10-CM

## 2024-11-05 DIAGNOSIS — F33.1 MDD (MAJOR DEPRESSIVE DISORDER), RECURRENT EPISODE, MODERATE: ICD-10-CM

## 2024-11-05 DIAGNOSIS — T45.1X5A NEUROPATHY DUE TO CHEMOTHERAPEUTIC DRUG: ICD-10-CM

## 2024-11-05 DIAGNOSIS — Z00.00 ENCOUNTER FOR MEDICARE ANNUAL WELLNESS EXAM: ICD-10-CM

## 2024-11-05 DIAGNOSIS — C50.912 BREAST CANCER METASTASIZED TO AXILLARY LYMPH NODE, LEFT: ICD-10-CM

## 2024-11-05 DIAGNOSIS — E11.21 CONTROLLED TYPE 2 DIABETES MELLITUS WITH DIABETIC NEPHROPATHY, WITHOUT LONG-TERM CURRENT USE OF INSULIN: ICD-10-CM

## 2024-11-05 DIAGNOSIS — E78.5 HYPERLIPIDEMIA ASSOCIATED WITH TYPE 2 DIABETES MELLITUS: ICD-10-CM

## 2024-11-05 DIAGNOSIS — G62.0 NEUROPATHY DUE TO CHEMOTHERAPEUTIC DRUG: ICD-10-CM

## 2024-11-05 DIAGNOSIS — F41.1 GAD (GENERALIZED ANXIETY DISORDER): ICD-10-CM

## 2024-11-05 DIAGNOSIS — E03.9 HYPOTHYROIDISM, UNSPECIFIED TYPE: ICD-10-CM

## 2024-11-05 DIAGNOSIS — Z90.13 H/O BILATERAL MASTECTOMY: ICD-10-CM

## 2024-11-05 DIAGNOSIS — I15.2 HYPERTENSION ASSOCIATED WITH DIABETES: ICD-10-CM

## 2024-11-05 DIAGNOSIS — E11.59 HYPERTENSION ASSOCIATED WITH DIABETES: ICD-10-CM

## 2024-11-05 DIAGNOSIS — D50.8 OTHER IRON DEFICIENCY ANEMIA: ICD-10-CM

## 2024-11-05 DIAGNOSIS — E55.9 VITAMIN D DEFICIENCY: ICD-10-CM

## 2024-11-05 DIAGNOSIS — E11.69 HYPERLIPIDEMIA ASSOCIATED WITH TYPE 2 DIABETES MELLITUS: ICD-10-CM

## 2024-11-05 NOTE — PATIENT INSTRUCTIONS
Counseling and Referral of Other Preventative  (Italic type indicates deductible and co-insurance are waived)    Patient Name: Maria Elena Tavares  Today's Date: 11/5/2024    Health Maintenance       Date Due Completion Date    RSV Vaccine (Age 60+ and Pregnant patients) (1 - Risk 60-74 years 1-dose series) Never done ---    Foot Exam 06/06/2024 6/6/2023    Influenza Vaccine (1) 09/01/2024 12/28/2023    COVID-19 Vaccine (5 - 2024-25 season) 09/01/2024 12/9/2022    Eye Exam 01/10/2025 1/10/2024    Override on 1/10/2024: Done    Hemoglobin A1c 01/12/2025 7/12/2024    Pneumococcal Vaccines (Age 65+) (4 of 4 - PPSV23 or PCV20) 03/11/2025 3/11/2020    TETANUS VACCINE 04/29/2025 4/29/2015    DEXA Scan 07/10/2025 7/10/2023    Lipid Panel 07/12/2025 7/12/2024    Diabetes Urine Screening 07/15/2025 7/15/2024    Colorectal Cancer Screening 10/08/2027 10/8/2020        No orders of the defined types were placed in this encounter.    The following information is provided to all patients.  This information is to help you find resources for any of the problems found today that may be affecting your health:                  Living healthy guide: www.[facility].[place].gov      Understanding Diabetes: www.diabetes.org      Eating healthy: www.cdc.gov/healthyweight      CDC home safety checklist: www.cdc.gov/steadi/patient.html      Agency on Aging: www.goea.[place].[facility]      Alcoholics anonymous (AA): www.aa.org      Physical Activity: www.tess.nih.gov/br8xrqp      Tobacco use: www.quitwithusla.org

## 2024-11-05 NOTE — TELEPHONE ENCOUNTER
Called to remind patient of her appointment to see if she was logging on, vm received, message left requesting return call.

## 2024-11-05 NOTE — PROGRESS NOTES
The patient location is: [place]  The chief complaint leading to consultation is: Medicare AWV     Visit type: audiovisual    Face to Face time with patient: 21  60 minutes of total time spent on the encounter, which includes face to face time and non-face to face time preparing to see the patient (eg, review of tests), Obtaining and/or reviewing separately obtained history, Documenting clinical information in the electronic or other health record, Independently interpreting results (not separately reported) and communicating results to the patient/family/caregiver, or Care coordination (not separately reported).         Each patient to whom he or she provides medical services by telemedicine is:  (1) informed of the relationship between the physician and patient and the respective role of any other health care provider with respect to management of the patient; and (2) notified that he or she may decline to receive medical services by telemedicine and may withdraw from such care at any time.    Notes:       Maria Elena Tavares presented for a  Medicare AWV and comprehensive Health Risk Assessment today. The following components were reviewed and updated:    Medical history  Family History  Social history  Allergies and Current Medications  Health Risk Assessment  Health Maintenance  Care Team         ** See Completed Assessments for Annual Wellness Visit within the encounter summary.**         The following assessments were completed:  Living Situation  CAGE  Depression Screening  Fall Risk Assessment (MACH 10)  Hearing Assessment(HHI)  Cognitive Function Screening  Nutrition Screening  ADL Screening  PAQ Screening    Opioid documentation:      Patient does not have a current opioid prescription.        Vitals:    11/05/24 1315   Weight: 73.9 kg (163 lb)   Height: 5' (1.524 m)     Body mass index is 31.83 kg/m².  Physical Exam  Constitutional:       General: She is not in acute distress.     Appearance: Normal  appearance. She is well-developed and well-groomed.   Skin:     Coloration: Skin is not pale.   Neurological:      Mental Status: She is alert and oriented to person, place, and time.   Psychiatric:         Mood and Affect: Mood normal.         Speech: Speech normal.         Behavior: Behavior normal. Behavior is cooperative.         Thought Content: Thought content normal.               Diagnoses and health risks identified today and associated recommendations/orders:    1. Encounter for Medicare annual wellness exam  - Ambulatory Referral/Consult to Enhanced Annual Wellness Visit (eAWV)    2. Encounter for preventive health examination  Screenings performed, as noted above. Personal preventative testing needs reviewed.     3. MDD (major depressive disorder), recurrent episode, moderate  Chronic. Stable. Followed by PCP.     4. Hyperlipidemia associated with type 2 diabetes mellitus  5. Aortic atherosclerosis  Chronic; stable with atorvastatin. Followed by PCP.    6. Controlled type 2 diabetes mellitus with diabetic nephropathy, without long-term current use of insulin  Chronic; stable on tirzepatide. Followed by Endocrinology.    7. Hypertension associated with diabetes  Chronic; stable with amlodipine and irbesartan. Followed by PCP.    8. Neuropathy due to chemotherapeutic drug  Chronic; stable with gabapentin. Followed by PCP.    9. Breast cancer metastasized to axillary lymph node, left  10. H/O bilateral mastectomy  Chronic. S/p bilateral mastectomy. Currently on femara. Followed by Oncology.     11. Finger numbness  Chronic; stable with gabapentin. Followed by PCP.    12. REJI (generalized anxiety disorder)  Chronic; stable. Followed by PCP.    13. Familial hypercholesterolemia  Chronic; stable with gabapentin. Followed by PCP.    14. Other iron deficiency anemia  Chronic. Stable. Followed by Oncology.     15. Vitamin D deficiency  Chronic; stable with vitamin D. Followed by PCP.    16. Obesity (BMI  30-39.9)  Work on diet modification and increase in physical activity as tolerated.   Followed by PCP.     17. Hypothyroidism, unspecified type  Chronic; stable with levothyroxine. Followed by Endocrinology.       Provided Maria Elena with a 5-10 year written screening schedule and personal prevention plan. Recommendations were developed using the USPSTF age appropriate recommendations. Education, counseling, and referrals were provided as needed. After Visit Summary printed and given to patient which includes a list of additional screenings\tests needed.    Follow up in about 1 year (around 11/5/2025) for your next annual wellness visit.    Hoa Charles NP      Advance Care Planning     I offered to discuss advanced care planning, including how to pick a person who would make decisions for you if you were unable to make them for yourself, called a health care power of , and what kind of decisions you might make such as use of life sustaining treatments such as ventilators and tube feeding when faced with a life limiting illness recorded on a living will that they will need to know. (How you want to be cared for as you near the end of your natural life)     X Patient is interested in learning more about how to make advanced directives.  I provided them paperwork and offered to discuss this with them.

## 2024-11-07 ENCOUNTER — PATIENT MESSAGE (OUTPATIENT)
Dept: PRIMARY CARE CLINIC | Facility: CLINIC | Age: 69
End: 2024-11-07
Payer: MEDICARE

## 2024-11-11 ENCOUNTER — CLINICAL SUPPORT (OUTPATIENT)
Dept: REHABILITATION | Facility: HOSPITAL | Age: 69
End: 2024-11-11
Payer: MEDICARE

## 2024-11-11 DIAGNOSIS — Z91.89 AT RISK FOR LYMPHEDEMA: ICD-10-CM

## 2024-11-11 DIAGNOSIS — R29.898 WEAKNESS OF BOTH UPPER EXTREMITIES: ICD-10-CM

## 2024-11-11 DIAGNOSIS — M25.521 RIGHT ELBOW PAIN: Primary | ICD-10-CM

## 2024-11-11 DIAGNOSIS — I97.2 POST-MASTECTOMY LYMPHEDEMA SYNDROME: Primary | Chronic | ICD-10-CM

## 2024-11-11 DIAGNOSIS — R20.0 FINGER NUMBNESS: ICD-10-CM

## 2024-11-11 DIAGNOSIS — Z90.13 H/O BILATERAL MASTECTOMY: ICD-10-CM

## 2024-11-11 DIAGNOSIS — M79.602 PAIN IN BOTH UPPER EXTREMITIES: ICD-10-CM

## 2024-11-11 DIAGNOSIS — M79.601 PAIN IN BOTH UPPER EXTREMITIES: ICD-10-CM

## 2024-11-11 PROCEDURE — 97140 MANUAL THERAPY 1/> REGIONS: CPT

## 2024-11-11 PROCEDURE — 97110 THERAPEUTIC EXERCISES: CPT

## 2024-11-11 NOTE — PROGRESS NOTES
"    [facility]  Occupational Therapy Treatment Note    Date: 11/11/2024  Name: Maria Elena Tavares  Clinic Number: [id number]    Therapy Diagnosis:   Encounter Diagnoses   Name Primary?    Right elbow pain Yes    Finger numbness      Physician: Jose Shaver Jr*       Physician Orders: Eval and Treat  Medical Diagnosis: M77.11 (ICD-10-CM) - Right lateral epicondylitis   Evaluation Date: 10/9/2024  Insurance Authorization Period Expiration: 9/27/2024 - 12/31/2024   Plan of Care Certification Period: 12 weeks; 12/31/2024  Date of Return to MD: TBD  Visit # / Visits authorized: 4 / 21  FOTO: 0/ 3     Precautions:  Standard, hx of CA, lymphedema in left UE  FOTO: date and score TBA  FOTO Lobby Code:       Time In: 9:00  Time Out: 9:50  Total Billable Time: 35 minutes      SUBJECTIVE     Pt reports: She is not having pain today and didn't have any over the weekend. "The exercises are helping". She requested to drop frequency down to 1x/week. She reports she still has tingling in the SF/Rf/LF, and occasionally the whole hand.   She was partially compliant with home exercise program given last session.   Response to previous treatment:fair. Continued numbness but improving pain  Functional change: cont to have pain with some daily activities. Overall improving pain    Pain: 0/10  Location: right elbow, forearm, and SF/Rf    OBJECTIVE   Objective Measures updated at progress report unless specified.    Observation/Appearance:  Pt reports dorsum of R hand and forearm are painful with tingling and discomfort            Elbow and Wrist ROM. Measured in degrees.    10/9/2024 10/9/2024     Left Right   Supination/Pronation WFL WFL   Wrist Ext/Flex 55/60 50/65   Wrist RD/UD 20/20 20/20         Sensation  Ulnar Nerve  Distribution 10/9/2024 10/9/2024     Right  Left    [place] Caro       Normal 1.65-2.83 X X   Diminished Light Touch 3.22-3.61       Diminished Protective 3.84-4.31       Loss of " Protective 4.56-6.65       Untestable >6.65          Special Tests:   Right  Positive tinel's at medial elbow   Positive for increased pain at elbow when in a fully flexed position for longer than 30 secs             Strength (Dynamometer) and Pinch Strength (Pinch Gauge)  Measured in pounds and psi. Average of three trials.    10/9/2024 10/9/2024 11/4/24     Right  Left  R   Rung II 25,18, 15 35,30,32 31   Key Pinch 8 8 8   3pt Pinch 5 5 6   2pt Pinch                  Treatment     Maria Elena received the treatments listed below:     Supervised modalities after being cleared for contradictions: Hot Pack -   Patient received MH x 5 min to R elbow and hand to increase blood flow, circulation and tissue elasticity prior to therex      Manual therapy techniques: Soft tissue Mobilization were applied to the: R UE for 10 minutes, including:  Soft tissue mobilization with IASTM tools to R hand, forearm, elbow, upper arm as tolerated    Therapeutic exercises to develop ROM and flexibility for 30 minutes, including: (10 min supervised)  -reviewed HEP and stretches    ASTYM stretchs 1-3 30 sec holds x 3 repetitions, pre and post manual therapy   Elbow AROM- ext/flx x 3 ways X 10 reps   Shoulder shrugs/depression X 15 reps   Scapular retraction X 15 reps   Bilateral elbow extension with yellow band X 15 reps   Brachioradialis flex with yellow band X 15 reps   UBE, Level 1,  X 3' back         Therapeutic activities to improve functional performance for 0  minutes, including:  NT    Neuromuscular re-education activities to improve Sense and Proprioception for 5 minutes. The following activities were included:  -reviewed ulnar nerve glides given at last session  -reviewed and performed radial nerve flossing x 10 reps as tolerated    Patient Education and Home Exercises      Education provided:   - cont HEP and stretches. Reviewed ulnar nerve glides  - Progress towards goals     Written Home Exercises Provided: Patient instructed  to cont prior HEP.  Exercises were reviewed and Maria Elena was able to demonstrate them prior to the end of the session.  Maria Elena demonstrated good  understanding of the HEP provided. See EMR under Patient Instructions for exercises provided during therapy sessions.      ASSESSMENT      Pt tolerated tx well today. She reports her pain has decreased and denied pain in clinic today, which is much improved. She is also getting PT Lymphedema therapy for her left UE. Treatment performed as tolerated today.  Discussed dropping therapy down to 1x/week due to improved symptoms and pt schedule.     Maria Elena is progressing fairly towards her goals and there are no updates to goals at this time. Pt prognosis is Fair.     Pt will continue to benefit from skilled outpatient occupational therapy to address the deficits listed in the problem list on initial evaluation, provide pt/family education and to maximize pt's level of independence in the home and community environment.     Pt's spiritual, cultural and educational needs considered and pt agreeable to plan of care and goals.    Anticipated barriers to occupational therapy: history of neuropathy d/t cancer treatment    Goals:  Short Term Goals: (in 4 weeks)  1) Patient will be independent in HEP  2) Decrease pain in R arm to no more than 4/10 worst in ADL/IADL's  3) Increase R  strength by at least 5-8 psi for increased functional use     Long Term Goals: (in by discharge)  1) Decrease pain in R arm to no more than 2/10 worst in ADL/IADL's  2) Increase AROM of R wrist to WFL for increased functioning in ADL/IADL's  3) Increase strength in R  to at least 32 for improved functioning in ADL/IADL's  4) Increased functioning in ADL/IADL's, as evidenced by a FOTO impairment rating of 50  5) Decreased reports of pain and aching with positional changes or maintained elbow flexion    PLAN     Continue skilled occupational therapy with individualized plan of care focusing on improving  pain and maximizing use of her right UE.     Updates/Grading for next session: cont to progress as tolerated. Drop OT down to 1x/week.     DONTRELL Beckman, ALYSAT

## 2024-11-11 NOTE — PROGRESS NOTES
Physical Therapy Daily Treatment Note     Name: Maria Elena Tavares  [facility] Number: [id number]    Therapy Diagnosis:   Encounter Diagnoses   Name Primary?    Post-mastectomy lymphedema syndrome Yes    Pain in both upper extremities     At risk for lymphedema     H/O bilateral mastectomy     Weakness of both upper extremities      Physician: Tere Villagran MD    Visit Date: 11/11/2024    Physician: Tere Villagran MD     Physician Orders: PT Eval and Treat   Medical Diagnosis from Referral:   Diagnosis   C50.912,C77.3 (ICD-10-CM) - Breast cancer metastasized to axillary lymph node, left   Z90.13 (ICD-10-CM) - H/O bilateral mastectomy   Evaluation Date: 8/21/2024  Authorization Period Expiration: 11/15/2024  Plan of Care Expiration: 11/14/24  Visit # / Visits authorized: 5/20    FOTO 11/11/24     Time In: 800a  Time Out: 900a  Total Billable Time:  60 minutes     Precautions: Standard, cancer, and lymphedema  Subjective     Pt reports:she connected [facility] with her insurance regarding sleeve cost - no deductible but does have copayment ~ $15 /sleeve = ~$45 for 3 sleeves- pt choice to proceed with one sleeve at this time due to financial situation.   She was somewhat compliant with home exercise/compression support program.  Response to previous treatment: her arm is soft and mobile but some continued tightness in L chest wall to under arm.  Pt continues with temporary compression sleeve and HEP.  Pt admits less pain to her R elbow/hand which she is seeing ortho and OT for.   Functional change: motion is improving especially since pain is lessoning   Pain: 2/10  Location: stiffness under L arm and side of chest   R UE to be followed per OT per orthopedic referral.       Objective      Female amb to dept, no device, Tubigrip to L arm  Amount of Swelling/Location of Swelling: mild/mod to L UE forearm, elbow and upper arm with fullness to tissues B UE post upper arm, body habitus   Tissue fold/crease mid upper arm  Surgical  "site: ant chest B Mastectomy, R chest prior port site, R SLNB, L SLNB then AND  Skin Integrity: radiation skin changes to L ant chest wall to axilla, dryness, color changes   Palpation/Texture: fullness along L forearm ridge, soft fullness of tissue bulk post arm   Circulation: intact   Posture:  FH, rounded shoulders, protraction, sh with slight IR   Hand Dominant: R    Girth Measurements (in centimeters)  LANDMARK RIGHT UE  8/21/24 LEFT UE  8/21/24 L UE  11/11/24 Change  L DIFF  At eval   E + 6" 39.0 cm 41.0 cm 39.0 2.0 2.0 cm   E + 4" 37.5 cm 40.0 cm 38.5 1.5 2.5 cm   E + 2" 34.0 cm 37.5 cm 35.5 2.0 3.5 cm   Elbow 28.5 cm 32.0 cm 28.5 3.5 3.5 cm   W+ 8" 29.0 cm 30.5 cm 29.0 1.5 2.5 cm   W +  6" 27.0 cm 29.0 cm 27.5 1.5 2.0 cm   W + 4" 23.0 cm 24.5 cm 22.5 2.0 1.5 cm   Wrist 16.5 cm 17.5 cm 16.5 1.0 1.0 cm   DPC 20.0 cm 20.0 cm 19.3 0.7 0 cm   IP Thumb 6.3 cm 6.5 cm 5.8 0.7 0.2 cm     Treatment:   Maria Elena received the following manual therapy techniques:- Manual Lymphatic Drainage were applied to the: L UE/quadrant  for 45 minutes, including: MLD and short stretch compression bandaging   Girth assessment    MANUAL LYMPHATIC DRAINAGE:  Drainage of L upper quadrant and L UE   While supine with arm elevated,  Drainage along neck, B subclavian regions,  Across ant chest and top of shoulder,  Axillary regions,  drainage of entire UE especially upper arm, forearm, wrist and hand with return of all areas proximally,  In sidelying stimulation of substitution pathways,  drainage post arm, and across back and down along trunk  Gentle soft tissue mobilizations to ant, lateral chest wall to axilla with AAROM    MULTILAYERED BANDAGING:  Issued supplies and bandaged L UE with cotton stockinet, Arteflex padding, 6cm, 8cm, and 10 cm rosidal rolls - cotton padding at elbow crease, bandaged from hand to prox arm,  To leave intact 12 -24 hours, dc with any problems,  Return rolled bandages next session. Confirmed wash and wear " "schedules.     tubigrip F + G to use on upper arm, or full Tubigrip F as one full segment - awaiting new compression.   Advised as temporary compression daytime when not in therapy bandaging.     Maria Elena received therapeutic exercises to develop strength, endurance, ROM, flexibility, and posture for 15 minutes including: review of breathing, posture, arom, aarom, rotation of chest and ribs, UE reach for scapular mobility.   Assisted L UE and shoulder motions with soft tissue massage, skin mobility, drainage to trunk, chest, axilla and upper arm.  Instructed in and performed, assisted sh ROM  Sidelying   Reach to protraction, bend to retraction and trunk rotation  Scaption to 90 then overhead  Supine  Snow dev style motions with LTR - + soft tissue mobilizations  along trunk, chest and axilla  Dowel assisted sh flexion  Therapist assisted UE AAROM  Continue at home:  Arm on pillow overhead in sh flex abd and er- assisted ROM, assisted tissue mobility and training in HEP  sitting  Scapular retractions with 5 sec hold  THERAPEUTIC EXERCISES:  Continue HEP of AROM, stretching, and postural correction.     Home Exercises Provided and Patient Education Provided:  Self Care Home Management Training/Functional Therapeutic Activity x 5 minutes  New compression options:  Confirmed with staff per her insurance no deductilble in place   Therapy sent exact size/brand/style of compression arm sleeve selected.  Copayment totals ~$45 for 3 sleeves, pt choice to proceed with one sleeve ~$15 for one  [facility].  Pt will contact as needed to proceed.   Therapist measured again 11/11/24  W 17.5cm- 16.5  E 29.0 - 28.5  A 41.0- 39.0  Length 43cm    Confirmed choice of Large regular to allow elbow and upper arm containment size    L UE compression arm  sleeve 20-30mmHg  JOBST Rosario Lite arm sleeve 20-30mmHg with 2" silicone top band  Size Large, length regular, color beige  Orders allow " -Quantity 3 / L arm  Pt choice for 1 sleeve- based on her financial situation and co pay.   Reviewed don/doff, position, fit, wear schedules.    Updated tracker for [facility] for above choice.     chronic lymphedema management of L UE - risk reduction review  acute needs for tendonitis vs neuropathy of R UE - OT per orthopedics.  orders for new compression arm sleeve from Dr. Villagran 10/21/24  Routed to [facility] 10/14/24  Updated product selection 11/4/24  Updated quantity choice 11/11/24- awaiting delivery.  OT Jesus Alberto for R UE per orthopedics - will coordinate follow up with both disciplines and referrals as able.   Present compression:  none  Orders and recommendations: L UE compression arm sleeve 20-30mmHg  PATIENT/FAMILY Education: bandaging/compression wear schedule,  HEP,  Beginning of self massage,  Self or assisted bandaging, compression options, and Risk reduction    Written Home Exercises Provided: Patient instructed to cont prior HEP.  Exercises were reviewed and Maria Elena was able to demonstrate them prior to the end of the session.  Maria Elena demonstrated good  understanding of the education provided.     Assessment   Maria Elena is a 68 y.o. female referred to outpatient Physical Therapy with a medical diagnosis of   Diagnosis   C50.912,C77.3 (ICD-10-CM) - Breast cancer metastasized to axillary lymph node, left   Z90.13 (ICD-10-CM) - H/O bilateral mastectomy   This patient presents s/p L breast CA, B mastectomy,R SLNB,  L SLNB to AND with radiation, chemotherapy, no reconstruction, noted fatigue and upper back and shoulder discomfort  resulting in: L UE/quadrant mod lymphedema of the L UE with pain in B axilla, chest wall, soft tissue restrictions, tightness, faulty posture and bio mechanics, increased pain, increased stiffness in the ant chest wall, axilla, shoulder B UE, as well as difficulty performing reach, lift, carry, and placing the pt at higher risk of infection.   Patient has stage II lymphedema secondary  to Breast cancer/cancer management.  She is participating in active (Phase I) stage of treatment.  A compression garment is required to reduce and maintain limb girth and prevent progression of lymphedema to prevent infections, wounds, pain and orthopedic issues.     Noted continued soft fullness of L UE primarily elbow to upper arm with reductions in girth noted throughout since active therapy, exercise, compression bandaging and use of temporary compression choice daily. Pt with mild tightness ant chest, pec region to axilla with improving ROM and soft tissue mobility in presence of mastectomy and radiation to L upper quadrant Awaiting new compression sleeve, updating HEP and self care plans, able to reduce frequency of visits.  Maria Elena Is progressing well towards her goals.   Pt prognosis is Good.     Pt will continue to benefit from skilled outpatient physical therapy to address the deficits listed in the problem list box on initial evaluation, provide pt/family education and to maximize pt's level of independence in the home and community environment.     Anticipated Barriers for therapy: B complaints, insurance and cost     The following goals were discussed with the patient and patient is in agreement with them as to be addressed in the treatment plan.   Short Term Goals: (6 weeks)  1. Decrease girth in L upper arm, forearm, wrist and hand by up to 1cm to allow for improved UE symmetry, clothing choice, ROM, and UE function.  met)  2. Patient will demonstrate 100% knowledge of lymphedema precautions and signs of infection to allow for reduced risk of lymphedema, reduced risk of infection, and/or exacerbation.  ( met)  3. Patient will perform self-bandaging techniques to enhance lymphatic drainage, tissue density, and self management of condition. Moving to sleeve)  4. Patient will perform self lymph drainage techniques to enhance lymphatic drainage and mobility.  (progressing,  5. Patient will tolerate daily  activities with multilayered bandaging to enhance lymphatic drainage and skin elasticity.  met)  6. Pt to show improved AROM to allow sh flex by 10 B UE to allow reach to second cabinet shelf with min to no reported onset of pain (progressing,      Long Term Goals: (12 weeks)  1. Patient to show decreased girth in L UE by up to 2 cm to allow for improved UE symmetry, clothing choice, ROM, and UE function.  (progressing, not met)  2. Patient will show reduction in density to mild or less with improved contour of limb to allow for improved cosmesis, improved lymphatic drainage, and functional mobility.  (progressing, not met)  3. Patient to naseem/doff compression garment with daily compliance to support lymphatic mobility and skin elasticity.  (progressing, not met)  4. Pt to show improved postural awareness and alignment.  (progressing, not met)  5. Pt to be I and compliant with HEP to allow for improved ROM, reach and carry with functional endurance and strength.    (progressing, not met)     Plan   Plan of care Certification: 8/21/2024 to 11/14/2024.     Outpatient Physical Therapy 2 times weekly for 10 weeks to include the following interventions: Patient Education, Self Care, Therapeutic Activities, and Therapeutic Exercise. Complete Decongestive Therapy- compression and home equipment needs to be addressed and assisted.    Decrease frequency 1 x weekly coordinate visits with OT for R UE  Pt to contact [facility] for one  sleeve.      Montse Nuñez, PT

## 2024-11-13 ENCOUNTER — TELEPHONE (OUTPATIENT)
Dept: PRIMARY CARE CLINIC | Facility: CLINIC | Age: 69
End: 2024-11-13
Payer: MEDICARE

## 2024-11-13 DIAGNOSIS — I10 HYPERTENSION, UNSPECIFIED TYPE: Primary | ICD-10-CM

## 2024-11-19 ENCOUNTER — PATIENT MESSAGE (OUTPATIENT)
Dept: REHABILITATION | Facility: HOSPITAL | Age: 69
End: 2024-11-19
Payer: MEDICARE

## 2024-11-21 ENCOUNTER — PATIENT MESSAGE (OUTPATIENT)
Dept: ENDOCRINOLOGY | Facility: CLINIC | Age: 69
End: 2024-11-21
Payer: MEDICARE

## 2024-11-21 ENCOUNTER — LAB VISIT (OUTPATIENT)
Dept: LAB | Facility: HOSPITAL | Age: 69
End: 2024-11-21
Attending: STUDENT IN AN ORGANIZED HEALTH CARE EDUCATION/TRAINING PROGRAM
Payer: MEDICARE

## 2024-11-21 DIAGNOSIS — E55.9 VITAMIN D DEFICIENCY: ICD-10-CM

## 2024-11-21 DIAGNOSIS — E78.00 PURE HYPERCHOLESTEROLEMIA: ICD-10-CM

## 2024-11-21 DIAGNOSIS — E08.22 DIABETES MELLITUS DUE TO UNDERLYING CONDITION WITH DIABETIC CHRONIC KIDNEY DISEASE: ICD-10-CM

## 2024-11-21 DIAGNOSIS — E03.9 HYPOTHYROIDISM, UNSPECIFIED TYPE: ICD-10-CM

## 2024-11-21 DIAGNOSIS — E78.01 FAMILIAL HYPERCHOLESTEROLEMIA: ICD-10-CM

## 2024-11-21 LAB
25(OH)D3+25(OH)D2 SERPL-MCNC: 25 NG/ML (ref 30–96)
ALBUMIN SERPL BCP-MCNC: 3.7 G/DL (ref 3.5–5.2)
ALP SERPL-CCNC: 126 U/L (ref 40–150)
ALT SERPL W/O P-5'-P-CCNC: 8 U/L (ref 10–44)
ANION GAP SERPL CALC-SCNC: 11 MMOL/L (ref 8–16)
AST SERPL-CCNC: 18 U/L (ref 10–40)
BILIRUB SERPL-MCNC: 0.5 MG/DL (ref 0.1–1)
BUN SERPL-MCNC: 12 MG/DL (ref 8–23)
CALCIUM SERPL-MCNC: 10.3 MG/DL (ref 8.7–10.5)
CHLORIDE SERPL-SCNC: 103 MMOL/L (ref 95–110)
CHOLEST SERPL-MCNC: 198 MG/DL (ref 120–199)
CHOLEST/HDLC SERPL: 3.2 {RATIO} (ref 2–5)
CO2 SERPL-SCNC: 24 MMOL/L (ref 23–29)
CREAT SERPL-MCNC: 0.8 MG/DL (ref 0.5–1.4)
EST. GFR  (NO RACE VARIABLE): >60 ML/MIN/1.73 M^2
ESTIMATED AVG GLUCOSE: 123 MG/DL (ref 68–131)
GLUCOSE SERPL-MCNC: 114 MG/DL (ref 70–110)
HBA1C MFR BLD: 5.9 % (ref 4–5.6)
HDLC SERPL-MCNC: 61 MG/DL (ref 40–75)
HDLC SERPL: 30.8 % (ref 20–50)
LDLC SERPL CALC-MCNC: 121.4 MG/DL (ref 63–159)
NONHDLC SERPL-MCNC: 137 MG/DL
POTASSIUM SERPL-SCNC: 4 MMOL/L (ref 3.5–5.1)
PROT SERPL-MCNC: 8.1 G/DL (ref 6–8.4)
SODIUM SERPL-SCNC: 138 MMOL/L (ref 136–145)
TRIGL SERPL-MCNC: 78 MG/DL (ref 30–150)
TSH SERPL DL<=0.005 MIU/L-ACNC: 0.67 UIU/ML (ref 0.4–4)

## 2024-11-21 PROCEDURE — 83036 HEMOGLOBIN GLYCOSYLATED A1C: CPT | Mod: HCNC | Performed by: FAMILY MEDICINE

## 2024-11-21 PROCEDURE — 80053 COMPREHEN METABOLIC PANEL: CPT | Mod: HCNC | Performed by: FAMILY MEDICINE

## 2024-11-21 PROCEDURE — 80061 LIPID PANEL: CPT | Mod: HCNC | Performed by: STUDENT IN AN ORGANIZED HEALTH CARE EDUCATION/TRAINING PROGRAM

## 2024-11-21 PROCEDURE — 84443 ASSAY THYROID STIM HORMONE: CPT | Mod: HCNC | Performed by: INTERNAL MEDICINE

## 2024-11-21 PROCEDURE — 82306 VITAMIN D 25 HYDROXY: CPT | Mod: HCNC | Performed by: FAMILY MEDICINE

## 2024-11-22 ENCOUNTER — PATIENT MESSAGE (OUTPATIENT)
Dept: ORTHOPEDICS | Facility: CLINIC | Age: 69
End: 2024-11-22
Payer: MEDICARE

## 2024-11-25 ENCOUNTER — OFFICE VISIT (OUTPATIENT)
Dept: PRIMARY CARE CLINIC | Facility: CLINIC | Age: 69
End: 2024-11-25
Attending: FAMILY MEDICINE
Payer: MEDICARE

## 2024-11-25 ENCOUNTER — CLINICAL SUPPORT (OUTPATIENT)
Dept: REHABILITATION | Facility: HOSPITAL | Age: 69
End: 2024-11-25
Payer: MEDICARE

## 2024-11-25 DIAGNOSIS — R29.898 WEAKNESS OF BOTH UPPER EXTREMITIES: ICD-10-CM

## 2024-11-25 DIAGNOSIS — M79.601 PAIN IN BOTH UPPER EXTREMITIES: ICD-10-CM

## 2024-11-25 DIAGNOSIS — E55.9 VITAMIN D DEFICIENCY: ICD-10-CM

## 2024-11-25 DIAGNOSIS — Z91.89 AT RISK FOR LYMPHEDEMA: ICD-10-CM

## 2024-11-25 DIAGNOSIS — E03.8 OTHER SPECIFIED HYPOTHYROIDISM: ICD-10-CM

## 2024-11-25 DIAGNOSIS — I97.2 POST-MASTECTOMY LYMPHEDEMA SYNDROME: Primary | Chronic | ICD-10-CM

## 2024-11-25 DIAGNOSIS — M79.602 PAIN IN BOTH UPPER EXTREMITIES: ICD-10-CM

## 2024-11-25 DIAGNOSIS — E11.21 CONTROLLED TYPE 2 DIABETES MELLITUS WITH DIABETIC NEPHROPATHY, WITHOUT LONG-TERM CURRENT USE OF INSULIN: ICD-10-CM

## 2024-11-25 DIAGNOSIS — T45.1X5A NEUROPATHY DUE TO CHEMOTHERAPEUTIC DRUG: ICD-10-CM

## 2024-11-25 DIAGNOSIS — E78.01 FAMILIAL HYPERCHOLESTEROLEMIA: Primary | ICD-10-CM

## 2024-11-25 DIAGNOSIS — Z90.13 H/O BILATERAL MASTECTOMY: ICD-10-CM

## 2024-11-25 DIAGNOSIS — G62.0 NEUROPATHY DUE TO CHEMOTHERAPEUTIC DRUG: ICD-10-CM

## 2024-11-25 DIAGNOSIS — E66.9 OBESITY (BMI 30-39.9): ICD-10-CM

## 2024-11-25 PROCEDURE — 1159F MED LIST DOCD IN RCRD: CPT | Mod: HCNC,CPTII,95, | Performed by: FAMILY MEDICINE

## 2024-11-25 PROCEDURE — 3061F NEG MICROALBUMINURIA REV: CPT | Mod: HCNC,CPTII,95, | Performed by: FAMILY MEDICINE

## 2024-11-25 PROCEDURE — 3066F NEPHROPATHY DOC TX: CPT | Mod: HCNC,CPTII,95, | Performed by: FAMILY MEDICINE

## 2024-11-25 PROCEDURE — 97140 MANUAL THERAPY 1/> REGIONS: CPT

## 2024-11-25 PROCEDURE — 4010F ACE/ARB THERAPY RXD/TAKEN: CPT | Mod: HCNC,CPTII,95, | Performed by: FAMILY MEDICINE

## 2024-11-25 PROCEDURE — 1160F RVW MEDS BY RX/DR IN RCRD: CPT | Mod: HCNC,CPTII,95, | Performed by: FAMILY MEDICINE

## 2024-11-25 PROCEDURE — 97110 THERAPEUTIC EXERCISES: CPT

## 2024-11-25 PROCEDURE — 99215 OFFICE O/P EST HI 40 MIN: CPT | Mod: HCNC,95,, | Performed by: FAMILY MEDICINE

## 2024-11-25 PROCEDURE — G2211 COMPLEX E/M VISIT ADD ON: HCPCS | Mod: HCNC,95,, | Performed by: FAMILY MEDICINE

## 2024-11-25 PROCEDURE — 3044F HG A1C LEVEL LT 7.0%: CPT | Mod: HCNC,CPTII,95, | Performed by: FAMILY MEDICINE

## 2024-11-25 NOTE — PROGRESS NOTES
Patient ID: Maria Elena Tavares is a 68 y.o. female.    Chief Complaint: DM, HTN, Hyperlipidemia  History of Present Illness    CHIEF COMPLAINT:  Patient presents today for follow up and review of lab results.    MEDICATIONS AND LAB RESULTS:  She reports taking atorvastatin 10 mg for cholesterol management, noting decreasing cholesterol levels. Her diabetes is well-controlled with an A1c of 5.9. She is taking prescribed vitamin D supplements, with her level slowly improving to 25.    WEIGHT MANAGEMENT:  She reports a weight of 167 lbs, a loss of approximately 2 lbs since her last visit. She expresses satisfaction with this weight loss trend.    KNEE REPLACEMENT FOLLOW-UP:  She reports significant improvement following knee replacement surgery, sometimes forgetting she has had the procedure. She denies major limitations, except for inability to kneel on the affected knee. She expresses satisfaction with her overall progress and function. A six-month follow-up visit is scheduled.    PHYSICAL THERAPY:  She is undergoing physical therapy for both arms. The right arm, treated for nerve pain, has shown significant improvement with exercises, no longer requiring ongoing therapy. The left arm therapy is post-mastectomy. She is also receiving lymphedema management, including wearing a compression sleeve. She reports occasional pain under the arm, for which she has been instructed on massage techniques and exercises. Overall, she expresses satisfaction with her physical therapy progress.      ROS:  General: -fever, -chills, -fatigue, -weight gain, -weight loss  Eyes: -vision changes, -redness, -discharge  ENT: -ear pain, -nasal congestion, -sore throat  Cardiovascular: -chest pain, -palpitations, -lower extremity edema  Respiratory: -cough, -shortness of breath  Gastrointestinal: -abdominal pain, -nausea, -vomiting, -diarrhea, -constipation, -blood in stool  Genitourinary: -dysuria, -hematuria, -frequency  Musculoskeletal: -joint  pain, +muscle pain  Skin: -rash, -lesion  Neurological: -headache, -dizziness, -numbness, -tingling  Psychiatric: -anxiety, -depression, -sleep difficulty         Physical Exam    General: No acute distress. Well-developed. Well-nourished.  Eyes: EOMI. Sclerae anicteric.  HENT: Normocephalic. Atraumatic. Nares patent. Moist oral mucosa.  Ears: Bilateral TMs clear. Bilateral EACs clear.  Cardiovascular: Regular rate. Regular rhythm. No murmurs. No rubs. No gallops. Normal S1, S2.  Respiratory: Normal respiratory effort. Clear to auscultation bilaterally. No rales. No rhonchi. No wheezing.  Abdomen: Soft. Non-tender. Non-distended. Normoactive bowel sounds.  Musculoskeletal: No  obvious deformity.  Extremities: No lower extremity edema.  Neurological: Alert & oriented x3. No slurred speech. Normal gait.  Psychiatric: Normal mood. Normal affect. Good insight. Good judgment.  Skin: Warm. Dry. No rash.         Assessment & Plan    Reviewed lab results: total cholesterol <200, LDL <130 (previously 164-165)  Vitamin D level at 25, slowly increasing  A1c at 5.9, deemed appropriate  Metabolic panel, kidney function, and liver enzymes within normal limits  Weight loss of 2 lbs since last visit (169 to 167)  Overall health status improving, including better function post knee replacement    HYPERLIPIDEMIA:  - Maintain current diet to support cholesterol management.  - Continued atorvastatin 10 mg daily for cholesterol management.    VITAMIN D DEFICIENCY:  - Continued vitamin D supplement every 14 days.    PREVENTIVE CARE AND VACCINATIONS:  - Explained importance of getting flu vaccine, RSV shot, and new COVID booster for upcoming flu season.  - Discussed timing and spacing of vaccinations: flu and RSV can be given together or spaced 1 week apart, COVID booster should be administered separately.  - Flu shot administered.  - RSV shot administered.  - Contact the office to schedule COVID booster (to be administered separately  from other vaccines).  - Get eye exam if not done recently.    POSTMASTECTOMY CARE:  - Continue prescribed exercises for right arm strengthening.    FOLLOW-UP:  - Follow up in 6 months for in-person appointment, repeat labs, and foot exam.          Visit today is associated with current or anticipated ongoing medical care related to this patient's single serious condition/complex condition of DM2. The patient will return to see me as these issues will be followed longitudinally      No follow-ups on file.    This note was generated with the assistance of ambient listening technology. Verbal consent was obtained by the patient and accompanying visitor(s) for the recording of patient appointment to facilitate this note. I attest to having reviewed and edited the generated note for accuracy, though some syntax or spelling errors may persist. Please contact the author of this note for any clarification.        Answers submitted by the patient for this visit:  Diabetes Questionnaire (Submitted on 11/18/2024)  Chief Complaint: Diabetes problem  Diabetes type: type 2  MedicAlert ID: No  blurred vision: No  chest pain: No  fatigue: No  foot paresthesias: Yes  foot ulcerations: No  polydipsia: No  polyphagia: No  polyuria: No  visual change: No  weakness: No  weight loss: No  Symptom course: improving  confusion: No  speech difficulty: No  dizziness: No  nervous/anxious: No  headaches: Yes  hunger: No  mood changes: No  pallor: No  seizures: No  tremors: No  sleepiness: No  sweats: No  blackouts: No  hospitalization: No  nocturnal hypoglycemia: No  required assistance: No  required glucagon: No  CVA: No  heart disease: No  nephropathy: No  peripheral neuropathy: Yes  PVD: No  retinopathy: No  autonomic neuropathy: No  CAD risks: hypertension  Current treatments: none  Treatment compliance: all of the time  Dose schedule: pre-breakfast  Given by: patient  Injection sites: abdominal wall  Home blood tests: 1-2 x per  day  Monitoring compliance: good  Blood glucose trend: decreasing steadily  Weight trend: decreasing steadily  Current diet: generally healthy  Meal planning: avoidance of concentrated sweets  Exercise: every other day  Dietitian visit: No  Eye exam current: Yes  Sees podiatrist: Yes

## 2024-11-25 NOTE — PROGRESS NOTES
Physical Therapy Daily Treatment Note     Name: Maria Elena Tavares  [facility] Number: [id number]    Therapy Diagnosis:   Encounter Diagnoses   Name Primary?    Post-mastectomy lymphedema syndrome Yes    Pain in both upper extremities     At risk for lymphedema     H/O bilateral mastectomy     Weakness of both upper extremities      Visit Date: 11/25/2024    Physician: Tere Villagran MD     Physician Orders: PT Eval and Treat   Medical Diagnosis from Referral:   Diagnosis   C50.912,C77.3 (ICD-10-CM) - Breast cancer metastasized to axillary lymph node, left   Z90.13 (ICD-10-CM) - H/O bilateral mastectomy   Evaluation Date: 8/21/2024  Authorization Period Expiration: 11/15/2024  Plan of Care Expiration: 11/14/24  Visit # / Visits authorized: 6/20    FOTO 11/11/24     Time In: 815a  Time Out: 900a  Total Billable Time:  45 minutes     Precautions: Standard, cancer, and lymphedema  Subjective     Pt reports: running late dealing with sister/medical issues.  Sleeve copay was made, may be 2 weeks before delivery.  She was somewhat compliant with home exercise/compression support program.  Response to previous treatment: mild tightness under her arm that she does massage with some benefits.  Pt uses temporary sleeves until her new sleeve arrives, Choose 1 of 3 due to cost/copay requirements.  Pt has less overall pain and improved mobility - thankful she started therapy.  Functional change: understands need for long term management and progression.  Performing HEP.   Pain: 2/10  Location: stiffness under L arm and side of chest   R UE to be followed per OT per orthopedic referral.       Objective      Female amb to dept, no device, Tubigrip to L arm  Amount of Swelling/Location of Swelling: mild/mod to L UE forearm, elbow and upper arm with fullness to tissues B UE post upper arm, body habitus   Tissue fold/crease mid upper arm  Surgical site: ant chest B Mastectomy, R chest prior port site, R SLNB, L SLNB then AND  Skin Integrity:  "radiation skin changes to L ant chest wall to axilla, dryness, color changes   Palpation/Texture: fullness along L forearm ridge, soft fullness of tissue bulk post arm   Circulation: intact   Posture:  FH, rounded shoulders, protraction, sh with slight IR   Hand Dominant: R    Girth Measurements (in centimeters)  LANDMARK RIGHT UE  8/21/24 LEFT UE  8/21/24 L UE  11/11/24 Change  L DIFF  At eval   E + 6" 39.0 cm 41.0 cm 39.0 2.0 2.0 cm   E + 4" 37.5 cm 40.0 cm 38.5 1.5 2.5 cm   E + 2" 34.0 cm 37.5 cm 35.5 2.0 3.5 cm   Elbow 28.5 cm 32.0 cm 28.5 3.5 3.5 cm   W+ 8" 29.0 cm 30.5 cm 29.0 1.5 2.5 cm   W +  6" 27.0 cm 29.0 cm 27.5 1.5 2.0 cm   W + 4" 23.0 cm 24.5 cm 22.5 2.0 1.5 cm   Wrist 16.5 cm 17.5 cm 16.5 1.0 1.0 cm   DPC 20.0 cm 20.0 cm 19.3 0.7 0 cm   IP Thumb 6.3 cm 6.5 cm 5.8 0.7 0.2 cm     Treatment:   Maria Elena received the following manual therapy techniques:- Manual Lymphatic Drainage were applied to the: L UE/quadrant  for 35 minutes, including: MLD and short stretch compression bandaging     MANUAL LYMPHATIC DRAINAGE:  Drainage of L upper quadrant and L UE   While supine with arm elevated,  Drainage along neck, B subclavian regions,  Across ant chest and top of shoulder,  Axillary regions,  drainage of entire UE especially upper arm, forearm, wrist and hand with return of all areas proximally,  In sidelying stimulation of substitution pathways,  drainage post arm, and across back and down along trunk  Gentle soft tissue mobilizations to ant, lateral chest wall to axilla with AAROM    MULTILAYERED BANDAGING:  Issued supplies and bandaged L UE with cotton stockinet, Arteflex padding, 6cm, 8cm, and 10 cm rosidal rolls - cotton padding at elbow crease, bandaged from hand to prox arm,  To leave intact 12 -24 hours, dc with any problems,  Return rolled bandages next session. Confirmed wash and wear schedules.     tubigrip F + G to use on upper arm, or full Tubigrip F as one full segment - awaiting new compression. " "  Advised as temporary compression daytime when not in therapy bandaging.     Maria Elena received therapeutic exercises to develop strength, endurance, ROM, flexibility, and posture for 15 minutes including: review of breathing, posture, arom, aarom, rotation of chest and ribs, UE reach for scapular mobility.   Assisted L UE and shoulder motions with soft tissue massage, skin mobility, drainage to trunk, chest, axilla and upper arm.  Instructed in and performed, assisted sh ROM  Sidelying   Reach to protraction, bend to retraction and trunk rotation  Scaption to 90 then overhead  Supine  Snow dev style motions with LTR + soft tissue mobilizations  along trunk, chest and axilla  Dowel assisted sh flexion  Therapist assisted UE AAROM  Sitting  Scapular retractions  Scaption overhead + spinal elongation and trunk reach  Standing  Wall posture trunk extensions  Sh opening and extension along wall  Dahlen and arrow style motions  Continue at home:  Arm on pillow overhead in sh flex abd and er- assisted ROM, assisted tissue mobility and training in HEP  sitting  Reviewed HEP and progressions.   THERAPEUTIC EXERCISES:  Continue HEP of AROM, stretching, and postural correction.     Home Exercises Provided and Patient Education Provided:  Self Care Home Management Training/Functional Therapeutic Activity x 5 minutes  New compression options:  Confirmed with staff per her insurance no deductilble in place   Therapy sent exact size/brand/style of compression arm sleeve selected.  Copayment totals ~$45 for 3 sleeves, pt choice to proceed with one sleeve ~$15 for one- copayment made, awaiting delivery.   [facility] for copayment.  Therapist measured again 11/11/24  W 17.5cm- 16.5  E 29.0 - 28.5  A 41.0- 39.0  Length 43cm    Confirmed choice of Large regular to allow elbow and upper arm containment size    L UE compression arm  sleeve 20-30mmHg  JOBST Rosario Lite arm sleeve 20-30mmHg with 2" silicone top band  Size Large, length " regular, color beige  Orders allow -Quantity 3 / L arm  Pt choice for 1 sleeve- based on her financial situation and co pay.   Reviewed don/doff, position, fit, wear schedules.  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     chronic lymphedema management of L UE - risk reduction review  acute needs for tendonitis vs neuropathy of R UE - OT per orthopedics.  orders Dr. Villagran 10/21/24  Routed to [facility] 10/14/24  Updated product selection 11/4/24  Updated quantity choice 11/11/24- awaiting delivery.  OT Jesus Alberto for R UE per orthopedics - will coordinate follow up with both disciplines and referrals as able.   Present compression:  none  Orders and recommendations: L UE compression arm sleeve 20-30mmHg  PATIENT/FAMILY Education: bandaging/compression wear schedule,  HEP,  Beginning of self massage,  Self or assisted bandaging, compression options, and Risk reduction    Written Home Exercises Provided: Patient instructed to cont prior HEP.  Exercises were reviewed and Maria Elena was able to demonstrate them prior to the end of the session.  Maria Elena demonstrated good  understanding of the education provided.     Assessment   Maria Elena is a 68 y.o. female referred to outpatient Physical Therapy with a medical diagnosis of   Diagnosis   C50.912,C77.3 (ICD-10-CM) - Breast cancer metastasized to axillary lymph node, left   Z90.13 (ICD-10-CM) - H/O bilateral mastectomy   This patient presents s/p L breast CA, B mastectomy,R SLNB,  L SLNB to AND with radiation, chemotherapy, no reconstruction, noted fatigue and upper back and shoulder discomfort  resulting in: L UE/quadrant mod lymphedema of the L UE with pain in B axilla, chest wall, soft tissue restrictions, tightness, faulty posture and bio mechanics, increased pain, increased stiffness in the ant chest wall, axilla, shoulder B UE, as well as difficulty performing reach, lift, carry, and placing the pt at higher risk of infection.   Patient has stage II  lymphedema secondary to Breast cancer/cancer management.  She is participating in active (Phase I) stage of treatment.  A compression garment is required to reduce and maintain limb girth and prevent progression of lymphedema to prevent infections, wounds, pain and orthopedic issues.     Less overall pain and tenderness with improving mobility, tissue movement and shoulder ROM despite mild feelings of tightness and stiffness. Pt is advancing her HEP, home self massage, self stretch and tissue mobility.  L UE with soft fullness mostly to upper arm that is responding to Manual Lymphatic Drainage, MLD, bandaging, exercise and compression support.  Awaiting new compression and then will decrease frequency as shift to Phase II of self management is achieved.   Mild soft tissue tightness L UE, ant chest, pec region to axilla with improving ROM, soft tissue mobility, as well as understanding of plan to manage post op mastectomy, radiations changes, and lymphedema risk to L UE/upper quadrant.  Awaiting new compression sleeve, updating HEP and self care plans, able to reduce frequency of visits.  Maria Elena Is progressing well towards her goals.   Pt prognosis is Good.     Pt will continue to benefit from skilled outpatient physical therapy to address the deficits listed in the problem list box on initial evaluation, provide pt/family education and to maximize pt's level of independence in the home and community environment.     Anticipated Barriers for therapy: B complaints, insurance and cost     The following goals were discussed with the patient and patient is in agreement with them as to be addressed in the treatment plan.   Short Term Goals: (6 weeks)  1. Decrease girth in L upper arm, forearm, wrist and hand by up to 1cm to allow for improved UE symmetry, clothing choice, ROM, and UE function.  met)  2. Patient will demonstrate 100% knowledge of lymphedema precautions and signs of infection to allow for reduced risk of  lymphedema, reduced risk of infection, and/or exacerbation.  ( met)  3. Patient will perform self-bandaging techniques to enhance lymphatic drainage, tissue density, and self management of condition. Moving to sleeve)  4. Patient will perform self lymph drainage techniques to enhance lymphatic drainage and mobility.  (progressing,  5. Patient will tolerate daily activities with multilayered bandaging to enhance lymphatic drainage and skin elasticity.  met)  6. Pt to show improved AROM to allow sh flex by 10 B UE to allow reach to second cabinet shelf with min to no reported onset of pain (progressing,      Long Term Goals: (12 weeks)  1. Patient to show decreased girth in L UE by up to 2 cm to allow for improved UE symmetry, clothing choice, ROM, and UE function.  (progressing, not met)  2. Patient will show reduction in density to mild or less with improved contour of limb to allow for improved cosmesis, improved lymphatic drainage, and functional mobility.  (progressing, not met)  3. Patient to naseem/doff compression garment with daily compliance to support lymphatic mobility and skin elasticity.  (progressing, not met)  4. Pt to show improved postural awareness and alignment.  (progressing, not met)  5. Pt to be I and compliant with HEP to allow for improved ROM, reach and carry with functional endurance and strength.    (progressing, not met)     Plan   Plan of care Certification: 8/21/2024 to 11/14/2024.     Outpatient Physical Therapy 2 times weekly for 10 weeks to include the following interventions: Patient Education, Self Care, Therapeutic Activities, and Therapeutic Exercise. Complete Decongestive Therapy- compression and home equipment needs to be addressed and assisted.    Decrease frequency 1 x weekly coordinate visits with OT for R UE  Pt to contact [facility] for one  sleeve.      Montse Nuñez, PT

## 2024-11-26 ENCOUNTER — PATIENT MESSAGE (OUTPATIENT)
Dept: PRIMARY CARE CLINIC | Facility: CLINIC | Age: 69
End: 2024-11-26
Payer: MEDICARE

## 2024-11-27 ENCOUNTER — HOSPITAL ENCOUNTER (OUTPATIENT)
Dept: RADIOLOGY | Facility: HOSPITAL | Age: 69
Discharge: HOME | End: 2024-11-27
Attending: PHYSICIAN ASSISTANT
Payer: MEDICARE

## 2024-11-27 ENCOUNTER — OFFICE VISIT (OUTPATIENT)
Dept: ORTHOPEDICS | Facility: CLINIC | Age: 69
End: 2024-11-27
Payer: MEDICARE

## 2024-11-27 VITALS — BODY MASS INDEX: 32.25 KG/M2 | HEIGHT: 60 IN | WEIGHT: 164.25 LBS

## 2024-11-27 DIAGNOSIS — Z96.652 AFTERCARE FOLLOWING LEFT KNEE JOINT REPLACEMENT SURGERY: Primary | ICD-10-CM

## 2024-11-27 DIAGNOSIS — Z47.1 AFTERCARE FOLLOWING LEFT KNEE JOINT REPLACEMENT SURGERY: ICD-10-CM

## 2024-11-27 DIAGNOSIS — Z47.1 AFTERCARE FOLLOWING LEFT KNEE JOINT REPLACEMENT SURGERY: Primary | ICD-10-CM

## 2024-11-27 DIAGNOSIS — Z96.652 AFTERCARE FOLLOWING LEFT KNEE JOINT REPLACEMENT SURGERY: ICD-10-CM

## 2024-11-27 PROCEDURE — 1159F MED LIST DOCD IN RCRD: CPT | Mod: HCNC,CPTII,S$GLB, | Performed by: PHYSICIAN ASSISTANT

## 2024-11-27 PROCEDURE — 3061F NEG MICROALBUMINURIA REV: CPT | Mod: HCNC,CPTII,S$GLB, | Performed by: PHYSICIAN ASSISTANT

## 2024-11-27 PROCEDURE — 4010F ACE/ARB THERAPY RXD/TAKEN: CPT | Mod: HCNC,CPTII,S$GLB, | Performed by: PHYSICIAN ASSISTANT

## 2024-11-27 PROCEDURE — 3044F HG A1C LEVEL LT 7.0%: CPT | Mod: HCNC,CPTII,S$GLB, | Performed by: PHYSICIAN ASSISTANT

## 2024-11-27 PROCEDURE — 99999 PR PBB SHADOW E&M-EST. PATIENT-LVL III: CPT | Mod: PBBFAC,HCNC,, | Performed by: PHYSICIAN ASSISTANT

## 2024-11-27 PROCEDURE — 1126F AMNT PAIN NOTED NONE PRSNT: CPT | Mod: HCNC,CPTII,S$GLB, | Performed by: PHYSICIAN ASSISTANT

## 2024-11-27 PROCEDURE — 73562 X-RAY EXAM OF KNEE 3: CPT | Mod: TC,HCNC,PN,LT

## 2024-11-27 PROCEDURE — 1101F PT FALLS ASSESS-DOCD LE1/YR: CPT | Mod: HCNC,CPTII,S$GLB, | Performed by: PHYSICIAN ASSISTANT

## 2024-11-27 PROCEDURE — 3008F BODY MASS INDEX DOCD: CPT | Mod: HCNC,CPTII,S$GLB, | Performed by: PHYSICIAN ASSISTANT

## 2024-11-27 PROCEDURE — 3066F NEPHROPATHY DOC TX: CPT | Mod: HCNC,CPTII,S$GLB, | Performed by: PHYSICIAN ASSISTANT

## 2024-11-27 PROCEDURE — 99213 OFFICE O/P EST LOW 20 MIN: CPT | Mod: HCNC,S$GLB,, | Performed by: PHYSICIAN ASSISTANT

## 2024-11-27 PROCEDURE — 3288F FALL RISK ASSESSMENT DOCD: CPT | Mod: HCNC,CPTII,S$GLB, | Performed by: PHYSICIAN ASSISTANT

## 2024-11-27 PROCEDURE — 73562 X-RAY EXAM OF KNEE 3: CPT | Mod: 26,HCNC,LT, | Performed by: RADIOLOGY

## 2024-11-27 NOTE — PROGRESS NOTES
Subjective:      Chief Complaint: Pain of the Left Knee    Patient ID: Maria Elena Tavares is a 68 y.o. female.  Patient is 6 months s/p  left primary total knee replacement  Anterior knee pain: No  Has improved pain  Is in physical therapy  No  Problems w incision  No  Is  happy with result  Yes  Opiod free: Yes   No complaints. Continues home exercise program.         Past Medical History:   Diagnosis Date    BMI 37.0-37.9, adult     Breast cancer 2019     Left breast, IDC with lymph node metastisis    Colon polyps     Colon polyps     Diabetes mellitus type II     Diverticulosis     history of diverticulosisseen on colonoscopy at age 48. Repeat recommended at age 58. Done by     Elevated blood protein     History of elevated protein. Apparently has seen  for extensive workup including bone marrow biopsy    History of shingles     Hyperlipidemia     Hypertension     Microalbuminuria     due 2 diabetes    Mild vitamin D deficiency     . A low vitamin D    Primary osteoarthritis of left knee 2023    Thyroid disease     hypothyroidism    Usual hyperplasia of lactiferous duct     Not sure        Past Surgical History:   Procedure Laterality Date    ARTHROPLASTY, KNEE, TOTAL, SIGHT ASSISTED Left 2024    Procedure: ARTHROPLASTY, KNEE, SIGHT ASSISTED;  Surgeon: Jamshid Reece MD;  Location: [facility];  Service: Orthopedics;  Laterality: Left;  bmi - 34.57    BREAST BIOPSY Left 2019    IDC with mets to node    BREAST BIOPSY Right 2019    core bx, benign node    BREAST BIOPSY Left 10/14/2019    MRI Core bx, + IDC    BREAST BIOPSY Left 10/08/2019    Core bx, ADH    BREAST SURGERY  2020    Breast cancer     SECTION      COLONOSCOPY N/A 10/08/2020    Procedure: COLONOSCOPY;  Surgeon: Shreya Mendoza MD;  Location: [address]);  Service: Endoscopy;  Laterality: N/A;  COVID screening on 10/5/20 [facility]    HYSTERECTOMY      INSERTION OF BREAST  TISSUE EXPANDER Bilateral 03/24/2020    Procedure: INSERTION, TISSUE EXPANDER, BREAST BILATERAL;  Surgeon: Michael Solorzano MD;  Location: [facility] OR 2ND FLR;  Service: Plastics;  Laterality: Bilateral;    INSERTION OF TUNNELED CENTRAL VENOUS CATHETER (CVC) WITH SUBCUTANEOUS PORT Right 10/04/2019    Procedure: [id number]-YSLJ-H-CATH RIGHT (CONSENT AM OF) 1.0 hr case;  Surgeon: Elena Lopez MD;  Location: [facility] OR 2ND FLR;  Service: General;  Laterality: Right;    OOPHORECTOMY      SENTINEL LYMPH NODE BIOPSY Left 03/24/2020    Procedure: BIOPSY, LYMPH NODE, SENTINEL LEFT;  Surgeon: Elena Lopez MD;  Location: [facility] OR 2ND FLR;  Service: General;  Laterality: Left;    SIMPLE MASTECTOMY Bilateral 03/24/2020    Procedure: MASTECTOMY, SIMPLE BILATERAL;  Surgeon: Elena Lopez MD;  Location: [facility] OR [facility] FLR;  Service: General;  Laterality: Bilateral;    TISSUE EXPANDER REMOVAL Bilateral 07/30/2020    Procedure: REMOVAL, TISSUE EXPANDER;  Surgeon: Michael Solorzano MD;  [facility];  Service: Plastics;  Laterality: Bilateral;        Current Outpatient Medications   Medication Instructions    amLODIPine (NORVASC) 5 mg, Oral, Daily    aspirin (ECOTRIN) 81 mg, Oral, 2 times daily    atorvastatin (LIPITOR) 10 mg, Oral, Daily    blood-glucose meter kit DISPENSE : BLOOD TEST STRIPS AND LANCETS PATIENT TEST BLOOD SUGARS TWICE DAILY  TEST STRIPS: 50 EACH, REFILL 5  LANCETS:  50 EACH , REFILL 5    cetirizine (ZYRTEC) 10 mg, Oral, Daily PRN    ciclopirox 0.77 % Gel Topical, 2 times daily, To thickened discolored toenails.    famotidine (PEPCID) 20 mg, Oral, 2 times daily    gabapentin (NEURONTIN) 800 mg, Oral, 3 times daily    hydrocortisone 1 % cream Topical (Top), 2 times daily PRN    irbesartan (AVAPRO) 300 MG tablet Take 1 tablet by mouth once daily    letrozole (FEMARA) 2.5 mg, Oral, Daily    levothyroxine (SYNTHROID) 200 mcg, Oral, Daily, Except on Sundays take 1/2 tablet    LIDOcaine (LIDODERM) 5 % 1  patch, Transdermal, As needed (PRN), Remove & Discard patch within 12 hours or as directed by MD    lifitegrast (XIIDRA) 5 % Dpet Apply 1 drop to  both eyes  2 (two) times daily.    MOUNJARO 10 mg, Subcutaneous, Every 7 days    polyethylene glycol (GLYCOLAX) 17 gram/dose powder Mix 17 g (1 capful) with juice or water an drink 2 (two) times daily.    prasterone, dhea, (INTRAROSA) 6.5 mg Inst 1 suppository, Vaginal, Nightly    VITAMIN D2 50,000 Units, Oral, Every 14 days        Review of patient's allergies indicates:   Allergen Reactions    Amoxil [amoxicillin] Rash       Review of Systems   Constitutional: Negative for fever and malaise/fatigue.   Eyes:  Negative for blurred vision.   Cardiovascular:  Negative for chest pain.   Respiratory:  Negative for shortness of breath.    Skin:  Negative for poor wound healing.   Musculoskeletal:  Positive for joint pain and myalgias.   Genitourinary:  Negative for bladder incontinence.   Neurological:  Negative for dizziness, numbness and paresthesias.   Psychiatric/Behavioral:  Negative for altered mental status.        The patient's relevant past medical, surgical, and social history was reviewed in Epic.       Objective:      VITAL SIGNS: Ht 5' (1.524 m)   Wt 74.5 kg (164 lb 3.9 oz)   LMP  (LMP Unknown)   BMI 32.08 kg/m²     General    Nursing note and vitals reviewed.  Constitutional: She is oriented to person, place, and time. She appears well-developed and well-nourished.   Neurological: She is alert and oriented to person, place, and time.             Left Knee Exam     Inspection   Scars: present    Tenderness   The patient is experiencing no tenderness.     Range of Motion   Extension:  0   Flexion:  100     Tests   Meniscus   Susana:  Medial - negative   Stability   Lachman: normal (-1 to 2mm)     Other   Sensation: normal    Muscle Strength   Left Lower Extremity   Quadriceps:  5/5   Hamstrin/5        Imaging  X-Ray Knee 3 View Left  Narrative:  EXAMINATION:  XR KNEE 3 VIEW LEFT    CLINICAL HISTORY:  Aftercare following joint replacement surgery    TECHNIQUE:  AP, lateral, and Merchant views of the left knee were performed.    COMPARISON:  08/27/2024    FINDINGS:  Prior left knee arthroplasty.  Hardware projects in similar alignment without evidence for interval loosening or failure.  No acute periprosthetic fracture.  Impression: As above.    Electronically signed by: Tyson Gupta  Date:    11/27/2024  Time:    11:01    I have reviewed the above radiograph and agree with the findings stated by the radiologist.           Assessment:       Maria Elena Tavares is a 68 y.o. female seen in the office today for   1. Aftercare following left knee joint replacement surgery     Overall patient appears to be doing well and is happy with the result of the knee arthroplasty. They can continue activities as tolerated avoiding high impact activities.  I would like to see the patient back In 6 months with xrays.       Plan:       Maria Elena was seen today for pain.    Diagnoses and all orders for this visit:    Aftercare following left knee joint replacement surgery          Diagnoses and plan discussed with the patient, as well as the expected course and duration of his symptoms.  All questions and concerns were addressed prior to the end of the visit.   Instructed patient to call office if they have any future questions/concerns or to schedule apt. Patient will return to see me if symptoms worsen or fail to improve    Note dictated with voice recognition software, please excuse any grammatical errors.        Kerline Keys PA-C      Department of Orthopedic Surgery  [facility]  Office: [phone]  11/27/2024

## 2024-12-02 ENCOUNTER — CLINICAL SUPPORT (OUTPATIENT)
Dept: REHABILITATION | Facility: HOSPITAL | Age: 69
End: 2024-12-02
Payer: MEDICARE

## 2024-12-02 DIAGNOSIS — M79.602 PAIN IN BOTH UPPER EXTREMITIES: ICD-10-CM

## 2024-12-02 DIAGNOSIS — I97.2 POST-MASTECTOMY LYMPHEDEMA SYNDROME: Primary | Chronic | ICD-10-CM

## 2024-12-02 DIAGNOSIS — Z91.89 AT RISK FOR LYMPHEDEMA: ICD-10-CM

## 2024-12-02 DIAGNOSIS — R29.898 WEAKNESS OF BOTH UPPER EXTREMITIES: ICD-10-CM

## 2024-12-02 DIAGNOSIS — Z90.13 H/O BILATERAL MASTECTOMY: ICD-10-CM

## 2024-12-02 DIAGNOSIS — M79.601 PAIN IN BOTH UPPER EXTREMITIES: ICD-10-CM

## 2024-12-02 PROCEDURE — 97140 MANUAL THERAPY 1/> REGIONS: CPT

## 2024-12-02 PROCEDURE — 97535 SELF CARE MNGMENT TRAINING: CPT

## 2024-12-02 NOTE — PROGRESS NOTES
Physical Therapy Daily Treatment Note     Name: Maria Elena Tavares  [facility] Number: [id number]    Therapy Diagnosis:   Encounter Diagnoses   Name Primary?    Post-mastectomy lymphedema syndrome Yes    Pain in both upper extremities     At risk for lymphedema     H/O bilateral mastectomy     Weakness of both upper extremities      Visit Date: 12/2/2024    Physician: Tere Villagran MD     Physician Orders: PT Eval and Treat   Medical Diagnosis from Referral:   Diagnosis   C50.912,C77.3 (ICD-10-CM) - Breast cancer metastasized to axillary lymph node, left   Z90.13 (ICD-10-CM) - H/O bilateral mastectomy   Evaluation Date: 8/21/2024  Authorization Period Expiration: 11/15/2024- date extension requested  Plan of Care Expiration: 11/14/24- updated since active started 10/21/24 - 1/7/25  Visit # / Visits authorized: 7/20    FOTO 11/11/24     Time In: 940a  Time Out: 1040a  Total Billable Time:  60 minutes     Precautions: Standard, cancer, and lymphedema  Subjective     Pt reports: pt has to assist her sister today with her medical visits.   She was somewhat compliant with home exercise/compression support program.  Response to previous treatment: still awaiting delivery of new compression arm sleeve.  Pt states she ordered 1 of 3 authorized and may consider one more now. Advised to wait and confirm 1 sleeve fit and support.  Pt is performing HEP and wearing temporary compression daily.  Feels her upper arm is getting smaller.   Functional change: improved reach and movement with less pain.   Pain: 2/10  Location: stiffness under L arm and side of chest   R UE to be followed with OT per orthopedic referral.       Objective      Female amb to dept, no device, Tubigrip to L arm- segments of E and F  Amount of Swelling/Location of Swelling: mild to L UE forearm, elbow, with soft tissue fullness of upper arm, post hanging tissue with fullness to tissues B UE post upper arm, body habitus   Tissue fold/crease mid upper arm  Surgical  "site: ant chest B Mastectomy, R chest prior port site, R SLNB, L SLNB then AND  Skin Integrity: radiation skin changes to L ant chest wall to axilla, dryness, color changes   Palpation/Texture: fullness along L forearm ridge, soft fullness of tissue bulk post arm   Circulation: intact   Posture:  FH, rounded shoulders, protraction, sh with slight IR   Hand Dominant: R    Girth Measurements (in centimeters)  LANDMARK RIGHT UE  8/21/24 LEFT UE  8/21/24 L UE  11/11/24 Change  L DIFF  At eval   E + 6" 39.0 cm 41.0 cm 39.0 2.0 2.0 cm   E + 4" 37.5 cm 40.0 cm 38.5 1.5 2.5 cm   E + 2" 34.0 cm 37.5 cm 35.5 2.0 3.5 cm   Elbow 28.5 cm 32.0 cm 28.5 3.5 3.5 cm   W+ 8" 29.0 cm 30.5 cm 29.0 1.5 2.5 cm   W +  6" 27.0 cm 29.0 cm 27.5 1.5 2.0 cm   W + 4" 23.0 cm 24.5 cm 22.5 2.0 1.5 cm   Wrist 16.5 cm 17.5 cm 16.5 1.0 1.0 cm   DPC 20.0 cm 20.0 cm 19.3 0.7 0 cm   IP Thumb 6.3 cm 6.5 cm 5.8 0.7 0.2 cm     Treatment:   Maria Elena received the following manual therapy techniques:- Manual Lymphatic Drainage were applied to the: L UE/quadrant  for 35 minutes, including: MLD and short stretch compression bandaging     MANUAL LYMPHATIC DRAINAGE:  Drainage of L upper quadrant and L UE   While supine with arm elevated,  Drainage along neck, B subclavian regions,  Across ant chest and top of shoulder,  Axillary regions,  drainage of entire UE especially upper arm, forearm, wrist and hand with return of all areas proximally,  In sidelying stimulation of substitution pathways,  drainage post arm, and across back and down along trunk  Gentle soft tissue mobilizations to ant, lateral chest wall to axilla with AAROM    MULTILAYERED BANDAGING:  Issued supplies and bandaged L UE with cotton stockinet, Arteflex padding, 6cm, 8cm, and 10 cm rosidal rolls - cotton padding at elbow crease, bandaged from hand to prox arm,  To leave intact 12 -24 hours, dc with any problems,  Return rolled bandages next session. Confirmed wash and wear schedules.     tubigrip " F + G to use on upper arm, or full Tubigrip F as one full segment - awaiting new compression.   Advised as temporary compression daytime when not in therapy bandaging.     Maria Elena received therapeutic exercises to develop strength, endurance, ROM, flexibility, and posture for 25 minutes including: review of breathing, posture, arom, aarom, rotation of chest and ribs, UE reach for scapular mobility.   Assisted L UE and shoulder motions with soft tissue massage, skin mobility, drainage to trunk, chest, axilla and upper arm.  Instructed in and performed, assisted sh ROM  Supine  Snow dev style motions with LTR + soft tissue mobilizations  along trunk, chest and axilla  Dowel assisted sh flexion  Standing  Scapular retractions  Scaption overhead + spinal elongation and trunk reach  Table walk aways for sh flex and spinal elongation   Wall posture trunk extensions  Sh opening and extension along wall  Bellaire and arrow style motions    Added strengthening for 3x weekly  Issued green theraband rowing with retraction:  EE to thighs, EF to waist x 15 each  1-2 # per UE , scaption to 90 with 5 second hold x 10  Added to home program.    Continue at home:  Daily stretching  Soft tissue mobilizations, massage  Arm on pillow overhead in sh flex abd and er- assisted ROM, assisted tissue mobility   Reviewed HEP and progressions.   THERAPEUTIC EXERCISES:  Continue HEP of AROM, stretching, and postural correction.     Home Exercises Provided and Patient Education Provided:  Self Care Home Management Training/Functional Therapeutic Activity   New compression options:  Sent message for updates 12/2/24 - awaiting her choice of 1 sleeve, she may then choose second of three that were authorized.  Therapy sent exact size/brand/style of compression arm sleeve selected.  Copayment totals ~$45 for 3 sleeves, pt choice to proceed with one sleeve ~$15 for one- copayment made, awaiting delivery.   [facility]    Therapist  "measured again 11/11/24  W 17.5cm- 16.5  E 29.0 - 28.5  A 41.0- 39.0  Length 43cm  Confirmed choice of Large regular to allow elbow and upper arm containment size    L UE compression arm  sleeve 20-30mmHg  JOBST Rosario Lite arm sleeve 20-30mmHg with 2" silicone top band  Size Large, length regular, color beige  Orders allow -Quantity 3 / L arm  Initial choice for 1 sleeve- based on her financial situation and co pay.   Reviewed don/doff, position, fit, wear schedules.  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  orders new compression Dr. Villagran 10/21/24  Routed to [facility] 10/14/24  Updated product selection 11/4/24  Updated quantity choice 11/11/24, 12/2/24, awaiting delivery.  OT Jesus Alberto for R UE per orthopedics    Present compression:  none  Orders and recommendations: L UE compression arm sleeve 20-30mmHg  PATIENT/FAMILY Education: bandaging/compression wear schedule,  HEP,  Beginning of self massage,  Self or assisted bandaging, compression options, and Risk reduction    Written Home Exercises Provided: Patient instructed to cont prior HEP.  Exercises were reviewed and Maria Elena was able to demonstrate them prior to the end of the session.  Maria Elena demonstrated good  understanding of the education provided.     Assessment   Maria Elena is a 68 y.o. female referred to outpatient Physical Therapy with a medical diagnosis of   Diagnosis   C50.912,C77.3 (ICD-10-CM) - Breast cancer metastasized to axillary lymph node, left   Z90.13 (ICD-10-CM) - H/O bilateral mastectomy   This patient presents s/p L breast CA, B mastectomy,R SLNB,  L SLNB to AND with radiation, chemotherapy, no reconstruction, noted fatigue and upper back and shoulder discomfort  resulting in: L UE/quadrant mod lymphedema of the L UE with pain in B axilla, " chest wall, soft tissue restrictions, tightness, faulty posture and bio mechanics, increased pain, increased stiffness in the ant chest wall, axilla, shoulder B UE, as well as difficulty performing reach, lift, carry, and placing the pt at higher risk of infection.   Patient has stage II lymphedema secondary to Breast cancer/cancer management.  She is participating in active (Phase I) stage of treatment.  A compression garment is required to reduce and maintain limb girth and prevent progression of lymphedema to prevent infections, wounds, pain and orthopedic issues.     Showing softness with tissue fullness post upper arm of lymphedema risk as well as body habitus.  Improving understanding of management and support to maintain reductions, improve posture, assist tissue mobility and drainage.  Pt is awaiting a new compression arm sleeve and should be able to shift focus to Phase II, self care/management.  Advancing HEP, lymphedema risk reduction and self management skills.  Able to decrease frequency with DC planning in place.   Post mastectomy mild soft tissue tightness L UE, ant chest, pec region to axilla although showing improved ROM, soft tissue mobility, decreasing girth as well as understanding of plan to manage post op mastectomy, radiations changes, and lymphedema risk to L UE/upper quadrant.  Maria Elena Is progressing well towards her goals.   Pt prognosis is Good.     Pt will continue to benefit from skilled outpatient physical therapy to address the deficits listed in the problem list box on initial evaluation, provide pt/family education and to maximize pt's level of independence in the home and community environment.     Anticipated Barriers for therapy: B complaints, insurance and cost     The following goals were discussed with the patient and patient is in agreement with them as to be addressed in the treatment plan.   Short Term Goals: (6 weeks)  1. Decrease girth in L upper arm, forearm, wrist and  hand by up to 1cm to allow for improved UE symmetry, clothing choice, ROM, and UE function.  met)  2. Patient will demonstrate 100% knowledge of lymphedema precautions and signs of infection to allow for reduced risk of lymphedema, reduced risk of infection, and/or exacerbation.  ( met)  3. Patient will perform self-bandaging techniques to enhance lymphatic drainage, tissue density, and self management of condition. Moving to sleeve)  4. Patient will perform self lymph drainage techniques to enhance lymphatic drainage and mobility.  (progressing,  5. Patient will tolerate daily activities with multilayered bandaging to enhance lymphatic drainage and skin elasticity.  met)  6. Pt to show improved AROM to allow sh flex by 10 B UE to allow reach to second cabinet shelf with min to no reported onset of pain (progressing,      Long Term Goals: (12 weeks)  1. Patient to show decreased girth in L UE by up to 2 cm to allow for improved UE symmetry, clothing choice, ROM, and UE function.  (progressing, not met)  2. Patient will show reduction in density to mild or less with improved contour of limb to allow for improved cosmesis, improved lymphatic drainage, and functional mobility.  (progressing, not met)  3. Patient to naseem/doff compression garment with daily compliance to support lymphatic mobility and skin elasticity.  (progressing, not met)  4. Pt to show improved postural awareness and alignment.  (progressing, not met)  5. Pt to be I and compliant with HEP to allow for improved ROM, reach and carry with functional endurance and strength.    (progressing, not met)     Plan   Plan of care Certification: 8/21/2024 to 11/14/2024.- updated since active therapy was delayed start 10/14/24- 1/7/25.     Outpatient Physical Therapy 2 times weekly for 10 weeks to include the following interventions: Patient Education, Self Care, Therapeutic Activities, and Therapeutic Exercise. Complete Decongestive Therapy- compression and home  equipment needs to be addressed and assisted.    Awaiting new compression arm sleeve from DME.  Return in 3-4 weeks for reassessment and confirmation of plan.     Decrease frequency 1 x weekly coordinate visits with OT for R UE  Pt to contact [facility] for one  sleeve.      Montse Nuñez, PT

## 2024-12-04 DIAGNOSIS — M79.601 RIGHT ARM PAIN: ICD-10-CM

## 2024-12-04 NOTE — TELEPHONE ENCOUNTER
No care due was identified.  [facility] Embedded Care Due Messages. Reference number: [id number].   12/04/2024 8:28:04 AM CST

## 2024-12-04 NOTE — TELEPHONE ENCOUNTER
Refill Encounter    PCP Visits: Recent Visits  Date Type Provider Dept   11/25/24 Office Visit Estephania San MD [facility]   09/10/24 Office Visit Estephania San MD [facility]   07/30/24 Office Visit Estephania San MD [facility]   07/10/24 Office Visit Estephania San MD [facility]   02/27/24 Office Visit Estephania San MD [facility]   Showing recent visits within past 360 days and meeting all other requirements  Future Appointments  No visits were found meeting these conditions.  Showing future appointments within next 720 days and meeting all other requirements     Last 3 Blood Pressure:   BP Readings from Last 3 Encounters:   09/13/24 121/74   09/09/24 120/62   08/12/24 132/78     Preferred Pharmacy:   [facility] [place] [address]  [facility] [address]  Phone: [phone] Fax: [phone]    [id number] [place] [address] AT [facility] [address]  [address] [place] [address] [place] [address]  Phone: [phone] Fax: [phone]    Requested RX:  Requested Prescriptions     Pending Prescriptions Disp Refills    gabapentin (NEURONTIN) 800 MG tablet 90 tablet 0     Sig: Take 1 tablet (800 mg total) by mouth 3 (three) times daily.      RX Route: Normal

## 2024-12-05 RX ORDER — GABAPENTIN 800 MG/1
800 TABLET ORAL 3 TIMES DAILY
Qty: 90 TABLET | Refills: 0 | Status: SHIPPED | OUTPATIENT
Start: 2024-12-05 | End: 2025-12-05

## 2024-12-18 ENCOUNTER — CLINICAL SUPPORT (OUTPATIENT)
Dept: REHABILITATION | Facility: HOSPITAL | Age: 69
End: 2024-12-18
Payer: MEDICARE

## 2024-12-18 DIAGNOSIS — R29.898 WEAKNESS OF BOTH UPPER EXTREMITIES: ICD-10-CM

## 2024-12-18 DIAGNOSIS — Z91.89 AT RISK FOR LYMPHEDEMA: ICD-10-CM

## 2024-12-18 DIAGNOSIS — I97.2 POST-MASTECTOMY LYMPHEDEMA SYNDROME: Primary | Chronic | ICD-10-CM

## 2024-12-18 DIAGNOSIS — M79.602 PAIN IN BOTH UPPER EXTREMITIES: ICD-10-CM

## 2024-12-18 DIAGNOSIS — M79.601 PAIN IN BOTH UPPER EXTREMITIES: ICD-10-CM

## 2024-12-18 DIAGNOSIS — Z90.13 H/O BILATERAL MASTECTOMY: ICD-10-CM

## 2024-12-18 PROCEDURE — 97110 THERAPEUTIC EXERCISES: CPT

## 2024-12-18 PROCEDURE — 97140 MANUAL THERAPY 1/> REGIONS: CPT

## 2024-12-18 NOTE — PROGRESS NOTES
Physical Therapy Daily Treatment Note     Name: Maria Elena Tavares  [facility] Number: [id number]    Therapy Diagnosis:   Encounter Diagnoses   Name Primary?    Post-mastectomy lymphedema syndrome Yes    Pain in both upper extremities     At risk for lymphedema     H/O bilateral mastectomy     Weakness of both upper extremities      Visit Date: 12/18/2024    Physician: Tere Villagran MD     Physician Orders: PT Eval and Treat   Medical Diagnosis from Referral:   Diagnosis   C50.912,C77.3 (ICD-10-CM) - Breast cancer metastasized to axillary lymph node, left   Z90.13 (ICD-10-CM) - H/O bilateral mastectomy   Evaluation Date: 8/21/2024  Authorization Period Expiration: 11/15/2024- date extension requested  Plan of Care Expiration: 11/14/24- updated since active started 10/21/24 - 1/7/25  Visit # / Visits authorized: 8/20    FOTO 11/11/24     Time In: 1005a  Time Out: 1105a  Total Billable Time:  60 minutes     Precautions: Standard, cancer, and lymphedema  Subjective     Pt reports: still awaiting new compression arm sleeve - advised on vendor updates and status tracker pending delivery.   She was somewhat compliant with home exercise/compression support program.  Response to previous treatment: improved ROM and mobility of her L shoulder.  Mild tightness and pulling under her arm but less than before.  Using temporary support sleeves until she receives her ordered product.  Pt choice one of 3 per copay cost. Performing HEP and wearing temporary compression daily.  Feels her upper arm is getting smaller.   Functional change: improved reach and movement with less pain. Pt is now working at [employer].   Pain: 2/10  Location: stiffness under L arm and side of chest   R UE to be followed with OT per orthopedic referral.       Objective      Female amb to dept, no device, pt has Tubigrip for her L arm- segments of E and F  Amount of Swelling/Location of Swelling: mild to L UE forearm, elbow, with soft tissue fullness of upper arm,  "post hanging tissue with fullness to tissues B UE post upper arm, body habitus   Tissue fold/crease mid upper arm  Surgical site: ant chest B Mastectomy, R chest prior port site, R SLNB, L SLNB then AND  Skin Integrity: radiation skin changes to L ant chest wall to axilla, dryness, color changes   Palpation/Texture: fullness along L forearm ridge, soft fullness of tissue bulk post arm   Circulation: intact   Posture:  FH, rounded shoulders, protraction, sh with slight IR   Hand Dominant: R    Shoulder flex active L  no added pain  L SH ROM, end ranges with mild pulling tightness in axilla and pec region    Girth Measurements (in centimeters)  LANDMARK RIGHT UE  8/21/24 LEFT UE  8/21/24 L UE  11/11/24 L UE  12/18/24 Change  L DIFF  At eval   E + 6" 39.0 cm 41.0 cm 39.0 39.0 2.0 2.0 cm   E + 4" 37.5 cm 40.0 cm 38.5 38.0 1.5 2.5 cm   E + 2" 34.0 cm 37.5 cm 35.5 35.5 2.0 3.5 cm   Elbow 28.5 cm 32.0 cm 28.5 28.0 3.5 3.5 cm   W+ 8" 29.0 cm 30.5 cm 29.0 28.5 1.5 2.5 cm   W +  6" 27.0 cm 29.0 cm 27.5 26.5 1.5 2.0 cm   W + 4" 23.0 cm 24.5 cm 22.5 22.5 2.0 1.5 cm   Wrist 16.5 cm 17.5 cm 16.5 16.5 1.0 1.0 cm   DPC 20.0 cm 20.0 cm 19.3 19.5 0.7 0 cm   IP Thumb 6.3 cm 6.5 cm 5.8 6.0 0.7 0.2 cm     Treatment:   Maria Elena received the following manual therapy techniques:- Manual Lymphatic Drainage were applied to the: L UE/quadrant  for 45 minutes, including: MLD and short stretch compression bandaging     MANUAL LYMPHATIC DRAINAGE:  Drainage of L upper quadrant and L UE   While supine with arm elevated,  Drainage along neck, B subclavian regions,  Across ant chest and top of shoulder,  Axillary regions,  drainage of entire UE especially upper arm, forearm, wrist and hand with return of all areas proximally,  In sidelying stimulation of substitution pathways,  drainage post arm, and across back and down along trunk  Gentle soft tissue mobilizations to ant, lateral chest wall to axilla with AAROM    MULTILAYERED BANDAGING:  " Issued supplies and bandaged L UE with cotton stockinet, Arteflex padding, 6cm, 8cm, and 10 cm rosidal rolls - cotton padding at elbow crease, bandaged from hand to prox arm,  To leave intact 12 -24 hours, dc with any problems,  Return rolled bandages next session. Confirmed wash and wear schedules.     tubigrip F + G to use on upper arm, or full Tubigrip F as one full segment - awaiting new compression.   Advised as temporary compression daytime when not in therapy bandaging.     Maria Elena received therapeutic exercises to develop strength, endurance, ROM, flexibility, and posture for 15 minutes including: review of breathing, posture, arom, aarom, rotation of chest and ribs, UE reach for scapular mobility.   Assisted L UE and shoulder motions with soft tissue massage, skin mobility, drainage to trunk, chest, axilla and upper arm.  Instructed in and performed, assisted sh ROM  Supine  Snow dev style motions with LTR + soft tissue mobilizations  along trunk, chest and axilla  Dowel assisted sh flexion  Arm placed in flex abd and er on pillow + self massage, rest and relax  Standing  Scapular retractions- may add theraband for strengthening - verbal review.  Scaption overhead + spinal elongation and trunk reach  Table walk aways for sh flex and spinal elongation   Wall posture trunk extensions  Sh opening and extension along wall  Shirley and arrow style motions    HEP for added strengthening - 3x weekly  green theraband rowing with retraction:  EE to thighs, EF to waist x 15 each  1-2 # per UE , scaption to 90 with 5 second hold x 10  Added to home program.    Continue at home:  Daily stretching  Soft tissue mobilizations, massage  Arm on pillow overhead in sh flex abd and er- assisted ROM, assisted tissue mobility   Reviewed HEP and progressions.   THERAPEUTIC EXERCISES:  Continue HEP of AROM, stretching, and postural correction.     Home Exercises Provided and Patient Education Provided:  Self Care Home Management  "Training/Functional Therapeutic Activity   New compression options:  Sent message for updates 12/2/24 - awaiting her choice of 1 sleeve, she may then choose second of three that were authorized.  Status tracker showing ordered awaiting delivery.    Questioned status 12/18/24  Therapy provided size/brand/style of compression arm sleeve   Copayment totals ~$45 for 3 sleeves, pt choice to proceed with one sleeve ~$15 for one- copayment made, awaiting delivery.   [facility] for copayment.    Therapist measured   W 17.5cm- 16.5  E 29.0 - 28.5  A 41.0- 39.0  Length 43cm  Confirmed choice of Large regular to allow elbow and upper arm containment size    L UE compression arm  sleeve 20-30mmHg  JOBST Rosario Lite arm sleeve 20-30mmHg with 2" silicone top band  Size Large, length regular, color beige  Orders allow -Quantity 3 / L arm  Initial choice for 1 sleeve- based on her financial situation and co pay.     Reviewed don/doff, position, fit, wear schedules.  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 Villagran 10/21/24  Routed to [facility] 10/14/24  Updated product selection 11/4/24  Updated quantity choice 11/11/24, 12/2/24, awaiting delivery.  OT Jesus Alberto for R UE per orthopedics    Present compression:  none  Orders and recommendations: L UE compression arm sleeve 20-30mmHg  PATIENT/FAMILY Education: bandaging/compression wear schedule,  HEP,  Beginning of self massage,  Self or assisted bandaging, compression options, and Risk reduction    Written Home Exercises Provided: Patient instructed to cont prior HEP.  Exercises were reviewed and Maria Elena was able to demonstrate them prior to the end of the session.  Maria Elena demonstrated good  understanding of the education provided.     Assessment   Maria Elena is a 68 y.o. female referred to outpatient Physical Therapy with a medical diagnosis of   Diagnosis   C50.912,C77.3 " (ICD-10-CM) - Breast cancer metastasized to axillary lymph node, left   Z90.13 (ICD-10-CM) - H/O bilateral mastectomy   This patient presents s/p L breast CA, B mastectomy,R SLNB,  L SLNB to AND with radiation, chemotherapy, no reconstruction, noted fatigue and upper back and shoulder discomfort  resulting in: L UE/quadrant mod lymphedema of the L UE with pain in B axilla, chest wall, soft tissue restrictions, tightness, faulty posture and bio mechanics, increased pain, increased stiffness in the ant chest wall, axilla, shoulder B UE, as well as difficulty performing reach, lift, carry, and placing the pt at higher risk of infection.   Patient has stage II lymphedema secondary to Breast cancer/cancer management.  She is participating in active (Phase I) stage of treatment.  A compression garment is required to reduce and maintain limb girth and prevent progression of lymphedema to prevent infections, wounds, pain and orthopedic issues.     Improved sh ROM, less tissue tightness, improved postural awareness and understanding of HEP and progression to manage her conditions.  L UE with reducing girth and density although lymphedema persists despite active therapy, exercise, massage, and ongoing compression support and needs.   Mild firmness in axilla to ant chest post CA management, with softness and tissue fullness post upper arm with lymphedema risk as well as body habitus.   DC planning in place, awaiting day compression sleeve support and advancing HEP.   Maria Elena Is progressing well towards her goals.   Pt prognosis is Good.     Pt will continue to benefit from skilled outpatient physical therapy to address the deficits listed in the problem list box on initial evaluation, provide pt/family education and to maximize pt's level of independence in the home and community environment.     Anticipated Barriers for therapy: B complaints, insurance and cost     The following goals were discussed with the patient and  patient is in agreement with them as to be addressed in the treatment plan.   Short Term Goals: (6 weeks)  1. Decrease girth in L upper arm, forearm, wrist and hand by up to 1cm to allow for improved UE symmetry, clothing choice, ROM, and UE function.  met)  2. Patient will demonstrate 100% knowledge of lymphedema precautions and signs of infection to allow for reduced risk of lymphedema, reduced risk of infection, and/or exacerbation.  ( met)  3. Patient will perform self-bandaging techniques to enhance lymphatic drainage, tissue density, and self management of condition. Moving to sleeve)  4. Patient will perform self lymph drainage techniques to enhance lymphatic drainage and mobility.  (progressing,  5. Patient will tolerate daily activities with multilayered bandaging to enhance lymphatic drainage and skin elasticity.  met)  6. Pt to show improved AROM to allow sh flex by 10 B UE to allow reach to second cabinet shelf with min to no reported onset of pain (met      Long Term Goals: (12 weeks)  1. Patient to show decreased girth in L UE by up to 2 cm to allow for improved UE symmetry, clothing choice, ROM, and UE function.  met)  2. Patient will show reduction in density to mild or less with improved contour of limb to allow for improved cosmesis, improved lymphatic drainage, and functional mobility.   met)  3. Patient to naseem/doff compression garment with daily compliance to support lymphatic mobility and skin elasticity.  (progressing, not met)  4. Pt to show improved postural awareness and alignment.  (progressing,   5. Pt to be I and compliant with HEP to allow for improved ROM, reach and carry with functional endurance and strength.    (Progressing ongoing)     Plan   Plan of care Certification: 8/21/2024 to 11/14/2024.- updated since active therapy was delayed start 10/14/24- 1/7/25.     Outpatient Physical Therapy 2 times weekly for 10 weeks to include the following interventions: Patient Education, Self  Care, Therapeutic Activities, and Therapeutic Exercise. Complete Decongestive Therapy- compression and home equipment needs to be addressed and assisted.    Awaiting new compression arm sleeve from DME.  Return in 3-4 weeks for compression and confirmation of plan.     Decrease frequency 1 x weekly coordinate visits with OT for R UE  Pt to contact [facility] for one  sleeve.      Montse Nuñez, PT

## 2024-12-27 ENCOUNTER — PATIENT MESSAGE (OUTPATIENT)
Dept: REHABILITATION | Facility: HOSPITAL | Age: 69
End: 2024-12-27
Payer: MEDICARE

## 2024-12-28 DIAGNOSIS — I10 HYPERTENSION, UNSPECIFIED TYPE: ICD-10-CM

## 2024-12-28 NOTE — TELEPHONE ENCOUNTER
No care due was identified.  [facility] Embedded Care Due Messages. Reference number: [id number].   12/28/2024 12:18:43 PM CST

## 2024-12-29 NOTE — TELEPHONE ENCOUNTER
Refill Routing Note   Medication(s) are not appropriate for processing by [facility] for the following reason(s):        Drug-disease interaction: irbesartan and Orthostatic hypotension     [facility] action(s):  Defer               Appointments  past 12m or future 3m with PCP    Date Provider   Last Visit   11/25/2024 Estephania San MD   Next Visit   Visit date not found Estephania San MD   ED visits in past 90 days: 0        Note composed:4:51 PM 12/29/2024

## 2024-12-30 RX ORDER — IRBESARTAN 300 MG/1
TABLET ORAL
Qty: 90 TABLET | Refills: 0 | Status: SHIPPED | OUTPATIENT
Start: 2024-12-30

## 2025-01-13 ENCOUNTER — PATIENT MESSAGE (OUTPATIENT)
Dept: PODIATRY | Facility: CLINIC | Age: 70
End: 2025-01-13
Payer: MEDICARE

## 2025-01-21 ENCOUNTER — PATIENT MESSAGE (OUTPATIENT)
Dept: REHABILITATION | Facility: HOSPITAL | Age: 70
End: 2025-01-21
Payer: MEDICARE

## 2025-01-27 ENCOUNTER — PATIENT MESSAGE (OUTPATIENT)
Dept: REHABILITATION | Facility: HOSPITAL | Age: 70
End: 2025-01-27
Payer: MEDICARE

## 2025-02-04 ENCOUNTER — OFFICE VISIT (OUTPATIENT)
Dept: PRIMARY CARE CLINIC | Facility: CLINIC | Age: 70
End: 2025-02-04
Attending: FAMILY MEDICINE
Payer: MEDICARE

## 2025-02-04 VITALS
HEART RATE: 85 BPM | WEIGHT: 162.5 LBS | SYSTOLIC BLOOD PRESSURE: 119 MMHG | BODY MASS INDEX: 31.73 KG/M2 | OXYGEN SATURATION: 100 % | DIASTOLIC BLOOD PRESSURE: 77 MMHG

## 2025-02-04 DIAGNOSIS — C77.3 BREAST CANCER METASTASIZED TO AXILLARY LYMPH NODE, LEFT: ICD-10-CM

## 2025-02-04 DIAGNOSIS — G62.0 NEUROPATHY DUE TO CHEMOTHERAPEUTIC DRUG: ICD-10-CM

## 2025-02-04 DIAGNOSIS — E55.9 VITAMIN D DEFICIENCY: ICD-10-CM

## 2025-02-04 DIAGNOSIS — I97.2 POST-MASTECTOMY LYMPHEDEMA SYNDROME: Chronic | ICD-10-CM

## 2025-02-04 DIAGNOSIS — T45.1X5A NEUROPATHY DUE TO CHEMOTHERAPEUTIC DRUG: ICD-10-CM

## 2025-02-04 DIAGNOSIS — E87.6 HYPOKALEMIA: ICD-10-CM

## 2025-02-04 DIAGNOSIS — C50.912 BREAST CANCER METASTASIZED TO AXILLARY LYMPH NODE, LEFT: ICD-10-CM

## 2025-02-04 DIAGNOSIS — E08.22 DIABETES MELLITUS DUE TO UNDERLYING CONDITION WITH CHRONIC KIDNEY DISEASE, WITHOUT LONG-TERM CURRENT USE OF INSULIN, UNSPECIFIED CKD STAGE: Primary | ICD-10-CM

## 2025-02-04 DIAGNOSIS — E78.5 HYPERLIPIDEMIA ASSOCIATED WITH TYPE 2 DIABETES MELLITUS: ICD-10-CM

## 2025-02-04 DIAGNOSIS — D50.0 IRON DEFICIENCY ANEMIA DUE TO CHRONIC BLOOD LOSS: ICD-10-CM

## 2025-02-04 DIAGNOSIS — E11.69 HYPERLIPIDEMIA ASSOCIATED WITH TYPE 2 DIABETES MELLITUS: ICD-10-CM

## 2025-02-04 DIAGNOSIS — R20.0 FINGER NUMBNESS: ICD-10-CM

## 2025-02-04 DIAGNOSIS — F41.1 GAD (GENERALIZED ANXIETY DISORDER): ICD-10-CM

## 2025-02-04 DIAGNOSIS — E11.21 CONTROLLED TYPE 2 DIABETES MELLITUS WITH DIABETIC NEPHROPATHY, WITHOUT LONG-TERM CURRENT USE OF INSULIN: ICD-10-CM

## 2025-02-04 PROCEDURE — 99999 PR PBB SHADOW E&M-EST. PATIENT-LVL III: CPT | Mod: PBBFAC,,, | Performed by: FAMILY MEDICINE

## 2025-02-04 RX ORDER — TIRZEPATIDE 10 MG/.5ML
10 INJECTION, SOLUTION SUBCUTANEOUS
Qty: 12 PEN | Refills: 2 | Status: SHIPPED | OUTPATIENT
Start: 2025-02-04

## 2025-02-04 RX ORDER — ERGOCALCIFEROL 1.25 MG/1
50000 CAPSULE ORAL
Qty: 24 CAPSULE | Refills: 0 | Status: SHIPPED | OUTPATIENT
Start: 2025-02-04 | End: 2026-02-04

## 2025-02-10 ENCOUNTER — PATIENT MESSAGE (OUTPATIENT)
Dept: HEMATOLOGY/ONCOLOGY | Facility: CLINIC | Age: 70
End: 2025-02-10

## 2025-02-10 ENCOUNTER — OFFICE VISIT (OUTPATIENT)
Dept: HEMATOLOGY/ONCOLOGY | Facility: CLINIC | Age: 70
End: 2025-02-10
Payer: MEDICARE

## 2025-02-10 ENCOUNTER — LAB VISIT (OUTPATIENT)
Dept: LAB | Facility: HOSPITAL | Age: 70
End: 2025-02-10
Attending: INTERNAL MEDICINE
Payer: MEDICARE

## 2025-02-10 VITALS
TEMPERATURE: 97 F | BODY MASS INDEX: 31.77 KG/M2 | OXYGEN SATURATION: 98 % | WEIGHT: 161.81 LBS | SYSTOLIC BLOOD PRESSURE: 138 MMHG | RESPIRATION RATE: 17 BRPM | DIASTOLIC BLOOD PRESSURE: 86 MMHG | HEIGHT: 60 IN | HEART RATE: 74 BPM

## 2025-02-10 DIAGNOSIS — E11.69 HYPERLIPIDEMIA ASSOCIATED WITH TYPE 2 DIABETES MELLITUS: ICD-10-CM

## 2025-02-10 DIAGNOSIS — C77.3 BREAST CANCER METASTASIZED TO AXILLARY LYMPH NODE, LEFT: Primary | ICD-10-CM

## 2025-02-10 DIAGNOSIS — I15.2 HYPERTENSION ASSOCIATED WITH DIABETES: ICD-10-CM

## 2025-02-10 DIAGNOSIS — M81.6 LOCALIZED OSTEOPOROSIS (LEQUESNE): ICD-10-CM

## 2025-02-10 DIAGNOSIS — E11.21 CONTROLLED TYPE 2 DIABETES MELLITUS WITH DIABETIC NEPHROPATHY, WITHOUT LONG-TERM CURRENT USE OF INSULIN: ICD-10-CM

## 2025-02-10 DIAGNOSIS — E08.22 DIABETES MELLITUS DUE TO UNDERLYING CONDITION WITH DIABETIC CHRONIC KIDNEY DISEASE, UNSPECIFIED CKD STAGE, UNSPECIFIED WHETHER LONG TERM INSULIN USE: ICD-10-CM

## 2025-02-10 DIAGNOSIS — C77.3 BREAST CANCER METASTASIZED TO AXILLARY LYMPH NODE, LEFT: ICD-10-CM

## 2025-02-10 DIAGNOSIS — E78.5 HYPERLIPIDEMIA ASSOCIATED WITH TYPE 2 DIABETES MELLITUS: ICD-10-CM

## 2025-02-10 DIAGNOSIS — C50.912 BREAST CANCER METASTASIZED TO AXILLARY LYMPH NODE, LEFT: ICD-10-CM

## 2025-02-10 DIAGNOSIS — C50.912 BREAST CANCER METASTASIZED TO AXILLARY LYMPH NODE, LEFT: Primary | ICD-10-CM

## 2025-02-10 DIAGNOSIS — Z90.13 H/O BILATERAL MASTECTOMY: ICD-10-CM

## 2025-02-10 DIAGNOSIS — E11.59 HYPERTENSION ASSOCIATED WITH DIABETES: ICD-10-CM

## 2025-02-10 DIAGNOSIS — I97.2 POST-MASTECTOMY LYMPHEDEMA SYNDROME: Chronic | ICD-10-CM

## 2025-02-10 DIAGNOSIS — E55.9 VITAMIN D DEFICIENCY: ICD-10-CM

## 2025-02-10 PROBLEM — C50.919 BREAST CANCER: Status: RESOLVED | Noted: 2020-03-24 | Resolved: 2025-02-10

## 2025-02-10 LAB
ALBUMIN SERPL BCP-MCNC: 3.7 G/DL (ref 3.5–5.2)
ALP SERPL-CCNC: 117 U/L (ref 40–150)
ALT SERPL W/O P-5'-P-CCNC: 11 U/L (ref 10–44)
ANION GAP SERPL CALC-SCNC: 10 MMOL/L (ref 8–16)
AST SERPL-CCNC: 19 U/L (ref 10–40)
BASOPHILS # BLD AUTO: 0.03 K/UL (ref 0–0.2)
BASOPHILS NFR BLD: 0.5 % (ref 0–1.9)
BILIRUB SERPL-MCNC: 0.5 MG/DL (ref 0.1–1)
BUN SERPL-MCNC: 22 MG/DL (ref 8–23)
CALCIUM SERPL-MCNC: 9.6 MG/DL (ref 8.7–10.5)
CHLORIDE SERPL-SCNC: 105 MMOL/L (ref 95–110)
CO2 SERPL-SCNC: 22 MMOL/L (ref 23–29)
CREAT SERPL-MCNC: 0.8 MG/DL (ref 0.5–1.4)
DIFFERENTIAL METHOD BLD: ABNORMAL
EOSINOPHIL # BLD AUTO: 0.1 K/UL (ref 0–0.5)
EOSINOPHIL NFR BLD: 0.9 % (ref 0–8)
ERYTHROCYTE [DISTWIDTH] IN BLOOD BY AUTOMATED COUNT: 15.1 % (ref 11.5–14.5)
EST. GFR  (NO RACE VARIABLE): >60 ML/MIN/1.73 M^2
GLUCOSE SERPL-MCNC: 113 MG/DL (ref 70–110)
HCT VFR BLD AUTO: 37.2 % (ref 37–48.5)
HGB BLD-MCNC: 12.1 G/DL (ref 12–16)
IMM GRANULOCYTES # BLD AUTO: 0.02 K/UL (ref 0–0.04)
IMM GRANULOCYTES NFR BLD AUTO: 0.4 % (ref 0–0.5)
LYMPHOCYTES # BLD AUTO: 1.2 K/UL (ref 1–4.8)
LYMPHOCYTES NFR BLD: 21.1 % (ref 18–48)
MCH RBC QN AUTO: 29.4 PG (ref 27–31)
MCHC RBC AUTO-ENTMCNC: 32.5 G/DL (ref 32–36)
MCV RBC AUTO: 90 FL (ref 82–98)
MONOCYTES # BLD AUTO: 0.5 K/UL (ref 0.3–1)
MONOCYTES NFR BLD: 8.7 % (ref 4–15)
NEUTROPHILS # BLD AUTO: 3.9 K/UL (ref 1.8–7.7)
NEUTROPHILS NFR BLD: 68.4 % (ref 38–73)
NRBC BLD-RTO: 0 /100 WBC
PLATELET # BLD AUTO: 287 K/UL (ref 150–450)
PMV BLD AUTO: 9.8 FL (ref 9.2–12.9)
POTASSIUM SERPL-SCNC: 3.6 MMOL/L (ref 3.5–5.1)
PROT SERPL-MCNC: 8.3 G/DL (ref 6–8.4)
RBC # BLD AUTO: 4.12 M/UL (ref 4–5.4)
SODIUM SERPL-SCNC: 137 MMOL/L (ref 136–145)
WBC # BLD AUTO: 5.65 K/UL (ref 3.9–12.7)

## 2025-02-10 PROCEDURE — 99999 PR PBB SHADOW E&M-EST. PATIENT-LVL IV: CPT | Mod: PBBFAC,,, | Performed by: INTERNAL MEDICINE

## 2025-02-10 PROCEDURE — 85025 COMPLETE CBC W/AUTO DIFF WBC: CPT | Performed by: INTERNAL MEDICINE

## 2025-02-10 PROCEDURE — 36415 COLL VENOUS BLD VENIPUNCTURE: CPT | Performed by: INTERNAL MEDICINE

## 2025-02-10 PROCEDURE — 80053 COMPREHEN METABOLIC PANEL: CPT | Performed by: INTERNAL MEDICINE

## 2025-02-10 NOTE — PROGRESS NOTES
Subjective     Patient ID: Maria Elena Tavares is a 69 y.o. female.    Chief Complaint: Breast cancer metastasized to axillary lymph node, left    HPI    Returns for routine follow up of breast cancer     Overall feels well.   Reports biggest issue is left arm lymphedema-- she just completed PT again and is wearing the sleeve  She is doing home exercise now  She notes pain in both shoulders due to increased exercise  States she sometimes has pain in her right axilla    Recovered well from left knee surgery- states 1 year follow up in 4/2025.    Weight is down-- On Mounjaro > 1 year and has HgBA1C down to < 6  Lost over 40 lbs  Able to be more active    States tolerating Femara well- can stop in 5/2025  Slight uptick in hot flashes hot flashes  Mild joint issues     Better BG control     BMD normal 7/10/2023.   Takes Vit D 5000u MWF.    Oncologic History:   - self detected, noted a shooting pain in breast first and then a week later felt a lump  Some delay in getting appointment as she was unaware she had to call  - 8/30/19 Diagnostic mammogram and ultrasound:  Left breast 20 mm x 18 mm x 17 mm mass at the 3 o'clock position. Assessment: 4 - Suspicious finding. Biopsy is recommended. Lymph Node: Left axilla 16 mm x 14 mm x 13 mm lymph node. Assessment: 4 - Suspicious finding. Biopsy is recommended. Right- There is no mammographic or sonographic evidence of malignancy.  BI-RADS Category:   Overall: 4 - Suspicious  - 9/5/19 Ultrasound guided biopsy  Pathology:  1. LEFT BREAST MASS, BIOPSY:  - Invasive ductal carcinoma, grade 3, longest linear length is 10 mm measured on the slide.  - Histologic Grade ([place] Histologic Score)  Glandular (Acinar/Tubular Differentiation): 3.  Nuclear Pleomorphism: 2.  Mitotic Rate: 3.  Overall Grade: Grade 3 (score 8).  - Microcalcifications: Not identified.  - Lymphovascular invasion: Not identified.  2. LYMPH NODE, LEFT AXILLA, BIOPSY:  - Positive for metastatic carcinoma.  - The  metastatic deposit measures 6 mm in longest continuous linear length.  Estrogen receptor: Positive, 90% of the tumor nuclei staining moderate to strongly.  Progesterone receptor: Positive, 30% of the tumor nuclei staining weak to moderately.  HER2: Negative.  Ki-67: 60%.  - ECHO 9/20/19: EF 64%  - PET 9/21/19:  Hypermetabolic left breast mass and regional left axillary lymphadenopathy consistent with reported breast cancer and corresponding with recent MRI findings.  Upper limit of normal sized right axillary lymph nodes with normal fatty duane and mildly increased radiotracer uptake.  Findings could represent reactive nodes with metastatic nodes thought less likely.  Lymphscintigraphy with injection in the left breast may considered to assess for drainage to the contralateral axilla.  - BX 9/24/19: Right axilla node biopsy is benign  - she underwent neoadjuvant chemotherapy and completed 4 cycles of AC followed by 11 cycles of weekly Taxol.   Stopped with side effects.  - 3/24/20 - she underwent bilateral mastectomies with Lt. sentinel node biopsy and subsequent Lt. axillary dissection and Rt. sentinel node biopsy with Dr. Lopez.  Tissue expanders were placed.   - Pathology from the Lt. breast revealed 1.2 cm of residual invasive ductal carcinoma histologic grade 3, nuclear grade 3 and mitotic index 5 ). There was high grade DCIS. There was lymphovascular invasion.  The margins of resection were negative at > 1 cm.  One axillary node had a 5 mm focus of metastatic carcinoma.  Another Lt. sentinel node had isolated tumor cells.   A third Lt. sentinel node and 7 Lt. axillary nodes were negative.  The Rt. breast and Rt. sentinel nodes were negative.    - completed adjuvant  XRT   - on Femara as of 5/2020     HM:  7/2023 BMD  Normal    Review of Systems   Constitutional:  Negative for activity change, appetite change, chills, fatigue, fever and unexpected weight change.   HENT:  Negative for mouth sores, sinus  pressure/congestion, sore throat and trouble swallowing.    Eyes:  Negative for visual disturbance.   Respiratory:  Negative for cough, shortness of breath and wheezing.    Cardiovascular:  Negative for chest pain, palpitations and leg swelling.   Gastrointestinal:  Negative for abdominal distention, abdominal pain, change in bowel habit, constipation and diarrhea.   Genitourinary:  Negative for decreased urine volume, difficulty urinating, dysuria, frequency and urgency.   Musculoskeletal:  Negative for back pain.   Integumentary:  Positive for breast tenderness (reports pain in right axilla at times). Negative for rash.   Neurological:  Negative for dizziness, weakness, numbness and headaches.   Hematological:  Negative for adenopathy. Does not bruise/bleed easily.   Psychiatric/Behavioral:  Negative for dysphoric mood and sleep disturbance. The patient is not nervous/anxious.    Breast: Positive for tenderness (reports pain in right axilla at times).         Objective     Physical Exam  Vitals and nursing note reviewed.   Constitutional:       General: She is not in acute distress.     Appearance: Normal appearance. She is normal weight. She is not ill-appearing.      Comments: Presents alone  Very pleasant   Eyes:      General: No scleral icterus.     Extraocular Movements: Extraocular movements intact.      Conjunctiva/sclera: Conjunctivae normal.      Pupils: Pupils are equal, round, and reactive to light.   Cardiovascular:      Rate and Rhythm: Normal rate and regular rhythm.      Heart sounds: Normal heart sounds. No murmur heard.     No friction rub. No gallop.   Pulmonary:      Effort: Pulmonary effort is normal. No respiratory distress.      Breath sounds: Normal breath sounds. No wheezing, rhonchi or rales.      Comments: No chest wall masses or :LAD  Post reconstruction  Chest:      Chest wall: No tenderness.   Abdominal:      General: Abdomen is flat. Bowel sounds are normal. There is no distension.       Palpations: Abdomen is soft. There is no mass.      Tenderness: There is no abdominal tenderness. There is no guarding or rebound.      Comments: No organomegaly   Musculoskeletal:         General: Swelling present. No tenderness or deformity. Normal range of motion.      Cervical back: Normal range of motion and neck supple. No muscular tenderness.      Right lower leg: No edema.      Left lower leg: No edema.      Comments: LUE Lymphedema   Lymphadenopathy:      Cervical: No cervical adenopathy.   Skin:     General: Skin is warm and dry.      Coloration: Skin is not pale.      Findings: No erythema, lesion or rash.   Neurological:      General: No focal deficit present.      Mental Status: She is alert and oriented to person, place, and time. Mental status is at baseline.      Sensory: No sensory deficit.      Motor: No weakness.      Coordination: Coordination normal.      Gait: Gait normal.   Psychiatric:         Mood and Affect: Mood normal.         Behavior: Behavior normal.         Thought Content: Thought content normal.         Judgment: Judgment normal.   Labs- reviewed       Assessment and Plan     1. Breast cancer metastasized to axillary lymph node, left  -     POST-MASTECTOMY BRA FOR HOME USE    2. H/O bilateral mastectomy  -     POST-MASTECTOMY BRA FOR HOME USE    3. Post-mastectomy lymphedema syndrome    4. Controlled type 2 diabetes mellitus with diabetic nephropathy, without long-term current use of insulin    5. Diabetes mellitus due to underlying condition with diabetic chronic kidney disease, unspecified CKD stage, unspecified whether long term insulin use    6. Vitamin D deficiency  Overview:  . A low vitamin D      7. Hyperlipidemia associated with type 2 diabetes mellitus    8. Hypertension associated with diabetes      Breast cancer  Post bilateral mastectomy  Completed lymphedema PT  Continue Femara through 5/2025   BMD due next year-- 7/2025     HTN/DM/Hyperlipidemia  Better control with  weight loss  Managed with her PCP     Vit D deficiency- on replacement  BMD in summer    Route Chart for Scheduling    Med Onc Chart Routing      Follow up with physician    Follow up with JAZZY 6 months. BMD in 7/2025, EP with [facility]Y and labs- cbc, cmp   Infusion scheduling note    Injection scheduling note    Labs    Imaging    Pharmacy appointment    Other referrals              Therapy Plan Information  PORT FLUSH for Anemia in neoplastic disease, noted on 12/2/2019  Flushes  heparin, porcine (PF) 100 unit/mL injection flush 500 Units  500 Units, Intravenous, Every visit  sodium chloride 0.9% flush 10 mL  10 mL, Intravenous, Every visit      No therapy plan of the specified type found.    No therapy plan of the specified type found.

## 2025-02-12 ENCOUNTER — OFFICE VISIT (OUTPATIENT)
Dept: OPTOMETRY | Facility: CLINIC | Age: 70
End: 2025-02-12
Payer: MEDICARE

## 2025-02-12 ENCOUNTER — TELEPHONE (OUTPATIENT)
Dept: OPHTHALMOLOGY | Facility: CLINIC | Age: 70
End: 2025-02-12
Payer: MEDICARE

## 2025-02-12 DIAGNOSIS — H53.15 VISUAL DISTORTIONS OF SHAPE AND SIZE: ICD-10-CM

## 2025-02-12 DIAGNOSIS — Z90.13 H/O BILATERAL MASTECTOMY: ICD-10-CM

## 2025-02-12 DIAGNOSIS — H04.123 DRY EYE SYNDROME, BILATERAL: Primary | ICD-10-CM

## 2025-02-12 DIAGNOSIS — I15.2 HYPERTENSION ASSOCIATED WITH DIABETES: ICD-10-CM

## 2025-02-12 DIAGNOSIS — E11.9 TYPE 2 DIABETES MELLITUS WITHOUT OPHTHALMIC MANIFESTATIONS: ICD-10-CM

## 2025-02-12 DIAGNOSIS — H52.4 PRESBYOPIA: ICD-10-CM

## 2025-02-12 DIAGNOSIS — C77.3 BREAST CANCER METASTASIZED TO AXILLARY LYMPH NODE, LEFT: ICD-10-CM

## 2025-02-12 DIAGNOSIS — C50.912 BREAST CANCER METASTASIZED TO AXILLARY LYMPH NODE, LEFT: ICD-10-CM

## 2025-02-12 DIAGNOSIS — H26.9 CORTICAL CATARACT OF BOTH EYES: ICD-10-CM

## 2025-02-12 DIAGNOSIS — I10 ESSENTIAL HYPERTENSION: ICD-10-CM

## 2025-02-12 DIAGNOSIS — H25.13 NS (NUCLEAR SCLEROSIS), BILATERAL: ICD-10-CM

## 2025-02-12 DIAGNOSIS — E11.59 HYPERTENSION ASSOCIATED WITH DIABETES: ICD-10-CM

## 2025-02-12 DIAGNOSIS — E11.21 CONTROLLED TYPE 2 DIABETES MELLITUS WITH DIABETIC NEPHROPATHY, WITHOUT LONG-TERM CURRENT USE OF INSULIN: ICD-10-CM

## 2025-02-12 PROCEDURE — 99999 PR PBB SHADOW E&M-EST. PATIENT-LVL III: CPT | Mod: PBBFAC,,, | Performed by: OPTOMETRIST

## 2025-02-12 RX ORDER — CYCLOSPORINE 0.5 MG/ML
1 EMULSION OPHTHALMIC 2 TIMES DAILY
Qty: 60 EACH | Refills: 11 | Status: SHIPPED | OUTPATIENT
Start: 2025-02-12 | End: 2026-02-12

## 2025-02-12 RX ORDER — LIFITEGRAST 50 MG/ML
1 SOLUTION/ DROPS OPHTHALMIC 2 TIMES DAILY
Qty: 180 EACH | Refills: 4 | Status: SHIPPED | OUTPATIENT
Start: 2025-02-12

## 2025-02-12 NOTE — TELEPHONE ENCOUNTER
----- Message from Yaritza Ruiz OD sent at 2/12/2025  3:22 PM CST -----  Call pt to schedule cat philip OD

## 2025-02-12 NOTE — PROGRESS NOTES
HPI    JULIO: 1/10/2024  Chief complaint (CC): patient here for annual check   States no significant changes in vision since last exam just a bit   blurrier   Glasses? + readers   Contacts? -  H/o eye surgery, injections or laser: -  H/o eye injury: -  Known eye conditions? -  Family h/o eye conditions? -  Eye gtts? XIIDRA BID      (-) Flashes (-)  Floaters (-) Mucous   (-)  Tearing (-) Itching (-) Burning   (-) Headaches (-) Eye Pain/discomfort (-) Irritation   (-)  Redness (-) Double vision (++) Blurry vision    Diabetic? +  A1c? Hemoglobin A1C       Date                     Value               Ref Range             Status                11/21/2024               5.9 (H)             4.0 - 5.6 %           Final              Last edited by Farheen Parra on 2/12/2025  2:51 PM.            Assessment /Plan     For exam results, see Encounter Report.       Dry eye syndrome, bilateral  Continue with lifitegrast (XIIDRA) 5 % Dpet; Apply 1 drop to  both eyes  2 (two) times daily.  Dispense: 60 each; Refill: 12             or      perfluorohexyloctane, PF, 100 % Drop; Place 1 drop into both eyes 4 (four) times daily.  Dispense: 3 mL; Refill: 11  -   or  cycloSPORINE (RESTASIS) 0.05 % ophthalmic emulsion; Place 1 drop into both eyes 2 (two) times daily.  Dispense: 60 each; Refill: 11    Presbyopia              Rx specs     Type 2 diabetes mellitus without ophthalmic manifestations  Essential hypertension              No retinopathy, monitor yearly     Breast cancer metastasized to axillary lymph node, left  H/O bilateral mastectomy        NS (nuclear sclerosis), bilateral  Cortical cataract of both eyes  OD>OS    Consult for cat eval   Today: OCT mac WNL OU     RTC 1 year, sooner PRN

## 2025-02-13 ENCOUNTER — TELEPHONE (OUTPATIENT)
Dept: OPTOMETRY | Facility: CLINIC | Age: 70
End: 2025-02-13
Payer: MEDICARE

## 2025-02-13 ENCOUNTER — PATIENT MESSAGE (OUTPATIENT)
Dept: OPTOMETRY | Facility: CLINIC | Age: 70
End: 2025-02-13
Payer: MEDICARE

## 2025-02-13 NOTE — TELEPHONE ENCOUNTER
----- Message from Camille sent at 2/13/2025  2:19 PM CST -----  Type:  Patient Referral Call    Who Called:Maria Elena  Does the patient know what this is regarding?:Evaluation  Would the patient rather a call back or a response via [employer]? call  Best Call Back Number: [phone]  Additional Information:

## 2025-02-24 ENCOUNTER — PATIENT MESSAGE (OUTPATIENT)
Dept: ORTHOPEDICS | Facility: CLINIC | Age: 70
End: 2025-02-24
Payer: MEDICARE

## 2025-03-03 ENCOUNTER — PATIENT MESSAGE (OUTPATIENT)
Dept: OBSTETRICS AND GYNECOLOGY | Facility: CLINIC | Age: 70
End: 2025-03-03
Payer: MEDICARE

## 2025-03-03 ENCOUNTER — PATIENT MESSAGE (OUTPATIENT)
Dept: ORTHOPEDICS | Facility: CLINIC | Age: 70
End: 2025-03-03
Payer: MEDICARE

## 2025-03-18 ENCOUNTER — OFFICE VISIT (OUTPATIENT)
Dept: PRIMARY CARE CLINIC | Facility: CLINIC | Age: 70
End: 2025-03-18
Attending: FAMILY MEDICINE
Payer: MEDICARE

## 2025-03-18 ENCOUNTER — PATIENT MESSAGE (OUTPATIENT)
Dept: PRIMARY CARE CLINIC | Facility: CLINIC | Age: 70
End: 2025-03-18

## 2025-03-18 VITALS
HEART RATE: 80 BPM | BODY MASS INDEX: 31.6 KG/M2 | SYSTOLIC BLOOD PRESSURE: 117 MMHG | DIASTOLIC BLOOD PRESSURE: 72 MMHG | WEIGHT: 161.81 LBS | OXYGEN SATURATION: 100 %

## 2025-03-18 DIAGNOSIS — E11.69 HYPERLIPIDEMIA ASSOCIATED WITH TYPE 2 DIABETES MELLITUS: ICD-10-CM

## 2025-03-18 DIAGNOSIS — I15.2 HYPERTENSION ASSOCIATED WITH DIABETES: ICD-10-CM

## 2025-03-18 DIAGNOSIS — C50.912 BREAST CANCER METASTASIZED TO AXILLARY LYMPH NODE, LEFT: ICD-10-CM

## 2025-03-18 DIAGNOSIS — E55.9 VITAMIN D DEFICIENCY: ICD-10-CM

## 2025-03-18 DIAGNOSIS — E78.5 HYPERLIPIDEMIA ASSOCIATED WITH TYPE 2 DIABETES MELLITUS: ICD-10-CM

## 2025-03-18 DIAGNOSIS — K52.9 ENTERITIS: ICD-10-CM

## 2025-03-18 DIAGNOSIS — E11.59 HYPERTENSION ASSOCIATED WITH DIABETES: ICD-10-CM

## 2025-03-18 DIAGNOSIS — R10.13 EPIGASTRIC PAIN: Primary | ICD-10-CM

## 2025-03-18 DIAGNOSIS — F33.1 MDD (MAJOR DEPRESSIVE DISORDER), RECURRENT EPISODE, MODERATE: ICD-10-CM

## 2025-03-18 DIAGNOSIS — C77.3 BREAST CANCER METASTASIZED TO AXILLARY LYMPH NODE, LEFT: ICD-10-CM

## 2025-03-18 DIAGNOSIS — R20.0 FINGER NUMBNESS: ICD-10-CM

## 2025-03-18 DIAGNOSIS — E78.01 FAMILIAL HYPERCHOLESTEROLEMIA: ICD-10-CM

## 2025-03-18 LAB
ALBUMIN SERPL BCP-MCNC: 3.8 G/DL (ref 3.5–5.2)
ALP SERPL-CCNC: 107 U/L (ref 40–150)
ALT SERPL W/O P-5'-P-CCNC: 7 U/L (ref 10–44)
ANION GAP SERPL CALC-SCNC: 10 MMOL/L (ref 8–16)
AST SERPL-CCNC: 15 U/L (ref 10–40)
BASOPHILS # BLD AUTO: 0.01 K/UL (ref 0–0.2)
BASOPHILS NFR BLD: 0.1 % (ref 0–1.9)
BILIRUB SERPL-MCNC: 0.8 MG/DL (ref 0.1–1)
BUN SERPL-MCNC: 18 MG/DL (ref 8–23)
CALCIUM SERPL-MCNC: 9.7 MG/DL (ref 8.7–10.5)
CHLORIDE SERPL-SCNC: 105 MMOL/L (ref 95–110)
CO2 SERPL-SCNC: 24 MMOL/L (ref 23–29)
CREAT SERPL-MCNC: 0.9 MG/DL (ref 0.5–1.4)
DIFFERENTIAL METHOD BLD: ABNORMAL
EOSINOPHIL # BLD AUTO: 0 K/UL (ref 0–0.5)
EOSINOPHIL NFR BLD: 0.3 % (ref 0–8)
ERYTHROCYTE [DISTWIDTH] IN BLOOD BY AUTOMATED COUNT: 15.7 % (ref 11.5–14.5)
EST. GFR  (NO RACE VARIABLE): >60 ML/MIN/1.73 M^2
GLUCOSE SERPL-MCNC: 84 MG/DL (ref 70–110)
HCT VFR BLD AUTO: 35 % (ref 37–48.5)
HGB BLD-MCNC: 11.5 G/DL (ref 12–16)
IMM GRANULOCYTES # BLD AUTO: 0.02 K/UL (ref 0–0.04)
IMM GRANULOCYTES NFR BLD AUTO: 0.3 % (ref 0–0.5)
LIPASE SERPL-CCNC: 10 U/L (ref 4–60)
LYMPHOCYTES # BLD AUTO: 1.2 K/UL (ref 1–4.8)
LYMPHOCYTES NFR BLD: 17.2 % (ref 18–48)
MCH RBC QN AUTO: 30.6 PG (ref 27–31)
MCHC RBC AUTO-ENTMCNC: 32.9 G/DL (ref 32–36)
MCV RBC AUTO: 93 FL (ref 82–98)
MONOCYTES # BLD AUTO: 0.6 K/UL (ref 0.3–1)
MONOCYTES NFR BLD: 7.9 % (ref 4–15)
NEUTROPHILS # BLD AUTO: 5.1 K/UL (ref 1.8–7.7)
NEUTROPHILS NFR BLD: 74.2 % (ref 38–73)
NRBC BLD-RTO: 0 /100 WBC
PLATELET # BLD AUTO: 320 K/UL (ref 150–450)
PMV BLD AUTO: 10.1 FL (ref 9.2–12.9)
POTASSIUM SERPL-SCNC: 4.4 MMOL/L (ref 3.5–5.1)
PROT SERPL-MCNC: 8 G/DL (ref 6–8.4)
RBC # BLD AUTO: 3.76 M/UL (ref 4–5.4)
SODIUM SERPL-SCNC: 139 MMOL/L (ref 136–145)
WBC # BLD AUTO: 6.92 K/UL (ref 3.9–12.7)

## 2025-03-18 PROCEDURE — 3008F BODY MASS INDEX DOCD: CPT | Mod: CPTII,S$GLB,, | Performed by: FAMILY MEDICINE

## 2025-03-18 PROCEDURE — 85025 COMPLETE CBC W/AUTO DIFF WBC: CPT | Performed by: FAMILY MEDICINE

## 2025-03-18 PROCEDURE — 80053 COMPREHEN METABOLIC PANEL: CPT | Performed by: FAMILY MEDICINE

## 2025-03-18 PROCEDURE — 99999 PR PBB SHADOW E&M-EST. PATIENT-LVL IV: CPT | Mod: PBBFAC,,, | Performed by: FAMILY MEDICINE

## 2025-03-18 PROCEDURE — 4010F ACE/ARB THERAPY RXD/TAKEN: CPT | Mod: CPTII,S$GLB,, | Performed by: FAMILY MEDICINE

## 2025-03-18 PROCEDURE — 1101F PT FALLS ASSESS-DOCD LE1/YR: CPT | Mod: CPTII,S$GLB,, | Performed by: FAMILY MEDICINE

## 2025-03-18 PROCEDURE — 3074F SYST BP LT 130 MM HG: CPT | Mod: CPTII,S$GLB,, | Performed by: FAMILY MEDICINE

## 2025-03-18 PROCEDURE — 83690 ASSAY OF LIPASE: CPT | Performed by: FAMILY MEDICINE

## 2025-03-18 PROCEDURE — 3078F DIAST BP <80 MM HG: CPT | Mod: CPTII,S$GLB,, | Performed by: FAMILY MEDICINE

## 2025-03-18 PROCEDURE — 99215 OFFICE O/P EST HI 40 MIN: CPT | Mod: S$GLB,,, | Performed by: FAMILY MEDICINE

## 2025-03-18 PROCEDURE — 1125F AMNT PAIN NOTED PAIN PRSNT: CPT | Mod: CPTII,S$GLB,, | Performed by: FAMILY MEDICINE

## 2025-03-18 PROCEDURE — 3288F FALL RISK ASSESSMENT DOCD: CPT | Mod: CPTII,S$GLB,, | Performed by: FAMILY MEDICINE

## 2025-03-18 PROCEDURE — 1159F MED LIST DOCD IN RCRD: CPT | Mod: CPTII,S$GLB,, | Performed by: FAMILY MEDICINE

## 2025-03-18 PROCEDURE — G2211 COMPLEX E/M VISIT ADD ON: HCPCS | Mod: S$GLB,,, | Performed by: FAMILY MEDICINE

## 2025-03-18 RX ORDER — PANTOPRAZOLE SODIUM 40 MG/1
40 TABLET, DELAYED RELEASE ORAL DAILY
Qty: 14 TABLET | Refills: 0 | Status: SHIPPED | OUTPATIENT
Start: 2025-03-18 | End: 2026-03-18

## 2025-03-19 ENCOUNTER — RESULTS FOLLOW-UP (OUTPATIENT)
Dept: PRIMARY CARE CLINIC | Facility: CLINIC | Age: 70
End: 2025-03-19

## 2025-03-19 ENCOUNTER — PATIENT MESSAGE (OUTPATIENT)
Dept: PRIMARY CARE CLINIC | Facility: CLINIC | Age: 70
End: 2025-03-19
Payer: MEDICARE

## 2025-03-25 NOTE — PROGRESS NOTES
Subjective:       Patient ID: Maria Elena Tavares is a 69 y.o. female.    Chief Complaint: Annual Exam    Pt presents today for 6 mos f/u DM and HTN as well as annual exam.  Pt has finished chemo for recent dx of breast CA. Had double mastectomy in past.  Is in good spirits. Has opted not to do reconstruction  Patient still has some neuropathic lower extremity pain.  Otherwise patient is doing well today she states.    .          Diabetes  Pertinent negatives for hypoglycemia include no confusion, dizziness, headaches or nervousness/anxiousness. Pertinent negatives for diabetes include no chest pain, no polydipsia, no polyuria and no weakness.   Hypertension  Pertinent negatives include no chest pain, headaches, palpitations or shortness of breath.     Review of Systems   Constitutional: Negative.    HENT:  Negative for congestion, ear pain, hearing loss, rhinorrhea, sinus pressure, sore throat and trouble swallowing.    Eyes: Negative.    Respiratory:  Negative for apnea, cough, chest tightness, shortness of breath and wheezing.    Cardiovascular:  Negative for chest pain, palpitations and leg swelling.   Gastrointestinal: Negative.    Endocrine: Negative for polydipsia and polyuria.   Musculoskeletal: Negative.  Negative for joint swelling.   Skin: Negative.    Neurological:  Negative for dizziness, weakness, light-headedness and headaches.   Psychiatric/Behavioral:  Negative for behavioral problems, confusion, decreased concentration, dysphoric mood and sleep disturbance. The patient is not nervous/anxious.    All other systems reviewed and are negative.      Objective:      Physical Exam  Vitals reviewed.   Constitutional:       General: She is not in acute distress.     Appearance: Normal appearance. She is well-developed. She is obese. She is not ill-appearing, toxic-appearing or diaphoretic.   HENT:      Head: Normocephalic and atraumatic.      Right Ear: Tympanic membrane, ear canal and external ear normal. There  is no impacted cerumen.      Left Ear: Tympanic membrane, ear canal and external ear normal. There is no impacted cerumen.      Nose: Nose normal.      Mouth/Throat:      Pharynx: No oropharyngeal exudate or posterior oropharyngeal erythema.   Eyes:      Extraocular Movements: Extraocular movements intact.      Conjunctiva/sclera: Conjunctivae normal.      Pupils: Pupils are equal, round, and reactive to light.   Neck:      Thyroid: No thyromegaly.   Cardiovascular:      Rate and Rhythm: Normal rate and regular rhythm.      Pulses: Normal pulses.      Heart sounds: Normal heart sounds. No murmur heard.  Pulmonary:      Effort: Pulmonary effort is normal. No respiratory distress.      Breath sounds: Normal breath sounds. No stridor. No wheezing, rhonchi or rales.   Chest:      Chest wall: No tenderness.   Abdominal:      General: Bowel sounds are normal. There is no distension.      Palpations: Abdomen is soft. There is no mass.      Tenderness: There is no abdominal tenderness. There is no guarding or rebound.      Hernia: No hernia is present.   Musculoskeletal:         General: No tenderness. Normal range of motion.      Cervical back: Normal range of motion and neck supple.      Right lower leg: No edema.      Left lower leg: No edema.   Lymphadenopathy:      Cervical: No cervical adenopathy.   Skin:     General: Skin is warm and dry.      Findings: No erythema or rash.   Neurological:      General: No focal deficit present.      Mental Status: She is alert and oriented to person, place, and time. Mental status is at baseline.      Cranial Nerves: No cranial nerve deficit.      Sensory: No sensory deficit.      Motor: No weakness or abnormal muscle tone.      Coordination: Coordination normal.      Gait: Gait normal.      Deep Tendon Reflexes: Reflexes are normal and symmetric. Reflexes normal.   Psychiatric:         Mood and Affect: Mood normal.         Behavior: Behavior normal.         Thought Content: Thought  content normal.         Judgment: Judgment normal.         Assessment:       1. Diabetes mellitus due to underlying condition with chronic kidney disease, without long-term current use of insulin, unspecified CKD stage    2. Controlled type 2 diabetes mellitus with diabetic nephropathy, without long-term current use of insulin    3. Vitamin D deficiency    4. Finger numbness    5. Neuropathy due to chemotherapeutic drug    6. REJI (generalized anxiety disorder)    7. Hyperlipidemia associated with type 2 diabetes mellitus    8. Hypokalemia    9. Iron deficiency anemia due to chronic blood loss    10. Breast cancer metastasized to axillary lymph node, left    11. Post-mastectomy lymphedema syndrome        Plan:       HTN controlled when she takes her meds. Was too low and will not adjust since pt is fearful of falling  DMT2: stable but a1c is improving.  Breast CA: surgery 3/24  RTC prn symptoms or q 4-6 mos pending labs  Neuropathy s/p chemo: pt on gabapentin  Diabetes mellitus due to underlying condition with chronic kidney disease, without long-term current use of insulin, unspecified CKD stage  -     Hemoglobin A1C; Future; Expected date: 02/04/2025    Controlled type 2 diabetes mellitus with diabetic nephropathy, without long-term current use of insulin  -     tirzepatide (MOUNJARO) 10 mg/0.5 mL PnIj; Inject 10 mg into the skin every 7 days.  Dispense: 12 Pen; Refill: 2    Vitamin D deficiency  -     ergocalciferol (ERGOCALCIFEROL) 50,000 unit Cap; Take 1 capsule (50,000 Units total) by mouth every 14 (fourteen) days.  Dispense: 24 capsule; Refill: 0    Finger numbness    Neuropathy due to chemotherapeutic drug    REJI (generalized anxiety disorder)    Hyperlipidemia associated with type 2 diabetes mellitus    Hypokalemia    Iron deficiency anemia due to chronic blood loss    Breast cancer metastasized to axillary lymph node, left    Post-mastectomy lymphedema syndrome      RTC prn symptoms    Greater than 45 mins  spent with pt; 50 % face to face going over her recent surgery and reassuring pt that she has done so well

## 2025-03-25 NOTE — PROGRESS NOTES
Patient ID: Maria Elena Tavares is a 69 y.o. female.    Chief Complaint: Abdominal Pain    History of Present Illness    CHIEF COMPLAINT:  Patient presents today with epigastric pain and stomach tenderness.    HISTORY OF PRESENT ILLNESS:  She reports epigastric tenderness and soreness that began approximately 3 hours after consuming a roast beef sandwich. The pain is similar to what she experienced during a previous hospitalization at [place]. She has been taking Tums and two Prilosec for symptom management.    GASTROINTESTINAL:  She reports irregular bowel movements with occasional straining.    MEDICAL HISTORY:  She was hospitalized at [place] in July 2020 for enteritis with black stools requiring infectious disease consultation. She had chemotherapy in June 2020 and surgery in March 2020. [facility] for similar symptoms where no interventions were performed.      ROS:  General: -fever, -chills, -fatigue, -weight gain, -weight loss, +loss of appetite  Eyes: -vision changes, -redness, -discharge  ENT: -ear pain, -nasal congestion, -sore throat  Cardiovascular: -chest pain, -palpitations, -lower extremity edema  Respiratory: -cough, -shortness of breath  Gastrointestinal: +abdominal pain, -nausea, -vomiting, -diarrhea, -constipation, -blood in stool, +change in bowel habits  Genitourinary: -dysuria, -hematuria, -frequency  Musculoskeletal: -joint pain, -muscle pain  Skin: -rash, -lesion  Neurological: -headache, -dizziness, -numbness, -tingling  Psychiatric: -anxiety, -depression, -sleep difficulty         Physical Exam    General: No acute distress. Well-developed. Well-nourished.  Eyes: EOMI. Sclerae anicteric.  HENT: Normocephalic. Atraumatic. Nares patent. Moist oral mucosa.  Ears: Bilateral TMs clear. Bilateral EACs clear.  Cardiovascular: Regular rate. Regular rhythm. No murmurs. No rubs. No gallops. Normal S1, S2.  Respiratory: Normal respiratory effort. Clear to auscultation bilaterally. No  rales. No rhonchi. No wheezing.  Abdomen: Soft. Tenderness LUQ. Tenderness RUQ. Tenderness in lower abdomen. Generalized abdominal tenderness. Non-distended. Normoactive bowel sounds.  Musculoskeletal: No  obvious deformity.  Extremities: No lower extremity edema.  Neurological: Alert & oriented x3. No slurred speech. Normal gait.  Psychiatric: Normal mood. Normal affect. Good insight. Good judgment.  Skin: Warm. Dry. No rash.         Assessment & Plan    IMPRESSION:  - Suspect recurrence of previous enteritis-like condition from 2020, given similar presentation of epigastric pain and tenderness.    MDD (MAJOR DEPRESSIVE DISORDER), RECURRENT EPISODE, MODERATE:  - Evaluated patient's ongoing stomach issues and pain, which could be related to or exacerbating depressive symptoms.  - Performed physical exam, including auscultation of heart and lungs, and abdominal palpation.  - Reviewed medical history, including previous hospital admissions and treatments.    EPIGASTRIC PAIN:  - Prescribed Protonix 40 mg daily for 14 days, to be taken first thing in the morning on an empty stomach.  - Instructed the patient to discontinue use of OTC Prilosec.  - Ordered CT Abdomen/Pelvis W Contrast to evaluate for potential recurrence or new pathology.  - Ordered basic labs including liver function tests, electrolytes, and pancreatic enzymes to rule out other etiologies.  - Directed office staff to assist with scheduling CT and coordinating with radiology for insurance approval.    CONSTIPATION:  - Noted that the patient reports irregular bowel movements and occasional straining.          ED prompts d/w pt    Visit today is associated with current or anticipated ongoing medical care related to this patient's single serious condition/complex condition of HTN, DM. The patient will return to see me as these issues will be followed longitudinally      This note was generated with the assistance of ambient listening technology. Verbal  consent was obtained by the patient and accompanying visitor(s) for the recording of patient appointment to facilitate this note. I attest to having reviewed and edited the generated note for accuracy, though some syntax or spelling errors may persist. Please contact the author of this note for any clarification.

## 2025-03-27 ENCOUNTER — HOSPITAL ENCOUNTER (OUTPATIENT)
Dept: RADIOLOGY | Facility: HOSPITAL | Age: 70
Discharge: HOME | End: 2025-03-27
Attending: FAMILY MEDICINE
Payer: MEDICARE

## 2025-03-27 DIAGNOSIS — R10.13 EPIGASTRIC PAIN: ICD-10-CM

## 2025-03-27 PROCEDURE — 25500020 PHARM REV CODE 255: Performed by: FAMILY MEDICINE

## 2025-03-27 PROCEDURE — 74177 CT ABD & PELVIS W/CONTRAST: CPT | Mod: TC

## 2025-03-27 PROCEDURE — 74177 CT ABD & PELVIS W/CONTRAST: CPT | Mod: 26,,, | Performed by: RADIOLOGY

## 2025-03-27 PROCEDURE — A9698 NON-RAD CONTRAST MATERIALNOC: HCPCS | Performed by: FAMILY MEDICINE

## 2025-03-27 RX ADMIN — BARIUM SULFATE 450 ML: 20 SUSPENSION ORAL at 04:03

## 2025-03-27 RX ADMIN — IOHEXOL 75 ML: 350 INJECTION, SOLUTION INTRAVENOUS at 04:03

## 2025-04-01 ENCOUNTER — PATIENT MESSAGE (OUTPATIENT)
Dept: PRIMARY CARE CLINIC | Facility: CLINIC | Age: 70
End: 2025-04-01

## 2025-04-01 ENCOUNTER — TELEPHONE (OUTPATIENT)
Dept: PRIMARY CARE CLINIC | Facility: CLINIC | Age: 70
End: 2025-04-01
Payer: MEDICARE

## 2025-04-01 ENCOUNTER — OFFICE VISIT (OUTPATIENT)
Dept: PRIMARY CARE CLINIC | Facility: CLINIC | Age: 70
End: 2025-04-01
Attending: FAMILY MEDICINE
Payer: MEDICARE

## 2025-04-01 DIAGNOSIS — E11.69 HYPERLIPIDEMIA ASSOCIATED WITH TYPE 2 DIABETES MELLITUS: ICD-10-CM

## 2025-04-01 DIAGNOSIS — M54.40 ACUTE RIGHT-SIDED LOW BACK PAIN WITH SCIATICA, SCIATICA LATERALITY UNSPECIFIED: Primary | ICD-10-CM

## 2025-04-01 DIAGNOSIS — E03.8 OTHER SPECIFIED HYPOTHYROIDISM: ICD-10-CM

## 2025-04-01 DIAGNOSIS — R10.11 RIGHT UPPER QUADRANT ABDOMINAL PAIN: ICD-10-CM

## 2025-04-01 DIAGNOSIS — E78.01 FAMILIAL HYPERCHOLESTEROLEMIA: ICD-10-CM

## 2025-04-01 DIAGNOSIS — E78.5 HYPERLIPIDEMIA ASSOCIATED WITH TYPE 2 DIABETES MELLITUS: ICD-10-CM

## 2025-04-01 DIAGNOSIS — C77.3 BREAST CANCER METASTASIZED TO AXILLARY LYMPH NODE, LEFT: Primary | ICD-10-CM

## 2025-04-01 DIAGNOSIS — E11.21 CONTROLLED TYPE 2 DIABETES MELLITUS WITH DIABETIC NEPHROPATHY, WITHOUT LONG-TERM CURRENT USE OF INSULIN: ICD-10-CM

## 2025-04-01 DIAGNOSIS — C50.912 BREAST CANCER METASTASIZED TO AXILLARY LYMPH NODE, LEFT: Primary | ICD-10-CM

## 2025-04-01 PROCEDURE — 98006 SYNCH AUDIO-VIDEO EST MOD 30: CPT | Mod: 95,,, | Performed by: FAMILY MEDICINE

## 2025-04-01 PROCEDURE — G2211 COMPLEX E/M VISIT ADD ON: HCPCS | Mod: 95,,, | Performed by: FAMILY MEDICINE

## 2025-04-01 PROCEDURE — 4010F ACE/ARB THERAPY RXD/TAKEN: CPT | Mod: CPTII,95,, | Performed by: FAMILY MEDICINE

## 2025-04-01 NOTE — TELEPHONE ENCOUNTER
----- Message from Estephania San MD sent at 4/1/2025 10:52 AM CDT -----  Regarding: FW: Reminder about Maria Elena Tavares [MRN: [id number]]  Pt needs telemed to review her imaging results please thanks, PV audio is ok if she cannot do video but video preferred. thanks  ----- Message -----  From: Estephania San MD  Sent: 3/29/2025   5:51 PM CDT  To: Estephania San MD  Subject: Reminder about Maria Elena Tavares [MRN: [id number]]     Call patient

## 2025-04-01 NOTE — PROGRESS NOTES
Subjective:       Patient ID: Maria Elena Tavares is a 69 y.o. female.    Chief Complaint:     The patient location is: home  The chief complaint leading to consultation is: above    Visit type: audiovisual    Face to Face time with patient: 20 minutes 20 minutes of total time spent on the encounter, which includes face to face time and non-face to face time preparing to see the patient (eg, review of tests), Obtaining and/or reviewing separately obtained history, Documenting clinical information in the electronic or other health record, Independently interpreting results (not separately reported) and communicating results to the patient/family/caregiver, or Care coordination (not separately reported).     Each patient to whom he or she provides medical services by telemedicine is:  (1) informed of the relationship between the physician and patient and the respective role of any other health care provider with respect to management of the patient; and (2) notified that he or she may decline to receive medical services by telemedicine and may withdraw from such care at any time.    Notes:     History of Present Illness    CHIEF COMPLAINT:  Patient presents today for follow up of CT results    MUSCULOSKELETAL PAIN:  She reports lower back and buttocks pain that is exacerbated by sitting and causes difficulty with rising from seated position. She experiences intermittent right-sided pain.    GASTROINTESTINAL:  She notes mild stomach tenderness which was present last night but remained bearable.    IMAGING:  CT showed small area of hepatic steatosis, unchanged from 2020. New finding of sclerosis in L2 vertebral body was noted. Diverticulosis was present but not acutely inflamed.    GENITOURINARY:  CT identified non-obstructive kidney stones in the right kidney.      ROS:  General: -fever, -chills, -fatigue, -weight gain, -weight loss  Eyes: -vision changes, -redness, -discharge  ENT: -ear pain, -nasal congestion, -sore  throat  Cardiovascular: -chest pain, -palpitations, -lower extremity edema  Respiratory: -cough, -shortness of breath  Gastrointestinal: +abdominal pain, -nausea, -vomiting, -diarrhea, -constipation, -blood in stool  Genitourinary: -dysuria, -hematuria, -frequency, +flank pain  Musculoskeletal: -joint pain, -muscle pain, +back pain, +difficulty standing up, +pain with movement  Skin: -rash, -lesion  Neurological: -headache, -dizziness, -numbness, -tingling  Psychiatric: -anxiety, -depression, -sleep difficulty          Objective:      Physical Exam    General: No acute distress. Well-developed. Well-nourished.  Eyes: EOMI. Sclerae anicteric.  HENT: Normocephalic. Atraumatic. Nares patent. Moist oral mucosa.  Ears: Bilateral TMs clear. Bilateral EACs clear.  Cardiovascular: Regular rate. Regular rhythm. No murmurs. No rubs. No gallops. Normal S1, S2.  Respiratory: Normal respiratory effort. Clear to auscultation bilaterally. No rales. No rhonchi. No wheezing.  Abdomen: Soft. Non-tender. Non-distended. Normoactive bowel sounds.  Musculoskeletal: No  obvious deformity.  Extremities: No lower extremity edema.  Neurological: Alert & oriented x3. No slurred speech. Normal gait.  Psychiatric: Normal mood. Normal affect. Good insight. Good judgment.  Skin: Warm. Dry. No rash.       Assessment:       1. Acute right-sided low back pain with sciatica, sciatica laterality unspecified    2. Familial hypercholesterolemia    3. Hyperlipidemia associated with type 2 diabetes mellitus    4. Controlled type 2 diabetes mellitus with diabetic nephropathy, without long-term current use of insulin    5. Other specified hypothyroidism    6. Right upper quadrant abdominal pain        Plan:   Assessment & Plan    M54.41 Lumbago with sciatica, right side  M85.88 Other specified disorders of bone density and structure, other site  K76.0 Fatty (change of) liver, not elsewhere classified  K57.30 Diverticulosis of large intestine without  perforation or abscess without bleeding  N20.0 Calculus of kidney  R10.819 Abdominal tenderness, unspecified site    IMPRESSION:  - Reviewed CT Abdomen results:  - Small area in liver, likely steatosis  - New finding of sclerosis in L2 vertebral body, requiring further evaluation  - Kidney stones on right side, currently non-obstructive  - Diverticulosis, not acutely inflamed.  - Determined hematology/oncology follow-up necessary due to history and new vertebral finding.  - Considered urologist referral if right-side pain persists.    LUMBAGO WITH SCIATICA (LOWER BACK AND BUTTOCK PAIN):  - Noted that the patient reports pain in the lower back and buttock, with difficulty getting up when sitting.  - Described spine anatomy to help the patient understand the location of L2 vertebral body sclerosis.  - Acknowledged the patient's pain but explained that the sclerosis found in the L2 vertebral body is higher than the area of reported pain.    VERTEBRAL BODY SCLEROSIS (L2):  - Noted that CT revealed a new finding of sclerosis of the L2 vertebral body, not present in the 2020 scan.  - Recommend further evaluation by a hematologist oncologist due to the patient's history and the new finding.  - Referred the patient to hematologist/oncologist Dr. Hernandez for evaluation of L2 vertebral sclerosis and potential further testing (e.g., PET scan).  - Instructed the patient to contact Dr. Hernandez's office if they do not hear back within 1 week.  - Will message Dr. Hernandez (hematologist oncologist) about the new finding for further evaluation and possible testing.    FATTY LIVER:  - Explained that steatosis (fatty liver) is often related to diet or medication use.  - Noted that CT showed a small area in the liver, consistent with previous findings of steatosis.  - Evaluated the fatty liver as not being very distinct or impressive.  - Recommend eating a healthy diet to manage fatty liver.  - Patient to maintain healthy eating habits to  address potential fatty liver.    DIVERTICULOSIS:  - Discussed diverticulosis as a common condition, currently not inflamed or likely cause of pain.  - Noted that CT revealed diverticulosis that was not acutely inflamed.  - Instructed the patient to follow up with gastroenterology on April 9th as scheduled.    KIDNEY STONES:  - Clarified that kidney stones, while present, are not currently obstructing or likely causing significant pain.  - Noted that CT showed stones on the right side of the kidney, not causing obstruction.  - Explained that kidney stones can cause intermittent pain as they move.  - Instructed the patient to contact the office if right-sided pain persists, for evaluation of kidney stones.  - Suggested referral to urologist if pain on the right side persists.    ABDOMINAL TENDERNESS:  - Noted that the patient reports ongoing mild tenderness in the stomach area.  - Acknowledged difficulty in pinpointing the exact cause of abdominal discomfort due to proximity of various organs.  - Instructed the patient to keep appointment with gastroenterology on April 9th for further evaluation of abdominal tenderness.        And to address steatosis with them as well.  Visit today is associated with current or anticipated ongoing medical care related to this patient's single serious condition/complex condition of HTN. The patient will return to see me as these issues will be followed longitudinally    Answers submitted by the patient for this visit:  Review of Systems Questionnaire (Submitted on 4/1/2025)  activity change: No  unexpected weight change: No  neck pain: No  hearing loss: No  rhinorrhea: No  trouble swallowing: No  eye discharge: No  visual disturbance: Yes  chest tightness: No  wheezing: No  chest pain: No  palpitations: No  blood in stool: No  constipation: Yes  vomiting: No  diarrhea: No  polydipsia: No  polyuria: No  difficulty urinating: No  hematuria: No  menstrual problem: No  dysuria: No  joint  swelling: Yes  arthralgias: Yes  headaches: No  weakness: Yes  confusion: No  dysphoric mood: No

## 2025-04-09 ENCOUNTER — HOSPITAL ENCOUNTER (OUTPATIENT)
Dept: RADIOLOGY | Facility: HOSPITAL | Age: 70
Discharge: HOME | End: 2025-04-09
Attending: INTERNAL MEDICINE
Payer: MEDICARE

## 2025-04-09 ENCOUNTER — OFFICE VISIT (OUTPATIENT)
Dept: GASTROENTEROLOGY | Facility: CLINIC | Age: 70
End: 2025-04-09
Payer: MEDICARE

## 2025-04-09 VITALS — HEIGHT: 60 IN | WEIGHT: 162.25 LBS | BODY MASS INDEX: 31.86 KG/M2

## 2025-04-09 DIAGNOSIS — R10.13 EPIGASTRIC PAIN: ICD-10-CM

## 2025-04-09 DIAGNOSIS — C77.3 BREAST CANCER METASTASIZED TO AXILLARY LYMPH NODE, LEFT: ICD-10-CM

## 2025-04-09 DIAGNOSIS — C50.912 BREAST CANCER METASTASIZED TO AXILLARY LYMPH NODE, LEFT: ICD-10-CM

## 2025-04-09 PROCEDURE — 99203 OFFICE O/P NEW LOW 30 MIN: CPT | Mod: S$GLB,,,

## 2025-04-09 PROCEDURE — A9585 GADOBUTROL INJECTION: HCPCS | Performed by: INTERNAL MEDICINE

## 2025-04-09 PROCEDURE — 25500020 PHARM REV CODE 255: Performed by: INTERNAL MEDICINE

## 2025-04-09 PROCEDURE — 1159F MED LIST DOCD IN RCRD: CPT | Mod: CPTII,S$GLB,,

## 2025-04-09 PROCEDURE — 3288F FALL RISK ASSESSMENT DOCD: CPT | Mod: CPTII,S$GLB,,

## 2025-04-09 PROCEDURE — 4010F ACE/ARB THERAPY RXD/TAKEN: CPT | Mod: CPTII,S$GLB,,

## 2025-04-09 PROCEDURE — 99999 PR PBB SHADOW E&M-EST. PATIENT-LVL III: CPT | Mod: PBBFAC,,,

## 2025-04-09 PROCEDURE — 72158 MRI LUMBAR SPINE W/O & W/DYE: CPT | Mod: TC

## 2025-04-09 PROCEDURE — 1125F AMNT PAIN NOTED PAIN PRSNT: CPT | Mod: CPTII,S$GLB,,

## 2025-04-09 PROCEDURE — 1101F PT FALLS ASSESS-DOCD LE1/YR: CPT | Mod: CPTII,S$GLB,,

## 2025-04-09 PROCEDURE — 3008F BODY MASS INDEX DOCD: CPT | Mod: CPTII,S$GLB,,

## 2025-04-09 PROCEDURE — 72158 MRI LUMBAR SPINE W/O & W/DYE: CPT | Mod: 26,,, | Performed by: STUDENT IN AN ORGANIZED HEALTH CARE EDUCATION/TRAINING PROGRAM

## 2025-04-09 RX ORDER — GADOBUTROL 604.72 MG/ML
7 INJECTION INTRAVENOUS
Status: COMPLETED | OUTPATIENT
Start: 2025-04-09 | End: 2025-04-09

## 2025-04-09 RX ADMIN — GADOBUTROL 7 ML: 604.72 INJECTION INTRAVENOUS at 03:04

## 2025-04-09 NOTE — PROGRESS NOTES
GASTROENTEROLOGY CLINIC NOTE    Reason for visit: The encounter diagnosis was Epigastric pain.  Referring provider/PCP: Estephania San MD    HPI:  Maria Elena Tavares is a 69 y.o. female here today for epigastric pain. Began in march-- upper abd burning, radiating to RUQ/LUQ. Saw PCP who completed CT and started PPI. No GI abnormalities seen on CT. She took two week course of PPI with improvement. She no longer is having abd pain. Did have some nausea initially- now improved, no vomiting. NSAID use a few times/month. Taking mounjaro for about 3 years.     Prior Endoscopy:  EGD:  Colon: 2020 with Dr. Mendoza:  - The examined portion of the ileum was normal.               - Two 2 to 3 mm polyps in the sigmoid colon and in the ascending colon, removed with a jumbo cold forceps. Resected and retrieved.               - Diverticulosis in the entire examined colon.               - The examination was otherwise normal on direct and retroflexion views.    - Repeat colonoscopy in 7 years for surveillance purposes.     Final Pathologic Diagnosis 1. Polyp, ascending colon (biopsy):  - Tubular adenoma  - No high-grade dysplasia or malignancy  2.  Polyp, sigmoid colon (biopsy):  - Early tubular adenoma  - No high-grade dysplasia or malignancy     (Portions of this note were dictated using voice recognition software and may contain dictation related errors in spelling/grammar/syntax not found on text review)    Review of Systems   Gastrointestinal:  Positive for abdominal pain (improved). Negative for nausea and vomiting.     Past Medical History: has a past medical history of BMI 37.0-37.9, adult, Breast cancer, Colon polyps, Colon polyps, Diabetes mellitus type II, Diverticulosis, Elevated blood protein, History of shingles, Hyperlipidemia, Hypertension, Microalbuminuria, Mild vitamin D deficiency, Primary osteoarthritis of left knee, Thyroid disease, and Usual hyperplasia of lactiferous duct.    Past Surgical History: has a past  surgical history that includes Hysterectomy; Oophorectomy; Insertion of tunneled central venous catheter (CVC) with subcutaneous port (Right, 10/04/2019); Breast biopsy (Left, 2019); Breast biopsy (Right, 2019); Breast biopsy (Left, 10/14/2019); Breast biopsy (Left, 10/08/2019); Simple mastectomy (Bilateral, 2020); [place] lymph node biopsy (Left, 2020); Insertion of breast tissue expander (Bilateral, 2020);  section; Tissue expander removal (Bilateral, 2020); Colonoscopy (N/A, 10/08/2020); arthroplasty, knee, total, sight assisted (Left, 2024); and Breast surgery (2020).    Home medications: Medications Ordered Prior to Encounter[1]    Vital signs:  Ht 5' (1.524 m)   Wt 73.6 kg (162 lb 4.1 oz)   LMP  (LMP Unknown)   BMI 31.69 kg/m²     Physical Exam  Constitutional:       Appearance: Normal appearance.   Abdominal:      General: There is no distension.      Palpations: Abdomen is soft.      Tenderness: There is no abdominal tenderness.   Neurological:      Mental Status: She is alert.       Results for orders placed or performed during the hospital encounter of 25 (from the past 2160 hours)   CT Abdomen Pelvis With IV Contrast Routine Oral Contrast    Narrative    EXAMINATION:  CT ABDOMEN PELVIS WITH IV CONTRAST    CLINICAL HISTORY:  Abdominal abscess/infection suspected;  Epigastric pain    TECHNIQUE:  Low dose axial images, sagittal and coronal reformations were obtained from the lung bases to the pubic symphysis following the IV administration of 75 mL of Omnipaque 350 intravenous contrast. Oral contrast was administered.    COMPARISON:  CT abdomen pelvis 2020    FINDINGS:  Lungs: Lung bases are clear.  No consolidation or pleural effusion in the visualized lung bases.    Heart: Normal size. Calcific atherosclerosis of the visualized coronary vessels.    Liver: Upper limit of normal in size measuring 17.0 cm craniocaudal.  Diffusely low  parenchymal attenuation which could reflect steatosis although this may be due to contrast bolus timing.  Stable small enhancing area in segment 7 the right hepatic lobe.  Calcified granuloma in the lower right lobe.  No new liver lesions..    Gallbladder: No stones.  No pericholecystic inflammatory changes.    Bile ducts: No intrahepatic or extrahepatic biliary ductal dilatation.    Spleen: Normal size. No focal abnormality.    Pancreas: No mass. No peripancreatic fat stranding.    Adrenals: No significant abnormality    Renal/Ureters: The kidneys are normal in size . Simple cyst in left kidney.  Few subcentimeter hypodensities bilaterally too small fully characterize.  0.7 cm nonobstructing stone within the right kidney.  No hydroureteronephrosis. The urinary bladder is unremarkable.    Reproductive: Hysterectomy.    Stomach/Bowel: Stomach is mildly distended but otherwise unremarkable..  Small bowel is normal caliber without obstruction or inflammation. Appendix is within normal limits..  Large bowel is normal caliber without obstruction or inflammation. Diverticulosis without evidence of diverticulitis.    Peritoneum: No free fluid. No intraperitoneal free air.    Lymph Nodes: No pathologic kin enlargement in the abdomen or pelvis.    Vasculature: Abdominal aorta tapers normally.  Moderate calcific atherosclerosis of the abdominal aorta and its branches. Portal vasculature, SMV, and splenic vein are patent.    Bones: Degenerative changes of the spine.  Focal sclerotic appearance of the L2 vertebral body no acute fractures.    Soft Tissues: Postop change of bilateral mastectomy.  Operative changes anterior abdominal wall.  Tiny fat containing umbilical hernia.      Impression    No CT findings to explain reported history of epigastric pain.    Sclerosis of the L2 vertebral body new from prior CT 2020.  Metastatic lesion not excluded in patient with history of breast cancer.  Consider nuclear medicine bone scan  or PET-CT for further evaluation.    Diverticulosis without acute diverticulitis.    Nonobstructing right-sided nephrolithiasis.    This report was flagged in Epic as abnormal.    Electronically signed by resident: Tomás Ortega  Date:    03/28/2025  Time:    08:13    Electronically signed by: Raul Jimenez MD  Date:    03/28/2025  Time:    12:37     *Note: Due to a large number of results and/or encounters for the requested time period, some results have not been displayed. A complete set of results can be found in Results Review.      I have reviewed associated labs, imaging and notes.     Assessment:  1. Epigastric pain    Epigastric pain-- march   Radiated to RUQ/LUQ   Burning pain, nausea- no vomiting  CT negative  for GI abnormalities  Trial of PPI with improvement    No longer with abd pain--- scheduled for PET later this month, if gastric/SB abnormality can schedule EGD or schedule if symptoms return. Pt in agreement with plan.     Plan:     F/U as needed    Stefanie Peñaloza NP  [facility] [place]         [1]   Current Outpatient Medications on File Prior to Visit   Medication Sig Dispense Refill    amLODIPine (NORVASC) 5 MG tablet Take 1 tablet (5 mg total) by mouth once daily. 90 tablet 3    atorvastatin (LIPITOR) 10 MG tablet Take 1 tablet (10 mg total) by mouth once daily. 30 tablet 11    cetirizine (ZYRTEC) 10 MG tablet Take 1 tablet (10 mg total) by mouth daily as needed for Allergies (itching). 30 tablet 0    ciclopirox 0.77 % Gel Apply topically 2 (two) times daily. To thickened discolored toenails. 30 g 6    cycloSPORINE (RESTASIS) 0.05 % ophthalmic emulsion Place 1 drop into both eyes 2 (two) times daily. 60 each 11    ergocalciferol (ERGOCALCIFEROL) 50,000 unit Cap Take 1 capsule (50,000 Units total) by mouth every 14 (fourteen) days. 24 capsule 0    gabapentin (NEURONTIN) 800 MG tablet Take 1 tablet (800 mg total) by mouth 3 (three) times daily. 90 tablet 0    irbesartan (AVAPRO)  300 MG tablet Take 1 tablet by mouth once daily 90 tablet 0    letrozole (FEMARA) 2.5 mg Tab Take 1 tablet (2.5 mg total) by mouth once daily. 90 tablet 3    lifitegrast (XIIDRA) 5 % Dpet Apply 1 drop to  both eyes  2 (two) times daily. 180 each 4    pantoprazole (PROTONIX) 40 MG tablet Take 1 tablet (40 mg total) by mouth once daily. 14 tablet 0    perfluorohexyloctane, PF, 100 % Drop Place 1 drop into both eyes 4 (four) times daily. 3 mL 11    polyethylene glycol (GLYCOLAX) 17 gram/dose powder Mix 17 g (1 capful) with juice or water an drink 2 (two) times daily. 1020 g 2    RSVPreF3 antigen-AS01E, PF, (AREXVY, PF,) 120 mcg/0.5 mL SusR vaccine Inject into the muscle. 1 each 0    tirzepatide (MOUNJARO) 10 mg/0.5 mL PnIj Inject 10 mg into the skin every 7 days. 12 Pen 2    aspirin (ECOTRIN) 81 MG EC tablet Take 1 tablet (81 mg total) by mouth 2 (two) times a day. 84 tablet 0    blood-glucose meter kit DISPENSE : BLOOD TEST STRIPS AND LANCETS PATIENT TEST BLOOD SUGARS TWICE DAILY  TEST STRIPS: 50 EACH, REFILL 5  LANCETS:  50 EACH , REFILL 5 1 each 0    levothyroxine (SYNTHROID) 200 MCG tablet Take 1 tablet (200 mcg total) by mouth once daily. Except on Sundays take 1/2 tablet 90 tablet 1     No current facility-administered medications on file prior to visit.

## 2025-04-10 ENCOUNTER — PATIENT MESSAGE (OUTPATIENT)
Dept: HEMATOLOGY/ONCOLOGY | Facility: CLINIC | Age: 70
End: 2025-04-10
Payer: MEDICARE

## 2025-04-10 DIAGNOSIS — C77.3 BREAST CANCER METASTASIZED TO AXILLARY LYMPH NODE, LEFT: Primary | ICD-10-CM

## 2025-04-10 DIAGNOSIS — C50.912 BREAST CANCER METASTASIZED TO AXILLARY LYMPH NODE, LEFT: Primary | ICD-10-CM

## 2025-04-11 DIAGNOSIS — Z47.1 AFTERCARE FOLLOWING LEFT KNEE JOINT REPLACEMENT SURGERY: Primary | ICD-10-CM

## 2025-04-11 DIAGNOSIS — Z96.652 AFTERCARE FOLLOWING LEFT KNEE JOINT REPLACEMENT SURGERY: Primary | ICD-10-CM

## 2025-04-12 DIAGNOSIS — I10 HYPERTENSION, UNSPECIFIED TYPE: ICD-10-CM

## 2025-04-12 DIAGNOSIS — M79.601 RIGHT ARM PAIN: ICD-10-CM

## 2025-04-13 RX ORDER — IRBESARTAN 300 MG/1
TABLET ORAL
Qty: 90 TABLET | Refills: 3 | Status: SHIPPED | OUTPATIENT
Start: 2025-04-13

## 2025-04-13 NOTE — TELEPHONE ENCOUNTER
No care due was identified.  [facility] Embedded Care Due Messages. Reference number: [id number].   4/12/2025 7:42:01 PM CDT

## 2025-04-13 NOTE — TELEPHONE ENCOUNTER
Care Due:                  Date            Visit Type   Department     Provider  --------------------------------------------------------------------------------                                ESTABLISHED                              PATIENT -    [facility] PRIMARY  Last Visit: 04-      VIRTUAL      CARE           Estephania San                              EP -                              PRIMARY      [facility] PRIMARY  Next Visit: 05-      CARE ([facility])   CARE           Estephania San                                                            Last  Test          Frequency    Reason                     Performed    Due Date  --------------------------------------------------------------------------------    HBA1C.......  6 months...  tirzepatide..............  11- 05-    [employer] Embedded Care Due Messages. Reference number: [id number].   4/12/2025 7:41:06 PM CDT

## 2025-04-13 NOTE — TELEPHONE ENCOUNTER
Refill Decision Note   Maria Elenajay Tavares  is requesting a refill authorization.  Brief Assessment and Rationale for Refill:  Approve     Medication Therapy Plan:        Comments:     Note composed:1:45 PM 04/13/2025

## 2025-04-14 ENCOUNTER — PATIENT MESSAGE (OUTPATIENT)
Dept: PRIMARY CARE CLINIC | Facility: CLINIC | Age: 70
End: 2025-04-14
Payer: MEDICARE

## 2025-04-14 DIAGNOSIS — M79.601 RIGHT ARM PAIN: ICD-10-CM

## 2025-04-14 RX ORDER — GABAPENTIN 800 MG/1
800 TABLET ORAL 3 TIMES DAILY
Qty: 90 TABLET | Refills: 0 | Status: SHIPPED | OUTPATIENT
Start: 2025-04-14 | End: 2026-04-14

## 2025-04-14 NOTE — TELEPHONE ENCOUNTER
Refill Encounter    PCP Visits: Recent Visits  Date Type Provider Dept   04/01/25 Office Visit Estephania San MD [facility] Primary Care   03/18/25 Office Visit Estephania San MD [facility]   02/04/25 Office Visit Estephania San MD [facility] Primary [facility]   11/25/24 Office Visit Estephania San MD [facility]   09/10/24 Office Visit Estephania San MD [facility]   07/30/24 Office Visit Estephania San MD [facility]   07/10/24 Office Visit Estephania San MD [facility] Primary [facility]   Showing recent visits within past 360 days and meeting all other requirements  Future Appointments  Date Type Provider Dept   05/06/25 Appointment Estephania San MD [facility] Primary [facility]   Showing future appointments within next 720 days and meeting all other requirements      Last 3 Blood Pressure:   BP Readings from Last 3 Encounters:   03/18/25 117/72   02/10/25 138/86   02/04/25 119/77     Preferred Pharmacy:   [facility] [address] [place] [address]  Phone: [phone] Fax: [phone]    Requested RX:  Requested Prescriptions     Pending Prescriptions Disp Refills    gabapentin (NEURONTIN) 800 MG tablet 90 tablet 0     Sig: Take 1 tablet (800 mg total) by mouth 3 (three) times daily.      RX Route: Normal

## 2025-04-14 NOTE — TELEPHONE ENCOUNTER
No care due was identified.  [facility] Embedded Care Due Messages. Reference number: [id number].   4/14/2025 1:12:37 PM CDT

## 2025-04-14 NOTE — TELEPHONE ENCOUNTER
Refill Encounter    PCP Visits: Recent Visits  Date Type Provider Dept   04/01/25 Office Visit Estephania San MD [facility] Primary Care   03/18/25 Office Visit Estephania San MD [facility]   02/04/25 Office Visit Estephania San MD [facility] Primary [facility] [place]   11/25/24 Office Visit Estephania San MD [facility]   09/10/24 Office Visit Estephania San MD [facility]   07/30/24 Office Visit Estephania San MD [facility] [place]   07/10/24 Office Visit Estephania San MD [facility] Primary [facility] [place]   Showing recent visits within past 360 days and meeting all other requirements  Future Appointments  Date Type Provider Dept   05/06/25 Appointment Estephania San MD [facility] Primary [facility] [place]   Showing future appointments within next 720 days and meeting all other requirements      Last 3 Blood Pressure:   BP Readings from Last 3 Encounters:   03/18/25 117/72   02/10/25 138/86   02/04/25 119/77     Preferred Pharmacy:   [facility] [address] [place] [address]  Phone: [phone] Fax: [phone]    Requested RX:  Requested Prescriptions     Pending Prescriptions Disp Refills    gabapentin (NEURONTIN) 800 MG tablet 90 tablet 0     Sig: Take 1 tablet (800 mg total) by mouth 3 (three) times daily.      RX Route: Normal

## 2025-04-16 ENCOUNTER — OFFICE VISIT (OUTPATIENT)
Dept: OPHTHALMOLOGY | Facility: CLINIC | Age: 70
End: 2025-04-16
Payer: MEDICARE

## 2025-04-16 DIAGNOSIS — H25.13 NUCLEAR AGE-RELATED CATARACT, BOTH EYES: Primary | ICD-10-CM

## 2025-04-16 PROCEDURE — 99999 PR PBB SHADOW E&M-EST. PATIENT-LVL III: CPT | Mod: PBBFAC,,,

## 2025-04-16 RX ORDER — MIDAZOLAM HYDROCHLORIDE 1 MG/ML
1 INJECTION, SOLUTION INTRAMUSCULAR; INTRAVENOUS
OUTPATIENT
Start: 2025-04-16

## 2025-04-16 RX ORDER — PHENYLEPHRINE HYDROCHLORIDE 100 MG/ML
1 SOLUTION/ DROPS OPHTHALMIC
OUTPATIENT
Start: 2025-04-16

## 2025-04-16 RX ORDER — MOXIFLOXACIN 5 MG/ML
1 SOLUTION/ DROPS OPHTHALMIC
OUTPATIENT
Start: 2025-04-16 | End: 2025-04-16

## 2025-04-16 RX ORDER — PREDNISOLONE ACETATE-GATIFLOXACIN-BROMFENAC .75; 5; 1 MG/ML; MG/ML; MG/ML
1 SUSPENSION/ DROPS OPHTHALMIC 3 TIMES DAILY
Qty: 8 ML | Refills: 3 | Status: SHIPPED | OUTPATIENT
Start: 2025-04-16

## 2025-04-16 RX ORDER — CYCLOP/TROP/PROPA/PHEN/KET/WAT 1-1-0.1%
1 DROPS (EA) OPHTHALMIC (EYE)
OUTPATIENT
Start: 2025-04-16 | End: 2025-04-16

## 2025-04-16 RX ORDER — FENTANYL CITRATE 50 UG/ML
25 INJECTION, SOLUTION INTRAMUSCULAR; INTRAVENOUS
Refills: 0 | OUTPATIENT
Start: 2025-04-16

## 2025-04-16 NOTE — PROGRESS NOTES
HPI    Patient is here today for a cataract evaluation/pre-op measurements. Last   eye exam was on 02/12/2025 with Dr. Ruiz. States it is difficult to   drive at night due to bright lights and glare. No pain, but occasional   dryness in OU. Occasional floater, not often.     Eye Meds: Xiidra BID OU  Past Ocular Sx: None  Last edited by Radha Puente on 4/16/2025  8:47 AM.            Assessment /Plan     For exam results, see Encounter Report.    Nuclear age-related cataract, both eyes        Visually Significant Cataract: Patient reports decreased vision consistent with the clinical amount of lenticular opacity, which reaches the level of visual significance and affects activities of daily living.     Risks, benefits, and alternatives to cataract surgery were discussed and the consent reviewed. IOL options were discussed, including ATIOLs and the associated side effects and additional patient cost associated with them.    Patient educational materials including eye models, videos, and brochures were utilized to explain that Traditional Cataract Surgery does not include techniques and technologies to reduce dependence on glasses or contacts, whereas Advanced Technology Cataract Surgery (ATCS) does include advanced pre operative work up and intraoperative/post operative techniques and technologies to reduced dependence on glasses/contacts. I explained to the patient that ATCS is an elective choice and has additional out of pocket costs, not covered by insurance, and that results cannot be guaranteed; thus some patients will still need glasses or contacts for some tasks.  Side effects of full range IOLs were specifically discussed including haloes and starbursts around lights at night.    Pt would like to discuss trad vs atcs. She is slightly myopic but unsure if she would like to keep her nearsightedness- she would like to further weigh her options.     RIGHT eye done first

## 2025-04-23 ENCOUNTER — OFFICE VISIT (OUTPATIENT)
Dept: OPHTHALMOLOGY | Facility: CLINIC | Age: 70
End: 2025-04-23
Attending: OPHTHALMOLOGY
Payer: MEDICARE

## 2025-04-23 DIAGNOSIS — H25.13 NUCLEAR SCLEROSIS, BILATERAL: Primary | ICD-10-CM

## 2025-04-23 PROCEDURE — 1159F MED LIST DOCD IN RCRD: CPT | Mod: CPTII,S$GLB,, | Performed by: OPHTHALMOLOGY

## 2025-04-23 PROCEDURE — 3288F FALL RISK ASSESSMENT DOCD: CPT | Mod: CPTII,S$GLB,, | Performed by: OPHTHALMOLOGY

## 2025-04-23 PROCEDURE — 99214 OFFICE O/P EST MOD 30 MIN: CPT | Mod: S$GLB,,, | Performed by: OPHTHALMOLOGY

## 2025-04-23 PROCEDURE — 1101F PT FALLS ASSESS-DOCD LE1/YR: CPT | Mod: CPTII,S$GLB,, | Performed by: OPHTHALMOLOGY

## 2025-04-23 PROCEDURE — 1160F RVW MEDS BY RX/DR IN RCRD: CPT | Mod: CPTII,S$GLB,, | Performed by: OPHTHALMOLOGY

## 2025-04-23 PROCEDURE — 99999 PR PBB SHADOW E&M-EST. PATIENT-LVL III: CPT | Mod: PBBFAC,,, | Performed by: OPHTHALMOLOGY

## 2025-04-23 PROCEDURE — 1126F AMNT PAIN NOTED NONE PRSNT: CPT | Mod: CPTII,S$GLB,, | Performed by: OPHTHALMOLOGY

## 2025-04-23 PROCEDURE — 4010F ACE/ARB THERAPY RXD/TAKEN: CPT | Mod: CPTII,S$GLB,, | Performed by: OPHTHALMOLOGY

## 2025-04-23 NOTE — PROGRESS NOTES
HPI    Cataract Evaluation     Patient states it is difficult to drive at night due to bright lights and   glare. No pain, but occasional dryness in OU. Occasional floater, not   often.    Eye Meds: Xiidra BID OU   Past Ocular Sx: None     Last edited by Al Medrano on 4/23/2025 10:34 AM.            Assessment /Plan     For exam results, see Encounter Report.    Nuclear sclerosis, bilateral      Visually Significant Cataract: Patient reports decreased vision consistent with the clinical amount of lenticular opacity, which reaches the level of visual significance and affects activities of daily living.     Specifically, this patient describes difficulty with:  - driving safely at night  - reading road signs  - reading small print  - deciphering medicine bottles  - reading the newspaper  - using the phone  - reading texts     Risks, benefits, and alternatives to cataract surgery were discussed and the consent reviewed. IOL options were discussed, including ATIOLs and the associated side effects and additional patient cost associated with them.   IOL Selections:   Right eye  IOL: DIBOO 21.5     Left eye  IOL: [id number] 21.5     Pt wishes to have RIGHT eye done first.  The patient expresses a desire to reduce spectacle dependence. I reviewed various IOL and LASER refractive surgical options and we will attempt to minimize spectacle dependence by managing astigmatism and optimizing IOL selection. Customized Cataract Surgery with Vision Correction (CCVC) was explained to the patient with educational videos and discussion.  The patient voices understanding and wishes to implement this technology during the cataract procedure.  I explained the increased precision of the LASER versus manual techniques, especially as it relates to astigmatism reduction with arcuate incisions.  I emphasized that although our goal is to reduce the need for refractive correction (glasses or contacts) after surgery, there may still be a  need for spectacle correction to achieve optimal visual acuity, and that a reasonable range of functional vision should be the expectation.  No guarantees are made about post operative refraction or visual acuity, as the eye may heal in unpredictable ways, and the standard risks, benefits, and alternatives to cataract surgery were explained.  The patient understands that the refractive portions of this cataract procedure are not covered by insurance, and that there is an out of pocket expense of $1400 FOR OD, AND $2000 FOR OS per eye. I also explained that even though our pre-operative plan is to utilize advanced refractive technologies during surgery, that I may decide to eliminate part or all of this plan if surgical challenges or complications arise, or I feel that it is not in the patient's best interest. Consent forms and an ABN form were given to the patient to review.    [employer] APPROVED

## 2025-04-23 NOTE — H&P (VIEW-ONLY)
HPI    Cataract Evaluation     Patient states it is difficult to drive at night due to bright lights and   glare. No pain, but occasional dryness in OU. Occasional floater, not   often.    Eye Meds: Xiidra BID OU   Past Ocular Sx: None     Last edited by Al Medrano on 4/23/2025 10:34 AM.            Assessment /Plan     For exam results, see Encounter Report.    Nuclear sclerosis, bilateral      Visually Significant Cataract: Patient reports decreased vision consistent with the clinical amount of lenticular opacity, which reaches the level of visual significance and affects activities of daily living.     Specifically, this patient describes difficulty with:  - driving safely at night  - reading road signs  - reading small print  - deciphering medicine bottles  - reading the newspaper  - using the phone  - reading texts     Risks, benefits, and alternatives to cataract surgery were discussed and the consent reviewed. IOL options were discussed, including ATIOLs and the associated side effects and additional patient cost associated with them.   IOL Selections:   Right eye  IOL: DIBOO 21.5     Left eye  IOL: IQV471 21.5     Pt wishes to have RIGHT eye done first.  The patient expresses a desire to reduce spectacle dependence. I reviewed various IOL and LASER refractive surgical options and we will attempt to minimize spectacle dependence by managing astigmatism and optimizing IOL selection. Customized Cataract Surgery with Vision Correction (CCVC) was explained to the patient with educational videos and discussion.  The patient voices understanding and wishes to implement this technology during the cataract procedure.  I explained the increased precision of the LASER versus manual techniques, especially as it relates to astigmatism reduction with arcuate incisions.  I emphasized that although our goal is to reduce the need for refractive correction (glasses or contacts) after surgery, there may still be a  need for spectacle correction to achieve optimal visual acuity, and that a reasonable range of functional vision should be the expectation.  No guarantees are made about post operative refraction or visual acuity, as the eye may heal in unpredictable ways, and the standard risks, benefits, and alternatives to cataract surgery were explained.  The patient understands that the refractive portions of this cataract procedure are not covered by insurance, and that there is an out of pocket expense of $1400 FOR OD, AND $2000 FOR OS per eye. I also explained that even though our pre-operative plan is to utilize advanced refractive technologies during surgery, that I may decide to eliminate part or all of this plan if surgical challenges or complications arise, or I feel that it is not in the patient's best interest. Consent forms and an ABN form were given to the patient to review.    [employer] APPROVED

## 2025-04-24 ENCOUNTER — HOSPITAL ENCOUNTER (OUTPATIENT)
Dept: RADIOLOGY | Facility: HOSPITAL | Age: 70
Discharge: HOME | End: 2025-04-24
Attending: INTERNAL MEDICINE
Payer: MEDICARE

## 2025-04-24 DIAGNOSIS — C50.912 BREAST CANCER METASTASIZED TO AXILLARY LYMPH NODE, LEFT: ICD-10-CM

## 2025-04-24 DIAGNOSIS — C77.3 BREAST CANCER METASTASIZED TO AXILLARY LYMPH NODE, LEFT: ICD-10-CM

## 2025-04-24 LAB — POCT GLUCOSE: 72 MG/DL (ref 70–110)

## 2025-04-24 PROCEDURE — 78815 PET IMAGE W/CT SKULL-THIGH: CPT | Mod: 26,PS,, | Performed by: NUCLEAR MEDICINE

## 2025-04-24 PROCEDURE — 78815 PET IMAGE W/CT SKULL-THIGH: CPT | Mod: TC

## 2025-04-24 PROCEDURE — A9552 F18 FDG: HCPCS | Performed by: INTERNAL MEDICINE

## 2025-04-24 RX ORDER — FLUDEOXYGLUCOSE F18 500 MCI/ML
15.28 INJECTION INTRAVENOUS
Status: COMPLETED | OUTPATIENT
Start: 2025-04-24 | End: 2025-04-24

## 2025-04-24 RX ADMIN — FLUDEOXYGLUCOSE F-18 15.28 MILLICURIE: 500 INJECTION INTRAVENOUS at 10:04

## 2025-04-25 ENCOUNTER — TELEPHONE (OUTPATIENT)
Dept: ORTHOPEDICS | Facility: CLINIC | Age: 70
End: 2025-04-25
Payer: MEDICARE

## 2025-04-25 ENCOUNTER — PATIENT MESSAGE (OUTPATIENT)
Dept: HEMATOLOGY/ONCOLOGY | Facility: CLINIC | Age: 70
End: 2025-04-25
Payer: MEDICARE

## 2025-04-25 DIAGNOSIS — R93.89 ABNORMAL MRI: Primary | ICD-10-CM

## 2025-04-25 NOTE — TELEPHONE ENCOUNTER
Attempted to contact pt, no answer.   ----- Message from SERJIO Perez sent at 4/25/2025 11:30 AM CDT -----  Referral placed by Dr. Villagran for Spine. Please reach out to pt to get appt set up. Thank you Ana

## 2025-04-28 ENCOUNTER — PATIENT MESSAGE (OUTPATIENT)
Dept: OPTOMETRY | Facility: CLINIC | Age: 70
End: 2025-04-28
Payer: MEDICARE

## 2025-04-28 ENCOUNTER — PATIENT MESSAGE (OUTPATIENT)
Dept: PRIMARY CARE CLINIC | Facility: CLINIC | Age: 70
End: 2025-04-28
Payer: MEDICARE

## 2025-04-28 ENCOUNTER — PATIENT MESSAGE (OUTPATIENT)
Dept: OPHTHALMOLOGY | Facility: CLINIC | Age: 70
End: 2025-04-28
Payer: MEDICARE

## 2025-04-28 ENCOUNTER — TELEPHONE (OUTPATIENT)
Dept: OPHTHALMOLOGY | Facility: CLINIC | Age: 70
End: 2025-04-28
Payer: MEDICARE

## 2025-04-28 DIAGNOSIS — H25.11 NUCLEAR SCLEROTIC CATARACT OF RIGHT EYE: Primary | ICD-10-CM

## 2025-04-29 ENCOUNTER — OFFICE VISIT (OUTPATIENT)
Dept: ORTHOPEDICS | Facility: CLINIC | Age: 70
End: 2025-04-29
Payer: MEDICARE

## 2025-04-29 ENCOUNTER — TELEPHONE (OUTPATIENT)
Dept: ORTHOPEDICS | Facility: CLINIC | Age: 70
End: 2025-04-29
Payer: MEDICARE

## 2025-04-29 ENCOUNTER — HOSPITAL ENCOUNTER (OUTPATIENT)
Facility: HOSPITAL | Age: 70
Discharge: HOME | End: 2025-04-29
Attending: PHYSICIAN ASSISTANT
Payer: MEDICARE

## 2025-04-29 ENCOUNTER — PATIENT MESSAGE (OUTPATIENT)
Dept: OPHTHALMOLOGY | Facility: CLINIC | Age: 70
End: 2025-04-29
Payer: MEDICARE

## 2025-04-29 VITALS — HEIGHT: 60 IN | WEIGHT: 158.75 LBS | BODY MASS INDEX: 31.17 KG/M2

## 2025-04-29 DIAGNOSIS — Z96.652 AFTERCARE FOLLOWING LEFT KNEE JOINT REPLACEMENT SURGERY: ICD-10-CM

## 2025-04-29 DIAGNOSIS — M70.52 PES ANSERINUS BURSITIS OF LEFT KNEE: ICD-10-CM

## 2025-04-29 DIAGNOSIS — Z96.652 AFTERCARE FOLLOWING LEFT KNEE JOINT REPLACEMENT SURGERY: Primary | ICD-10-CM

## 2025-04-29 DIAGNOSIS — Z47.1 AFTERCARE FOLLOWING LEFT KNEE JOINT REPLACEMENT SURGERY: ICD-10-CM

## 2025-04-29 DIAGNOSIS — Z47.1 AFTERCARE FOLLOWING LEFT KNEE JOINT REPLACEMENT SURGERY: Primary | ICD-10-CM

## 2025-04-29 DIAGNOSIS — M77.11 RIGHT LATERAL EPICONDYLITIS: ICD-10-CM

## 2025-04-29 PROCEDURE — 99999 PR PBB SHADOW E&M-EST. PATIENT-LVL III: CPT | Mod: PBBFAC,,, | Performed by: PHYSICIAN ASSISTANT

## 2025-04-29 PROCEDURE — 73562 X-RAY EXAM OF KNEE 3: CPT | Mod: TC,PN,LT

## 2025-04-29 PROCEDURE — 77073 BONE LENGTH STUDIES: CPT | Mod: TC,PN

## 2025-04-29 PROCEDURE — 77073 BONE LENGTH STUDIES: CPT | Mod: 26,,, | Performed by: STUDENT IN AN ORGANIZED HEALTH CARE EDUCATION/TRAINING PROGRAM

## 2025-04-29 RX ORDER — TRIAMCINOLONE ACETONIDE 40 MG/ML
40 INJECTION, SUSPENSION INTRA-ARTICULAR; INTRAMUSCULAR
Status: DISCONTINUED | OUTPATIENT
Start: 2025-04-29 | End: 2025-04-29 | Stop reason: HOSPADM

## 2025-04-29 RX ADMIN — TRIAMCINOLONE ACETONIDE 40 MG: 40 INJECTION, SUSPENSION INTRA-ARTICULAR; INTRAMUSCULAR at 01:04

## 2025-04-29 NOTE — PROGRESS NOTES
Subjective:      Chief Complaint: Pain of the Left Knee (S/P L TKA 4/29/24) and Follow-up    Patient ID: Maria Elena Tavares is a 69 y.o. female.  Patient is 1 years s/p  left primary total knee replacement  Anterior knee pain: Yes-around pes  Has improved pain  Is in physical therapy  No  Problems w incision  No  Is  happy with result  Yes  Opiod free: Yes    Noticing pes pain x1mon at night. Has been icing.   Also notes return of right lateral epicondyle pain. Requesting repeat injection because she is unable to see her hand specialist. Was referred previously to PT for her elbow which helped but she admits to not keeping up with [facility] for elbow AND knee.           Past Medical History:   Diagnosis Date    BMI 37.0-37.9, adult     Breast cancer 09/05/2019     Left breast, IDC with lymph node metastisis    Colon polyps 2015    Colon polyps 2015    Diabetes mellitus type II     Diverticulosis     history of diverticulosisseen on colonoscopy at age 48. Repeat recommended at age 58. Done by     Elevated blood protein     History of elevated protein. Apparently has seen  for extensive workup including bone marrow biopsy    History of shingles 2006    Hyperlipidemia     Hypertension     Microalbuminuria     due 2 diabetes    Mild vitamin D deficiency     . A low vitamin D    Primary osteoarthritis of left knee 8/31/2023    Thyroid disease     hypothyroidism    Usual hyperplasia of lactiferous duct     Not sure        Past Surgical History:   Procedure Laterality Date    ARTHROPLASTY, KNEE, TOTAL, SIGHT ASSISTED Left 04/29/2024    Procedure: ARTHROPLASTY, KNEE, SIGHT ASSISTED;  Surgeon: Jamshid Reece MD;  Location: [facility];  Service: Orthopedics;  Laterality: Left;  bmi - 34.57    BREAST BIOPSY Left 09/05/2019    IDC with mets to node    BREAST BIOPSY Right 09/26/2019    core bx, benign node    BREAST BIOPSY Left 10/14/2019    MRI Core bx, + IDC    BREAST BIOPSY Left 10/08/2019    Core bx, ADH    BREAST SURGERY   2020    Breast cancer     SECTION      COLONOSCOPY N/A 10/08/2020    Procedure: COLONOSCOPY;  Surgeon: Shreya Mendoza MD;  Location: [facility] (4TH FLR);  Service: Endoscopy;  Laterality: N/A;  COVID screening on 10/5/20 [facility]    HYSTERECTOMY      INSERTION OF BREAST TISSUE EXPANDER Bilateral 2020    Procedure: INSERTION, TISSUE EXPANDER, BREAST BILATERAL;  Surgeon: Michael Solorzano MD;  Location: [facility] OR 2ND FLR;  Service: Plastics;  Laterality: Bilateral;    INSERTION OF TUNNELED CENTRAL VENOUS CATHETER (CVC) WITH SUBCUTANEOUS PORT Right 10/04/2019    Procedure: KVUKTWYIL-POKM-L-CATH RIGHT (CONSENT AM OF) 1.0 hr case;  Surgeon: Elena Lopez MD;  Location: [facility] OR 2ND FLR;  Service: General;  Laterality: Right;    OOPHORECTOMY      SENTINEL LYMPH NODE BIOPSY Left 2020    Procedure: BIOPSY, LYMPH NODE, SENTINEL LEFT;  Surgeon: Elena Lopez MD;  Location: [facility] OR 2ND FLR;  Service: General;  Laterality: Left;    SIMPLE MASTECTOMY Bilateral 2020    Procedure: MASTECTOMY, SIMPLE BILATERAL;  Surgeon: Elena Lopez MD;  Location: [facility] OR 2ND FLR;  Service: General;  Laterality: Bilateral;    TISSUE EXPANDER REMOVAL Bilateral 2020    Procedure: REMOVAL, TISSUE EXPANDER;  Surgeon: Michael Solorzano MD;  Location: [facility] OR 2ND FLR;  Service: Plastics;  Laterality: Bilateral;        Current Outpatient Medications   Medication Instructions    amLODIPine (NORVASC) 5 mg, Oral, Daily    aspirin (ECOTRIN) 81 mg, Oral, 2 times daily    atorvastatin (LIPITOR) 10 mg, Oral, Daily    blood-glucose meter kit DISPENSE : BLOOD TEST STRIPS AND LANCETS PATIENT TEST BLOOD SUGARS TWICE DAILY  TEST STRIPS: 50 EACH, REFILL 5  LANCETS:  50 EACH , REFILL 5    cetirizine (ZYRTEC) 10 mg, Oral, Daily PRN    ciclopirox 0.77 % Gel Topical, 2 times daily, To thickened discolored toenails.    cycloSPORINE (RESTASIS) 0.05 % ophthalmic emulsion 1 drop, Both Eyes, 2 times daily     gabapentin (NEURONTIN) 800 mg, Oral, 3 times daily    gabapentin (NEURONTIN) 800 mg, Oral, 3 times daily    irbesartan (AVAPRO) 300 MG tablet Take 1 tablet by mouth once daily    letrozole (FEMARA) 2.5 mg, Oral, Daily    levothyroxine (SYNTHROID) 200 mcg, Oral, Daily, Except on Sundays take 1/2 tablet    lifitegrast (XIIDRA) 5 % Dpet Apply 1 drop to  both eyes  2 (two) times daily.    MOUNJARO 10 mg, Subcutaneous, Every 7 days    pantoprazole (PROTONIX) 40 mg, Oral, Daily    perfluorohexyloctane, PF, 100 % Drop 1 drop, Both Eyes, 4 times daily    polyethylene glycol (GLYCOLAX) 17 gram/dose powder Mix 17 g (1 capful) with juice or water an drink 2 (two) times daily.    prednisolon/gatiflox/bromfenac (PREDNISOL ACE-GATIFLOX-BROMFEN) 1-0.5-0.075 % DrpS 1 drop, Ophthalmic, 3 times daily    RSVPreF3 antigen-AS01E, PF, (AREXVY, PF,) 120 mcg/0.5 mL SusR vaccine Intramuscular    VITAMIN D2 50,000 Units, Oral, Every 14 days        Review of patient's allergies indicates:   Allergen Reactions    Amoxil [amoxicillin] Rash       Review of Systems   Constitutional: Negative for fever and malaise/fatigue.   Eyes:  Negative for blurred vision.   Cardiovascular:  Negative for chest pain.   Respiratory:  Negative for shortness of breath.    Skin:  Negative for poor wound healing.   Musculoskeletal:  Positive for joint pain and myalgias.   Genitourinary:  Negative for bladder incontinence.   Neurological:  Negative for dizziness, numbness and paresthesias.   Psychiatric/Behavioral:  Negative for altered mental status.        The patient's relevant past medical, surgical, and social history was reviewed in Epic.       Objective:      VITAL SIGNS: Ht 5' (1.524 m)   Wt 72 kg (158 lb 11.7 oz)   LMP  (LMP Unknown)   BMI 31.00 kg/m²     General    Nursing note and vitals reviewed.  Constitutional: She is oriented to person, place, and time. She appears well-developed and well-nourished.   Neurological: She is alert and oriented to  person, place, and time.     General Musculoskeletal Exam   Gait: normal         Left Knee Exam     Inspection   Scars: present    Tenderness   The patient tender to palpation of the pes anserinus.    Range of Motion   Extension:  0   Flexion:  110     Tests   Stability   MCL - Valgus: normal (0 to 2mm)  LCL - Varus: normal (0 to 2mm)  Patella   Passive Patellar Tilt: neutral    Other   Sensation: normal    Right Hand/Wrist Exam     Range of Motion     Wrist   Extension:  normal   Flexion:  normal       Right Elbow Exam     Tenderness   The patient is tender to palpation of the lateral epicondyle.     Range of Motion   Extension:  normal   Flexion:  normal     Tests   Tennis Elbow: mild          Muscle Strength   Right Upper Extremity   Wrist extension: 5/5   Wrist flexion: 5/5   Elbow Pronation:  5/5   Elbow Supination:  5/5   Elbow Extension: 5/5  Elbow Flexion: 5/5  Left Lower Extremity   Quadriceps:  3/5   Hamstrin/5     Vascular Exam       Left Pulses  Dorsalis Pedis:      2+  Posterior Tibial:      2+           Imaging  X-Ray Knee 3 View Left  Narrative: EXAMINATION:  XR KNEE 3 VIEW LEFT    CLINICAL HISTORY:  Aftercare following joint replacement surgery    TECHNIQUE:  AP, lateral, and Merchant views of the left knee were performed.    COMPARISON:  2024    FINDINGS:  Total knee arthroplasty is in place on the left in good position and alignment.  No joint effusion is seen.  Impression: See above    Electronically signed by: Diogo Bennett MD  Date:    2025  Time:    13:17    I have reviewed the above imaging and agree with the findings stated by the radiologist.           Assessment:       Maria Elena Tavares is a 69 y.o. female seen in the office today for   1. Aftercare following left knee joint replacement surgery    2. Pes anserinus bursitis of left knee    3. Right lateral epicondylitis     Right pes anserine bursitis due to significant hamstring and quad weakness.  Pes bursa injection given  today.  Home exercises shown to help strengthen quad and hamstring.  Also repeat right lateral condyle Kenalog injection.  Continue home exercises.  If she does not notice much relief from this injection, recommend follow-up with her hand specialist for further evaluation.  She may return in one year for her 2 year postop visit of the left knee.      Plan:       Maria Elena was seen today for pain and follow-up.    Diagnoses and all orders for this visit:    Aftercare following left knee joint replacement surgery    Pes anserinus bursitis of left knee  -     Large Joint Aspiration/Injection: L anserine bursa    Right lateral epicondylitis  -     Intermediate Joint Aspiration/Injection: R elbow          Diagnoses and plan discussed with the patient, as well as the expected course and duration of his symptoms.  All questions and concerns were addressed prior to the end of the visit.   Instructed patient to call office if they have any future questions/concerns or to schedule apt. Patient will return to see me if symptoms worsen or fail to improve    Note dictated with voice recognition software, please excuse any grammatical errors.        Kerline Keys PA-C      Department of Orthopedic Surgery  [facility]  Office: [phone]  04/29/2025

## 2025-04-29 NOTE — PROCEDURES
Large Joint Aspiration/Injection: L anserine bursa    Date/Time: 4/29/2025 1:00 PM    Performed by: Kerline Keys PA-C  Authorized by: Kerline Keys PA-C    Consent Done?:  Yes (Verbal)  Indications:  Pain  Site marked: the procedure site was marked    Timeout: prior to procedure the correct patient, procedure, and site was verified    Prep: patient was prepped and draped in usual sterile fashion    Local anesthesia used?: No    Local anesthetic:  Topical anesthetic and lidocaine 1% without epinephrine    Details:  Needle Size:  22 G  Ultrasonic Guidance for needle placement?: No    Approach:  Anterolateral  Location:  Knee  Site:  L anserine bursa  Medications:  40 mg triamcinolone acetonide 40 mg/mL  Patient tolerance:  Patient tolerated the procedure well with no immediate complications  R lateral epicondyleIntermediate Joint Aspiration/Injection: R elbow    Date/Time: 4/29/2025 1:00 PM    Performed by: Kerline Keys PA-C  Authorized by: Kerline Keys PA-C    Consent Done?:  Yes (Verbal)  Indications:  Pain  Site marked: The procedure site was marked    Timeout: Prior to procedure the correct patient, procedure, and site was verified      Location:  Elbow  Site:  R elbow  Prep: Patient was prepped and draped in usual sterile fashion    Ultrasonic Guidance for needle placement: No  Needle size:  22 G  Medications:  40 mg triamcinolone acetonide 40 mg/mL  Patient tolerance:  Patient tolerated the procedure well with no immediate complications

## 2025-04-29 NOTE — TELEPHONE ENCOUNTER
----- Message from Magdalene sent at 4/29/2025  8:15 AM CDT -----  Pt Requesting to Schedule an Appointment Pt is requesting to schedule an appointment that our scheduling dept cannot schedule.Who called: Muluest call back #:  [phone] pt wants appt: 5/2/25 , any time before 12noonReason for appt: right elbow and right hand injectionAdditional notes:

## 2025-05-07 ENCOUNTER — PATIENT MESSAGE (OUTPATIENT)
Dept: OPHTHALMOLOGY | Facility: CLINIC | Age: 70
End: 2025-05-07
Payer: MEDICARE

## 2025-05-07 ENCOUNTER — TELEPHONE (OUTPATIENT)
Dept: OPHTHALMOLOGY | Facility: CLINIC | Age: 70
End: 2025-05-07
Payer: MEDICARE

## 2025-05-07 DIAGNOSIS — M51.362 DEGENERATION OF INTERVERTEBRAL DISC OF LUMBAR REGION WITH DISCOGENIC BACK PAIN AND LOWER EXTREMITY PAIN: Primary | ICD-10-CM

## 2025-05-07 NOTE — TELEPHONE ENCOUNTER
Patient given arrival time of 7:00 am on Monday May 12. Nothing to eat or drink after 11:59 pm.   Start drops into the operative eye Saturday. [facility]

## 2025-05-09 ENCOUNTER — PATIENT MESSAGE (OUTPATIENT)
Dept: OPHTHALMOLOGY | Facility: CLINIC | Age: 70
End: 2025-05-09
Payer: MEDICARE

## 2025-05-09 NOTE — PRE-PROCEDURE INSTRUCTIONS
Unable to reach pt via phone.  Left voicemail with arrival time also informing pt of need for responsible  accompaniment and instructing pt to follow pre-procedure instructions provided via [facility] portal.  The following message was sent to pt's portal.      Dear Maria Elena     Below you will find basic pre-procedure instructions in preparation for your procedure on 5/12/25 with Dr. Foy        You should receive your arrival time within 24-48 hours of your scheduled procedure date from the  at your surgeon's office.     Nothing to eat after midnight the night before your surgery. STOP drinking clear liquids 2 hours prior to your arrival time. Anesthesia encourages this to ensure that you are adequately hydrated. Clear liquids includes water, clear juices, Gatorade, black coffee (no milk, no cream), or soda.  Clear liquids do NOT include anything with pulp or food particles (chicken broth, ice cream, yogurt, Jello, etc.)      - HOLD all oral Diabetic medications night before and morning of procedure  - HOLD all Insulin morning of procedure  - HOLD all Fluid pills morning of procedure  - HOLD all non-insulin shots until after surgery (Ozempic, Mounjaro, Trulicity, Victoza, Byetta, Wegovy and Adlyxin) (7 days prior)  - HOLD all vitamins, minerals and herbal supplements morning of procedure   - TAKE all B/P meds, EXCEPT those that contain a fluid pill (ex. Lasix, Hydroclorothiazide/HCTZ, Spirnolactone)  - USE inhalers as needed and bring AM of surgery  - USE EYE DROPS as directed  -TAKE blood thinner meds AM of surgery unless otherwise instructed by your provider to not take     - Shower and wash face with antibacterial soap (ex. Dial) for 3 mins PM prior and AM of surgery  - No powder, lotions, creams, oils, gels, ointments, makeup,  or jewelry    - Wear comfortable clothing (button up shirt)     (Patient is required to have a responsible ride to transport home, ride may not leave while patient is in  surgery)     -- [facility], 2nd floor Surgery Center, located   @ [address] [place] [address], [place] [address]  2nd Floor Registration        If you have any questions or concerns please feel free to contact your surgeon's office.     In the event that you are running late or need to reschedule on the day of your procedure, please contact the pre-op desk at [phone].          Please reply to this message as receipt of delivery.     Catina, LPN [facility]  Pre-Admit - Anesthesia Dept

## 2025-05-12 ENCOUNTER — HOSPITAL ENCOUNTER (OUTPATIENT)
Facility: HOSPITAL | Age: 70
Discharge: HOME | End: 2025-05-12
Attending: OPHTHALMOLOGY | Admitting: OPHTHALMOLOGY
Payer: MEDICARE

## 2025-05-12 ENCOUNTER — PATIENT MESSAGE (OUTPATIENT)
Dept: HEMATOLOGY/ONCOLOGY | Facility: CLINIC | Age: 70
End: 2025-05-12
Payer: MEDICARE

## 2025-05-12 VITALS
HEART RATE: 80 BPM | HEIGHT: 60 IN | RESPIRATION RATE: 20 BRPM | SYSTOLIC BLOOD PRESSURE: 126 MMHG | OXYGEN SATURATION: 100 % | BODY MASS INDEX: 31.02 KG/M2 | TEMPERATURE: 97 F | DIASTOLIC BLOOD PRESSURE: 82 MMHG | WEIGHT: 158 LBS

## 2025-05-12 DIAGNOSIS — H25.11 NUCLEAR SCLEROTIC CATARACT OF RIGHT EYE: Primary | ICD-10-CM

## 2025-05-12 DIAGNOSIS — H25.13 NUCLEAR AGE-RELATED CATARACT, BOTH EYES: ICD-10-CM

## 2025-05-12 LAB — POCT GLUCOSE: 86 MG/DL (ref 70–110)

## 2025-05-12 PROCEDURE — 63600175 PHARM REV CODE 636 W HCPCS

## 2025-05-12 PROCEDURE — 36000706: Performed by: OPHTHALMOLOGY

## 2025-05-12 PROCEDURE — 71000015 HC POSTOP RECOV 1ST HR: Performed by: OPHTHALMOLOGY

## 2025-05-12 PROCEDURE — 63600175 PHARM REV CODE 636 W HCPCS: Performed by: OPHTHALMOLOGY

## 2025-05-12 PROCEDURE — 99900035 HC TECH TIME PER 15 MIN (STAT)

## 2025-05-12 PROCEDURE — 94761 N-INVAS EAR/PLS OXIMETRY MLT: CPT | Mod: XB

## 2025-05-12 PROCEDURE — 25000003 PHARM REV CODE 250: Performed by: OPHTHALMOLOGY

## 2025-05-12 PROCEDURE — V2632 POST CHMBR INTRAOCULAR LENS: HCPCS | Performed by: OPHTHALMOLOGY

## 2025-05-12 PROCEDURE — 99152 MOD SED SAME PHYS/QHP 5/>YRS: CPT | Performed by: OPHTHALMOLOGY

## 2025-05-12 PROCEDURE — 99152 MOD SED SAME PHYS/QHP 5/>YRS: CPT | Mod: ,,, | Performed by: OPHTHALMOLOGY

## 2025-05-12 PROCEDURE — 25000003 PHARM REV CODE 250

## 2025-05-12 PROCEDURE — 82962 GLUCOSE BLOOD TEST: CPT | Performed by: OPHTHALMOLOGY

## 2025-05-12 PROCEDURE — 36000707: Performed by: OPHTHALMOLOGY

## 2025-05-12 PROCEDURE — 66984 XCAPSL CTRC RMVL W/O ECP: CPT | Mod: RT,,, | Performed by: OPHTHALMOLOGY

## 2025-05-12 DEVICE — LENS EYHANCE +21.5D: Type: IMPLANTABLE DEVICE | Site: EYE | Status: FUNCTIONAL

## 2025-05-12 RX ORDER — PROPARACAINE HYDROCHLORIDE 5 MG/ML
1 SOLUTION/ DROPS OPHTHALMIC
Status: DISCONTINUED | OUTPATIENT
Start: 2025-05-12 | End: 2025-05-12 | Stop reason: HOSPADM

## 2025-05-12 RX ORDER — PROPARACAINE HYDROCHLORIDE 5 MG/ML
SOLUTION/ DROPS OPHTHALMIC
Status: DISCONTINUED | OUTPATIENT
Start: 2025-05-12 | End: 2025-05-12 | Stop reason: HOSPADM

## 2025-05-12 RX ORDER — FENTANYL CITRATE 50 UG/ML
25 INJECTION, SOLUTION INTRAMUSCULAR; INTRAVENOUS
Status: DISCONTINUED | OUTPATIENT
Start: 2025-05-12 | End: 2025-05-12 | Stop reason: HOSPADM

## 2025-05-12 RX ORDER — CYCLOP/TROP/PROPA/PHEN/KET/WAT 1-1-0.1%
1 DROPS (EA) OPHTHALMIC (EYE)
Status: COMPLETED | OUTPATIENT
Start: 2025-05-12 | End: 2025-05-12

## 2025-05-12 RX ORDER — LIDOCAINE HYDROCHLORIDE 40 MG/ML
INJECTION, SOLUTION RETROBULBAR
Status: DISCONTINUED | OUTPATIENT
Start: 2025-05-12 | End: 2025-05-12 | Stop reason: HOSPADM

## 2025-05-12 RX ORDER — MOXIFLOXACIN 5 MG/ML
1 SOLUTION/ DROPS OPHTHALMIC EVERY 5 MIN PRN
Status: COMPLETED | OUTPATIENT
Start: 2025-05-12 | End: 2025-05-12

## 2025-05-12 RX ORDER — ACETAMINOPHEN 325 MG/1
650 TABLET ORAL EVERY 4 HOURS PRN
Status: DISCONTINUED | OUTPATIENT
Start: 2025-05-12 | End: 2025-05-12 | Stop reason: HOSPADM

## 2025-05-12 RX ORDER — MOXIFLOXACIN 5 MG/ML
SOLUTION/ DROPS OPHTHALMIC
Status: DISCONTINUED | OUTPATIENT
Start: 2025-05-12 | End: 2025-05-12 | Stop reason: HOSPADM

## 2025-05-12 RX ORDER — MIDAZOLAM HYDROCHLORIDE 1 MG/ML
1 INJECTION, SOLUTION INTRAMUSCULAR; INTRAVENOUS
Status: DISCONTINUED | OUTPATIENT
Start: 2025-05-12 | End: 2025-05-12 | Stop reason: HOSPADM

## 2025-05-12 RX ORDER — PHENYLEPHRINE HYDROCHLORIDE 100 MG/ML
1 SOLUTION/ DROPS OPHTHALMIC
Status: DISCONTINUED | OUTPATIENT
Start: 2025-05-12 | End: 2025-05-12 | Stop reason: HOSPADM

## 2025-05-12 RX ORDER — MOXIFLOXACIN 5 MG/ML
1 SOLUTION/ DROPS OPHTHALMIC
Status: COMPLETED | OUTPATIENT
Start: 2025-05-12 | End: 2025-05-12

## 2025-05-12 RX ADMIN — MOXIFLOXACIN 1 DROP: 5 SOLUTION/ DROPS OPHTHALMIC at 10:05

## 2025-05-12 RX ADMIN — MOXIFLOXACIN 1 DROP: 5 SOLUTION/ DROPS OPHTHALMIC at 09:05

## 2025-05-12 RX ADMIN — MOXIFLOXACIN OPHTHALMIC 1 DROP: 5 SOLUTION/ DROPS OPHTHALMIC at 07:05

## 2025-05-12 RX ADMIN — MIDAZOLAM HYDROCHLORIDE 2 MG: 1 INJECTION, SOLUTION INTRAMUSCULAR; INTRAVENOUS at 08:05

## 2025-05-12 RX ADMIN — Medication 1 DROP: at 07:05

## 2025-05-12 RX ADMIN — MIDAZOLAM HYDROCHLORIDE 1 MG: 1 INJECTION, SOLUTION INTRAMUSCULAR; INTRAVENOUS at 08:05

## 2025-05-12 NOTE — DISCHARGE SUMMARY
Outcome: Successful outpatient ophthalmic surgical procedure  Preprinted Instructions given to patient.  Regular diet.  Activity: No restrictions  Meds: see Med Rec  Condition: stable  Follow up: 1 day with Dr Foy  Disposition: Home  Diagnosis: s/p eye surgery  Date of discharge: 05/12/2025

## 2025-05-12 NOTE — PLAN OF CARE
Pt in preop bay 39, VSS, meds given and IV inserted. Pt denies any open wounds on body or the use of any weight loss injections. Pt needs consents signed and site jessica, otherwise ready to roll. Procedural consents verified with pt.

## 2025-05-12 NOTE — OP NOTE
SURGEON:  Kendal Foy M.D.    PREOPERATIVE DIAGNOSIS:    Nuclear Sclerotic Cataract Right Eye    POSTOPERATIVE DIAGNOSIS:    Nuclear Sclerotic Cataract Right Eye    PROCEDURES:    Phacoemulsification with  intraocular lens, Right eye (69820)  With moderate sedation >10min (82044)    DATE OF SURGERY: 05/12/2025    IMPLANT: DIB00 21.5    ANESTHESIA:  Under my direct supervision, intravenous moderate sedation was administered during the course of this procedure, with continuous monitoring of hemodynamic parameters. Total time of sedation and amount of sedatives are documented in the nursing logs.    COMPLICATIONS:  None    ESTIMATED BLOOD LOSS: None    SPECIMENS: None    INDICATIONS:    The patient has a history of painless progressive visual loss and difficulty with activities of daily living, which specifically include difficult driving at night due to glare and difficulty reading small print, secondary to cataract formation.  After a thorough discussion of the risks, benefits, and alternatives to cataract surgery, including, but not limited to, the rare risks of infection, retinal detachment, hemorrhage, need for additional surgery, loss of vision, and even loss of the eye, the patient voices understanding and desires to proceed.    DESCRIPTION OF PROCEDURE:    The patients IOL calculations were reviewed, and the lens selection confirmed.   After verification and marking of the proper eye in the preop holding area, the patient was brought to the operating room in supine position where the eye was prepped and draped in standard sterile fashion with 5% Betadine and a lid speculum placed in the eye.   Topical 4% Lidocaine was used in addition to the preoperative anesthesia and the procedure was begun by the creation of a paracentesis incision through which viscoelastic was used to fill the anterior chamber.  Next, a keratome blade was used to create a triplanar temporal clear corneal incision and a cystotome and  Dominiqueata forceps used to fashion a continuous curvilinear capsulorrhexis.  Hydrodissection was carried out using the Wiggins hydrodissection cannula and the nucleus was found to be mobile.  Phacoemulsification of the nucleus was carried out using a quick chop technique, and all remaining epinuclear and cortical material was removed.  The eye was then reformed with Viscoelastic and the  intraocular lens was implanted into the capsular bag.  All remaining viscoelastics were removed from the eye and at the end of the case the pupil was round, the lens was well-centered within the capsular bag and all wounds were found to be water tight.  Drops of Vigamox and Pred Forte were instilled and a shield was placed over the eye. The patient will follow up with Dr. Foy in the morning.

## 2025-05-13 ENCOUNTER — OFFICE VISIT (OUTPATIENT)
Dept: OPHTHALMOLOGY | Facility: CLINIC | Age: 70
End: 2025-05-13
Payer: MEDICARE

## 2025-05-13 DIAGNOSIS — H25.11 NUCLEAR SCLEROSIS OF RIGHT EYE: ICD-10-CM

## 2025-05-13 DIAGNOSIS — Z98.890 POST-OPERATIVE STATE: Primary | ICD-10-CM

## 2025-05-13 PROCEDURE — 99999 PR PBB SHADOW E&M-EST. PATIENT-LVL III: CPT | Mod: PBBFAC,,, | Performed by: OPHTHALMOLOGY

## 2025-05-13 PROCEDURE — 1159F MED LIST DOCD IN RCRD: CPT | Mod: CPTII,S$GLB,, | Performed by: OPHTHALMOLOGY

## 2025-05-13 PROCEDURE — 99024 POSTOP FOLLOW-UP VISIT: CPT | Mod: S$GLB,,, | Performed by: OPHTHALMOLOGY

## 2025-05-13 PROCEDURE — 1126F AMNT PAIN NOTED NONE PRSNT: CPT | Mod: CPTII,S$GLB,, | Performed by: OPHTHALMOLOGY

## 2025-05-13 PROCEDURE — 4010F ACE/ARB THERAPY RXD/TAKEN: CPT | Mod: CPTII,S$GLB,, | Performed by: OPHTHALMOLOGY

## 2025-05-13 PROCEDURE — 1160F RVW MEDS BY RX/DR IN RCRD: CPT | Mod: CPTII,S$GLB,, | Performed by: OPHTHALMOLOGY

## 2025-05-13 NOTE — PROGRESS NOTES
HPI    05/12/2025 IMPLANT: DIB00 21.5 OD    Patient is here today for 1 day phaco OD. OD is doing well since surgery.   No pain or discomfort.    PMB TID OD    Last edited by Radha Puente on 5/13/2025  8:28 AM.            Assessment /Plan     For exam results, see Encounter Report.    Post-operative state    Nuclear sclerosis of right eye      Slit lamp exam:  L/L: nl  K: clear, wound sealed  AC: 1+ cell  Lens: IOL centered and stable    POD1 s/p Phaco/IOL  Appropriate precautions and post op medications reviewed.  Patient instructed to call or come in if symptoms of redness, decreased vision, or pain are experienced.

## 2025-05-14 ENCOUNTER — TELEPHONE (OUTPATIENT)
Dept: OPHTHALMOLOGY | Facility: CLINIC | Age: 70
End: 2025-05-14
Payer: MEDICARE

## 2025-05-14 DIAGNOSIS — H25.12 NUCLEAR SCLEROTIC CATARACT OF LEFT EYE: Primary | ICD-10-CM

## 2025-05-17 ENCOUNTER — PATIENT MESSAGE (OUTPATIENT)
Dept: GASTROENTEROLOGY | Facility: CLINIC | Age: 70
End: 2025-05-17
Payer: MEDICARE

## 2025-05-17 ENCOUNTER — HOSPITAL ENCOUNTER (OUTPATIENT)
Facility: HOSPITAL | Age: 70
Discharge: HOME | End: 2025-05-18
Attending: EMERGENCY MEDICINE | Admitting: STUDENT IN AN ORGANIZED HEALTH CARE EDUCATION/TRAINING PROGRAM
Payer: MEDICARE

## 2025-05-17 DIAGNOSIS — N20.1 URETERAL STONE: Primary | ICD-10-CM

## 2025-05-17 DIAGNOSIS — R07.9 CHEST PAIN: ICD-10-CM

## 2025-05-17 LAB
ABSOLUTE EOSINOPHIL (OHS): 0.01 K/UL
ABSOLUTE MONOCYTE (OHS): 0.78 K/UL (ref 0.3–1)
ABSOLUTE NEUTROPHIL COUNT (OHS): 6.52 K/UL (ref 1.8–7.7)
ALBUMIN SERPL BCP-MCNC: 3.2 G/DL (ref 3.5–5.2)
ALP SERPL-CCNC: 95 UNIT/L (ref 40–150)
ALT SERPL W/O P-5'-P-CCNC: 11 UNIT/L (ref 10–44)
ANION GAP (OHS): 11 MMOL/L (ref 8–16)
AST SERPL-CCNC: 21 UNIT/L (ref 11–45)
BACTERIA #/AREA URNS AUTO: ABNORMAL /HPF
BASOPHILS # BLD AUTO: 0.03 K/UL
BASOPHILS NFR BLD AUTO: 0.4 %
BILIRUB SERPL-MCNC: 0.8 MG/DL (ref 0.1–1)
BILIRUB UR QL STRIP.AUTO: NEGATIVE
BUN SERPL-MCNC: 24 MG/DL (ref 8–23)
CALCIUM SERPL-MCNC: 8.1 MG/DL (ref 8.7–10.5)
CHLORIDE SERPL-SCNC: 109 MMOL/L (ref 95–110)
CLARITY UR: CLEAR
CO2 SERPL-SCNC: 18 MMOL/L (ref 23–29)
COLOR UR AUTO: COLORLESS
CREAT SERPL-MCNC: 1.3 MG/DL (ref 0.5–1.4)
ERYTHROCYTE [DISTWIDTH] IN BLOOD BY AUTOMATED COUNT: 17.6 % (ref 11.5–14.5)
GFR SERPLBLD CREATININE-BSD FMLA CKD-EPI: 45 ML/MIN/1.73/M2
GLUCOSE SERPL-MCNC: 86 MG/DL (ref 70–110)
GLUCOSE UR QL STRIP: NEGATIVE
HCT VFR BLD AUTO: 38.1 % (ref 37–48.5)
HCV AB SERPL QL IA: NORMAL
HGB BLD-MCNC: 12.4 GM/DL (ref 12–16)
HGB UR QL STRIP: ABNORMAL
HIV 1+2 AB+HIV1 P24 AG SERPL QL IA: NORMAL
HOLD SPECIMEN: NORMAL
HOLD SPECIMEN: NORMAL
HYALINE CASTS UR QL AUTO: 1 /LPF (ref 0–1)
IMM GRANULOCYTES # BLD AUTO: 0.03 K/UL (ref 0–0.04)
IMM GRANULOCYTES NFR BLD AUTO: 0.4 % (ref 0–0.5)
KETONES UR QL STRIP: NEGATIVE
LEUKOCYTE ESTERASE UR QL STRIP: ABNORMAL
LIPASE SERPL-CCNC: 8 U/L (ref 4–60)
LYMPHOCYTES # BLD AUTO: 0.93 K/UL (ref 1–4.8)
MCH RBC QN AUTO: 30.5 PG (ref 27–31)
MCHC RBC AUTO-ENTMCNC: 32.5 G/DL (ref 32–36)
MCV RBC AUTO: 94 FL (ref 82–98)
MICROSCOPIC COMMENT: ABNORMAL
NITRITE UR QL STRIP: NEGATIVE
NON-SQ EPI CELLS #/AREA URNS AUTO: <1 /HPF
NUCLEATED RBC (/100WBC) (OHS): 0 /100 WBC
PH UR STRIP: 6 [PH]
PLATELET # BLD AUTO: 312 K/UL (ref 150–450)
PMV BLD AUTO: 10.4 FL (ref 9.2–12.9)
POTASSIUM SERPL-SCNC: 4.1 MMOL/L (ref 3.5–5.1)
PROT SERPL-MCNC: 6.8 GM/DL (ref 6–8.4)
PROT UR QL STRIP: NEGATIVE
RBC # BLD AUTO: 4.07 M/UL (ref 4–5.4)
RBC #/AREA URNS AUTO: 20 /HPF (ref 0–4)
RELATIVE EOSINOPHIL (OHS): 0.1 %
RELATIVE LYMPHOCYTE (OHS): 11.2 % (ref 18–48)
RELATIVE MONOCYTE (OHS): 9.4 % (ref 4–15)
RELATIVE NEUTROPHIL (OHS): 78.5 % (ref 38–73)
SODIUM SERPL-SCNC: 138 MMOL/L (ref 136–145)
SP GR UR STRIP: 1.01
SQUAMOUS #/AREA URNS AUTO: <1 /HPF
UROBILINOGEN UR STRIP-ACNC: NEGATIVE EU/DL
WBC # BLD AUTO: 8.3 K/UL (ref 3.9–12.7)
WBC #/AREA URNS AUTO: 16 /HPF (ref 0–5)

## 2025-05-17 PROCEDURE — 25500020 PHARM REV CODE 255: Performed by: EMERGENCY MEDICINE

## 2025-05-17 PROCEDURE — 25000003 PHARM REV CODE 250: Performed by: PHYSICIAN ASSISTANT

## 2025-05-17 PROCEDURE — 94761 N-INVAS EAR/PLS OXIMETRY MLT: CPT

## 2025-05-17 PROCEDURE — 86803 HEPATITIS C AB TEST: CPT | Performed by: PHYSICIAN ASSISTANT

## 2025-05-17 PROCEDURE — 87389 HIV-1 AG W/HIV-1&-2 AB AG IA: CPT | Performed by: PHYSICIAN ASSISTANT

## 2025-05-17 PROCEDURE — 83690 ASSAY OF LIPASE: CPT | Performed by: EMERGENCY MEDICINE

## 2025-05-17 PROCEDURE — 87086 URINE CULTURE/COLONY COUNT: CPT | Performed by: PHYSICIAN ASSISTANT

## 2025-05-17 PROCEDURE — 80053 COMPREHEN METABOLIC PANEL: CPT | Performed by: EMERGENCY MEDICINE

## 2025-05-17 PROCEDURE — 85025 COMPLETE CBC W/AUTO DIFF WBC: CPT | Performed by: PHYSICIAN ASSISTANT

## 2025-05-17 PROCEDURE — 96375 TX/PRO/DX INJ NEW DRUG ADDON: CPT

## 2025-05-17 PROCEDURE — 63600175 PHARM REV CODE 636 W HCPCS: Performed by: PHYSICIAN ASSISTANT

## 2025-05-17 PROCEDURE — 81001 URINALYSIS AUTO W/SCOPE: CPT | Performed by: PHYSICIAN ASSISTANT

## 2025-05-17 PROCEDURE — 96374 THER/PROPH/DIAG INJ IV PUSH: CPT

## 2025-05-17 PROCEDURE — 96376 TX/PRO/DX INJ SAME DRUG ADON: CPT

## 2025-05-17 PROCEDURE — 99285 EMERGENCY DEPT VISIT HI MDM: CPT | Mod: 25

## 2025-05-17 RX ORDER — TAMSULOSIN HYDROCHLORIDE 0.4 MG/1
0.4 CAPSULE ORAL
Status: COMPLETED | OUTPATIENT
Start: 2025-05-17 | End: 2025-05-17

## 2025-05-17 RX ORDER — PROCHLORPERAZINE EDISYLATE 5 MG/ML
5 INJECTION INTRAMUSCULAR; INTRAVENOUS
Status: COMPLETED | OUTPATIENT
Start: 2025-05-17 | End: 2025-05-17

## 2025-05-17 RX ORDER — ONDANSETRON HYDROCHLORIDE 2 MG/ML
4 INJECTION, SOLUTION INTRAVENOUS
Status: COMPLETED | OUTPATIENT
Start: 2025-05-17 | End: 2025-05-17

## 2025-05-17 RX ORDER — CEFTRIAXONE 1 G/1
1 INJECTION, POWDER, FOR SOLUTION INTRAMUSCULAR; INTRAVENOUS
Status: COMPLETED | OUTPATIENT
Start: 2025-05-17 | End: 2025-05-17

## 2025-05-17 RX ORDER — MORPHINE SULFATE 4 MG/ML
4 INJECTION, SOLUTION INTRAMUSCULAR; INTRAVENOUS
Refills: 0 | Status: COMPLETED | OUTPATIENT
Start: 2025-05-17 | End: 2025-05-17

## 2025-05-17 RX ADMIN — PROCHLORPERAZINE EDISYLATE 5 MG: 5 INJECTION INTRAMUSCULAR; INTRAVENOUS at 10:05

## 2025-05-17 RX ADMIN — SODIUM CHLORIDE 1000 ML: 0.9 INJECTION, SOLUTION INTRAVENOUS at 10:05

## 2025-05-17 RX ADMIN — CEFTRIAXONE 1 G: 1 INJECTION, POWDER, FOR SOLUTION INTRAMUSCULAR; INTRAVENOUS at 10:05

## 2025-05-17 RX ADMIN — ONDANSETRON 4 MG: 2 INJECTION INTRAMUSCULAR; INTRAVENOUS at 05:05

## 2025-05-17 RX ADMIN — SODIUM CHLORIDE 500 ML: 9 INJECTION, SOLUTION INTRAVENOUS at 05:05

## 2025-05-17 RX ADMIN — MORPHINE SULFATE 4 MG: 4 INJECTION INTRAVENOUS at 10:05

## 2025-05-17 RX ADMIN — MORPHINE SULFATE 4 MG: 4 INJECTION INTRAVENOUS at 05:05

## 2025-05-17 RX ADMIN — TAMSULOSIN HYDROCHLORIDE 0.4 MG: 0.4 CAPSULE ORAL at 10:05

## 2025-05-17 RX ADMIN — IOHEXOL 75 ML: 350 INJECTION, SOLUTION INTRAVENOUS at 08:05

## 2025-05-17 NOTE — ED PROVIDER NOTES
"Encounter Date: 5/17/2025       History     Chief Complaint   Patient presents with    Abdominal Pain     Lower abd pain, pulling in my vagina      69-year-old female with history of breast cancer, type 2 DM, diverticulosis, hypertension, hyperlipidemia, hypothyroidism who presents to the emergency department with chief complaint of right lower quadrant abdominal pain that radiates to the right groin that started yesterday.  Denies vaginal pain, vaginal bleeding, discharge. Her pain has been waxing and waning since onset.  It was a "15/10" when she came to the emergency department but has since subsided.  She endorses two episodes of nausea and vomiting yesterday and 1 episode today.  Last bowel movement was yesterday morning.  Denies diarrhea, melena, hematochezia.  Denies dysuria or hematuria.  Admits to having similar pain in the past.  Her PCP and gastroenterologist told her that if her pain ever returned, she should come to the emergency department for further evaluation.  She took Tylenol at home with little improvement of her symptoms.  Abdominal surgeries include total hysterectomy.  She denies other worsening or alleviating factors.      Review of patient's allergies indicates:   Allergen Reactions    Amoxil [amoxicillin] Rash     Past Medical History:   Diagnosis Date    BMI 37.0-37.9, adult     Breast cancer 09/05/2019     Left breast, IDC with lymph node metastisis    Colon polyps 2015    Colon polyps 2015    Diabetes mellitus type II     Diverticulosis     history of diverticulosisseen on colonoscopy at age 48. Repeat recommended at age 58. Done by     Elevated blood protein     History of elevated protein. Apparently has seen  for extensive workup including bone marrow biopsy    History of shingles 2006    Hyperlipidemia     Hypertension     Microalbuminuria     due 2 diabetes    Mild vitamin D deficiency     . A low vitamin D    Primary osteoarthritis of left knee 8/31/2023    " Thyroid disease     hypothyroidism    Usual hyperplasia of lactiferous duct     Not sure     Past Surgical History:   Procedure Laterality Date    ARTHROPLASTY, KNEE, TOTAL, SIGHT ASSISTED Left 2024    Procedure: ARTHROPLASTY, KNEE, SIGHT ASSISTED;  Surgeon: Jamshid Reece MD;  Location: [facility];  Service: Orthopedics;  Laterality: Left;  bmi - 34.57    BREAST BIOPSY Left 2019    IDC with mets to node    BREAST BIOPSY Right 2019    core bx, benign node    BREAST BIOPSY Left 10/14/2019    MRI Core bx, + IDC    BREAST BIOPSY Left 10/08/2019    Core bx, ADH    BREAST SURGERY  2020    Breast cancer    CATARACT EXTRACTION W/  INTRAOCULAR LENS IMPLANT Right 2025    Procedure: EXTRACTION, CATARACT, WITH IOL INSERTION;  Surgeon: Kendal Foy MD;  Location: [facility] OR;  Service: Ophthalmology;  Laterality: Right;     SECTION      COLONOSCOPY N/A 10/08/2020    Procedure: COLONOSCOPY;  Surgeon: Shreya Mendoza MD;  Location: [facility] (4TH FLR);  Service: Endoscopy;  Laterality: N/A;  COVID screening on 10/5/20 [facility]    HYSTERECTOMY      INSERTION OF BREAST TISSUE EXPANDER Bilateral 2020    Procedure: INSERTION, TISSUE EXPANDER, BREAST BILATERAL;  Surgeon: Michael Solorzano MD;  Location: [address];  Service: Plastics;  Laterality: Bilateral;    INSERTION OF TUNNELED CENTRAL VENOUS CATHETER (CVC) WITH SUBCUTANEOUS PORT Right 10/04/2019    Procedure: [id number] RIGHT (CONSENT AM OF) 1.0 hr case;  Surgeon: Elena Lopez MD;  Location: [address];  Service: General;  Laterality: Right;    OOPHORECTOMY      SENTINEL LYMPH NODE BIOPSY Left 2020    Procedure: BIOPSY, LYMPH NODE, SENTINEL LEFT;  Surgeon: Elena Lopez MD;  [address];  Service: General;  Laterality: Left;    SIMPLE MASTECTOMY Bilateral 2020    Procedure: MASTECTOMY, SIMPLE BILATERAL;  Surgeon: Elena Lopez MD;  Location: [facility] OR [address] [place] [address];  Service:  General;  Laterality: Bilateral;    TISSUE EXPANDER REMOVAL Bilateral 07/30/2020    Procedure: REMOVAL, TISSUE EXPANDER;  Surgeon: Michael Solorzano MD;  Location: [facility] OR [address] [place] [address];  Service: Plastics;  Laterality: Bilateral;     Family History   Problem Relation Name Age of Onset    No Known Problems Paternal Grandfather      No Known Problems Paternal Grandmother      No Known Problems Maternal Grandmother      Lung cancer Maternal Grandfather      Cancer Father      Lung cancer Father      Cancer Mother mom         lung ca heavy smoker    Lung cancer Mother mom     Arthritis Mother mom     Diabetes Mother mom     Hypertension Mother mom     Hypertension Sister      No Known Problems Sister      Hypothyroidism Sister      No Known Problems Daughter      Diabetes Maternal Aunt      Cancer Maternal Aunt      Breast cancer Paternal Aunt Not sure     No Known Problems Paternal Uncle      Amblyopia Neg Hx      Blindness Neg Hx      Cataracts Neg Hx      Glaucoma Neg Hx      Macular degeneration Neg Hx      Retinal detachment Neg Hx      Strabismus Neg Hx      Stroke Neg Hx      Thyroid disease Neg Hx      Ovarian cancer Neg Hx      Colon cancer Neg Hx      Cervical cancer Neg Hx      Uterine cancer Neg Hx       Social History[1]  Review of Systems   Gastrointestinal:  Positive for abdominal pain, nausea and vomiting.       Physical Exam     Initial Vitals [05/17/25 1540]   BP Pulse Resp Temp SpO2   (!) 174/81 88 16 98.9 °F (37.2 °C) 98 %      MAP       --         Physical Exam    Vitals reviewed.  Constitutional: She appears well-developed and well-nourished. She is not diaphoretic. No distress.   HENT:   Head: Normocephalic and atraumatic. Mouth/Throat: Oropharynx is clear and moist.   Eyes: Conjunctivae and EOM are normal. Pupils are equal, round, and reactive to light.   Neck: Neck supple.   Normal range of motion.  Cardiovascular:  Normal rate, regular rhythm, normal heart sounds and intact distal pulses.      Exam reveals no gallop and no friction rub.       No murmur heard.  Pulmonary/Chest: Breath sounds normal. She has no wheezes. She has no rhonchi. She has no rales.   Abdominal: Abdomen is soft. Bowel sounds are normal. There is abdominal tenderness in the right lower quadrant.   Musculoskeletal:         General: Normal range of motion.      Cervical back: Normal range of motion and neck supple.     Neurological: She is alert and oriented to person, place, and time. She has normal strength. No cranial nerve deficit or sensory deficit. GCS score is 15. GCS eye subscore is 4. GCS verbal subscore is 5. GCS motor subscore is 6.   Skin: Skin is warm and dry. Capillary refill takes less than 2 seconds.   Psychiatric: She has a normal mood and affect. Her behavior is normal. Judgment and thought content normal.         ED Course   Procedures  Labs Reviewed   URINALYSIS, REFLEX TO URINE CULTURE - Abnormal       Result Value    Color, UA Colorless (*)     Appearance, UA Clear      pH, UA 6.0      Spec Grav UA 1.010      Protein, UA Negative      Glucose, UA Negative      Ketones, UA Negative      Bilirubin, UA Negative      Blood, UA 2+ (*)     Nitrites, UA Negative      Urobilinogen, UA Negative      Leukocyte Esterase, UA 2+ (*)    CBC WITH DIFFERENTIAL - Abnormal    WBC 8.30      RBC 4.07      HGB 12.4      HCT 38.1      MCV 94      MCH 30.5      MCHC 32.5      RDW 17.6 (*)     Platelet Count 312      MPV 10.4      Nucleated RBC 0      Neut % 78.5 (*)     Lymph % 11.2 (*)     Mono % 9.4      Eos % 0.1      Basophil % 0.4      Imm Grans % 0.4      Neut # 6.52      Lymph # 0.93 (*)     Mono # 0.78      Eos # 0.01      Baso # 0.03      Imm Grans # 0.03     URINALYSIS MICROSCOPIC - Abnormal    RBC, UA 20 (*)     WBC, UA 16 (*)     Bacteria, UA Occasional      Squamous Epithelial Cells, UA <1      Non-Squamous Epithelial Cells <1 (*)     Hyaline Casts, UA 1      Microscopic Comment       COMPREHENSIVE METABOLIC PANEL -  Abnormal    Sodium 138      Potassium 4.1      Chloride 109      CO2 18 (*)     Glucose 86      BUN 24 (*)     Creatinine 1.3      Calcium 8.1 (*)     Protein Total 6.8      Albumin 3.2 (*)     Bilirubin Total 0.8      ALP 95      AST 21      ALT 11      Anion Gap 11      eGFR 45 (*)    HEPATITIS C ANTIBODY - Normal    Hep C Ab Interp Non-Reactive     HIV 1 / 2 ANTIBODY - Normal    HIV 1/2 Ag/Ab Non-Reactive     LIPASE - Normal    Lipase Level 8     CULTURE, URINE   HEP C VIRUS HOLD SPECIMEN    Extra Tube Hold for add-ons.     CBC W/ AUTO DIFFERENTIAL    Narrative:     The following orders were created for panel order CBC W/ AUTO DIFFERENTIAL.  Procedure                               Abnormality         Status                     ---------                               -----------         ------                     CBC with Differential[[id number]]       Abnormal            Final result                 Please view results for these tests on the individual orders.   GREY TOP URINE HOLD    Extra Tube Hold for add-ons.     ISTAT CHEM8          Imaging Results               CT Abdomen Pelvis With IV Contrast NO Oral Contrast (Preliminary result)  Result time 05/17/25 23:54:20      Preliminary result by Dimitris Xiao MD (05/17/25 23:54:20)                   Initial Result:    EXAMINATION:  CT ABDOMEN PELVIS WITH IV CONTRAST    CLINICAL HISTORY:  RLQ abdominal pain (Age >= 14y);    TECHNIQUE:  Low dose axial images, sagittal and coronal reformations were obtained   from the lung bases to the pubic symphysis before and after the IV   administration of 75 mL of Omnipaque 350 .  Oral contrast was not given.    COMPARISON:  PET-CT 04/24/2025, MR lumbar spine 04/09/2025, CT abdomen pelvis   03/27/2025    FINDINGS:  SOFT TISSUES: Partially imaged postoperative change of bilateral   mastectomies.    LUNG BASES/VISUALIZED MEDIASTINUM: Lung bases are clear.  Coronary artery   calcific atherosclerosis.    HEPATOBILIARY: Liver is  normal size. Geographic hypoattenuation about the   falciform ligament, likely focal fat.  Unchanged calcification in the   inferior right hepatic lobe.  No biliary ductal dilatation.  Normal   gallbladder.    PANCREAS: No focal masses or ductal dilatation.    SPLEEN: Normal size.    ADRENALS: No adrenal nodules.    KIDNEYS/URETERS: Delayed right nephrogram with loss of corticomedullary   distinction.  There is a 0.5 cm obstructing stone at the right   ureterovesical junction with moderate upstream hydroureteronephrosis.    Urothelial thickening and enhancement along the course of the right ureter   with associated right perinephric and periureteral fat stranding.  There   is an additional punctate nonobstructing stone in the upper pole the right   kidney.  Left kidney enhances normally.  Left simple renal cyst and   bilateral subcentimeter hypodensities too small to characterize.  No   left-sided stones or hydronephrosis.    BLADDER/PELVIC ORGANS: No bladder wall thickening.  Hysterectomy.  No   adnexal masses.    PERITONEUM / RETROPERITONEUM: No free air or fluid.    LYMPH NODES: No lymphadenopathy.    VESSELS: No aortic aneurysm.  Moderate aortoiliac calcific   atherosclerosis.  Major aortic branch vessels are patent.  Portal venous   system is patent.    GI TRACT: Stomach and duodenum are unremarkable.  No evidence of bowel   obstruction.  The appendix courses in close proximity to the mid right   ureter and is fluid-filled with mucosal hyperenhancement.  No pathologic   distension or evidence of appendicolith.  No focal colonic wall thickening   or distension.    BONES: Degenerative changes.  Similar sclerosis of the L2 vertebral body.    No acute fracture.    Impression:    1. Obstructing 0.5 cm stone at the right UVJ with moderate upstream   hydroureteronephrosis, urothelial thickening and enhancement, and   perinephric/periureteral fat stranding.  Delayed right nephrogram with   loss of corticomedullary  distinction.  Findings likely relate to   obstructive uropathy although superimposed infection is not excluded.    Recommend correlation with urinalysis.  2. Mucosal hyperenhancement of the appendix is likely reactive due to its   close proximity to the mid right ureter.  3. Similar sclerosis of the L2 vertebral body, better characterized on   prior PET-CT and MRI lumbar spine.  4. Additional findings as above.  This report was flagged in Epic as abnormal.    These findings were discovered at 22:00 on 05/17/2025 and relayed to   Raina Locke PA-C via telephone by Ricky Fritz MD at 22:07 on   05/17/2025.    Electronically signed by resident: Ricky Fritz  Date:    05/17/2025  Time:    21:55                        Preliminary result by Ricky Fritz MD (05/17/25 23:25:45)                   Initial Result:    EXAMINATION:  CT ABDOMEN PELVIS WITH IV CONTRAST    CLINICAL HISTORY:  RLQ abdominal pain (Age >= 14y);    TECHNIQUE:  Low dose axial images, sagittal and coronal reformations were obtained   from the lung bases to the pubic symphysis before and after the IV   administration of 75 mL of Omnipaque 350 .  Oral contrast was not given.    COMPARISON:  PET-CT 04/24/2025, MR lumbar spine 04/09/2025, CT abdomen pelvis   03/27/2025    FINDINGS:  SOFT TISSUES: Partially imaged postoperative change of bilateral   mastectomies.    LUNG BASES/VISUALIZED MEDIASTINUM: Lung bases are clear.  Coronary artery   calcific atherosclerosis.    HEPATOBILIARY: Liver is normal size. Geographic hypoattenuation about the   falciform ligament, likely focal fat.  Unchanged calcification in the   inferior right hepatic lobe.  No biliary ductal dilatation.  Normal   gallbladder.    PANCREAS: No focal masses or ductal dilatation.    SPLEEN: Normal size.    ADRENALS: No adrenal nodules.    KIDNEYS/URETERS: Delayed right nephrogram with loss of corticomedullary   distinction.  There is a 0.5 cm obstructing stone at the right    ureterovesical junction with moderate upstream hydroureteronephrosis.    Urothelial thickening and enhancement along the course of the right ureter   with associated right perinephric and periureteral fat stranding.  There   is an additional punctate nonobstructing stone in the upper pole the right   kidney.  Left kidney enhances normally.  Left simple renal cyst and   bilateral subcentimeter hypodensities too small to characterize.  No   left-sided stones or hydronephrosis.    BLADDER/PELVIC ORGANS: No bladder wall thickening.  Hysterectomy.  No   adnexal masses.    PERITONEUM / RETROPERITONEUM: No free air or fluid.    LYMPH NODES: No lymphadenopathy.    VESSELS: No aortic aneurysm.  Moderate aortoiliac calcific   atherosclerosis.  Major aortic branch vessels are patent.  Portal venous   system is patent.    GI TRACT: Stomach and duodenum are unremarkable.  No evidence of bowel   obstruction.  The appendix courses in close proximity to the mid right   ureter and is fluid-filled with mucosal hyperenhancement.  No pathologic   distension or evidence of appendicolith.  No focal colonic wall thickening   or distension.    BONES: Degenerative changes.  Similar sclerosis of the L2 vertebral body.    No acute fracture.    Impression:    1. Obstructing 0.5 cm stone at the right UVJ with moderate upstream   hydroureteronephrosis, urothelial thickening and enhancement, and   perinephric/periureteral fat stranding.  Delayed right nephrogram with   loss of corticomedullary distinction.  Findings likely relate to   obstructive uropathy although superimposed infection is not excluded.    Recommend correlation with urinalysis.  2. Mucosal hyperenhancement of the appendix is likely reactive due to its   close proximity to the mid right ureter.  3. Similar sclerosis of the L2 vertebral body, better characterized on   prior PET-CT and MRI lumbar spine.  4. Additional findings as above.  This report was flagged in Epic as  abnormal.    These findings were discovered at 22:00 on 05/17/2025 and relayed to   Raina Locke PA-C via telephone by Ricky Fritz MD at 22:07 on   05/17/2025.    Electronically signed by resident: Ricky Fritz  Date:    05/17/2025  Time:    21:55                                       Medications   sodium chloride 0.9% bolus 500 mL 500 mL (0 mLs Intravenous Stopped 5/17/25 1719)   ondansetron injection 4 mg (4 mg Intravenous Given 5/17/25 1709)   morphine injection 4 mg (4 mg Intravenous Given 5/17/25 1712)   iohexoL (OMNIPAQUE 350) injection 75 mL (75 mLs Intravenous Given 5/17/25 2024)   morphine injection 4 mg (4 mg Intravenous Given 5/17/25 2245)   cefTRIAXone injection 1 g (1 g Intravenous Given 5/17/25 2233)   tamsulosin 24 hr capsule 0.4 mg (0.4 mg Oral Given 5/17/25 2233)   sodium chloride 0.9% bolus 1,000 mL 1,000 mL (0 mLs Intravenous Stopped 5/17/25 2249)   prochlorperazine injection Soln 5 mg (5 mg Intravenous Given 5/17/25 2243)     Medical Decision Making  Emergent evaluation of a 69 y.o. female presenting to the emergency department complaining of right lower quadrant abdominal pain that radiates to the right groin that started yesterday. Endorses associated nausea and vomiting. Patient is afebrile, hemodynamically stable, and non toxic appearing.   Will order labs, imaging, symptom control. Declines analgesia.     Differential diagnosis includes but isn't limited to appendicitis, colitis, inguinal hernia, urinary tract infection, pyelonephritis, ureterolithiasis.    No severe hematologic derangements.  No leukocytosis.  H/H stable.  Platelets are normal.  Creatinine 1.3.  Baseline 0.8.  Concerning for mild JADEN.  Lipase 8.  UA concerning for infection. 2+ leukocytes, 20 RBC, 16 WBC, occasional bacteria.   Received call from radiology regarding 5 mm stone at the right UVJ with hydroureteronephrosis and perinephric fat stranding.    Urology consulted.  They have evaluated the patient.   Recommend another fluid bolus and then maintenance fluids.  Recommend giving the patient Flomax.  Recommend observation overnight and recheck metabolic panel in the morning.  They will reassess in the morning to determine if ureteral stent is needed.  Patient is agreeable with this plan.  All questions answered.      Amount and/or Complexity of Data Reviewed  Labs: ordered. Decision-making details documented in ED Course.  Radiology: ordered.    Risk  Prescription drug management.               ED Course as of 05/18/25 0016   Sat May 17, 2025   1713 I-STAT grossly abnormal with Benadryl me of 122, hyperkalemia greater than 9, ionized calcium less than 0.65, BUN 47 and creatinine 1.8.  This is likely hemolyzed.  Will await CMP results.  [JM]   1714 WBC: 8.30 [JM]   1715 Hemoglobin: 12.4 [JM]   1715 Hematocrit: 38.1 [JM]   1715 Platelet Count: 312 [JM]      ED Course User Index  [JM] Raina Locke PA-C                           Clinical Impression:  Final diagnoses:  [N20.1] Ureteral stone (Primary)          ED Disposition Condition    Observation                     [1]   Social History  Tobacco Use    Smoking status: Never     Passive exposure: Never    Smokeless tobacco: Never    Tobacco comments:     Retired on Nov 21, 2021   Substance Use Topics    Alcohol use: Not Currently     Comment: only on special occasions / rarley    Drug use: No        Raina Locke PA-C  05/18/25 0017

## 2025-05-17 NOTE — ED NOTES
I-STAT Chem-8+ Results:   Value Reference Range   Sodium 122 136-145 mmol/L   Potassium  >9 3.5-5.1 mmol/L   Chloride 107  mmol/L   Ionized Calcium <0.65 1.06-1.42 mmol/L   CO2 (measured) 22 23-29 mmol/L   Glucose 97  mg/dL   BUN 47 6-30 mg/dL   Creatinine 1.8 0.5-1.4 mg/dL   Hematocrit 43 36-54%    MICK Locke aware of abnormal istat values, hemolyzed sample, verbalized okay to await CMP results

## 2025-05-17 NOTE — ED TRIAGE NOTES
Pt arrives to ED with c/o lower abdominal pain along with nausea and some vomiting since yesterday. Danielle urinary complaints, denies CP or SOB.

## 2025-05-18 VITALS
SYSTOLIC BLOOD PRESSURE: 136 MMHG | RESPIRATION RATE: 18 BRPM | OXYGEN SATURATION: 93 % | TEMPERATURE: 98 F | DIASTOLIC BLOOD PRESSURE: 73 MMHG | WEIGHT: 159 LBS | HEIGHT: 60 IN | BODY MASS INDEX: 31.22 KG/M2 | HEART RATE: 87 BPM

## 2025-05-18 PROBLEM — N17.9 AKI (ACUTE KIDNEY INJURY): Status: ACTIVE | Noted: 2025-05-18

## 2025-05-18 LAB
ABSOLUTE EOSINOPHIL (OHS): 0.05 K/UL
ABSOLUTE MONOCYTE (OHS): 1.08 K/UL (ref 0.3–1)
ABSOLUTE NEUTROPHIL COUNT (OHS): 5.24 K/UL (ref 1.8–7.7)
ALBUMIN SERPL BCP-MCNC: 3 G/DL (ref 3.5–5.2)
ALP SERPL-CCNC: 97 UNIT/L (ref 40–150)
ALT SERPL W/O P-5'-P-CCNC: 9 UNIT/L (ref 10–44)
ANION GAP (OHS): 11 MMOL/L (ref 8–16)
AST SERPL-CCNC: 21 UNIT/L (ref 11–45)
BASOPHILS # BLD AUTO: 0.02 K/UL
BASOPHILS NFR BLD AUTO: 0.3 %
BILIRUB SERPL-MCNC: 0.7 MG/DL (ref 0.1–1)
BUN SERPL-MCNC: 21 MG/DL (ref 8–23)
CALCIUM SERPL-MCNC: 8.5 MG/DL (ref 8.7–10.5)
CHLORIDE SERPL-SCNC: 106 MMOL/L (ref 95–110)
CO2 SERPL-SCNC: 20 MMOL/L (ref 23–29)
CREAT SERPL-MCNC: 1.1 MG/DL (ref 0.5–1.4)
ERYTHROCYTE [DISTWIDTH] IN BLOOD BY AUTOMATED COUNT: 15.7 % (ref 11.5–14.5)
GFR SERPLBLD CREATININE-BSD FMLA CKD-EPI: 55 ML/MIN/1.73/M2
GLUCOSE SERPL-MCNC: 79 MG/DL (ref 70–110)
HCT VFR BLD AUTO: 35.4 % (ref 37–48.5)
HGB BLD-MCNC: 11.9 GM/DL (ref 12–16)
IMM GRANULOCYTES # BLD AUTO: 0.03 K/UL (ref 0–0.04)
IMM GRANULOCYTES NFR BLD AUTO: 0.4 % (ref 0–0.5)
LYMPHOCYTES # BLD AUTO: 1.19 K/UL (ref 1–4.8)
MAGNESIUM SERPL-MCNC: 2.6 MG/DL (ref 1.6–2.6)
MCH RBC QN AUTO: 30.6 PG (ref 27–31)
MCHC RBC AUTO-ENTMCNC: 33.6 G/DL (ref 32–36)
MCV RBC AUTO: 91 FL (ref 82–98)
NUCLEATED RBC (/100WBC) (OHS): 0 /100 WBC
PHOSPHATE SERPL-MCNC: 3.8 MG/DL (ref 2.7–4.5)
PLATELET # BLD AUTO: 258 K/UL (ref 150–450)
PMV BLD AUTO: 10.4 FL (ref 9.2–12.9)
POCT GLUCOSE: 90 MG/DL (ref 70–110)
POTASSIUM SERPL-SCNC: 4.4 MMOL/L (ref 3.5–5.1)
PROT SERPL-MCNC: 6.9 GM/DL (ref 6–8.4)
RBC # BLD AUTO: 3.89 M/UL (ref 4–5.4)
RELATIVE EOSINOPHIL (OHS): 0.7 %
RELATIVE LYMPHOCYTE (OHS): 15.6 % (ref 18–48)
RELATIVE MONOCYTE (OHS): 14.2 % (ref 4–15)
RELATIVE NEUTROPHIL (OHS): 68.8 % (ref 38–73)
SODIUM SERPL-SCNC: 137 MMOL/L (ref 136–145)
WBC # BLD AUTO: 7.61 K/UL (ref 3.9–12.7)

## 2025-05-18 PROCEDURE — 36415 COLL VENOUS BLD VENIPUNCTURE: CPT | Performed by: INTERNAL MEDICINE

## 2025-05-18 PROCEDURE — G0378 HOSPITAL OBSERVATION PER HR: HCPCS

## 2025-05-18 PROCEDURE — 83735 ASSAY OF MAGNESIUM: CPT | Performed by: INTERNAL MEDICINE

## 2025-05-18 PROCEDURE — 84100 ASSAY OF PHOSPHORUS: CPT | Performed by: INTERNAL MEDICINE

## 2025-05-18 PROCEDURE — 63600175 PHARM REV CODE 636 W HCPCS: Performed by: INTERNAL MEDICINE

## 2025-05-18 PROCEDURE — 82040 ASSAY OF SERUM ALBUMIN: CPT | Performed by: INTERNAL MEDICINE

## 2025-05-18 PROCEDURE — 85025 COMPLETE CBC W/AUTO DIFF WBC: CPT | Performed by: INTERNAL MEDICINE

## 2025-05-18 PROCEDURE — 96361 HYDRATE IV INFUSION ADD-ON: CPT

## 2025-05-18 PROCEDURE — 25000003 PHARM REV CODE 250: Performed by: INTERNAL MEDICINE

## 2025-05-18 RX ORDER — MORPHINE SULFATE 4 MG/ML
4 INJECTION, SOLUTION INTRAMUSCULAR; INTRAVENOUS EVERY 6 HOURS PRN
Refills: 0 | Status: DISCONTINUED | OUTPATIENT
Start: 2025-05-18 | End: 2025-05-18

## 2025-05-18 RX ORDER — SODIUM CHLORIDE 0.9 % (FLUSH) 0.9 %
10 SYRINGE (ML) INJECTION EVERY 12 HOURS PRN
Status: DISCONTINUED | OUTPATIENT
Start: 2025-05-18 | End: 2025-05-18 | Stop reason: HOSPADM

## 2025-05-18 RX ORDER — AMLODIPINE BESYLATE 5 MG/1
5 TABLET ORAL DAILY
Status: DISCONTINUED | OUTPATIENT
Start: 2025-05-18 | End: 2025-05-18 | Stop reason: HOSPADM

## 2025-05-18 RX ORDER — MUPIROCIN 20 MG/G
OINTMENT TOPICAL 2 TIMES DAILY
Status: DISCONTINUED | OUTPATIENT
Start: 2025-05-18 | End: 2025-05-18 | Stop reason: HOSPADM

## 2025-05-18 RX ORDER — AMOXICILLIN 250 MG
1 CAPSULE ORAL 2 TIMES DAILY PRN
Status: DISCONTINUED | OUTPATIENT
Start: 2025-05-18 | End: 2025-05-18 | Stop reason: HOSPADM

## 2025-05-18 RX ORDER — TALC
6 POWDER (GRAM) TOPICAL NIGHTLY PRN
Status: DISCONTINUED | OUTPATIENT
Start: 2025-05-18 | End: 2025-05-18 | Stop reason: HOSPADM

## 2025-05-18 RX ORDER — POLYETHYLENE GLYCOL 3350 17 G/17G
17 POWDER, FOR SOLUTION ORAL 2 TIMES DAILY PRN
Status: DISCONTINUED | OUTPATIENT
Start: 2025-05-18 | End: 2025-05-18 | Stop reason: HOSPADM

## 2025-05-18 RX ORDER — HEPARIN SODIUM 5000 [USP'U]/ML
5000 INJECTION, SOLUTION INTRAVENOUS; SUBCUTANEOUS EVERY 8 HOURS
Status: DISCONTINUED | OUTPATIENT
Start: 2025-05-18 | End: 2025-05-18 | Stop reason: HOSPADM

## 2025-05-18 RX ORDER — ATORVASTATIN CALCIUM 10 MG/1
10 TABLET, FILM COATED ORAL DAILY
Status: DISCONTINUED | OUTPATIENT
Start: 2025-05-18 | End: 2025-05-18 | Stop reason: HOSPADM

## 2025-05-18 RX ORDER — INSULIN ASPART 100 [IU]/ML
0-5 INJECTION, SOLUTION INTRAVENOUS; SUBCUTANEOUS EVERY 6 HOURS PRN
Status: DISCONTINUED | OUTPATIENT
Start: 2025-05-18 | End: 2025-05-18 | Stop reason: HOSPADM

## 2025-05-18 RX ORDER — IBUPROFEN 200 MG
16 TABLET ORAL
Status: DISCONTINUED | OUTPATIENT
Start: 2025-05-18 | End: 2025-05-18 | Stop reason: HOSPADM

## 2025-05-18 RX ORDER — ONDANSETRON HYDROCHLORIDE 2 MG/ML
4 INJECTION, SOLUTION INTRAVENOUS EVERY 8 HOURS PRN
Status: DISCONTINUED | OUTPATIENT
Start: 2025-05-18 | End: 2025-05-18 | Stop reason: HOSPADM

## 2025-05-18 RX ORDER — TAMSULOSIN HYDROCHLORIDE 0.4 MG/1
0.4 CAPSULE ORAL DAILY
Qty: 30 CAPSULE | Refills: 11 | Status: SHIPPED | OUTPATIENT
Start: 2025-05-18 | End: 2026-05-18

## 2025-05-18 RX ORDER — POLYETHYLENE GLYCOL 3350 17 G/17G
17 POWDER, FOR SOLUTION ORAL 2 TIMES DAILY PRN
Qty: 1020 G | Refills: 11 | Status: SHIPPED | OUTPATIENT
Start: 2025-05-18 | End: 2026-05-13

## 2025-05-18 RX ORDER — GLUCAGON 1 MG
1 KIT INJECTION
Status: DISCONTINUED | OUTPATIENT
Start: 2025-05-18 | End: 2025-05-18 | Stop reason: HOSPADM

## 2025-05-18 RX ORDER — IBUPROFEN 200 MG
24 TABLET ORAL
Status: DISCONTINUED | OUTPATIENT
Start: 2025-05-18 | End: 2025-05-18 | Stop reason: HOSPADM

## 2025-05-18 RX ORDER — PROCHLORPERAZINE EDISYLATE 5 MG/ML
5 INJECTION INTRAMUSCULAR; INTRAVENOUS EVERY 6 HOURS PRN
Status: DISCONTINUED | OUTPATIENT
Start: 2025-05-18 | End: 2025-05-18 | Stop reason: HOSPADM

## 2025-05-18 RX ORDER — PANTOPRAZOLE SODIUM 40 MG/1
40 TABLET, DELAYED RELEASE ORAL DAILY
Status: DISCONTINUED | OUTPATIENT
Start: 2025-05-18 | End: 2025-05-18 | Stop reason: HOSPADM

## 2025-05-18 RX ORDER — LEVOTHYROXINE SODIUM 200 UG/1
200 TABLET ORAL
Qty: 30 TABLET | Refills: 11 | Status: SHIPPED | OUTPATIENT
Start: 2025-05-19 | End: 2026-05-19

## 2025-05-18 RX ORDER — OXYCODONE HYDROCHLORIDE 5 MG/1
10 TABLET ORAL EVERY 6 HOURS PRN
Refills: 0 | Status: DISCONTINUED | OUTPATIENT
Start: 2025-05-18 | End: 2025-05-18

## 2025-05-18 RX ORDER — OXYCODONE HYDROCHLORIDE 5 MG/1
5 TABLET ORAL EVERY 6 HOURS PRN
Refills: 0 | Status: DISCONTINUED | OUTPATIENT
Start: 2025-05-18 | End: 2025-05-18

## 2025-05-18 RX ORDER — CEFPODOXIME PROXETIL 100 MG/1
200 TABLET, FILM COATED ORAL 2 TIMES DAILY
Qty: 36 TABLET | Refills: 0 | Status: CANCELLED | OUTPATIENT
Start: 2025-05-18 | End: 2025-05-27

## 2025-05-18 RX ORDER — CEFTRIAXONE 1 G/1
1 INJECTION, POWDER, FOR SOLUTION INTRAMUSCULAR; INTRAVENOUS
Status: DISCONTINUED | OUTPATIENT
Start: 2025-05-18 | End: 2025-05-18 | Stop reason: HOSPADM

## 2025-05-18 RX ORDER — KETOROLAC TROMETHAMINE 10 MG/1
10 TABLET, FILM COATED ORAL EVERY 6 HOURS
Qty: 20 TABLET | Refills: 0 | Status: SHIPPED | OUTPATIENT
Start: 2025-05-18 | End: 2025-05-23

## 2025-05-18 RX ORDER — LEVOTHYROXINE SODIUM 100 UG/1
200 TABLET ORAL
Status: DISCONTINUED | OUTPATIENT
Start: 2025-05-18 | End: 2025-05-18 | Stop reason: HOSPADM

## 2025-05-18 RX ORDER — NALOXONE HCL 0.4 MG/ML
0.02 VIAL (ML) INJECTION
Status: DISCONTINUED | OUTPATIENT
Start: 2025-05-18 | End: 2025-05-18 | Stop reason: HOSPADM

## 2025-05-18 RX ORDER — TAMSULOSIN HYDROCHLORIDE 0.4 MG/1
0.4 CAPSULE ORAL DAILY
Status: DISCONTINUED | OUTPATIENT
Start: 2025-05-18 | End: 2025-05-18 | Stop reason: HOSPADM

## 2025-05-18 RX ADMIN — SODIUM CHLORIDE, POTASSIUM CHLORIDE, SODIUM LACTATE AND CALCIUM CHLORIDE 1000 ML: 600; 310; 30; 20 INJECTION, SOLUTION INTRAVENOUS at 06:05

## 2025-05-18 RX ADMIN — AMLODIPINE BESYLATE 5 MG: 5 TABLET ORAL at 09:05

## 2025-05-18 RX ADMIN — PANTOPRAZOLE SODIUM 40 MG: 40 TABLET, DELAYED RELEASE ORAL at 09:05

## 2025-05-18 RX ADMIN — ATORVASTATIN CALCIUM 10 MG: 10 TABLET, FILM COATED ORAL at 09:05

## 2025-05-18 RX ADMIN — TAMSULOSIN HYDROCHLORIDE 0.4 MG: 0.4 CAPSULE ORAL at 09:05

## 2025-05-18 NOTE — ED NOTES
Provided update with patient on that we are waiting for CT Tech to come get her for her scan at this time.

## 2025-05-18 NOTE — ED NOTES
Telemetry Verification   Patient placed on Telemetry Box  Verified with War Room  Box #  [id number]   Monitor Tech      Rate 76   Rhythm   NS

## 2025-05-18 NOTE — HPI
"68 y/o F w/ h/o remote breast cancer s/p mastectomy, DM2, HTN, HLD, hypothyroidism, diverticulosis; p/w RLQ abdo pain radiating to the R groin since 05/16. Pain has been waxing/waning, initially described as "15/10" in intensity upon ED arrival, now improved. Associated w/ 2 episodes of N/V on 05/16 & 1 episode on 05/17. She denies fevers, chills, dysuria, hematuria, vaginal d/c or bleeding. She reports a similar episode of pain in the past, for which her PCP & GI advised her to return to the ED if sxs recurred. She took acetaminophen at home w/ minimal relief. Last BM was 05/16 AM; denies diarrhea, hematochezia, or melena.    CT A/P w/o contrast revealed mild R hydroureteronephrosis to the level of a 5 mm distal R ureteral stone at the UVJ, w/ associated perinephric fat stranding. Also noted were multiple non-obstructing R renal calculi; no stones or hydronephrosis on the L. UA notable for 2+ leukocyte esterase, 16 WBCs, 20 RBCs, & occasional bacteria; however, no subjective or objective signs of infection. Lipase normal. WBC count wnl. Creatinine ? from baseline 0.8 to 1.3 on arrival, c/f pre-renal JADEN; received IVF in ED.    Urology was consulted. Given stable vitals, absence of infection or obstruction requiring emergent decompression, and improving pain, they recommend admission for observation, IVF, alpha blocker (tamsulosin), and urine straining. They will re-eval on 05/18 AM to assess for spontaneous passage or need for intervention (e.g., stent). Pt agreeable to plan and all questions answered.  "

## 2025-05-18 NOTE — ASSESSMENT & PLAN NOTE
Patient has metastatic cancer of breast cancer primary. The cancer has metastasized to axillary LN. The patient  under the care of an outpatient oncologist. The patient is s/p mastectomy.Their staging information is listed below.    Cancer Staging   Breast cancer metastasized to axillary lymph node, left  Staging form: Breast, AJCC 8th Edition  - Clinical: Stage IIA (cT1c, cN1, cM0, G3, ER+, VT+, HER2-) - Signed by Tere Villagran MD on 9/16/2019  Stable, monitor

## 2025-05-18 NOTE — ASSESSMENT & PLAN NOTE
69 F w/ DM2, HTN; here w/ RLQ abdo pain radiating to R groin x2 days, associated w/ N/V. No fevers/chills, dysuria, hematuria. No constipation. Imaging w/ distal R ureteral stone at UVJ w/ mild R hydroureteronephrosis associated w/ perinephric fat stranding, also multiple non-obstructing R renal calculi. UA w/ 2+ leuk esterase, 16 WBC, 20 RBC, occasional bacteria. Lipase normal, so less likely pancreatic. AF & HDS in ED w/ normal WBC, so not septic. Cr up from b/l so mild JADEN, likely prerenal. Urology consulted in ED w/ rec for observation for now & re-eval in the AM for spontaneous passage vs need for stenting.  Urology consulted; appreciate recs  Re-eval in AM to eval for need for stenting  Rec'd ketorolac for pain, IVF, & tamsulosin  Ucx pending  Cover for empiric UTI w/ CTX  Low threshold to d/c if stone passed & otherwise clinically improved given no fever or leukocytosis  Antiemetics PRN  MMPC  NPO for now in s/o possible procedure by Uro

## 2025-05-18 NOTE — H&P
"[facility] - Emergency Dept  [facility] Medicine  History & Physical    Patient Name: Maria Elena Tavares  MRN: [id number]  Patient Class: OP- Observation   Admission Date: 5/17/2025  Attending Physician: Shaji Bell, *   Primary Care Provider: Estephania San MD    Patient information was obtained from patient, past medical records, and ER records.     Subjective   Principal Problem:Ureterolithiasis    Chief Complaint:  Chief Complaint   Patient presents with    Abdominal Pain     Lower abd pain, pulling in my vagina      HPI: 68 y/o F w/ h/o remote breast cancer s/p mastectomy, DM2, HTN, HLD, hypothyroidism, diverticulosis; p/w RLQ abdo pain radiating to the R groin since 05/16. Pain has been waxing/waning, initially described as "15/10" in intensity upon ED arrival, now improved. Associated w/ 2 episodes of N/V on 05/16 & 1 episode on 05/17. She denies fevers, chills, dysuria, hematuria, vaginal d/c or bleeding. She reports a similar episode of pain in the past, for which her PCP & GI advised her to return to the ED if sxs recurred. She took acetaminophen at home w/ minimal relief. Last BM was 05/16 AM; denies diarrhea, hematochezia, or melena.    CT A/P w/o contrast revealed mild R hydroureteronephrosis to the level of a 5 mm distal R ureteral stone at the UVJ, w/ associated perinephric fat stranding. Also noted were multiple non-obstructing R renal calculi; no stones or hydronephrosis on the L. UA notable for 2+ leukocyte esterase, 16 WBCs, 20 RBCs, & occasional bacteria; however, no subjective or objective signs of infection. Lipase normal. WBC count wnl. Creatinine ? from baseline 0.8 to 1.3 on arrival, c/f pre-renal JADEN; received IVF in ED.    Urology was consulted. Given stable vitals, absence of infection or obstruction requiring emergent decompression, and improving pain, they recommend admission for observation, IVF, alpha blocker (tamsulosin), and urine straining. They will re-eval on 05/18 AM to assess " for spontaneous passage or need for intervention (e.g., stent). Pt agreeable to plan and all questions answered.    Objective   Past Medical History:   Diagnosis Date    BMI 37.0-37.9, adult     Breast cancer 2019     Left breast, IDC with lymph node metastisis    Colon polyps     Colon polyps     Diabetes mellitus type II     Diverticulosis     history of diverticulosisseen on colonoscopy at age 48. Repeat recommended at age 58. Done by     Elevated blood protein     History of elevated protein. Apparently has seen  for extensive workup including bone marrow biopsy    History of shingles 2006    Hyperlipidemia     Hypertension     Microalbuminuria     due 2 diabetes    Mild vitamin D deficiency     . A low vitamin D    Primary osteoarthritis of left knee 2023    Thyroid disease     hypothyroidism    Usual hyperplasia of lactiferous duct     Not sure     Past Surgical History:   Procedure Laterality Date    ARTHROPLASTY, KNEE, TOTAL, SIGHT ASSISTED Left 2024    Procedure: ARTHROPLASTY, KNEE, SIGHT ASSISTED;  Surgeon: Jamshid Reece MD;  Location: [facility] OR;  Service: Orthopedics;  Laterality: Left;  bmi - 34.57    BREAST BIOPSY Left 2019    IDC with mets to node    BREAST BIOPSY Right 2019    core bx, benign node    BREAST BIOPSY Left 10/14/2019    MRI Core bx, + IDC    BREAST BIOPSY Left 10/08/2019    Core bx, ADH    BREAST SURGERY  2020    Breast cancer    CATARACT EXTRACTION W/  INTRAOCULAR LENS IMPLANT Right 2025    Procedure: EXTRACTION, CATARACT, WITH IOL INSERTION;  Surgeon: Kendal Foy MD;  Location: [facility] OR;  Service: Ophthalmology;  Laterality: Right;     SECTION      COLONOSCOPY N/A 10/08/2020    Procedure: COLONOSCOPY;  Surgeon: Shreya Mendoza MD;  Location: [address]);  Service: Endoscopy;  Laterality: N/A;  COVID screening on 10/5/20 [facility]    HYSTERECTOMY      INSERTION OF BREAST TISSUE EXPANDER  Bilateral 03/24/2020    Procedure: INSERTION, TISSUE EXPANDER, BREAST BILATERAL;  Surgeon: Michael Solorzano MD;  [facility];  Service: Plastics;  Laterality: Bilateral;    INSERTION OF TUNNELED CENTRAL VENOUS CATHETER (CVC) WITH SUBCUTANEOUS PORT Right 10/04/2019    Procedure: [id number]-J-CATH RIGHT (CONSENT AM OF) 1.0 hr case;  Surgeon: Elena Lopez MD;  [facility];  Service: General;  Laterality: Right;    OOPHORECTOMY      SENTINEL LYMPH NODE BIOPSY Left 03/24/2020    Procedure: BIOPSY, LYMPH NODE, SENTINEL LEFT;  Surgeon: Elena Lopez MD;  [facility];  Service: General;  Laterality: Left;    SIMPLE MASTECTOMY Bilateral 03/24/2020    Procedure: MASTECTOMY, SIMPLE BILATERAL;  Surgeon: Elena Lopez MD;  Location: [facility] OR [address] [place] [address];  Service: General;  Laterality: Bilateral;    TISSUE EXPANDER REMOVAL Bilateral 07/30/2020    Procedure: REMOVAL, TISSUE EXPANDER;  Surgeon: Michael Solorzano MD;  Location: [facility] OR [address] [place] [address];  Service: Plastics;  Laterality: Bilateral;     Review of patient's allergies indicates:   Allergen Reactions    Amoxil [amoxicillin] Rash     No current facility-administered medications on file prior to encounter.     Current Outpatient Medications on File Prior to Encounter   Medication Sig    amLODIPine (NORVASC) 5 MG tablet Take 1 tablet (5 mg total) by mouth once daily.    aspirin (ECOTRIN) 81 MG EC tablet Take 1 tablet (81 mg total) by mouth 2 (two) times a day.    atorvastatin (LIPITOR) 10 MG tablet Take 1 tablet (10 mg total) by mouth once daily.    blood-glucose meter kit DISPENSE : BLOOD TEST STRIPS AND LANCETS PATIENT TEST BLOOD SUGARS TWICE DAILY  TEST STRIPS: 50 EACH, REFILL 5  LANCETS:  50 EACH , REFILL 5    cetirizine (ZYRTEC) 10 MG tablet Take 1 tablet (10 mg total) by mouth daily as needed for Allergies (itching).    ciclopirox 0.77 % Gel Apply topically 2 (two) times daily. To thickened discolored toenails.     cycloSPORINE (RESTASIS) 0.05 % ophthalmic emulsion Place 1 drop into both eyes 2 (two) times daily.    ergocalciferol (ERGOCALCIFEROL) 50,000 unit Cap Take 1 capsule (50,000 Units total) by mouth every 14 (fourteen) days.    gabapentin (NEURONTIN) 800 MG tablet Take 1 tablet (800 mg total) by mouth 3 (three) times daily.    irbesartan (AVAPRO) 300 MG tablet Take 1 tablet by mouth once daily    letrozole (FEMARA) 2.5 mg Tab Take 1 tablet (2.5 mg total) by mouth once daily.    levothyroxine (SYNTHROID) 200 MCG tablet Take 1 tablet (200 mcg total) by mouth once daily. Except on Sundays take 1/2 tablet    lifitegrast (XIIDRA) 5 % Dpet Apply 1 drop to  both eyes  2 (two) times daily.    pantoprazole (PROTONIX) 40 MG tablet Take 1 tablet (40 mg total) by mouth once daily.    perfluorohexyloctane, PF, 100 % Drop Place 1 drop into both eyes 4 (four) times daily.    polyethylene glycol (GLYCOLAX) 17 gram/dose powder Mix 17 g (1 capful) with juice or water an drink 2 (two) times daily.    prednisolon/gatiflox/bromfenac (PREDNISOL ACE-GATIFLOX-BROMFEN) 1-0.5-0.075 % DrpS Apply 1 drop to eye 3 (three) times daily.    RSVPreF3 antigen-AS01E, PF, (AREXVY, PF,) 120 mcg/0.5 mL SusR vaccine Inject into the muscle.    tirzepatide (MOUNJARO) 10 mg/0.5 mL PnIj Inject 10 mg into the skin every 7 days.     Family History       Problem Relation (Age of Onset)    Arthritis Mother    Breast cancer Paternal Aunt    Cancer Father, Mother, Maternal Aunt    Diabetes Mother, Maternal Aunt    Hypertension Mother, Sister    Hypothyroidism Sister    Lung cancer Maternal Grandfather, Father, Mother    No Known Problems Paternal Grandfather, Paternal Grandmother, Maternal Grandmother, Sister, Daughter, Paternal Uncle          Tobacco Use    Smoking status: Never     Passive exposure: Never    Smokeless tobacco: Never    Tobacco comments:     Retired on Nov 21, 2021   Substance and Sexual Activity    Alcohol use: Not Currently     Comment: only on  special occasions / rarley    Drug use: No    Sexual activity: Not Currently     Partners: Male     Birth control/protection: Abstinence, None     Review of Systems   Constitutional:  Negative for chills, fever and unexpected weight change.   HENT:  Negative for sinus pain, trouble swallowing and voice change.    Eyes:  Negative for photophobia, pain and visual disturbance.   Respiratory:  Negative for cough, shortness of breath and wheezing.    Cardiovascular:  Negative for chest pain, palpitations and leg swelling.   Gastrointestinal:  Positive for abdominal pain, nausea and vomiting. Negative for blood in stool, constipation and diarrhea.   Endocrine: Negative for polydipsia and polyuria.   Genitourinary:  Positive for pelvic pain. Negative for dysuria, frequency, hematuria and urgency.   Musculoskeletal:  Negative for arthralgias, back pain and myalgias.   Skin:  Negative for color change and pallor.   Neurological:  Negative for syncope, weakness, numbness and headaches.   Hematological:  Does not bruise/bleed easily.   Psychiatric/Behavioral:  Negative for confusion, dysphoric mood and sleep disturbance.      Objective:     Vital Signs (Most Recent):  Temp: 98.2 °F (36.8 °C) (05/17/25 2319)  Pulse: 75 (05/17/25 2319)  Resp: 19 (05/17/25 2319)  BP: (!) 144/76 (05/17/25 2319)  SpO2: 98 % (05/17/25 2319) Vital Signs (24h Range):  Temp:  [98.1 °F (36.7 °C)-98.9 °F (37.2 °C)] 98.2 °F (36.8 °C)  Pulse:  [69-88] 75  Resp:  [16-20] 19  SpO2:  [97 %-98 %] 98 %  BP: (133-174)/(76-81) 144/76     Weight: 72.1 kg (159 lb)  Body mass index is 31.05 kg/m².     Physical Exam  Vitals and nursing note reviewed.   Constitutional:       General: She is not in acute distress.     Appearance: She is obese. She is not ill-appearing, toxic-appearing or diaphoretic.   HENT:      Head: Normocephalic and atraumatic.      Right Ear: External ear normal.      Left Ear: External ear normal.      Mouth/Throat:      Mouth: Mucous membranes  are moist.      Pharynx: No oropharyngeal exudate.   Eyes:      Extraocular Movements: Extraocular movements intact.      Pupils: Pupils are equal, round, and reactive to light.   Cardiovascular:      Rate and Rhythm: Normal rate and regular rhythm.      Pulses: Normal pulses.      Heart sounds: Normal heart sounds. No murmur heard.  Pulmonary:      Effort: Pulmonary effort is normal. No respiratory distress.      Breath sounds: Normal breath sounds. No wheezing or rales.   Abdominal:      General: Abdomen is flat. Bowel sounds are normal. There is no distension.      Palpations: Abdomen is soft. There is no mass.      Tenderness: There is abdominal tenderness.   Musculoskeletal:         General: No swelling or tenderness. Normal range of motion.      Cervical back: Normal range of motion and neck supple.      Right lower leg: No edema.      Left lower leg: No edema.   Skin:     General: Skin is warm and dry.      Capillary Refill: Capillary refill takes less than 2 seconds.   Neurological:      General: No focal deficit present.      Mental Status: She is alert and oriented to person, place, and time. Mental status is at baseline.   Psychiatric:         Mood and Affect: Mood normal.         Behavior: Behavior normal.         CRANIAL NERVES     CN III, IV, VI   Pupils are equal, round, and reactive to light.     Significant Labs: All pertinent labs within the past 24 hours have been reviewed.    Significant Imaging: I have reviewed all pertinent imaging results/findings within the past 24 hours.    Assessment & Plan  Ureterolithiasis  69 F w/ DM2, HTN; here w/ RLQ abdo pain radiating to R groin x2 days, associated w/ N/V. No fevers/chills, dysuria, hematuria. No constipation. Imaging w/ distal R ureteral stone at UVJ w/ mild R hydroureteronephrosis associated w/ perinephric fat stranding, also multiple non-obstructing R renal calculi. UA w/ 2+ leuk esterase, 16 WBC, 20 RBC, occasional bacteria. Lipase normal, so  less likely pancreatic. AF & HDS in ED w/ normal WBC, so not septic. Cr up from b/l so mild JADEN, likely prerenal. Urology consulted in ED w/ rec for observation for now & re-eval in the AM for spontaneous passage vs need for stenting.  Urology consulted; appreciate recs  Re-eval in AM to eval for need for stenting  Rec'd ketorolac for pain, IVF, & tamsulosin  Ucx pending  Cover for empiric UTI w/ CTX  Low threshold to d/c if stone passed & otherwise clinically improved given no fever or leukocytosis  Antiemetics PRN  MMPC  NPO for now in s/o possible procedure by Uro  JADEN (acute kidney injury)  JADEN is likely due to pre-renal azotemia due to intravascular volume depletion. Baseline creatinine is 0.9. Most recent creatinine and eGFR are listed below.  Recent Labs     05/17/25 1929   CREATININE 1.3   EGFRNORACEVR 45*   JADEN is stable  Avoid nephrotoxins and renally dose meds for GFR listed above  Monitor urine output, serial BMP, and adjust therapy as needed   Breast cancer metastasized to axillary lymph node, left  Patient has metastatic cancer of breast cancer primary. The cancer has metastasized to axillary LN. The patient  under the care of an outpatient oncologist. The patient is s/p mastectomy.Their staging information is listed below.    Cancer Staging   Breast cancer metastasized to axillary lymph node, left  Staging form: Breast, AJCC 8th Edition  - Clinical: Stage IIA (cT1c, cN1, cM0, G3, ER+, SC+, HER2-) - Signed by Tere Villagran MD on 9/16/2019  Stable, monitor  Neuropathy due to chemotherapeutic drug  On home gabapentin; hold for now, continue if needed  Controlled type 2 diabetes mellitus with diabetic nephropathy, without long-term current use of insulin  Lab Results   Component Value Date    HGBA1C 5.9 (H) 11/21/2024    HGBA1C 5.9 (H) 07/12/2024    HGBA1C 6.3 (H) 03/19/2024     Recent Labs     05/17/25 1929   GLU 86     Antihyperglycemics (From admission, onward)      None        Basal/prandial/SSI  titrated PRN goal Glc 140-180  Hypoglycemia protocol  Hold PO antiglycemics while hospitalized  Diet diabetic when able to eat  Hypertension associated with diabetes  Most recent BP: (!) 144/76 (05/17/25 2319); 24 hr range BP  Min: 133/79  Max: 174/81  Current meds: amLODIPine tablet 5 mg, Daily, Oral  Adjust regimen PRN goal SBP <180   Hyperlipidemia associated with type 2 diabetes mellitus  Continue statin  Hypothyroid  Continue levothyroxine    VTE Risk Mitigation (From admission, onward)           Ordered     heparin (porcine) injection 5,000 Units  Every 8 hours         05/18/25 0019     IP VTE HIGH RISK PATIENT  Once         05/18/25 0019     Place sequential compression device  Until discontinued         05/18/25 0019                  Austin Thomas MD  Department of Hospital Medicine   [facility] - Emergency Dept

## 2025-05-18 NOTE — PLAN OF CARE
[facility] - Internal Medicine Telemetry  Discharge Final Note    Primary Care Provider: Estephania San MD    Expected Discharge Date: 5/18/2025    Patient cleared for discharge from case management standpoint.    Final Discharge Note (most recent)       Final Note - 05/18/25 1100          Final Note    Assessment Type Final Discharge Note     Anticipated Discharge Disposition Home or Self Care     What phone number can be called within the next 1-3 days to see how you are doing after discharge? [phone]     Hospital Resources/Appts/Education Provided Provided patient/caregiver with written discharge plan information;Appointments scheduled and added to AVS        Post-Acute Status    Discharge Delays None known at this time                   Future Appointments   Date Time Provider Department Center   5/22/2025 10:00 AM Lorraine Charles DNP [facility] [place]   6/3/2025  8:30 AM Kendal Foy MD [facility] [place]   6/4/2025  9:00 AM [facility]   6/4/2025 10:00 AM NANNETTE Pollard NP [facility]   7/10/2025  1:40 PM [facility], DEXA1 [facility] BMD [facility] Judd RN  Weekend  - [facility] [place]  [phone]

## 2025-05-18 NOTE — CONSULTS
[facility] - Emergency Dept  Urology  Consult Note    Patient Name: Maria Elena Tavares  MRN: [id number]  Admission Date: 5/17/2025  Hospital Length of Stay: 0   Code Status: Prior   Attending Provider: Adriana Colby,*   Consulting Provider: Fede Dee MD  Primary Care Physician: Estephania San MD  Principal Problem:<principal problem not specified>    Inpatient consult to Urology  Consult performed by: Fede Dee MD  Consult ordered by: Raina Locke PA-C  Reason for consult: Right UVJ stone          Subjective:     HPI:  Maria Elena Tavares is a 69 year old women with a past medical history of HTN, hypothyroidism and diverticulosis. No urologic history. She presented to the ED due to one day right right lower abdominal pain and nausea. Urology was consulted for right ureterolithiasis.     The patient denies a recent history of fevers, chills, nausea and vomiting.     CT Abdomen and Pelvis shows multiple non-obstructing stones in the right kidney along with mild hydroureteronephrosis to the level of 5 mm distal ureteral stone. No stones or hydronephrosis on the left.     Past Medical History:   Diagnosis Date    BMI 37.0-37.9, adult     Breast cancer 09/05/2019     Left breast, IDC with lymph node metastisis    Colon polyps 2015    Colon polyps 2015    Diabetes mellitus type II     Diverticulosis     history of diverticulosisseen on colonoscopy at age 48. Repeat recommended at age 58. Done by     Elevated blood protein     History of elevated protein. Apparently has seen  for extensive workup including bone marrow biopsy    History of shingles 2006    Hyperlipidemia     Hypertension     Microalbuminuria     due 2 diabetes    Mild vitamin D deficiency     . A low vitamin D    Primary osteoarthritis of left knee 8/31/2023    Thyroid disease     hypothyroidism    Usual hyperplasia of lactiferous duct     Not sure       Past Surgical History:   Procedure Laterality Date    ARTHROPLASTY,  KNEE, TOTAL, SIGHT ASSISTED Left 2024    Procedure: ARTHROPLASTY, KNEE, SIGHT ASSISTED;  Surgeon: Jamshid Reece MD;  Location: [facility] OR;  Service: Orthopedics;  Laterality: Left;  bmi - 34.57    BREAST BIOPSY Left 2019    IDC with mets to node    BREAST BIOPSY Right 2019    core bx, benign node    BREAST BIOPSY Left 10/14/2019    MRI Core bx, + IDC    BREAST BIOPSY Left 10/08/2019    Core bx, ADH    BREAST SURGERY  2020    Breast cancer    CATARACT EXTRACTION W/  INTRAOCULAR LENS IMPLANT Right 2025    Procedure: EXTRACTION, CATARACT, WITH IOL INSERTION;  Surgeon: Kendal Foy MD;  [facility];  Service: Ophthalmology;  Laterality: Right;     SECTION      COLONOSCOPY N/A 10/08/2020    Procedure: COLONOSCOPY;  Surgeon: Shreya Mendoza MD;  Location: [facility] ENDO (4TH FLR);  Service: Endoscopy;  Laterality: N/A;  COVID screening on 10/5/20 [facility]    HYSTERECTOMY      INSERTION OF BREAST TISSUE EXPANDER Bilateral 2020    Procedure: INSERTION, TISSUE EXPANDER, BREAST BILATERAL;  Surgeon: Michael Solorzano MD;  Location: [facility] 2ND FLR;  Service: Plastics;  Laterality: Bilateral;    INSERTION OF TUNNELED CENTRAL VENOUS CATHETER (CVC) WITH SUBCUTANEOUS PORT Right 10/04/2019    Procedure: [id number]-CATH RIGHT (CONSENT AM OF) 1.0 hr case;  Surgeon: Elena Lopez MD;  [address]  Service: General;  Laterality: Right;    OOPHORECTOMY      SENTINEL LYMPH NODE BIOPSY Left 2020    Procedure: BIOPSY, LYMPH NODE, SENTINEL LEFT;  Surgeon: Elena Lopez MD;  Location: [address];  Service: General;  Laterality: Left;    SIMPLE MASTECTOMY Bilateral 2020    Procedure: MASTECTOMY, SIMPLE BILATERAL;  Surgeon: Elena Lopez MD;  Location: [address];  Service: General;  Laterality: Bilateral;    TISSUE EXPANDER REMOVAL Bilateral 2020    Procedure: REMOVAL, TISSUE EXPANDER;  Surgeon: Michael Solorzano MD;   Location: [facility] OR [address] [place] [address];  Service: Plastics;  Laterality: Bilateral;       Review of patient's allergies indicates:   Allergen Reactions    Amoxil [amoxicillin] Rash       Family History       Problem Relation (Age of Onset)    Arthritis Mother    Breast cancer Paternal Aunt    Cancer Father, Mother, Maternal Aunt    Diabetes Mother, Maternal Aunt    Hypertension Mother, Sister    Hypothyroidism Sister    Lung cancer Maternal Grandfather, Father, Mother    No Known Problems Paternal Grandfather, Paternal Grandmother, Maternal Grandmother, Sister, Daughter, Paternal Uncle            Tobacco Use    Smoking status: Never     Passive exposure: Never    Smokeless tobacco: Never    Tobacco comments:     Retired on Nov 21, 2021   Substance and Sexual Activity    Alcohol use: Not Currently     Comment: only on special occasions / rarley    Drug use: No    Sexual activity: Not Currently     Partners: Male     Birth control/protection: Abstinence, None       Review of Systems   Constitutional:  Negative for chills and fever.       Objective:     Temp:  [98.1 °F (36.7 °C)-98.9 °F (37.2 °C)] 98.1 °F (36.7 °C)  Pulse:  [69-88] 69  Resp:  [16-20] 20  SpO2:  [97 %-98 %] 98 %  BP: (133-174)/(79-81) 133/79  Weight: 72.1 kg (159 lb)  Body mass index is 31.05 kg/m².    Date 05/17/25 0700 - 05/18/25 0659   Shift 5512-3589 9107-3270 7338-0168 24 Hour Total   INTAKE   IV Piggyback  500  500   Shift Total(mL/kg)  500(6.9)  500(6.9)   OUTPUT   Shift Total(mL/kg)       Weight (kg)  72.1 72.1 72.1          Drains       None                    Physical Exam  Vitals reviewed.   Constitutional:       Appearance: Normal appearance.   Pulmonary:      Effort: Pulmonary effort is normal.   Abdominal:      General: Abdomen is flat.      Palpations: Abdomen is soft.   Neurological:      General: No focal deficit present.      Mental Status: She is alert and oriented to person, place, and time.   Psychiatric:         Mood and Affect: Mood normal.  "         Significant Labs:    BMP:  Recent Labs   Lab 05/17/25  1929      K 4.1      CO2 18*   BUN 24*   CREATININE 1.3   CALCIUM 8.1*       CBC:  Recent Labs   Lab 05/17/25  1658   WBC 8.30   HGB 12.4   HCT 38.1          Blood Culture: No results for input(s): "LABBLOO" in the last 168 hours.  Urine Culture: No results for input(s): "LABURIN" in the last 168 hours.  Urine Studies:   Recent Labs   Lab 05/17/25  1838   COLORU Colorless*   APPEARANCEUA Clear   PHUR 6.0   SPECGRAV 1.010   PROTEINUA Negative   GLUCUA Negative   BILIRUBINUA Negative   OCCULTUA 2+*   NITRITE Negative   UROBILINOGEN Negative   LEUKOCYTESUR 2+*   RBCUA 20*   WBCUA 16*   BACTERIA Occasional   HYALINECASTS 1       Significant Imaging:  All pertinent imaging results/findings from the past 24 hours have been reviewed.                    Assessment and Plan:     Ureterolithiasis  Maria Elena Tavares is a 69 year old women with right ureterolithiasis.     - No acute Urologic intervention indicated at this time   - Creatinine 1.3 from 0.9 baseline. Will repeat CMP after fluid bolus.   - Urine non concerning for infection   - No leukocytosis  - Strain all urine   - Recommend Toradol, Flomax and mIVF  - Recommend admission to ED observation   - Rest of care per primary team           VTE Risk Mitigation (From admission, onward)      None            Thank you for your consult. I will follow-up with patient. Please contact us if you have any additional questions.    Fede Dee MD  Urology  [facility] - Emergency Dept  "

## 2025-05-18 NOTE — DISCHARGE SUMMARY
"Adam Neal - Internal Medicine [facility] Medicine  Discharge Summary      Patient Name: Maria Elena Tavares  MRN: [id number]  BEVERLY: [id number]  Patient Class: OP- Observation  Admission Date: 5/17/2025  Hospital Length of Stay: 0 days  Discharge Date and Time: 05/18/2025 10:49 AM  Attending Physician: Shaji Bell, *   Discharging Provider: Julius Dumas MD  Primary Care Provider: Estephania San MD  [facility] Medicine Team: [facility] 5 Julius Dumas MD  Primary Care Team: [facility] 5    HPI:   68 y/o F w/ h/o remote breast cancer s/p mastectomy, DM2, HTN, HLD, hypothyroidism, diverticulosis; p/w RLQ abdo pain radiating to the R groin since 05/16. Pain has been waxing/waning, initially described as "15/10" in intensity upon ED arrival, now improved. Associated w/ 2 episodes of N/V on 05/16 & 1 episode on 05/17. She denies fevers, chills, dysuria, hematuria, vaginal d/c or bleeding. She reports a similar episode of pain in the past, for which her PCP & GI advised her to return to the ED if sxs recurred. She took acetaminophen at home w/ minimal relief. Last BM was 05/16 AM; denies diarrhea, hematochezia, or melena.    CT A/P w/o contrast revealed mild R hydroureteronephrosis to the level of a 5 mm distal R ureteral stone at the UVJ, w/ associated perinephric fat stranding. Also noted were multiple non-obstructing R renal calculi; no stones or hydronephrosis on the L. UA notable for 2+ leukocyte esterase, 16 WBCs, 20 RBCs, & occasional bacteria; however, no subjective or objective signs of infection. Lipase normal. WBC count wnl. Creatinine ? from baseline 0.8 to 1.3 on arrival, c/f pre-renal JADEN; received IVF in ED.    Urology was consulted. Given stable vitals, absence of infection or obstruction requiring emergent decompression, and improving pain, they recommend admission for observation, IVF, alpha blocker (tamsulosin), and urine straining. They will re-eval on 05/18 AM to assess for spontaneous passage " or need for intervention (e.g., stent). Pt agreeable to plan and all questions answered.    * No surgery found *      Hospital Course:   69-year-old woman admitted with right-sided ureterolithiasis. CT Abdomen/Pelvis revealed multiple non-obstructing right renal stones and mild right hydroureteronephrosis to the level of a 5 mm distal ureteral stone. Left kidney unremarkable. Renal function improved with fluids (Cr 1.3 ? 1.1). No signs of infection; urine studies and WBC count were unremarkable. No acute urologic intervention required. Managed conservatively with pain control and medical expulsive therapy. Discharged with Toradol and Flomax, and outpatient Urology follow-up arranged in one week.     Goals of Care Treatment Preferences:  Code Status: Full Code         Consults:   Consults (From admission, onward)          Status Ordering Provider     Inpatient consult to Urology  Once        Provider:  (Not yet assigned)    Completed ISIDRA SAMPSON            Assessment & Plan  Ureterolithiasis  69 F w/ DM2, HTN; here w/ RLQ abdo pain radiating to R groin x2 days, associated w/ N/V. No fevers/chills, dysuria, hematuria. No constipation. Imaging w/ distal R ureteral stone at UVJ w/ mild R hydroureteronephrosis associated w/ perinephric fat stranding, also multiple non-obstructing R renal calculi. UA w/ 2+ leuk esterase, 16 WBC, 20 RBC, occasional bacteria. Lipase normal, so less likely pancreatic. AF & HDS in ED w/ normal WBC, so not septic. Cr up from b/l so mild JADEN, likely prerenal. Urology consulted in ED w/ rec for observation for now & re-eval in the AM for spontaneous passage vs need for stenting.  Urology consulted; appreciate recs  Re-eval in AM to eval for need for stenting  Rec'd ketorolac for pain, IVF, & tamsulosin  Ucx pending  Cover for empiric UTI w/ CTX  Low threshold to d/c if stone passed & otherwise clinically improved given no fever or leukocytosis  Antiemetics PRN  MMPC  NPO for now in s/o  possible procedure by Uro  JADEN (acute kidney injury)  JADEN is likely due to pre-renal azotemia due to intravascular volume depletion. Baseline creatinine is 0.9. Most recent creatinine and eGFR are listed below.  Recent Labs     05/17/25 1929 05/18/25 0456   CREATININE 1.3 1.1   EGFRNORACEVR 45* 55*   JADEN is stable  Avoid nephrotoxins and renally dose meds for GFR listed above  Monitor urine output, serial BMP, and adjust therapy as needed   Breast cancer metastasized to axillary lymph node, left  Patient has metastatic cancer of breast cancer primary. The cancer has metastasized to axillary LN. The patient  under the care of an outpatient oncologist. The patient is s/p mastectomy.Their staging information is listed below.    Cancer Staging   Breast cancer metastasized to axillary lymph node, left  Staging form: Breast, AJCC 8th Edition  - Clinical: Stage IIA (cT1c, cN1, cM0, G3, ER+, WV+, HER2-) - Signed by Tere Villagran MD on 9/16/2019  Stable, monitor  Neuropathy due to chemotherapeutic drug  On home gabapentin; hold for now, continue if needed  Controlled type 2 diabetes mellitus with diabetic nephropathy, without long-term current use of insulin  Lab Results   Component Value Date    HGBA1C 5.9 (H) 11/21/2024    HGBA1C 5.9 (H) 07/12/2024    HGBA1C 6.3 (H) 03/19/2024     Recent Labs     05/17/25 1929 05/18/25 0456 05/18/25  0642   POCTGLUCOSE  --   --  90   GLU 86 79  --      Antihyperglycemics (From admission, onward)      Start     Stop Route Frequency Ordered    05/18/25 0202  insulin aspart U-100 pen 0-5 Units         -- SubQ Every 6 hours PRN 05/18/25 0102        Basal/prandial/SSI titrated PRN goal Glc 140-180  Hypoglycemia protocol  Hold PO antiglycemics while hospitalized  Diet diabetic when able to eat  Hypertension associated with diabetes  Most recent BP: 119/76 (05/18/25 0743); 24 hr range BP  Min: 112/71  Max: 174/81  Current meds: amLODIPine tablet 5 mg, Daily, Oral  Adjust regimen PRN goal  SBP <180   Hyperlipidemia associated with type 2 diabetes mellitus  Continue statin  Hypothyroid  Continue levothyroxine  Final Active Diagnoses:    Diagnosis Date Noted POA    PRINCIPAL PROBLEM:  Ureterolithiasis [N20.1] 05/17/2025 Yes    JADEN (acute kidney injury) [N17.9] 05/18/2025 Yes    Hyperlipidemia associated with type 2 diabetes mellitus [E11.69, E78.5] 04/30/2024 Yes    Neuropathy due to chemotherapeutic drug [G62.0, T45.1X5A] 12/17/2019 Yes    Breast cancer metastasized to axillary lymph node, left [C50.912, C77.3] 09/16/2019 Yes    Hypothyroid [E03.9] 10/02/2012 Yes    Controlled type 2 diabetes mellitus with diabetic nephropathy, without long-term current use of insulin [E11.21]  Yes    Hypertension associated with diabetes [E11.59, I15.2]  Yes      Problems Resolved During this Admission:       Discharged Condition: good    Disposition:     Follow Up:    Patient Instructions:   No discharge procedures on file.        Pending Diagnostic Studies:       None           Medications:  Reconciled Home Medications:      Medication List        PAUSE taking these medications      AREXVY (PF) 120 mcg/0.5 mL Susr vaccine  Wait to take this until your doctor or other care provider tells you to start again.  Generic drug: RSVPreF3 antigen-AS01E (PF)  Inject into the muscle.     aspirin 81 MG EC tablet  Wait to take this until your doctor or other care provider tells you to start again.  Commonly known as: ECOTRIN  Take 1 tablet (81 mg total) by mouth 2 (two) times a day.     perfluorohexyloctane (PF) 100 % Drop  Wait to take this until your doctor or other care provider tells you to start again.  Place 1 drop into both eyes 4 (four) times daily.            START taking these medications      ketorolac 10 mg tablet  Commonly known as: TORADOL  Take 1 tablet (10 mg total) by mouth every 6 (six) hours. for 5 days     tamsulosin 0.4 mg Cap  Commonly known as: FLOMAX  Take 1 capsule (0.4 mg total) by mouth once daily.             CHANGE how you take these medications      levothyroxine 200 MCG tablet  Commonly known as: SYNTHROID  Take 1 tablet (200 mcg total) by mouth daily before breakfast.  Start taking on: May 19, 2025  What changed:   when to take this  additional instructions     polyethylene glycol 17 gram/dose powder  Commonly known as: GLYCOLAX  Use cap to measure 17g, mix with liquid, and take by mouth 2 (two) times daily as needed for Constipation.  What changed:   when to take this  reasons to take this            CONTINUE taking these medications      amLODIPine 5 MG tablet  Commonly known as: NORVASC  Take 1 tablet (5 mg total) by mouth once daily.     atorvastatin 10 MG tablet  Commonly known as: LIPITOR  Take 1 tablet (10 mg total) by mouth once daily.     blood-glucose meter kit  DISPENSE : BLOOD TEST STRIPS AND LANCETS PATIENT TEST BLOOD SUGARS TWICE DAILY  TEST STRIPS: 50 EACH, REFILL 5  LANCETS:  50 EACH , REFILL 5     cetirizine 10 MG tablet  Commonly known as: ZYRTEC  Take 1 tablet (10 mg total) by mouth daily as needed for Allergies (itching).     ciclopirox 0.77 % Gel  Apply topically 2 (two) times daily. To thickened discolored toenails.     cycloSPORINE 0.05 % ophthalmic emulsion  Commonly known as: RESTASIS  Place 1 drop into both eyes 2 (two) times daily.     gabapentin 800 MG tablet  Commonly known as: NEURONTIN  Take 1 tablet (800 mg total) by mouth 3 (three) times daily.     irbesartan 300 MG tablet  Commonly known as: AVAPRO  Take 1 tablet by mouth once daily     letrozole 2.5 mg Tab  Commonly known as: FEMARA  Take 1 tablet (2.5 mg total) by mouth once daily.     MOUNJARO 10 mg/0.5 mL Pnij  Generic drug: tirzepatide  Inject 10 mg into the skin every 7 days.     pantoprazole 40 MG tablet  Commonly known as: PROTONIX  Take 1 tablet (40 mg total) by mouth once daily.     prednisol ace-gatiflox-bromfen 1-0.5-0.075 % Drps  Apply 1 drop to eye 3 (three) times daily.     VITAMIN D2 1,250 mcg (50,000 unit)  capsule  Generic drug: ergocalciferol  Take 1 capsule (50,000 Units total) by mouth every 14 (fourteen) days.     XIIDRA 5 % Dpet  Generic drug: lifitegrast  Apply 1 drop to  both eyes  2 (two) times daily.              Indwelling Lines/Drains at time of discharge:   Lines/Drains/Airways       Central Venous Catheter Line  Duration             Port A Cath Single Lumen -- days                    Time spent on the discharge of patient: 45 minutes         Julius Dumas MD  Department of Hospital Medicine  [facility] - Internal Medicine Telemetry

## 2025-05-18 NOTE — ASSESSMENT & PLAN NOTE
JADEN is likely due to pre-renal azotemia due to intravascular volume depletion. Baseline creatinine is 0.9. Most recent creatinine and eGFR are listed below.  Recent Labs     05/17/25  1929 05/18/25  0456   CREATININE 1.3 1.1   EGFRNORACEVR 45* 55*   JADEN is stable  Avoid nephrotoxins and renally dose meds for GFR listed above  Monitor urine output, serial BMP, and adjust therapy as needed

## 2025-05-18 NOTE — ED NOTES
Updated the patient that the only thing we are waiting on is her CT results, patient asked for something to eat or drink. Reached out to Chucky DUKES at this time.

## 2025-05-18 NOTE — HOSPITAL COURSE
69-year-old woman admitted with right-sided ureterolithiasis. CT Abdomen/Pelvis revealed multiple non-obstructing right renal stones and mild right hydroureteronephrosis to the level of a 5 mm distal ureteral stone. Left kidney unremarkable. Renal function improved with fluids (Cr 1.3 ? 1.1). No signs of infection; urine studies and WBC count were unremarkable. No acute urologic intervention required. Managed conservatively with pain control and medical expulsive therapy. Discharged with Toradol and Flomax, and outpatient Urology follow-up arranged in one week.

## 2025-05-18 NOTE — SUBJECTIVE & OBJECTIVE
"Interval History: No acute events overnight. The patient reports that pain is well controlled. No nausea or vomiting. She continues to strain urine. Denies fevers or chills.     Review of Systems   Constitutional:  Negative for chills and fever.     Objective:     Temp:  [98 °F (36.7 °C)-98.9 °F (37.2 °C)] 98.2 °F (36.8 °C)  Pulse:  [69-88] 85  Resp:  [16-20] 18  SpO2:  [96 %-99 %] 96 %  BP: (112-174)/(71-81) 119/76     Body mass index is 31.05 kg/m².           Drains       None                    Physical Exam  Vitals reviewed.   Constitutional:       Appearance: Normal appearance.   Cardiovascular:      Rate and Rhythm: Normal rate.   Pulmonary:      Effort: Pulmonary effort is normal.   Abdominal:      General: Abdomen is flat.      Palpations: Abdomen is soft.      Tenderness: There is no right CVA tenderness or left CVA tenderness.   Skin:     General: Skin is warm.   Neurological:      General: No focal deficit present.      Mental Status: She is alert and oriented to person, place, and time.   Psychiatric:         Mood and Affect: Mood normal.           Significant Labs:    BMP:  Recent Labs   Lab 05/17/25  1929 05/18/25  0456    137   K 4.1 4.4    106   CO2 18* 20*   BUN 24* 21   CREATININE 1.3 1.1   CALCIUM 8.1* 8.5*       CBC:   Recent Labs   Lab 05/17/25  1658 05/18/25  0456   WBC 8.30 7.61   HGB 12.4 11.9*   HCT 38.1 35.4*    258       Blood Culture: No results for input(s): "LABBLOO" in the last 168 hours.  Urine Culture: No results for input(s): "LABURIN" in the last 168 hours.  Urine Studies:   Recent Labs   Lab 05/17/25  1838   COLORU Colorless*   APPEARANCEUA Clear   PHUR 6.0   SPECGRAV 1.010   PROTEINUA Negative   GLUCUA Negative   BILIRUBINUA Negative   OCCULTUA 2+*   NITRITE Negative   UROBILINOGEN Negative   LEUKOCYTESUR 2+*   RBCUA 20*   WBCUA 16*   BACTERIA Occasional   HYALINECASTS 1       Significant Imaging:  All pertinent imaging results/findings from the past 24 hours " have been reviewed.

## 2025-05-18 NOTE — SUBJECTIVE & OBJECTIVE
Past Medical History:   Diagnosis Date    BMI 37.0-37.9, adult     Breast cancer 2019     Left breast, IDC with lymph node metastisis    Colon polyps     Colon polyps     Diabetes mellitus type II     Diverticulosis     history of diverticulosisseen on colonoscopy at age 48. Repeat recommended at age 58. Done by     Elevated blood protein     History of elevated protein. Apparently has seen  for extensive workup including bone marrow biopsy    History of shingles 2006    Hyperlipidemia     Hypertension     Microalbuminuria     due 2 diabetes    Mild vitamin D deficiency     . A low vitamin D    Primary osteoarthritis of left knee 2023    Thyroid disease     hypothyroidism    Usual hyperplasia of lactiferous duct     Not sure     Past Surgical History:   Procedure Laterality Date    ARTHROPLASTY, KNEE, TOTAL, SIGHT ASSISTED Left 2024    Procedure: ARTHROPLASTY, KNEE, SIGHT ASSISTED;  Surgeon: Jamshid Reece MD;  Location: [facility];  Service: Orthopedics;  Laterality: Left;  bmi - 34.57    BREAST BIOPSY Left 2019    IDC with mets to node    BREAST BIOPSY Right 2019    core bx, benign node    BREAST BIOPSY Left 10/14/2019    MRI Core bx, + IDC    BREAST BIOPSY Left 10/08/2019    Core bx, ADH    BREAST SURGERY  2020    Breast cancer    CATARACT EXTRACTION W/  INTRAOCULAR LENS IMPLANT Right 2025    Procedure: EXTRACTION, CATARACT, WITH IOL INSERTION;  Surgeon: Kendal Foy MD;  [facility];  Service: Ophthalmology;  Laterality: Right;     SECTION      COLONOSCOPY N/A 10/08/2020    Procedure: COLONOSCOPY;  Surgeon: Shreya Mendoza MD;  Location: [address]);  Service: Endoscopy;  Laterality: N/A;  COVID screening on 10/5/20 [facility]    HYSTERECTOMY      INSERTION OF BREAST TISSUE EXPANDER Bilateral 2020    Procedure: INSERTION, TISSUE EXPANDER, BREAST BILATERAL;  Surgeon: Michael Solorzano MD;  Location: [facility]  OR 2ND FLR;  Service: Plastics;  Laterality: Bilateral;    INSERTION OF TUNNELED CENTRAL VENOUS CATHETER (CVC) WITH SUBCUTANEOUS PORT Right 10/04/2019    Procedure: [id number]-CATH RIGHT (CONSENT AM OF) 1.0 hr case;  Surgeon: Elena Lopez MD;  Location: [facility] OR [address] [place] [address];  Service: General;  Laterality: Right;    OOPHORECTOMY      SENTINEL LYMPH NODE BIOPSY Left 03/24/2020    Procedure: BIOPSY, LYMPH NODE, SENTINEL LEFT;  Surgeon: Elena Lopez MD;  [facility];  Service: General;  Laterality: Left;    SIMPLE MASTECTOMY Bilateral 03/24/2020    Procedure: MASTECTOMY, SIMPLE BILATERAL;  Surgeon: Elena Lopez MD;  [facility];  Service: General;  Laterality: Bilateral;    TISSUE EXPANDER REMOVAL Bilateral 07/30/2020    Procedure: REMOVAL, TISSUE EXPANDER;  Surgeon: Michael Solorzano MD;  Location: [facility] OR [address] [place] [address];  Service: Plastics;  Laterality: Bilateral;     Review of patient's allergies indicates:   Allergen Reactions    Amoxil [amoxicillin] Rash     No current facility-administered medications on file prior to encounter.     Current Outpatient Medications on File Prior to Encounter   Medication Sig    amLODIPine (NORVASC) 5 MG tablet Take 1 tablet (5 mg total) by mouth once daily.    aspirin (ECOTRIN) 81 MG EC tablet Take 1 tablet (81 mg total) by mouth 2 (two) times a day.    atorvastatin (LIPITOR) 10 MG tablet Take 1 tablet (10 mg total) by mouth once daily.    blood-glucose meter kit DISPENSE : BLOOD TEST STRIPS AND LANCETS PATIENT TEST BLOOD SUGARS TWICE DAILY  TEST STRIPS: 50 EACH, REFILL 5  LANCETS:  50 EACH , REFILL 5    cetirizine (ZYRTEC) 10 MG tablet Take 1 tablet (10 mg total) by mouth daily as needed for Allergies (itching).    ciclopirox 0.77 % Gel Apply topically 2 (two) times daily. To thickened discolored toenails.    cycloSPORINE (RESTASIS) 0.05 % ophthalmic emulsion Place 1 drop into both eyes 2 (two) times daily.    ergocalciferol (ERGOCALCIFEROL) 50,000  unit Cap Take 1 capsule (50,000 Units total) by mouth every 14 (fourteen) days.    gabapentin (NEURONTIN) 800 MG tablet Take 1 tablet (800 mg total) by mouth 3 (three) times daily.    irbesartan (AVAPRO) 300 MG tablet Take 1 tablet by mouth once daily    letrozole (FEMARA) 2.5 mg Tab Take 1 tablet (2.5 mg total) by mouth once daily.    levothyroxine (SYNTHROID) 200 MCG tablet Take 1 tablet (200 mcg total) by mouth once daily. Except on Sundays take 1/2 tablet    lifitegrast (XIIDRA) 5 % Dpet Apply 1 drop to  both eyes  2 (two) times daily.    pantoprazole (PROTONIX) 40 MG tablet Take 1 tablet (40 mg total) by mouth once daily.    perfluorohexyloctane, PF, 100 % Drop Place 1 drop into both eyes 4 (four) times daily.    polyethylene glycol (GLYCOLAX) 17 gram/dose powder Mix 17 g (1 capful) with juice or water an drink 2 (two) times daily.    prednisolon/gatiflox/bromfenac (PREDNISOL ACE-GATIFLOX-BROMFEN) 1-0.5-0.075 % DrpS Apply 1 drop to eye 3 (three) times daily.    RSVPreF3 antigen-AS01E, PF, (AREXVY, PF,) 120 mcg/0.5 mL SusR vaccine Inject into the muscle.    tirzepatide (MOUNJARO) 10 mg/0.5 mL PnIj Inject 10 mg into the skin every 7 days.     Family History       Problem Relation (Age of Onset)    Arthritis Mother    Breast cancer Paternal Aunt    Cancer Father, Mother, Maternal Aunt    Diabetes Mother, Maternal Aunt    Hypertension Mother, Sister    Hypothyroidism Sister    Lung cancer Maternal Grandfather, Father, Mother    No Known Problems Paternal Grandfather, Paternal Grandmother, Maternal Grandmother, Sister, Daughter, Paternal Uncle          Tobacco Use    Smoking status: Never     Passive exposure: Never    Smokeless tobacco: Never    Tobacco comments:     Retired on Nov 21, 2021   Substance and Sexual Activity    Alcohol use: Not Currently     Comment: only on special occasions / rarley    Drug use: No    Sexual activity: Not Currently     Partners: Male     Birth control/protection: Abstinence, None      Review of Systems   Constitutional:  Negative for chills, fever and unexpected weight change.   HENT:  Negative for sinus pain, trouble swallowing and voice change.    Eyes:  Negative for photophobia, pain and visual disturbance.   Respiratory:  Negative for cough, shortness of breath and wheezing.    Cardiovascular:  Negative for chest pain, palpitations and leg swelling.   Gastrointestinal:  Positive for abdominal pain, nausea and vomiting. Negative for blood in stool, constipation and diarrhea.   Endocrine: Negative for polydipsia and polyuria.   Genitourinary:  Positive for pelvic pain. Negative for dysuria, frequency, hematuria and urgency.   Musculoskeletal:  Negative for arthralgias, back pain and myalgias.   Skin:  Negative for color change and pallor.   Neurological:  Negative for syncope, weakness, numbness and headaches.   Hematological:  Does not bruise/bleed easily.   Psychiatric/Behavioral:  Negative for confusion, dysphoric mood and sleep disturbance.      Objective:     Vital Signs (Most Recent):  Temp: 98.2 °F (36.8 °C) (05/17/25 2319)  Pulse: 75 (05/17/25 2319)  Resp: 19 (05/17/25 2319)  BP: (!) 144/76 (05/17/25 2319)  SpO2: 98 % (05/17/25 2319) Vital Signs (24h Range):  Temp:  [98.1 °F (36.7 °C)-98.9 °F (37.2 °C)] 98.2 °F (36.8 °C)  Pulse:  [69-88] 75  Resp:  [16-20] 19  SpO2:  [97 %-98 %] 98 %  BP: (133-174)/(76-81) 144/76     Weight: 72.1 kg (159 lb)  Body mass index is 31.05 kg/m².     Physical Exam  Vitals and nursing note reviewed.   Constitutional:       General: She is not in acute distress.     Appearance: She is obese. She is not ill-appearing, toxic-appearing or diaphoretic.   HENT:      Head: Normocephalic and atraumatic.      Right Ear: External ear normal.      Left Ear: External ear normal.      Mouth/Throat:      Mouth: Mucous membranes are moist.      Pharynx: No oropharyngeal exudate.   Eyes:      Extraocular Movements: Extraocular movements intact.      Pupils: Pupils are  equal, round, and reactive to light.   Cardiovascular:      Rate and Rhythm: Normal rate and regular rhythm.      Pulses: Normal pulses.      Heart sounds: Normal heart sounds. No murmur heard.  Pulmonary:      Effort: Pulmonary effort is normal. No respiratory distress.      Breath sounds: Normal breath sounds. No wheezing or rales.   Abdominal:      General: Abdomen is flat. Bowel sounds are normal. There is no distension.      Palpations: Abdomen is soft. There is no mass.      Tenderness: There is abdominal tenderness.   Musculoskeletal:         General: No swelling or tenderness. Normal range of motion.      Cervical back: Normal range of motion and neck supple.      Right lower leg: No edema.      Left lower leg: No edema.   Skin:     General: Skin is warm and dry.      Capillary Refill: Capillary refill takes less than 2 seconds.   Neurological:      General: No focal deficit present.      Mental Status: She is alert and oriented to person, place, and time. Mental status is at baseline.   Psychiatric:         Mood and Affect: Mood normal.         Behavior: Behavior normal.         CRANIAL NERVES     CN III, IV, VI   Pupils are equal, round, and reactive to light.     Significant Labs: All pertinent labs within the past 24 hours have been reviewed.    Significant Imaging: I have reviewed all pertinent imaging results/findings within the past 24 hours.

## 2025-05-18 NOTE — ASSESSMENT & PLAN NOTE
Maria Elena Tavares is a 69 year old women with right ureterolithiasis.     - No acute Urologic intervention indicated at this time   - Creatinine 1.3 from 0.9 baseline. Will repeat CMP after fluid bolus.   - Urine non concerning for infection   - No leukocytosis  - Recommend Toradol, Flomax and mIVF  - Recommend admission to ED observation   - Rest of care per primary team

## 2025-05-18 NOTE — PLAN OF CARE
CM noted discharge order. Reviewed chart and confirmed plan with team. Disposition is home with no needs. Urology staff will arrange outpatient follow in one week.       Ivory Judd RN  Weekend  - [facility], later [facility]  [phone]

## 2025-05-18 NOTE — HPI
MariaE lena Tavares is a 69 year old women with a past medical history of HTN, hypothyroidism and diverticulosis. No urologic history. She presented to the ED due to one day right right lower abdominal pain and nausea. Urology was consulted for right ureterolithiasis.     The patient denies a recent history of fevers, chills, nausea and vomiting.     CT Abdomen and Pelvis shows multiple non-obstructing stones in the right kidney along with mild hydroureteronephrosis to the level of 5 mm distal ureteral stone. No stones or hydronephrosis on the left.

## 2025-05-18 NOTE — PROGRESS NOTES
[facility] - Internal Medicine Telemetry  Urology  Progress Note    Patient Name: Maria Elena Tavares  MRN: [id number]  Admission Date: 5/17/2025  Hospital Length of Stay: 0 days  Code Status: Full Code   Attending Provider: Shaji Bell, *   Primary Care Physician: Estephania Sna MD    Subjective:     HPI:  Maria Elena Tavares is a 69 year old women with a past medical history of HTN, hypothyroidism and diverticulosis. No urologic history. She presented to the ED due to one day right right lower abdominal pain and nausea. Urology was consulted for right ureterolithiasis.     The patient denies a recent history of fevers, chills, nausea and vomiting.     CT Abdomen and Pelvis shows multiple non-obstructing stones in the right kidney along with mild hydroureteronephrosis to the level of 5 mm distal ureteral stone. No stones or hydronephrosis on the left.     Interval History: No acute events overnight. The patient reports that pain is well controlled. No nausea or vomiting. She continues to strain urine. Denies fevers or chills.     Review of Systems   Constitutional:  Negative for chills and fever.     Objective:     Temp:  [98 °F (36.7 °C)-98.9 °F (37.2 °C)] 98.2 °F (36.8 °C)  Pulse:  [69-88] 85  Resp:  [16-20] 18  SpO2:  [96 %-99 %] 96 %  BP: (112-174)/(71-81) 119/76     Body mass index is 31.05 kg/m².           Drains       None                    Physical Exam  Vitals reviewed.   Constitutional:       Appearance: Normal appearance.   Cardiovascular:      Rate and Rhythm: Normal rate.   Pulmonary:      Effort: Pulmonary effort is normal.   Abdominal:      General: Abdomen is flat.      Palpations: Abdomen is soft.      Tenderness: There is no right CVA tenderness or left CVA tenderness.   Skin:     General: Skin is warm.   Neurological:      General: No focal deficit present.      Mental Status: She is alert and oriented to person, place, and time.   Psychiatric:         Mood and Affect: Mood normal.          "  Significant Labs:    BMP:  Recent Labs   Lab 05/17/25  1929 05/18/25  0456    137   K 4.1 4.4    106   CO2 18* 20*   BUN 24* 21   CREATININE 1.3 1.1   CALCIUM 8.1* 8.5*       CBC:   Recent Labs   Lab 05/17/25  1658 05/18/25  0456   WBC 8.30 7.61   HGB 12.4 11.9*   HCT 38.1 35.4*    258       Blood Culture: No results for input(s): "LABBLOO" in the last 168 hours.  Urine Culture: No results for input(s): "LABURIN" in the last 168 hours.  Urine Studies:   Recent Labs   Lab 05/17/25  1838   COLORU Colorless*   APPEARANCEUA Clear   PHUR 6.0   SPECGRAV 1.010   PROTEINUA Negative   GLUCUA Negative   BILIRUBINUA Negative   OCCULTUA 2+*   NITRITE Negative   UROBILINOGEN Negative   LEUKOCYTESUR 2+*   RBCUA 20*   WBCUA 16*   BACTERIA Occasional   HYALINECASTS 1       Significant Imaging:  All pertinent imaging results/findings from the past 24 hours have been reviewed.                  Assessment/Plan:     * Ureterolithiasis  Maria Elena Tavares is a 69 year old women with right ureterolithiasis.     - No acute Urologic intervention indicated at this time   - Creatinine 1.1 from 1.3 after fluid challenge   - Urine non concerning for infection   - No leukocytosis  - Recommend Toradol, Flomax on discharge   - Will arrange outpatient follow up with Urology in one week   - Rest of care per primary team   - Okay for discharge from Urologic perspective           VTE Risk Mitigation (From admission, onward)           Ordered     heparin (porcine) injection 5,000 Units  Every 8 hours         05/18/25 0019     IP VTE HIGH RISK PATIENT  Once         05/18/25 0019     Place sequential compression device  Until discontinued         05/18/25 0019                    Fede Dee MD  Urology  [facility] - Internal Medicine Telemetry  "

## 2025-05-18 NOTE — ASSESSMENT & PLAN NOTE
JADEN is likely due to pre-renal azotemia due to intravascular volume depletion. Baseline creatinine is 0.9. Most recent creatinine and eGFR are listed below.  Recent Labs     05/17/25 1929   CREATININE 1.3   EGFRNORACEVR 45*   JADEN is stable  Avoid nephrotoxins and renally dose meds for GFR listed above  Monitor urine output, serial BMP, and adjust therapy as needed

## 2025-05-18 NOTE — ASSESSMENT & PLAN NOTE
Maria Elena Tavares is a 69 year old women with right ureterolithiasis.     - No acute Urologic intervention indicated at this time   - Creatinine 1.1 from 1.3 after fluid challenge   - Urine non concerning for infection   - No leukocytosis  - Recommend Toradol, Flomax on discharge   - Will arrange outpatient follow up with Urology in one week   - Rest of care per primary team   - Okay for discharge from Urologic perspective

## 2025-05-18 NOTE — SUBJECTIVE & OBJECTIVE
Past Medical History:   Diagnosis Date    BMI 37.0-37.9, adult     Breast cancer 2019     Left breast, IDC with lymph node metastisis    Colon polyps     Colon polyps     Diabetes mellitus type II     Diverticulosis     history of diverticulosisseen on colonoscopy at age 48. Repeat recommended at age 58. Done by     Elevated blood protein     History of elevated protein. Apparently has seen  for extensive workup including bone marrow biopsy    History of shingles 2006    Hyperlipidemia     Hypertension     Microalbuminuria     due 2 diabetes    Mild vitamin D deficiency     . A low vitamin D    Primary osteoarthritis of left knee 2023    Thyroid disease     hypothyroidism    Usual hyperplasia of lactiferous duct     Not sure       Past Surgical History:   Procedure Laterality Date    ARTHROPLASTY, KNEE, TOTAL, SIGHT ASSISTED Left 2024    Procedure: ARTHROPLASTY, KNEE, SIGHT ASSISTED;  Surgeon: Jamshid Reece MD;  Location: [facility];  Service: Orthopedics;  Laterality: Left;  bmi - 34.57    BREAST BIOPSY Left 2019    IDC with mets to node    BREAST BIOPSY Right 2019    core bx, benign node    BREAST BIOPSY Left 10/14/2019    MRI Core bx, + IDC    BREAST BIOPSY Left 10/08/2019    Core bx, ADH    BREAST SURGERY  2020    Breast cancer    CATARACT EXTRACTION W/  INTRAOCULAR LENS IMPLANT Right 2025    Procedure: EXTRACTION, CATARACT, WITH IOL INSERTION;  Surgeon: Kendal Foy MD;  Location: [facility];  Service: Ophthalmology;  Laterality: Right;     SECTION      COLONOSCOPY N/A 10/08/2020    Procedure: COLONOSCOPY;  Surgeon: Shreya Mendoza MD;  Location: [address]);  Service: Endoscopy;  Laterality: N/A;  COVID screening on 10/5/20 [facility]    HYSTERECTOMY      INSERTION OF BREAST TISSUE EXPANDER Bilateral 2020    Procedure: INSERTION, TISSUE EXPANDER, BREAST BILATERAL;  Surgeon: Michael Solorzano MD;  Location:  [facility] OR [facility];  Service: Plastics;  Laterality: Bilateral;    INSERTION OF TUNNELED CENTRAL VENOUS CATHETER (CVC) WITH SUBCUTANEOUS PORT Right 10/04/2019    Procedure: [id number]-N-CATH RIGHT (CONSENT AM OF) 1.0 hr case;  Surgeon: Elena Lopez MD;  Location: [facility] OR [address] [place] [address];  Service: General;  Laterality: Right;    OOPHORECTOMY      SENTINEL LYMPH NODE BIOPSY Left 03/24/2020    Procedure: BIOPSY, LYMPH NODE, SENTINEL LEFT;  Surgeon: Elena Lopez MD;  [facility];  Service: General;  Laterality: Left;    SIMPLE MASTECTOMY Bilateral 03/24/2020    Procedure: MASTECTOMY, SIMPLE BILATERAL;  Surgeon: Elena Lopez MD;  Location: [facility] OR [address] [place] [address];  Service: General;  Laterality: Bilateral;    TISSUE EXPANDER REMOVAL Bilateral 07/30/2020    Procedure: REMOVAL, TISSUE EXPANDER;  Surgeon: Michael Solorzano MD;  Location: [facility] OR [address] [place] [address];  Service: Plastics;  Laterality: Bilateral;       Review of patient's allergies indicates:   Allergen Reactions    Amoxil [amoxicillin] Rash       Family History       Problem Relation (Age of Onset)    Arthritis Mother    Breast cancer Paternal Aunt    Cancer Father, Mother, Maternal Aunt    Diabetes Mother, Maternal Aunt    Hypertension Mother, Sister    Hypothyroidism Sister    Lung cancer Maternal Grandfather, Father, Mother    No Known Problems Paternal Grandfather, Paternal Grandmother, Maternal Grandmother, Sister, Daughter, Paternal Uncle            Tobacco Use    Smoking status: Never     Passive exposure: Never    Smokeless tobacco: Never    Tobacco comments:     Retired on Nov 21, 2021   Substance and Sexual Activity    Alcohol use: Not Currently     Comment: only on special occasions / rarley    Drug use: No    Sexual activity: Not Currently     Partners: Male     Birth control/protection: Abstinence, None       Review of Systems   Constitutional:  Negative for chills and fever.       Objective:     Temp:  [98.1 °F (36.7 °C)-98.9 °F (37.2 °C)] 98.1 °F  "(36.7 °C)  Pulse:  [69-88] 69  Resp:  [16-20] 20  SpO2:  [97 %-98 %] 98 %  BP: (133-174)/(79-81) 133/79  Weight: 72.1 kg (159 lb)  Body mass index is 31.05 kg/m².    Date 05/17/25 0700 - 05/18/25 0659   Shift 0091-3679 6869-2288 4306-2511 24 Hour Total   INTAKE   IV Piggyback  500  500   Shift Total(mL/kg)  500(6.9)  500(6.9)   OUTPUT   Shift Total(mL/kg)       Weight (kg)  72.1 72.1 72.1          Drains       None                    Physical Exam  Vitals reviewed.   Constitutional:       Appearance: Normal appearance.   Pulmonary:      Effort: Pulmonary effort is normal.   Abdominal:      General: Abdomen is flat.      Palpations: Abdomen is soft.   Neurological:      General: No focal deficit present.      Mental Status: She is alert and oriented to person, place, and time.   Psychiatric:         Mood and Affect: Mood normal.          Significant Labs:    BMP:  Recent Labs   Lab 05/17/25  1929      K 4.1      CO2 18*   BUN 24*   CREATININE 1.3   CALCIUM 8.1*       CBC:  Recent Labs   Lab 05/17/25  1658   WBC 8.30   HGB 12.4   HCT 38.1          Blood Culture: No results for input(s): "LABBLOO" in the last 168 hours.  Urine Culture: No results for input(s): "LABURIN" in the last 168 hours.  Urine Studies:   Recent Labs   Lab 05/17/25  1838   COLORU Colorless*   APPEARANCEUA Clear   PHUR 6.0   SPECGRAV 1.010   PROTEINUA Negative   GLUCUA Negative   BILIRUBINUA Negative   OCCULTUA 2+*   NITRITE Negative   UROBILINOGEN Negative   LEUKOCYTESUR 2+*   RBCUA 20*   WBCUA 16*   BACTERIA Occasional   HYALINECASTS 1       Significant Imaging:  All pertinent imaging results/findings from the past 24 hours have been reviewed.                  "

## 2025-05-18 NOTE — ASSESSMENT & PLAN NOTE
Most recent BP: 119/76 (05/18/25 0743); 24 hr range BP  Min: 112/71  Max: 174/81  Current meds: amLODIPine tablet 5 mg, Daily, Oral  Adjust regimen PRN goal SBP <180

## 2025-05-18 NOTE — ASSESSMENT & PLAN NOTE
Lab Results   Component Value Date    HGBA1C 5.9 (H) 11/21/2024    HGBA1C 5.9 (H) 07/12/2024    HGBA1C 6.3 (H) 03/19/2024     Recent Labs     05/17/25  1929 05/18/25  0456 05/18/25  0642   POCTGLUCOSE  --   --  90   GLU 86 79  --      Antihyperglycemics (From admission, onward)      Start     Stop Route Frequency Ordered    05/18/25 0202  insulin aspart U-100 pen 0-5 Units         -- SubQ Every 6 hours PRN 05/18/25 0102        Basal/prandial/SSI titrated PRN goal Glc 140-180  Hypoglycemia protocol  Hold PO antiglycemics while hospitalized  Diet diabetic when able to eat

## 2025-05-18 NOTE — ASSESSMENT & PLAN NOTE
Most recent BP: (!) 144/76 (05/17/25 2319); 24 hr range BP  Min: 133/79  Max: 174/81  Current meds: amLODIPine tablet 5 mg, Daily, Oral  Adjust regimen PRN goal SBP <180

## 2025-05-18 NOTE — ASSESSMENT & PLAN NOTE
Patient has metastatic cancer of breast cancer primary. The cancer has metastasized to axillary LN. The patient  under the care of an outpatient oncologist. The patient is s/p mastectomy.Their staging information is listed below.    Cancer Staging   Breast cancer metastasized to axillary lymph node, left  Staging form: Breast, AJCC 8th Edition  - Clinical: Stage IIA (cT1c, cN1, cM0, G3, ER+, MA+, HER2-) - Signed by Tere Villagran MD on 9/16/2019  Stable, monitor

## 2025-05-18 NOTE — ASSESSMENT & PLAN NOTE
Lab Results   Component Value Date    HGBA1C 5.9 (H) 11/21/2024    HGBA1C 5.9 (H) 07/12/2024    HGBA1C 6.3 (H) 03/19/2024     Recent Labs     05/17/25 1929   GLU 86     Antihyperglycemics (From admission, onward)      None        Basal/prandial/SSI titrated PRN goal Glc 140-180  Hypoglycemia protocol  Hold PO antiglycemics while hospitalized  Diet diabetic when able to eat

## 2025-05-19 ENCOUNTER — TELEPHONE (OUTPATIENT)
Dept: PRIMARY CARE CLINIC | Facility: CLINIC | Age: 70
End: 2025-05-19

## 2025-05-19 ENCOUNTER — PATIENT MESSAGE (OUTPATIENT)
Dept: PRIMARY CARE CLINIC | Facility: CLINIC | Age: 70
End: 2025-05-19
Payer: MEDICARE

## 2025-05-19 LAB
BUN SERPL-MCNC: 47 MG/DL (ref 6–30)
CHLORIDE SERPL-SCNC: 107 MMOL/L (ref 95–110)
CREAT SERPL-MCNC: 1.8 MG/DL (ref 0.5–1.4)
GLUCOSE SERPL-MCNC: 97 MG/DL (ref 70–110)
HCT VFR BLD CALC: 43 %PCV (ref 36–54)
POC IONIZED CALCIUM: 0.65 MMOL/L (ref 1.06–1.42)
POC TCO2 (MEASURED): 22 MMOL/L (ref 23–29)
POTASSIUM BLD-SCNC: >9 MMOL/L (ref 3.5–5.1)
SAMPLE: ABNORMAL
SODIUM BLD-SCNC: 122 MMOL/L (ref 136–145)

## 2025-05-19 NOTE — TELEPHONE ENCOUNTER
----- Message from Kim sent at 5/19/2025  8:03 AM CDT -----  Name of Who is Calling:OSWALD DANIELLE [[id number]]What is the request in detail: pt requesting a call back regarding hospital f/u and pt would like schedule with Dr. San. Did attempt to schedule but no availability. Please contact to further discuss and advise.  Can the clinic reply by [facility]: call What [facility][phone]

## 2025-05-20 ENCOUNTER — PATIENT OUTREACH (OUTPATIENT)
Dept: ADMINISTRATIVE | Facility: CLINIC | Age: 70
End: 2025-05-20
Payer: MEDICARE

## 2025-05-20 NOTE — PROGRESS NOTES
C3 nurse attempted to contact Maria Elena Tavares for a TCC post hospital discharge follow up call. LVM . The patient has a scheduled HOSFU appointment with Haven Ovalles FNP-C on 05/27/25 @ 3012.

## 2025-05-22 ENCOUNTER — OFFICE VISIT (OUTPATIENT)
Dept: OPHTHALMOLOGY | Facility: CLINIC | Age: 70
End: 2025-05-22
Payer: MEDICARE

## 2025-05-22 DIAGNOSIS — H25.12 NUCLEAR AGE-RELATED CATARACT, LEFT EYE: Primary | ICD-10-CM

## 2025-05-22 PROCEDURE — 99999 PR PBB SHADOW E&M-EST. PATIENT-LVL III: CPT | Mod: PBBFAC,,,

## 2025-05-22 RX ORDER — PHENYLEPHRINE HYDROCHLORIDE 100 MG/ML
1 SOLUTION/ DROPS OPHTHALMIC
OUTPATIENT
Start: 2025-05-22

## 2025-05-22 RX ORDER — MIDAZOLAM HYDROCHLORIDE 1 MG/ML
1 INJECTION, SOLUTION INTRAMUSCULAR; INTRAVENOUS
OUTPATIENT
Start: 2025-05-22

## 2025-05-22 RX ORDER — MOXIFLOXACIN 5 MG/ML
1 SOLUTION/ DROPS OPHTHALMIC
OUTPATIENT
Start: 2025-05-22 | End: 2025-05-22

## 2025-05-22 RX ORDER — FENTANYL CITRATE 50 UG/ML
25 INJECTION, SOLUTION INTRAMUSCULAR; INTRAVENOUS
Refills: 0 | OUTPATIENT
Start: 2025-05-22

## 2025-05-22 RX ORDER — CYCLOP/TROP/PROPA/PHEN/KET/WAT 1-1-0.1%
1 DROPS (EA) OPHTHALMIC (EYE)
OUTPATIENT
Start: 2025-05-22 | End: 2025-05-22

## 2025-05-22 NOTE — PROGRESS NOTES
HPI    05/12/2025 IMPLANT: DIB00 21.5 OD    Patient is here today for 1 week  phaco OD.   PMB TID OD    Last edited by Candy Mendes MA on 5/22/2025 10:42 AM.            Assessment /Plan     For exam results, see Encounter Report.    Nuclear age-related cataract, left eye      Slit lamp exam:  L/L: nl  K: clear, wound sealed  AC: trace cell  Iris/Lens: IOL centered and stable    POW1 s/p phaco: Surgery healing well with no signs of infection or abnormal inflammation.    Patient wishes to proceed with surgery in the second eye. Risks, benefits, alternatives reviewed. IOL selection reviewed.     IOL Selections:   Right eye  IOL: DIBOO 21.5 (IMPLANT: DIBOO 21.5)     Left eye  IOL: [id number] 21.5       The patient expresses a desire to reduce spectacle dependence. I reviewed various IOL and LASER refractive surgical options and we will attempt to minimize spectacle dependence by managing astigmatism and optimizing IOL selection. Customized Cataract Surgery with Vision Correction (CCVC) was explained to the patient with educational videos and discussion.  The patient voices understanding and wishes to implement this technology during the cataract procedure.  I explained the increased precision of the LASER versus manual techniques, especially as it relates to astigmatism reduction with arcuate incisions.  I emphasized that although our goal is to reduce the need for refractive correction (glasses or contacts) after surgery, there may still be a need for spectacle correction to achieve optimal visual acuity, and that a reasonable range of functional vision should be the expectation.  No guarantees are made about post operative refraction or visual acuity, as the eye may heal in unpredictable ways, and the standard risks, benefits, and alternatives to cataract surgery were explained.  The patient understands that the refractive portions of this cataract procedure are not covered by insurance, and that there is an out  of pocket expense of $1400 FOR OD, AND $2000 FOR OS per eye. I also explained that even though our pre-operative plan is to utilize advanced refractive technologies during surgery, that I may decide to eliminate part or all of this plan if surgical challenges or complications arise, or I feel that it is not in the patient's best interest. Consent forms and an ABN form were given to the patient to review.    [employer] APPROVED

## 2025-05-29 ENCOUNTER — PATIENT MESSAGE (OUTPATIENT)
Dept: OPHTHALMOLOGY | Facility: CLINIC | Age: 70
End: 2025-05-29
Payer: MEDICARE

## 2025-05-29 ENCOUNTER — TELEPHONE (OUTPATIENT)
Dept: OPHTHALMOLOGY | Facility: CLINIC | Age: 70
End: 2025-05-29
Payer: MEDICARE

## 2025-05-29 NOTE — TELEPHONE ENCOUNTER
Patient given arrival time of 9:15 am on Monday June 2. Nothing to eat or drink after 11:59 pm.  Start drops into the operative eye Saturday. [address] [facility]

## 2025-05-30 ENCOUNTER — OFFICE VISIT (OUTPATIENT)
Dept: PRIMARY CARE CLINIC | Facility: CLINIC | Age: 70
End: 2025-05-30
Payer: MEDICARE

## 2025-05-30 DIAGNOSIS — J06.9 URI WITH COUGH AND CONGESTION: ICD-10-CM

## 2025-05-30 DIAGNOSIS — N20.1 URETEROLITHIASIS: Primary | ICD-10-CM

## 2025-05-30 PROCEDURE — 1126F AMNT PAIN NOTED NONE PRSNT: CPT | Mod: CPTII,95,,

## 2025-05-30 PROCEDURE — 3288F FALL RISK ASSESSMENT DOCD: CPT | Mod: CPTII,95,,

## 2025-05-30 PROCEDURE — 98006 SYNCH AUDIO-VIDEO EST MOD 30: CPT | Mod: 95,,,

## 2025-05-30 PROCEDURE — 4010F ACE/ARB THERAPY RXD/TAKEN: CPT | Mod: CPTII,95,,

## 2025-05-30 PROCEDURE — 1101F PT FALLS ASSESS-DOCD LE1/YR: CPT | Mod: CPTII,95,,

## 2025-05-30 RX ORDER — KETOROLAC TROMETHAMINE 10 MG/1
10 TABLET, FILM COATED ORAL EVERY 6 HOURS PRN
Qty: 20 TABLET | Refills: 0 | Status: SHIPPED | OUTPATIENT
Start: 2025-05-30

## 2025-05-30 RX ORDER — FLUTICASONE PROPIONATE 50 MCG
1 SPRAY, SUSPENSION (ML) NASAL DAILY
Qty: 16 G | Refills: 0 | Status: SHIPPED | OUTPATIENT
Start: 2025-05-30

## 2025-05-30 RX ORDER — GUAIFENESIN 600 MG/1
1200 TABLET, EXTENDED RELEASE ORAL 2 TIMES DAILY
Qty: 20 TABLET | Refills: 0 | Status: SHIPPED | OUTPATIENT
Start: 2025-05-30

## 2025-06-02 ENCOUNTER — HOSPITAL ENCOUNTER (OUTPATIENT)
Facility: HOSPITAL | Age: 70
Discharge: HOME | End: 2025-06-02
Attending: OPHTHALMOLOGY | Admitting: OPHTHALMOLOGY
Payer: MEDICARE

## 2025-06-02 VITALS
SYSTOLIC BLOOD PRESSURE: 119 MMHG | RESPIRATION RATE: 20 BRPM | HEIGHT: 60 IN | HEART RATE: 78 BPM | BODY MASS INDEX: 31.22 KG/M2 | TEMPERATURE: 98 F | DIASTOLIC BLOOD PRESSURE: 67 MMHG | OXYGEN SATURATION: 100 % | WEIGHT: 159 LBS

## 2025-06-02 DIAGNOSIS — H25.12 NUCLEAR AGE-RELATED CATARACT, LEFT EYE: ICD-10-CM

## 2025-06-02 PROCEDURE — 25000003 PHARM REV CODE 250: Performed by: OPHTHALMOLOGY

## 2025-06-02 PROCEDURE — 25000003 PHARM REV CODE 250

## 2025-06-02 PROCEDURE — 99152 MOD SED SAME PHYS/QHP 5/>YRS: CPT | Mod: ,,, | Performed by: OPHTHALMOLOGY

## 2025-06-02 PROCEDURE — 63600175 PHARM REV CODE 636 W HCPCS

## 2025-06-02 PROCEDURE — 36000706: Performed by: OPHTHALMOLOGY

## 2025-06-02 PROCEDURE — V2632 POST CHMBR INTRAOCULAR LENS: HCPCS | Performed by: OPHTHALMOLOGY

## 2025-06-02 PROCEDURE — 71000015 HC POSTOP RECOV 1ST HR: Performed by: OPHTHALMOLOGY

## 2025-06-02 PROCEDURE — 36000707: Performed by: OPHTHALMOLOGY

## 2025-06-02 PROCEDURE — 63600175 PHARM REV CODE 636 W HCPCS: Performed by: OPHTHALMOLOGY

## 2025-06-02 PROCEDURE — 66984 XCAPSL CTRC RMVL W/O ECP: CPT | Mod: 79,LT,, | Performed by: OPHTHALMOLOGY

## 2025-06-02 PROCEDURE — 99900035 HC TECH TIME PER 15 MIN (STAT)

## 2025-06-02 PROCEDURE — 94761 N-INVAS EAR/PLS OXIMETRY MLT: CPT

## 2025-06-02 DEVICE — LENS EYHANCE +21.5D: Type: IMPLANTABLE DEVICE | Site: EYE | Status: FUNCTIONAL

## 2025-06-02 RX ORDER — MOXIFLOXACIN 5 MG/ML
SOLUTION/ DROPS OPHTHALMIC
Status: DISCONTINUED | OUTPATIENT
Start: 2025-06-02 | End: 2025-06-02 | Stop reason: HOSPADM

## 2025-06-02 RX ORDER — PROPARACAINE HYDROCHLORIDE 5 MG/ML
1 SOLUTION/ DROPS OPHTHALMIC
Status: DISCONTINUED | OUTPATIENT
Start: 2025-06-02 | End: 2025-06-02 | Stop reason: HOSPADM

## 2025-06-02 RX ORDER — PROPARACAINE HYDROCHLORIDE 5 MG/ML
SOLUTION/ DROPS OPHTHALMIC
Status: DISCONTINUED | OUTPATIENT
Start: 2025-06-02 | End: 2025-06-02 | Stop reason: HOSPADM

## 2025-06-02 RX ORDER — MOXIFLOXACIN 5 MG/ML
1 SOLUTION/ DROPS OPHTHALMIC
Status: COMPLETED | OUTPATIENT
Start: 2025-06-02 | End: 2025-06-02

## 2025-06-02 RX ORDER — FENTANYL CITRATE 50 UG/ML
25 INJECTION, SOLUTION INTRAMUSCULAR; INTRAVENOUS
Status: DISCONTINUED | OUTPATIENT
Start: 2025-06-02 | End: 2025-06-02 | Stop reason: HOSPADM

## 2025-06-02 RX ORDER — PHENYLEPHRINE HYDROCHLORIDE 100 MG/ML
1 SOLUTION/ DROPS OPHTHALMIC
Status: DISCONTINUED | OUTPATIENT
Start: 2025-06-02 | End: 2025-06-02 | Stop reason: HOSPADM

## 2025-06-02 RX ORDER — CYCLOP/TROP/PROPA/PHEN/KET/WAT 1-1-0.1%
1 DROPS (EA) OPHTHALMIC (EYE) EVERY 5 MIN PRN
Status: COMPLETED | OUTPATIENT
Start: 2025-06-02 | End: 2025-06-02

## 2025-06-02 RX ORDER — MOXIFLOXACIN 5 MG/ML
1 SOLUTION/ DROPS OPHTHALMIC EVERY 5 MIN PRN
Status: COMPLETED | OUTPATIENT
Start: 2025-06-02 | End: 2025-06-02

## 2025-06-02 RX ORDER — MIDAZOLAM HYDROCHLORIDE 1 MG/ML
1 INJECTION, SOLUTION INTRAMUSCULAR; INTRAVENOUS
Status: DISCONTINUED | OUTPATIENT
Start: 2025-06-02 | End: 2025-06-02 | Stop reason: HOSPADM

## 2025-06-02 RX ORDER — ACETAMINOPHEN 325 MG/1
650 TABLET ORAL EVERY 4 HOURS PRN
Status: DISCONTINUED | OUTPATIENT
Start: 2025-06-02 | End: 2025-06-02 | Stop reason: HOSPADM

## 2025-06-02 RX ORDER — LIDOCAINE HYDROCHLORIDE 40 MG/ML
INJECTION, SOLUTION RETROBULBAR
Status: DISCONTINUED | OUTPATIENT
Start: 2025-06-02 | End: 2025-06-02 | Stop reason: HOSPADM

## 2025-06-02 RX ADMIN — MOXIFLOXACIN 1 DROP: 5 SOLUTION/ DROPS OPHTHALMIC at 11:06

## 2025-06-02 RX ADMIN — Medication 1 DROP: at 09:06

## 2025-06-02 RX ADMIN — MOXIFLOXACIN OPHTHALMIC 1 DROP: 5 SOLUTION/ DROPS OPHTHALMIC at 10:06

## 2025-06-02 RX ADMIN — MOXIFLOXACIN OPHTHALMIC 1 DROP: 5 SOLUTION/ DROPS OPHTHALMIC at 09:06

## 2025-06-02 RX ADMIN — MIDAZOLAM HYDROCHLORIDE 2 MG: 1 INJECTION, SOLUTION INTRAMUSCULAR; INTRAVENOUS at 11:06

## 2025-06-02 RX ADMIN — Medication 1 DROP: at 10:06

## 2025-06-03 ENCOUNTER — OFFICE VISIT (OUTPATIENT)
Dept: OPHTHALMOLOGY | Facility: CLINIC | Age: 70
End: 2025-06-03
Payer: MEDICARE

## 2025-06-03 DIAGNOSIS — Z98.890 POST-OPERATIVE STATE: Primary | ICD-10-CM

## 2025-06-03 DIAGNOSIS — H25.12 NUCLEAR SCLEROTIC CATARACT OF LEFT EYE: ICD-10-CM

## 2025-06-03 PROCEDURE — 1126F AMNT PAIN NOTED NONE PRSNT: CPT | Mod: CPTII,S$GLB,, | Performed by: OPHTHALMOLOGY

## 2025-06-03 PROCEDURE — 1101F PT FALLS ASSESS-DOCD LE1/YR: CPT | Mod: CPTII,S$GLB,, | Performed by: OPHTHALMOLOGY

## 2025-06-03 PROCEDURE — 99024 POSTOP FOLLOW-UP VISIT: CPT | Mod: S$GLB,,, | Performed by: OPHTHALMOLOGY

## 2025-06-03 PROCEDURE — 1160F RVW MEDS BY RX/DR IN RCRD: CPT | Mod: CPTII,S$GLB,, | Performed by: OPHTHALMOLOGY

## 2025-06-03 PROCEDURE — 99999 PR PBB SHADOW E&M-EST. PATIENT-LVL III: CPT | Mod: PBBFAC,,, | Performed by: OPHTHALMOLOGY

## 2025-06-03 PROCEDURE — 4010F ACE/ARB THERAPY RXD/TAKEN: CPT | Mod: CPTII,S$GLB,, | Performed by: OPHTHALMOLOGY

## 2025-06-03 PROCEDURE — 3288F FALL RISK ASSESSMENT DOCD: CPT | Mod: CPTII,S$GLB,, | Performed by: OPHTHALMOLOGY

## 2025-06-03 PROCEDURE — 1159F MED LIST DOCD IN RCRD: CPT | Mod: CPTII,S$GLB,, | Performed by: OPHTHALMOLOGY

## 2025-06-04 ENCOUNTER — OFFICE VISIT (OUTPATIENT)
Dept: UROLOGY | Facility: CLINIC | Age: 70
End: 2025-06-04
Payer: MEDICARE

## 2025-06-04 ENCOUNTER — PATIENT MESSAGE (OUTPATIENT)
Dept: HEMATOLOGY/ONCOLOGY | Facility: CLINIC | Age: 70
End: 2025-06-04
Payer: MEDICARE

## 2025-06-04 ENCOUNTER — HOSPITAL ENCOUNTER (OUTPATIENT)
Dept: RADIOLOGY | Facility: HOSPITAL | Age: 70
Discharge: HOME | End: 2025-06-04
Attending: REGISTERED NURSE
Payer: MEDICARE

## 2025-06-04 ENCOUNTER — OFFICE VISIT (OUTPATIENT)
Dept: ORTHOPEDICS | Facility: CLINIC | Age: 70
End: 2025-06-04
Payer: MEDICARE

## 2025-06-04 VITALS — WEIGHT: 159.63 LBS | HEIGHT: 60 IN | BODY MASS INDEX: 31.34 KG/M2

## 2025-06-04 VITALS
HEIGHT: 60 IN | WEIGHT: 159.63 LBS | BODY MASS INDEX: 31.34 KG/M2 | SYSTOLIC BLOOD PRESSURE: 140 MMHG | HEART RATE: 73 BPM | DIASTOLIC BLOOD PRESSURE: 87 MMHG

## 2025-06-04 DIAGNOSIS — S34.112S: Primary | ICD-10-CM

## 2025-06-04 DIAGNOSIS — N20.0 NEPHROLITHIASIS: Primary | ICD-10-CM

## 2025-06-04 DIAGNOSIS — M51.362 DEGENERATION OF INTERVERTEBRAL DISC OF LUMBAR REGION WITH DISCOGENIC BACK PAIN AND LOWER EXTREMITY PAIN: ICD-10-CM

## 2025-06-04 DIAGNOSIS — R93.89 ABNORMAL MRI: ICD-10-CM

## 2025-06-04 PROBLEM — R26.89 BALANCE PROBLEM: Status: RESOLVED | Noted: 2024-01-11 | Resolved: 2025-06-04

## 2025-06-04 PROBLEM — R29.898 WEAKNESS OF BOTH UPPER EXTREMITIES: Status: RESOLVED | Noted: 2024-01-11 | Resolved: 2025-06-04

## 2025-06-04 PROBLEM — T85.9XXA COMPLICATION OF BREAST IMPLANT: Status: RESOLVED | Noted: 2020-07-30 | Resolved: 2025-06-04

## 2025-06-04 PROBLEM — R26.89 ANTALGIC GAIT: Status: RESOLVED | Noted: 2024-05-01 | Resolved: 2025-06-04

## 2025-06-04 PROBLEM — R53.81 PHYSICAL DECONDITIONING: Status: RESOLVED | Noted: 2023-01-20 | Resolved: 2025-06-04

## 2025-06-04 PROBLEM — N61.0 INFECTION OF LEFT BREAST: Status: RESOLVED | Noted: 2020-07-30 | Resolved: 2025-06-04

## 2025-06-04 PROBLEM — Z74.09 DECREASED FUNCTIONAL MOBILITY AND ENDURANCE: Status: RESOLVED | Noted: 2024-01-11 | Resolved: 2025-06-04

## 2025-06-04 PROBLEM — Z96.652 AFTERCARE FOLLOWING LEFT KNEE JOINT REPLACEMENT SURGERY: Status: RESOLVED | Noted: 2020-10-08 | Resolved: 2025-06-04

## 2025-06-04 PROBLEM — I97.2 POST-MASTECTOMY LYMPHEDEMA SYNDROME: Chronic | Status: RESOLVED | Noted: 2024-08-21 | Resolved: 2025-06-04

## 2025-06-04 PROBLEM — Z47.1 AFTERCARE FOLLOWING LEFT KNEE JOINT REPLACEMENT SURGERY: Status: RESOLVED | Noted: 2020-10-08 | Resolved: 2025-06-04

## 2025-06-04 PROBLEM — M25.662 DECREASED RANGE OF MOTION (ROM) OF LEFT KNEE: Status: RESOLVED | Noted: 2024-05-01 | Resolved: 2025-06-04

## 2025-06-04 PROBLEM — M25.562 CHRONIC PAIN OF LEFT KNEE: Status: RESOLVED | Noted: 2023-08-31 | Resolved: 2025-06-04

## 2025-06-04 PROBLEM — R11.2 NON-INTRACTABLE VOMITING WITH NAUSEA: Status: RESOLVED | Noted: 2020-02-18 | Resolved: 2025-06-04

## 2025-06-04 PROBLEM — Z90.13 H/O BILATERAL MASTECTOMY: Status: RESOLVED | Noted: 2020-12-03 | Resolved: 2025-06-04

## 2025-06-04 PROBLEM — R29.898 WEAKNESS OF BOTH LOWER EXTREMITIES: Status: RESOLVED | Noted: 2024-01-11 | Resolved: 2025-06-04

## 2025-06-04 PROBLEM — K52.9 ENTERITIS: Status: RESOLVED | Noted: 2020-07-27 | Resolved: 2025-06-04

## 2025-06-04 PROBLEM — G89.29 CHRONIC PAIN OF LEFT KNEE: Status: RESOLVED | Noted: 2023-08-31 | Resolved: 2025-06-04

## 2025-06-04 PROBLEM — E87.6 HYPOKALEMIA: Status: RESOLVED | Noted: 2020-07-28 | Resolved: 2025-06-04

## 2025-06-04 PROBLEM — B37.0 THRUSH: Status: RESOLVED | Noted: 2019-10-22 | Resolved: 2025-06-04

## 2025-06-04 PROBLEM — N17.9 AKI (ACUTE KIDNEY INJURY): Status: RESOLVED | Noted: 2025-05-18 | Resolved: 2025-06-04

## 2025-06-04 PROBLEM — M25.521 RIGHT ELBOW PAIN: Status: RESOLVED | Noted: 2024-10-21 | Resolved: 2025-06-04

## 2025-06-04 PROBLEM — M17.12 PRIMARY OSTEOARTHRITIS OF LEFT KNEE: Status: RESOLVED | Noted: 2023-08-31 | Resolved: 2025-06-04

## 2025-06-04 PROBLEM — M62.81 QUADRICEPS WEAKNESS: Status: RESOLVED | Noted: 2024-03-19 | Resolved: 2025-06-04

## 2025-06-04 PROBLEM — N20.1 URETEROLITHIASIS: Status: RESOLVED | Noted: 2025-05-17 | Resolved: 2025-06-04

## 2025-06-04 PROCEDURE — 3288F FALL RISK ASSESSMENT DOCD: CPT | Mod: CPTII,S$GLB,, | Performed by: UROLOGY

## 2025-06-04 PROCEDURE — 3008F BODY MASS INDEX DOCD: CPT | Mod: CPTII,S$GLB,, | Performed by: UROLOGY

## 2025-06-04 PROCEDURE — 99999 PR PBB SHADOW E&M-EST. PATIENT-LVL V: CPT | Mod: PBBFAC,,, | Performed by: UROLOGY

## 2025-06-04 PROCEDURE — 72110 X-RAY EXAM L-2 SPINE 4/>VWS: CPT | Mod: 26,,, | Performed by: RADIOLOGY

## 2025-06-04 PROCEDURE — 1101F PT FALLS ASSESS-DOCD LE1/YR: CPT | Mod: CPTII,S$GLB,, | Performed by: UROLOGY

## 2025-06-04 PROCEDURE — 3077F SYST BP >= 140 MM HG: CPT | Mod: CPTII,S$GLB,, | Performed by: UROLOGY

## 2025-06-04 PROCEDURE — 72110 X-RAY EXAM L-2 SPINE 4/>VWS: CPT | Mod: TC

## 2025-06-04 PROCEDURE — 99999 PR PBB SHADOW E&M-EST. PATIENT-LVL IV: CPT | Mod: PBBFAC,,, | Performed by: REGISTERED NURSE

## 2025-06-04 PROCEDURE — 1160F RVW MEDS BY RX/DR IN RCRD: CPT | Mod: CPTII,S$GLB,, | Performed by: UROLOGY

## 2025-06-04 PROCEDURE — 1159F MED LIST DOCD IN RCRD: CPT | Mod: CPTII,S$GLB,, | Performed by: UROLOGY

## 2025-06-04 PROCEDURE — 1126F AMNT PAIN NOTED NONE PRSNT: CPT | Mod: CPTII,S$GLB,, | Performed by: UROLOGY

## 2025-06-04 PROCEDURE — 3079F DIAST BP 80-89 MM HG: CPT | Mod: CPTII,S$GLB,, | Performed by: UROLOGY

## 2025-06-04 PROCEDURE — 99204 OFFICE O/P NEW MOD 45 MIN: CPT | Mod: S$GLB,,, | Performed by: UROLOGY

## 2025-06-04 PROCEDURE — 4010F ACE/ARB THERAPY RXD/TAKEN: CPT | Mod: CPTII,S$GLB,, | Performed by: UROLOGY

## 2025-06-05 ENCOUNTER — PATIENT MESSAGE (OUTPATIENT)
Dept: HEMATOLOGY/ONCOLOGY | Facility: CLINIC | Age: 70
End: 2025-06-05
Payer: MEDICARE

## 2025-06-06 ENCOUNTER — TELEPHONE (OUTPATIENT)
Dept: INTERVENTIONAL RADIOLOGY/VASCULAR | Facility: CLINIC | Age: 70
End: 2025-06-06
Payer: MEDICARE

## 2025-06-06 ENCOUNTER — PATIENT MESSAGE (OUTPATIENT)
Dept: ORTHOPEDICS | Facility: CLINIC | Age: 70
End: 2025-06-06
Payer: MEDICARE

## 2025-06-06 DIAGNOSIS — S34.112S: Primary | ICD-10-CM

## 2025-06-10 ENCOUNTER — HOSPITAL ENCOUNTER (OUTPATIENT)
Dept: RADIOLOGY | Facility: HOSPITAL | Age: 70
Discharge: HOME | End: 2025-06-10
Attending: UROLOGY
Payer: MEDICARE

## 2025-06-10 ENCOUNTER — PATIENT MESSAGE (OUTPATIENT)
Dept: UROLOGY | Facility: CLINIC | Age: 70
End: 2025-06-10
Payer: MEDICARE

## 2025-06-10 ENCOUNTER — RESULTS FOLLOW-UP (OUTPATIENT)
Dept: UROLOGY | Facility: CLINIC | Age: 70
End: 2025-06-10

## 2025-06-10 ENCOUNTER — TELEPHONE (OUTPATIENT)
Dept: UROLOGY | Facility: CLINIC | Age: 70
End: 2025-06-10
Payer: MEDICARE

## 2025-06-10 DIAGNOSIS — N20.0 NEPHROLITHIASIS: ICD-10-CM

## 2025-06-10 PROCEDURE — 76775 US EXAM ABDO BACK WALL LIM: CPT | Mod: 26,,, | Performed by: STUDENT IN AN ORGANIZED HEALTH CARE EDUCATION/TRAINING PROGRAM

## 2025-06-10 PROCEDURE — 76775 US EXAM ABDO BACK WALL LIM: CPT | Mod: TC

## 2025-06-11 ENCOUNTER — PATIENT MESSAGE (OUTPATIENT)
Dept: UROLOGY | Facility: CLINIC | Age: 70
End: 2025-06-11
Payer: MEDICARE

## 2025-06-16 ENCOUNTER — PATIENT MESSAGE (OUTPATIENT)
Dept: PRIMARY CARE CLINIC | Facility: CLINIC | Age: 70
End: 2025-06-16
Payer: MEDICARE

## 2025-06-18 ENCOUNTER — PATIENT MESSAGE (OUTPATIENT)
Dept: INTERVENTIONAL RADIOLOGY/VASCULAR | Facility: HOSPITAL | Age: 70
End: 2025-06-18
Payer: MEDICARE

## 2025-06-19 ENCOUNTER — TELEPHONE (OUTPATIENT)
Dept: INTERVENTIONAL RADIOLOGY/VASCULAR | Facility: HOSPITAL | Age: 70
End: 2025-06-19
Payer: MEDICARE

## 2025-06-19 NOTE — NURSING
Pre-procedure call complete.  2 patient identifier used (name and ).      No food after midnight.  You may drink clear liquids (sports drinks, clear juices) until 2 hours before arrival time.  Clear liquids include only water, black coffee (no creamer), clear oral rehydration drinks, clear sports drinks and clear fruit juices.  Clear liquids do NOT include anything with pulp or food particles (chicken broth, ice cream, yogurt, jello, etc).  NO orange juice, pulpy juices, or apple cider.  If you can read newsprint through the liquid, it qualifies as clear.  IF UNSURE, drink water instead.      Have NOTHING BY MOUTH 2 hours before arrival time.  Medication the morning of your procedure may be taken with a sip of water     Pt aware will need someone to provide transport home and monitor pt 8 hours post procedure.  No driving for at least 24 hours after procedure.   Patient advised to take blood pressure, heart medications, with a sip of water morning of procedure.  Patient verbalized aware of which medications to take.  Do not take sleep medication (including OTC) and anxiety medication the night before procedure.  Arrival time and location given.  Expected length of stay reviewed.  Covid screening completed.  Pt verbalized understanding of all pre-procedure instructions.  Written instructions and directions sent to patient in Mychart/portal.

## 2025-06-20 ENCOUNTER — LAB VISIT (OUTPATIENT)
Dept: LAB | Facility: HOSPITAL | Age: 70
End: 2025-06-20
Attending: FAMILY MEDICINE
Payer: MEDICARE

## 2025-06-20 DIAGNOSIS — S34.112S: ICD-10-CM

## 2025-06-20 DIAGNOSIS — D49.9 NEOPLASM: ICD-10-CM

## 2025-06-20 LAB
ABSOLUTE EOSINOPHIL (OHS): 0.04 K/UL
ABSOLUTE MONOCYTE (OHS): 0.62 K/UL (ref 0.3–1)
ABSOLUTE NEUTROPHIL COUNT (OHS): 3.2 K/UL (ref 1.8–7.7)
BASOPHILS # BLD AUTO: 0.02 K/UL
BASOPHILS NFR BLD AUTO: 0.4 %
ERYTHROCYTE [DISTWIDTH] IN BLOOD BY AUTOMATED COUNT: 14.7 % (ref 11.5–14.5)
HCT VFR BLD AUTO: 33.9 % (ref 37–48.5)
HGB BLD-MCNC: 10.7 GM/DL (ref 12–16)
IMM GRANULOCYTES # BLD AUTO: 0.01 K/UL (ref 0–0.04)
IMM GRANULOCYTES NFR BLD AUTO: 0.2 % (ref 0–0.5)
INR PPP: 1.3 (ref 0.8–1.2)
LYMPHOCYTES # BLD AUTO: 1.12 K/UL (ref 1–4.8)
MCH RBC QN AUTO: 29.6 PG (ref 27–31)
MCHC RBC AUTO-ENTMCNC: 31.6 G/DL (ref 32–36)
MCV RBC AUTO: 94 FL (ref 82–98)
NUCLEATED RBC (/100WBC) (OHS): 0 /100 WBC
PLATELET # BLD AUTO: 259 K/UL (ref 150–450)
PMV BLD AUTO: 9.7 FL (ref 9.2–12.9)
PROTHROMBIN TIME: 14.1 SECONDS (ref 9–12.5)
RBC # BLD AUTO: 3.62 M/UL (ref 4–5.4)
RELATIVE EOSINOPHIL (OHS): 0.8 %
RELATIVE LYMPHOCYTE (OHS): 22.4 % (ref 18–48)
RELATIVE MONOCYTE (OHS): 12.4 % (ref 4–15)
RELATIVE NEUTROPHIL (OHS): 63.8 % (ref 38–73)
WBC # BLD AUTO: 5.01 K/UL (ref 3.9–12.7)

## 2025-06-20 PROCEDURE — 36415 COLL VENOUS BLD VENIPUNCTURE: CPT | Mod: PN

## 2025-06-20 PROCEDURE — 85610 PROTHROMBIN TIME: CPT

## 2025-06-20 PROCEDURE — 85025 COMPLETE CBC W/AUTO DIFF WBC: CPT

## 2025-06-23 ENCOUNTER — HOSPITAL ENCOUNTER (OUTPATIENT)
Dept: INTERVENTIONAL RADIOLOGY/VASCULAR | Facility: HOSPITAL | Age: 70
Discharge: HOME | End: 2025-06-23
Admitting: RADIOLOGY
Payer: MEDICARE

## 2025-06-23 VITALS
HEART RATE: 72 BPM | WEIGHT: 157 LBS | TEMPERATURE: 97 F | RESPIRATION RATE: 16 BRPM | SYSTOLIC BLOOD PRESSURE: 139 MMHG | BODY MASS INDEX: 30.82 KG/M2 | HEIGHT: 60 IN | OXYGEN SATURATION: 100 % | DIASTOLIC BLOOD PRESSURE: 71 MMHG

## 2025-06-23 DIAGNOSIS — N20.0 NEPHROLITHIASIS: ICD-10-CM

## 2025-06-23 DIAGNOSIS — E11.21 CONTROLLED TYPE 2 DIABETES MELLITUS WITH DIABETIC NEPHROPATHY, WITHOUT LONG-TERM CURRENT USE OF INSULIN: ICD-10-CM

## 2025-06-23 DIAGNOSIS — I15.2 HYPERTENSION ASSOCIATED WITH DIABETES: ICD-10-CM

## 2025-06-23 DIAGNOSIS — I95.1 ORTHOSTATIC HYPOTENSION: ICD-10-CM

## 2025-06-23 DIAGNOSIS — E11.59 HYPERTENSION ASSOCIATED WITH DIABETES: ICD-10-CM

## 2025-06-23 DIAGNOSIS — E08.22 DIABETES MELLITUS DUE TO UNDERLYING CONDITION WITH DIABETIC CHRONIC KIDNEY DISEASE, UNSPECIFIED CKD STAGE, UNSPECIFIED WHETHER LONG TERM INSULIN USE: ICD-10-CM

## 2025-06-23 DIAGNOSIS — E78.5 HYPERLIPIDEMIA ASSOCIATED WITH TYPE 2 DIABETES MELLITUS: ICD-10-CM

## 2025-06-23 DIAGNOSIS — D49.9 NEOPLASM: Primary | ICD-10-CM

## 2025-06-23 DIAGNOSIS — E11.69 HYPERLIPIDEMIA ASSOCIATED WITH TYPE 2 DIABETES MELLITUS: ICD-10-CM

## 2025-06-23 DIAGNOSIS — F41.1 GAD (GENERALIZED ANXIETY DISORDER): ICD-10-CM

## 2025-06-23 DIAGNOSIS — E78.01 FAMILIAL HYPERCHOLESTEROLEMIA: ICD-10-CM

## 2025-06-23 DIAGNOSIS — F43.29 STRESS AND ADJUSTMENT REACTION: ICD-10-CM

## 2025-06-23 DIAGNOSIS — T45.1X5A NEUROPATHY DUE TO CHEMOTHERAPEUTIC DRUG: ICD-10-CM

## 2025-06-23 DIAGNOSIS — D50.8 OTHER IRON DEFICIENCY ANEMIA: ICD-10-CM

## 2025-06-23 DIAGNOSIS — E55.9 VITAMIN D DEFICIENCY: ICD-10-CM

## 2025-06-23 DIAGNOSIS — E66.9 OBESITY (BMI 30-39.9): ICD-10-CM

## 2025-06-23 DIAGNOSIS — F33.1 MDD (MAJOR DEPRESSIVE DISORDER), RECURRENT EPISODE, MODERATE: ICD-10-CM

## 2025-06-23 DIAGNOSIS — Z91.89 AT RISK FOR LYMPHEDEMA: ICD-10-CM

## 2025-06-23 DIAGNOSIS — R20.0 FINGER NUMBNESS: ICD-10-CM

## 2025-06-23 DIAGNOSIS — H25.12 NUCLEAR SCLEROTIC CATARACT OF LEFT EYE: ICD-10-CM

## 2025-06-23 DIAGNOSIS — D63.0 ANEMIA IN NEOPLASTIC DISEASE: ICD-10-CM

## 2025-06-23 DIAGNOSIS — G62.0 NEUROPATHY DUE TO CHEMOTHERAPEUTIC DRUG: ICD-10-CM

## 2025-06-23 DIAGNOSIS — E03.9 HYPOTHYROIDISM, UNSPECIFIED TYPE: ICD-10-CM

## 2025-06-23 DIAGNOSIS — S34.112S: ICD-10-CM

## 2025-06-23 DIAGNOSIS — C77.3 BREAST CANCER METASTASIZED TO AXILLARY LYMPH NODE, LEFT: ICD-10-CM

## 2025-06-23 DIAGNOSIS — C50.912 BREAST CANCER METASTASIZED TO AXILLARY LYMPH NODE, LEFT: ICD-10-CM

## 2025-06-23 DIAGNOSIS — I70.0 AORTIC ATHEROSCLEROSIS: ICD-10-CM

## 2025-06-23 LAB — POCT GLUCOSE: 91 MG/DL (ref 70–110)

## 2025-06-23 PROCEDURE — 89051 BODY FLUID CELL COUNT: CPT

## 2025-06-23 PROCEDURE — 87070 CULTURE OTHR SPECIMN AEROBIC: CPT | Performed by: FAMILY MEDICINE

## 2025-06-23 PROCEDURE — 77002 NEEDLE LOCALIZATION BY XRAY: CPT | Mod: TC | Performed by: RADIOLOGY

## 2025-06-23 PROCEDURE — 87102 FUNGUS ISOLATION CULTURE: CPT | Performed by: FAMILY MEDICINE

## 2025-06-23 PROCEDURE — 88311 DECALCIFY TISSUE: CPT | Mod: TC,59 | Performed by: FAMILY MEDICINE

## 2025-06-23 PROCEDURE — 63600175 PHARM REV CODE 636 W HCPCS: Performed by: STUDENT IN AN ORGANIZED HEALTH CARE EDUCATION/TRAINING PROGRAM

## 2025-06-23 PROCEDURE — 20225 BONE BIOPSY TROCAR/NDL DEEP: CPT | Mod: ,,, | Performed by: RADIOLOGY

## 2025-06-23 PROCEDURE — 63600175 PHARM REV CODE 636 W HCPCS: Performed by: RADIOLOGY

## 2025-06-23 PROCEDURE — 27201423 OPTIME MED/SURG SUP & DEVICES STERILE SUPPLY

## 2025-06-23 PROCEDURE — 87075 CULTR BACTERIA EXCEPT BLOOD: CPT | Performed by: FAMILY MEDICINE

## 2025-06-23 PROCEDURE — 87205 SMEAR GRAM STAIN: CPT | Performed by: FAMILY MEDICINE

## 2025-06-23 RX ORDER — SODIUM CHLORIDE 9 MG/ML
INJECTION, SOLUTION INTRAVENOUS CONTINUOUS
Status: DISCONTINUED | OUTPATIENT
Start: 2025-06-23 | End: 2025-06-24 | Stop reason: HOSPADM

## 2025-06-23 RX ORDER — MIDAZOLAM HYDROCHLORIDE 1 MG/ML
INJECTION, SOLUTION INTRAMUSCULAR; INTRAVENOUS
Status: COMPLETED | OUTPATIENT
Start: 2025-06-23 | End: 2025-06-23

## 2025-06-23 RX ORDER — LIDOCAINE HYDROCHLORIDE 10 MG/ML
1 INJECTION, SOLUTION EPIDURAL; INFILTRATION; INTRACAUDAL; PERINEURAL ONCE
Status: DISCONTINUED | OUTPATIENT
Start: 2025-06-23 | End: 2025-06-24 | Stop reason: HOSPADM

## 2025-06-23 RX ORDER — ONDANSETRON HYDROCHLORIDE 2 MG/ML
INJECTION, SOLUTION INTRAVENOUS
Status: COMPLETED | OUTPATIENT
Start: 2025-06-23 | End: 2025-06-23

## 2025-06-23 RX ORDER — FENTANYL CITRATE 50 UG/ML
INJECTION, SOLUTION INTRAMUSCULAR; INTRAVENOUS
Status: COMPLETED | OUTPATIENT
Start: 2025-06-23 | End: 2025-06-23

## 2025-06-23 RX ADMIN — MIDAZOLAM HYDROCHLORIDE 1 MG: 2 INJECTION, SOLUTION INTRAMUSCULAR; INTRAVENOUS at 04:06

## 2025-06-23 RX ADMIN — ONDANSETRON 4 MG: 2 INJECTION INTRAMUSCULAR; INTRAVENOUS at 04:06

## 2025-06-23 RX ADMIN — FENTANYL CITRATE 50 MCG: 50 INJECTION, SOLUTION INTRAMUSCULAR; INTRAVENOUS at 04:06

## 2025-06-23 RX ADMIN — FENTANYL CITRATE 25 MCG: 50 INJECTION, SOLUTION INTRAMUSCULAR; INTRAVENOUS at 04:06

## 2025-06-23 NOTE — Clinical Note
Anesthesia Type: RN IV Sedation Continued Stay Review    Date: 3/8/25 and 3/9/25                           Current Patient Class: Inpatient  Current Level of Care: Med Surg     HPI:63 y.o. female initially admitted on 3/4/25    Current Diagnosis:Influenza A    3/8/25 Assessment/Plan: Procalcitonin now normalized x2. Repeat blood culture NGTD for 24hrs. Pending final results. Noted to have HR 40-50s. Poor appetite. Temp 97.9 F, /64, HR 63, RR 18, Spo2 93% on 2 liters oxygen. + rhonchi and rales. Plan: continue Tamiflu, IV Antibiotics, wan oxygen as able, nebulizers, home oxygen eval prior to dc if remains on oxygen.     3/9/25 Assessment/plan:  repeat BCxs NGTD (48 hours). + bradycardic, decreasing Atenolol from 50 mg to 12.5 mg. Still Currently on 2 liters oxygen, Spo2 89% on room air when attempted to wean, will need home oxygen eval prior to dc. Plan: continue IV ceftriaxone and nebulizers, home oxygen eval. Monitor BP/HR with decrease of atenolol     Home Oxygen Test:    **Medicare Guidelines require item(s) 1-5 on all ambulatory patients or 1 and 2 on non-ambulatory patients.     1. Baseline SPO2 on Room Air at rest 92 %   If <= 88% on Room Air add O2 via NC to obtain SpO2 >=88%. If LPM needed, document LPM needed to reach =>88%     SPO2 during exertion on Room Air 91  %  During exertion monitor SPO2. If SPO2 increases >=89%, do not add supplemental oxygen     SPO2 on Oxygen at Rest  % at  LPM     SPO2 during exertion on Oxygen  % at LPM     Test performed during exertion activity.      []  Supplemental Home Oxygen is indicated.     [x]  Client does not qualify for home oxygen.     Respiratory Additional Notes- pt ambulated approx. 250 feet... Pt will need to be successfully weaned off oxygen prior to dc.       Medications:   Scheduled Medications:  atenolol, 12.5 mg, Oral, Daily  atorvastatin, 20 mg, Oral, Daily With Dinner  cefTRIAXone, 2,000 mg, Intravenous, Q24H  guaiFENesin, 600 mg, Oral, Q12H JERAD  heparin (porcine), 5,000 Units,  Subcutaneous, Q8H JERAD  hydroCHLOROthiazide, 25 mg, Oral, Daily  ipratropium, 0.5 mg, Nebulization, TID  levalbuterol, 1.25 mg, Nebulization, TID  levothyroxine, 125 mcg, Oral, Early Morning      Continuous IV Infusions: none      PRN Meds:  acetaminophen, 650 mg, Oral, Q6H PRN  albuterol, 2 puff, Inhalation, Q4H PRN  albuterol, 2.5 mg, Nebulization, Q6H PRN  Hydrocod Sharath-Chlorphe Sharath ER, 5 mL, Oral, Q12H PRN  ondansetron, 4 mg, Intravenous, Q6H PRN      Discharge Plan: TBD     Vital Signs (last 3 days)       Date/Time Temp Pulse Resp BP MAP (mmHg) SpO2 Calculated FIO2 (%) - Nasal Cannula O2 Flow Rate (L/min) Nasal Cannula O2 Flow Rate (L/min) O2 Device Patient Position - Orthostatic VS Pain    03/10/25 0819 -- -- -- -- -- 91 % -- -- -- None (Room air) Lying --    03/10/25 08:18:08 98.2 °F (36.8 °C) 60 -- 159/86 110 92 % -- -- -- -- -- --    03/10/25 0817 -- -- -- -- -- 91 % -- -- -- -- -- --    03/10/25 0700 -- 46 -- -- -- -- -- -- -- -- -- --    03/10/25 0300 -- 48 -- -- -- -- -- -- -- -- -- --    03/10/25 0100 -- 49 -- -- -- -- -- -- -- -- -- --    03/09/25 21:32:54 98.1 °F (36.7 °C) 56 16 150/70 97 89 % -- -- -- -- -- --    03/09/25 2118 -- -- -- -- -- 90 % 28 -- 2 L/min Nasal cannula -- No Pain    03/09/25 1922 -- 63 18 -- -- 90 % -- -- -- Nasal cannula -- --    03/09/25 15:58:24 98.1 °F (36.7 °C) 59 18 143/80 101 93 % -- -- -- -- Sitting --    03/09/25 1534 -- -- -- -- -- 92 % -- -- -- None (Room air) -- --    03/09/25 0900 -- 60 -- -- -- -- -- -- -- -- -- --    03/09/25 0852 -- -- -- -- -- 94 % 28 -- 2 L/min Nasal cannula -- No Pain    03/09/25 08:03:59 97.8 °F (36.6 °C) 60 18 148/66 93 98 % -- -- -- -- -- --    03/09/25 0751 -- -- -- -- -- -- 28 -- 2 L/min Nasal cannula -- --    03/09/25 0500 -- 43 -- -- -- -- -- -- -- -- -- --    03/09/25 0400 -- -- -- -- -- -- 28 -- 2 L/min Nasal cannula -- No Pain    03/09/25 0100 -- 45 -- -- -- -- -- -- -- -- -- --    03/08/25 2300 -- 45 -- -- -- -- -- -- -- -- -- --     03/08/25 1959 -- -- -- -- -- 91 % 28 -- 2 L/min Nasal cannula -- --    03/08/25 14:55:01 -- 49 -- 153/85 108 91 % -- -- -- -- -- --    03/08/25 1409 -- -- -- -- -- 96 % -- -- -- None (Room air) -- --    03/08/25 0838 -- -- -- -- -- 93 % -- -- -- None (Room air) -- No Pain    03/08/25 08:37:02 -- 63 -- 134/64 87 91 % -- -- -- -- -- --    03/08/25 0837 -- 63 -- 134/64 -- -- -- -- -- -- -- --    03/08/25 0750 -- -- -- -- -- 91 % -- -- -- None (Room air) -- --    03/07/25 22:42:47 97.9 °F (36.6 °C) 55 18 154/81 105 94 % -- -- -- -- -- --    03/07/25 2100 -- -- -- -- -- -- -- -- -- None (Room air) -- No Pain    03/07/25 1913 -- -- -- -- -- -- -- -- -- None (Room air) -- --    03/07/25 1911 -- -- -- -- -- -- 28 2 L/min 2 L/min Nasal cannula -- --    03/07/25 17:21:47 97.9 °F (36.6 °C) 57 -- 125/73 90 90 % -- -- -- -- -- --    03/07/25 1351 -- -- -- -- -- 94 % 28 -- 2 L/min Nasal cannula -- --    03/07/25 0845 -- -- -- -- -- 92 % 28 -- 2 L/min Nasal cannula -- No Pain    03/07/25 08:02:34 97.9 °F (36.6 °C) 55 -- 130/60 83 92 % -- -- -- -- -- --    03/07/25 0710 -- -- -- -- -- 93 % 28 -- 2 L/min Nasal cannula -- --          Weight (last 2 days)       None            Pertinent Labs/Diagnostic Results:   Radiology:  CT chest without contrast   Final Interpretation by Nayla Gillespie MD (03/04 7696)      1) Diffuse bronchial wall thickening, suggestive of bronchitis or reactive airways disease.      2) Evaluation of lung parenchyma significantly limited by motion artifact, in particular in the lower lungs. Suggestion of some tree-in-bud nodularity in the right middle lobe, and some tubular opacities in the right lower lobe, likely infectious.      3) Mild mediastinal lymphadenopathy, likely reactive.                           Workstation performed: [id number]         XR chest 2 views   Final Interpretation by Ry Peraza MD (03/05 0728)      Prominent bibasilar bronchial markings may represent bronchitis. See  subsequent chest CT report.            Workstation performed: [id number]           Cardiology:  ECG 12 lead   Final Result by Onel Randhawa MD (03/05 6338)   Normal sinus rhythm   Nonspecific ST and T wave abnormality   Abnormal ECG   No previous ECGs available   Confirmed by Onel Randhawa ([id number]) on 3/5/2025 2:58:15 PM            Results from last 7 days   Lab Units 03/04/25  1708   SARS-COV-2  Negative     Results from last 7 days   Lab Units 03/09/25  0650 03/07/25  0451 03/06/25  0401 03/05/25 0445 03/04/25  1708   WBC Thousand/uL 8.09 6.32 8.92 13.12* 16.05*   HEMOGLOBIN g/dL 13.4 12.7 13.5 13.8 15.4   HEMATOCRIT % 40.7 39.8 41.5 43.0 47.1*   PLATELETS Thousands/uL 303 246 227 234 247   TOTAL NEUT ABS Thousands/µL  --   --  6.86 11.33*  --    BANDS PCT %  --   --   --   --  4         Results from last 7 days   Lab Units 03/09/25  0650 03/08/25  0555 03/07/25  0451 03/06/25 0401 03/05/25 0445   SODIUM mmol/L 137 140 141 139 138   POTASSIUM mmol/L 3.7 3.6 3.4* 3.7 3.0*   CHLORIDE mmol/L 100 100 101 102 100   CO2 mmol/L 29 34* 31 29 29   ANION GAP mmol/L 8 6 9 8 9   BUN mg/dL 11 15 18 24 35*   CREATININE mg/dL 0.49* 0.51* 0.49* 0.50* 0.61   EGFR ml/min/1.73sq m 104 102 104 103 96   CALCIUM mg/dL 9.0 9.0 8.9 9.0 8.8   MAGNESIUM mg/dL  --   --  2.3  --  2.4     Results from last 7 days   Lab Units 03/05/25  0445 03/04/25  1708   AST U/L 44* 48*   ALT U/L 24 27   ALK PHOS U/L 59 71   TOTAL PROTEIN g/dL 7.1 7.7   ALBUMIN g/dL 3.9 4.4   TOTAL BILIRUBIN mg/dL 0.64 1.01*         Results from last 7 days   Lab Units 03/09/25  0650 03/08/25  0555 03/07/25  0451 03/06/25  0401 03/05/25  0445 03/04/25  1708   GLUCOSE RANDOM mg/dL 114 105 107 110 122 125         Results from last 7 days   Lab Units 03/04/25  2031 03/04/25  1708   HS TNI 0HR ng/L  --  35   HS TNI 2HR ng/L 36  --    HSTNI D2 ng/L 1  --          Results from last 7 days   Lab Units 03/04/25  1731   PROTIME seconds 14.3   INR  1.06   PTT seconds 34      Results from last 7 days   Lab Units 03/08/25  0555   TSH 3RD GENERATON uIU/mL 5.974*     Results from last 7 days   Lab Units 03/08/25  0555 03/07/25  0451 03/05/25  0445 03/04/25  1731   PROCALCITONIN ng/ml 0.09 0.14 0.51* 0.49*     Results from last 7 days   Lab Units 03/04/25  1731   LACTIC ACID mmol/L 1.4     Results from last 7 days   Lab Units 03/06/25  1650 03/04/25  1708   STREP PNEUMONIAE ANTIGEN, URINE  Negative  --    LEGIONELLA URINARY ANTIGEN  Negative  --    INFLUENZA A PCR   --  Positive*   INFLUENZA B PCR   --  Negative   RSV PCR   --  Negative                       Results from last 7 days   Lab Units 03/06/25  1453 03/04/25  1731   BLOOD CULTURE  No Growth at 72 hrs.  No Growth at 72 hrs. No Growth After 5 Days.  Streptococcus parasanguinis*   GRAM STAIN RESULT   --  Gram positive cocci in pairs and chains*                   Network Utilization Review Department  ATTENTION: Please call with any questions or concerns to [phone] and carefully listen to the prompts so that you are directed to the right person. All voicemails are confidential.   For Discharge needs, contact Care Management DC Support Team at [phone] opt. [phone]  Send all requests for admission clinical reviews, approved or denied determinations and any other requests to dedicated fax number below belonging to the campus where the patient is receiving treatment. List of dedicated fax numbers for the Facilities:  FACILITY NAME UR FAX NUMBER   ADMISSION DENIALS (Administrative/Medical Necessity) [phone]   DISCHARGE SUPPORT TEAM (NETWORK) [phone]   PARENT CHILD HEALTH (Maternity/NICU/Pediatrics) [phone]   [facility] [phone]   [facility] [phone]   [facility] [phone]   [facility] [phone]   [facility] [phone]   General acute hospital [phone]    [facility] [phone]   [facility] [phone]   [facility] [phone]   [facility] [phone]   [facility] [phone]   [facility] [phone]

## 2025-06-23 NOTE — H&P
Interventional Neuroradiology Pre-Procedure Note    Chief Complaint: back pain    History of Present Illness:  Maria Elena Tavares is a 69 y.o. female with PMH of breast cancer here for initial evaluation of intermittent low back pain who was found to have heterogeneous enhancement of the L2 vertebral body is referred for L2 Biopsy.       Past Medical History:   Diagnosis Date    BMI 37.0-37.9, adult     Breast cancer 09/05/2019     Left breast, IDC with lymph node metastisis    Colon polyps 2015    Colon polyps 2015    Diabetes mellitus type II     Diverticulosis     history of diverticulosisseen on colonoscopy at age 48. Repeat recommended at age 58. Done by     Elevated blood protein     History of elevated protein. Apparently has seen  for extensive workup including bone marrow biopsy    H/O bilateral mastectomy 12/03/2020    History of shingles 2006    Hyperlipidemia     Hypertension     Microalbuminuria     due 2 diabetes    Mild vitamin D deficiency     . A low vitamin D    Primary osteoarthritis of left knee 08/31/2023    Thyroid disease     hypothyroidism    Usual hyperplasia of lactiferous duct     Not sure     Past Surgical History:   Procedure Laterality Date    ARTHROPLASTY, KNEE, TOTAL, SIGHT ASSISTED Left 04/29/2024    Procedure: ARTHROPLASTY, KNEE, SIGHT ASSISTED;  Surgeon: Jamshid Reece MD;  Location: [facility] OR;  Service: Orthopedics;  Laterality: Left;  bmi - 34.57    BREAST BIOPSY Left 09/05/2019    IDC with mets to node    BREAST BIOPSY Right 09/26/2019    core bx, benign node    BREAST BIOPSY Left 10/14/2019    MRI Core bx, + IDC    BREAST BIOPSY Left 10/08/2019    Core bx, ADH    BREAST SURGERY  03/24/2020    Breast cancer    CATARACT EXTRACTION W/  INTRAOCULAR LENS IMPLANT Right 5/12/2025    Procedure: EXTRACTION, CATARACT, WITH IOL INSERTION;  Surgeon: Kendal Foy MD;  [facility] OR;  Service: Ophthalmology;  Laterality: Right;    CATARACT EXTRACTION W/  INTRAOCULAR LENS  IMPLANT Left 2025    Procedure: EXTRACTION, CATARACT, WITH IOL INSERTION;  Surgeon: Kendal Foy MD;  Location: [facility] OR;  Service: Ophthalmology;  Laterality: Left;     SECTION      COLONOSCOPY N/A 10/08/2020    Procedure: COLONOSCOPY;  Surgeon: Shreya Mendoza MD;  Location: [facility] ENDO (4TH FLR);  Service: Endoscopy;  Laterality: N/A;  COVID screening on 10/5/20 [facility]    HYSTERECTOMY      INSERTION OF BREAST TISSUE EXPANDER Bilateral 2020    Procedure: INSERTION, TISSUE EXPANDER, BREAST BILATERAL;  Surgeon: Michael Solorzano MD;  Location: [facility] OR 2ND FLR;  Service: Plastics;  Laterality: Bilateral;    INSERTION OF TUNNELED CENTRAL VENOUS CATHETER (CVC) WITH SUBCUTANEOUS PORT Right 10/04/2019    Procedure: [id number]-G-CATH RIGHT (CONSENT AM OF) 1.0 hr case;  Surgeon: Elena Lopez MD;  Location: [facility] OR 2ND FLR;  Service: General;  Laterality: Right;    OOPHORECTOMY      SENTINEL LYMPH NODE BIOPSY Left 2020    Procedure: BIOPSY, LYMPH NODE, SENTINEL LEFT;  Surgeon: Elena Lopez MD;  Location: [facility] OR 2ND FLR;  Service: General;  Laterality: Left;    SIMPLE MASTECTOMY Bilateral 2020    Procedure: MASTECTOMY, SIMPLE BILATERAL;  Surgeon: Elena Lopez MD;  Location: [facility] OR 2ND FLR;  Service: General;  Laterality: Bilateral;    TISSUE EXPANDER REMOVAL Bilateral 2020    Procedure: REMOVAL, TISSUE EXPANDER;  Surgeon: Michael Solorzano MD;  Location: [facility] OR 2ND FLR;  Service: Plastics;  Laterality: Bilateral;       Home Meds:   Prior to Admission medications    Medication Sig Start Date End Date Taking? Authorizing Provider   amLODIPine (NORVASC) 5 MG tablet Take 1 tablet (5 mg total) by mouth once daily. 7/10/24 7/10/25  Estephania San MD   aspirin (ECOTRIN) 81 MG EC tablet Take 1 tablet (81 mg total) by mouth 2 (two) times a day. 24  Олег Beck MD   atorvastatin (LIPITOR) 10 MG tablet Take 1 tablet (10 mg total) by mouth once  daily. 4/10/24 6/2/25  Flash Membreno MD   blood-glucose meter kit DISPENSE : BLOOD TEST STRIPS AND LANCETS PATIENT TEST BLOOD SUGARS TWICE DAILY  TEST STRIPS: 50 EACH, REFILL 5  LANCETS:  50 EACH , REFILL 5 3/5/18 11/5/24  Estephania San MD   cetirizine (ZYRTEC) 10 MG tablet Take 1 tablet (10 mg total) by mouth daily as needed for Allergies (itching). 7/29/24   Miri Sidhu NP   ciclopirox 0.77 % Gel Apply topically 2 (two) times daily. To thickened discolored toenails. 10/10/22   Becca Rodriguez DPM   ergocalciferol (ERGOCALCIFEROL) 50,000 unit Cap Take 1 capsule (50,000 Units total) by mouth every 14 (fourteen) days. 2/4/25 2/4/26  Estephania San MD   fluticasone propionate (FLONASE) 50 mcg/actuation nasal spray 1 spray (50 mcg total) by Each Nostril route once daily. 5/30/25   Haven Ovalles FNP-C   gabapentin (NEURONTIN) 800 MG tablet Take 1 tablet (800 mg total) by mouth 3 (three) times daily. 4/14/25 4/14/26  Christin Marquez DO   guaiFENesin (MUCINEX) 600 mg 12 hr tablet Take 2 tablets (1,200 mg total) by mouth 2 (two) times daily. 5/30/25   Haven Ovalles FNP-C   irbesartan (AVAPRO) 300 MG tablet Take 1 tablet by mouth once daily 4/13/25   Estephania San MD   ketorolac (TORADOL) 10 mg tablet Take 1 tablet (10 mg total) by mouth every 6 (six) hours as needed for Pain. 5/30/25   Haven Ovalles FNP-C   letrozole (FEMARA) 2.5 mg Tab Take 1 tablet (2.5 mg total) by mouth once daily. 7/25/24 7/25/25  Elsa Reilly NP   levothyroxine (SYNTHROID) 200 MCG tablet Take 1 tablet (200 mcg total) by mouth daily before breakfast. 5/19/25 5/19/26  Julius Dumas MD   lifitegrast (XIIDRA) 5 % Dpet Apply 1 drop to  both eyes  2 (two) times daily. 2/12/25   Yaritza Ruiz, JES   pantoprazole (PROTONIX) 40 MG tablet Take 1 tablet (40 mg total) by mouth once daily. 3/18/25 3/18/26  Estephania San MD   polyethylene glycol (GLYCOLAX) 17 gram/dose powder Use cap to measure 17g, mix with liquid, and take by  mouth 2 (two) times daily as needed for Constipation. 5/18/25 5/13/26  Julius Dumas MD   prednisolon/gatiflox/bromfenac (PREDNISOL ACE-GATIFLOX-BROMFEN) 1-0.5-0.075 % DrpS Apply 1 drop to eye 3 (three) times daily. 4/16/25   Lorraine Charles, DUC   tirzepatide (MOUNJARO) 10 mg/0.5 mL PnIj Inject 10 mg into the skin every 7 days. 2/4/25   Estephania San MD     Anticoagulants/Antiplatelets: aspirin    Allergies:   Review of patient's allergies indicates:   Allergen Reactions    Amoxil [amoxicillin] Rash     Sedation History:  no adverse reactions    Review of Systems:   Hematological: no known coagulopathies  Respiratory: no shortness of breath  Cardiovascular: no chest pain  Gastrointestinal: no abdominal pain  Genito-Urinary: no dysuria  Musculoskeletal: +back pain  Neurological: no TIA or stroke symptoms         OBJECTIVE:     Vital Signs (Most Recent)       Physical Exam:  ASA: 2  Mallampati: 2    General: no acute distress  Mental Status: alert and oriented to person, place and time  HEENT: normocephalic, atraumatic  Chest: unlabored breathing  Heart: regular heart rate  Abdomen: nondistended  Extremity: moves all extremities    Laboratory  Lab Results   Component Value Date    INR 1.3 (H) 06/20/2025       Lab Results   Component Value Date    WBC 5.01 06/20/2025    HGB 10.7 (L) 06/20/2025    HCT 33.9 (L) 06/20/2025    MCV 94 06/20/2025     06/20/2025      Lab Results   Component Value Date    GLU 79 05/18/2025     05/18/2025    K 4.4 05/18/2025     05/18/2025    CO2 20 (L) 05/18/2025    BUN 21 05/18/2025    CREATININE 1.1 05/18/2025    CALCIUM 8.5 (L) 05/18/2025    MG 2.6 05/18/2025    ALT 9 (L) 05/18/2025    AST 21 05/18/2025    ALBUMIN 3.0 (L) 05/18/2025    BILITOT 0.7 05/18/2025    BILIDIR 0.1 09/25/2012       ASSESSMENT/PLAN:   -Sedation Plan: Up to moderate   -Patient will undergo: fluoroscopy guided L2 bone biopsy                Ned Mayfield MD, MHA  Fellow, NeuroEndovascular  Surgery, [facility] [place]  Neurologist, [facility] [place], [place]

## 2025-06-23 NOTE — PROGRESS NOTES
Called pt's sister and en route to [facility] / pt AAO w/ NAD and getting ready for D/C home / inst review as well as S&S of infection / dressing in place and NAD noted / transport to garage pending and placed in as times / transport arrived at 17:50 but pt D/C not ready and transport timed for 18:10

## 2025-06-23 NOTE — PROCEDURES
Radiology Post-Procedure Note    Pre Op Diagnosis: L2 lesion    Post Op Diagnosis:  Same    Procedure: L2  bone biopsy    Procedure performed by: Maxwell Ornelas MD and Ned Mayfield MD    Written Informed Consent Obtained: Yes    Specimen Removed: NO    Estimated Blood Loss: Minimal    Findings: Successful L2 bone biopsy using 11 gauge on control biopsy needle    Specimen (Bloody bone fragments) sent to pathology and culture.    Patient tolerated procedure well.              Ned Mayfield MD, MHA  Fellow, NeuroEndovascular Surgery, [facility]  Neurologist, [facility] [place], [place]

## 2025-06-23 NOTE — DISCHARGE INSTRUCTIONS
Interventional Radiology Clinic     For complications  [phone]. Monday - Friday, 8:00 am - 4:00 pm   [phone] After hours and on holidays. Ask to speak with the interventional radiologist on call.     For scheduling  [phone] Monday - Friday, 8:00 am - 4:00 pm

## 2025-06-23 NOTE — PLAN OF CARE
Patient ambulated on unit with her sister/friend at bedside.  C/o slight headache of a 3 on scale of 0-10.  Verbalized an understanding of procedure.  Oriented to unit and call bell provided.  Will continue to monitor.

## 2025-06-23 NOTE — SEDATION DOCUMENTATION
Pt arrived to IR Dept room 200 for L2 Bone Bx. Pt oriented to unit and staff. Plan of care reviewed with patient, patient verbalizes understanding. Comfort measures utilized. Pt safely transferred from stretcher to procedural table. Fall risk reviewed with patient, fall risk interventions maintained. Safety strap applied, positioner pillows utilized to minimize pressure points. Blankets applied. Pt prepped and draped utilizing standard sterile technique. Patient placed on continuous monitoring, as required by sedation policy. Timeouts completed utilizing standard universal time-out, per department and facility policy. RN to remain at bedside, continuous monitoring maintained. Pt resting comfortably. Denies pain/discomfort. Will continue to monitor. See flow sheets for monitoring, medication administration, and updates.

## 2025-06-24 LAB
APPEARANCE FLD: NORMAL
COLOR FLD: NORMAL
GRAM STN SPEC: NORMAL
GRAM STN SPEC: NORMAL
LYMPHOCYTES NFR FLD MANUAL: 26 %
MONOS+MACROS NFR FLD MANUAL: 3 %
NEUTROPHILS NFR FLD MANUAL: 71 %
WBC # FLD: 120 /CU MM

## 2025-06-26 LAB
ESTROGEN SERPL-MCNC: NORMAL PG/ML
INSULIN SERPL-ACNC: NORMAL U[IU]/ML
LAB AP CLINICAL INFORMATION: NORMAL
LAB AP GROSS DESCRIPTION: NORMAL
LAB AP PERFORMING LOCATION(S): NORMAL
LAB AP REPORT FOOTNOTES: NORMAL

## 2025-06-27 LAB — BACTERIA SPEC AEROBE CULT: NO GROWTH

## 2025-06-30 ENCOUNTER — PATIENT MESSAGE (OUTPATIENT)
Dept: ORTHOPEDICS | Facility: CLINIC | Age: 70
End: 2025-06-30
Payer: MEDICARE

## 2025-07-01 LAB
BACTERIA SPEC ANAEROBE CULT: NORMAL
FUNGUS SPEC CULT: NORMAL

## 2025-07-06 ENCOUNTER — PATIENT MESSAGE (OUTPATIENT)
Dept: ADMINISTRATIVE | Facility: OTHER | Age: 70
End: 2025-07-06
Payer: MEDICARE

## 2025-07-09 ENCOUNTER — PATIENT MESSAGE (OUTPATIENT)
Dept: HEMATOLOGY/ONCOLOGY | Facility: CLINIC | Age: 70
End: 2025-07-09
Payer: MEDICARE

## 2025-07-09 ENCOUNTER — OFFICE VISIT (OUTPATIENT)
Dept: OPTOMETRY | Facility: CLINIC | Age: 70
End: 2025-07-09
Payer: MEDICARE

## 2025-07-09 ENCOUNTER — PATIENT MESSAGE (OUTPATIENT)
Dept: ORTHOPEDICS | Facility: CLINIC | Age: 70
End: 2025-07-09
Payer: MEDICARE

## 2025-07-09 DIAGNOSIS — E11.21 CONTROLLED TYPE 2 DIABETES MELLITUS WITH DIABETIC NEPHROPATHY, WITHOUT LONG-TERM CURRENT USE OF INSULIN: ICD-10-CM

## 2025-07-09 DIAGNOSIS — E11.9 TYPE 2 DIABETES MELLITUS WITHOUT OPHTHALMIC MANIFESTATIONS: ICD-10-CM

## 2025-07-09 DIAGNOSIS — Z96.1 PSEUDOPHAKIA OF BOTH EYES: Primary | ICD-10-CM

## 2025-07-09 DIAGNOSIS — H04.123 DRY EYE SYNDROME, BILATERAL: ICD-10-CM

## 2025-07-09 PROCEDURE — 99024 POSTOP FOLLOW-UP VISIT: CPT | Mod: S$GLB,,, | Performed by: OPTOMETRIST

## 2025-07-09 PROCEDURE — 1160F RVW MEDS BY RX/DR IN RCRD: CPT | Mod: CPTII,S$GLB,, | Performed by: OPTOMETRIST

## 2025-07-09 PROCEDURE — 4010F ACE/ARB THERAPY RXD/TAKEN: CPT | Mod: CPTII,S$GLB,, | Performed by: OPTOMETRIST

## 2025-07-09 PROCEDURE — 99999 PR PBB SHADOW E&M-EST. PATIENT-LVL III: CPT | Mod: PBBFAC,,, | Performed by: OPTOMETRIST

## 2025-07-09 PROCEDURE — 1159F MED LIST DOCD IN RCRD: CPT | Mod: CPTII,S$GLB,, | Performed by: OPTOMETRIST

## 2025-07-09 PROCEDURE — 3288F FALL RISK ASSESSMENT DOCD: CPT | Mod: CPTII,S$GLB,, | Performed by: OPTOMETRIST

## 2025-07-09 PROCEDURE — 1126F AMNT PAIN NOTED NONE PRSNT: CPT | Mod: CPTII,S$GLB,, | Performed by: OPTOMETRIST

## 2025-07-09 PROCEDURE — 1101F PT FALLS ASSESS-DOCD LE1/YR: CPT | Mod: CPTII,S$GLB,, | Performed by: OPTOMETRIST

## 2025-07-09 NOTE — PROGRESS NOTES
HPI    Pt is here today for 1 month post op s/p Bilateral Cataract extraction w/   IOL.  OS 06/02/2025 IMPLANT: DIBOO 21.5  OD 05/12/2025 IMPLANT: DIB00 21.5    Today she reports her distance vision is doing well since her Cataract   surgery. She does still need some vision correction to read up close. She   is still using the prescribed drops from Dr. Foy but is almost finished.    Eye Meds:  PGB TID OU    Last edited by Mat Arriola on 7/9/2025 12:56 PM.            Assessment /Plan     For exam results, see Encounter Report.    Pseudophakia of both eyes  OS 06/02/2025 IMPLANT: DIBOO 21.5  OD 05/12/2025 IMPLANT: DIB00 21.5    Dry eye syndrome, bilateral   Continue with Xiidra BID, try vevye if Xiidra to $$$    Type 2 diabetes mellitus without ophthalmic manifestations  Controlled type 2 diabetes mellitus with diabetic nephropathy, without long-term current use of insulin   No retinopathy, monitor yearly    RTC 1 year annual

## 2025-07-10 ENCOUNTER — HOSPITAL ENCOUNTER (OUTPATIENT)
Dept: RADIOLOGY | Facility: CLINIC | Age: 70
Discharge: HOME | End: 2025-07-10
Attending: INTERNAL MEDICINE
Payer: MEDICARE

## 2025-07-10 DIAGNOSIS — M81.6 LOCALIZED OSTEOPOROSIS (LEQUESNE): ICD-10-CM

## 2025-07-10 DIAGNOSIS — E55.9 VITAMIN D DEFICIENCY: ICD-10-CM

## 2025-07-10 PROCEDURE — 77080 DXA BONE DENSITY AXIAL: CPT | Mod: TC,FY

## 2025-07-24 DIAGNOSIS — I10 ESSENTIAL HYPERTENSION: ICD-10-CM

## 2025-07-24 RX ORDER — AMLODIPINE BESYLATE 5 MG/1
5 TABLET ORAL DAILY
Qty: 90 TABLET | Refills: 0 | Status: SHIPPED | OUTPATIENT
Start: 2025-07-24 | End: 2025-10-22

## 2025-07-24 NOTE — TELEPHONE ENCOUNTER
Care Due:                  Date            Visit Type   Department     Provider  --------------------------------------------------------------------------------                                ESTABLISHED                              PATIENT -    [facility] PRIMARY  Last Visit: 04-      [facility]      CARE           Estephania San                              EP -                              PRIMARY      [facility] PRIMARY  Next Visit: 09-      CARE ([facility])   CARE           Estephania San                                                            Last  Test          Frequency    Reason                     Performed    Due Date  --------------------------------------------------------------------------------    HBA1C.......  6 months...  tirzepatide..............  11- 05-    [employer] Embedded Care Due Messages. Reference number: [id number].   7/24/2025 8:32:33 AM CDT

## 2025-07-24 NOTE — TELEPHONE ENCOUNTER
Refill Encounter    PCP Visits: Recent Visits  Date Type Provider Dept   05/30/25 Office Visit Haven Ovalles FNP-C [facility]   04/01/25 Office Visit Estephania San MD [facility]   03/18/25 Office Visit Estephania San MD [facility]   02/04/25 Office Visit Estephania San MD [facility]   11/25/24 Office Visit Estephania San MD [facility]   09/10/24 Office Visit Estephania San MD [facility]   07/30/24 Office Visit Estephania San MD [facility]   Showing recent visits within past 360 days and meeting all other requirements  Future Appointments  Date Type Provider Dept   09/10/25 Appointment Estephania San MD [facility]   Showing future appointments within next 720 days and meeting all other requirements      Last 3 Blood Pressure:   BP Readings from Last 3 Encounters:   06/23/25 139/71   06/04/25 (!) 140/87   06/02/25 119/67     Preferred Pharmacy:   [facility] [address] [place] [address]  Phone: [phone] Fax: [phone]    [facility] - [address].  [address] [place] [address].  [facility] [address]  Phone: [phone] Fax: [phone]    Requested RX:  Requested Prescriptions     Pending Prescriptions Disp Refills    amLODIPine (NORVASC) 5 MG tablet 90 tablet 3     Sig: Take 1 tablet (5 mg total) by mouth once daily.      RX Route: Normal

## 2025-08-07 ENCOUNTER — OFFICE VISIT (OUTPATIENT)
Dept: HEMATOLOGY/ONCOLOGY | Facility: CLINIC | Age: 70
End: 2025-08-07
Payer: MEDICARE

## 2025-08-07 VITALS
OXYGEN SATURATION: 99 % | RESPIRATION RATE: 17 BRPM | HEIGHT: 60 IN | WEIGHT: 154.75 LBS | DIASTOLIC BLOOD PRESSURE: 79 MMHG | BODY MASS INDEX: 30.38 KG/M2 | HEART RATE: 77 BPM | TEMPERATURE: 97 F | SYSTOLIC BLOOD PRESSURE: 148 MMHG

## 2025-08-07 DIAGNOSIS — R93.89 ABNORMAL FINDING ON IMAGING: ICD-10-CM

## 2025-08-07 DIAGNOSIS — C50.912 BREAST CANCER METASTASIZED TO AXILLARY LYMPH NODE, LEFT: Primary | ICD-10-CM

## 2025-08-07 DIAGNOSIS — C77.3 BREAST CANCER METASTASIZED TO AXILLARY LYMPH NODE, LEFT: Primary | ICD-10-CM

## 2025-08-07 PROCEDURE — 3008F BODY MASS INDEX DOCD: CPT | Mod: CPTII,S$GLB,, | Performed by: INTERNAL MEDICINE

## 2025-08-07 PROCEDURE — 4010F ACE/ARB THERAPY RXD/TAKEN: CPT | Mod: CPTII,S$GLB,, | Performed by: INTERNAL MEDICINE

## 2025-08-07 PROCEDURE — 99999 PR PBB SHADOW E&M-EST. PATIENT-LVL IV: CPT | Mod: PBBFAC,,, | Performed by: INTERNAL MEDICINE

## 2025-08-07 PROCEDURE — 1160F RVW MEDS BY RX/DR IN RCRD: CPT | Mod: CPTII,S$GLB,, | Performed by: INTERNAL MEDICINE

## 2025-08-07 PROCEDURE — 1159F MED LIST DOCD IN RCRD: CPT | Mod: CPTII,S$GLB,, | Performed by: INTERNAL MEDICINE

## 2025-08-07 PROCEDURE — 3077F SYST BP >= 140 MM HG: CPT | Mod: CPTII,S$GLB,, | Performed by: INTERNAL MEDICINE

## 2025-08-07 PROCEDURE — 99215 OFFICE O/P EST HI 40 MIN: CPT | Mod: S$GLB,,, | Performed by: INTERNAL MEDICINE

## 2025-08-07 PROCEDURE — 1101F PT FALLS ASSESS-DOCD LE1/YR: CPT | Mod: CPTII,S$GLB,, | Performed by: INTERNAL MEDICINE

## 2025-08-07 PROCEDURE — 3078F DIAST BP <80 MM HG: CPT | Mod: CPTII,S$GLB,, | Performed by: INTERNAL MEDICINE

## 2025-08-07 PROCEDURE — G2211 COMPLEX E/M VISIT ADD ON: HCPCS | Mod: S$GLB,,, | Performed by: INTERNAL MEDICINE

## 2025-08-07 PROCEDURE — 3288F FALL RISK ASSESSMENT DOCD: CPT | Mod: CPTII,S$GLB,, | Performed by: INTERNAL MEDICINE

## 2025-08-07 PROCEDURE — 1126F AMNT PAIN NOTED NONE PRSNT: CPT | Mod: CPTII,S$GLB,, | Performed by: INTERNAL MEDICINE

## 2025-08-07 NOTE — PROGRESS NOTES
Subjective     Patient ID: Maria Elena Tavares is a 69 y.o. female.    Chief Complaint: Breast cancer metastasized to axillary lymph node, left    HPI    Returns for routine follow up of breast cancer  Works part time at [employer] to assist with income    Returns for follow up  There was a question of bone met- see work up below     - 6/23/2025 Deep bone biopsy:  Final Diagnosis   BONE, L2 VERTEBRA, BIOPSY:   Bone with degenerative changes.    Negative for osteomyelitis or neoplasia.     Comment: An AE1/AE3 cytokeratin immunostain is negative in the tissue.  Control is adequate.  There is no evidence of metastatic carcinoma.  Moreover, the findings do not explain a mass lesion.  As such, sampling issues may be considered.  Clinical correlation is advised.        - 4/9/2025 MRI L Spine:  Impression:     Heterogeneous appearance of the marrow with a focal heterogeneous lesion about the L2 vertebral body, correlating to area of diffusely increased sclerosis on CT 03/27/2025.  Lesion does not appear to involve the pedicles or posterior elements, with questionable mild heterogeneous enhancement on postcontrast imaging.  Lesion remains indeterminate, noting that metastatic disease cannot be excluded.  Correlation and further evaluation with dedicated nuclear medicine bone scan or FDG PET as clinically indicated.     Small well-circumscribed T2 hyperintense focus along the posterior epidural margin at the L4-L5 level.  Findings may relate to a small arachnoid cyst or other congenital cyst.  No abnormal enhancement or significant mass effect on the spinal canal at this level.     Mild multilevel lumbar spondylosis as detailed above, which contributes to mild bilateral neural foraminal narrowing at L4-L5 and L5-S1.  No significant spinal canal stenosis.    - 4//22/2025 PET scan:  FINDINGS:  Quality of the study: Adequate.     In the head and neck, there are no hypermetabolic lesions worrisome for malignancy. There are no  hypermetabolic mucosal lesions, and there are no pathologically enlarged or hypermetabolic lymph nodes.  There is diffuse heterogeneous uptake in the thyroid, similar to prior which may reflect hyperthyroidism versus thyroiditis.     In the chest, there are no hypermetabolic lesions worrisome for malignancy.  There are no concerning pulmonary nodules or masses, and there are no pathologically enlarged or hypermetabolic lymph nodes.  Postoperative changes bilateral mastectomy and left axillary node dissection.     In the abdomen and pelvis, there is physiologic tracer distribution within the abdominal organs and excretion into the genitourinary system.     In the bones, there are no hypermetabolic lesions worrisome for malignancy.  There is similar increased sclerotic change throughout the L2 vertebral body without increased focal uptake.     In the extremities, there are no hypermetabolic lesions worrisome for malignancy.     Additional CT findings: Aortic annular calcifications.  Calcific atherosclerosis of the coronary arteries and aorta.  Linear opacities in the left lung which may represent post radiation change, atelectasis, or scarring.     Right nonobstructing renal calculus.  Left renal cyst.  Hysterectomy.  Colonic diverticulosis.  Tiny fat containing umbilical hernia.  Postoperative changes in the anterior abdominal wall.     Impression:     History of breast cancer, status post bilateral mastectomy and left axillary node dissection.  No hypermetabolic foci to suggest residual or recurrent disease.     Similar sclerotic appearance of the L2 vertebral body without increased focal tracer uptake to suggest osseous metastatic disease.    - 5/17/2025 CT Abd/Pelvis: (done for renal stone)  FINDINGS:  Abdomen CT and pelvis CT:  Artifacts related to beam hardening and/or motion degrade portions of the scan.  The technologist encountered technical difficulties in producing the usual complete set of sagittal coronal  reformations.  Although the technologist's continue to work on the problem, at the time of this stat interpretation, only abdominal sagittal and coronal reformations are available.  No pelvic sagittal and coronal reformations are yet available.  This could potentially limit assessment/detection of some findings.  SOFT TISSUES: Partially imaged postoperative change of bilateral mastectomies.  LUNG BASES/VISUALIZED MEDIASTINUM: Lung bases are clear.  Coronary artery calcific atherosclerosis.  HEPATOBILIARY: Liver is normal size. Geographic hypoattenuation about the falciform ligament, likely focal fat.  Unchanged calcification in the inferior right hepatic lobe.  No biliary ductal dilatation.  Normal gallbladder.  PANCREAS: No focal masses or ductal dilatation.  SPLEEN: Normal size.  ADRENALS: No adrenal nodules.  KIDNEYS/URETERS: Delayed right nephrogram with loss of corticomedullary distinction.  There is a 0.5 cm obstructing stone at the right ureterovesical junction with moderate upstream hydroureteronephrosis.  Urothelial thickening and enhancement along the course of the right ureter with associated right perinephric and periureteral fat stranding.  There is an additional punctate nonobstructing stone in the upper pole the right kidney.  Left kidney enhances normally.  Left simple renal cyst and bilateral subcentimeter hypodensities too small to characterize.  No left-sided stones or hydronephrosis.  BLADDER/PELVIC ORGANS: No bladder wall thickening.  Hysterectomy.  No adnexal masses.  PERITONEUM / RETROPERITONEUM: No free air or fluid.  LYMPH NODES: No lymphadenopathy.  VESSELS: No aortic aneurysm.  Moderate aortoiliac calcific atherosclerosis.  Major aortic branch vessels are patent.  Portal venous system is patent.  GI TRACT: Stomach and duodenum are unremarkable.  No evidence of bowel obstruction.  The appendix courses in close proximity to the mid right ureter and is fluid-filled with mucosal  hyperenhancement.  No pathologic distension or evidence of appendicolith.  No focal colonic wall thickening or distension.  BONES: Degenerative changes.  Similar sclerosis of the L2 vertebral body.  No acute fracture.  Impression:  Abdomen CT and Pelvis CT:  1. Obstructing 0.5 cm stone at the right UVJ with moderate upstream hydroureteronephrosis, urothelial thickening and enhancement, and perinephric/periureteral fat stranding.  Delayed right nephrogram with loss of corticomedullary distinction.  Findings likely relate to obstructive uropathy although superimposed infection or other underlying right renal pathology is not excluded.  Recommend correlation with urinalysis, and follow-up imaging after resolution of ureteral obstruction.  2. Mucosal hyperenhancement of the appendix is likely reactive due to its close proximity to the mid right ureter.  Primary acute appendicitis is considered less likely.  3. Similar sclerosis of the L2 vertebral body, better characterized on prior PET-CT and MRI lumbar spine.  4. Additional findings as detailed in the body of the report.  This report was flagged in Epic as abnormal.     Oncologic History:   - self detected, noted a shooting pain in breast first and then a week later felt a lump  Some delay in getting appointment as she was unaware she had to call  - 8/30/19 Diagnostic mammogram and ultrasound:  Left breast 20 mm x 18 mm x 17 mm mass at the 3 o'clock position. Assessment: 4 - Suspicious finding. Biopsy is recommended. Lymph Node: Left axilla 16 mm x 14 mm x 13 mm lymph node. Assessment: 4 - Suspicious finding. Biopsy is recommended. Right- There is no mammographic or sonographic evidence of malignancy.  BI-RADS Category:   Overall: 4 - Suspicious  - 9/5/19 Ultrasound guided biopsy  Pathology:  1. LEFT BREAST MASS, BIOPSY:  - Invasive ductal carcinoma, grade 3, longest linear length is 10 mm measured on the slide.  - Histologic Grade (Hawa Histologic  Score)  Glandular (Acinar/Tubular Differentiation): 3.  Nuclear Pleomorphism: 2.  Mitotic Rate: 3.  Overall Grade: Grade 3 (score 8).  - Microcalcifications: Not identified.  - Lymphovascular invasion: Not identified.  2. LYMPH NODE, LEFT AXILLA, BIOPSY:  - Positive for metastatic carcinoma.  - The metastatic deposit measures 6 mm in longest continuous linear length.  Estrogen receptor: Positive, 90% of the tumor nuclei staining moderate to strongly.  Progesterone receptor: Positive, 30% of the tumor nuclei staining weak to moderately.  HER2: Negative.  Ki-67: 60%.  - ECHO 9/20/19: EF 64%  - PET 9/21/19:  Hypermetabolic left breast mass and regional left axillary lymphadenopathy consistent with reported breast cancer and corresponding with recent MRI findings.  Upper limit of normal sized right axillary lymph nodes with normal fatty duane and mildly increased radiotracer uptake.  Findings could represent reactive nodes with metastatic nodes thought less likely.  Lymphscintigraphy with injection in the left breast may considered to assess for drainage to the contralateral axilla.  - BX 9/24/19: Right axilla node biopsy is benign  - she underwent neoadjuvant chemotherapy and completed 4 cycles of AC followed by 11 cycles of weekly Taxol.   Stopped with side effects.  - 3/24/20 - she underwent bilateral mastectomies with Lt. sentinel node biopsy and subsequent Lt. axillary dissection and Rt. sentinel node biopsy with Dr. Lopez.  Tissue expanders were placed.   - Pathology from the Lt. breast revealed 1.2 cm of residual invasive ductal carcinoma histologic grade 3, nuclear grade 3 and mitotic index 5 ). There was high grade DCIS. There was lymphovascular invasion.  The margins of resection were negative at > 1 cm.  One axillary node had a 5 mm focus of metastatic carcinoma.  Another Lt. sentinel node had isolated tumor cells.   A third Lt. sentinel node and 7 Lt. axillary nodes were negative.  The Rt. breast and Rt.  sentinel nodes were negative.    - completed adjuvant  XRT   - on Femara as of 5/2020     HM:  7/2023 BMD  Normal    Review of Systems   Constitutional:  Negative for activity change, appetite change, chills, fatigue, fever and unexpected weight change.   HENT:  Negative for mouth sores, sinus pressure/congestion, sore throat and trouble swallowing.    Eyes:  Negative for visual disturbance.   Respiratory:  Negative for cough, shortness of breath and wheezing.    Cardiovascular:  Negative for chest pain, palpitations and leg swelling.   Gastrointestinal:  Negative for abdominal distention, abdominal pain, change in bowel habit, constipation and diarrhea.   Genitourinary:  Negative for decreased urine volume, difficulty urinating, dysuria, frequency and urgency.   Musculoskeletal:  Negative for back pain.   Integumentary:  Positive for breast tenderness (reports pain in right axilla at times). Negative for rash.   Neurological:  Negative for dizziness, weakness, numbness and headaches.   Hematological:  Negative for adenopathy. Does not bruise/bleed easily.   Psychiatric/Behavioral:  Negative for dysphoric mood and sleep disturbance. The patient is not nervous/anxious.    Breast: Positive for tenderness (reports pain in right axilla at times).         Objective     Physical Exam  Vitals and nursing note reviewed.   Constitutional:       General: She is not in acute distress.     Appearance: Normal appearance. She is normal weight. She is not ill-appearing.      Comments: Presents alone  Very pleasant   Eyes:      General: No scleral icterus.     Extraocular Movements: Extraocular movements intact.      Conjunctiva/sclera: Conjunctivae normal.      Pupils: Pupils are equal, round, and reactive to light.   Cardiovascular:      Rate and Rhythm: Normal rate and regular rhythm.      Heart sounds: Normal heart sounds. No murmur heard.     No friction rub. No gallop.   Pulmonary:      Effort: Pulmonary effort is normal. No  respiratory distress.      Breath sounds: Normal breath sounds. No wheezing, rhonchi or rales.      Comments: No chest wall masses or :LAD  Post reconstruction  Chest:      Chest wall: No tenderness.   Abdominal:      General: Abdomen is flat. Bowel sounds are normal. There is no distension.      Palpations: Abdomen is soft. There is no mass.      Tenderness: There is no abdominal tenderness. There is no guarding or rebound.      Comments: No organomegaly   Musculoskeletal:         General: No swelling, tenderness or deformity. Normal range of motion.      Cervical back: Normal range of motion and neck supple. No muscular tenderness.      Right lower leg: No edema.      Left lower leg: No edema.      Comments: LUE Lymphedema   Lymphadenopathy:      Cervical: No cervical adenopathy.   Skin:     General: Skin is warm and dry.      Coloration: Skin is not jaundiced or pale.      Findings: No erythema, lesion or rash.   Neurological:      General: No focal deficit present.      Mental Status: She is alert and oriented to person, place, and time. Mental status is at baseline.      Sensory: No sensory deficit.      Motor: No weakness.      Coordination: Coordination normal.      Gait: Gait normal.   Psychiatric:         Mood and Affect: Mood normal.         Behavior: Behavior normal.         Thought Content: Thought content normal.         Judgment: Judgment normal.            Assessment and Plan     1. Breast cancer metastasized to axillary lymph node, left    2. Abnormal finding on imaging      Reviewed final work up  Will repeat MTI in fall but final work up negative    Reviewed GLP-1 side effects and reduce dose    Route Chart for Scheduling    Med Onc Chart Routing      Follow up with physician 3 months. MRI spine prior   Follow up with JA[facility]    Infusion scheduling note    Injection scheduling note    Labs    Imaging    Pharmacy appointment    Other referrals                  Therapy Plan Information  PORT FLUSH for  Anemia in neoplastic disease, noted on 12/2/2019  Flushes  heparin, porcine (PF) 100 unit/mL injection flush 500 Units  500 Units, Intravenous, Every visit  sodium chloride 0.9% flush 10 mL  10 mL, Intravenous, Every visit      No therapy plan of the specified type found.    No therapy plan of the specified type found.     code applied: patient requires or will require a continuous, longitudinal, and active collaborative plan of care related to this patient's health condition, breast cancer --the management of which requires the direction of a practitioner with specialized clinical knowledge, skill, and expertise.

## 2025-08-15 DIAGNOSIS — M79.601 RIGHT ARM PAIN: ICD-10-CM

## 2025-08-18 RX ORDER — GABAPENTIN 800 MG/1
800 TABLET ORAL 3 TIMES DAILY
Qty: 90 TABLET | Refills: 0 | Status: SHIPPED | OUTPATIENT
Start: 2025-08-18 | End: 2026-08-18

## 2025-08-19 ENCOUNTER — TELEPHONE (OUTPATIENT)
Dept: INTERNAL MEDICINE | Facility: CLINIC | Age: 70
End: 2025-08-19
Payer: MEDICARE

## 2025-08-19 ENCOUNTER — PATIENT MESSAGE (OUTPATIENT)
Dept: PRIMARY CARE CLINIC | Facility: CLINIC | Age: 70
End: 2025-08-19
Payer: MEDICARE

## 2025-08-19 DIAGNOSIS — I15.2 HYPERTENSION ASSOCIATED WITH DIABETES: ICD-10-CM

## 2025-08-19 DIAGNOSIS — E11.21 CONTROLLED TYPE 2 DIABETES MELLITUS WITH DIABETIC NEPHROPATHY, WITHOUT LONG-TERM CURRENT USE OF INSULIN: ICD-10-CM

## 2025-08-19 DIAGNOSIS — E78.5 HYPERLIPIDEMIA ASSOCIATED WITH TYPE 2 DIABETES MELLITUS: Primary | ICD-10-CM

## 2025-08-19 DIAGNOSIS — E11.59 HYPERTENSION ASSOCIATED WITH DIABETES: ICD-10-CM

## 2025-08-19 DIAGNOSIS — E11.69 HYPERLIPIDEMIA ASSOCIATED WITH TYPE 2 DIABETES MELLITUS: Primary | ICD-10-CM

## 2025-08-19 RX ORDER — TIRZEPATIDE 7.5 MG/.5ML
7.5 INJECTION, SOLUTION SUBCUTANEOUS
Qty: 4 PEN | Refills: 0 | Status: SHIPPED | OUTPATIENT
Start: 2025-08-19

## (undated) DEVICE — SOL POVIDONE SCRUB IODINE 4 OZ

## (undated) DEVICE — SUT ETHILON 2-0 PSLX 30IN

## (undated) DEVICE — SPONGE LAP 18X18 PREWASHED

## (undated) DEVICE — STAPLER SKIN ROTATING HEAD

## (undated) DEVICE — BRA SURGICAL LG 38-40

## (undated) DEVICE — Device

## (undated) DEVICE — SOL NS 1000CC

## (undated) DEVICE — SUT STRATAFIX PDS 1 CTX 18IN

## (undated) DEVICE — INTERPULSE SET

## (undated) DEVICE — PAD PREP CUFFED NS 24X48IN

## (undated) DEVICE — CONTAINER SPECIMEN STRL 3OZ

## (undated) DEVICE — TRAY MINOR GEN SURG

## (undated) DEVICE — GOWN SURGICAL X-LARGE

## (undated) DEVICE — MANIFOLD 4 PORT

## (undated) DEVICE — EVACUATOR WOUND BULB 100CC

## (undated) DEVICE — ALCOHOL 70% ISOP W/GREEN 16OZ

## (undated) DEVICE — NDL 18GA X1 1/2 REG BEVEL

## (undated) DEVICE — HOOK STAY ELAS 5MM 8EA/PK

## (undated) DEVICE — PAD ABD 8X10 STERILE

## (undated) DEVICE — DRAPE TIBURON ORTHOPEDIC SPLIT

## (undated) DEVICE — HOOD T-5 TEAR AWAY STERILE

## (undated) DEVICE — DRAPE INCISE IOBAN 2 23X23IN

## (undated) DEVICE — COVER TABLE HVY DTY 60X90IN

## (undated) DEVICE — SET FLUID TRANSFER ASEPTIC

## (undated) DEVICE — NDL 22GA X1 1/2 REG BEVEL

## (undated) DEVICE — DRAPE STERI INSTRUMENT 1018

## (undated) DEVICE — SUT MCRYL PLUS 4-0 PS2 27IN

## (undated) DEVICE — SYR 50CC LL

## (undated) DEVICE — PACK UNIVERSAL SPLIT II

## (undated) DEVICE — SEE MEDLINE ITEM 152622

## (undated) DEVICE — SUT 2-0 VICRYL / CT-1

## (undated) DEVICE — ELECTRODE EXTENDED BLADE

## (undated) DEVICE — SEE MEDLINE ITEM 157117

## (undated) DEVICE — ELECTRODE BLADE INSULATED 1 IN

## (undated) DEVICE — SUT MONOCRYL 3-0 PS-2 UND

## (undated) DEVICE — SUT SILK SH 2-0 30IN MP BLK

## (undated) DEVICE — GLOVE SENSICARE PI SURG 7.5

## (undated) DEVICE — SPONGE DERMACEA GAUZE 4X4

## (undated) DEVICE — SEE MEDLINE ITEM 157128

## (undated) DEVICE — SUT 2-0 VICRYL / SH (J417)

## (undated) DEVICE — GLOVE SENSICARE PI GRN 8

## (undated) DEVICE — SPONGE ENDO DISP LF

## (undated) DEVICE — GAUZE FLUFF XXLG 36X36 2 PLY

## (undated) DEVICE — DRESSING XEROFORM FOIL PK 1X8

## (undated) DEVICE — BOVIE SUCTION

## (undated) DEVICE — BLADE SAW RECIP 76MMX12.5MM

## (undated) DEVICE — SKINMARKER & RULER REGULAR X-F

## (undated) DEVICE — SEE MEDLINE ITEM 152512

## (undated) DEVICE — SYR DISP LL 5CC

## (undated) DEVICE — ELECTRODE BLD 1 INCH TEFLON

## (undated) DEVICE — INJECTION SODIUM CHLORIDE 50ML

## (undated) DEVICE — NDL HYPO STD REG BVL 18GX1.5IN

## (undated) DEVICE — DRESSING GAUZE XEROFORM 5X9

## (undated) DEVICE — DRESSING TRANS 4X4 TEGADERM

## (undated) DEVICE — SYR 10CC LUER LOCK

## (undated) DEVICE — SUT VICRYL 1 OB 36 CTX

## (undated) DEVICE — ELECTRODE REM PLYHSV RETURN 9

## (undated) DEVICE — SOL BETADINE 5%

## (undated) DEVICE — GAUZE SPONGE 4X4 12PLY

## (undated) DEVICE — NEOGUARD COVER 4X30CM STERILE

## (undated) DEVICE — DRAPE THYROID WITH ARMBOARD

## (undated) DEVICE — SYR ONLY LUER LOCK 20CC

## (undated) DEVICE — BLADE SURG CARBON STEEL SZ11

## (undated) DEVICE — SET BLD COLL SAFETY 21G X 3/4

## (undated) DEVICE — DRESSING OPTIFOAM AG 4X12IN

## (undated) DEVICE — DRESSING TELFA STRL 4X3 LF

## (undated) DEVICE — SUT VICRYL 3-0 27 SH

## (undated) DEVICE — CARD UNIV KNEE NAVGTN SW-SCL L

## (undated) DEVICE — SUT PDS II 2-0 CT1

## (undated) DEVICE — STRIP MEDI WND CLSR 1/2X4IN

## (undated) DEVICE — BLADE REAMER PILOT HOLE 35MM

## (undated) DEVICE — SEE MEDLINE ITEM 146417

## (undated) DEVICE — DRAPE C ARM 42 X 120 10/BX

## (undated) DEVICE — CUP MEDICINE STERILE 2OZ

## (undated) DEVICE — BATTERY INSTRUMENT

## (undated) DEVICE — APPLIER CLIP LIAGCLIP 9.375IN

## (undated) DEVICE — DURAPREP SURG SCRUB 26ML

## (undated) DEVICE — GOWN AERO CHROME W/ TOWEL XL

## (undated) DEVICE — YANKAUER OPEN TIP W/O VENT

## (undated) DEVICE — KIT PREVENA PLUS

## (undated) DEVICE — SYS CLSR DERMABOND PRINEO 22CM

## (undated) DEVICE — NDL BLUNT W/ FILTER 18GX1.5IN

## (undated) DEVICE — APPLICATOR CHLORAPREP ORN 26ML

## (undated) DEVICE — DRAPE CASSETTE 20 X 40

## (undated) DEVICE — NOZZLE BNE INJ CEM FEM POST 65

## (undated) DEVICE — SOL IRR NACL .9% 3000ML

## (undated) DEVICE — ADHESIVE DERMABOND ADVANCED

## (undated) DEVICE — NDL HYPO REG 25G X 1 1/2

## (undated) DEVICE — DECANTER FLUID TRNSF WHITE 9IN

## (undated) DEVICE — DRAIN CHANNEL ROUND 15FR

## (undated) DEVICE — KIT MX BNE CEM REVOLTN W/BRKWY

## (undated) DEVICE — SUT CTD VICRYL 2.0

## (undated) DEVICE — SOL NACL 0.9% INJ PF/50151

## (undated) DEVICE — SET DECANTER MEDICHOICE

## (undated) DEVICE — BLADE RMR PATELLA 35MM

## (undated) DEVICE — COVER OVERHEAD SURG LT BLUE

## (undated) DEVICE — SCRUB 10% POVIDONE IODINE 4OZ

## (undated) DEVICE — BLADE 90X13X1.27MM

## (undated) DEVICE — ADAPTER DUAL IRRIGATION

## (undated) DEVICE — SPONGE COTTON TRAY 4X4IN